# Patient Record
Sex: FEMALE | Race: BLACK OR AFRICAN AMERICAN | NOT HISPANIC OR LATINO | Employment: OTHER | URBAN - METROPOLITAN AREA
[De-identification: names, ages, dates, MRNs, and addresses within clinical notes are randomized per-mention and may not be internally consistent; named-entity substitution may affect disease eponyms.]

---

## 2017-01-19 ENCOUNTER — INFUSION THERAPY VISIT (OUTPATIENT)
Dept: INFUSION THERAPY | Facility: CLINIC | Age: 77
End: 2017-01-19
Payer: MEDICARE

## 2017-01-19 VITALS
SYSTOLIC BLOOD PRESSURE: 88 MMHG | DIASTOLIC BLOOD PRESSURE: 63 MMHG | HEART RATE: 73 BPM | OXYGEN SATURATION: 98 % | TEMPERATURE: 97 F | RESPIRATION RATE: 18 BRPM

## 2017-01-19 DIAGNOSIS — K55.20 AVM (ARTERIOVENOUS MALFORMATION) OF SMALL BOWEL, ACQUIRED: ICD-10-CM

## 2017-01-19 DIAGNOSIS — R19.7 DIARRHEA: Primary | ICD-10-CM

## 2017-01-19 DIAGNOSIS — K31.811 ANGIODYSPLASIA OF STOMACH AND DUODENUM WITH HEMORRHAGE: ICD-10-CM

## 2017-01-19 DIAGNOSIS — D50.0 IRON DEFICIENCY ANEMIA DUE TO CHRONIC BLOOD LOSS: ICD-10-CM

## 2017-01-19 PROCEDURE — 99207 ZZC NO CHARGE LOS: CPT

## 2017-01-19 PROCEDURE — 96372 THER/PROPH/DIAG INJ SC/IM: CPT | Performed by: NURSE PRACTITIONER

## 2017-01-19 RX ADMIN — OCTREOTIDE ACETATE 20 MG: KIT INTRAMUSCULAR at 12:06

## 2017-01-19 NOTE — PROGRESS NOTES
Infusion Nursing Note:  Rachele LORRIE Martínez presents today for Sandostatin.    Patient seen by provider today: No   present during visit today: Not Applicable.    Note: pt last had injection on 12/22, tolerated without issue.    Intravenous Access:  No Intravenous access/labs at this visit.    Treatment Conditions:  Not Applicable.      Post Infusion Assessment:  Patient tolerated injection without incident to RIGHT gluteal.    Discharge Plan:   Patient and/or family verbalized understanding of discharge instructions and all questions answered.  Copy of AVS reviewed with patient and/or family.  Patient will return 4 weeks for next appointment, they will schedule this on their way out today.  Patient discharged in stable condition accompanied by: self and daughter.  Departure Mode: Ambulatory.    Jerilyn Guajardo RN

## 2017-02-16 ENCOUNTER — INFUSION THERAPY VISIT (OUTPATIENT)
Dept: INFUSION THERAPY | Facility: CLINIC | Age: 77
End: 2017-02-16
Payer: MEDICARE

## 2017-02-16 VITALS
HEART RATE: 71 BPM | TEMPERATURE: 97 F | BODY MASS INDEX: 23.21 KG/M2 | DIASTOLIC BLOOD PRESSURE: 66 MMHG | WEIGHT: 135.2 LBS | SYSTOLIC BLOOD PRESSURE: 121 MMHG

## 2017-02-16 DIAGNOSIS — R19.7 DIARRHEA: Primary | ICD-10-CM

## 2017-02-16 DIAGNOSIS — K55.20 AVM (ARTERIOVENOUS MALFORMATION) OF SMALL BOWEL, ACQUIRED: ICD-10-CM

## 2017-02-16 DIAGNOSIS — D50.0 IRON DEFICIENCY ANEMIA DUE TO CHRONIC BLOOD LOSS: ICD-10-CM

## 2017-02-16 DIAGNOSIS — K31.811 ANGIODYSPLASIA OF STOMACH AND DUODENUM WITH HEMORRHAGE: ICD-10-CM

## 2017-02-16 PROCEDURE — 96372 THER/PROPH/DIAG INJ SC/IM: CPT | Performed by: NURSE PRACTITIONER

## 2017-02-16 PROCEDURE — 99207 ZZC NO CHARGE LOS: CPT

## 2017-02-16 RX ADMIN — OCTREOTIDE ACETATE 20 MG: KIT INTRAMUSCULAR at 11:50

## 2017-02-16 NOTE — PROGRESS NOTES
Infusion Nursing Note:  Rachele Martínez presents today for Sandostatin.    Patient seen by provider today: No   present during visit today: Not Applicable.    Note: N/A.    Intravenous Access:  No Intravenous access/labs at this visit.    Treatment Conditions:  Not Applicable.      Post Infusion Assessment:  Patient tolerated injection without incident.  Site patent and intact, free from redness, edema or discomfort.    Discharge Plan:   Copy of AVS reviewed with patient and/or family.  Patient will return in 1 month for next appointment.  Patient discharged in stable condition accompanied by: daughter.  Departure Mode: Ambulatory.    Salome Bahena RN

## 2017-02-16 NOTE — MR AVS SNAPSHOT
After Visit Summary   2/16/2017    Rachele Martínez    MRN: [id number]           Patient Information     Date Of Birth          1940        Visit Information        Provider Department      2/16/2017 11:30 AM [address]        Today's Diagnoses     Diarrhea    -  1    Angiodysplasia of stomach and duodenum with hemorrhage        AVM (arteriovenous malformation) of small bowel, acquired (H)        Iron deficiency anemia due to chronic blood loss           Follow-ups after your visit        Your next 10 appointments already scheduled     May 04, 2017  9:30 AM CDT   Lab with  LAB   [facility] ([facility])    [address] [place] [address] [phone]            May 04, 2017 10:00 AM CDT   US ABDOMEN COMPLETE with [address] [place] [address] Imaging Center US ([facility])    [address] [place] [address] [phone]           Please bring a list of your medicines (including vitamins, minerals and over-the-counter drugs). Also, tell your doctor about any allergies you may have. Wear comfortable clothes and leave your valuables at home.  Adults: No eating or drinking for 8 hours before the exam. You may take medicine with a small sip of water.  Children: - Children 6+ years: No food or drink for 6 hours before exam. - Children 1-5 years: No food or drink for 4 hours before exam. - Infants, breast-fed: may have breast milk up to 2 hours before exam. - Infants, formula: may have bottle until 4 hours before exam.  Please call the Imaging Department at your exam site with any questions.            May 04, 2017 11:00 AM CDT   (Arrive by 10:45 AM)   Return General Liver with Nguyen Yusuf PA-C   [facility] ([facility])    [address] [place] [address] [phone]              Who to contact      If you have questions or need follow up information about today's clinic visit or your schedule please contact [facility] directly at [phone].  Normal or non-critical lab and imaging results will be communicated to you by Covacsishart, letter or phone within 4 business days after the clinic has received the results. If you do not hear from us within 7 days, please contact the clinic through Covacsishart or phone. If you have a critical or abnormal lab result, we will notify you by phone as soon as possible.  Submit refill requests through Signal Vine or call your pharmacy and they will forward the refill request to us. Please allow 3 business days for your refill to be completed.          Additional Information About Your Visit        Signal Vine Information     Signal Vine is an electronic gateway that provides easy, online access to your medical records. With Signal Vine, you can request a clinic appointment, read your test results, renew a prescription or communicate with your care team.     To sign up for Signal Vine visit the website at [email or web address]   You will be asked to enter the access code listed below, as well as some personal information. Please follow the directions to create your username and password.     Your access code is: [email or web address]  Expires: 2017  6:31 AM     Your access code will  in 90 days. If you need help or a new code, please contact your [facility] or call [phone] for assistance.        Care EveryWhere ID     This is your Care EveryWhere ID. This could be used by other organizations to access your [place] medical records  [id number]        Your Vitals Were     Pulse Temperature BMI (Body Mass Index)             71 97  F (36.1  C) (Oral) 23.21 kg/m2          Blood Pressure from Last 3 Encounters:   17 121/66   17 (!) 88/63   16 130/75    Weight from Last 3 Encounters:   17 61.3 kg (135 lb 3.2 oz)    12/22/16 61 kg (134 lb 8 oz)   11/03/16 61.1 kg (134 lb 12.8 oz)              Today, you had the following     No orders found for display         Today's Medication Changes          These changes are accurate as of: 2/16/17 11:55 AM.  If you have any questions, ask your nurse or doctor.               These medicines have changed or have updated prescriptions.        Dose/Directions    carvedilol 6.25 MG tablet   Commonly known as:  COREG   This may have changed:    - how much to take  - additional instructions   Used for:  Hypertension goal BP (blood pressure) < 140/80        Dose:  18.75 mg   Take 3 tablets (18.75 mg) by mouth 2 times daily (with meals)   Quantity:  150 tablet   Refills:  3                Primary Care Provider Office Phone # Fax #    Agnieszka Rodriguez [phone]       [address] [place] [address]        Thank you!     Thank you for choosing [facility]  for your care. Our goal is always to provide you with excellent care. Hearing back from our patients is one way we can continue to improve our services. Please take a few minutes to complete the written survey that you may receive in the mail after your visit with us. Thank you!             Your Updated Medication List - Protect others around you: Learn how to safely use, store and throw away your medicines at www.disposemymeds.org.          This list is accurate as of: 2/16/17 11:55 AM.  Always use your most recent med list.                   Brand Name Dispense Instructions for use    Calcium Acetate (Phos Binder) 667 MG Caps      TAKE 2 CAPSULE BY MOUTH THREE TIMES A DAY WITH MEALS       calcium carbonate 500 MG tablet    OS-BELGICA 500 mg [place]. Ca    90 tablet    Take 1 tablet (500 mg) by mouth 3 times daily       carvedilol 6.25 MG tablet    COREG    150 tablet    Take 3 tablets (18.75 mg) by mouth 2 times daily (with meals)       OCTREOTIDE ACETATE IJ      Inject as  directed every 30 days       order for DME     1 Device    Equipment being ordered: 4 wheel walker with seat.       polyethylene glycol 3350 Powd     1530 g    Take 17 g by mouth daily       sertraline 50 MG tablet    ZOLOFT    90 tablet    Take 1 tablet (50 mg) by mouth daily

## 2017-03-16 ENCOUNTER — INFUSION THERAPY VISIT (OUTPATIENT)
Dept: INFUSION THERAPY | Facility: CLINIC | Age: 77
End: 2017-03-16
Payer: MEDICARE

## 2017-03-16 VITALS
SYSTOLIC BLOOD PRESSURE: 118 MMHG | DIASTOLIC BLOOD PRESSURE: 70 MMHG | HEART RATE: 81 BPM | OXYGEN SATURATION: 100 % | TEMPERATURE: 97.8 F | RESPIRATION RATE: 18 BRPM

## 2017-03-16 DIAGNOSIS — K55.20 AVM (ARTERIOVENOUS MALFORMATION) OF SMALL BOWEL, ACQUIRED: ICD-10-CM

## 2017-03-16 DIAGNOSIS — D50.0 IRON DEFICIENCY ANEMIA DUE TO CHRONIC BLOOD LOSS: ICD-10-CM

## 2017-03-16 DIAGNOSIS — R19.7 DIARRHEA: Primary | ICD-10-CM

## 2017-03-16 DIAGNOSIS — K31.811 ANGIODYSPLASIA OF STOMACH AND DUODENUM WITH HEMORRHAGE: ICD-10-CM

## 2017-03-16 PROCEDURE — 96372 THER/PROPH/DIAG INJ SC/IM: CPT | Performed by: NURSE PRACTITIONER

## 2017-03-16 PROCEDURE — 99207 ZZC NO CHARGE LOS: CPT

## 2017-03-16 RX ADMIN — OCTREOTIDE ACETATE 20 MG: KIT INTRAMUSCULAR at 12:02

## 2017-03-16 NOTE — PROGRESS NOTES
Infusion Nursing Note:  Rachele Martínez presents today for Sandostatin.    Patient seen by provider today: No   present during visit today: Not Applicable.    Note: N/A.    Intravenous Access:  No Intravenous access/labs at this visit.    Treatment Conditions:  Not Applicable.      Post Infusion Assessment:  Patient tolerated injection without incident.    Discharge Plan:   Pt to make appt for 4 wks.    ADAM GUO RN

## 2017-03-16 NOTE — MR AVS SNAPSHOT
After Visit Summary   3/16/2017    Rachele Martínez    MRN: [id number]           Patient Information     Date Of Birth          1940        Visit Information        Provider Department      3/16/2017 11:30 AM [address]        Today's Diagnoses     Diarrhea    -  1    Angiodysplasia of stomach and duodenum with hemorrhage        AVM (arteriovenous malformation) of small bowel, acquired (H)        Iron deficiency anemia due to chronic blood loss           Follow-ups after your visit        Your next 10 appointments already scheduled     Apr 13, 2017 11:30 AM CDT   Level O with [address] ([facility])    [address] [address] [place] [id number] [phone]            May 04, 2017  9:30 AM CDT   Lab with  LAB   [facility])    [address] [place] [address] [phone]            May 04, 2017 10:00 AM CDT   US ABDOMEN COMPLETE with [email or web address]   [facility] Imaging Center US ([facility])    [address] [place] [address] [phone]           Please bring a list of your medicines (including vitamins, minerals and over-the-counter drugs). Also, tell your doctor about any allergies you may have. Wear comfortable clothes and leave your valuables at home.  Adults: No eating or drinking for 8 hours before the exam. You may take medicine with a small sip of water.  Children: - Children 6+ years: No food or drink for 6 hours before exam. - Children 1-5 years: No food or drink for 4 hours before exam. - Infants, breast-fed: may have breast milk up to 2 hours before exam. - Infants, formula: may have bottle until 4 hours before exam.  Please call the Imaging Department at your exam site with any questions.            May 04, 2017 11:00 AM CDT   (Arrive by 10:45 AM)   Return General Liver with  Nguyen Yusuf PA-C   [facility] ([facility])    [address] [facility]  3rd [facility] [address] [phone]              Who to contact     If you have questions or need follow up information about today's clinic visit or your schedule please contact [facility] directly at [phone].  Normal or non-critical lab and imaging results will be communicated to you by MyChart, letter or phone within 4 business days after the clinic has received the results. If you do not hear from us within 7 days, please contact the clinic through MyChart or phone. If you have a critical or abnormal lab result, we will notify you by phone as soon as possible.  Submit refill requests through Fortem or call your pharmacy and they will forward the refill request to us. Please allow 3 business days for your refill to be completed.          Additional Information About Your Visit        Fortem Information     Fortem is an electronic gateway that provides easy, online access to your medical records. With Fortem, you can request a clinic appointment, read your test results, renew a prescription or communicate with your care team.     To sign up for Fortem visit the website at [email or web address]   You will be asked to enter the access code listed below, as well as some personal information. Please follow the directions to create your username and password.     Your access code is: [email or web address]  Expires: 2017  4:49 PM     Your access code will  in 90 days. If you need help or a new code, please contact your [facility] or call [phone] for assistance.        Care EveryWhere ID     This is your Care EveryWhere ID. This could be used by other organizations to access your [place] medical records  [id number]        Your Vitals Were     Pulse Temperature Respirations Pulse Oximetry          81 97.8  F (36.6  C)  (Oral) 18 100%         Blood Pressure from Last 3 Encounters:   03/16/17 118/70   02/16/17 121/66   01/19/17 (!) 88/63    Weight from Last 3 Encounters:   02/16/17 61.3 kg (135 lb 3.2 oz)   12/22/16 61 kg (134 lb 8 oz)   11/03/16 61.1 kg (134 lb 12.8 oz)              Today, you had the following     No orders found for display       Primary Care Provider Office Phone # Fax #    Agnieszka Rodriguez [phone]       [address]  [facility] [address]        Thank you!     Thank you for choosing [facility]  for your care. Our goal is always to provide you with excellent care. Hearing back from our patients is one way we can continue to improve our services. Please take a few minutes to complete the written survey that you may receive in the mail after your visit with us. Thank you!             Your Updated Medication List - Protect others around you: Learn how to safely use, store and throw away your medicines at www.disposemymeds.org.          This list is accurate as of: 3/16/17  4:49 PM.  Always use your most recent med list.                   Brand Name Dispense Instructions for use    Calcium Acetate (Phos Binder) 667 MG Caps      TAKE 2 CAPSULE BY MOUTH THREE TIMES A DAY WITH MEALS       calcium carbonate 500 MG tablet    OS-BELGICA 500 mg [place]. Ca    90 tablet    Take 1 tablet (500 mg) by mouth 3 times daily       carvedilol 6.25 MG tablet    COREG    150 tablet    Take 3 tablets (18.75 mg) by mouth 2 times daily (with meals)       OCTREOTIDE ACETATE IJ      Inject as directed every 30 days       order for DME     1 Device    Equipment being ordered: 4 wheel walker with seat.       polyethylene glycol 3350 Powd     1530 g    Take 17 g by mouth daily       sertraline 50 MG tablet    ZOLOFT    90 tablet    Take 1 tablet (50 mg) by mouth daily

## 2017-04-13 ENCOUNTER — INFUSION THERAPY VISIT (OUTPATIENT)
Dept: INFUSION THERAPY | Facility: CLINIC | Age: 77
End: 2017-04-13
Payer: MEDICARE

## 2017-04-13 VITALS
TEMPERATURE: 97.2 F | RESPIRATION RATE: 18 BRPM | SYSTOLIC BLOOD PRESSURE: 112 MMHG | HEART RATE: 74 BPM | OXYGEN SATURATION: 99 % | DIASTOLIC BLOOD PRESSURE: 61 MMHG

## 2017-04-13 DIAGNOSIS — D50.0 IRON DEFICIENCY ANEMIA DUE TO CHRONIC BLOOD LOSS: ICD-10-CM

## 2017-04-13 DIAGNOSIS — K55.20 AVM (ARTERIOVENOUS MALFORMATION) OF SMALL BOWEL, ACQUIRED: ICD-10-CM

## 2017-04-13 DIAGNOSIS — K31.811 ANGIODYSPLASIA OF STOMACH AND DUODENUM WITH HEMORRHAGE: ICD-10-CM

## 2017-04-13 DIAGNOSIS — R19.7 DIARRHEA: Primary | ICD-10-CM

## 2017-04-13 PROCEDURE — 96372 THER/PROPH/DIAG INJ SC/IM: CPT | Performed by: NURSE PRACTITIONER

## 2017-04-13 PROCEDURE — 99207 ZZC NO CHARGE LOS: CPT

## 2017-04-13 RX ADMIN — OCTREOTIDE ACETATE 20 MG: KIT INTRAMUSCULAR at 11:51

## 2017-04-13 NOTE — PROGRESS NOTES
Infusion Nursing Note:  Rachele Martínez presents today for Sandostatin.    Patient seen by provider today: No   present during visit today: Not Applicable.    Note: N/A.    Intravenous Access:  No Intravenous access/labs at this visit.    Treatment Conditions:  Not Applicable.      Post Infusion Assessment:  Patient tolerated injection without incident.  Site patent and intact, free from redness, edema or discomfort.    Discharge Plan:   Patient will return 5/10/17 for next appointment.   Patient discharged in stable condition accompanied by: self and wife.  Departure Mode: Ambulatory.    Loraine García RN

## 2017-04-13 NOTE — MR AVS SNAPSHOT
After Visit Summary   4/13/2017    Rachele Martínez    MRN: [id number]           Patient Information     Date Of Birth          1940        Visit Information        Provider Department      4/13/2017 11:30 AM [address]        Today's Diagnoses     Diarrhea    -  1    Angiodysplasia of stomach and duodenum with hemorrhage        AVM (arteriovenous malformation) of small bowel, acquired (H)        Iron deficiency anemia due to chronic blood loss           Follow-ups after your visit        Follow-up notes from your care team     Return in about 4 weeks (around 5/11/2017).      Your next 10 appointments already scheduled     May 04, 2017  9:30 AM CDT   Lab with  LAB   [facility] ([facility])    [address] [place] [address] [phone]            May 04, 2017 10:00 AM CDT   US ABDOMEN COMPLETE with US1   [facility] Imaging Center US ([facility])    [address] [place] [address] [phone]           Please bring a list of your medicines (including vitamins, minerals and over-the-counter drugs). Also, tell your doctor about any allergies you may have. Wear comfortable clothes and leave your valuables at home.  Adults: No eating or drinking for 8 hours before the exam. You may take medicine with a small sip of water.  Children: - Children 6+ years: No food or drink for 6 hours before exam. - Children 1-5 years: No food or drink for 4 hours before exam. - Infants, breast-fed: may have breast milk up to 2 hours before exam. - Infants, formula: may have bottle until 4 hours before exam.  Please call the Imaging Department at your exam site with any questions.            May 04, 2017 11:00 AM CDT   (Arrive by 10:45 AM)   Return General Liver with Nguyen Yusuf PA-C   [facility] Hepatology ([facility])    [address] [place] [address]  [address]  3rd [employer] [id number] [phone]            May 11, 2017 11:30 AM CDT   Level O with [address] ([facility])    [address] [address] [place] [address] [phone]              Who to contact     If you have questions or need follow up information about today's clinic visit or your schedule please contact [facility] directly at [phone].  Normal or non-critical lab and imaging results will be communicated to you by Zoundshart, letter or phone within 4 business days after the clinic has received the results. If you do not hear from us within 7 days, please contact the clinic through Zoundshart or phone. If you have a critical or abnormal lab result, we will notify you by phone as soon as possible.  Submit refill requests through Earnix or call your pharmacy and they will forward the refill request to us. Please allow 3 business days for your refill to be completed.          Additional Information About Your Visit        Earnix Information     Earnix is an electronic gateway that provides easy, online access to your medical records. With Earnix, you can request a clinic appointment, read your test results, renew a prescription or communicate with your care team.     To sign up for Earnix visit the website at [email or web address]   You will be asked to enter the access code listed below, as well as some personal information. Please follow the directions to create your username and password.     Your access code is: [email or web address]  Expires: 2017  4:49 PM     Your access code will  in 90 days. If you need help or a new code, please contact your [facility] or call [phone] for assistance.        Care EveryWhere ID     This is your Care EveryWhere ID. This could be used by other organizations to access your [place] medical records  [id number]        Your  Vitals Were     Pulse Temperature Respirations Pulse Oximetry          74 97.2  F (36.2  C) (Oral) 18 99%         Blood Pressure from Last 3 Encounters:   04/13/17 112/61   03/16/17 118/70   02/16/17 121/66    Weight from Last 3 Encounters:   02/16/17 61.3 kg (135 lb 3.2 oz)   12/22/16 61 kg (134 lb 8 oz)   11/03/16 61.1 kg (134 lb 12.8 oz)              Today, you had the following     No orders found for display       Primary Care Provider Office Phone # Fax #    Agnieszka Erica Rodriguez [phone]       [address]  [facility] [address]        Thank you!     Thank you for choosing [facility]  for your care. Our goal is always to provide you with excellent care. Hearing back from our patients is one way we can continue to improve our services. Please take a few minutes to complete the written survey that you may receive in the mail after your visit with us. Thank you!             Your Updated Medication List - Protect others around you: Learn how to safely use, store and throw away your medicines at www.disposemymeds.org.          This list is accurate as of: 4/13/17  1:55 PM.  Always use your most recent med list.                   Brand Name Dispense Instructions for use    Calcium Acetate (Phos Binder) 667 MG Caps      TAKE 2 CAPSULE BY MOUTH THREE TIMES A DAY WITH MEALS       calcium carbonate 500 MG tablet    OS-BELGICA 500 mg [place]. Ca    90 tablet    Take 1 tablet (500 mg) by mouth 3 times daily       OCTREOTIDE ACETATE IJ      Inject as directed every 30 days       order for DME     1 Device    Equipment being ordered: 4 wheel walker with seat.       polyethylene glycol 3350 Powd     1530 g    Take 17 g by mouth daily       sertraline 50 MG tablet    ZOLOFT    90 tablet    Take 1 tablet (50 mg) by mouth daily

## 2017-04-17 DIAGNOSIS — K74.60 CIRRHOSIS OF LIVER WITHOUT ASCITES, UNSPECIFIED HEPATIC CIRRHOSIS TYPE (H): Primary | ICD-10-CM

## 2017-05-09 ENCOUNTER — OFFICE VISIT (OUTPATIENT)
Dept: GASTROENTEROLOGY | Facility: CLINIC | Age: 77
End: 2017-05-09
Attending: INTERNAL MEDICINE
Payer: MEDICARE

## 2017-05-09 VITALS
HEIGHT: 64 IN | DIASTOLIC BLOOD PRESSURE: 71 MMHG | HEART RATE: 68 BPM | BODY MASS INDEX: 23.9 KG/M2 | OXYGEN SATURATION: 100 % | SYSTOLIC BLOOD PRESSURE: 123 MMHG | RESPIRATION RATE: 16 BRPM | TEMPERATURE: 98.2 F | WEIGHT: 140 LBS

## 2017-05-09 DIAGNOSIS — B18.2 CHRONIC HEPATITIS C WITHOUT HEPATIC COMA (H): Primary | ICD-10-CM

## 2017-05-09 DIAGNOSIS — K74.60 CIRRHOSIS OF LIVER WITHOUT ASCITES, UNSPECIFIED HEPATIC CIRRHOSIS TYPE (H): ICD-10-CM

## 2017-05-09 LAB
ALBUMIN SERPL-MCNC: 4 G/DL (ref 3.4–5)
ALP SERPL-CCNC: 144 U/L (ref 40–150)
ALT SERPL W P-5'-P-CCNC: 20 U/L (ref 0–50)
ANION GAP SERPL CALCULATED.3IONS-SCNC: 15 MMOL/L (ref 3–14)
AST SERPL W P-5'-P-CCNC: 29 U/L (ref 0–45)
BILIRUB DIRECT SERPL-MCNC: 0.1 MG/DL (ref 0–0.2)
BILIRUB SERPL-MCNC: 0.5 MG/DL (ref 0.2–1.3)
BUN SERPL-MCNC: 23 MG/DL (ref 7–30)
CALCIUM SERPL-MCNC: 8.7 MG/DL (ref 8.5–10.1)
CHLORIDE SERPL-SCNC: 94 MMOL/L (ref 94–109)
CO2 SERPL-SCNC: 29 MMOL/L (ref 20–32)
CREAT SERPL-MCNC: 6.88 MG/DL (ref 0.52–1.04)
ERYTHROCYTE [DISTWIDTH] IN BLOOD BY AUTOMATED COUNT: 14.5 % (ref 10–15)
GFR SERPL CREATININE-BSD FRML MDRD: 6 ML/MIN/1.7M2
GLUCOSE SERPL-MCNC: 117 MG/DL (ref 70–99)
HCT VFR BLD AUTO: 32.8 % (ref 35–47)
HGB BLD-MCNC: 10.5 G/DL (ref 11.7–15.7)
INR PPP: 1.19 (ref 0.86–1.14)
MCH RBC QN AUTO: 33.4 PG (ref 26.5–33)
MCHC RBC AUTO-ENTMCNC: 32 G/DL (ref 31.5–36.5)
MCV RBC AUTO: 105 FL (ref 78–100)
PLATELET # BLD AUTO: 260 10E9/L (ref 150–450)
POTASSIUM SERPL-SCNC: 3.7 MMOL/L (ref 3.4–5.3)
PROT SERPL-MCNC: 8.7 G/DL (ref 6.8–8.8)
RBC # BLD AUTO: 3.14 10E12/L (ref 3.8–5.2)
SODIUM SERPL-SCNC: 138 MMOL/L (ref 133–144)
WBC # BLD AUTO: 6.1 10E9/L (ref 4–11)

## 2017-05-09 PROCEDURE — 80048 BASIC METABOLIC PNL TOTAL CA: CPT | Performed by: INTERNAL MEDICINE

## 2017-05-09 PROCEDURE — 91200 LIVER ELASTOGRAPHY: CPT | Mod: ZF | Performed by: INTERNAL MEDICINE

## 2017-05-09 PROCEDURE — 85610 PROTHROMBIN TIME: CPT | Performed by: INTERNAL MEDICINE

## 2017-05-09 PROCEDURE — 36415 COLL VENOUS BLD VENIPUNCTURE: CPT | Performed by: INTERNAL MEDICINE

## 2017-05-09 PROCEDURE — 80076 HEPATIC FUNCTION PANEL: CPT | Performed by: INTERNAL MEDICINE

## 2017-05-09 PROCEDURE — 85027 COMPLETE CBC AUTOMATED: CPT | Performed by: INTERNAL MEDICINE

## 2017-05-09 PROCEDURE — 99213 OFFICE O/P EST LOW 20 MIN: CPT | Mod: ZF

## 2017-05-09 RX ORDER — B,C/FERROUS FUM/FA/D3/ZINC OX 8MG-800MCG
1 TABLET ORAL DAILY
COMMUNITY
End: 2020-11-04

## 2017-05-09 ASSESSMENT — PAIN SCALES - GENERAL: PAINLEVEL: NO PAIN (0)

## 2017-05-09 NOTE — MR AVS SNAPSHOT
After Visit Summary   5/9/2017    Rachele Martínez    MRN: [id number]           Patient Information     Date Of Birth          1940        Visit Information        Provider Department      5/9/2017 12:15 PM Andriy Barnett MD [facility] Hepatology        Today's Diagnoses     Chronic hepatitis C without hepatic coma (H)    -  1       Follow-ups after your visit        Your next 10 appointments already scheduled     May 11, 2017 11:30 AM CDT   Level O with BAY 6 INFUSION   [facility]    [address] [address] [place] [address]   [phone]            Nov 07, 2017 10:00 AM CST   Lab with  LAB   [facility] ([facility])    [address]  1st Floor  [facility] [address] [phone]            Nov 07, 2017 11:00 AM CST   (Arrive by 10:45 AM)   Return General Liver with Andriy Barnett MD   [facility] Hepatology ([facility])    [address]  3rd Floor  [facility] [address] [phone]              Future tests that were ordered for you today     Open Future Orders        Priority Expected Expires Ordered    Fibrosis Scan Routine 5/9/2017 5/9/2018 5/9/2017            Who to contact     If you have questions or need follow up information about today's clinic visit or your schedule please contact [facility] HEPATOLOGY directly at [phone].  Normal or non-critical lab and imaging results will be communicated to you by MyChart, letter or phone within 4 business days after the clinic has received the results. If you do not hear from us within 7 days, please contact the clinic through MyChart or phone. If you have a critical or abnormal lab result, we will notify you by phone as soon as possible.  Submit refill requests through [employer] or call your pharmacy and they will forward the refill request to us. Please allow 3 business days for your refill to be completed.           "Additional Information About Your Visit        Care EveryWhere ID     This is your Care EveryWhere ID. This could be used by other organizations to access your [facility] medical records  [id number]        Your Vitals Were     Pulse Temperature Respirations Height Pulse Oximetry BMI (Body Mass Index)    68 98.2  F (36.8  C) (Oral) 16 1.626 m (5' 4.02\") 100% 24.02 kg/m2       Blood Pressure from Last 3 Encounters:   05/09/17 123/71   04/13/17 112/61   03/16/17 118/70    Weight from Last 3 Encounters:   05/09/17 63.5 kg (140 lb)   02/16/17 61.3 kg (135 lb 3.2 oz)   12/22/16 61 kg (134 lb 8 oz)                 Today's Medication Changes          These changes are accurate as of: 5/9/17  1:10 PM.  If you have any questions, ask your nurse or doctor.               These medicines have changed or have updated prescriptions.        Dose/Directions    polyethylene glycol 3350 Powd   This may have changed:    - when to take this  - reasons to take this   Used for:  Slow transit constipation        Dose:  17 g   Take 17 g by mouth daily   Quantity:  1530 g   Refills:  3                Primary Care Provider Office Phone # Fax #    Agnieszkamena Rodriguez [phone]       [address]  [facility] [address]        Thank you!     Thank you for choosing [facility] HEPATOLOGY  for your care. Our goal is always to provide you with excellent care. Hearing back from our patients is one way we can continue to improve our services. Please take a few minutes to complete the written survey that you may receive in the mail after your visit with us. Thank you!             Your Updated Medication List - Protect others around you: Learn how to safely use, store and throw away your medicines at www.disposemymeds.org.          This list is accurate as of: 5/9/17  1:10 PM.  Always use your most recent med list.                   Brand Name Dispense Instructions for use    Calcium Acetate (Phos Binder) " 667 MG Caps      TAKE 2 CAPSULE BY MOUTH THREE TIMES A DAY WITH MEALS       OCTREOTIDE ACETATE IJ      Inject as directed every 30 days       order for DME     1 Device    Equipment being ordered: 4 wheel walker with seat.       polyethylene glycol 3350 Powd     1530 g    Take 17 g by mouth daily       PRORENAL + D Tabs      Take 1 tablet by mouth daily       sertraline 50 MG tablet    ZOLOFT    90 tablet    Take 1 tablet (50 mg) by mouth daily

## 2017-05-09 NOTE — NURSING NOTE
"Chief Complaint   Patient presents with     RECHECK     Hep C       Initial /71 (BP Location: Left arm, Patient Position: Chair, Cuff Size: Adult Regular)  Pulse 68  Temp 98.2  F (36.8  C) (Oral)  Resp 16  Ht 1.626 m (5' 4.02\")  Wt 63.5 kg (140 lb)  SpO2 100%  BMI 24.02 kg/m2 Estimated body mass index is 24.02 kg/(m^2) as calculated from the following:    Height as of this encounter: 1.626 m (5' 4.02\").    Weight as of this encounter: 63.5 kg (140 lb).  Medication Reconciliation: complete    "

## 2017-05-09 NOTE — LETTER
May 10, 2017       TO: Rachele Martínez  [address]    [place] [facility] [address]       Dear MsAbdiel,    Your liver stiffness assessment showed your liver stiffness score is 19.9 kilopascals.  This indicates significant scarring in your liver (stage 4/cirrhosis).    Please be sure to follow-up with your liver specialist as planned. If you have any questions or are unsure of your liver plan of care, please call our clinic at [phone].    Laurence Kaiser CNP, Hepatology

## 2017-05-09 NOTE — LETTER
5/9/2017      RE: Rachele Martínez  [address]    [place] [facility] [address]       HISTORY OF PRESENT ILLNESS:  I had the pleasure of seeing Rachele Martínez for followup in the Liver Clinic at the [facility] on 05/09/2017.  Ms. 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  She is a sustained virologic responder to hepatitis C treatment.      She is doing extremely well at this visit.  Unfortunately, she has started on dialysis, but otherwise denies any abdominal pain, itching or skin rash or fatigue.  She denies any increased abdominal girth or lower extremity edema.  She denies any fevers or chills, cough or shortness of breath.  She denies any nausea or vomiting, diarrhea or constipation.  Her appetite has been good, and her weight has been stable.  There have been no other new events since she was last seen.  Specifically, she has had no gastrointestinal bleeding or overt signs of encephalopathy.       Current Outpatient Prescriptions   Medication     Multiple Vitamins-Minerals (PRORENAL + D) TABS     Calcium Acetate, Phos Binder, 667 MG CAPS     OCTREOTIDE ACETATE IJ     order for DME     polyethylene glycol 3350 POWD     sertraline (ZOLOFT) 50 MG tablet     No current facility-administered medications for this visit.      B/P: 123/71, T: 98.2, P: 68, R: 16    HEENT exam shows no scleral icterus and no temporal muscle wasting.  Chest is clear.  Abdominal exam shows no increase in girth.  No masses or tenderness to palpation are present.  He liver is 10 cm in span without left lobe enlargement.  No spleen tip is palpable.  Extremity exam shows no edema.  Skin exam shows no stigmata of chronic liver disease.  Neurologic exam shows no asterixis.       Recent Results (from the past 168 hour(s))   Hepatic panel    Collection Time: 05/09/17 12:03 PM   Result Value Ref Range    Bilirubin Direct 0.1 0.0 - 0.2 mg/dL    Bilirubin Total  0.5 0.2 - 1.3 mg/dL    Albumin 4.0 3.4 - 5.0 g/dL    Protein Total 8.7 6.8 - 8.8 g/dL    Alkaline Phosphatase 144 40 - 150 U/L    ALT 20 0 - 50 U/L    AST 29 0 - 45 U/L   CBC with platelets    Collection Time: 05/09/17 12:03 PM   Result Value Ref Range    WBC 6.1 4.0 - 11.0 10e9/L    RBC Count 3.14 (L) 3.8 - 5.2 10e12/L    Hemoglobin 10.5 (L) 11.7 - 15.7 g/dL    Hematocrit 32.8 (L) 35.0 - 47.0 %     (H) 78 - 100 fl    MCH 33.4 (H) 26.5 - 33.0 pg    MCHC 32.0 31.5 - 36.5 g/dL    RDW 14.5 10.0 - 15.0 %    Platelet Count 260 150 - 450 10e9/L   INR    Collection Time: 05/09/17 12:03 PM   Result Value Ref Range    INR 1.19 (H) 0.86 - 1.14   Basic metabolic panel    Collection Time: 05/09/17 12:03 PM   Result Value Ref Range    Sodium 138 133 - 144 mmol/L    Potassium 3.7 3.4 - 5.3 mmol/L    Chloride 94 94 - 109 mmol/L    Carbon Dioxide 29 20 - 32 mmol/L    Anion Gap 15 (H) 3 - 14 mmol/L    Glucose 117 (H) 70 - 99 mg/dL    Urea Nitrogen 23 7 - 30 mg/dL    Creatinine 6.88 (H) 0.52 - 1.04 mg/dL    GFR Estimate 6 (L) >60 mL/min/1.7m2    GFR Estimate If Black 7 (L) >60 mL/min/1.7m2    Calcium 8.7 8.5 - 10.1 mg/dL      My impression is that Ms. Martínez has very well-compensated cirrhosis caused by chronic hepatitis C.  In fact, I did question whether she might have resolved her cirrhosis given her sustained virologic response to hepatitis C treatment.  We did go ahead and perform a fibrosis scan, which does show that she still has a little stiffness that would put her in the cirrhotic range.  She, however, has minimal evidence of cirrhosis by ultrasound and her last endoscopy showed no varices.  Moreover, her platelet count is normal also.  I will not be making any change to her medical regimen.  I will see her back in the clinic again in 6 months for routine cirrhosis care.      Thank you very much for allowing me to participate in the care of your patient.  If you have any questions regarding my recommendations,  please do not hesitate to contact me.       Andriy Barnett MD      Professor of Medicine  [employer]      Executive Medical Director, Solid Organ Transplant Program  [facility]

## 2017-05-11 ENCOUNTER — INFUSION THERAPY VISIT (OUTPATIENT)
Dept: INFUSION THERAPY | Facility: CLINIC | Age: 77
End: 2017-05-11
Payer: MEDICARE

## 2017-05-11 VITALS
DIASTOLIC BLOOD PRESSURE: 57 MMHG | TEMPERATURE: 97.4 F | HEART RATE: 73 BPM | BODY MASS INDEX: 24 KG/M2 | SYSTOLIC BLOOD PRESSURE: 99 MMHG | OXYGEN SATURATION: 98 % | WEIGHT: 139.9 LBS | RESPIRATION RATE: 18 BRPM

## 2017-05-11 DIAGNOSIS — R19.7 DIARRHEA: Primary | ICD-10-CM

## 2017-05-11 DIAGNOSIS — K31.811 ANGIODYSPLASIA OF STOMACH AND DUODENUM WITH HEMORRHAGE: ICD-10-CM

## 2017-05-11 DIAGNOSIS — K55.20 AVM (ARTERIOVENOUS MALFORMATION) OF SMALL BOWEL, ACQUIRED: ICD-10-CM

## 2017-05-11 DIAGNOSIS — D50.0 IRON DEFICIENCY ANEMIA DUE TO CHRONIC BLOOD LOSS: ICD-10-CM

## 2017-05-11 PROCEDURE — 99207 ZZC NO CHARGE LOS: CPT

## 2017-05-11 PROCEDURE — 96372 THER/PROPH/DIAG INJ SC/IM: CPT | Performed by: NURSE PRACTITIONER

## 2017-05-11 RX ADMIN — OCTREOTIDE ACETATE 20 MG: KIT INTRAMUSCULAR at 12:12

## 2017-05-11 NOTE — MR AVS SNAPSHOT
After Visit Summary   5/11/2017    Rachele Martínez    MRN: [id number]           Patient Information     Date Of Birth          1940        Visit Information        Provider Department      5/11/2017 11:30 AM [address]        Today's Diagnoses     Diarrhea    -  1    Angiodysplasia of stomach and duodenum with hemorrhage        AVM (arteriovenous malformation) of small bowel, acquired (H)        Iron deficiency anemia due to chronic blood loss           Follow-ups after your visit        Your next 10 appointments already scheduled     Jun 08, 2017 11:30 AM CDT   Level O with [address] ([facility])    [address] [address] [place] [id number] [phone]            Nov 07, 2017 10:00 AM CST   Lab with  LAB   [facility])    [address]  1st [facility] [address] [phone]            Nov 07, 2017 11:00 AM CST   (Arrive by 10:45 AM)   Return General Liver with Andriy Barnett MD   [facility] Hepatology ([facility])    [address]  3rd Floor  [facility] [address] [phone]              Who to contact     If you have questions or need follow up information about today's clinic visit or your schedule please contact [facility] directly at [phone].  Normal or non-critical lab and imaging results will be communicated to you by MyChart, letter or phone within 4 business days after the clinic has received the results. If you do not hear from us within 7 days, please contact the clinic through MyChart or phone. If you have a critical or abnormal lab result, we will notify you by phone as soon as possible.  Submit refill requests through [employer] or call your pharmacy and they will forward the refill request to us. Please allow 3 business days for your refill to be completed.           Additional Information About Your Visit        Care EveryWhere ID     This is your Care EveryWhere ID. This could be used by other organizations to access your [place] [facility] medical records  [id number]        Your Vitals Were     Pulse Temperature Respirations Pulse Oximetry BMI (Body Mass Index)       73 97.4  F (36.3  C) (Oral) 18 98% 24 kg/m2        Blood Pressure from Last 3 Encounters:   05/11/17 99/57   05/09/17 123/71   04/13/17 112/61    Weight from Last 3 Encounters:   05/11/17 63.5 kg (139 lb 14.4 oz)   05/09/17 63.5 kg (140 lb)   02/16/17 61.3 kg (135 lb 3.2 oz)              Today, you had the following     No orders found for display         Today's Medication Changes          These changes are accurate as of: 5/11/17 11:59 PM.  If you have any questions, ask your nurse or doctor.               These medicines have changed or have updated prescriptions.        Dose/Directions    polyethylene glycol 3350 Powd   This may have changed:    - when to take this  - reasons to take this   Used for:  Slow transit constipation        Dose:  17 g   Take 17 g by mouth daily   Quantity:  1530 g   Refills:  3                Primary Care Provider Office Phone # Fax #    Agnieszka Rodriguez [phone]       [address]  [facility] [address]        Thank you!     Thank you for choosing [facility]  for your care. Our goal is always to provide you with excellent care. Hearing back from our patients is one way we can continue to improve our services. Please take a few minutes to complete the written survey that you may receive in the mail after your visit with us. Thank you!             Your Updated Medication List - Protect others around you: Learn how to safely use, store and throw away your medicines at www.disposemymeds.org.          This list is accurate as of: 5/11/17 11:59 PM.  Always use your most recent med list.                   Brand Name Dispense  Instructions for use    Calcium Acetate (Phos Binder) 667 MG Caps      TAKE 2 CAPSULE BY MOUTH THREE TIMES A DAY WITH MEALS       OCTREOTIDE ACETATE IJ      Inject as directed every 30 days       order for DME     1 Device    Equipment being ordered: 4 wheel walker with seat.       polyethylene glycol 3350 Powd     1530 g    Take 17 g by mouth daily       PRORENAL + D Tabs      Take 1 tablet by mouth daily       sertraline 50 MG tablet    ZOLOFT    90 tablet    Take 1 tablet (50 mg) by mouth daily

## 2017-05-12 NOTE — PROGRESS NOTES
HISTORY OF PRESENT ILLNESS:  I had the pleasure of seeing Rachele Martínez for followup in the Liver Clinic at the [facility] on 05/09/2017.  Ms. 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  She is a sustained virologic responder to hepatitis C treatment.      She is doing extremely well at this visit.  Unfortunately, she has started on dialysis, but otherwise denies any abdominal pain, itching or skin rash or fatigue.  She denies any increased abdominal girth or lower extremity edema.  She denies any fevers or chills, cough or shortness of breath.  She denies any nausea or vomiting, diarrhea or constipation.  Her appetite has been good, and her weight has been stable.  There have been no other new events since she was last seen.  Specifically, she has had no gastrointestinal bleeding or overt signs of encephalopathy.       Current Outpatient Prescriptions   Medication     Multiple Vitamins-Minerals (PRORENAL + D) TABS     Calcium Acetate, Phos Binder, 667 MG CAPS     OCTREOTIDE ACETATE IJ     order for DME     polyethylene glycol 3350 POWD     sertraline (ZOLOFT) 50 MG tablet     No current facility-administered medications for this visit.      B/P: 123/71, T: 98.2, P: 68, R: 16    HEENT exam shows no scleral icterus and no temporal muscle wasting.  Chest is clear.  Abdominal exam shows no increase in girth.  No masses or tenderness to palpation are present.  He liver is 10 cm in span without left lobe enlargement.  No spleen tip is palpable.  Extremity exam shows no edema.  Skin exam shows no stigmata of chronic liver disease.  Neurologic exam shows no asterixis.       Recent Results (from the past 168 hour(s))   Hepatic panel    Collection Time: 05/09/17 12:03 PM   Result Value Ref Range    Bilirubin Direct 0.1 0.0 - 0.2 mg/dL    Bilirubin Total 0.5 0.2 - 1.3 mg/dL    Albumin 4.0 3.4 - 5.0 g/dL    Protein Total 8.7 6.8 - 8.8 g/dL    Alkaline  Phosphatase 144 40 - 150 U/L    ALT 20 0 - 50 U/L    AST 29 0 - 45 U/L   CBC with platelets    Collection Time: 05/09/17 12:03 PM   Result Value Ref Range    WBC 6.1 4.0 - 11.0 10e9/L    RBC Count 3.14 (L) 3.8 - 5.2 10e12/L    Hemoglobin 10.5 (L) 11.7 - 15.7 g/dL    Hematocrit 32.8 (L) 35.0 - 47.0 %     (H) 78 - 100 fl    MCH 33.4 (H) 26.5 - 33.0 pg    MCHC 32.0 31.5 - 36.5 g/dL    RDW 14.5 10.0 - 15.0 %    Platelet Count 260 150 - 450 10e9/L   INR    Collection Time: 05/09/17 12:03 PM   Result Value Ref Range    INR 1.19 (H) 0.86 - 1.14   Basic metabolic panel    Collection Time: 05/09/17 12:03 PM   Result Value Ref Range    Sodium 138 133 - 144 mmol/L    Potassium 3.7 3.4 - 5.3 mmol/L    Chloride 94 94 - 109 mmol/L    Carbon Dioxide 29 20 - 32 mmol/L    Anion Gap 15 (H) 3 - 14 mmol/L    Glucose 117 (H) 70 - 99 mg/dL    Urea Nitrogen 23 7 - 30 mg/dL    Creatinine 6.88 (H) 0.52 - 1.04 mg/dL    GFR Estimate 6 (L) >60 mL/min/1.7m2    GFR Estimate If Black 7 (L) >60 mL/min/1.7m2    Calcium 8.7 8.5 - 10.1 mg/dL      My impression is that Ms. Martínez has very well-compensated cirrhosis caused by chronic hepatitis C.  In fact, I did question whether she might have resolved her cirrhosis given her sustained virologic response to hepatitis C treatment.  We did go ahead and perform a fibrosis scan, which does show that she still has a little stiffness that would put her in the cirrhotic range.  She, however, has minimal evidence of cirrhosis by ultrasound and her last endoscopy showed no varices.  Moreover, her platelet count is normal also.  I will not be making any change to her medical regimen.  I will see her back in the clinic again in 6 months for routine cirrhosis care.      Thank you very much for allowing me to participate in the care of your patient.  If you have any questions regarding my recommendations, please do not hesitate to contact me.       Andriy Barnett MD      Professor of Medicine  [facility]  [employer]      Executive Medical Director, Solid Organ Transplant Program  [facility]

## 2017-06-08 ENCOUNTER — INFUSION THERAPY VISIT (OUTPATIENT)
Dept: INFUSION THERAPY | Facility: CLINIC | Age: 77
End: 2017-06-08
Payer: MEDICARE

## 2017-06-08 VITALS
SYSTOLIC BLOOD PRESSURE: 107 MMHG | TEMPERATURE: 98.1 F | DIASTOLIC BLOOD PRESSURE: 67 MMHG | HEART RATE: 77 BPM | RESPIRATION RATE: 16 BRPM | OXYGEN SATURATION: 94 %

## 2017-06-08 DIAGNOSIS — R19.7 DIARRHEA: Primary | ICD-10-CM

## 2017-06-08 DIAGNOSIS — D50.0 IRON DEFICIENCY ANEMIA DUE TO CHRONIC BLOOD LOSS: ICD-10-CM

## 2017-06-08 DIAGNOSIS — K31.811 ANGIODYSPLASIA OF STOMACH AND DUODENUM WITH HEMORRHAGE: ICD-10-CM

## 2017-06-08 DIAGNOSIS — K55.20 AVM (ARTERIOVENOUS MALFORMATION) OF SMALL BOWEL, ACQUIRED: ICD-10-CM

## 2017-06-08 PROCEDURE — 99207 ZZC NO CHARGE LOS: CPT

## 2017-06-08 PROCEDURE — 96372 THER/PROPH/DIAG INJ SC/IM: CPT | Performed by: INTERNAL MEDICINE

## 2017-06-08 RX ADMIN — OCTREOTIDE ACETATE 20 MG: KIT INTRAMUSCULAR at 11:52

## 2017-06-08 ASSESSMENT — PAIN SCALES - GENERAL: PAINLEVEL: NO PAIN (0)

## 2017-06-08 NOTE — MR AVS SNAPSHOT
After Visit Summary   6/8/2017    Rachele Martínez    MRN: [id number]           Patient Information     Date Of Birth          1940        Visit Information        Provider Department      6/8/2017 11:30 AM [place] [facility]        Today's Diagnoses     Diarrhea    -  1    Angiodysplasia of stomach and duodenum with hemorrhage        AVM (arteriovenous malformation) of small bowel, acquired (H)        Iron deficiency anemia due to chronic blood loss           Follow-ups after your visit        Your next 10 appointments already scheduled     Jul 07, 2017  3:00 PM CDT   (Arrive by 2:45 PM)   PHYSICAL with Agnieszka Rodriguez MD   [facility] ([facility])    [address] [place] [address]  4th Floor  [facility] [id number] [phone]            Jul 11, 2017 11:00 AM CDT   Level O with [address] ([facility])    [address] [place] [id number] [phone]            Nov 07, 2017 10:00 AM CST   Lab with  LAB   [facility] Lab ([facility])    [address] [place] [address]  1st [facility] [phone] [phone]            Nov 07, 2017 11:00 AM CST   (Arrive by 10:45 AM)   Return General Liver with Andriy Barnett MD   [facility] Hepatology ([facility])    [address] [place] [address]  3rd Floor  [facility] [phone] [phone]              Who to contact     If you have questions or need follow up information about today's clinic visit or your schedule please contact [facility] directly at [phone].  Normal or non-critical lab and imaging results will be communicated to you by MyChart, letter or phone within 4 business days after the clinic has received the results. If you do not hear from us within 7 days, please contact the clinic through MyChart or phone. If you have a critical or abnormal  lab result, we will notify you by phone as soon as possible.  Submit refill requests through [employer] or call your pharmacy and they will forward the refill request to us. Please allow 3 business days for your refill to be completed.          Additional Information About Your Visit        Care EveryWhere ID     This is your Care EveryWhere ID. This could be used by other organizations to access your [facility] medical records  [id number]        Your Vitals Were     Pulse Temperature Respirations Pulse Oximetry          77 98.1  F (36.7  C) (Oral) 16 94%         Blood Pressure from Last 3 Encounters:   06/08/17 107/67   05/11/17 99/57   05/09/17 123/71    Weight from Last 3 Encounters:   05/11/17 63.5 kg (139 lb 14.4 oz)   05/09/17 63.5 kg (140 lb)   02/16/17 61.3 kg (135 lb 3.2 oz)              Today, you had the following     No orders found for display         Today's Medication Changes          These changes are accurate as of: 6/8/17 11:55 AM.  If you have any questions, ask your nurse or doctor.               These medicines have changed or have updated prescriptions.        Dose/Directions    polyethylene glycol 3350 Powd   This may have changed:    - when to take this  - reasons to take this   Used for:  Slow transit constipation        Dose:  17 g   Take 17 g by mouth daily   Quantity:  1530 g   Refills:  3                Primary Care Provider Office Phone # Fax #    Agnieszkamena Rodriguez [phone]       [address] [place] [address]        Thank you!     Thank you for choosing [facility]  for your care. Our goal is always to provide you with excellent care. Hearing back from our patients is one way we can continue to improve our services. Please take a few minutes to complete the written survey that you may receive in the mail after your visit with us. Thank you!             Your Updated Medication List - Protect others around you: Learn  how to safely use, store and throw away your medicines at www.disposemymeds.org.          This list is accurate as of: 6/8/17 11:55 AM.  Always use your most recent med list.                   Brand Name Dispense Instructions for use    Calcium Acetate (Phos Binder) 667 MG Caps      TAKE 2 CAPSULE BY MOUTH THREE TIMES A DAY WITH MEALS       OCTREOTIDE ACETATE IJ      Inject as directed every 30 days       order for DME     1 Device    Equipment being ordered: 4 wheel walker with seat.       polyethylene glycol 3350 Powd     1530 g    Take 17 g by mouth daily       PRORENAL + D Tabs      Take 1 tablet by mouth daily       sertraline 50 MG tablet    ZOLOFT    90 tablet    Take 1 tablet (50 mg) by mouth daily

## 2017-07-07 ENCOUNTER — OFFICE VISIT (OUTPATIENT)
Dept: INTERNAL MEDICINE | Facility: CLINIC | Age: 77
End: 2017-07-07

## 2017-07-07 VITALS
DIASTOLIC BLOOD PRESSURE: 76 MMHG | SYSTOLIC BLOOD PRESSURE: 122 MMHG | HEART RATE: 88 BPM | RESPIRATION RATE: 16 BRPM | BODY MASS INDEX: 23.85 KG/M2 | WEIGHT: 139 LBS

## 2017-07-07 DIAGNOSIS — M25.561 CHRONIC PAIN OF RIGHT KNEE: ICD-10-CM

## 2017-07-07 DIAGNOSIS — R06.02 SHORTNESS OF BREATH: ICD-10-CM

## 2017-07-07 DIAGNOSIS — Z99.2 ESRD (END STAGE RENAL DISEASE) ON DIALYSIS (H): ICD-10-CM

## 2017-07-07 DIAGNOSIS — L29.9 ITCHING: Primary | ICD-10-CM

## 2017-07-07 DIAGNOSIS — G89.29 CHRONIC PAIN OF RIGHT KNEE: ICD-10-CM

## 2017-07-07 DIAGNOSIS — N18.6 ESRD (END STAGE RENAL DISEASE) ON DIALYSIS (H): ICD-10-CM

## 2017-07-07 DIAGNOSIS — R42 DIZZINESS: ICD-10-CM

## 2017-07-07 RX ORDER — LORATADINE 10 MG/1
10 TABLET ORAL DAILY
Qty: 90 TABLET | Refills: 3 | Status: SHIPPED | OUTPATIENT
Start: 2017-07-07 | End: 2017-07-11

## 2017-07-07 ASSESSMENT — PAIN SCALES - GENERAL: PAINLEVEL: NO PAIN (0)

## 2017-07-07 NOTE — NURSING NOTE
Chief Complaint   Patient presents with     Physical     Pt is here for a physical.      Laura Marsh LPN July 7, 2017 3:09 PM

## 2017-07-07 NOTE — MR AVS SNAPSHOT
After Visit Summary   7/7/2017    Rachele Martínez    MRN: [id number]           Patient Information     Date Of Birth          1940        Visit Information        Provider Department      7/7/2017 3:00 PM Agnieszka Rodriguez MD [facility]        Today's Diagnoses     Itching    -  1    Chronic pain of right knee        Dizziness        ESRD (end stage renal disease) on dialysis (H)        Shortness of breath           Care Instructions    Primary Care Center Medication Refill Request Information:  * Please contact your pharmacy regarding ANY request for medication refills.  ** [facility] Prescription Fax = [phone]  * Please allow 3 business days for routine medication refills.  * Please allow 5 business days for controlled substance medication refills.     Primary Care Center Test Result notification information:  *You will be notified within 7-10 days of your appointment day regarding the results of your test.  If you are on MyChart you will be notified as soon as the provider has reviewed the results and signed off on them.  ECHO DEPARTMENT  (EXERCISE)    Name: Rachele Martínez  YOB: 1940  MRN: [id number]  DATE:     TIME:     DOES PATIENT HAVE A DEFIBRILLATOR?  No  DOES PATIENT HAVE A PACEMAKER? No  IS PATIENT TAKING ANY BETA-BLOCKERS? No    PREP INFO:        NOTHING TO EAT OR DRINK 3 HOURS PRIOR.    DO NOT TAKE NITRATES ON THE DAY OF YOUR TEST. DO NOT WEAR YOUR NITRO-PATCH.    NO ALCOHOL, SMOKING, OR OTHER TOBACCO FOR 12 HOURS BEFORE THE TEST    WEAR COMFORTABLE CLOTHES AND COMFORTABLE SHOES    STOP BETA-BLOCKERS THE DAY BEFORE AND THE MORNING OF PROCEDURE.    LOCATION:       REPORT TO THE [facility] WAITING ROOM ON THE 2ND FLOOR, (STREET LEVEL) OF THE [facility], AT THE [facility].    QUESTIONS OR NEED TO RESCHEDULE: CALL [phone]          Follow-ups after your visit        Additional Services     PHYSICAL THERAPY REFERRAL        "*This therapy referral will be filtered to a centralized scheduling office at [facility] and the patient will receive a call to schedule an appointment at a [place] location most convenient for them. *     [facility] provides Physical Therapy evaluation and treatment and many specialty services across the [place] system.  If requesting a specialty program, please choose from the list below.    If you have not heard from the scheduling office within 2 business days, please call [phone] for all locations, with the exception of [place], please call [phone].  Treatment: Evaluation & Treatment  Special Instructions/Modalities: none  Special Programs: None    Please be aware that coverage of these services is subject to the terms and limitations of your health insurance plan.  Call member services at your health plan with any benefit or coverage questions.      **Note to Provider:  If you are referring outside of [place] for the therapy appointment, please list the name of the location in the \"special instructions\" above, print the referral and give to the patient to schedule the appointment.                  Your next 10 appointments already scheduled     Jul 11, 2017 11:00 AM CDT   Level O with [address] ([facility])    [address] [place] [address] [phone]            Jul 11, 2017  2:00 PM CDT   Ech Dobutamine Stress Test with UUEDOBR1   [facility] ([facility] [place])    [address] [address] [phone]           1.  Please bring or wear a comfortable two-piece outfit and walking shoes. 2.  Stop eating 3 hours before the test. You may drink water or juice. 3.  Stop all caffeine 12 hours before the test. This includes coffee, tea, soda pop, chocolate and certain medicines (such as Anacin and Excederin). Also " avoid decaf coffee and tea, as these contain small amounts of caffeine. 4.  No alcohol, smoking or use of other tobacco products for 12 hours before the test. 5.  Refer to your provider instructions to see if you need to stop any medications (such as beta-blockers or nitrates) for this test. 6.  For patients with diabetes: -   If you take insulin, call your diabetes care team. Ask if you should take a   dose the morning of your test. -   If you take diabetes medicine by mouth, don't take it on the morning of your test. Bring it with you to take after the test.  (If you have questions, call your diabetes care team) 7.  When you arrive, please tell us if: -   You have diabetes. -   You have taken Viagra, Cialis or Levitra in the past 48 hours. 8.  For any questions that cannot be answered, please contact the ordering physician            Nov 07, 2017 10:00 AM CST   Lab with  LAB   [facility] ([facility])    [address]  1st Floor  [facility] [address] [phone]            Nov 07, 2017 11:00 AM CST   (Arrive by 10:45 AM)   Return General Liver with Andriy Barnett MD   [facility] ([facility])    [address] [place] [address]  3rd Floor  [facility] [address] [phone]              Future tests that were ordered for you today     Open Future Orders        Priority Expected Expires Ordered    Dobutamine Stress Echocardiogram Routine  7/7/2018 7/7/2017            Who to contact     Please call your clinic at [phone] to:    Ask questions about your health    Make or cancel appointments    Discuss your medicines    Learn about your test results    Speak to your doctor   If you have compliments or concerns about an experience at your clinic, or if you wish to file a complaint, please contact [facility] Patient Relations at [phone] or email us at [email or web address]         Additional Information  About Your Visit        Care EveryWhere ID     This is your Care EveryWhere ID. This could be used by other organizations to access your [facility] medical records  [id number]        Your Vitals Were     Pulse Respirations BMI (Body Mass Index)             88 16 23.85 kg/m2          Blood Pressure from Last 3 Encounters:   07/07/17 122/76   06/08/17 107/67   05/11/17 99/57    Weight from Last 3 Encounters:   07/07/17 63 kg (139 lb)   05/11/17 63.5 kg (139 lb 14.4 oz)   05/09/17 63.5 kg (140 lb)              We Performed the Following     PHYSICAL THERAPY REFERRAL          Today's Medication Changes          These changes are accurate as of: 7/7/17  4:29 PM.  If you have any questions, ask your nurse or doctor.               Start taking these medicines.        Dose/Directions    loratadine 10 MG tablet   Commonly known as:  CLARITIN   Used for:  Itching   Started by:  Agnieszka Rodriguez MD        Dose:  10 mg   Take 1 tablet (10 mg) by mouth daily   Quantity:  90 tablet   Refills:  3         These medicines have changed or have updated prescriptions.        Dose/Directions    polyethylene glycol 3350 Powd   This may have changed:    - when to take this  - reasons to take this   Used for:  Slow transit constipation        Dose:  17 g   Take 17 g by mouth daily   Quantity:  1530 g   Refills:  3            Where to get your medicines      These medications were sent to [facility]/pharmacy #[id number] - [place], [place] - [address]  [address] [address]     Phone:  [phone]     loratadine 10 MG tablet                Primary Care Provider Office Phone # Fax #    Agnieszka Rodriguez [phone]       [facility] [address]  [facility] [address]        Equal Access to Services     JMAIE CARVAJAL : Crow Degroot, soy weiss, cee patel, jaz fowler. So [facility] [phone].    ATENCIÓN: Russell campbell  español, tiene a ibarra disposición servicios gratuitos de asistencia lingüística. Bettye werner [phone].    We comply with applicable federal civil rights laws and [place] laws. We do not discriminate on the basis of race, color, national origin, age, disability sex, sexual orientation or gender identity.            Thank you!     Thank you for choosing [facility]  for your care. Our goal is always to provide you with excellent care. Hearing back from our patients is one way we can continue to improve our services. Please take a few minutes to complete the written survey that you may receive in the mail after your visit with us. Thank you!             Your Updated Medication List - Protect others around you: Learn how to safely use, store and throw away your medicines at www.disposemymeds.org.          This list is accurate as of: 7/7/17  4:29 PM.  Always use your most recent med list.                   Brand Name Dispense Instructions for use Diagnosis    Calcium Acetate (Phos Binder) 667 MG Caps      TAKE 2 CAPSULE BY MOUTH THREE TIMES A DAY WITH MEALS        loratadine 10 MG tablet    CLARITIN    90 tablet    Take 1 tablet (10 mg) by mouth daily    Itching       OCTREOTIDE ACETATE IJ      Inject as directed every 30 days        order for DME     1 Device    Equipment being ordered: 4 wheel walker with seat.    Disorder of bone and cartilage, Arthralgia, unspecified joint, Tendency to fall       polyethylene glycol 3350 Powd     1530 g    Take 17 g by mouth daily    Slow transit constipation       PRORENAL + D Tabs      Take 1 tablet by mouth daily        sertraline 50 MG tablet    ZOLOFT    90 tablet    Take 1 tablet (50 mg) by mouth daily    Itching

## 2017-07-07 NOTE — PATIENT INSTRUCTIONS
Primary Care Center Medication Refill Request Information:  * Please contact your pharmacy regarding ANY request for medication refills.  ** [facility] Prescription Fax = [phone]  * Please allow 3 business days for routine medication refills.  * Please allow 5 business days for controlled substance medication refills.     Primary Care Center Test Result notification information:  *You will be notified within 7-10 days of your appointment day regarding the results of your test.  If you are on MyChart you will be notified as soon as the provider has reviewed the results and signed off on them.  ECHO DEPARTMENT  (EXERCISE)    Name: Rachele Martínez  YOB: 1940  MRN: [id number]  DATE:     TIME:     DOES PATIENT HAVE A DEFIBRILLATOR?  No  DOES PATIENT HAVE A PACEMAKER? No  IS PATIENT TAKING ANY BETA-BLOCKERS? No    PREP INFO:        NOTHING TO EAT OR DRINK 3 HOURS PRIOR.    DO NOT TAKE NITRATES ON THE DAY OF YOUR TEST. DO NOT WEAR YOUR NITRO-PATCH.    NO ALCOHOL, SMOKING, OR OTHER TOBACCO FOR 12 HOURS BEFORE THE TEST    WEAR COMFORTABLE CLOTHES AND COMFORTABLE SHOES    STOP BETA-BLOCKERS THE DAY BEFORE AND THE MORNING OF PROCEDURE.    LOCATION:       REPORT TO THE [facility] WAITING ROOM ON THE 2ND FLOOR, (STREET LEVEL) OF THE [facility], AT THE [facility].    QUESTIONS OR NEED TO RESCHEDULE: CALL [phone]

## 2017-07-11 ENCOUNTER — HOSPITAL ENCOUNTER (OUTPATIENT)
Dept: CARDIOLOGY | Facility: CLINIC | Age: 77
Discharge: HOME | End: 2017-07-11
Attending: INTERNAL MEDICINE | Admitting: INTERNAL MEDICINE
Payer: MEDICARE

## 2017-07-11 ENCOUNTER — INFUSION THERAPY VISIT (OUTPATIENT)
Dept: INFUSION THERAPY | Facility: CLINIC | Age: 77
End: 2017-07-11
Payer: MEDICARE

## 2017-07-11 VITALS
RESPIRATION RATE: 18 BRPM | OXYGEN SATURATION: 96 % | HEART RATE: 75 BPM | TEMPERATURE: 96.9 F | SYSTOLIC BLOOD PRESSURE: 121 MMHG | DIASTOLIC BLOOD PRESSURE: 63 MMHG

## 2017-07-11 DIAGNOSIS — K55.20 AVM (ARTERIOVENOUS MALFORMATION) OF SMALL BOWEL, ACQUIRED: ICD-10-CM

## 2017-07-11 DIAGNOSIS — K31.811 ANGIODYSPLASIA OF STOMACH AND DUODENUM WITH HEMORRHAGE: ICD-10-CM

## 2017-07-11 DIAGNOSIS — D50.0 IRON DEFICIENCY ANEMIA DUE TO CHRONIC BLOOD LOSS: ICD-10-CM

## 2017-07-11 DIAGNOSIS — R06.02 SHORTNESS OF BREATH: ICD-10-CM

## 2017-07-11 DIAGNOSIS — R19.7 DIARRHEA: Primary | ICD-10-CM

## 2017-07-11 DIAGNOSIS — R42 DIZZINESS: ICD-10-CM

## 2017-07-11 PROCEDURE — 93018 CV STRESS TEST I&R ONLY: CPT | Performed by: INTERNAL MEDICINE

## 2017-07-11 PROCEDURE — 93321 DOPPLER ECHO F-UP/LMTD STD: CPT | Mod: 26 | Performed by: INTERNAL MEDICINE

## 2017-07-11 PROCEDURE — 96372 THER/PROPH/DIAG INJ SC/IM: CPT | Performed by: INTERNAL MEDICINE

## 2017-07-11 PROCEDURE — 93325 DOPPLER ECHO COLOR FLOW MAPG: CPT | Mod: 26 | Performed by: INTERNAL MEDICINE

## 2017-07-11 PROCEDURE — 25000128 H RX IP 250 OP 636: Performed by: INTERNAL MEDICINE

## 2017-07-11 PROCEDURE — 93016 CV STRESS TEST SUPVJ ONLY: CPT | Performed by: INTERNAL MEDICINE

## 2017-07-11 PROCEDURE — 25500064 ZZH RX 255 OP 636: Performed by: INTERNAL MEDICINE

## 2017-07-11 PROCEDURE — 25000125 ZZHC RX 250: Performed by: INTERNAL MEDICINE

## 2017-07-11 PROCEDURE — 93350 STRESS TTE ONLY: CPT | Mod: 26 | Performed by: INTERNAL MEDICINE

## 2017-07-11 PROCEDURE — 40000264 ECHO DOBUTAMINE STRESS TEST WITH DEFINITY

## 2017-07-11 PROCEDURE — 99207 ZZC NO CHARGE NURSE ONLY: CPT

## 2017-07-11 RX ORDER — METOPROLOL TARTRATE 1 MG/ML
.5-15 INJECTION, SOLUTION INTRAVENOUS
Status: DISCONTINUED | OUTPATIENT
Start: 2017-07-11 | End: 2017-07-12 | Stop reason: HOSPADM

## 2017-07-11 RX ORDER — DOBUTAMINE HYDROCHLORIDE 200 MG/100ML
5-50 INJECTION INTRAVENOUS CONTINUOUS PRN
Status: COMPLETED | OUTPATIENT
Start: 2017-07-11 | End: 2017-07-11

## 2017-07-11 RX ADMIN — DOBUTAMINE IN DEXTROSE 30 MCG/KG/MIN: 200 INJECTION, SOLUTION INTRAVENOUS at 14:26

## 2017-07-11 RX ADMIN — METOPROLOL TARTRATE 2 MG: 5 INJECTION INTRAVENOUS at 14:33

## 2017-07-11 RX ADMIN — OCTREOTIDE ACETATE 20 MG: KIT INTRAMUSCULAR at 11:33

## 2017-07-11 RX ADMIN — PERFLUTREN 3 ML: 6.52 INJECTION, SUSPENSION INTRAVENOUS at 14:34

## 2017-07-11 NOTE — MR AVS SNAPSHOT
After Visit Summary   7/11/2017    Rachele Martínez    MRN: [id number]           Patient Information     Date Of Birth          1940        Visit Information        Provider Department      7/11/2017 11:00 AM [address]        Today's Diagnoses     Diarrhea    -  1    Angiodysplasia of stomach and duodenum with hemorrhage        AVM (arteriovenous malformation) of small bowel, acquired (H)        Iron deficiency anemia due to chronic blood loss           Follow-ups after your visit        Your next 10 appointments already scheduled     Jul 11, 2017  2:00 PM CDT   Ech Dobutamine Stress Test with UUEDOBR1   [facility] [place],  Echocardiography ([facility], [facility])    [address] [place] [address] [phone]           1.  Please bring or wear a comfortable two-piece outfit and walking shoes. 2.  Stop eating 3 hours before the test. You may drink water or juice. 3.  Stop all caffeine 12 hours before the test. This includes coffee, tea, soda pop, chocolate and certain medicines (such as Anacin and Excederin). Also avoid decaf coffee and tea, as these contain small amounts of caffeine. 4.  No alcohol, smoking or use of other tobacco products for 12 hours before the test. 5.  Refer to your provider instructions to see if you need to stop any medications (such as beta-blockers or nitrates) for this test. 6.  For patients with diabetes: -   If you take insulin, call your diabetes care team. Ask if you should take a   dose the morning of your test. -   If you take diabetes medicine by mouth, don't take it on the morning of your test. Bring it with you to take after the test.  (If you have questions, call your diabetes care team) 7.  When you arrive, please tell us if: -   You have diabetes. -   You have taken Viagra, Cialis or Levitra in the past 48 hours. 8.  For any questions that cannot be answered, please contact the  ordering physician            Jul 18, 2017 10:10 AM CDT   KATIE Extremity with Earnest Lerma, PT   [facility] ([facility]  )    [address]  [place] [address]   [phone]            Jul 25, 2017 10:10 AM CDT   KATIE Extremity with Earnest Lerma, PT   [facility] ([facility]  )    [address]  [place] [address]   [phone]            Aug 01, 2017 10:10 AM CDT   KATIE Extremity with Earnest Lerma, PT   [facility] ([facility]  )    [address]  [place] [address]   [phone]            Aug 08, 2017 11:30 AM CDT   Level O with BAY 2 INFUSION   [facility]    [address] [place] [id number] [phone]            Nov 07, 2017 10:00 AM CST   Lab with  LAB   [facility] ([facility])    [address]  1st Floor  [facility] [address] [phone]            Nov 07, 2017 11:00 AM CST   (Arrive by 10:45 AM)   Return General Liver with Andriy Barnett MD   [facility] ([facility])    [address]  3rd Floor  [facility] [id number] [phone]              Who to contact     If you have questions or need follow up information about today's clinic visit or your schedule please contact [facility] directly at [phone].  Normal or non-critical lab and imaging results will be communicated to you by MyChart, letter or phone within 4 business days after the clinic has received the results. If you do not hear from us within 7 days, please contact the clinic through MyChart or phone. If you have a critical or abnormal lab result, we will notify you by phone as soon as possible.  Submit refill requests through [employer] or call your pharmacy and they will forward the refill request to us.  Please allow 3 business days for your refill to be completed.          Additional Information About Your Visit        Care EveryWhere ID     This is your Care EveryWhere ID. This could be used by other organizations to access your [place] medical records  [id number]        Your Vitals Were     Pulse Temperature Respirations Pulse Oximetry          75 96.9  F (36.1  C) (Oral) 18 96%         Blood Pressure from Last 3 Encounters:   07/11/17 121/63   07/07/17 122/76   06/08/17 107/67    Weight from Last 3 Encounters:   07/07/17 63 kg (139 lb)   05/11/17 63.5 kg (139 lb 14.4 oz)   05/09/17 63.5 kg (140 lb)              Today, you had the following     No orders found for display         Today's Medication Changes          These changes are accurate as of: 7/11/17 11:53 AM.  If you have any questions, ask your nurse or doctor.               These medicines have changed or have updated prescriptions.        Dose/Directions    polyethylene glycol 3350 Powd   This may have changed:    - when to take this  - reasons to take this   Used for:  Slow transit constipation        Dose:  17 g   Take 17 g by mouth daily   Quantity:  1530 g   Refills:  3         Stop taking these medicines if you haven't already. Please contact your care team if you have questions.     loratadine 10 MG tablet   Commonly known as:  CLARITIN                    Primary Care Provider Office Phone # Fax #    Agnieszka Erica Rodriguez [phone]       [address] [place] [address]        Equal Access to Services     JAMIE CARVAJAL : Hadii lawrence Degroot, waaxda lumiguelangeladaha, qaybta kaalmada jaz patel. So [facility] [phone].    ATENCIÓN: Si habla español, tiene a ibarra disposición servicios gratuitos de asistencia lingüística. Llame al [phone].    We comply with applicable federal civil rights laws and [place] laws. We do not discriminate on the basis of  race, color, national origin, age, disability sex, sexual orientation or gender identity.            Thank you!     Thank you for choosing [facility]  for your care. Our goal is always to provide you with excellent care. Hearing back from our patients is one way we can continue to improve our services. Please take a few minutes to complete the written survey that you may receive in the mail after your visit with us. Thank you!             Your Updated Medication List - Protect others around you: Learn how to safely use, store and throw away your medicines at www.disposemymeds.org.          This list is accurate as of: 7/11/17 11:53 AM.  Always use your most recent med list.                   Brand Name Dispense Instructions for use Diagnosis    Calcium Acetate (Phos Binder) 667 MG Caps      TAKE 2 CAPSULE BY MOUTH THREE TIMES A DAY WITH MEALS        OCTREOTIDE ACETATE IJ      Inject as directed every 30 days        order for DME     1 Device    Equipment being ordered: 4 wheel walker with seat.    Disorder of bone and cartilage, Arthralgia, unspecified joint, Tendency to fall       polyethylene glycol 3350 Powd     1530 g    Take 17 g by mouth daily    Slow transit constipation       PRORENAL + D Tabs      Take 1 tablet by mouth daily        sertraline 50 MG tablet    ZOLOFT    90 tablet    Take 1 tablet (50 mg) by mouth daily    Itching

## 2017-07-11 NOTE — PROGRESS NOTES
Pt here for dobutamine stress test.  Test, meds and side effects reviewed with patient.  Achieved target HR at 40 mcg Dobutamine.  Pt very SOB at peak HR.  Gave a total of 2 mg IV Metoprolol to bring HR back to baseline.  Post monitoring complete and VSS.  Pt states she is feeling better after medication stopped.  Pt escorted out to the gold waiting room.

## 2017-07-11 NOTE — PROGRESS NOTES
Infusion Nursing Note:  Rachele Martínez presents today for sandostatin.    Patient seen by provider today: No   present during visit today: Not Applicable.    Treatment Conditions:  Patient continues to see primary care physician about chronic dizziness. No fevers or new medical concerns verbalized.      Post Infusion Assessment:  Patient tolerated injection without incident.    Discharge Plan:   Patient discharged in stable condition accompanied by: daughter.  Departure Mode: Wheelchair.  Verbally reviewed next sandostatin appointment on 8/8/17.    Erum Abel RN

## 2017-07-19 DIAGNOSIS — R00.2 PALPITATIONS: ICD-10-CM

## 2017-07-19 DIAGNOSIS — R42 LIGHTHEADEDNESS: Primary | ICD-10-CM

## 2017-07-20 NOTE — PROGRESS NOTES
Chief complaint:  Rachele Martínez is a 77 year old female presents for   Chief Complaint   Patient presents with     Physical     Pt is here for a physical.         SUBJECTIVE:  Has been doing well since last visit.  No further epsidoes of GI bleeding.  She is on HD and tolerating it well.  She is not very interested in PD as she does not want to be responsible for the cares at home.      Not very physically active.  Mostly sedentary.  Daughter is encouraging her to get out more.    Does have ongoing episodes of dizziness.  Previously thought they may be medication related, but she is now off BP meds.  Never had cardiac w/u.  Not positional.  Come on suddenly.  No syncope.    Medications and allergies were reviewed by me today.     SocHx:   History   Smoking Status     Former Smoker     Packs/day: 2.00     Years: 45.00     Types: Cigarettes     Quit date: 11/23/2002   Smokeless Tobacco     Never Used        Patient Active Problem List   Diagnosis     Health Care Home     Cataract     Anxiety state     Insomnia     Hyperlipidemia with target LDL less than 130     History of hepatitis C     ACP (advance care planning)     Iron deficiency anemia due to chronic blood loss     AVM (arteriovenous malformation) of small bowel, acquired (H)     ESRD (end stage renal disease) on dialysis (H)     Cirrhosis of liver without ascites, unspecified hepatic cirrhosis type (H)     Tendency to fall     Hypertension goal BP (blood pressure) < 140/80     Diarrhea     Angiodysplasia of stomach and duodenum with hemorrhage       Review Of Systems   10 point ROS completed and negative except noted above, including Gen, CV, Resp, GI, MS    PE:  /76  Pulse 88  Resp 16  Wt 63 kg (139 lb)  BMI 23.85 kg/m2  Gen: no distress, comfortable, pleasant   Eyes: anicteric, normal extra-ocular movements   Cardiovascular: regular rate and rhythm, normal S1 and S2, no murmurs, rubs or gallops, peripheral pulses full and symmetric    Respiratory: clear to auscultation, no wheezes or crackles, normal breath sounds   Gastrointestinal: positive bowel sounds, nontender,   Skin: no concerning lesions, no jaundice   Psychological: appropriate mood       ASSESSMENT/PLAN:  Itching  Try cetaphil    Chronic pain of right knee  - PHYSICAL THERAPY REFERRAL    Dizziness  Start with TTE.  If unremarkable will get ziopatch    ESRD (end stage renal disease) on dialysis (H)  Continue HD        HM:  Get records from HD ([facility] will fax labs)    RTC: sofía Rodriguez MD

## 2017-08-01 ENCOUNTER — ALLIED HEALTH/NURSE VISIT (OUTPATIENT)
Dept: CARDIOLOGY | Facility: CLINIC | Age: 77
End: 2017-08-01
Attending: INTERNAL MEDICINE
Payer: MEDICARE

## 2017-08-01 DIAGNOSIS — R42 LIGHTHEADEDNESS: ICD-10-CM

## 2017-08-01 DIAGNOSIS — R00.2 PALPITATIONS: ICD-10-CM

## 2017-08-01 PROCEDURE — 0298T ZZC EXT ECG > 48HR TO 21 DAY REVIEW AND INTERPRETATN: CPT | Performed by: INTERNAL MEDICINE

## 2017-08-01 PROCEDURE — 0296T ZIO PATCH HOLTER: CPT | Mod: ZF

## 2017-08-01 NOTE — MR AVS SNAPSHOT
After Visit Summary   8/1/2017    Rachele Martínez    MRN: [id number]           Patient Information     Date Of Birth          1940        Visit Information        Provider Department      8/1/2017 11:00 AM Tech, [facility] Monitor, [facility]        Today's Diagnoses     Lightheadedness        Palpitations           Follow-ups after your visit        Your next 10 appointments already scheduled     Aug 08, 2017 11:30 AM CDT   Level O with [place] 2 [facility] ([facility])    [address] [place] [address] [phone]            Sep 11, 2017 11:30 AM CDT   (Arrive by 11:15 AM)   New Patient Visit with Dereck Prado MD   [facility] ([facility])    [address] [place] [address] [id number] [phone]            Nov 07, 2017 10:00 AM CST   Lab with  LAB   [facility] Lab ([facility])    [address] [place] [address] [id number] [phone]            Nov 07, 2017 11:00 AM CST   (Arrive by 10:45 AM)   Return General Liver with Andriy Barnett MD   [facility] Hepatology ([facility])    [address] [place] [address] [id number] [phone]              Who to contact     If you have questions or need follow up information about today's clinic visit or your schedule please contact [facility] directly at [phone].  Normal or non-critical lab and imaging results will be communicated to you by MyChart, letter or phone within 4 business days after the clinic has received the results. If you do not hear from us within 7 days, please contact the clinic through MyChart or phone. If you have a critical or abnormal lab result, we will notify you by phone as soon as possible.  Submit refill requests through IPLocks or call your pharmacy and they will forward the refill request to us. Please  allow 3 business days for your refill to be completed.          Additional Information About Your Visit        Care EveryWhere ID     This is your Care EveryWhere ID. This could be used by other organizations to access your [place] medical records  [id number]         Blood Pressure from Last 3 Encounters:   07/11/17 121/63   07/07/17 122/76   06/08/17 107/67    Weight from Last 3 Encounters:   07/07/17 63 kg (139 lb)   05/11/17 63.5 kg (139 lb 14.4 oz)   05/09/17 63.5 kg (140 lb)              We Performed the Following     Zio Patch Holter          Today's Medication Changes          These changes are accurate as of: 8/1/17 11:54 AM.  If you have any questions, ask your nurse or doctor.               These medicines have changed or have updated prescriptions.        Dose/Directions    polyethylene glycol 3350 Powd   This may have changed:    - when to take this  - reasons to take this   Used for:  Slow transit constipation        Dose:  17 g   Take 17 g by mouth daily   Quantity:  1530 g   Refills:  3                Primary Care Provider Office Phone # Fax #    Agnieszka Erica Rodriguez [phone]       [facility] [address] [facility] [address]  [facility] [address]        Equal Access to Services     JAMIE CARVAJAL AH: Crow Degroot, watimda abhishek, qaybta kaalmada adecarolinayada, jaz fowler. So [facility] [phone].    ATENCIÓN: Si habla español, tiene a ibarra disposición servicios gratuitos de asistencia lingüística. Llame al [phone].    We comply with applicable federal civil rights laws and [place] laws. We do not discriminate on the basis of race, color, national origin, age, disability sex, sexual orientation or gender identity.            Thank you!     Thank you for choosing [facility]  for your care. Our goal is always to provide you with excellent care. Hearing back from our patients is one way we can continue to improve our services.  Please take a few minutes to complete the written survey that you may receive in the mail after your visit with us. Thank you!             Your Updated Medication List - Protect others around you: Learn how to safely use, store and throw away your medicines at www.disposemymeds.org.          This list is accurate as of: 8/1/17 11:54 AM.  Always use your most recent med list.                   Brand Name Dispense Instructions for use Diagnosis    Calcium Acetate (Phos Binder) 667 MG Caps      TAKE 2 CAPSULE BY MOUTH THREE TIMES A DAY WITH MEALS        OCTREOTIDE ACETATE IJ      Inject as directed every 30 days        order for DME     1 Device    Equipment being ordered: 4 wheel walker with seat.    Disorder of bone and cartilage, Arthralgia, unspecified joint, Tendency to fall       polyethylene glycol 3350 Powd     1530 g    Take 17 g by mouth daily    Slow transit constipation       PRORENAL + D Tabs      Take 1 tablet by mouth daily        sertraline 50 MG tablet    ZOLOFT    90 tablet    Take 1 tablet (50 mg) by mouth daily    Itching

## 2017-08-01 NOTE — NURSING NOTE
Per JESSICA Giraldo patient to have 7 day zio patch monitor placed.  Diagnosis: Palpitations  Monitor placed: Yes  Patient Instructed: Yes  Patient verbalized understanding: Yes  Holter # [id number]    Placed by Sheree Conde MA

## 2017-08-08 ENCOUNTER — INFUSION THERAPY VISIT (OUTPATIENT)
Dept: INFUSION THERAPY | Facility: CLINIC | Age: 77
End: 2017-08-08
Payer: MEDICARE

## 2017-08-08 VITALS
HEART RATE: 78 BPM | RESPIRATION RATE: 18 BRPM | SYSTOLIC BLOOD PRESSURE: 94 MMHG | BODY MASS INDEX: 23.81 KG/M2 | OXYGEN SATURATION: 100 % | TEMPERATURE: 98.4 F | DIASTOLIC BLOOD PRESSURE: 54 MMHG | WEIGHT: 138.8 LBS

## 2017-08-08 DIAGNOSIS — D50.0 IRON DEFICIENCY ANEMIA DUE TO CHRONIC BLOOD LOSS: ICD-10-CM

## 2017-08-08 DIAGNOSIS — K55.20 AVM (ARTERIOVENOUS MALFORMATION) OF SMALL BOWEL, ACQUIRED: ICD-10-CM

## 2017-08-08 DIAGNOSIS — K31.811 ANGIODYSPLASIA OF STOMACH AND DUODENUM WITH HEMORRHAGE: Primary | ICD-10-CM

## 2017-08-08 PROCEDURE — 96372 THER/PROPH/DIAG INJ SC/IM: CPT | Performed by: INTERNAL MEDICINE

## 2017-08-08 PROCEDURE — 99207 ZZC NO CHARGE NURSE ONLY: CPT

## 2017-08-08 RX ADMIN — OCTREOTIDE ACETATE 20 MG: KIT INTRAMUSCULAR at 12:12

## 2017-08-08 ASSESSMENT — PAIN SCALES - GENERAL: PAINLEVEL: NO PAIN (0)

## 2017-08-08 NOTE — MR AVS SNAPSHOT
After Visit Summary   8/8/2017    Rachele Martínez    MRN: [id number]           Patient Information     Date Of Birth          1940        Visit Information        Provider Department      8/8/2017 11:30 AM [place] [facility]        Today's Diagnoses     Angiodysplasia of stomach and duodenum with hemorrhage    -  1    AVM (arteriovenous malformation) of small bowel, acquired (H)        Iron deficiency anemia due to chronic blood loss           Follow-ups after your visit        Your next 10 appointments already scheduled     Sep 05, 2017 11:30 AM CDT   Level O with [address] ([facility])    [id number] [place] [phone]   [phone]            Sep 11, 2017 11:30 AM CDT   (Arrive by 11:15 AM)   New Patient Visit with Dereck Prado MD   [facility] Heart Care ([facility])    [address] [place] [address] [phone] [phone]            Nov 07, 2017 10:00 AM CST   Lab with  LAB   [facility] Lab ([facility])    [address] [place] [phone] [phone]            Nov 07, 2017 11:00 AM CST   (Arrive by 10:45 AM)   Return General Liver with Andriy Barnett MD   [facility] Hepatology ([facility])    [address] [place] [address] [id number] [phone]              Who to contact     If you have questions or need follow up information about today's clinic visit or your schedule please contact [facility] directly at [phone].  Normal or non-critical lab and imaging results will be communicated to you by MyChart, letter or phone within 4 business days after the clinic has received the results. If you do not hear from us within 7 days, please contact the clinic through MyChart or phone. If you have a critical or abnormal lab result, we will  notify you by phone as soon as possible.  Submit refill requests through Zapya or call your pharmacy and they will forward the refill request to us. Please allow 3 business days for your refill to be completed.          Additional Information About Your Visit        Zapya Information     Zapya is an electronic gateway that provides easy, online access to your medical records. With Zapya, you can request a clinic appointment, read your test results, renew a prescription or communicate with your care team.     To sign up for Zapya visit the website at [email or web address]   You will be asked to enter the access code listed below, as well as some personal information. Please follow the directions to create your username and password.     Your access code is: [email or web address]  Expires: 2017  3:05 PM     Your access code will  in 90 days. If you need help or a new code, please contact your [facility] or call [phone] for assistance.        Care EveryWhere ID     This is your Care EveryWhere ID. This could be used by other organizations to access your [place] medical records  [id number]        Your Vitals Were     Pulse Temperature Respirations Pulse Oximetry BMI (Body Mass Index)       78 98.4  F (36.9  C) (Oral) 18 100% 23.81 kg/m2        Blood Pressure from Last 3 Encounters:   17 94/54   17 121/63   17 122/76    Weight from Last 3 Encounters:   17 63 kg (138 lb 12.8 oz)   17 63 kg (139 lb)   17 63.5 kg (139 lb 14.4 oz)              Today, you had the following     No orders found for display         Today's Medication Changes          These changes are accurate as of: 17  3:05 PM.  If you have any questions, ask your nurse or doctor.               These medicines have changed or have updated prescriptions.        Dose/Directions    polyethylene glycol 3350 Powd   This may have changed:    - when to take this  -  reasons to take this   Used for:  Slow transit constipation        Dose:  17 g   Take 17 g by mouth daily   Quantity:  1530 g   Refills:  3                Primary Care Provider Office Phone # Fax #    Agnieszka Erica Rodriguez [phone]       [address]  [facility] [address]        Equal Access to Services     CLARISA [facility] : Hadii aad ku hadasho Soomaali, waaxda luqadaha, qaybta kaalmada adeegyada, waxay idiin hayaan adeeg kharash la'aan . So [facility] [phone].    ATENCIÓN: Si habla español, tiene a ibarra disposición servicios gratuitos de asistencia lingüística. Bettye al [phone].    We comply with applicable federal civil rights laws and [place] laws. We do not discriminate on the basis of race, color, national origin, age, disability sex, sexual orientation or gender identity.            Thank you!     Thank you for choosing [facility]  for your care. Our goal is always to provide you with excellent care. Hearing back from our patients is one way we can continue to improve our services. Please take a few minutes to complete the written survey that you may receive in the mail after your visit with us. Thank you!             Your Updated Medication List - Protect others around you: Learn how to safely use, store and throw away your medicines at www.disposemymeds.org.          This list is accurate as of: 8/8/17  3:05 PM.  Always use your most recent med list.                   Brand Name Dispense Instructions for use Diagnosis    Calcium Acetate (Phos Binder) 667 MG Caps      TAKE 2 CAPSULE BY MOUTH THREE TIMES A DAY WITH MEALS        OCTREOTIDE ACETATE IJ      Inject as directed every 30 days        order for DME     1 Device    Equipment being ordered: 4 wheel walker with seat.    Disorder of bone and cartilage, Arthralgia, unspecified joint, Tendency to fall       polyethylene glycol 3350 Powd     1530 g    Take 17 g by mouth daily    Slow  transit constipation       PRORENAL + D Tabs      Take 1 tablet by mouth daily        sertraline 50 MG tablet    ZOLOFT    90 tablet    Take 1 tablet (50 mg) by mouth daily    Itching

## 2017-09-01 DIAGNOSIS — R19.7 DIARRHEA: Primary | ICD-10-CM

## 2017-09-05 ENCOUNTER — ONCOLOGY VISIT (OUTPATIENT)
Dept: ONCOLOGY | Facility: CLINIC | Age: 77
End: 2017-09-05
Payer: MEDICARE

## 2017-09-05 ENCOUNTER — CARE COORDINATION (OUTPATIENT)
Dept: GASTROENTEROLOGY | Facility: CLINIC | Age: 77
End: 2017-09-05

## 2017-09-05 VITALS
TEMPERATURE: 97.8 F | OXYGEN SATURATION: 100 % | HEART RATE: 64 BPM | DIASTOLIC BLOOD PRESSURE: 78 MMHG | SYSTOLIC BLOOD PRESSURE: 162 MMHG | RESPIRATION RATE: 16 BRPM

## 2017-09-05 DIAGNOSIS — D50.0 IRON DEFICIENCY ANEMIA DUE TO CHRONIC BLOOD LOSS: ICD-10-CM

## 2017-09-05 DIAGNOSIS — K31.811 ANGIODYSPLASIA OF STOMACH AND DUODENUM WITH HEMORRHAGE: ICD-10-CM

## 2017-09-05 DIAGNOSIS — R19.7 DIARRHEA: Primary | ICD-10-CM

## 2017-09-05 DIAGNOSIS — K55.20 AVM (ARTERIOVENOUS MALFORMATION) OF SMALL BOWEL, ACQUIRED: ICD-10-CM

## 2017-09-05 PROCEDURE — 96372 THER/PROPH/DIAG INJ SC/IM: CPT

## 2017-09-05 RX ADMIN — OCTREOTIDE ACETATE 20 MG: KIT INTRAMUSCULAR at 12:42

## 2017-09-05 NOTE — PROGRESS NOTES
[facility] RN Care Coordination Note:    Called and spoke with infusion nurse at  Infusion center. Informed her Dr. Gonzalez is out of the office through tomorrow. Informed her that we can arrange for a one time injection but the patient either needs to arrange follow up here in the GI clinic, can see SHANNON Drummond that works with Dr. Gonzalez or can follow up with her PCP for additional orders for continue sandostatin injections.      Shira Izquierdo RN   Care Coordinator - Dr Fonseca-Jayant  [phone]

## 2017-09-05 NOTE — MR AVS SNAPSHOT
After Visit Summary   9/5/2017    Rachele Martínez    MRN: [id number]           Patient Information     Date Of Birth          1940        Visit Information        Provider Department      9/5/2017 11:30 AM NURSE ONLY [facility]        Today's Diagnoses     Diarrhea    -  1    Angiodysplasia of stomach and duodenum with hemorrhage        AVM (arteriovenous malformation) of small bowel, acquired (H)        Iron deficiency anemia due to chronic blood loss           Follow-ups after your visit        Your next 10 appointments already scheduled     Sep 11, 2017 11:30 AM CDT   (Arrive by 11:15 AM)   New Patient Visit with Dereck Prado MD   [facility] ([facility])    [address] [place] [address]  [address] [facility] [phone]            Oct 03, 2017 11:30 AM CDT   Level O with [address] ([facility])    [address] [place] [address] [phone]            Nov 07, 2017 10:00 AM CST   Lab with  LAB   [facility] Lab ([facility])    [address] [place] [address]  [address] [facility] [phone]            Nov 07, 2017 11:00 AM CST   (Arrive by 10:45 AM)   Return General Liver with Andriy Barnett MD   [facility] Hepatology ([facility])    [address] [place] [address]  [address] [facility] [phone]            Dec 12, 2017 11:05 AM CST   (Arrive by 10:50 AM)   Return Visit with Pan Saba MD   [facility] ([facility])    [address] [place] [address]  [address] [facility] [phone]              Who to contact     If you have questions or need follow up information about today's clinic visit or your schedule please contact [facility] directly at [phone].  Normal or non-critical lab and imaging  results will be communicated to you by CEDUhart, letter or phone within 4 business days after the clinic has received the results. If you do not hear from us within 7 days, please contact the clinic through MetaSolv or phone. If you have a critical or abnormal lab result, we will notify you by phone as soon as possible.  Submit refill requests through MetaSolv or call your pharmacy and they will forward the refill request to us. Please allow 3 business days for your refill to be completed.          Additional Information About Your Visit        MetaSolv Information     MetaSolv is an electronic gateway that provides easy, online access to your medical records. With MetaSolv, you can request a clinic appointment, read your test results, renew a prescription or communicate with your care team.     To sign up for MetaSolv visit the website at [email or web address]   You will be asked to enter the access code listed below, as well as some personal information. Please follow the directions to create your username and password.     Your access code is: [email or web address]  Expires: 2017  3:05 PM     Your access code will  in 90 days. If you need help or a new code, please contact your [facility] or call [phone] for assistance.        Care EveryWhere ID     This is your Care EveryWhere ID. This could be used by other organizations to access your [place] medical records  [id number]        Your Vitals Were     Pulse Temperature Respirations Pulse Oximetry          64 97.8  F (36.6  C) (Oral) 16 100%         Blood Pressure from Last 3 Encounters:   17 162/78   17 94/54   17 121/63    Weight from Last 3 Encounters:   17 63 kg (138 lb 12.8 oz)   17 63 kg (139 lb)   17 63.5 kg (139 lb 14.4 oz)              Today, you had the following     No orders found for display         Today's Medication Changes          These changes are accurate as of: 17   1:08 PM.  If you have any questions, ask your nurse or doctor.               These medicines have changed or have updated prescriptions.        Dose/Directions    polyethylene glycol 3350 Powd   This may have changed:    - when to take this  - reasons to take this   Used for:  Slow transit constipation        Dose:  17 g   Take 17 g by mouth daily   Quantity:  1530 g   Refills:  3                Primary Care Provider Office Phone # Fax #    Agnieszka Erica Rodriguez [phone]       [address] [place] [address] [address] [place] [address]        Equal Access to Services     [facility]: Hadii lawrence wilson hadasho Sohemal, waaxda luqadaha, qaybta kaalmada josueegevan, jaz peres . So [facility] [phone].    ATENCIÓN: Si habla español, tiene a ibarra disposición servicios gratuitos de asistencia lingüística. [facility] [phone].    We comply with applicable federal civil rights laws and [place] laws. We do not discriminate on the basis of race, color, national origin, age, disability sex, sexual orientation or gender identity.            Thank you!     Thank you for choosing [facility]  for your care. Our goal is always to provide you with excellent care. Hearing back from our patients is one way we can continue to improve our services. Please take a few minutes to complete the written survey that you may receive in the mail after your visit with us. Thank you!             Your Updated Medication List - Protect others around you: Learn how to safely use, store and throw away your medicines at www.disposemymeds.org.          This list is accurate as of: 9/5/17  1:08 PM.  Always use your most recent med list.                   Brand Name Dispense Instructions for use Diagnosis    Calcium Acetate (Phos Binder) 667 MG Caps      TAKE 2 CAPSULE BY MOUTH THREE TIMES A DAY WITH MEALS        OCTREOTIDE ACETATE IJ      Inject as directed every 30 days        order for DME     1  Device    Equipment being ordered: 4 wheel walker with seat.    Disorder of bone and cartilage, Arthralgia, unspecified joint, Tendency to fall       polyethylene glycol 3350 Powd     1530 g    Take 17 g by mouth daily    Slow transit constipation       PRORENAL + D Tabs      Take 1 tablet by mouth daily        sertraline 50 MG tablet    ZOLOFT    90 tablet    Take 1 tablet (50 mg) by mouth daily    Itching

## 2017-09-05 NOTE — PROGRESS NOTES
Infusion Nursing Note:  Rachele LORRIE Martínez presents today for Sandostatin.    Patient seen by provider today: No. Provider called for orders. 1 Time dose entered. Instructed patient to make f/u appt with GI provider for additional sandostatin orders   present during visit today: Not Applicable.    Note: Patient hypertensive today. Patient states she was hypertensive at dialysis yesterday also. Discussed with pharmacy who instructed small percentage of patient's have a hypertension adverse effect with sandostatin. Instructed patient to monitor BP at home and if she continues to be elevated she needs to make an appt soon with primary.        Treatment Conditions:  Not Applicable.      Post Infusion Assessment:  Patient tolerated injection without incident.    Discharge Plan:   Patient discharged in stable condition accompanied by: daughter.  Departure Mode: Ambulatory.    DAMIEN DUMONT RN

## 2017-09-13 ENCOUNTER — PRE VISIT (OUTPATIENT)
Dept: CARDIOLOGY | Facility: CLINIC | Age: 77
End: 2017-09-13

## 2017-09-13 DIAGNOSIS — E78.5 HYPERLIPIDEMIA WITH TARGET LDL LESS THAN 130: Chronic | ICD-10-CM

## 2017-09-13 DIAGNOSIS — I10 HYPERTENSION GOAL BP (BLOOD PRESSURE) < 140/80: Primary | Chronic | ICD-10-CM

## 2017-09-13 NOTE — TELEPHONE ENCOUNTER
Previsit nursing summary    HPI: 77 year old female self referred to discuss results of recent cardiac testing from Dr. Rodriguez. She had been seen for ongoing episodes of dizziness.    Procedures:    Dobutamine stress test (7/11/2017)  Interpretation Summary  Dobutamine Stress test with no inducible ischemia.  Normal blood pressure and heart rate response to dobutamine  No anginal symptoms during stress test.  No ECG evidence of ischemia at rest or with dobutamine.  No stress induced regional wall motion abnormalities.  Normal resting LV function with EF of approximately 55-60%, with dobutamine  left ventricular cavity size decreases and LVEF increases to 65-70%.  Normal biventricular function, normal aortic root and no significant valvular  dysfunction noted on screening 2D and Doppler examination.        Maria D (8/1/2017)

## 2017-09-25 DIAGNOSIS — K74.60 CIRRHOSIS OF LIVER WITHOUT ASCITES, UNSPECIFIED HEPATIC CIRRHOSIS TYPE (H): Primary | ICD-10-CM

## 2017-10-10 ENCOUNTER — OFFICE VISIT (OUTPATIENT)
Dept: GASTROENTEROLOGY | Facility: CLINIC | Age: 77
End: 2017-10-10
Attending: PHYSICIAN ASSISTANT
Payer: MEDICARE

## 2017-10-10 VITALS
HEART RATE: 81 BPM | OXYGEN SATURATION: 99 % | DIASTOLIC BLOOD PRESSURE: 72 MMHG | TEMPERATURE: 98.3 F | RESPIRATION RATE: 18 BRPM | SYSTOLIC BLOOD PRESSURE: 120 MMHG | WEIGHT: 138 LBS | BODY MASS INDEX: 23.56 KG/M2 | HEIGHT: 64 IN

## 2017-10-10 DIAGNOSIS — K26.4 GASTROINTESTINAL HEMORRHAGE ASSOCIATED WITH DUODENAL ULCER: Primary | ICD-10-CM

## 2017-10-10 PROCEDURE — 99213 OFFICE O/P EST LOW 20 MIN: CPT | Mod: ZF

## 2017-10-10 RX ORDER — LISINOPRIL 10 MG/1
10 TABLET ORAL DAILY
Refills: 3 | COMMUNITY
Start: 2017-09-06 | End: 2017-11-07

## 2017-10-10 ASSESSMENT — PAIN SCALES - GENERAL: PAINLEVEL: NO PAIN (0)

## 2017-10-10 NOTE — PATIENT INSTRUCTIONS
It was a pleasure taking care of you today.  I've included a brief summary of our discussion and care plan from today's visit below.  Please review this information with your primary care provider.  ______________________________________________________________________    My recommendations are summarized as follows:    -- Try Zantac 150mg before bed.  If cough still persists, consider taking it once in the morning and once at night  -- Continue Octreotide once a month      Return to GI Clinic in December months to review your progress.    ______________________________________________________________________    Who do I call with any questions after my visit?  Please be in touch if there are any further questions that arise following today's visit.  There are multiple ways to contact your gastroenterology care team.        During business hours, you may reach a Gastroenterology nurse at [phone].       To schedule or reschedule an appointment, please call [phone].       You can always send a secure message through ticketstreet.  ticketstreet messages are answered by your nurse or doctor typically within 24 hours.  Please allow extra time on weekends and holidays.        For urgent/emergent questions after business hours, you may reach the on-call GI Fellow by contacting the [facility]  at [phone].     How will I get the results of any tests ordered?    You will receive all of your results.  If you have signed up for ticketstreet, any tests ordered at your visit will be available to you after your physician reviews them.  Typically this takes 1-2 weeks.  If there are urgent results that require a change in your care plan, your physician or nurse will call you to discuss the next steps.      What is ticketstreet?  ticketstreet is a secure way for you to access all of your healthcare records from the [facility].  It is a web based computer program, so you can sign on to it from any location.  It  also allows you to send secure messages to your care team.  I recommend signing up for [place] access if you have not already done so and are comfortable with using a computer.      How to I schedule a follow-up visit?  If you did not schedule a follow-up visit today, please call [phone] to schedule a follow-up office visit.        Sincerely,    Leandro Odell PA-C  [facility]  Division of Gastroenterology

## 2017-10-10 NOTE — MR AVS SNAPSHOT
After Visit Summary   10/10/2017    Rachele Martínez    MRN: [id number]           Patient Information     Date Of Birth          1940        Visit Information        Provider Department      10/10/2017 2:00 PM Leandro Odell PA-C [facility]        Care Instructions    It was a pleasure taking care of you today.  I've included a brief summary of our discussion and care plan from today's visit below.  Please review this information with your primary care provider.  ______________________________________________________________________    My recommendations are summarized as follows:    -- Try Zantac 150mg before bed.  If cough still persists, consider taking it once in the morning and once at night  -- Continue Octreotide once a month      Return to GI Clinic in December months to review your progress.    ______________________________________________________________________    Who do I call with any questions after my visit?  Please be in touch if there are any further questions that arise following today's visit.  There are multiple ways to contact your gastroenterology care team.        During business hours, you may reach a Gastroenterology nurse at [phone].       To schedule or reschedule an appointment, please call [phone].       You can always send a secure message through [employer].  [employer] messages are answered by your nurse or doctor typically within 24 hours.  Please allow extra time on weekends and holidays.        For urgent/emergent questions after business hours, you may reach the on-call GI Fellow by contacting the [facility] at [phone].     How will I get the results of any tests ordered?    You will receive all of your results.  If you have signed up for [employer], any tests ordered at your visit will be available to you after your physician reviews them.  Typically this takes 1-2 weeks.  If there are urgent results that  require a change in your care plan, your physician or nurse will call you to discuss the next steps.      What is [employer]?  [employer] is a secure way for you to access all of your healthcare records from the [facility].  It is a web based computer program, so you can sign on to it from any location.  It also allows you to send secure messages to your care team.  I recommend signing up for [employer] access if you have not already done so and are comfortable with using a computer.      How to I schedule a follow-up visit?  If you did not schedule a follow-up visit today, please call [phone] to schedule a follow-up office visit.        Sincerely,    Leandro Odell PA-C  [facility]  Division of Gastroenterology                  Follow-ups after your visit        Follow-up notes from your care team     Return if symptoms worsen or fail to improve.      Your next 10 appointments already scheduled     Nov 07, 2017 10:00 AM CST   Lab with [facility]   [facility] ([facility])    [address]  1st Floor  [place] [place] [address] [phone]            Nov 07, 2017 11:00 AM CST   (Arrive by 10:45 AM)   Return General Liver with Andriy Barnett MD   [facility] ([facility])    [address]  3rd Floor  [place] [place] [address] [phone]            Dec 12, 2017 11:05 AM CST   (Arrive by 10:50 AM)   Return Visit with aPn Saba MD   [facility] ([facility])    [address]  2nd Floor  [place] [place] [address] [phone]              Who to contact     If you have questions or need follow up information about today's clinic visit or your schedule please contact [facility] directly at [phone].  Normal or non-critical lab and imaging results will be communicated to you by MyChart, letter or phone within 4 business days after the clinic has  "received the results. If you do not hear from us within 7 days, please contact the clinic through [employer] or phone. If you have a critical or abnormal lab result, we will notify you by phone as soon as possible.  Submit refill requests through [employer] or call your pharmacy and they will forward the refill request to us. Please allow 3 business days for your refill to be completed.          Additional Information About Your Visit        [employer] Information     [employer] lets you send messages to your doctor, view your test results, renew your prescriptions, schedule appointments and more. To sign up, go to [email or web address] . Click on \"Log in\" on the left side of the screen, which will take you to the Welcome page. Then click on \"Sign up Now\" on the right side of the page.     You will be asked to enter the access code listed below, as well as some personal information. Please follow the directions to create your username and password.     Your access code is: [email or web address]  Expires: 2017  3:05 PM     Your access code will  in 90 days. If you need help or a new code, please call your [facility] clinic or [phone].        Care EveryWhere ID     This is your Care EveryWhere ID. This could be used by other organizations to access your [facility] medical records  [id number]        Your Vitals Were     Pulse Temperature Respirations Height Pulse Oximetry BMI (Body Mass Index)    81 98.3  F (36.8  C) (Oral) 18 1.626 m (5' 4.02\") 99% 23.68 kg/m2       Blood Pressure from Last 3 Encounters:   10/10/17 120/72   17 162/78   17 94/54    Weight from Last 3 Encounters:   10/10/17 62.6 kg (138 lb)   17 63 kg (138 lb 12.8 oz)   17 63 kg (139 lb)              Today, you had the following     No orders found for display         Today's Medication Changes          These changes are accurate as of: 10/10/17  3:13 PM.  If you have any questions, ask your nurse or doctor.               These " medicines have changed or have updated prescriptions.        Dose/Directions    polyethylene glycol 3350 Powd   This may have changed:    - when to take this  - reasons to take this   Used for:  Slow transit constipation        Dose:  17 g   Take 17 g by mouth daily   Quantity:  1530 g   Refills:  3                Primary Care Provider Office Phone # Fax #    Agnieszka Erica Rodriguez [phone]       [address] [place] [address]  [facility] [address]        Equal Access to Services     JAMIE CARVAJAL : Hadii aad ku hadasho Soomaali, waaxda luqadaha, qaybta kaalmada adeegyada, waxay idiin hayaan adeeg bc peres . So [facility] [phone].    ATENCIÓN: Si habla español, tiene a ibarra disposición servicios gratuitos de asistencia lingüística. [facility] [phone].    We comply with applicable federal civil rights laws and [place] laws. We do not discriminate on the basis of race, color, national origin, age, disability, sex, sexual orientation, or gender identity.            Thank you!     Thank you for choosing [facility]  for your care. Our goal is always to provide you with excellent care. Hearing back from our patients is one way we can continue to improve our services. Please take a few minutes to complete the written survey that you may receive in the mail after your visit with us. Thank you!             Your Updated Medication List - Protect others around you: Learn how to safely use, store and throw away your medicines at www.disposemymeds.org.          This list is accurate as of: 10/10/17  3:13 PM.  Always use your most recent med list.                   Brand Name Dispense Instructions for use Diagnosis    Calcium Acetate (Phos Binder) 667 MG Caps      TAKE 2 CAPSULE BY MOUTH THREE TIMES A DAY WITH MEALS        lisinopril 10 MG tablet    PRINIVIL/ZESTRIL     Take 10 mg by mouth daily        OCTREOTIDE ACETATE IJ      Inject as directed every 30 days        order for DME      1 Device    Equipment being ordered: 4 wheel walker with seat.    Disorder of bone and cartilage, Arthralgia, unspecified joint, Tendency to fall       polyethylene glycol 3350 Powd     1530 g    Take 17 g by mouth daily    Slow transit constipation       PRORENAL + D Tabs      Take 1 tablet by mouth daily        sertraline 50 MG tablet    ZOLOFT    90 tablet    TAKE 1 TABLET (50 MG) BY MOUTH DAILY    Itching

## 2017-10-10 NOTE — LETTER
10/10/2017       RE: Rachele Martínez  [address]    [place] [facility] [id number]     Dear Colleague,    Thank you for referring your patient, Rachele Martínez, to the [facility] at [facility]. Please see a copy of my visit note below.    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    1. AVMs of the small bowel: Patient is currently asymptomatic. Last transfusion was July 2017. Regarding repeating enteroscopy for treatment has been considered although per Dr. Gonzalez, repeating these procedures is likely not a high yield considering there could be others that are bleeding and will likely impact the amount or frequency of transfusions that she will need. This is also complicated by her previous transfusion reactions. We will continue octreotide treatment at this time with once a month injection. Infusion plan was updated today.    2. Nighttime cough: This could be consideration of gastric esophageal reflux disease. I think it would be in patient's best interest to initiate proton pump inhibitor therapy at this time. I had encouraged patient to use ranitidine as needed and could try prior to bedtime to see if this helps with nighttime cough.    RTC 1 year, or if symptomatic sooner    Thank you for this consultation.  It was a pleasure to participate in the care of this patient; please contact us with any further questions.       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    Regarding patient's recurrent anemia it is presumed to be secondary to AVMs throughout her GI tract. She was well controlled prior to hemodialysis when she received frequent doses of Procrit. Now she is on hemodialysis and they have change the Procrit for a longer lasting formulation of erythropoietin. It is likely the burden of the AVMs contributing to drops in hemoglobin. She had a mail enteroscopy from above and below and will treated for 3 AVMs. In addition she has had transfusion reactions which are presumed to be allergic reactions, last transfused July 2017. Dr. Gonzalez had discussed with patient's nephrologist in order to optimize and minimize need for transfusions. Nephrologist changed her erythropoietin from long-acting to shorter acting one to see if this would help stabilize her hemoglobin. Octreotide was then initiated. Last blood transfusion was July 2017. Patient has no incidence of upper or lower GI bleeding at this time. Patient has hemoglobin tract with dialysis and per patient report last hemoglobin was around 9. Last upper endoscopy was performed March 2016 revealing a few nonbleeding angiectasia is in the duodenum and jejunum which were not treated due to patient's intolerance to the procedure.     She is currently having 2 BM per day. She denies any hematemesis, hematochezia, any sources of blood loss. She denies any reflux symptoms, although she does note occasional cough in the middle of night and in the morning. She is not on any acid suppression medications. She denies any recent weight loss. No fevers.    ROS:    No fevers or chills  No weight loss  No blurry vision, double vision or change in  vision  No sore throat  No lymphadenopathy  No headache, paraesthesias, or weakness in a limb  No shortness of breath or wheezing  No chest pain or pressure  No arthralgias or myalgias  No rashes or skin changes  No odynophagia or dysphagia  No BRBPR, hematochezia, melena  No dysuria, frequency or urgency  No hot/cold intolerance or polyria  No anxiety or depression    PROBLEM LIST  Patient Active Problem List    Diagnosis Date Noted     Diarrhea 08/29/2016     Priority: Medium     Angiodysplasia of stomach and duodenum with hemorrhage 08/29/2016     Priority: Medium     Tendency to fall 07/20/2016     Priority: Medium     ESRD (end stage renal disease) on dialysis (H) 05/05/2016     Priority: Medium     Nephrologist is Dr. Tarango  [facility] [phone], fax [phone]         Cirrhosis of liver without ascites, unspecified hepatic cirrhosis type (H) 05/05/2016     Priority: Medium     AVM (arteriovenous malformation) of small bowel, acquired (H) 03/14/2016     Priority: Medium     Iron deficiency anemia due to chronic blood loss 01/31/2016     Priority: Medium     Due to AVMs       ACP (advance care planning) 07/30/2015     Priority: Medium     Advance Care Planning 7/30/2015: ACP Review and Resources Provided:  Reviewed chart for advance care plan.  Rachele Martínez has no plan or code status on file. Discussed available resources and provided with information. Confirmed code status reflects current choices pending further ACP discussions. . Added by Zita Armendariz             History of hepatitis C 07/28/2015     Priority: Medium     SVR, 7/2015       Hyperlipidemia with target LDL less than 130 02/27/2015     Priority: Medium     Diagnosis updated by automated process. Provider to review and confirm.       Cataract 11/23/2012     Priority: Medium     Right   Problem list name updated by automated process. Provider to review       Anxiety state 11/23/2012     Priority: Medium     Problem  list name updated by automated process. Provider to review       Insomnia 11/23/2012     Priority: Medium     Problem list name updated by automated process. Provider to review       Health Care Home 02/03/2012     Priority: Medium     State Tier Level:    Status:  Accepted/Closed 4/15/16   Care Coordinator:  Stacy Jones RN     See Letters for [facility] Care Plan--Pending  Date:  March 4, 2016        Disenroll from [facility] 8/31/12  [facility] Case Management               Hypertension goal BP (blood pressure) < 140/80 08/02/2003     Priority: Medium       PERTINENT PAST MEDICAL HISTORY:  Past Medical History:   Diagnosis Date     Anemia      Anxiety state, unspecified 11/23/2012     Arthritis      Chronic hepatitis C with cirrhosis (H) 12/10/2014     Chronic kidney disease      Clotting disorder (H)      Dialysis patient (H)      Glaucoma      Hypertension      Hypertension goal BP (blood pressure) < 140/80 2/10/2014     Liver failure (H)      Renal failure      Unspecified pruritic disorder 11/23/2012       PREVIOUS SURGERIES:  Past Surgical History:   Procedure Laterality Date     COLONOSCOPY N/A 9/4/2015    Procedure: COMBINED COLONOSCOPY, SINGLE OR MULTIPLE BIOPSY/POLYPECTOMY BY BIOPSY;  Surgeon: Rupesh Lopez MD;  Location:  GI     ESOPHAGOSCOPY, GASTROSCOPY, DUODENOSCOPY (EGD), COMBINED N/A 12/18/2014    Procedure: COMBINED ESOPHAGOSCOPY, GASTROSCOPY, DUODENOSCOPY (EGD);  Surgeon: Betsy Carvajal MD;  Location:  GI     ESOPHAGOSCOPY, GASTROSCOPY, DUODENOSCOPY (EGD), COMBINED N/A 4/25/2015    Procedure: COMBINED ESOPHAGOSCOPY, GASTROSCOPY, DUODENOSCOPY (EGD);  Surgeon: Yosvany Ram MD;  Location:  GI     ESOPHAGOSCOPY, GASTROSCOPY, DUODENOSCOPY (EGD), COMBINED N/A 5/5/2015    Procedure: COMBINED ESOPHAGOSCOPY, GASTROSCOPY, DUODENOSCOPY (EGD);  Surgeon: Mariano Mistry MD;  Location:  GI     HC CAPSULE ENDOSCOPY N/A 9/30/2015    Procedure: CAPSULE/PILL CAM ENDOSCOPY;  Surgeon:  Pan Dhaliwal MD;  Location:  GI     HYSTERECTOMY  1980    [facility]     LUMPECTOMY BREAST       ALLERGIES:     Allergies   Allergen Reactions     Diovan Hct Itching     severe     Dust Mites      Hydrochlorothiazide Itching     Severe       Sulfa Drugs Hives     Spironolactone Nausea       PERTINENT MEDICATIONS:    Current Outpatient Prescriptions:      lisinopril (PRINIVIL/ZESTRIL) 10 MG tablet, Take 10 mg by mouth daily, Disp: , Rfl: 3     sertraline (ZOLOFT) 50 MG tablet, TAKE 1 TABLET (50 MG) BY MOUTH DAILY, Disp: 90 tablet, Rfl: 3     Multiple Vitamins-Minerals (PRORENAL + D) TABS, Take 1 tablet by mouth daily, Disp: , Rfl:      Calcium Acetate, Phos Binder, 667 MG CAPS, TAKE 2 CAPSULE BY MOUTH THREE TIMES A DAY WITH MEALS, Disp: , Rfl: 8     OCTREOTIDE ACETATE IJ, Inject as directed every 30 days, Disp: , Rfl:      order for DME, Equipment being ordered: 4 wheel walker with seat., Disp: 1 Device, Rfl: 0     polyethylene glycol 3350 POWD, Take 17 g by mouth daily (Patient taking differently: Take 17 g by mouth daily as needed (constipation) ), Disp: 1530 g, Rfl: 3    SOCIAL HISTORY:  Social History     Social History     Marital status:      Spouse name: N/A     Number of children: N/A     Years of education: N/A     Occupational History     Not on file.     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     Sexual activity: Not Currently     Other Topics Concern     Parent/Sibling W/ Cabg, Mi Or Angioplasty Before 65f 55m? No     Social History Narrative       FAMILY HISTORY:  Family History   Problem Relation Age of Onset     DIABETES Mother      Alzheimer Disease Mother      Musculoskeletal Disorder Mother      joint pain bilateral knees  "      Past/family/social history reviewed and no changes    PHYSICAL EXAMINATION:  Constitutional: aaox3, cooperative, pleasant, not dyspneic/diaphoretic, no acute distress  Vitals reviewed: /72  Pulse 81  Temp 98.3  F (36.8  C) (Oral)  Resp 18  Ht 1.626 m (5' 4.02\")  Wt 62.6 kg (138 lb)  SpO2 99%  BMI 23.68 kg/m2  Wt:   Wt Readings from Last 2 Encounters:   10/10/17 62.6 kg (138 lb)   08/08/17 63 kg (138 lb 12.8 oz)      Eyes: Sclera anicteric/injected  Ears/nose/mouth/throat: Normal oropharynx without ulcers or exudate, mucus membranes moist, hearing intact  Neck: supple, thyroid normal size  CV: No edema  Respiratory: Unlabored breathing  Lymph: No axillary, submandibular, supraclavicular or inguinal lymphadenopathy  Abd: Nondistended, +bs, no hepatosplenomegaly, tender to RUQ, no peritoneal signs  Skin: warm, perfused, no jaundice  Psych: Normal affect  MSK: Normal gait      PERTINENT STUDIES:    Orders Only on 05/09/2017   Component Date Value Ref Range Status     Bilirubin Direct 05/09/2017 0.1  0.0 - 0.2 mg/dL Final     Bilirubin Total 05/09/2017 0.5  0.2 - 1.3 mg/dL Final     Albumin 05/09/2017 4.0  3.4 - 5.0 g/dL Final     Protein Total 05/09/2017 8.7  6.8 - 8.8 g/dL Final     Alkaline Phosphatase 05/09/2017 144  40 - 150 U/L Final     ALT 05/09/2017 20  0 - 50 U/L Final     AST 05/09/2017 29  0 - 45 U/L Final     WBC 05/09/2017 6.1  4.0 - 11.0 10e9/L Final     RBC Count 05/09/2017 3.14* 3.8 - 5.2 10e12/L Final     Hemoglobin 05/09/2017 10.5* 11.7 - 15.7 g/dL Final     Hematocrit 05/09/2017 32.8* 35.0 - 47.0 % Final     MCV 05/09/2017 105* 78 - 100 fl Final     MCH 05/09/2017 33.4* 26.5 - 33.0 pg Final     MCHC 05/09/2017 32.0  31.5 - 36.5 g/dL Final     RDW 05/09/2017 14.5  10.0 - 15.0 % Final     Platelet Count 05/09/2017 260  150 - 450 10e9/L Final     INR 05/09/2017 1.19* 0.86 - 1.14 Final     Sodium 05/09/2017 138  133 - 144 mmol/L Final     Potassium 05/09/2017 3.7  3.4 - 5.3 mmol/L Final "     Chloride 05/09/2017 94  94 - 109 mmol/L Final     Carbon Dioxide 05/09/2017 29  20 - 32 mmol/L Final     Anion Gap 05/09/2017 15* 3 - 14 mmol/L Final     Glucose 05/09/2017 117* 70 - 99 mg/dL Final     Urea Nitrogen 05/09/2017 23  7 - 30 mg/dL Final     Creatinine 05/09/2017 6.88* 0.52 - 1.04 mg/dL Final     GFR Estimate 05/09/2017 6* >60 mL/min/1.7m2 Final     GFR Estimate If Black 05/09/2017 7* >60 mL/min/1.7m2 Final     Calcium 05/09/2017 8.7  8.5 - 10.1 mg/dL Final       Again, thank you for allowing me to participate in the care of your patient.      Sincerely,    Leandro Odell PA-C

## 2017-10-10 NOTE — PROGRESS NOTES
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    1. AVMs of the small bowel: Patient is currently asymptomatic. Last transfusion was July 2017. Regarding repeating enteroscopy for treatment has been considered although per Dr. Gonzalez, repeating these procedures is likely not a high yield considering there could be others that are bleeding and will likely impact the amount or frequency of transfusions that she will need. This is also complicated by her previous transfusion reactions. We will continue octreotide treatment at this time with once a month injection. Infusion plan was updated today.    2. Nighttime cough: This could be consideration of gastric esophageal reflux disease. I think it would be in patient's best interest to initiate proton pump inhibitor therapy at this time. I had encouraged patient to use ranitidine as needed and could try prior to bedtime to see if this helps with nighttime cough.    RTC 1 year, or if symptomatic sooner    Thank you for this consultation.  It was a pleasure to participate in the care of this patient; please contact us with any further questions.       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    Regarding patient's recurrent  anemia it is presumed to be secondary to AVMs throughout her GI tract. She was well controlled prior to hemodialysis when she received frequent doses of Procrit. Now she is on hemodialysis and they have change the Procrit for a longer lasting formulation of erythropoietin. It is likely the burden of the AVMs contributing to drops in hemoglobin. She had a mail enteroscopy from above and below and will treated for 3 AVMs. In addition she has had transfusion reactions which are presumed to be allergic reactions, last transfused July 2017. Dr. Gonzalez had discussed with patient's nephrologist in order to optimize and minimize need for transfusions. Nephrologist changed her erythropoietin from long-acting to shorter acting one to see if this would help stabilize her hemoglobin. Octreotide was then initiated. Last blood transfusion was July 2017. Patient has no incidence of upper or lower GI bleeding at this time. Patient has hemoglobin tract with dialysis and per patient report last hemoglobin was around 9. Last upper endoscopy was performed March 2016 revealing a few nonbleeding angiectasia is in the duodenum and jejunum which were not treated due to patient's intolerance to the procedure.     She is currently having 2 BM per day. She denies any hematemesis, hematochezia, any sources of blood loss. She denies any reflux symptoms, although she does note occasional cough in the middle of night and in the morning. She is not on any acid suppression medications. She denies any recent weight loss. No fevers.    ROS:    No fevers or chills  No weight loss  No blurry vision, double vision or change in vision  No sore throat  No lymphadenopathy  No headache, paraesthesias, or weakness in a limb  No shortness of breath or wheezing  No chest pain or pressure  No arthralgias or myalgias  No rashes or skin changes  No odynophagia or dysphagia  No BRBPR, hematochezia, melena  No dysuria, frequency or urgency  No hot/cold intolerance or  polyria  No anxiety or depression    PROBLEM LIST  Patient Active Problem List    Diagnosis Date Noted     Diarrhea 08/29/2016     Priority: Medium     Angiodysplasia of stomach and duodenum with hemorrhage 08/29/2016     Priority: Medium     Tendency to fall 07/20/2016     Priority: Medium     ESRD (end stage renal disease) on dialysis (H) 05/05/2016     Priority: Medium     Nephrologist is Dr. Tarango  [facility] [phone], fax [phone]         Cirrhosis of liver without ascites, unspecified hepatic cirrhosis type (H) 05/05/2016     Priority: Medium     AVM (arteriovenous malformation) of small bowel, acquired (H) 03/14/2016     Priority: Medium     Iron deficiency anemia due to chronic blood loss 01/31/2016     Priority: Medium     Due to AVMs       ACP (advance care planning) 07/30/2015     Priority: Medium     Advance Care Planning 7/30/2015: ACP Review and Resources Provided:  Reviewed chart for advance care plan.  Rachele Martínez has no plan or code status on file. Discussed available resources and provided with information. Confirmed code status reflects current choices pending further ACP discussions. . Added by Zita Armendariz             History of hepatitis C 07/28/2015     Priority: Medium     SVR, 7/2015       Hyperlipidemia with target LDL less than 130 02/27/2015     Priority: Medium     Diagnosis updated by automated process. Provider to review and confirm.       Cataract 11/23/2012     Priority: Medium     Right   Problem list name updated by automated process. Provider to review       Anxiety state 11/23/2012     Priority: Medium     Problem list name updated by automated process. Provider to review       Insomnia 11/23/2012     Priority: Medium     Problem list name updated by automated process. Provider to review       Health Care Home 02/03/2012     Priority: Medium     State Tier Level:    Status:  Accepted/Closed 4/15/16   Care Coordinator:  Stacy Jones RN     See  Letters for H Care Plan--Pending  Date:  March 4, 2016        Disenroll from [facility] 8/31/12  [facility] Case Management               Hypertension goal BP (blood pressure) < 140/80 08/02/2003     Priority: Medium       PERTINENT PAST MEDICAL HISTORY:  Past Medical History:   Diagnosis Date     Anemia      Anxiety state, unspecified 11/23/2012     Arthritis      Chronic hepatitis C with cirrhosis (H) 12/10/2014     Chronic kidney disease      Clotting disorder (H)      Dialysis patient (H)      Glaucoma      Hypertension      Hypertension goal BP (blood pressure) < 140/80 2/10/2014     Liver failure (H)      Renal failure      Unspecified pruritic disorder 11/23/2012       PREVIOUS SURGERIES:  Past Surgical History:   Procedure Laterality Date     COLONOSCOPY N/A 9/4/2015    Procedure: COMBINED COLONOSCOPY, SINGLE OR MULTIPLE BIOPSY/POLYPECTOMY BY BIOPSY;  Surgeon: Rupesh Lopez MD;  Location:  GI     ESOPHAGOSCOPY, GASTROSCOPY, DUODENOSCOPY (EGD), COMBINED N/A 12/18/2014    Procedure: COMBINED ESOPHAGOSCOPY, GASTROSCOPY, DUODENOSCOPY (EGD);  Surgeon: Betsy Carvajal MD;  Location:  GI     ESOPHAGOSCOPY, GASTROSCOPY, DUODENOSCOPY (EGD), COMBINED N/A 4/25/2015    Procedure: COMBINED ESOPHAGOSCOPY, GASTROSCOPY, DUODENOSCOPY (EGD);  Surgeon: Yosvany Ram MD;  Location:  GI     ESOPHAGOSCOPY, GASTROSCOPY, DUODENOSCOPY (EGD), COMBINED N/A 5/5/2015    Procedure: COMBINED ESOPHAGOSCOPY, GASTROSCOPY, DUODENOSCOPY (EGD);  Surgeon: Mariano Mistry MD;  Location:  GI     HC CAPSULE ENDOSCOPY N/A 9/30/2015    Procedure: CAPSULE/PILL CAM ENDOSCOPY;  Surgeon: Pan Dhaliwal MD;  Location:  GI     HYSTERECTOMY  1980    KYREE     LUMPECTOMY BREAST       ALLERGIES:     Allergies   Allergen Reactions     Diovan Hct Itching     severe     Dust Mites      Hydrochlorothiazide Itching     Severe       Sulfa Drugs Hives     Spironolactone Nausea       PERTINENT MEDICATIONS:    Current  "Outpatient Prescriptions:      lisinopril (PRINIVIL/ZESTRIL) 10 MG tablet, Take 10 mg by mouth daily, Disp: , Rfl: 3     sertraline (ZOLOFT) 50 MG tablet, TAKE 1 TABLET (50 MG) BY MOUTH DAILY, Disp: 90 tablet, Rfl: 3     Multiple Vitamins-Minerals (PRORENAL + D) TABS, Take 1 tablet by mouth daily, Disp: , Rfl:      Calcium Acetate, Phos Binder, 667 MG CAPS, TAKE 2 CAPSULE BY MOUTH THREE TIMES A DAY WITH MEALS, Disp: , Rfl: 8     OCTREOTIDE ACETATE IJ, Inject as directed every 30 days, Disp: , Rfl:      order for DME, Equipment being ordered: 4 wheel walker with seat., Disp: 1 Device, Rfl: 0     polyethylene glycol 3350 POWD, Take 17 g by mouth daily (Patient taking differently: Take 17 g by mouth daily as needed (constipation) ), Disp: 1530 g, Rfl: 3    SOCIAL HISTORY:  Social History     Social History     Marital status:      Spouse name: N/A     Number of children: N/A     Years of education: N/A     Occupational History     Not on file.     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     Sexual activity: Not Currently     Other Topics Concern     Parent/Sibling W/ Cabg, Mi Or Angioplasty Before 65f 55m? No     Social History Narrative       FAMILY HISTORY:  Family History   Problem Relation Age of Onset     DIABETES Mother      Alzheimer Disease Mother      Musculoskeletal Disorder Mother      joint pain bilateral knees       Past/family/social history reviewed and no changes    PHYSICAL EXAMINATION:  Constitutional: aaox3, cooperative, pleasant, not dyspneic/diaphoretic, no acute distress  Vitals reviewed: /72  Pulse 81  Temp 98.3  F (36.8  C) (Oral)  Resp 18  Ht 1.626 m (5' 4.02\")  Wt 62.6 kg (138 lb)  SpO2 99%  BMI 23.68 kg/m2  Wt:   Wt Readings from " Last 2 Encounters:   10/10/17 62.6 kg (138 lb)   08/08/17 63 kg (138 lb 12.8 oz)      Eyes: Sclera anicteric/injected  Ears/nose/mouth/throat: Normal oropharynx without ulcers or exudate, mucus membranes moist, hearing intact  Neck: supple, thyroid normal size  CV: No edema  Respiratory: Unlabored breathing  Lymph: No axillary, submandibular, supraclavicular or inguinal lymphadenopathy  Abd: Nondistended, +bs, no hepatosplenomegaly, tender to RUQ, no peritoneal signs  Skin: warm, perfused, no jaundice  Psych: Normal affect  MSK: Normal gait      PERTINENT STUDIES:    Orders Only on 05/09/2017   Component Date Value Ref Range Status     Bilirubin Direct 05/09/2017 0.1  0.0 - 0.2 mg/dL Final     Bilirubin Total 05/09/2017 0.5  0.2 - 1.3 mg/dL Final     Albumin 05/09/2017 4.0  3.4 - 5.0 g/dL Final     Protein Total 05/09/2017 8.7  6.8 - 8.8 g/dL Final     Alkaline Phosphatase 05/09/2017 144  40 - 150 U/L Final     ALT 05/09/2017 20  0 - 50 U/L Final     AST 05/09/2017 29  0 - 45 U/L Final     WBC 05/09/2017 6.1  4.0 - 11.0 10e9/L Final     RBC Count 05/09/2017 3.14* 3.8 - 5.2 10e12/L Final     Hemoglobin 05/09/2017 10.5* 11.7 - 15.7 g/dL Final     Hematocrit 05/09/2017 32.8* 35.0 - 47.0 % Final     MCV 05/09/2017 105* 78 - 100 fl Final     MCH 05/09/2017 33.4* 26.5 - 33.0 pg Final     MCHC 05/09/2017 32.0  31.5 - 36.5 g/dL Final     RDW 05/09/2017 14.5  10.0 - 15.0 % Final     Platelet Count 05/09/2017 260  150 - 450 10e9/L Final     INR 05/09/2017 1.19* 0.86 - 1.14 Final     Sodium 05/09/2017 138  133 - 144 mmol/L Final     Potassium 05/09/2017 3.7  3.4 - 5.3 mmol/L Final     Chloride 05/09/2017 94  94 - 109 mmol/L Final     Carbon Dioxide 05/09/2017 29  20 - 32 mmol/L Final     Anion Gap 05/09/2017 15* 3 - 14 mmol/L Final     Glucose 05/09/2017 117* 70 - 99 mg/dL Final     Urea Nitrogen 05/09/2017 23  7 - 30 mg/dL Final     Creatinine 05/09/2017 6.88* 0.52 - 1.04 mg/dL Final     GFR Estimate 05/09/2017 6* >60  mL/min/1.7m2 Final     GFR Estimate If Black 05/09/2017 7* >60 mL/min/1.7m2 Final     Calcium 05/09/2017 8.7  8.5 - 10.1 mg/dL Final

## 2017-10-17 ENCOUNTER — INFUSION THERAPY VISIT (OUTPATIENT)
Dept: INFUSION THERAPY | Facility: CLINIC | Age: 77
End: 2017-10-17
Payer: MEDICARE

## 2017-10-17 VITALS
SYSTOLIC BLOOD PRESSURE: 94 MMHG | WEIGHT: 136.3 LBS | OXYGEN SATURATION: 98 % | BODY MASS INDEX: 23.38 KG/M2 | TEMPERATURE: 98.5 F | HEART RATE: 82 BPM | DIASTOLIC BLOOD PRESSURE: 61 MMHG | RESPIRATION RATE: 16 BRPM

## 2017-10-17 DIAGNOSIS — K55.20 AVM (ARTERIOVENOUS MALFORMATION) OF SMALL BOWEL, ACQUIRED: ICD-10-CM

## 2017-10-17 DIAGNOSIS — R19.7 DIARRHEA: Primary | ICD-10-CM

## 2017-10-17 DIAGNOSIS — K31.811 ANGIODYSPLASIA OF STOMACH AND DUODENUM WITH HEMORRHAGE: ICD-10-CM

## 2017-10-17 DIAGNOSIS — D50.0 IRON DEFICIENCY ANEMIA DUE TO CHRONIC BLOOD LOSS: ICD-10-CM

## 2017-10-17 PROCEDURE — 96372 THER/PROPH/DIAG INJ SC/IM: CPT | Performed by: INTERNAL MEDICINE

## 2017-10-17 PROCEDURE — 99207 ZZC NO CHARGE LOS: CPT

## 2017-10-17 RX ADMIN — OCTREOTIDE ACETATE 20 MG: KIT INTRAMUSCULAR at 13:39

## 2017-10-17 NOTE — PROGRESS NOTES
Infusion Nursing Note:  Rachele Martínez presents today for Sandostatin.    Patient seen by provider today: No   present during visit today: Not Applicable.    Note: Pt reports she receives Sandostatin for GI bleed. Reports her last Hgb was 10.1 per dialysis. States she has experienced less tarry stools.     Intravenous Access:  No Intravenous access/labs at this visit.    Treatment Conditions:  Not Applicable.      Post Infusion Assessment:  Patient tolerated injection without incident.    Discharge Plan:   Patient will return 11/16/17 for next appointment.   Patient discharged in stable condition accompanied by: daughter.  Departure Mode: Ambulatory.    Kathrin Peters RN

## 2017-10-17 NOTE — MR AVS SNAPSHOT
After Visit Summary   10/17/2017    Rachele Martínez    MRN: [id number]           Patient Information     Date Of Birth          1940        Visit Information        Provider Department      10/17/2017 1:30 PM [address]        Today's Diagnoses     Diarrhea    -  1    Angiodysplasia of stomach and duodenum with hemorrhage        AVM (arteriovenous malformation) of small bowel, acquired (H)        Iron deficiency anemia due to chronic blood loss           Follow-ups after your visit        Your next 10 appointments already scheduled     Nov 07, 2017 10:00 AM CST   Lab with  LAB   [facility] ([facility])    [address]  1st Floor  [facility] [id number] [phone]            Nov 07, 2017 11:00 AM CST   (Arrive by 10:45 AM)   Return General Liver with Andriy Barnett MD   [facility] ([facility])    [address] [place] [address]  3rd Floor  [facility] [id number] [phone]            Nov 16, 2017 11:30 AM CST   Level O with [address] ([facility])    [address] [place] [id number] [phone]            Dec 12, 2017 11:05 AM CST   (Arrive by 10:50 AM)   Return Visit with Pan Saba MD   [facility] ([facility])    [address] [place] [address]  2nd Floor  [facility] [address] [phone]              Who to contact     If you have questions or need follow up information about today's clinic visit or your schedule please contact [facility] directly at [phone].  Normal or non-critical lab and imaging results will be communicated to you by MyChart, letter or phone within 4 business days after the clinic has received the results. If you do not hear from us within 7 days, please contact the clinic through MyChart or phone. If you have a critical or  abnormal lab result, we will notify you by phone as soon as possible.  Submit refill requests through [facility] [employer] or call your pharmacy and they will forward the refill request to us. Please allow 3 business days for your refill to be completed.          Additional Information About Your Visit        [facility] Information     [facility] is an electronic gateway that provides easy, online access to your medical records. With [facility] [employer], you can request a clinic appointment, read your test results, renew a prescription or communicate with your care team.     To sign up for [employer] [employer] visit the website at [email or web address]   You will be asked to enter the access code listed below, as well as some personal information. Please follow the directions to create your username and password.     Your access code is: [email or web address]  Expires: 2017  3:05 PM     Your access code will  in 90 days. If you need help or a new code, please contact your [facility] or call [phone] for assistance.        Care EveryWhere ID     This is your Care EveryWhere ID. This could be used by other organizations to access your [facility] medical records  [id number]        Your Vitals Were     Pulse Temperature Respirations Pulse Oximetry BMI (Body Mass Index)       82 98.5  F (36.9  C) (Oral) 16 98% 23.38 kg/m2        Blood Pressure from Last 3 Encounters:   10/17/17 94/61   10/10/17 120/72   17 162/78    Weight from Last 3 Encounters:   10/17/17 61.8 kg (136 lb 4.8 oz)   10/10/17 62.6 kg (138 lb)   17 63 kg (138 lb 12.8 oz)              Today, you had the following     No orders found for display         Today's Medication Changes          These changes are accurate as of: 10/17/17  1:48 PM.  If you have any questions, ask your nurse or doctor.               These medicines have changed or have updated prescriptions.        Dose/Directions    polyethylene glycol 3350 Powd   This may have changed:     - when to take this  - reasons to take this   Used for:  Slow transit constipation        Dose:  17 g   Take 17 g by mouth daily   Quantity:  1530 g   Refills:  3                Primary Care Provider Office Phone # Fax #    Agnieszka Erica Rodriguez [phone]       [address]  [facility] [address]        Equal Access to Services     [facility] CenterNATHALY : Hadii aad ku hadasho Soomaali, waaxda luqadaha, qaybta kaalmada adeegyada, waxay idiin hayaan adeeg kharash latristinn . So [facility] [phone].    ATENCIÓN: Si habla español, tiene a ibarra disposición servicios gratuitos de asistencia lingüística. Bettye al [phone].    We comply with applicable federal civil rights laws and [place] laws. We do not discriminate on the basis of race, color, national origin, age, disability, sex, sexual orientation, or gender identity.            Thank you!     Thank you for choosing [facility]  for your care. Our goal is always to provide you with excellent care. Hearing back from our patients is one way we can continue to improve our services. Please take a few minutes to complete the written survey that you may receive in the mail after your visit with us. Thank you!             Your Updated Medication List - Protect others around you: Learn how to safely use, store and throw away your medicines at www.disposemymeds.org.          This list is accurate as of: 10/17/17  1:48 PM.  Always use your most recent med list.                   Brand Name Dispense Instructions for use Diagnosis    Calcium Acetate (Phos Binder) 667 MG Caps      TAKE 2 CAPSULE BY MOUTH THREE TIMES A DAY WITH MEALS        lisinopril 10 MG tablet    PRINIVIL/ZESTRIL     Take 10 mg by mouth daily        OCTREOTIDE ACETATE IJ      Inject as directed every 30 days        order for DME     1 Device    Equipment being ordered: 4 wheel walker with seat.    Disorder of bone and cartilage, Arthralgia, unspecified joint, Tendency  to fall       polyethylene glycol 3350 Powd     1530 g    Take 17 g by mouth daily    Slow transit constipation       PRORENAL + D Tabs      Take 1 tablet by mouth daily        sertraline 50 MG tablet    ZOLOFT    90 tablet    TAKE 1 TABLET (50 MG) BY MOUTH DAILY    Itching

## 2017-11-06 ENCOUNTER — TELEPHONE (OUTPATIENT)
Dept: INTERNAL MEDICINE | Facility: CLINIC | Age: 77
End: 2017-11-06

## 2017-11-06 NOTE — TELEPHONE ENCOUNTER
Regarding: Jennifer pt--requesting med  Contact: [phone]  Pt's daughter Charla calling In, states pt is needing to be seen for a bad cough and some back pain. I was able to get them in with a resident next week, but Charla is wondering what to do about pt's cough in the meantime. She states pt is constantly coughing and expelling phlegm (Charla states it has been clear colored). Charla is wondering if a cough syrup could be prescribed again--she states it was done before, she could not remember the name. Would this be possible? Please advise.    November 6, 2017 5:19 PM  Spoke with Charla. No able to prescribe cough medicine without an appointment. Advised fluids, keeping head a little elevated at night, and could speak with pharmacist about OTC cough medicine. Appointment moved up to Thu, 11/9/17 at 3:40 with Dr. Cowan.   Alma Delia Barger RN, Care Coordinator

## 2017-11-07 ENCOUNTER — OFFICE VISIT (OUTPATIENT)
Dept: GASTROENTEROLOGY | Facility: CLINIC | Age: 77
End: 2017-11-07
Attending: INTERNAL MEDICINE
Payer: MEDICARE

## 2017-11-07 VITALS
TEMPERATURE: 98.3 F | WEIGHT: 143 LBS | HEART RATE: 66 BPM | DIASTOLIC BLOOD PRESSURE: 83 MMHG | OXYGEN SATURATION: 99 % | BODY MASS INDEX: 24.41 KG/M2 | HEIGHT: 64 IN | SYSTOLIC BLOOD PRESSURE: 120 MMHG

## 2017-11-07 DIAGNOSIS — L29.9 ITCHING: ICD-10-CM

## 2017-11-07 DIAGNOSIS — K74.60 CIRRHOSIS OF LIVER WITHOUT ASCITES, UNSPECIFIED HEPATIC CIRRHOSIS TYPE (H): Primary | ICD-10-CM

## 2017-11-07 DIAGNOSIS — K74.60 CIRRHOSIS OF LIVER WITHOUT ASCITES, UNSPECIFIED HEPATIC CIRRHOSIS TYPE (H): ICD-10-CM

## 2017-11-07 LAB
ALBUMIN SERPL-MCNC: 3.8 G/DL (ref 3.4–5)
ALP SERPL-CCNC: 113 U/L (ref 40–150)
ALT SERPL W P-5'-P-CCNC: 30 U/L (ref 0–50)
ANION GAP SERPL CALCULATED.3IONS-SCNC: 8 MMOL/L (ref 3–14)
AST SERPL W P-5'-P-CCNC: 30 U/L (ref 0–45)
BILIRUB DIRECT SERPL-MCNC: 0.1 MG/DL (ref 0–0.2)
BILIRUB SERPL-MCNC: 0.4 MG/DL (ref 0.2–1.3)
BUN SERPL-MCNC: 19 MG/DL (ref 7–30)
CALCIUM SERPL-MCNC: 8.6 MG/DL (ref 8.5–10.1)
CHLORIDE SERPL-SCNC: 97 MMOL/L (ref 94–109)
CO2 SERPL-SCNC: 28 MMOL/L (ref 20–32)
CREAT SERPL-MCNC: 6.97 MG/DL (ref 0.52–1.04)
ERYTHROCYTE [DISTWIDTH] IN BLOOD BY AUTOMATED COUNT: 13.7 % (ref 10–15)
GFR SERPL CREATININE-BSD FRML MDRD: 6 ML/MIN/1.7M2
GLUCOSE SERPL-MCNC: 100 MG/DL (ref 70–99)
HCT VFR BLD AUTO: 36.3 % (ref 35–47)
HGB BLD-MCNC: 11.1 G/DL (ref 11.7–15.7)
INR PPP: 1.13 (ref 0.86–1.14)
MCH RBC QN AUTO: 31.5 PG (ref 26.5–33)
MCHC RBC AUTO-ENTMCNC: 30.6 G/DL (ref 31.5–36.5)
MCV RBC AUTO: 103 FL (ref 78–100)
PLATELET # BLD AUTO: 233 10E9/L (ref 150–450)
POTASSIUM SERPL-SCNC: 4.1 MMOL/L (ref 3.4–5.3)
PROT SERPL-MCNC: 7.9 G/DL (ref 6.8–8.8)
RBC # BLD AUTO: 3.52 10E12/L (ref 3.8–5.2)
SODIUM SERPL-SCNC: 133 MMOL/L (ref 133–144)
WBC # BLD AUTO: 5.3 10E9/L (ref 4–11)

## 2017-11-07 PROCEDURE — 85027 COMPLETE CBC AUTOMATED: CPT | Performed by: INTERNAL MEDICINE

## 2017-11-07 PROCEDURE — 85610 PROTHROMBIN TIME: CPT | Performed by: INTERNAL MEDICINE

## 2017-11-07 PROCEDURE — 36415 COLL VENOUS BLD VENIPUNCTURE: CPT | Performed by: INTERNAL MEDICINE

## 2017-11-07 PROCEDURE — 80076 HEPATIC FUNCTION PANEL: CPT | Mod: AY | Performed by: INTERNAL MEDICINE

## 2017-11-07 PROCEDURE — 99212 OFFICE O/P EST SF 10 MIN: CPT | Mod: ZF

## 2017-11-07 PROCEDURE — 80048 BASIC METABOLIC PNL TOTAL CA: CPT | Performed by: INTERNAL MEDICINE

## 2017-11-07 ASSESSMENT — PAIN SCALES - GENERAL: PAINLEVEL: EXTREME PAIN (8)

## 2017-11-07 NOTE — NURSING NOTE
Chief Complaint   Patient presents with     RECHECK     Cirrhosis of Liver without Ascites   Pt roomed, vitals, meds, and allergies reviewed with pt. Pt ready for provider.  Andrew Jones, CMA

## 2017-11-07 NOTE — LETTER
11/7/2017      RE: Rachele Martínez  [address] [place]    [facility] [address]       I had the pleasure of seeing Rachele Martínez for followup in the Liver Clinic at the [facility] on 11/07/2017.  Ms. 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      For the most part, she seems stable.  She does complain of some mild right upper quadrant pain.  She also complains of itching.  She is on Zoloft for that but is only on a dose of 50 mg per day.  We will try increasing the dose to see if that provides better control of her itching.  She does complain of some fatigue.  She denies any increased abdominal girth or lower extremity edema.  She has not had any gastrointestinal bleeding or any overt signs of encephalopathy.  She denies any fevers or chills.  She does have a cough that has been going on for about a month.  She did see her primary physician about that and was prescribed some cough medicine that seemed to work fairly well.  She is now out and will be seeing the primary physician in 2 days.  She denies any nausea or vomiting, diarrhea or constipation.  Her appetite is so-so, but her weight has remained stable and is controlled by dialysis.     Current Outpatient Prescriptions   Medication     sertraline (ZOLOFT) 50 MG tablet     Multiple Vitamins-Minerals (PRORENAL + D) TABS     Calcium Acetate, Phos Binder, 667 MG CAPS     OCTREOTIDE ACETATE IJ     order for DME     polyethylene glycol 3350 POWD     [DISCONTINUED] sertraline (ZOLOFT) 50 MG tablet     No current facility-administered medications for this visit.      B/P: 120/83, T: 98.3, P: 66, R: Data Unavailable    HEENT exam shows no scleral icterus and no temporal muscle wasting.  Her chest is clear.  Her abdominal exam shows no increase in girth.  No masses or tenderness to palpation are present.  Liver is 10 cm in span without left lobe enlargement.  No spleen tip is palpable, and  extremity exam shows no edema.  Skin exam shows no stigmata of chronic liver disease, and neurologic exam shows no asterixis.     Recent Results (from the past 168 hour(s))   Hepatic panel    Collection Time: 11/07/17 10:18 AM   Result Value Ref Range    Bilirubin Direct 0.1 0.0 - 0.2 mg/dL    Bilirubin Total 0.4 0.2 - 1.3 mg/dL    Albumin 3.8 3.4 - 5.0 g/dL    Protein Total 7.9 6.8 - 8.8 g/dL    Alkaline Phosphatase 113 40 - 150 U/L    ALT 30 0 - 50 U/L    AST 30 0 - 45 U/L   CBC with platelets    Collection Time: 11/07/17 10:18 AM   Result Value Ref Range    WBC 5.3 4.0 - 11.0 10e9/L    RBC Count 3.52 (L) 3.8 - 5.2 10e12/L    Hemoglobin 11.1 (L) 11.7 - 15.7 g/dL    Hematocrit 36.3 35.0 - 47.0 %     (H) 78 - 100 fl    MCH 31.5 26.5 - 33.0 pg    MCHC 30.6 (L) 31.5 - 36.5 g/dL    RDW 13.7 10.0 - 15.0 %    Platelet Count 233 150 - 450 10e9/L   INR    Collection Time: 11/07/17 10:18 AM   Result Value Ref Range    INR 1.13 0.86 - 1.14   Basic metabolic panel    Collection Time: 11/07/17 10:18 AM   Result Value Ref Range    Sodium 133 133 - 144 mmol/L    Potassium 4.1 3.4 - 5.3 mmol/L    Chloride 97 94 - 109 mmol/L    Carbon Dioxide 28 20 - 32 mmol/L    Anion Gap 8 3 - 14 mmol/L    Glucose 100 (H) 70 - 99 mg/dL    Urea Nitrogen 19 7 - 30 mg/dL    Creatinine 6.97 (H) 0.52 - 1.04 mg/dL    GFR Estimate 6 (L) >60 mL/min/1.7m2    GFR Estimate If Black 7 (L) >60 mL/min/1.7m2    Calcium 8.6 8.5 - 10.1 mg/dL      My impression is that Ms. 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  She has a normal platelet count; however, her liver stiffness was less than 20; thus, she does not need screening for esophageal varices.  She does need an ultrasound to screen for HCC which she will be scheduled for.  She, otherwise, will return to see me in 6 months.      As I mentioned, I am going to increase her Zoloft to 100 mg b.i.d. to see if it does not affect a better control of her pruritus.      Thank you very much for  allowing me to participate in the care of this patient.  If you have any questions regarding my recommendations, please do not hesitate to contact me.       Andriy Barnett MD      Professor of Medicine  [employer]      Executive Medical Director, Solid Organ Transplant Program  [facility]

## 2017-11-07 NOTE — MR AVS SNAPSHOT
After Visit Summary   11/7/2017    Rachele Martínez    MRN: [id number]           Patient Information     Date Of Birth          1940        Visit Information        Provider Department      11/7/2017 11:00 AM Andriy Barnett MD [facility] Hepatology        Today's Diagnoses     Cirrhosis of liver without ascites, unspecified hepatic cirrhosis type (H)    -  1    Itching           Follow-ups after your visit        Follow-up notes from your care team     Return in about 6 months (around 5/7/2018).      Your next 10 appointments already scheduled     Nov 07, 2017 11:00 AM CST   (Arrive by 10:45 AM)   Return General Liver with Andriy Barnett MD   [facility] Hepatology ([facility])    [address] [place] [address]  3rd Floor  [facility] [address] [phone]            Nov 09, 2017  3:40 PM CST   (Arrive by 3:25 PM)   Return Visit with Bull Cowan MD   [facility] Primary Care Clinic ([facility])    [address] [place] [address]  4th Floor  [facility] [address] [phone]            Nov 16, 2017 11:30 AM CST   Level O with [address] ([facility])    [address] [place] [id number] [phone]            Dec 12, 2017 11:05 AM CST   (Arrive by 10:50 AM)   Return Visit with Pan Saba MD   [facility] ([facility])    [address] [place] [address]  2nd [facility] [address] [phone]            May 08, 2018 10:00 AM CDT   Lab with  LAB   [facility] Lab ([facility])    [address] [place] [address]  1st [facility] [address] [phone]            May 08, 2018 10:45 AM CDT   US ABDOMEN COMPLETE with UCUS1   [facility] Imaging Center US ([facility])    [address] [place] [address]  1st [facility] [address] [phone]           Please bring a list of your medicines (including  vitamins, minerals and over-the-counter drugs). Also, tell your doctor about any allergies you may have. Wear comfortable clothes and leave your valuables at home.  Adults: No eating or drinking for 8 hours before the exam. You may take medicine with a small sip of water.  Children: - Children 6+ years: No food or drink for 6 hours before exam. - Children 1-5 years: No food or drink for 4 hours before exam. - Infants, breast-fed: may have breast milk up to 2 hours before exam. - Infants, formula: may have bottle until 4 hours before exam.  Please call the Imaging Department at your exam site with any questions.            May 08, 2018 11:45 AM CDT   (Arrive by 11:30 AM)   Return General Liver with Andriy Barnett MD   [facility] Hepatology ([facility])    [address] [place] [address] [phone]              Future tests that were ordered for you today     Open Standing Orders        Priority Remaining Interval Expires Ordered    US abdomen complete [RTM373] Routine 2/2 Every 6 Months 11/7/2018 11/7/2017          Open Future Orders        Priority Expected Expires Ordered    US Abdomen Complete Routine  11/7/2018 11/7/2017    Hepatic Panel [LAB20] Routine 5/6/2018 11/7/2018 11/7/2017    Basic metabolic panel [LAB15] Routine 5/6/2018 11/7/2018 11/7/2017    CBC with platelets [RWC819] Routine 5/6/2018 11/7/2018 11/7/2017    INR [GYZ6984] Routine 5/6/2018 11/7/2018 11/7/2017    AFP tumor marker [LUN500] Routine 5/6/2018 11/7/2018 11/7/2017            Who to contact     If you have questions or need follow up information about today's clinic visit or your schedule please contact [facility] HEPATOLOGY directly at [phone].  Normal or non-critical lab and imaging results will be communicated to you by MyChart, letter or phone within 4 business days after the clinic has received the results. If you do not hear from us within 7 days, please contact the clinic through  "[employer] or phone. If you have a critical or abnormal lab result, we will notify you by phone as soon as possible.  Submit refill requests through [employer] or call your pharmacy and they will forward the refill request to us. Please allow 3 business days for your refill to be completed.          Additional Information About Your Visit        [employer] Information     [employer] lets you send messages to your doctor, view your test results, renew your prescriptions, schedule appointments and more. To sign up, go to [email or web address] . Click on \"Log in\" on the left side of the screen, which will take you to the Welcome page. Then click on \"Sign up Now\" on the right side of the page.     You will be asked to enter the access code listed below, as well as some personal information. Please follow the directions to create your username and password.     Your access code is: [email or web address]  Expires: 2018  6:30 AM     Your access code will  in 90 days. If you need help or a new code, please call your [place] clinic or [phone].        Care EveryWhere ID     This is your Care EveryWhere ID. This could be used by other organizations to access your [place] medical records  [id number]        Your Vitals Were     Pulse Temperature Height Pulse Oximetry BMI (Body Mass Index)       66 98.3  F (36.8  C) (Oral) 1.626 m (5' 4\") 99% 24.55 kg/m2        Blood Pressure from Last 3 Encounters:   17 120/83   10/17/17 94/61   10/10/17 120/72    Weight from Last 3 Encounters:   17 64.9 kg (143 lb)   10/17/17 61.8 kg (136 lb 4.8 oz)   10/10/17 62.6 kg (138 lb)              We Performed the Following     Schedule follow up appointments          Today's Medication Changes          These changes are accurate as of: 17 10:56 AM.  If you have any questions, ask your nurse or doctor.               These medicines have changed or have updated prescriptions.        Dose/Directions    polyethylene glycol 3350 Powd "   This may have changed:    - when to take this  - reasons to take this   Used for:  Slow transit constipation        Dose:  17 g   Take 17 g by mouth daily   Quantity:  1530 g   Refills:  3       sertraline 50 MG tablet   Commonly known as:  ZOLOFT   This may have changed:  See the new instructions.   Used for:  Itching   Changed by:  Andriy Barnett MD        Dose:  100 mg   Take 2 tablets (100 mg) by mouth daily   Quantity:  180 tablet   Refills:  3            Where to get your medicines      These medications were sent to [facility] - [place] - [address]  [address] [place] [address]     Phone:  [phone]     sertraline 50 MG tablet                Primary Care Provider Office Phone # Fax #    Agnieszka Erica Rodriguez [phone]       [address] [place] [address]  [facility] [address]        Equal Access to Services     [facility]: Hadii lawrence ku hadasho Soomaali, waaxda luqadaha, qaybta kaalmada adeegyada, waxay idiin hayaadani peres . So [facility] [phone].    ATENCIÓN: Si habla español, tiene a ibarra disposición servicios gratuitos de asistencia lingüística. Bettye al [phone].    We comply with applicable federal civil rights laws and [place] laws. We do not discriminate on the basis of race, color, national origin, age, disability, sex, sexual orientation, or gender identity.            Thank you!     Thank you for choosing [facility] HEPATOLOGY  for your care. Our goal is always to provide you with excellent care. Hearing back from our patients is one way we can continue to improve our services. Please take a few minutes to complete the written survey that you may receive in the mail after your visit with us. Thank you!             Your Updated Medication List - Protect others around you: Learn how to safely use, store and throw away your medicines at www.disposemymeds.org.          This list is accurate as of: 11/7/17 10:56 AM.  Always use your most  recent med list.                   Brand Name Dispense Instructions for use Diagnosis    Calcium Acetate (Phos Binder) 667 MG Caps      TAKE 2 CAPSULE BY MOUTH THREE TIMES A DAY WITH MEALS        OCTREOTIDE ACETATE IJ      Inject as directed every 30 days        order for DME     1 Device    Equipment being ordered: 4 wheel walker with seat.    Disorder of bone and cartilage, Arthralgia, unspecified joint, Tendency to fall       polyethylene glycol 3350 Powd     1530 g    Take 17 g by mouth daily    Slow transit constipation       PRORENAL + D Tabs      Take 1 tablet by mouth daily        sertraline 50 MG tablet    ZOLOFT    180 tablet    Take 2 tablets (100 mg) by mouth daily    Itching

## 2017-11-07 NOTE — LETTER
11/7/2017       RE: Rachele Martínez  [address] [place]    [facility] [address]     Dear Colleague,    Thank you for referring your patient, Rachlee Martínez, to the [facility] HEPATOLOGY at [facility]. Please see a copy of my visit note below.    I had the pleasure of seeing Rachele Martínez for followup in the Liver Clinic at the [facility] on 11/07/2017.  Ms. 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      For the most part, she seems stable.  She does complain of some mild right upper quadrant pain.  She also complains of itching.  She is on Zoloft for that but is only on a dose of 50 mg per day.  We will try increasing the dose to see if that provides better control of her itching.  She does complain of some fatigue.  She denies any increased abdominal girth or lower extremity edema.  She has not had any gastrointestinal bleeding or any overt signs of encephalopathy.  She denies any fevers or chills.  She does have a cough that has been going on for about a month.  She did see her primary physician about that and was prescribed some cough medicine that seemed to work fairly well.  She is now out and will be seeing the primary physician in 2 days.  She denies any nausea or vomiting, diarrhea or constipation.  Her appetite is so-so, but her weight has remained stable and is controlled by dialysis.     Current Outpatient Prescriptions   Medication     sertraline (ZOLOFT) 50 MG tablet     Multiple Vitamins-Minerals (PRORENAL + D) TABS     Calcium Acetate, Phos Binder, 667 MG CAPS     OCTREOTIDE ACETATE IJ     order for DME     polyethylene glycol 3350 POWD     [DISCONTINUED] sertraline (ZOLOFT) 50 MG tablet     No current facility-administered medications for this visit.      B/P: 120/83, T: 98.3, P: 66, R: Data Unavailable    HEENT exam shows no scleral icterus and no temporal muscle wasting.  Her chest is  clear.  Her abdominal exam shows no increase in girth.  No masses or tenderness to palpation are present.  Liver is 10 cm in span without left lobe enlargement.  No spleen tip is palpable, and extremity exam shows no edema.  Skin exam shows no stigmata of chronic liver disease, and neurologic exam shows no asterixis.     Recent Results (from the past 168 hour(s))   Hepatic panel    Collection Time: 11/07/17 10:18 AM   Result Value Ref Range    Bilirubin Direct 0.1 0.0 - 0.2 mg/dL    Bilirubin Total 0.4 0.2 - 1.3 mg/dL    Albumin 3.8 3.4 - 5.0 g/dL    Protein Total 7.9 6.8 - 8.8 g/dL    Alkaline Phosphatase 113 40 - 150 U/L    ALT 30 0 - 50 U/L    AST 30 0 - 45 U/L   CBC with platelets    Collection Time: 11/07/17 10:18 AM   Result Value Ref Range    WBC 5.3 4.0 - 11.0 10e9/L    RBC Count 3.52 (L) 3.8 - 5.2 10e12/L    Hemoglobin 11.1 (L) 11.7 - 15.7 g/dL    Hematocrit 36.3 35.0 - 47.0 %     (H) 78 - 100 fl    MCH 31.5 26.5 - 33.0 pg    MCHC 30.6 (L) 31.5 - 36.5 g/dL    RDW 13.7 10.0 - 15.0 %    Platelet Count 233 150 - 450 10e9/L   INR    Collection Time: 11/07/17 10:18 AM   Result Value Ref Range    INR 1.13 0.86 - 1.14   Basic metabolic panel    Collection Time: 11/07/17 10:18 AM   Result Value Ref Range    Sodium 133 133 - 144 mmol/L    Potassium 4.1 3.4 - 5.3 mmol/L    Chloride 97 94 - 109 mmol/L    Carbon Dioxide 28 20 - 32 mmol/L    Anion Gap 8 3 - 14 mmol/L    Glucose 100 (H) 70 - 99 mg/dL    Urea Nitrogen 19 7 - 30 mg/dL    Creatinine 6.97 (H) 0.52 - 1.04 mg/dL    GFR Estimate 6 (L) >60 mL/min/1.7m2    GFR Estimate If Black 7 (L) >60 mL/min/1.7m2    Calcium 8.6 8.5 - 10.1 mg/dL      My impression is that Ms. 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  She has a normal platelet count; however, her liver stiffness was less than 20; thus, she does not need screening for esophageal varices.  She does need an ultrasound to screen for HCC which she will be scheduled for.  She, otherwise, will  return to see me in 6 months.      As I mentioned, I am going to increase her Zoloft to 100 mg b.i.d. to see if it does not affect a better control of her pruritus.      Thank you very much for allowing me to participate in the care of this patient.  If you have any questions regarding my recommendations, please do not hesitate to contact me.       Andriy Barnett MD      Professor of Medicine  [employer]      Executive Medical Director, Solid Organ Transplant Program  [facility]

## 2017-11-07 NOTE — PROGRESS NOTES
I had the pleasure of seeing Rachele Martínez for followup in the Liver Clinic at the [facility] on 11/07/2017.  Ms. 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      For the most part, she seems stable.  She does complain of some mild right upper quadrant pain.  She also complains of itching.  She is on Zoloft for that but is only on a dose of 50 mg per day.  We will try increasing the dose to see if that provides better control of her itching.  She does complain of some fatigue.  She denies any increased abdominal girth or lower extremity edema.  She has not had any gastrointestinal bleeding or any overt signs of encephalopathy.  She denies any fevers or chills.  She does have a cough that has been going on for about a month.  She did see her primary physician about that and was prescribed some cough medicine that seemed to work fairly well.  She is now out and will be seeing the primary physician in 2 days.  She denies any nausea or vomiting, diarrhea or constipation.  Her appetite is so-so, but her weight has remained stable and is controlled by dialysis.     Current Outpatient Prescriptions   Medication     sertraline (ZOLOFT) 50 MG tablet     Multiple Vitamins-Minerals (PRORENAL + D) TABS     Calcium Acetate, Phos Binder, 667 MG CAPS     OCTREOTIDE ACETATE IJ     order for DME     polyethylene glycol 3350 POWD     [DISCONTINUED] sertraline (ZOLOFT) 50 MG tablet     No current facility-administered medications for this visit.      B/P: 120/83, T: 98.3, P: 66, R: Data Unavailable    HEENT exam shows no scleral icterus and no temporal muscle wasting.  Her chest is clear.  Her abdominal exam shows no increase in girth.  No masses or tenderness to palpation are present.  Liver is 10 cm in span without left lobe enlargement.  No spleen tip is palpable, and extremity exam shows no edema.  Skin exam shows no stigmata of chronic liver disease, and neurologic exam  shows no asterixis.     Recent Results (from the past 168 hour(s))   Hepatic panel    Collection Time: 11/07/17 10:18 AM   Result Value Ref Range    Bilirubin Direct 0.1 0.0 - 0.2 mg/dL    Bilirubin Total 0.4 0.2 - 1.3 mg/dL    Albumin 3.8 3.4 - 5.0 g/dL    Protein Total 7.9 6.8 - 8.8 g/dL    Alkaline Phosphatase 113 40 - 150 U/L    ALT 30 0 - 50 U/L    AST 30 0 - 45 U/L   CBC with platelets    Collection Time: 11/07/17 10:18 AM   Result Value Ref Range    WBC 5.3 4.0 - 11.0 10e9/L    RBC Count 3.52 (L) 3.8 - 5.2 10e12/L    Hemoglobin 11.1 (L) 11.7 - 15.7 g/dL    Hematocrit 36.3 35.0 - 47.0 %     (H) 78 - 100 fl    MCH 31.5 26.5 - 33.0 pg    MCHC 30.6 (L) 31.5 - 36.5 g/dL    RDW 13.7 10.0 - 15.0 %    Platelet Count 233 150 - 450 10e9/L   INR    Collection Time: 11/07/17 10:18 AM   Result Value Ref Range    INR 1.13 0.86 - 1.14   Basic metabolic panel    Collection Time: 11/07/17 10:18 AM   Result Value Ref Range    Sodium 133 133 - 144 mmol/L    Potassium 4.1 3.4 - 5.3 mmol/L    Chloride 97 94 - 109 mmol/L    Carbon Dioxide 28 20 - 32 mmol/L    Anion Gap 8 3 - 14 mmol/L    Glucose 100 (H) 70 - 99 mg/dL    Urea Nitrogen 19 7 - 30 mg/dL    Creatinine 6.97 (H) 0.52 - 1.04 mg/dL    GFR Estimate 6 (L) >60 mL/min/1.7m2    GFR Estimate If Black 7 (L) >60 mL/min/1.7m2    Calcium 8.6 8.5 - 10.1 mg/dL      My impression is that Ms. 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  She has a normal platelet count; however, her liver stiffness was less than 20; thus, she does not need screening for esophageal varices.  She does need an ultrasound to screen for HCC which she will be scheduled for.  She, otherwise, will return to see me in 6 months.      As I mentioned, I am going to increase her Zoloft to 100 mg b.i.d. to see if it does not affect a better control of her pruritus.      Thank you very much for allowing me to participate in the care of this patient.  If you have any questions regarding my  recommendations, please do not hesitate to contact me.       Andriy Barnett MD      Professor of Medicine  [employer]      Executive Medical Director, Solid Organ Transplant Program  [facility]

## 2017-11-09 ENCOUNTER — OFFICE VISIT (OUTPATIENT)
Dept: INTERNAL MEDICINE | Facility: CLINIC | Age: 77
End: 2017-11-09

## 2017-11-09 VITALS
SYSTOLIC BLOOD PRESSURE: 138 MMHG | DIASTOLIC BLOOD PRESSURE: 67 MMHG | OXYGEN SATURATION: 100 % | BODY MASS INDEX: 24.13 KG/M2 | WEIGHT: 140.6 LBS | HEART RATE: 65 BPM | RESPIRATION RATE: 16 BRPM

## 2017-11-09 DIAGNOSIS — Z13.220 SCREENING FOR HYPERLIPIDEMIA: Primary | ICD-10-CM

## 2017-11-09 DIAGNOSIS — K21.9 GASTROESOPHAGEAL REFLUX DISEASE WITHOUT ESOPHAGITIS: ICD-10-CM

## 2017-11-09 DIAGNOSIS — M47.816 LUMBAR ARTHROPATHY: ICD-10-CM

## 2017-11-09 RX ORDER — OMEPRAZOLE 40 MG/1
40 CAPSULE, DELAYED RELEASE ORAL DAILY
Qty: 30 CAPSULE | Refills: 0 | Status: SHIPPED | OUTPATIENT
Start: 2017-11-09 | End: 2017-12-31

## 2017-11-09 ASSESSMENT — PAIN SCALES - GENERAL: PAINLEVEL: SEVERE PAIN (7)

## 2017-11-09 NOTE — PATIENT INSTRUCTIONS
[facility]: [phone]     [facility] Medication Refill Request Information:  * Please contact your pharmacy regarding ANY request for medication refills.  ** [facility] Prescription Fax = [phone]  * Please allow 3 business days for routine medication refills.  * Please allow 5 business days for controlled substance medication refills.     [facility] Test Result notification information:  *You will be notified with in 7-10 days of your appointment day regarding the results of your test.  If you are on MyChart you will be notified as soon as the provider has reviewed the results and signed off on them.

## 2017-11-09 NOTE — MR AVS SNAPSHOT
After Visit Summary   11/9/2017    Rachele Martínez    MRN: [id number]           Patient Information     Date Of Birth          1940        Visit Information        Provider Department      11/9/2017 3:40 PM Bull Cowan MD [facility]        Today's Diagnoses     Screening for hyperlipidemia    -  1    Gastroesophageal reflux disease without esophagitis        Lumbar arthropathy (H)          Care Instructions    Primary Care Center: [phone]     Primary Care Center Medication Refill Request Information:  * Please contact your pharmacy regarding ANY request for medication refills.  ** [facility] Prescription Fax = [phone]  * Please allow 3 business days for routine medication refills.  * Please allow 5 business days for controlled substance medication refills.     Primary Care Center Test Result notification information:  *You will be notified with in 7-10 days of your appointment day regarding the results of your test.  If you are on MyChart you will be notified as soon as the provider has reviewed the results and signed off on them.            Follow-ups after your visit        Follow-up notes from your care team     Return in about 1 month (around 12/9/2017).      Your next 10 appointments already scheduled     Nov 16, 2017 11:30 AM CST   Level O with [address] ([facility])    [address] [place] [address] [phone]            Dec 12, 2017 11:05 AM CST   (Arrive by 10:50 AM)   Return Visit with Pan Saba MD   [facility] ([facility])    [address]  2nd Floor  [facility] [address] [phone]            Jan 11, 2018 10:30 AM CST   (Arrive by 10:15 AM)   Return Visit with Agnieszka Rodriguez MD   [facility] ([facility])    [address]  4th Floor  [facility] [address]    [phone]            May 08, 2018 10:00 AM CDT   Lab with  LAB    Health Lab ([facility])    [address] [place] [address] [phone]            May 08, 2018 10:45 AM CDT   US ABDOMEN COMPLETE with UCUS1   [facility] US ([facility])    [address] [place] [address] [phone]           Please bring a list of your medicines (including vitamins, minerals and over-the-counter drugs). Also, tell your doctor about any allergies you may have. Wear comfortable clothes and leave your valuables at home.  Adults: No eating or drinking for 8 hours before the exam. You may take medicine with a small sip of water.  Children: - Children 6+ years: No food or drink for 6 hours before exam. - Children 1-5 years: No food or drink for 4 hours before exam. - Infants, breast-fed: may have breast milk up to 2 hours before exam. - Infants, formula: may have bottle until 4 hours before exam.  Please call the Imaging Department at your exam site with any questions.            May 08, 2018 11:45 AM CDT   (Arrive by 11:30 AM)   Return General Liver with Andriy Barnett MD   [facility] ([facility])    [address] [place] [address] [phone]              Who to contact     Please call your clinic at [phone] to:    Ask questions about your health    Make or cancel appointments    Discuss your medicines    Learn about your test results    Speak to your doctor   If you have compliments or concerns about an experience at your clinic, or if you wish to file a complaint, please contact [facility] Patient Relations at [phone] or email us at [email or web address]         Additional Information About Your Visit        Chosen.fmhart Information     [employer] is an electronic gateway that provides easy, online access  to your medical records. With [employer], you can request a clinic appointment, read your test results, renew a prescription or communicate with your care team.     To sign up for [employer] visit the website at [email or web address]   You will be asked to enter the access code listed below, as well as some personal information. Please follow the directions to create your username and password.     Your access code is: [email or web address]  Expires: 2018  6:30 AM     Your access code will  in 90 days. If you need help or a new code, please contact your [facility] or call [phone] for assistance.        Care EveryWhere ID     This is your Care EveryWhere ID. This could be used by other organizations to access your [place] medical records  [id number]        Your Vitals Were     Pulse Respirations Pulse Oximetry BMI (Body Mass Index)          65 16 100% 24.13 kg/m2         Blood Pressure from Last 3 Encounters:   17 138/67   17 120/83   10/17/17 94/61    Weight from Last 3 Encounters:   17 63.8 kg (140 lb 9.6 oz)   17 64.9 kg (143 lb)   10/17/17 61.8 kg (136 lb 4.8 oz)                 Today's Medication Changes          These changes are accurate as of: 17 11:59 PM.  If you have any questions, ask your nurse or doctor.               Start taking these medicines.        Dose/Directions    diclofenac 1 % Gel topical gel   Commonly known as:  VOLTAREN   Used for:  Lumbar arthropathy (H)   Started by:  Bull Cowan MD        Apply 4 grams to knees or 2 grams to hands four times daily using enclosed dosing card.   Quantity:  100 g   Refills:  1       omeprazole 40 MG capsule   Commonly known as:  priLOSEC   Used for:  Gastroesophageal reflux disease without esophagitis   Started by:  Bull Cowan MD        Dose:  40 mg   Take 1 capsule (40 mg) by mouth daily Take 30-60 minutes before a meal.   Quantity:  30 capsule   Refills:  0         These medicines  have changed or have updated prescriptions.        Dose/Directions    polyethylene glycol 3350 Powd   This may have changed:    - when to take this  - reasons to take this   Used for:  Slow transit constipation        Dose:  17 g   Take 17 g by mouth daily   Quantity:  1530 g   Refills:  3            Where to get your medicines      These medications were sent to [facility] [address] [facility] [place] - [address]  [address] [place] [place] [address]     Phone:  [phone]     diclofenac 1 % Gel topical gel    omeprazole 40 MG capsule                Primary Care Provider Office Phone # Fax #    Agnieszka Erica Rodriguez [phone]       [address] [facility] [address]  [facility] [address]        Equal Access to Services     JAMIE CARVAJAL : Hadii lawrence sellerso Sohemal, waaxda luqadaha, qaybta kaalmada adecarolinayada, jaz fowler. So [facility] [phone].    ATENCIÓN: Si habla español, tiene a ibarra disposición servicios gratuitos de asistencia lingüística. [facility] [phone].    We comply with applicable federal civil rights laws and [place] laws. We do not discriminate on the basis of race, color, national origin, age, disability, sex, sexual orientation, or gender identity.            Thank you!     Thank you for choosing [facility]  for your care. Our goal is always to provide you with excellent care. Hearing back from our patients is one way we can continue to improve our services. Please take a few minutes to complete the written survey that you may receive in the mail after your visit with us. Thank you!             Your Updated Medication List - Protect others around you: Learn how to safely use, store and throw away your medicines at www.disposemymeds.org.          This list is accurate as of: 11/9/17 11:59 PM.  Always use your most recent med list.                   Brand Name Dispense Instructions for use Diagnosis    Calcium Acetate (Phos Binder) 665  MG Caps      TAKE 2 CAPSULE BY MOUTH THREE TIMES A DAY WITH MEALS        diclofenac 1 % Gel topical gel    VOLTAREN    100 g    Apply 4 grams to knees or 2 grams to hands four times daily using enclosed dosing card.    Lumbar arthropathy (H)       OCTREOTIDE ACETATE IJ      Inject as directed every 30 days        omeprazole 40 MG capsule    priLOSEC    30 capsule    Take 1 capsule (40 mg) by mouth daily Take 30-60 minutes before a meal.    Gastroesophageal reflux disease without esophagitis       order for DME     1 Device    Equipment being ordered: 4 wheel walker with seat.    Disorder of bone and cartilage, Arthralgia, unspecified joint, Tendency to fall       polyethylene glycol 3350 Powd     1530 g    Take 17 g by mouth daily    Slow transit constipation       PRORENAL + D Tabs      Take 1 tablet by mouth daily        sertraline 50 MG tablet    ZOLOFT    180 tablet    Take 2 tablets (100 mg) by mouth daily    Itching

## 2017-11-09 NOTE — NURSING NOTE
Chief Complaint   Patient presents with     Cough     pt has a cough      Back Pain     pt states having back pain and spasms     Selin Glover CMA at 3:46 PM on 11/9/2017.

## 2017-11-10 NOTE — PROGRESS NOTES
PRIMARY CARE CENTER       SUBJECTIVE:  Rachele Martínez is a 77 year old female with a PMHx of   Active Ambulatory Problems     Diagnosis Date Noted     Health Care Home 02/03/2012     Cataract 11/23/2012     Anxiety state 11/23/2012     Insomnia 11/23/2012     Hyperlipidemia with target LDL less than 130 02/27/2015     History of hepatitis C 07/28/2015     ACP (advance care planning) 07/30/2015     Iron deficiency anemia due to chronic blood loss 01/31/2016     AVM (arteriovenous malformation) of small bowel, acquired (H) 03/14/2016     ESRD (end stage renal disease) on dialysis (H) 05/05/2016     Cirrhosis of liver without ascites, unspecified hepatic cirrhosis type (H) 05/05/2016     Tendency to fall 07/20/2016     Hypertension goal BP (blood pressure) < 140/80 08/02/2003     Diarrhea 08/29/2016     Angiodysplasia of stomach and duodenum with hemorrhage 08/29/2016   who comes in for cough that started about 2 weeks ago. It is worse in the evening and the morning and is accompanied by whitish sputum. Can be dry at times. Has no symptoms of acid reflux. Was recommended to start Zantac by GI 2 days ago, but this has not improved symptoms. She denies any recent fever, chills, night sweats, rhinorrhea, sinus congestion, sore throat or any symptoms to suggest URI. No sick contacts or recent travel.     Patient also has low back pain in L3-L4 region. Has tried physical therapy and improved symptoms mildly. Would like other alternatives not including acetaminophen due to hx of HCV cirrhosis and NSAIDs due to ESRD. Denies red flag symptoms such as fever, chills, nightsweats, weight loss, stool or urine incontinence.     Medications and allergies reviewed by me today.     ROS:   Constitutional, neuro, ENT, endocrine, pulmonary, cardiac, gastrointestinal, genitourinary, musculoskeletal, integument and psychiatric systems are negative, except as otherwise noted.      OBJECTIVE:  /67  Pulse 65   Resp 16  Wt 63.8 kg (140 lb 9.6 oz)  SpO2 100%  BMI 24.13 kg/m2   Wt Readings from Last 1 Encounters:   11/09/17 63.8 kg (140 lb 9.6 oz)       GENERAL APPEARANCE: healthy, alert and no distress     EYES: EOMI,  PERRL     HENT: ear canals and TM's normal and nose and mouth without ulcers or lesions     NECK: no adenopathy, no asymmetry, masses, or scars and thyroid normal to palpation     RESP: lungs clear to auscultation - no rales, rhonchi or wheezes     CV: regular rates and rhythm, normal S1 S2, no S3 or S4 and no murmur, click or rub     ABDOMEN:  soft, nontender, no HSM or masses and bowel sounds normal     MS: extremities normal- no gross deformities noted, no evidence of inflammation in joints, FROM in all extremities, point tenderness in L3-L4, positive straight leg raise and KRISTIN test bilaterally     SKIN: no suspicious lesions or rashes     NEURO: Normal strength and tone, sensory exam grossly normal, mentation intact and speech normal        ASSESSMENT/PLAN:    Rachele was seen today for cough and back pain.    Diagnoses and all orders for this visit:    Screening for hyperlipidemia  -     Lipid panel reflex to direct LDL Fasting; Future    Cough  Gastroesophageal reflux disease without esophagitis  Believe cough is secondary to GERD based on exacerbation when laying down and in the morning. Did have some hoarseness on visit. Will trial PPI and if this does not resolve symptoms will consider chest CT for lung etiology as patient with about 30 pack year history, qut about 15 years ago.   -     omeprazole (PRILOSEC) 40 MG capsule; Take 1 capsule (40 mg) by mouth daily Take 30-60 minutes before a meal.    Lumbar arthropathy (H)  -     diclofenac (VOLTAREN) 1 % GEL topical gel; Apply 4 grams to knees or 2 grams to hands four times daily using enclosed dosing card.    Pt should return to clinic for f/u in 4 weeks.    Bull Cowan MD  Nov 9, 2017    Pt was seen and plan of care discussed with Dr. Ascencio.      Attending Addendum:  Patient seen and examined with resident in clinic today.  Pertinent portions of history and exam were independently verified by myself.  I agree with the exam and plan as outlined above with the following modifications: none.  If PPI doesn't improve cough, would consider reactive airways/inhaler and chest imaging given significant smoking history.  This was verbalized/discussed with patient.  Jaz Ascencio MD  Internal Medicine

## 2017-11-16 ENCOUNTER — INFUSION THERAPY VISIT (OUTPATIENT)
Dept: INFUSION THERAPY | Facility: CLINIC | Age: 77
End: 2017-11-16
Payer: MEDICARE

## 2017-11-16 VITALS
DIASTOLIC BLOOD PRESSURE: 70 MMHG | HEART RATE: 80 BPM | RESPIRATION RATE: 18 BRPM | SYSTOLIC BLOOD PRESSURE: 126 MMHG | OXYGEN SATURATION: 100 % | TEMPERATURE: 97.6 F

## 2017-11-16 DIAGNOSIS — D50.0 IRON DEFICIENCY ANEMIA DUE TO CHRONIC BLOOD LOSS: ICD-10-CM

## 2017-11-16 DIAGNOSIS — K31.811 ANGIODYSPLASIA OF STOMACH AND DUODENUM WITH HEMORRHAGE: ICD-10-CM

## 2017-11-16 DIAGNOSIS — R19.7 DIARRHEA, UNSPECIFIED TYPE: Primary | ICD-10-CM

## 2017-11-16 DIAGNOSIS — K55.20 AVM (ARTERIOVENOUS MALFORMATION) OF SMALL BOWEL, ACQUIRED: ICD-10-CM

## 2017-11-16 PROCEDURE — 99207 ZZC NO CHARGE LOS: CPT

## 2017-11-16 PROCEDURE — 96372 THER/PROPH/DIAG INJ SC/IM: CPT | Performed by: INTERNAL MEDICINE

## 2017-11-16 RX ADMIN — OCTREOTIDE ACETATE 20 MG: KIT INTRAMUSCULAR at 11:47

## 2017-11-16 NOTE — PROGRESS NOTES
Infusion Nursing Note:  Rachele Martínez presents today for Sandostatin.    Patient seen by provider today: No   present during visit today: Not Applicable.    Note: N/A.    Intravenous Access:  No Intravenous access/labs at this visit.    Treatment Conditions:  Not Applicable.      Post Infusion Assessment:  Patient tolerated injection without incident.  Site patent and intact, free from redness, edema or discomfort.    Discharge Plan:   Patient will return 12/14/17 for next appointment.   Patient discharged in stable condition accompanied by: self and friend.  Departure Mode: Ambulatory.    Loraine García RN

## 2017-11-16 NOTE — MR AVS SNAPSHOT
After Visit Summary   11/16/2017    Rachele Martínez    MRN: [id number]           Patient Information     Date Of Birth          1940        Visit Information        Provider Department      11/16/2017 11:30 AM [place] [facility]        Today's Diagnoses     Diarrhea, unspecified type    -  1    Angiodysplasia of stomach and duodenum with hemorrhage        AVM (arteriovenous malformation) of small bowel, acquired (H)        Iron deficiency anemia due to chronic blood loss           Follow-ups after your visit        Your next 10 appointments already scheduled     Dec 12, 2017 11:05 AM CST   (Arrive by 10:50 AM)   Return Visit with Pan Saba MD   [facility] ([facility])    [address] [place] [address]  2nd [facility] [id number] [phone]            Dec 14, 2017 11:30 AM CST   Level O with [address] ([facility])    [address] [place] [id number] [phone]            Jan 11, 2018 10:30 AM CST   (Arrive by 10:15 AM)   Return Visit with Agnieszka Rodriguez MD   [facility] ([facility])    [address] [place] [address]  4th [facility] [id number] [phone]            May 08, 2018 10:00 AM CDT   Lab with  LAB   [facility] ([facility])    [address] [place] [id number] [phone]            May 08, 2018 10:45 AM CDT   US ABDOMEN COMPLETE with [address] [place] [address] Imaging Center US ([facility])    [address] [place] [id number] [phone]           Please bring a list of your medicines (including vitamins, minerals and over-the-counter drugs). Also, tell your doctor about any allergies you may have. Wear comfortable clothes and leave your valuables at home.  Adults: No eating or  drinking for 8 hours before the exam. You may take medicine with a small sip of water.  Children: - Children 6+ years: No food or drink for 6 hours before exam. - Children 1-5 years: No food or drink for 4 hours before exam. - Infants, breast-fed: may have breast milk up to 2 hours before exam. - Infants, formula: may have bottle until 4 hours before exam.  Please call the Imaging Department at your exam site with any questions.            May 08, 2018 11:45 AM CDT   (Arrive by 11:30 AM)   Return General Liver with Andriy Barnett MD   [facility] ([facility])    [address] [facility]  3rd Floor  [facility] [address] [phone]              Who to contact     If you have questions or need follow up information about today's clinic visit or your schedule please contact [facility] [[email or web address]] directly at [phone].  Normal or non-critical lab and imaging results will be communicated to you by MyChart, letter or phone within 4 business days after the clinic has received the results. If you do not hear from us within 7 days, please contact the clinic through [employer] or phone. If you have a critical or abnormal lab result, we will notify you by phone as soon as possible.  Submit refill requests through [employer] or call your pharmacy and they will forward the refill request to us. Please allow 3 business days for your refill to be completed.          Additional Information About Your Visit        [employer] Information     [employer] is an electronic gateway that provides easy, online access to your medical records. With [employer], you can request a clinic appointment, read your test results, renew a prescription or communicate with your care team.     To sign up for [employer] visit the website at [email or web address]   You will be asked to enter the access code listed below, as well as some personal information. Please follow the directions to create your username and  password.     Your access code is: [email or web address]  Expires: 2018  6:30 AM     Your access code will  in 90 days. If you need help or a new code, please contact your [facility] or call [phone] for assistance.        Care EveryWhere ID     This is your Care EveryWhere ID. This could be used by other organizations to access your [place] medical records  [id number]        Your Vitals Were     Pulse Temperature Respirations Pulse Oximetry          80 97.6  F (36.4  C) (Oral) 18 100%         Blood Pressure from Last 3 Encounters:   17 126/70   17 138/67   17 120/83    Weight from Last 3 Encounters:   17 63.8 kg (140 lb 9.6 oz)   17 64.9 kg (143 lb)   10/17/17 61.8 kg (136 lb 4.8 oz)              Today, you had the following     No orders found for display         Today's Medication Changes          These changes are accurate as of: 17 11:57 AM.  If you have any questions, ask your nurse or doctor.               These medicines have changed or have updated prescriptions.        Dose/Directions    polyethylene glycol 3350 Powd   This may have changed:    - when to take this  - reasons to take this   Used for:  Slow transit constipation        Dose:  17 g   Take 17 g by mouth daily   Quantity:  1530 g   Refills:  3                Primary Care Provider Office Phone # Fax #    Agnieszka Erica Rodriguez [phone]       [address] [place] [address] [address] [place] [id number]        Equal Access to Services     JAMIE CARVAJAL : Hadbarbara edwards Sohemal, waaxda luqadaha, qaybta kaalmada jorge, jaz fowler. So [facility] [phone].    ATENCIÓN: Si habla español, tiene a ibarra disposición servicios gratuitos de asistencia lingüística. Llame al [phone].    We comply with applicable federal civil rights laws and [place] laws. We do not discriminate on the basis of race, color, national origin, age,  disability, sex, sexual orientation, or gender identity.            Thank you!     Thank you for choosing [facility]  for your care. Our goal is always to provide you with excellent care. Hearing back from our patients is one way we can continue to improve our services. Please take a few minutes to complete the written survey that you may receive in the mail after your visit with us. Thank you!             Your Updated Medication List - Protect others around you: Learn how to safely use, store and throw away your medicines at www.disposemymeds.org.          This list is accurate as of: 11/16/17 11:57 AM.  Always use your most recent med list.                   Brand Name Dispense Instructions for use Diagnosis    Calcium Acetate (Phos Binder) 667 MG Caps      TAKE 2 CAPSULE BY MOUTH THREE TIMES A DAY WITH MEALS        diclofenac 1 % Gel topical gel    VOLTAREN    100 g    Apply 4 grams to knees or 2 grams to hands four times daily using enclosed dosing card.    Lumbar arthropathy (H)       OCTREOTIDE ACETATE IJ      Inject as directed every 30 days        omeprazole 40 MG capsule    priLOSEC    30 capsule    Take 1 capsule (40 mg) by mouth daily Take 30-60 minutes before a meal.    Gastroesophageal reflux disease without esophagitis       order for DME     1 Device    Equipment being ordered: 4 wheel walker with seat.    Disorder of bone and cartilage, Arthralgia, unspecified joint, Tendency to fall       polyethylene glycol 3350 Powd     1530 g    Take 17 g by mouth daily    Slow transit constipation       PRORENAL + D Tabs      Take 1 tablet by mouth daily        sertraline 50 MG tablet    ZOLOFT    180 tablet    Take 2 tablets (100 mg) by mouth daily    Itching

## 2017-11-27 ENCOUNTER — TELEPHONE (OUTPATIENT)
Dept: INTERNAL MEDICINE | Facility: CLINIC | Age: 77
End: 2017-11-27

## 2017-11-27 NOTE — TELEPHONE ENCOUNTER
[facility] Prior Authorization Team   Phone: [phone]  Fax: [phone]      PA Initiation    Medication: diclofenac (VOLTAREN) 1 % GEL topical gel - pa initiated   Insurance Company:    Pharmacy Filling the Rx: [id number] - [place] [place] - [address]  Filling Pharmacy Phone: [phone]  Filling Pharmacy Fax:    Start Date: 11/27/2017

## 2017-11-28 NOTE — TELEPHONE ENCOUNTER
MEDICATION APPEAL APPROVED    Medication: diclofenac (VOLTAREN) 1 % GEL topical gel - pa initiated-Approved-  Authorization Effective Date: 11/27/2017  Authorization Expiration Date: 12/31/2018  Approved Dose/Quantity:   Reference #:     Insurance Company:    Expected CoPay: $0.00     CoPay Card Available:      Foundation Assistance Needed:    Which Pharmacy is filling the prescription (Not needed for infusion/clinic administered): [id number] [place] - [address]

## 2017-12-14 ENCOUNTER — INFUSION THERAPY VISIT (OUTPATIENT)
Dept: INFUSION THERAPY | Facility: CLINIC | Age: 77
End: 2017-12-14
Payer: MEDICARE

## 2017-12-14 VITALS
HEART RATE: 95 BPM | SYSTOLIC BLOOD PRESSURE: 112 MMHG | WEIGHT: 140 LBS | BODY MASS INDEX: 24.03 KG/M2 | OXYGEN SATURATION: 98 % | DIASTOLIC BLOOD PRESSURE: 63 MMHG | RESPIRATION RATE: 16 BRPM | TEMPERATURE: 97.9 F

## 2017-12-14 DIAGNOSIS — K55.20 AVM (ARTERIOVENOUS MALFORMATION) OF SMALL BOWEL, ACQUIRED: ICD-10-CM

## 2017-12-14 DIAGNOSIS — K31.811 ANGIODYSPLASIA OF STOMACH AND DUODENUM WITH HEMORRHAGE: ICD-10-CM

## 2017-12-14 DIAGNOSIS — D50.0 IRON DEFICIENCY ANEMIA DUE TO CHRONIC BLOOD LOSS: ICD-10-CM

## 2017-12-14 DIAGNOSIS — R19.7 DIARRHEA, UNSPECIFIED TYPE: Primary | ICD-10-CM

## 2017-12-14 PROCEDURE — 96372 THER/PROPH/DIAG INJ SC/IM: CPT | Performed by: NURSE PRACTITIONER

## 2017-12-14 PROCEDURE — 99207 ZZC NO CHARGE LOS: CPT

## 2017-12-14 RX ADMIN — OCTREOTIDE ACETATE 20 MG: KIT INTRAMUSCULAR at 11:52

## 2017-12-14 NOTE — MR AVS SNAPSHOT
After Visit Summary   12/14/2017    Rachele Martínez    MRN: [id number]           Patient Information     Date Of Birth          1940        Visit Information        Provider Department      12/14/2017 11:30 AM [place] [facility]        Today's Diagnoses     Diarrhea, unspecified type    -  1    Angiodysplasia of stomach and duodenum with hemorrhage        AVM (arteriovenous malformation) of small bowel, acquired (H)        Iron deficiency anemia due to chronic blood loss           Follow-ups after your visit        Your next 10 appointments already scheduled     Jan 11, 2018 10:30 AM CST   (Arrive by 10:15 AM)   Return Visit with Agnieszka Rodriguez MD   [facility] ([facility])    [address] [place] [address] [id number] [phone]            Jan 16, 2018 11:30 AM CST   Level O with [address] ([facility])    [address] [place] [address] [id number] [phone]            May 08, 2018 10:00 AM CDT   Lab with  LAB   [facility] ([facility])    [address] [place] [address] [id number] [phone]            May 08, 2018 10:45 AM CDT   US ABDOMEN COMPLETE with UCUS1   [facility] ([facility])    [address] [place] [id number] [phone]           Please bring a list of your medicines (including vitamins, minerals and over-the-counter drugs). Also, tell your doctor about any allergies you may have. Wear comfortable clothes and leave your valuables at home.  Adults: No eating or drinking for 8 hours before the exam. You may take medicine with a small sip of water.  Children: - Children 6+ years: No food or drink for 6 hours before exam. - Children 1-5 years: No food or drink for 4 hours before exam. - Infants, breast-fed: may  have breast milk up to 2 hours before exam. - Infants, formula: may have bottle until 4 hours before exam.  Please call the Imaging Department at your exam site with any questions.            May 08, 2018 11:45 AM CDT   (Arrive by 11:30 AM)   Return General Liver with Andriy Barnett MD   [facility] ([facility])    [address] [facility]  [address] [facility] [address] [phone]              Who to contact     If you have questions or need follow up information about today's clinic visit or your schedule please contact [facility] directly at [phone].  Normal or non-critical lab and imaging results will be communicated to you by [employer], letter or phone within 4 business days after the clinic has received the results. If you do not hear from us within 7 days, please contact the clinic through MyChart or phone. If you have a critical or abnormal lab result, we will notify you by phone as soon as possible.  Submit refill requests through [facility] or call your pharmacy and they will forward the refill request to us. Please allow 3 business days for your refill to be completed.          Additional Information About Your Visit        [facility] Information     [facility] is an electronic gateway that provides easy, online access to your medical records. With [facility], you can request a clinic appointment, read your test results, renew a prescription or communicate with your care team.     To sign up for [facility] visit the website at [email or web address]   You will be asked to enter the access code listed below, as well as some personal information. Please follow the directions to create your username and password.     Your access code is: [email or web address]  Expires: 2018  6:30 AM     Your access code will  in 90 days. If you need help or a new code, please contact your [facility] or call [phone] for assistance.         Care EveryWhere ID     This is your Care EveryWhere ID. This could be used by other organizations to access your [facility] medical records  [id number]        Your Vitals Were     Pulse Temperature Respirations Pulse Oximetry BMI (Body Mass Index)       95 97.9  F (36.6  C) (Oral) 16 98% 24.03 kg/m2        Blood Pressure from Last 3 Encounters:   12/14/17 112/63   11/16/17 126/70   11/09/17 138/67    Weight from Last 3 Encounters:   12/14/17 63.5 kg (140 lb)   11/09/17 63.8 kg (140 lb 9.6 oz)   11/07/17 64.9 kg (143 lb)              Today, you had the following     No orders found for display         Today's Medication Changes          These changes are accurate as of: 12/14/17 11:57 AM.  If you have any questions, ask your nurse or doctor.               These medicines have changed or have updated prescriptions.        Dose/Directions    polyethylene glycol 3350 Powd   This may have changed:    - when to take this  - reasons to take this   Used for:  Slow transit constipation        Dose:  17 g   Take 17 g by mouth daily   Quantity:  1530 g   Refills:  3                Primary Care Provider Office Phone # Fax #    Agnieszka Erica Rodriguez [phone]       [address] [place] [address] [address] [place] [address]        Equal Access to Services     JAMIE CARVAJAL AH: Hadii lawrence edwards Sohemal, waaxda luqadaha, qaybta kaalmada adeegyada, jaz fowler. So [facility] [phone].    ATENCIÓN: Si habla español, tiene a ibarra disposición servicios gratuitos de asistencia lingüística. Llame al [phone].    We comply with applicable federal civil rights laws and [place] laws. We do not discriminate on the basis of race, color, national origin, age, disability, sex, sexual orientation, or gender identity.            Thank you!     Thank you for choosing [facility]  for your care. Our goal is always to provide you with excellent care. Hearing back from our patients is  one way we can continue to improve our services. Please take a few minutes to complete the written survey that you may receive in the mail after your visit with us. Thank you!             Your Updated Medication List - Protect others around you: Learn how to safely use, store and throw away your medicines at www.disposemymeds.org.          This list is accurate as of: 12/14/17 11:57 AM.  Always use your most recent med list.                   Brand Name Dispense Instructions for use Diagnosis    Calcium Acetate (Phos Binder) 667 MG Caps      TAKE 2 CAPSULE BY MOUTH THREE TIMES A DAY WITH MEALS        diclofenac 1 % Gel topical gel    VOLTAREN    100 g    Apply 4 grams to knees or 2 grams to hands four times daily using enclosed dosing card.    Lumbar arthropathy (H)       OCTREOTIDE ACETATE IJ      Inject as directed every 30 days        omeprazole 40 MG capsule    priLOSEC    30 capsule    Take 1 capsule (40 mg) by mouth daily Take 30-60 minutes before a meal.    Gastroesophageal reflux disease without esophagitis       order for DME     1 Device    Equipment being ordered: 4 wheel walker with seat.    Disorder of bone and cartilage, Arthralgia, unspecified joint, Tendency to fall       polyethylene glycol 3350 Powd     1530 g    Take 17 g by mouth daily    Slow transit constipation       PRORENAL + D Tabs      Take 1 tablet by mouth daily        sertraline 50 MG tablet    ZOLOFT    180 tablet    Take 2 tablets (100 mg) by mouth daily    Itching

## 2017-12-14 NOTE — PROGRESS NOTES
Infusion Nursing Note:  [facility]    Patient seen by provider today: No   present during visit today: Not Applicable.    Note: N/A.    Intravenous Access:  No Intravenous access/labs at this visit.    Treatment Conditions:  Not Applicable.      Post Infusion Assessment:  Patient tolerated injection without incident.  Site patent and intact, free from redness, edema or discomfort.  No evidence of extravasations.    Discharge Plan:   Discharge instructions reviewed with: Patient.  Patient and/or family verbalized understanding of discharge instructions and all questions answered.  Patient discharged in stable condition accompanied by: self.  Departure Mode: Ambulatory.    Loraine Charles RN

## 2017-12-31 DIAGNOSIS — K21.9 GASTROESOPHAGEAL REFLUX DISEASE WITHOUT ESOPHAGITIS: ICD-10-CM

## 2018-01-05 RX ORDER — OMEPRAZOLE 40 MG/1
CAPSULE, DELAYED RELEASE ORAL
Qty: 30 CAPSULE | Refills: 0 | Status: SHIPPED | OUTPATIENT
Start: 2018-01-05 | End: 2018-02-13

## 2018-01-16 ENCOUNTER — INFUSION THERAPY VISIT (OUTPATIENT)
Dept: INFUSION THERAPY | Facility: CLINIC | Age: 78
End: 2018-01-16
Payer: MEDICARE

## 2018-01-16 VITALS
SYSTOLIC BLOOD PRESSURE: 153 MMHG | BODY MASS INDEX: 24.43 KG/M2 | RESPIRATION RATE: 18 BRPM | TEMPERATURE: 98.1 F | DIASTOLIC BLOOD PRESSURE: 71 MMHG | HEART RATE: 65 BPM | WEIGHT: 142.3 LBS | OXYGEN SATURATION: 97 %

## 2018-01-16 DIAGNOSIS — K31.811 ANGIODYSPLASIA OF STOMACH AND DUODENUM WITH HEMORRHAGE: ICD-10-CM

## 2018-01-16 DIAGNOSIS — K55.20 AVM (ARTERIOVENOUS MALFORMATION) OF SMALL BOWEL, ACQUIRED: ICD-10-CM

## 2018-01-16 DIAGNOSIS — R19.7 DIARRHEA, UNSPECIFIED TYPE: Primary | ICD-10-CM

## 2018-01-16 DIAGNOSIS — D50.0 IRON DEFICIENCY ANEMIA DUE TO CHRONIC BLOOD LOSS: ICD-10-CM

## 2018-01-16 PROCEDURE — 99207 ZZC NO CHARGE LOS: CPT

## 2018-01-16 PROCEDURE — 96372 THER/PROPH/DIAG INJ SC/IM: CPT | Performed by: INTERNAL MEDICINE

## 2018-01-16 RX ADMIN — OCTREOTIDE ACETATE 20 MG: KIT INTRAMUSCULAR at 11:51

## 2018-01-16 ASSESSMENT — PAIN SCALES - GENERAL: PAINLEVEL: EXTREME PAIN (8)

## 2018-01-16 NOTE — MR AVS SNAPSHOT
After Visit Summary   1/16/2018    Rachele Martínez    MRN: [id number]           Patient Information     Date Of Birth          1940        Visit Information        Provider Department      1/16/2018 11:30 AM [address]        Today's Diagnoses     Diarrhea, unspecified type    -  1    Angiodysplasia of stomach and duodenum with hemorrhage        AVM (arteriovenous malformation) of small bowel, acquired (H)        Iron deficiency anemia due to chronic blood loss           Follow-ups after your visit        Your next 10 appointments already scheduled     Feb 13, 2018 11:30 AM CST   Level O with [address] ([facility])    [address] [address] [place] [address] [phone]            May 08, 2018 10:00 AM CDT   Lab with  LAB   [facility])    [address] [place] [address] [phone] [phone]            May 08, 2018 10:45 AM CDT   US ABDOMEN COMPLETE with UCUS1   [facility] US ([facility])    [address] [place] [address] [phone]           Please bring a list of your medicines (including vitamins, minerals and over-the-counter drugs). Also, tell your doctor about any allergies you may have. Wear comfortable clothes and leave your valuables at home.  Adults: No eating or drinking for 8 hours before the exam. You may take medicine with a small sip of water.  Children: - Children 6+ years: No food or drink for 6 hours before exam. - Children 1-5 years: No food or drink for 4 hours before exam. - Infants, breast-fed: may have breast milk up to 2 hours before exam. - Infants, formula: may have bottle until 4 hours before exam.  Please call the Imaging Department at your exam site with any questions.            May 08, 2018 11:45 AM CDT   (Arrive by 11:30 AM)   Return  General Liver with Andriy Barnett MD   [facility] ([facility])    [address] [facility] [address]  [address]  [facility] [address] [phone]              Who to contact     If you have questions or need follow up information about today's clinic visit or your schedule please contact [facility] directly at [phone].  Normal or non-critical lab and imaging results will be communicated to you by MyChart, letter or phone within 4 business days after the clinic has received the results. If you do not hear from us within 7 days, please contact the clinic through MyChart or phone. If you have a critical or abnormal lab result, we will notify you by phone as soon as possible.  Submit refill requests through [employer] or call your pharmacy and they will forward the refill request to us. Please allow 3 business days for your refill to be completed.          Additional Information About Your Visit        [employer] Information     [employer] is an electronic gateway that provides easy, online access to your medical records. With [employer], you can request a clinic appointment, read your test results, renew a prescription or communicate with your care team.     To sign up for [employer] visit the website at [email or web address]   You will be asked to enter the access code listed below, as well as some personal information. Please follow the directions to create your username and password.     Your access code is: [email or web address]  Expires: 2018  6:30 AM     Your access code will  in 90 days. If you need help or a new code, please contact your [facility] or call [phone] for assistance.        Care EveryWhere ID     This is your Care EveryWhere ID. This could be used by other organizations to access your [place] medical records  [id number]        Your Vitals Were     Pulse Temperature Respirations Pulse Oximetry BMI (Body Mass Index)        65 98.1  F (36.7  C) (Oral) 18 97% 24.43 kg/m2        Blood Pressure from Last 3 Encounters:   01/16/18 153/71   12/14/17 112/63   11/16/17 126/70    Weight from Last 3 Encounters:   01/16/18 64.5 kg (142 lb 4.8 oz)   12/14/17 63.5 kg (140 lb)   11/09/17 63.8 kg (140 lb 9.6 oz)              Today, you had the following     No orders found for display         Today's Medication Changes          These changes are accurate as of: 1/16/18 12:01 PM.  If you have any questions, ask your nurse or doctor.               These medicines have changed or have updated prescriptions.        Dose/Directions    polyethylene glycol 3350 Powd   This may have changed:    - when to take this  - reasons to take this   Used for:  Slow transit constipation        Dose:  17 g   Take 17 g by mouth daily   Quantity:  1530 g   Refills:  3                Primary Care Provider Office Phone # Fax #    Agnieszka Erica Rodriguez [phone]       [address] [place] [address] [address] [place] [address]        Equal Access to Services     [facility] HospitalNATHALY : Hadii lawrence Degroot, wamolina weiss, qaalexandrata kaalsho patel, jaz peres . So [facility] [phone].    ATENCIÓN: Si habla español, tiene a ibarra disposición servicios gratuitos de asistencia lingüística. Ll[facility] [phone].    We comply with applicable federal civil rights laws and [place] laws. We do not discriminate on the basis of race, color, national origin, age, disability, sex, sexual orientation, or gender identity.            Thank you!     Thank you for choosing [facility]  for your care. Our goal is always to provide you with excellent care. Hearing back from our patients is one way we can continue to improve our services. Please take a few minutes to complete the written survey that you may receive in the mail after your visit with us. Thank you!             Your Updated Medication List - Protect others around you: Learn  how to safely use, store and throw away your medicines at www.disposemymeds.org.          This list is accurate as of: 1/16/18 12:01 PM.  Always use your most recent med list.                   Brand Name Dispense Instructions for use Diagnosis    Calcium Acetate (Phos Binder) 667 MG Caps      TAKE 2 CAPSULE BY MOUTH THREE TIMES A DAY WITH MEALS        diclofenac 1 % Gel topical gel    VOLTAREN    100 g    Apply 4 grams to knees or 2 grams to hands four times daily using enclosed dosing card.    Lumbar arthropathy (H)       OCTREOTIDE ACETATE IJ      Inject as directed every 30 days        omeprazole 40 MG capsule    priLOSEC    30 capsule    TAKE 1 CAPSULE BY MOUTH DAILY TAKE 30-60 MINUTES BEFORE A MEAL.    Gastroesophageal reflux disease without esophagitis       order for DME     1 Device    Equipment being ordered: 4 wheel walker with seat.    Disorder of bone and cartilage, Arthralgia, unspecified joint, Tendency to fall       polyethylene glycol 3350 Powd     1530 g    Take 17 g by mouth daily    Slow transit constipation       PRORENAL + D Tabs      Take 1 tablet by mouth daily        sertraline 50 MG tablet    ZOLOFT    180 tablet    Take 2 tablets (100 mg) by mouth daily    Itching

## 2018-01-16 NOTE — PROGRESS NOTES
The following medication was given:     MEDICATION: Sandostatin LAR  ROUTE: IM  SITE: Left Gluteus Wagner  DOSE: 20 mg    Nursing Note:  Rachele Martínez presents today for Sandostatin IM.    Patient seen by provider today: No   present during visit today: Not Applicable.    Note: Pt states she is doing well, denies any problems with Sandostatin injection.  States she is getting over bronchitis, taking last antibiotic dose today.    Intravenous Access:  No Intravenous access/labs at this visit.    Treatment Conditions:  Not Applicable.      Post Infusion Assessment:  Patient tolerated injection without incident.    Discharge Plan:   Return on 02/13/18 for injection.  Discharge instructions reviewed with: Patient and Family.  Patient and/or family verbalized understanding of discharge instructions and all questions answered.  Patient discharged in stable condition accompanied by: daughter.  Departure Mode: Ambulatory.    Estrella Hernandez RN

## 2018-01-31 ENCOUNTER — OFFICE VISIT (OUTPATIENT)
Dept: INTERNAL MEDICINE | Facility: CLINIC | Age: 78
End: 2018-01-31
Payer: MEDICARE

## 2018-01-31 VITALS
DIASTOLIC BLOOD PRESSURE: 59 MMHG | HEART RATE: 90 BPM | WEIGHT: 136.9 LBS | SYSTOLIC BLOOD PRESSURE: 113 MMHG | OXYGEN SATURATION: 99 % | BODY MASS INDEX: 23.5 KG/M2

## 2018-01-31 DIAGNOSIS — R05.9 COUGH: Primary | ICD-10-CM

## 2018-01-31 DIAGNOSIS — I10 HYPERTENSION GOAL BP (BLOOD PRESSURE) < 140/80: Chronic | ICD-10-CM

## 2018-01-31 RX ORDER — ALBUTEROL SULFATE 90 UG/1
AEROSOL, METERED RESPIRATORY (INHALATION)
Refills: 0 | COMMUNITY
Start: 2018-01-06 | End: 2018-08-21

## 2018-01-31 RX ORDER — GUAIFENESIN/DEXTROMETHORPHAN 100-10MG/5
5 SYRUP ORAL EVERY 4 HOURS PRN
Qty: 240 ML | Refills: 1 | Status: SHIPPED | OUTPATIENT
Start: 2018-01-31 | End: 2018-02-01

## 2018-01-31 RX ORDER — OXYCODONE HYDROCHLORIDE 5 MG/1
5-10 TABLET ORAL
COMMUNITY
Start: 2016-06-28 | End: 2018-01-31

## 2018-01-31 RX ORDER — ALBUTEROL SULFATE 90 UG/1
2 AEROSOL, METERED RESPIRATORY (INHALATION) EVERY 6 HOURS
Qty: 1 INHALER | Refills: 3 | Status: ON HOLD | OUTPATIENT
Start: 2018-01-31 | End: 2018-09-16

## 2018-01-31 RX ORDER — PREDNISONE 20 MG/1
60 TABLET ORAL DAILY
Qty: 15 TABLET | Refills: 0 | Status: SHIPPED | OUTPATIENT
Start: 2018-01-31 | End: 2018-02-05

## 2018-01-31 ASSESSMENT — PAIN SCALES - GENERAL: PAINLEVEL: SEVERE PAIN (7)

## 2018-01-31 NOTE — NURSING NOTE
Chief Complaint   Patient presents with     Cough     pt states having a cough x 7 weeks, went to urgent care, coughing up white foamy stuff, hurts     Selin Glover CMA at 2:57 PM on 1/31/2018.

## 2018-01-31 NOTE — MR AVS SNAPSHOT
After Visit Summary   1/31/2018    Rachele Martínez    MRN: [id number]           Patient Information     Date Of Birth          1940        Visit Information        Provider Department      1/31/2018 2:40 PM Fede Pizarro MD [facility]        Today's Diagnoses     Cough    -  1    Hypertension goal BP (blood pressure) < 140/80          Care Instructions    Primary Care Center: [phone]     Primary Care Center Medication Refill Request Information:  * Please contact your pharmacy regarding ANY request for medication refills.  ** [facility] Prescription Fax = [phone]  * Please allow 3 business days for routine medication refills.  * Please allow 5 business days for controlled substance medication refills.     Primary Care Center Test Result notification information:  *You will be notified with in 7-10 days of your appointment day regarding the results of your test.  If you are on MyChart you will be notified as soon as the provider has reviewed the results and signed off on them.            Follow-ups after your visit        Your next 10 appointments already scheduled     Feb 13, 2018 11:30 AM CST   Level O with [address] ([facility])    [address] [place] [address] [id number] [phone]            Feb 27, 2018 12:00 PM CST   Walk In From ENT with Abad Barba   [facility] Audiology ([facility])    [address] [place] [address] [id number] [phone]            Feb 27, 2018  1:15 PM CST   (Arrive by 1:00 PM)   RETURN NEUROTOLOGY with Rick L Nissen, MD   [facility] Ear Nose and Throat ([facility])    [address] [place] [address] [id number] [phone]            May 08, 2018 10:00 AM CDT   Lab with  LAB   [facility] Lab ([facility])    [address] [place] [address]  [id number] [phone]            May 08, 2018 10:45 AM CDT   US ABDOMEN COMPLETE with [email or web address]   [facility] Imaging Center US ([facility])    [address]  1st Floor  [facility] [id number] [phone]           Please bring a list of your medicines (including vitamins, minerals and over-the-counter drugs). Also, tell your doctor about any allergies you may have. Wear comfortable clothes and leave your valuables at home.  Adults: No eating or drinking for 8 hours before the exam. You may take medicine with a small sip of water.  Children: - Children 6+ years: No food or drink for 6 hours before exam. - Children 1-5 years: No food or drink for 4 hours before exam. - Infants, breast-fed: may have breast milk up to 2 hours before exam. - Infants, formula: may have bottle until 4 hours before exam.  Please call the Imaging Department at your exam site with any questions.            May 08, 2018 11:45 AM CDT   (Arrive by 11:30 AM)   Return General Liver with Andriy Barnett MD   [facility] Hepatology ([facility])    [address]  [address] [place] [address] [phone] [phone]              Who to contact     Please call your clinic at [phone] to:    Ask questions about your health    Make or cancel appointments    Discuss your medicines    Learn about your test results    Speak to your doctor   If you have compliments or concerns about an experience at your clinic, or if you wish to file a complaint, please contact [facility] Patient Relations at [phone] or email us at [email or web address]         Additional Information About Your Visit        [employer]hart Information     [employer] is an electronic gateway that provides easy, online access to your medical records. With [employer], you can request a clinic appointment, read your test results, renew a prescription or communicate with your care team.     To sign up  for MyChart visit the website at [email or web address]   You will be asked to enter the access code listed below, as well as some personal information. Please follow the directions to create your username and password.     Your access code is: [email or web address]  Expires: 2018  6:30 AM     Your access code will  in 90 days. If you need help or a new code, please contact your [facility] or call [phone] for assistance.        Care EveryWhere ID     This is your Care EveryWhere ID. This could be used by other organizations to access your [place] medical records  [id number]        Your Vitals Were     Pulse Pulse Oximetry BMI (Body Mass Index)             90 99% 23.5 kg/m2          Blood Pressure from Last 3 Encounters:   18 113/59   18 153/71   17 112/63    Weight from Last 3 Encounters:   18 62.1 kg (136 lb 14.4 oz)   18 64.5 kg (142 lb 4.8 oz)   17 63.5 kg (140 lb)              Today, you had the following     No orders found for display         Today's Medication Changes          These changes are accurate as of 18  4:01 PM.  If you have any questions, ask your nurse or doctor.               Start taking these medicines.        Dose/Directions    fluticasone-salmeterol 250-50 MCG/DOSE diskus inhaler   Commonly known as:  ADVAIR   Used for:  Hypertension goal BP (blood pressure) < 140/80   Started by:  Fede Pizarro MD        Dose:  1 puff   Inhale 1 puff into the lungs every 12 hours   Quantity:  60 Inhaler   Refills:  1       guaiFENesin-dextromethorphan 100-10 MG/5ML syrup   Commonly known as:  ROBITUSSIN DM   Used for:  Hypertension goal BP (blood pressure) < 140/80   Started by:  Fede Pizarro MD        Dose:  5 mL   Take 5 mLs by mouth every 4 hours as needed for cough   Quantity:  240 mL   Refills:  1       predniSONE 20 MG tablet   Commonly known as:  DELTASONE   Used for:  Hypertension goal BP (blood pressure) <  140/80   Started by:  Fede Pizarro MD        Dose:  60 mg   Take 3 tablets (60 mg) by mouth daily for 5 days   Quantity:  15 tablet   Refills:  0         These medicines have changed or have updated prescriptions.        Dose/Directions    polyethylene glycol 3350 Powd   This may have changed:    - when to take this  - reasons to take this   Used for:  Slow transit constipation        Dose:  17 g   Take 17 g by mouth daily   Quantity:  1530 g   Refills:  3       * VENTOLIN  (90 BASE) MCG/ACT Inhaler   This may have changed:  Another medication with the same name was added. Make sure you understand how and when to take each.   Generic drug:  albuterol   Changed by:  Fede Pizarro MD        INHALE 2 PUFFS BY MOUTH EVERY 4 HOURS AS NEEDED FOR WHEEZING OR SHORTNESS OF BREATH   Refills:  0       * albuterol 108 (90 BASE) MCG/ACT Inhaler   Commonly known as:  PROAIR HFA   This may have changed:  You were already taking a medication with the same name, and this prescription was added. Make sure you understand how and when to take each.   Used for:  Cough   Changed by:  Fede Pizarro MD        Dose:  2 puff   Inhale 2 puffs into the lungs every 6 hours   Quantity:  1 Inhaler   Refills:  3       * Notice:  This list has 2 medication(s) that are the same as other medications prescribed for you. Read the directions carefully, and ask your doctor or other care provider to review them with you.         Where to get your medicines      These medications were sent to [facility] [id number] [facility] - [place] - [address] [place] [address]. [place] [address]     Phone:  [phone]     albuterol 108 (90 BASE) MCG/ACT Inhaler    fluticasone-salmeterol 250-50 MCG/DOSE diskus inhaler    guaiFENesin-dextromethorphan 100-10 MG/5ML syrup    predniSONE 20 MG tablet                Primary Care Provider Office Phone # Fax #    Agnieszka Rodriguez [phone]       [address] [place] [address]  [address]  [facility] [address]        Equal Access to Services     JAMIE CARVAJAL : Hadii aad ku hadestersayra Annaali, watimda radhatalha, qaalexandrarissa carvalhomajaz sanchez. So [facility] [phone].    ATENCIÓN: Si habla español, tiene a ibarra disposición servicios gratuitos de asistencia lingüística. Bettye al [phone].    We comply with applicable federal civil rights laws and [place] laws. We do not discriminate on the basis of race, color, national origin, age, disability, sex, sexual orientation, or gender identity.            Thank you!     Thank you for choosing [facility]  for your care. Our goal is always to provide you with excellent care. Hearing back from our patients is one way we can continue to improve our services. Please take a few minutes to complete the written survey that you may receive in the mail after your visit with us. Thank you!             Your Updated Medication List - Protect others around you: Learn how to safely use, store and throw away your medicines at www.disposemymeds.org.          This list is accurate as of 1/31/18  4:01 PM.  Always use your most recent med list.                   Brand Name Dispense Instructions for use Diagnosis    Calcium Acetate (Phos Binder) 667 MG Caps      TAKE 2 CAPSULE BY MOUTH THREE TIMES A DAY WITH MEALS        diclofenac 1 % Gel topical gel    VOLTAREN    100 g    Apply 4 grams to knees or 2 grams to hands four times daily using enclosed dosing card.    Lumbar arthropathy (H)       fluticasone-salmeterol 250-50 MCG/DOSE diskus inhaler    ADVAIR    60 Inhaler    Inhale 1 puff into the lungs every 12 hours    Hypertension goal BP (blood pressure) < 140/80       guaiFENesin-dextromethorphan 100-10 MG/5ML syrup    ROBITUSSIN DM    240 mL    Take 5 mLs by mouth every 4 hours as needed for cough    Hypertension goal BP (blood pressure) < 140/80       OCTREOTIDE ACETATE IJ      Inject as directed every 30 days         omeprazole 40 MG capsule    priLOSEC    30 capsule    TAKE 1 CAPSULE BY MOUTH DAILY TAKE 30-60 MINUTES BEFORE A MEAL.    Gastroesophageal reflux disease without esophagitis       order for DME     1 Device    Equipment being ordered: 4 wheel walker with seat.    Disorder of bone and cartilage, Arthralgia, unspecified joint, Tendency to fall       polyethylene glycol 3350 Powd     1530 g    Take 17 g by mouth daily    Slow transit constipation       predniSONE 20 MG tablet    DELTASONE    15 tablet    Take 3 tablets (60 mg) by mouth daily for 5 days    Hypertension goal BP (blood pressure) < 140/80       PRORENAL + D Tabs      Take 1 tablet by mouth daily        sertraline 50 MG tablet    ZOLOFT    180 tablet    Take 2 tablets (100 mg) by mouth daily    Itching       * VENTOLIN  (90 BASE) MCG/ACT Inhaler   Generic drug:  albuterol      INHALE 2 PUFFS BY MOUTH EVERY 4 HOURS AS NEEDED FOR WHEEZING OR SHORTNESS OF BREATH        * albuterol 108 (90 BASE) MCG/ACT Inhaler    PROAIR HFA    1 Inhaler    Inhale 2 puffs into the lungs every 6 hours    Cough       * Notice:  This list has 2 medication(s) that are the same as other medications prescribed for you. Read the directions carefully, and ask your doctor or other care provider to review them with you.

## 2018-01-31 NOTE — PROGRESS NOTES
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 Reports that she has had a persistent cough for the past 7 weeks, has not worsened or improved during this time. She is coughing up small amount of whitish sputum, no green sputum and no hemoptysis. She denies any fevers, chills, nasal congestion, headaches, myalgias, or sick contacts. She notes that the cough wakes her up at night and is better once she sits up. Has some mild sinus pressure. Has chest/abdomen pain after prolonged coughing attacks. She has tried Mucinex at home with no relief. She went to urgent care at [facility] on 1/6 for these symptoms. CXR at that time did not show any concerning pathology. She was prescribed doxycycline and a 5 day course of prednisone for presumed bronchitis. Was also given albuterol inhaler. She thinks that this alleviated her symptoms for about a day, then they immediately returned. History of tobacco use (90 pack year history), though quit about 20 years ago. She does not have a history of asthma or any other lung disease.      She also is concerned about a long history of dizziness. Says that this has been present for about a year. She has been evaluated by ENT, who did not find evidence of any ear pathology. Describes it as more of lightheadedness/unbalanced feeling as opposed to vertigo.       Medications and allergies were reviewed by me today.     SocHx:   History   Smoking Status     Former Smoker     Packs/day: 2.00     Years: 45.00     Types: Cigarettes     Quit date: 11/23/2002   Smokeless Tobacco     Never Used        Patient Active Problem List   Diagnosis     Health Care Home      Cataract     Anxiety state     Insomnia     Hyperlipidemia with target LDL less than 130     History of hepatitis C     ACP (advance care planning)     Iron deficiency anemia due to chronic blood loss     AVM (arteriovenous malformation) of small bowel, acquired (H)     ESRD (end stage renal disease) on dialysis (H)     Cirrhosis of liver without ascites, unspecified hepatic cirrhosis type (H)     Tendency to fall     Hypertension goal BP (blood pressure) < 140/80     Diarrhea     Angiodysplasia of stomach and duodenum with hemorrhage       Current Outpatient Prescriptions   Medication     VENTOLIN  (90 BASE) MCG/ACT Inhaler     guaiFENesin-dextromethorphan (ROBITUSSIN DM) 100-10 MG/5ML syrup     predniSONE (DELTASONE) 20 MG tablet     fluticasone-salmeterol (ADVAIR) 250-50 MCG/DOSE diskus inhaler     albuterol (PROAIR HFA) 108 (90 BASE) MCG/ACT Inhaler     omeprazole (PRILOSEC) 40 MG capsule     diclofenac (VOLTAREN) 1 % GEL topical gel     sertraline (ZOLOFT) 50 MG tablet     Multiple Vitamins-Minerals (PRORENAL + D) TABS     Calcium Acetate, Phos Binder, 667 MG CAPS     OCTREOTIDE ACETATE IJ     order for DME     polyethylene glycol 3350 POWD     No current facility-administered medications for this visit.        Review Of Systems   5 point ROS completed and negative except noted above, including Gen, CV, Resp, GI, MS    PE:  /59  Pulse 90  Wt 62.1 kg (136 lb 14.4 oz)  SpO2 99%  BMI 23.5 kg/m2  Gen: no distress, comfortable, pleasant   HEENT: Oropharynx clear and moist, no erythema, exudates, or cobblestone appearance. Nasal passages patent. Eyes anicteric, normal extra-ocular movements. No pre/postauricular, submandibular, submental, cervical, or supraclavicular lymphadenopathy.   Cardiovascular: Regular rate and rhythm, normal S1 and S2, no murmurs, rubs or gallops. Peripheral pulses full and symmetric.    Respiratory: Clear to auscultation, no wheezes or crackles with normal respirations.  Scattered expiratory wheezing with coughing/ forced expiration.   Gastrointestinal: Positive bowel sounds, nontender, nondistended.   Psychological: Appropriate mood.        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     Cough - History of cough for 7 weeks, likely post-viral reactive airways. No fevers or significant sputum production, low suspicion for bacterial infection at this time. Other differentials include GERD (less likely given wheezing on exam) and sinusitis/post-nasal drip. After discussing options with patient, decided to treat with 5 day course of prednisone along with Advair inhaler. She can continue to use albuterol inhaler as needed for coughing attacks, and can use Robitussin at night for symptom relief.   - guaiFENesin-dextromethorphan (ROBITUSSIN DM) 100-10 MG/5ML syrup  Dispense: 240 mL; Refill: 1  - predniSONE (DELTASONE) 20 MG tablet  Dispense: 15 tablet; Refill: 0  - fluticasone-salmeterol (ADVAIR) 250-50 MCG/DOSE diskus inhaler  Dispense: 60 Inhaler; Refill: 1  - albuterol (PROAIR HFA) 108 (90 BASE) MCG/ACT Inhaler  Dispense: 1 Inhaler; Refill: 3    Dizziness - Unclear etiology for her dizziness at this time. She has been evaluated by ENT, who did not feel that her symptoms are ENT related. Could be due to hypotension related to her dialysis. Advised her to speak with dialysis staff at her dialysis center about possibly removing less fluid to see if that helps and to follow up once cough and other symptoms resolved.  Consider further evaluation including event monitor, BP ambulatory monitoring.       RTC: 3-4 weeks     Dana Giordano MS4     As the attending physician, I conducted the history, examination, and medical decision making.  The student accompanied me while seeing this patient and acted as a scribe in recording the physician's history, examination and  medical management.  The review of systems and/or past, family, and social history may have been taken directly from the patient/parent and documented by the student.    Fede Pizarro MD, A

## 2018-01-31 NOTE — PATIENT INSTRUCTIONS
[facility]: [phone]     [facility] Medication Refill Request Information:  * Please contact your pharmacy regarding ANY request for medication refills.  ** [facility] Prescription Fax = [phone]  * Please allow 3 business days for routine medication refills.  * Please allow 5 business days for controlled substance medication refills.     [facility] Test Result notification information:  *You will be notified with in 7-10 days of your appointment day regarding the results of your test.  If you are on MyChart you will be notified as soon as the provider has reviewed the results and signed off on them.

## 2018-02-01 ENCOUNTER — TELEPHONE (OUTPATIENT)
Dept: INTERNAL MEDICINE | Facility: CLINIC | Age: 78
End: 2018-02-01

## 2018-02-01 DIAGNOSIS — I10 HYPERTENSION GOAL BP (BLOOD PRESSURE) < 140/80: Chronic | ICD-10-CM

## 2018-02-01 DIAGNOSIS — R05.9 COUGH: Primary | ICD-10-CM

## 2018-02-01 RX ORDER — GUAIFENESIN/DEXTROMETHORPHAN 100-10MG/5
5 SYRUP ORAL EVERY 4 HOURS PRN
Qty: 240 ML | Refills: 1 | Status: SHIPPED | OUTPATIENT
Start: 2018-02-01 | End: 2018-02-13

## 2018-02-01 RX ORDER — BUDESONIDE AND FORMOTEROL FUMARATE DIHYDRATE 80; 4.5 UG/1; UG/1
2 AEROSOL RESPIRATORY (INHALATION) 2 TIMES DAILY
Qty: 1 INHALER | Refills: 1 | Status: SHIPPED | OUTPATIENT
Start: 2018-02-01 | End: 2018-02-13

## 2018-02-01 NOTE — TELEPHONE ENCOUNTER
Regarding: FW: Pt's Daughter, Charla Called  Contact: [phone]    Charla, Daughter of Pt of Dr. Pizarro called, and stated that we didn't send the Rx for Robitussin w/ Codeine to pt's pharmacy, and that this was the main reason they had come to the appt. Pt's Daughter said they'd just spoken w/ [facility], and was informed they did not have the request though I do see the order in [facility].      [facility]:  [address]  [facility] [address]  Fax: [phone]    February 1, 2018 9:18 AM  Spoke with Charla. Advised her that Dr. Pizarro had prescribed Robitussin-DM. It was not received by pharmacy. Prescription resent and confirmed that pharmacy received it.     Per pharmacist, Advair is not covered by patient's insurance. The preferred medications would be Symbicort or Breo. If Advair desired, would need a prior auth.   Alma Delia Barger RN, Care Coordinator

## 2018-02-02 NOTE — TELEPHONE ENCOUNTER
"Contacted Chalra about the Symbicort prescription. She thought patient had already picked up the \"steroid inhaler\" that Dr. Pizarro prescribed. She went to patient's house, and it was an albuterol inhaler that was filled. She thought that was different than the Ventolin, so patient has been using double the albuterol. She will contact her mother regarding only using one of those, and aware that Symbicort is at pharmacy to be picked up.   "

## 2018-02-13 ENCOUNTER — INFUSION THERAPY VISIT (OUTPATIENT)
Dept: INFUSION THERAPY | Facility: CLINIC | Age: 78
End: 2018-02-13
Payer: MEDICARE

## 2018-02-13 VITALS
HEART RATE: 82 BPM | WEIGHT: 140.3 LBS | OXYGEN SATURATION: 100 % | RESPIRATION RATE: 20 BRPM | BODY MASS INDEX: 24.08 KG/M2

## 2018-02-13 DIAGNOSIS — R19.7 DIARRHEA, UNSPECIFIED TYPE: Primary | ICD-10-CM

## 2018-02-13 DIAGNOSIS — K55.20 AVM (ARTERIOVENOUS MALFORMATION) OF SMALL BOWEL, ACQUIRED: ICD-10-CM

## 2018-02-13 DIAGNOSIS — K31.811 ANGIODYSPLASIA OF STOMACH AND DUODENUM WITH HEMORRHAGE: ICD-10-CM

## 2018-02-13 DIAGNOSIS — D50.0 IRON DEFICIENCY ANEMIA DUE TO CHRONIC BLOOD LOSS: ICD-10-CM

## 2018-02-13 PROCEDURE — 96372 THER/PROPH/DIAG INJ SC/IM: CPT | Performed by: INTERNAL MEDICINE

## 2018-02-13 PROCEDURE — 99207 ZZC NO CHARGE LOS: CPT

## 2018-02-13 RX ADMIN — OCTREOTIDE ACETATE 20 MG: KIT INTRAMUSCULAR at 11:58

## 2018-02-13 ASSESSMENT — PAIN SCALES - GENERAL: PAINLEVEL: NO PAIN (0)

## 2018-02-13 NOTE — MR AVS SNAPSHOT
After Visit Summary   2/13/2018    Rachele Martínez    MRN: [id number]           Patient Information     Date Of Birth          1940        Visit Information        Provider Department      2/13/2018 11:30 AM [address]        Today's Diagnoses     Diarrhea, unspecified type    -  1    Angiodysplasia of stomach and duodenum with hemorrhage        AVM (arteriovenous malformation) of small bowel, acquired (H)        Iron deficiency anemia due to chronic blood loss           Follow-ups after your visit        Your next 10 appointments already scheduled     Feb 27, 2018 12:00 PM CST   Walk In From ENT with Abad Barba   [facility]    [address] [place] [id number] [phone]            Feb 27, 2018  1:15 PM CST   (Arrive by 1:00 PM)   RETURN NEUROTOLOGY with Rick L Nissen, MD   [facility]    [address] [place] [address] [id number] [phone]            Mar 13, 2018 11:30 AM CDT   Level O with [address] ([facility])    [address] [place] [address] [phone] [phone]            May 08, 2018 10:00 AM CDT   Lab with  LAB   [facility]    [address] [place] [address] [phone]            May 08, 2018 10:45 AM CDT   US ABDOMEN COMPLETE with UCUS1   [facility]    [address] [place] [id number] [phone]           Please bring a list of your medicines (including vitamins, minerals and over-the-counter drugs). Also, tell your doctor about any allergies you may have. Wear comfortable clothes and leave your valuables at home.  Adults: No eating or drinking for 8 hours before the exam. You  may take medicine with a small sip of water.  Children: - Children 6+ years: No food or drink for 6 hours before exam. - Children 1-5 years: No food or drink for 4 hours before exam. - Infants, breast-fed: may have breast milk up to 2 hours before exam. - Infants, formula: may have bottle until 4 hours before exam.  Please call the Imaging Department at your exam site with any questions.            May 08, 2018 11:45 AM CDT   (Arrive by 11:30 AM)   Return General Liver with Andriy Barnett MD   [facility] ([facility])    [address]  [address]  [facility] [address] [phone]              Who to contact     If you have questions or need follow up information about today's clinic visit or your schedule please contact [facility] directly at [phone].  Normal or non-critical lab and imaging results will be communicated to you by MyChart, letter or phone within 4 business days after the clinic has received the results. If you do not hear from us within 7 days, please contact the clinic through [employer] or phone. If you have a critical or abnormal lab result, we will notify you by phone as soon as possible.  Submit refill requests through [employer] or call your pharmacy and they will forward the refill request to us. Please allow 3 business days for your refill to be completed.          Additional Information About Your Visit        EQU[employer] Information     EQUISO is an electronic gateway that provides easy, online access to your medical records. With EQUISO, you can request a clinic appointment, read your test results, renew a prescription or communicate with your care team.     To sign up for EQUISO visit the website at [email or web address]   You will be asked to enter the access code listed below, as well as some personal information. Please follow the directions to create your username and password.     Your access code is:  [email or web address]  Expires: 2018  6:30 AM     Your access code will  in 90 days. If you need help or a new code, please contact your [facility] or call [phone] for assistance.        Care EveryWhere ID     This is your Care EveryWhere ID. This could be used by other organizations to access your [facility] medical records  [id number]        Your Vitals Were     Pulse Respirations Pulse Oximetry BMI (Body Mass Index)          82 20 100% 24.08 kg/m2         Blood Pressure from Last 3 Encounters:   18 113/59   18 153/71   17 112/63    Weight from Last 3 Encounters:   18 63.6 kg (140 lb 4.8 oz)   18 62.1 kg (136 lb 14.4 oz)   18 64.5 kg (142 lb 4.8 oz)              Today, you had the following     No orders found for display         Today's Medication Changes          These changes are accurate as of 18 11:59 PM.  If you have any questions, ask your nurse or doctor.               These medicines have changed or have updated prescriptions.        Dose/Directions    polyethylene glycol 3350 Powd   This may have changed:    - when to take this  - reasons to take this   Used for:  Slow transit constipation        Dose:  17 g   Take 17 g by mouth daily   Quantity:  1530 g   Refills:  3         Stop taking these medicines if you haven't already. Please contact your care team if you have questions.     budesonide-formoterol 80-4.5 MCG/ACT Inhaler   Commonly known as:  SYMBICORT           guaiFENesin-dextromethorphan 100-10 MG/5ML syrup   Commonly known as:  ROBITUSSIN DM           omeprazole 40 MG capsule   Commonly known as:  priLOSEC                    Primary Care Provider Office Phone # Fax #    Agnieszka Rodriguez [phone]       [address] [place] [address] [address] [place] [id number]        Equal Access to Services     JAMIE CARVAJAL AH: Crow Degroot, soy weiss, jaz ramirez  josuecarolina lesryder la'aadani ah. So [facility] [phone].    ATENCIÓN: Si gisellla jorje, tiene a ibarra disposición servicios gratuitos de asistencia lingüística. Bettye werner [phone].    We comply with applicable federal civil rights laws and [place] laws. We do not discriminate on the basis of race, color, national origin, age, disability, sex, sexual orientation, or gender identity.            Thank you!     Thank you for choosing [facility]  for your care. Our goal is always to provide you with excellent care. Hearing back from our patients is one way we can continue to improve our services. Please take a few minutes to complete the written survey that you may receive in the mail after your visit with us. Thank you!             Your Updated Medication List - Protect others around you: Learn how to safely use, store and throw away your medicines at www.disposemymeds.org.          This list is accurate as of 2/13/18 11:59 PM.  Always use your most recent med list.                   Brand Name Dispense Instructions for use Diagnosis    Calcium Acetate (Phos Binder) 667 MG Caps      TAKE 2 CAPSULE BY MOUTH THREE TIMES A DAY WITH MEALS        diclofenac 1 % Gel topical gel    VOLTAREN    100 g    Apply 4 grams to knees or 2 grams to hands four times daily using enclosed dosing card.    Lumbar arthropathy (H)       OCTREOTIDE ACETATE IJ      Inject as directed every 30 days        order for DME     1 Device    Equipment being ordered: 4 wheel walker with seat.    Disorder of bone and cartilage, Arthralgia, unspecified joint, Tendency to fall       polyethylene glycol 3350 Powd     1530 g    Take 17 g by mouth daily    Slow transit constipation       PRORENAL + D Tabs      Take 1 tablet by mouth daily        sertraline 50 MG tablet    ZOLOFT    180 tablet    Take 2 tablets (100 mg) by mouth daily    Itching       * VENTOLIN  (90 BASE) MCG/ACT Inhaler   Generic drug:  albuterol      INHALE 2 PUFFS BY MOUTH  EVERY 4 HOURS AS NEEDED FOR WHEEZING OR SHORTNESS OF BREATH        * albuterol 108 (90 BASE) MCG/ACT Inhaler    PROAIR HFA    1 Inhaler    Inhale 2 puffs into the lungs every 6 hours    Cough       * Notice:  This list has 2 medication(s) that are the same as other medications prescribed for you. Read the directions carefully, and ask your doctor or other care provider to review them with you.

## 2018-02-14 NOTE — PROGRESS NOTES
Infusion Nursing Note:  Rachele Martínez presents today for Sandostatin.    Patient seen by provider today: No   present during visit today: Not Applicable.    Note: N/A.    Intravenous Access:  No Intravenous access/labs at this visit.    Treatment Conditions:  Not Applicable.      Post Infusion Assessment:  Patient tolerated injection without incident.    Discharge Plan:   3/13 as scheduled for next injection.    ADAM GUO RN

## 2018-02-21 ENCOUNTER — OFFICE VISIT (OUTPATIENT)
Dept: INTERNAL MEDICINE | Facility: CLINIC | Age: 78
End: 2018-02-21
Payer: MEDICARE

## 2018-02-21 VITALS
HEART RATE: 83 BPM | BODY MASS INDEX: 23.34 KG/M2 | DIASTOLIC BLOOD PRESSURE: 55 MMHG | SYSTOLIC BLOOD PRESSURE: 110 MMHG | WEIGHT: 136 LBS

## 2018-02-21 DIAGNOSIS — R42 DIZZINESS: ICD-10-CM

## 2018-02-21 DIAGNOSIS — R01.1 HEART MURMUR: Primary | ICD-10-CM

## 2018-02-21 ASSESSMENT — PAIN SCALES - GENERAL: PAINLEVEL: SEVERE PAIN (7)

## 2018-02-21 NOTE — PROGRESS NOTES
"Chief Complaint   Rachele Martínez is a 77 year old female presents for   Chief Complaint   Patient presents with     Dizziness     pt states being very dizzy     Cough     pt states she still is having cough      SUBJECTIVE:  ***    Medications and allergies were reviewed by me today.     Social History   History   Smoking Status     Former Smoker     Packs/day: 2.00     Years: 45.00     Types: Cigarettes     Quit date: 11/23/2002   Smokeless Tobacco     Never Used      PHQ-2 ( 1999 Pf[employer]) 8/9/2016 6/21/2016   Q1: Little interest or pleasure in doing things 0 0   Q2: Feeling down, depressed or hopeless 0 0   PHQ-2 Score 0 0     No flowsheet data found.***    Past Medical History   Patient Active Problem List   Diagnosis     Health Care Home     Cataract     Anxiety state     Insomnia     Hyperlipidemia with target LDL less than 130     History of hepatitis C     ACP (advance care planning)     Iron deficiency anemia due to chronic blood loss     AVM (arteriovenous malformation) of small bowel, acquired (H)     ESRD (end stage renal disease) on dialysis (H)     Cirrhosis of liver without ascites, unspecified hepatic cirrhosis type (H)     Tendency to fall     Hypertension goal BP (blood pressure) < 140/80     Diarrhea     Angiodysplasia of stomach and duodenum with hemorrhage       Review of Systems   {Use \"UMPROS\" if patient entered questionnaire completed:755858}    Physical Exam   /55  Pulse 83  Wt 61.7 kg (136 lb)  BMI 23.34 kg/m2  Gen: no distress, comfortable, pleasant   Eyes: anicteric, normal extra-ocular movements   Cardiovascular: regular rate and rhythm, normal S1 and S2, no murmurs, rubs or gallops, peripheral pulses full and symmetric   Respiratory: clear to auscultation, no wheezes or crackles, normal breath sounds   Gastrointestinal: positive bowel sounds, nontender, nondistended  Skin: no concerning lesions, no jaundice   Psychological: appropriate mood   ***  Assessment & Plan   There " are no diagnoses linked to this encounter.    HM:  ***    Dr. Tarango [phone]    RTC: ***    Agnieszka Rodriguez MD

## 2018-02-21 NOTE — NURSING NOTE
Chief Complaint   Patient presents with     Dizziness     pt states being very dizzy     Cough     pt states she still is having cough       Selin Glover CMA at 3:09 PM on 2/21/2018.

## 2018-02-21 NOTE — PROGRESS NOTES
Chief Complaint   Rachele Martínez is a 77 year old female presents for dizziness/lightheadedness and cough.  Chief Complaint   Patient presents with     Dizziness     pt states being very dizzy     Cough     pt states she still is having cough      SUBJECTIVE:  Rachele Martínez is a 77 year old woman with ESRD who presents after her dialysis appointment for evaluation of dizziness/lightheadedness and cough. She notes that she has been experiencing symptoms of dizziness/lightheadedness for approximately 1 year and is concerned it could be related to her low Hgb and dialysis treatments. She becomes light headed during the day and feels cloudy headed and her vision becomes blurry for a moment. Her daughter stated her hemoglobin was 6.3 at dialysis today and that she is scheduled for a blood transfusion tomorrow. She is not currently on EPO. Her daughter provided a detailed list of her blood pressures taken seated and standing before and after every dialysis session on [facility]. Many of the readings consistent with ortho stasis. She also experiences a spinning of the room at night when she lays flat. She notes that they symptoms occur with both eyes open and closed. She had a full evaluation for vertigo by ENT and her daughter said they had nothing left to offer.     She has had a 2 month history of chronic cough treated with 2 rounds of prednisone, a course of amoxicillin, an albuterol inhaler, and elevation of the head of her bed all of which have provided no relief. She indicated that she was inhaling her albuterol quickly so the spacer would whistle using the medication approximately 2 times per day. Of note she restarted lisinopril around the same time the cough started and was instructed by her dialysis team to take 20 milligrams which is twice her previous dose. She denies fever, chills, diaphoresis, green/yellow sputum, or GERD symptoms.     Finally her daughter mentioned that she has become more forgetful and  has been having trouble with familiar tasks. Specifically noting that she turned on the faucet and could not remember how to turn it off. Rachele notes her daughter helps her with most ADLs. She did not qualify for a PCA in the past due to her social security income but states she cannot afford that option at this time.      Medications and allergies were reviewed by me today.     Social History   History   Smoking Status     Former Smoker     Packs/day: 2.00     Years: 45.00     Types: Cigarettes     Quit date: 11/23/2002   Smokeless Tobacco     Never Used        Past Medical History   Patient Active Problem List   Diagnosis     Health Care Home     Cataract     Anxiety state     Insomnia     Hyperlipidemia with target LDL less than 130     History of hepatitis C     ACP (advance care planning)     Iron deficiency anemia due to chronic blood loss     AVM (arteriovenous malformation) of small bowel, acquired (H)     ESRD (end stage renal disease) on dialysis (H)     Cirrhosis of liver without ascites, unspecified hepatic cirrhosis type (H)     Tendency to fall     Hypertension goal BP (blood pressure) < 140/80     Diarrhea     Angiodysplasia of stomach and duodenum with hemorrhage       Review of Systems   5 point ROS completed and negative except noted above, including Gen, CV, Resp, GI, MS    Physical Exam   /55  Pulse 83  Wt 61.7 kg (136 lb)  BMI 23.34 kg/m2  Gen: no distress, comfortable, pleasant, general malaise, in wheelchair    HEENT: anicteric, normal extra-ocular movements, no hearing difficulty, nasal turbinates mildly erythematous on left, no evidence of post nasal drip on physical exam.   Neuro: no nystagmus, cognitive  Cardiovascular: Systolic crescendo decrescendo murmur heard over the right second intercostal space and left sternal boarder. Significant radiation of the murmer into the right carotid.   Respiratory: clear to auscultation, no wheezes or crackles, normal breath sounds    Gastrointestinal: positive bowel sounds, nontender, nondistended  Skin: no concerning lesions, no jaundice   Psychological: mild depressed mood secondary to dialysis and health conditions    Assessment & Plan   Heart murmur/ Dizziness  New crescendo decrescendo murmur consistent with aortic stenosis. Patient would benefit from a resting echo to evaluate valve given her symptoms of lightheadedness.   - Echocardiogram    Cough  Patients cough is consistent with re-starting lisinopril. We touch base with her dialysis physician Dr. Tarango this Friday when he is in clinic and consider switching her to an ARB. Given lack of benefit from the albuterol (with the caveat of improper technique), lack of GERD symptoms, and absence of evidence of post nasal drip on physical exam it is most likely related to her medication. In addition albuterol can increase heart strain and cause electrolyte changes and therefore we would like her to use this medication less if possible.   - Discuss with dialysis team about medication changes    Memory  Daughter will monitor and we will discuss further at next visit    This note was scribed by Cain Brown MS4 on behalf of Dr. Tae Tarango [phone]    Tae Rodriguez MD

## 2018-02-21 NOTE — MR AVS SNAPSHOT
After Visit Summary   2/21/2018    Rachele Martínez    MRN: [id number]           Patient Information     Date Of Birth          1940        Visit Information        Provider Department      2/21/2018 3:00 PM Agnieszka Rodriguez MD [facility]        Today's Diagnoses     Heart murmur    -  1    Dizziness          Care Instructions    Primary Care Center: [phone]     Primary Care Center Medication Refill Request Information:  * Please contact your pharmacy regarding ANY request for medication refills.  ** [facility] Prescription Fax = [phone]  * Please allow 3 business days for routine medication refills.  * Please allow 5 business days for controlled substance medication refills.     Primary Care Center Test Result notification information:  *You will be notified with in 7-10 days of your appointment day regarding the results of your test.  If you are on MyChart you will be notified as soon as the provider has reviewed the results and signed off on them.            Follow-ups after your visit        Your next 10 appointments already scheduled     Feb 22, 2018 10:00 AM CST   Ech Complete with [id number]   [facility] Echo ([facility])    [address] [place] [address] [phone]           1. Please bring or wear a comfortable two-piece outfit. 2. You may eat, drink and take your normal medicines. 3. For any questions that cannot be answered, please contact the ordering physician            Feb 27, 2018 12:00 PM CST   Walk In From ENT with Abad Barba   [facility] Audiology ([facility])    [address] [place] [address] [phone]            Feb 27, 2018  1:15 PM CST   (Arrive by 1:00 PM)   RETURN NEUROTOLOGY with Rick L Nissen, MD   [facility] Nose and Throat ([facility])    [address] [place] [address]    [phone]            Mar 13, 2018 11:30 AM CDT   Level O with BAY 2 INFUSION   [facility] ([facility])    [address] [facility] [address] [phone]            May 08, 2018 10:00 AM CDT   Lab with  LAB   [facility] ([facility])    [address] [facility] [address] [phone]            May 08, 2018 10:45 AM CDT   US ABDOMEN COMPLETE with UCUS1   [facility] US ([facility])    [address] [facility] [address] [phone]           Please bring a list of your medicines (including vitamins, minerals and over-the-counter drugs). Also, tell your doctor about any allergies you may have. Wear comfortable clothes and leave your valuables at home.  Adults: No eating or drinking for 8 hours before the exam. You may take medicine with a small sip of water.  Children: - Children 6+ years: No food or drink for 6 hours before exam. - Children 1-5 years: No food or drink for 4 hours before exam. - Infants, breast-fed: may have breast milk up to 2 hours before exam. - Infants, formula: may have bottle until 4 hours before exam.  Please call the Imaging Department at your exam site with any questions.            May 08, 2018 11:45 AM CDT   (Arrive by 11:30 AM)   Return General Liver with Andriy Barnett MD   [facility] Hepatology ([facility])    [address] [place] [address]  [address]  [facility] [address] [phone]              Future tests that were ordered for you today     Open Future Orders        Priority Expected Expires Ordered    Echocardiogram Routine  2/21/2019 2/21/2018            Who to contact     Please call your clinic at [phone] to:    Ask questions about your health    Make or cancel appointments    Discuss your medicines    Learn about your test results    Speak to your doctor            Additional  Information About Your Visit        [email or web address] Information     [email or web address] is an electronic gateway that provides easy, online access to your medical records. With [email or web address], you can request a clinic appointment, read your test results, renew a prescription or communicate with your care team.     To sign up for [email or web address] visit the website at [email or web address]   You will be asked to enter the access code listed below, as well as some personal information. Please follow the directions to create your username and password.     Your access code is: [email or web address]  Expires: 2018  6:30 AM     Your access code will  in 90 days. If you need help or a new code, please contact your [facility] or call [phone] for assistance.        Care EveryWhere ID     This is your Care EveryWhere ID. This could be used by other organizations to access your [place] medical records  [id number]        Your Vitals Were     Pulse BMI (Body Mass Index)                83 23.34 kg/m2           Blood Pressure from Last 3 Encounters:   18 110/55   18 113/59   18 153/71    Weight from Last 3 Encounters:   18 61.7 kg (136 lb)   18 63.6 kg (140 lb 4.8 oz)   18 62.1 kg (136 lb 14.4 oz)                 Today's Medication Changes          These changes are accurate as of 18  4:48 PM.  If you have any questions, ask your nurse or doctor.               These medicines have changed or have updated prescriptions.        Dose/Directions    polyethylene glycol 3350 Powd   This may have changed:    - when to take this  - reasons to take this   Used for:  Slow transit constipation        Dose:  17 g   Take 17 g by mouth daily   Quantity:  1530 g   Refills:  3                Primary Care Provider Office Phone # Fax #    Agnieszka Rodriguez [phone]       [address] [place] [address]  [facility] [address]        Equal Access to Services     JAMIE CARVAJAL AH:  Hadii aad ku hadestero Socassandraali, waaxda luqadaha, qaybta kaalmada jorge, jaz fowler. So [facility] [phone].    ATENCIÓN: Si shannan recinos, tiene a ibarra disposición servicios gratuitos de asistencia lingüística. Llame al [phone].    We comply with applicable federal civil rights laws and [place] laws. We do not discriminate on the basis of race, color, national origin, age, disability, sex, sexual orientation, or gender identity.            Thank you!     Thank you for choosing [facility]  for your care. Our goal is always to provide you with excellent care. Hearing back from our patients is one way we can continue to improve our services. Please take a few minutes to complete the written survey that you may receive in the mail after your visit with us. Thank you!             Your Updated Medication List - Protect others around you: Learn how to safely use, store and throw away your medicines at www.disposemymeds.org.          This list is accurate as of 2/21/18  4:48 PM.  Always use your most recent med list.                   Brand Name Dispense Instructions for use Diagnosis    Calcium Acetate (Phos Binder) 667 MG Caps      TAKE 2 CAPSULE BY MOUTH THREE TIMES A DAY WITH MEALS        diclofenac 1 % Gel topical gel    VOLTAREN    100 g    Apply 4 grams to knees or 2 grams to hands four times daily using enclosed dosing card.    Lumbar arthropathy (H)       OCTREOTIDE ACETATE IJ      Inject as directed every 30 days        order for DME     1 Device    Equipment being ordered: 4 wheel walker with seat.    Disorder of bone and cartilage, Arthralgia, unspecified joint, Tendency to fall       polyethylene glycol 3350 Powd     1530 g    Take 17 g by mouth daily    Slow transit constipation       PRORENAL + D Tabs      Take 1 tablet by mouth daily        sertraline 50 MG tablet    ZOLOFT    180 tablet    Take 2 tablets (100 mg) by mouth daily    Itching       *  VENTOLIN  (90 BASE) MCG/ACT Inhaler   Generic drug:  albuterol      INHALE 2 PUFFS BY MOUTH EVERY 4 HOURS AS NEEDED FOR WHEEZING OR SHORTNESS OF BREATH        * albuterol 108 (90 BASE) MCG/ACT Inhaler    PROAIR HFA    1 Inhaler    Inhale 2 puffs into the lungs every 6 hours    Cough       * Notice:  This list has 2 medication(s) that are the same as other medications prescribed for you. Read the directions carefully, and ask your doctor or other care provider to review them with you.

## 2018-02-21 NOTE — PATIENT INSTRUCTIONS
[facility]: [phone]     [facility] Medication Refill Request Information:  * Please contact your pharmacy regarding ANY request for medication refills.  ** [facility] Prescription Fax = [phone]  * Please allow 3 business days for routine medication refills.  * Please allow 5 business days for controlled substance medication refills.     [facility] Test Result notification information:  *You will be notified with in 7-10 days of your appointment day regarding the results of your test.  If you are on MyChart you will be notified as soon as the provider has reviewed the results and signed off on them.

## 2018-02-21 NOTE — PROGRESS NOTES
Rooming Note  Health Maintenance   Health Maintenance Due   Topic Date Due     LIPID MONITORING Q1 YEAR  02/10/2015    All health maintenance items discussed and pended.

## 2018-02-23 ENCOUNTER — TELEPHONE (OUTPATIENT)
Dept: INTERNAL MEDICINE | Facility: CLINIC | Age: 78
End: 2018-02-23

## 2018-02-23 NOTE — TELEPHONE ENCOUNTER
Reviewed information in detail with patient's daughter, Charla. She verbalized understanding. Follow-up appointment scheduled for patient with Dr. Rodriguez on Wed, 4/25/18 at 3:00.

## 2018-02-23 NOTE — TELEPHONE ENCOUNTER
Spoke with Dr. Tarango (her nephrologist).  We discussed the concerns we had in clinic:  1. Lisinopril -- will hold for now and start losartan if needed for BP  2. Anemia -- she's on all the correct meds, so we'll keep watching  3. Dizziness -- still not sure the cause since they are not taking off much fluid with HD.  Will see if holding the lisinopril hilps  4. Memory -- Dr. Tarango mentioned some concerns as well.  We should talk about this more at the next visit.    Can you please let her daughter know.  Her number is the primary one in the chart

## 2018-03-08 ENCOUNTER — RADIANT APPOINTMENT (OUTPATIENT)
Dept: CARDIOLOGY | Facility: CLINIC | Age: 78
End: 2018-03-08
Payer: MEDICARE

## 2018-03-08 DIAGNOSIS — R42 DIZZINESS: ICD-10-CM

## 2018-03-08 DIAGNOSIS — R01.1 HEART MURMUR: ICD-10-CM

## 2018-03-09 NOTE — PROGRESS NOTES
I agree with the PFSH and ROS as completed by the MS.  The remainder of the encounter was performed by me and scribed by the MS.  The scribed note accurately reflects my personal services and the decisions made by me.    After her visit, spoke with her neprhologist Dr Tarango who discontined the lisionpril based on her BP.  He will start losartan if needed.

## 2018-03-13 ENCOUNTER — INFUSION THERAPY VISIT (OUTPATIENT)
Dept: INFUSION THERAPY | Facility: CLINIC | Age: 78
End: 2018-03-13
Payer: MEDICARE

## 2018-03-13 VITALS
HEART RATE: 72 BPM | BODY MASS INDEX: 23.88 KG/M2 | DIASTOLIC BLOOD PRESSURE: 68 MMHG | TEMPERATURE: 99.1 F | SYSTOLIC BLOOD PRESSURE: 123 MMHG | OXYGEN SATURATION: 97 % | WEIGHT: 139.1 LBS

## 2018-03-13 DIAGNOSIS — D50.0 IRON DEFICIENCY ANEMIA DUE TO CHRONIC BLOOD LOSS: ICD-10-CM

## 2018-03-13 DIAGNOSIS — K55.20 AVM (ARTERIOVENOUS MALFORMATION) OF SMALL BOWEL, ACQUIRED: ICD-10-CM

## 2018-03-13 DIAGNOSIS — K31.811 ANGIODYSPLASIA OF STOMACH AND DUODENUM WITH HEMORRHAGE: ICD-10-CM

## 2018-03-13 DIAGNOSIS — R19.7 DIARRHEA, UNSPECIFIED TYPE: Primary | ICD-10-CM

## 2018-03-13 PROCEDURE — 96372 THER/PROPH/DIAG INJ SC/IM: CPT | Performed by: INTERNAL MEDICINE

## 2018-03-13 PROCEDURE — 99207 ZZC NO CHARGE NURSE ONLY: CPT

## 2018-03-13 RX ADMIN — OCTREOTIDE ACETATE 20 MG: KIT INTRAMUSCULAR at 12:09

## 2018-03-13 ASSESSMENT — PAIN SCALES - GENERAL: PAINLEVEL: NO PAIN (0)

## 2018-03-13 NOTE — PROGRESS NOTES
Infusion Nursing Note:  Rachele Martínez presents today for Sandostatin.    Patient seen by provider today: No   present during visit today: Not Applicable.    Note: Pt c/o still needing blood transfusions lately despite receiving the sandostatin, going to talk with ordering provider regarding this.  See flow sheet for assessment.    Intravenous Access:  No Intravenous access/labs at this visit.    Treatment Conditions:  Not Applicable.      Post Infusion Assessment:  Patient tolerated injection without incident.  Site patent and intact, free from redness, edema or discomfort.    Discharge Plan:   Patient discharged in stable condition accompanied by: daughter.  Departure Mode: Ambulatory.  Pt will RTC 4/10/18 for sandostatin.    Josias Hicks RN

## 2018-03-13 NOTE — MR AVS SNAPSHOT
After Visit Summary   3/13/2018    Rachele Martínez    MRN: [id number]           Patient Information     Date Of Birth          1940        Visit Information        Provider Department      3/13/2018 11:30 AM [place] [facility]        Today's Diagnoses     Diarrhea, unspecified type    -  1    Angiodysplasia of stomach and duodenum with hemorrhage        AVM (arteriovenous malformation) of small bowel, acquired (H)        Iron deficiency anemia due to chronic blood loss           Follow-ups after your visit        Your next 10 appointments already scheduled     Apr 10, 2018 11:30 AM CDT   Level O with [address] ([facility])    [address] [place] [address] [phone]            Apr 25, 2018  3:00 PM CDT   (Arrive by 2:45 PM)   Return Visit with Agnieszka Rodriguez MD   [facility] ([facility])    [address] [place] [id number] [phone]            May 17, 2018  9:00 AM CDT   Lab with  LAB   [facility] Lab ([facility])    [address] [place] [id number] [phone]            May 17, 2018  9:15 AM CDT   US ABDOMEN COMPLETE with UCUS[address] [place] [address] Imaging Center US ([facility])    [address] [place] [id number] [phone]           Please bring a list of your medicines (including vitamins, minerals and over-the-counter drugs). Also, tell your doctor about any allergies you may have. Wear comfortable clothes and leave your valuables at home.  Adults: No eating or drinking for 8 hours before the exam. You may take medicine with a small sip of water.  Children: - Children 6+ years: No food or drink for 6 hours before exam. - Children 1-5 years: No food or drink for 4 hours before exam. - Infants, breast-fed: may have  breast milk up to 2 hours before exam. - Infants, formula: may have bottle until 4 hours before exam.  Please call the Imaging Department at your exam site with any questions.            May 17, 2018 10:30 AM CDT   (Arrive by 10:15 AM)   Return General Liver with Roberto Gordon PA-C   [facility] Hepatology ([facility])    [address] [facility]  [address]  [facility] [address] [phone]              Who to contact     If you have questions or need follow up information about today's clinic visit or your schedule please contact [facility] directly at [phone].  Normal or non-critical lab and imaging results will be communicated to you by MyChart, letter or phone within 4 business days after the clinic has received the results. If you do not hear from us within 7 days, please contact the clinic through MyChart or phone. If you have a critical or abnormal lab result, we will notify you by phone as soon as possible.  Submit refill requests through [employer] or call your pharmacy and they will forward the refill request to us. Please allow 3 business days for your refill to be completed.          Additional Information About Your Visit        [employer] Information     [employer] is an electronic gateway that provides easy, online access to your medical records. With [employer], you can request a clinic appointment, read your test results, renew a prescription or communicate with your care team.     To sign up for [employer] visit the website at [email or web address]   You will be asked to enter the access code listed below, as well as some personal information. Please follow the directions to create your username and password.     Your access code is: [email or web address]  Expires: 2018  7:30 AM     Your access code will  in 90 days. If you need help or a new code, please contact your [facility] or call [phone] for assistance.         Care EveryWhere ID     This is your Care EveryWhere ID. This could be used by other organizations to access your [place] medical records  [id number]        Your Vitals Were     Pulse Temperature Pulse Oximetry BMI (Body Mass Index)          72 99.1  F (37.3  C) (Oral) 97% 23.88 kg/m2         Blood Pressure from Last 3 Encounters:   03/13/18 123/68   02/21/18 110/55   01/31/18 113/59    Weight from Last 3 Encounters:   03/13/18 63.1 kg (139 lb 1.6 oz)   02/21/18 61.7 kg (136 lb)   02/13/18 63.6 kg (140 lb 4.8 oz)              Today, you had the following     No orders found for display         Today's Medication Changes          These changes are accurate as of 3/13/18  2:33 PM.  If you have any questions, ask your nurse or doctor.               These medicines have changed or have updated prescriptions.        Dose/Directions    polyethylene glycol 3350 Powd   This may have changed:    - when to take this  - reasons to take this   Used for:  Slow transit constipation        Dose:  17 g   Take 17 g by mouth daily   Quantity:  1530 g   Refills:  3                Primary Care Provider Office Phone # Fax #    Agnieszka Erica Rodriguez [phone]       [address] [place] [address] [address] [place] [address]        Equal Access to Services     JAMIE CARVAJAL AH: Hadii lawrence edwards Sohemal, waaxda luqadaha, qaybta kaalmada adeegyada, jaz fowler. So [facility] [phone].    ATENCIÓN: Si habla español, tiene a ibarra disposición servicios gratuitos de asistencia lingüística. Pegame al [phone].    We comply with applicable federal civil rights laws and [place] laws. We do not discriminate on the basis of race, color, national origin, age, disability, sex, sexual orientation, or gender identity.            Thank you!     Thank you for choosing [facility]  for your care. Our goal is always to provide you with excellent care. Hearing back from our patients is one  way we can continue to improve our services. Please take a few minutes to complete the written survey that you may receive in the mail after your visit with us. Thank you!             Your Updated Medication List - Protect others around you: Learn how to safely use, store and throw away your medicines at www.disposemymeds.org.          This list is accurate as of 3/13/18  2:33 PM.  Always use your most recent med list.                   Brand Name Dispense Instructions for use Diagnosis    Calcium Acetate (Phos Binder) 667 MG Caps      TAKE 2 CAPSULE BY MOUTH THREE TIMES A DAY WITH MEALS        diclofenac 1 % Gel topical gel    VOLTAREN    100 g    Apply 4 grams to knees or 2 grams to hands four times daily using enclosed dosing card.    Lumbar arthropathy (H)       OCTREOTIDE ACETATE IJ      Inject as directed every 30 days        order for DME     1 Device    Equipment being ordered: 4 wheel walker with seat.    Disorder of bone and cartilage, Arthralgia, unspecified joint, Tendency to fall       polyethylene glycol 3350 Powd     1530 g    Take 17 g by mouth daily    Slow transit constipation       PRORENAL + D Tabs      Take 1 tablet by mouth daily        sertraline 50 MG tablet    ZOLOFT    180 tablet    Take 2 tablets (100 mg) by mouth daily    Itching       * VENTOLIN  (90 BASE) MCG/ACT Inhaler   Generic drug:  albuterol      INHALE 2 PUFFS BY MOUTH EVERY 4 HOURS AS NEEDED FOR WHEEZING OR SHORTNESS OF BREATH        * albuterol 108 (90 BASE) MCG/ACT Inhaler    PROAIR HFA    1 Inhaler    Inhale 2 puffs into the lungs every 6 hours    Cough       * Notice:  This list has 2 medication(s) that are the same as other medications prescribed for you. Read the directions carefully, and ask your doctor or other care provider to review them with you.

## 2018-04-10 ENCOUNTER — INFUSION THERAPY VISIT (OUTPATIENT)
Dept: INFUSION THERAPY | Facility: CLINIC | Age: 78
End: 2018-04-10
Payer: MEDICARE

## 2018-04-10 VITALS
SYSTOLIC BLOOD PRESSURE: 115 MMHG | HEART RATE: 74 BPM | OXYGEN SATURATION: 100 % | DIASTOLIC BLOOD PRESSURE: 70 MMHG | TEMPERATURE: 98.3 F

## 2018-04-10 DIAGNOSIS — R19.7 DIARRHEA, UNSPECIFIED TYPE: Primary | ICD-10-CM

## 2018-04-10 DIAGNOSIS — K31.811 ANGIODYSPLASIA OF STOMACH AND DUODENUM WITH HEMORRHAGE: ICD-10-CM

## 2018-04-10 DIAGNOSIS — K55.20 AVM (ARTERIOVENOUS MALFORMATION) OF SMALL BOWEL, ACQUIRED: ICD-10-CM

## 2018-04-10 DIAGNOSIS — D50.0 IRON DEFICIENCY ANEMIA DUE TO CHRONIC BLOOD LOSS: ICD-10-CM

## 2018-04-10 PROCEDURE — 96372 THER/PROPH/DIAG INJ SC/IM: CPT | Performed by: INTERNAL MEDICINE

## 2018-04-10 PROCEDURE — 99207 ZZC NO CHARGE NURSE ONLY: CPT

## 2018-04-10 RX ADMIN — OCTREOTIDE ACETATE 20 MG: KIT INTRAMUSCULAR at 11:39

## 2018-04-10 ASSESSMENT — PAIN SCALES - GENERAL: PAINLEVEL: NO PAIN (0)

## 2018-04-10 NOTE — PROGRESS NOTES
Infusion Nursing Note:  Rachele Martínez presents today for sadostatin.    Patient seen by provider today: No   present during visit today: Not Applicable.    Treatment Conditions:  Reviewed system flow sheet. No new medical issues or concerns.      Post Infusion Assessment:  Patient tolerated injection without incident.    Discharge Plan:   Patient discharged in stable condition accompanied by: daughter.  Departure Mode: Ambulatory.  Verbally reviewed next injection on 5/8/18.    Erum Abel RN

## 2018-04-10 NOTE — MR AVS SNAPSHOT
After Visit Summary   4/10/2018    Rachele Martínez    MRN: [id number]           Patient Information     Date Of Birth          1940        Visit Information        Provider Department      4/10/2018 11:30 AM [address]        Today's Diagnoses     Diarrhea, unspecified type    -  1    Angiodysplasia of stomach and duodenum with hemorrhage        AVM (arteriovenous malformation) of small bowel, acquired (H)        Iron deficiency anemia due to chronic blood loss           Follow-ups after your visit        Your next 10 appointments already scheduled     Apr 25, 2018  3:00 PM CDT   (Arrive by 2:45 PM)   Return Visit with Agnieszka Rodriguez MD   [facility] ([facility])    [address] [place] [address]  [facility] [id number] [phone]            May 08, 2018 11:30 AM CDT   Level O with [address] ([facility])    [address] [place] [address] [id number] [phone]            May 17, 2018  9:00 AM CDT   Lab with UC LAB   [facility] ([facility])    [address] [place] [id number] [phone]            May 17, 2018  9:15 AM CDT   US ABDOMEN COMPLETE with UCUS1   [facility] ([facility])    [address] [place] [id number] [phone]           Please bring a list of your medicines (including vitamins, minerals and over-the-counter drugs). Also, tell your doctor about any allergies you may have. Wear comfortable clothes and leave your valuables at home.  Adults: No eating or drinking for 8 hours before the exam. You may take medicine with a small sip of water.  Children: - Children 6+ years: No food or drink for 6 hours before exam. - Children 1-5 years: No food or drink for 4 hours before exam. - Infants, breast-fed: may have  breast milk up to 2 hours before exam. - Infants, formula: may have bottle until 4 hours before exam.  Please call the Imaging Department at your exam site with any questions.            May 17, 2018 10:15 AM CDT   (Arrive by 10:00 AM)   Return General Liver with Roberto Gordon PA-C   [facility] ([facility])    [address] [facility]  [address]  [facility] [address] [phone]              Who to contact     If you have questions or need follow up information about today's clinic visit or your schedule please contact [facility] directly at [phone].  Normal or non-critical lab and imaging results will be communicated to you by MyChart, letter or phone within 4 business days after the clinic has received the results. If you do not hear from us within 7 days, please contact the clinic through MyChart or phone. If you have a critical or abnormal lab result, we will notify you by phone as soon as possible.  Submit refill requests through [employer] or call your pharmacy and they will forward the refill request to us. Please allow 3 business days for your refill to be completed.          Additional Information About Your Visit        [employer] Information     [employer] is an electronic gateway that provides easy, online access to your medical records. With [employer], you can request a clinic appointment, read your test results, renew a prescription or communicate with your care team.     To sign up for [employer] visit the website at [email or web address]   You will be asked to enter the access code listed below, as well as some personal information. Please follow the directions to create your username and password.     Your access code is: [email or web address]  Expires: 2018  7:30 AM     Your access code will  in 90 days. If you need help or a new code, please contact your [facility] or call [phone] for assistance.         Care EveryWhere ID     This is your Care EveryWhere ID. This could be used by other organizations to access your [place] medical records  [id number]        Your Vitals Were     Pulse Temperature Pulse Oximetry             74 98.3  F (36.8  C) (Oral) 100%          Blood Pressure from Last 3 Encounters:   04/10/18 115/70   03/13/18 123/68   02/21/18 110/55    Weight from Last 3 Encounters:   03/13/18 63.1 kg (139 lb 1.6 oz)   02/21/18 61.7 kg (136 lb)   02/13/18 63.6 kg (140 lb 4.8 oz)              Today, you had the following     No orders found for display         Today's Medication Changes          These changes are accurate as of 4/10/18  1:32 PM.  If you have any questions, ask your nurse or doctor.               These medicines have changed or have updated prescriptions.        Dose/Directions    polyethylene glycol 3350 Powd   This may have changed:    - when to take this  - reasons to take this   Used for:  Slow transit constipation        Dose:  17 g   Take 17 g by mouth daily   Quantity:  1530 g   Refills:  3                Primary Care Provider Office Phone # Fax #    Agnieszka Erica Rodriguez [phone]       [address] [facility] [address]  [facility] [address]        Equal Access to Services     JAIME CARVAJAL AH: Crow sellerso Socassandraali, waaxda luqadaha, qaybta kaalmada adeegyada, jaz fowler. So [facility] [phone].    ATENCIÓN: Si habla español, tiene a ibarra disposición servicios gratuitos de asistencia lingüística. Llame al [phone].    We comply with applicable federal civil rights laws and [place] laws. We do not discriminate on the basis of race, color, national origin, age, disability, sex, sexual orientation, or gender identity.            Thank you!     Thank you for choosing [facility]  for your care. Our goal is always to provide you with excellent care. Hearing back from our patients is one way we can continue to improve  our services. Please take a few minutes to complete the written survey that you may receive in the mail after your visit with us. Thank you!             Your Updated Medication List - Protect others around you: Learn how to safely use, store and throw away your medicines at www.disposemymeds.org.          This list is accurate as of 4/10/18  1:32 PM.  Always use your most recent med list.                   Brand Name Dispense Instructions for use Diagnosis    Calcium Acetate (Phos Binder) 667 MG Caps      TAKE 2 CAPSULE BY MOUTH THREE TIMES A DAY WITH MEALS        diclofenac 1 % Gel topical gel    VOLTAREN    100 g    Apply 4 grams to knees or 2 grams to hands four times daily using enclosed dosing card.    Lumbar arthropathy (H)       OCTREOTIDE ACETATE IJ      Inject as directed every 30 days        order for DME     1 Device    Equipment being ordered: 4 wheel walker with seat.    Disorder of bone and cartilage, Arthralgia, unspecified joint, Tendency to fall       polyethylene glycol 3350 Powd     1530 g    Take 17 g by mouth daily    Slow transit constipation       PRORENAL + D Tabs      Take 1 tablet by mouth daily        sertraline 50 MG tablet    ZOLOFT    180 tablet    Take 2 tablets (100 mg) by mouth daily    Itching       * VENTOLIN  (90 Base) MCG/ACT Inhaler   Generic drug:  albuterol      INHALE 2 PUFFS BY MOUTH EVERY 4 HOURS AS NEEDED FOR WHEEZING OR SHORTNESS OF BREATH        * albuterol 108 (90 Base) MCG/ACT Inhaler    PROAIR HFA    1 Inhaler    Inhale 2 puffs into the lungs every 6 hours    Cough       * Notice:  This list has 2 medication(s) that are the same as other medications prescribed for you. Read the directions carefully, and ask your doctor or other care provider to review them with you.

## 2018-04-25 ENCOUNTER — OFFICE VISIT (OUTPATIENT)
Dept: ORTHOPEDICS | Facility: CLINIC | Age: 78
End: 2018-04-25
Payer: MEDICARE

## 2018-04-25 ENCOUNTER — RADIANT APPOINTMENT (OUTPATIENT)
Dept: GENERAL RADIOLOGY | Facility: CLINIC | Age: 78
End: 2018-04-25
Attending: FAMILY MEDICINE
Payer: MEDICARE

## 2018-04-25 ENCOUNTER — RADIANT APPOINTMENT (OUTPATIENT)
Dept: GENERAL RADIOLOGY | Facility: CLINIC | Age: 78
End: 2018-04-25
Payer: MEDICARE

## 2018-04-25 ENCOUNTER — OFFICE VISIT (OUTPATIENT)
Dept: INTERNAL MEDICINE | Facility: CLINIC | Age: 78
End: 2018-04-25
Payer: MEDICARE

## 2018-04-25 VITALS
HEART RATE: 85 BPM | RESPIRATION RATE: 16 BRPM | DIASTOLIC BLOOD PRESSURE: 70 MMHG | WEIGHT: 135.9 LBS | SYSTOLIC BLOOD PRESSURE: 103 MMHG | BODY MASS INDEX: 23.33 KG/M2

## 2018-04-25 VITALS
DIASTOLIC BLOOD PRESSURE: 70 MMHG | HEART RATE: 85 BPM | BODY MASS INDEX: 23.17 KG/M2 | SYSTOLIC BLOOD PRESSURE: 103 MMHG | WEIGHT: 135 LBS

## 2018-04-25 DIAGNOSIS — R05.9 COUGH: ICD-10-CM

## 2018-04-25 DIAGNOSIS — M65.322 TRIGGER INDEX FINGER OF LEFT HAND: ICD-10-CM

## 2018-04-25 DIAGNOSIS — R41.3 MEMORY LOSS: ICD-10-CM

## 2018-04-25 DIAGNOSIS — M79.645 PAIN OF FINGER OF LEFT HAND: Primary | ICD-10-CM

## 2018-04-25 DIAGNOSIS — R05.9 COUGH: Primary | ICD-10-CM

## 2018-04-25 DIAGNOSIS — M79.645 PAIN OF FINGER OF LEFT HAND: ICD-10-CM

## 2018-04-25 ASSESSMENT — PAIN SCALES - GENERAL: PAINLEVEL: SEVERE PAIN (7)

## 2018-04-25 NOTE — PROGRESS NOTES
SPORTS & ORTHOPEDIC WALK-IN VISIT 4/25/2018    Primary Care Physician: Dr. Rodriguez  A few weeks ago noticed some pain in L index finger. Pain is at PIP joint. Flexion is most painful. Denies any injury or hx of finger pain. Has not been doing anything for pain.      Reason for visit:     What part of your body is injured / painful?  left index finger    What caused the injury /pain? No inciting event     How long ago did your injury occur or pain begin? several weeks ago    What are your most bothersome symptoms? Pain    How would you characterize your symptom?  aching and sharp    What makes your symptoms better? Nothing    What makes your symptoms worse? Movement    Have you been previously seen for this problem? No    Medical History:    Any recent changes to your medical history? No    Any new medication prescribed since last visit? No    Have you had surgery on this body part before? No    Social History:    Occupation: NA    Handedness: Right    Exercise: None    Review of Systems:    Do you have fever, chills, weight loss? No    Do you have any vision problems? No    Do you have any chest pain or edema? No    Do you have any shortness of breath or wheezing?  No    Do you have stomach problems? No    Do you have any numbness or focal weakness? No    Do you have diabetes? No    Do you have problems with bleeding or clotting? No    Do you have an rashes or other skin lesions? 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 that are presumed to be leading to drops in hemoglobin    PMH:  Past Medical History:   Diagnosis Date     Anemia      Anxiety state, unspecified 11/23/2012     Arthritis      Chronic hepatitis C with cirrhosis (H) 12/10/2014     Chronic kidney disease      Clotting disorder (H)      Dialysis patient (H)      Glaucoma       Hypertension      Hypertension goal BP (blood pressure) < 140/80 2/10/2014     Liver failure (H)      Renal failure      Unspecified pruritic disorder 11/23/2012       Active problem list:  Patient Active Problem List   Diagnosis     Health Care Home     Cataract     Anxiety state     Insomnia     Hyperlipidemia with target LDL less than 130     History of hepatitis C     ACP (advance care planning)     Iron deficiency anemia due to chronic blood loss     AVM (arteriovenous malformation) of small bowel, acquired (H)     ESRD (end stage renal disease) on dialysis (H)     Cirrhosis of liver without ascites, unspecified hepatic cirrhosis type (H)     Tendency to fall     Hypertension goal BP (blood pressure) < 140/80     Diarrhea     Angiodysplasia of stomach and duodenum with hemorrhage       FH:  Family History   Problem Relation Age of Onset     DIABETES Mother      Alzheimer Disease Mother      Musculoskeletal Disorder Mother      joint pain bilateral knees       SH:  Social History     Social History     Marital status:      Spouse name: N/A     Number of children: N/A     Years of education: N/A     Occupational History     Not on file.     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     Sexual activity: Not Currently     Other Topics Concern     Parent/Sibling W/ Cabg, Mi Or Angioplasty Before 65f 55m? No     Social History Narrative       MEDS:  See EMR, reviewed  ALL:  See EMR, reviewed    REVIEW OF SYSTEMS:  CONSTITUTIONAL:NEGATIVE for fever, chills, change in weight  INTEGUMENTARY/SKIN: NEGATIVE for worrisome rashes, moles or lesions  EYES: NEGATIVE for vision changes or irritation  ENT/MOUTH: NEGATIVE for ear, mouth and throat problems  RESP:NEGATIVE for  significant cough or SOB  BREAST: NEGATIVE for masses, tenderness or discharge  CV: NEGATIVE for chest pain, palpitations or peripheral edema  GI: NEGATIVE for nausea, abdominal pain, heartburn, or change in bowel habits  :NEGATIVE for frequency, dysuria, or hematuria  :NEGATIVE for frequency, dysuria, or hematuria  NEURO: NEGATIVE for weakness, dizziness or paresthesias  ENDOCRINE: NEGATIVE for temperature intolerance, skin/hair changes  HEME/ALLERGY/IMMUNE: NEGATIVE for bleeding problems  PSYCHIATRIC: NEGATIVE for changes in mood or affect    SUBJECTIVE:  This 77-year-old female has a tendency to notice a catching sensation involving the base of her left index finger.  She points on the palmar side near the A1 pulley as the area of discomfort.  It is not to the point that she has had to manually reduce the finger, but sometimes it will get locked in place.  She tends to play games on her phone and she will admit to holding the phone for sometimes 2 hours at a time to play these games.  She will prop it on her flexed index finger to hold the phone.      OBJECTIVE:  The hand appears normal.  There is no synovial thickening about the MCPs, PIPs or DIPs.  No deformity noted.  She can make a full fist.  She has intact strength to flexion independently at the DIP, PIP and MCP of the index finger, but she does have a nodule at the A1 pulley with some slight catching noted in full flexion.  There are no signs of contracture in the hand.  The overlying skin is normal.  She is appropriate in conversation and affect.      ASSESSMENT:  Trigger finger, index finger, left hand.      PLAN:  This is a recent finding.  We talked about getting a  order to play her computer games with her phone rather than holding it for hours at a time.  We went over some gentle stretches she can do in extension and some massaging in the area of the A1 pulley.  If the problem becomes persistent enough, she can return for a cortisone  injection.  She declined a cortisone injection today.

## 2018-04-25 NOTE — LETTER
4/25/2018       RE: Rachele Martínez  [address]    [facility] [address]     Dear Colleague,    Thank you for referring your patient, Rachele Martínez, to the [facility] CLINIC at [facility]. Please see a copy of my visit note below.          SPORTS & ORTHOPEDIC WALK-IN VISIT 4/25/2018    Primary Care Physician: Dr. Rodriguez  A few weeks ago noticed some pain in L index finger. Pain is at PIP joint. Flexion is most painful. Denies any injury or hx of finger pain. Has not been doing anything for pain.      Reason for visit:     What part of your body is injured / painful?  left index finger    What caused the injury /pain? No inciting event     How long ago did your injury occur or pain begin? several weeks ago    What are your most bothersome symptoms? Pain    How would you characterize your symptom?  aching and sharp    What makes your symptoms better? Nothing    What makes your symptoms worse? Movement    Have you been previously seen for this problem? No    Medical History:    Any recent changes to your medical history? No    Any new medication prescribed since last visit? No    Have you had surgery on this body part before? No    Social History:    Occupation: NA    Handedness: Right    Exercise: None    Review of Systems:    Do you have fever, chills, weight loss? No    Do you have any vision problems? No    Do you have any chest pain or edema? No    Do you have any shortness of breath or wheezing?  No    Do you have stomach problems? No    Do you have any numbness or focal weakness? No    Do you have diabetes? No    Do you have problems with bleeding or clotting? No    Do you have an rashes or other skin lesions? 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 that are  presumed to be leading to drops in hemoglobin    PMH:  Past Medical History:   Diagnosis Date     Anemia      Anxiety state, unspecified 11/23/2012     Arthritis      Chronic hepatitis C with cirrhosis (H) 12/10/2014     Chronic kidney disease      Clotting disorder (H)      Dialysis patient (H)      Glaucoma      Hypertension      Hypertension goal BP (blood pressure) < 140/80 2/10/2014     Liver failure (H)      Renal failure      Unspecified pruritic disorder 11/23/2012       Active problem list:  Patient Active Problem List   Diagnosis     Health Care Home     Cataract     Anxiety state     Insomnia     Hyperlipidemia with target LDL less than 130     History of hepatitis C     ACP (advance care planning)     Iron deficiency anemia due to chronic blood loss     AVM (arteriovenous malformation) of small bowel, acquired (H)     ESRD (end stage renal disease) on dialysis (H)     Cirrhosis of liver without ascites, unspecified hepatic cirrhosis type (H)     Tendency to fall     Hypertension goal BP (blood pressure) < 140/80     Diarrhea     Angiodysplasia of stomach and duodenum with hemorrhage       FH:  Family History   Problem Relation Age of Onset     DIABETES Mother      Alzheimer Disease Mother      Musculoskeletal Disorder Mother      joint pain bilateral knees       SH:  Social History     Social History     Marital status:      Spouse name: N/A     Number of children: N/A     Years of education: N/A     Occupational History     Not on file.     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     Sexual activity: Not Currently     Other Topics Concern     Parent/Sibling W/ Cabg, Mi Or Angioplasty Before 65f 55m? No     Social History Narrative       MEDS:  See EMR,  reviewed  ALL:  See EMR, reviewed    REVIEW OF SYSTEMS:  CONSTITUTIONAL:NEGATIVE for fever, chills, change in weight  INTEGUMENTARY/SKIN: NEGATIVE for worrisome rashes, moles or lesions  EYES: NEGATIVE for vision changes or irritation  ENT/MOUTH: NEGATIVE for ear, mouth and throat problems  RESP:NEGATIVE for significant cough or SOB  BREAST: NEGATIVE for masses, tenderness or discharge  CV: NEGATIVE for chest pain, palpitations or peripheral edema  GI: NEGATIVE for nausea, abdominal pain, heartburn, or change in bowel habits  :NEGATIVE for frequency, dysuria, or hematuria  :NEGATIVE for frequency, dysuria, or hematuria  NEURO: NEGATIVE for weakness, dizziness or paresthesias  ENDOCRINE: NEGATIVE for temperature intolerance, skin/hair changes  HEME/ALLERGY/IMMUNE: NEGATIVE for bleeding problems  PSYCHIATRIC: NEGATIVE for changes in mood or affect    SUBJECTIVE:  This 77-year-old female has a tendency to notice a catching sensation involving the base of her left index finger.  She points on the palmar side near the A1 pulley as the area of discomfort.  It is not to the point that she has had to manually reduce the finger, but sometimes it will get locked in place.  She tends to play games on her phone and she will admit to holding the phone for sometimes 2 hours at a time to play these games.  She will prop it on her flexed index finger to hold the phone.      OBJECTIVE:  The hand appears normal.  There is no synovial thickening about the MCPs, PIPs or DIPs.  No deformity noted.  She can make a full fist.  She has intact strength to flexion independently at the DIP, PIP and MCP of the index finger, but she does have a nodule at the A1 pulley with some slight catching noted in full flexion.  There are no signs of contracture in the hand.  The overlying skin is normal.  She is appropriate in conversation and affect.      ASSESSMENT:  Trigger finger, index finger, left hand.      PLAN:  This is a recent finding.  We  talked about getting a  order to play her computer games with her phone rather than holding it for hours at a time.  We went over some gentle stretches she can do in extension and some massaging in the area of the A1 pulley.  If the problem becomes persistent enough, she can return for a cortisone injection.  She declined a cortisone injection today.               Again, thank you for allowing me to participate in the care of your patient.      Sincerely,    Mendez Yun MD

## 2018-04-25 NOTE — MR AVS SNAPSHOT
After Visit Summary   4/25/2018    Rachele Martínez    MRN: [id number]           Patient Information     Date Of Birth          1940        Visit Information        Provider Department      4/25/2018 3:50 PM Mendez Yun MD [facility]        Today's Diagnoses     Pain of finger of left hand    -  1    Trigger index finger of left hand           Follow-ups after your visit        Your next 10 appointments already scheduled     May 08, 2018 11:30 AM CDT   Level O with [address] ([facility])    [address] [place] [address] [phone]            May 12, 2018 10:00 AM CDT   (Arrive by 9:45 AM)   MR BRAIN W/O CONTRAST with [address] [facility] [place] [facility] ([facility] [place])    [address] [address] [phone]           Take your medicines as usual, unless your doctor tells you not to. Bring a list of your current medicines to your exam (including vitamins, minerals and over-the-counter drugs). Also bring the results of similar scans you may have had.  Please remove any body piercings and hair extensions before you arrive.  Follow your doctor s orders. If you do not, we may have to postpone your exam.  You may or may not receive IV contrast for this exam pending the discretion of the Radiologist.  You do not need to do anything special to prepare.  The MRI machine uses a strong magnet. Please wear clothes without metal (snaps, zippers). A sweatsuit works well, or we may give you a hospital gown.   **IMPORTANT** THE INSTRUCTIONS BELOW ARE ONLY FOR THOSE PATIENTS WHO HAVE BEEN PRESCRIBED SEDATION OR GENERAL ANESTHESIA DURING THEIR MRI PROCEDURE:  IF YOUR DOCTOR PRESCRIBED ORAL SEDATION (take medicine to help you relax during your exam):   You must get the medicine from your doctor (oral medication) before you arrive. Bring  the medicine to the exam. Do not take it at home. You ll be told when to take it upon arriving for your exam.   Arrive one hour early. Bring someone who can take you home after the test. Your medicine will make you sleepy. After the exam, you may not drive, take a bus or take a taxi by yourself.  IF YOUR DOCTOR PRESCRIBED IV SEDATION:   Arrive one hour early. Bring someone who can take you home after the test. Your medicine will make you sleepy. After the exam, you may not drive, take a bus or take a taxi by yourself.   No eating 6 hours before your exam. You may have clear liquids up until 4 hours before your exam. (Clear liquids include water, clear tea, black coffee and fruit juice without pulp.)  IF YOUR DOCTOR PRESCRIBED ANESTHESIA (be asleep for your exam):   Arrive 1 1/2 hours early. Bring someone who can take you home after the test. You may not drive, take a bus or take a taxi by yourself.   No eating 8 hours before your exam. You may have clear liquids up until 4 hours before your exam. (Clear liquids include water, clear tea, black coffee and fruit juice without pulp.)   You will spend four to five hours in the recovery room.  Please call the Imaging Department at your exam site with any questions.            May 17, 2018  9:00 AM CDT   Lab with  LAB   [facility] ([facility])    [address] [place] [address] [phone]            May 17, 2018  9:15 AM CDT   US ABDOMEN COMPLETE with [address] [place] [address] Imaging Center US ([facility])    [address] [place] [id number] [phone]           Please bring a list of your medicines (including vitamins, minerals and over-the-counter drugs). Also, tell your doctor about any allergies you may have. Wear comfortable clothes and leave your valuables at home.  Adults: No eating or drinking for 8 hours before the exam. You may take medicine with a small sip  of water.  Children: - Children 6+ years: No food or drink for 6 hours before exam. - Children 1-5 years: No food or drink for 4 hours before exam. - Infants, breast-fed: may have breast milk up to 2 hours before exam. - Infants, formula: may have bottle until 4 hours before exam.  Please call the Imaging Department at your exam site with any questions.            May 17, 2018 10:15 AM CDT   (Arrive by 10:00 AM)   Return General Liver with Roberto Gordon PA-C   [facility] ([facility])    [address] [place] [address]  [address]  [facility] [address] [phone]              Future tests that were ordered for you today     Open Future Orders        Priority Expected Expires Ordered    MRI Brain w/o contrast Routine  2019            Who to contact     Please call your clinic at [phone] to:    Ask questions about your health    Make or cancel appointments    Discuss your medicines    Learn about your test results    Speak to your doctor            Additional Information About Your Visit        [employer] Information     [employer] is an electronic gateway that provides easy, online access to your medical records. With [employer], you can request a clinic appointment, read your test results, renew a prescription or communicate with your care team.     To sign up for [employer] visit the website at [email or web address]   You will be asked to enter the access code listed below, as well as some personal information. Please follow the directions to create your username and password.     Your access code is: [email or web address]  Expires: 2018  7:30 AM     Your access code will  in 90 days. If you need help or a new code, please contact your [facility] or call [phone] for assistance.        Care EveryWhere ID     This is your Care EveryWhere ID. This could be used by other organizations to access your [facility]  records  [id number]        Your Vitals Were     Pulse BMI (Body Mass Index)                85 23.17 kg/m2           Blood Pressure from Last 3 Encounters:   04/25/18 103/70   04/25/18 103/70   04/10/18 115/70    Weight from Last 3 Encounters:   04/25/18 135 lb (61.2 kg)   04/25/18 135 lb 14.4 oz (61.6 kg)   03/13/18 139 lb 1.6 oz (63.1 kg)                 Today's Medication Changes          These changes are accurate as of 4/25/18 11:59 PM.  If you have any questions, ask your nurse or doctor.               These medicines have changed or have updated prescriptions.        Dose/Directions    polyethylene glycol 3350 Powd   This may have changed:    - when to take this  - reasons to take this   Used for:  Slow transit constipation        Dose:  17 g   Take 17 g by mouth daily   Quantity:  1530 g   Refills:  3                Primary Care Provider Office Phone # Fax #    Agnieszka Erica Rodriguez [phone]       [address] [place] [address] [address] [place] [address]        Equal Access to Services     [facility]: Hadii lawrence edwards Sohemal, waaxda luqadaha, qaybta kaalmakarolina patel, jaz fowler. So [facility] [phone].    ATENCIÓN: Si habla español, tiene a ibarra disposición servicios gratuitos de asistencia lingüística. [facility] [phone].    We comply with applicable federal civil rights laws and [place] laws. We do not discriminate on the basis of race, color, national origin, age, disability, sex, sexual orientation, or gender identity.            Thank you!     Thank you for choosing [facility]  for your care. Our goal is always to provide you with excellent care. Hearing back from our patients is one way we can continue to improve our services. Please take a few minutes to complete the written survey that you may receive in the mail after your visit with us. Thank you!             Your Updated Medication List - Protect others  around you: Learn how to safely use, store and throw away your medicines at www.disposemymeds.org.          This list is accurate as of 4/25/18 11:59 PM.  Always use your most recent med list.                   Brand Name Dispense Instructions for use Diagnosis    Calcium Acetate (Phos Binder) 667 MG Caps      TAKE 2 CAPSULE BY MOUTH THREE TIMES A DAY WITH MEALS        diclofenac 1 % Gel topical gel    VOLTAREN    100 g    Apply 4 grams to knees or 2 grams to hands four times daily using enclosed dosing card.    Lumbar arthropathy (H)       OCTREOTIDE ACETATE IJ      Inject as directed every 30 days        order for DME     1 Device    Equipment being ordered: 4 wheel walker with seat.    Disorder of bone and cartilage, Arthralgia, unspecified joint, Tendency to fall       polyethylene glycol 3350 Powd     1530 g    Take 17 g by mouth daily    Slow transit constipation       PRORENAL + D Tabs      Take 1 tablet by mouth daily        sertraline 50 MG tablet    ZOLOFT    180 tablet    Take 2 tablets (100 mg) by mouth daily    Itching       * VENTOLIN  (90 Base) MCG/ACT Inhaler   Generic drug:  albuterol      INHALE 2 PUFFS BY MOUTH EVERY 4 HOURS AS NEEDED FOR WHEEZING OR SHORTNESS OF BREATH        * albuterol 108 (90 Base) MCG/ACT Inhaler    PROAIR HFA    1 Inhaler    Inhale 2 puffs into the lungs every 6 hours    Cough       * Notice:  This list has 2 medication(s) that are the same as other medications prescribed for you. Read the directions carefully, and ask your doctor or other care provider to review them with you.

## 2018-04-25 NOTE — MR AVS SNAPSHOT
After Visit Summary   4/25/2018    Rachele Martínez    MRN: [id number]           Patient Information     Date Of Birth          1940        Visit Information        Provider Department      4/25/2018 3:00 PM Agnieszka Rodriguez MD [facility]        Today's Diagnoses     Cough    -  1    Memory loss          Care Instructions    Primary Care Center Phone Number [phone]  Primary Care Center Medication Refill Request Information:  * Please contact your pharmacy regarding ANY request for medication refills.  ** [facility] Prescription Fax = [phone]  * Please allow 3 business days for routine medication refills.  * Please allow 5 business days for controlled substance medication refills.     Primary Care Center Test Result notification information:  *You will be notified with in 7-10 days of your appointment day regarding the results of your test.  If you are on MyChart you will be notified as soon as the provider has reviewed the results and signed off on them.                  Follow-ups after your visit        Your next 10 appointments already scheduled     May 08, 2018 11:30 AM CDT   Level O with [address] ([facility])    [address] [place] [address] [phone]            May 12, 2018 10:00 AM CDT   (Arrive by 9:45 AM)   MR BRAIN W/O CONTRAST with [address] [facility] [place] [facility] ([facility])    [address] [address] [phone]           Take your medicines as usual, unless your doctor tells you not to. Bring a list of your current medicines to your exam (including vitamins, minerals and over-the-counter drugs). Also bring the results of similar scans you may have had.  Please remove any body piercings and hair extensions before you arrive.  Follow your doctor s orders. If you do not, we may have to postpone your exam.  You may  or may not receive IV contrast for this exam pending the discretion of the Radiologist.  You do not need to do anything special to prepare.  The MRI machine uses a strong magnet. Please wear clothes without metal (snaps, zippers). A sweatsuit works well, or we may give you a hospital gown.   **IMPORTANT** THE INSTRUCTIONS BELOW ARE ONLY FOR THOSE PATIENTS WHO HAVE BEEN PRESCRIBED SEDATION OR GENERAL ANESTHESIA DURING THEIR MRI PROCEDURE:  IF YOUR DOCTOR PRESCRIBED ORAL SEDATION (take medicine to help you relax during your exam):   You must get the medicine from your doctor (oral medication) before you arrive. Bring the medicine to the exam. Do not take it at home. You ll be told when to take it upon arriving for your exam.   Arrive one hour early. Bring someone who can take you home after the test. Your medicine will make you sleepy. After the exam, you may not drive, take a bus or take a taxi by yourself.  IF YOUR DOCTOR PRESCRIBED IV SEDATION:   Arrive one hour early. Bring someone who can take you home after the test. Your medicine will make you sleepy. After the exam, you may not drive, take a bus or take a taxi by yourself.   No eating 6 hours before your exam. You may have clear liquids up until 4 hours before your exam. (Clear liquids include water, clear tea, black coffee and fruit juice without pulp.)  IF YOUR DOCTOR PRESCRIBED ANESTHESIA (be asleep for your exam):   Arrive 1 1/2 hours early. Bring someone who can take you home after the test. You may not drive, take a bus or take a taxi by yourself.   No eating 8 hours before your exam. You may have clear liquids up until 4 hours before your exam. (Clear liquids include water, clear tea, black coffee and fruit juice without pulp.)   You will spend four to five hours in the recovery room.  Please call the Imaging Department at your exam site with any questions.            May 17, 2018  9:00 AM CDT   Lab with [facility] ([facility])    [address] [facility]  1st Floor  [facility] [id number] [phone]            May 17, 2018  9:15 AM CDT   US ABDOMEN COMPLETE with [id number]   [facility] US ([facility])    [address] [facility]  1st Floor  [facility] [id number] [phone]           Please bring a list of your medicines (including vitamins, minerals and over-the-counter drugs). Also, tell your doctor about any allergies you may have. Wear comfortable clothes and leave your valuables at home.  Adults: No eating or drinking for 8 hours before the exam. You may take medicine with a small sip of water.  Children: - Children 6+ years: No food or drink for 6 hours before exam. - Children 1-5 years: No food or drink for 4 hours before exam. - Infants, breast-fed: may have breast milk up to 2 hours before exam. - Infants, formula: may have bottle until 4 hours before exam.  Please call the Imaging Department at your exam site with any questions.            May 17, 2018 10:15 AM CDT   (Arrive by 10:00 AM)   Return General Liver with Roberto Gordon PA-C   [facility] ([facility])    [address] [facility]  [address]  [facility] [id number] [phone]              Future tests that were ordered for you today     Open Future Orders        Priority Expected Expires Ordered    XR Chest 2 Views Routine 4/25/2018 4/25/2019 4/25/2018    MRI Brain w/o contrast Routine  4/25/2019 4/25/2018            Who to contact     Please call your clinic at [phone] to:    Ask questions about your health    Make or cancel appointments    Discuss your medicines    Learn about your test results    Speak to your doctor            Additional Information About Your Visit        [employer] Information     [employer] is an electronic gateway that provides easy, online access to your medical records. With [employer], you can request a clinic appointment, read your test results,  renew a prescription or communicate with your care team.     To sign up for [employer] visit the website at [email or web address]   You will be asked to enter the access code listed below, as well as some personal information. Please follow the directions to create your username and password.     Your access code is: [email or web address]  Expires: 2018  7:30 AM     Your access code will  in 90 days. If you need help or a new code, please contact your [facility] or call [phone] for assistance.        Care EveryWhere ID     This is your Care EveryWhere ID. This could be used by other organizations to access your [facility] medical records  [id number]        Your Vitals Were     Pulse Respirations Breastfeeding? BMI (Body Mass Index)          85 16 No 23.33 kg/m2         Blood Pressure from Last 3 Encounters:   18 103/70   18 103/70   04/10/18 115/70    Weight from Last 3 Encounters:   18 61.2 kg (135 lb)   18 61.6 kg (135 lb 14.4 oz)   18 63.1 kg (139 lb 1.6 oz)                 Today's Medication Changes          These changes are accurate as of 18  4:22 PM.  If you have any questions, ask your nurse or doctor.               These medicines have changed or have updated prescriptions.        Dose/Directions    polyethylene glycol 3350 Powd   This may have changed:    - when to take this  - reasons to take this   Used for:  Slow transit constipation        Dose:  17 g   Take 17 g by mouth daily   Quantity:  1530 g   Refills:  3                Primary Care Provider Office Phone # Fax #    Agnieszkamena Rodriguez [phone]       [address] [place] [address] [address] [place] [address]        Equal Access to Services     JAMIE CARVAJAL : Crow Degroot, soy weiss, jaz ramirez. So [facility] [phone].    ATENCIÓN: Si habla español, tiene a ibarra disposición servicios  lawrence de asistencia lingüística. Bettye werner [phone].    We comply with applicable federal civil rights laws and [place] laws. We do not discriminate on the basis of race, color, national origin, age, disability, sex, sexual orientation, or gender identity.            Thank you!     Thank you for choosing [facility]  for your care. Our goal is always to provide you with excellent care. Hearing back from our patients is one way we can continue to improve our services. Please take a few minutes to complete the written survey that you may receive in the mail after your visit with us. Thank you!             Your Updated Medication List - Protect others around you: Learn how to safely use, store and throw away your medicines at www.disposemymeds.org.          This list is accurate as of 4/25/18  4:22 PM.  Always use your most recent med list.                   Brand Name Dispense Instructions for use Diagnosis    Calcium Acetate (Phos Binder) 667 MG Caps      TAKE 2 CAPSULE BY MOUTH THREE TIMES A DAY WITH MEALS        diclofenac 1 % Gel topical gel    VOLTAREN    100 g    Apply 4 grams to knees or 2 grams to hands four times daily using enclosed dosing card.    Lumbar arthropathy (H)       OCTREOTIDE ACETATE IJ      Inject as directed every 30 days        order for DME     1 Device    Equipment being ordered: 4 wheel walker with seat.    Disorder of bone and cartilage, Arthralgia, unspecified joint, Tendency to fall       polyethylene glycol 3350 Powd     1530 g    Take 17 g by mouth daily    Slow transit constipation       PRORENAL + D Tabs      Take 1 tablet by mouth daily        sertraline 50 MG tablet    ZOLOFT    180 tablet    Take 2 tablets (100 mg) by mouth daily    Itching       * VENTOLIN  (90 Base) MCG/ACT Inhaler   Generic drug:  albuterol      INHALE 2 PUFFS BY MOUTH EVERY 4 HOURS AS NEEDED FOR WHEEZING OR SHORTNESS OF BREATH        * albuterol 108 (90 Base) MCG/ACT Inhaler     PROAIR HFA    1 Inhaler    Inhale 2 puffs into the lungs every 6 hours    Cough       * Notice:  This list has 2 medication(s) that are the same as other medications prescribed for you. Read the directions carefully, and ask your doctor or other care provider to review them with you.

## 2018-04-25 NOTE — PATIENT INSTRUCTIONS
Primary Care Center Phone Number [phone]  Primary Care Center Medication Refill Request Information:  * Please contact your pharmacy regarding ANY request for medication refills.  ** [facility] Prescription Fax = [phone]  * Please allow 3 business days for routine medication refills.  * Please allow 5 business days for controlled substance medication refills.     Primary Care Center Test Result notification information:  *You will be notified with in 7-10 days of your appointment day regarding the results of your test.  If you are on MyChart you will be notified as soon as the provider has reviewed the results and signed off on them.

## 2018-04-25 NOTE — NURSING NOTE
Chief Complaint   Patient presents with     Results     pt is here to review echo results      Finger     pt is here to discuss finger issues/pain       Cecily Calderon CMA at 2:50 PM on 4/25/2018

## 2018-05-04 NOTE — PROGRESS NOTES
Chief Complaint   Rachele Martínez is a 78 year old female presents for   Chief Complaint   Patient presents with     Results     pt is here to review echo results      Finger     pt is here to discuss finger issues/pain      SUBJECTIVE:  Here for f/u  Dizziness is ongoing, although perhaps not as severe.  She remains off the ACEi.  Has ongoing dry cough.    Ongoing concern for memory.  More trouble with recent past.  One episode where she was standing in front of the sink and she couldn't remember how to turn off the water.      Also trouble with finger.  She is unable to fully straighten it.  No pain.  No trauma.    Medications and allergies were reviewed by me today.     Social History   History   Smoking Status     Former Smoker     Packs/day: 2.00     Years: 45.00     Types: Cigarettes     Quit date: 11/23/2002   Smokeless Tobacco     Never Used      PHQ-2 ( 1999 [employer]) 8/9/2016 6/21/2016   Q1: Little interest or pleasure in doing things 0 0   Q2: Feeling down, depressed or hopeless 0 0   PHQ-2 Score 0 0     No flowsheet data found.    Past Medical History   Patient Active Problem List   Diagnosis     Health Care Home     Cataract     Anxiety state     Insomnia     Hyperlipidemia with target LDL less than 130     History of hepatitis C     ACP (advance care planning)     Iron deficiency anemia due to chronic blood loss     AVM (arteriovenous malformation) of small bowel, acquired (H)     ESRD (end stage renal disease) on dialysis (H)     Cirrhosis of liver without ascites, unspecified hepatic cirrhosis type (H)     Tendency to fall     Hypertension goal BP (blood pressure) < 140/80     Diarrhea     Angiodysplasia of stomach and duodenum with hemorrhage       Review of Systems   5 point ROS completed and negative except noted above, including Gen, CV, Resp, GI, MS    Physical Exam   /70  Pulse 85  Resp 16  Wt 61.6 kg (135 lb 14.4 oz)  Breastfeeding? No  BMI 23.33 kg/m2  Gen: no distress,  comfortable, pleasant   Eyes: anicteric, normal extra-ocular movements   Neuro: MOCA performed, 16/30 (scanned in)  MSK: unable to fully extend finger  Skin: no concerning lesions, no jaundice   Psychological: appropriate mood       Assessment & Plan      Cough  - XR Chest 2 Views    Memory loss  Low score on MOCA.  Will get MRI.  Concern for vascular dementia versus alzheimers (has strong family hx)  - MRI Brain w/o contrast    Finger: will have her see ortho UC      RTC: 2-3 mo    Agnieszka Rodriguez MD

## 2018-05-08 ENCOUNTER — INFUSION THERAPY VISIT (OUTPATIENT)
Dept: INFUSION THERAPY | Facility: CLINIC | Age: 78
End: 2018-05-08
Payer: MEDICARE

## 2018-05-08 VITALS
RESPIRATION RATE: 20 BRPM | SYSTOLIC BLOOD PRESSURE: 125 MMHG | TEMPERATURE: 97.7 F | DIASTOLIC BLOOD PRESSURE: 75 MMHG | HEART RATE: 69 BPM | OXYGEN SATURATION: 99 %

## 2018-05-08 DIAGNOSIS — R19.7 DIARRHEA, UNSPECIFIED TYPE: Primary | ICD-10-CM

## 2018-05-08 DIAGNOSIS — K31.811 ANGIODYSPLASIA OF STOMACH AND DUODENUM WITH HEMORRHAGE: ICD-10-CM

## 2018-05-08 DIAGNOSIS — K55.20 AVM (ARTERIOVENOUS MALFORMATION) OF SMALL BOWEL, ACQUIRED: ICD-10-CM

## 2018-05-08 DIAGNOSIS — D50.0 IRON DEFICIENCY ANEMIA DUE TO CHRONIC BLOOD LOSS: ICD-10-CM

## 2018-05-08 PROCEDURE — 96372 THER/PROPH/DIAG INJ SC/IM: CPT | Performed by: INTERNAL MEDICINE

## 2018-05-08 PROCEDURE — 99207 ZZC NO CHARGE LOS: CPT

## 2018-05-08 RX ADMIN — OCTREOTIDE ACETATE 20 MG: KIT INTRAMUSCULAR at 12:04

## 2018-05-08 ASSESSMENT — PAIN SCALES - GENERAL: PAINLEVEL: NO PAIN (0)

## 2018-05-08 NOTE — MR AVS SNAPSHOT
After Visit Summary   5/8/2018    Rachele Martínez    MRN: [id number]           Patient Information     Date Of Birth          1940        Visit Information        Provider Department      5/8/2018 11:30 AM [address]        Today's Diagnoses     Diarrhea, unspecified type    -  1    Angiodysplasia of stomach and duodenum with hemorrhage        AVM (arteriovenous malformation) of small bowel, acquired (H)        Iron deficiency anemia due to chronic blood loss           Follow-ups after your visit        Your next 10 appointments already scheduled     May 12, 2018 10:00 AM CDT   MR BRAIN W/O CONTRAST with UCMR1   [facility] MRI ([facility])    [address]  1st Floor  [facility] [address] [phone]           Take your medicines as usual, unless your doctor tells you not to. Bring a list of your current medicines to your exam (including vitamins, minerals and over-the-counter drugs). Also bring the results of similar scans you may have had.  Please remove any body piercings and hair extensions before you arrive.  Follow your doctor s orders. If you do not, we may have to postpone your exam.  You may or may not receive IV contrast for this exam pending the discretion of the Radiologist.  You do not need to do anything special to prepare.  The MRI machine uses a strong magnet. Please wear clothes without metal (snaps, zippers). A sweatsuit works well, or we may give you a hospital gown.   **IMPORTANT** THE INSTRUCTIONS BELOW ARE ONLY FOR THOSE PATIENTS WHO HAVE BEEN PRESCRIBED SEDATION OR GENERAL ANESTHESIA DURING THEIR MRI PROCEDURE:  IF YOUR DOCTOR PRESCRIBED ORAL SEDATION (take medicine to help you relax during your exam):   You must get the medicine from your doctor (oral medication) before you arrive. Bring the medicine to the exam. Do not take it at home. You ll be told when to take it upon arriving for  your exam.   Arrive one hour early. Bring someone who can take you home after the test. Your medicine will make you sleepy. After the exam, you may not drive, take a bus or take a taxi by yourself.  IF YOUR DOCTOR PRESCRIBED IV SEDATION:   Arrive one hour early. Bring someone who can take you home after the test. Your medicine will make you sleepy. After the exam, you may not drive, take a bus or take a taxi by yourself.   No eating 6 hours before your exam. You may have clear liquids up until 4 hours before your exam. (Clear liquids include water, clear tea, black coffee and fruit juice without pulp.)  IF YOUR DOCTOR PRESCRIBED ANESTHESIA (be asleep for your exam):   Arrive 1 1/2 hours early. Bring someone who can take you home after the test. You may not drive, take a bus or take a taxi by yourself.   No eating 8 hours before your exam. You may have clear liquids up until 4 hours before your exam. (Clear liquids include water, clear tea, black coffee and fruit juice without pulp.)   You will spend four to five hours in the recovery room.  Please call the Imaging Department at your exam site with any questions.            May 17, 2018  9:00 AM CDT   Lab with  LAB   [facility] ([facility])    [address] [place] [address] [phone]            May 17, 2018  9:15 AM CDT   US ABDOMEN COMPLETE with US1   [facility] US ([facility])    [address] [place] [id number] [phone]           Please bring a list of your medicines (including vitamins, minerals and over-the-counter drugs). Also, tell your doctor about any allergies you may have. Wear comfortable clothes and leave your valuables at home.  Adults: No eating or drinking for 8 hours before the exam. You may take medicine with a small sip of water.  Children: - Children 6+ years: No food or drink for 6 hours before exam. - Children 1-5  years: No food or drink for 4 hours before exam. - Infants, breast-fed: may have breast milk up to 2 hours before exam. - Infants, formula: may have bottle until 4 hours before exam.  Please call the Imaging Department at your exam site with any questions.            May 17, 2018 10:15 AM CDT   (Arrive by 10:00 AM)   Return General Liver with Roberto Gordon PA-C   [facility]at[facility] ([facility])    [address]  [address]  [facility] [address] [phone]            Jun 05, 2018 11:30 AM CDT   Level O with [place] [facility] ([facility])    [address] [address] [place] [address] [phone]              Who to contact     If you have questions or need follow up information about today's clinic visit or your schedule please contact [facility] directly at [phone].  Normal or non-critical lab and imaging results will be communicated to you by NatureBoxhart, letter or phone within 4 business days after the clinic has received the results. If you do not hear from us within 7 days, please contact the clinic through [employer] or phone. If you have a critical or abnormal lab result, we will notify you by phone as soon as possible.  Submit refill requests through FlipKey or call your pharmacy and they will forward the refill request to us. Please allow 3 business days for your refill to be completed.          Additional Information About Your Visit        FlipKey Information     FlipKey is an electronic gateway that provides easy, online access to your medical records. With FlipKey, you can request a clinic appointment, read your test results, renew a prescription or communicate with your care team.     To sign up for FlipKey visit the website at [email or web address]   You will be asked to enter the access code listed below, as well as some personal information. Please follow the directions to  create your username and password.     Your access code is: [email or web address]  Expires: 2018  7:30 AM     Your access code will  in 90 days. If you need help or a new code, please contact your [facility] or call [phone] for assistance.        Care EveryWhere ID     This is your Care EveryWhere ID. This could be used by other organizations to access your [facility] medical records  [id number]        Your Vitals Were     Pulse Temperature Respirations Pulse Oximetry          69 97.7  F (36.5  C) (Oral) 20 99%         Blood Pressure from Last 3 Encounters:   18 125/75   18 103/70   18 103/70    Weight from Last 3 Encounters:   18 61.2 kg (135 lb)   18 61.6 kg (135 lb 14.4 oz)   18 63.1 kg (139 lb 1.6 oz)              Today, you had the following     No orders found for display         Today's Medication Changes          These changes are accurate as of 18 11:59 PM.  If you have any questions, ask your nurse or doctor.               Stop taking these medicines if you haven't already. Please contact your care team if you have questions.     diclofenac 1 % Gel topical gel   Commonly known as:  VOLTAREN                    Primary Care Provider Office Phone # Fax #    Agnieszka Erica Rodriguez [phone]       [address] [place] [address] [address] [place] [address]        Equal Access to Services     CLARISA [facility] HospitalNATHALY : Hadii lawrence sellerso Sohemal, waaxda luqadaha, qaybta kaalmairina sancheznatalie celestine peres . So [facility] [phone].    ATENCIÓN: Si habla español, tiene a ibarra disposición servicios gratuitos de asistencia lingüística. Llame al [phone].    We comply with applicable federal civil rights laws and [place] laws. We do not discriminate on the basis of race, color, national origin, age, disability, sex, sexual orientation, or gender identity.            Thank you!     Thank you for choosing  HEALTH  [facility]  for your care. Our goal is always to provide you with excellent care. Hearing back from our patients is one way we can continue to improve our services. Please take a few minutes to complete the written survey that you may receive in the mail after your visit with us. Thank you!             Your Updated Medication List - Protect others around you: Learn how to safely use, store and throw away your medicines at www.disposemymeds.org.          This list is accurate as of 5/8/18 11:59 PM.  Always use your most recent med list.                   Brand Name Dispense Instructions for use Diagnosis    Calcium Acetate (Phos Binder) 667 MG Caps      TAKE 2 CAPSULE BY MOUTH THREE TIMES A DAY WITH MEALS        OCTREOTIDE ACETATE IJ      Inject as directed every 30 days        order for DME     1 Device    Equipment being ordered: 4 wheel walker with seat.    Disorder of bone and cartilage, Arthralgia, unspecified joint, Tendency to fall       polyethylene glycol 3350 Powd     1530 g    Take 17 g by mouth daily    Slow transit constipation       PRORENAL + D Tabs      Take 1 tablet by mouth daily        sertraline 50 MG tablet    ZOLOFT    180 tablet    Take 2 tablets (100 mg) by mouth daily    Itching       * VENTOLIN  (90 Base) MCG/ACT Inhaler   Generic drug:  albuterol      INHALE 2 PUFFS BY MOUTH EVERY 4 HOURS AS NEEDED FOR WHEEZING OR SHORTNESS OF BREATH        * albuterol 108 (90 Base) MCG/ACT Inhaler    PROAIR HFA    1 Inhaler    Inhale 2 puffs into the lungs every 6 hours    Cough       * Notice:  This list has 2 medication(s) that are the same as other medications prescribed for you. Read the directions carefully, and ask your doctor or other care provider to review them with you.

## 2018-05-08 NOTE — PROGRESS NOTES
Infusion Nursing Note:  Rachele Martínez presents today for Sandostatin.    Patient seen by provider today: No   present during visit today: Not Applicable.    Note: N/A.    Intravenous Access:  No Intravenous access/labs at this visit.    Treatment Conditions:  Not Applicable.      Post Infusion Assessment:  Patient tolerated injection without incident.    Discharge Plan:   Verified upcoming monthly appt with patient.    ADAM GUO RN

## 2018-05-12 ENCOUNTER — RADIANT APPOINTMENT (OUTPATIENT)
Dept: MRI IMAGING | Facility: CLINIC | Age: 78
End: 2018-05-12
Attending: INTERNAL MEDICINE
Payer: MEDICARE

## 2018-05-12 DIAGNOSIS — R41.3 MEMORY LOSS: ICD-10-CM

## 2018-05-17 ENCOUNTER — RADIANT APPOINTMENT (OUTPATIENT)
Dept: ULTRASOUND IMAGING | Facility: CLINIC | Age: 78
End: 2018-05-17
Attending: INTERNAL MEDICINE
Payer: MEDICARE

## 2018-05-17 ENCOUNTER — OFFICE VISIT (OUTPATIENT)
Dept: GASTROENTEROLOGY | Facility: CLINIC | Age: 78
End: 2018-05-17
Attending: PHYSICIAN ASSISTANT
Payer: MEDICARE

## 2018-05-17 VITALS
BODY MASS INDEX: 23.42 KG/M2 | DIASTOLIC BLOOD PRESSURE: 78 MMHG | WEIGHT: 137.2 LBS | OXYGEN SATURATION: 99 % | HEIGHT: 64 IN | TEMPERATURE: 98.5 F | SYSTOLIC BLOOD PRESSURE: 137 MMHG | HEART RATE: 69 BPM

## 2018-05-17 DIAGNOSIS — K74.60 CIRRHOSIS OF LIVER WITHOUT ASCITES, UNSPECIFIED HEPATIC CIRRHOSIS TYPE (H): Primary | ICD-10-CM

## 2018-05-17 DIAGNOSIS — K74.60 CIRRHOSIS OF LIVER WITHOUT ASCITES, UNSPECIFIED HEPATIC CIRRHOSIS TYPE (H): ICD-10-CM

## 2018-05-17 LAB
AFP SERPL-MCNC: 6.3 UG/L (ref 0–8)
ALBUMIN SERPL-MCNC: 3.9 G/DL (ref 3.4–5)
ALP SERPL-CCNC: 106 U/L (ref 40–150)
ALT SERPL W P-5'-P-CCNC: 18 U/L (ref 0–50)
ANION GAP SERPL CALCULATED.3IONS-SCNC: 8 MMOL/L (ref 3–14)
AST SERPL W P-5'-P-CCNC: 25 U/L (ref 0–45)
BILIRUB DIRECT SERPL-MCNC: 0.1 MG/DL (ref 0–0.2)
BILIRUB SERPL-MCNC: 0.4 MG/DL (ref 0.2–1.3)
BUN SERPL-MCNC: 24 MG/DL (ref 7–30)
CALCIUM SERPL-MCNC: 9.2 MG/DL (ref 8.5–10.1)
CHLORIDE SERPL-SCNC: 98 MMOL/L (ref 94–109)
CO2 SERPL-SCNC: 32 MMOL/L (ref 20–32)
CREAT SERPL-MCNC: 6.31 MG/DL (ref 0.52–1.04)
ERYTHROCYTE [DISTWIDTH] IN BLOOD BY AUTOMATED COUNT: 14.9 % (ref 10–15)
GFR SERPL CREATININE-BSD FRML MDRD: 6 ML/MIN/1.7M2
GLUCOSE SERPL-MCNC: 108 MG/DL (ref 70–99)
HCT VFR BLD AUTO: 35.8 % (ref 35–47)
HGB BLD-MCNC: 11.2 G/DL (ref 11.7–15.7)
INR PPP: 1.03 (ref 0.86–1.14)
MCH RBC QN AUTO: 32.3 PG (ref 26.5–33)
MCHC RBC AUTO-ENTMCNC: 31.3 G/DL (ref 31.5–36.5)
MCV RBC AUTO: 103 FL (ref 78–100)
PLATELET # BLD AUTO: 234 10E9/L (ref 150–450)
POTASSIUM SERPL-SCNC: 3.5 MMOL/L (ref 3.4–5.3)
PROT SERPL-MCNC: 8.2 G/DL (ref 6.8–8.8)
RBC # BLD AUTO: 3.47 10E12/L (ref 3.8–5.2)
SODIUM SERPL-SCNC: 138 MMOL/L (ref 133–144)
WBC # BLD AUTO: 5.6 10E9/L (ref 4–11)

## 2018-05-17 PROCEDURE — 85027 COMPLETE CBC AUTOMATED: CPT | Performed by: INTERNAL MEDICINE

## 2018-05-17 PROCEDURE — G0463 HOSPITAL OUTPT CLINIC VISIT: HCPCS | Mod: ZF

## 2018-05-17 PROCEDURE — 85610 PROTHROMBIN TIME: CPT | Performed by: INTERNAL MEDICINE

## 2018-05-17 PROCEDURE — 36415 COLL VENOUS BLD VENIPUNCTURE: CPT | Performed by: INTERNAL MEDICINE

## 2018-05-17 PROCEDURE — 80048 BASIC METABOLIC PNL TOTAL CA: CPT | Performed by: INTERNAL MEDICINE

## 2018-05-17 PROCEDURE — 80076 HEPATIC FUNCTION PANEL: CPT | Performed by: INTERNAL MEDICINE

## 2018-05-17 PROCEDURE — 82105 ALPHA-FETOPROTEIN SERUM: CPT | Performed by: INTERNAL MEDICINE

## 2018-05-17 ASSESSMENT — PAIN SCALES - GENERAL: PAINLEVEL: NO PAIN (0)

## 2018-05-17 NOTE — LETTER
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 Hepatitis C has been treated with Harvoni ×12 weeks, achieved SVR in 2015. Repeat fibrosis scan 5/2017 showed Stage 4 fibrosis, 19.9 kilopascals. She has normal liver function, normal platelets and normal transaminases.  She has no signs of ascites, hepatic encephalopathy or lower extremity edema. She recently had MRI for recent cognitive changes, she does not have asterixis on exam. Can consider a trial of lactulose in the future, but she doesn't appear to have characteristics of HE at this time. Her ultrasound for HCC screening did not show any hepatic lesions.     - Continue HCC screening with Abdominal U/S in 6 months   - BMP, Hepatic panel, CBC, INR in 6 months  - Follow-up in clinic in 6 months     Roberto Gordon PA-C   [facility] Hepatology clinic    -----------------------------------------------------       HPI:   Rachele Martínez is a 78 year old female with Cirrhosis presenting for follow-up.     Hep C:  - treated with Harvoni x 12, achieved SVR 7/2015  - Fibrosis scan:  Stage 4 fibrosis, 19.9 kilopascals    Cirrhosis:  - Hep C  Complicated by:  - No hx of Ascites  - No hx of GI bleed  - No hx of HE  EGD: last EGD done in 2015, history of AVM's  HCC: 5/17/2018, no liver lesions.     Patient presenting for follow-up of cirrhosis due to hepatitis C.  Patient denies any recent hospitalizations or ER visits. 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 She notes that she is had some cognitive changes recently. She states she had an episode where she is standing at her sink was not sure how to  turn off the water.  She had an MRI ordered recently. She notes a good appetite.  Her dry weight has remained stable.  She states she has 1-2 bowel movements a day. She is sleeping well at night. No daytime drowsiness.      Patient denies jaundice, lower extremity edema, abdominal distension.  Patient also denies melena, hematochezia or hematemesis. Patient denies weight loss, fevers, sweats or chills.    Medical hx Surgical hx   Past Medical History:   Diagnosis Date     Anemia      Anxiety state, unspecified 11/23/2012     Arthritis      Chronic hepatitis C with cirrhosis (H) 12/10/2014     Chronic kidney disease      Clotting disorder (H)      Dialysis patient (H)      Glaucoma      Hypertension      Hypertension goal BP (blood pressure) < 140/80 2/10/2014     Liver failure (H)      Renal failure      Unspecified pruritic disorder 11/23/2012    Past Surgical History:   Procedure Laterality Date     COLONOSCOPY N/A 9/4/2015    Procedure: COMBINED COLONOSCOPY, SINGLE OR MULTIPLE BIOPSY/POLYPECTOMY BY BIOPSY;  Surgeon: Rupesh Lopez MD;  Location:  GI     ESOPHAGOSCOPY, GASTROSCOPY, DUODENOSCOPY (EGD), COMBINED N/A 12/18/2014    Procedure: COMBINED ESOPHAGOSCOPY, GASTROSCOPY, DUODENOSCOPY (EGD);  Surgeon: Betsy Carvajal MD;  Location:  GI     ESOPHAGOSCOPY, GASTROSCOPY, DUODENOSCOPY (EGD), COMBINED N/A 4/25/2015    Procedure: COMBINED ESOPHAGOSCOPY, GASTROSCOPY, DUODENOSCOPY (EGD);  Surgeon: Yosvany Ram MD;  Location:  GI     ESOPHAGOSCOPY, GASTROSCOPY, DUODENOSCOPY (EGD), COMBINED N/A 5/5/2015    Procedure: COMBINED ESOPHAGOSCOPY, GASTROSCOPY, DUODENOSCOPY (EGD);  Surgeon: Mariano Mistry MD;  Location:  GI     HC CAPSULE ENDOSCOPY N/A 9/30/2015    Procedure: CAPSULE/PILL CAM ENDOSCOPY;  Surgeon: Pan Dhaliwal MD;  Location:  GI     HYSTERECTOMY  1980    KYREE     LUMPECTOMY BREAST                   Medications:     Current Outpatient Prescriptions   Medication      "albuterol (PROAIR HFA) 108 (90 BASE) MCG/ACT Inhaler     Calcium Acetate, Phos Binder, 667 MG CAPS     Multiple Vitamins-Minerals (PRORENAL + D) TABS     OCTREOTIDE ACETATE IJ     order for DME     sertraline (ZOLOFT) 50 MG tablet     VENTOLIN  (90 BASE) MCG/ACT Inhaler     polyethylene glycol 3350 POWD     No current facility-administered medications for this visit.             Allergies:     Allergies   Allergen Reactions     Diovan Hct Itching     severe     Dust Mites      Hydrochlorothiazide Itching     Severe       Sulfa Drugs Hives     Spironolactone Nausea            Review of Systems:   10 points ROS was obtained and highlighted in the HPI, otherwise negative.          Physical Exam:   VS:  /78  Pulse 69  Temp 98.5  F (36.9  C) (Oral)  Ht 1.626 m (5' 4\")  Wt 62.2 kg (137 lb 3.2 oz)  SpO2 99%  BMI 23.55 kg/m2      Gen: A&Ox3, NAD, well developed  HEENT: non-icteric   CV: RRR, no overt murmurs  Lung: CTA Bilatererally, no wheezing or crackles.   Lym- no palpable lymphadenopathy  Abd: soft, NT, ND, no organomegaly.   Ext: no edema, intact pulses.   Skin: No rash no palmar erythema, telangiectasias or jaundice  Neuro: grossly intact, no asterixis   Psych: appropriate mood and affects           Data:   Reviewed in person and significant for:    Lab Results   Component Value Date     05/17/2018      Lab Results   Component Value Date    POTASSIUM 3.5 05/17/2018     Lab Results   Component Value Date    CHLORIDE 98 05/17/2018     Lab Results   Component Value Date    CO2 32 05/17/2018     Lab Results   Component Value Date    BUN 24 05/17/2018     Lab Results   Component Value Date    CR 6.31 05/17/2018       Lab Results   Component Value Date    WBC 5.6 05/17/2018     Lab Results   Component Value Date    HGB 11.2 05/17/2018     Lab Results   Component Value Date    HCT 35.8 05/17/2018     Lab Results   Component Value Date     05/17/2018     Lab Results   Component Value Date    "  05/17/2018       Lab Results   Component Value Date    AST 25 05/17/2018     Lab Results   Component Value Date    ALT 18 05/17/2018     No results found for: BILICONJ   Lab Results   Component Value Date    BILITOTAL 0.4 05/17/2018       Lab Results   Component Value Date    ALBUMIN 3.9 05/17/2018     Lab Results   Component Value Date    PROTTOTAL 8.2 05/17/2018      Lab Results   Component Value Date    ALKPHOS 106 05/17/2018       Lab Results   Component Value Date    INR 1.03 05/17/2018       Roberto Gordon PA-C

## 2018-05-17 NOTE — PROGRESS NOTES
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 Hepatitis C has been treated with Harvoni ×12 weeks, achieved SVR in 2015. Repeat fibrosis scan 5/2017 showed Stage 4 fibrosis, 19.9 kilopascals. She has normal liver function, normal platelets and normal transaminases.  She has no signs of ascites, hepatic encephalopathy or lower extremity edema. She recently had MRI for recent cognitive changes, she does not have asterixis on exam. Can consider a trial of lactulose in the future, but she doesn't appear to have characteristics of HE at this time. Her ultrasound for HCC screening did not show any hepatic lesions.     - Continue HCC screening with Abdominal U/S in 6 months   - BMP, Hepatic panel, CBC, INR in 6 months  - Follow-up in clinic in 6 months     Roberto Gordon PA-C   [facility] Hepatology clinic    -----------------------------------------------------       HPI:   Rachele Martínez is a 78 year old female with Cirrhosis presenting for follow-up.     Hep C:  - treated with Harvoni x 12, achieved SVR 7/2015  - Fibrosis scan:  Stage 4 fibrosis, 19.9 kilopascals    Cirrhosis:  - Hep C  Complicated by:  - No hx of Ascites  - No hx of GI bleed  - No hx of HE  EGD: last EGD done in 2015, history of AVM's  HCC: 5/17/2018, no liver lesions.     Patient presenting for follow-up of cirrhosis due to hepatitis C.  Patient denies any recent hospitalizations or ER visits. 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 She notes that she is had some cognitive changes recently. She states she had an episode where she is standing at her sink was not sure how to turn off the water.  She had an MRI ordered recently. She notes a good appetite.  Her dry weight  has remained stable.  She states she has 1-2 bowel movements a day. She is sleeping well at night. No daytime drowsiness.      Patient denies jaundice, lower extremity edema, abdominal distension.  Patient also denies melena, hematochezia or hematemesis. Patient denies weight loss, fevers, sweats or chills.    Medical hx Surgical hx   Past Medical History:   Diagnosis Date     Anemia      Anxiety state, unspecified 11/23/2012     Arthritis      Chronic hepatitis C with cirrhosis (H) 12/10/2014     Chronic kidney disease      Clotting disorder (H)      Dialysis patient (H)      Glaucoma      Hypertension      Hypertension goal BP (blood pressure) < 140/80 2/10/2014     Liver failure (H)      Renal failure      Unspecified pruritic disorder 11/23/2012    Past Surgical History:   Procedure Laterality Date     COLONOSCOPY N/A 9/4/2015    Procedure: COMBINED COLONOSCOPY, SINGLE OR MULTIPLE BIOPSY/POLYPECTOMY BY BIOPSY;  Surgeon: Rupesh Lopez MD;  Location:  GI     ESOPHAGOSCOPY, GASTROSCOPY, DUODENOSCOPY (EGD), COMBINED N/A 12/18/2014    Procedure: COMBINED ESOPHAGOSCOPY, GASTROSCOPY, DUODENOSCOPY (EGD);  Surgeon: Betsy Carvajal MD;  Location:  GI     ESOPHAGOSCOPY, GASTROSCOPY, DUODENOSCOPY (EGD), COMBINED N/A 4/25/2015    Procedure: COMBINED ESOPHAGOSCOPY, GASTROSCOPY, DUODENOSCOPY (EGD);  Surgeon: Yosvany Ram MD;  Location:  GI     ESOPHAGOSCOPY, GASTROSCOPY, DUODENOSCOPY (EGD), COMBINED N/A 5/5/2015    Procedure: COMBINED ESOPHAGOSCOPY, GASTROSCOPY, DUODENOSCOPY (EGD);  Surgeon: Mariano Mistry MD;  Location:  GI     HC CAPSULE ENDOSCOPY N/A 9/30/2015    Procedure: CAPSULE/PILL CAM ENDOSCOPY;  Surgeon: Pan Dhaliwal MD;  Location:  GI     HYSTERECTOMY  1980    KYREE     LUMPECTOMY BREAST                   Medications:     Current Outpatient Prescriptions   Medication     albuterol (PROAIR HFA) 108 (90 BASE) MCG/ACT Inhaler     Calcium Acetate, Phos Binder, 667 MG CAPS  "    Multiple Vitamins-Minerals (PRORENAL + D) TABS     OCTREOTIDE ACETATE IJ     order for DME     sertraline (ZOLOFT) 50 MG tablet     VENTOLIN  (90 BASE) MCG/ACT Inhaler     polyethylene glycol 3350 POWD     No current facility-administered medications for this visit.             Allergies:     Allergies   Allergen Reactions     Diovan Hct Itching     severe     Dust Mites      Hydrochlorothiazide Itching     Severe       Sulfa Drugs Hives     Spironolactone Nausea            Review of Systems:   10 points ROS was obtained and highlighted in the HPI, otherwise negative.          Physical Exam:   VS:  /78  Pulse 69  Temp 98.5  F (36.9  C) (Oral)  Ht 1.626 m (5' 4\")  Wt 62.2 kg (137 lb 3.2 oz)  SpO2 99%  BMI 23.55 kg/m2      Gen: A&Ox3, NAD, well developed  HEENT: non-icteric   CV: RRR, no overt murmurs  Lung: CTA Bilatererally, no wheezing or crackles.   Lym- no palpable lymphadenopathy  Abd: soft, NT, ND, no organomegaly.   Ext: no edema, intact pulses.   Skin: No rash no palmar erythema, telangiectasias or jaundice  Neuro: grossly intact, no asterixis   Psych: appropriate mood and affects           Data:   Reviewed in person and significant for:    Lab Results   Component Value Date     05/17/2018      Lab Results   Component Value Date    POTASSIUM 3.5 05/17/2018     Lab Results   Component Value Date    CHLORIDE 98 05/17/2018     Lab Results   Component Value Date    CO2 32 05/17/2018     Lab Results   Component Value Date    BUN 24 05/17/2018     Lab Results   Component Value Date    CR 6.31 05/17/2018       Lab Results   Component Value Date    WBC 5.6 05/17/2018     Lab Results   Component Value Date    HGB 11.2 05/17/2018     Lab Results   Component Value Date    HCT 35.8 05/17/2018     Lab Results   Component Value Date     05/17/2018     Lab Results   Component Value Date     05/17/2018       Lab Results   Component Value Date    AST 25 05/17/2018     Lab Results "   Component Value Date    ALT 18 05/17/2018     No results found for: BILICONJ   Lab Results   Component Value Date    BILITOTAL 0.4 05/17/2018       Lab Results   Component Value Date    ALBUMIN 3.9 05/17/2018     Lab Results   Component Value Date    PROTTOTAL 8.2 05/17/2018      Lab Results   Component Value Date    ALKPHOS 106 05/17/2018       Lab Results   Component Value Date    INR 1.03 05/17/2018

## 2018-05-17 NOTE — MR AVS SNAPSHOT
After Visit Summary   5/17/2018    Rachele Martínez    MRN: [id number]           Patient Information     Date Of Birth          1940        Visit Information        Provider Department      5/17/2018 10:15 AM Roberto Gordon PA-C [facility] Hepatology        Today's Diagnoses     Cirrhosis of liver without ascites, unspecified hepatic cirrhosis type (H)    -  1       Follow-ups after your visit        Follow-up notes from your care team     Return in about 6 months (around 11/17/2018).      Your next 10 appointments already scheduled     Jun 05, 2018 11:30 AM CDT   Level O with [address] ([facility])    [address] [place] [address] [phone]            Nov 15, 2018  9:00 AM CST   Lab with  LAB   [facility] ([facility])    [address] [place] [address] [phone]            Nov 15, 2018  9:15 AM CST   US ABDOMEN COMPLETE with UCUS1   [facility] US ([facility])    [address] [place] [address] [phone]           Please bring a list of your medicines (including vitamins, minerals and over-the-counter drugs). Also, tell your doctor about any allergies you may have. Wear comfortable clothes and leave your valuables at home.  Adults: No eating or drinking for 8 hours before the exam. You may take medicine with a small sip of water.  Children: - Children 6+ years: No food or drink for 6 hours before exam. - Children 1-5 years: No food or drink for 4 hours before exam. - Infants, breast-fed: may have breast milk up to 2 hours before exam. - Infants, formula: may have bottle until 4 hours before exam.  Please call the Imaging Department at your exam site with any questions.            Nov 15, 2018 10:15 AM CST   (Arrive by 10:00 AM)   Return General Liver with Roberto Gordon PA-C  "  [facility] ([facility])    [address]  [address]  [facility] [address] [phone]              Future tests that were ordered for you today     Open Standing Orders        Priority Remaining Interval Expires Ordered    US abdomen complete [XZP177] Routine 2/2 Every 6 Months 5/17/2019 5/17/2018          Open Future Orders        Priority Expected Expires Ordered    US Abdomen Complete Routine  5/17/2019 5/17/2018    Fibrosis Scan (In-Clinic) Routine 11/17/2018 5/17/2019 5/17/2018    INR [AFC1553] Routine 11/13/2018 5/17/2019 5/17/2018    Hepatic Panel [LAB20] Routine 11/13/2018 5/17/2019 5/17/2018    Basic metabolic panel [LAB15] Routine 11/13/2018 5/17/2019 5/17/2018    CBC with platelets [EKT030] Routine 11/13/2018 5/17/2019 5/17/2018            Who to contact     If you have questions or need follow up information about today's clinic visit or your schedule please contact [facility] HEPATOLOG[facility] directly at [phone].  Normal or non-critical lab and imaging results will be communicated to you by Xenithhart, letter or phone within 4 business days after the clinic has received the results. If you do not hear from us within 7 days, please contact the clinic through Xenithhart or phone. If you have a critical or abnormal lab result, we will notify you by phone as soon as possible.  Submit refill requests through S[employer] or call your pharmacy and they will forward the refill request to us. Please allow 3 business days for your refill to be completed.          Additional Information About Your Visit        Xenithhart Information     Syncbak lets you send messages to your doctor, view your test results, renew your prescriptions, schedule appointments and more. To sign up, go to [email or web address] . Click on \"Log in\" on the left side of the screen, which will take you to the Welcome page. Then click on \"Sign up Now\" on the right side of the page.     You will be asked to " "enter the access code listed below, as well as some personal information. Please follow the directions to create your username and password.     Your access code is: [email or web address]  Expires: 8/15/2018 11:15 AM     Your access code will  in 90 days. If you need help or a new code, please call your [facility] clinic or [phone].        Care EveryWhere ID     This is your Care EveryWhere ID. This could be used by other organizations to access your [facility] medical records  [id number]        Your Vitals Were     Pulse Temperature Height Pulse Oximetry BMI (Body Mass Index)       69 98.5  F (36.9  C) (Oral) 1.626 m (5' 4\") 99% 23.55 kg/m2        Blood Pressure from Last 3 Encounters:   18 137/78   18 125/75   18 103/70    Weight from Last 3 Encounters:   18 62.2 kg (137 lb 3.2 oz)   18 61.2 kg (135 lb)   18 61.6 kg (135 lb 14.4 oz)              We Performed the Following     Schedule follow up appointments        Primary Care Provider Office Phone # Fax #    Agnieszka Erica Rodriguez [phone]       [address] [place] [address] [address] [place] [address]        Equal Access to Services     JAMIE CARVAJAL : Hadii lawrence ku hadasho Sohemal, waaxda luqadaha, qaybta kaalmada adecarolinayada, jaz fowler. So [facility] [phone].    ATENCIÓN: Si habla español, tiene a ibarra disposición servicios gratuitos de asistencia lingüística. Llame al [phone].    We comply with applicable federal civil rights laws and [place] laws. We do not discriminate on the basis of race, color, national origin, age, disability, sex, sexual orientation, or gender identity.            Thank you!     Thank you for choosing [facility] HEPATOLOG[facility]  for your care. Our goal is always to provide you with excellent care. Hearing back from our patients is one way we can continue to improve our services. Please take a few minutes to complete the written survey that you may receive in the " mail after your visit with us. Thank you!             Your Updated Medication List - Protect others around you: Learn how to safely use, store and throw away your medicines at www.disposemymeds.org.          This list is accurate as of 5/17/18 11:15 AM.  Always use your most recent med list.                   Brand Name Dispense Instructions for use Diagnosis    Calcium Acetate (Phos Binder) 667 MG Caps      TAKE 2 CAPSULE BY MOUTH THREE TIMES A DAY WITH MEALS        OCTREOTIDE ACETATE IJ      Inject as directed every 30 days        order for DME     1 Device    Equipment being ordered: 4 wheel walker with seat.    Disorder of bone and cartilage, Arthralgia, unspecified joint, Tendency to fall       polyethylene glycol 3350 Powd     1530 g    Take 17 g by mouth daily    Slow transit constipation       PRORENAL + D Tabs      Take 1 tablet by mouth daily        sertraline 50 MG tablet    ZOLOFT    180 tablet    Take 2 tablets (100 mg) by mouth daily    Itching       * VENTOLIN  (90 Base) MCG/ACT Inhaler   Generic drug:  albuterol      INHALE 2 PUFFS BY MOUTH EVERY 4 HOURS AS NEEDED FOR WHEEZING OR SHORTNESS OF BREATH        * albuterol 108 (90 Base) MCG/ACT Inhaler    PROAIR HFA    1 Inhaler    Inhale 2 puffs into the lungs every 6 hours    Cough       * Notice:  This list has 2 medication(s) that are the same as other medications prescribed for you. Read the directions carefully, and ask your doctor or other care provider to review them with you.

## 2018-05-17 NOTE — NURSING NOTE
Chief Complaint   Patient presents with     RECHECK     Cirrhosis of liver without ascites     Alcira Mahan MA

## 2018-06-05 ENCOUNTER — ONCOLOGY VISIT (OUTPATIENT)
Dept: ONCOLOGY | Facility: CLINIC | Age: 78
End: 2018-06-05
Payer: MEDICARE

## 2018-06-05 VITALS
OXYGEN SATURATION: 98 % | WEIGHT: 137.7 LBS | TEMPERATURE: 97.6 F | SYSTOLIC BLOOD PRESSURE: 119 MMHG | RESPIRATION RATE: 16 BRPM | HEART RATE: 82 BPM | DIASTOLIC BLOOD PRESSURE: 68 MMHG | BODY MASS INDEX: 23.64 KG/M2

## 2018-06-05 DIAGNOSIS — R19.7 DIARRHEA, UNSPECIFIED TYPE: Primary | ICD-10-CM

## 2018-06-05 DIAGNOSIS — K55.20 AVM (ARTERIOVENOUS MALFORMATION) OF SMALL BOWEL, ACQUIRED: ICD-10-CM

## 2018-06-05 DIAGNOSIS — K31.811 ANGIODYSPLASIA OF STOMACH AND DUODENUM WITH HEMORRHAGE: ICD-10-CM

## 2018-06-05 DIAGNOSIS — D50.0 IRON DEFICIENCY ANEMIA DUE TO CHRONIC BLOOD LOSS: ICD-10-CM

## 2018-06-05 PROCEDURE — 96372 THER/PROPH/DIAG INJ SC/IM: CPT

## 2018-06-05 RX ADMIN — OCTREOTIDE ACETATE 20 MG: KIT INTRAMUSCULAR at 11:38

## 2018-06-05 ASSESSMENT — PAIN SCALES - GENERAL: PAINLEVEL: NO PAIN (0)

## 2018-06-05 NOTE — PROGRESS NOTES
Infusion Nursing Note:   Racehle Martínez presents today for Sandostatin.    Patient seen by provider today: No   present during visit today: Not Applicable.    Note: Last received sandostatin on 5/8/18. Administered today to the right gluteus. Last blood transfusion was 3 months ago. States she can tell her Hgb is lower.    Intravenous Access:  No Intravenous access/labs at this visit.    Treatment Conditions:  Not Applicable.      Post Infusion Assessment:  Patient tolerated injection without incident.  Blood return noted pre and post infusion.  Site patent and intact, free from redness, edema or discomfort.  No evidence of extravasations.  Access discontinued per protocol.    Discharge Plan:   Patient will return in 28 days for next appointment. Sent to scheduling.  Patient discharged in stable condition accompanied by: daughter.  Departure Mode: Ambulatory.    Kathrin Peters RN

## 2018-06-05 NOTE — MR AVS SNAPSHOT
After Visit Summary   6/5/2018    Rachele Martínez    MRN: [id number]           Patient Information     Date Of Birth          1940        Visit Information        Provider Department      6/5/2018 11:30 AM NURSE ONLY CANCER CENTER [facility]        Today's Diagnoses     Diarrhea, unspecified type    -  1    Angiodysplasia of stomach and duodenum with hemorrhage        AVM (arteriovenous malformation) of small bowel, acquired (H)        Iron deficiency anemia due to chronic blood loss           Follow-ups after your visit        Your next 10 appointments already scheduled     Satish 15, 2018  3:00 PM CDT   (Arrive by 2:45 PM)   Return Visit with Agnieszka Rodriguez MD   [facility] ([facility])    [address] [place] [address]  [address] [facility] [id number] [phone]            Jul 03, 2018  2:00 PM CDT   Level O with [address] ([facility])    [address] [place] [id number] [phone]            Nov 15, 2018  9:00 AM CST   Lab with UC LAB   [facility] Lab ([facility])    [address] [place] [id number] [phone]            Nov 15, 2018  9:15 AM CST   US ABDOMEN COMPLETE with UCUS1   [facility] US ([facility])    [address] [place] [id number] [phone]           Please bring a list of your medicines (including vitamins, minerals and over-the-counter drugs). Also, tell your doctor about any allergies you may have. Wear comfortable clothes and leave your valuables at home.  Adults: No eating or drinking for 8 hours before the exam. You may take medicine with a small sip of water.  Children: - Children 6+ years: No food or drink for 6 hours before exam. - Children 1-5 years: No food or drink for 4 hours before exam. - Infants, breast-fed:  may have breast milk up to 2 hours before exam. - Infants, formula: may have bottle until 4 hours before exam.  Please call the Imaging Department at your exam site with any questions.            Nov 15, 2018 10:15 AM CST   (Arrive by 10:00 AM)   Return General Liver with Roberto Gordon PA-C   [facility] ([facility])    [address]  [address]  [facility] [address] [phone]              Who to contact     If you have questions or need follow up information about today's clinic visit or your schedule please contact [facility] directly at [phone].  Normal or non-critical lab and imaging results will be communicated to you by MyChart, letter or phone within 4 business days after the clinic has received the results. If you do not hear from us within 7 days, please contact the clinic through MyChart or phone. If you have a critical or abnormal lab result, we will notify you by phone as soon as possible.  Submit refill requests through [employer] or call your pharmacy and they will forward the refill request to us. Please allow 3 business days for your refill to be completed.          Additional Information About Your Visit        [employer] Information     [employer] is an electronic gateway that provides easy, online access to your medical records. With [employer], you can request a clinic appointment, read your test results, renew a prescription or communicate with your care team.     To sign up for [employer] visit the website at [email or web address]   You will be asked to enter the access code listed below, as well as some personal information. Please follow the directions to create your username and password.     Your access code is: [email or web address]  Expires: 8/15/2018 11:15 AM     Your access code will  in 90 days. If you need help or a new code, please contact your [facility] or call [phone] for  assistance.        Care EveryWhere ID     This is your Care EveryWhere ID. This could be used by other organizations to access your [facility] medical records  [id number]        Your Vitals Were     Pulse Temperature Respirations Pulse Oximetry BMI (Body Mass Index)       82 97.6  F (36.4  C) (Oral) 16 98% 23.64 kg/m2        Blood Pressure from Last 3 Encounters:   06/05/18 119/68   05/17/18 137/78   05/08/18 125/75    Weight from Last 3 Encounters:   06/05/18 62.5 kg (137 lb 11.2 oz)   05/17/18 62.2 kg (137 lb 3.2 oz)   04/25/18 61.2 kg (135 lb)              Today, you had the following     No orders found for display       Primary Care Provider Office Phone # Fax #    Agnieszka Erica Rodriguez [phone]       [address] [facility] [address] [place] [address]        Equal Access to Services     JAMIE CARVAJAL : Hadii aad ku hadasho Soomaali, waaxda luqadaha, qaybta kaalmada adeegyada, waxay anmolin hayshyanne peres . So [facility] [phone].    ATENCIÓN: Si habla español, tiene a ibarra disposición servicios gratuitos de asistencia lingüística. Llame al [phone].    We comply with applicable federal civil rights laws and [place] laws. We do not discriminate on the basis of race, color, national origin, age, disability, sex, sexual orientation, or gender identity.            Thank you!     Thank you for choosing [facility]  for your care. Our goal is always to provide you with excellent care. Hearing back from our patients is one way we can continue to improve our services. Please take a few minutes to complete the written survey that you may receive in the mail after your visit with us. Thank you!             Your Updated Medication List - Protect others around you: Learn how to safely use, store and throw away your medicines at www.disposemymeds.org.          This list is accurate as of 6/5/18 11:46 AM.  Always use your most recent med list.                   Brand Name  Dispense Instructions for use Diagnosis    Calcium Acetate (Phos Binder) 667 MG Caps      TAKE 2 CAPSULE BY MOUTH THREE TIMES A DAY WITH MEALS        OCTREOTIDE ACETATE IJ      Inject as directed every 30 days        order for DME     1 Device    Equipment being ordered: 4 wheel walker with seat.    Disorder of bone and cartilage, Arthralgia, unspecified joint, Tendency to fall       polyethylene glycol 3350 Powd     1530 g    Take 17 g by mouth daily    Slow transit constipation       PRORENAL + D Tabs      Take 1 tablet by mouth daily        sertraline 50 MG tablet    ZOLOFT    180 tablet    Take 2 tablets (100 mg) by mouth daily    Itching       * VENTOLIN  (90 Base) MCG/ACT Inhaler   Generic drug:  albuterol      INHALE 2 PUFFS BY MOUTH EVERY 4 HOURS AS NEEDED FOR WHEEZING OR SHORTNESS OF BREATH        * albuterol 108 (90 Base) MCG/ACT Inhaler    PROAIR HFA    1 Inhaler    Inhale 2 puffs into the lungs every 6 hours    Cough       * Notice:  This list has 2 medication(s) that are the same as other medications prescribed for you. Read the directions carefully, and ask your doctor or other care provider to review them with you.

## 2018-06-15 ENCOUNTER — OFFICE VISIT (OUTPATIENT)
Dept: INTERNAL MEDICINE | Facility: CLINIC | Age: 78
End: 2018-06-15
Payer: MEDICARE

## 2018-06-15 VITALS
DIASTOLIC BLOOD PRESSURE: 69 MMHG | WEIGHT: 135 LBS | HEART RATE: 76 BPM | SYSTOLIC BLOOD PRESSURE: 126 MMHG | BODY MASS INDEX: 23.17 KG/M2 | OXYGEN SATURATION: 99 %

## 2018-06-15 DIAGNOSIS — R41.89 COGNITIVE IMPAIRMENT: Primary | ICD-10-CM

## 2018-06-15 RX ORDER — ATORVASTATIN CALCIUM 20 MG/1
20 TABLET, FILM COATED ORAL DAILY
Qty: 90 TABLET | Refills: 3 | Status: SHIPPED | OUTPATIENT
Start: 2018-06-15 | End: 2019-08-09

## 2018-06-15 ASSESSMENT — PAIN SCALES - GENERAL: PAINLEVEL: NO PAIN (0)

## 2018-06-15 NOTE — PATIENT INSTRUCTIONS
[facility]: [phone]     [facility] Medication Refill Request Information:  * Please contact your pharmacy regarding ANY request for medication refills.  ** [facility] Prescription Fax = [phone]  * Please allow 3 business days for routine medication refills.  * Please allow 5 business days for controlled substance medication refills.     [facility] Test Result notification information:  *You will be notified with in 7-10 days of your appointment day regarding the results of your test.  If you are on MyChart you will be notified as soon as the provider has reviewed the results and signed off on them.

## 2018-06-15 NOTE — MR AVS SNAPSHOT
After Visit Summary   6/15/2018    Rachele Martínez    MRN: [id number]           Patient Information     Date Of Birth          1940        Visit Information        Provider Department      6/15/2018 3:00 PM Agnieszka Rodriguez MD [facility]        Today's Diagnoses     Cognitive impairment    -  1      Care Instructions    [facility] Center: [phone]     Primary Care Center Medication Refill Request Information:  * Please contact your pharmacy regarding ANY request for medication refills.  ** [facility] Prescription Fax = [phone]  * Please allow 3 business days for routine medication refills.  * Please allow 5 business days for controlled substance medication refills.     Primary Care Center Test Result notification information:  *You will be notified with in 7-10 days of your appointment day regarding the results of your test.  If you are on MyChart you will be notified as soon as the provider has reviewed the results and signed off on them.          Follow-ups after your visit        Additional Services     NEUROPSYCHOLOGY REFERRAL       Your provider has referred you to:    [facility]: Adult Neuropsychology Clinic [facility] [phone] Preferred Provider: No preferred provider   [email or web address]    All scheduling is subject to the client's specific insurance plan & benefits, provider/location availability, and provider clinical specialities.  Please arrive 15 minutes early for your first appointment and bring your completed paperwork.    Please be aware that coverage of these services is subject to the terms and limitations of your health insurance plan.  Call member services at your health plan with any benefit or coverage questions.    Please bring the following to your appointment:  >>   Any x-rays, CTs or MRIs which have been performed.  Contact the facility where they were done to arrange for  prior to your scheduled  appointment.  Any new CT, MRI or other procedures ordered by your specialist must be performed at a [place] facility or coordinated by your clinic's referral office.    >>   List of current medications   >>   This referral request   >>   Any documents/labs given to you for this referral                  Your next 10 appointments already scheduled     Jul 03, 2018  2:00 PM CDT   Level O with [address] ([facility])    [address] [address] [place] [address] [phone]            Nov 15, 2018  9:00 AM CST   Lab with  LAB   [facility] Lab ([facility])    [address] [address] [phone]            Nov 15, 2018  9:15 AM CST   US ABDOMEN COMPLETE with UCUS1   [facility] Imaging Center US ([facility])    [address] [place] [address] [phone]           Please bring a list of your medicines (including vitamins, minerals and over-the-counter drugs). Also, tell your doctor about any allergies you may have. Wear comfortable clothes and leave your valuables at home.  Adults: No eating or drinking for 8 hours before the exam. You may take medicine with a small sip of water.  Children: - Children 6+ years: No food or drink for 6 hours before exam. - Children 1-5 years: No food or drink for 4 hours before exam. - Infants, breast-fed: may have breast milk up to 2 hours before exam. - Infants, formula: may have bottle until 4 hours before exam.  Please call the Imaging Department at your exam site with any questions.            Nov 15, 2018 10:15 AM CST   (Arrive by 10:00 AM)   Return General Liver with Roberto Gordon PA-C   [facility] Hepatology ([facility])    [address] [facility]  [address]  [facility] [address] [phone]              Who to contact     Please call your clinic at [phone] to:    Ask  questions about your health    Make or cancel appointments    Discuss your medicines    Learn about your test results    Speak to your doctor            Additional Information About Your Visit        SNAPP'hart Information     Cloudmach is an electronic gateway that provides easy, online access to your medical records. With Cloudmach, you can request a clinic appointment, read your test results, renew a prescription or communicate with your care team.     To sign up for Cloudmach visit the website at [email or web address]   You will be asked to enter the access code listed below, as well as some personal information. Please follow the directions to create your username and password.     Your access code is: [email or web address]  Expires: 8/15/2018 11:15 AM     Your access code will  in 90 days. If you need help or a new code, please contact your [facility] or call [phone] for assistance.        Care EveryWhere ID     This is your Care EveryWhere ID. This could be used by other organizations to access your [facility] medical records  [id number]        Your Vitals Were     Pulse Pulse Oximetry Breastfeeding? BMI (Body Mass Index)          76 99% No 23.17 kg/m2         Blood Pressure from Last 3 Encounters:   06/15/18 126/69   18 119/68   18 137/78    Weight from Last 3 Encounters:   06/15/18 61.2 kg (135 lb)   18 62.5 kg (137 lb 11.2 oz)   18 62.2 kg (137 lb 3.2 oz)              We Performed the Following     NEUROPSYCHOLOGY REFERRAL          Today's Medication Changes          These changes are accurate as of 6/15/18  3:07 PM.  If you have any questions, ask your nurse or doctor.               Start taking these medicines.        Dose/Directions    atorvastatin 20 MG tablet   Commonly known as:  LIPITOR   Used for:  Cognitive impairment   Started by:  Agnieszka Rodriguez MD        Dose:  20 mg   Take 1 tablet (20 mg) by mouth daily   Quantity:  90 tablet    Refills:  3            Where to get your medicines      These medications were sent to [facility] [address] [facility] [place] - [address]  [address] [place] [address]     Phone:  [phone]     atorvastatin 20 MG tablet                Primary Care Provider Office Phone # Fax #    Agnieszka Erica Rodriguez [phone]       [address] [place] [address]  [facility] [address]        Equal Access to Services     JAMIE CARVAJAL : Hadii aad ku hadasho Soomaali, waaxda luqadaha, qaybta kaalmada adeegyada, waxay idiin hayaan adeeg kharash la'shyanne . So [facility] [phone].    ATENCIÓN: Si habla español, tiene a ibarra disposición servicios gratuitos de asistencia lingüística. [facility] [phone].    We comply with applicable federal civil rights laws and [place] laws. We do not discriminate on the basis of race, color, national origin, age, disability, sex, sexual orientation, or gender identity.            Thank you!     Thank you for choosing [facility]  for your care. Our goal is always to provide you with excellent care. Hearing back from our patients is one way we can continue to improve our services. Please take a few minutes to complete the written survey that you may receive in the mail after your visit with us. Thank you!             Your Updated Medication List - Protect others around you: Learn how to safely use, store and throw away your medicines at www.disposemymeds.org.          This list is accurate as of 6/15/18  3:07 PM.  Always use your most recent med list.                   Brand Name Dispense Instructions for use Diagnosis    atorvastatin 20 MG tablet    LIPITOR    90 tablet    Take 1 tablet (20 mg) by mouth daily    Cognitive impairment       Calcium Acetate (Phos Binder) 667 MG Caps      TAKE 2 CAPSULE BY MOUTH THREE TIMES A DAY WITH MEALS        OCTREOTIDE ACETATE IJ      Inject as directed every 30 days        order for DME     1 Device    Equipment being  ordered: 4 wheel walker with seat.    Disorder of bone and cartilage, Arthralgia, unspecified joint, Tendency to fall       polyethylene glycol 3350 Powd     1530 g    Take 17 g by mouth daily    Slow transit constipation       PRORENAL + D Tabs      Take 1 tablet by mouth daily        sertraline 50 MG tablet    ZOLOFT    180 tablet    Take 2 tablets (100 mg) by mouth daily    Itching       * VENTOLIN  (90 Base) MCG/ACT Inhaler   Generic drug:  albuterol      INHALE 2 PUFFS BY MOUTH EVERY 4 HOURS AS NEEDED FOR WHEEZING OR SHORTNESS OF BREATH        * albuterol 108 (90 Base) MCG/ACT Inhaler    PROAIR HFA    1 Inhaler    Inhale 2 puffs into the lungs every 6 hours    Cough       * Notice:  This list has 2 medication(s) that are the same as other medications prescribed for you. Read the directions carefully, and ask your doctor or other care provider to review them with you.

## 2018-06-15 NOTE — NURSING NOTE
Chief Complaint   Patient presents with     Results     Pt is here to follow up on results.      Kimberley Randall LPN at 2:35 PM on 6/15/2018.

## 2018-06-29 NOTE — PROGRESS NOTES
Chief Complaint   Rachele Martínez is a 78 year old female presents for   Chief Complaint   Patient presents with     Results     Pt is here to follow up on results.       SUBJECTIVE:  Here for f/u on results of MRI.      She is currently living independently.  Denies issues with leaving on stove or taking meds.  Does not drive.  Daughter is very involved in care.      Medications and allergies were reviewed by me today.     Social History   History   Smoking Status     Former Smoker     Packs/day: 2.00     Years: 45.00     Types: Cigarettes     Quit date: 11/23/2002   Smokeless Tobacco     Never Used      PHQ-2 ( 1999 [employer]) 5/17/2018 8/9/2016   Q1: Little interest or pleasure in doing things 0 0   Q2: Feeling down, depressed or hopeless 0 0   PHQ-2 Score 0 0     No flowsheet data found.    Past Medical History   Patient Active Problem List   Diagnosis     Health Care Home     Cataract     Anxiety state     Insomnia     Hyperlipidemia with target LDL less than 130     History of hepatitis C     ACP (advance care planning)     Iron deficiency anemia due to chronic blood loss     AVM (arteriovenous malformation) of small bowel, acquired (H)     ESRD (end stage renal disease) on dialysis (H)     Cirrhosis of liver without ascites, unspecified hepatic cirrhosis type (H)     Tendency to fall     Hypertension goal BP (blood pressure) < 140/80     Diarrhea     Angiodysplasia of stomach and duodenum with hemorrhage       Review of Systems   5 point ROS completed and negative except noted above, including Gen, CV, Resp, GI, MS    Physical Exam   /69  Pulse 76  Wt 61.2 kg (135 lb)  SpO2 99%  Breastfeeding? No  BMI 23.17 kg/m2  Gen: no distress, comfortable, pleasant   Eyes: anicteric, normal extra-ocular movements   Skin: no concerning lesions, no jaundice   Psychological: appropriate mood     Assessment & Plan      Cognitive impairment  Changes on MRI concerning for possible vascular dementia; which would also  be consistent with the time course of her dementia.  Will get further cognitive eval.  Discussed importance of staying active.  Daughter is very involved and feels she is safe living at home at this point   - NEUROPSYCHOLOGY REFERRAL  - atorvastatin (LIPITOR) 20 MG tablet  Dispense: 90 tablet; Refill: 3    >25 minutes, over half the time of the visit, spent as face to face time in discussion regarding MRI findings      RTC: sofía Rodriguez MD

## 2018-07-03 ENCOUNTER — INFUSION THERAPY VISIT (OUTPATIENT)
Dept: INFUSION THERAPY | Facility: CLINIC | Age: 78
End: 2018-07-03
Payer: MEDICARE

## 2018-07-03 VITALS
BODY MASS INDEX: 23.84 KG/M2 | SYSTOLIC BLOOD PRESSURE: 123 MMHG | HEART RATE: 72 BPM | DIASTOLIC BLOOD PRESSURE: 72 MMHG | RESPIRATION RATE: 18 BRPM | WEIGHT: 138.9 LBS | TEMPERATURE: 98.7 F | OXYGEN SATURATION: 97 %

## 2018-07-03 DIAGNOSIS — D50.0 IRON DEFICIENCY ANEMIA DUE TO CHRONIC BLOOD LOSS: ICD-10-CM

## 2018-07-03 DIAGNOSIS — R19.7 DIARRHEA, UNSPECIFIED TYPE: Primary | ICD-10-CM

## 2018-07-03 DIAGNOSIS — K55.20 AVM (ARTERIOVENOUS MALFORMATION) OF SMALL BOWEL, ACQUIRED: ICD-10-CM

## 2018-07-03 DIAGNOSIS — K31.811 ANGIODYSPLASIA OF STOMACH AND DUODENUM WITH HEMORRHAGE: ICD-10-CM

## 2018-07-03 PROCEDURE — 99207 ZZC NO CHARGE LOS: CPT

## 2018-07-03 PROCEDURE — 96372 THER/PROPH/DIAG INJ SC/IM: CPT | Performed by: INTERNAL MEDICINE

## 2018-07-03 RX ADMIN — OCTREOTIDE ACETATE 20 MG: KIT INTRAMUSCULAR at 14:30

## 2018-07-03 ASSESSMENT — PAIN SCALES - GENERAL: PAINLEVEL: NO PAIN (0)

## 2018-07-03 NOTE — MR AVS SNAPSHOT
After Visit Summary   7/3/2018    Rachele Martínez    MRN: [id number]           Patient Information     Date Of Birth          1940        Visit Information        Provider Department      7/3/2018 2:00 PM [facility]        Today's Diagnoses     Diarrhea, unspecified type    -  1    Angiodysplasia of stomach and duodenum with hemorrhage        AVM (arteriovenous malformation) of small bowel, acquired (H)        Iron deficiency anemia due to chronic blood loss           Follow-ups after your visit        Your next 10 appointments already scheduled     Jul 10, 2018  8:00 AM CDT   (Arrive by 7:45 AM)   Neuropsych Eval with Cecily Fontaine, PhD [facility] Neuropsychology ([facility])    [address] [place] [address]  3rd [facility] [id number] [phone]            Jul 31, 2018 11:30 AM CDT   Level O with [address] ([facility])    [address] [place] [id number] [phone]            Nov 15, 2018  9:00 AM CST   Lab with  LAB   [facility] Lab ([facility])    [address] [place] [id number] [phone]            Nov 15, 2018  9:15 AM CST   US ABDOMEN COMPLETE with UCUS1   [facility] US ([facility])    [address] [place] [id number] [phone]           Please bring a list of your medicines (including vitamins, minerals and over-the-counter drugs). Also, tell your doctor about any allergies you may have. Wear comfortable clothes and leave your valuables at home.  Adults: No eating or drinking for 8 hours before the exam. You may take medicine with a small sip of water.  Children: - Children 6+ years: No food or drink for 6 hours before exam. - Children 1-5 years: No food or drink for 4 hours before exam. - Infants, breast-fed: may have  breast milk up to 2 hours before exam. - Infants, formula: may have bottle until 4 hours before exam.  Please call the Imaging Department at your exam site with any questions.            Nov 15, 2018 10:15 AM CST   (Arrive by 10:00 AM)   Return General Liver with Roberto Gordon PA-C   [facility] Hepatology ([facility])    [address] [facility]  [address]  [facility] [address] [phone]              Who to contact     If you have questions or need follow up information about today's clinic visit or your schedule please contact [facility] directly at [phone].  Normal or non-critical lab and imaging results will be communicated to you by MyChart, letter or phone within 4 business days after the clinic has received the results. If you do not hear from us within 7 days, please contact the clinic through MyChart or phone. If you have a critical or abnormal lab result, we will notify you by phone as soon as possible.  Submit refill requests through [employer] or call your pharmacy and they will forward the refill request to us. Please allow 3 business days for your refill to be completed.          Additional Information About Your Visit        Care EveryWhere ID     This is your Care EveryWhere ID. This could be used by other organizations to access your [facility] medical records  [id number]        Your Vitals Were     Pulse Temperature Respirations Pulse Oximetry BMI (Body Mass Index)       72 98.7  F (37.1  C) (Oral) 18 97% 23.84 kg/m2        Blood Pressure from Last 3 Encounters:   07/03/18 123/72   06/15/18 126/69   06/05/18 119/68    Weight from Last 3 Encounters:   07/03/18 63 kg (138 lb 14.4 oz)   06/15/18 61.2 kg (135 lb)   06/05/18 62.5 kg (137 lb 11.2 oz)              Today, you had the following     No orders found for display       Primary Care Provider Office Phone # Fax #    Agnieszka Rodriguez [phone]       [address] [place] [address]  [address] [facility] [address]  [facility] [address]        Equal Access to Services     JAMIE CARVAJAL : Hadii lawrence wilson marlosayra Socassandraali, waaxda luqadaha, qaybta karobbykarolina patel, ajz saldanaaurechristina fowler. So [facility] [phone].    ATENCIÓN: Si habla español, tiene a ibarra disposición servicios gratuitos de asistencia lingüística. Llame al [phone].    We comply with applicable federal civil rights laws and [place] laws. We do not discriminate on the basis of race, color, national origin, age, disability, sex, sexual orientation, or gender identity.            Thank you!     Thank you for choosing [facility]  for your care. Our goal is always to provide you with excellent care. Hearing back from our patients is one way we can continue to improve our services. Please take a few minutes to complete the written survey that you may receive in the mail after your visit with us. Thank you!             Your Updated Medication List - Protect others around you: Learn how to safely use, store and throw away your medicines at www.disposemymeds.org.          This list is accurate as of 7/3/18  4:28 PM.  Always use your most recent med list.                   Brand Name Dispense Instructions for use Diagnosis    atorvastatin 20 MG tablet    LIPITOR    90 tablet    Take 1 tablet (20 mg) by mouth daily    Cognitive impairment       Calcium Acetate (Phos Binder) 667 MG Caps      TAKE 2 CAPSULE BY MOUTH THREE TIMES A DAY WITH MEALS        OCTREOTIDE ACETATE IJ      Inject as directed every 30 days        order for DME     1 Device    Equipment being ordered: 4 wheel walker with seat.    Disorder of bone and cartilage, Arthralgia, unspecified joint, Tendency to fall       polyethylene glycol 3350 Powd     1530 g    Take 17 g by mouth daily    Slow transit constipation       PRORENAL + D Tabs      Take 1 tablet by mouth daily        sertraline 50 MG tablet    ZOLOFT    180 tablet    Take 2 tablets (100 mg) by mouth  daily    Itching       * VENTOLIN  (90 Base) MCG/ACT Inhaler   Generic drug:  albuterol      INHALE 2 PUFFS BY MOUTH EVERY 4 HOURS AS NEEDED FOR WHEEZING OR SHORTNESS OF BREATH        * albuterol 108 (90 Base) MCG/ACT Inhaler    PROAIR HFA    1 Inhaler    Inhale 2 puffs into the lungs every 6 hours    Cough       * Notice:  This list has 2 medication(s) that are the same as other medications prescribed for you. Read the directions carefully, and ask your doctor or other care provider to review them with you.

## 2018-07-03 NOTE — PROGRESS NOTES
Infusion Nursing Note:  Rachele Martínez presents today for Sandstatin.    Patient seen by provider today: No   present during visit today: Not Applicable.    Note: N/A.    Intravenous Access:  No Intravenous access/labs at this visit.    Treatment Conditions:  Not Applicable.      Post Infusion Assessment:  Patient tolerated injection without incident.    Discharge Plan:   RTC as scheduled.    ADAM GUO RN

## 2018-07-10 ENCOUNTER — OFFICE VISIT (OUTPATIENT)
Dept: NEUROPSYCHOLOGY | Facility: CLINIC | Age: 78
End: 2018-07-10
Payer: MEDICARE

## 2018-07-10 DIAGNOSIS — K74.60 CIRRHOSIS OF LIVER WITHOUT ASCITES, UNSPECIFIED HEPATIC CIRRHOSIS TYPE (H): Primary | ICD-10-CM

## 2018-07-10 DIAGNOSIS — F09 MENTAL DISORDER DUE TO GENERAL MEDICAL CONDITION: ICD-10-CM

## 2018-07-10 NOTE — PROGRESS NOTES
Pt was seen for neuropsychological evaluation at the request of Dr. Agnieszka Rodriguez for the purposes of diagnostic clarification and treatment planning. 1.5 hours of face-to-face testing were provided by this writer. Please see Dr. Cecily Fontaien's report for a full interpretation of the findings.    Karina Kirk MA  Psychometrist

## 2018-07-10 NOTE — MR AVS SNAPSHOT
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           Follow-ups after your visit        Your next 10 appointments already scheduled     Jul 31, 2018 11:30 AM CDT   Level O with [address] ([facility])    [address] [address] [place] [id number] [phone]            Nov 15, 2018  9:00 AM CST   Lab with  LAB   [facility] ([facility])    [address] [place] [address] [phone]            Nov 15, 2018  9:15 AM CST   US ABDOMEN COMPLETE with UCUS1   [facility] US ([facility])    [address] [place] [address] [phone]           Please bring a list of your medicines (including vitamins, minerals and over-the-counter drugs). Also, tell your doctor about any allergies you may have. Wear comfortable clothes and leave your valuables at home.  Adults: No eating or drinking for 8 hours before the exam. You may take medicine with a small sip of water.  Children: - Infants, breast-fed: may have breast milk up to 2 hours before exam. - Infants, formula: may have bottle until 4 hours before exam. - Children 1-5 years: No food or drink for 4 hours before exam. - Children 6 -12 years: No food or drink for 6 hours before exam. - Children over 12 years: No food or drink for 8 hours before exam. - J Tube Fed: No need to stop feedings.  Please call the Imaging Department at your exam site with any questions.            Nov 15, 2018 10:15 AM CST    (Arrive by 10:00 AM)   Return General Liver with Roberto Gordon PA-C   [facility] ([facility])    [address]  [address]  [facility] [address] [phone]              Who to contact     Please call your clinic at [phone] to:    Ask questions about your health    Make or cancel appointments    Discuss your medicines    Learn about your test results    Speak to your doctor            Additional Information About Your Visit        Care EveryWhere ID     This is your Care EveryWhere ID. This could be used by other organizations to access your [facility] medical records  [id number]         Blood Pressure from Last 3 Encounters:   07/03/18 123/72   06/15/18 126/69   06/05/18 119/68    Weight from Last 3 Encounters:   07/03/18 63 kg (138 lb 14.4 oz)   06/15/18 61.2 kg (135 lb)   06/05/18 62.5 kg (137 lb 11.2 oz)              We Performed the Following     [id number] TESTING, PER HR/PSYCHOLOGIST     NEUROPSYCH TESTING BY [facility]        Primary Care Provider Office Phone # Fax #    Agnieszka Erica Rodriguez [phone]       [address]  [facility] [address]        Equal Access to Services     JAMIE CARVAJAL : Hadii aad ku hadasho Soomaali, waaxda luqadaha, qaybta kaalmada adeegyada, waxay anmolin hayacn shilpa fowler. So [facility] [phone].    ATENCIÓN: Si habla español, tiene a ibarra disposición servicios gratuitos de asistencia lingüística. Llmelina al [phone].    We comply with applicable federal civil rights laws and [place] laws. We do not discriminate on the basis of race, color, national origin, age, disability, sex, sexual orientation, or gender identity.            Thank you!     Thank you for choosing [facility] NEUROPSYCHOLOGY  for your care. Our goal is always to provide you with excellent care. Hearing back from our patients is one way we can continue to improve our services. Please take a few minutes to  complete the written survey that you may receive in the mail after your visit with us. Thank you!             Your Updated Medication List - Protect others around you: Learn how to safely use, store and throw away your medicines at www.disposemymeds.org.          This list is accurate as of 7/10/18 11:59 PM.  Always use your most recent med list.                   Brand Name Dispense Instructions for use Diagnosis    atorvastatin 20 MG tablet    LIPITOR    90 tablet    Take 1 tablet (20 mg) by mouth daily    Cognitive impairment       Calcium Acetate (Phos Binder) 667 MG Caps      TAKE 2 CAPSULE BY MOUTH THREE TIMES A DAY WITH MEALS        OCTREOTIDE ACETATE IJ      Inject as directed every 30 days        order for DME     1 Device    Equipment being ordered: 4 wheel walker with seat.    Disorder of bone and cartilage, Arthralgia, unspecified joint, Tendency to fall       polyethylene glycol 3350 Powd     1530 g    Take 17 g by mouth daily    Slow transit constipation       PRORENAL + D Tabs      Take 1 tablet by mouth daily        sertraline 50 MG tablet    ZOLOFT    180 tablet    Take 2 tablets (100 mg) by mouth daily    Itching       * VENTOLIN  (90 Base) MCG/ACT Inhaler   Generic drug:  albuterol      INHALE 2 PUFFS BY MOUTH EVERY 4 HOURS AS NEEDED FOR WHEEZING OR SHORTNESS OF BREATH        * albuterol 108 (90 Base) MCG/ACT Inhaler    PROAIR HFA    1 Inhaler    Inhale 2 puffs into the lungs every 6 hours    Cough       * Notice:  This list has 2 medication(s) that are the same as other medications prescribed for you. Read the directions carefully, and ask your doctor or other care provider to review them with you.

## 2018-07-23 NOTE — PROGRESS NOTES
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 Information was obtained via interview with the patient and her daughter, and review of her medical records. 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 At a visit with her internal medicine provider, Dr. Rodriguez, on 4/25/2018, it was noted that she has an ongoing concern for memory, with more trouble with recent events. She had an episode where she was standing in front of the sink and could not remember how to turn off the water. A MoCA on that date was 16/30. There was a concern for vascular dementia vs. Alzheimer s disease. An MRI of the brain on 5/12/2018 was notable for multiple punctate and confluent foci of T2 hyperintensity within the pontine, periventricular and subcortical white matter, and mild diffuse cerebral volume loss. At a hepatology clinic visit on 5/17/2018, she was noted not to have any signs of ascites, hepatic encephalopathy, or lower extremity edema. She was referred for neuropsychological evaluation by Agnieszka Rodriguez M.D., for further characterization of any cognitive difficulties and to evaluate mood.    CLINICAL INTERVIEW FINDINGS    Upon interview, Ms. Martínez and her daughter stated that she has been having problems with cognition over the last six months to a year, which began gradually. They are not sure whether the problems have been progressive. They gave the example of her going to the kitchen sink when the water was running, and not remembering how to turn it off. She forgets what others have said, and misplaces items more than normal. She has not had  difficulty remembering names of familiar people, nor has she become lost in familiar places. She notices greater difficulty with word-finding. Attention and concentration have never been particularly strong for her, but do not seem to have changed much. She feels that she is more impulsive, but her daughter noted that it takes her much longer to make decisions about things. For instance, she may spend 1   hours in the grocery store, because she has to read the labels to monitor potassium.    Ms. Martínez lives by herself in an independent apartment. Her daughter has been managing her finances for her for the last four years, because she had a lot of overdrafts. She manages her own medications, but acknowledges she sometimes forgets, or does not know if she has already taken them. She uses a pillbox, which she sets up herself, but she takes a medication at every meal and sometimes forgets to take it. She quit driving three years ago, reportedly because of dizziness. She had not been cooking much because she was afraid of eating the wrong thing, and because she is tired after dialysis. She has preferred quick meals. She has a meal delivery program that will start next week. She handles her personal cares independently.    Ms. Martínez denied any history of psychiatric illness. She worked with a psychologist many years ago, but has never been hospitalized for psychiatric care. She noted that she takes Zoloft because of an  itching problem,  but she has also noticed that it helps her stay balanced and keeps her from getting depressed. She described her mood currently as okay, and does not believe that she is depressed or anxious. She is no more irritable than normal, although she gets frustrated with dialysis, which she has been on for three years. She has not been crying any more than normal. She sleeps fairly well, but tends to stay up late watching television, often until 3:00 or 4:00 a.m. She wakes up at 9:00 a.m.  on days when she has dialysis, but may sleep until 2:00 p.m. on other days. She tends to nap on days when she has had dialysis. Her appetite varies. She lost 30 pounds since she started dialysis, without trying. Her energy level is low. Her interest level and motivation are also low. Her daughter stated that this is a major change for her, as she used to be on-the-go all the time, and now she does not want to do anything. She will still go to [employer] and [employer]. She denied suicidal ideation or any history of attempts to commit suicide. In the past, she had visual hallucinations consisting of big spiders on the wall, which she believes were associated with her blood pressure medications. She denied auditory hallucinations.    Ms. Martínez denied alcohol use. She has a history of drug abuse, including crack, marijuana, and IV drugs. She participated in a chemical dependency treatment program 25 years ago, and stopped using all IV drugs at that point. She acknowledges she continues to use marijuana occasionally because it helps with her appetite, the last time a couple of days ago. She denied tobacco use.    In school, Ms. Martínez was never in any special education classes, nor was she held back any grades. She stated that she did fairly well in school except for tests. She left school during the 11th grade, stating that she was  fast,  but does not remember why she left. She earned her GED, and also completed a nine-month schaffer school program. 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 She last worked in 2000. She reportedly did not receive disability. She was  after 18 years of marriage, and has three children.    Ms. Martínez denied any history of seizure, stroke, or head injury resulting loss of consciousness. She understands that her last MRI showed microscopic strokes. She experiences dizzy spells on a daily basis, and in the past has fallen a couple of times, but  not recently. She denied unilateral weakness or numbness. She has a tremor when she does not take Zoloft. She experiences occasional headaches. She has occasional back pain as well.    PAST MEDICAL HISTORY: Medical records indicate a history of anxiety, end stage renal disease on dialysis, cirrhosis of the liver without ascites, arteriovenous malformation of small bowel, history of hepatitis C, hyperlipidemia, and hypertension.    CURRENT MEDICATIONS:  Include albuterol, atorvastatin, calcium, multiple vitamins-minerals, octreotide, polyethylene glycol, sertraline, and Ventolin.    FAMILY MEDICAL HISTORY:  Significant for a mother with Alzheimer s disease beginning in her early 80s, who  at the age of 92, a maternal aunt with Alzheimer s disease, a maternal grandmother with Alzheimer s disease and cancer of the eyes, nose, and throat. She has an aunt who had cerebrovascular disease, and some sisters with cancer including brain cancer, lung cancer, and another one who needs a bone marrow transplant. She has a grandson with depression.    BEHAVIORAL OBSERVATIONS    During the evaluation, Ms. Martínez was pleasant, cooperative, and seemed to understand the instructions. She was oriented to person, place, month, and year, but not to date. She appeared tired, and responses were at times somewhat impulsive. Gait was slow, but no other abnormal movements were observed clinically. Mood was euthymic although affect was flat. Speech was fluent, with normal articulation, volume, and rate. Spontaneous conversation was almost entirely absent, but she responded appropriately and with detail to questions. Internal performance validity measures fell within normal limits. The results are believed to accurately reflect her current level of functioning.    MEASURES ADMINISTERED    The following measures were administered by a trained psychometrist, under the direct supervision of a licensed psychologist:    Subtests of the  Wechsler Abbreviated Scale of Intelligence - 2 (WASI-2); Subtests of the Wechsler Adult Intelligence Scale - 4; Reading subtest of the Wide Range Achievement Test - 4; subtests of the Wechsler Memory Scale - Revised; Beck Auditory Verbal Learning Test; Beck-Osterrieth Complex Figure; [place] Naming Test; Controlled Oral Word Association Test; Semantic Fluency; Clock Drawing; Trail Making Test; Stroop; Dementia Rating Scale - 2 (DRS-2); Geriatric Depression Scale (GDS).    RESULTS AND INTERPRETATION    Overall intellectual functioning was estimated to fall in the low average range, consistent with premorbid estimates based on single word reading abilities. Performance on a screening measure dementia was low average (DRS-2 Total Score = 129/144).     Confrontation naming was low average for her age and level of education. Expressive vocabulary was average for her age. Letter fluency was average, and generative naming to category was also average.    Attention span was mildly impaired. Divided attention was moderately impaired, and was notable for difficulty shifting cognitive set. Performance on a measure of distractibility was low average. Psychomotor processing speed was low average.    Construction of a clock was impaired. She had perseverative placement of numbers, and difficulty with conceptualization. When asked to set the hands to 11:10, she denilson one line pointing toward the 11, and one toward the 10. Construction of a complex design was moderately impaired, and was characterized by difficulty with planning and organization, and some missing details. Nonverbal deductive reasoning was low average for her age.    Immediate recall of verbal narrative material was mildly impaired, with average retention (70%) following a 30 minute delay. On a multiple trial list learning task, learning was moderately inefficient, with average retention (67%) following a 30 minute delay. Recognition memory on this task was mildly  impaired. Immediate recall of visual material was mildly impaired, with  low average retention (50%) following a 30 minute delay.    On the GDS, a self-report questionnaire, Ms. Martínez endorsed a mild level of depressive symptomatology. Specifically, she acknowledged that she is not basically satisfied with her life. She has dropped many of her activities and interests, and often gets bored and feels restless and fidgety. She often feels helpless. She prefers to stay at home rather than going out and doing new things and she feels pretty worthless the way that she is now. She finds it hard to get started on new projects and does not feel full of energy. She thinks that most people are better off than she is. She does not enjoy getting up in the morning and she prefers to avoid social gatherings. She feels that she has more problems with memory than most, she has trouble concentrating, and her mind is not as clear as it used to be.    IMPRESSIONS AND RECOMMENDATIONS    Current results indicate executive dysfunction, including difficulty establishing and shifting cognitive set, perseverations, and difficulty with planning, organization, and conceptualization. Behaviorally, she was somewhat impulsive. Learning is impaired, although she tends to retain information well once she has learned it. Attention is also impaired. Language abilities and visual processing generally fall within normal limits, although she has difficulty with construction of visual material. She endorses a mild level of depressive symptomatology.    This pattern of performance is suggestive primarily of frontal system involvement. The findings do not appear to reflect dementia at this time, although they are abnormal. The etiology of her cognitive difficulties is not entirely clear based on neuropsychological evaluation alone. It is noted that there have apparently been marked fluctuations in cognition. She earned 16/30 on a MoCA administered in  her clinic on 4/25/2018. The MoCA was not administered today, but the findings on this evaluation are incompatible with a MoCA of 16, which suggests moderate to severe impairment. Fluctuating cognitive functioning in the presence of cirrhosis raises the possibility of hepatic encephalopathy, and the pattern of performance on today s evaluation is consistent with minimal hepatic encephalopathy. While vascular dementia cannot be ruled out, such fluctuations in cognition would be atypical.  It is noted as well that she uses marijuana on occasion, reportedly to help with her appetite. The last time she used it was two days ago, so substance use may exacerbate her cognitive difficulties, although it does not appear to be the sole contributor.     In terms of daily functioning, Ms. Su s cognitive difficulties may interfere with her ability to manage her instrumental activities of daily living independently. For the last four years, her daughter has been assisting with management of her finances, as she had overdrafts. She manages her own medications, but acknowledged some errors, particularly for a medication that she takes with her meals. A meal delivery service is in the process of being set up for her, which may also help with establishing more of a routine. Perhaps she could rely on an alarm, or her family could call her at mealtimes, to ensure that she is taking the medications regularly. Ultimately, it may be reasonable for her to consider assisted living, where many of these services can be provided, at the same time as she maintains a fair amount of independence. She may have difficulty managing large, complex tasks, and others may assist by breaking down such tasks into smaller, more manageable parts. She may benefit from structure and routine. When working on a task, she may find it helpful to work in an environment that is relatively free from distractions, such as noises or other interruptions.    It may  be helpful to follow her over time. Repeated neuropsychological evaluation in nine months to one year may help to determine whether her cognitive difficulties are progressive. These results have not been discussed with Ms. Martínez or her family. They were provided with instructions to contact me to review the results in detail if they would like.    Cecily Fontaine, Ph.D., ABPP  Licensed Psychologist, [employer] [id number]  Board Certified in Clinical Neuropsychology    Time spent:  Four hours professional time, including interview, record review, data integration, and report writing (CPT 72030); an additional three hours, including testing administered by a psychometrist and interpreted by a neuropsychologist (CPT 76704). ICD-10 diagnosis: K74.60; F06.8.

## 2018-07-26 NOTE — PROGRESS NOTES
WASI-2    Raw                     T   Vocabulary   31    45   Matrix Reasoning     9    40     Full 2 IQ   87      WAIS-IV    Raw              Age Scaled   Coding   37     8    Digit Span   17     6     WIDE RANGE ACHIEVEMENT TEST - 4    Standard  %tile              Grade      Score  Rank              Equiv.  Reading    89     23           9.6      WECHSLER MEMORY SCALE - REVISED    Raw Score       MAS          %ile  Information & Orientation       12   Logical Memory  Immed.  10     5    5   Logical Memory  30 min.    7     6    9   Logical Memory  % retention    70     9    37   Visual Reprod Imm.  18     5    5   Visual Reprod 30 min.    9     6    9   Visual Reprod % retention    50     8    25   30 Minute Recognition    1     JEAN AUDITORY VERBAL LEARNING TEST  (30 item recognition)    I   II III  IV  V VI  VII    1    5       3       2       3       4       3   30 Minute Recall  2  MAS    6   30 Minute Recognition  6  MAS    5           Intrusions  2       Learning Efficiency      <65  % Ret.  67                                                                     MAS  10     JEAN-OSTERRIETH COMPLEX FIGURE   Raw Score   %tile  Copy    20   ?1     BOSTON NAMING TEST  Score     46      MAS    7   16 %ile  [     46 w/o cues    6 w/phonemic cues]    CONTROLLED ORAL WORD ASSOC TEST  Score     25              MAS  8     37 %ile    SEMANTIC FLUENCY  Score     35              MAS 10   50 %ile      CLOCK DRAWING  Command  1 /3    Copy   2   /3       TRAIL MAKING TEST         Seconds         Errors           MAS                %ile  A    44    0    10  .    50   B     282    2      3  .      1     STROOP                    Raw   +  Justo =     Total       MAS      %ile  Word  87  +  - =   87    11   63   Color  55  +  - =   55      9   37   C-W  23  +  - =   23      8   25     DEMENTIA RATING SCALE - 2 - Standard    Raw MAS Raw     MAS   Attention  36   12   Concept  34   8    Init/Psv  34    8  Memory  19     5   Construct  6   10  Total     129/144   7     GERIATRIC DEPRESSION SCALE: 15    [facility] = [place] Older Adult Normative Study Age & Ed. Adj. Scaled Score

## 2018-07-31 ENCOUNTER — INFUSION THERAPY VISIT (OUTPATIENT)
Dept: INFUSION THERAPY | Facility: CLINIC | Age: 78
End: 2018-07-31
Payer: MEDICARE

## 2018-07-31 VITALS
HEART RATE: 58 BPM | RESPIRATION RATE: 18 BRPM | DIASTOLIC BLOOD PRESSURE: 71 MMHG | TEMPERATURE: 97.9 F | WEIGHT: 136.2 LBS | BODY MASS INDEX: 23.38 KG/M2 | OXYGEN SATURATION: 97 % | SYSTOLIC BLOOD PRESSURE: 121 MMHG

## 2018-07-31 DIAGNOSIS — R19.7 DIARRHEA, UNSPECIFIED TYPE: Primary | ICD-10-CM

## 2018-07-31 DIAGNOSIS — K55.20 AVM (ARTERIOVENOUS MALFORMATION) OF SMALL BOWEL, ACQUIRED: ICD-10-CM

## 2018-07-31 DIAGNOSIS — D50.0 IRON DEFICIENCY ANEMIA DUE TO CHRONIC BLOOD LOSS: ICD-10-CM

## 2018-07-31 DIAGNOSIS — K31.811 ANGIODYSPLASIA OF STOMACH AND DUODENUM WITH HEMORRHAGE: ICD-10-CM

## 2018-07-31 PROCEDURE — 96372 THER/PROPH/DIAG INJ SC/IM: CPT | Performed by: INTERNAL MEDICINE

## 2018-07-31 PROCEDURE — 99207 ZZC NO CHARGE NURSE ONLY: CPT

## 2018-07-31 RX ADMIN — OCTREOTIDE ACETATE 20 MG: KIT INTRAMUSCULAR at 11:50

## 2018-07-31 ASSESSMENT — PAIN SCALES - GENERAL: PAINLEVEL: NO PAIN (0)

## 2018-07-31 NOTE — PROGRESS NOTES
Nursing Note:  Rachele Martínez presents today for Sandostatin injection every 28 days.  Patient seen by provider today: No   present during visit today: Not Applicable.    Note: Pt states she is doing well, a little fatigued from dialysis yesterday, denies any problems with Sandostatin injections.    Intravenous Access:  No Intravenous access/labs at this visit.    Treatment Conditions:  Not Applicable.      Post Infusion Assessment:  Patient tolerated injection without incident.    Discharge Plan:   Return 08/28/18 for injection.  Discharge instructions reviewed with: Patient and Family.  Patient and/or family verbalized understanding of discharge instructions and all questions answered.  Patient discharged in stable condition accompanied by: self and daughter.  Departure Mode: Ambulatory.    Estrella Hernandez RN

## 2018-07-31 NOTE — MR AVS SNAPSHOT
After Visit Summary   7/31/2018    Rachele Martínez    MRN: [id number]           Patient Information     Date Of Birth          1940        Visit Information        Provider Department      7/31/2018 11:30 AM BAY 4 INFUSION [facility]        Today's Diagnoses     Diarrhea, unspecified type    -  1    Angiodysplasia of stomach and duodenum with hemorrhage        AVM (arteriovenous malformation) of small bowel, acquired (H)        Iron deficiency anemia due to chronic blood loss           Follow-ups after your visit        Your next 10 appointments already scheduled     Aug 28, 2018 11:30 AM CDT   Level O with [place] 2 INFUSION   [facility] ([facility])    [address] [place] [address]   [phone]            Sep 25, 2018 11:30 AM CDT   Level O with [place] 6 INFUSION   [facility] ([facility])    [id number] [place] [address]   [phone]            Nov 15, 2018  9:00 AM CST   Lab with UC LAB   [facility] ([facility])    [address] [place] [id number] [phone]            Nov 15, 2018  9:15 AM CST   US ABDOMEN COMPLETE with UCUS1   [facility] US ([facility])    [address] [place] [id number] [phone]           Please bring a list of your medicines (including vitamins, minerals and over-the-counter drugs). Also, tell your doctor about any allergies you may have. Wear comfortable clothes and leave your valuables at home.  Adults: No eating or drinking for 8 hours before the exam. You may take medicine with a small sip of water.  Children: - Infants, breast-fed: may have breast milk up to 2 hours before exam. - Infants, formula: may have bottle until 4 hours before exam. - Children 1-5 years: No food or drink for 4 hours before exam. - Children 6 -12  years: No food or drink for 6 hours before exam. - Children over 12 years: No food or drink for 8 hours before exam. - J Tube Fed: No need to stop feedings.  Please call the Imaging Department at your exam site with any questions.            Nov 15, 2018 10:15 AM CST   (Arrive by 10:00 AM)   Return General Liver with Roberto Gordon PA-C   [facility] ([facility])    [address]  [address]  [facility] [address] [phone]              Who to contact     If you have questions or need follow up information about today's clinic visit or your schedule please contact [facility] directly at [phone].  Normal or non-critical lab and imaging results will be communicated to you by MyChart, letter or phone within 4 business days after the clinic has received the results. If you do not hear from us within 7 days, please contact the clinic through MyChart or phone. If you have a critical or abnormal lab result, we will notify you by phone as soon as possible.  Submit refill requests through [employer] or call your pharmacy and they will forward the refill request to us. Please allow 3 business days for your refill to be completed.          Additional Information About Your Visit        Care EveryWhere ID     This is your Care EveryWhere ID. This could be used by other organizations to access your [place] medical records  [id number]        Your Vitals Were     Pulse Temperature Respirations Pulse Oximetry BMI (Body Mass Index)       58 97.9  F (36.6  C) (Oral) 18 97% 23.38 kg/m2        Blood Pressure from Last 3 Encounters:   07/31/18 121/71   07/03/18 123/72   06/15/18 126/69    Weight from Last 3 Encounters:   07/31/18 61.8 kg (136 lb 3.2 oz)   07/03/18 63 kg (138 lb 14.4 oz)   06/15/18 61.2 kg (135 lb)              Today, you had the following     No orders found for display       Primary Care Provider Office Phone # Fax #    Agnieszka Maldonado  MD Jennifer [phone]       [address] [place] [address] [address]  [facility] [address]        Equal Access to Services     JAMIE CARVAJAL : Hadii aad ku hadestersayra Aminahemal, michaelkarolina garciatalha, cee riyarobbyda jorge, jaz yancey laShantishyanne fowler. So [facility] [phone].    ATENCIÓN: Si habla español, tiene a ibarra disposición servicios gratuitos de asistencia lingüística. Llame al [phone].    We comply with applicable federal civil rights laws and [place] laws. We do not discriminate on the basis of race, color, national origin, age, disability, sex, sexual orientation, or gender identity.            Thank you!     Thank you for choosing [facility]  for your care. Our goal is always to provide you with excellent care. Hearing back from our patients is one way we can continue to improve our services. Please take a few minutes to complete the written survey that you may receive in the mail after your visit with us. Thank you!             Your Updated Medication List - Protect others around you: Learn how to safely use, store and throw away your medicines at www.disposemymeds.org.          This list is accurate as of 7/31/18 12:00 PM.  Always use your most recent med list.                   Brand Name Dispense Instructions for use Diagnosis    atorvastatin 20 MG tablet    LIPITOR    90 tablet    Take 1 tablet (20 mg) by mouth daily    Cognitive impairment       Calcium Acetate (Phos Binder) 667 MG Caps      TAKE 2 CAPSULE BY MOUTH THREE TIMES A DAY WITH MEALS        OCTREOTIDE ACETATE IJ      Inject as directed every 30 days        order for DME     1 Device    Equipment being ordered: 4 wheel walker with seat.    Disorder of bone and cartilage, Arthralgia, unspecified joint, Tendency to fall       polyethylene glycol 3350 Powd     1530 g    Take 17 g by mouth daily    Slow transit constipation       PRORENAL + D Tabs      Take 1 tablet by mouth daily        sertraline 50 MG tablet     ZOLOFT    180 tablet    Take 2 tablets (100 mg) by mouth daily    Itching       * VENTOLIN  (90 Base) MCG/ACT Inhaler   Generic drug:  albuterol      INHALE 2 PUFFS BY MOUTH EVERY 4 HOURS AS NEEDED FOR WHEEZING OR SHORTNESS OF BREATH        * albuterol 108 (90 Base) MCG/ACT Inhaler    PROAIR HFA    1 Inhaler    Inhale 2 puffs into the lungs every 6 hours    Cough       * Notice:  This list has 2 medication(s) that are the same as other medications prescribed for you. Read the directions carefully, and ask your doctor or other care provider to review them with you.

## 2018-08-21 ENCOUNTER — OFFICE VISIT (OUTPATIENT)
Dept: GASTROENTEROLOGY | Facility: CLINIC | Age: 78
End: 2018-08-21
Attending: PHYSICIAN ASSISTANT
Payer: MEDICARE

## 2018-08-21 VITALS
OXYGEN SATURATION: 100 % | RESPIRATION RATE: 16 BRPM | WEIGHT: 138.6 LBS | BODY MASS INDEX: 23.66 KG/M2 | SYSTOLIC BLOOD PRESSURE: 169 MMHG | DIASTOLIC BLOOD PRESSURE: 73 MMHG | HEIGHT: 64 IN | TEMPERATURE: 98 F | HEART RATE: 72 BPM

## 2018-08-21 DIAGNOSIS — K74.60 CIRRHOSIS OF LIVER WITHOUT ASCITES, UNSPECIFIED HEPATIC CIRRHOSIS TYPE (H): ICD-10-CM

## 2018-08-21 DIAGNOSIS — K74.60 CIRRHOSIS OF LIVER WITHOUT ASCITES, UNSPECIFIED HEPATIC CIRRHOSIS TYPE (H): Primary | ICD-10-CM

## 2018-08-21 LAB
ALBUMIN SERPL-MCNC: 3.2 G/DL (ref 3.4–5)
ALP SERPL-CCNC: 127 U/L (ref 40–150)
ALT SERPL W P-5'-P-CCNC: 13 U/L (ref 0–50)
ANION GAP SERPL CALCULATED.3IONS-SCNC: 8 MMOL/L (ref 3–14)
AST SERPL W P-5'-P-CCNC: 31 U/L (ref 0–45)
BILIRUB DIRECT SERPL-MCNC: 0.2 MG/DL (ref 0–0.2)
BILIRUB SERPL-MCNC: 0.4 MG/DL (ref 0.2–1.3)
BUN SERPL-MCNC: 21 MG/DL (ref 7–30)
CALCIUM SERPL-MCNC: 8.3 MG/DL (ref 8.5–10.1)
CHLORIDE SERPL-SCNC: 100 MMOL/L (ref 94–109)
CO2 SERPL-SCNC: 31 MMOL/L (ref 20–32)
CREAT SERPL-MCNC: 6.52 MG/DL (ref 0.52–1.04)
ERYTHROCYTE [DISTWIDTH] IN BLOOD BY AUTOMATED COUNT: 16 % (ref 10–15)
GFR SERPL CREATININE-BSD FRML MDRD: 6 ML/MIN/1.7M2
GLUCOSE SERPL-MCNC: 102 MG/DL (ref 70–99)
HCT VFR BLD AUTO: 24.9 % (ref 35–47)
HGB BLD-MCNC: 7.8 G/DL (ref 11.7–15.7)
INR PPP: 1.12 (ref 0.86–1.14)
MCH RBC QN AUTO: 31.6 PG (ref 26.5–33)
MCHC RBC AUTO-ENTMCNC: 31.3 G/DL (ref 31.5–36.5)
MCV RBC AUTO: 101 FL (ref 78–100)
PLATELET # BLD AUTO: 269 10E9/L (ref 150–450)
POTASSIUM SERPL-SCNC: 4.1 MMOL/L (ref 3.4–5.3)
PROT SERPL-MCNC: 7.1 G/DL (ref 6.8–8.8)
RBC # BLD AUTO: 2.47 10E12/L (ref 3.8–5.2)
SODIUM SERPL-SCNC: 138 MMOL/L (ref 133–144)
WBC # BLD AUTO: 7.6 10E9/L (ref 4–11)

## 2018-08-21 PROCEDURE — G0463 HOSPITAL OUTPT CLINIC VISIT: HCPCS | Mod: ZF

## 2018-08-21 PROCEDURE — 80076 HEPATIC FUNCTION PANEL: CPT | Performed by: PHYSICIAN ASSISTANT

## 2018-08-21 PROCEDURE — 85027 COMPLETE CBC AUTOMATED: CPT | Performed by: PHYSICIAN ASSISTANT

## 2018-08-21 PROCEDURE — 36415 COLL VENOUS BLD VENIPUNCTURE: CPT | Performed by: PHYSICIAN ASSISTANT

## 2018-08-21 PROCEDURE — 80048 BASIC METABOLIC PNL TOTAL CA: CPT | Performed by: PHYSICIAN ASSISTANT

## 2018-08-21 PROCEDURE — 85610 PROTHROMBIN TIME: CPT | Performed by: PHYSICIAN ASSISTANT

## 2018-08-21 RX ORDER — PANTOPRAZOLE SODIUM 40 MG/1
40 TABLET, DELAYED RELEASE ORAL 2 TIMES DAILY
Status: ON HOLD | COMMUNITY
Start: 2018-08-16 | End: 2018-09-20

## 2018-08-21 ASSESSMENT — PAIN SCALES - GENERAL: PAINLEVEL: NO PAIN (0)

## 2018-08-21 NOTE — LETTER
8/21/2018      RE: Rachele Martínez  [address] [place]  [address]  [facility] [address]       Hepatology Follow-up Clinic note  Rachele Martínez   Date of Birth 1940  Date of Service 8/21/2018    Reason for follow-up: Hep C cirrhosis          Assessment/plan:   Rachele Martínez is a 78 year old female with history of Hep C cirrhosis. Hep C has been treated and she achieved Harvoni x 12 weeks. Her cirrhosis is well compensated at this time. No signs of ascites, LESTER or HE or asterixis on exam. She has a history of AVM and was recently hospitalized for GI bleed requiring transfusions and two EGD's and argon plasma coagulation. Her platelets remain normal and she does not have any varices.     - Follow-up in clinic in [facility] within the week   - HCC screening with abdominal ultrasound in November  - Repeat hepatic panel, CBC, BMP, INR in 6 months   - Follow up with me in 6 months or sooner as needed    Roberto Gordon PA-C   [facility] Hepatology clinic    -----------------------------------------------------       HPI:   Rachele Martínez is a 78 year old female presenting for follow-up. She is accompanied by her daughter today.     Hep C:  - treated with Harvoni x 12, achieved SVR 7/2015  - Fibrosis scan:  Stage 4 fibrosis, 19.9 kilopascals     Cirrhosis:  - Hep C  Complicated by:  - No hx of Ascites  - Hx of GI bleed due to AVM, no history of varices  - No hx of HE  EGD: 8/14/2018, normal esophagus, bleeding angiodysplastic lesions in stomach and duodenum  HCC: 5/17/2018, no liver lesions.      Patient was last seen by me on 5/17/2018. She and her daughter present for hospital follow to discuss management of AVM. She was recently hospitalized at [facility] for GI bleed secondary to multiple AVM's. She two EGD's which showed multiple bleeding angiodysplastic lesion in the stomach that were bleeding and treated with argon plasma coagulation. She did require transfusions during  admission. Her hemoglobin today was 7.8. Her hemoglobin one week ago was 8.7. She denies any shortness of breath or chest pain. She denies any melena or hematochezia since discharge.  She continues to receive dialysis three times a week.     She has previously been followed [facility] for [facility]'s and has been on monthly octreotide infusions. Due to normal platelets and liver stiffness <20 kilopascals, she did not previously have variceal screening.     Patient denies jaundice, lower extremity edema, abdominal distension or confusion.  Patient also denies melena, hematochezia or hematemesis. Patient denies weight loss, fevers, sweats or chills. ETOH includes     Medical hx Surgical hx   Past Medical History:   Diagnosis Date     Anemia      Anxiety state, unspecified 11/23/2012     Arthritis      Chronic hepatitis C with cirrhosis (H) 12/10/2014     Chronic kidney disease      Clotting disorder (H)      Dialysis patient (H)      Glaucoma      Hypertension      Hypertension goal BP (blood pressure) < 140/80 2/10/2014     Liver failure (H)      Renal failure      Unspecified pruritic disorder 11/23/2012    Past Surgical History:   Procedure Laterality Date     COLONOSCOPY N/A 9/4/2015    Procedure: COMBINED COLONOSCOPY, SINGLE OR MULTIPLE BIOPSY/POLYPECTOMY BY BIOPSY;  Surgeon: Rupesh Lopez MD;  Location: [facility]     ESOPHAGOSCOPY, GASTROSCOPY, DUODENOSCOPY (EGD), COMBINED N/A 12/18/2014    Procedure: COMBINED ESOPHAGOSCOPY, GASTROSCOPY, DUODENOSCOPY (EGD);  Surgeon: Betsy Carvajal MD;  Location:  GI     ESOPHAGOSCOPY, GASTROSCOPY, DUODENOSCOPY (EGD), COMBINED N/A 4/25/2015    Procedure: COMBINED ESOPHAGOSCOPY, GASTROSCOPY, DUODENOSCOPY (EGD);  Surgeon: Yosvany Ram MD;  Location:  GI     ESOPHAGOSCOPY, GASTROSCOPY, DUODENOSCOPY (EGD), COMBINED N/A 5/5/2015    Procedure: COMBINED ESOPHAGOSCOPY, GASTROSCOPY, DUODENOSCOPY (EGD);  Surgeon: Mariano Mistry MD;  Location: [facility]     HC  "CAPSULE ENDOSCOPY N/A 9/30/2015    Procedure: CAPSULE/PILL CAM ENDOSCOPY;  Surgeon: Pan Dhaliwal MD;  Location:  GI     HYSTERECTOMY  1980    KYREE     LUMPECTOMY BREAST                   Medications:     Current Outpatient Prescriptions   Medication     atorvastatin (LIPITOR) 20 MG tablet     Calcium Acetate, Phos Binder, 667 MG CAPS     Multiple Vitamins-Minerals (PRORENAL + D) TABS     OCTREOTIDE ACETATE IJ     pantoprazole (PROTONIX) 40 MG EC tablet     polyethylene glycol 3350 POWD     sertraline (ZOLOFT) 50 MG tablet     albuterol (PROAIR HFA) 108 (90 BASE) MCG/ACT Inhaler     order for DME     [DISCONTINUED] VENTOLIN  (90 BASE) MCG/ACT Inhaler     No current facility-administered medications for this visit.           Allergies:     Allergies   Allergen Reactions     Diovan Hct Itching     severe     Dust Mites      Hydrochlorothiazide Itching     Severe       Sulfa Drugs Hives     Spironolactone Nausea            Review of Systems:   10 points ROS was obtained and highlighted in the HPI, otherwise negative.          Physical Exam:   VS:  /73 (BP Location: Left arm, Patient Position: Sitting, Cuff Size: Adult Regular)  Pulse 72  Temp 98  F (36.7  C) (Oral)  Resp 16  Ht 1.626 m (5' 4\")  Wt 62.9 kg (138 lb 9.6 oz)  SpO2 100%  BMI 23.79 kg/m2      Gen: A&Ox3, NAD, well developed  HEENT: non-icteric, no cervical lymphadenopathy, no lesions or ulcers in oropharynx  CV: RRR, no overt murmurs  Lung: CTA Bilatererally, no wheezing or crackles.   Lym- no palpable lymphadenopathy  Abd: soft, NT, ND, no organomegaly. No ascites.   Ext: no edema, intact pulses.   Skin: No rash, no palmar erythema, telangiectasias or jaundice  Neuro: grossly intact, no asterixis   Psych: appropriate mood and affects           Data:   Reviewed in person and significant for:    Lab Results   Component Value Date     05/17/2018      Lab Results   Component Value Date    POTASSIUM 3.5 05/17/2018     Lab " Results   Component Value Date    CHLORIDE 98 05/17/2018     Lab Results   Component Value Date    CO2 32 05/17/2018     Lab Results   Component Value Date    BUN 24 05/17/2018     Lab Results   Component Value Date    CR 6.31 05/17/2018       Lab Results   Component Value Date    WBC 5.6 05/17/2018     Lab Results   Component Value Date    HGB 11.2 05/17/2018     Lab Results   Component Value Date    HCT 35.8 05/17/2018     Lab Results   Component Value Date     05/17/2018     Lab Results   Component Value Date     05/17/2018       Lab Results   Component Value Date    AST 25 05/17/2018     Lab Results   Component Value Date    ALT 18 05/17/2018     No results found for: BILICONJ   Lab Results   Component Value Date    BILITOTAL 0.4 05/17/2018       Lab Results   Component Value Date    ALBUMIN 3.9 05/17/2018     Lab Results   Component Value Date    PROTTOTAL 8.2 05/17/2018      Lab Results   Component Value Date    ALKPHOS 106 05/17/2018       Lab Results   Component Value Date    INR 1.03 05/17/2018       Roberto Gordon PA-C

## 2018-08-21 NOTE — LETTER
8/21/2018       RE: Rachele Martínez  [address] [place]  [address]  [facility] [address]     Dear Colleague,    Thank you for referring your patient, Rachele Martínez, to the [facility] HEPATOLOGY at [facility]. Please see a copy of my visit note below.    Hepatology Follow-up Clinic note  Rachele Martínez   Date of Birth 1940  Date of Service 8/21/2018    Reason for follow-up: Hep C cirrhosis          Assessment/plan:   Rachele Martínez is a 78 year old female with history of Hep C cirrhosis. Hep C has been treated and she achieved Harvoni x 12 weeks. Her cirrhosis is well compensated at this time. No signs of ascites, LESTER or HE or asterixis on exam. She has a history of AVM and was recently hospitalized for GI bleed requiring transfusions and two EGD's and argon plasma coagulation. Her platelets remain normal and she does not have any varices.     - Follow-up in clinic in [facility] within the week   - HCC screening with abdominal ultrasound in November  - Repeat hepatic panel, CBC, BMP, INR in 6 months   - Follow up with me in 6 months or sooner as needed    Roberto Gordon PA-C   [facility] Hepatology clinic    -----------------------------------------------------       HPI:   Rachele Martínez is a 78 year old female presenting for follow-up. She is accompanied by her daughter today.     Hep C:  - treated with Harvoni x 12, achieved SVR 7/2015  - Fibrosis scan:  Stage 4 fibrosis, 19.9 kilopascals     Cirrhosis:  - Hep C  Complicated by:  - No hx of Ascites  - Hx of GI bleed due to AVM, no history of varices  - No hx of HE  EGD: 8/14/2018, normal esophagus, bleeding angiodysplastic lesions in stomach and duodenum  HCC: 5/17/2018, no liver lesions.      Patient was last seen by me on 5/17/2018. She and her daughter present for hospital follow to discuss management of AVM. She was recently hospitalized at [facility] for GI bleed secondary to  multiple AVM's. She two EGD's which showed multiple bleeding angiodysplastic lesion in the stomach that were bleeding and treated with argon plasma coagulation. She did require transfusions during admission. Her hemoglobin today was 7.8. Her hemoglobin one week ago was 8.7. She denies any shortness of breath or chest pain. She denies any melena or hematochezia since discharge.  She continues to receive dialysis three times a week.     She has previously been followed in [facility] for [facility]'s and has been on monthly octreotide infusions. Due to normal platelets and liver stiffness <20 kilopascals, she did not previously have variceal screening.     Patient denies jaundice, lower extremity edema, abdominal distension or confusion.  Patient also denies melena, hematochezia or hematemesis. Patient denies weight loss, fevers, sweats or chills. ETOH includes     Medical hx Surgical hx   Past Medical History:   Diagnosis Date     Anemia      Anxiety state, unspecified 11/23/2012     Arthritis      Chronic hepatitis C with cirrhosis (H) 12/10/2014     Chronic kidney disease      Clotting disorder (H)      Dialysis patient (H)      Glaucoma      Hypertension      Hypertension goal BP (blood pressure) < 140/80 2/10/2014     Liver failure (H)      Renal failure      Unspecified pruritic disorder 11/23/2012    Past Surgical History:   Procedure Laterality Date     COLONOSCOPY N/A 9/4/2015    Procedure: COMBINED COLONOSCOPY, SINGLE OR MULTIPLE BIOPSY/POLYPECTOMY BY BIOPSY;  Surgeon: Rupesh Lopez MD;  Location: [facility]     ESOPHAGOSCOPY, GASTROSCOPY, DUODENOSCOPY (EGD), COMBINED N/A 12/18/2014    Procedure: COMBINED ESOPHAGOSCOPY, GASTROSCOPY, DUODENOSCOPY (EGD);  Surgeon: Betsy Carvajal MD;  Location: [facility]     ESOPHAGOSCOPY, GASTROSCOPY, DUODENOSCOPY (EGD), COMBINED N/A 4/25/2015    Procedure: COMBINED ESOPHAGOSCOPY, GASTROSCOPY, DUODENOSCOPY (EGD);  Surgeon: Yosvany Ram MD;  Location: [facility]  "    ESOPHAGOSCOPY, GASTROSCOPY, DUODENOSCOPY (EGD), COMBINED N/A 5/5/2015    Procedure: COMBINED ESOPHAGOSCOPY, GASTROSCOPY, DUODENOSCOPY (EGD);  Surgeon: Mariano Mistry MD;  Location:  GI     HC CAPSULE ENDOSCOPY N/A 9/30/2015    Procedure: CAPSULE/PILL CAM ENDOSCOPY;  Surgeon: Pan Dhaliwal MD;  Location:  GI     HYSTERECTOMY  1980    KYREE     LUMPECTOMY BREAST                   Medications:     Current Outpatient Prescriptions   Medication     atorvastatin (LIPITOR) 20 MG tablet     Calcium Acetate, Phos Binder, 667 MG CAPS     Multiple Vitamins-Minerals (PRORENAL + D) TABS     OCTREOTIDE ACETATE IJ     pantoprazole (PROTONIX) 40 MG EC tablet     polyethylene glycol 3350 POWD     sertraline (ZOLOFT) 50 MG tablet     albuterol (PROAIR HFA) 108 (90 BASE) MCG/ACT Inhaler     order for DME     [DISCONTINUED] VENTOLIN  (90 BASE) MCG/ACT Inhaler     No current facility-administered medications for this visit.           Allergies:     Allergies   Allergen Reactions     Diovan Hct Itching     severe     Dust Mites      Hydrochlorothiazide Itching     Severe       Sulfa Drugs Hives     Spironolactone Nausea            Review of Systems:   10 points ROS was obtained and highlighted in the HPI, otherwise negative.          Physical Exam:   VS:  /73 (BP Location: Left arm, Patient Position: Sitting, Cuff Size: Adult Regular)  Pulse 72  Temp 98  F (36.7  C) (Oral)  Resp 16  Ht 1.626 m (5' 4\")  Wt 62.9 kg (138 lb 9.6 oz)  SpO2 100%  BMI 23.79 kg/m2      Gen: A&Ox3, NAD, well developed  HEENT: non-icteric, no cervical lymphadenopathy, no lesions or ulcers in oropharynx  CV: RRR, no overt murmurs  Lung: CTA Bilatererally, no wheezing or crackles.   Lym- no palpable lymphadenopathy  Abd: soft, NT, ND, no organomegaly. No ascites.   Ext: no edema, intact pulses.   Skin: No rash, no palmar erythema, telangiectasias or jaundice  Neuro: grossly intact, no asterixis   Psych: appropriate mood and " affects           Data:   Reviewed in person and significant for:    Lab Results   Component Value Date     05/17/2018      Lab Results   Component Value Date    POTASSIUM 3.5 05/17/2018     Lab Results   Component Value Date    CHLORIDE 98 05/17/2018     Lab Results   Component Value Date    CO2 32 05/17/2018     Lab Results   Component Value Date    BUN 24 05/17/2018     Lab Results   Component Value Date    CR 6.31 05/17/2018       Lab Results   Component Value Date    WBC 5.6 05/17/2018     Lab Results   Component Value Date    HGB 11.2 05/17/2018     Lab Results   Component Value Date    HCT 35.8 05/17/2018     Lab Results   Component Value Date     05/17/2018     Lab Results   Component Value Date     05/17/2018       Lab Results   Component Value Date    AST 25 05/17/2018     Lab Results   Component Value Date    ALT 18 05/17/2018     No results found for: BILICONJ   Lab Results   Component Value Date    BILITOTAL 0.4 05/17/2018       Lab Results   Component Value Date    ALBUMIN 3.9 05/17/2018     Lab Results   Component Value Date    PROTTOTAL 8.2 05/17/2018      Lab Results   Component Value Date    ALKPHOS 106 05/17/2018       Lab Results   Component Value Date    INR 1.03 05/17/2018       Again, thank you for allowing me to participate in the care of your patient.      Sincerely,    Roberto Gordon PA-C

## 2018-08-21 NOTE — NURSING NOTE
"Chief Complaint   Patient presents with     Hospital F/U     GI bleed     RECHECK     cirrhosis       /73 (BP Location: Left arm, Patient Position: Sitting, Cuff Size: Adult Regular)  Pulse 72  Temp 98  F (36.7  C) (Oral)  Resp 16  Ht 1.626 m (5' 4\")  Wt 62.9 kg (138 lb 9.6 oz)  SpO2 100%  BMI 23.79 kg/m2    Florence Wadsworth [facility]  8/21/2018 12:43 PM      "

## 2018-08-21 NOTE — MR AVS SNAPSHOT
After Visit Summary   8/21/2018    Rachele Martínez    MRN: [id number]           Patient Information     Date Of Birth          1940        Visit Information        Provider Department      8/21/2018 12:45 PM Roberto Gordon PA-C [facility] Hepatology        Today's Diagnoses     Cirrhosis of liver without ascites, unspecified hepatic cirrhosis type (H)    -  1       Follow-ups after your visit        Follow-up notes from your care team     Return in about 6 months (around 2/21/2019).      Your next 10 appointments already scheduled     Aug 28, 2018 11:30 AM CDT   Level O with BAY 3 INFUSION   [facility] ([facility])    [address] [place] [id number]   [phone]            Sep 25, 2018 11:30 AM CDT   Level O with BAY 6 INFUSION   [facility] ([facility])    [address] [place] [address]   [phone]            Nov 15, 2018  9:00 AM CST   Lab with  LAB   [facility] ([facility])    [address] [place] [id number] [phone]            Nov 15, 2018  9:15 AM CST   US ABDOMEN COMPLETE with UCUS1   [facility] US ([facility])    [address] [place] [id number] [phone]           Please bring a list of your medicines (including vitamins, minerals and over-the-counter drugs). Also, tell your doctor about any allergies you may have. Wear comfortable clothes and leave your valuables at home.  Adults: No eating or drinking for 8 hours before the exam. You may take medicine with a small sip of water.  Children: - Infants, breast-fed: may have breast milk up to 2 hours before exam. - Infants, formula: may have bottle until 4 hours before exam. - Children 1-5 years: No food or drink for 4 hours before exam. - Children 6 -12 years: No food or drink for 6 hours before  "exam. - Children over 12 years: No food or drink for 8 hours before exam. - J Tube Fed: No need to stop feedings.  Please call the Imaging Department at your exam site with any questions.            Feb 12, 2019 11:45 AM CST   (Arrive by 11:30 AM)   Return General Liver with Andriy Barnett MD   [facility] Hepatology ([facility])    [address] [place] [address]  [address]  [facility] [address] [phone]              Who to contact     If you have questions or need follow up information about today's clinic visit or your schedule please contact [facility] HEPATOLOGY directly at [phone].  Normal or non-critical lab and imaging results will be communicated to you by MyChart, letter or phone within 4 business days after the clinic has received the results. If you do not hear from us within 7 days, please contact the clinic through MyChart or phone. If you have a critical or abnormal lab result, we will notify you by phone as soon as possible.  Submit refill requests through MyForce or call your pharmacy and they will forward the refill request to us. Please allow 3 business days for your refill to be completed.          Additional Information About Your Visit        Care EveryWhere ID     This is your Care EveryWhere ID. This could be used by other organizations to access your [place] medical records  [id number]        Your Vitals Were     Pulse Temperature Respirations Height Pulse Oximetry BMI (Body Mass Index)    72 98  F (36.7  C) (Oral) 16 1.626 m (5' 4\") 100% 23.79 kg/m2       Blood Pressure from Last 3 Encounters:   08/21/18 169/73   07/31/18 121/71   07/03/18 123/72    Weight from Last 3 Encounters:   08/21/18 62.9 kg (138 lb 9.6 oz)   07/31/18 61.8 kg (136 lb 3.2 oz)   07/03/18 63 kg (138 lb 14.4 oz)              Today, you had the following     No orders found for display         Today's Medication Changes          These changes are accurate as of 8/21/18 11:59 PM.  If you have any " questions, ask your nurse or doctor.               These medicines have changed or have updated prescriptions.        Dose/Directions    polyethylene glycol 3350 Powd   This may have changed:    - when to take this  - reasons to take this   Used for:  Slow transit constipation        Dose:  17 g   Take 17 g by mouth daily   Quantity:  1530 g   Refills:  3                Primary Care Provider Office Phone # Fax #    Agnieszka Erica Rodriguez [phone]       [address] [place] [address] 741  [facility] [address]        Equal Access to Services     JAMIE CARVAJAL : Hadii lawrence ku hadasho Soomaali, waaxda luqadaha, qaybta kaalmada adeegyada, waxay anmolin hayshyanne peres . So [facility] [phone].    ATENCIÓN: Si habla español, tiene a ibarra disposición servicios gratuitos de asistencia lingüística. [facility] [phone].    We comply with applicable federal civil rights laws and [place] laws. We do not discriminate on the basis of race, color, national origin, age, disability, sex, sexual orientation, or gender identity.            Thank you!     Thank you for choosing [facility] HEPATOLOGY  for your care. Our goal is always to provide you with excellent care. Hearing back from our patients is one way we can continue to improve our services. Please take a few minutes to complete the written survey that you may receive in the mail after your visit with us. Thank you!             Your Updated Medication List - Protect others around you: Learn how to safely use, store and throw away your medicines at www.disposemymeds.org.          This list is accurate as of 8/21/18 11:59 PM.  Always use your most recent med list.                   Brand Name Dispense Instructions for use Diagnosis    albuterol 108 (90 Base) MCG/ACT inhaler    PROAIR HFA    1 Inhaler    Inhale 2 puffs into the lungs every 6 hours    Cough       atorvastatin 20 MG tablet    LIPITOR    90 tablet    Take 1 tablet (20 mg) by mouth daily     Cognitive impairment       Calcium Acetate (Phos Binder) 667 MG Caps      TAKE 2 CAPSULE BY MOUTH THREE TIMES A DAY WITH MEALS        OCTREOTIDE ACETATE IJ      Inject as directed every 30 days        order for DME     1 Device    Equipment being ordered: 4 wheel walker with seat.    Disorder of bone and cartilage, Arthralgia, unspecified joint, Tendency to fall       pantoprazole 40 MG EC tablet    PROTONIX     Take 40 mg by mouth 2 times daily        polyethylene glycol 3350 Powd     1530 g    Take 17 g by mouth daily    Slow transit constipation       PRORENAL + D Tabs      Take 1 tablet by mouth daily        sertraline 50 MG tablet    ZOLOFT    180 tablet    Take 2 tablets (100 mg) by mouth daily    Itching

## 2018-08-21 NOTE — PROGRESS NOTES
Hepatology Follow-up Clinic note  Rachele Martínez   Date of Birth 1940  Date of Service 8/21/2018    Reason for follow-up: Hep C cirrhosis          Assessment/plan:   Rachele Martínez is a 78 year old female with history of Hep C cirrhosis. Hep C has been treated and she achieved Harvoni x 12 weeks. Her cirrhosis is well compensated at this time. No signs of ascites, LESTER or HE or asterixis on exam. She has a history of AVM and was recently hospitalized for GI bleed requiring transfusions and two EGD's and argon plasma coagulation. Her platelets remain normal and she does not have any varices.     - Follow-up in clinic in [facility] within the week   - HCC screening with abdominal ultrasound in November  - Repeat hepatic panel, CBC, BMP, INR in 6 months   - Follow up with me in 6 months or sooner as needed    Roberto Gordon PA-C   [facility] Hepatology clinic    -----------------------------------------------------       HPI:   Rachele Martínez is a 78 year old female presenting for follow-up. She is accompanied by her daughter today.     Hep C:  - treated with Harvoni x 12, achieved SVR 7/2015  - Fibrosis scan:  Stage 4 fibrosis, 19.9 kilopascals     Cirrhosis:  - Hep C  Complicated by:  - No hx of Ascites  - Hx of GI bleed due to AVM, no history of varices  - No hx of HE  EGD: 8/14/2018, normal esophagus, bleeding angiodysplastic lesions in stomach and duodenum  HCC: 5/17/2018, no liver lesions.      Patient was last seen by me on 5/17/2018. She and her daughter present for hospital follow to discuss management of AVM. She was recently hospitalized at [facility] for GI bleed secondary to multiple AVM's. She two EGD's which showed multiple bleeding angiodysplastic lesion in the stomach that were bleeding and treated with argon plasma coagulation. She did require transfusions during admission. Her hemoglobin today was 7.8. Her hemoglobin one week ago was 8.7. She denies any shortness  of breath or chest pain. She denies any melena or hematochezia since discharge.  She continues to receive dialysis three times a week.     She has previously been followed [facility] for AVM's and has been on monthly octreotide infusions. Due to normal platelets and liver stiffness <20 kilopascals, she did not previously have variceal screening.     Patient denies jaundice, lower extremity edema, abdominal distension or confusion.  Patient also denies melena, hematochezia or hematemesis. Patient denies weight loss, fevers, sweats or chills. ETOH includes     Medical hx Surgical hx   Past Medical History:   Diagnosis Date     Anemia      Anxiety state, unspecified 11/23/2012     Arthritis      Chronic hepatitis C with cirrhosis (H) 12/10/2014     Chronic kidney disease      Clotting disorder (H)      Dialysis patient (H)      Glaucoma      Hypertension      Hypertension goal BP (blood pressure) < 140/80 2/10/2014     Liver failure (H)      Renal failure      Unspecified pruritic disorder 11/23/2012    Past Surgical History:   Procedure Laterality Date     COLONOSCOPY N/A 9/4/2015    Procedure: COMBINED COLONOSCOPY, SINGLE OR MULTIPLE BIOPSY/POLYPECTOMY BY BIOPSY;  Surgeon: Rupesh Lopez MD;  Location:  GI     ESOPHAGOSCOPY, GASTROSCOPY, DUODENOSCOPY (EGD), COMBINED N/A 12/18/2014    Procedure: COMBINED ESOPHAGOSCOPY, GASTROSCOPY, DUODENOSCOPY (EGD);  Surgeon: Betsy Carvajal MD;  Location:  GI     ESOPHAGOSCOPY, GASTROSCOPY, DUODENOSCOPY (EGD), COMBINED N/A 4/25/2015    Procedure: COMBINED ESOPHAGOSCOPY, GASTROSCOPY, DUODENOSCOPY (EGD);  Surgeon: Yosvany Ram MD;  Location:  GI     ESOPHAGOSCOPY, GASTROSCOPY, DUODENOSCOPY (EGD), COMBINED N/A 5/5/2015    Procedure: COMBINED ESOPHAGOSCOPY, GASTROSCOPY, DUODENOSCOPY (EGD);  Surgeon: Mariano Mistry MD;  Location:  GI     HC CAPSULE ENDOSCOPY N/A 9/30/2015    Procedure: CAPSULE/PILL CAM ENDOSCOPY;  Surgeon: Pan Dhaliwal  "MD FLY;  Location:  GI     HYSTERECTOMY  1980    KYREE     LUMPECTOMY BREAST                   Medications:     Current Outpatient Prescriptions   Medication     atorvastatin (LIPITOR) 20 MG tablet     Calcium Acetate, Phos Binder, 667 MG CAPS     Multiple Vitamins-Minerals (PRORENAL + D) TABS     OCTREOTIDE ACETATE IJ     pantoprazole (PROTONIX) 40 MG EC tablet     polyethylene glycol 3350 POWD     sertraline (ZOLOFT) 50 MG tablet     albuterol (PROAIR HFA) 108 (90 BASE) MCG/ACT Inhaler     order for DME     [DISCONTINUED] VENTOLIN  (90 BASE) MCG/ACT Inhaler     No current facility-administered medications for this visit.           Allergies:     Allergies   Allergen Reactions     Diovan Hct Itching     severe     Dust Mites      Hydrochlorothiazide Itching     Severe       Sulfa Drugs Hives     Spironolactone Nausea            Review of Systems:   10 points ROS was obtained and highlighted in the HPI, otherwise negative.          Physical Exam:   VS:  /73 (BP Location: Left arm, Patient Position: Sitting, Cuff Size: Adult Regular)  Pulse 72  Temp 98  F (36.7  C) (Oral)  Resp 16  Ht 1.626 m (5' 4\")  Wt 62.9 kg (138 lb 9.6 oz)  SpO2 100%  BMI 23.79 kg/m2      Gen: A&Ox3, NAD, well developed  HEENT: non-icteric, no cervical lymphadenopathy, no lesions or ulcers in oropharynx  CV: RRR, no overt murmurs  Lung: CTA Bilatererally, no wheezing or crackles.   Lym- no palpable lymphadenopathy  Abd: soft, NT, ND, no organomegaly. No ascites.   Ext: no edema, intact pulses.   Skin: No rash, no palmar erythema, telangiectasias or jaundice  Neuro: grossly intact, no asterixis   Psych: appropriate mood and affects           Data:   Reviewed in person and significant for:    Lab Results   Component Value Date     05/17/2018      Lab Results   Component Value Date    POTASSIUM 3.5 05/17/2018     Lab Results   Component Value Date    CHLORIDE 98 05/17/2018     Lab Results   Component Value Date    CO2 32 " 05/17/2018     Lab Results   Component Value Date    BUN 24 05/17/2018     Lab Results   Component Value Date    CR 6.31 05/17/2018       Lab Results   Component Value Date    WBC 5.6 05/17/2018     Lab Results   Component Value Date    HGB 11.2 05/17/2018     Lab Results   Component Value Date    HCT 35.8 05/17/2018     Lab Results   Component Value Date     05/17/2018     Lab Results   Component Value Date     05/17/2018       Lab Results   Component Value Date    AST 25 05/17/2018     Lab Results   Component Value Date    ALT 18 05/17/2018     No results found for: BILICONJ   Lab Results   Component Value Date    BILITOTAL 0.4 05/17/2018       Lab Results   Component Value Date    ALBUMIN 3.9 05/17/2018     Lab Results   Component Value Date    PROTTOTAL 8.2 05/17/2018      Lab Results   Component Value Date    ALKPHOS 106 05/17/2018       Lab Results   Component Value Date    INR 1.03 05/17/2018

## 2018-08-22 ENCOUNTER — TELEPHONE (OUTPATIENT)
Dept: GASTROENTEROLOGY | Facility: CLINIC | Age: 78
End: 2018-08-22

## 2018-08-22 NOTE — TELEPHONE ENCOUNTER
LVM for patient's daughter to schedule GI clinic appointment with Kim Cheng per Leandro Odell. Left call back number for patient's daughter to call and schedule appointment.

## 2018-08-28 ENCOUNTER — INFUSION THERAPY VISIT (OUTPATIENT)
Dept: INFUSION THERAPY | Facility: CLINIC | Age: 78
End: 2018-08-28
Payer: MEDICARE

## 2018-08-28 VITALS
DIASTOLIC BLOOD PRESSURE: 64 MMHG | WEIGHT: 135.8 LBS | SYSTOLIC BLOOD PRESSURE: 130 MMHG | TEMPERATURE: 97.6 F | BODY MASS INDEX: 23.31 KG/M2 | OXYGEN SATURATION: 100 % | HEART RATE: 70 BPM | RESPIRATION RATE: 16 BRPM

## 2018-08-28 DIAGNOSIS — K31.811 ANGIODYSPLASIA OF STOMACH AND DUODENUM WITH HEMORRHAGE: ICD-10-CM

## 2018-08-28 DIAGNOSIS — R19.7 DIARRHEA, UNSPECIFIED TYPE: Primary | ICD-10-CM

## 2018-08-28 DIAGNOSIS — K55.20 AVM (ARTERIOVENOUS MALFORMATION) OF SMALL BOWEL, ACQUIRED: ICD-10-CM

## 2018-08-28 DIAGNOSIS — D50.0 IRON DEFICIENCY ANEMIA DUE TO CHRONIC BLOOD LOSS: ICD-10-CM

## 2018-08-28 PROCEDURE — 96372 THER/PROPH/DIAG INJ SC/IM: CPT | Performed by: INTERNAL MEDICINE

## 2018-08-28 PROCEDURE — 99207 ZZC NO CHARGE NURSE ONLY: CPT

## 2018-08-28 RX ADMIN — OCTREOTIDE ACETATE 20 MG: KIT INTRAMUSCULAR at 11:49

## 2018-08-28 ASSESSMENT — PAIN SCALES - GENERAL: PAINLEVEL: NO PAIN (0)

## 2018-08-28 NOTE — PROGRESS NOTES
Infusion Nursing Note:  Rachele Martínez presents today for Sandostatin.    Patient seen by provider today: No   present during visit today: Not Applicable.    Note: States she was in the hospital earlier this month for low Hgb and dark stools. She was treated at [facility] and has not noticed GI bleeding/dark stools since.    Intravenous Access:  No Intravenous access/labs at this visit.    Treatment Conditions:  Not Applicable.      Post Infusion Assessment:  Patient tolerated injection without incident.    Discharge Plan:   Patient will return 9/25/18 for next appointment.   Patient discharged in stable condition accompanied by: daughter.  Departure Mode: Ambulatory.    Kathrin Peters RN

## 2018-08-28 NOTE — MR AVS SNAPSHOT
After Visit Summary   8/28/2018    Rachele Martínez    MRN: [id number]           Patient Information     Date Of Birth          1940        Visit Information        Provider Department      8/28/2018 11:30 AM [place] [facility]        Today's Diagnoses     Diarrhea, unspecified type    -  1    Angiodysplasia of stomach and duodenum with hemorrhage        AVM (arteriovenous malformation) of small bowel, acquired (H)        Iron deficiency anemia due to chronic blood loss           Follow-ups after your visit        Your next 10 appointments already scheduled     Sep 04, 2018 12:00 PM CDT   (Arrive by 11:45 AM)   New Patient Visit with Leandro Odell PA-C   [facility] ([facility])    [address] [place] [address]  4th [facility] [id number] [phone]            Sep 25, 2018 11:30 AM CDT   Level O with [place] [facility] ([facility])    [address] [address] [place] [id number] [phone]            Sep 27, 2018 11:30 AM CDT   Level O with [place] [facility] ([facility])    [address] [place] [id number] [phone]            Nov 15, 2018  9:00 AM CST   Lab with  LAB   [facility] ([facility])    [address] [place] [id number] [phone]            Nov 15, 2018  9:15 AM CST   US ABDOMEN COMPLETE with UCUS1   [facility] US ([facility])    [address] [place] [id number] [phone]           Please bring a list of your medicines (including vitamins, minerals and over-the-counter drugs). Also, tell your doctor about any allergies you may have. Wear comfortable clothes and leave your valuables at home.  Adults: No eating or drinking for 8 hours before the exam. You may  take medicine with a small sip of water.  Children: - Infants, breast-fed: may have breast milk up to 2 hours before exam. - Infants, formula: may have bottle until 4 hours before exam. - Children 1-5 years: No food or drink for 4 hours before exam. - Children 6 -12 years: No food or drink for 6 hours before exam. - Children over 12 years: No food or drink for 8 hours before exam. - J Tube Fed: No need to stop feedings.  Please call the Imaging Department at your exam site with any questions.            Feb 12, 2019 11:45 AM CST   (Arrive by 11:30 AM)   Return General Liver with Andriy Barnett MD   [facility] Hepatology ([facility])    [address] [facility] [address]  [address]  [facility] [address] [phone]              Who to contact     If you have questions or need follow up information about today's clinic visit or your schedule please contact [facility] directly at [phone].  Normal or non-critical lab and imaging results will be communicated to you by MyChart, letter or phone within 4 business days after the clinic has received the results. If you do not hear from us within 7 days, please contact the clinic through MyChart or phone. If you have a critical or abnormal lab result, we will notify you by phone as soon as possible.  Submit refill requests through [employer] or call your pharmacy and they will forward the refill request to us. Please allow 3 business days for your refill to be completed.          Additional Information About Your Visit        Care EveryWhere ID     This is your Care EveryWhere ID. This could be used by other organizations to access your [place] medical records  [id number]        Your Vitals Were     Pulse Temperature Respirations Pulse Oximetry BMI (Body Mass Index)       70 97.6  F (36.4  C) (Oral) 16 100% 23.31 kg/m2        Blood Pressure from Last 3 Encounters:   08/28/18 130/64   08/21/18 169/73   07/31/18 121/71    Weight from Last 3  Encounters:   08/28/18 61.6 kg (135 lb 12.8 oz)   08/21/18 62.9 kg (138 lb 9.6 oz)   07/31/18 61.8 kg (136 lb 3.2 oz)              Today, you had the following     No orders found for display         Today's Medication Changes          These changes are accurate as of 8/28/18  2:28 PM.  If you have any questions, ask your nurse or doctor.               These medicines have changed or have updated prescriptions.        Dose/Directions    polyethylene glycol 3350 Powd   This may have changed:    - when to take this  - reasons to take this   Used for:  Slow transit constipation        Dose:  17 g   Take 17 g by mouth daily   Quantity:  1530 g   Refills:  3                Primary Care Provider Office Phone # Fax #    Agnieszka Erica Rodriguez [phone]       [address] [place] [address] 741  [facility] [address]        Equal Access to Services     [facility]inaNATHALY : Hadii lawrence Degroot, waaxda luduong, qaybta kaalmada jorge, jza peres . So [facility] [phone].    ATENCIÓN: Si habla español, tiene a ibarra disposición servicios gratuitos de asistencia lingüística. Llame al [phone].    We comply with applicable federal civil rights laws and [place] laws. We do not discriminate on the basis of race, color, national origin, age, disability, sex, sexual orientation, or gender identity.            Thank you!     Thank you for choosing [facility]  for your care. Our goal is always to provide you with excellent care. Hearing back from our patients is one way we can continue to improve our services. Please take a few minutes to complete the written survey that you may receive in the mail after your visit with us. Thank you!             Your Updated Medication List - Protect others around you: Learn how to safely use, store and throw away your medicines at www.disposemymeds.org.          This list is accurate as of 8/28/18  2:28 PM.  Always use your most  recent med list.                   Brand Name Dispense Instructions for use Diagnosis    albuterol 108 (90 Base) MCG/ACT inhaler    PROAIR HFA    1 Inhaler    Inhale 2 puffs into the lungs every 6 hours    Cough       atorvastatin 20 MG tablet    LIPITOR    90 tablet    Take 1 tablet (20 mg) by mouth daily    Cognitive impairment       Calcium Acetate (Phos Binder) 667 MG Caps      TAKE 2 CAPSULE BY MOUTH THREE TIMES A DAY WITH MEALS        OCTREOTIDE ACETATE IJ      Inject as directed every 30 days        order for DME     1 Device    Equipment being ordered: 4 wheel walker with seat.    Disorder of bone and cartilage, Arthralgia, unspecified joint, Tendency to fall       pantoprazole 40 MG EC tablet    PROTONIX     Take 40 mg by mouth 2 times daily        polyethylene glycol 3350 Powd     1530 g    Take 17 g by mouth daily    Slow transit constipation       PRORENAL + D Tabs      Take 1 tablet by mouth daily        sertraline 50 MG tablet    ZOLOFT    180 tablet    Take 2 tablets (100 mg) by mouth daily    Itching

## 2018-08-31 ENCOUNTER — TELEPHONE (OUTPATIENT)
Dept: GASTROENTEROLOGY | Facility: CLINIC | Age: 78
End: 2018-08-31

## 2018-08-31 NOTE — TELEPHONE ENCOUNTER
Called and spoke with patient's daughter Charla to remind of appointment for 9/4/18 at 12PM. Daughter stated they are currently at [facility] and may have to be admitted for blood transfusion. Advised will keep appointment on for now but may have to reschedule should patient have to be hospitalized.

## 2018-09-04 ENCOUNTER — OFFICE VISIT (OUTPATIENT)
Dept: GASTROENTEROLOGY | Facility: CLINIC | Age: 78
End: 2018-09-04
Payer: MEDICARE

## 2018-09-04 VITALS
TEMPERATURE: 97.8 F | WEIGHT: 139.2 LBS | RESPIRATION RATE: 16 BRPM | BODY MASS INDEX: 23.76 KG/M2 | DIASTOLIC BLOOD PRESSURE: 60 MMHG | HEART RATE: 76 BPM | HEIGHT: 64 IN | OXYGEN SATURATION: 99 % | SYSTOLIC BLOOD PRESSURE: 137 MMHG

## 2018-09-04 DIAGNOSIS — K55.21 AVM (ARTERIOVENOUS MALFORMATION) OF SMALL BOWEL, ACQUIRED WITH HEMORRHAGE: Primary | ICD-10-CM

## 2018-09-04 ASSESSMENT — PAIN SCALES - GENERAL: PAINLEVEL: NO PAIN (0)

## 2018-09-04 NOTE — PROGRESS NOTES
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    1. AVMs of the small bowel: Recent admission requiring APC for treatment for reoccuring small bowel AVMs. Continues monthly octreotide injections.  We again discussed the utilization of repeat enteroscopy for treatment and as per Dr. Gonzalez, repeating these procedures is likely not a high yield considering there could be others that are bleeding and will likely impact the amount or frequency of transfusions that she will need. This is also complicated by her previous transfusion reactions. We discussed that we do recommend evaluation at [facility] if she were to become symptomatic, in order to allow for consolidation of care.  We will continue octreotide treatment at this time with once a month injection. Infusion plan was updated today.  -- Continue octreotide injections monthly  -- Continue protonix 40 mg BID, to be taken 30-60 minutes prior to any intake.     2. Colon cancer surveillance: last colonoscopy 2015 shows 3 TAs, with recommendations to repeat in 3 years, due now.    RTC 1 year, or if symptomatic sooner    Thank you for this consultation.  It was a pleasure to participate in the care of this patient; please contact us with any further questions.       Leandro Odell PA-C  Division of Gastroenterology, Hepatology and Nutrition  [facility]      HPI  78-year-old female with hepatitis C cirrhosis, status post her bony treatment which she has cleared hepatitis C, hypertension, chronic kidney disease, currently on dialysis with history of  AVMs throughout her small bowel that are presumed to be leading to drops in hemoglobin.    Regarding patient's recurrent anemia it is presumed to be secondary to AVMs throughout her GI tract. She was well controlled prior to hemodialysis when she received frequent doses of Procrit. She continues with hemodialysis 3 times per week. It is likely the burden of the AVMs contributing to drops in hemoglobin. She had an enteroscopy at [facility] from above and below and treated 3 AVMs. In addition she has had transfusion reactions which are presumed to be allergic reactions, last transfused July 2017. Dr. Gonzalez had discussed with patient's nephrologist in order to optimize and minimize need for transfusions. Nephrologist changed her erythropoietin from long-acting to shorter acting one to see if this would help stabilize her hemoglobin. Octreotide was then initiated. Last blood transfusion was August 2018 when she was seen at [facility] due to rectal bleeding.  Patient had presented with bloody stools for 5 days with lower abdominal pain.  When she was admitted hemoglobin was 6.3, INR 1.2, creatinine 7.45 and LFTs within normal limits.  Required 3 transfusions.  Multiple recently bleeding angiodysplastic lesions of the duodenum.  Treated with argon plasma coagulation, no specimens collected.  EGD 8/14/2018 repeated due to ongoing bloody stools, a single recently bleeding angiodysplastic lesion in the stomach which is treated with argon plasma coagulation.  Nonbleeding gastric ulcers from prior APC therapy noted, red spot on edge of one treated APC.  No specimens were collected at that time.  Treated with argon plasma coagulation (APC).  Normal duodenal bulb.EGD 8/12/2018 showed normal esophagus, multiple bleeding angiodysplastic lesions in the stomach.  PPI was continued twice daily and octreotide were continued during hospitalization through discharge.    Patient continues to note blood in her stool on occasion. Last  hgb 7.8 on 8/21/18, but another hbg pending from dialysis 8/31/18.  No hematemesis, or other any sources of blood loss.    ROS:    No fevers or chills  No weight loss  No blurry vision, double vision or change in vision  No sore throat  No lymphadenopathy  No headache, paraesthesias, or weakness in a limb  No shortness of breath or wheezing  No chest pain or pressure  No arthralgias or myalgias  No rashes or skin changes  No odynophagia or dysphagia  + BRBPR, hematochezia  No melena  No dysuria, frequency or urgency  No hot/cold intolerance or polyria  No anxiety or depression    PROBLEM LIST  Patient Active Problem List    Diagnosis Date Noted     Diarrhea 08/29/2016     Priority: Medium     Angiodysplasia of stomach and duodenum with hemorrhage 08/29/2016     Priority: Medium     Tendency to fall 07/20/2016     Priority: Medium     ESRD (end stage renal disease) on dialysis (H) 05/05/2016     Priority: Medium     [facility]phrolog[facility] Dr. Tarango  [phone], fax [phone]         Cirrhosis of liver without ascites, unspecified hepatic cirrhosis type (H) 05/05/2016     Priority: Medium     AVM (arteriovenous malformation) of small bowel, acquired (H) 03/14/2016     Priority: Medium     Iron deficiency anemia due to chronic blood loss 01/31/2016     Priority: Medium     Due to AVMs       ACP (advance care planning) 07/30/2015     Priority: Medium     Advance Care Planning 7/30/2015: ACP Review and Resources Provided:  Reviewed chart for advance care plan.  Rachele Martínez has no plan or code status on file. Discussed available resources and provided with information. Confirmed code status reflects current choices pending further ACP discussions. . Added by Zita Armendariz             History of hepatitis C 07/28/2015     Priority: Medium     SVR, 7/2015       Hyperlipidemia with target LDL less than 130 02/27/2015     Priority: Medium     Diagnosis updated by automated process. Provider  to review and confirm.       Cataract 11/23/2012     Priority: Medium     Right   Problem list name updated by automated process. Provider to review       Anxiety state 11/23/2012     Priority: Medium     Problem list name updated by automated process. Provider to review       Insomnia 11/23/2012     Priority: Medium     Problem list name updated by automated process. Provider to review       Health Care Home 02/03/2012     Priority: Medium     State Tier Level:    Status:  Accepted/Closed 4/15/16   Care Coordinator:  Stacy Jones RN     See Letters for [facility] Care Plan--Pending  Date:  March 4, 2016        Disenroll from [facility] 8/31/12  [facility] Case Management               Hypertension goal BP (blood pressure) < 140/80 08/02/2003     Priority: Medium       PERTINENT PAST MEDICAL HISTORY:  Past Medical History:   Diagnosis Date     Anemia      Anxiety state, unspecified 11/23/2012     Arthritis      Chronic hepatitis C with cirrhosis (H) 12/10/2014     Chronic kidney disease      Clotting disorder (H)      Dialysis patient (H)      Glaucoma      Hypertension      Hypertension goal BP (blood pressure) < 140/80 2/10/2014     Liver failure (H)      Renal failure      Unspecified pruritic disorder 11/23/2012       PREVIOUS SURGERIES:  Past Surgical History:   Procedure Laterality Date     COLONOSCOPY N/A 9/4/2015    Procedure: COMBINED COLONOSCOPY, SINGLE OR MULTIPLE BIOPSY/POLYPECTOMY BY BIOPSY;  Surgeon: Rupesh Lopez MD;  Location:  GI     ESOPHAGOSCOPY, GASTROSCOPY, DUODENOSCOPY (EGD), COMBINED N/A 12/18/2014    Procedure: COMBINED ESOPHAGOSCOPY, GASTROSCOPY, DUODENOSCOPY (EGD);  Surgeon: Betsy Carvajal MD;  Location:  GI     ESOPHAGOSCOPY, GASTROSCOPY, DUODENOSCOPY (EGD), COMBINED N/A 4/25/2015    Procedure: COMBINED ESOPHAGOSCOPY, GASTROSCOPY, DUODENOSCOPY (EGD);  Surgeon: Yosvany Ram MD;  Location:  GI     ESOPHAGOSCOPY, GASTROSCOPY, DUODENOSCOPY (EGD), COMBINED N/A  5/5/2015    Procedure: COMBINED ESOPHAGOSCOPY, GASTROSCOPY, DUODENOSCOPY (EGD);  Surgeon: Mariano Mistry MD;  Location:  GI     HC CAPSULE ENDOSCOPY N/A 9/30/2015    Procedure: CAPSULE/PILL CAM ENDOSCOPY;  Surgeon: Pan Dhaliwal MD;  Location:  GI     HYSTERECTOMY  1980    KYREE     LUMPECTOMY BREAST       ALLERGIES:     Allergies   Allergen Reactions     Diovan Hct Itching     severe     Dust Mites      Hydrochlorothiazide Itching     Severe       Sulfa Drugs Hives     Spironolactone Nausea       PERTINENT MEDICATIONS:    Current Outpatient Prescriptions:      albuterol (PROAIR HFA) 108 (90 BASE) MCG/ACT Inhaler, Inhale 2 puffs into the lungs every 6 hours (Patient not taking: Reported on 7/3/2018), Disp: 1 Inhaler, Rfl: 3     atorvastatin (LIPITOR) 20 MG tablet, Take 1 tablet (20 mg) by mouth daily, Disp: 90 tablet, Rfl: 3     Calcium Acetate, Phos Binder, 667 MG CAPS, TAKE 2 CAPSULE BY MOUTH THREE TIMES A DAY WITH MEALS, Disp: , Rfl: 8     Multiple Vitamins-Minerals (PRORENAL + D) TABS, Take 1 tablet by mouth daily, Disp: , Rfl:      OCTREOTIDE ACETATE IJ, Inject as directed every 30 days, Disp: , Rfl:      order for DME, Equipment being ordered: 4 wheel walker with seat., Disp: 1 Device, Rfl: 0     pantoprazole (PROTONIX) 40 MG EC tablet, Take 40 mg by mouth 2 times daily, Disp: , Rfl:      polyethylene glycol 3350 POWD, Take 17 g by mouth daily (Patient taking differently: Take 17 g by mouth daily as needed (constipation) ), Disp: 1530 g, Rfl: 3     sertraline (ZOLOFT) 50 MG tablet, Take 2 tablets (100 mg) by mouth daily, Disp: 180 tablet, Rfl: 3    SOCIAL HISTORY:  Social History     Social History     Marital status:      Spouse name: N/A     Number of children: N/A     Years of education: N/A     Occupational History     Not on file.     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     Sexual activity: Not Currently     Other Topics Concern     Parent/Sibling W/ Cabg, Mi Or Angioplasty Before 65f 55m? No     Social History Narrative       FAMILY HISTORY:  Family History   Problem Relation Age of Onset     Diabetes Mother      Alzheimer Disease Mother      Musculoskeletal Disorder Mother      joint pain bilateral knees       Past/family/social history reviewed and no changes    PHYSICAL EXAMINATION:  Constitutional: aaox3, cooperative, pleasant, not dyspneic/diaphoretic, no acute distress  Vitals reviewed: /60 (BP Location: Left arm, Patient Position: Sitting, Cuff Size: Adult Regular)  Pulse 76  Temp 97.8  F (36.6  C) (Oral)  Resp 16  Ht 1.62 m (5' 3.78\")  Wt 63.1 kg (139 lb 3.2 oz)  SpO2 99%  BMI 24.06 kg/m2  Wt:   Wt Readings from Last 2 Encounters:   08/28/18 61.6 kg (135 lb 12.8 oz)   08/21/18 62.9 kg (138 lb 9.6 oz)      Eyes: Sclera anicteric/injected  Ears/nose/mouth/throat: Normal oropharynx without ulcers or exudate, mucus membranes moist, hearing intact  Neck: supple, thyroid normal size  CV: No edema  Respiratory: Unlabored breathing  Lymph: No axillary, submandibular, supraclavicular or inguinal lymphadenopathy  Abd: Nondistended, +bs, no hepatosplenomegaly, tender to RUQ, no peritoneal signs  Skin: warm, perfused, no jaundice  Psych: Normal affect  MSK: Normal gait      PERTINENT STUDIES:    Orders Only on 05/09/2017   Component Date Value Ref Range Status     Bilirubin Direct 05/09/2017 0.1  0.0 - 0.2 mg/dL Final     Bilirubin Total 05/09/2017 0.5  0.2 - 1.3 mg/dL Final     Albumin 05/09/2017 4.0  3.4 - 5.0 g/dL Final     Protein Total 05/09/2017 8.7  6.8 - 8.8 g/dL Final     Alkaline Phosphatase 05/09/2017 144  40 - 150 U/L Final     ALT 05/09/2017 20  0 - 50 U/L Final     AST 05/09/2017 29  0 - 45 U/L Final     WBC 05/09/2017 6.1 "  4.0 - 11.0 10e9/L Final     RBC Count 05/09/2017 3.14* 3.8 - 5.2 10e12/L Final     Hemoglobin 05/09/2017 10.5* 11.7 - 15.7 g/dL Final     Hematocrit 05/09/2017 32.8* 35.0 - 47.0 % Final     MCV 05/09/2017 105* 78 - 100 fl Final     MCH 05/09/2017 33.4* 26.5 - 33.0 pg Final     MCHC 05/09/2017 32.0  31.5 - 36.5 g/dL Final     RDW 05/09/2017 14.5  10.0 - 15.0 % Final     Platelet Count 05/09/2017 260  150 - 450 10e9/L Final     INR 05/09/2017 1.19* 0.86 - 1.14 Final     Sodium 05/09/2017 138  133 - 144 mmol/L Final     Potassium 05/09/2017 3.7  3.4 - 5.3 mmol/L Final     Chloride 05/09/2017 94  94 - 109 mmol/L Final     Carbon Dioxide 05/09/2017 29  20 - 32 mmol/L Final     Anion Gap 05/09/2017 15* 3 - 14 mmol/L Final     Glucose 05/09/2017 117* 70 - 99 mg/dL Final     Urea Nitrogen 05/09/2017 23  7 - 30 mg/dL Final     Creatinine 05/09/2017 6.88* 0.52 - 1.04 mg/dL Final     GFR Estimate 05/09/2017 6* >60 mL/min/1.7m2 Final     GFR Estimate If Black 05/09/2017 7* >60 mL/min/1.7m2 Final     Calcium 05/09/2017 8.7  8.5 - 10.1 mg/dL Final

## 2018-09-04 NOTE — MR AVS SNAPSHOT
After Visit Summary   9/4/2018    Rachele Martínez    MRN: [id number]           Patient Information     Date Of Birth          1940        Visit Information        Provider Department      9/4/2018 12:00 PM Leandro Odell PA-C [facility]        Today's Diagnoses     AVM (arteriovenous malformation) of small bowel, acquired with hemorrhage (H)    -  1       Follow-ups after your visit        Additional Services     GASTROENTEROLOGY ADULT REF PROCEDURE ONLY       Last Lab Result: Creatinine (mg/dL)       Date                     Value                 08/21/2018               6.52 (H)         ----------  Body mass index is 24.06 kg/(m^2).     Needed:  No  Language:  English    Patient will be contacted to schedule procedure.     Please be aware that coverage of these services is subject to the terms and limitations of your health insurance plan.  Call member services at your health plan with any benefit or coverage questions.  Any procedures must be performed at a [place] facility OR coordinated by your clinic's referral office.    Please bring the following with you to your appointment:    (1) Any X-Rays, CTs or MRIs which have been performed.  Contact the facility where they were done to arrange for  prior to your scheduled appointment.    (2) List of current medications   (3) This referral request   (4) Any documents/labs given to you for this referral                  Your next 10 appointments already scheduled     Sep 25, 2018 11:30 AM CDT   Level O with [place] 6 INFUSION   [facility]    [id number] [place] [id number]   [phone]            Sep 27, 2018 11:30 AM CDT   Level O with [place] 10 INFUSION   [facility] ([facility])    [id number] [place] [id number]   [phone]            Nov 15, 2018  9:00 AM CST   Lab with  LAB   [facility])    [address] [facility] [id number] [phone]            Nov 15, 2018  9:15 AM CST   US ABDOMEN COMPLETE with UCUS1   [facility] US ([facility])    [address]  [address] [facility] [id number] [phone]           Please bring a list of your medicines (including vitamins, minerals and over-the-counter drugs). Also, tell your doctor about any allergies you may have. Wear comfortable clothes and leave your valuables at home.  Adults: No eating or drinking for 8 hours before the exam. You may take medicine with a small sip of water.  Children: - Infants, breast-fed: may have breast milk up to 2 hours before exam. - Infants, formula: may have bottle until 4 hours before exam. - Children 1-5 years: No food or drink for 4 hours before exam. - Children 6 -12 years: No food or drink for 6 hours before exam. - Children over 12 years: No food or drink for 8 hours before exam. - J Tube Fed: No need to stop feedings.  Please call the Imaging Department at your exam site with any questions.            Feb 12, 2019 11:45 AM CST   (Arrive by 11:30 AM)   Return General Liver with Andriy Barnett MD   [facility] ([facility])    [address] [place] [address]  [address]  [facility] [id number] [phone]              Who to contact     Please call your clinic at [phone] to:    Ask questions about your health    Make or cancel appointments    Discuss your medicines    Learn about your test results    Speak to your doctor            Additional Information About Your Visit        Care EveryWhere ID     This is your Care EveryWhere ID. This could be used by other organizations to access your [place] medical records  [id number]        Your Vitals Were     Pulse Temperature Respirations Height Pulse Oximetry BMI (Body Mass Index)    76 97.8  F (36.6  C) (Oral) 16 1.62 m  "(5' 3.78\") 99% 24.06 kg/m2       Blood Pressure from Last 3 Encounters:   09/04/18 137/60   08/28/18 130/64   08/21/18 169/73    Weight from Last 3 Encounters:   09/04/18 63.1 kg (139 lb 3.2 oz)   08/28/18 61.6 kg (135 lb 12.8 oz)   08/21/18 62.9 kg (138 lb 9.6 oz)              We Performed the Following     GASTROENTEROLOGY ADULT REF PROCEDURE ONLY        Primary Care Provider Office Phone # Fax #    Agnieszka Erica Rodriguez [phone]       [address] [facility] [address] [place] [address]        Equal Access to Services     JAMIE CARVAJAL : Hadii lawrence Degroot, watimda abhishek, qaybta kaalmada jorge, jaz fowler. So [facility] [phone].    ATENCIÓN: Si habla español, tiene a ibarra disposición servicios gratuitos de asistencia lingüística. [facility] [phone].    We comply with applicable federal civil rights laws and [place] laws. We do not discriminate on the basis of race, color, national origin, age, disability, sex, sexual orientation, or gender identity.            Thank you!     Thank you for choosing [facility]STROENTEROLOG[facility] IBD [facility]IN[facility]  for your care. Our goal is always to provide you with excellent care. Hearing back from our patients is one way we can continue to improve our services. Please take a few minutes to complete the written survey that you may receive in the mail after your visit with us. Thank you!             Your Updated Medication List - Protect others around you: Learn how to safely use, store and throw away your medicines at www.disposemymeds.org.          This list is accurate as of 9/4/18  2:30 PM.  Always use your most recent med list.                   Brand Name Dispense Instructions for use Diagnosis    albuterol 108 (90 Base) MCG/ACT inhaler    PROAIR HFA    1 Inhaler    Inhale 2 puffs into the lungs every 6 hours    Cough       atorvastatin 20 MG tablet    LIPITOR    90 tablet    Take 1 tablet (20 mg) by mouth daily    " Cognitive impairment       Calcium Acetate (Phos Binder) 667 MG Caps      TAKE 2 CAPSULE BY MOUTH THREE TIMES A DAY WITH MEALS        OCTREOTIDE ACETATE IJ      Inject as directed every 30 days        order for DME     1 Device    Equipment being ordered: 4 wheel walker with seat.    Disorder of bone and cartilage, Arthralgia, unspecified joint, Tendency to fall       pantoprazole 40 MG EC tablet    PROTONIX     Take 40 mg by mouth 2 times daily        polyethylene glycol 3350 Powd     1530 g    Take 17 g by mouth daily    Slow transit constipation       PRORENAL + D Tabs      Take 1 tablet by mouth daily        sertraline 50 MG tablet    ZOLOFT    180 tablet    Take 2 tablets (100 mg) by mouth daily    Itching

## 2018-09-04 NOTE — NURSING NOTE
"Chief Complaint   Patient presents with     Consult     Irritable Bowel Disease       Vitals:    09/04/18 1203   BP: 137/60   BP Location: Left arm   Patient Position: Sitting   Cuff Size: Adult Regular   Pulse: 76   Resp: 16   Temp: 97.8  F (36.6  C)   TempSrc: Oral   SpO2: 99%   Weight: 63.1 kg (139 lb 3.2 oz)   Height: 1.62 m (5' 3.78\")       Body mass index is 24.06 kg/(m^2).      Katiana Damico LPN                          "

## 2018-09-04 NOTE — LETTER
9/4/2018       RE: Rachele Martínez  [address] [place]  [address]  [facility] [address]     Dear Colleague,    Thank you for referring your patient, Rachele Martínez, to the [facility] GASTROENTEROLOG[facility] IBD CLINIC at [facility]. Please see a copy of my visit note below.    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    1. AVMs of the small bowel: Recent admission requiring APC for treatment for reoccuring small bowel AVMs. Continues monthly octreotide injections.  We again discussed the utilization of repeat enteroscopy for treatment and as per Dr. Gonzalez, repeating these procedures is likely not a high yield considering there could be others that are bleeding and will likely impact the amount or frequency of transfusions that she will need. This is also complicated by her previous transfusion reactions. We discussed that we do recommend evaluation at [facility] if she were to become symptomatic, in order to allow for consolidation of care.  We will continue octreotide treatment at this time with once a month injection. Infusion plan was updated today.  -- Continue octreotide injections monthly  -- Continue protonix 40 mg BID, to be taken 30-60 minutes prior to any intake.     2. Colon cancer surveillance: last colonoscopy 2015 shows 3 TAs, with recommendations to repeat in 3 years, due now.    RTC 1 year, or if symptomatic sooner    Thank you for this consultation.  It was a pleasure to participate in the care of this patient; please contact us with any further  questions.       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    Regarding patient's recurrent anemia it is presumed to be secondary to AVMs throughout her GI tract. She was well controlled prior to hemodialysis when she received frequent doses of Procrit. She continues with hemodialysis 3 times per week. It is likely the burden of the AVMs contributing to drops in hemoglobin. She had an enteroscopy at [facility] from above and below and treated 3 AVMs. In addition she has had transfusion reactions which are presumed to be allergic reactions, last transfused July 2017. Dr. Gonzalez had discussed with patient's nephrologist in order to optimize and minimize need for transfusions. Nephrologist changed her erythropoietin from long-acting to shorter acting one to see if this would help stabilize her hemoglobin. Octreotide was then initiated. Last blood transfusion was August 2018 when she was seen at [facility] due to rectal bleeding.  Patient had presented with bloody stools for 5 days with lower abdominal pain.  When she was admitted hemoglobin was 6.3, INR 1.2, creatinine 7.45 and LFTs within normal limits.  Required 3 transfusions.  Multiple recently bleeding angiodysplastic lesions of the duodenum.  Treated with argon plasma coagulation, no specimens collected.  EGD 8/14/2018 repeated due to ongoing bloody stools, a single recently bleeding angiodysplastic lesion in the stomach which is treated with argon plasma coagulation.  Nonbleeding gastric ulcers from prior APC therapy noted, red spot on edge of one treated APC.  No specimens were collected at that time.  Treated with argon  plasma coagulation (APC).  Normal duodenal bulb.EGD 8/12/2018 showed normal esophagus, multiple bleeding angiodysplastic lesions in the stomach.  PPI was continued twice daily and octreotide were continued during hospitalization through discharge.    Patient continues to note blood in her stool on occasion. Last hgb 7.8 on 8/21/18, but another hbg pending from dialysis 8/31/18.  No hematemesis, or other any sources of blood loss.    ROS:    No fevers or chills  No weight loss  No blurry vision, double vision or change in vision  No sore throat  No lymphadenopathy  No headache, paraesthesias, or weakness in a limb  No shortness of breath or wheezing  No chest pain or pressure  No arthralgias or myalgias  No rashes or skin changes  No odynophagia or dysphagia  + BRBPR, hematochezia  No melena  No dysuria, frequency or urgency  No hot/cold intolerance or polyria  No anxiety or depression    PROBLEM LIST  Patient Active Problem List    Diagnosis Date Noted     Diarrhea 08/29/2016     Priority: Medium     Angiodysplasia of stomach and duodenum with hemorrhage 08/29/2016     Priority: Medium     Tendency to fall 07/20/2016     Priority: Medium     ESRD (end stage renal disease) on dialysis (H) 05/05/2016     Priority: Medium     Nephrologist is Dr. Tarango  [facility] [phone], fax [phone]         Cirrhosis of liver without ascites, unspecified hepatic cirrhosis type (H) 05/05/2016     Priority: Medium     AVM (arteriovenous malformation) of small bowel, acquired (H) 03/14/2016     Priority: Medium     Iron deficiency anemia due to chronic blood loss 01/31/2016     Priority: Medium     Due to AVMs       ACP (advance care planning) 07/30/2015     Priority: Medium     Advance Care Planning 7/30/2015: ACP Review and Resources Provided:  Reviewed chart for advance care plan.  Rachele Martínez has no plan or code status on file. Discussed available resources and provided with information. Confirmed  code status reflects current choices pending further ACP discussions. . Added by Zita Armendariz             History of hepatitis C 07/28/2015     Priority: Medium     SVR, 7/2015       Hyperlipidemia with target LDL less than 130 02/27/2015     Priority: Medium     Diagnosis updated by automated process. Provider to review and confirm.       Cataract 11/23/2012     Priority: Medium     Right   Problem list name updated by automated process. Provider to review       Anxiety state 11/23/2012     Priority: Medium     Problem list name updated by automated process. Provider to review       Insomnia 11/23/2012     Priority: Medium     Problem list name updated by automated process. Provider to review       Health Care Home 02/03/2012     Priority: Medium     State Tier Level:    Status:  Accepted/Closed 4/15/16   Care Coordinator:  Stacy Jones RN     See Letters for [facility] Care Plan--Pending  Date:  March 4, 2016        Disenroll from [facility] 8/31/12  [facility] Case Management               Hypertension goal BP (blood pressure) < 140/80 08/02/2003     Priority: Medium       PERTINENT PAST MEDICAL HISTORY:  Past Medical History:   Diagnosis Date     Anemia      Anxiety state, unspecified 11/23/2012     Arthritis      Chronic hepatitis C with cirrhosis (H) 12/10/2014     Chronic kidney disease      Clotting disorder (H)      Dialysis patient (H)      Glaucoma      Hypertension      Hypertension goal BP (blood pressure) < 140/80 2/10/2014     Liver failure (H)      Renal failure      Unspecified pruritic disorder 11/23/2012       PREVIOUS SURGERIES:  Past Surgical History:   Procedure Laterality Date     COLONOSCOPY N/A 9/4/2015    Procedure: COMBINED COLONOSCOPY, SINGLE OR MULTIPLE BIOPSY/POLYPECTOMY BY BIOPSY;  Surgeon: Rupesh Lopez MD;  Location:  GI     ESOPHAGOSCOPY, GASTROSCOPY, DUODENOSCOPY (EGD), COMBINED N/A 12/18/2014    Procedure: COMBINED ESOPHAGOSCOPY, GASTROSCOPY, DUODENOSCOPY (EGD);  Surgeon:  Betsy Carvajal MD;  Location:  GI     ESOPHAGOSCOPY, GASTROSCOPY, DUODENOSCOPY (EGD), COMBINED N/A 4/25/2015    Procedure: COMBINED ESOPHAGOSCOPY, GASTROSCOPY, DUODENOSCOPY (EGD);  Surgeon: Yosvany Ram MD;  Location:  GI     ESOPHAGOSCOPY, GASTROSCOPY, DUODENOSCOPY (EGD), COMBINED N/A 5/5/2015    Procedure: COMBINED ESOPHAGOSCOPY, GASTROSCOPY, DUODENOSCOPY (EGD);  Surgeon: Mariano Mistry MD;  Location:  GI     HC CAPSULE ENDOSCOPY N/A 9/30/2015    Procedure: CAPSULE/PILL CAM ENDOSCOPY;  Surgeon: Pan Dhaliwal MD;  Location:  GI     HYSTERECTOMY  1980    KYREE     LUMPECTOMY BREAST       ALLERGIES:     Allergies   Allergen Reactions     Diovan Hct Itching     severe     Dust Mites      Hydrochlorothiazide Itching     Severe       Sulfa Drugs Hives     Spironolactone Nausea       PERTINENT MEDICATIONS:    Current Outpatient Prescriptions:      albuterol (PROAIR HFA) 108 (90 BASE) MCG/ACT Inhaler, Inhale 2 puffs into the lungs every 6 hours (Patient not taking: Reported on 7/3/2018), Disp: 1 Inhaler, Rfl: 3     atorvastatin (LIPITOR) 20 MG tablet, Take 1 tablet (20 mg) by mouth daily, Disp: 90 tablet, Rfl: 3     Calcium Acetate, Phos Binder, 667 MG CAPS, TAKE 2 CAPSULE BY MOUTH THREE TIMES A DAY WITH MEALS, Disp: , Rfl: 8     Multiple Vitamins-Minerals (PRORENAL + D) TABS, Take 1 tablet by mouth daily, Disp: , Rfl:      OCTREOTIDE ACETATE IJ, Inject as directed every 30 days, Disp: , Rfl:      order for DME, Equipment being ordered: 4 wheel walker with seat., Disp: 1 Device, Rfl: 0     pantoprazole (PROTONIX) 40 MG EC tablet, Take 40 mg by mouth 2 times daily, Disp: , Rfl:      polyethylene glycol 3350 POWD, Take 17 g by mouth daily (Patient taking differently: Take 17 g by mouth daily as needed (constipation) ), Disp: 1530 g, Rfl: 3     sertraline (ZOLOFT) 50 MG tablet, Take 2 tablets (100 mg) by mouth daily, Disp: 180 tablet, Rfl: 3    SOCIAL HISTORY:  Social History     Social  "History     Marital status:      Spouse name: N/A     Number of children: N/A     Years of education: N/A     Occupational History     Not on file.     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     Sexual activity: Not Currently     Other Topics Concern     Parent/Sibling W/ Cabg, Mi Or Angioplasty Before 65f 55m? No     Social History Narrative       FAMILY HISTORY:  Family History   Problem Relation Age of Onset     Diabetes Mother      Alzheimer Disease Mother      Musculoskeletal Disorder Mother      joint pain bilateral knees       Past/family/social history reviewed and no changes    PHYSICAL EXAMINATION:  Constitutional: aaox3, cooperative, pleasant, not dyspneic/diaphoretic, no acute distress  Vitals reviewed: /60 (BP Location: Left arm, Patient Position: Sitting, Cuff Size: Adult Regular)  Pulse 76  Temp 97.8  F (36.6  C) (Oral)  Resp 16  Ht 1.62 m (5' 3.78\")  Wt 63.1 kg (139 lb 3.2 oz)  SpO2 99%  BMI 24.06 kg/m2  Wt:   Wt Readings from Last 2 Encounters:   08/28/18 61.6 kg (135 lb 12.8 oz)   08/21/18 62.9 kg (138 lb 9.6 oz)      Eyes: Sclera anicteric/injected  Ears/nose/mouth/throat: Normal oropharynx without ulcers or exudate, mucus membranes moist, hearing intact  Neck: supple, thyroid normal size  CV: No edema  Respiratory: Unlabored breathing  Lymph: No axillary, submandibular, supraclavicular or inguinal lymphadenopathy  Abd: Nondistended, +bs, no hepatosplenomegaly, tender to RUQ, no peritoneal signs  Skin: warm, perfused, no jaundice  Psych: Normal affect  MSK: Normal gait      PERTINENT STUDIES:    Orders Only on 05/09/2017   Component Date Value Ref Range Status     Bilirubin Direct 05/09/2017 0.1  0.0 - 0.2 mg/dL Final     Bilirubin " Total 05/09/2017 0.5  0.2 - 1.3 mg/dL Final     Albumin 05/09/2017 4.0  3.4 - 5.0 g/dL Final     Protein Total 05/09/2017 8.7  6.8 - 8.8 g/dL Final     Alkaline Phosphatase 05/09/2017 144  40 - 150 U/L Final     ALT 05/09/2017 20  0 - 50 U/L Final     AST 05/09/2017 29  0 - 45 U/L Final     WBC 05/09/2017 6.1  4.0 - 11.0 10e9/L Final     RBC Count 05/09/2017 3.14* 3.8 - 5.2 10e12/L Final     Hemoglobin 05/09/2017 10.5* 11.7 - 15.7 g/dL Final     Hematocrit 05/09/2017 32.8* 35.0 - 47.0 % Final     MCV 05/09/2017 105* 78 - 100 fl Final     MCH 05/09/2017 33.4* 26.5 - 33.0 pg Final     MCHC 05/09/2017 32.0  31.5 - 36.5 g/dL Final     RDW 05/09/2017 14.5  10.0 - 15.0 % Final     Platelet Count 05/09/2017 260  150 - 450 10e9/L Final     INR 05/09/2017 1.19* 0.86 - 1.14 Final     Sodium 05/09/2017 138  133 - 144 mmol/L Final     Potassium 05/09/2017 3.7  3.4 - 5.3 mmol/L Final     Chloride 05/09/2017 94  94 - 109 mmol/L Final     Carbon Dioxide 05/09/2017 29  20 - 32 mmol/L Final     Anion Gap 05/09/2017 15* 3 - 14 mmol/L Final     Glucose 05/09/2017 117* 70 - 99 mg/dL Final     Urea Nitrogen 05/09/2017 23  7 - 30 mg/dL Final     Creatinine 05/09/2017 6.88* 0.52 - 1.04 mg/dL Final     GFR Estimate 05/09/2017 6* >60 mL/min/1.7m2 Final     GFR Estimate If Black 05/09/2017 7* >60 mL/min/1.7m2 Final     Calcium 05/09/2017 8.7  8.5 - 10.1 mg/dL Final                   Again, thank you for allowing me to participate in the care of your patient.      Sincerely,    Leandro Odell PA-C

## 2018-09-06 DIAGNOSIS — D50.0 IRON DEFICIENCY ANEMIA DUE TO CHRONIC BLOOD LOSS: Primary | ICD-10-CM

## 2018-09-07 ENCOUNTER — CARE COORDINATION (OUTPATIENT)
Dept: GASTROENTEROLOGY | Facility: CLINIC | Age: 78
End: 2018-09-07

## 2018-09-07 NOTE — PROGRESS NOTES
Octreotide plan . New plan entered with no change in plan.         Can you renew this patient's octreotide therapy plan?

## 2018-09-10 ENCOUNTER — TELEPHONE (OUTPATIENT)
Dept: GASTROENTEROLOGY | Facility: CLINIC | Age: 78
End: 2018-09-10

## 2018-09-10 NOTE — TELEPHONE ENCOUNTER
Pt was scheduled for colonoscopy 9/25 at [facility]. Dr Crawley reviewed, wants done in OR. Called pt and informed her I was cancelling [facility] date and that one of Dr. Crawley's nurses will call pt to schedule in OR.

## 2018-09-12 ENCOUNTER — CARE COORDINATION (OUTPATIENT)
Dept: GASTROENTEROLOGY | Facility: CLINIC | Age: 78
End: 2018-09-12

## 2018-09-12 DIAGNOSIS — K55.20 ARTERIOVENOUS MALFORMATION OF SMALL INTESTINE: Primary | ICD-10-CM

## 2018-09-12 NOTE — PROGRESS NOTES
Message from Dr. Crawley to organize the following:  Based on a procedure note from 2016   She needs anesthesia for anterograde enteroscopy   Needs to be done in the OR; no real urgency   Ill forward to my team and take it from here; I can do it   (only clear liquids after dinner the day before with two packets of Miralax that night), no real urgency     Rober     Order placed and sent to scheduling.     Emily DUPREE RN Coordinator  Dr. Verduzco, Dr. Crawley & Dr. Michael   Advanced Endoscopy  [phone]

## 2018-09-14 ENCOUNTER — TELEPHONE (OUTPATIENT)
Dept: GASTROENTEROLOGY | Facility: CLINIC | Age: 78
End: 2018-09-14

## 2018-09-14 ENCOUNTER — HOSPITAL ENCOUNTER (INPATIENT)
Facility: CLINIC | Age: 78
LOS: 2 days | Discharge: HOME | DRG: 377 | End: 2018-09-16
Attending: EMERGENCY MEDICINE | Admitting: INTERNAL MEDICINE
Payer: MEDICARE

## 2018-09-14 DIAGNOSIS — K92.2 ACUTE GASTROINTESTINAL HEMORRHAGE: ICD-10-CM

## 2018-09-14 DIAGNOSIS — R05.9 COUGH: ICD-10-CM

## 2018-09-14 DIAGNOSIS — K62.5 RECTAL BLEEDING: ICD-10-CM

## 2018-09-14 LAB
ALBUMIN SERPL-MCNC: 3.5 G/DL (ref 3.4–5)
ALP SERPL-CCNC: 132 U/L (ref 40–150)
ALT SERPL W P-5'-P-CCNC: 19 U/L (ref 0–50)
ANION GAP SERPL CALCULATED.3IONS-SCNC: 11 MMOL/L (ref 3–14)
APTT PPP: 38 SEC (ref 22–37)
AST SERPL W P-5'-P-CCNC: 26 U/L (ref 0–45)
BASOPHILS # BLD AUTO: 0.1 10E9/L (ref 0–0.2)
BASOPHILS NFR BLD AUTO: 0.8 %
BILIRUB DIRECT SERPL-MCNC: 0.2 MG/DL (ref 0–0.2)
BILIRUB SERPL-MCNC: 0.3 MG/DL (ref 0.2–1.3)
BUN SERPL-MCNC: 48 MG/DL (ref 7–30)
CALCIUM SERPL-MCNC: 8.2 MG/DL (ref 8.5–10.1)
CHLORIDE SERPL-SCNC: 99 MMOL/L (ref 94–109)
CO2 SERPL-SCNC: 29 MMOL/L (ref 20–32)
CREAT SERPL-MCNC: 8.26 MG/DL (ref 0.52–1.04)
DIFFERENTIAL METHOD BLD: ABNORMAL
EOSINOPHIL # BLD AUTO: 0.2 10E9/L (ref 0–0.7)
EOSINOPHIL NFR BLD AUTO: 2.2 %
ERYTHROCYTE [DISTWIDTH] IN BLOOD BY AUTOMATED COUNT: 18.2 % (ref 10–15)
FERRITIN SERPL-MCNC: 229 NG/ML (ref 8–252)
FOLATE SERPL-MCNC: >100 NG/ML
GFR SERPL CREATININE-BSD FRML MDRD: 5 ML/MIN/1.7M2
GLUCOSE SERPL-MCNC: 121 MG/DL (ref 70–99)
HCT VFR BLD AUTO: 22.3 % (ref 35–47)
HGB BLD-MCNC: 7 G/DL (ref 11.7–15.7)
HGB BLD-MCNC: 7.2 G/DL (ref 11.7–15.7)
IMM GRANULOCYTES # BLD: 0 10E9/L (ref 0–0.4)
IMM GRANULOCYTES NFR BLD: 0.1 %
INR PPP: 1.2 (ref 0.86–1.14)
IRON SATN MFR SERPL: 20 % (ref 15–46)
IRON SERPL-MCNC: 55 UG/DL (ref 35–180)
LYMPHOCYTES # BLD AUTO: 1.1 10E9/L (ref 0.8–5.3)
LYMPHOCYTES NFR BLD AUTO: 14.2 %
MCH RBC QN AUTO: 34.7 PG (ref 26.5–33)
MCHC RBC AUTO-ENTMCNC: 31.4 G/DL (ref 31.5–36.5)
MCV RBC AUTO: 110 FL (ref 78–100)
MONOCYTES # BLD AUTO: 0.4 10E9/L (ref 0–1.3)
MONOCYTES NFR BLD AUTO: 5 %
NEUTROPHILS # BLD AUTO: 5.8 10E9/L (ref 1.6–8.3)
NEUTROPHILS NFR BLD AUTO: 77.7 %
NRBC # BLD AUTO: 0 10*3/UL
NRBC BLD AUTO-RTO: 0 /100
PHOSPHATE SERPL-MCNC: 3.6 MG/DL (ref 2.5–4.5)
PLATELET # BLD AUTO: 209 10E9/L (ref 150–450)
POTASSIUM SERPL-SCNC: 4.7 MMOL/L (ref 3.4–5.3)
PROT SERPL-MCNC: 7.2 G/DL (ref 6.8–8.8)
RBC # BLD AUTO: 2.02 10E12/L (ref 3.8–5.2)
RETICS # AUTO: 211.1 10E9/L (ref 25–95)
RETICS/RBC NFR AUTO: 10.4 % (ref 0.5–2)
SODIUM SERPL-SCNC: 139 MMOL/L (ref 133–144)
TIBC SERPL-MCNC: 279 UG/DL (ref 240–430)
TRANSFERRIN SERPL-MCNC: 241 MG/DL (ref 210–360)
VIT B12 SERPL-MCNC: 1108 PG/ML (ref 193–986)
WBC # BLD AUTO: 7.4 10E9/L (ref 4–11)

## 2018-09-14 PROCEDURE — 85045 AUTOMATED RETICULOCYTE COUNT: CPT | Performed by: EMERGENCY MEDICINE

## 2018-09-14 PROCEDURE — 82607 VITAMIN B-12: CPT | Performed by: EMERGENCY MEDICINE

## 2018-09-14 PROCEDURE — 25000132 ZZH RX MED GY IP 250 OP 250 PS 637: Mod: GY | Performed by: INTERNAL MEDICINE

## 2018-09-14 PROCEDURE — 86900 BLOOD TYPING SEROLOGIC ABO: CPT | Performed by: EMERGENCY MEDICINE

## 2018-09-14 PROCEDURE — 86923 COMPATIBILITY TEST ELECTRIC: CPT | Performed by: EMERGENCY MEDICINE

## 2018-09-14 PROCEDURE — A9270 NON-COVERED ITEM OR SERVICE: HCPCS | Mod: GY | Performed by: PHYSICIAN ASSISTANT

## 2018-09-14 PROCEDURE — 83540 ASSAY OF IRON: CPT | Performed by: EMERGENCY MEDICINE

## 2018-09-14 PROCEDURE — 12000001 ZZH R&B MED SURG/OB UMMC

## 2018-09-14 PROCEDURE — 82728 ASSAY OF FERRITIN: CPT | Performed by: EMERGENCY MEDICINE

## 2018-09-14 PROCEDURE — 99223 1ST HOSP IP/OBS HIGH 75: CPT | Mod: AI | Performed by: INTERNAL MEDICINE

## 2018-09-14 PROCEDURE — 80048 BASIC METABOLIC PNL TOTAL CA: CPT | Performed by: EMERGENCY MEDICINE

## 2018-09-14 PROCEDURE — 84100 ASSAY OF PHOSPHORUS: CPT | Performed by: EMERGENCY MEDICINE

## 2018-09-14 PROCEDURE — 85610 PROTHROMBIN TIME: CPT | Performed by: EMERGENCY MEDICINE

## 2018-09-14 PROCEDURE — 83550 IRON BINDING TEST: CPT | Performed by: EMERGENCY MEDICINE

## 2018-09-14 PROCEDURE — 96365 THER/PROPH/DIAG IV INF INIT: CPT | Performed by: EMERGENCY MEDICINE

## 2018-09-14 PROCEDURE — 25000131 ZZH RX MED GY IP 250 OP 636 PS 637: Mod: GY | Performed by: PHYSICIAN ASSISTANT

## 2018-09-14 PROCEDURE — 99207 ZZC APP CREDIT; MD BILLING SHARED VISIT: CPT | Performed by: PHYSICIAN ASSISTANT

## 2018-09-14 PROCEDURE — 96375 TX/PRO/DX INJ NEW DRUG ADDON: CPT | Performed by: EMERGENCY MEDICINE

## 2018-09-14 PROCEDURE — 99285 EMERGENCY DEPT VISIT HI MDM: CPT | Mod: 25 | Performed by: EMERGENCY MEDICINE

## 2018-09-14 PROCEDURE — 36415 COLL VENOUS BLD VENIPUNCTURE: CPT | Performed by: PHYSICIAN ASSISTANT

## 2018-09-14 PROCEDURE — C9113 INJ PANTOPRAZOLE SODIUM, VIA: HCPCS | Performed by: PHYSICIAN ASSISTANT

## 2018-09-14 PROCEDURE — 82746 ASSAY OF FOLIC ACID SERUM: CPT | Performed by: EMERGENCY MEDICINE

## 2018-09-14 PROCEDURE — 25000132 ZZH RX MED GY IP 250 OP 250 PS 637: Mod: GY | Performed by: PHYSICIAN ASSISTANT

## 2018-09-14 PROCEDURE — 86850 RBC ANTIBODY SCREEN: CPT | Performed by: EMERGENCY MEDICINE

## 2018-09-14 PROCEDURE — 93005 ELECTROCARDIOGRAM TRACING: CPT | Performed by: EMERGENCY MEDICINE

## 2018-09-14 PROCEDURE — 85025 COMPLETE CBC W/AUTO DIFF WBC: CPT | Performed by: EMERGENCY MEDICINE

## 2018-09-14 PROCEDURE — 25000128 H RX IP 250 OP 636: Performed by: PHYSICIAN ASSISTANT

## 2018-09-14 PROCEDURE — 80076 HEPATIC FUNCTION PANEL: CPT | Performed by: EMERGENCY MEDICINE

## 2018-09-14 PROCEDURE — 84466 ASSAY OF TRANSFERRIN: CPT | Performed by: EMERGENCY MEDICINE

## 2018-09-14 PROCEDURE — 85730 THROMBOPLASTIN TIME PARTIAL: CPT | Performed by: EMERGENCY MEDICINE

## 2018-09-14 PROCEDURE — 86901 BLOOD TYPING SEROLOGIC RH(D): CPT | Performed by: EMERGENCY MEDICINE

## 2018-09-14 PROCEDURE — A9270 NON-COVERED ITEM OR SERVICE: HCPCS | Mod: GY | Performed by: INTERNAL MEDICINE

## 2018-09-14 PROCEDURE — 85018 HEMOGLOBIN: CPT | Performed by: PHYSICIAN ASSISTANT

## 2018-09-14 PROCEDURE — 96366 THER/PROPH/DIAG IV INF ADDON: CPT | Performed by: EMERGENCY MEDICINE

## 2018-09-14 PROCEDURE — 99285 EMERGENCY DEPT VISIT HI MDM: CPT | Mod: Z6 | Performed by: EMERGENCY MEDICINE

## 2018-09-14 RX ORDER — ONDANSETRON 4 MG/1
4 TABLET, ORALLY DISINTEGRATING ORAL EVERY 6 HOURS PRN
Status: DISCONTINUED | OUTPATIENT
Start: 2018-09-14 | End: 2018-09-16 | Stop reason: HOSPADM

## 2018-09-14 RX ORDER — SERTRALINE HYDROCHLORIDE 100 MG/1
100 TABLET, FILM COATED ORAL DAILY
Status: DISCONTINUED | OUTPATIENT
Start: 2018-09-15 | End: 2018-09-14

## 2018-09-14 RX ORDER — OCTREOTIDE ACETATE 50 UG/ML
50 INJECTION, SOLUTION INTRAVENOUS; SUBCUTANEOUS ONCE
Status: COMPLETED | OUTPATIENT
Start: 2018-09-14 | End: 2018-09-14

## 2018-09-14 RX ORDER — DIPHENHYDRAMINE HYDROCHLORIDE 50 MG/ML
25 INJECTION INTRAMUSCULAR; INTRAVENOUS ONCE
Status: DISCONTINUED | OUTPATIENT
Start: 2018-09-14 | End: 2018-09-15

## 2018-09-14 RX ORDER — LANOLIN ALCOHOL/MO/W.PET/CERES
3 CREAM (GRAM) TOPICAL
Status: DISCONTINUED | OUTPATIENT
Start: 2018-09-14 | End: 2018-09-16 | Stop reason: HOSPADM

## 2018-09-14 RX ORDER — ONDANSETRON 2 MG/ML
4 INJECTION INTRAMUSCULAR; INTRAVENOUS EVERY 6 HOURS PRN
Status: DISCONTINUED | OUTPATIENT
Start: 2018-09-14 | End: 2018-09-16 | Stop reason: HOSPADM

## 2018-09-14 RX ORDER — ATORVASTATIN CALCIUM 20 MG/1
20 TABLET, FILM COATED ORAL DAILY
Status: DISCONTINUED | OUTPATIENT
Start: 2018-09-15 | End: 2018-09-16 | Stop reason: HOSPADM

## 2018-09-14 RX ORDER — SERTRALINE HYDROCHLORIDE 100 MG/1
100 TABLET, FILM COATED ORAL DAILY
Status: DISCONTINUED | OUTPATIENT
Start: 2018-09-14 | End: 2018-09-16 | Stop reason: HOSPADM

## 2018-09-14 RX ORDER — LIDOCAINE 40 MG/G
CREAM TOPICAL
Status: DISCONTINUED | OUTPATIENT
Start: 2018-09-14 | End: 2018-09-16 | Stop reason: HOSPADM

## 2018-09-14 RX ADMIN — MELATONIN TAB 3 MG 3 MG: 3 TAB at 21:45

## 2018-09-14 RX ADMIN — OCTREOTIDE ACETATE 50 MCG: 50 INJECTION, SOLUTION INTRAVENOUS; SUBCUTANEOUS at 17:31

## 2018-09-14 RX ADMIN — SERTRALINE HYDROCHLORIDE 100 MG: 100 TABLET ORAL at 21:25

## 2018-09-14 RX ADMIN — PANTOPRAZOLE SODIUM 40 MG: 40 INJECTION, POWDER, FOR SOLUTION INTRAVENOUS at 16:43

## 2018-09-14 RX ADMIN — OCTREOTIDE ACETATE 50 MCG/HR: 200 INJECTION, SOLUTION INTRAVENOUS; SUBCUTANEOUS at 17:38

## 2018-09-14 ASSESSMENT — ENCOUNTER SYMPTOMS
DIZZINESS: 0
ANAL BLEEDING: 1
LIGHT-HEADEDNESS: 0

## 2018-09-14 ASSESSMENT — ACTIVITIES OF DAILY LIVING (ADL): ADLS_ACUITY_SCORE: 12

## 2018-09-14 NOTE — CONSULTS
Nephrology Initial Consult  September 14, 2018      Rachele Martínez MRN:[id number] YOB: 1940  Date of Admission:9/14/2018  Primary care provider: Agnieszka Rodriguez  Requesting physician: Sandi Lopez DO    ASSESSMENT AND RECOMMENDATIONS:   Rachele Martínez is a 78 year old female with a hx of ESKD on HD for 3 yrs with LUE AVG for ~3yrs at [facility] with Dr Enoch Tarango , x3 colon adenoma resected 2015, HTN , HepC SP treatment with Harvoni 3 yrs back. She has a hx of recurrent GIB with need for transfusions and multiple EGD for cauterization (x2 in last 6 wks) sec to AVM, on monthly octreotide and PPI BiD treatment for bleed. Last admission to [facility] 8/14 for bleed and transfused 3 pRBC and received EGD for acute anemia with blood loss.    ESKD on IHD  At [facility]   Under care of Dr Tarango   Run time 3.5hrs   -- will plan on IHD in am , if there is a plan for transfusion non emergent please coordinate with HD for transfusion while she dialyzes, she should be able to tolerate any emergent dialysis needs over night  -- labs stable and no clinical hypervolemia with admission for bleed HD eld for today  -- repeat labs in am , renal panel , magnesium and phos  -- follow on hb per GI and primary plans  -- daily weights    Electrolytes stable    Bicarb 29    Clinical euvolemic  Normotensive    Hx of Liver cirrhosis / hep C SP harvoni  GIB with AVM  Octreotide and PPI per primary    Anemia  Worsened from baseline   Transfuse if <7  Follow per primary    CKD MBD  Will get run records in am  Calcium and phos wnl      Recommendations were communicated to primary team     Seen and discussed with Dr. Mason Cisneros MD   [phone]      REASON FOR CONSULT: ESKD , bleed and need for ESKD management    HISTORY OF PRESENT ILLNESS:  Admitting provider and nursing notes reviewed  Rachele Martínez is a 78 year old female with a hx of ESKD on HD for 3 yrs with LUE AVG for ~3yrs at  [facility] with Dr Enoch Tarango , x3 colon adenoma resected 2015, HTN , HepC SP treatment with [facility]oni 3 yrs back. She has a hx of recurrent GIB with need for transfusions and multiple EGD for cauterization (x2 in last 6 wks) sec to AVM, on monthly octreotide and PPI BiD treatment for bleed. Last admission to [facility] 8/14 for bleed and transfused 3 pRBC and received EGD for acute anemia with blood loss.  She had noticed some blood after BM last night and then again this morning after BM which was excess amount them before. She also noticed increased dizziness and light headedness and that prompted her to reports to ED with her daughter.   She has been tolerating IHD and has been followed by Dr Tarango. She reports her EDW is 61.5kg and has 2.5kg usual UF on HD treatments MWF , she missed HD this morning due to bleed and dizziness she was sent from her HD unit to ED    PAST MEDICAL HISTORY:  Reviewed with patient on 09/14/2018     Past Medical History:   Diagnosis Date     Anemia      Anxiety and depression      Arthritis      AVM (arteriovenous malformation)      Chronic hepatitis C with cirrhosis (H)      Clotting disorder (H)      ESRD (end stage renal disease) (H)     on dialysis     GI bleed     recurrent     Glaucoma      Hyperlipidemia      Hypertension goal BP (blood pressure) < 140/80        Past Surgical History:   Procedure Laterality Date     COLONOSCOPY N/A 9/4/2015    Procedure: COMBINED COLONOSCOPY, SINGLE OR MULTIPLE BIOPSY/POLYPECTOMY BY BIOPSY;  Surgeon: Rupesh Lopez MD;  Location: [facility]     ESOPHAGOSCOPY, GASTROSCOPY, DUODENOSCOPY (EGD), COMBINED N/A 12/18/2014    Procedure: COMBINED ESOPHAGOSCOPY, GASTROSCOPY, DUODENOSCOPY (EGD);  Surgeon: Betsy Carvajal MD;  Location: [facility]     ESOPHAGOSCOPY, GASTROSCOPY, DUODENOSCOPY (EGD), COMBINED N/A 4/25/2015    Procedure: COMBINED ESOPHAGOSCOPY, GASTROSCOPY, DUODENOSCOPY (EGD);  Surgeon: Yosvany Ram MD;  Location:  GI      ESOPHAGOSCOPY, GASTROSCOPY, DUODENOSCOPY (EGD), COMBINED N/A 5/5/2015    Procedure: COMBINED ESOPHAGOSCOPY, GASTROSCOPY, DUODENOSCOPY (EGD);  Surgeon: Mariano Mistry MD;  Location: [facility]     HC CAPSULE ENDOSCOPY N/A 9/30/2015    Procedure: CAPSULE/PILL CAM ENDOSCOPY;  Surgeon: Pan Dhaliwal MD;  Location:  GI     HYSTERECTOMY  1980    KYREE     LUMPECTOMY BREAST          MEDICATIONS:  PTA Meds  Prior to Admission medications    Medication Sig Last Dose Taking? Auth Provider   atorvastatin (LIPITOR) 20 MG tablet Take 1 tablet (20 mg) by mouth daily 9/13/2018 at Unknown time Yes Agnieszka Rodriguez MD   Multiple Vitamins-Minerals (PRORENAL + D) TABS Take 1 tablet by mouth daily 9/13/2018 at Unknown time Yes Reported, Patient   OCTREOTIDE ACETATE IJ Inject as directed every 28 days  8/28/2018 Yes Reported, Patient   pantoprazole (PROTONIX) 40 MG EC tablet Take 40 mg by mouth 2 times daily 9/13/2018 at Unknown time Yes Reported, Patient   sertraline (ZOLOFT) 50 MG tablet Take 2 tablets (100 mg) by mouth daily 9/13/2018 at Unknown time Yes Andriy Barnett MD   albuterol (PROAIR HFA) 108 (90 BASE) MCG/ACT Inhaler Inhale 2 puffs into the lungs every 6 hours More than a month at Unknown time  Fede Pizarro MD   Calcium Acetate, Phos Binder, 667 MG CAPS TAKE 2 CAPSULE BY MOUTH THREE TIMES A DAY WITH MEALS Unknown at Unknown time  Reported, Patient   order for DME Equipment being ordered: 4 wheel walker with seat.  Patient not taking: Reported on 9/4/2018   Kong Britton MD   polyethylene glycol 3350 POWD Take 17 g by mouth daily More than a month at Unknown time  Kong Britton MD      Current Meds    [START ON 9/15/2018] atorvastatin  20 mg Oral Daily     [START ON 9/15/2018] Calcium Acetate (Phos Binder)  2 capsule Oral TID w/meals     diphenhydrAMINE  25 mg Intravenous Once     pantoprazole (PROTONIX) IV  40 mg Intravenous Q12H     [START ON 9/15/2018] PRORENAL + D  1 tablet Oral Daily  "    [START ON 9/15/2018] sertraline  100 mg Oral Daily     sodium chloride (PF)  3 mL Intracatheter Q8H     Infusion Meds    octreotide (sandoSTATIN) infusion ADULT 50 mcg/hr (18 6877)       ALLERGIES:    Allergies   Allergen Reactions     Diovan Hct Itching     severe     Dust Mites      Hydrochlorothiazide Itching     Severe       Sulfa Drugs Hives     Spironolactone Nausea       REVIEW OF SYSTEMS:  A comprehensive of systems was negative except as noted above.    SOCIAL HISTORY:   Social History     Social History     Marital status:      Spouse name: N/A     Number of children: N/A     Years of education: N/A     Occupational History     Not on file.     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     Sexual activity: Not Currently     Other Topics Concern     Parent/Sibling W/ Cabg, Mi Or Angioplasty Before 65f 55m? No     Social History Narrative     Reviewed with patient   Daughter accompanies Rachele Martínez in hospital room    FAMILY MEDICAL HISTORY:   Family History   Problem Relation Age of Onset     Diabetes Mother      Alzheimer Disease Mother      Reviewed with patient     PHYSICAL EXAM:   Temp  Av.6  F (37  C)  Min: 98.4  F (36.9  C)  Max: 98.8  F (37.1  C)      Pulse  Av  Min: 77  Max: 77 Resp  Av.7  Min: 8  Max: 16  SpO2  Av.1 %  Min: 95 %  Max: 100 %       /52  Pulse 77  Temp 98.4  F (36.9  C) (Oral)  Resp 11  Ht 1.626 m (5' 4\")  Wt 63.3 kg (139 lb 8 oz)  SpO2 100%  BMI 23.95 kg/m2       Admit Weight: 63.3 kg (139 lb 8 oz)     GENERAL APPEARANCE: no distress,  awake  EYES: - scleral icterus, pupils equal  Endo: - goiter, - moon facies  Lymphatics: no cervical or " supraclavicular LAD  Pulmonary: lungs clear to auscultation with equal breath sounds bilaterally, - clubbing  CV: regular rhythm, normal rate, no rub   - JVD -   - Edema +  GI: soft, nontender, normal bowel sounds  MS: no evidence of inflammation in joints, no muscle tenderness  : - armando  SKIN: no rash, warm, dry, no cyanosis  NEURO: face symmetric, - asterixis     LABS:   CMP  Recent Labs  Lab 09/14/18  1159      POTASSIUM 4.7   CHLORIDE 99   CO2 29   ANIONGAP 11   *   BUN 48*   CR 8.26*   GFRESTIMATED 5*   GFRESTBLACK 6*   BELGICA 8.2*   PHOS 3.6   PROTTOTAL 7.2   ALBUMIN 3.5   BILITOTAL 0.3   ALKPHOS 132   AST 26   ALT 19     CBC  Recent Labs  Lab 09/14/18  1159   HGB 7.0*   WBC 7.4   RBC 2.02*   HCT 22.3*   *   MCH 34.7*   MCHC 31.4*   RDW 18.2*        INR  Recent Labs  Lab 09/14/18  1159   INR 1.20*   PTT 38*     ABGNo lab results found in last 7 days.   URINE STUDIES  Recent Labs   Lab Test  03/11/16   1449  03/05/16   1440  05/04/15   1213  01/18/12   1726   COLOR  Light Yellow  Yellow  Light Yellow  Yellow   APPEARANCE  Clear  Clear  Clear   --    URINEGLC  30*  30*  Negative  NEGATIVE   URINEBILI  Negative  Negative  Negative   --    URINEKETONE  Negative  Negative  Negative   --    SG  1.009  1.011  1.008  >=1.030   UBLD  Trace*  Small*  Trace*   --    URINEPH  6.0  6.0  6.5  5.5   PROTEIN  300*  300*  300*   --    UROBILINOGEN   --    --    --   1.0 E.U./dL   NITRITE  Negative  Negative  Negative  NEGATIVE   LEUKEST  Negative  Negative  Negative   --    RBCU  1  1  <1   --    WBCU  <1  4*  2  2-4     No lab results found.  PTH  Recent Labs   Lab Test  03/12/16   0552   PTHI  147*     IRON STUDIES  Recent Labs   Lab Test  09/14/18   1159  06/21/16   1404  03/13/16   0446  03/01/16   1140  01/14/16   0944  11/12/15   1005  08/21/15   0924  11/05/14   1306   IRON  55  30*  25*  21*   --   35  34*  143   FEB  279  284  369  482*   --   399  445*  360   IRONSAT  20  11*  7*  4*   --    9*  8*  40   KASSI  229  67  18  11  51  40  18   --        IMAGING:  All imaging studies reviewed by me.     Yo Cisneros MD

## 2018-09-14 NOTE — IP AVS SNAPSHOT
[address] [address]    Phone:  [phone]                                       After Visit Summary   9/14/2018    Rachele Martínez    MRN: [id number]           After Visit Summary Signature Page     I have received my discharge instructions, and my questions have been answered. I have discussed any challenges I see with this plan with the nurse or doctor.    ..........................................................................................................................................  Patient/Patient Representative Signature      ..........................................................................................................................................  Patient Representative Print Name and Relationship to Patient    ..................................................               ................................................  Date                                   Time    ..........................................................................................................................................  Reviewed by Signature/Title    ...................................................              ..............................................  Date                                               Time          22EPIC Rev 08/18

## 2018-09-14 NOTE — PLAN OF CARE
Problem: Patient Care Overview  Goal: Plan of Care/Patient Progress Review  Outcome: No Change  Pt arrived to unit 5B at 1630 from UED, VSS upon arrival, Pt alert and oriented.  Pt belongings at the bedside including clothing, glasses, dentures, purse and cell phone.  Pt able to ambulate from stretcher to bed with a stand by assist.  No acute incidents this shift.  Will continue to monitor and await MD orders.

## 2018-09-14 NOTE — ED TRIAGE NOTES
Pt with hx of bleeding AVMs comes in with rectal bleeding and anemia. She was recently inpatient at [facility] about 2 weeks ago with low Hgb of 6.1, receiving 1 units PRBCs. She is a dialysis pt, did not have her run today d/t the rectal bleeding.

## 2018-09-14 NOTE — IP AVS SNAPSHOT
MRN:[id number]                      After Visit Summary   9/14/2018    Rachele Martínez    MRN: [id number]           Thank you!     Thank you for choosing [facility] for your care. Our goal is always to provide you with excellent care. Hearing back from our patients is one way we can continue to improve our services. Please take a few minutes to complete the written survey that you may receive in the mail after you visit with us. Thank you!        Patient Information     Date Of Birth          1940        Designated Caregiver       Most Recent Value    Caregiver    Will someone help with your care after discharge? yes    Name of designated caregiver Leti    Phone number of caregiver [phone]    Caregiver address Not sure [Doesn't know off the top of her head]      About your hospital stay     You were admitted on:  September 14, 2018 You last received care in the:  [address] [facility]    You were discharged on:  September 16, 2018        Reason for your hospital stay       You were admitted for evaluation of GI bleeding. You received a unit of blood with dialysis. GI will arrange for outpatient endoscopy.                  Who to Call     For medical emergencies, please call 911.  For non-urgent questions about your medical care, please call your primary care provider or clinic, [phone]          Attending Provider     Provider Specialty    Jackelyn Quezada MD Emergency Medicine    Sandi Lopez DO Internal Medicine       Primary Care Provider Office Phone # Fax #    Agnieszka Erica Rodriguez [phone]       When to contact your care team       Return to ED if recurrent GI bleeding.                  After Care Instructions     Activity       Your activity upon discharge: activity as tolerated            Diet       Follow this diet upon discharge: Regular            Monitor and record       Monitor stool output                  Follow-up Appointments     Adult  [facility] Follow-up and recommended labs and tests       GI should contact you to arrange outpatient endoscopies. Continue your usual dialysis schedule. Consider repeat hemoglobin with dialysis run in 5-7 days.     Appointments on [place] and/or [facility] (with [facility] or [facility] provider or service). Call [phone] if you haven't heard regarding these appointments within 7 days of discharge.                  Your next 10 appointments already scheduled     Sep 27, 2018 11:30 AM CDT   Level O with [place] 10 INFUSION   [facility]    [address] [address] [place] [address] [id number] [phone]            Nov 15, 2018  9:00 AM CST   Lab with  LAB   [facility] ([facility])    [address] [place] [address] [id number] [phone]            Nov 15, 2018  9:15 AM CST   US ABDOMEN COMPLETE with UCUS1   [facility] ([facility])    [address] [place] [address] [id number] [phone]           How do I prepare for my exam? (Food and drink instructions) Adults: No eating, smoking, gum chewing or drinking for 8 hours before the exam. You may take medicine with a small sip of water.  Children: * Infants, breast-fed: may have breast milk up to 2 hours before exam. * Infants, formula: may have bottle until 4 hours before exam. * Children 1-5 years: No food or drink for 4 hours before exam. * Children 6 -12 years: No food or drink for 6 hours before exam. * Children over 12 years: No food or drink for 8 hours before exam.  * J Tube Fed: No need to stop feedings.  What should I wear: Wear comfortable clothes.  How long does the exam take: Most ultrasounds take 30 to 60 minutes.  What should I bring: Bring a list of your medicines, including vitamins, minerals and over-the-counter drugs. It is safest to leave personal items at home.  Do I need a :  No  is needed.  " What do I need to tell my doctor: Tell your doctor about any allergies you may have.  What should I do after the exam: No restrictions, You may resume normal activities.  What is this test: An ultrasound uses sound waves to make pictures of the body. Sound waves do not cause pain. The only discomfort may be the pressure of the wand against your skin or full bladder.  Who should I call with questions: If you have any questions, please call the Imaging Department where you will have your exam. Directions, parking instructions, and other information is available on our website, [email or web address].            Dec 04, 2018 10:40 AM CST   (Arrive by 10:25 AM)   RETURN INFLAMMATORY BOWEL DISEASE with Rupesh Lopez MD   [facility] ([facility])    [address] [place] [address]  4th Floor  [facility] [address]   [phone]            Feb 12, 2019 11:45 AM CST   (Arrive by 11:30 AM)   Return General Liver with Andriy Barnett MD   [facility] ([facility])    [address] [place] [address]  [address]  [facility] [address]   [phone]              Pending Results     No orders found from 9/12/2018 to 9/15/2018.            Statement of Approval     Ordered          09/16/18 1317  I have reviewed and agree with all the recommendations and orders detailed in this document.  EFFECTIVE NOW     Approved and electronically signed by:  Kierra Pena PA             Admission Information     Date & Time Provider Department Dept. Phone    9/14/2018 Sandi Lopez DO [address] [facility] [phone]      Your Vitals Were     Blood Pressure Pulse Temperature Respirations Height Weight    158/89 (BP Location: Left arm) 78 98.1  F (36.7  C) (Oral) 16 1.626 m (5' 4\") 62.6 kg (137 lb 14.4 oz)    Pulse Oximetry BMI (Body Mass Index)                99% 23.67 kg/m2          Care EveryWhere ID     This is your Care EveryWhere ID. This could be used " by other organizations to access your [place] medical records  [id number]        Equal Access to Services     ESTIVENCLARISA WEI : Crow Degroot, soy weiss, cee carvalhomakarolina patel, jaz saldanaaurechristina fowler. So [facility] [phone].    ATENCIÓN: Si habla español, tiene a ibarra disposición servicios gratuitos de asistencia lingüística. Pegame al [phone].    We comply with applicable federal civil rights laws and [place] laws. We do not discriminate on the basis of race, color, national origin, age, disability, sex, sexual orientation, or gender identity.               Review of your medicines      CONTINUE these medicines which may have CHANGED, or have new prescriptions. If we are uncertain of the size of tablets/capsules you have at home, strength may be listed as something that might have changed.        Dose / Directions    PROAIR  (90 Base) MCG/ACT inhaler   This may have changed:    - when to take this  - reasons to take this   Used for:  Cough   Generic drug:  albuterol        Dose:  2 puff   Inhale 2 puffs into the lungs every 6 hours as needed for shortness of breath / dyspnea or wheezing   Quantity:  1 Inhaler   Refills:  3         CONTINUE these medicines which have NOT CHANGED        Dose / Directions    atorvastatin 20 MG tablet   Commonly known as:  LIPITOR   Used for:  Cognitive impairment        Dose:  20 mg   Take 1 tablet (20 mg) by mouth daily   Quantity:  90 tablet   Refills:  3       Calcium Acetate (Phos Binder) 667 MG Caps        TAKE 2 CAPSULE BY MOUTH THREE TIMES A DAY WITH MEALS   Refills:  8       OCTREOTIDE ACETATE IJ        Inject as directed every 28 days   Refills:  0       order for DME   Used for:  Disorder of bone and cartilage, Arthralgia, unspecified joint, Tendency to fall        Equipment being ordered: 4 wheel walker with seat.   Quantity:  1 Device   Refills:  0       pantoprazole 40 MG EC tablet   Commonly known as:  PROTONIX         Dose:  40 mg   Take 40 mg by mouth 2 times daily   Refills:  0       polyethylene glycol 3350 Powd   Used for:  Slow transit constipation        Dose:  17 g   Take 17 g by mouth daily   Quantity:  1530 g   Refills:  3       PRORENAL + D Tabs        Dose:  1 tablet   Take 1 tablet by mouth daily   Refills:  0       sertraline 50 MG tablet   Commonly known as:  ZOLOFT   Used for:  Itching        Dose:  100 mg   Take 2 tablets (100 mg) by mouth daily   Quantity:  180 tablet   Refills:  3                Protect others around you: Learn how to safely use, store and throw away your medicines at www.disposemymeds.org.             Medication List: This is a list of all your medications and when to take them. Check marks below indicate your daily home schedule. Keep this list as a reference.      Medications           Morning Afternoon Evening Bedtime As Needed    atorvastatin 20 MG tablet   Commonly known as:  LIPITOR   Take 1 tablet (20 mg) by mouth daily   Last time this was given:  20 mg on 9/15/2018  3:03 PM                                Calcium Acetate (Phos Binder) 667 MG Caps   TAKE 2 CAPSULE BY MOUTH THREE TIMES A DAY WITH MEALS   Last time this was given:  1,334 mg on 9/15/2018  3:04 PM                                OCTREOTIDE ACETATE IJ   Inject as directed every 28 days   Last time this was given:  9/15/2018  5:37 PM                                order for DME   Equipment being ordered: 4 wheel walker with seat.                                pantoprazole 40 MG EC tablet   Commonly known as:  PROTONIX   Take 40 mg by mouth 2 times daily                                polyethylene glycol 3350 Powd   Take 17 g by mouth daily                                PROAIR  (90 Base) MCG/ACT inhaler   Inhale 2 puffs into the lungs every 6 hours as needed for shortness of breath / dyspnea or wheezing   Generic drug:  albuterol                                PRORENAL + D Tabs   Take 1 tablet by mouth daily                                 sertraline 50 MG tablet   Commonly known as:  ZOLOFT   Take 2 tablets (100 mg) by mouth daily   Last time this was given:  100 mg on 9/15/2018  3:04 PM

## 2018-09-14 NOTE — CONSULTS
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 Patient presents to ED today after noticing bright red rectal bleeding. Also reports having dark black stool for a long period of time. Most recent EGD from 8/14/18 [facility] showed a single recently bleeding angiodysplastic lesion in the stomach, treated by APC and a non bleeding gastric ulcers from prior APC therapy with red spot on edge of one treated with APC. Two days prior (8/12/18), she also had an EGD with APC therapy applied to multiple gastric and duodenal angiodysplastic lesions. Capsule endoscopy (10/26/15) revealed small bowel AVMs.   Currently HDS, reports epigastric pain, and no hematemesis. Hgb is 7 g/dl (from 7.6 at [facility] on 8/31), with normal coagulation panel and elevated BUN/creat (patient is also ESRD, missed today's HD session).   Presentation likely secondary to bleeding from AVMs. Although repeating endoscopic interventions is unlikely to be of high yield for long term management, it is not unreasonable to attempt further evaluation and therapy for current acute presentation.     Recommendations  - Close monitoring of hemodynamic measures and stool output.  - Clears for today. NPO after MN for potential EGD in am.  - Monitor hemoglobin Q8H.  - IV PPI 40 mg BID.  - Avoid NSAIDs,  antiplatelets and anticoagulants.   - GI team will continue to follow.      Patient care plan discussed with Dr. Baires, GI staff physician. Thank you for involving us in this patient's care. Please do not hesitate to contact the GI service with any questions or concerns.     Felix James  GI Fellow  P: [phone]  -------------------------------------------------------------------------------------------------------------------   History is obtained from the patient and the medical record.          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 Patient presents to ED today after noticing bright red rectal bleeding. Also reports having dark black stool for a long period of time, with epigastric abdominal pain. No reported N/V, hematemesis, dizziness or syncope. No recent use of NSAIDs. Had prior history of similar presentations attributed to AVMs.             Past Medical History:   Reviewed and edited as appropriate  Past Medical History:   Diagnosis Date     Anemia      Anxiety and depression      Arthritis      AVM (arteriovenous malformation)      Chronic hepatitis C with cirrhosis (H)      Clotting disorder (H)      ESRD (end stage renal disease) (H)     on dialysis     GI bleed     recurrent     Glaucoma      Hyperlipidemia      Hypertension goal BP (blood pressure) < 140/80             Past Surgical History:   Reviewed and edited as appropriate   Past Surgical History:   Procedure Laterality Date     COLONOSCOPY N/A 9/4/2015    Procedure: COMBINED COLONOSCOPY, SINGLE OR MULTIPLE BIOPSY/POLYPECTOMY BY  BIOPSY;  Surgeon: Rupesh Lopez MD;  Location:  GI     ESOPHAGOSCOPY, GASTROSCOPY, DUODENOSCOPY (EGD), COMBINED N/A 12/18/2014    Procedure: COMBINED ESOPHAGOSCOPY, GASTROSCOPY, DUODENOSCOPY (EGD);  Surgeon: Betsy Carvajal MD;  Location:  GI     ESOPHAGOSCOPY, GASTROSCOPY, DUODENOSCOPY (EGD), COMBINED N/A 4/25/2015    Procedure: COMBINED ESOPHAGOSCOPY, GASTROSCOPY, DUODENOSCOPY (EGD);  Surgeon: Yosvany Ram MD;  Location:  GI     ESOPHAGOSCOPY, GASTROSCOPY, DUODENOSCOPY (EGD), COMBINED N/A 5/5/2015    Procedure: COMBINED ESOPHAGOSCOPY, GASTROSCOPY, DUODENOSCOPY (EGD);  Surgeon: Mariano Mistry MD;  Location:  GI     HC CAPSULE ENDOSCOPY N/A 9/30/2015    Procedure: CAPSULE/PILL CAM ENDOSCOPY;  Surgeon: Pan Dhaliwal MD;  Location:  GI     HYSTERECTOMY  1980    KYREE     LUMPECTOMY BREAST              Previous Endoscopy:     Results for orders placed or performed during the hospital encounter of 09/04/15   COLONOSCOPY   Result Value Ref Range    COLONOSCOPY       [address] [place] [address] [phone]     Endoscopy Department  _______________________________________________________________________________  Patient Name: Rachele Martínez        Procedure Date: 9/4/2015 10:10 AM  MRN: [id number]                       Account Number: [id number]  YOB: 1940              Admit Type: Outpatient  Age: 75                                Gender: Female  Note Status: Finalized                Attending MD: Rupesh Lopez,   _______________________________________________________________________________     Procedure:           Colonoscopy  Indications:         Iron deficiency anemia  Providers:           Rupesh Lopez, Chika Acuña RN, Jennifer Mann RN  Patient Profile:     75 year old female with hep C cirrhosis recently                        treated, who has history of gastric ulcer (healed as of                         May 2015) and AVMs. Has ongoing anemia requiring blood                         transfusion. EGD today with some AVMs in the duodenum.                        Colonoscopy to assess for etiology of anemia.  Referring MD:        Kong Britton MD  Medicines:           Midazolam 0.5 mg IV, Fentanyl 25 micrograms IV  Complications:       No immediate complications.  _______________________________________________________________________________  Procedure:           Pre-Anesthesia Assessment:                       - Prior to the procedure, a History and Physical was                        performed, and patient medications and allergies were                        reviewed. The patient is competent. The risks and                        benefits of the procedure and the sedation options and                        risks were discussed with the patient. All questions                        were answered and informed consent was obtained. Patient                        identification and proposed procedure were verified by                        the physi julia and the nurse in the pre-procedure area in                        the procedure room at 10:50 AM. Mental Status                        Examination: alert and oriented. Airway Examination:                        normal oropharyngeal airway and neck mobility.                        Respiratory Examination: clear to auscultation. CV                        Examination: normal. Prophylactic Antibiotics: The                        patient does not require prophylactic antibiotics. Prior                        Anticoagulants: The patient has taken no previous                        anticoagulant or antiplatelet agents. ASA Grade                        Assessment: III - A patient with severe systemic                        disease. After reviewing the risks and benefits, the                        patient was deemed in satisfactory condition to undergo                         the procedure. The anesthesia plan was to use moderate                        sedation / analgesia (conscious sedat ion). Immediately                        prior to administration of medications, the patient was                        re-assessed for adequacy to receive sedatives. The heart                        rate, respiratory rate, oxygen saturations, blood                        pressure, adequacy of pulmonary ventilation, and                        response to care were monitored throughout the                        procedure. The physical status of the patient was                        re-assessed after the procedure.                       - Airway Examination: Mallampati Class III (part of the                        uvula and soft palate visualized).                       After obtaining informed consent, the colonoscope was                        passed under direct vision. Throughout the procedure,                        the patient's blood pressure, pulse, and oxygen                        saturations were monitored continuously. The Colonoscope                        was introduced t hrough the anus and advanced to the                        terminal ileum. The colonoscopy was somewhat difficult                        due to a tortuous colon. Successful completion of the                        procedure was aided by using scope torsion. The patient                        tolerated the procedure well. The quality of the bowel                        preparation was good.                                                                                   Findings:       Five semi-sessile polyps were found in the sigmoid colon, in the        descending colon, in the transverse colon and in the ascending colon.        The polyps were 1 to 4 mm in size. These polyps were removed with a cold        biopsy forceps. Resection and retrieval were complete. Verification of        patient identification for  the specimen was done. Estimated blood loss        was minimal.       The terminal ileum appeared normal.                                                                                    Impression:          - Five 1 to 4 mm polyps in the sigmoid colon, in the                        descending colon, in the transverse colon and in the                        ascending colon. Resected and retrieved.                       - The examined portion of the ileum was normal.  Recommendation:      - Await pathology results.                       - No etiology of anemia was found. The polyps were small                        and without mucosal erosion or ulceration and thus were                        not contributing to anemia.                       - If 3 or more polyps return as adenomas would repeat in                        3 years, If < 3 adenomas then would repeat in 5 years.                        Await pathology results in 2-4 weeks for final                        recommendation.                       - Most likely the anemia is related to AVMs. Gievn                        findings on EGD today suspect that more AVMs are in the                        smal l intestine. Would recommend capsule endoscopy for                        further evaluation if continued anemia.                                                                                     Electronically signed by: Rupesh Lopez MD  _____________________  Rupesh Lopez,   9/4/2015 11:52 AM  Number of Addenda: 0    Note Initiated On: 9/4/2015 10:10 AM              Social History:   Reviewed and edited as appropriate  Social History     Social History     Marital status:      Spouse name: N/A     Number of children: N/A     Years of education: N/A     Occupational History     Not on file.     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     Sexual activity: Not Currently     Other Topics Concern     Parent/Sibling W/ Cabg, Mi Or Angioplasty Before 65f 55m? No     Social History Narrative            Family History:   Reviewed and edited as appropriate  Family History   Problem Relation Age of Onset     Diabetes Mother      Alzheimer Disease Mother            Allergies:   Reviewed and edited as appropriate     Allergies   Allergen Reactions     Diovan Hct Itching     severe     Dust Mites      Hydrochlorothiazide Itching     Severe       Sulfa Drugs Hives     Spironolactone Nausea            Medications:     Current Facility-Administered Medications   Medication     diphenhydrAMINE (BENADRYL) injection 25 mg     Current Outpatient Prescriptions   Medication Sig     atorvastatin (LIPITOR) 20 MG tablet Take 1 tablet (20 mg) by mouth daily     Multiple Vitamins-Minerals (PRORENAL + D) TABS Take 1 tablet by mouth daily     OCTREOTIDE ACETATE IJ Inject as directed every 28 days      pantoprazole (PROTONIX) 40 MG EC tablet Take 40 mg by mouth 2 times daily     sertraline (ZOLOFT) 50 MG tablet Take 2 tablets (100 mg) by mouth daily     albuterol (PROAIR HFA) 108 (90 BASE) MCG/ACT Inhaler Inhale 2 puffs into the lungs every 6 hours     Calcium Acetate, Phos Binder, 667 MG CAPS TAKE 2 CAPSULE BY MOUTH THREE TIMES A DAY WITH MEALS     order for DME Equipment being ordered: 4 wheel walker with seat. (Patient not taking: Reported on 9/4/2018)     polyethylene glycol 3350 POWD Take 17 g by mouth daily             Review of Systems:   A complete review of systems was performed and is negative except as noted in the HPI           Physical Exam:   /90  Pulse 77  Temp 98.4  F (36.9  C) (Oral)  Resp 11  Ht 1.626 m (5' 4\")  Wt 63.3 kg (139 lb 8 oz)  SpO2 99%  BMI 23.95 " kg/m2  Wt:   Wt Readings from Last 2 Encounters:   09/14/18 63.3 kg (139 lb 8 oz)   09/04/18 63.1 kg (139 lb 3.2 oz)      Constitutional: cooperative, pleasant, not dyspneic/diaphoretic, no acute distress  Eyes: Pale  Ears/nose/mouth/throat: Normal oropharynx without ulcers or exudate, mucus membranes moist, hearing intact  Neck: supple, thyroid normal size  CV: No edema  Respiratory: Unlabored breathing  Lymph: No axillary, submandibular, supraclavicular or inguinal lymphadenopathy  Abd: Nondistended, +bs, no hepatosplenomegaly, tender epigastrium, no peritoneal signs  Skin: warm, perfused, no jaundice  Neuro: AAO x 3, No asterixis  Psych: Normal affect  MSK: Normal gait         Data:   Labs and imaging below were independently reviewed and interpreted    BMP  Recent Labs  Lab 09/14/18  1159      POTASSIUM 4.7   CHLORIDE 99   BELGICA 8.2*   CO2 29   BUN 48*   CR 8.26*   *     CBC  Recent Labs  Lab 09/14/18  1159   WBC 7.4   RBC 2.02*   HGB 7.0*   HCT 22.3*   *   MCH 34.7*   MCHC 31.4*   RDW 18.2*        INR  Recent Labs  Lab 09/14/18  1159   INR 1.20*     LFTs  Recent Labs  Lab 09/14/18  1159   ALKPHOS 132   AST 26   ALT 19   BILITOTAL 0.3   PROTTOTAL 7.2   ALBUMIN 3.5      PANCNo lab results found in last 7 days.    Imaging and records reviewed.

## 2018-09-14 NOTE — ED NOTES
[facility], [place]   ED Nurse to Floor Handoff     Rachele Martínez is a 78 year old female who speaks English and lives alone,  in a home  They arrived in the ED by car from clinic    ED Chief Complaint: Rectal Bleeding and Anemia    ED Dx;   Final diagnoses:   Rectal bleeding         Needed?: No    Allergies:   Allergies   Allergen Reactions     Diovan Hct Itching     severe     Dust Mites      Hydrochlorothiazide Itching     Severe       Sulfa Drugs Hives     Spironolactone Nausea   .  Past Medical Hx:   Past Medical History:   Diagnosis Date     Anemia      Anxiety and depression      Arthritis      AVM (arteriovenous malformation)      Chronic hepatitis C with cirrhosis (H)      Clotting disorder (H)      ESRD (end stage renal disease) (H)     on dialysis     GI bleed     recurrent     Glaucoma      Hyperlipidemia      Hypertension goal BP (blood pressure) < 140/80       Baseline Mental status: WDL  Current Mental Status changes: at basesline    Infection present or suspected this encounter: no  Sepsis suspected: No  Isolation type: No active isolations     Activity level - Baseline/Home:  Independent  Activity Level - Current:   Independent    Bariatric equipment needed?: No    In the ED these meds were given: Medications - No data to display    Drips running?  No    Home pump  No    Current LDAs       Labs results:   Labs Ordered and Resulted from Time of ED Arrival Up to the Time of Departure from the ED   CBC WITH PLATELETS DIFFERENTIAL - Abnormal; Notable for the following:        Result Value    RBC Count 2.02 (*)     Hemoglobin 7.0 (*)     Hematocrit 22.3 (*)      (*)     MCH 34.7 (*)     MCHC 31.4 (*)     RDW 18.2 (*)     All other components within normal limits   INR - Abnormal; Notable for the following:     INR 1.20 (*)     All other components within normal limits   PARTIAL THROMBOPLASTIN TIME - Abnormal; Notable for the following:     PTT 38 (*)   "   All other components within normal limits   BASIC METABOLIC PANEL - Abnormal; Notable for the following:     Glucose 121 (*)     Urea Nitrogen 48 (*)     Creatinine 8.26 (*)     GFR Estimate 5 (*)     GFR Estimate If Black 6 (*)     Calcium 8.2 (*)     All other components within normal limits   TRANSFERRIN   FERRITIN   IRON AND IRON BINDING CAPACITY   PERIPHERAL IV CATHETER   CARDIAC CONTINUOUS MONITORING   IV ACCESS   ABO/RH TYPE AND SCREEN       Imaging Studies: No results found for this or any previous visit (from the past 24 hour(s)).    Recent vital signs:   /64  Pulse 77  Temp 98.4  F (36.9  C) (Oral)  Resp 11  Ht 1.626 m (5' 4\")  Wt 63.3 kg (139 lb 8 oz)  SpO2 100%  BMI 23.95 kg/m2    Cardiac Rhythm: Normal Sinus  Pt needs tele? No  Skin/wound Issues: None    Code Status: Full Code    Pain control: pt had none    Nausea control: pt had none    Abnormal labs/tests/findings requiring intervention:     Family present during ED course? Yes   Family Comments/Social Situation comments:     Tasks needing completion: Dialysis     JESSICA GUO RN    [phone] [facility]    "

## 2018-09-14 NOTE — TELEPHONE ENCOUNTER
[facility] Call Center    Phone Message    May a detailed message be left on voicemail: yes    Reason for Call: Other: Patients daughter Charla is calling, patient is currently in the ED dept and she is hoping that Leandro can call over and speak with the ED physician.  She stated that the colonoscopy needs to be moved to OR, and that they are currently trying to take Xray of chest, yet patient is in for blood in stool please call to follow up.      Action Taken: Message routed to:  [facility] ([facility]): GI

## 2018-09-14 NOTE — PHARMACY-ADMISSION MEDICATION HISTORY
Admission medication history interview status for the 9/14/2018 admission is complete. See Epic admission navigator for allergy information, pharmacy, prior to admission medications and immunization status.     Medication history interview sources:  patient, patient's daughter, [facility]    Changes made to PTA medication list (reason)  Added: none  Deleted: none  Changed: none    Additional medication history information (including reliability of information, actions taken by pharmacist):  --Patient's daughter was a reliable historian for most of the patient's medications. Pantoprazole was started on 8/17/18 and patient was told to take twice daily for 30 days. Patient should still be taking this medication. No refill was given on prescription  --Patient reports she is taking Phoslo, however [facility] did not have record since April 2017. Patient stated she only filled at this pharmacy.   --Patient receives octreotide in the clinic, last given 8/28/18.     Prior to Admission medications    Medication Sig Last Dose Taking? Auth Provider   atorvastatin (LIPITOR) 20 MG tablet Take 1 tablet (20 mg) by mouth daily 9/13/2018 at Unknown time Yes Agnieszka Rodriguez MD   Calcium Acetate, Phos Binder, 667 MG CAPS TAKE 2 CAPSULE BY MOUTH THREE TIMES A DAY WITH MEALS 9/13/2018 at Unknown time Yes Reported, Patient   Multiple Vitamins-Minerals (PRORENAL + D) TABS Take 1 tablet by mouth daily 9/13/2018 at Unknown time Yes Reported, Patient   OCTREOTIDE ACETATE IJ Inject as directed every 28 days  8/28/2018 Yes Reported, Patient   pantoprazole (PROTONIX) 40 MG EC tablet Take 40 mg by mouth 2 times daily 9/13/2018 at Unknown time Yes Reported, Patient   sertraline (ZOLOFT) 50 MG tablet Take 2 tablets (100 mg) by mouth daily 9/13/2018 at Unknown time Yes Andriy Barnett MD   albuterol (PROAIR HFA) 108 (90 BASE) MCG/ACT Inhaler Inhale 2 puffs into the lungs every 6 hours More than a month at Unknown time  Fede Pizarro,  MD   order for DME Equipment being ordered: 4 wheel walker with seat.  Patient not taking: Reported on 9/4/2018   Kong Britton MD   polyethylene glycol 3350 POWD Take 17 g by mouth daily More than a month at Unknown time  Kong Britton MD       Medication history completed by: Gunjan Britton, AshD

## 2018-09-14 NOTE — H&P
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     #Bright Red Blood Per Rectum. With history of AVMs throughout the GI tract resulting in recurrent GIB bleeds. EGD on 8/12/18 showed multiple bleeding angiodysplastic lesions in the stomach treated with argon plasma coagulation (APC), and multiple recently bleeding angiodysplastic lesions in the duodenum also treated with APC. Repeat EGD on 8/14 had a single recently bleeding angiodysplastic lesion in the stomach treated with APC, non-bleeding gastric ulcers from prior APC therapy, and red spot on edge of one treated with APC. Presented w/ rectal bleeding and stools darker than baseline. Hemodynamically stable. Hgb 7.6-->7.0 over past couple of weeks.   - GI consult. Discussed. Anticipate EGD today +/- prep for colonoscopy tomorrow.   - NPO pending GI reccs.   - IV PPI BID.   - Per prelim d/w GI, octreotide gtt not indicated (takes monthly injection at home).   - Anemia management as below.     #Acute on Chronic Anemia. Hgb 7s-8s dating back to at least 4/2016 in [facility] records, although was 11.2 here in 5/2018 (unclear reliability although was also 10s-11s here in 2017). No iron studies or other anemia w/u since [id number].  which is unexpected. Suspect mixed picture w/ drop from 7.6 on 8/31 (from 7.0 prior to 2 units - inadequate response) to 7.0 here today d/t GIB but chronic disease, iron deficiency, and possible other nutrient deficiencies playing a role. She has a hx of transfusion reactions.   - Agree w/ 1 unit PRBCs ordered in  ED. Note that patient has a history of transfusion reactions and per daughter receives Benadryl 25 mg IV prior to any transfusion.  - Add on retic count, iron studies, B12/folate prior to transfusion (although last had on 8/31).   - Trend hgb again this evening.   - Asked nephrology to review anemia management w/ HD.     #ESRD. On HD MWF via right AVG at [facility].   - Nephrology consult. Discussed. Is due for HD today. Also asked them to further eval anemia and what is currently being done w/ HD (per patient receives iron).     #Cirrhosis. Hepatitis C treated w/ [facility] and achieved SVR 7/2015. Cirrhosis has been well compensated w/ no hx of varices, ascites, or HE. MELD Na score 21 primarily driven by creatinine (on dialysis). Followed in Dr. Barnett's clinic.   - Add on LFTs.     #HTN. BPs controlled. Not on meds.     #HLD. Continue home statin.     #Depression/Anxiety. Continue home Zoloft.     Diet: NPO pending GI recommendations  Fluids: PO - IV if developing tachycardia/hypotension but note that she missed HD today   DVT Prophylaxis: mechanical  Code Status: full    Disposition Plan   Expected discharge: 2 - 3 days; recommended to prior living arrangement once GIB resolved    Kierra Pena PA-C  Hospitalist Service  [facility]  Pager [phone]    Chief Complaint   Rectal Bleeding    History is obtained from the patient and daughter    History of Present Illness   Patient has a long history of recurrent GI bleeding attributed to small bowel AVMs. She was most recently admitted to [facility] 8/11-8/16/18 with a hemoglobin of 6.3. She received 3 units PRBCs and was 8.7 at time of discharge. During that stay she underwent EGD x 2. On 8/12 had multiple bleeding angiodysplastic lesions in the stomach treated with argon plasma coagulation (APC), and multiple recently bleeding angiodysplastic lesions in the duodenum also treated with APC. On 8/14 had a single recently bleeding  angiodysplastic lesion in the stomach treated with APC, non-bleeding gastric ulcers from prior APC therapy, and red spot on edge of one treated with APC.     She presented to [facility] on 8/31/18 for dizziness, weakness, and a hemoglobin of 7 identified at dialysis. She had dark stools that were hemoccult positive. She received 2 units PRBCs and declined admission leaving A. Repeat hemoglobin was 7.6.     Today, she presents to our ED for evaluation of rectal bleeding. Seeing blood when she wipes but also reports that stools are darker than normal (but not bloody). Daughter reports at least 30 blood transfusions in the past 3 years for recurrent GI bleeding. Prior to August she had been doing well since February after starting monthly octreotide injections about 2 years ago. She had a colonoscopy scheduled for 9/17 but this was cancelled as GI team wanted to perform in OR instead, and not yet rescheduled. Last was in 2015. Followed in our GI clinic. Currently, patient denies any abdominal pain, N/V, dizziness, chest pain, SOB. She and her daughter are frustrated with the recurrence and wonder if they should be seen at [place]. Daughter notes that patient receives dose of IV Benadryl prior to any blood transfusions due to history of reactions (which are documented in GI notes as well).     Review of Systems   10 point ROS performed and negative unless otherwise noted in HPI    Past Medical History    I have reviewed this patient's medical history and updated it with pertinent information if needed.   Past Medical History:   Diagnosis Date     Anemia      Anxiety and depression      Arthritis      AVM (arteriovenous malformation)      Chronic hepatitis C with cirrhosis (H)      Clotting disorder (H)      ESRD (end stage renal disease) (H)     on dialysis     GI bleed     recurrent     Glaucoma      Hyperlipidemia      Hypertension goal BP (blood pressure) < 140/80         Past Surgical History   I have reviewed  this patient's surgical history and updated it with pertinent information if needed.  Past Surgical History:   Procedure Laterality Date     COLONOSCOPY N/A 9/4/2015    Procedure: COMBINED COLONOSCOPY, SINGLE OR MULTIPLE BIOPSY/POLYPECTOMY BY BIOPSY;  Surgeon: Rupesh Lopez MD;  Location:  GI     ESOPHAGOSCOPY, GASTROSCOPY, DUODENOSCOPY (EGD), COMBINED N/A 12/18/2014    Procedure: COMBINED ESOPHAGOSCOPY, GASTROSCOPY, DUODENOSCOPY (EGD);  Surgeon: Betsy Carvajal MD;  Location:  GI     ESOPHAGOSCOPY, GASTROSCOPY, DUODENOSCOPY (EGD), COMBINED N/A 4/25/2015    Procedure: COMBINED ESOPHAGOSCOPY, GASTROSCOPY, DUODENOSCOPY (EGD);  Surgeon: Yosvany Ram MD;  Location:  GI     ESOPHAGOSCOPY, GASTROSCOPY, DUODENOSCOPY (EGD), COMBINED N/A 5/5/2015    Procedure: COMBINED ESOPHAGOSCOPY, GASTROSCOPY, DUODENOSCOPY (EGD);  Surgeon: Mariano Mistry MD;  Location:  GI     HC CAPSULE ENDOSCOPY N/A 9/30/2015    Procedure: CAPSULE/PILL CAM ENDOSCOPY;  Surgeon: Pan Dhaliwal MD;  Location:  GI     HYSTERECTOMY  1980    KYREE     LUMPECTOMY BREAST          Social History   Social History   Substance Use Topics     Smoking status: Former Smoker     Packs/day: 2.00     Years: 45.00     Types: Cigarettes     Start date: 1/1/1953     Quit date: 11/23/2002     Smokeless tobacco: Never Used     Alcohol use No       Family History   I have reviewed this patient's family history and updated it with pertinent information if needed.   Family History   Problem Relation Age of Onset     Diabetes Mother      Alzheimer Disease Mother        Prior to Admission Medications   Prior to Admission Medications   Prescriptions Last Dose Informant Patient Reported? Taking?   Calcium Acetate, Phos Binder, 667 MG CAPS Unknown at Unknown time  Yes No   Sig: TAKE 2 CAPSULE BY MOUTH THREE TIMES A DAY WITH MEALS   Multiple Vitamins-Minerals (PRORENAL + D) TABS 9/13/2018 at Unknown time  Yes Yes   Sig: Take 1 tablet by  "mouth daily   OCTREOTIDE ACETATE IJ 8/28/2018  Yes Yes   Sig: Inject as directed every 28 days    albuterol (PROAIR HFA) 108 (90 BASE) MCG/ACT Inhaler More than a month at Unknown time  No No   Sig: Inhale 2 puffs into the lungs every 6 hours   atorvastatin (LIPITOR) 20 MG tablet 9/13/2018 at Unknown time  No Yes   Sig: Take 1 tablet (20 mg) by mouth daily   order for DME   No No   Sig: Equipment being ordered: 4 wheel walker with seat.   Patient not taking: Reported on 9/4/2018   pantoprazole (PROTONIX) 40 MG EC tablet 9/13/2018 at Unknown time  Yes Yes   Sig: Take 40 mg by mouth 2 times daily   polyethylene glycol 3350 POWD More than a month at Unknown time  No No   Sig: Take 17 g by mouth daily   sertraline (ZOLOFT) 50 MG tablet 9/13/2018 at Unknown time  No Yes   Sig: Take 2 tablets (100 mg) by mouth daily      Facility-Administered Medications: None     Allergies   Allergies   Allergen Reactions     Diovan Hct Itching     severe     Dust Mites      Hydrochlorothiazide Itching     Severe       Sulfa Drugs Hives     Spironolactone Nausea       Physical Exam   /64  Pulse 77  Temp 98.4  F (36.9  C) (Oral)  Resp 11  Ht 1.626 m (5' 4\")  Wt 63.3 kg (139 lb 8 oz)  SpO2 100%  BMI 23.95 kg/m2  GENERAL: Alert and oriented x 3. Lying in bed, appears comfortable. Conversant.   HEENT: Anicteric sclera. Mucous membranes moist.   CV: RRR. S1, S2. No murmurs appreciated.   RESPIRATORY: Effort normal on room air. Lungs CTAB with no wheezing, rales, rhonchi.   GI: Abdomen soft and non distended, bowel sounds present. No tenderness, rebound, guarding.   NEUROLOGICAL: No focal deficits. Moves all extremities.   EXTREMITIES: No peripheral edema. Intact bilateral pedal pulses.   SKIN: No jaundice. No rashes.     Data   Data reviewed today: I reviewed all medications, new labs and imaging results over the last 24 hours.                            "

## 2018-09-14 NOTE — ED PROVIDER NOTES
"    [place] EMERGENCY DEPARTMENT ([facility])  9/14/18  History     Chief Complaint   Patient presents with     Rectal Bleeding     Anemia     The history is provided by a relative, the patient and medical records.     Rachele Martínez is a 78 year old female with a medical history significant for GI bleed secondary to AVM, ESRD on dialysis and hypertension.  Per review of patient's chart, patient was seen at [facility] Emergency Department on 8/31/2018 after patient was found to have a hemoglobin of 7 at dialysis associated with dizziness and weakness.  While in the Emergency Department at that time, her exam showed dark stools that was Hemoccult positive.  Patient was given 2 units of PRBCs.  Patient after this was monitored for 1 hour in the Emergency Department, but was unwilling to be monitored further.  Patient left AMA.    Patient returns to the Emergency Department today for evaluation of rectal bleeding.  The majority of the patient's history is provided by the patient's daughter.  Patient has a history of rectal bleeding and reports that no blood is seen in the stool, blood is only seen when the patient wipes.  Patient has required multiple blood transfusions in the past, per the daughter's report she has needed \"30 the last 3 years\".  Patient here denies any lightheadedness, dizziness or chest pain.  Patient comes to the [place] Emergency Department as her Gastroenterology physician is here through the clinics.  The patient's daughter does note that the patient was scheduled to undergo a colonoscopy on 9/17/2018; however, this was canceled as they wanted the colonoscopy to be done in the OR.  They have not been called yet to reschedule the colonoscopy.    I have reviewed the Medications, Allergies, Past Medical and Surgical History, and Social History in the [employer] system.    Past Medical History:   Diagnosis Date     Anemia      Anxiety state, unspecified 11/23/2012     Arthritis  "     Chronic hepatitis C with cirrhosis (H) 12/10/2014     Chronic kidney disease      Clotting disorder (H)      Dialysis patient (H)      Glaucoma      Hypertension      Hypertension goal BP (blood pressure) < 140/80 2/10/2014     Liver failure (H)      Renal failure      Unspecified pruritic disorder 11/23/2012       Past Surgical History:   Procedure Laterality Date     COLONOSCOPY N/A 9/4/2015    Procedure: COMBINED COLONOSCOPY, SINGLE OR MULTIPLE BIOPSY/POLYPECTOMY BY BIOPSY;  Surgeon: Rupesh Lopez MD;  Location:  GI     ESOPHAGOSCOPY, GASTROSCOPY, DUODENOSCOPY (EGD), COMBINED N/A 12/18/2014    Procedure: COMBINED ESOPHAGOSCOPY, GASTROSCOPY, DUODENOSCOPY (EGD);  Surgeon: Betsy Carvajal MD;  Location:  GI     ESOPHAGOSCOPY, GASTROSCOPY, DUODENOSCOPY (EGD), COMBINED N/A 4/25/2015    Procedure: COMBINED ESOPHAGOSCOPY, GASTROSCOPY, DUODENOSCOPY (EGD);  Surgeon: Yosvany Ram MD;  Location:  GI     ESOPHAGOSCOPY, GASTROSCOPY, DUODENOSCOPY (EGD), COMBINED N/A 5/5/2015    Procedure: COMBINED ESOPHAGOSCOPY, GASTROSCOPY, DUODENOSCOPY (EGD);  Surgeon: Mariano Mistry MD;  Location:  GI     HC CAPSULE ENDOSCOPY N/A 9/30/2015    Procedure: CAPSULE/PILL CAM ENDOSCOPY;  Surgeon: Pan Dhaliwal MD;  Location:  GI     HYSTERECTOMY  1980    KYREE     LUMPECTOMY BREAST         Family History   Problem Relation Age of Onset     Diabetes Mother      Alzheimer Disease Mother      Musculoskeletal Disorder Mother      joint pain bilateral knees       Social History   Substance Use Topics     Smoking status: Former Smoker     Packs/day: 2.00     Years: 45.00     Types: Cigarettes     Start date: 1/1/1953     Quit date: 11/23/2002     Smokeless tobacco: Never Used     Alcohol use No       No current facility-administered medications for this encounter.      Current Outpatient Prescriptions   Medication     atorvastatin (LIPITOR) 20 MG tablet     Calcium Acetate, Phos Binder, 667 MG CAPS      "sertraline (ZOLOFT) 50 MG tablet     albuterol (PROAIR HFA) 108 (90 BASE) MCG/ACT Inhaler     Multiple Vitamins-Minerals (PRORENAL + D) TABS     OCTREOTIDE ACETATE IJ     order for DME     pantoprazole (PROTONIX) 40 MG EC tablet     polyethylene glycol 3350 POWD        Allergies   Allergen Reactions     Diovan Hct Itching     severe     Dust Mites      Hydrochlorothiazide Itching     Severe       Sulfa Drugs Hives     Spironolactone Nausea         Review of Systems   Cardiovascular: Negative for chest pain.   Gastrointestinal: Positive for anal bleeding.   Neurological: Negative for dizziness and light-headedness.   All other systems reviewed and are negative.      Physical Exam   BP: 145/66  Pulse: 77  Temp: 98.8  F (37.1  C)  Resp: 16  Height: 162.6 cm (5' 4\")  Weight: 63.3 kg (139 lb 8 oz)  SpO2: 100 %      Physical Exam   Constitutional: She is oriented to person, place, and time. She appears well-developed and well-nourished. No distress.   HENT:   Head: Atraumatic.   Mouth/Throat: Oropharynx is clear and moist. No oropharyngeal exudate.   Eyes: Pupils are equal, round, and reactive to light. No scleral icterus.   Cardiovascular: Normal rate, regular rhythm, normal heart sounds and intact distal pulses.    Pulmonary/Chest: Breath sounds normal. No respiratory distress.   Abdominal: Soft. Bowel sounds are normal. There is no tenderness.   Musculoskeletal: She exhibits no edema or tenderness.   Neurological: She is alert and oriented to person, place, and time.   Skin: Skin is warm. No rash noted. She is not diaphoretic.   Nursing note and vitals reviewed.      ED Course   11:44 AM  The patient was seen and examined by Jackelyn Quezada MD in Room ED14.     ED Course     Procedures             Critical Care time:  none             Labs Ordered and Resulted from Time of ED Arrival Up to the Time of Departure from the ED - No data to display         Assessments & Plan (with Medical Decision Making)     78 year old " female with a medical history significant for GI bleed secondary to AVM, ESRD on dialysis and hypertension with recurrent GI bleeds who comes in with c/o rectal bleeding. IV established, labs drawn and sent and remarkable for a Hgb of 7.0 and stable electrolytes c/w her ESRD. Case discussed with Medicine and GI, will admit to trend HgB and further eval. .      I have reviewed the nursing notes.    I have reviewed the findings, diagnosis, plan and need for follow up with the patient.    New Prescriptions    No medications on file       Final diagnoses:   Rectal bleeding     IKostas, am serving as a trained medical scribe to document services personally performed by Jackelyn Quezada MD, based on the provider's statements to me.   IJackelyn MD, was physically present and have reviewed and verified the accuracy of this note documented by Kostas Dickey.    9/14/2018   [facility], [place], EMERGENCY DEPARTMENT     Jackelyn Quezada MD  09/25/18 [id number]

## 2018-09-15 LAB
ABO + RH BLD: NORMAL
ABO + RH BLD: NORMAL
BLD GP AB SCN SERPL QL: NORMAL
BLD PROD TYP BPU: NORMAL
BLD PROD TYP BPU: NORMAL
BLD UNIT ID BPU: 0
BLOOD BANK CMNT PATIENT-IMP: NORMAL
BLOOD PRODUCT CODE: NORMAL
BPU ID: NORMAL
ERYTHROCYTE [DISTWIDTH] IN BLOOD BY AUTOMATED COUNT: 17.8 % (ref 10–15)
HCT VFR BLD AUTO: 21.6 % (ref 35–47)
HGB BLD-MCNC: 6.8 G/DL (ref 11.7–15.7)
HGB BLD-MCNC: 8 G/DL (ref 11.7–15.7)
HGB BLD-MCNC: 8.5 G/DL (ref 11.7–15.7)
INTERPRETATION ECG - MUSE: NORMAL
MCH RBC QN AUTO: 34.2 PG (ref 26.5–33)
MCHC RBC AUTO-ENTMCNC: 31.5 G/DL (ref 31.5–36.5)
MCV RBC AUTO: 109 FL (ref 78–100)
NUM BPU REQUESTED: 1
PLATELET # BLD AUTO: 203 10E9/L (ref 150–450)
RBC # BLD AUTO: 1.99 10E12/L (ref 3.8–5.2)
SPECIMEN EXP DATE BLD: NORMAL
TRANSFUSION STATUS PATIENT QL: NORMAL
TRANSFUSION STATUS PATIENT QL: NORMAL
WBC # BLD AUTO: 8.7 10E9/L (ref 4–11)

## 2018-09-15 PROCEDURE — 25000125 ZZHC RX 250: Performed by: PHYSICIAN ASSISTANT

## 2018-09-15 PROCEDURE — A9270 NON-COVERED ITEM OR SERVICE: HCPCS | Mod: GY | Performed by: INTERNAL MEDICINE

## 2018-09-15 PROCEDURE — 25000132 ZZH RX MED GY IP 250 OP 250 PS 637: Mod: GY | Performed by: INTERNAL MEDICINE

## 2018-09-15 PROCEDURE — A9270 NON-COVERED ITEM OR SERVICE: HCPCS | Mod: GY | Performed by: PHYSICIAN ASSISTANT

## 2018-09-15 PROCEDURE — 25000132 ZZH RX MED GY IP 250 OP 250 PS 637: Mod: GY | Performed by: PHYSICIAN ASSISTANT

## 2018-09-15 PROCEDURE — 36415 COLL VENOUS BLD VENIPUNCTURE: CPT | Performed by: PHYSICIAN ASSISTANT

## 2018-09-15 PROCEDURE — 99207 ZZC APP CREDIT; MD BILLING SHARED VISIT: CPT | Performed by: PHYSICIAN ASSISTANT

## 2018-09-15 PROCEDURE — 25000131 ZZH RX MED GY IP 250 OP 636 PS 637: Mod: GY | Performed by: PHYSICIAN ASSISTANT

## 2018-09-15 PROCEDURE — 85018 HEMOGLOBIN: CPT | Performed by: PHYSICIAN ASSISTANT

## 2018-09-15 PROCEDURE — 12000001 ZZH R&B MED SURG/OB UMMC

## 2018-09-15 PROCEDURE — 90937 HEMODIALYSIS REPEATED EVAL: CPT

## 2018-09-15 PROCEDURE — C9113 INJ PANTOPRAZOLE SODIUM, VIA: HCPCS | Performed by: PHYSICIAN ASSISTANT

## 2018-09-15 PROCEDURE — 99232 SBSQ HOSP IP/OBS MODERATE 35: CPT | Performed by: INTERNAL MEDICINE

## 2018-09-15 PROCEDURE — 5A1D70Z PERFORMANCE OF URINARY FILTRATION, INTERMITTENT, LESS THAN 6 HOURS PER DAY: ICD-10-PCS | Performed by: INTERNAL MEDICINE

## 2018-09-15 PROCEDURE — 25000128 H RX IP 250 OP 636: Performed by: PHYSICIAN ASSISTANT

## 2018-09-15 PROCEDURE — 25000128 H RX IP 250 OP 636: Performed by: INTERNAL MEDICINE

## 2018-09-15 PROCEDURE — P9016 RBC LEUKOCYTES REDUCED: HCPCS | Performed by: EMERGENCY MEDICINE

## 2018-09-15 PROCEDURE — 85027 COMPLETE CBC AUTOMATED: CPT | Performed by: PHYSICIAN ASSISTANT

## 2018-09-15 RX ORDER — LIDOCAINE 4 G/G
1-2 PATCH TOPICAL
Status: DISCONTINUED | OUTPATIENT
Start: 2018-09-15 | End: 2018-09-16 | Stop reason: HOSPADM

## 2018-09-15 RX ORDER — ACETAMINOPHEN 500 MG
500 TABLET ORAL
Status: COMPLETED | OUTPATIENT
Start: 2018-09-15 | End: 2018-09-15

## 2018-09-15 RX ORDER — DIPHENHYDRAMINE HYDROCHLORIDE 50 MG/ML
25 INJECTION INTRAMUSCULAR; INTRAVENOUS ONCE
Status: COMPLETED | OUTPATIENT
Start: 2018-09-15 | End: 2018-09-15

## 2018-09-15 RX ADMIN — IRON SUCROSE 100 MG: 20 INJECTION, SOLUTION INTRAVENOUS at 11:45

## 2018-09-15 RX ADMIN — SODIUM CHLORIDE 250 ML: 9 INJECTION, SOLUTION INTRAVENOUS at 10:45

## 2018-09-15 RX ADMIN — Medication 1 MG: at 15:03

## 2018-09-15 RX ADMIN — OCTREOTIDE ACETATE 50 MCG/HR: 200 INJECTION, SOLUTION INTRAVENOUS; SUBCUTANEOUS at 17:37

## 2018-09-15 RX ADMIN — CALCIUM ACETATE 1334 MG: 667 CAPSULE ORAL at 15:04

## 2018-09-15 RX ADMIN — DIPHENHYDRAMINE HYDROCHLORIDE 25 MG: 50 INJECTION, SOLUTION INTRAMUSCULAR; INTRAVENOUS at 10:51

## 2018-09-15 RX ADMIN — ATORVASTATIN CALCIUM 20 MG: 20 TABLET, FILM COATED ORAL at 15:03

## 2018-09-15 RX ADMIN — SERTRALINE HYDROCHLORIDE 100 MG: 100 TABLET ORAL at 15:04

## 2018-09-15 RX ADMIN — Medication: at 11:05

## 2018-09-15 RX ADMIN — ONDANSETRON 4 MG: 4 TABLET, ORALLY DISINTEGRATING ORAL at 16:08

## 2018-09-15 RX ADMIN — PANTOPRAZOLE SODIUM 40 MG: 40 INJECTION, POWDER, FOR SOLUTION INTRAVENOUS at 15:56

## 2018-09-15 RX ADMIN — SODIUM CHLORIDE 300 ML: 9 INJECTION, SOLUTION INTRAVENOUS at 10:45

## 2018-09-15 RX ADMIN — LIDOCAINE 2 PATCH: 560 PATCH PERCUTANEOUS; TOPICAL; TRANSDERMAL at 21:08

## 2018-09-15 RX ADMIN — ACETAMINOPHEN 500 MG: 500 TABLET, FILM COATED ORAL at 16:55

## 2018-09-15 RX ADMIN — PANTOPRAZOLE SODIUM 40 MG: 40 INJECTION, POWDER, FOR SOLUTION INTRAVENOUS at 04:04

## 2018-09-15 ASSESSMENT — ACTIVITIES OF DAILY LIVING (ADL)
ADLS_ACUITY_SCORE: 10
ADLS_ACUITY_SCORE: 12
ADLS_ACUITY_SCORE: 10

## 2018-09-15 ASSESSMENT — PAIN DESCRIPTION - DESCRIPTORS: DESCRIPTORS: ACHING

## 2018-09-15 NOTE — PLAN OF CARE
Problem: Patient Care Overview  Goal: Plan of Care/Patient Progress Review  Outcome: No Change   09/15/18 1814   OTHER   Plan Of Care Reviewed With patient   Plan of Care Review   Progress no change     Temp: 98.9  F (37.2  C) Temp src: Oral BP: 156/70 Pulse: 78 Heart Rate: 82 Resp: 16 SpO2: 98 % O2 Device: None (Room air)       Patient is here for GI bleed:    -hemoglobin stable at 8.6  -C/O lower back pain, given a one time dose of 1 tab Tylenol with relief  -C/O nausea, given Zofran with relief  -tolerating regular diet with good appetite  -had dialysis today  -on Octreotide drip at 50 mcg/hr    Plan:  Hemoglobin check at 18:00.  Octreotide incompatible with Protonix: disconnect octreotide and flush with saline before and after Protonix.

## 2018-09-15 NOTE — PROGRESS NOTES
HEMODIALYSIS TREATMENT NOTE    Date: 9/15/2018  Time: 2:53 PM    Data:  Pre Wt:   62.8 kg  Desired Wt: 62.0 kg   Ultrafiltration - Post Run Net Total Removed (mL): 800 mL  Ultrafiltration - Post Run Net Total Gain (mL): 0 mL  Vascular Access Status: Yes, secured and visible  Dialyzer Rinse: Clear  Total Blood Volume Processed: 86.0 L  Total Dialysis (Treatment) Time:  3.5 hours      Lab:   Yes - Hep B Surface Antigen & Antibody, CBC & BMP      Assessment/Interventions:  3.5 hours of HD via RUE AVG on K2Ca3 bath. AVG cannulated with 15 gauge needles. 450 BFR. Tolerated well. VSS throughout. 1 unit of PRBCs transfused without complications. 25 mg IV Benadryl administered prior to transfusion, as ordered. 0.8 kg total removed in attempt to reach desired post-run weight of 62.0 kg (UF goal adjusted to include PRBC volume). Venofer given. Hemostasis achieved in 5 minutes post-treatment. See MAR for medication details. Report given to primary RN on 5B.     Plan:    Regularly dialyzes MWF. Next run Monday or per renal team.

## 2018-09-15 NOTE — PROGRESS NOTES
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       #Recurrent GI Bleed, History of AVMs. Chronic issue w/ most recent eval at [facility] - EGD on 8/12/18 showed multiple bleeding angiodysplastic lesions in the stomach treated with argon plasma coagulation (APC), and multiple recently bleeding angiodysplastic lesions in the duodenum also treated with APC. Repeat EGD on 8/14 had a single recently bleeding angiodysplastic lesion in the stomach treated with APC, non-bleeding gastric ulcers from prior APC therapy, and red spot on edge of one treated with APC. Capsule endoscopy here in 2015 revealed small bowel AVMs. Last colonoscopy 2015 showed only diminutive polyps. Presented here w/ hgb 7.0 (from 7.6 on 8/31), melena, and bright red blood per rectum. Hemodynamically stable. Did not receive blood yesterday - hgb now 6.8.   - GI following and patient is NPO for EGD w/ push enteroscopy today. The bulk of her angioectasias have been proximal. Unlikely to be high yield for long term management.   - Transfuse 1 unit PRBCs w/ dialysis today. Recheck hgb this evening.   - Continue IV PPI BID and octreotide gtt (note that patient is on monthly octreotide injections as OP and had good period of stability when she first started this).      #Acute on Chronic Anemia. Hgb 7s-8s dating back to at least 4/2016 in [facility] records, although was 11.2 here in 5/2018 (unclear reliability although was also 10s-11s here in 2017). No iron studies or other anemia w/u since [id number].  which is unexpected. Suspect mixed picture w/ drop  from 7.6 on 8/31 (from 7.0 prior to 2 units - inadequate response) to 7.0 here on admit --> 6.8 today d/t GIB but chronic disease, iron deficiency, and possible other nutrient deficiencies playing a role. She has a hx of transfusion reactions. Studies here: ferritin 229, iron 55, TIBC 279, % sat 20, transferrin 241, B12 1108, folate > 100, retic 10.4%.   - Did not receive blood yesterday. Will get 1 unit w/ HD today.   - Note that patient has a history of transfusion reactions and per daughter receives Benadryl 25 mg IV prior to any transfusion.  - Trend hgb again this evening as above.   - Nephrology obtaining run records, would be helpful to know what she is doing for anemia management w/ HD.      #ESRD. On HD MWF via right AVG at [facility] under Dr. Tarango. Missed 9/14 run.   - Nephrology following w/ plan for HD today.       #Cirrhosis. Hepatitis C treated w/ [facility] and achieved SVR 7/2015. Cirrhosis has been well compensated w/ no hx of varices, ascites, or HE. MELD Na score 21 primarily driven by creatinine (on dialysis). Followed in Dr. Barnett's clinic. LFTs wnl.     Consulting Services: GI, Nephrology     Diet: NPO for procedure, then per GI reccs  Fluids: No IV at this time (dialysis patient w/ missed run and volume w/ blood transfusion)  DVT Prophylaxis: Mechanical  Code Status: Full Code    Disposition Plan   Expected discharge: 2 - 3 days; recommended to prior living arrangement once GIB resolved    The patient's care was discussed with bedside LEESA Eason, blood bank, and medicine attending Dr. Lopez.     Kierra Pena PA-C  Hospitalist Service  [facility]  Pager: [phone]    Interval History   Rachele is feeling tired today. She slept ok overnight. She is feeling nauseous and threw up once yesterday (non bloody). Does not feel hungry. Had BM since arrival - formed, darker than usual, with some maroon/red streaks. Mild abdominal discomfort/upset. No f/c. Frustrated by all  "of these AVM recurrences. Understands plan for EGD, HD, and blood transfusion today. No chest pain or SOB.     A 4 point ROS was performed (including CV, Resp, GI, ) and negative unless otherwise noted in HPI.     Physical Exam   /63  Pulse 78  Temp 98.2  F (36.8  C) (Oral)  Resp 16  Ht 1.626 m (5' 4\")  Wt 62.8 kg (138 lb 6.4 oz)  SpO2 100%  BMI 23.76 kg/m2   GENERAL: Alert and oriented x 3. Lying in bed, appears comfortable.  HEENT: Anicteric sclera. Mucous membranes moist.   CV: RRR. S1, S2. No murmurs appreciated.   RESPIRATORY: Effort normal on room air. Lungs CTAB with no wheezing, rales, rhonchi.   GI: Abdomen soft and non distended, bowel sounds present. No tenderness, rebound, guarding.   NEUROLOGICAL: No focal deficits. Moves all extremities.   EXTREMITIES: No peripheral edema. Intact bilateral pedal pulses.   SKIN: No jaundice. No rashes.     Lines/Tubes/Drains  PIV     Data reviewed today: I reviewed all medications, new labs and imaging results over the last 24 hours.                      "

## 2018-09-15 NOTE — PROVIDER NOTIFICATION
Patient's Hgb 6.8 with morning labs. Notified SHANNON Lozada ([id number]). No new orders placed at this time, will continue to monitor.

## 2018-09-15 NOTE — PLAN OF CARE
Problem: Patient Care Overview  Goal: Plan of Care/Patient Progress Review  Outcome: No Change  Pt A&O x 4.  AVSS on RA.  Up with SBA to BR.  No c/o pain or discomfort.  Pt tolerated clear liquid diet well last evening, has been NPO since MN for possible EGD today.  BM x 1, formed brown with maroon/red streaks.  Hgb remained stable last evening, labs have yet to be drawn this morning.  Pt pleasant and cooperative.  Will continue to monitor, update physicians with any changes.

## 2018-09-15 NOTE — PLAN OF CARE
Problem: Patient Care Overview  Goal: Plan of Care/Patient Progress Review  AVSS, Hgb 6.8 this am. Seen by team, to had dialysis  3 1/2 hr run and 1 U RBCs in dialysis. Possible EGD on hold, follow Hgb this evening to determine if EGD required tomorrow. Octreotide gtt infusing 10 ml/hr Note 1800 Hgb check.

## 2018-09-15 NOTE — PROGRESS NOTES
Brief GI Progress Note  9/15/2018     Patient seen and examined. Stool now brown with red streaks. Melena is resolved. Getting dialysis today.    If bleeding continues overnight, would plan for enteroscopy tomorrow. If this resolves, we could perform this as an outpatient.    Reasonable to have her eat today and then NPO after MN again.    Discussed with attending, Dr Baires.    Scott Rose MD  GI Fellow  [phone]

## 2018-09-16 ENCOUNTER — PATIENT OUTREACH (OUTPATIENT)
Dept: CARE COORDINATION | Facility: CLINIC | Age: 78
End: 2018-09-16

## 2018-09-16 VITALS
OXYGEN SATURATION: 99 % | BODY MASS INDEX: 23.54 KG/M2 | TEMPERATURE: 98.1 F | RESPIRATION RATE: 16 BRPM | HEIGHT: 64 IN | DIASTOLIC BLOOD PRESSURE: 89 MMHG | SYSTOLIC BLOOD PRESSURE: 158 MMHG | HEART RATE: 78 BPM | WEIGHT: 137.9 LBS

## 2018-09-16 LAB
ANION GAP SERPL CALCULATED.3IONS-SCNC: 8 MMOL/L (ref 3–14)
BUN SERPL-MCNC: 21 MG/DL (ref 7–30)
CALCIUM SERPL-MCNC: 8.4 MG/DL (ref 8.5–10.1)
CHLORIDE SERPL-SCNC: 102 MMOL/L (ref 94–109)
CO2 SERPL-SCNC: 28 MMOL/L (ref 20–32)
CREAT SERPL-MCNC: 4.77 MG/DL (ref 0.52–1.04)
ERYTHROCYTE [DISTWIDTH] IN BLOOD BY AUTOMATED COUNT: 19 % (ref 10–15)
GFR SERPL CREATININE-BSD FRML MDRD: 9 ML/MIN/1.7M2
GLUCOSE SERPL-MCNC: 92 MG/DL (ref 70–99)
HCT VFR BLD AUTO: 27.4 % (ref 35–47)
HGB BLD-MCNC: 8.3 G/DL (ref 11.7–15.7)
MCH RBC QN AUTO: 32.5 PG (ref 26.5–33)
MCHC RBC AUTO-ENTMCNC: 30.3 G/DL (ref 31.5–36.5)
MCV RBC AUTO: 108 FL (ref 78–100)
PLATELET # BLD AUTO: 188 10E9/L (ref 150–450)
POTASSIUM SERPL-SCNC: 5.2 MMOL/L (ref 3.4–5.3)
POTASSIUM SERPL-SCNC: 5.6 MMOL/L (ref 3.4–5.3)
RBC # BLD AUTO: 2.55 10E12/L (ref 3.8–5.2)
SODIUM SERPL-SCNC: 138 MMOL/L (ref 133–144)
WBC # BLD AUTO: 6 10E9/L (ref 4–11)

## 2018-09-16 PROCEDURE — 99238 HOSP IP/OBS DSCHRG MGMT 30/<: CPT | Performed by: INTERNAL MEDICINE

## 2018-09-16 PROCEDURE — 80048 BASIC METABOLIC PNL TOTAL CA: CPT | Performed by: PHYSICIAN ASSISTANT

## 2018-09-16 PROCEDURE — 84132 ASSAY OF SERUM POTASSIUM: CPT | Performed by: PHYSICIAN ASSISTANT

## 2018-09-16 PROCEDURE — 85027 COMPLETE CBC AUTOMATED: CPT | Performed by: PHYSICIAN ASSISTANT

## 2018-09-16 PROCEDURE — 99207 ZZC APP CREDIT; MD BILLING SHARED VISIT: CPT | Performed by: PHYSICIAN ASSISTANT

## 2018-09-16 PROCEDURE — 36415 COLL VENOUS BLD VENIPUNCTURE: CPT | Performed by: PHYSICIAN ASSISTANT

## 2018-09-16 RX ORDER — ALBUTEROL SULFATE 90 UG/1
2 AEROSOL, METERED RESPIRATORY (INHALATION) EVERY 6 HOURS PRN
Qty: 1 INHALER | Refills: 3 | COMMUNITY
Start: 2018-09-16 | End: 2019-03-26

## 2018-09-16 ASSESSMENT — ACTIVITIES OF DAILY LIVING (ADL)
ADLS_ACUITY_SCORE: 10

## 2018-09-16 NOTE — PLAN OF CARE
Problem: Patient Care Overview  Goal: Plan of Care/Patient Progress Review  Outcome: Improving  Pt slept quietly through the night.  A&O x 4, AVSS on RA.  Octreotide gtt at 10/h.  Hgb stable at 8.3 this morning.  No BMs overnight.  Lido patches placed on lower back.  No further complaints.  Will update provider with any changes.

## 2018-09-16 NOTE — PROGRESS NOTES
Patient dc to home with daughter.  Patient understands his medications and orders to be seen. Gathered all belongings. Iv removed with pressure dressing applied.  [facility]

## 2018-09-16 NOTE — PROGRESS NOTES
Brief Progress Note  9/16/2018     No further bleeding, stool is brown. After discussion with primary team and patient, will pursue outpatient endoscopic evaluation. We will coordinate this in the next coming days. Please call with questions or concerns.     Discussed with attending, Dr Baires.     Scott Rose MD  GI Fellow  [phone]

## 2018-09-16 NOTE — DISCHARGE SUMMARY
[facility], [place]    Internal Medicine Discharge Summary- Gold Service    Date of Admission: 9/14/2018  Date of Discharge: 9/16/2018  Discharging Provider: Kierra Pena PA-C/Sandi Lopez DO  Discharging Team: Gold 3    Follow-ups Needed After Discharge   GI to arrange outpatient endoscopy    Discharge Diagnoses   Acute on Chronic GI Bleed, Suspected 2/2 AVMs  Acute on Chronic Anemia, Suspected 2/2 GI Bleed  ESRD on HD  Cirrhosis  HTN  HLD  Depression/Anxiety    Hospital Course   Rachele Martínez is a 78 year old woman with a history of HTN, HLD, hepatitis C (previously treated) well compensated cirrhosis, depression/anxiety, ESRD on HD (s/p right brachiobasilic graft declot on 8/14/18), and 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      The following problems were addressed during his hospitalization:    #Recurrent GI Bleed, History of AVMs. Chronic issue w/ most recent eval at [facility] - EGD on 8/12/18 showed multiple bleeding angiodysplastic lesions in the stomach treated with argon plasma coagulation (APC), and multiple recently bleeding angiodysplastic lesions in the duodenum also treated with APC. Repeat EGD on 8/14 had a single recently bleeding angiodysplastic lesion in the stomach treated with APC, non-bleeding gastric ulcers from prior APC therapy, and red spot on edge of one treated with APC. Capsule endoscopy here in 2015 revealed small bowel AVMs. Last colonoscopy 2015 showed only diminutive polyps. Presented here w/ hgb 7.0 (from 7.6 on 8/31), melena, and bright red blood per rectum. Hemodynamically stable. Received 1 unit PRBCs w/ dialysis on 9/15 and hgb has improved to 8.3. GI was consulted. Initially considered EGD w/ push enteroscopy but patient reported requiring heavy sedation for these procedures and this is not available today. Last evidence of blood in stool was 9/15 AM - normal appearing brown  "stools since and patient feels well. She would prefer to go home and come back for OP evaluation. Long term management is challenging - need to optimize anemia management through nephrology. Will resume her monthly octreotide injections. GI will contact patient to arrange outpatient endoscopy w/ appropriate sedation - likely upper and lower. Patient/daughter in agreement w/ this plan and agree to return if evidence of recurrent GIB.       #Acute on Chronic Anemia. Hgb 7s-8s dating back to at least 4/2016 in [facility] records, although was 11.2 here in 5/2018 (unclear reliability although was also 10s-11s here in 2017). No iron studies or other anemia w/u since [id number].  which is unexpected. Suspect mixed picture w/ drop from 7.6 on 8/31 (from 7.0 prior to 2 units - inadequate response) to 7.0 here on admit d/t GIB but chronic disease, iron deficiency, and possible other nutrient deficiencies playing a role. Studies here: ferritin 229, iron 55, TIBC 279, % sat 20, transferrin 241, B12 1108, folate > 100, retic 10.4%. Hgb has improved to 8.3 after 1 unit PRBCs. Note that patient has a reported history of transfusion reactions and takes Benadryl prior to any transfusions.       #ESRD. On HD MWF via [facility] under Dr. Tarango. Missed 9/14 run. Nephrology consulted here and ran on 9/15.       #Cirrhosis. Hepatitis C treated w/ [facility] and achieved SVR 7/2015. Cirrhosis has been well compensated w/ no hx of varices, ascites, or HE. MELD Na score 21 primarily driven by creatinine (on dialysis). Followed in Dr. Barnett's clinic. LFTs wnl.     Consultations This Hospital Stay   GI, Nephrology     Physical Exam   Blood pressure 158/89, pulse 78, temperature 98.1  F (36.7  C), temperature source Oral, resp. rate 16, height 1.626 m (5' 4\"), weight 62.6 kg (137 lb 14.4 oz), SpO2 99 %, not currently breastfeeding.  GENERAL: Alert and oriented x 3. Lying in bed, appears comfortable.  HEENT: " Anicteric sclera. Mucous membranes moist.   CV: RRR. S1, S2. No murmurs appreciated.   RESPIRATORY: Effort normal on room air. Lungs CTAB with no wheezing, rales, rhonchi.   GI: Abdomen soft and non distended, bowel sounds present. No tenderness, rebound, guarding.   NEUROLOGICAL: No focal deficits. Moves all extremities.   EXTREMITIES: No peripheral edema. Intact bilateral pedal pulses.   SKIN: No jaundice. No rashes.      Significant Results and Procedures   Hemodialysis 9/15/18.     Pending Results   None    Primary Care Physician   Agnieszka Rodriguez    Discharge Disposition   Discharged to home  Condition at discharge: Stable    Code Status   Full    Discharge Procedure Orders  Reason for your hospital stay   Order Comments: You were admitted for evaluation of GI bleeding. You received a unit of blood with dialysis. GI will arrange for outpatient endoscopy.     Adult [facility] Follow-up and recommended labs and tests   Order Comments: GI should contact you to arrange outpatient endoscopies. Continue your usual dialysis schedule. Consider repeat hemoglobin with dialysis run in 5-7 days.     Appointments on [place] and/or [facility] (with [facility] or [facility] provider or service). Call [phone] if you haven't heard regarding these appointments within 7 days of discharge.     Activity   Order Comments: Your activity upon discharge: activity as tolerated   Order Specific Question Answer Comments   Is discharge order? Yes      When to contact your care team   Order Comments: Return to ED if recurrent GI bleeding.     Monitor and record   Order Comments: Monitor stool output     Full Code     Diet   Order Comments: Follow this diet upon discharge: Regular   Order Specific Question Answer Comments   Is discharge order? Yes           Discharge Medications   Current Discharge Medication List      CONTINUE these medications which have CHANGED    Details   albuterol (PROAIR HFA) 108 (90 Base) MCG/ACT inhaler  Inhale 2 puffs into the lungs every 6 hours as needed for shortness of breath / dyspnea or wheezing  Qty: 1 Inhaler, Refills: 3    Associated Diagnoses: Cough         CONTINUE these medications which have NOT CHANGED    Details   atorvastatin (LIPITOR) 20 MG tablet Take 1 tablet (20 mg) by mouth daily  Qty: 90 tablet, Refills: 3    Associated Diagnoses: Cognitive impairment      Multiple Vitamins-Minerals (PRORENAL + D) TABS Take 1 tablet by mouth daily      OCTREOTIDE ACETATE IJ Inject as directed every 28 days       pantoprazole (PROTONIX) 40 MG EC tablet Take 40 mg by mouth 2 times daily      sertraline (ZOLOFT) 50 MG tablet Take 2 tablets (100 mg) by mouth daily  Qty: 180 tablet, Refills: 3    Associated Diagnoses: Itching      Calcium Acetate, Phos Binder, 667 MG CAPS TAKE 2 CAPSULE BY MOUTH THREE TIMES A DAY WITH MEALS  Refills: 8      order for DME Equipment being ordered: 4 wheel walker with seat.  Qty: 1 Device, Refills: 0    Associated Diagnoses: Disorder of bone and cartilage; Arthralgia, unspecified joint; Tendency to fall      polyethylene glycol 3350 POWD Take 17 g by mouth daily  Qty: 1530 g, Refills: 3    Associated Diagnoses: Slow transit constipation             Time Spent on this Encounter   35 minutes spent in discharge    Patient was evaluated on day of discharge by attending physician who agrees with plan of care.     Kierra Pena PA-C  Hospitalist Service  [facility]  Pager: [phone]

## 2018-09-17 NOTE — PROGRESS NOTES
[facility]: Post-Discharge Note  SITUATION                                                      Admission:    Admission Date: 09/14/18   Reason for Admission: Acute on Chronic GI Bleed, Suspected 2/2 AVMs  Discharge:   Discharge Date: 09/16/18  Discharge Diagnosis: Acute on Chronic GI Bleed, Suspected 2/2 AVMs  Discharge Service: Internal Medicine     BACKGROUND                                                      Rachele Martínez is a 78 year old woman with a history of HTN, HLD, hepatitis C (previously treated) well compensated cirrhosis, depression/anxiety, ESRD on HD (s/p right brachiobasilic graft declot on 8/14/18), and 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      ASSESSMENT      Discharge Assessment  Patient reports symptoms are: Improved  Does the patient have all of their medications?: Yes  Does patient know what their new medications are for?: Yes  Does paient have a follow-up appointment scheduled?: Yes  Does patient have any other questions or concerns?: No    Post-op  Did the patient have surgery or a procedure: No    PLAN                                                      Outpatient Plan:      GI should contact you to arrange outpatient endoscopies. Continue your usual dialysis schedule. Consider repeat hemoglobin with dialysis run in 5-7 days.     Future Appointments  Date Time Provider Department Center   9/27/2018 11:30 AM BAY 10 INFUSION [facility] [place]   11/15/2018 9:00 AM  LAB UCLAB [facility]   11/15/2018 9:15 AM UCUS1 CUS [facility]   12/4/2018 10:40 AM Rupesh Lopez MD [facility]   2/12/2019 11:45 AM Andriy Barnett MD [facility]           Jessica Walker, [facility]

## 2018-09-18 ENCOUNTER — HOSPITAL ENCOUNTER (OUTPATIENT)
Facility: CLINIC | Age: 78
End: 2018-09-18
Attending: INTERNAL MEDICINE | Admitting: INTERNAL MEDICINE
Payer: MEDICARE

## 2018-09-18 ENCOUNTER — HOSPITAL ENCOUNTER (OUTPATIENT)
Facility: CLINIC | Age: 78
Setting detail: OBSERVATION
Discharge: HOME | End: 2018-09-20
Attending: EMERGENCY MEDICINE | Admitting: INTERNAL MEDICINE
Payer: MEDICARE

## 2018-09-18 DIAGNOSIS — K92.2 GASTROINTESTINAL HEMORRHAGE, UNSPECIFIED GASTROINTESTINAL HEMORRHAGE TYPE: ICD-10-CM

## 2018-09-18 LAB
ABO + RH BLD: NORMAL
ABO + RH BLD: NORMAL
ALBUMIN SERPL-MCNC: 3.9 G/DL (ref 3.4–5)
ALP SERPL-CCNC: 120 U/L (ref 40–150)
ALT SERPL W P-5'-P-CCNC: 24 U/L (ref 0–50)
ANION GAP SERPL CALCULATED.3IONS-SCNC: 8 MMOL/L (ref 3–14)
APTT PPP: 37 SEC (ref 22–37)
AST SERPL W P-5'-P-CCNC: 29 U/L (ref 0–45)
BASOPHILS # BLD AUTO: 0.1 10E9/L (ref 0–0.2)
BASOPHILS NFR BLD AUTO: 1.1 %
BILIRUB SERPL-MCNC: 0.5 MG/DL (ref 0.2–1.3)
BLD GP AB SCN SERPL QL: NORMAL
BLOOD BANK CMNT PATIENT-IMP: NORMAL
BUN SERPL-MCNC: 27 MG/DL (ref 7–30)
CALCIUM SERPL-MCNC: 8.4 MG/DL (ref 8.5–10.1)
CHLORIDE SERPL-SCNC: 99 MMOL/L (ref 94–109)
CO2 SERPL-SCNC: 30 MMOL/L (ref 20–32)
CREAT SERPL-MCNC: 5.96 MG/DL (ref 0.52–1.04)
DIFFERENTIAL METHOD BLD: ABNORMAL
EOSINOPHIL # BLD AUTO: 0.1 10E9/L (ref 0–0.7)
EOSINOPHIL NFR BLD AUTO: 1.9 %
ERYTHROCYTE [DISTWIDTH] IN BLOOD BY AUTOMATED COUNT: 17.1 % (ref 10–15)
GFR SERPL CREATININE-BSD FRML MDRD: 7 ML/MIN/1.7M2
GLUCOSE SERPL-MCNC: 98 MG/DL (ref 70–99)
HCT VFR BLD AUTO: 28.5 % (ref 35–47)
HGB BLD-MCNC: 7.8 G/DL (ref 11.7–15.7)
HGB BLD-MCNC: 8.8 G/DL (ref 11.7–15.7)
IMM GRANULOCYTES # BLD: 0 10E9/L (ref 0–0.4)
IMM GRANULOCYTES NFR BLD: 0.1 %
INR PPP: 1.12 (ref 0.86–1.14)
LIPASE SERPL-CCNC: 165 U/L (ref 73–393)
LYMPHOCYTES # BLD AUTO: 0.8 10E9/L (ref 0.8–5.3)
LYMPHOCYTES NFR BLD AUTO: 11 %
MCH RBC QN AUTO: 32.7 PG (ref 26.5–33)
MCHC RBC AUTO-ENTMCNC: 30.9 G/DL (ref 31.5–36.5)
MCV RBC AUTO: 106 FL (ref 78–100)
MONOCYTES # BLD AUTO: 0.6 10E9/L (ref 0–1.3)
MONOCYTES NFR BLD AUTO: 8 %
NEUTROPHILS # BLD AUTO: 5.7 10E9/L (ref 1.6–8.3)
NEUTROPHILS NFR BLD AUTO: 77.9 %
NRBC # BLD AUTO: 0 10*3/UL
NRBC BLD AUTO-RTO: 0 /100
PLATELET # BLD AUTO: 230 10E9/L (ref 150–450)
POTASSIUM SERPL-SCNC: 4.3 MMOL/L (ref 3.4–5.3)
PROT SERPL-MCNC: 8.1 G/DL (ref 6.8–8.8)
RBC # BLD AUTO: 2.69 10E12/L (ref 3.8–5.2)
SODIUM SERPL-SCNC: 137 MMOL/L (ref 133–144)
SPECIMEN EXP DATE BLD: NORMAL
WBC # BLD AUTO: 7.3 10E9/L (ref 4–11)

## 2018-09-18 PROCEDURE — 25000128 H RX IP 250 OP 636: Performed by: EMERGENCY MEDICINE

## 2018-09-18 PROCEDURE — 80053 COMPREHEN METABOLIC PANEL: CPT | Performed by: EMERGENCY MEDICINE

## 2018-09-18 PROCEDURE — 96376 TX/PRO/DX INJ SAME DRUG ADON: CPT | Performed by: EMERGENCY MEDICINE

## 2018-09-18 PROCEDURE — 99207 ZZC APP CREDIT; MD BILLING SHARED VISIT: CPT | Performed by: PHYSICIAN ASSISTANT

## 2018-09-18 PROCEDURE — 85025 COMPLETE CBC W/AUTO DIFF WBC: CPT | Performed by: EMERGENCY MEDICINE

## 2018-09-18 PROCEDURE — 99285 EMERGENCY DEPT VISIT HI MDM: CPT | Mod: Z6 | Performed by: EMERGENCY MEDICINE

## 2018-09-18 PROCEDURE — C9113 INJ PANTOPRAZOLE SODIUM, VIA: HCPCS | Performed by: EMERGENCY MEDICINE

## 2018-09-18 PROCEDURE — 96375 TX/PRO/DX INJ NEW DRUG ADDON: CPT | Performed by: EMERGENCY MEDICINE

## 2018-09-18 PROCEDURE — 25000128 H RX IP 250 OP 636: Performed by: PHYSICIAN ASSISTANT

## 2018-09-18 PROCEDURE — 86850 RBC ANTIBODY SCREEN: CPT | Performed by: EMERGENCY MEDICINE

## 2018-09-18 PROCEDURE — 86900 BLOOD TYPING SEROLOGIC ABO: CPT | Performed by: EMERGENCY MEDICINE

## 2018-09-18 PROCEDURE — C9113 INJ PANTOPRAZOLE SODIUM, VIA: HCPCS | Performed by: PHYSICIAN ASSISTANT

## 2018-09-18 PROCEDURE — 83690 ASSAY OF LIPASE: CPT | Performed by: EMERGENCY MEDICINE

## 2018-09-18 PROCEDURE — 96366 THER/PROPH/DIAG IV INF ADDON: CPT | Performed by: EMERGENCY MEDICINE

## 2018-09-18 PROCEDURE — 85610 PROTHROMBIN TIME: CPT | Performed by: EMERGENCY MEDICINE

## 2018-09-18 PROCEDURE — A9270 NON-COVERED ITEM OR SERVICE: HCPCS | Mod: GY | Performed by: PHYSICIAN ASSISTANT

## 2018-09-18 PROCEDURE — 85018 HEMOGLOBIN: CPT | Performed by: PHYSICIAN ASSISTANT

## 2018-09-18 PROCEDURE — 25000132 ZZH RX MED GY IP 250 OP 250 PS 637: Mod: GY | Performed by: PHYSICIAN ASSISTANT

## 2018-09-18 PROCEDURE — 99285 EMERGENCY DEPT VISIT HI MDM: CPT | Mod: 25 | Performed by: EMERGENCY MEDICINE

## 2018-09-18 PROCEDURE — 36415 COLL VENOUS BLD VENIPUNCTURE: CPT | Performed by: PHYSICIAN ASSISTANT

## 2018-09-18 PROCEDURE — 96365 THER/PROPH/DIAG IV INF INIT: CPT | Performed by: EMERGENCY MEDICINE

## 2018-09-18 PROCEDURE — 85730 THROMBOPLASTIN TIME PARTIAL: CPT | Performed by: EMERGENCY MEDICINE

## 2018-09-18 PROCEDURE — 86901 BLOOD TYPING SEROLOGIC RH(D): CPT | Performed by: EMERGENCY MEDICINE

## 2018-09-18 PROCEDURE — 12000025 ZZH R&B TRANSPLANT INTERMEDIATE

## 2018-09-18 RX ORDER — ACETAMINOPHEN 325 MG/1
650 TABLET ORAL EVERY 4 HOURS PRN
Status: DISCONTINUED | OUTPATIENT
Start: 2018-09-18 | End: 2018-09-20 | Stop reason: HOSPADM

## 2018-09-18 RX ORDER — ALBUTEROL SULFATE 90 UG/1
2 AEROSOL, METERED RESPIRATORY (INHALATION) EVERY 6 HOURS PRN
Status: DISCONTINUED | OUTPATIENT
Start: 2018-09-18 | End: 2018-09-20 | Stop reason: HOSPADM

## 2018-09-18 RX ORDER — POLYETHYLENE GLYCOL 3350 17 G/17G
17 POWDER, FOR SOLUTION ORAL DAILY
Status: DISCONTINUED | OUTPATIENT
Start: 2018-09-18 | End: 2018-09-20 | Stop reason: HOSPADM

## 2018-09-18 RX ORDER — HYDROMORPHONE HYDROCHLORIDE 1 MG/ML
0.5 INJECTION, SOLUTION INTRAMUSCULAR; INTRAVENOUS; SUBCUTANEOUS
Status: DISCONTINUED | OUTPATIENT
Start: 2018-09-18 | End: 2018-09-18

## 2018-09-18 RX ORDER — AMOXICILLIN 250 MG
1 CAPSULE ORAL 2 TIMES DAILY PRN
Status: DISCONTINUED | OUTPATIENT
Start: 2018-09-18 | End: 2018-09-20 | Stop reason: HOSPADM

## 2018-09-18 RX ORDER — NALOXONE HYDROCHLORIDE 0.4 MG/ML
.1-.4 INJECTION, SOLUTION INTRAMUSCULAR; INTRAVENOUS; SUBCUTANEOUS
Status: DISCONTINUED | OUTPATIENT
Start: 2018-09-18 | End: 2018-09-20 | Stop reason: HOSPADM

## 2018-09-18 RX ORDER — ATORVASTATIN CALCIUM 20 MG/1
20 TABLET, FILM COATED ORAL DAILY
Status: DISCONTINUED | OUTPATIENT
Start: 2018-09-18 | End: 2018-09-20 | Stop reason: HOSPADM

## 2018-09-18 RX ORDER — AMOXICILLIN 250 MG
2 CAPSULE ORAL 2 TIMES DAILY PRN
Status: DISCONTINUED | OUTPATIENT
Start: 2018-09-18 | End: 2018-09-20 | Stop reason: HOSPADM

## 2018-09-18 RX ORDER — ONDANSETRON 2 MG/ML
4 INJECTION INTRAMUSCULAR; INTRAVENOUS EVERY 30 MIN PRN
Status: DISCONTINUED | OUTPATIENT
Start: 2018-09-18 | End: 2018-09-18

## 2018-09-18 RX ORDER — ONDANSETRON 2 MG/ML
4 INJECTION INTRAMUSCULAR; INTRAVENOUS EVERY 6 HOURS PRN
Status: DISCONTINUED | OUTPATIENT
Start: 2018-09-18 | End: 2018-09-20 | Stop reason: HOSPADM

## 2018-09-18 RX ORDER — ONDANSETRON 4 MG/1
4 TABLET, ORALLY DISINTEGRATING ORAL EVERY 6 HOURS PRN
Status: DISCONTINUED | OUTPATIENT
Start: 2018-09-18 | End: 2018-09-20 | Stop reason: HOSPADM

## 2018-09-18 RX ORDER — SERTRALINE HYDROCHLORIDE 100 MG/1
100 TABLET, FILM COATED ORAL DAILY
Status: DISCONTINUED | OUTPATIENT
Start: 2018-09-18 | End: 2018-09-20 | Stop reason: HOSPADM

## 2018-09-18 RX ADMIN — POLYETHYLENE GLYCOL 3350, SODIUM SULFATE ANHYDROUS, SODIUM BICARBONATE, SODIUM CHLORIDE, POTASSIUM CHLORIDE 4000 ML: 236; 22.74; 6.74; 5.86; 2.97 POWDER, FOR SOLUTION ORAL at 20:07

## 2018-09-18 RX ADMIN — ONDANSETRON 4 MG: 2 INJECTION INTRAMUSCULAR; INTRAVENOUS at 13:51

## 2018-09-18 RX ADMIN — SODIUM CHLORIDE 80 MG: 9 INJECTION, SOLUTION INTRAVENOUS at 14:07

## 2018-09-18 RX ADMIN — PANTOPRAZOLE SODIUM 40 MG: 40 INJECTION, POWDER, FOR SOLUTION INTRAVENOUS at 18:22

## 2018-09-18 RX ADMIN — ATORVASTATIN CALCIUM 20 MG: 20 TABLET, FILM COATED ORAL at 18:01

## 2018-09-18 RX ADMIN — Medication 1 CAPSULE: at 18:01

## 2018-09-18 RX ADMIN — SERTRALINE HYDROCHLORIDE 100 MG: 100 TABLET ORAL at 18:01

## 2018-09-18 RX ADMIN — ACETAMINOPHEN 650 MG: 325 TABLET, FILM COATED ORAL at 20:18

## 2018-09-18 RX ADMIN — Medication 0.5 MG: at 17:10

## 2018-09-18 RX ADMIN — SODIUM CHLORIDE 8 MG/HR: 9 INJECTION, SOLUTION INTRAVENOUS at 14:22

## 2018-09-18 RX ADMIN — Medication 0.5 MG: at 13:56

## 2018-09-18 ASSESSMENT — ENCOUNTER SYMPTOMS
VOMITING: 0
LIGHT-HEADEDNESS: 1
ANAL BLEEDING: 1
NAUSEA: 1
ABDOMINAL PAIN: 1
DIZZINESS: 0
BLOOD IN STOOL: 1

## 2018-09-18 ASSESSMENT — ACTIVITIES OF DAILY LIVING (ADL): ADLS_ACUITY_SCORE: 9

## 2018-09-18 ASSESSMENT — PAIN DESCRIPTION - DESCRIPTORS: DESCRIPTORS: ACHING

## 2018-09-18 NOTE — IP AVS SNAPSHOT
[address] [address]    Phone:  [phone]                                       After Visit Summary   9/18/2018    Rachele Martínez    MRN: [id number]           After Visit Summary Signature Page     I have received my discharge instructions, and my questions have been answered. I have discussed any challenges I see with this plan with the nurse or doctor.    ..........................................................................................................................................  Patient/Patient Representative Signature      ..........................................................................................................................................  Patient Representative Print Name and Relationship to Patient    ..................................................               ................................................  Date                                   Time    ..........................................................................................................................................  Reviewed by Signature/Title    ...................................................              ..............................................  Date                                               Time          22EPIC Rev 08/18

## 2018-09-18 NOTE — ED PROVIDER NOTES
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 Notably, patient was admitted from 9/14-9/16 for the same symptoms--melena and bright red blood per rectum. She received 1 unit of PRBC and was instructed to arrange outpatient Endoscopy with GI and to return at recurrence of GIB. Patient states that this morning, at both 3 and 10 AM, she had episodes of melena and bright red blood per rectum. She reports the toilet bowl was \"filled with blood and black stools\". Patient endorses associated lightheadedness, nausea, and abdominal pain. She denies syncope, dizziness, or vomiting.    Past Medical History:   Diagnosis Date     Anemia      Anxiety and depression      Arthritis      AVM (arteriovenous malformation)      Chronic hepatitis C with cirrhosis (H)      Clotting disorder (H)      ESRD (end stage renal disease) (H)     on dialysis     GI bleed     recurrent     Glaucoma      Hyperlipidemia      Hypertension goal BP (blood pressure) < 140/80        Past Surgical History:   Procedure Laterality Date     COLONOSCOPY N/A 9/4/2015    Procedure: COMBINED COLONOSCOPY, SINGLE OR MULTIPLE BIOPSY/POLYPECTOMY BY BIOPSY;  Surgeon: Rupesh Lopez MD;  Location:  GI     ESOPHAGOSCOPY, GASTROSCOPY, DUODENOSCOPY (EGD), COMBINED N/A 12/18/2014    Procedure: COMBINED ESOPHAGOSCOPY, GASTROSCOPY, DUODENOSCOPY (EGD);  Surgeon: Betsy Carvajal MD;  Location:  GI     ESOPHAGOSCOPY, GASTROSCOPY, DUODENOSCOPY (EGD), COMBINED N/A 4/25/2015    Procedure: COMBINED ESOPHAGOSCOPY, GASTROSCOPY, DUODENOSCOPY (EGD);  Surgeon: Yosvany Ram MD;  Location:  GI     ESOPHAGOSCOPY, GASTROSCOPY, DUODENOSCOPY " (EGD), COMBINED N/A 5/5/2015    Procedure: COMBINED ESOPHAGOSCOPY, GASTROSCOPY, DUODENOSCOPY (EGD);  Surgeon: Mariano Mistry MD;  Location:  GI     HC CAPSULE ENDOSCOPY N/A 9/30/2015    Procedure: CAPSULE/PILL CAM ENDOSCOPY;  Surgeon: Pan Dhaliwal MD;  Location:  GI     HYSTERECTOMY  1980    KYREE     LUMPECTOMY BREAST         Family History   Problem Relation Age of Onset     Diabetes Mother      Alzheimer Disease Mother        Social History   Substance Use Topics     Smoking status: Former Smoker     Packs/day: 2.00     Years: 45.00     Types: Cigarettes     Start date: 1/1/1953     Quit date: 11/23/2002     Smokeless tobacco: Never Used     Alcohol use No       Current Facility-Administered Medications   Medication     HYDROmorphone (PF) (DILAUDID) injection 0.5 mg     ondansetron (ZOFRAN) injection 4 mg     pantoprazole (PROTONIX) 80 mg in sodium chloride 0.9 % 100 mL infusion     Current Outpatient Prescriptions   Medication     albuterol (PROAIR HFA) 108 (90 Base) MCG/ACT inhaler     atorvastatin (LIPITOR) 20 MG tablet     Calcium Acetate, Phos Binder, 667 MG CAPS     Multiple Vitamins-Minerals (PRORENAL + D) TABS     OCTREOTIDE ACETATE IJ     order for DME     pantoprazole (PROTONIX) 40 MG EC tablet     polyethylene glycol 3350 POWD     sertraline (ZOLOFT) 50 MG tablet        Allergies   Allergen Reactions     Diovan Hct Itching     severe     Dust Mites      Hydrochlorothiazide Itching     Severe       No Clinical Screening - See Comments      History of blood transfusion reactions and pre-treats with Benadryl.      Sulfa Drugs Hives     Spironolactone Nausea       I have reviewed the Medications, Allergies, Past Medical and Surgical History, and Social History in the Epic system.    Review of Systems   Gastrointestinal: Positive for abdominal pain, anal bleeding, blood in stool and nausea. Negative for vomiting.   Neurological: Positive for light-headedness. Negative for dizziness and syncope.  "  All other systems reviewed and are negative.      Physical Exam   BP: 117/57  Pulse: 75  Heart Rate: 67  Temp: 98.8  F (37.1  C)  Resp: 18  Height: 162.6 cm (5' 4\")  Weight: 63 kg (139 lb)  SpO2: 100 %      Physical Exam   Constitutional: No distress.   HENT:   Head: Atraumatic.   Mouth/Throat: Oropharynx is clear and moist. No oropharyngeal exudate.   Eyes: EOM are normal. No scleral icterus.   Neck: Neck supple.   Cardiovascular: Normal rate, regular rhythm and normal heart sounds.    Pulmonary/Chest: Breath sounds normal. No respiratory distress.   Abdominal: Soft. She exhibits no distension.   Moderate upper abdominal tenderness   Genitourinary: Rectal exam shows guaiac positive stool.   Genitourinary Comments: Hemoccult positive, no gross blood   Musculoskeletal: She exhibits no edema or deformity.   Neurological: She is alert.   Skin: Skin is warm and dry. She is not diaphoretic.   Psychiatric: She has a normal mood and affect. Her behavior is normal.       ED Course     ED Course     Procedures          Labs Ordered and Resulted from Time of ED Arrival Up to the Time of Departure from the ED   CBC WITH PLATELETS DIFFERENTIAL - Abnormal; Notable for the following:        Result Value    RBC Count 2.69 (*)     Hemoglobin 8.8 (*)     Hematocrit 28.5 (*)      (*)     MCHC 30.9 (*)     RDW 17.1 (*)     All other components within normal limits   COMPREHENSIVE METABOLIC PANEL - Abnormal; Notable for the following:     Creatinine 5.96 (*)     GFR Estimate 7 (*)     GFR Estimate If Black 8 (*)     Calcium 8.4 (*)     All other components within normal limits   LIPASE   INR   PARTIAL THROMBOPLASTIN TIME   PERIPHERAL IV CATHETER   ABO/RH TYPE AND SCREEN          Results for orders placed or performed during the hospital encounter of 09/18/18   CBC with platelets differential   Result Value Ref Range    WBC 7.3 4.0 - 11.0 10e9/L    RBC Count 2.69 (L) 3.8 - 5.2 10e12/L    Hemoglobin 8.8 (L) 11.7 - 15.7 g/dL    " Hematocrit 28.5 (L) 35.0 - 47.0 %     (H) 78 - 100 fl    MCH 32.7 26.5 - 33.0 pg    MCHC 30.9 (L) 31.5 - 36.5 g/dL    RDW 17.1 (H) 10.0 - 15.0 %    Platelet Count 230 150 - 450 10e9/L    Diff Method Automated Method     % Neutrophils 77.9 %    % Lymphocytes 11.0 %    % Monocytes 8.0 %    % Eosinophils 1.9 %    % Basophils 1.1 %    % Immature Granulocytes 0.1 %    Nucleated RBCs 0 0 /100    Absolute Neutrophil 5.7 1.6 - 8.3 10e9/L    Absolute Lymphocytes 0.8 0.8 - 5.3 10e9/L    Absolute Monocytes 0.6 0.0 - 1.3 10e9/L    Absolute Eosinophils 0.1 0.0 - 0.7 10e9/L    Absolute Basophils 0.1 0.0 - 0.2 10e9/L    Abs Immature Granulocytes 0.0 0 - 0.4 10e9/L    Absolute Nucleated RBC 0.0    Comprehensive metabolic panel   Result Value Ref Range    Sodium 137 133 - 144 mmol/L    Potassium 4.3 3.4 - 5.3 mmol/L    Chloride 99 94 - 109 mmol/L    Carbon Dioxide 30 20 - 32 mmol/L    Anion Gap 8 3 - 14 mmol/L    Glucose 98 70 - 99 mg/dL    Urea Nitrogen 27 7 - 30 mg/dL    Creatinine 5.96 (H) 0.52 - 1.04 mg/dL    GFR Estimate 7 (L) >60 mL/min/1.7m2    GFR Estimate If Black 8 (L) >60 mL/min/1.7m2    Calcium 8.4 (L) 8.5 - 10.1 mg/dL    Bilirubin Total 0.5 0.2 - 1.3 mg/dL    Albumin 3.9 3.4 - 5.0 g/dL    Protein Total 8.1 6.8 - 8.8 g/dL    Alkaline Phosphatase 120 40 - 150 U/L    ALT 24 0 - 50 U/L    AST 29 0 - 45 U/L   Lipase   Result Value Ref Range    Lipase 165 73 - 393 U/L   INR   Result Value Ref Range    INR 1.12 0.86 - 1.14   Partial thromboplastin time   Result Value Ref Range    PTT 37 22 - 37 sec   ABO/Rh type and screen   Result Value Ref Range    ABO O     RH(D) Pos     Antibody Screen Neg     Test Valid Only At          [facility]    Specimen Expires 09/21/2018        Assessments & Plan (with Medical Decision Making)   78-year-old female presents with chief complaint of rectal bleeding.  She has had multiple episodes of this in the past including earlier this week.  It  had resolved at that time and she elected discharge rather than stay for endoscopy in the hospital.  She has had multiple episodes of black tarry stool and rectal bleeding starting at 3 AM this morning.  She also states she has been lightheaded.  Hemoccult was positive but no gross blood was seen on exam.  Hemoglobin is currently stable as her vital signs.  Discussed with internal medicine triage physician who accepted the admission.  They will consult with gastroenterology for likely endoscopy during the stay.  Patient was also placed on Protonix drip and given a bolus.    I have reviewed the nursing notes.    I have reviewed the findings, diagnosis, plan and need for follow up with the patient.    New Prescriptions    No medications on file       Final diagnoses:   Gastrointestinal hemorrhage, unspecified gastrointestinal hemorrhage type   I, Alin Charlton, am serving as a trained medical scribe to document services personally performed by Gene Frederick DO, based on the provider's statements to me.   I, Gene Frederick DO, was physically present and have reviewed and verified the accuracy of this note documented by Alin Charlton.      9/18/2018   [facility], [place], EMERGENCY DEPARTMENT     Gene Frederick DO  09/18/18 [id number]

## 2018-09-18 NOTE — IP AVS SNAPSHOT
MRN:[id number]                      After Visit Summary   9/18/2018    aRchele Martínez    MRN: [id number]           Thank you!     Thank you for choosing [facility] for your care. Our goal is always to provide you with excellent care. Hearing back from our patients is one way we can continue to improve our services. Please take a few minutes to complete the written survey that you may receive in the mail after you visit with us. Thank you!        Patient Information     Date Of Birth          1940        Designated Caregiver       Most Recent Value    Caregiver    Will someone help with your care after discharge? yes    Name of designated caregiver Charla    Phone number of caregiver [phone]    Caregiver address [address] [place]      About your hospital stay     You were admitted on:  September 18, 2018 You last received care in the:  [address] [facility]    You were discharged on:  September 20, 2018        Reason for your hospital stay       Upper GI bleed                  Who to Call     For medical emergencies, please call 911.  For non-urgent questions about your medical care, please call your primary care provider or clinic, [phone]  For questions related to your surgery, please call your surgery clinic        Attending Provider     Provider Gene Sanabria DO Emergency Medicine    Anand Jerome MD Internal Medicine    Sujit London MD Internal Medicine       Primary Care Provider Office Phone # Fax #    Agnieszka Erica Rodriguez [phone]      After Care Instructions     Activity       Your activity upon discharge: activity as tolerated            Diet       Follow this diet upon discharge: Orders Placed This Encounter      Advance Diet as Tolerated: Regular Diet Adult            Discharge Instructions       Daily Miralax to avoid constipation  Twice daily pantoprazole till you follow up  with GI team again  If you have further Bleeds, please seek emergent medical attention                  Follow-up Appointments     Adult [facility] Follow-up and recommended labs and tests       GI team will contact you with a follow up appointment.  Please follow up with your PCP in 1-2 weeks as a follow up visit     Appointments on [facility] and/or [facility] (with [facility] or [facility] provider or service). Call [phone] if you haven't heard regarding these appointments within 7 days of discharge.                  Your next 10 appointments already scheduled     Sep 27, 2018 11:30 AM CDT   Level O with [address] ([facility])    [id number] [place] [id number] [phone]            Oct 02, 2018 12:05 PM CDT   (Arrive by 11:50 AM)   Return Visit with Tiarra Marcelino MD   [facility] ([facility] [place])    [address] [place] [address]  4th [facility] [address] [phone]            Oct 23, 2018   Procedure with Rober Crawley MD   [facility], [place], Same Day Surgery (--)    [address]   [place] [address]   [phone]            Nov 15, 2018  9:00 AM CST   Lab with  LAB   [facility] Lab ([facility])    [address] [place] [id number] [phone]            Nov 15, 2018  9:15 AM CST   US ABDOMEN COMPLETE with UCUS1   [facility] US ([facility])    [address] [place] [address] [phone]           How do I prepare for my exam? (Food and drink instructions) Adults: No eating, smoking, gum chewing or drinking for 8 hours before the exam. You may take medicine with a small sip of water.  Children: * Infants, breast-fed: may have breast milk up to 2 hours before exam. * Infants, formula: may have bottle until 4 hours before exam. * Children 1-5 years: No food or drink  for 4 hours before exam. * Children 6 -12 years: No food or drink for 6 hours before exam. * Children over 12 years: No food or drink for 8 hours before exam.  * J Tube Fed: No need to stop feedings.  What should I wear: Wear comfortable clothes.  How long does the exam take: Most ultrasounds take 30 to 60 minutes.  What should I bring: Bring a list of your medicines, including vitamins, minerals and over-the-counter drugs. It is safest to leave personal items at home.  Do I need a :  No  is needed.  What do I need to tell my doctor: Tell your doctor about any allergies you may have.  What should I do after the exam: No restrictions, You may resume normal activities.  What is this test: An ultrasound uses sound waves to make pictures of the body. Sound waves do not cause pain. The only discomfort may be the pressure of the wand against your skin or full bladder.  Who should I call with questions: If you have any questions, please call the Imaging Department where you will have your exam. Directions, parking instructions, and other information is available on our website, [email or web address].            Dec 04, 2018 10:40 AM CST   (Arrive by 10:25 AM)   RETURN INFLAMMATORY BOWEL DISEASE with Rupesh Lopez MD   [facility] ([facility])    [address] [place] [address]  4th Floor  [facility] [address]   [phone]            Feb 12, 2019 11:45 AM CST   (Arrive by 11:30 AM)   Return General Liver with Andriy Barnett MD   [facility] Hepatology ([facility])    [address] [place] [address]  [address] [place] [address]   [phone]              Pending Results     Date and Time Order Name Status Description    9/19/2018 1633 Hepatitis B Surface Antibody In process             Statement of Approval     Ordered          09/20/18 0023  I have reviewed and agree with all the recommendations and orders detailed in this document.   "EFFECTIVE NOW     Approved and electronically signed by:  Sujit London MD             Admission Information     Date & Time Provider Department Dept. Phone    9/18/2018 Sujit London MD [address] [facility] [phone]      Your Vitals Were     Blood Pressure Pulse Temperature Respirations Height Weight    135/63 (BP Location: Left arm) 76 98.9  F (37.2  C) (Oral) 16 1.626 m (5' 4\") 61.6 kg (135 lb 14.4 oz)    Pulse Oximetry BMI (Body Mass Index)                99% 23.33 kg/m2          Care EveryWhere ID     This is your Care EveryWhere ID. This could be used by other organizations to access your [facility] medical records  [id number]        Equal Access to Services     JAMIE CARVAJAL AH: Crow Degroot, wamolina luqcory, qayumiko kaalmakarolina patel, jaz fowler. So [facility] [phone].    ATENCIÓN: Si habla español, tiene a ibarra disposición servicios gratuitos de asistencia lingüística. Llame al [phone].    We comply with applicable federal civil rights laws and [place] laws. We do not discriminate on the basis of race, color, national origin, age, disability, sex, sexual orientation, or gender identity.               Review of your medicines      CONTINUE these medicines which may have CHANGED, or have new prescriptions. If we are uncertain of the size of tablets/capsules you have at home, strength may be listed as something that might have changed.        Dose / Directions    pantoprazole 20 MG EC tablet   Commonly known as:  PROTONIX   This may have changed:    - medication strength  - how much to take  - additional instructions   Used for:  Gastrointestinal hemorrhage, unspecified gastrointestinal hemorrhage type        Dose:  20 mg   Take 1 tablet (20 mg) by mouth 2 times daily Take by mouth 30-60 minutes before a meal.   Quantity:  60 tablet   Refills:  2         CONTINUE these medicines which have NOT CHANGED        Dose / " Directions    atorvastatin 20 MG tablet   Commonly known as:  LIPITOR   Used for:  Cognitive impairment        Dose:  20 mg   Take 1 tablet (20 mg) by mouth daily   Quantity:  90 tablet   Refills:  3       Calcium Acetate (Phos Binder) 667 MG Caps        TAKE 2 CAPSULE BY MOUTH THREE TIMES A DAY WITH MEALS   Refills:  8       octreotide 20 MG injection   Commonly known as:  sandoSTATIN LAR        Dose:  20 mg   Inject 20 mg into the muscle every 28 days   Refills:  0       order for DME   Used for:  Disorder of bone and cartilage, Arthralgia, unspecified joint, Tendency to fall        Equipment being ordered: 4 wheel walker with seat.   Quantity:  1 Device   Refills:  0       polyethylene glycol 3350 Powd   Used for:  Slow transit constipation        Dose:  17 g   Take 17 g by mouth daily   Quantity:  1530 g   Refills:  3       PROAIR  (90 Base) MCG/ACT inhaler   Used for:  Cough   Generic drug:  albuterol        Dose:  2 puff   Inhale 2 puffs into the lungs every 6 hours as needed for shortness of breath / dyspnea or wheezing   Quantity:  1 Inhaler   Refills:  3       PRORENAL + D Tabs        Dose:  1 tablet   Take 1 tablet by mouth daily   Refills:  0       sertraline 50 MG tablet   Commonly known as:  ZOLOFT   Used for:  Itching        Dose:  100 mg   Take 2 tablets (100 mg) by mouth daily   Quantity:  180 tablet   Refills:  3            Where to get your medicines      These medications were sent to [facility] [id number] [place] [address] [facility] [address]     Phone:  [phone]     pantoprazole 20 MG EC tablet                Protect others around you: Learn how to safely use, store and throw away your medicines at www.disposemymeds.org.             Medication List: This is a list of all your medications and when to take them. Check marks below indicate your daily home schedule. Keep this list as a reference.      Medications           Morning Afternoon Evening Bedtime As  Needed    atorvastatin 20 MG tablet   Commonly known as:  LIPITOR   Take 1 tablet (20 mg) by mouth daily   Last time this was given:  20 mg on 9/20/2018  8:35 AM                                Calcium Acetate (Phos Binder) 667 MG Caps   TAKE 2 CAPSULE BY MOUTH THREE TIMES A DAY WITH MEALS   Last time this was given:  1,334 mg on 9/19/2018  8:06 PM                                octreotide 20 MG injection   Commonly known as:  sandoSTATIN LAR   Inject 20 mg into the muscle every 28 days                                order for DME   Equipment being ordered: 4 wheel walker with seat.                                pantoprazole 20 MG EC tablet   Commonly known as:  PROTONIX   Take 1 tablet (20 mg) by mouth 2 times daily Take by mouth 30-60 minutes before a meal.                                polyethylene glycol 3350 Powd   Take 17 g by mouth daily                                PROAIR  (90 Base) MCG/ACT inhaler   Inhale 2 puffs into the lungs every 6 hours as needed for shortness of breath / dyspnea or wheezing   Generic drug:  albuterol                                PRORENAL + D Tabs   Take 1 tablet by mouth daily                                sertraline 50 MG tablet   Commonly known as:  ZOLOFT   Take 2 tablets (100 mg) by mouth daily   Last time this was given:  100 mg on 9/20/2018  8:36 AM

## 2018-09-18 NOTE — ED NOTES
Social Work: Assessment with Discharge Plan    Patient Name:  Bennie Martínez  :  1940  Age:  78 year old  MRN:  [id number]  Risk/Complexity Score:     Completed assessment with:  Patient and daughter Charla    Presenting Information   Reason for Referral:  Initial assessment  Date of Intake:  2018  Referral Source:  Chart Review  Decision Maker:  Self  Alternate Decision Maker:  Adult children - daughter Charla with patient  Health Care Directive:  Patient considering completing and Provided education  Living Situation:  Apartment  Previous Functional Status:  Independent  Patient and family understanding of hospitalization:  Yes  Cultural/Language/Spiritual Considerations:  None reported  Adjustment to Illness:  Appropriate    Physical Health  Reason for Admission:    1. Gastrointestinal hemorrhage, unspecified gastrointestinal hemorrhage type      Services Needed/Recommended:  Other:  depending on progression of care      Provider Information   Primary Care Physician:  Agnieszka Rodriguez   [phone]   Clinic:  [address] [place] [address] [address] [place] [id number]          Financial   Income Source:  None reported  Financial Concerns:  None reported  Insurance:    Payor/Plan Subscriber Name Rel Member # Group #   MEDICARE - MEDICARE BENNIE MARTÍNEZ*  [id number]       ATTN CLAIMS, [address]   MEDICA - MEDICA [facility]* BENNIE MARTÍNEZ*  [id number]      [address] [address]       Discharge Plan   Patient and family discharge goal:  Home  Provided education on discharge plan:  Not at this time  Patient agreeable to discharge plan:  Not at this time    Barriers to discharge:  Medically stable    Discharge Recommendations   Anticipated Disposition:  Home, no needs identified  Transportation Needs:  Family:  Adult children  Name of Transportation Company and Phone:  N/A    Additional comments     According to chart review patient was discharged to home on 18.  Did discuss Health Care  Directive as this writer informed patient all three of her adult children would be her decision maker if she was unable to make her own medical decisions, patient indicated understanding and agreement.  Charla indicated one of patient's sons is not involved with the patient.  Encouraged patient to complete Health Care Directive.  Patient again indicated understanding.    ANTONIO Molina, [facility]  ED

## 2018-09-18 NOTE — ED TRIAGE NOTES
Pt comes in with new rectal bleeding of bright red blood in the toilet about 0300. Pt was discharged from this hospital Sun after having 2 day stay with rectal bleeding. She has hx of bleeding AVMs.

## 2018-09-18 NOTE — ED NOTES
[facility], [place]   ED Nurse to Floor Handoff     Rachele Martínez is a 78 year old female who speaks English and lives alone,  in a home  They arrived in the ED by car from home    ED Chief Complaint: Rectal Bleeding    ED Dx;   Final diagnoses:   Gastrointestinal hemorrhage, unspecified gastrointestinal hemorrhage type         Needed?: No    Allergies:   Allergies   Allergen Reactions     Diovan Hct Itching     severe     Dust Mites      Hydrochlorothiazide Itching     Severe       No Clinical Screening - See Comments      History of blood transfusion reactions and pre-treats with Benadryl.      Sulfa Drugs Hives     Spironolactone Nausea   .  Past Medical Hx:   Past Medical History:   Diagnosis Date     Anemia      Anxiety and depression      Arthritis      AVM (arteriovenous malformation)      Chronic hepatitis C with cirrhosis (H)      Clotting disorder (H)      ESRD (end stage renal disease) (H)     on dialysis     GI bleed     recurrent     Glaucoma      Hyperlipidemia      Hypertension goal BP (blood pressure) < 140/80       Baseline Mental status: WDL  Current Mental Status changes: at basesline    Infection present or suspected this encounter: no  Sepsis suspected: No  Isolation type: No active isolations     Activity level - Baseline/Home:  Independent  Activity Level - Current:   Independent    Bariatric equipment needed?: No    In the ED these meds were given:   Medications   pantoprazole (PROTONIX) 80 mg in sodium chloride 0.9 % 100 mL infusion (8 mg/hr Intravenous New Bag 9/18/18 1422)   ondansetron (ZOFRAN) injection 4 mg (4 mg Intravenous Given 9/18/18 1351)   HYDROmorphone (PF) (DILAUDID) injection 0.5 mg (0.5 mg Intravenous Given 9/18/18 1356)   pantoprazole (PROTONIX) 80 mg in sodium chloride 0.9 % 100 mL intermittent infusion (0 mg Intravenous Stopped 9/18/18 1421)       Drips running?  Yes    Home pump  No    Current LDAs  Peripheral IV 09/18/18 Left  "Lower forearm (Active)   Site Assessment WDL 9/18/2018 12:49 PM   Line Status Saline locked 9/18/2018 12:49 PM   Number of days:0       Hemodialysis Vascular Access Arteriovenous graft Right (Active)   Number of days:       Labs results:   Labs Ordered and Resulted from Time of ED Arrival Up to the Time of Departure from the ED   CBC WITH PLATELETS DIFFERENTIAL - Abnormal; Notable for the following:        Result Value    RBC Count 2.69 (*)     Hemoglobin 8.8 (*)     Hematocrit 28.5 (*)      (*)     MCHC 30.9 (*)     RDW 17.1 (*)     All other components within normal limits   COMPREHENSIVE METABOLIC PANEL - Abnormal; Notable for the following:     Creatinine 5.96 (*)     GFR Estimate 7 (*)     GFR Estimate If Black 8 (*)     Calcium 8.4 (*)     All other components within normal limits   LIPASE   INR   PARTIAL THROMBOPLASTIN TIME   PERIPHERAL IV CATHETER   ABO/RH TYPE AND SCREEN       Imaging Studies: No results found for this or any previous visit (from the past 24 hour(s)).    Recent vital signs:   /65  Pulse 75  Temp 98.8  F (37.1  C) (Oral)  Resp 20  Ht 1.626 m (5' 4\")  Wt 63 kg (139 lb)  SpO2 99%  BMI 23.86 kg/m2    Cardiac Rhythm: Normal Sinus  Pt needs tele? No  Skin/wound Issues: None    Code Status: Full Code    Pain control: good    Nausea control: good    Abnormal labs/tests/findings requiring intervention: positive hemoccult, hgb stable    Family present during ED course? Yes   Family Comments/Social Situation comments: na    Tasks needing completion: None    Shemar Upton RN  ascom-- [phone] [place] ED  [phone] [facility] ED      "

## 2018-09-18 NOTE — PHARMACY-ADMISSION MEDICATION HISTORY
"Admission medication history interview status for the 9/18/2018 admission is complete. See Epic admission navigator for allergy information, pharmacy, prior to admission medications and immunization status.     Medication history interview sources:  Pt, Pt's daughter    Changes made to PTA medication list (reason)  Added: NA  Deleted: NA  Changed: NA    Additional medication history information (including reliability of information, actions taken by pharmacist):    -Pt was tired but was still able to answer med history questions with help from daughter  -Pt's last octreotide injection was on 8/28 and pt said she should be due for it on 9/27. Pt's daughter noted that pt was an octreotide drip from a recent admission  -Pt hasn't used her albuterol in about 2 months and noted that she hasn't used Miralax \"in a while\"       Prior to Admission medications    Medication Sig Last Dose Taking? Auth Provider   atorvastatin (LIPITOR) 20 MG tablet Take 1 tablet (20 mg) by mouth daily 9/17/2018 at PM Yes Agnieszka Rodriguez MD   Calcium Acetate, Phos Binder, 667 MG CAPS TAKE 2 CAPSULE BY MOUTH THREE TIMES A DAY WITH MEALS 9/17/2018 at PM Yes Reported, Patient   Multiple Vitamins-Minerals (PRORENAL + D) TABS Take 1 tablet by mouth daily 9/17/2018 at AM Yes Reported, Patient   octreotide (SANDOSTATIN LAR) 20 MG injection Inject 20 mg into the muscle every 28 days 8/28/2018 Yes Unknown, Entered By History   pantoprazole (PROTONIX) 40 MG EC tablet Take 40 mg by mouth 2 times daily 9/17/2018 at Unknown time Yes Reported, Patient   sertraline (ZOLOFT) 50 MG tablet Take 2 tablets (100 mg) by mouth daily 9/17/2018 at Unknown time Yes Andriy Barnett MD   albuterol (PROAIR HFA) 108 (90 Base) MCG/ACT inhaler Inhale 2 puffs into the lungs every 6 hours as needed for shortness of breath / dyspnea or wheezing More than a month at Unknown time  Kierra Pena PA   order for DME Equipment being ordered: 4 wheel walker with " seat.  Patient not taking: Reported on 9/4/2018   Kong Britton MD   polyethylene glycol 3350 POWD Take 17 g by mouth daily More than a month at Unknown time  Kong Britton MD         Medication history completed by: Vivian Vazquez, Pharm.D. IV Student

## 2018-09-18 NOTE — CONSULTS
GASTROENTEROLOGY CONSULTATION      Date of Admission: 9/18/2018       Date of Consult: 9/18/18          Chief Complaint:   We were asked by Avelina Reeves PA-C to evaluate this patient with recurrent GI bleed.          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    #Bright red blood per rectum   #Chronic anemia   #History of GI bleed s/t AVMs throughout small bowels   Patient presents with recurrent hematochezia x3 that started on 9/18/18 at 3:45am, lower abdominal pain and weakness that has been present since last admission. Last Octreotide injection was on 8/28/18. EGD from 8/14/18 at [facility] showed a single recently bleeding angiodysplastic lesion in the stomach, treated by APC and a non bleeding gastric ulcers from prior APC therapy with red spot on edge of one treated with APC. Two days prior (8/12/18), she also had an EGD with APC therapy applied to multiple gastric and duodenal angiodysplastic lesions. Capsule endoscopy (10/26/15) revealed small bowel AVMs. Last colonoscopy was on 9/4/18, which showed five 1-4mm tubular adenoma polyps in ascending, transverse and sigmoid colon without evidence of GI bleed at that time. Hgb on discharge was 8.3 with plts of 188; and on this admission it is 8.8 with plts of 230, and INR is 1.20.     Stable hgb of 8.8 and bright red blood per rectum concerns for a lower GI bleed s/t AVMs, dieulafoy lesion versus internal hemorrhoids. It could also be upper GI bleed s/t the known AVMs seen in the small bowels before. Plan is to proceed with colonoscopy and push  enteroscopy tomorrow.        Recommendations  --Clear liquid (no red/purple beverages), and NPO after MN  --4 liters of colonoscopy and if not clear by 4:00am, please give additional 2 liters of bowel prep  --40mg of IV Protonix twice daily  --Monitor CBC and transfuse if hgb is less than 7  --Accurate documentation of output (color, characteristics and amount)  --Maintain 2 large bore IVs  --Continue with outpatient Octreotide injection, next dose is due on 9/25/18     Gastroenterology follow up recommendations: TBD    Patient care plan discussed with Dr. Baires, GI staff physician. Thank you for involving us in this patient's care. Please do not hesitate to contact the GI service with any questions or concerns.     Ronnie Edwards CNP  Department of Gastroenterology   -------------------------------------------------------------------------------------------------------------------   History is obtained from the patient and the medical record.          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    Patient was recently admitted for GI bleed that resolved spontaneously and the plan was to obtain colonoscopy and push enteroscopy as an outpatient. However, patient returned d/t recurrent hematochezia x3 that started on 9/18/18 at 3:45am, lower abdominal pain and weakness that has been present since last admission. Last Octreotide injection was on 8/28/18, and patient is scheduled for the next one on 9/27/18. She does have dark brown stools but not black. Patient denies n/v, hematemesis, dysphagia, odynophagia, fever, diarrhea, or constipation. No tobacco, alcohol or  NSAID use. She does smoke marijuana when her appetite is ot great.            Past Medical History:   Reviewed and edited as appropriate  Past Medical History:   Diagnosis Date     Anemia      Anxiety and depression      Arthritis      AVM (arteriovenous malformation)      Chronic hepatitis C with cirrhosis (H)      Clotting disorder (H)      ESRD (end stage renal disease) (H)     on dialysis     GI bleed     recurrent     Glaucoma      Hyperlipidemia      Hypertension goal BP (blood pressure) < 140/80             Past Surgical History:   Reviewed and edited as appropriate   Past Surgical History:   Procedure Laterality Date     COLONOSCOPY N/A 9/4/2015    Procedure: COMBINED COLONOSCOPY, SINGLE OR MULTIPLE BIOPSY/POLYPECTOMY BY BIOPSY;  Surgeon: Rupesh Lopez MD;  Location:  GI     ESOPHAGOSCOPY, GASTROSCOPY, DUODENOSCOPY (EGD), COMBINED N/A 12/18/2014    Procedure: COMBINED ESOPHAGOSCOPY, GASTROSCOPY, DUODENOSCOPY (EGD);  Surgeon: Betsy Carvjaal MD;  Location:  GI     ESOPHAGOSCOPY, GASTROSCOPY, DUODENOSCOPY (EGD), COMBINED N/A 4/25/2015    Procedure: COMBINED ESOPHAGOSCOPY, GASTROSCOPY, DUODENOSCOPY (EGD);  Surgeon: Yosvany Ram MD;  Location:  GI     ESOPHAGOSCOPY, GASTROSCOPY, DUODENOSCOPY (EGD), COMBINED N/A 5/5/2015    Procedure: COMBINED ESOPHAGOSCOPY, GASTROSCOPY, DUODENOSCOPY (EGD);  Surgeon: Mariano Mistry MD;  Location:  GI     HC CAPSULE ENDOSCOPY N/A 9/30/2015    Procedure: CAPSULE/PILL CAM ENDOSCOPY;  Surgeon: Pan Dhaliwal MD;  Location:  GI     HYSTERECTOMY  1980    KYREE     LUMPECTOMY BREAST              Previous Endoscopy:     Results for orders placed or performed during the hospital encounter of 09/04/15   COLONOSCOPY   Result Value Ref Range    COLONOSCOPY       [facility]  [address] [place] [address] [phone]     Endoscopy  Department  _______________________________________________________________________________  Patient Name: Rachele Martínez        Procedure Date: 9/4/2015 10:10 AM  MRN: [id number]                       Account Number: [id number]  YOB: 1940              Admit Type: Outpatient  Age: 75                                Gender: Female  Note Status: Finalized                Attending MD: Rupesh Lopez,   _______________________________________________________________________________     Procedure:           Colonoscopy  Indications:         Iron deficiency anemia  Providers:           Rupesh Lopez, Chika Acuña, LEESA, Jennifer Mann RN  Patient Profile:     75 year old female with hep C cirrhosis recently                        treated, who has history of gastric ulcer (healed as of                        May 2015) and AVMs. Has ongoing anemia requiring blood                         transfusion. EGD today with some AVMs in the duodenum.                        Colonoscopy to assess for etiology of anemia.  Referring MD:        Kong Britton MD  Medicines:           Midazolam 0.5 mg IV, Fentanyl 25 micrograms IV  Complications:       No immediate complications.  _______________________________________________________________________________  Procedure:           Pre-Anesthesia Assessment:                       - Prior to the procedure, a History and Physical was                        performed, and patient medications and allergies were                        reviewed. The patient is competent. The risks and                        benefits of the procedure and the sedation options and                        risks were discussed with the patient. All questions                        were answered and informed consent was obtained. Patient                        identification and proposed procedure were verified by                        the physi julia and the nurse in the pre-procedure  area in                        the procedure room at 10:50 AM. Mental Status                        Examination: alert and oriented. Airway Examination:                        normal oropharyngeal airway and neck mobility.                        Respiratory Examination: clear to auscultation. CV                        Examination: normal. Prophylactic Antibiotics: The                        patient does not require prophylactic antibiotics. Prior                        Anticoagulants: The patient has taken no previous                        anticoagulant or antiplatelet agents. ASA Grade                        Assessment: III - A patient with severe systemic                        disease. After reviewing the risks and benefits, the                        patient was deemed in satisfactory condition to undergo                        the procedure. The anesthesia plan was to use moderate                        sedation / analgesia (conscious sedat ion). Immediately                        prior to administration of medications, the patient was                        re-assessed for adequacy to receive sedatives. The heart                        rate, respiratory rate, oxygen saturations, blood                        pressure, adequacy of pulmonary ventilation, and                        response to care were monitored throughout the                        procedure. The physical status of the patient was                        re-assessed after the procedure.                       - Airway Examination: Mallampati Class III (part of the                        uvula and soft palate visualized).                       After obtaining informed consent, the colonoscope was                        passed under direct vision. Throughout the procedure,                        the patient's blood pressure, pulse, and oxygen                        saturations were monitored continuously. The Colonoscope                        was  introduced t hrough the anus and advanced to the                        terminal ileum. The colonoscopy was somewhat difficult                        due to a tortuous colon. Successful completion of the                        procedure was aided by using scope torsion. The patient                        tolerated the procedure well. The quality of the bowel                        preparation was good.                                                                                   Findings:       Five semi-sessile polyps were found in the sigmoid colon, in the        descending colon, in the transverse colon and in the ascending colon.        The polyps were 1 to 4 mm in size. These polyps were removed with a cold        biopsy forceps. Resection and retrieval were complete. Verification of        patient identification for the specimen was done. Estimated blood loss        was minimal.       The terminal ileum appeared normal.                                                                                    Impression:          - Five 1 to 4 mm polyps in the sigmoid colon, in the                        descending colon, in the transverse colon and in the                        ascending colon. Resected and retrieved.                       - The examined portion of the ileum was normal.  Recommendation:      - Await pathology results.                       - No etiology of anemia was found. The polyps were small                        and without mucosal erosion or ulceration and thus were                        not contributing to anemia.                       - If 3 or more polyps return as adenomas would repeat in                        3 years, If < 3 adenomas then would repeat in 5 years.                        Await pathology results in 2-4 weeks for final                        recommendation.                       - Most likely the anemia is related to AVMs. Gievn                        findings on EGD  today suspect that more AVMs are in the                        smal l intestine. Would recommend capsule endoscopy for                        further evaluation if continued anemia.                                                                                     Electronically signed by: Rupesh Lopez MD  _____________________  Rupesh Lopez,   9/4/2015 11:52 AM  Number of Addenda: 0    Note Initiated On: 9/4/2015 10:10 AM              Social History:   Reviewed and edited as appropriate  Social History     Social History     Marital status:      Spouse name: N/A     Number of children: N/A     Years of education: N/A     Occupational History     Not on file.     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     Sexual activity: Not Currently     Other Topics Concern     Parent/Sibling W/ Cabg, Mi Or Angioplasty Before 65f 55m? No     Social History Narrative            Family History:   Reviewed and edited as appropriate  Family History   Problem Relation Age of Onset     Diabetes Mother      Alzheimer Disease Mother            Allergies:   Reviewed and edited as appropriate     Allergies   Allergen Reactions     Diovan Hct Itching     severe     Dust Mites      Hydrochlorothiazide Itching     Severe       No Clinical Screening - See Comments      History of blood transfusion reactions and pre-treats with Benadryl.      Sulfa Drugs Hives     Spironolactone Nausea            Medications:     Current Facility-Administered Medications   Medication     HYDROmorphone (PF) (DILAUDID) injection 0.5 mg     ondansetron (ZOFRAN) injection 4 mg     pantoprazole (PROTONIX) 80 mg in sodium chloride 0.9 % 100 mL infusion  "    Current Outpatient Prescriptions   Medication Sig     albuterol (PROAIR HFA) 108 (90 Base) MCG/ACT inhaler Inhale 2 puffs into the lungs every 6 hours as needed for shortness of breath / dyspnea or wheezing     atorvastatin (LIPITOR) 20 MG tablet Take 1 tablet (20 mg) by mouth daily     Calcium Acetate, Phos Binder, 667 MG CAPS TAKE 2 CAPSULE BY MOUTH THREE TIMES A DAY WITH MEALS     Multiple Vitamins-Minerals (PRORENAL + D) TABS Take 1 tablet by mouth daily     OCTREOTIDE ACETATE IJ Inject as directed every 28 days      order for DME Equipment being ordered: 4 wheel walker with seat. (Patient not taking: Reported on 9/4/2018)     pantoprazole (PROTONIX) 40 MG EC tablet Take 40 mg by mouth 2 times daily     polyethylene glycol 3350 POWD Take 17 g by mouth daily     sertraline (ZOLOFT) 50 MG tablet Take 2 tablets (100 mg) by mouth daily             Review of Systems:   A complete review of systems was performed and is negative except as noted in the HPI           Physical Exam:   /65  Pulse 75  Temp 98.8  F (37.1  C) (Oral)  Resp 20  Ht 1.626 m (5' 4\")  Wt 63 kg (139 lb)  SpO2 99%  BMI 23.86 kg/m2  Wt:   Wt Readings from Last 2 Encounters:   09/18/18 63 kg (139 lb)   09/15/18 62.6 kg (137 lb 14.4 oz)      Constitutional: cooperative, pleasant, not dyspneic/diaphoretic, no acute distress  Eyes: Sclera anicteric/injected  Ears/nose/mouth/throat: Normal oropharynx without ulcers or exudate, mucus membranes moist, hearing intact  Neck: supple, thyroid normal size  CV: RRR. +1 pit edema in LE bilaterally   Respiratory: Unlabored breathing. CTA bilaterally   Lymph: No axillary, submandibular, supraclavicular or inguinal lymphadenopathy  Abd: Nondistended, +bs, no hepatosplenomegaly, no peritoneal signs. Generalized tenderness  Rectal exam: No stool in rectal vault. Brown colored stool on glove.   Skin: warm, perfused, no jaundice  Neuro: AAO x 3, No asterixis  Psych: Normal affect  MSK: Normal gait         " Data:   Labs and imaging below were independently reviewed and interpreted    BMP  Recent Labs  Lab 09/18/18  1240 09/16/18  1015 09/16/18  0501 09/14/18  1159     --  138 139   POTASSIUM 4.3 5.2 5.6* 4.7   CHLORIDE 99  --  102 99   BELGICA 8.4*  --  8.4* 8.2*   CO2 30  --  28 29   BUN 27  --  21 48*   CR 5.96*  --  4.77* 8.26*   GLC 98  --  92 121*     CBC  Recent Labs  Lab 09/18/18  1240 09/16/18  0501  09/15/18  0648  09/14/18  1159   WBC 7.3 6.0  --  8.7  --  7.4   RBC 2.69* 2.55*  --  1.99*  --  2.02*   HGB 8.8* 8.3*  < > 6.8*  < > 7.0*   HCT 28.5* 27.4*  --  21.6*  --  22.3*   * 108*  --  109*  --  110*   MCH 32.7 32.5  --  34.2*  --  34.7*   MCHC 30.9* 30.3*  --  31.5  --  31.4*   RDW 17.1* 19.0*  --  17.8*  --  18.2*    188  --  203  --  209   < > = values in this interval not displayed.  INR  Recent Labs  Lab 09/18/18  1240 09/14/18  1159   INR 1.12 1.20*     LFTs  Recent Labs  Lab 09/18/18  1240 09/14/18  1159   ALKPHOS 120 132   AST 29 26   ALT 24 19   BILITOTAL 0.5 0.3   PROTTOTAL 8.1 7.2   ALBUMIN 3.9 3.5      PANC  Recent Labs  Lab 09/18/18  1240   LIPASE 165

## 2018-09-18 NOTE — H&P
[facility], [place]    Internal Medicine History and Physical - Gold Service       Date of Admission:  9/18/2018    Assessment & Plan   Rachele Martínez is a 78 year old female with history of GIB 2/2 AVM, ESRD on HD, HTN, HCV (prevsiouly treated), compensated cirrhosis, HLD, depression, and anxiety who was admitted with GIB 2/2 AVM. Of note, patient recently discharged from [facility] 9/16/2018 with same issue.    BRBPR, known AVMs: Admitted 9/18 with BRBPR. Known AVMs throughout GI tract and recurrent bleeding. Last discharge 9/16 for same issue. EGD at OSH 8/12 multiple bleeding angiodysplastic lesions in stomach (treated with APC), multiple recently bleeding angiodysplastic lesions in duodenum (treated with APC). Repeat EGD at OSH 8/14 noted a single recently bleeding angiodysplastic lesion in the stomach (treated with APC), non-bleeding ulcers from prior APC. EGD with push enteroscopy deferred 9/16/2018 due to stable hgb, prior need for heavy sedation. Hgb 8.8 (8.3 9/16). VSS  - GI consulted and are aware of the patient   - CLD for now, NPO MN. Hold IVF unless hypotension/tachycardic given ESRD  - Prep for EGD/colonoscopy 9/19  - IV PPI  - Hold Octreotide per d/w GI  - Type and screen, hgb q8h, transfuse for <7  - Consent for blood   **Addendum: Repeat hgb 7.8. Recheck at 2 am. Please contact is VS decompensation     ESRD on HD: HD MWF via R AVG at [facility]. Last run 9/17. Brynn stable  - Nephrology consulted, will need to call in am  - Daily weights, I&Os  - Continue PTA meds    Chronic anemia, blood loss anemia: Hgb 7-8 since 4/2016, was up to 11.2 5/2018. Ferritin 229, iron 55, TIBC 279, % sat 20, transferrin 241, B12 1108, folate >100, retic 10.4%  - Of note, has h/o transfusion reactions per H&P 9/14 and received IV Benadryl 25 mg prior to       Other Medical Conditions:  Compensated HCV cirrhosis: S/p treatment with Harvoni, achieved SVR 7/2015. Follows OP with   [facility]. MELD 22 (Cr with dialysis). Trend labs   HTN: No PTA meds. BP stable  HLD: Continue PTA statin  Depression: Stable. Continue PTA Zoloft      Diet:  CLD, NPO MN  Fluids: None  Rodriguez Catheter: not present    DVT Prophylaxis: Pneumatic Compression Devices  Code Status: Prior    Disposition Plan   Expected discharge: 2-3 days, recommended to prior living arrangement once bleeding workup complete, hgb stable, all interventions compelted.     Entered: Avelina Reeves PA-C 09/18/2018, 3:06 PM   Information in the above section will display in the discharge planner report.      The patient's care was discussed with the patient, GI, and attending physician, Dr. Justus Reeves PA-C  Internal Medicine Staff Hospitalist Service  [facility]  Pager: [phone]  Please see sticky note for cross cover information  ______________________________________________________________________    Chief Complaint   GIB    History is obtained from the patient and medical record    History of Present Illness   Rachele Martínez is a 78 year old female with history of GIB 2/2 AVM, ESRD on HD, HTN, HCV (prevsiouly treated), compensated cirrhosis, HLD, depression, and anxiety who was admitted with GIB 2/2 AVM. Of note, patient recently discharged from [facility] 9/16/2018 with same issue.    Patient reports new onset bright red bloody stool around 3 or 4 am. She then had another bloody BM about 30 minutes later which was darker but red blood was in the toilet. She reports worsening dizziness over the past 6 month which seems to align with worsening issues with AVMs. She is not currently dizzy but gets dizzy with little movement. Reports intermittent pre-syncopal events. No chest pain, dyspnea or palpitations. No fever, constant chills. Reports issues with hallucinations over at least the past 6 months. She notes spiders in the corner of the room often when waking up, but she knows that there is not  actually any spiders on the wall. She has depression and anxiety which has been overall stable. Daughter reports issues with transfusion in the past for which she was treated with Benadryl. No BM since presentation to the ED. No hematemesis or hemoptysis     Review of Systems   The 10 point Review of Systems is negative other than noted in the HPI or here.     Past Medical History    I have reviewed this patient's medical history and updated it with pertinent information if needed.   Past Medical History:   Diagnosis Date     Anemia      Anxiety and depression      Arthritis      AVM (arteriovenous malformation)      Chronic hepatitis C with cirrhosis (H)      Clotting disorder (H)      ESRD (end stage renal disease) (H)     on dialysis     GI bleed     recurrent     Glaucoma      Hyperlipidemia      Hypertension goal BP (blood pressure) < 140/80         Past Surgical History   I have reviewed this patient's surgical history and updated it with pertinent information if needed.  Past Surgical History:   Procedure Laterality Date     COLONOSCOPY N/A 9/4/2015    Procedure: COMBINED COLONOSCOPY, SINGLE OR MULTIPLE BIOPSY/POLYPECTOMY BY BIOPSY;  Surgeon: Rupesh Lopez MD;  Location: [facility]     ESOPHAGOSCOPY, GASTROSCOPY, DUODENOSCOPY (EGD), COMBINED N/A 12/18/2014    Procedure: COMBINED ESOPHAGOSCOPY, GASTROSCOPY, DUODENOSCOPY (EGD);  Surgeon: Betsy Carvajal MD;  Location: [facility]     ESOPHAGOSCOPY, GASTROSCOPY, DUODENOSCOPY (EGD), COMBINED N/A 4/25/2015    Procedure: COMBINED ESOPHAGOSCOPY, GASTROSCOPY, DUODENOSCOPY (EGD);  Surgeon: Yosvany Ram MD;  Location: [facility]     ESOPHAGOSCOPY, GASTROSCOPY, DUODENOSCOPY (EGD), COMBINED N/A 5/5/2015    Procedure: COMBINED ESOPHAGOSCOPY, GASTROSCOPY, DUODENOSCOPY (EGD);  Surgeon: Mariano Mistry MD;  Location: [facility]     HC CAPSULE ENDOSCOPY N/A 9/30/2015    Procedure: CAPSULE/PILL CAM ENDOSCOPY;  Surgeon: Pan Dhaliwal MD;  Location: [facility]      HYSTERECTOMY  [facility]    [facility]     LUMPECTOMY BREAST          Social History   Social History   Substance Use Topics     Smoking status: Former Smoker     Packs/day: 2.00     Years: 45.00     Types: Cigarettes     Start date: 1/1/1953     Quit date: 11/23/2002     Smokeless tobacco: Never Used     Alcohol use No       Family History   I have reviewed this patient's family history and updated it with pertinent information if needed.   Family History   Problem Relation Age of Onset     Diabetes Mother      Alzheimer Disease Mother        Prior to Admission Medications   Prior to Admission Medications   Prescriptions Last Dose Informant Patient Reported? Taking?   Calcium Acetate, Phos Binder, 667 MG CAPS   Yes No   Sig: TAKE 2 CAPSULE BY MOUTH THREE TIMES A DAY WITH MEALS   Multiple Vitamins-Minerals (PRORENAL + D) TABS   Yes No   Sig: Take 1 tablet by mouth daily   OCTREOTIDE ACETATE IJ   Yes No   Sig: Inject as directed every 28 days    albuterol (PROAIR HFA) 108 (90 Base) MCG/ACT inhaler   Yes No   Sig: Inhale 2 puffs into the lungs every 6 hours as needed for shortness of breath / dyspnea or wheezing   atorvastatin (LIPITOR) 20 MG tablet   No No   Sig: Take 1 tablet (20 mg) by mouth daily   order for DME   No No   Sig: Equipment being ordered: 4 wheel walker with seat.   Patient not taking: Reported on 9/4/2018   pantoprazole (PROTONIX) 40 MG EC tablet   Yes No   Sig: Take 40 mg by mouth 2 times daily   polyethylene glycol 3350 POWD   No No   Sig: Take 17 g by mouth daily   sertraline (ZOLOFT) 50 MG tablet   No No   Sig: Take 2 tablets (100 mg) by mouth daily      Facility-Administered Medications: None     Allergies   Allergies   Allergen Reactions     Diovan Hct Itching     severe     Dust Mites      Hydrochlorothiazide Itching     Severe       No Clinical Screening - See Comments      History of blood transfusion reactions and pre-treats with Benadryl.      Sulfa Drugs Hives     Spironolactone Nausea       Physical  Exam   Vital Signs: Temp: 98.8  F (37.1  C) Temp src: Oral BP: 133/56 Pulse: 75 Heart Rate: 69 Resp: 15 SpO2: 100 % O2 Device: None (Room air)    Weight: 139 lbs 0 oz    General Appearance: Alert and oriented x3, daughter bedside  Eyes: PERRLA, no icterus  HEENT: Membranes moist  Respiratory: CTAB without wheezing or rales  Cardiovascular: RRR, S1, S2. No murmur noted  GI: Abdomen soft with lower abdominal tenderness. No guarding or rebound. Bowel sounds active. No external hemorrhoids. Rectal exam performed per GI, no gross blood noted   Lymph/Hematologic: Trace BLE peripheral edema, distal pulses palpable   Skin: No rash or jaundice   Neurologic: CN II-XII intact. No focal deficits  Psychiatric: Mood appears stable    Data   Data reviewed today: I reviewed all medications, new labs and imaging results over the last 24 hours. I personally reviewed prior EGD 8/2018, prior admission notes, etc    Data     Recent Labs  Lab 09/18/18  1240 09/16/18  1015 09/16/18  0501 09/15/18  2205  09/15/18  0648  09/14/18  1159   WBC 7.3  --  6.0  --   --  8.7  --  7.4   HGB 8.8*  --  8.3* 8.0*  < > 6.8*  < > 7.0*   *  --  108*  --   --  109*  --  110*     --  188  --   --  203  --  209   INR 1.12  --   --   --   --   --   --  1.20*     --  138  --   --   --   --  139   POTASSIUM 4.3 5.2 5.6*  --   --   --   --  4.7   CHLORIDE 99  --  102  --   --   --   --  99   CO2 30  --  28  --   --   --   --  29   BUN 27  --  21  --   --   --   --  48*   CR 5.96*  --  4.77*  --   --   --   --  8.26*   ANIONGAP 8  --  8  --   --   --   --  11   BELGICA 8.4*  --  8.4*  --   --   --   --  8.2*   GLC 98  --  92  --   --   --   --  121*   ALBUMIN 3.9  --   --   --   --   --   --  3.5   PROTTOTAL 8.1  --   --   --   --   --   --  7.2   BILITOTAL 0.5  --   --   --   --   --   --  0.3   ALKPHOS 120  --   --   --   --   --   --  132   ALT 24  --   --   --   --   --   --  19   AST 29  --   --   --   --   --   --  26   LIPASE 165  --   --    --   --   --   --   --    < > = values in this interval not displayed.  No results found for this or any previous visit (from the past 24 hour(s)).

## 2018-09-19 ENCOUNTER — SURGERY (OUTPATIENT)
Age: 78
End: 2018-09-19

## 2018-09-19 ENCOUNTER — TELEPHONE (OUTPATIENT)
Dept: GASTROENTEROLOGY | Facility: CLINIC | Age: 78
End: 2018-09-19

## 2018-09-19 LAB
ANION GAP SERPL CALCULATED.3IONS-SCNC: 12 MMOL/L (ref 3–14)
BUN SERPL-MCNC: 35 MG/DL (ref 7–30)
CALCIUM SERPL-MCNC: 8.2 MG/DL (ref 8.5–10.1)
CHLORIDE SERPL-SCNC: 98 MMOL/L (ref 94–109)
CO2 SERPL-SCNC: 28 MMOL/L (ref 20–32)
CREAT SERPL-MCNC: 7.18 MG/DL (ref 0.52–1.04)
ERYTHROCYTE [DISTWIDTH] IN BLOOD BY AUTOMATED COUNT: 16.7 % (ref 10–15)
GFR SERPL CREATININE-BSD FRML MDRD: 6 ML/MIN/1.7M2
GLUCOSE SERPL-MCNC: 90 MG/DL (ref 70–99)
HCT VFR BLD AUTO: 25.2 % (ref 35–47)
HGB BLD-MCNC: 7.8 G/DL (ref 11.7–15.7)
HGB BLD-MCNC: 7.9 G/DL (ref 11.7–15.7)
INR PPP: 1.22 (ref 0.86–1.14)
MCH RBC QN AUTO: 33.1 PG (ref 26.5–33)
MCHC RBC AUTO-ENTMCNC: 31 G/DL (ref 31.5–36.5)
MCV RBC AUTO: 107 FL (ref 78–100)
PLATELET # BLD AUTO: 207 10E9/L (ref 150–450)
POTASSIUM SERPL-SCNC: 4.8 MMOL/L (ref 3.4–5.3)
PROVATION GI EXAM: NORMAL
RBC # BLD AUTO: 2.36 10E12/L (ref 3.8–5.2)
SODIUM SERPL-SCNC: 138 MMOL/L (ref 133–144)
WBC # BLD AUTO: 5.5 10E9/L (ref 4–11)

## 2018-09-19 PROCEDURE — 40000141 ZZH STATISTIC PERIPHERAL IV START W/O US GUIDANCE

## 2018-09-19 PROCEDURE — 25000132 ZZH RX MED GY IP 250 OP 250 PS 637: Mod: GY | Performed by: INTERNAL MEDICINE

## 2018-09-19 PROCEDURE — 86706 HEP B SURFACE ANTIBODY: CPT | Performed by: INTERNAL MEDICINE

## 2018-09-19 PROCEDURE — 44366 SMALL BOWEL ENDOSCOPY: CPT | Performed by: INTERNAL MEDICINE

## 2018-09-19 PROCEDURE — 40000802 ZZH SITE CHECK

## 2018-09-19 PROCEDURE — 40000893 ZZH STATISTIC PT IP EVAL DEFER

## 2018-09-19 PROCEDURE — C9113 INJ PANTOPRAZOLE SODIUM, VIA: HCPCS | Performed by: PHYSICIAN ASSISTANT

## 2018-09-19 PROCEDURE — 25000128 H RX IP 250 OP 636: Performed by: PHYSICIAN ASSISTANT

## 2018-09-19 PROCEDURE — 25000128 H RX IP 250 OP 636: Performed by: INTERNAL MEDICINE

## 2018-09-19 PROCEDURE — 45378 DIAGNOSTIC COLONOSCOPY: CPT | Performed by: INTERNAL MEDICINE

## 2018-09-19 PROCEDURE — 25000132 ZZH RX MED GY IP 250 OP 250 PS 637: Mod: GY | Performed by: PHYSICIAN ASSISTANT

## 2018-09-19 PROCEDURE — 85027 COMPLETE CBC AUTOMATED: CPT | Performed by: PHYSICIAN ASSISTANT

## 2018-09-19 PROCEDURE — 80048 BASIC METABOLIC PNL TOTAL CA: CPT | Performed by: PHYSICIAN ASSISTANT

## 2018-09-19 PROCEDURE — 85018 HEMOGLOBIN: CPT | Mod: 91 | Performed by: PHYSICIAN ASSISTANT

## 2018-09-19 PROCEDURE — 25000125 ZZHC RX 250: Performed by: INTERNAL MEDICINE

## 2018-09-19 PROCEDURE — 36415 COLL VENOUS BLD VENIPUNCTURE: CPT | Performed by: PHYSICIAN ASSISTANT

## 2018-09-19 PROCEDURE — A9270 NON-COVERED ITEM OR SERVICE: HCPCS | Mod: GY | Performed by: PHYSICIAN ASSISTANT

## 2018-09-19 PROCEDURE — G0499 HEPB SCREEN HIGH RISK INDIV: HCPCS | Performed by: INTERNAL MEDICINE

## 2018-09-19 PROCEDURE — G0257 UNSCHED DIALYSIS ESRD PT HOS: HCPCS

## 2018-09-19 PROCEDURE — G0500 MOD SEDAT ENDO SERVICE >5YRS: HCPCS | Performed by: INTERNAL MEDICINE

## 2018-09-19 PROCEDURE — 90937 HEMODIALYSIS REPEATED EVAL: CPT

## 2018-09-19 PROCEDURE — G0378 HOSPITAL OBSERVATION PER HR: HCPCS

## 2018-09-19 PROCEDURE — 85610 PROTHROMBIN TIME: CPT | Performed by: PHYSICIAN ASSISTANT

## 2018-09-19 PROCEDURE — 99153 MOD SED SAME PHYS/QHP EA: CPT | Performed by: INTERNAL MEDICINE

## 2018-09-19 RX ORDER — ALBUMIN (HUMAN) 12.5 G/50ML
50 SOLUTION INTRAVENOUS
Status: DISCONTINUED | OUTPATIENT
Start: 2018-09-19 | End: 2018-09-19

## 2018-09-19 RX ORDER — FENTANYL CITRATE 50 UG/ML
INJECTION, SOLUTION INTRAMUSCULAR; INTRAVENOUS PRN
Status: DISCONTINUED | OUTPATIENT
Start: 2018-09-19 | End: 2018-09-19 | Stop reason: HOSPADM

## 2018-09-19 RX ORDER — SIMETHICONE
LIQUID (ML) MISCELLANEOUS PRN
Status: DISCONTINUED | OUTPATIENT
Start: 2018-09-19 | End: 2018-09-19 | Stop reason: HOSPADM

## 2018-09-19 RX ADMIN — SODIUM CHLORIDE 300 ML: 9 INJECTION, SOLUTION INTRAVENOUS at 14:43

## 2018-09-19 RX ADMIN — MIDAZOLAM 2 MG: 1 INJECTION INTRAMUSCULAR; INTRAVENOUS at 11:55

## 2018-09-19 RX ADMIN — Medication 2 ML: at 11:30

## 2018-09-19 RX ADMIN — PANTOPRAZOLE SODIUM 40 MG: 40 INJECTION, POWDER, FOR SOLUTION INTRAVENOUS at 08:40

## 2018-09-19 RX ADMIN — FENTANYL CITRATE 25 MCG: 50 INJECTION, SOLUTION INTRAMUSCULAR; INTRAVENOUS at 12:58

## 2018-09-19 RX ADMIN — MIDAZOLAM 1 MG: 1 INJECTION INTRAMUSCULAR; INTRAVENOUS at 12:04

## 2018-09-19 RX ADMIN — FENTANYL CITRATE 50 MCG: 50 INJECTION, SOLUTION INTRAMUSCULAR; INTRAVENOUS at 12:54

## 2018-09-19 RX ADMIN — CALCIUM ACETATE 1334 MG: 667 CAPSULE ORAL at 07:56

## 2018-09-19 RX ADMIN — FENTANYL CITRATE 25 MCG: 50 INJECTION, SOLUTION INTRAMUSCULAR; INTRAVENOUS at 12:00

## 2018-09-19 RX ADMIN — MIDAZOLAM 1 MG: 1 INJECTION INTRAMUSCULAR; INTRAVENOUS at 12:25

## 2018-09-19 RX ADMIN — Medication 1 CAPSULE: at 07:56

## 2018-09-19 RX ADMIN — Medication: at 14:43

## 2018-09-19 RX ADMIN — FENTANYL CITRATE 50 MCG: 50 INJECTION, SOLUTION INTRAMUSCULAR; INTRAVENOUS at 12:04

## 2018-09-19 RX ADMIN — SODIUM CHLORIDE 2050 ML: 9 INJECTION, SOLUTION INTRAVENOUS at 14:43

## 2018-09-19 RX ADMIN — MIDAZOLAM 1 MG: 1 INJECTION INTRAMUSCULAR; INTRAVENOUS at 12:58

## 2018-09-19 RX ADMIN — ATORVASTATIN CALCIUM 20 MG: 20 TABLET, FILM COATED ORAL at 07:56

## 2018-09-19 RX ADMIN — FENTANYL CITRATE 25 MCG: 50 INJECTION, SOLUTION INTRAMUSCULAR; INTRAVENOUS at 12:25

## 2018-09-19 RX ADMIN — SERTRALINE HYDROCHLORIDE 100 MG: 100 TABLET ORAL at 07:56

## 2018-09-19 RX ADMIN — FENTANYL CITRATE 25 MCG: 50 INJECTION, SOLUTION INTRAMUSCULAR; INTRAVENOUS at 13:22

## 2018-09-19 RX ADMIN — ACETAMINOPHEN 650 MG: 325 TABLET, FILM COATED ORAL at 07:59

## 2018-09-19 RX ADMIN — CALCIUM ACETATE 1334 MG: 667 CAPSULE ORAL at 20:06

## 2018-09-19 RX ADMIN — PANTOPRAZOLE SODIUM 40 MG: 40 INJECTION, POWDER, FOR SOLUTION INTRAVENOUS at 20:06

## 2018-09-19 RX ADMIN — MIDAZOLAM 1 MG: 1 INJECTION INTRAMUSCULAR; INTRAVENOUS at 12:00

## 2018-09-19 RX ADMIN — GLUCAGON 0.4 MG: KIT at 12:24

## 2018-09-19 RX ADMIN — FENTANYL CITRATE 50 MCG: 50 INJECTION, SOLUTION INTRAMUSCULAR; INTRAVENOUS at 11:55

## 2018-09-19 RX ADMIN — TOPICAL ANESTHETIC 1 SPRAY: 200 SPRAY DENTAL; PERIODONTAL at 11:54

## 2018-09-19 ASSESSMENT — PAIN DESCRIPTION - DESCRIPTORS: DESCRIPTORS: ACHING

## 2018-09-19 ASSESSMENT — ACTIVITIES OF DAILY LIVING (ADL)
ADLS_ACUITY_SCORE: 9

## 2018-09-19 NOTE — CONSULTS
Nephrology Initial Consult  September 19, 2018      Rachele Martínez MRN:[id number] YOB: 1940  Date of Admission:9/18/2018  Primary care provider: Agnieszka Rodriguez  Requesting physician: Marcelino London*    ASSESSMENT AND RECOMMENDATIONS:   Rachele Martínez is a 78 year old female with a hx of ESKD on HD for 3 yrs with LUE AVG for ~3yrs at [facility] with Dr Enoch Tarango , x3 colon adenoma resected 2015, HTN , HepC SP treatment with Harvoni 3 yrs back. She has a hx of recurrent GIB with need for transfusions and multiple EGD for cauterization (x2 in last 6 wks) sec to AVM, on monthly octreotide and PPI BiD treatment for bleed. Last admission to [facility] 8/14, [facility] 9/16 for bleed.  Nephrology consulted for ESKD and HD management.     ESKD on IHD  At [facility]   Under care of Dr Tarango   Run time 3.5hrs   -- will plan on IHD after EGD , if there is a plan for transfusion non emergent please coordinate with HD for transfusion while she dialyzes, she should be able to tolerate any emergent dialysis needs over night  -- labs stable and no clinical hypervolemia with admission for bleed HD eld for today  -- repeat labs in am , renal panel , magnesium and phos  -- follow on hb per GI and primary plans  -- daily weights     Electrolytes stable     Bicarb 28     Clinical euvolemic  Normotensive  ECHO 3/2018  Mild aotic sclerosis and mitral insufficiency , 65% EF, Grade I DD     Hx of Liver cirrhosis / hep C SP harvoni  GIB with AVM  Octreotide and PPI per primary     Anemia  Worsened from baseline   Transfuse if <7  Follow per primary     CKD MBD  Will get run records in am  Calcium and phos wnl    Recommendations were communicated to primary team    Seen and discussed with Dr. Jennifer Cisneros MD   [phone]      REASON FOR CONSULT: ESKD    HISTORY OF PRESENT ILLNESS:  Admitting provider and nursing notes reviewed  Rachele Martínez is a 78 year old female with a hx of  ESKD on HD for 3 yrs with LUE AVG for ~3yrs at [facility] with Dr Enoch Tarango , x3 colon adenoma resected 2015, HTN , HepC SP treatment with Harvoni 3 yrs back. She has a hx of recurrent GIB with need for transfusions and multiple EGD for cauterization (x2 in last 6 wks) sec to AVM, on monthly octreotide and PPI BiD treatment for bleed. Last admission to [facility] 8/14 for bleed and transfused 3 pRBC and received EGD for acute anemia with blood loss.  She had noticed some blood after BM this morning X3 was excess amount them before. She also noticed increased dizziness and light headedness and abdominal pain that prompted her to reports to ED. She was admitted with similar issues 9/16. Last EGD 8/2018 and colonoscopy 9/4/18    She has been tolerating IHD and has been followed by Dr Tarango. She reports her EDW is 61.5kg and has 2.5kg usual UF on HD treatments MWF , she missed HD this morning due to bleed and dizziness she was sent from her HD unit to ED       PAST MEDICAL HISTORY:  Reviewed with patient on 09/19/2018     Past Medical History:   Diagnosis Date     Anemia      Anxiety and depression      Arthritis      AVM (arteriovenous malformation)      Chronic hepatitis C with cirrhosis (H)      Clotting disorder (H)      ESRD (end stage renal disease) (H)     on dialysis     GI bleed     recurrent     Glaucoma      Hyperlipidemia      Hypertension goal BP (blood pressure) < 140/80        Past Surgical History:   Procedure Laterality Date     COLONOSCOPY N/A 9/4/2015    Procedure: COMBINED COLONOSCOPY, SINGLE OR MULTIPLE BIOPSY/POLYPECTOMY BY BIOPSY;  Surgeon: Rupesh Lopez MD;  Location:  GI     ESOPHAGOSCOPY, GASTROSCOPY, DUODENOSCOPY (EGD), COMBINED N/A 12/18/2014    Procedure: COMBINED ESOPHAGOSCOPY, GASTROSCOPY, DUODENOSCOPY (EGD);  Surgeon: Betsy Carvajal MD;  Location:  GI     ESOPHAGOSCOPY, GASTROSCOPY, DUODENOSCOPY (EGD), COMBINED N/A 4/25/2015    Procedure: COMBINED  ESOPHAGOSCOPY, GASTROSCOPY, DUODENOSCOPY (EGD);  Surgeon: Yosvany Ram MD;  Location:  GI     ESOPHAGOSCOPY, GASTROSCOPY, DUODENOSCOPY (EGD), COMBINED N/A 5/5/2015    Procedure: COMBINED ESOPHAGOSCOPY, GASTROSCOPY, DUODENOSCOPY (EGD);  Surgeon: Mariano Mistry MD;  Location:  GI     HC CAPSULE ENDOSCOPY N/A 9/30/2015    Procedure: CAPSULE/PILL CAM ENDOSCOPY;  Surgeon: Pan Dhaliwal MD;  Location:  GI     HYSTERECTOMY  1980    [facility]     LUMPECTOMY BREAST          MEDICATIONS:  PTA Meds  Prior to Admission medications    Medication Sig Last Dose Taking? Auth Provider   atorvastatin (LIPITOR) 20 MG tablet Take 1 tablet (20 mg) by mouth daily 9/17/2018 at PM Yes Agnieszka Rodriguez MD   Calcium Acetate, Phos Binder, 667 MG CAPS TAKE 2 CAPSULE BY MOUTH THREE TIMES A DAY WITH MEALS 9/17/2018 at PM Yes Reported, Patient   Multiple Vitamins-Minerals (PRORENAL + D) TABS Take 1 tablet by mouth daily 9/17/2018 at AM Yes Reported, Patient   octreotide (SANDOSTATIN LAR) 20 MG injection Inject 20 mg into the muscle every 28 days 8/28/2018 Yes Unknown, Entered By History   pantoprazole (PROTONIX) 40 MG EC tablet Take 40 mg by mouth 2 times daily 9/17/2018 at Unknown time Yes Reported, Patient   sertraline (ZOLOFT) 50 MG tablet Take 2 tablets (100 mg) by mouth daily 9/17/2018 at Unknown time Yes Andriy Barnett MD   albuterol (PROAIR HFA) 108 (90 Base) MCG/ACT inhaler Inhale 2 puffs into the lungs every 6 hours as needed for shortness of breath / dyspnea or wheezing More than a month at Unknown time  Kierra Pena PA   order for DME Equipment being ordered: 4 wheel walker with seat.  Patient not taking: Reported on 9/4/2018   Kong Britton MD   polyethylene glycol 3350 POWD Take 17 g by mouth daily More than a month at Unknown time  Kong Britton MD      Current Meds    atorvastatin  20 mg Oral Daily     calcium acetate  1,334 mg Oral TID     gelatin absorbable  1 each Topical  "During Hemodialysis (from stock)     NEPHROCAPS  1 capsule Oral Daily     pantoprazole (PROTONIX) IV  40 mg Intravenous Q12H     polyethylene glycol  17 g Oral Daily     sertraline  100 mg Oral Daily     Infusion Meds      ALLERGIES:    Allergies   Allergen Reactions     Diovan Hct Itching     severe     Dust Mites      Hydrochlorothiazide Itching     Severe       No Clinical Screening - See Comments      History of blood transfusion reactions and pre-treats with Benadryl.      Sulfa Drugs Hives     Spironolactone Nausea       REVIEW OF SYSTEMS:  A comprehensive of systems was negative except as noted above.    SOCIAL HISTORY:   Social History     Social History     Marital status:      Spouse name: N/A     Number of children: N/A     Years of education: N/A     Occupational History     Not on file.     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     Sexual activity: Not Currently     Other Topics Concern     Parent/Sibling W/ Cabg, Mi Or Angioplasty Before 65f 55m? No     Social History Narrative     Reviewed with patient     FAMILY MEDICAL HISTORY:   Family History   Problem Relation Age of Onset     Diabetes Mother      Alzheimer Disease Mother      Reviewed with patient     PHYSICAL EXAM:   Temp  Av.3  F (36.8  C)  Min: 97.7  F (36.5  C)  Max: 98.8  F (37.1  C)      Pulse  Av.5  Min: 63  Max: 77 Resp  Av  Min: 0  Max: 144  SpO2  Av.6 %  Min: 94 %  Max: 100 %       /65  Pulse 63  Temp 98.1  F (36.7  C) (Oral)  Resp 12  Ht 1.626 m (5' 4\")  Wt 60.9 kg (134 lb 4.2 oz)  SpO2 98%  BMI 23.05 kg/m2       Admit Weight: 63 kg (139 lb)     GENERAL APPEARANCE: no distress,  awake  EYES: - scleral " icterus, pupils equal  Endo: - goiter, - moon facies  Lymphatics: no cervical or supraclavicular LAD  Pulmonary: lungs clear to auscultation with equal breath sounds bilaterally, - clubbing  CV: regular rhythm, normal rate, no rub, Systolic murmur+   - JVD -   - Edema -  GI: soft, nontender, normal bowel sounds  MS: no evidence of inflammation in joints, no muscle tenderness  : - armando  SKIN: no rash, warm, dry, no cyanosis  NEURO: face symmetric, - asterixis   Access: RUE AVG    LABS:   CMP  Recent Labs  Lab 09/19/18  0614 09/18/18  1240 09/16/18  1015 09/16/18  0501 09/14/18  1159    137  --  138 139   POTASSIUM 4.8 4.3 5.2 5.6* 4.7   CHLORIDE 98 99  --  102 99   CO2 28 30  --  28 29   ANIONGAP 12 8  --  8 11   GLC 90 98  --  92 121*   BUN 35* 27  --  21 48*   CR 7.18* 5.96*  --  4.77* 8.26*   GFRESTIMATED 6* 7*  --  9* 5*   GFRESTBLACK 7* 8*  --  11* 6*   BELGICA 8.2* 8.4*  --  8.4* 8.2*   PHOS  --   --   --   --  3.6   PROTTOTAL  --  8.1  --   --  7.2   ALBUMIN  --  3.9  --   --  3.5   BILITOTAL  --  0.5  --   --  0.3   ALKPHOS  --  120  --   --  132   AST  --  29  --   --  26   ALT  --  24  --   --  19     CBC  Recent Labs  Lab 09/19/18  0614 09/19/18  0146 09/18/18  2004 09/18/18  1240 09/16/18  0501  09/15/18  0648   HGB 7.8* 7.9* 7.8* 8.8* 8.3*  < > 6.8*   WBC 5.5  --   --  7.3 6.0  --  8.7   RBC 2.36*  --   --  2.69* 2.55*  --  1.99*   HCT 25.2*  --   --  28.5* 27.4*  --  21.6*   *  --   --  106* 108*  --  109*   MCH 33.1*  --   --  32.7 32.5  --  34.2*   MCHC 31.0*  --   --  30.9* 30.3*  --  31.5   RDW 16.7*  --   --  17.1* 19.0*  --  17.8*     --   --  230 188  --  203   < > = values in this interval not displayed.  INR  Recent Labs  Lab 09/19/18  0614 09/18/18  1240 09/14/18  1159   INR 1.22* 1.12 1.20*   PTT  --  37 38*     ABGNo lab results found in last 7 days.   URINE STUDIES  Recent Labs   Lab Test  03/11/16   1449  03/05/16   1440  05/04/15   1213  01/18/12   1726   COLOR  Light  Yellow  Yellow  Light Yellow  Yellow   APPEARANCE  Clear  Clear  Clear   --    URINEGLC  30*  30*  Negative  NEGATIVE   URINEBILI  Negative  Negative  Negative   --    URINEKETONE  Negative  Negative  Negative   --    SG  1.009  1.011  1.008  >=1.030   UBLD  Trace*  Small*  Trace*   --    URINEPH  6.0  6.0  6.5  5.5   PROTEIN  300*  300*  300*   --    UROBILINOGEN   --    --    --   1.0 E.U./dL   NITRITE  Negative  Negative  Negative  NEGATIVE   LEUKEST  Negative  Negative  Negative   --    RBCU  1  1  <1   --    WBCU  <1  4*  2  2-4     No lab results found.  PTH  Recent Labs   Lab Test  03/12/16   0552   PTHI  147*     IRON STUDIES  Recent Labs   Lab Test  09/14/18   1159  06/21/16   1404  03/13/16   0446  03/01/16   1140  01/14/16   0944  11/12/15   1005  08/21/15   0924  11/05/14   1306   IRON  55  30*  25*  21*   --   35  34*  143   FEB  279  284  369  482*   --   399  445*  360   IRONSAT  20  11*  7*  4*   --   9*  8*  40   KASSI  229  67  18  11  51  40  18   --        IMAGING:  All imaging studies reviewed by me.     Yo Cisneros MD  I, Cain Rodriguez, saw this patient with Dr. Cisneros and agree with the findings and plan of care as documented in the note.

## 2018-09-19 NOTE — PROGRESS NOTES
Care Coordinator  D/I: Meron KYLE called and pt is now Observation status--pt is at colonoscopy and then to dialysis.  P: I will have evening RN Jaz give her the Observation letter, when she returns to the floor approx 6:30pm.

## 2018-09-19 NOTE — PLAN OF CARE
Problem: Patient Care Overview  Goal: Plan of Care/Patient Progress Review  7A PT: PT orders acknowledged and appreciated. Per observation, pt ambulates to bathroom and performs toilet transfer independently with no unsteadiness or balance concerns. Per discussion with pt and daughter, pt is independent with functional mobility and ADLs, though pt's daughter assists with rides to appointments, grocery shopping, and other activities outside of the home. Pt with no IP PT needs at this time; PT to defer and complete orders. Please re-consult PT if needs arise.

## 2018-09-19 NOTE — PLAN OF CARE
"Problem: Patient Care Overview  Goal: Plan of Care/Patient Progress Review  Outcome: No Change  /84 (BP Location: Left arm)  Pulse 63  Temp 98.3  F (36.8  C) (Oral)  Resp 16  Ht 1.626 m (5' 4\")  Wt 61.6 kg (135 lb 14.4 oz)  SpO2 98%  BMI 23.33 kg/m2. Vitals stable, on RA.  Pt. had no c/o's pain or nausea.  Pt. oliguric, 4 loose brown/watery stools. No blood seen in stools.  Pt. is currently taking Golytely for bowel prep for colonoscopy today. Pt. up ad rosalinda.  Left PIV saline locked. Hgb 7.9 at 0146 & on-call aware with no orders to transfuse. Previous Hgb 7.8 last van.  Hgb q6 hours ordered.  Pt. slept fair between cares. Continue to monitor & treat per POC & notify team with change in status.      "

## 2018-09-19 NOTE — PROGRESS NOTES
[facility], [place]   Internal Medicine Daily Note          Assessment and Plan:       Rachele Martínez is a 78 year old female with history of GIB 2/2 AVM, ESRD on HD, HTN, HCV (prevsiouly treated), compensated cirrhosis, HLD, depression, and anxiety who was admitted with GIB 2/2 AVM. Of note, patient recently discharged from [facility] 9/16/2018 with same issue.     BRBPR, known AVMs: Admitted 9/18 with BRBPR. Known AVMs throughout GI tract and recurrent bleeding. Last discharge 9/16 for same issue. EGD at OSH 8/12 multiple bleeding angiodysplastic lesions in stomach (treated with APC), multiple recently bleeding angiodysplastic lesions in duodenum (treated with APC). Repeat EGD at OSH 8/14 noted a single recently bleeding angiodysplastic lesion in the stomach (treated with APC), non-bleeding ulcers from prior APC. EGD with push enteroscopy deferred 9/16/2018 due to stable hgb, prior need for heavy sedation. Hgb 8.8 (8.3 9/16). VSS  - GI consulted. Patient  Underwent colonoscopy and push enteroscopy.Used ERBE to cauterize AVMs found in the gastric and duodenal region using 2 effect, 20-40 mcmillan with a flow of 0.4-0.8. Detailed report awaited   - IV PPI  - Hold Octreotide per d/w GI  - Type and screen, hgb q8h, transfuse for <7     ESRD on HD: HD MWF via R [facility]G at [facility]. Last run 9/17. Brynn stable  - Nephrology consulted, Will dialyze today  - Daily weights, I&Os  - Continue PTA meds     Chronic anemia, blood loss anemia:   Hgb 7-8 since 4/2016, was up to 11.2 5/2018. Ferritin 229, iron 55, TIBC 279, % sat 20, transferrin 241, B12 1108, folate >100, retic 10.4%  - Of note, has h/o transfusion reactions per H&P 9/14 and received IV Benadryl 25 mg prior to   Hgb stable at 7.8 today         Other Medical Conditions:  Compensated HCV cirrhosis: S/p treatment with Harvoni, achieved SVR 7/2015. Follows OP with Dr. Barnett. MELD 22 (Cr with dialysis). Trend labs   HTN: No PTA meds. BP  "stable  HLD: Continue PTA statin  Depression: Stable. Continue PTA Zoloft       Consulting teams: GI, Nephrology   Code status: Full   DVT Prophylaxis: PCD  Gastric prophylaxis: PPI   Diet: Advance to clears   Disposition: To be determined       Patient seen and examined with RN         Interval History:   Progress notes in the last 24 hrs by MDT team has been reviewed.  Met patient before procedure and writing notes after seeing procedure details  This morning, patient was completing her Golytely preparation   Denies chest pain/ SOB   No fevers or chills       Review of Systems:   A comprehensive review of systems was performed and found to be negative except: Those that are outlined in interval history              Medications:   I have reviewed this patient's current medications which are outlined in the \"current medication\" section of EPIC             Physical Exam:   Vitals were reviewed  Temp: 98.6  F (37  C) Temp src: Oral BP: 139/64 Pulse: 63 Heart Rate: 60 Resp: 10 SpO2: 98 % O2 Device: None (Room air) Oxygen Delivery: 2 LPM  Constitutional:   awake, alert, cooperative, no apparent distress, and appears stated age     Lungs:   No increased work of breathing, good air exchange, clear to auscultation bilaterally, no crackles or wheezing     Cardiovascular:   Normal apical impulse, regular rate and rhythm, normal S1 and S2, no S3 or S4, and no murmur noted     Abdomen:   No scars, normal bowel sounds, soft, non-distended, non-tender, no masses palpated, no hepatosplenomegally     Musculoskeletal:   Mild lower extremity pitting edema      Neurologic:   Awake, alert, oriented to name, place and time.  Cranial nerves II-XII are grossly intact.              Data:     Most recent labs have been evaluated and relevant labs are outlined below :    BMP    Recent Labs  Lab 09/19/18  0614 09/18/18  1240 09/16/18  1015 09/16/18  0501 09/14/18  1159    137  --  138 139   POTASSIUM 4.8 4.3 5.2 5.6* 4.7   CHLORIDE 98 " 99  --  102 99   BELGICA 8.2* 8.4*  --  8.4* 8.2*   CO2 28 30  --  28 29   BUN 35* 27  --  21 48*   CR 7.18* 5.96*  --  4.77* 8.26*   GLC 90 98  --  92 121*     CBC  Recent Labs  Lab 09/19/18  0614  09/18/18  1240 09/16/18  0501  09/15/18  0648   WBC 5.5  --  7.3 6.0  --  8.7   RBC 2.36*  --  2.69* 2.55*  --  1.99*   HGB 7.8*  < > 8.8* 8.3*  < > 6.8*   HCT 25.2*  --  28.5* 27.4*  --  21.6*   *  --  106* 108*  --  109*   MCH 33.1*  --  32.7 32.5  --  34.2*   MCHC 31.0*  --  30.9* 30.3*  --  31.5   RDW 16.7*  --  17.1* 19.0*  --  17.8*     --  230 188  --  203   < > = values in this interval not displayed.  INR    Recent Labs  Lab 09/19/18  0614 09/18/18  1240 09/14/18  1159   INR 1.22* 1.12 1.20*     LFTs    Recent Labs  Lab 09/18/18  1240 09/14/18  1159   ALKPHOS 120 132   AST 29 26   ALT 24 19   BILITOTAL 0.5 0.3   PROTTOTAL 8.1 7.2   ALBUMIN 3.9 3.5      PANC    Recent Labs  Lab 09/18/18  1240   LIPASE 165         Dr BINA Amin MD  Hospitalist ( Internal medicine)  Pager: [phone]

## 2018-09-19 NOTE — PLAN OF CARE
Problem: Patient Care Overview  Goal: Plan of Care/Patient Progress Review  Outcome: Therapy, progress toward functional goals is gradual  (5512-0857). AVSS on room air. Patient had BM x4 after drinking Golytely clear, watery stools. Tylenol given x1 for abdominal pain. Denied nausea. Patient went down to endo at 1100. Received report from endoscopy and patient tolerated procedure well. Patient to go to dialysis and then will return to . Report given to dialysis RN.

## 2018-09-19 NOTE — OR NURSING
Procedure: Small Bowel Enteroscopy to control bleed   Sedation: Conscious sedation (4 mg versed, 175 mcg fentanyl)  O2: 2 LPM NC, room air post  Tolerated: well. VS stable during and post procedure. ETCO2 monitored continuously. Not c/o abdominal or chest pain  Specimens: NO specimen jar(s) sent to lab  Report: Given to Alyssa LANDERS; #[id number]  Plan of care to perform Colonoscopy  Other: Used ERBE to cauterize AVMs found in the gastric and duodenal region using 2 effect, 20-40 mcmillan with a flow of 0.4-0.8. Placed grounding pad on right flank. Upon removal skin appeared clean, dry and intact.

## 2018-09-19 NOTE — PROVIDER NOTIFICATION
Notified Resident at 2106 PM regarding lab results.      Spoke with: DAVID Reeves MD    Orders were obtained.    Comments: MD notified of pt's Hgb 7.8 from 8.8 at 2004 this van. Vitals stable & pt. states she hasn't had BM since arriving from ED today.  MD ordered to have next Hgb at 2am & morning labs at 6am tomorrow.

## 2018-09-19 NOTE — UTILIZATION REVIEW
"    Admission Status; Secondary Review Determination         Under the authority of the Utilization Management Committee, the utilization review process indicated a secondary review on the above patient.  The review outcome is based on review of the medical records, discussions with staff, and applying clinical experience noted on the date of the review.        ()      Inpatient Status Appropriate - This patient's medical care is consistent with medical management for inpatient care and reasonable inpatient medical practice.      (x) Observation Status Appropriate - This patient does not meet hospital inpatient criteria and is placed in observation status. If this patient's primary payer is Medicare and was admitted as an inpatient, Condition Code 44 should be used and patient status changed to \"observation\".   () Admission Status NOT Appropriate - This patient's medical care is not consistent with medical management for Inpatient or Observation Status.          RATIONALE FOR DETERMINATION     \"78 year old female with history of GIB 2/2 AVM, ESRD on HD, HTN, HCV (prevsiouly treated), compensated cirrhosis, HLD, depression, and anxiety who was admitted with GIB 2/2 AVM. Of note, patient recently discharged from [facility] 9/16/2018 with same issue.\"    Pt has been seen by GI and plan is for colonoscopy. Her H/H is stable for now and she is hemodynamically stable. Pt is also going to get HD today. I discussed care with Dr Amin and for now she meets observation criteria.    The severity of illness, intensity of service provided, expected LOS make it appropriate for hospital observation.        The information on this document is developed by the utilization review team in order for the business office to ensure compliance.  This only denotes the appropriateness of proper admission status and does not reflect the quality of care rendered.         The definitions of Inpatient Status and Observation Status used in making " the determination above are those provided in the CMS Coverage Manual, Chapter 1 and Chapter 6, section 70.4.      Sincerely,     RHIANNON UGARTE MD    Physician Advisor  Utilization Review/ Case Management  [facility].

## 2018-09-19 NOTE — OR NURSING
Procedure: Colonoscopy  Sedation: Conscious sedation (2 mg versed, 75 mcg fentanyl)  O2: 2 LPM NC, room air post  Tolerated: Well with incremental doses   VS stable during and post procedure. ETCO2 monitored continuously. Not c/o abdominal or chest pain  Specimens: NO specimen jar(s) sent to lab  Report: Given to Alyssa staff RN; #[id number]  Pt to recovery area in stable condition, accompanied by RN  Other: Patient escorted to and from endoscopy clinic by patient transport on a litter.

## 2018-09-19 NOTE — PROGRESS NOTES
GASTROENTEROLOGY PROGRESS NOTE       Patient Summary   Rachele Martínez is a 78 year old female with a past medical history of Hep C cirrhosis s/p Harvoni therapy (cleared hepatitis C), HTN, ESRD on hemodialysis (MWF), s/p right brachiobasilic graft on 8/14/18, anemia, hx of colonic adenomas s/p resection in 2015, recurrent GI bleed s/t AVMs throughout small bowels (on monthly Octreotide) and now presents with dark brown stools mixed bright red blood.       ASSESSMENT AND RECOMMENDATIONS:   #Bright red blood per rectum   #Chronic anemia   #History of GI bleed s/t AVMs throughout small bowels   Patient presents with recurrent hematochezia x3 that started on 9/18/18 at 3:45am, lower abdominal pain and weakness that has been present since last admission. Last Octreotide injection was on 8/28/18. EGD from 8/14/18 at [facility] showed a single recently bleeding angiodysplastic lesion in the stomach, treated by APC and a non bleeding gastric ulcers from prior APC therapy with red spot on edge of one treated with APC. Two days prior (8/12/18), she also had an EGD with APC therapy applied to multiple gastric and duodenal angiodysplastic lesions. Capsule endoscopy (10/26/15) revealed small bowel AVMs. Last colonoscopy was on 9/4/18, which showed five 1-4mm tubular adenoma polyps in ascending, transverse and sigmoid colon without evidence of GI bleed at that time. Hgb on discharge was 8.3 with plts of 188; and on this admission it is 8.8 with plts of 230, and INR is 1.20.      Patient is suspected to have a lower GI bleed s/t AVMs, dieulafoy lesion versus internal hemorrhoids. It could also be upper GI bleed s/t the known AVMs seen in the small bowels before. Plan is to proceed with colonoscopy and push enteroscopy today. However, stool needs to be clear.       Recommendations  --NPO for 2 hours prior to procedure   --Please continue with bowel prep till patient is having clear water   --Continue with 40mg of  "IV Protonix twice daily  --Monitor CBC and transfuse if hgb is less than 7  --Accurate documentation of output (color, characteristics and amount)  --Maintain 2 large bore IVs  --Continue with outpatient Octreotide injection, next dose is due on 9/25/18    Pt care plan discussed with Dr. Baires, GI staff physician. Thank you for involving us in this patient's care. Please do not hesitate to contact the GI service with any questions or concerns.    BAILEY [facility]  Department of Gastroenterology   P: [phone]  Subjective\events within the 24 hours:   Hgb is stable at 7.8, and stools are liquid brown. Patient feels exhausted from the bowel prep.     4 point ROS performed and negative unless noted above.           Medications:     Current Facility-Administered Medications   Medication     0.9% sodium chloride BOLUS     0.9% sodium chloride BOLUS     0.9% sodium chloride BOLUS     acetaminophen (TYLENOL) tablet 650 mg     albumin human 25 % injection 50 mL     albuterol (PROAIR HFA/PROVENTIL HFA/VENTOLIN HFA) 108 (90 Base) MCG/ACT inhaler 2 puff     atorvastatin (LIPITOR) tablet 20 mg     calcium acetate (PHOSLO) capsule 1,334 mg     gelatin absorbable (GELFOAM) sponge 1 each     melatonin tablet 1 mg     naloxone (NARCAN) injection 0.1-0.4 mg     NEPHROCAPS capsule 1 capsule     No heparin via hemodialysis machine     ondansetron (ZOFRAN-ODT) ODT tab 4 mg    Or     ondansetron (ZOFRAN) injection 4 mg     pantoprazole (PROTONIX) 40 mg IV push injection     polyethylene glycol (GoLYTELY/NuLYTELY) suspension 4,000 mL     polyethylene glycol (MIRALAX/GLYCOLAX) Packet 17 g     senna-docusate (SENOKOT-S;PERICOLACE) 8.6-50 MG per tablet 1 tablet    Or     senna-docusate (SENOKOT-S;PERICOLACE) 8.6-50 MG per tablet 2 tablet     sertraline (ZOLOFT) tablet 100 mg        Physical Exam   Blood pressure 132/48, pulse 63, temperature 98.6  F (37  C), temperature source Oral, resp. rate 12, height 1.626 m (5' 4\"), " weight 60.9 kg (134 lb 3.2 oz), SpO2 100 %, not currently breastfeeding.  Constitutional: cooperative, pleasant, not dyspneic/diaphoretic, no acute distress  Eyes: Sclera anicteric/injected  Ears/nose/mouth/throat: Normal oropharynx without ulcers or exudate, mucus membranes moist, hearing intact  Neck: supple, thyroid normal size  CV: RRR. +1 pit edema in LE bilaterally   Respiratory: Unlabored breathing. CTA bilaterally   Lymph: No axillary, submandibular, supraclavicular or inguinal lymphadenopathy  Abd: Nondistended, +bs, no hepatosplenomegaly, no peritoneal signs. Generalized tenderness  Skin: warm, perfused, no jaundice  Neuro: AAO x 3, No asterixis  Psych: Normal affect  MSK: Normal gait    Data   Current Labs  CBC  Recent Labs  Lab 09/19/18  0614 09/19/18  0146 09/18/18  2004 09/18/18  1240 09/16/18  0501  09/15/18  0648   WBC 5.5  --   --  7.3 6.0  --  8.7   RBC 2.36*  --   --  2.69* 2.55*  --  1.99*   HGB 7.8* 7.9* 7.8* 8.8* 8.3*  < > 6.8*   HCT 25.2*  --   --  28.5* 27.4*  --  21.6*   *  --   --  106* 108*  --  109*   MCH 33.1*  --   --  32.7 32.5  --  34.2*   MCHC 31.0*  --   --  30.9* 30.3*  --  31.5   RDW 16.7*  --   --  17.1* 19.0*  --  17.8*     --   --  230 188  --  203   < > = values in this interval not displayed.  BMP  Recent Labs  Lab 09/19/18  0614 09/18/18  1240 09/16/18  1015 09/16/18  0501 09/14/18  1159    137  --  138 139   POTASSIUM 4.8 4.3 5.2 5.6* 4.7   CHLORIDE 98 99  --  102 99   CO2 28 30  --  28 29   ANIONGAP 12 8  --  8 11   GLC 90 98  --  92 121*   BUN 35* 27  --  21 48*   CR 7.18* 5.96*  --  4.77* 8.26*   GFRESTIMATED 6* 7*  --  9* 5*   GFRESTBLACK 7* 8*  --  11* 6*   BELGICA 8.2* 8.4*  --  8.4* 8.2*   PHOS  --   --   --   --  3.6      INR  Recent Labs  Lab 09/19/18  0614 09/18/18  1240 09/14/18  1159   INR 1.22* 1.12 1.20*     Liver panel  Recent Labs  Lab 09/18/18  1240 09/14/18  1159   PROTTOTAL 8.1 7.2   ALBUMIN 3.9 3.5   BILITOTAL 0.5 0.3   ALKPHOS 120 132    AST 29 26   ALT 24 19          History of Present Illness:   Rachele Martínez is a 78 year old female with a history of Hep C cirrhosis s/p Harvoni therapy (cleared hepatitis C), HTN, ESRD on hemodialysis (MWF), s/p right brachiobasilic graft on 8/14/18, anemia, hx of colonic adenomas s/p resection in 2015, recurrent GI bleed s/t AVMs throughout small bowels and now presents with black stools and bright red blood per rectum.     Patient was recently admitted for GI bleed that resolved spontaneously and the plan was to obtain colonoscopy and push enteroscopy as an outpatient. However, patient returned d/t recurrent hematochezia x3 that started on 9/18/18 at 3:45am, lower abdominal pain and weakness that has been present since last admission. Last Octreotide injection was on 8/28/18, and patient is scheduled for the next one on 9/27/18. She does have dark brown stools but not black. Patient denies n/v, hematemesis, dysphagia, odynophagia, fever, diarrhea, or constipation. No tobacco, alcohol or NSAID use. She does smoke marijuana when her appetite is ot great.

## 2018-09-19 NOTE — PLAN OF CARE
Problem: Patient Care Overview  Goal: Plan of Care/Patient Progress Review  OT 7A - Orders acknowledged and received. Per interdisciplinary collaboration, pt observed to be IND with ADLs and pt reports no concerns with ADL/IADLs. Pt requires some assistance with driving and completing grocery shopping but pt reports this is baseline for her. OT will be deferring at this time. Will re-evaluate if need arises.

## 2018-09-19 NOTE — TELEPHONE ENCOUNTER
LATE DOCUMENTATION from 09/18/2018:    I spoke to Charla yesterday and got Rcahele scheduled with Dr. Crawley for a procedure.   Patient's daughter informed me that pt was currently in the ED, I advised that they should have Dr. Crawley paged as he goes on service today (09/19/2018).  I see that patient is scheduled with Dr. Baires for today.   Patient on schedule with Dr. Crawley on 10/23/2018.  All instructions will be mailed to patient at her address listed in EPIC, it was confirmed during my call yesterday.   I will send a message to RNCC to F/U on plan post discharge to see if procedure w/ Dr. Crawley is still needed.     SR 09/19/2018 @ 9780 A

## 2018-09-19 NOTE — PLAN OF CARE
Problem: Gastrointestinal Condition Comorbidity  Intervention: Monitor/Manage Gastrointestinal Function/Elimination  Pt admitted to 7A from ED around 1800 w/ GIB, abdominal pain, and nausea. AVSS on RA. A&Ox4. Denies pain and nausea. PIV saline locked, protonix gtt d/c'd, received Protonix IV push x1. Clear liquid diet ordered until 0000, then NPO for colonoscopy. Golytlely prep ordered from pharmacy, will begin once arrives to unit. No output since arrival to the unit. Up w/ SBA. Will continue to monitor and update team w/ changes.

## 2018-09-20 ENCOUNTER — PATIENT OUTREACH (OUTPATIENT)
Dept: CARE COORDINATION | Facility: CLINIC | Age: 78
End: 2018-09-20

## 2018-09-20 VITALS
OXYGEN SATURATION: 99 % | HEART RATE: 76 BPM | DIASTOLIC BLOOD PRESSURE: 63 MMHG | TEMPERATURE: 98.9 F | WEIGHT: 135.9 LBS | HEIGHT: 64 IN | RESPIRATION RATE: 16 BRPM | BODY MASS INDEX: 23.2 KG/M2 | SYSTOLIC BLOOD PRESSURE: 135 MMHG

## 2018-09-20 LAB
ERYTHROCYTE [DISTWIDTH] IN BLOOD BY AUTOMATED COUNT: 15.5 % (ref 10–15)
HBV SURFACE AB SERPL IA-ACNC: 8.64 M[IU]/ML
HBV SURFACE AG SERPL QL IA: NONREACTIVE
HCT VFR BLD AUTO: 24.3 % (ref 35–47)
HGB BLD-MCNC: 7.8 G/DL (ref 11.7–15.7)
MCH RBC QN AUTO: 33.1 PG (ref 26.5–33)
MCHC RBC AUTO-ENTMCNC: 32.1 G/DL (ref 31.5–36.5)
MCV RBC AUTO: 103 FL (ref 78–100)
PLATELET # BLD AUTO: 194 10E9/L (ref 150–450)
RBC # BLD AUTO: 2.36 10E12/L (ref 3.8–5.2)
WBC # BLD AUTO: 6 10E9/L (ref 4–11)

## 2018-09-20 PROCEDURE — 25000128 H RX IP 250 OP 636: Performed by: PHYSICIAN ASSISTANT

## 2018-09-20 PROCEDURE — 25000132 ZZH RX MED GY IP 250 OP 250 PS 637: Mod: GY | Performed by: PHYSICIAN ASSISTANT

## 2018-09-20 PROCEDURE — A9270 NON-COVERED ITEM OR SERVICE: HCPCS | Mod: GY | Performed by: PHYSICIAN ASSISTANT

## 2018-09-20 PROCEDURE — C9113 INJ PANTOPRAZOLE SODIUM, VIA: HCPCS | Performed by: PHYSICIAN ASSISTANT

## 2018-09-20 PROCEDURE — G0378 HOSPITAL OBSERVATION PER HR: HCPCS

## 2018-09-20 PROCEDURE — 96376 TX/PRO/DX INJ SAME DRUG ADON: CPT

## 2018-09-20 PROCEDURE — 85027 COMPLETE CBC AUTOMATED: CPT | Performed by: INTERNAL MEDICINE

## 2018-09-20 PROCEDURE — 99217 ZZC OBSERVATION CARE DISCHARGE: CPT | Performed by: INTERNAL MEDICINE

## 2018-09-20 RX ORDER — PANTOPRAZOLE SODIUM 40 MG/1
40 TABLET, DELAYED RELEASE ORAL
Status: DISCONTINUED | OUTPATIENT
Start: 2018-09-20 | End: 2018-09-20 | Stop reason: HOSPADM

## 2018-09-20 RX ORDER — PANTOPRAZOLE SODIUM 20 MG/1
20 TABLET, DELAYED RELEASE ORAL 2 TIMES DAILY
Qty: 60 TABLET | Refills: 1 | Status: SHIPPED | OUTPATIENT
Start: 2018-09-20 | End: 2018-09-20

## 2018-09-20 RX ORDER — PANTOPRAZOLE SODIUM 20 MG/1
20 TABLET, DELAYED RELEASE ORAL 2 TIMES DAILY
Qty: 60 TABLET | Refills: 2 | Status: SHIPPED | OUTPATIENT
Start: 2018-09-20 | End: 2019-01-18

## 2018-09-20 RX ADMIN — ACETAMINOPHEN 650 MG: 325 TABLET, FILM COATED ORAL at 01:08

## 2018-09-20 RX ADMIN — Medication 1 CAPSULE: at 08:35

## 2018-09-20 RX ADMIN — ATORVASTATIN CALCIUM 20 MG: 20 TABLET, FILM COATED ORAL at 08:35

## 2018-09-20 RX ADMIN — PANTOPRAZOLE SODIUM 40 MG: 40 INJECTION, POWDER, FOR SOLUTION INTRAVENOUS at 05:56

## 2018-09-20 RX ADMIN — SERTRALINE HYDROCHLORIDE 100 MG: 100 TABLET ORAL at 08:36

## 2018-09-20 ASSESSMENT — PAIN DESCRIPTION - DESCRIPTORS: DESCRIPTORS: ACHING

## 2018-09-20 NOTE — PROGRESS NOTES
HEMODIALYSIS TREATMENT NOTE    Date: 9/19/2018  Time: 7:58 PM    Data:  Pre Wt: 60.9 kg (134 lb 4.2 oz)   Desired Wt: 60.9 kg   Post Wt: 60.9 kg (134 lb 4.2 oz)  Weight gain: 0 kg   Weight change: 0 kg  Ultrafiltration - Post Run Net Total Removed (mL): 0 mL  Ultrafiltration - Post Run Net Total Gain (mL): 0 mL  Vascular Access Status: Yes, secured and visible  Dialyzer Rinse: Streaked  Total Blood Volume Processed: 89.3  Total Dialysis (Treatment) Time:   3.5 hours    Lab:   No    Interventions:Assessment:  Pt came to dialysis from Endoscopy. Pt toterated tx well. No fluid obtained this tx. AVF utilized without complication. Thrill and bruit present. VSS throughout tx. Sites held for 10 minutes to achieve homeostasis. Hand off report given to primary nurse.     Plan:    Per nephrology team.

## 2018-09-20 NOTE — PLAN OF CARE
Problem: Patient Care Overview  Goal: Plan of Care/Patient Progress Review  Outcome: Improving  Patient plans to discharge today. Stable hemodynamically but C/O IV causing pain every time it's used. Given Tylenol for pain. Continue to monitor and treat per plan of care.

## 2018-09-20 NOTE — DISCHARGE SUMMARY
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      Sulfa Drugs Hives     Spironolactone Nausea               Discharge Medications:     Current Discharge Medication List      CONTINUE these medications which have NOT CHANGED    Details   atorvastatin (LIPITOR) 20 MG tablet Take 1 tablet (20 mg) by mouth daily  Qty: 90 tablet, Refills: 3    Associated Diagnoses: Cognitive impairment      Calcium Acetate, Phos Binder, 667 MG CAPS TAKE 2 CAPSULE BY MOUTH THREE TIMES A DAY WITH MEALS  Refills: 8      Multiple Vitamins-Minerals (PRORENAL + D) TABS Take 1 tablet by mouth daily      octreotide (SANDOSTATIN LAR) 20 MG injection Inject 20 mg into the muscle every 28 days      pantoprazole (PROTONIX) 20 MG EC tablet Take 20 mg by mouth 2 times daily      sertraline (ZOLOFT) 50 MG tablet Take 2 tablets (100 mg) by mouth daily  Qty: 180 tablet, Refills: 3    Associated Diagnoses: Itching      albuterol (PROAIR HFA) 108 (90 Base) MCG/ACT inhaler Inhale 2 puffs into the lungs every 6 hours as needed for  shortness of breath / dyspnea or wheezing  Qty: 1 Inhaler, Refills: 3    Associated Diagnoses: Cough      order for DME Equipment being ordered: 4 wheel walker with seat.  Qty: 1 Device, Refills: 0    Associated Diagnoses: Disorder of bone and cartilage; Arthralgia, unspecified joint; Tendency to fall      polyethylene glycol 3350 POWD Take 17 g by mouth daily  Qty: 1530 g, Refills: 3    Associated Diagnoses: Slow transit constipation                   Consultations:   Consultation during this admission received from gastroenterology and nephrology          Brief History of Presenting Illness:   Rachele Martínez is a 78 year old female with history of GIB 2/2 AVM, ESRD on HD, HTN, HCV (prevsiouly treated), compensated cirrhosis, HLD, depression, and anxiety who was admitted with GIB on 9/18 secondary to AVM. Of note, patient recently discharged from [facility] 9/16/2018 with same issue.          Hospital Course:   #Bright red blood per rectum   #Chronic anemia   #History of GI bleed s/t AVMs throughout small bowels   Patient presents with recurrent hematochezia x3 that started on 9/18/18 at 3:45am, lower abdominal pain and weakness that has been present since last admission. Last Octreotide injection was on 8/28/18. EGD from 8/14/18 at [facility] showed a single recently bleeding angiodysplastic lesion in the stomach, treated by APC and a non bleeding gastric ulcers from prior APC therapy with red spot on edge of one treated with APC. Two days prior (8/12/18), she also had an EGD with APC therapy applied to multiple gastric and duodenal angiodysplastic lesions. Capsule endoscopy (10/26/15) revealed small bowel AVMs. Last colonoscopy was on 9/4/18, which showed five 1-4mm tubular adenoma polyps in ascending, transverse and sigmoid colon without evidence of GI bleed at that time. Hgb on discharge was 8.3 with plts of 188; and on this admission it is 8.8 with plts of 230, and INR is 1.20.       Push  "enteroscopy and colonoscopy were completed on 9/19/18, AVMs in the small bowel and colon has been treated, for detailed report please see procedure note.  No further bleeds noted       Recommendations  --Diet as tolerated   --Continue with 20mg of Protonix twice daily  ( reduction from previous 40 mg B ID)   --Continue with outpatient Octreotide injection, next dose is due on 9/28/18  --Daily Miralax as needed to prevent constipation  --Outpatient f/u with Dr. Crawley, GI will arrange     ESRD on HD: HD MWF via R AVG at [facility].Lytes stable  - Nephrology consulted. Patient dialyzed on 9/19    - Continue PTA meds      Chronic anemia, blood loss anemia:   Hgb 7-8 since 4/2016, was up to 11.2 5/2018. Ferritin 229, iron 55, TIBC 279, % sat 20, transferrin 241, B12 1108, folate >100, retic 10.4%  - Of note, has h/o transfusion reactions per H&P 9/14 and received IV Benadryl 25 mg prior to   Hgb stable at 7.8 today           Other Medical Conditions:  Compensated HCV cirrhosis: S/p treatment with Harvoni, achieved SVR 7/2015. Follows OP with Dr. Barnett. MELD 22 (Cr with dialysis). Trend labs   HTN: No PTA meds. BP stable  HLD: Continue PTA statin  Depression: Stable. Continue PTA Zoloft             PROGRESS ON DISCHARGE DAY   Feels well today  No further bleeding  Denies chest pain/ SOB   No fevers or chills   /63 (BP Location: Left arm)  Pulse 76  Temp 98.9  F (37.2  C) (Oral)  Resp 16  Ht 1.626 m (5' 4\")  Wt 61.6 kg (135 lb 14.4 oz)  SpO2 99%  BMI 23.33 kg/m2  CVS: Normal Heart sounds   RS: Clear breath sounds bilaterally   Abdomen: Soft and non tender. Normal bowel sounds.   Neuro: Alert and orientated X 3                  Pending Tests at Discharge:     Unresulted Labs Ordered in the Past 30 Days of this Admission     Date and Time Order Name Status Description    9/19/2018 1633 Hepatitis B Surface Antibody In process                  Discharge instructions and Follow up:       Discharge " Procedure Orders  Reason for your hospital stay   Order Comments: Upper GI bleed     Adult [facility] Follow-up and recommended labs and tests   Order Comments: GI team will contact you with a follow up appointment.  Please follow up with your PCP in 1-2 weeks as a follow up visit     Appointments on [place] and/or [facility] (with [facility] or [facility] provider or service). Call [phone] if you haven't heard regarding these appointments within 7 days of discharge.     Activity   Order Comments: Your activity upon discharge: activity as tolerated   Order Specific Question Answer Comments   Is discharge order? Yes      Discharge Instructions   Order Comments: Daily Miralax to avoid constipation  Twice daily pantoprazole till you follow up with GI team again  If you have further Bleeds, please seek emergent medical attention     Full Code   Order Specific Question Answer Comments   Code status determined by: Discussion with patient/legal decision maker      Diet   Order Comments: Follow this diet upon discharge: Orders Placed This Encounter     Advance Diet as Tolerated: Regular Diet Adult   Order Specific Question Answer Comments   Is discharge order? Yes                Discharge Disposition:   Discharged to Home           Time spent : 35  mts total with patient and coordination of care       Dr BINA Amin MD  Hospitalist ( Internal medicine)  Pager: [phone]

## 2018-09-20 NOTE — PROGRESS NOTES
GASTROENTEROLOGY PROGRESS NOTE       Patient Summary   Rachele Martínez is a 78 year old female with a past medical history of Hep C cirrhosis s/p Harvoni therapy (cleared hepatitis C), HTN, ESRD on hemodialysis (MWF), s/p right brachiobasilic graft on 8/14/18, anemia, hx of colonic adenomas s/p resection in 2015, recurrent GI bleed s/t AVMs throughout small bowels (on monthly Octreotide) and now presents with dark brown stools mixed bright red blood.       ASSESSMENT AND RECOMMENDATIONS:   #Bright red blood per rectum   #Chronic anemia   #History of GI bleed s/t AVMs throughout small bowels   Patient presents with recurrent hematochezia x3 that started on 9/18/18 at 3:45am, lower abdominal pain and weakness that has been present since last admission. Last Octreotide injection was on 8/28/18. EGD from 8/14/18 at [facility] showed a single recently bleeding angiodysplastic lesion in the stomach, treated by APC and a non bleeding gastric ulcers from prior APC therapy with red spot on edge of one treated with APC. Two days prior (8/12/18), she also had an EGD with APC therapy applied to multiple gastric and duodenal angiodysplastic lesions. Capsule endoscopy (10/26/15) revealed small bowel AVMs. Last colonoscopy was on 9/4/18, which showed five 1-4mm tubular adenoma polyps in ascending, transverse and sigmoid colon without evidence of GI bleed at that time. Hgb on discharge was 8.3 with plts of 188; and on this admission it is 8.8 with plts of 230, and INR is 1.20.      Push enteroscopy and colonoscopy were completed on 9/19/18, AVMs in the small bowel and colon has been treated, for detailed report please see procedure note. Today patient denies bloody bowel movement and hgb is stable at 7.8. Plan is for patient to follow up with GI clinic.       Recommendations  --Diet as tolerated   --Continue with 20mg of Protonix twice daily   --Continue with outpatient Octreotide injection, next dose is due on  "9/28/18  --Daily Miralax as needed to prevent constipation  --Outpatient f/u with Dr. Crawley, GI will arrange   --GI will sign off patient to primary team     Pt care plan discussed with Dr. Baires, GI staff physician. Thank you for involving us in this patient's care. Please do not hesitate to contact the GI service with any questions or concerns.    BAILEY [facility]  Department of Gastroenterology   P: [phone]  Subjective\events within the 24 hours:   No acute event overnight.     4 point ROS performed and negative unless noted above.           Medications:     Current Facility-Administered Medications   Medication     acetaminophen (TYLENOL) tablet 650 mg     albuterol (PROAIR HFA/PROVENTIL HFA/VENTOLIN HFA) 108 (90 Base) MCG/ACT inhaler 2 puff     atorvastatin (LIPITOR) tablet 20 mg     calcium acetate (PHOSLO) capsule 1,334 mg     melatonin tablet 1 mg     naloxone (NARCAN) injection 0.1-0.4 mg     NEPHROCAPS capsule 1 capsule     ondansetron (ZOFRAN-ODT) ODT tab 4 mg    Or     ondansetron (ZOFRAN) injection 4 mg     pantoprazole (PROTONIX) EC tablet 40 mg     polyethylene glycol (GoLYTELY/NuLYTELY) suspension 4,000 mL     polyethylene glycol (MIRALAX/GLYCOLAX) Packet 17 g     senna-docusate (SENOKOT-S;PERICOLACE) 8.6-50 MG per tablet 1 tablet    Or     senna-docusate (SENOKOT-S;PERICOLACE) 8.6-50 MG per tablet 2 tablet     sertraline (ZOLOFT) tablet 100 mg        Physical Exam   Blood pressure 135/63, pulse 76, temperature 98.9  F (37.2  C), temperature source Oral, resp. rate 16, height 1.626 m (5' 4\"), weight 61.6 kg (135 lb 14.4 oz), SpO2 99 %, not currently breastfeeding.  Constitutional: cooperative, pleasant, not dyspneic/diaphoretic, no acute distress  Eyes: Sclera anicteric/injected  Ears/nose/mouth/throat: Normal oropharynx without ulcers or exudate, mucus membranes moist, hearing intact  Neck: supple, thyroid normal size  CV: RRR. +1 pit edema in LE bilaterally   Respiratory: " Unlabored breathing. CTA bilaterally   Lymph: No axillary, submandibular, supraclavicular or inguinal lymphadenopathy  Abd: Nondistended, +bs, no hepatosplenomegaly, no peritoneal signs. Generalized tenderness  Skin: warm, perfused, no jaundice  Neuro: AAO x 3, No asterixis  Psych: Normal affect  MSK: Normal gait    Data   Current Labs  CBC    Recent Labs  Lab 09/20/18  0650 09/19/18  0614 09/19/18  0146 09/18/18  2004 09/18/18  1240 09/16/18  0501   WBC 6.0 5.5  --   --  7.3 6.0   RBC 2.36* 2.36*  --   --  2.69* 2.55*   HGB 7.8* 7.8* 7.9* 7.8* 8.8* 8.3*   HCT 24.3* 25.2*  --   --  28.5* 27.4*   * 107*  --   --  106* 108*   MCH 33.1* 33.1*  --   --  32.7 32.5   MCHC 32.1 31.0*  --   --  30.9* 30.3*   RDW 15.5* 16.7*  --   --  17.1* 19.0*    207  --   --  230 188     BMP    Recent Labs  Lab 09/19/18  0614 09/18/18  1240 09/16/18  1015 09/16/18  0501 09/14/18  1159    137  --  138 139   POTASSIUM 4.8 4.3 5.2 5.6* 4.7   CHLORIDE 98 99  --  102 99   CO2 28 30  --  28 29   ANIONGAP 12 8  --  8 11   GLC 90 98  --  92 121*   BUN 35* 27  --  21 48*   CR 7.18* 5.96*  --  4.77* 8.26*   GFRESTIMATED 6* 7*  --  9* 5*   GFRESTBLACK 7* 8*  --  11* 6*   BELGICA 8.2* 8.4*  --  8.4* 8.2*   PHOS  --   --   --   --  3.6      INR    Recent Labs  Lab 09/19/18  0614 09/18/18  1240 09/14/18  1159   INR 1.22* 1.12 1.20*     Liver panel    Recent Labs  Lab 09/18/18  1240 09/14/18  1159   PROTTOTAL 8.1 7.2   ALBUMIN 3.9 3.5   BILITOTAL 0.5 0.3   ALKPHOS 120 132   AST 29 26   ALT 24 19          History of Present Illness:   Rachele Martínez is a 78 year old female with a history of Hep C cirrhosis s/p Harvoni therapy (cleared hepatitis C), HTN, ESRD on hemodialysis (MWF), s/p right brachiobasilic graft on 8/14/18, anemia, hx of colonic adenomas s/p resection in 2015, recurrent GI bleed s/t AVMs throughout small bowels and now presents with black stools and bright red blood per rectum.     Patient was recently admitted for GI  bleed that resolved spontaneously and the plan was to obtain colonoscopy and push enteroscopy as an outpatient. However, patient returned d/t recurrent hematochezia x3 that started on 9/18/18 at 3:45am, lower abdominal pain and weakness that has been present since last admission. Last Octreotide injection was on 8/28/18, and patient is scheduled for the next one on 9/27/18. She does have dark brown stools but not black. Patient denies n/v, hematemesis, dysphagia, odynophagia, fever, diarrhea, or constipation. No tobacco, alcohol or NSAID use. She does smoke marijuana when her appetite is ot great.

## 2018-09-20 NOTE — PLAN OF CARE
Problem: Patient Care Overview  Goal: Plan of Care/Patient Progress Review  Outcome: Therapy, progress toward functional goals is gradual  OBSERVATION GOALS:     1) Resolution of bleeding: Met, patient does not have any symptoms of active bleeding. Hemoglobin stable.   2). Completion of tests: Met.   3) Safe discharge: Pending

## 2018-09-20 NOTE — PLAN OF CARE
Problem: Patient Care Overview  Goal: Plan of Care/Patient Progress Review  Outcome: Therapy, progress toward functional goals is gradual  OBSERVATION GOALS:     1) Resolution of bleeding: Met. Patient does not have any symptoms of active bleeding. Denies blood in stool. Hemoglobin stable.   2). Completion of tests: Met.   3) Safe discharge: Met. Patient discharged home with daughter. Denies pain. Tolerating regular diet. Denies nausea. Discharge orders reviewed with patient and all questions answered. PIV removed. AVSS. All belongings accounted for. Patient to  prescription at [facility] [employer] [place], [place].

## 2018-09-20 NOTE — PLAN OF CARE
Pt returned from HD.Alert and orient.VSS.voided 50cc,Had a small stool,looked like dark red coffee grounds and tolerating a clear liquid diet.Observation sheet given to pt.Continue with POC.

## 2018-09-21 NOTE — PROGRESS NOTES
Patient has clinic visit within 24-48 hours of Discharge so no post DC follow up call is needed    HD MWF via R AVG at [facility].Brynn soilz  - Nephrology consulted. Patient dialyzed on 9/19

## 2018-09-24 LAB
COLONOSCOPY: NORMAL
PROVATION GI EXAM: NORMAL
PROVATION GI EXAM: NORMAL

## 2018-09-27 ENCOUNTER — INFUSION THERAPY VISIT (OUTPATIENT)
Dept: INFUSION THERAPY | Facility: CLINIC | Age: 78
End: 2018-09-27
Payer: MEDICARE

## 2018-09-27 VITALS
RESPIRATION RATE: 16 BRPM | WEIGHT: 137 LBS | TEMPERATURE: 98 F | SYSTOLIC BLOOD PRESSURE: 129 MMHG | OXYGEN SATURATION: 100 % | HEART RATE: 71 BPM | DIASTOLIC BLOOD PRESSURE: 77 MMHG | BODY MASS INDEX: 23.52 KG/M2

## 2018-09-27 DIAGNOSIS — K31.811 ANGIODYSPLASIA OF STOMACH AND DUODENUM WITH HEMORRHAGE: Primary | ICD-10-CM

## 2018-09-27 DIAGNOSIS — K55.20 AVM (ARTERIOVENOUS MALFORMATION) OF SMALL BOWEL, ACQUIRED: ICD-10-CM

## 2018-09-27 DIAGNOSIS — N18.6 ESRD (END STAGE RENAL DISEASE) ON DIALYSIS (H): ICD-10-CM

## 2018-09-27 DIAGNOSIS — K74.60 CIRRHOSIS OF LIVER WITHOUT ASCITES, UNSPECIFIED HEPATIC CIRRHOSIS TYPE (H): ICD-10-CM

## 2018-09-27 DIAGNOSIS — Z99.2 ESRD (END STAGE RENAL DISEASE) ON DIALYSIS (H): ICD-10-CM

## 2018-09-27 DIAGNOSIS — D50.0 IRON DEFICIENCY ANEMIA DUE TO CHRONIC BLOOD LOSS: ICD-10-CM

## 2018-09-27 PROCEDURE — 99207 ZZC NO CHARGE LOS: CPT

## 2018-09-27 PROCEDURE — 96372 THER/PROPH/DIAG INJ SC/IM: CPT | Performed by: INTERNAL MEDICINE

## 2018-09-27 RX ADMIN — OCTREOTIDE ACETATE 20 MG: KIT INTRAMUSCULAR at 11:47

## 2018-09-27 ASSESSMENT — PAIN SCALES - GENERAL: PAINLEVEL: NO PAIN (0)

## 2018-09-27 NOTE — MR AVS SNAPSHOT
After Visit Summary   9/27/2018    Rachele Martínez    MRN: [id number]           Patient Information     Date Of Birth          1940        Visit Information        Provider Department      9/27/2018 11:30 AM [place] [facility] Pending sale to [facility]        Today's Diagnoses     Angiodysplasia of stomach and duodenum with hemorrhage    -  1    ESRD (end stage renal disease) on dialysis (H)        Cirrhosis of liver without ascites, unspecified hepatic cirrhosis type (H)        AVM (arteriovenous malformation) of small bowel, acquired (H)        Iron deficiency anemia due to chronic blood loss           Follow-ups after your visit        Your next 10 appointments already scheduled     Oct 02, 2018 12:05 PM CDT   (Arrive by 11:50 AM)   Return Visit with Tiarra Marcelino MD   [facility] ([facility])    [address] [place] [address]  4th [facility] [id number] [phone]            Oct 23, 2018   Procedure with Rober Crawley MD   [facility], [place], Same Day Surgery (--)    [address] [facility] [id number] [phone]            Oct 25, 2018 11:30 AM CDT   Level O with [place] [facility] Pending sale to [facility] ([facility])    [id number] [place] [id number] [phone]            Nov 15, 2018  9:00 AM CST   Lab with UC LAB   [facility] ([facility])    [address] [place] [id number] [phone]            Nov 15, 2018  9:15 AM CST   US ABDOMEN COMPLETE with UCUS1   [facility] ([facility])    [address] [place] [id number] [phone]           How do I prepare for my exam? (Food and drink instructions) Adults: No eating, smoking, gum chewing or drinking for 8 hours before the exam. You may take medicine with a small sip of water.  Children: * Infants, breast-fed: may  have breast milk up to 2 hours before exam. * Infants, formula: may have bottle until 4 hours before exam. * Children 1-5 years: No food or drink for 4 hours before exam. * Children 6 -12 years: No food or drink for 6 hours before exam. * Children over 12 years: No food or drink for 8 hours before exam.  * J Tube Fed: No need to stop feedings.  What should I wear: Wear comfortable clothes.  How long does the exam take: Most ultrasounds take 30 to 60 minutes.  What should I bring: Bring a list of your medicines, including vitamins, minerals and over-the-counter drugs. It is safest to leave personal items at home.  Do I need a :  No  is needed.  What do I need to tell my doctor: Tell your doctor about any allergies you may have.  What should I do after the exam: No restrictions, You may resume normal activities.  What is this test: An ultrasound uses sound waves to make pictures of the body. Sound waves do not cause pain. The only discomfort may be the pressure of the wand against your skin or full bladder.  Who should I call with questions: If you have any questions, please call the Imaging Department where you will have your exam. Directions, parking instructions, and other information is available on our website, [email or web address].            Nov 23, 2018 11:30 AM CST   Level O with [address] ([facility])    [address] [place] [id number]   [phone]            Dec 04, 2018 10:40 AM CST   (Arrive by 10:25 AM)   RETURN INFLAMMATORY BOWEL DISEASE with Rupesh Lopez MD   [facility] ([facility])    [address] [place] [id number]   [phone]            Feb 12, 2019 11:45 AM CST   (Arrive by 11:30 AM)   Return General Liver with Andriy Barnett MD   [facility] Hepatology ([facility])    [address] [place] [address]  [address]  [facility] [address] [phone]              Who to contact     If you have questions or need follow up information about today's clinic visit or your schedule please contact [facility] directly at [phone].  Normal or non-critical lab and imaging results will be communicated to you by MyChart, letter or phone within 4 business days after the clinic has received the results. If you do not hear from us within 7 days, please contact the clinic through MyChart or phone. If you have a critical or abnormal lab result, we will notify you by phone as soon as possible.  Submit refill requests through [employer] or call your pharmacy and they will forward the refill request to us. Please allow 3 business days for your refill to be completed.          Additional Information About Your Visit        Care EveryWhere ID     This is your Care EveryWhere ID. This could be used by other organizations to access your [facility] medical records  [id number]        Your Vitals Were     Pulse Temperature Respirations Pulse Oximetry BMI (Body Mass Index)       71 98  F (36.7  C) (Oral) 16 100% 23.52 kg/m2        Blood Pressure from Last 3 Encounters:   09/27/18 129/77   09/20/18 135/63   09/16/18 158/89    Weight from Last 3 Encounters:   09/27/18 62.1 kg (137 lb)   09/20/18 61.6 kg (135 lb 14.4 oz)   09/15/18 62.6 kg (137 lb 14.4 oz)              Today, you had the following     No orders found for display       Primary Care Provider Office Phone # Fax #    Agnieszka Erica Rodriguez [phone]       [address] [place] [phone]  [facility] [address]        Equal Access to Services     JAMIE CARVAJAL : Hadbarbara Degroot, soy weiss, jaz ramirez. So [facility] [phone].    ATENCIÓN: Si habla español, tiene a ibarra disposición servicios gratuitos de asistencia lingüística. Llame al [phone].    We comply with applicable  federal civil rights laws and [place] laws. We do not discriminate on the basis of race, color, national origin, age, disability, sex, sexual orientation, or gender identity.            Thank you!     Thank you for choosing [facility]  for your care. Our goal is always to provide you with excellent care. Hearing back from our patients is one way we can continue to improve our services. Please take a few minutes to complete the written survey that you may receive in the mail after your visit with us. Thank you!             Your Updated Medication List - Protect others around you: Learn how to safely use, store and throw away your medicines at www.disposemymeds.org.          This list is accurate as of 9/27/18 11:58 AM.  Always use your most recent med list.                   Brand Name Dispense Instructions for use Diagnosis    atorvastatin 20 MG tablet    LIPITOR    90 tablet    Take 1 tablet (20 mg) by mouth daily    Cognitive impairment       Calcium Acetate (Phos Binder) 667 MG Caps      TAKE 2 CAPSULE BY MOUTH THREE TIMES A DAY WITH MEALS        octreotide 20 MG injection    sandoSTATIN LAR     Inject 20 mg into the muscle every 28 days        order for DME     1 Device    Equipment being ordered: 4 wheel walker with seat.    Disorder of bone and cartilage, Arthralgia, unspecified joint, Tendency to fall       pantoprazole 20 MG EC tablet    PROTONIX    60 tablet    Take 1 tablet (20 mg) by mouth 2 times daily Take by mouth 30-60 minutes before a meal.    Gastrointestinal hemorrhage, unspecified gastrointestinal hemorrhage type       polyethylene glycol 3350 Powd     1530 g    Take 17 g by mouth daily    Slow transit constipation       PROAIR  (90 Base) MCG/ACT inhaler   Generic drug:  albuterol     1 Inhaler    Inhale 2 puffs into the lungs every 6 hours as needed for shortness of breath / dyspnea or wheezing    Cough       PRORENAL + D Tabs      Take 1 tablet by mouth daily         sertraline 50 MG tablet    ZOLOFT    180 tablet    Take 2 tablets (100 mg) by mouth daily    Itching

## 2018-09-27 NOTE — PROGRESS NOTES
Infusion Nursing Note:  Rachele Martínez presents today for sandostatin.    Patient seen by provider today: No   present during visit today: Not Applicable.    Note: N/A.    Intravenous Access:  No Intravenous access/labs at this visit.    Treatment Conditions:  Not Applicable.      Post Infusion Assessment:  Patient tolerated injection without incident.    Dis[facility] Plan:   [facility]  Patient will return 10/25/2018 for next appointment.   Patient discharged in stable condition accompanied by: daughter.  Departure Mode: Ambulatory.    Agnieszka Triplett RN

## 2018-10-01 ENCOUNTER — TELEPHONE (OUTPATIENT)
Dept: GASTROENTEROLOGY | Facility: CLINIC | Age: 78
End: 2018-10-01

## 2018-10-01 NOTE — TELEPHONE ENCOUNTER
Charla is informed that Rachele is scheduled on 10/24/2018 at 4 PM for a clinic consult with Dr. Baires and a lab appt at 3:30 P.  Patient is on Dialysis on Wednesdays and is unable to come in sooner.     SR 10/1/2018 @ 145 P

## 2018-10-03 DIAGNOSIS — D64.9 ANEMIA: Primary | ICD-10-CM

## 2018-10-24 ENCOUNTER — OFFICE VISIT (OUTPATIENT)
Dept: GASTROENTEROLOGY | Facility: CLINIC | Age: 78
End: 2018-10-24
Payer: MEDICARE

## 2018-10-24 VITALS
BODY MASS INDEX: 23.15 KG/M2 | WEIGHT: 135.6 LBS | DIASTOLIC BLOOD PRESSURE: 62 MMHG | HEART RATE: 91 BPM | SYSTOLIC BLOOD PRESSURE: 121 MMHG | OXYGEN SATURATION: 100 % | TEMPERATURE: 98.3 F | HEIGHT: 64 IN

## 2018-10-24 DIAGNOSIS — D50.0 IRON DEFICIENCY ANEMIA DUE TO CHRONIC BLOOD LOSS: ICD-10-CM

## 2018-10-24 DIAGNOSIS — K55.21 AVM (ARTERIOVENOUS MALFORMATION) OF SMALL BOWEL, ACQUIRED WITH HEMORRHAGE: Primary | ICD-10-CM

## 2018-10-24 DIAGNOSIS — D64.9 ANEMIA: ICD-10-CM

## 2018-10-24 LAB
BASOPHILS # BLD AUTO: 0.1 10E9/L (ref 0–0.2)
BASOPHILS NFR BLD AUTO: 1.1 %
DIFFERENTIAL METHOD BLD: ABNORMAL
EOSINOPHIL # BLD AUTO: 0.1 10E9/L (ref 0–0.7)
EOSINOPHIL NFR BLD AUTO: 2 %
ERYTHROCYTE [DISTWIDTH] IN BLOOD BY AUTOMATED COUNT: 17.1 % (ref 10–15)
FERRITIN SERPL-MCNC: 176 NG/ML (ref 8–252)
FOLATE SERPL-MCNC: 51.3 NG/ML
HCT VFR BLD AUTO: 33.8 % (ref 35–47)
HGB BLD-MCNC: 10.1 G/DL (ref 11.7–15.7)
IMM GRANULOCYTES # BLD: 0 10E9/L (ref 0–0.4)
IMM GRANULOCYTES NFR BLD: 0.3 %
IRON SATN MFR SERPL: 57 % (ref 15–46)
IRON SERPL-MCNC: 241 UG/DL (ref 35–180)
LYMPHOCYTES # BLD AUTO: 0.8 10E9/L (ref 0.8–5.3)
LYMPHOCYTES NFR BLD AUTO: 11.9 %
MCH RBC QN AUTO: 31.4 PG (ref 26.5–33)
MCHC RBC AUTO-ENTMCNC: 29.9 G/DL (ref 31.5–36.5)
MCV RBC AUTO: 105 FL (ref 78–100)
MONOCYTES # BLD AUTO: 0.7 10E9/L (ref 0–1.3)
MONOCYTES NFR BLD AUTO: 10.4 %
NEUTROPHILS # BLD AUTO: 5 10E9/L (ref 1.6–8.3)
NEUTROPHILS NFR BLD AUTO: 74.3 %
NRBC # BLD AUTO: 0 10*3/UL
NRBC BLD AUTO-RTO: 0 /100
PLATELET # BLD AUTO: 282 10E9/L (ref 150–450)
RBC # BLD AUTO: 3.22 10E12/L (ref 3.8–5.2)
TIBC SERPL-MCNC: 419 UG/DL (ref 240–430)
VIT B12 SERPL-MCNC: 1402 PG/ML (ref 193–986)
WBC # BLD AUTO: 6.7 10E9/L (ref 4–11)

## 2018-10-24 PROCEDURE — 82746 ASSAY OF FOLIC ACID SERUM: CPT | Performed by: PHYSICIAN ASSISTANT

## 2018-10-24 ASSESSMENT — PAIN SCALES - GENERAL: PAINLEVEL: WORST PAIN (10)

## 2018-10-24 NOTE — PROGRESS NOTES
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 Last EGD, push enterosocopy, and colonoscopy on 9/19/18 showed multiple bleeding angioectasias that were treated. Clinically, she has been doing well since then without overt signs of bleeding. She did receive a 1 unit PRBC transfusion for a Hgb of 6.7 on 10/17 but her Hgb today is 10.1 which is very encouraging. We discussed that ESRD patient's commonly develop angioectasias and that she will always be at risk for recurrence over time.  This is not something that can be completely cured but can be medically and endoscopically managed. Octreotide will decrease the bleeding from these lesions and her current dose is adequate (higher doses haven't been studied significantly). There may be some data that going from the depot injection to 3 x a day subcutaneous injections may provide better results, but this is quite onerous and the data isn't strong enough, in my opinion, to know if there is a real benefit. She should continue with IV iron infusions and her current iron studies show adequate iron levels. I would not pursue any endoscopy or other studies at this time given all these findings. Recommend continued monitoring of blood counts and if there is a trend for more frequent transfusions or if she starts seeing melena in her stool again, would recommend a capsule study followed by and an endoscopic procedure depending on the findings.    RECOMMENDATIONS:  - Continue current management with octreotide depot 20 mg monthly and IV iron with dialysis  - If  she re-develops melena or increased transfusion requirements, then she should have a capsule study first and we can then determine the best endoscopic procedure for her at that time.      Thank you for this consultation.  It was a pleasure to participate in the care of this patient; please contact us with any further questions.  A total of 30 minutes was spent in face to face evaluation with this patient, >50% of which was counseling and coordinating a management plan for this patient.     Ankit Baires MD  [facility]  Division of Gastroenterology and Hepatology  [place] [address]    __________________________________________________________________________________  HPI:  Rachele Martínez is a 78 year old female with a past medical history significant for HCV cirrhosis (compensated, CPS A) s/p Harvoni (in SVR), ESRD on hemodialysis (MWF), and recurrent GI bleeds/anemia due to GI angioectasias who is here for follow up. Most recently she was admitted on 9/18 to [facility] when she had noted some bright red streaks in her stool along with melena.  She had an EGD on 8/14/18 at [facility] for anemia showed a single recently bleeding angiodysplastic lesion in the stomach, treated by APC and a non bleeding gastric ulceration from prior APC therapy with red spot on edge of one treated with APC. Two days prior (8/12/18), she also had an EGD with APC therapy applied to multiple gastric and duodenal angiodysplastic lesions. Capsule endoscopy on 10/26/15 revealed small bowel angioectasias. Last colonoscopy was on 9/4/18, which showed five 1-4mm tubular adenoma polyps in ascending, transverse and sigmoid colon without evidence of GI bleed at that time. I performed a push enteroscopy and colonoscopy on 9/19/18. Angioectasias were seen in the stomach and treated with APC and multiple actively bleeding angioectasias in the duodenum were seen and also  treated. Colonoscopy showed angioectasia in the cecum that were also treated. Hemorrhoids were also seen and likely the source of her bright red blood per rectum that are not contributing to her anemia significantly. She has clinically been doing well since then without melena or hematochezia. She continues on monthly Octreotide 20 mg depot injections. She receives IV iron during dialysis. She did received a 1 unit PRBC transfusion for a Hgb of 6.7 on 10/17. Her Hgb today is 10.1    Patient denies jaundice, abdominal distension, lower extremity edema, lethargy or confusion. Patient denies fevers, sweats, chills or weight loss.    PMHx:  Past Medical History:   Diagnosis Date     Anemia      Anxiety and depression      Arthritis      AVM (arteriovenous malformation)      Chronic hepatitis C with cirrhosis (H)      Clotting disorder (H)      ESRD (end stage renal disease) (H)     on dialysis     GI bleed     recurrent     Glaucoma      Hyperlipidemia      Hypertension goal BP (blood pressure) < 140/80      PSurgHx:  Past Surgical History:   Procedure Laterality Date     COLONOSCOPY N/A 9/4/2015    Procedure: COMBINED COLONOSCOPY, SINGLE OR MULTIPLE BIOPSY/POLYPECTOMY BY BIOPSY;  Surgeon: Rupesh Lopez MD;  [facility]     COLONOSCOPY N/A 9/19/2018    Procedure: COLONOSCOPY;  enteroscopy small bowel  COLONOSCOPY;  Surgeon: Ankit Baires MD;  Location:  GI     ESOPHAGOSCOPY, GASTROSCOPY, DUODENOSCOPY (EGD), COMBINED N/A 12/18/2014    Procedure: COMBINED ESOPHAGOSCOPY, GASTROSCOPY, DUODENOSCOPY (EGD);  Surgeon: Betsy Carvajal MD;  [facility]     ESOPHAGOSCOPY, GASTROSCOPY, DUODENOSCOPY (EGD), COMBINED N/A 4/25/2015    Procedure: COMBINED ESOPHAGOSCOPY, GASTROSCOPY, DUODENOSCOPY (EGD);  Surgeon: Yosvany Ram MD;  [facility]     ESOPHAGOSCOPY, GASTROSCOPY, DUODENOSCOPY (EGD), COMBINED N/A 5/5/2015    Procedure: COMBINED ESOPHAGOSCOPY, GASTROSCOPY, DUODENOSCOPY (EGD);  Surgeon:  Mariano Mistry MD;  Location:  GI     HC CAPSULE ENDOSCOPY N/A 9/30/2015    Procedure: CAPSULE/PILL CAM ENDOSCOPY;  Surgeon: Pan Dhaliwal MD;  Location:  GI     HYSTERECTOMY  1980    KYREE     LUMPECTOMY BREAST         MEDS:  Current Outpatient Prescriptions   Medication     albuterol (PROAIR HFA) 108 (90 Base) MCG/ACT inhaler     atorvastatin (LIPITOR) 20 MG tablet     Calcium Acetate, Phos Binder, 667 MG CAPS     Multiple Vitamins-Minerals (PRORENAL + D) TABS     octreotide (SANDOSTATIN LAR) 20 MG injection     order for DME     pantoprazole (PROTONIX) 20 MG EC tablet     polyethylene glycol 3350 POWD     sertraline (ZOLOFT) 50 MG tablet     No current facility-administered medications for this visit.      ALLERGIES:    Allergies   Allergen Reactions     Diovan Hct Itching     severe     Dust Mites      Hydrochlorothiazide Itching     Severe       No Clinical Screening - See Comments      History of blood transfusion reactions and pre-treats with Benadryl.      Sulfa Drugs Hives     Spironolactone Nausea     FHx:  Family History   Problem Relation Age of Onset     Diabetes Mother      Alzheimer Disease Mother        SOCIAL Hx:  Social History     Social History     Marital status:      Spouse name: N/A     Number of children: N/A     Years of education: N/A     Occupational History     Not on file.     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     Sexual activity: Not Currently     Other Topics Concern     Parent/Sibling W/ Cabg, Mi Or Angioplasty Before 65f 55m? No     Social History Narrative       ROS: A comprehensive Review of Systems was asked and answered in the negative unless specifically commented  upon in the HPI    Physical Exam  There were no vitals taken for this visit.  There is no height or weight on file to calculate BMI.  Gen: A&Ox3, NAD  HEENT: Moist mucus membranes, no scleral icterus.  Lungs: no respiratory distress  Abd: soft, non-tender, non-distended.  No guarding/rigidity/rebound.  Skin: no jaundice, no stigmata of chronic liver disease  Ext: warm, dry, no evidence of edema    LABS:  Orders Only on 10/24/2018   Component Date Value Ref Range Status     Iron 10/24/2018 241* 35 - 180 ug/dL Final     Iron Binding Cap 10/24/2018 419  240 - 430 ug/dL Final     Iron Saturation Index 10/24/2018 57* 15 - 46 % Final     Ferritin 10/24/2018 176  8 - 252 ng/mL Final     Vitamin B12 10/24/2018 1402* 193 - 986 pg/mL Final     WBC 10/24/2018 6.7  4.0 - 11.0 10e9/L Final     RBC Count 10/24/2018 3.22* 3.8 - 5.2 10e12/L Final     Hemoglobin 10/24/2018 10.1* 11.7 - 15.7 g/dL Final     Hematocrit 10/24/2018 33.8* 35.0 - 47.0 % Final     MCV 10/24/2018 105* 78 - 100 fl Final     MCH 10/24/2018 31.4  26.5 - 33.0 pg Final     MCHC 10/24/2018 29.9* 31.5 - 36.5 g/dL Final     RDW 10/24/2018 17.1* 10.0 - 15.0 % Final     Platelet Count 10/24/2018 282  150 - 450 10e9/L Final     Diff Method 10/24/2018 Automated Method   Final     % Neutrophils 10/24/2018 74.3  % Final     % Lymphocytes 10/24/2018 11.9  % Final     % Monocytes 10/24/2018 10.4  % Final     % Eosinophils 10/24/2018 2.0  % Final     % Basophils 10/24/2018 1.1  % Final     % Immature Granulocytes 10/24/2018 0.3  % Final     Nucleated RBCs 10/24/2018 0  0 /100 Final     Absolute Neutrophil 10/24/2018 5.0  1.6 - 8.3 10e9/L Final     Absolute Lymphocytes 10/24/2018 0.8  0.8 - 5.3 10e9/L Final     Absolute Monocytes 10/24/2018 0.7  0.0 - 1.3 10e9/L Final     Absolute Eosinophils 10/24/2018 0.1  0.0 - 0.7 10e9/L Final     Absolute Basophils 10/24/2018 0.1  0.0 - 0.2 10e9/L Final     Abs Immature Granulocytes 10/24/2018 0.0  0 - 0.4 10e9/L Final     Absolute  Nucleated RBC 10/24/2018 0.0   Final       Results for RACHELE SCHUMACHER (MRN [id number]) as of 10/24/2018 16:47   Ref. Range 9/18/2018 20:04 9/19/2018 01:46 9/19/2018 06:14 9/20/2018 06:50 10/24/2018 15:06   Hemoglobin Latest Ref Range: 11.7 - 15.7 g/dL 7.8 (L) 7.9 (L) 7.8 (L) 7.8 (L) 10.1 (L)     Outside Hgb at [facility]  10/5 7.6  10/8 7.4  10/17 6.7  --->1u Transfusion    Endoscopies:    PROVGI 09/19/2018 11:05 AM [address] [place] [address] [phone]     Endoscopy Department   _______________________________________________________________________________   Patient Name: Rachele Schumacher        Procedure Date: 9/19/2018 11:05 AM   MRN: [id number]                       Account Number: [id number]   YOB: 1940              Admit Type: Inpatient   Age: 78                                Gender: Female   Note Status: Finalized                Attending MD: Ankit Baires MD   Pause for the Cause: time out performed Total Sedation Time:   _______________________________________________________________________________       Procedure:           Upper endoscopy with push enteroscopy   Indications:         Hematochezia, Melena, Arteriovenous malformation in the                        small intestine   Providers:           Ankit Baires MD, Gayle Patel RN, Ke Campbell MD   Referring MD:        Sujit Amin   Medicines:           Midazolam 4 mg IV, Fentanyl 125 micrograms IV, Glucagon                        0.4 mg IV   Complications:       No immediate complications. Estimated blood loss:                        Minimal.   _______________________________________________________________________________   Procedure:           Pre-Anesthesia Assessment:                        - Prior to the procedure, a History and Physical was                        performed, and patient medications and allergies were                         reviewed. The patient is competent. The risks and                        benefits of the procedure and the sedation options and                        risks were discussed with the patient. All questions                        were answered and informed consent was obtained. Patient                        identification and proposed procedure were verified by                        the physician and the nurse in the procedure room.                        Mental Status Examination: alert and oriented. Airway                        Examination: normal oropharyngeal airway and neck                        mobility. Respiratory Examination: clear to                        auscultation. CV Examination: normal. Prophylactic                        Antibiotics: The patient does not require prophylactic                        antibiotics. Prior Anticoagulants: The patient has taken                        no previous anticoagulant or antiplatelet agents. ASA                        Grade Assessment: III - A patient with severe systemic                        disease. After reviewing the risks and benefits, the                        patient was deemed in satisfactory condition to undergo                        the procedure. The anesthesia plan was to use moderate                        sedation / analgesia (conscious sedation). Immediately                        prior to administration of medications, the patient was                        re-assessed for adequacy to receive sedatives. The heart                        rate, respiratory rate, oxygen saturations, blood                        pressure, adequacy of pulmonary ventilation, and                        response to care were monitored throughout the                        procedure. The physical status of the patient was                        re-assessed after the procedure.                        After obtaining informed consent, the endoscope was                         passed under direct vision. Throughout the procedure,                        the patient's blood pressure, pulse, and oxygen                        saturations were monitored continuously. The Colonoscope                        was introduced through the mouth and advanced to the                        proximal jejunum. The small bowel enteroscopy was                        accomplished without difficulty. The patient tolerated                        the procedure well.                                                                                     Findings:        The examined esophagus was normal.        Two small no bleeding angioectasias were found in the gastric body.        Coagulation for bleeding prevention using argon plasma at 0.5        liters/minute and 40 mmcillan was successful. Estimated blood loss was        minimal.        Multiple angioectasias with bleeding were found in the duodenal bulb, in        the second portion of the duodenum and in the third portion of the        duodenum, some with active bleeding. Coagulation for hemostasis using        argon plasma at 0.5 liters/minute and 20 mcmillan was successful.        There was no evidence of significant pathology in the proximal jejunum.                                                                                     Moderate Sedation:        Moderate (conscious) sedation was administered by the endoscopy nurse        and supervised by the endoscopist. The patient's oxygen saturation,        heart rate, blood pressure and response to care were monitored. Total        physician intraservice time was 49 minutes.   Impression:          - Normal esophagus.                        - Two non-bleeding angioectasias in the stomach. Treated                        with argon plasma coagulation (APC).                        - Multiple bleeding angioectasias in the duodenum, some                        with active bleeding. Treated with argon plasma                         coagulation (APC).                        - The examined portion of the jejunum was normal.                        - No specimens collected.   Recommendation:      - Proceed with colonoscopy today as scheduled (see                        seperate note for complete recommendations).                                                                                       Ankit Baires MD        COLONOSCOPY 09/19/2018 12:46 PM [address] [place] [address] [phone]     Endoscopy Department   _______________________________________________________________________________   Patient Name: Rachele Martínez        Procedure Date: 9/19/2018 12:46 PM   MRN: [id number]                       Account Number: [id number]   YOB: 1940              Admit Type: Inpatient   Age: 78                                Gender: Female   Note Status: Supervisor Override      Attending MD: Ankit Baires MD   Pause for the Cause: time out performed Total Sedation Time:   _______________________________________________________________________________       Procedure:           Colonoscopy   Indications:         Hematochezia   Providers:           Ankit Baires MD, Gayle Patel RN, Ke Campbell MD   Referring MD:        Sujit Amin   Medicines:           See the other procedure note for documentation of the                        administered medications, Midazolam 1 mg IV, Fentanyl 25                        micrograms IV   Complications:       No immediate complications. Estimated blood loss:                        Minimal.   _______________________________________________________________________________   Procedure:           Pre-Anesthesia Assessment:                        - Prior to the procedure, a History and Physical was                        performed, and patient medications and allergies were                        reviewed. The  patient is competent. The risks and                        benefits of the procedure and the sedation options and                        risks were discussed with the patient. All questions                        were answered and informed consent was obtained. Patient                        identification and proposed procedure were verified by                        the physician and the nurse in the procedure room.                        Mental Status Examination: alert and oriented. Airway                        Examination: normal oropharyngeal airway and neck                        mobility. Respiratory Examination: clear to                        auscultation. CV Examination: normal. Prophylactic                        Antibiotics: The patient does not require prophylactic                        antibiotics. Prior Anticoagulants: The patient has taken                        no previous anticoagulant or antiplatelet agents. ASA                        Grade Assessment: III - A patient with severe systemic                        disease. After reviewing the risks and benefits, the                        patient was deemed in satisfactory condition to undergo                        the procedure. The anesthesia plan was to use moderate                        sedation / analgesia (conscious sedation). Immediately                        prior to administration of medications, the patient was                        re-assessed for adequacy to receive sedatives. The heart                        rate, respiratory rate, oxygen saturations, blood                        pressure, adequacy of pulmonary ventilation, and                        response to care were monitored throughout the                        procedure. The physical status of the patient was                        re-assessed after the procedure.                        After obtaining informed consent, the colonoscope was                        passed  under direct vision. Throughout the procedure,                        the patient's blood pressure, pulse, and oxygen                        saturations were monitored continuously. The Colonoscope                        was introduced through the anus and advanced to the                        terminal ileum. The colonoscopy was performed without                        difficulty. The patient tolerated the procedure well.                        The quality of the bowel preparation was evaluated using                        the BBPS ([place] Bowel Preparation Scale) with scores                        of: Right Colon = 3, Transverse Colon = 3 and Left Colon                        = 3 (entire mucosa seen well with no residual staining,                        small fragments of stool or opaque liquid). The total                        BBPS score equals 9.                                                                                     Findings:        The perianal and digital rectal examinations were normal. Pertinent        negatives include no palpable rectal lesions.        Old blood clots in the colon lumen. The terminal ileum appeared normal.        A few large localized angioectasias with stigmata of recent bleeding        were found in the cecum. Coagulation for bleeding prevention using argon        plasma at 0.5 liters/minute and 20 mcmillan was successful. Estimated blood        loss was minimal.        The exam was otherwise without abnormality.        External hemorrhoids were found during retroflexion. The hemorrhoids        were large.                                                                                     Moderate Sedation:        Moderate (conscious) sedation was administered by the endoscopy nurse        and supervised by the endoscopist. The patient's oxygen saturation,        heart rate, blood pressure and response to care were monitored. Total        physician intraservice time was 36  minutes.   Impression:          - Old blood clots were seen in the colon lumen.                        - The examined portion of the ileum was normal.                        - A few recently bleeding colonic angioectasias. Treated                        with argon plasma coagulation (APC).                        - The examination was otherwise normal.                        - External hemorrhoids.                        - No specimens collected.                        - Exam also adequate for CRC screening purposes as she                        was due for surveillance   Recommendation:      - [facility].                        - Suspect the bright red blood was from the hemorrhoids                        and the melena was from the upper GI tract and cecal                        angioectasias.                        - Continue to monitor Hgb and stool output                        - Continue medical management of chronic                        angioectasia/small bowel bleeding with octreotide and IV                        iron infusions with dialysis                        - Repeat colonoscopy in 5 years given prior history of                        tubular adenomas.                                                                                       Ankit Baires MD   ________________      Video Capsule Endoscopy 10/26/2015  4:44 PM [address] [place] [address] [phone]     Endoscopy Department   _______________________________________________________________________________   Patient Name: Rachele Martínez        Procedure Date: 10/26/2015 4:44 PM   MRN: [id number]                       Account Number: [id number]   YOB: 1940              Admit Type: Outpatient   Age: 75                                Gender: Female   Note Status: Supervisor Override      Attending MD: Pan Iverson MD    _______________________________________________________________________________       Procedure:           Video capsule endoscopy   Indications:         GI bleeding source not documented by previous EGD and                        colonoscopy   Providers:           Pan Iverson MD   Referring MD:        Rupesh Lopez, Betsy Carvajal MD   Medicines:           None   Complications:       No immediate complications.   _______________________________________________________________________________   Procedure:           Pre-Anesthesia Assessment:                        - After reviewing the risks and benefits, the patient                        was deemed in satisfactory condition to undergo the                        procedure.                        The video capsule endoscopy was accomplished without                        difficulty. The patient tolerated the procedure well.                        - Small amy preparation was adequate                        - Gastric transit time 1 h 2 m                        - Small bowel transit time 1 h 41 m                        - Capsule swallowed 9/30/2015                                                                                     Findings:        Images of the esophagus, stomach and small bowel were obtained from the        swallowed capsule and reviewed.        A single medium-sized angioectasia with no stigmata of recent bleeding        was seen in the small bowel (probable duodenum). It was focal in extent.        A few venous structures without bleeding were seen in the small bowel        (mid small bowel) (distal small bowel). They were small. There were no        stigmata of recent bleeding.                                                                                     Impression:          - No blood noted in the entire exam.                        - A single angioectasia with no stigmata of recent                        bleeding in the  duodenum.                        - A few venous structures without bleeding in the small                        bowel. Likely the once noted in upper endoscopy and                        treated with APC.   Recommendation:      - Return to referring physician.                                                                                       _____________________   Pan Iverson MD

## 2018-10-24 NOTE — LETTER
10/24/2018       RE: Rachele Martínez  [address] [place]  [address]  [facility] [address]     Dear Colleague,    Thank you for referring your patient, Rachele Martínez, to the [facility] at [facility]. Please see a copy of my visit note below.    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 Last EGD, push enterosocopy, and colonoscopy on 9/19/18 showed multiple bleeding angioectasias that were treated. Clinically, she has been doing well since then without overt signs of bleeding. She did receive a 1 unit PRBC transfusion for a Hgb of 6.7 on 10/17 but her Hgb today is 10.1 which is very encouraging. We discussed that ESRD patient's commonly develop angioectasias and that she will always be at risk for recurrence over time.  This is not something that can be completely cured but can be medically and endoscopically managed. Octreotide will decrease the bleeding from these lesions and her current dose is adequate (higher doses haven't been studied significantly). There may be some data that going from the depot injection to 3 x a day subcutaneous injections may provide better results, but this is quite onerous and the data isn't strong enough, in my opinion, to know if there is a real benefit. She should continue with IV iron infusions and her current iron studies show adequate iron levels. I would not pursue any endoscopy or other studies at this time given all these findings. Recommend continued monitoring of blood counts and if there is  a trend for more frequent transfusions or if she starts seeing melena in her stool again, would recommend a capsule study followed by and an endoscopic procedure depending on the findings.    RECOMMENDATIONS:  - Continue current management with octreotide depot 20 mg monthly and IV iron with dialysis  - If she re-develops melena or increased transfusion requirements, then she should have a capsule study first and we can then determine the best endoscopic procedure for her at that time.      Thank you for this consultation.  It was a pleasure to participate in the care of this patient; please contact us with any further questions.  A total of 30 minutes was spent in face to face evaluation with this patient, >50% of which was counseling and coordinating a management plan for this patient.     Ankit Baires MD  [facility]  Division of Gastroenterology and Hepatology  [facility] [address] [address]    __________________________________________________________________________________  HPI:  Rachele Martínez is a 78 year old female with a past medical history significant for HCV cirrhosis (compensated, CPS A) s/p Harvoni (in SVR), ESRD on hemodialysis (MWF), and recurrent GI bleeds/anemia due to GI angioectasias who is here for follow up. Most recently she was admitted on 9/18 to [facility] when she had noted some bright red streaks in her stool along with melena.  She had an EGD on 8/14/18 at [facility] for anemia showed a single recently bleeding angiodysplastic lesion in the stomach, treated by APC and a non bleeding gastric ulceration from prior APC therapy with red spot on edge of one treated with APC. Two days prior (8/12/18), she also had an EGD with APC therapy applied to multiple gastric and duodenal angiodysplastic lesions. Capsule endoscopy on 10/26/15 revealed small bowel angioectasias. Last colonoscopy was on 9/4/18, which showed five 1-4mm  tubular adenoma polyps in ascending, transverse and sigmoid colon without evidence of GI bleed at that time. I performed a push enteroscopy and colonoscopy on 9/19/18. Angioectasias were seen in the stomach and treated with APC and multiple actively bleeding angioectasias in the duodenum were seen and also treated. Colonoscopy showed angioectasia in the cecum that were also treated. Hemorrhoids were also seen and likely the source of her bright red blood per rectum that are not contributing to her anemia significantly. She has clinically been doing well since then without melena or hematochezia. She continues on monthly Octreotide 20 mg depot injections. She receives IV iron during dialysis. She did received a 1 unit PRBC transfusion for a Hgb of 6.7 on 10/17. Her Hgb today is 10.1    Patient denies jaundice, abdominal distension, lower extremity edema, lethargy or confusion. Patient denies fevers, sweats, chills or weight loss.    PMHx:  Past Medical History:   Diagnosis Date     Anemia      Anxiety and depression      Arthritis      AVM (arteriovenous malformation)      Chronic hepatitis C with cirrhosis (H)      Clotting disorder (H)      ESRD (end stage renal disease) (H)     on dialysis     GI bleed     recurrent     Glaucoma      Hyperlipidemia      Hypertension goal BP (blood pressure) < 140/80      PSurgHx:  Past Surgical History:   Procedure Laterality Date     COLONOSCOPY N/A 9/4/2015    Procedure: COMBINED COLONOSCOPY, SINGLE OR MULTIPLE BIOPSY/POLYPECTOMY BY BIOPSY;  Surgeon: Rupesh Lopez MD;  Location:  GI     COLONOSCOPY N/A 9/19/2018    Procedure: COLONOSCOPY;  enteroscopy small bowel  COLONOSCOPY;  Surgeon: Ankit Baires MD;  Location:  GI     ESOPHAGOSCOPY, GASTROSCOPY, DUODENOSCOPY (EGD), COMBINED N/A 12/18/2014    Procedure: COMBINED ESOPHAGOSCOPY, GASTROSCOPY, DUODENOSCOPY (EGD);  Surgeon: Betsy Carvajal MD;  Location:  GI     ESOPHAGOSCOPY, GASTROSCOPY, DUODENOSCOPY  (EGD), COMBINED N/A 4/25/2015    Procedure: COMBINED ESOPHAGOSCOPY, GASTROSCOPY, DUODENOSCOPY (EGD);  Surgeon: Yosvany Ram MD;  Location:  GI     ESOPHAGOSCOPY, GASTROSCOPY, DUODENOSCOPY (EGD), COMBINED N/A 5/5/2015    Procedure: COMBINED ESOPHAGOSCOPY, GASTROSCOPY, DUODENOSCOPY (EGD);  Surgeon: Mariano Mistry MD;  Location:  GI     HC CAPSULE ENDOSCOPY N/A 9/30/2015    Procedure: CAPSULE/PILL CAM ENDOSCOPY;  Surgeon: Pan Dhaliwal MD;  Location:  GI     HYSTERECTOMY  1980    KYREE     LUMPECTOMY BREAST         MEDS:  Current Outpatient Prescriptions   Medication     albuterol (PROAIR HFA) 108 (90 Base) MCG/ACT inhaler     atorvastatin (LIPITOR) 20 MG tablet     Calcium Acetate, Phos Binder, 667 MG CAPS     Multiple Vitamins-Minerals (PRORENAL + D) TABS     octreotide (SANDOSTATIN LAR) 20 MG injection     order for DME     pantoprazole (PROTONIX) 20 MG EC tablet     polyethylene glycol 3350 POWD     sertraline (ZOLOFT) 50 MG tablet     No current facility-administered medications for this visit.      ALLERGIES:    Allergies   Allergen Reactions     Diovan Hct Itching     severe     Dust Mites      Hydrochlorothiazide Itching     Severe       No Clinical Screening - See Comments      History of blood transfusion reactions and pre-treats with Benadryl.      Sulfa Drugs Hives     Spironolactone Nausea     FHx:  Family History   Problem Relation Age of Onset     Diabetes Mother      Alzheimer Disease Mother        SOCIAL Hx:  Social History     Social History     Marital status:      Spouse name: N/A     Number of children: N/A     Years of education: N/A     Occupational History     Not on file.     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     Sexual activity: Not Currently     Other Topics Concern     Parent/Sibling W/ Cabg, Mi Or Angioplasty Before 65f 55m? No     Social History Narrative       ROS: A comprehensive Review of Systems was asked and answered in the negative unless specifically commented upon in the HPI    Physical Exam  There were no vitals taken for this visit.  There is no height or weight on file to calculate BMI.  Gen: A&Ox3, NAD  HEENT: Moist mucus membranes, no scleral icterus.  Lungs: no respiratory distress  Abd: soft, non-tender, non-distended.  No guarding/rigidity/rebound.  Skin: no jaundice, no stigmata of chronic liver disease  Ext: warm, dry, no evidence of edema    LABS:  Orders Only on 10/24/2018   Component Date Value Ref Range Status     Iron 10/24/2018 241* 35 - 180 ug/dL Final     Iron Binding Cap 10/24/2018 419  240 - 430 ug/dL Final     Iron Saturation Index 10/24/2018 57* 15 - 46 % Final     Ferritin 10/24/2018 176  8 - 252 ng/mL Final     Vitamin B12 10/24/2018 1402* 193 - 986 pg/mL Final     WBC 10/24/2018 6.7  4.0 - 11.0 10e9/L Final     RBC Count 10/24/2018 3.22* 3.8 - 5.2 10e12/L Final     Hemoglobin 10/24/2018 10.1* 11.7 - 15.7 g/dL Final     Hematocrit 10/24/2018 33.8* 35.0 - 47.0 % Final     MCV 10/24/2018 105* 78 - 100 fl Final     MCH 10/24/2018 31.4  26.5 - 33.0 pg Final     MCHC 10/24/2018 29.9* 31.5 - 36.5 g/dL Final     RDW 10/24/2018 17.1* 10.0 - 15.0 % Final     Platelet Count 10/24/2018 282  150 - 450 10e9/L Final     Diff Method 10/24/2018 Automated Method   Final     % Neutrophils 10/24/2018 74.3  % Final     % Lymphocytes 10/24/2018 11.9  % Final     % Monocytes 10/24/2018 10.4  % Final     % Eosinophils 10/24/2018 2.0  % Final     % Basophils 10/24/2018 1.1  % Final     % Immature Granulocytes 10/24/2018 0.3  % Final     Nucleated RBCs 10/24/2018 0  0 /100 Final     Absolute Neutrophil 10/24/2018 5.0  1.6 - 8.3 10e9/L Final     Absolute  Lymphocytes 10/24/2018 0.8  0.8 - 5.3 10e9/L Final     Absolute Monocytes 10/24/2018 0.7  0.0 - 1.3 10e9/L Final     Absolute Eosinophils 10/24/2018 0.1  0.0 - 0.7 10e9/L Final     Absolute Basophils 10/24/2018 0.1  0.0 - 0.2 10e9/L Final     Abs Immature Granulocytes 10/24/2018 0.0  0 - 0.4 10e9/L Final     Absolute Nucleated RBC 10/24/2018 0.0   Final       Results for RACHELE SCHUMACHER (MRN [id number]) as of 10/24/2018 16:47   Ref. Range 9/18/2018 20:04 9/19/2018 01:46 9/19/2018 06:14 9/20/2018 06:50 10/24/2018 15:06   Hemoglobin Latest Ref Range: 11.7 - 15.7 g/dL 7.8 (L) 7.9 (L) 7.8 (L) 7.8 (L) 10.1 (L)     Outside Hgb at [facility]  10/5 7.6  10/8 7.4  10/17 6.7  --->1u Transfusion    Endoscopies:    PROVGI 09/19/2018 11:05 AM [address] [place] [address] [phone]     Endoscopy Department   _______________________________________________________________________________   Patient Name: Rachele Schumacher        Procedure Date: 9/19/2018 11:05 AM   MRN: [id number]                       Account Number: [id number]   YOB: 1940              Admit Type: Inpatient   Age: 78                                Gender: Female   Note Status: Finalized                Attending MD: Ankit Baires MD   Pause for the Cause: time out performed Total Sedation Time:   _______________________________________________________________________________       Procedure:           Upper endoscopy with push enteroscopy   Indications:         Hematochezia, Melena, Arteriovenous malformation in the                        small intestine   Providers:           Ankit Baires MD, Gayle Patel RN, Ke Campbell MD   Referring MD:        Sujit Amin   Medicines:           Midazolam 4 mg IV, Fentanyl 125 micrograms IV, Glucagon                        0.4 mg IV   Complications:       No immediate complications. Estimated blood loss:                         Minimal.   _______________________________________________________________________________   Procedure:           Pre-Anesthesia Assessment:                        - Prior to the procedure, a History and Physical was                        performed, and patient medications and allergies were                        reviewed. The patient is competent. The risks and                        benefits of the procedure and the sedation options and                        risks were discussed with the patient. All questions                        were answered and informed consent was obtained. Patient                        identification and proposed procedure were verified by                        the physician and the nurse in the procedure room.                        Mental Status Examination: alert and oriented. Airway                        Examination: normal oropharyngeal airway and neck                        mobility. Respiratory Examination: clear to                        auscultation. CV Examination: normal. Prophylactic                        Antibiotics: The patient does not require prophylactic                        antibiotics. Prior Anticoagulants: The patient has taken                        no previous anticoagulant or antiplatelet agents. ASA                        Grade Assessment: III - A patient with severe systemic                        disease. After reviewing the risks and benefits, the                        patient was deemed in satisfactory condition to undergo                        the procedure. The anesthesia plan was to use moderate                        sedation / analgesia (conscious sedation). Immediately                        prior to administration of medications, the patient was                        re-assessed for adequacy to receive sedatives. The heart                        rate, respiratory rate, oxygen saturations, blood                        pressure, adequacy of  pulmonary ventilation, and                        response to care were monitored throughout the                        procedure. The physical status of the patient was                        re-assessed after the procedure.                        After obtaining informed consent, the endoscope was                        passed under direct vision. Throughout the procedure,                        the patient's blood pressure, pulse, and oxygen                        saturations were monitored continuously. The Colonoscope                        was introduced through the mouth and advanced to the                        proximal jejunum. The small bowel enteroscopy was                        accomplished without difficulty. The patient tolerated                        the procedure well.                                                                                     Findings:        The examined esophagus was normal.        Two small no bleeding angioectasias were found in the gastric body.        Coagulation for bleeding prevention using argon plasma at 0.5        liters/minute and 40 mcmillan was successful. Estimated blood loss was        minimal.        Multiple angioectasias with bleeding were found in the duodenal bulb, in        the second portion of the duodenum and in the third portion of the        duodenum, some with active bleeding. Coagulation for hemostasis using        argon plasma at 0.5 liters/minute and 20 mcmillan was successful.        There was no evidence of significant pathology in the proximal jejunum.                                                                                     Moderate Sedation:        Moderate (conscious) sedation was administered by the endoscopy nurse        and supervised by the endoscopist. The patient's oxygen saturation,        heart rate, blood pressure and response to care were monitored. Total        physician intraservice time was 49 minutes.   Impression:           - Normal esophagus.                        - Two non-bleeding angioectasias in the stomach. Treated                        with argon plasma coagulation (APC).                        - Multiple bleeding angioectasias in the duodenum, some                        with active bleeding. Treated with argon plasma                        coagulation (APC).                        - The examined portion of the jejunum was normal.                        - No specimens collected.   Recommendation:      - Proceed with colonoscopy today as scheduled (see                        seperate note for complete recommendations).                                                                                       Ankit Baires MD        COLONOSCOPY 09/19/2018 12:46 PM [address] [place] [address] [phone]     Endoscopy Department   _______________________________________________________________________________   Patient Name: Rachele Martínez        Procedure Date: 9/19/2018 12:46 PM   MRN: [id number]                       Account Number: [id number]   YOB: 1940              Admit Type: Inpatient   Age: 78                                Gender: Female   Note Status: Supervisor Override      Attending MD: Ankit Baires MD   Pause for the Cause: time out performed Total Sedation Time:   _______________________________________________________________________________       Procedure:           Colonoscopy   Indications:         Hematochezia   Providers:           Ankit Baires MD, Gayle Patel RN, Ke Campbell MD   Referring MD:        Sujit Amin   Medicines:           See the other procedure note for documentation of the                        administered medications, Midazolam 1 mg IV, Fentanyl 25                        micrograms IV   Complications:       No immediate complications. Estimated blood loss:                        Minimal.    _______________________________________________________________________________   Procedure:           Pre-Anesthesia Assessment:                        - Prior to the procedure, a History and Physical was                        performed, and patient medications and allergies were                        reviewed. The patient is competent. The risks and                        benefits of the procedure and the sedation options and                        risks were discussed with the patient. All questions                        were answered and informed consent was obtained. Patient                        identification and proposed procedure were verified by                        the physician and the nurse in the procedure room.                        Mental Status Examination: alert and oriented. Airway                        Examination: normal oropharyngeal airway and neck                        mobility. Respiratory Examination: clear to                        auscultation. CV Examination: normal. Prophylactic                        Antibiotics: The patient does not require prophylactic                        antibiotics. Prior Anticoagulants: The patient has taken                        no previous anticoagulant or antiplatelet agents. ASA                        Grade Assessment: III - A patient with severe systemic                        disease. After reviewing the risks and benefits, the                        patient was deemed in satisfactory condition to undergo                        the procedure. The anesthesia plan was to use moderate                        sedation / analgesia (conscious sedation). Immediately                        prior to administration of medications, the patient was                        re-assessed for adequacy to receive sedatives. The heart                        rate, respiratory rate, oxygen saturations, blood                        pressure, adequacy of pulmonary  ventilation, and                        response to care were monitored throughout the                        procedure. The physical status of the patient was                        re-assessed after the procedure.                        After obtaining informed consent, the colonoscope was                        passed under direct vision. Throughout the procedure,                        the patient's blood pressure, pulse, and oxygen                        saturations were monitored continuously. The Colonoscope                        was introduced through the anus and advanced to the                        terminal ileum. The colonoscopy was performed without                        difficulty. The patient tolerated the procedure well.                        The quality of the bowel preparation was evaluated using                        the BBPS ([place] Bowel Preparation Scale) with scores                        of: Right Colon = 3, Transverse Colon = 3 and Left Colon                        = 3 (entire mucosa seen well with no residual staining,                        small fragments of stool or opaque liquid). The total                        BBPS score equals 9.                                                                                     Findings:        The perianal and digital rectal examinations were normal. Pertinent        negatives include no palpable rectal lesions.        Old blood clots in the colon lumen. The terminal ileum appeared normal.        A few large localized angioectasias with stigmata of recent bleeding        were found in the cecum. Coagulation for bleeding prevention using argon        plasma at 0.5 liters/minute and 20 mcmillan was successful. Estimated blood        loss was minimal.        The exam was otherwise without abnormality.        External hemorrhoids were found during retroflexion. The hemorrhoids        were large.                                                                                      Moderate Sedation:        Moderate (conscious) sedation was administered by the endoscopy nurse        and supervised by the endoscopist. The patient's oxygen saturation,        heart rate, blood pressure and response to care were monitored. Total        physician intraservice time was 36 minutes.   Impression:          - Old blood clots were seen in the colon lumen.                        - The examined portion of the ileum was normal.                        - A few recently bleeding colonic angioectasias. Treated                        with argon plasma coagulation (APC).                        - The examination was otherwise normal.                        - External hemorrhoids.                        - No specimens collected.                        - Exam also adequate for CRC screening purposes as she                        was due for surveillance   Recommendation:      - [facility].                        - Suspect the bright red blood was from the hemorrhoids                        and the melena was from the upper GI tract and cecal                        angioectasias.                        - Continue to monitor Hgb and stool output                        - Continue medical management of chronic                        angioectasia/small bowel bleeding with octreotide and IV                        iron infusions with dialysis                        - Repeat colonoscopy in 5 years given prior history of                        tubular adenomas.                                                                                       Ankit Baires MD   ________________      Video Capsule Endoscopy 10/26/2015  4:44 PM [address] [place] [address] [phone]     Endoscopy Department   _______________________________________________________________________________   Patient Name: Rachele Martínez         Procedure Date: 10/26/2015 4:44 PM   MRN: [id number]                       Account Number: [id number]   YOB: 1940              Admit Type: Outpatient   Age: 75                                Gender: Female   Note Status: Supervisor Override      Attending MD: Pan Iverson MD   _______________________________________________________________________________       Procedure:           Video capsule endoscopy   Indications:         GI bleeding source not documented by previous EGD and                        colonoscopy   Providers:           Pan Iverson MD   Referring MD:        Rupesh Lopze, Betsy Carvajal MD   Medicines:           None   Complications:       No immediate complications.   _______________________________________________________________________________   Procedure:           Pre-Anesthesia Assessment:                        - After reviewing the risks and benefits, the patient                        was deemed in satisfactory condition to undergo the                        procedure.                        The video capsule endoscopy was accomplished without                        difficulty. The patient tolerated the procedure well.                        - Small amy preparation was adequate                        - Gastric transit time 1 h 2 m                        - Small bowel transit time 1 h 41 m                        - Capsule swallowed 9/30/2015                                                                                     Findings:        Images of the esophagus, stomach and small bowel were obtained from the        swallowed capsule and reviewed.        A single medium-sized angioectasia with no stigmata of recent bleeding        was seen in the small bowel (probable duodenum). It was focal in extent.        A few venous structures without bleeding were seen in the small bowel        (mid small bowel) (distal small bowel). They were small.  There were no        stigmata of recent bleeding.                                                                                     Impression:          - No blood noted in the entire exam.                        - A single angioectasia with no stigmata of recent                        bleeding in the duodenum.                        - A few venous structures without bleeding in the small                        bowel. Likely the once noted in upper endoscopy and                        treated with APC.   Recommendation:      - Return to referring physician.                                                                                       _____________________   Pan Iverson MD            Again, thank you for allowing me to participate in the care of your patient.      Sincerely,    Ankit Baires MD

## 2018-10-24 NOTE — MR AVS SNAPSHOT
After Visit Summary   10/24/2018    Rachele Martínez    MRN: [id number]           Patient Information     Date Of Birth          1940        Visit Information        Provider Department      10/24/2018 4:00 PM Ankit Baires MD [facility] Pancreas [facility] Biliary        Care Instructions    You labs look great. I do not think you need another procedure to look for more bleeding at this time. I recommend continuing your octreotide at you current dose and monitoring you blood counts. We will plan to see you back in 3 months and review your blood counts. If you see blood in your stool or you blood counts start to decrease, let us know and we will likely repeat a capsule study and or a scope to treat those abnormal blood vessels.          Follow-ups after your visit        Follow-up notes from your care team     Return in about 3 months (around 1/24/2019).      Your next 10 appointments already scheduled     Oct 25, 2018 11:30 AM CDT   Level O with [address] ([facility])    [address] [place] [id number] [phone]            Nov 15, 2018  9:00 AM CST   Lab with  LAB   [facility] Lab ([facility])    [address] [place] [id number] [phone]            Nov 15, 2018  9:15 AM CST   US ABDOMEN COMPLETE with UCUS1   [facility] Imaging Center US ([facility])    [address] [place] [id number] [phone]           How do I prepare for my exam? (Food and drink instructions) Adults: No eating, smoking, gum chewing or drinking for 8 hours before the exam. You may take medicine with a small sip of water.  Children: * Infants, breast-fed: may have breast milk up to 2 hours before exam. * Infants, formula: may have bottle until 4 hours before exam. * Children 1-5 years: No food or drink for 4 hours before exam. * Children 6 -12 years:  No food or drink for 6 hours before exam. * Children over 12 years: No food or drink for 8 hours before exam.  * J Tube Fed: No need to stop feedings.  What should I wear: Wear comfortable clothes.  How long does the exam take: Most ultrasounds take 30 to 60 minutes.  What should I bring: Bring a list of your medicines, including vitamins, minerals and over-the-counter drugs. It is safest to leave personal items at home.  Do I need a :  No  is needed.  What do I need to tell my doctor: Tell your doctor about any allergies you may have.  What should I do after the exam: No restrictions, You may resume normal activities.  What is this test: An ultrasound uses sound waves to make pictures of the body. Sound waves do not cause pain. The only discomfort may be the pressure of the wand against your skin or full bladder.  Who should I call with questions: If you have any questions, please call the Imaging Department where you will have your exam. Directions, parking instructions, and other information is available on our website, [email or web address].            Nov 23, 2018 11:30 AM CST   Level O with [address] ([facility])    [address] [place] [id number] [phone]            Dec 07, 2018  3:30 PM CST   (Arrive by 3:15 PM)   Return Visit with Agnieszka Rodriguez MD   [facility] ([facility])    [address] [place] [address]  4th Floor  [facility] [address] [phone]            Feb 12, 2019 11:45 AM CST   (Arrive by 11:30 AM)   Return General Liver with Andriy Barnett MD   [facility] Hepatology ([facility])    [address] [place] [address]  [address]  [facility] [address] [phone]              Who to contact     Please call your clinic at [phone] to:    Ask questions about your health    Make or cancel appointments    Discuss your medicines    Learn about your  "test results    Speak to your doctor            Additional Information About Your Visit        MyChart Information     [employer] is an electronic gateway that provides easy, online access to your medical records. With [employer], you can request a clinic appointment, read your test results, renew a prescription or communicate with your care team.     To sign up for [employer] visit the website at [email or web address]   You will be asked to enter the access code listed below, as well as some personal information. Please follow the directions to create your username and password.     Your access code is: [email or web address]  Expires: 2019  6:30 AM     Your access code will  in 90 days. If you need help or a new code, please contact your [facility] or call [phone] for assistance.        Care EveryWhere ID     This is your Care EveryWhere ID. This could be used by other organizations to access your [place] medical records  [id number]        Your Vitals Were     Pulse Temperature Height Pulse Oximetry BMI (Body Mass Index)       91 98.3  F (36.8  C) (Oral) 5' 4\" 100% 23.28 kg/m2        Blood Pressure from Last 3 Encounters:   10/24/18 121/62   18 129/77   18 135/63    Weight from Last 3 Encounters:   10/24/18 135 lb 9.6 oz   18 137 lb   18 135 lb 14.4 oz              Today, you had the following     No orders found for display       Primary Care Provider Office Phone # Fax #    Agnieszka Erica Rodriguez [phone]       [address] [place] [address]        Equal Access to Services     JAMIE CARVAJAL AH: Hadii lawrence Degroot, waaxda luqadaha, qaybta kaalmajaz sanchez. So [facility] [phone].    ATENCIÓN: Si habla español, tiene a ibarra disposición servicios gratuitos de asistencia lingüística. Bettye al [phone].    We comply with applicable federal civil rights laws and " [place] laws. We do not discriminate on the basis of race, color, national origin, age, disability, sex, sexual orientation, or gender identity.            Thank you!     Thank you for choosing [facility]CREAS [facility] BILI[facility]  for your care. Our goal is always to provide you with excellent care. Hearing back from our patients is one way we can continue to improve our services. Please take a few minutes to complete the written survey that you may receive in the mail after your visit with us. Thank you!             Your Updated Medication List - Protect others around you: Learn how to safely use, store and throw away your medicines at www.disposemymeds.org.          This list is accurate as of 10/24/18  4:33 PM.  Always use your most recent med list.                   Brand Name Dispense Instructions for use Diagnosis    atorvastatin 20 MG tablet    LIPITOR    90 tablet    Take 1 tablet (20 mg) by mouth daily    Cognitive impairment       Calcium Acetate (Phos Binder) 667 MG Caps      TAKE 2 CAPSULE BY MOUTH THREE TIMES A DAY WITH MEALS        octreotide 20 MG injection    sandoSTATIN LAR     Inject 20 mg into the muscle every 28 days        order for DME     1 Device    Equipment being ordered: 4 wheel walker with seat.    Disorder of bone and cartilage, Arthralgia, unspecified joint, Tendency to fall       pantoprazole 20 MG EC tablet    PROTONIX    60 tablet    Take 1 tablet (20 mg) by mouth 2 times daily Take by mouth 30-60 minutes before a meal.    Gastrointestinal hemorrhage, unspecified gastrointestinal hemorrhage type       polyethylene glycol 3350 Powd     1530 g    Take 17 g by mouth daily    Slow transit constipation       PROAIR  (90 Base) MCG/ACT inhaler   Generic drug:  albuterol     1 Inhaler    Inhale 2 puffs into the lungs every 6 hours as needed for shortness of breath / dyspnea or wheezing    Cough       PRORENAL + D Tabs      Take 1 tablet by mouth daily        sertraline 50 MG tablet     ZOLOFT    180 tablet    Take 2 tablets (100 mg) by mouth daily    Itching

## 2018-10-24 NOTE — PATIENT INSTRUCTIONS
You labs look great. I do not think you need another procedure to look for more bleeding at this time. I recommend continuing your octreotide at you current dose and monitoring you blood counts. We will plan to see you back in 3 months and review your blood counts. If you see blood in your stool or you blood counts start to decrease, let us know and we will likely repeat a capsule study and or a scope to treat those abnormal blood vessels.

## 2018-10-24 NOTE — NURSING NOTE
"No chief complaint on file.      Vitals:    10/24/18 1606   BP: 121/62   Pulse: 91   Temp: 98.3  F (36.8  C)   TempSrc: Oral   SpO2: 100%   Weight: 135 lb 9.6 oz   Height: 5' 4\"       Body mass index is 23.28 kg/(m^2).      Jose J Martinez on 10/24/2018 at 4:12 PM                          "

## 2018-10-25 ENCOUNTER — INFUSION THERAPY VISIT (OUTPATIENT)
Dept: INFUSION THERAPY | Facility: CLINIC | Age: 78
End: 2018-10-25
Payer: MEDICARE

## 2018-10-25 VITALS
TEMPERATURE: 98.8 F | WEIGHT: 135 LBS | DIASTOLIC BLOOD PRESSURE: 69 MMHG | SYSTOLIC BLOOD PRESSURE: 120 MMHG | BODY MASS INDEX: 23.17 KG/M2 | HEART RATE: 78 BPM | OXYGEN SATURATION: 100 %

## 2018-10-25 DIAGNOSIS — D50.0 IRON DEFICIENCY ANEMIA DUE TO CHRONIC BLOOD LOSS: ICD-10-CM

## 2018-10-25 DIAGNOSIS — K55.20 AVM (ARTERIOVENOUS MALFORMATION) OF SMALL BOWEL, ACQUIRED: ICD-10-CM

## 2018-10-25 DIAGNOSIS — K74.60 CIRRHOSIS OF LIVER WITHOUT ASCITES, UNSPECIFIED HEPATIC CIRRHOSIS TYPE (H): ICD-10-CM

## 2018-10-25 DIAGNOSIS — Z99.2 ESRD (END STAGE RENAL DISEASE) ON DIALYSIS (H): ICD-10-CM

## 2018-10-25 DIAGNOSIS — N18.6 ESRD (END STAGE RENAL DISEASE) ON DIALYSIS (H): ICD-10-CM

## 2018-10-25 DIAGNOSIS — K31.811 ANGIODYSPLASIA OF STOMACH AND DUODENUM WITH HEMORRHAGE: Primary | ICD-10-CM

## 2018-10-25 PROCEDURE — 96372 THER/PROPH/DIAG INJ SC/IM: CPT | Performed by: INTERNAL MEDICINE

## 2018-10-25 PROCEDURE — 99207 ZZC NO CHARGE LOS: CPT

## 2018-10-25 RX ADMIN — OCTREOTIDE ACETATE 20 MG: KIT INTRAMUSCULAR at 11:57

## 2018-10-25 ASSESSMENT — PAIN SCALES - GENERAL: PAINLEVEL: SEVERE PAIN (6)

## 2018-10-25 NOTE — MR AVS SNAPSHOT
After Visit Summary   10/25/2018    Rachele Martínez    MRN: [id number]           Patient Information     Date Of Birth          1940        Visit Information        Provider Department      10/25/2018 11:30 AM [address]        Today's Diagnoses     Angiodysplasia of stomach and duodenum with hemorrhage    -  1    ESRD (end stage renal disease) on dialysis (H)        Cirrhosis of liver without ascites, unspecified hepatic cirrhosis type (H)        AVM (arteriovenous malformation) of small bowel, acquired (H)        Iron deficiency anemia due to chronic blood loss           Follow-ups after your visit        Your next 10 appointments already scheduled     Nov 15, 2018  9:00 AM CST   Lab with  LAB   [facility] ([facility])    [address] [place] [address] [phone]            Nov 15, 2018  9:15 AM CST   US ABDOMEN COMPLETE with UCUS1   [facility] Imaging Center US ([facility])    [address] [place] [id number] [phone]           How do I prepare for my exam? (Food and drink instructions) Adults: No eating, smoking, gum chewing or drinking for 8 hours before the exam. You may take medicine with a small sip of water.  Children: * Infants, breast-fed: may have breast milk up to 2 hours before exam. * Infants, formula: may have bottle until 4 hours before exam. * Children 1-5 years: No food or drink for 4 hours before exam. * Children 6 -12 years: No food or drink for 6 hours before exam. * Children over 12 years: No food or drink for 8 hours before exam.  * J Tube Fed: No need to stop feedings.  What should I wear: Wear comfortable clothes.  How long does the exam take: Most ultrasounds take 30 to 60 minutes.  What should I bring: Bring a list of your medicines, including vitamins, minerals and over-the-counter drugs. It is safest to leave personal  items at home.  Do I need a :  No  is needed.  What do I need to tell my doctor: Tell your doctor about any allergies you may have.  What should I do after the exam: No restrictions, You may resume normal activities.  What is this test: An ultrasound uses sound waves to make pictures of the body. Sound waves do not cause pain. The only discomfort may be the pressure of the wand against your skin or full bladder.  Who should I call with questions: If you have any questions, please call the Imaging Department where you will have your exam. Directions, parking instructions, and other information is available on our website, [email or web address].            Nov 23, 2018  3:30 PM CST   Level O with [address] [facility] ([facility])    [address] [place] [id number] [phone]            Dec 07, 2018  3:30 PM CST   (Arrive by 3:15 PM)   Return Visit with Agnieszka Rodriguez MD   [facility] ([place] [facility])    [address] [place] [address]  4th Floor  [facility] [phone] [phone]            Feb 12, 2019 11:45 AM CST   (Arrive by 11:30 AM)   Return General Liver with Andriy Barnett MD   [facility] Hepatology ([place] [facility])    [address] [place] [address]  [address] [place] [id number] [phone]              Who to contact     If you have questions or need follow up information about today's clinic visit or your schedule please contact [facility] directly at [phone].  Normal or non-critical lab and imaging results will be communicated to you by MyChart, letter or phone within 4 business days after the clinic has received the results. If you do not hear from us within 7 days, please contact the clinic through MyChart or phone. If you have a critical or abnormal lab result, we will notify you by phone as soon as possible.  Submit refill requests through [employer]  or call your pharmacy and they will forward the refill request to us. Please allow 3 business days for your refill to be completed.          Additional Information About Your Visit        Ripstonehart Information     [employer] is an electronic gateway that provides easy, online access to your medical records. With [employer], you can request a clinic appointment, read your test results, renew a prescription or communicate with your care team.     To sign up for [employer] visit the website at [email or web address]   You will be asked to enter the access code listed below, as well as some personal information. Please follow the directions to create your username and password.     Your access code is: [email or web address]  Expires: 2019  6:30 AM     Your access code will  in 90 days. If you need help or a new code, please contact your [facility] or call [phone] for assistance.        Care EveryWhere ID     This is your Care EveryWhere ID. This could be used by other organizations to access your [place] medical records  [id number]        Your Vitals Were     Pulse Temperature Pulse Oximetry BMI (Body Mass Index)          78 98.8  F (37.1  C) (Oral) 100% 23.17 kg/m2         Blood Pressure from Last 3 Encounters:   10/25/18 120/69   10/24/18 121/62   18 129/77    Weight from Last 3 Encounters:   10/25/18 61.2 kg (135 lb)   10/24/18 61.5 kg (135 lb 9.6 oz)   18 62.1 kg (137 lb)              Today, you had the following     No orders found for display       Primary Care Provider Office Phone # Fax #    Agnieszka Erica Rodriguez [phone]       [address] [place] [address] [address] [place] [address]        Equal Access to Services     JAMIE CARVAJAL : Hadii lawrence Degroot, watimda luqadaha, qaybta kaalmada adecarolinayada, jaz fowler. So [facility] [phone].    ATENCIÓN: Si habla español, tiene a ibarra disposición servicios gratuitos de  asistencia lingüística. Bettye al [phone].    We comply with applicable federal civil rights laws and [place] laws. We do not discriminate on the basis of race, color, national origin, age, disability, sex, sexual orientation, or gender identity.            Thank you!     Thank you for choosing [facility]  for your care. Our goal is always to provide you with excellent care. Hearing back from our patients is one way we can continue to improve our services. Please take a few minutes to complete the written survey that you may receive in the mail after your visit with us. Thank you!             Your Updated Medication List - Protect others around you: Learn how to safely use, store and throw away your medicines at www.disposemymeds.org.          This list is accurate as of 10/25/18  2:21 PM.  Always use your most recent med list.                   Brand Name Dispense Instructions for use Diagnosis    atorvastatin 20 MG tablet    LIPITOR    90 tablet    Take 1 tablet (20 mg) by mouth daily    Cognitive impairment       Calcium Acetate (Phos Binder) 667 MG Caps      TAKE 2 CAPSULE BY MOUTH THREE TIMES A DAY WITH MEALS        octreotide 20 MG injection    sandoSTATIN LAR     Inject 20 mg into the muscle every 28 days        order for DME     1 Device    Equipment being ordered: 4 wheel walker with seat.    Disorder of bone and cartilage, Arthralgia, unspecified joint, Tendency to fall       pantoprazole 20 MG EC tablet    PROTONIX    60 tablet    Take 1 tablet (20 mg) by mouth 2 times daily Take by mouth 30-60 minutes before a meal.    Gastrointestinal hemorrhage, unspecified gastrointestinal hemorrhage type       polyethylene glycol 3350 Powd     1530 g    Take 17 g by mouth daily    Slow transit constipation       PROAIR  (90 Base) MCG/ACT inhaler   Generic drug:  albuterol     1 Inhaler    Inhale 2 puffs into the lungs every 6 hours as needed for shortness of breath / dyspnea or wheezing     Cough       PRORENAL + D Tabs      Take 1 tablet by mouth daily        sertraline 50 MG tablet    ZOLOFT    180 tablet    Take 2 tablets (100 mg) by mouth daily    Itching

## 2018-10-25 NOTE — PROGRESS NOTES
Infusion Nursing Note:  Rachele Martínez presents today for Sandostatin.    Patient seen by provider today: No   present during visit today: Not Applicable.    Note: N/A.    Intravenous Access:  No Intravenous access/labs at this visit.    Treatment Conditions:  Not Applicable.    Post Infusion Assessment:  Patient tolerated injection without incident.  Site patent and intact, free from redness, edema or discomfort.    Discharge Plan:   Patient will return 11/23/18 for next appointment.  Patient discharged in stable condition accompanied by: daughter.  Departure Mode: Ambulatory.    Holly Méndez RN

## 2018-11-03 ENCOUNTER — RADIANT APPOINTMENT (OUTPATIENT)
Dept: GENERAL RADIOLOGY | Facility: CLINIC | Age: 78
End: 2018-11-03
Attending: FAMILY MEDICINE
Payer: MEDICARE

## 2018-11-03 ENCOUNTER — OFFICE VISIT (OUTPATIENT)
Dept: ORTHOPEDICS | Facility: CLINIC | Age: 78
End: 2018-11-03
Payer: MEDICARE

## 2018-11-03 VITALS
BODY MASS INDEX: 23.05 KG/M2 | SYSTOLIC BLOOD PRESSURE: 128 MMHG | HEIGHT: 64 IN | WEIGHT: 135 LBS | DIASTOLIC BLOOD PRESSURE: 72 MMHG

## 2018-11-03 DIAGNOSIS — M54.41 ACUTE RIGHT-SIDED LOW BACK PAIN WITH RIGHT-SIDED SCIATICA: ICD-10-CM

## 2018-11-03 DIAGNOSIS — M54.41 ACUTE RIGHT-SIDED LOW BACK PAIN WITH RIGHT-SIDED SCIATICA: Primary | ICD-10-CM

## 2018-11-03 RX ORDER — PREDNISONE 20 MG/1
40 TABLET ORAL DAILY
Qty: 10 TABLET | Refills: 0 | Status: SHIPPED | OUTPATIENT
Start: 2018-11-03 | End: 2019-03-02

## 2018-11-03 RX ORDER — TIZANIDINE 2 MG/1
2 TABLET ORAL 3 TIMES DAILY PRN
Qty: 30 TABLET | Refills: 0 | Status: SHIPPED | OUTPATIENT
Start: 2018-11-03 | End: 2019-03-26

## 2018-11-03 NOTE — MR AVS SNAPSHOT
After Visit Summary   11/3/2018    Rachele Martínez    MRN: [id number]           Patient Information     Date Of Birth          1940        Visit Information        Provider Department      11/3/2018 11:30 AM Sharad Awan MD [facility]        Today's Diagnoses     Acute right-sided low back pain with right-sided sciatica    -  1      Care Instructions    Take prednisone in the morning with food for 5 days  Do not take ibuprofen, naproxen while taking prednisone  Take tizanidine up to three times daily for spasm  Perform home exercises as tolerated  Follow up in clinic if worsening symptoms such as numbness, weakness, or if there is no improvement after 6 weeks.             Follow-ups after your visit        Your next 10 appointments already scheduled     Nov 08, 2018 11:30 AM TREASURE   MA SCREENING DIGITAL BILATERAL with [id number]   [facility] Imaging ([facility])    [address] [place] [address], 2nd Floor  [facility] [id number] [phone]           How do I prepare for my exam? (Food and drink instructions) No Food and Drink Restrictions.  How do I prepare for my exam? (Other instructions) Do not use any powder, lotion or deodorant under your arms or on your breast. If you do, we will ask you to remove it before your exam.  What should I wear: Wear comfortable, two-piece clothing.  How long does the exam take: Most scans will take 15 minutes.  What should I bring: Bring any previous mammograms from other facilities or have them mailed to the breast center.  Do I need a :  No  is needed.  What do I need to tell my doctor: If you have any allergies, tell your care team.  What should I do after the exam: No restrictions, You may resume normal activities.  What is this test: This test is an x-ray of the breast to look for breast disease. The breast is pressed between two plates to flatten and spread the  "tissue. An X-ray is taken of the breast from different angles.  Who should I call with questions: If you have any questions, please call the Imaging Department where you will have your exam. Directions, parking instructions, and other information is available on our website, [email or web address].  Other information about my exam Three-dimensional (3D) mammograms are available at [facility] locations in [facility], [facility], [place], [facility] and [place].  Health locations include [place] and the [facility] in [place].  Benefits of 3D mammograms include * Improved rate of cancer detection * Decreases your chance of having to go back for more tests, which means fewer: * \"False-positive\" results (This means that there is an abnormal area but it isn't cancer.) * Invasive testing procedures, such as a biopsy or surgery * Can provide clearer images of the breast if you have dense breast tissue.  *3D mammography is an optional exam that anyone can have with a 2D mammogram. It doesn't replace or take the place of a 2D mammogram. 2D mammograms remain an effective screening test for all women.  Not all insurance companies cover the cost of a 3D mammogram. Check with your insurance. Three-dimensional (3D) mammograms are available at [facility] locations in [facility], [facility], [facility], and [place]. Health locations include [place] and [facility] in [place]. Benefits of 3D mammograms include: - Improved rate of cancer detection - Decreases your chance of having to go back for more tests, which means fewer: - \"False-positive\" results (This means that there is an abnormal area but it isn't cancer.) - Invasive testing procedures, such as a biopsy or surgery - Can provide clearer images of the breast if you have dense breast tissue. 3D mammography is an optional exam that anyone can have with a 2D " mammogram. It doesn't replace or take the place of a 2D mammogram. 2D mammograms remain an effective screening test for all women.  Not all insurance companies cover the cost of a 3D mammogram. Check with your insurance.            2018  3:30 PM CST   Level O with [facility] ([facility])    [address] [place] [address]   [phone]            Dec 07, 2018  3:30 PM CST   (Arrive by 3:15 PM)   Return Visit with Agnieszka Rodriguez MD   [facility] ([facility])    [address]  4th Floor  [facility] [id number] [phone]            2019 11:45 AM CST   (Arrive by 11:30 AM)   Return General Liver with Andriy Barnett MD   [facility] Hepatology ([facility])    [address]  [address]  [facility] [id number] [phone]              Who to contact     Please call your clinic at [phone] to:    Ask questions about your health    Make or cancel appointments    Discuss your medicines    Learn about your test results    Speak to your doctor            Additional Information About Your Visit        MyChart Information     [employer] is an electronic gateway that provides easy, online access to your medical records. With [employer], you can request a clinic appointment, read your test results, renew a prescription or communicate with your care team.     To sign up for [employer] visit the website at [email or web address]   You will be asked to enter the access code listed below, as well as some personal information. Please follow the directions to create your username and password.     Your access code is: [email or web address]  Expires: 2019  6:30 AM     Your access code will  in 90 days. If you need help or a new code, please contact your [facility] or call [phone] for assistance.        Care EveryWhere ID   "   This is your Care EveryWhere ID. This could be used by other organizations to access your [facility] medical records  [id number]        Your Vitals Were     Height BMI (Body Mass Index)                1.626 m (5' 4\") 23.17 kg/m2           Blood Pressure from Last 3 Encounters:   10/25/18 120/69   10/24/18 121/62   09/27/18 129/77    Weight from Last 3 Encounters:   11/03/18 61.2 kg (135 lb)   10/25/18 61.2 kg (135 lb)   10/24/18 61.5 kg (135 lb 9.6 oz)                 Today's Medication Changes          These changes are accurate as of 11/3/18 12:41 PM.  If you have any questions, ask your nurse or doctor.               Start taking these medicines.        Dose/Directions    predniSONE 20 MG tablet   Commonly known as:  DELTASONE   Used for:  Acute right-sided low back pain with right-sided sciatica   Started by:  Sharad Awan MD        Dose:  40 mg   Take 2 tablets (40 mg) by mouth daily   Quantity:  10 tablet   Refills:  0       tiZANidine 2 MG tablet   Commonly known as:  ZANAFLEX   Used for:  Acute right-sided low back pain with right-sided sciatica   Started by:  Sharad Awan MD        Dose:  2 mg   Take 1 tablet (2 mg) by mouth 3 times daily as needed for muscle spasms   Quantity:  30 tablet   Refills:  0            Where to get your medicines      These medications were sent to [facility] [address] [place] [address] [address] [facility] [address]     Phone:  [phone]     predniSONE 20 MG tablet    tiZANidine 2 MG tablet                Primary Care Provider Office Phone # Fax #    Agnieszka Erica Rodriguez [phone]       [address] [place] [address]  [facility] [address]        Equal Access to Services     JAMIE CARVAJAL AH: Crow Degroot, waaxda luqadaha, qaybta kaalmada jorge, jaz fowler. So [facility] [phone].    ATENCIÓN: Si habla español, tiene a ibarra disposición servicios gratuitos de asistencia " lingüística. Bettye al [phone].    We comply with applicable federal civil rights laws and [place] laws. We do not discriminate on the basis of race, color, national origin, age, disability, sex, sexual orientation, or gender identity.            Thank you!     Thank you for choosing [facility]THOP[facility] W[facility] CLINIC  for your care. Our goal is always to provide you with excellent care. Hearing back from our patients is one way we can continue to improve our services. Please take a few minutes to complete the written survey that you may receive in the mail after your visit with us. Thank you!             Your Updated Medication List - Protect others around you: Learn how to safely use, store and throw away your medicines at www.disposemymeds.org.          This list is accurate as of 11/3/18 12:41 PM.  Always use your most recent med list.                   Brand Name Dispense Instructions for use Diagnosis    atorvastatin 20 MG tablet    LIPITOR    90 tablet    Take 1 tablet (20 mg) by mouth daily    Cognitive impairment       Calcium Acetate (Phos Binder) 667 MG Caps      TAKE 2 CAPSULE BY MOUTH THREE TIMES A DAY WITH MEALS        octreotide 20 MG injection    sandoSTATIN LAR     Inject 20 mg into the muscle every 28 days        order for DME     1 Device    Equipment being ordered: 4 wheel walker with seat.    Disorder of bone and cartilage, Arthralgia, unspecified joint, Tendency to fall       pantoprazole 20 MG EC tablet    PROTONIX    60 tablet    Take 1 tablet (20 mg) by mouth 2 times daily Take by mouth 30-60 minutes before a meal.    Gastrointestinal hemorrhage, unspecified gastrointestinal hemorrhage type       polyethylene glycol 3350 Powd     1530 g    Take 17 g by mouth daily    Slow transit constipation       predniSONE 20 MG tablet    DELTASONE    10 tablet    Take 2 tablets (40 mg) by mouth daily    Acute right-sided low back pain with right-sided sciatica       PROAIR  (90  Base) MCG/ACT inhaler   Generic drug:  albuterol     1 Inhaler    Inhale 2 puffs into the lungs every 6 hours as needed for shortness of breath / dyspnea or wheezing    Cough       PRORENAL + D Tabs      Take 1 tablet by mouth daily        sertraline 50 MG tablet    ZOLOFT    180 tablet    Take 2 tablets (100 mg) by mouth daily    Itching       tiZANidine 2 MG tablet    ZANAFLEX    30 tablet    Take 1 tablet (2 mg) by mouth 3 times daily as needed for muscle spasms    Acute right-sided low back pain with right-sided sciatica

## 2018-11-03 NOTE — PATIENT INSTRUCTIONS
Take prednisone in the morning with food for 5 days  Do not take ibuprofen, naproxen while taking prednisone  Take tizanidine up to three times daily for spasm  Perform home exercises as tolerated  Follow up in clinic if worsening symptoms such as numbness, weakness, or if there is no improvement after 6 weeks.

## 2018-11-03 NOTE — PROGRESS NOTES
SPORTS & ORTHOPEDIC WALK-IN VISIT 11/3/2018    Primary Care Physician:      Patient is here today with her daughter.  Has been having low back pain for a number of months however for the last few weeks has been radiating down her right anterior leg.  Pain is worse with sitting as well as walking.  Has pain with forward flexion.  Better with lying down.  Has tried rest, heat and Tylenol with minimal improvement.  Denies any tingling numbness or weakness in the lower extremities.  No specific back injury or trauma.  No bowel or bladder problems.      Reason for visit:     What part of your body is injured / painful?  right low back and right leg/thigh.     What caused the injury /pain? No inciting event     How long ago did your injury occur or pain begin? several weeks ago    What are your most bothersome symptoms? Pain    How would you characterize your symptom?  sharp and shooting    What makes your symptoms better? Rest, Heat and Ibuprofen    What makes your symptoms worse? Standing, Walking, Movement and Bending    Have you been previously seen for this problem? No    Medical History:    Any recent changes to your medical history? No    Any new medication prescribed since last visit? No    Have you had surgery on this body part before? No    Social History:    Occupation: NA    Handedness: Right    Exercise: 4-5 days/week    Review of Systems:    Do you have fever, chills, weight loss? No    Do you have any vision problems? No    Do you have any chest pain or edema? No    Do you have any shortness of breath or wheezing?  No    Do you have stomach problems? No    Do you have any numbness or focal weakness? No    Do you have diabetes? No    Do you have problems with bleeding or clotting? No    Do you have an rashes or other skin lesions? Yes, bleeding AVM           Past Medical History, Current Medications, and Allergies are reviewed in the electronic medical record as appropriate.       EXAM: 1.626 m  "(5' 4\")  Wt 61.2 kg (135 lb)  BMI 23.17 kg/m2    General: alert, pleasant, no distress. sitting comfortably in chair  Back/Spine: no tenderness to palpation of spinous processes, or paraspinous musculature of lumbar spine. full ROM with flexion, extension, twisting, and side bending but has pain with forward flexion, side bending and rotation at the waist.  Straight leg raise produces pain in the back bilaterally.  No significant hamstring tightness noted.   Neuro: SILT on bilateral lower extremities, can stand on toes and heel walk without difficulty, patellar and achilles reflexes 2+ and symmetric,       Imaging: X-rays of the lumbar spine are performed reviewed independently demonstrating diffuse degenerative disease worse at L3-4 as evidenced by endplate sclerosis and disc height narrowing.      Assessment: Patient is a 78 year old female with degenerative disease of the lumbar spine presenting with complaints of low back pain and right-sided radicular symptoms.    Recommendations:   Reviewed imaging and assessment with the patient and her daughter in detail  Recommended course of oral steroid medication.  Given prescription for tizanidine and discussed dosing with relation to her dialysis  Call or follow-up in 1 week if no improvement after steroid course.  Could consider further imaging of the lumbar spine with MRI at that time.    Sharad Awan MD                "

## 2018-11-08 ENCOUNTER — RADIANT APPOINTMENT (OUTPATIENT)
Dept: MAMMOGRAPHY | Facility: CLINIC | Age: 78
End: 2018-11-08
Payer: MEDICARE

## 2018-11-08 DIAGNOSIS — Z12.31 VISIT FOR SCREENING MAMMOGRAM: ICD-10-CM

## 2018-11-14 DIAGNOSIS — L29.9 ITCHING: ICD-10-CM

## 2018-11-23 ENCOUNTER — INFUSION THERAPY VISIT (OUTPATIENT)
Dept: INFUSION THERAPY | Facility: CLINIC | Age: 78
End: 2018-11-23
Payer: MEDICARE

## 2018-11-23 VITALS
DIASTOLIC BLOOD PRESSURE: 75 MMHG | OXYGEN SATURATION: 96 % | HEART RATE: 55 BPM | TEMPERATURE: 97.3 F | SYSTOLIC BLOOD PRESSURE: 123 MMHG

## 2018-11-23 DIAGNOSIS — K74.60 CIRRHOSIS OF LIVER WITHOUT ASCITES, UNSPECIFIED HEPATIC CIRRHOSIS TYPE (H): ICD-10-CM

## 2018-11-23 DIAGNOSIS — N18.6 ESRD (END STAGE RENAL DISEASE) ON DIALYSIS (H): ICD-10-CM

## 2018-11-23 DIAGNOSIS — K55.20 AVM (ARTERIOVENOUS MALFORMATION) OF SMALL BOWEL, ACQUIRED: ICD-10-CM

## 2018-11-23 DIAGNOSIS — Z99.2 ESRD (END STAGE RENAL DISEASE) ON DIALYSIS (H): ICD-10-CM

## 2018-11-23 DIAGNOSIS — D50.0 IRON DEFICIENCY ANEMIA DUE TO CHRONIC BLOOD LOSS: ICD-10-CM

## 2018-11-23 DIAGNOSIS — K31.811 ANGIODYSPLASIA OF STOMACH AND DUODENUM WITH HEMORRHAGE: Primary | ICD-10-CM

## 2018-11-23 PROCEDURE — 96372 THER/PROPH/DIAG INJ SC/IM: CPT | Performed by: NURSE PRACTITIONER

## 2018-11-23 PROCEDURE — 99207 ZZC NO CHARGE NURSE ONLY: CPT

## 2018-11-23 RX ADMIN — OCTREOTIDE ACETATE 20 MG: KIT INTRAMUSCULAR at 14:52

## 2018-11-23 ASSESSMENT — PAIN SCALES - GENERAL: PAINLEVEL: NO PAIN (0)

## 2018-11-23 NOTE — MR AVS SNAPSHOT
After Visit Summary   11/23/2018    Rachele Martínez    MRN: [id number]           Patient Information     Date Of Birth          1940        Visit Information        Provider Department      11/23/2018 3:30 PM \[facility] [place] [facility]\"" INFUSION [facility]        Today's Diagnoses     Angiodysplasia of stomach and duodenum with hemorrhage    -  1    ESRD (end stage renal disease) on dialysis (H)        Cirrhosis of liver without ascites, unspecified hepatic cirrhosis type (H)        AVM (arteriovenous malformation) of small bowel, acquired (H)        Iron deficiency anemia due to chronic blood loss           Follow-ups after your visit        Your next 10 appointments already scheduled     Nov 23, 2018  3:30 PM CST   Level O with \[facility] [place] [facility]\"" INFUSION   [facility] ([facility])    [address] [address] [place] [address] [phone]            Dec 07, 2018  3:30 PM CST   (Arrive by 3:15 PM)   Return Visit with Agnieszka Rodriguez MD   [facility] ([facility])    [address] [place] [address]  4th Floor  [facility] [address] [phone]            Dec 20, 2018 11:00 AM CST   Level O with \[facility] [place] [facility]\"" INFUSION   [facility] ([facility])    [address] [place] [address] [phone]            Feb 12, 2019 11:45 AM CST   (Arrive by 11:30 AM)   Return General Liver with Andriy Barnett MD   [facility] Hepatology ([facility])    [address] [place] [id number] [phone]              Who to contact     If you have questions or need follow up information about today's clinic visit or your schedule please contact [facility] directly at [phone].  Normal or non-critical lab and imaging results will be communicated to you by MyChart, letter or phone within 4 business days after the clinic has received the  results. If you do not hear from us within 7 days, please contact the clinic through [email or web address] or phone. If you have a critical or abnormal lab result, we will notify you by phone as soon as possible.  Submit refill requests through [email or web address] or call your pharmacy and they will forward the refill request to us. Please allow 3 business days for your refill to be completed.          Additional Information About Your Visit        [email or web address] Information     [email or web address] is an electronic gateway that provides easy, online access to your medical records. With [email or web address], you can request a clinic appointment, read your test results, renew a prescription or communicate with your care team.     To sign up for [email or web address] visit the website at [email or web address]   You will be asked to enter the access code listed below, as well as some personal information. Please follow the directions to create your username and password.     Your access code is: [email or web address]  Expires: 2019  5:30 AM     Your access code will  in 90 days. If you need help or a new code, please contact your [facility] or call [phone] for assistance.        Care EveryWhere ID     This is your Care EveryWhere ID. This could be used by other organizations to access your [place] medical records  [id number]        Your Vitals Were     Pulse Temperature Pulse Oximetry             55 97.3  F (36.3  C) (Oral) 96%          Blood Pressure from Last 3 Encounters:   18 123/75   18 128/72   10/25/18 120/69    Weight from Last 3 Encounters:   18 61.2 kg (135 lb)   10/25/18 61.2 kg (135 lb)   10/24/18 61.5 kg (135 lb 9.6 oz)              Today, you had the following     No orders found for display       Primary Care Provider Office Phone # Fax #    Agnieszka Rodriguez [phone]       [address] [place] [address]  [facility] [address]        Equal Access to Services     JAMIE CARVAJAL : Jerrodii lawrence wilson  jerry Degroot, watimda luqadaha, qaybta kamalika patel, jaz ramírez josuecarolina lesryder latristindain malcolm. So [facility] [phone].    ATENCIÓN: Si habla español, tiene a ibarra disposición servicios gratuitos de asistencia lingüística. Bettye al [phone].    We comply with applicable federal civil rights laws and [place] laws. We do not discriminate on the basis of race, color, national origin, age, disability, sex, sexual orientation, or gender identity.            Thank you!     Thank you for choosing [facility]  for your care. Our goal is always to provide you with excellent care. Hearing back from our patients is one way we can continue to improve our services. Please take a few minutes to complete the written survey that you may receive in the mail after your visit with us. Thank you!             Your Updated Medication List - Protect others around you: Learn how to safely use, store and throw away your medicines at www.disposemymeds.org.          This list is accurate as of 11/23/18  3:06 PM.  Always use your most recent med list.                   Brand Name Dispense Instructions for use Diagnosis    atorvastatin 20 MG tablet    LIPITOR    90 tablet    Take 1 tablet (20 mg) by mouth daily    Cognitive impairment       Calcium Acetate (Phos Binder) 667 MG Caps      TAKE 2 CAPSULE BY MOUTH THREE TIMES A DAY WITH MEALS        octreotide 20 MG injection    sandoSTATIN LAR     Inject 20 mg into the muscle every 28 days        order for DME     1 Device    Equipment being ordered: 4 wheel walker with seat.    Disorder of bone and cartilage, Arthralgia, unspecified joint, Tendency to fall       pantoprazole 20 MG EC tablet    PROTONIX    60 tablet    Take 1 tablet (20 mg) by mouth 2 times daily Take by mouth 30-60 minutes before a meal.    Gastrointestinal hemorrhage, unspecified gastrointestinal hemorrhage type       polyethylene glycol 3350 Powd     1530 g    Take 17 g by mouth daily    Slow transit  constipation       predniSONE 20 MG tablet    DELTASONE    10 tablet    Take 2 tablets (40 mg) by mouth daily    Acute right-sided low back pain with right-sided sciatica       PROAIR  (90 Base) MCG/ACT inhaler   Generic drug:  albuterol     1 Inhaler    Inhale 2 puffs into the lungs every 6 hours as needed for shortness of breath / dyspnea or wheezing    Cough       PRORENAL + D Tabs      Take 1 tablet by mouth daily        sertraline 50 MG tablet    ZOLOFT    180 tablet    Take 2 tablets (100 mg) by mouth daily    Itching       tiZANidine 2 MG tablet    ZANAFLEX    30 tablet    Take 1 tablet (2 mg) by mouth 3 times daily as needed for muscle spasms    Acute right-sided low back pain with right-sided sciatica

## 2018-12-20 ENCOUNTER — INFUSION THERAPY VISIT (OUTPATIENT)
Dept: INFUSION THERAPY | Facility: CLINIC | Age: 78
End: 2018-12-20
Payer: MEDICARE

## 2018-12-20 VITALS
TEMPERATURE: 98 F | WEIGHT: 140.5 LBS | RESPIRATION RATE: 16 BRPM | OXYGEN SATURATION: 99 % | BODY MASS INDEX: 24.12 KG/M2 | SYSTOLIC BLOOD PRESSURE: 128 MMHG | DIASTOLIC BLOOD PRESSURE: 63 MMHG | HEART RATE: 73 BPM

## 2018-12-20 DIAGNOSIS — D50.0 IRON DEFICIENCY ANEMIA DUE TO CHRONIC BLOOD LOSS: ICD-10-CM

## 2018-12-20 DIAGNOSIS — K74.60 CIRRHOSIS OF LIVER WITHOUT ASCITES, UNSPECIFIED HEPATIC CIRRHOSIS TYPE (H): ICD-10-CM

## 2018-12-20 DIAGNOSIS — K31.811 ANGIODYSPLASIA OF STOMACH AND DUODENUM WITH HEMORRHAGE: Primary | ICD-10-CM

## 2018-12-20 DIAGNOSIS — N18.6 ESRD (END STAGE RENAL DISEASE) ON DIALYSIS (H): ICD-10-CM

## 2018-12-20 DIAGNOSIS — K55.20 AVM (ARTERIOVENOUS MALFORMATION) OF SMALL BOWEL, ACQUIRED: ICD-10-CM

## 2018-12-20 DIAGNOSIS — Z99.2 ESRD (END STAGE RENAL DISEASE) ON DIALYSIS (H): ICD-10-CM

## 2018-12-20 PROCEDURE — 99207 ZZC NO CHARGE NURSE ONLY: CPT

## 2018-12-20 PROCEDURE — 96372 THER/PROPH/DIAG INJ SC/IM: CPT | Performed by: INTERNAL MEDICINE

## 2018-12-20 RX ADMIN — OCTREOTIDE ACETATE 20 MG: KIT INTRAMUSCULAR at 11:44

## 2018-12-20 ASSESSMENT — PAIN SCALES - GENERAL: PAINLEVEL: NO PAIN (0)

## 2018-12-20 NOTE — PROGRESS NOTES
Infusion Nursing Note:  Rachele Martínez presents today for Sandostatin.    Patient seen by provider today: No   present during visit today: Not Applicable.    Note: Pt c/o fatigue and decreased appetite, see flow sheet for assessment.    Intravenous Access:  No Intravenous access.    Treatment Conditions:  Not Applicable.      Post Infusion Assessment:  Patient tolerated injection without incident.  Site patent and intact, free from redness, edema or discomfort.    Discharge Plan:   Patient discharged in stable condition accompanied by: daughter.  Departure Mode: Ambulatory.  Pt will RTC 1/17/19 for sandostatin.    Josias Hicks RN

## 2019-01-16 DIAGNOSIS — K74.60 CIRRHOSIS OF LIVER WITHOUT ASCITES, UNSPECIFIED HEPATIC CIRRHOSIS TYPE (H): Primary | ICD-10-CM

## 2019-01-17 ENCOUNTER — INFUSION THERAPY VISIT (OUTPATIENT)
Dept: INFUSION THERAPY | Facility: CLINIC | Age: 79
End: 2019-01-17
Payer: MEDICARE

## 2019-01-17 VITALS
RESPIRATION RATE: 18 BRPM | SYSTOLIC BLOOD PRESSURE: 144 MMHG | WEIGHT: 136.25 LBS | BODY MASS INDEX: 23.26 KG/M2 | DIASTOLIC BLOOD PRESSURE: 79 MMHG | OXYGEN SATURATION: 98 % | HEART RATE: 74 BPM | HEIGHT: 64 IN | TEMPERATURE: 97.8 F

## 2019-01-17 DIAGNOSIS — K31.811 ANGIODYSPLASIA OF STOMACH AND DUODENUM WITH HEMORRHAGE: Primary | ICD-10-CM

## 2019-01-17 DIAGNOSIS — D50.0 IRON DEFICIENCY ANEMIA DUE TO CHRONIC BLOOD LOSS: ICD-10-CM

## 2019-01-17 DIAGNOSIS — K55.20 AVM (ARTERIOVENOUS MALFORMATION) OF SMALL BOWEL, ACQUIRED: ICD-10-CM

## 2019-01-17 DIAGNOSIS — Z99.2 ESRD (END STAGE RENAL DISEASE) ON DIALYSIS (H): ICD-10-CM

## 2019-01-17 DIAGNOSIS — K74.60 CIRRHOSIS OF LIVER WITHOUT ASCITES, UNSPECIFIED HEPATIC CIRRHOSIS TYPE (H): ICD-10-CM

## 2019-01-17 DIAGNOSIS — N18.6 ESRD (END STAGE RENAL DISEASE) ON DIALYSIS (H): ICD-10-CM

## 2019-01-17 PROCEDURE — 96372 THER/PROPH/DIAG INJ SC/IM: CPT | Performed by: INTERNAL MEDICINE

## 2019-01-17 PROCEDURE — 99207 ZZC NO CHARGE LOS: CPT

## 2019-01-17 RX ADMIN — OCTREOTIDE ACETATE 20 MG: KIT INTRAMUSCULAR at 11:54

## 2019-01-17 ASSESSMENT — MIFFLIN-ST. JEOR: SCORE: 1088.03

## 2019-01-17 ASSESSMENT — PAIN SCALES - GENERAL: PAINLEVEL: EXTREME PAIN (8)

## 2019-01-17 NOTE — PROGRESS NOTES
Infusion Nursing Note:  Rachele Martínez presents today for Sandostatin injection.    Patient seen by provider today: No   present during visit today: Not Applicable.    Note: N/A.    Intravenous Access:  No Intravenous access/labs at this visit.    Treatment Conditions:  Not Applicable.      Post Infusion Assessment:  Patient tolerated injection without incident.    Discharge Plan:   Patient will return 2/14/19 for next appointment.   Patient discharged in stable condition accompanied by: daughter.  Departure Mode: Ambulatory.    Mercy Shoemaker RN

## 2019-01-18 DIAGNOSIS — K92.2 GASTROINTESTINAL HEMORRHAGE, UNSPECIFIED GASTROINTESTINAL HEMORRHAGE TYPE: ICD-10-CM

## 2019-01-19 NOTE — TELEPHONE ENCOUNTER
pantoprazole (PROTONIX) 20 MG EC tablet       Last Written Prescription Date:  Inpatient U7A     Last Office Visit : 6/15/18  Future Office visit:  none    Routing refill request to provider for review/approval because:  Previously ordered inpatient U7A

## 2019-01-21 RX ORDER — PANTOPRAZOLE SODIUM 20 MG/1
20 TABLET, DELAYED RELEASE ORAL 2 TIMES DAILY
Qty: 180 TABLET | Refills: 1 | Status: SHIPPED | OUTPATIENT
Start: 2019-01-21 | End: 2019-11-04

## 2019-02-14 ENCOUNTER — INFUSION THERAPY VISIT (OUTPATIENT)
Dept: INFUSION THERAPY | Facility: CLINIC | Age: 79
End: 2019-02-14
Payer: MEDICARE

## 2019-02-14 VITALS
OXYGEN SATURATION: 98 % | DIASTOLIC BLOOD PRESSURE: 82 MMHG | SYSTOLIC BLOOD PRESSURE: 158 MMHG | TEMPERATURE: 98.2 F | HEART RATE: 70 BPM | RESPIRATION RATE: 16 BRPM

## 2019-02-14 DIAGNOSIS — K55.20 AVM (ARTERIOVENOUS MALFORMATION) OF SMALL BOWEL, ACQUIRED: ICD-10-CM

## 2019-02-14 DIAGNOSIS — N18.6 ESRD (END STAGE RENAL DISEASE) ON DIALYSIS (H): ICD-10-CM

## 2019-02-14 DIAGNOSIS — Z99.2 ESRD (END STAGE RENAL DISEASE) ON DIALYSIS (H): ICD-10-CM

## 2019-02-14 DIAGNOSIS — K31.811 ANGIODYSPLASIA OF STOMACH AND DUODENUM WITH HEMORRHAGE: Primary | ICD-10-CM

## 2019-02-14 DIAGNOSIS — D50.0 IRON DEFICIENCY ANEMIA DUE TO CHRONIC BLOOD LOSS: ICD-10-CM

## 2019-02-14 DIAGNOSIS — K74.60 CIRRHOSIS OF LIVER WITHOUT ASCITES, UNSPECIFIED HEPATIC CIRRHOSIS TYPE (H): ICD-10-CM

## 2019-02-14 PROCEDURE — 96372 THER/PROPH/DIAG INJ SC/IM: CPT | Performed by: NURSE PRACTITIONER

## 2019-02-14 PROCEDURE — 99207 ZZC NO CHARGE LOS: CPT

## 2019-02-14 RX ADMIN — OCTREOTIDE ACETATE 20 MG: KIT INTRAMUSCULAR at 11:39

## 2019-02-14 ASSESSMENT — PAIN SCALES - GENERAL: PAINLEVEL: NO PAIN (0)

## 2019-02-14 NOTE — PROGRESS NOTES
Infusion Nursing Note:  Rachele Martínez presents today for sandostatin.    Patient seen by provider today: No   present during visit today: Not Applicable.    Note: N/A.    Intravenous Access:  No Intravenous access/labs at this visit.    Treatment Conditions:  Not Applicable.      Post Infusion Assessment:  Patient tolerated injection without incident.    Discharge Plan:   Patient and/or family verbalized understanding of discharge instructions and all questions answered.  Patient discharged in stable condition accompanied by: self and family member.  Departure Mode: Ambulatory.    Holly Way RN

## 2019-03-02 ENCOUNTER — OFFICE VISIT (OUTPATIENT)
Dept: ORTHOPEDICS | Facility: CLINIC | Age: 79
End: 2019-03-02
Payer: MEDICARE

## 2019-03-02 VITALS — BODY MASS INDEX: 23.22 KG/M2 | WEIGHT: 136 LBS | RESPIRATION RATE: 16 BRPM | HEIGHT: 64 IN

## 2019-03-02 DIAGNOSIS — M51.369 LUMBAR DEGENERATIVE DISC DISEASE: ICD-10-CM

## 2019-03-02 DIAGNOSIS — M54.41 ACUTE RIGHT-SIDED LOW BACK PAIN WITH RIGHT-SIDED SCIATICA: Primary | ICD-10-CM

## 2019-03-02 RX ORDER — PREDNISONE 20 MG/1
40 TABLET ORAL DAILY
Qty: 10 TABLET | Refills: 0 | Status: SHIPPED | OUTPATIENT
Start: 2019-03-02 | End: 2019-03-11

## 2019-03-02 RX ORDER — TRAMADOL HYDROCHLORIDE 50 MG/1
50 TABLET ORAL EVERY 6 HOURS PRN
Qty: 15 TABLET | Refills: 0 | Status: SHIPPED | OUTPATIENT
Start: 2019-03-02 | End: 2019-03-26

## 2019-03-02 ASSESSMENT — MIFFLIN-ST. JEOR: SCORE: 1086.89

## 2019-03-02 NOTE — LETTER
"  RE: Rachele Martínez  [address]  [address]  [facility] [address]     Dear Colleague,    Thank you for referring your patient, Rachele Martínez, to the [facility] SPORTS AND ORTHOPAEDIC WALK IN CLINIC at [facility]. Please see a copy of my visit note below.    ESTABLISHED PATIENT FOLLOW-UP:  Pain of the Lower Back     HISTORY OF PRESENT ILLNESS  Ms. Martínez is a pleasant 78 year old year old female who presents to clinic today for follow-up of low back pain with right-sided radiculopathy.    Date of injury: Roughly 6 months ago, no acute injury  Date last seen: 11/3/18 with Dr. Awan  Following Therapeutic Plan: Yes  Pain: Worsening  Function: Worsening  Interval History: Patient states that she was seen by Dr. Awan about 4 months ago and prescribed prednisone burst.  She had very good response shortly thereafter and had decreased pain of her low back.  However she has continued to have pain down the right leg which she describes as \"the whole leg\".  It is gotten worse over the last week or 2 which is preventing her from being able to walk normally.  No significant numbness or tingling noted.  No saddle anesthesia.    Treatments to date: Prednisone burst, tizanidine which she continues to use sparingly.  Patient does not like ibuprofen or Tylenol.    Additional medical/Social/Surgical histories reviewed in [facility] and updated as appropriate.     PHYSICAL EXAM  Resp 16   Ht 1.626 m (5' 4\")   Wt 61.7 kg (136 lb)   BMI 23.34 kg/m       General  - normal appearance, in no obvious distress  CV  - normal peripheral perfusion  Pulm  - normal respiratory pattern, non-labored  Musculoskeletal - lumbar spine  - stance: normal gait without limp, no obvious leg length discrepancy, normal heel and toe walk  - inspection: normal bone and joint alignment, no obvious scoliosis  - palpation: no paravertebral or bony tenderness  - ROM: flexion exacerbates pain, normal extension, " sidebending, rotation  - strength: lower extremities 5/5 in all planes  - special tests:  (+) straight leg raise right  (+) slump test right  Neuro  - patellar and Achilles DTRs 2+ bilaterally, no lower extremity sensory deficit throughout L5 distribution, grossly normal coordination, normal muscle tone  Skin  - no ecchymosis, erythema, warmth, or induration, no obvious rash  Psych  - interactive, appropriate, normal mood and affect    IMAGING : XR lumbar spine reviewed independently and reveals significant L3-L4 disc degeneration, otherwise multilevel mild to moderate changes     ASSESSMENT & PLAN  Ms. Martínez is a 78 year old year old female who presents to clinic today with mild right-sided low back pain as well as severe pain of her right lower extremity.  Given initial conservative treatments, is warranted to order an MRI of the lumbar spine at this time.  I do suspect that there is impingement, possibly at the L4 nerve root caused by disc or osteophyte    -MRI lumbar spine  -Tramadol as needed for pain  -Prednisone burst  -Follow up after MRI is completed, discussed possibility of epidural steroid injection if applicable    It was a pleasure seeing Haily Ayala DO, CAQSM  Primary Care Sports Medicine

## 2019-03-03 ENCOUNTER — ANCILLARY PROCEDURE (OUTPATIENT)
Dept: MRI IMAGING | Facility: CLINIC | Age: 79
End: 2019-03-03
Attending: FAMILY MEDICINE
Payer: MEDICARE

## 2019-03-03 DIAGNOSIS — M54.41 ACUTE RIGHT-SIDED LOW BACK PAIN WITH RIGHT-SIDED SCIATICA: ICD-10-CM

## 2019-03-03 NOTE — DISCHARGE INSTRUCTIONS
MRI Contrast Discharge Instructions    The IV contrast you received today will pass out of your body in your  urine. This will happen in the next 24 hours. You will not feel this process.  Your urine will not change color.    Drink at least 4 extra glasses of water or juice today (unless your doctor  has restricted your fluids). This reduces the stress on your kidneys.  You may take your regular medicines.    If you are on dialysis: It is best to have dialysis today.    If you have a reaction: Most reactions happen right away. If you have  any new symptoms after leaving the hospital (such as hives or swelling),  call your hospital at the correct number below. Or call your family doctor.  If you have breathing distress or wheezing, call 911.    Special instructions: ***    I have read and understand the above information.    Signature:______________________________________ Date:___________    Staff:__________________________________________ Date:___________     Time:__________    [place] [facility] Departments:    ___[place]: [phone]  ___[facility]: [phone]  ___[place]: [phone] ___[facility]: [phone]  ___[facility]: [phone]  ___[facility]: [phone]  ___[phone]  ___[facility]: [phone]  ___[phone]

## 2019-03-03 NOTE — PROGRESS NOTES
"ESTABLISHED PATIENT FOLLOW-UP:  Pain of the Lower Back       HISTORY OF PRESENT ILLNESS  Ms. Martínez is a pleasant 78 year old year old female who presents to clinic today for follow-up of low back pain with right-sided radiculopathy.    Date of injury: Roughly 6 months ago, no acute injury  Date last seen: 11/3/18 with Dr. Awan  Following Therapeutic Plan: Yes  Pain: Worsening  Function: Worsening  Interval History: Patient states that she was seen by Dr. Awan about 4 months ago and prescribed prednisone burst.  She had very good response shortly thereafter and had decreased pain of her low back.  However she has continued to have pain down the right leg which she describes as \"the whole leg\".  It is gotten worse over the last week or 2 which is preventing her from being able to walk normally.  No significant numbness or tingling noted.  No saddle anesthesia.    Treatments to date: Prednisone burst, tizanidine which she continues to use sparingly.  Patient does not like ibuprofen or Tylenol.    Additional medical/Social/Surgical histories reviewed in [facility] and updated as appropriate.    REVIEW OF SYSTEMS (3/2/2019)  CONSTITUTIONAL: Denies fever and weight loss  GASTROINTESTINAL: Denies abdominal pain, nausea, vomiting  MUSCULOSKELETAL: See HPI  SKIN: Denies any recent rash or lesion  NEUROLOGICAL: Denies numbness or focal weakness     PHYSICAL EXAM  Resp 16   Ht 1.626 m (5' 4\")   Wt 61.7 kg (136 lb)   BMI 23.34 kg/m      General  - normal appearance, in no obvious distress  CV  - normal peripheral perfusion  Pulm  - normal respiratory pattern, non-labored  Musculoskeletal - lumbar spine  - stance: normal gait without limp, no obvious leg length discrepancy, normal heel and toe walk  - inspection: normal bone and joint alignment, no obvious scoliosis  - palpation: no paravertebral or bony tenderness  - ROM: flexion exacerbates pain, normal extension, sidebending, rotation  - strength: lower extremities 5/5 in " all planes  - special tests:  (+) straight leg raise right  (+) slump test right  Neuro  - patellar and Achilles DTRs 2+ bilaterally, no lower extremity sensory deficit throughout L5 distribution, grossly normal coordination, normal muscle tone  Skin  - no ecchymosis, erythema, warmth, or induration, no obvious rash  Psych  - interactive, appropriate, normal mood and affect    IMAGING : XR lumbar spine reviewed independently and reveals significant L3-L4 disc degeneration, otherwise multilevel mild to moderate changes     ASSESSMENT & PLAN  Ms. Martínez is a 78 year old year old female who presents to clinic today with mild right-sided low back pain as well as severe pain of her right lower extremity.  Given initial conservative treatments, is warranted to order an MRI of the lumbar spine at this time.  I do suspect that there is impingement, possibly at the L4 nerve root caused by disc or osteophyte    -MRI lumbar spine  -Tramadol as needed for pain  -Prednisone burst  -Follow up after MRI is completed, discussed possibility of epidural steroid injection if applicable    It was a pleasure seeing Haily Ayala DO, CAQSM  Primary Care Sports Medicine

## 2019-03-11 ENCOUNTER — OFFICE VISIT (OUTPATIENT)
Dept: ORTHOPEDICS | Facility: CLINIC | Age: 79
End: 2019-03-11
Payer: MEDICARE

## 2019-03-11 DIAGNOSIS — G89.29 CHRONIC BILATERAL LOW BACK PAIN WITH RIGHT-SIDED SCIATICA: Primary | ICD-10-CM

## 2019-03-11 DIAGNOSIS — M54.41 CHRONIC BILATERAL LOW BACK PAIN WITH RIGHT-SIDED SCIATICA: Primary | ICD-10-CM

## 2019-03-11 RX ORDER — TRAMADOL HYDROCHLORIDE 50 MG/1
50-100 TABLET ORAL EVERY 6 HOURS PRN
Qty: 15 TABLET | Refills: 0 | Status: SHIPPED | OUTPATIENT
Start: 2019-03-11 | End: 2019-08-09

## 2019-03-11 NOTE — Clinical Note
3/11/2019       RE: Rachele Martínez  [address]  [place] [address]     Dear Colleague,    Thank you for referring your patient, Rachele Martínez, to the [facility] SPORTS AND ORTHOPAEDIC WALK IN CLINIC at [facility]. Please see a copy of my visit note below.          SPORTS & ORTHOPEDIC WALK-IN FOLLOW-UP VISIT 3/11/2019    Interval History:     Follow up reason: results of lumbar MRI             [facility]  Orthopedics  Sharad Awan MD  2019     Name: Rachele Martínez  MRN: [id number]  Age: 78 year old  : 1940  Referring provider: Referred Self     Chief Complaint:   Low Back Pain.      History of Present Illness:   Rachele Martínez is a 78 year old female who presents today for follow-up regarding low back pain with right-sided radiculopathy. She was last evaluated in follow-up of this complaint on 2019 by Dr. Naga Ayala; please see this note for further details. At that time, she was experiencing right-sided low back pain with radiation to her right lateral thigh. It had progressively worsened in the two weeks prior to her evaluation, preventing her from being able to walk normally. Dr. Ayala prescribed a course of prednisone as this had been helpful in the past. MR imaging was also undertaken; this was completed on 2019. The results are listed below. She presents today to discuss these results and further treatment options. Compared to last week, the patient states she has had no change in symptoms. The prednisone offered little to no relief. She continues to have pain localized to her right low back with radiation to her right posterolateral thigh. The right leg generally feels weak.     Review of Systems:   A 10-point review of systems was obtained and is negative except for as noted in the HPI.     Physical Exam:   General: alert, pleasant, no distress. sitting comfortably in chair  Back/Spine: Positive straight leg  raise on the right.   Neuro: Patellar and achilles reflexes 2+ and symmetric. Sensation intact to light touch.     Imaging:  MRI of the lumbar spine - (03/11/2019):  1. Multilevel lumbar spondylosis, most pronounced at L3-4 with severe spinal canal stenosis and moderate to severe neural foraminal stenosis bilaterally and at L5-S1 with severe right and moderate severe left neural foraminal stenosis.  2. Narrowing of the lateral recesses at L3-4 and L5-S1 with probable impingement of the traversing L4 and S1 nerve roots bilaterally.  Per Radiology.     I have independently reviewed the above imaging studies; the results were discussed with the patient.     Assessment:   78 year old female with low back pain and right-sided radicular symptoms. Multiple disc bulges noted on MR imaging; suspect symptoms correlate with an L5 radiculopathy.     Plan:   Rachele and I discussed the available treatment options, including continue conservative measures versus an L5-S1 interlaminar corticosteroid injection, as well as the risks and benefits of each. We agreed upon the following plan:     - We will schedule an L5-S1 interlaminar epidural corticosteroid injection.   - She will follow up two weeks after the injection for repeat evaluation and to discuss progression.   - Continue with conservative measures, including rest, tizanidine, tramadol, and activity modification in the meantime. A new prescription for tramadol was provided.       Scribe Disclosure:   I, Nakul Fraga, am serving as a scribe to document services personally performed by Sharad Awan MD at this visit, based upon the provider's statements to me. All documentation has been reviewed by the aforementioned provider prior to being entered into the official medical record.         Again, thank you for allowing me to participate in the care of your patient.      Sincerely,    Sharad Awan MD

## 2019-03-11 NOTE — PROGRESS NOTES
SPORTS & ORTHOPEDIC WALK-IN FOLLOW-UP VISIT 3/11/2019    Interval History:     Follow up reason: results of lumbar MRI

## 2019-03-11 NOTE — PROGRESS NOTES
[facility]  Orthopedics  Sharad Awan MD  2019     Name: Rachele Martínez  MRN: [id number]  Age: 78 year old  : 1940  Referring provider: Referred Self     Chief Complaint:   Low Back Pain.      History of Present Illness:   Rachele Martínez is a 78 year old female who presents today for follow-up regarding low back pain with right-sided radiculopathy. She was last evaluated in follow-up of this complaint on 2019 by Dr. Naga Ayala; please see this note for further details. At that time, she was experiencing right-sided low back pain with radiation to her right lateral thigh. It had progressively worsened in the two weeks prior to her evaluation, preventing her from being able to walk normally. Dr. Ayala prescribed a course of prednisone as this had been helpful in the past. MR imaging was also undertaken; this was completed on 2019. The results are listed below. She presents today to discuss these results and further treatment options. Compared to last week, the patient states she has had no change in symptoms. The prednisone offered little to no relief. She continues to have pain localized to her right low back with radiation to her right posterolateral thigh. The right leg generally feels weak.     Review of Systems:   A 10-point review of systems was obtained and is negative except for as noted in the HPI.     Physical Exam:   General: alert, pleasant, no distress. sitting comfortably in chair  Back/Spine: Positive straight leg raise on the right.   Neuro: Patellar and achilles reflexes 2+ and symmetric. Sensation intact to light touch.     Imaging:  MRI of the lumbar spine - (2019):  1. Multilevel lumbar spondylosis, most pronounced at L3-4 with severe spinal canal stenosis and moderate to severe neural foraminal stenosis bilaterally and at L5-S1 with severe right and moderate severe left neural foraminal stenosis.  2. Narrowing of the lateral recesses at L3-4 and L5-S1 with  probable impingement of the traversing L4 and S1 nerve roots bilaterally.  Per Radiology.     I have independently reviewed the above imaging studies; the results were discussed with the patient.     Assessment:   78 year old female with low back pain and right-sided radicular symptoms. Multiple disc bulges noted on MR imaging; suspect symptoms correlate with an L5 radiculopathy.     Plan:   Rachele and I discussed the available treatment options, including continue conservative measures versus an L5-S1 interlaminar corticosteroid injection, as well as the risks and benefits of each. We agreed upon the following plan:     - We will schedule an L5-S1 interlaminar epidural corticosteroid injection.   - She will follow up two weeks after the injection for repeat evaluation and to discuss progression.   - Continue with conservative measures, including rest, tizanidine, tramadol, and activity modification in the meantime. A new prescription for tramadol was provided.     Sharad Awan MD    Scribe Disclosure:   Nakul OSORIO, am serving as a scribe to document services personally performed by Sharad Awan MD at this visit, based upon the provider's statements to me. All documentation has been reviewed by the aforementioned provider prior to being entered into the official medical record.

## 2019-03-14 ENCOUNTER — INFUSION THERAPY VISIT (OUTPATIENT)
Dept: INFUSION THERAPY | Facility: CLINIC | Age: 79
End: 2019-03-14
Payer: MEDICARE

## 2019-03-14 VITALS
SYSTOLIC BLOOD PRESSURE: 168 MMHG | TEMPERATURE: 97.8 F | RESPIRATION RATE: 16 BRPM | OXYGEN SATURATION: 96 % | HEART RATE: 65 BPM | DIASTOLIC BLOOD PRESSURE: 68 MMHG

## 2019-03-14 DIAGNOSIS — K31.811 ANGIODYSPLASIA OF STOMACH AND DUODENUM WITH HEMORRHAGE: Primary | ICD-10-CM

## 2019-03-14 DIAGNOSIS — K55.20 AVM (ARTERIOVENOUS MALFORMATION) OF SMALL BOWEL, ACQUIRED: ICD-10-CM

## 2019-03-14 DIAGNOSIS — Z99.2 ESRD (END STAGE RENAL DISEASE) ON DIALYSIS (H): ICD-10-CM

## 2019-03-14 DIAGNOSIS — D50.0 IRON DEFICIENCY ANEMIA DUE TO CHRONIC BLOOD LOSS: ICD-10-CM

## 2019-03-14 DIAGNOSIS — N18.6 ESRD (END STAGE RENAL DISEASE) ON DIALYSIS (H): ICD-10-CM

## 2019-03-14 DIAGNOSIS — K74.60 CIRRHOSIS OF LIVER WITHOUT ASCITES, UNSPECIFIED HEPATIC CIRRHOSIS TYPE (H): ICD-10-CM

## 2019-03-14 PROCEDURE — 96372 THER/PROPH/DIAG INJ SC/IM: CPT | Performed by: INTERNAL MEDICINE

## 2019-03-14 PROCEDURE — 99207 ZZC NO CHARGE LOS: CPT

## 2019-03-14 RX ADMIN — OCTREOTIDE ACETATE 20 MG: KIT INTRAMUSCULAR at 11:42

## 2019-03-14 ASSESSMENT — PAIN SCALES - GENERAL: PAINLEVEL: EXTREME PAIN (8)

## 2019-03-14 NOTE — PROGRESS NOTES
Infusion Nursing Note:  Rachele Martíenz presents today for Sandostatin.    Patient seen by provider today: No   present during visit today: Not Applicable.    Intravenous Access:  No Intravenous access/labs at this visit.    Treatment Conditions:  Not Applicable.    Post Infusion Assessment:  Patient tolerated injection without incident.    Discharge Plan:   Patient and/or family verbalized understanding of discharge instructions and all questions answered.  Patient discharged in stable condition accompanied by: self and daughter.  Departure Mode: Ambulatory.    Holly Way RN

## 2019-03-19 ENCOUNTER — HOSPITAL ENCOUNTER (OUTPATIENT)
Facility: CLINIC | Age: 79
Discharge: HOME | End: 2019-03-19
Attending: RADIOLOGY | Admitting: RADIOLOGY
Payer: MEDICARE

## 2019-03-19 ENCOUNTER — HOSPITAL ENCOUNTER (OUTPATIENT)
Dept: GENERAL RADIOLOGY | Facility: CLINIC | Age: 79
End: 2019-03-19
Attending: FAMILY MEDICINE | Admitting: RADIOLOGY
Payer: MEDICARE

## 2019-03-19 VITALS
SYSTOLIC BLOOD PRESSURE: 186 MMHG | HEIGHT: 64 IN | DIASTOLIC BLOOD PRESSURE: 84 MMHG | HEART RATE: 89 BPM | BODY MASS INDEX: 22.53 KG/M2 | RESPIRATION RATE: 18 BRPM | TEMPERATURE: 98.6 F | WEIGHT: 132 LBS | OXYGEN SATURATION: 94 %

## 2019-03-19 DIAGNOSIS — G89.29 CHRONIC BILATERAL LOW BACK PAIN WITH RIGHT-SIDED SCIATICA: ICD-10-CM

## 2019-03-19 DIAGNOSIS — M54.41 CHRONIC BILATERAL LOW BACK PAIN WITH RIGHT-SIDED SCIATICA: ICD-10-CM

## 2019-03-19 PROCEDURE — 25500064 ZZH RX 255 OP 636: Performed by: FAMILY MEDICINE

## 2019-03-19 PROCEDURE — 25000128 H RX IP 250 OP 636: Performed by: FAMILY MEDICINE

## 2019-03-19 PROCEDURE — 62323 NJX INTERLAMINAR LMBR/SAC: CPT

## 2019-03-19 PROCEDURE — 25000125 ZZHC RX 250: Performed by: FAMILY MEDICINE

## 2019-03-19 PROCEDURE — 40000863 ZZH STATISTIC RADIOLOGY XRAY, US, CT, MAR, NM

## 2019-03-19 RX ORDER — NICOTINE POLACRILEX 4 MG
15-30 LOZENGE BUCCAL
Status: CANCELLED | OUTPATIENT
Start: 2019-03-19

## 2019-03-19 RX ORDER — DEXTROSE MONOHYDRATE 25 G/50ML
25-50 INJECTION, SOLUTION INTRAVENOUS
Status: DISCONTINUED | OUTPATIENT
Start: 2019-03-19 | End: 2019-03-19 | Stop reason: HOSPADM

## 2019-03-19 RX ORDER — LISINOPRIL 10 MG/1
10 TABLET ORAL
Status: ON HOLD | COMMUNITY
End: 2019-03-28

## 2019-03-19 RX ORDER — LIDOCAINE HYDROCHLORIDE 10 MG/ML
30 INJECTION, SOLUTION EPIDURAL; INFILTRATION; INTRACAUDAL; PERINEURAL ONCE
Status: COMPLETED | OUTPATIENT
Start: 2019-03-19 | End: 2019-03-19

## 2019-03-19 RX ORDER — NICOTINE POLACRILEX 4 MG
15-30 LOZENGE BUCCAL
Status: DISCONTINUED | OUTPATIENT
Start: 2019-03-19 | End: 2019-03-19 | Stop reason: HOSPADM

## 2019-03-19 RX ORDER — DEXAMETHASONE SODIUM PHOSPHATE 10 MG/ML
20 INJECTION, SOLUTION INTRAMUSCULAR; INTRAVENOUS ONCE
Status: COMPLETED | OUTPATIENT
Start: 2019-03-19 | End: 2019-03-19

## 2019-03-19 RX ORDER — IOPAMIDOL 408 MG/ML
10 INJECTION, SOLUTION INTRATHECAL ONCE
Status: COMPLETED | OUTPATIENT
Start: 2019-03-19 | End: 2019-03-19

## 2019-03-19 RX ORDER — DEXTROSE MONOHYDRATE 25 G/50ML
25-50 INJECTION, SOLUTION INTRAVENOUS
Status: CANCELLED | OUTPATIENT
Start: 2019-03-19

## 2019-03-19 RX ADMIN — LIDOCAINE HYDROCHLORIDE 4 ML: 10 INJECTION, SOLUTION EPIDURAL; INFILTRATION; INTRACAUDAL; PERINEURAL at 11:44

## 2019-03-19 RX ADMIN — DEXAMETHASONE SODIUM PHOSPHATE 20 MG: 10 INJECTION INTRAMUSCULAR; INTRAVENOUS at 11:58

## 2019-03-19 RX ADMIN — LIDOCAINE HYDROCHLORIDE 3 ML: 10 INJECTION, SOLUTION EPIDURAL; INFILTRATION; INTRACAUDAL; PERINEURAL at 11:59

## 2019-03-19 RX ADMIN — IOPAMIDOL 3 ML: 408 INJECTION, SOLUTION INTRATHECAL at 11:51

## 2019-03-19 ASSESSMENT — MIFFLIN-ST. JEOR: SCORE: 1068.75

## 2019-03-19 NOTE — PROGRESS NOTES
RADIOLOGY PROCEDURE NOTE  Patient name: Rachele Martínez  MRN: [id number]  : 1940    Pre-procedure diagnosis: low back pain and right-sided radicular symptoms    Post-procedure diagnosis: Same    Procedure Date/Time: 2019  12:02 PM  Procedure: L5-S1 interlaminar epidural steroid injection  Estimated blood loss: None  Specimen(s) collected with description: none  The patient tolerated the procedure well with no immediate complications.  Significant findings:none    See imaging dictation for procedural details.    Provider name: Cecily Phillips PA-C and All Osuna PA-C  Assistant(s):None

## 2019-03-19 NOTE — PROGRESS NOTES
Band aid CDI.  No diff dressing or ambulating in room.  Tolerated food and fluids.  discharge to home via WC with family

## 2019-03-19 NOTE — DISCHARGE INSTRUCTIONS
Steroid Injection Discharge Instructions     After you go home:      You may resume your normal diet.    Care of Puncture Site:      If you have a bandaid on your puncture site, you may remove it the next morning    You may shower tomorrow    No bath tubs, whirlpools or swimming for at least 3 days     Activity:      You may go back to normal activity in 24 hours    You should let pain be your guide as to the extent of your activities    Maintain any activity limitations as ordered by your provider    Do NOT drive a vehicle until tomorrow    Medicines:      You may resume all medications    Resume your Warfarin/Coumadin at your regular dose today. Follow up with your provider to have your INR rechecked    Resume your Platelet Inhibitors and Aspirin tomorrow at your regular dose    For minor pain, you may take Acetaminophen (Tylenol) or Ibuprofen (Advil)    Pain:       You may experience increased or different pain over the next 24-48 hours    For the next 48 hrs - you may use ice packs for discomfort     Call your primary care doctor if:      You have severe pain that does not improve with pain medication    You have chills or a fever greater than 101 F (38 C)    The site is red, swollen, hot or tender    New problems with your bowel or bladder    Any questions or concerns    Other Instructions:      New numbness down your leg post injection is temporary and may last for up to 6 hours. You may need assistance with activity until your leg has normal sensation.    If you are diabetic, monitor your blood sugar closely. Contact the provider who manages your diabetes to help you control your blood sugar if needed.    For Your Information:      A steroid was injected to help decrease swelling and may help to reduce pain. It may take up to 7-10 days to obtain full results.    Some patients will get lasting relief from a single injection. Others may require up to 3 injections to get results. If you have more than one  steroid injection, they should be given 2 weeks apart.    Side effects of your steroid injection are mild and will go away in 2-3 days  - Insomnia  - Heartburn  - Flushed face  - Water retention  - Increased appetite  - Increased blood sugar      If you have questions call:        [facility] Radiology Dept @ [phone]      The provider who performed your procedure was  All ORTEGA

## 2019-03-19 NOTE — PROGRESS NOTES
1215 back to room about 1210. Right leg pain at 4, low back pain at 5. Was 10 pre. bandaid site on low mid back is CDI. Pt given beverage and meal. Report to alexsandra

## 2019-03-26 ENCOUNTER — APPOINTMENT (OUTPATIENT)
Dept: CARDIOLOGY | Facility: CLINIC | Age: 79
DRG: 377 | End: 2019-03-26
Payer: MEDICARE

## 2019-03-26 ENCOUNTER — APPOINTMENT (OUTPATIENT)
Dept: GENERAL RADIOLOGY | Facility: CLINIC | Age: 79
DRG: 377 | End: 2019-03-26
Attending: EMERGENCY MEDICINE
Payer: MEDICARE

## 2019-03-26 ENCOUNTER — HOSPITAL ENCOUNTER (INPATIENT)
Facility: CLINIC | Age: 79
LOS: 3 days | Discharge: HOME | DRG: 377 | End: 2019-03-29
Attending: EMERGENCY MEDICINE | Admitting: INTERNAL MEDICINE
Payer: MEDICARE

## 2019-03-26 DIAGNOSIS — I21.4 NSTEMI (NON-ST ELEVATED MYOCARDIAL INFARCTION) (H): ICD-10-CM

## 2019-03-26 DIAGNOSIS — R05.9 COUGH: ICD-10-CM

## 2019-03-26 DIAGNOSIS — K92.2 GIB (GASTROINTESTINAL BLEEDING): ICD-10-CM

## 2019-03-26 DIAGNOSIS — R06.02 SHORTNESS OF BREATH: ICD-10-CM

## 2019-03-26 DIAGNOSIS — K92.2 GASTROINTESTINAL HEMORRHAGE, UNSPECIFIED GASTROINTESTINAL HEMORRHAGE TYPE: ICD-10-CM

## 2019-03-26 LAB
ABO + RH BLD: NORMAL
ABO + RH BLD: NORMAL
ALBUMIN SERPL-MCNC: 3.5 G/DL (ref 3.4–5)
ALP SERPL-CCNC: 88 U/L (ref 40–150)
ALT SERPL W P-5'-P-CCNC: 14 U/L (ref 0–50)
ANION GAP SERPL CALCULATED.3IONS-SCNC: 10 MMOL/L (ref 3–14)
APTT PPP: 37 SEC (ref 22–37)
AST SERPL W P-5'-P-CCNC: 17 U/L (ref 0–45)
BASOPHILS # BLD AUTO: 0 10E9/L (ref 0–0.2)
BASOPHILS NFR BLD AUTO: 0.6 %
BILIRUB SERPL-MCNC: 0.5 MG/DL (ref 0.2–1.3)
BLD GP AB SCN SERPL QL: NORMAL
BLOOD BANK CMNT PATIENT-IMP: NORMAL
BUN SERPL-MCNC: 30 MG/DL (ref 7–30)
CALCIUM SERPL-MCNC: 8.8 MG/DL (ref 8.5–10.1)
CHLORIDE SERPL-SCNC: 100 MMOL/L (ref 94–109)
CO2 BLDCOV-SCNC: 29 MMOL/L (ref 21–28)
CO2 SERPL-SCNC: 28 MMOL/L (ref 20–32)
CREAT SERPL-MCNC: 6.19 MG/DL (ref 0.52–1.04)
DIFFERENTIAL METHOD BLD: ABNORMAL
EOSINOPHIL # BLD AUTO: 0.1 10E9/L (ref 0–0.7)
EOSINOPHIL NFR BLD AUTO: 0.7 %
ERYTHROCYTE [DISTWIDTH] IN BLOOD BY AUTOMATED COUNT: 15.3 % (ref 10–15)
GFR SERPL CREATININE-BSD FRML MDRD: 6 ML/MIN/{1.73_M2}
GLUCOSE SERPL-MCNC: 103 MG/DL (ref 70–99)
HCT VFR BLD AUTO: 30.4 % (ref 35–47)
HGB BLD-MCNC: 9.1 G/DL (ref 11.7–15.7)
IMM GRANULOCYTES # BLD: 0 10E9/L (ref 0–0.4)
IMM GRANULOCYTES NFR BLD: 0.4 %
INR PPP: 1.31 (ref 0.86–1.14)
INTERPRETATION ECG - MUSE: NORMAL
LACTATE BLD-SCNC: 1 MMOL/L (ref 0.7–2.1)
LIPASE SERPL-CCNC: 63 U/L (ref 73–393)
LYMPHOCYTES # BLD AUTO: 0.5 10E9/L (ref 0.8–5.3)
LYMPHOCYTES NFR BLD AUTO: 6.9 %
MCH RBC QN AUTO: 32 PG (ref 26.5–33)
MCHC RBC AUTO-ENTMCNC: 29.9 G/DL (ref 31.5–36.5)
MCV RBC AUTO: 107 FL (ref 78–100)
MONOCYTES # BLD AUTO: 0.7 10E9/L (ref 0–1.3)
MONOCYTES NFR BLD AUTO: 10.2 %
NEUTROPHILS # BLD AUTO: 5.7 10E9/L (ref 1.6–8.3)
NEUTROPHILS NFR BLD AUTO: 81.2 %
NRBC # BLD AUTO: 0 10*3/UL
NRBC BLD AUTO-RTO: 0 /100
NT-PROBNP SERPL-MCNC: ABNORMAL PG/ML (ref 0–1800)
PCO2 BLDV: 39 MM HG (ref 40–50)
PH BLDV: 7.48 PH (ref 7.32–7.43)
PLATELET # BLD AUTO: 282 10E9/L (ref 150–450)
PO2 BLDV: 24 MM HG (ref 25–47)
POTASSIUM SERPL-SCNC: 4.7 MMOL/L (ref 3.4–5.3)
PROT SERPL-MCNC: 7.2 G/DL (ref 6.8–8.8)
RBC # BLD AUTO: 2.84 10E12/L (ref 3.8–5.2)
SAO2 % BLDV FROM PO2: 46 %
SODIUM SERPL-SCNC: 137 MMOL/L (ref 133–144)
SPECIMEN EXP DATE BLD: NORMAL
TROPONIN I BLD-MCNC: 0.08 UG/L (ref 0–0.08)
TROPONIN I SERPL-MCNC: 0.16 UG/L (ref 0–0.04)
TROPONIN I SERPL-MCNC: 0.17 UG/L (ref 0–0.04)
WBC # BLD AUTO: 7.1 10E9/L (ref 4–11)

## 2019-03-26 PROCEDURE — 84484 ASSAY OF TROPONIN QUANT: CPT

## 2019-03-26 PROCEDURE — 25000132 ZZH RX MED GY IP 250 OP 250 PS 637: Mod: GY | Performed by: INTERNAL MEDICINE

## 2019-03-26 PROCEDURE — 25000128 H RX IP 250 OP 636: Performed by: EMERGENCY MEDICINE

## 2019-03-26 PROCEDURE — A9270 NON-COVERED ITEM OR SERVICE: HCPCS | Mod: GY | Performed by: EMERGENCY MEDICINE

## 2019-03-26 PROCEDURE — 99285 EMERGENCY DEPT VISIT HI MDM: CPT | Mod: 25 | Performed by: EMERGENCY MEDICINE

## 2019-03-26 PROCEDURE — 93005 ELECTROCARDIOGRAM TRACING: CPT | Performed by: EMERGENCY MEDICINE

## 2019-03-26 PROCEDURE — 80053 COMPREHEN METABOLIC PANEL: CPT | Performed by: EMERGENCY MEDICINE

## 2019-03-26 PROCEDURE — 86900 BLOOD TYPING SEROLOGIC ABO: CPT | Performed by: EMERGENCY MEDICINE

## 2019-03-26 PROCEDURE — A9270 NON-COVERED ITEM OR SERVICE: HCPCS | Mod: GY | Performed by: INTERNAL MEDICINE

## 2019-03-26 PROCEDURE — 83605 ASSAY OF LACTIC ACID: CPT

## 2019-03-26 PROCEDURE — 96365 THER/PROPH/DIAG IV INF INIT: CPT | Performed by: EMERGENCY MEDICINE

## 2019-03-26 PROCEDURE — 83880 ASSAY OF NATRIURETIC PEPTIDE: CPT | Performed by: EMERGENCY MEDICINE

## 2019-03-26 PROCEDURE — 96375 TX/PRO/DX INJ NEW DRUG ADDON: CPT | Performed by: EMERGENCY MEDICINE

## 2019-03-26 PROCEDURE — 86850 RBC ANTIBODY SCREEN: CPT | Performed by: EMERGENCY MEDICINE

## 2019-03-26 PROCEDURE — 12000001 ZZH R&B MED SURG/OB UMMC

## 2019-03-26 PROCEDURE — 82803 BLOOD GASES ANY COMBINATION: CPT

## 2019-03-26 PROCEDURE — 71046 X-RAY EXAM CHEST 2 VIEWS: CPT

## 2019-03-26 PROCEDURE — 93306 TTE W/DOPPLER COMPLETE: CPT | Mod: 26 | Performed by: INTERNAL MEDICINE

## 2019-03-26 PROCEDURE — A9270 NON-COVERED ITEM OR SERVICE: HCPCS | Mod: GY

## 2019-03-26 PROCEDURE — 25000132 ZZH RX MED GY IP 250 OP 250 PS 637: Mod: GY | Performed by: EMERGENCY MEDICINE

## 2019-03-26 PROCEDURE — 85025 COMPLETE CBC W/AUTO DIFF WBC: CPT | Performed by: EMERGENCY MEDICINE

## 2019-03-26 PROCEDURE — 25000132 ZZH RX MED GY IP 250 OP 250 PS 637: Mod: GY

## 2019-03-26 PROCEDURE — 84484 ASSAY OF TROPONIN QUANT: CPT | Performed by: EMERGENCY MEDICINE

## 2019-03-26 PROCEDURE — 25800030 ZZH RX IP 258 OP 636: Performed by: EMERGENCY MEDICINE

## 2019-03-26 PROCEDURE — 93306 TTE W/DOPPLER COMPLETE: CPT

## 2019-03-26 PROCEDURE — 83690 ASSAY OF LIPASE: CPT | Performed by: EMERGENCY MEDICINE

## 2019-03-26 PROCEDURE — 93010 ELECTROCARDIOGRAM REPORT: CPT | Mod: Z6 | Performed by: EMERGENCY MEDICINE

## 2019-03-26 PROCEDURE — 25000128 H RX IP 250 OP 636

## 2019-03-26 PROCEDURE — 99223 1ST HOSP IP/OBS HIGH 75: CPT | Mod: GC | Performed by: INTERNAL MEDICINE

## 2019-03-26 PROCEDURE — 85610 PROTHROMBIN TIME: CPT | Performed by: EMERGENCY MEDICINE

## 2019-03-26 PROCEDURE — 86901 BLOOD TYPING SEROLOGIC RH(D): CPT | Performed by: EMERGENCY MEDICINE

## 2019-03-26 PROCEDURE — C9113 INJ PANTOPRAZOLE SODIUM, VIA: HCPCS | Performed by: EMERGENCY MEDICINE

## 2019-03-26 PROCEDURE — 85730 THROMBOPLASTIN TIME PARTIAL: CPT | Performed by: EMERGENCY MEDICINE

## 2019-03-26 RX ORDER — LABETALOL 20 MG/4 ML (5 MG/ML) INTRAVENOUS SYRINGE
10 ONCE
Status: DISCONTINUED | OUTPATIENT
Start: 2019-03-26 | End: 2019-03-27

## 2019-03-26 RX ORDER — ALBUTEROL SULFATE 90 UG/1
2 AEROSOL, METERED RESPIRATORY (INHALATION) EVERY 6 HOURS PRN
Status: DISCONTINUED | OUTPATIENT
Start: 2019-03-26 | End: 2019-03-29 | Stop reason: HOSPADM

## 2019-03-26 RX ORDER — ATORVASTATIN CALCIUM 20 MG/1
20 TABLET, FILM COATED ORAL DAILY
Status: DISCONTINUED | OUTPATIENT
Start: 2019-03-27 | End: 2019-03-29 | Stop reason: HOSPADM

## 2019-03-26 RX ORDER — TRAMADOL HYDROCHLORIDE 50 MG/1
50 TABLET ORAL EVERY 6 HOURS PRN
Status: DISCONTINUED | OUTPATIENT
Start: 2019-03-26 | End: 2019-03-27

## 2019-03-26 RX ORDER — POLYETHYLENE GLYCOL 3350 17 G/17G
17 POWDER, FOR SOLUTION ORAL DAILY PRN
Status: DISCONTINUED | OUTPATIENT
Start: 2019-03-26 | End: 2019-03-29 | Stop reason: HOSPADM

## 2019-03-26 RX ORDER — LOSARTAN POTASSIUM 50 MG/1
25 TABLET ORAL EVERY MORNING
COMMUNITY
End: 2020-11-04

## 2019-03-26 RX ORDER — HYDRALAZINE HYDROCHLORIDE 20 MG/ML
10 INJECTION INTRAMUSCULAR; INTRAVENOUS EVERY 4 HOURS PRN
Status: DISCONTINUED | OUTPATIENT
Start: 2019-03-26 | End: 2019-03-26

## 2019-03-26 RX ORDER — ONDANSETRON 2 MG/ML
4 INJECTION INTRAMUSCULAR; INTRAVENOUS EVERY 30 MIN PRN
Status: DISCONTINUED | OUTPATIENT
Start: 2019-03-26 | End: 2019-03-29 | Stop reason: HOSPADM

## 2019-03-26 RX ORDER — SERTRALINE HYDROCHLORIDE 100 MG/1
100 TABLET, FILM COATED ORAL DAILY
Status: DISCONTINUED | OUTPATIENT
Start: 2019-03-27 | End: 2019-03-29 | Stop reason: HOSPADM

## 2019-03-26 RX ORDER — HYDRALAZINE HYDROCHLORIDE 20 MG/ML
10 INJECTION INTRAMUSCULAR; INTRAVENOUS ONCE
Status: COMPLETED | OUTPATIENT
Start: 2019-03-26 | End: 2019-03-26

## 2019-03-26 RX ORDER — NITROGLYCERIN 0.4 MG/1
0.4 TABLET SUBLINGUAL EVERY 5 MIN PRN
Status: DISCONTINUED | OUTPATIENT
Start: 2019-03-26 | End: 2019-03-29 | Stop reason: HOSPADM

## 2019-03-26 RX ORDER — ASPIRIN 81 MG/1
81 TABLET, CHEWABLE ORAL ONCE
Status: COMPLETED | OUTPATIENT
Start: 2019-03-26 | End: 2019-03-26

## 2019-03-26 RX ORDER — LIDOCAINE 40 MG/G
CREAM TOPICAL
Status: DISCONTINUED | OUTPATIENT
Start: 2019-03-26 | End: 2019-03-29 | Stop reason: HOSPADM

## 2019-03-26 RX ORDER — NALOXONE HYDROCHLORIDE 0.4 MG/ML
.1-.4 INJECTION, SOLUTION INTRAMUSCULAR; INTRAVENOUS; SUBCUTANEOUS
Status: DISCONTINUED | OUTPATIENT
Start: 2019-03-26 | End: 2019-03-29 | Stop reason: HOSPADM

## 2019-03-26 RX ADMIN — Medication 10 MG: at 16:31

## 2019-03-26 RX ADMIN — ASPIRIN 81 MG CHEWABLE TABLET 81 MG: 81 TABLET CHEWABLE at 15:53

## 2019-03-26 RX ADMIN — Medication 1 MG: at 21:34

## 2019-03-26 RX ADMIN — NITROGLYCERIN 0.4 MG: 0.4 TABLET SUBLINGUAL at 15:52

## 2019-03-26 RX ADMIN — SODIUM CHLORIDE 80 MG: 9 INJECTION, SOLUTION INTRAVENOUS at 15:40

## 2019-03-26 RX ADMIN — HYDRALAZINE HYDROCHLORIDE 10 MG: 20 INJECTION INTRAMUSCULAR; INTRAVENOUS at 20:04

## 2019-03-26 RX ADMIN — TRAMADOL HYDROCHLORIDE 50 MG: 50 TABLET, COATED ORAL at 17:22

## 2019-03-26 ASSESSMENT — ENCOUNTER SYMPTOMS
ABDOMINAL DISTENTION: 0
NAUSEA: 1
BLOOD IN STOOL: 1
ABDOMINAL PAIN: 0
LIGHT-HEADEDNESS: 1
FEVER: 0
UNEXPECTED WEIGHT CHANGE: 0
RHINORRHEA: 0
SHORTNESS OF BREATH: 1
DIZZINESS: 1
VOMITING: 0
DYSURIA: 0
DIARRHEA: 0
COUGH: 1

## 2019-03-26 ASSESSMENT — MIFFLIN-ST. JEOR: SCORE: 1076.46

## 2019-03-26 NOTE — H&P
[facility], [place]  History and Physical - FRS Service   Date of Admission:  3/26/2019    Assessment & Plan   Rachele Martínez is a 78 year old female admitted on 3/26/2019. 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  Hgb stable and suspect dyspnea is due to combination of volume overload and     GIB:  Hx AVMs:   Hx ABIGAIL: Presenting with reported BRBPR.  Known history of AVM and on monthly octreotide.  Ddx for painless rectal bleeding also includes hemorrhoid vs diverticular vs variceal vs polyp/malignancy.   Reported bleed sounds small and hgb is at baseline (around 9), however will monitor closely incase of increasing rate.    - GI consulted and aware of patient  - CLD for now, NPO midnight  - Defer ABX for now  - Defer prep for now pending hgb trend, if bleeding start prep and call GI  - Type and screen done in the ER  - Vitamin K given in ER  - Two large bore IVs  - IV PPI BID, loaded in the ER  - Consider octreotide if bleeding increases, defer for now  - Consented for blood transfusion  - Trend hgb in the AM, or sooner is sxs bleeding  - Repeat INR in the AM    Dyspnea:  Elevated JVD:  Elevated NT-proBNP  Systolic murmur:  Lightheadedness:  Shortness of breath possibly related to volume overload, less likely ACS as below.  She is volume up on exam, however it is also possible that there is a component of perceived dyspnea 2/2 lisinopril. Most recent ECHO in 2015 showed normal EF and mild diastolic dysfunction, bedside ECHO in the ER with diastolic dysfunction and B lines which is consistent with clinical picture.  Patient may need dry weight re-challenge.    - Orthostatics ordered  - HD tomorrow as below  - TTE ordered  - Will likely benefit from dialysis in the AM  - Continue PTA inhaler    Elevated troponin:   ACS rule  out/hypertensive emergency: Low suspicion for ACS as I suspect this elevation is due to ESRD, with possible component of volume overload causing spill vs 2/2 demand with hypertension.  However, with equivocal ECG changes and reported dyspnea/near syncope will proceed with caution and monitor closely.   - Defer further monitoring unless chest pain or other sxs  - ASA given in the ER, defer further given possible GIB  - Will repeat trop and trend if continued elevation  - Telemetry  - Will give iv hydralazine slow push in case of tachycardia  - Then PO PRN on arrival to floor    Chronic Issues:  ESRD on HD: HD MWF   - Nephrology consulted, will need to call in am  - Daily weights, I&Os  - Continue PTA meds  Compensated HCV cirrhosis: S/p treatment with Harvoni, achieved SVR 7/2015. Follows OP with Dr. Barnett. MELD 23 (alhough significant contribution from creatinine).    HTN: Held PTA lisinopril as above.   HLD: Continue PTA statin  Depression: Stable. Continue PTA Zoloft  Chronic pain: Continue MEDINA tramadol      Diet: NPO for Medical/Clinical Reasons Except for: No Exceptions, Meds, Ice Chips    Fluids: None  DVT Prophylaxis: SCDs  Rodriguez Catheter: not present  Code Status: DNI, ok with chest compressions, discussed with patient and family    Disposition Plan   Expected discharge: 2 - 3 days, recommended to prior living arrangement once hemoglobin stable.  Entered: Nathan Carvajal MD 03/26/2019, 3:42 PM       The patient's care was discussed with the Attending Physician, Dr. Burgos.    Nathan Carvajal MD  [facility], [place]  Pager: [phone]  Please see sticky note for cross cover information  ______________________________________________________________________    Chief Complaint   Shortness of breath an rectal bleeding    History is obtained from the patient    History of Present Illness   Rachele Martínez is a 78 year old female who presents with CC of  dyspnea and recent BRBPR.     Shortness of breath began several days ago.  However for the past week she has had new cough which is productive of clear sputum and lightheadedness with exertion or standing.  It is better with rest/sitting.  These symptoms started after beginning a lisinopril for hypertension.  She stopped taking it 2 days ago, cough has not changed much.  She has not had and fevers or chills.  No chest pain.  No syncope.      This morning she had an episode of bloody stool, she noted blood on the toilet paper and in the water, but she thinks it was small volume, less than 1/3 cup.  No blood dripping into the bowl.  No rectal itching.  Blood was maroon in color.  She has not had recent bleeding otherwise but has long history of bleeding from AVMs and feels like her current symptoms are similar to when she has been anemic in the past.  No ABD pain.  She denies heavy use of NSAIDs.  No Hematemesis/CGE.  No melena.       She is on MWF dialysis and attending her visit yesterday without improvement in her shortness of breath, although she was told to stop taking her lisinopril due to the cough.  Afterwards, due to concern for SOB she went to the ER in [place], hgb was stable and CT PE was negative but did show pleural effusions.  Trop was mildly elevated but decrease don recheck.      ER course:  ECG with non specific ST changes  Hgb 9.1  Platelets normal  NT BNP markedly elevated  INR 1.31  Trop mildly elevated (increased from prior)  Venous blood gas unremarkable  Vitamin K given   Admitted for monitoring and possible scope tomorrow    Review of Systems    The 10 point Review of Systems is negative other than noted in the HPI or here.     Past Medical History    I have reviewed this patient's medical history and updated it with pertinent information if needed.   Past Medical History:   Diagnosis Date     Anemia      Anxiety and depression      Arthritis      AVM (arteriovenous malformation)       Chronic hepatitis C with cirrhosis (H)      Clotting disorder (H)      ESRD (end stage renal disease) (H)     on dialysis     GI bleed     recurrent     Glaucoma      Hyperlipidemia      Hypertension goal BP (blood pressure) < 140/80       Past Surgical History   I have reviewed this patient's surgical history and updated it with pertinent information if needed.  Past Surgical History:   Procedure Laterality Date     COLONOSCOPY N/A 9/4/2015    Procedure: COMBINED COLONOSCOPY, SINGLE OR MULTIPLE BIOPSY/POLYPECTOMY BY BIOPSY;  Surgeon: Rupesh Lopez MD;  Location:  GI     COLONOSCOPY N/A 9/19/2018    Procedure: COLONOSCOPY;  enteroscopy small bowel  COLONOSCOPY;  Surgeon: Ankit Baires MD;  Location:  GI     ESOPHAGOSCOPY, GASTROSCOPY, DUODENOSCOPY (EGD), COMBINED N/A 12/18/2014    Procedure: COMBINED ESOPHAGOSCOPY, GASTROSCOPY, DUODENOSCOPY (EGD);  Surgeon: Betsy Carvajal MD;  Location:  GI     ESOPHAGOSCOPY, GASTROSCOPY, DUODENOSCOPY (EGD), COMBINED N/A 4/25/2015    Procedure: COMBINED ESOPHAGOSCOPY, GASTROSCOPY, DUODENOSCOPY (EGD);  Surgeon: Yosvany Ram MD;  [facility]: [facility]     ESOPHAGOSCOPY, GASTROSCOPY, DUODENOSCOPY (EGD), COMBINED N/A 5/5/2015    Procedure: COMBINED ESOPHAGOSCOPY, GASTROSCOPY, DUODENOSCOPY (EGD);  Surgeon: Mariano Mistry MD;  Location:  GI     HC CAPSULE ENDOSCOPY N/A 9/30/2015    Procedure: CAPSULE/PILL CAM ENDOSCOPY;  Surgeon: Pan Dhaliwal MD;  Location:  GI     HYSTERECTOMY  1980    KYREE     LUMPECTOMY BREAST        Social History   I have reviewed this patient's social history and updated it with pertinent information if needed. Rachele Martínez  reports that she quit smoking about 16 years ago. Her smoking use included cigarettes. She started smoking about 66 years ago. She has a 90.00 pack-year smoking history. she has never used smokeless tobacco. She reports that she uses drugs. Drug: Marijuana. She reports that she does not drink  alcohol.    Family History   I have reviewed this patient's family history and updated it with pertinent information if needed.   Family History   Problem Relation Age of Onset     Diabetes Mother      Alzheimer Disease Mother      Prior to Admission Medications   Prior to Admission Medications   Prescriptions Last Dose Informant Patient Reported? Taking?   Calcium Acetate, Phos Binder, 667 MG CAPS   Yes No   Sig: TAKE 2 CAPSULE BY MOUTH THREE TIMES A DAY WITH MEALS   Multiple Vitamins-Minerals (PRORENAL + D) TABS   Yes No   Sig: Take 1 tablet by mouth daily   albuterol (PROAIR HFA) 108 (90 Base) MCG/ACT inhaler   Yes No   Sig: Inhale 2 puffs into the lungs every 6 hours as needed for shortness of breath / dyspnea or wheezing   atorvastatin (LIPITOR) 20 MG tablet   No No   Sig: Take 1 tablet (20 mg) by mouth daily   lisinopril (PRINIVIL/ZESTRIL) 10 MG tablet   Yes No   Sig: Take 10 mg by mouth   octreotide (SANDOSTATIN LAR) 20 MG injection   Yes No   Sig: Inject 20 mg into the muscle every 28 days   order for DME   No No   Sig: Equipment being ordered: 4 wheel walker with seat.   Patient not taking: Reported on 10/24/2018   pantoprazole (PROTONIX) 20 MG EC tablet   No No   Sig: Take 1 tablet (20 mg) by mouth 2 times daily 30 - 60 minutes before a meal.   polyethylene glycol 3350 POWD   No No   Sig: Take 17 g by mouth daily   sertraline (ZOLOFT) 50 MG tablet   No No   Sig: Take 2 tablets (100 mg) by mouth daily   tiZANidine (ZANAFLEX) 2 MG tablet   No No   Sig: Take 1 tablet (2 mg) by mouth 3 times daily as needed for muscle spasms   traMADol (ULTRAM) 50 MG tablet   No No   Sig: Take 1 tablet (50 mg) by mouth every 6 hours as needed for severe pain   traMADol (ULTRAM) 50 MG tablet   No No   Sig: Take 1-2 tablets ( mg) by mouth every 6 hours as needed for breakthrough pain or severe pain      Facility-Administered Medications: None     Allergies   Allergies   Allergen Reactions     Diovan Hct Itching     severe      Dust Mites      Hydrochlorothiazide Itching     Severe       No Clinical Screening - See Comments      History of blood transfusion reactions and pre-treats with Benadryl.      Sulfa Drugs Hives     Spironolactone Nausea       Physical Exam   Vital Signs: Temp: 98.5  F (36.9  C) Temp src: Oral BP: 184/76   Heart Rate: 86 Resp: 16 SpO2: 98 % O2 Device: None (Room air)    Weight: 136 lbs 0 oz    GEN: Well nourished, well developed, appears stated age.  Family at bedside and quite concerned  HEENT: PERRL, no conjunctival injection, anicteric, Neck supple without meningismus, no anterior cervical or supraclavicular LAD  CV: RRR, 3/6 systolic murmur loudest at the left sternal border,  Warm, well-perfused extremities, JVD to ear at 45 degrees.    RESP: CTAB, slight crackles at the bilateral bases, normal WOB  GI: soft, non-tender, non-distended, no rebound or guarding, no masses or organomegaly noted  MSK: No gross deformities appreciated, non pitting edema at the ankles bilaterally  Skin: Warm, dry. No rashes/lesions  Neuro: Alert, CNs II-XII grossly intact. Sensation and motor function of extremities grossly intact.    Psych: Appropriate mood and affect.    Data   Data reviewed today: I reviewed all medications, new labs and imaging results over the last 24 hours.

## 2019-03-26 NOTE — LETTER
Transition Communication Hand-off for Care Transitions to Next Level of Care Provider    Name: Rachele Martínez  : 1940  MRN #: [id number]  Primary Care Provider: Agnieszka Rodriguez     Primary Clinic: [address] [facility] [address]  [facility] [address]     Reason for Hospitalization:  Shortness of breath [R06.02]  GIB (gastrointestinal bleeding) [K92.2]  NSTEMI (non-ST elevated myocardial infarction) (H) [I21.4]  Admit Date/Time: 3/26/2019  1:12 PM  Discharge Date: 3/29/2019   Payor Source: Payor: MEDICARE / Plan: MEDICARE / Product Type: Medicare /     Readmission Assessment Measure (CHRISTEN) Risk Score/category: Average         Reason for Communication Hand-off Referral: Fragility- has out pt HD runs 3 times weekly  Follow-up plan:    Future Appointments   Date Time Provider Department Center   2019  3:00 PM Shun Cornelius MD [facility] [place]   2019 11:00 AM BAY 8 INFUSION [facility] [place]       Any outstanding tests or procedures:        Radiology & Cardiology Orders     Future Labs/Procedures Complete By Expires    NM Lexiscan stress test  2019 (Approximate) 2019    Process Instructions:    The type of stress to be performed (pharmacologic or physical) will be at the discretion of the supervising physician as per department protocol.              Key Recommendations:  Resume out pt HD runs.  Follow up with PCP for post hospital visit.  SEE avs for final orders and discharge recommendations    Senia Rivas    AVS/Discharge Summary is the source of truth; this is a helpful guide for improved communication of patient story

## 2019-03-26 NOTE — ED TRIAGE NOTES
Pt arrived via car with c/o SOA and bleeding jamie red blood after a bowel movement this morning. Per pt and her daughter pt was evaluated at urgent care for SOA yesterday. On arrival to the ED vials stable, afebrile. Pt alert and oriented x4. Denies dizziness. Reports SOA at rest. Breathing unlabored.

## 2019-03-26 NOTE — PHARMACY-ADMISSION MEDICATION HISTORY
"Admission medication history interview status for the 3/26/2019 admission is complete. See Epic admission navigator for allergy information, pharmacy, prior to admission medications and immunization status.     Medication history interview sources:     Patient    Patient's daughter (Charla)    [facility] [employer], [place], [place] #[phone]    [facility] rx    Changes made to PTA medication list (reason)  Added: Losartan  Deleted: Albuterol HFA, Tizanidine, Tramadol duplication  Changed: None    Losartan: [facility] stated losartan 25mg prescription was sent over on 3/25/19 and the patient has not picked up. Pt's [place] records state she has had a chronic cough that did not resolve with prednisone and albuterol in the fall. Now, it could possibly be due to her lisinopril. Therefore, kept on PTA as not taking.    Albuterol inhaler: Pt stated she used for her cough a \"few months ago\" and has not used since then. [facility] confirmed the prescription has .    Tizanidine: Pt's daughter stated the patient had only a few day supply for some leg spasms/pain. This was last filled at [facility] on 11/3/18 for 10 day supply.    Tramadol: There was a duplication on her PTA list with different directions. One script stated take 1 to 2 tablets and the other stated take 1 tablet. [facility] confirmed the most recent tramadol fill was on 3/11/19 for 2 day supply of 15 tablets which contained the 1 tablet by mouth every 6 hours as needed. Therefore, tramadol script with directions take 1 to 2 tablets was deleted.    Pt does take tramadol differently than prescribed. She takes 2 tablets at once instead of 1 tablet every 6 hours.  Other:    Lisinopril: Pt stated she has not taken since 3/24/19 per doctor's request since this could be causing her cough.  She last filled was 3/18/19 for 90 day supply from [facility].    Octreotide: [place] records showed pt's last dose was on 3/14/19.    Polyethylene glycol: pt takes as needed when constipated.      Additional " medication history information (including reliability of information, actions taken by pharmacist): Pt's daughter was more reliable than the pt for source of information. Pt's daughter knew her medications, but not the strength. Therefore, [facility] was called to verify patients prescriptions.    Prior to Admission medications    Medication Sig Last Dose Taking? Auth Provider   atorvastatin (LIPITOR) 20 MG tablet Take 1 tablet (20 mg) by mouth daily 3/25/2019 at Unknown time Yes Agnieszka Rodriguez MD   Calcium Acetate, Phos Binder, 667 MG CAPS TAKE 2 CAPSULE BY MOUTH THREE TIMES A DAY WITH MEALS 3/25/2019 at PM Yes Reported, Patient   Multiple Vitamins-Minerals (PRORENAL + D) TABS Take 1 tablet by mouth daily 3/25/2019 at PM Yes Reported, Patient   octreotide (SANDOSTATIN LAR) 20 MG injection Inject 20 mg into the muscle every 28 days 3/18/2019 Yes Unknown, Entered By History   pantoprazole (PROTONIX) 20 MG EC tablet Take 1 tablet (20 mg) by mouth 2 times daily 30 - 60 minutes before a meal. 3/25/2019 at Unknown time Yes Agnieszka Rodriguez MD   sertraline (ZOLOFT) 50 MG tablet Take 2 tablets (100 mg) by mouth daily 3/25/2019 at Unknown time Yes Andriy Barnett MD   lisinopril (PRINIVIL/ZESTRIL) 10 MG tablet Take 10 mg by mouth 3/24/2019 at Unknown time  Reported, Patient   losartan (COZAAR) 25 MG tablet Take 25 mg by mouth Daily at bedtime   Unknown, Entered By History   order for DME Equipment being ordered: 4 wheel walker with seat.  Patient not taking: Reported on 10/24/2018   Kong Britton MD   polyethylene glycol 3350 POWD Take 17 g by mouth daily  Patient taking differently: Take 17 g by mouth daily as needed  More than a month at Unknown time  Kong Britton MD   traMADol (ULTRAM) 50 MG tablet Take 1-2 tablets ( mg) by mouth every 6 hours as needed for breakthrough pain or severe pain  Patient taking differently: Take 100 mg by mouth every 6 hours as needed for breakthrough pain  or severe pain    Sharad Awan MD     Medication history completed by: Meredith Cornejo [facility] Student

## 2019-03-26 NOTE — ED NOTES
[facility], [place]   ED Nurse to Floor Handoff     Rachele Martínez is a 78 year old female who speaks English and lives alone,  in a home  They arrived in the ED by car from home    ED Chief Complaint: Shortness of Breath    ED Dx;   Final diagnoses:   GIB (gastrointestinal bleeding)         Needed?: No    Allergies:   Allergies   Allergen Reactions     Diovan Hct Itching     severe     Dust Mites      Hydrochlorothiazide Itching     Severe       No Clinical Screening - See Comments      History of blood transfusion reactions and pre-treats with Benadryl.      Sulfa Drugs Hives     Spironolactone Nausea   .  Past Medical Hx:   Past Medical History:   Diagnosis Date     Anemia      Anxiety and depression      Arthritis      AVM (arteriovenous malformation)      Chronic hepatitis C with cirrhosis (H)      Clotting disorder (H)      ESRD (end stage renal disease) (H)     on dialysis     GI bleed     recurrent     Glaucoma      Hyperlipidemia      Hypertension goal BP (blood pressure) < 140/80       Baseline Mental status: WDL  Current Mental Status changes: at basesline    Infection present or suspected this encounter: no  Sepsis suspected: No  Isolation type: No active isolations     Activity level - Baseline/Home:  Independent  Activity Level - Current:   Independent    Bariatric equipment needed?: No    In the ED these meds were given:   Medications   ondansetron (ZOFRAN) injection 4 mg (not administered)   phytonadione (MEPHYTON/VITAMIN K) 1 MG/ML oral suspension 10 mg (not administered)   nitroGLYcerin (NITROSTAT) sublingual tablet 0.4 mg (not administered)   aspirin (ASA) chewable tablet 81 mg (not administered)   pantoprazole (PROTONIX) 80 mg in sodium chloride 0.9 % 100 mL intermittent infusion (80 mg Intravenous New Bag 3/26/19 1540)       Drips running?  Yes Protonix     Home pump  No    Current LDAs  Peripheral IV 09/19/18 Left;Anterior;Medial Upper forearm  (Active)   Number of days: 188       Peripheral IV 03/26/19 Left Upper forearm (Active)   Site Assessment WDL 3/26/2019  1:57 PM   Number of days: 0       Hemodialysis Vascular Access Arteriovenous graft Right (Active)   Number of days:        Incision/Surgical Site 03/19/19 Lower Back (Active)   Number of days: 7       Labs results:   Labs Ordered and Resulted from Time of ED Arrival Up to the Time of Departure from the ED   CBC WITH PLATELETS DIFFERENTIAL - Abnormal; Notable for the following components:       Result Value    RBC Count 2.84 (*)     Hemoglobin 9.1 (*)     Hematocrit 30.4 (*)      (*)     MCHC 29.9 (*)     RDW 15.3 (*)     Absolute Lymphocytes 0.5 (*)     All other components within normal limits   COMPREHENSIVE METABOLIC PANEL - Abnormal; Notable for the following components:    Glucose 103 (*)     Creatinine 6.19 (*)     GFR Estimate 6 (*)     GFR Estimate If Black 7 (*)     All other components within normal limits   LIPASE - Abnormal; Notable for the following components:    Lipase 63 (*)     All other components within normal limits   INR - Abnormal; Notable for the following components:    INR 1.31 (*)     All other components within normal limits   NT PROBNP INPATIENT - Abnormal; Notable for the following components:    N-Terminal Pro BNP Inpatient >175,000 (*)     All other components within normal limits   TROPONIN I - Abnormal; Notable for the following components:    Troponin I ES 0.164 (*)     All other components within normal limits   ISTAT  GASES LACTATE RENALDO POCT - Abnormal; Notable for the following components:    Ph Venous 7.48 (*)     PCO2 Venous 39 (*)     PO2 Venous 24 (*)     Bicarbonate Venous 29 (*)     All other components within normal limits   PARTIAL THROMBOPLASTIN TIME   PERIPHERAL IV CATHETER   ISTAT CG4 GASES LACTATE RENALDO NURSING POCT   ISTAT TROPONIN NURSING POCT   CARDIAC CONTINUOUS MONITORING   PERIPHERAL IV CATHETER   TROPONIN POCT   ABO/RH TYPE AND SCREEN        Imaging Studies: No results found for this or any previous visit (from the past 24 hour(s)).    Recent vital signs:   /76   Temp 98.5  F (36.9  C) (Oral)   Resp 16   Wt 61.7 kg (136 lb)   SpO2 98%   BMI 23.34 kg/m      Cardiac Rhythm: Normal Sinus  Pt needs tele? Yes  Skin/wound Issues: None    Code Status: Full Code    Pain control: good    Nausea control: good    Abnormal labs/tests/findings requiring intervention: Troponin 0.164, BNP >175,000    Family present during ED course? Yes   Family Comments/Social Situation comments: NA    Tasks needing completion: None    Evon Bansal, RN    [phone] [facility] ED

## 2019-03-26 NOTE — CONSULTS
GASTROENTEROLOGY CONSULTATION      Date of Admission:  3/26/2019           Reason for Consultation:   We were asked by Dr. Berman to evaluate this patient with bright red blood per rectum.            ASSESSMENT AND RECOMMENDATIONS:   Assessment:  Rachele Martínez is a 78 year old female with a history of ESRD on hemodialysis, hepatitis C s/p Harvoni treatment, HTN, HLD, and multiple GI bleed from AVMs   who GI was consulted on for possible GI bleed.     Presented with 3 days of fatigue and dyspnea on exertion and 1 day of melena and BRBPR concerning for recurrent GI bleed. Hgb at baseline in 9s and patient hemodynamically stable. Black stool noted on rectal exam today concerning for upper GI bleed source. Known history of multiple AVMs is most likely source for bleeding however differential also includes PUD (no NSAID/EtOH use, no abdominal pain), gastritis, hemorrhoids (for BRBPR), esophagitis. Will monitor vitals and hemoglobin overnight and plan for upper endoscopy with push enteroscopy tomorrow if signs of ongoing bleeding.      Recommendations  - Clear liquid diet today, NPO at midnight   - Plan for EGD with push enteroscopy tomorrow if signs/sxs of bleeding continues.   - If no upper source identified will plan to proceed with capsule endoscopy study +/- colonoscopy.   - Trend hemoglobin, transfuse if <7.   - 40mg IV pantoprazole BID   - If patient becomes hemodynamically unstable or concern for active/lifethreatening GI bleed, page GI for urgent EGD and consider starting octreotide drip for AVM treatment.      Gastroenterology outpatient follow up recommendations: TBD    GI will continue to follow    Thank you for involving us in this patient's care. Please do not hesitate to contact the GI service with any questions or concerns.     Pt care plan discussed with Dr. Perkins, GI staff physician.    Vikas Rios MD   Internal Medicine, PGY1  P:  [phone]  -------------------------------------------------------------------------------------------------------------------    History of Present Illness   Rachele Martínez is a 78 year old female with a history of ESRD on hemodialysis, hepatitis C s/p Harvoni treatment, HTN, HLD, and multiple GI bleed from AVMs   who GI was consulted on for possible GI bleed.     Patient reports 2-3 days of generalized fatigue and shortness of breath with exertion. She had uneventful run of HD on 3/25 but was sent to ED/UC after dialysis due to complaint of shortness of breath. Per chair review, workup for shortness of breath was largely unremarkable. CT PE study was negative for PE but showed small bilateral layering effusions and slight airway wall thickening. Labs were otherwise at her baseline and she was discharged from ED.     This morning she noted bright red blood mixed in her stool. The stool was dark in color but unable to say if it was black or maroon. She has had black stool along with bright red blood previously from GI bleeds and reports she usually presents with fatigue. Also notes nausea this AM without emesis. Reports dry cough since starting lisinopril. Denies fever, chills, abdominal pain, chest pain, pain with defecation, diarrhea or urinary symptoms. Denies NSAID or EtOH use. Not on any anticoagulation.  No additional bowel movements since this morning.     Patient has had multiple admissions in the past year for GI bleeding due to AVMs, most recently in September 2018. Last GI procedures included EGD, push enteroscopy and colonoscopy (9/19/18) which showed multiple angiectasias in stomach, duodenum and cecum treated with APC. External hemorrhoids also noted and thought to be source of bright red blood in stool.   Follows with Dr. Baires in GI clinic and has been doing well with monthly octreotide injections and IV iron with dialysis.       EtOH: none  Smoking: none  NSAIDs:  none  Antiplatelets/Anticoags: none  Bismuth/iron use: IV iron in during dialysis runs.     Past Medical History    Reviewed and edited as appropriate  Past Medical History:   Diagnosis Date     Anemia      Anxiety and depression      Arthritis      AVM (arteriovenous malformation)      Chronic hepatitis C with cirrhosis (H)      Clotting disorder (H)      ESRD (end stage renal disease) (H)     on dialysis     GI bleed     recurrent     Glaucoma      Hyperlipidemia      Hypertension goal BP (blood pressure) < 140/80        Past Surgical History   Reviewed and edited as appropriate   Past Surgical History:   Procedure Laterality Date     COLONOSCOPY N/A 9/4/2015    Procedure: COMBINED COLONOSCOPY, SINGLE OR MULTIPLE BIOPSY/POLYPECTOMY BY BIOPSY;  Surgeon: Rupesh Lopez MD;  Location:  GI     COLONOSCOPY N/A 9/19/2018    Procedure: COLONOSCOPY;  enteroscopy small bowel  COLONOSCOPY;  Surgeon: Ankit Baires MD;  Location:  GI     ESOPHAGOSCOPY, GASTROSCOPY, DUODENOSCOPY (EGD), COMBINED N/A 12/18/2014    Procedure: COMBINED ESOPHAGOSCOPY, GASTROSCOPY, DUODENOSCOPY (EGD);  Surgeon: Betsy Carvajal MD;  Location:  GI     ESOPHAGOSCOPY, GASTROSCOPY, DUODENOSCOPY (EGD), COMBINED N/A 4/25/2015    Procedure: COMBINED ESOPHAGOSCOPY, GASTROSCOPY, DUODENOSCOPY (EGD);  Surgeon: Yosvany Ram MD;  Location:  GI     ESOPHAGOSCOPY, GASTROSCOPY, DUODENOSCOPY (EGD), COMBINED N/A 5/5/2015    Procedure: COMBINED ESOPHAGOSCOPY, GASTROSCOPY, DUODENOSCOPY (EGD);  Surgeon: Mariano Mistry MD;  Location:  GI     HC CAPSULE ENDOSCOPY N/A 9/30/2015    Procedure: CAPSULE/PILL CAM ENDOSCOPY;  Surgeon: Pan Dhaliwal MD;  Location:  GI     HYSTERECTOMY  1980    KYREE     LUMPECTOMY BREAST         Social History   Reviewed and edited as appropriate  Social History     Socioeconomic History     Marital status:      Spouse name: Not on file     Number of children: Not on file     Years of  education: Not on file     Highest education level: Not on file   Occupational History     Not on file   Social Needs     Financial resource strain: Not on file     Food insecurity:     Worry: Not on file     Inability: Not on file     Transportation needs:     Medical: Not on file     Non-medical: Not on file   Tobacco Use     Smoking status: Former Smoker     Packs/day: 2.00     Years: 45.00     Pack years: 90.00     Types: Cigarettes     Start date: 1953     Last attempt to quit: 2002     Years since quittin.3     Smokeless tobacco: Never Used   Substance and Sexual Activity     Alcohol use: No     Drug use: Yes     Types: Marijuana     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     Sexual activity: Not Currently   Lifestyle     Physical activity:     Days per week: Not on file     Minutes per session: Not on file     Stress: Not on file   Relationships     Social connections:     Talks on phone: Not on file     Gets together: Not on file     Attends Jain service: Not on file     Active member of club or organization: Not on file     Attends meetings of clubs or organizations: Not on file     Relationship status: Not on file     Intimate partner violence:     Fear of current or ex partner: Not on file     Emotionally abused: Not on file     Physically abused: Not on file     Forced sexual activity: Not on file   Other Topics Concern     Parent/sibling w/ CABG, MI or angioplasty before 65F 55M? No   Social History Narrative     Not on file       Family History     Reviewed and edited as appropriate  Family History   Problem Relation Age of Onset     Diabetes Mother      Alzheimer Disease Mother        Allergies   Reviewed and edited as appropriate     Allergies   Allergen Reactions     Diovan Hct Itching     severe     Dust Mites      Hydrochlorothiazide Itching     Severe       No Clinical Screening - See Comments      History of blood transfusion reactions and  pre-treats with Benadryl.      Sulfa Drugs Hives     Spironolactone Nausea        Prior to Admission Medications      Current Facility-Administered Medications   Medication     nitroGLYcerin (NITROSTAT) sublingual tablet 0.4 mg     ondansetron (ZOFRAN) injection 4 mg     phytonadione (MEPHYTON/VITAMIN K) 1 MG/ML oral suspension 10 mg     Current Outpatient Medications   Medication Sig     atorvastatin (LIPITOR) 20 MG tablet Take 1 tablet (20 mg) by mouth daily     Calcium Acetate, Phos Binder, 667 MG CAPS TAKE 2 CAPSULE BY MOUTH THREE TIMES A DAY WITH MEALS     Multiple Vitamins-Minerals (PRORENAL + D) TABS Take 1 tablet by mouth daily     pantoprazole (PROTONIX) 20 MG EC tablet Take 1 tablet (20 mg) by mouth 2 times daily 30 - 60 minutes before a meal.     sertraline (ZOLOFT) 50 MG tablet Take 2 tablets (100 mg) by mouth daily     lisinopril (PRINIVIL/ZESTRIL) 10 MG tablet Take 10 mg by mouth     octreotide (SANDOSTATIN LAR) 20 MG injection Inject 20 mg into the muscle every 28 days     order for DME Equipment being ordered: 4 wheel walker with seat. (Patient not taking: Reported on 10/24/2018)     polyethylene glycol 3350 POWD Take 17 g by mouth daily (Patient taking differently: Take 17 g by mouth daily as needed )     traMADol (ULTRAM) 50 MG tablet Take 1-2 tablets ( mg) by mouth every 6 hours as needed for breakthrough pain or severe pain (Patient taking differently: Take 100 mg by mouth every 6 hours as needed for breakthrough pain or severe pain )        Review of Systems   A complete review of systems was performed and is negative except as noted in the HPI      Physical Exam   /76   Temp 98.5  F (36.9  C) (Oral)   Resp 16   Wt 61.7 kg (136 lb)   SpO2 98%   BMI 23.34 kg/m    Wt:   Wt Readings from Last 2 Encounters:   03/26/19 61.7 kg (136 lb)   03/19/19 59.9 kg (132 lb)      Constitutional: cooperative, pleasant, not dyspneic/diaphoretic, no acute distress  Eyes: EOMI, Sclera anicteric    Ears/nose/mouth/throat: Normal oropharynx without ulcers or exudate, mucus membranes moist, hearing intact  CV: RRR, normal s1/s2, no murmur, rub or gallop. No edema  Respiratory: Unlabored breathing, CTAB, no wheezes, crackles, or rhonchi  Abd: Soft, mild epigastric tenderness on palpation. Nondistended, +bs, no hepatosplenomegaly, no peritoneal signs  Rectal: Positive for melena/black stool.  No masses, external hemorrhoids, or fissures   Skin: warm, perfused, no jaundice  Neuro: AAO x 3, No asterixis  Psych: Normal affect  MSK: No gross deformities    Data   Labs and imaging below were independently reviewed and interpreted    BMP  Recent Labs   Lab 03/26/19  1358      POTASSIUM 4.7   CHLORIDE 100   BELGICA 8.8   CO2 28   BUN 30   CR 6.19*   *     CBC  Recent Labs   Lab 03/26/19  1358   WBC 7.1   RBC 2.84*   HGB 9.1*   HCT 30.4*   *   MCH 32.0   MCHC 29.9*   RDW 15.3*        INR  Recent Labs   Lab 03/26/19  1358   INR 1.31*     LFTs  Recent Labs   Lab 03/26/19  1358   ALKPHOS 88   AST 17   ALT 14   BILITOTAL 0.5   PROTTOTAL 7.2   ALBUMIN 3.5      PANC  Recent Labs   Lab 03/26/19  1358   LIPASE 63*              Previous Endoscopy:     Results for orders placed or performed during the hospital encounter of 09/18/18   COLONOSCOPY   Result Value Ref Range    COLONOSCOPY       [address] [place] [address] [phone]     Endoscopy Department  _______________________________________________________________________________  Patient Name: Rachele Martínez        Procedure Date: 9/19/2018 12:46 PM  MRN: [id number]                       Account Number: [id number]  YOB: 1940              Admit Type: Inpatient  Age: 78                                Gender: Female  Note Status: Supervisor Override      Attending MD: Ankit Baires MD  Pause for the Cause: time out performed Total Sedation Time:    _______________________________________________________________________________     Procedure:           Colonoscopy  Indications:         Hematochezia  Providers:           Ankit Baires MD, Gayle Patel RN, Ke Campbell MD  Referring MD:        Sujit Amin  Medicines:           See the other procedure note for documentation of the                        administered medications, Midazolam 1 mg IV,  Fentanyl 25                        micrograms IV  Complications:       No immediate complications. Estimated blood loss:                        Minimal.  _______________________________________________________________________________  Procedure:           Pre-Anesthesia Assessment:                       - Prior to the procedure, a History and Physical was                        performed, and patient medications and allergies were                        reviewed. The patient is competent. The risks and                        benefits of the procedure and the sedation options and                        risks were discussed with the patient. All questions                        were answered and informed consent was obtained. Patient                        identification and proposed procedure were verified by                        the physician and the nurse in the procedure room.                        Mental Status Examination: alert and oriented. Airway                        [place]: normal oropharyngeal airway and neck                        mobility. Respiratory Examination: clear to                        auscultation. CV Examination: normal. Prophylactic                        Antibiotics: The patient does not require prophylactic                        antibiotics. Prior Anticoagulants: The patient has taken                        no previous anticoagulant or antiplatelet agents. ASA                        Grade Assessment: III - A patient with severe systemic                         disease. After reviewing the risks and benefits, the                        patient was deemed in satisfactory condition to undergo                        the procedure. The anesthesia plan was to use moderate                        sedation / analgesia (conscious sedation). Immediately                        prior to administration of medications, the patient was                        re-assessed for adequacy to receive sedatives. The heart                        rate, re spiratory rate, oxygen saturations, blood                        pressure, adequacy of pulmonary ventilation, and                        response to care were monitored throughout the                        procedure. The physical status of the patient was                        re-assessed after the procedure.                       After obtaining informed consent, the colonoscope was                        passed under direct vision. Throughout the procedure,                        the patient's blood pressure, pulse, and oxygen                        saturations were monitored continuously. The Colonoscope                        was introduced through the anus and advanced to the                        terminal ileum. The colonoscopy was performed without                        difficulty. The patient tolerated the procedure well.                        The quality of the bowel preparation was evaluated using                        the BBPS ([place] Bowel Preparation Scale) with scor es                        of: Right Colon = 3, Transverse Colon = 3 and Left Colon                        = 3 (entire mucosa seen well with no residual staining,                        small fragments of stool or opaque liquid). The total                        BBPS score equals 9.                                                                                   Findings:       The perianal and digital rectal examinations were normal. Pertinent        negatives  include no palpable rectal lesions.       Old blood clots in the colon lumen. The terminal ileum appeared normal.       A few large localized angioectasias with stigmata of recent bleeding        were found in the cecum. Coagulation for bleeding prevention using argon        plasma at 0.5 liters/minute and 20 mcmillan was successful. Estimated blood        loss was minimal.       The exam was otherwise without abnormality.       External hemorrhoids were found during retroflexion. The hemorrhoids        were large.                                                                                    Moderate Sedation:       Moderate (conscious) sedation was administered by the endoscopy nurse        and supervised by the endoscopist. The patient's oxygen saturation,        heart rate, blood pressure and response to care were monitored. Total        physician intraservice time was 36 minutes.  Impression:          - Old blood clots were seen in the colon lumen.                       - The examined portion of the ileum was normal.                       - A few recently bleeding colonic angioectasias. Treated                        with argon plasma coagulation (APC).                       - The examination was otherwise normal.                       - External hemorrhoids.                       - No specimens collected.                       - Exam also adequate for CRC screening purposes as she                        was due for surveillance  Recommendation:      - Return patient to [facility] for [facility] care.                        - Suspect the bright red blood was from the hemorrhoids                        and the melena was from the upper GI tract and cecal                        angioectasias.                       - Continue to monitor Hgb and stool output                       - Continue medical management of chronic                        angioectasia/small bowel bleeding with octreotide and IV                         iron infusions with dialysis                       - Repeat colonoscopy in 5 years given prior history of                        tubular adenomas.                                                                                     Ankit Baires MD  ________________  Ankit Baires MD  9/19/2018 3:42:02 PM  I was physically present for the entire viewing portion of the exam.  __________________________  Signature of teaching physician  [facility]/Libertad Baires MD    __________________  Ke Campbell MD  Number of Addenda: 0    Note Initiated On: 9/19/2018 12:46 PM  Scope In:   Scope Out:

## 2019-03-26 NOTE — ED PROVIDER NOTES
History     Chief Complaint   Patient presents with     Shortness of Breath     The history is provided by the patient and medical records.     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 Patient reports onset of sob, dizziness, lightheadedness, and nausea 2 days ago, which she typically develops when she is anemic from a GI bleed. She had one dark, tarry bowel movement this morning with streaking of bright red blood and blood in the toilet bowl. The patient denies chest pain or abdominal pain. She is not on any blood thinners.     The patient reports she was at her dialysis appointment yesterday (03/25/2019) and due to her shortness of breath she presented to the [facility]. Per chart review, CT Chest Pulmonary Angio was obtained and was negative for pulmonary embolism or pneumothorax. Small bilateral layering pleural effusions and slight airway wall thickening was seen, suggestive of mild interstitial edema. Of note, patient was recently started on Lisinopril for HTN but was subsequently discontinued due to dry cough.     I have reviewed the Medications, Allergies, Past Medical and Surgical History, and Social History in the [employer] system.    Past Medical History:   Diagnosis Date     Anemia      Anxiety and depression      Arthritis      AVM (arteriovenous malformation)      Chronic hepatitis C with cirrhosis (H)      Clotting disorder (H)      ESRD (end stage renal disease) (H)     on dialysis     GI bleed     recurrent     Glaucoma      Hyperlipidemia      Hypertension goal BP (blood pressure) < 140/80        Past Surgical History:   Procedure Laterality Date     COLONOSCOPY N/A 9/4/2015    Procedure: COMBINED  COLONOSCOPY, SINGLE OR MULTIPLE BIOPSY/POLYPECTOMY BY BIOPSY;  Surgeon: Rupesh Lopez MD;  Location: [facility]     COLONOSCOPY N/A 2018    Procedure: COLONOSCOPY;  enteroscopy small bowel  COLONOSCOPY;  Surgeon: Ankit Baires MD;  Location:  GI     ESOPHAGOSCOPY, GASTROSCOPY, DUODENOSCOPY (EGD), COMBINED N/A 2014    Procedure: COMBINED ESOPHAGOSCOPY, GASTROSCOPY, DUODENOSCOPY (EGD);  Surgeon: Betsy Carvajal MD;  [facility]     ESOPHAGOSCOPY, GASTROSCOPY, DUODENOSCOPY (EGD), COMBINED N/A 2015    Procedure: COMBINED ESOPHAGOSCOPY, GASTROSCOPY, DUODENOSCOPY (EGD);  Surgeon: Yosvany Ram MD;  Location:  GI     ESOPHAGOSCOPY, GASTROSCOPY, DUODENOSCOPY (EGD), COMBINED N/A 2015    Procedure: COMBINED ESOPHAGOSCOPY, GASTROSCOPY, DUODENOSCOPY (EGD);  Surgeon: Mariano Mistry MD;  Location:  GI     HC CAPSULE ENDOSCOPY N/A 2015    Procedure: CAPSULE/PILL CAM ENDOSCOPY;  Surgeon: Pan Dhaliwal MD;  Location:  GI     HYSTERECTOMY  1980    KYREE     LUMPECTOMY BREAST         Family History   Problem Relation Age of Onset     Diabetes Mother      Alzheimer Disease Mother        Social History     Tobacco Use     Smoking status: Former Smoker     Packs/day: 2.00     Years: 45.00     Pack years: 90.00     Types: Cigarettes     Start date: 1953     Last attempt to quit: 2002     Years since quittin.3     Smokeless tobacco: Never Used   Substance Use Topics     Alcohol use: No       Current Facility-Administered Medications   Medication     nitroGLYcerin (NITROSTAT) sublingual tablet 0.4 mg     ondansetron (ZOFRAN) injection 4 mg     phytonadione (MEPHYTON/VITAMIN K) 1 MG/ML oral suspension 10 mg     Current Outpatient Medications   Medication     atorvastatin (LIPITOR) 20 MG tablet     Calcium Acetate, Phos Binder, 667 MG CAPS     Multiple Vitamins-Minerals (PRORENAL + D) TABS     pantoprazole (PROTONIX) 20 MG EC tablet     sertraline (ZOLOFT) 50 MG  tablet     lisinopril (PRINIVIL/ZESTRIL) 10 MG tablet     octreotide (SANDOSTATIN LAR) 20 MG injection     order for DME     polyethylene glycol 3350 POWD     traMADol (ULTRAM) 50 MG tablet        Allergies   Allergen Reactions     Diovan Hct Itching     severe     Dust Mites      Hydrochlorothiazide Itching     Severe       No Clinical Screening - See Comments      History of blood transfusion reactions and pre-treats with Benadryl.      Sulfa Drugs Hives     Spironolactone Nausea       Review of Systems   Constitutional: Negative for fever and unexpected weight change.   HENT: Negative for congestion and rhinorrhea.    Respiratory: Positive for cough and shortness of breath.    Cardiovascular: Negative for chest pain and leg swelling.   Gastrointestinal: Positive for blood in stool and nausea. Negative for abdominal distention, abdominal pain, diarrhea and vomiting.   Genitourinary: Negative for dysuria.   Neurological: Positive for dizziness and light-headedness.   All other systems reviewed and are negative.      Physical Exam   BP: 184/76  Heart Rate: 86  Temp: 98.5  F (36.9  C)  Resp: 16  Weight: 61.7 kg (136 lb)  SpO2: 98 %      Physical Exam   Constitutional: She is oriented to person, place, and time. She appears well-developed and well-nourished. No distress.   HENT:   Head: Normocephalic and atraumatic.   Nose: Nose normal.   Mouth/Throat: Oropharynx is clear and moist.   Eyes: Conjunctivae are normal. No scleral icterus.   Neck: Normal range of motion. Neck supple. JVD present.   Cardiovascular: Normal rate, regular rhythm, normal heart sounds and intact distal pulses. Exam reveals no friction rub.   No murmur heard.  Pulmonary/Chest: Effort normal and breath sounds normal. No stridor. No respiratory distress. She has no wheezes. She has no rales. She exhibits no tenderness.   Abdominal: Soft. Bowel sounds are normal. She exhibits no distension and no mass. There is tenderness in the right upper  quadrant, epigastric area and left upper quadrant. There is no rigidity, no rebound, no guarding and no tenderness at McBurney's point.   Genitourinary: Rectal exam shows guaiac positive stool. Rectal exam shows no external hemorrhoid, no internal hemorrhoid, no fissure, no mass, no tenderness and anal tone normal.   Genitourinary Comments: Gross melena. No BRB per rectum.    Musculoskeletal: Normal range of motion. She exhibits no edema or deformity.   Neurological: She is alert and oriented to person, place, and time. No sensory deficit. She exhibits normal muscle tone.   Skin: Skin is warm and dry. No rash noted. She is not diaphoretic. No erythema. No pallor.   Psychiatric: She has a normal mood and affect. Her behavior is normal. Thought content normal.   Nursing note and vitals reviewed.      ED Course        Procedures             EKG Interpretation:      Interpreted by Dr. Berman  Time reviewed: 13:45  Symptoms at time of EKG: None   Rhythm: normal sinus   Rate: 80 bpm  Axis: Normal  Ectopy: none  Conduction: normal  ST Segments/ T Waves: Non-specific ST-T wave abnormalities  Q Waves: Prolonged QT  Comparison to prior: Diffuse T wave flattening, similar to EKG from 03/15/2016, but new compared to 09/14/2018    Clinical Impression: Nonspecific ST-T wave changes      Critical Care time:  none             Labs Ordered and Resulted from Time of ED Arrival Up to the Time of Departure from the ED   CBC WITH PLATELETS DIFFERENTIAL - Abnormal; Notable for the following components:       Result Value    RBC Count 2.84 (*)     Hemoglobin 9.1 (*)     Hematocrit 30.4 (*)      (*)     MCHC 29.9 (*)     RDW 15.3 (*)     Absolute Lymphocytes 0.5 (*)     All other components within normal limits   COMPREHENSIVE METABOLIC PANEL - Abnormal; Notable for the following components:    Glucose 103 (*)     Creatinine 6.19 (*)     GFR Estimate 6 (*)     GFR Estimate If Black 7 (*)     All other components within normal limits    LIPASE - Abnormal; Notable for the following components:    Lipase 63 (*)     All other components within normal limits   INR - Abnormal; Notable for the following components:    INR 1.31 (*)     All other components within normal limits   NT PROBNP INPATIENT - Abnormal; Notable for the following components:    N-Terminal Pro BNP Inpatient >175,000 (*)     All other components within normal limits   TROPONIN I - Abnormal; Notable for the following components:    Troponin I ES 0.164 (*)     All other components within normal limits   ISTAT  GASES LACTATE RENALDO POCT - Abnormal; Notable for the following components:    Ph Venous 7.48 (*)     PCO2 Venous 39 (*)     PO2 Venous 24 (*)     Bicarbonate Venous 29 (*)     All other components within normal limits   PARTIAL THROMBOPLASTIN TIME   PERIPHERAL IV CATHETER   ISTAT CG4 GASES LACTATE RENALDO NURSING POCT   ISTAT TROPONIN NURSING POCT   CARDIAC CONTINUOUS MONITORING   PERIPHERAL IV CATHETER   TROPONIN POCT   ABO/RH TYPE AND SCREEN           [facility] ED    3/25/19  Hgb 9.3    CT Chest Pulmonary Angio 3/25/2019  IMPRESSION:   1.  Study is negative for pulmonary embolism or pneumothorax.  2.  Small bilateral layering pleural effusions with adjacent relaxation atelectasis. Combined with slight airway wall thickening suggests mild possibly resolving interstitial edema.     Endoscopy 8/14/2019  Impression:       - Normal esophagus.       - A single recently bleeding angiodysplastic lesion in the stomach. Treated with argon plasma coagulation (APC).       - Non-bleeding gastric ulcers from prior APC therapy, red spot on edge of one treated with APC.       - Normal.       - No specimens collected.      [facility]   Colonoscopy 9/9/18  Impression:          - Old blood clots were seen in the colon lumen.                        - The examined portion of the ileum was normal.                        - A few recently bleeding colonic angioectasias. Treated with argon plasma  coagulation (APC).                        - The examination was otherwise normal.                        - External hemorrhoids.                        - No specimens collected.                        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    Ddx: UGIB, LGIB, anemia, ACS, coagulopathy    Patient had nl w/u for dyspnea yesterday at [facility]. CTA neg for PE as above. Has known h/o GIBs from AVMs that have required recurrent interventions with Argon and multiple previous blood transfusions. She is also on monthly octreotide injections. No known h/o variceal bleeding and no recent SBP. Abdominal exam with only mild epigastric tenderness. No fluid wave or diffuse tenderness/pain so not c/f SBP. Hgb essentially stable from outside lab performed yesterday. Only one bloody BM today. Vitals wnl aside from HTN. Therefore, doubt rapid or unstable GIB. Defer abdominal CT. Will admit for monitoring given high risk comorbidities and coagulopathy. INR elevated. Given PO Vit K. Given IV bolus protonix. Discussed with GI who agreed with admission. No need for redosing of Octreotide.     Patient does c/o orthopnea and has JVD. No LE edema. HTN on arrival w/o chest pain. Previous echo showed diastolic dysfunction with preserved EF. Trop elevated. Discussed with accepting triage hospitalist who recommended giving baby ASA. Also given SL nitroglycerin in setting of HTN and elevated BNP. Will titrate to SBP >120. Suspect decompensated (new onset) HFpEF and type II NSTEMI.  Per medicine, they will contact nephrology to arrange  dialysis. Admitted to general care on tele.     I have reviewed the nursing notes.    I have reviewed the findings, diagnosis, plan and need for follow up with the patient.       Medication List      There are no discharge medications for this visit.         Final diagnoses:   GIB (gastrointestinal bleeding)   NSTEMI (non-ST elevated myocardial infarction) (H)   Shortness of breath   I, Margarito Lees, am serving as a trained medical scribe to document services personally performed by Yaquelin Berman MD, based on the provider's statements to me.   IYaquelin MD, was physically present and have reviewed and verified the accuracy of this note documented by Margarito Lees.    3/26/2019   [facility], [place], EMERGENCY DEPARTMENT     Yaquelin Berman MD  03/26/19 [id number]

## 2019-03-27 LAB
ANION GAP SERPL CALCULATED.3IONS-SCNC: 11 MMOL/L (ref 3–14)
BASOPHILS # BLD AUTO: 0.1 10E9/L (ref 0–0.2)
BASOPHILS NFR BLD AUTO: 0.9 %
BUN SERPL-MCNC: 35 MG/DL (ref 7–30)
CALCIUM SERPL-MCNC: 8.8 MG/DL (ref 8.5–10.1)
CHLORIDE SERPL-SCNC: 100 MMOL/L (ref 94–109)
CO2 SERPL-SCNC: 25 MMOL/L (ref 20–32)
CREAT SERPL-MCNC: 7.44 MG/DL (ref 0.52–1.04)
DIFFERENTIAL METHOD BLD: ABNORMAL
EOSINOPHIL # BLD AUTO: 0.2 10E9/L (ref 0–0.7)
EOSINOPHIL NFR BLD AUTO: 2.8 %
ERYTHROCYTE [DISTWIDTH] IN BLOOD BY AUTOMATED COUNT: 15.8 % (ref 10–15)
GFR SERPL CREATININE-BSD FRML MDRD: 5 ML/MIN/{1.73_M2}
GLUCOSE SERPL-MCNC: 103 MG/DL (ref 70–99)
HCT VFR BLD AUTO: 28.2 % (ref 35–47)
HGB BLD-MCNC: 8.3 G/DL (ref 11.7–15.7)
HGB BLD-MCNC: 9.2 G/DL (ref 11.7–15.7)
IMM GRANULOCYTES # BLD: 0 10E9/L (ref 0–0.4)
IMM GRANULOCYTES NFR BLD: 0.6 %
INR PPP: 1.38 (ref 0.86–1.14)
LYMPHOCYTES # BLD AUTO: 0.7 10E9/L (ref 0.8–5.3)
LYMPHOCYTES NFR BLD AUTO: 10.5 %
MCH RBC QN AUTO: 31.9 PG (ref 26.5–33)
MCHC RBC AUTO-ENTMCNC: 29.4 G/DL (ref 31.5–36.5)
MCV RBC AUTO: 109 FL (ref 78–100)
MONOCYTES # BLD AUTO: 0.9 10E9/L (ref 0–1.3)
MONOCYTES NFR BLD AUTO: 12.2 %
NEUTROPHILS # BLD AUTO: 5.1 10E9/L (ref 1.6–8.3)
NEUTROPHILS NFR BLD AUTO: 73 %
NRBC # BLD AUTO: 0 10*3/UL
NRBC BLD AUTO-RTO: 0 /100
PLATELET # BLD AUTO: 305 10E9/L (ref 150–450)
POTASSIUM SERPL-SCNC: 4.7 MMOL/L (ref 3.4–5.3)
RBC # BLD AUTO: 2.6 10E12/L (ref 3.8–5.2)
SMALL BOWEL ENTEROSCOPY: NORMAL
SODIUM SERPL-SCNC: 136 MMOL/L (ref 133–144)
TROPONIN I SERPL-MCNC: 0.18 UG/L (ref 0–0.04)
TROPONIN I SERPL-MCNC: 0.19 UG/L (ref 0–0.04)
WBC # BLD AUTO: 7 10E9/L (ref 4–11)

## 2019-03-27 PROCEDURE — 0W3P8ZZ CONTROL BLEEDING IN GASTROINTESTINAL TRACT, VIA NATURAL OR ARTIFICIAL OPENING ENDOSCOPIC: ICD-10-PCS | Performed by: INTERNAL MEDICINE

## 2019-03-27 PROCEDURE — 85610 PROTHROMBIN TIME: CPT

## 2019-03-27 PROCEDURE — 25000132 ZZH RX MED GY IP 250 OP 250 PS 637: Mod: GY | Performed by: INTERNAL MEDICINE

## 2019-03-27 PROCEDURE — 36415 COLL VENOUS BLD VENIPUNCTURE: CPT | Performed by: STUDENT IN AN ORGANIZED HEALTH CARE EDUCATION/TRAINING PROGRAM

## 2019-03-27 PROCEDURE — A9270 NON-COVERED ITEM OR SERVICE: HCPCS | Mod: GY | Performed by: INTERNAL MEDICINE

## 2019-03-27 PROCEDURE — 99153 MOD SED SAME PHYS/QHP EA: CPT | Performed by: INTERNAL MEDICINE

## 2019-03-27 PROCEDURE — 44366 SMALL BOWEL ENDOSCOPY: CPT | Performed by: INTERNAL MEDICINE

## 2019-03-27 PROCEDURE — 84484 ASSAY OF TROPONIN QUANT: CPT | Performed by: STUDENT IN AN ORGANIZED HEALTH CARE EDUCATION/TRAINING PROGRAM

## 2019-03-27 PROCEDURE — 25000128 H RX IP 250 OP 636: Performed by: INTERNAL MEDICINE

## 2019-03-27 PROCEDURE — 86706 HEP B SURFACE ANTIBODY: CPT | Performed by: PHYSICIAN ASSISTANT

## 2019-03-27 PROCEDURE — 36415 COLL VENOUS BLD VENIPUNCTURE: CPT

## 2019-03-27 PROCEDURE — 80048 BASIC METABOLIC PNL TOTAL CA: CPT

## 2019-03-27 PROCEDURE — 85018 HEMOGLOBIN: CPT | Performed by: INTERNAL MEDICINE

## 2019-03-27 PROCEDURE — 85025 COMPLETE CBC W/AUTO DIFF WBC: CPT

## 2019-03-27 PROCEDURE — 99233 SBSQ HOSP IP/OBS HIGH 50: CPT | Performed by: INTERNAL MEDICINE

## 2019-03-27 PROCEDURE — C9113 INJ PANTOPRAZOLE SODIUM, VIA: HCPCS

## 2019-03-27 PROCEDURE — 84484 ASSAY OF TROPONIN QUANT: CPT

## 2019-03-27 PROCEDURE — 25000128 H RX IP 250 OP 636

## 2019-03-27 PROCEDURE — 25000132 ZZH RX MED GY IP 250 OP 250 PS 637: Mod: GY

## 2019-03-27 PROCEDURE — A9270 NON-COVERED ITEM OR SERVICE: HCPCS | Mod: GY

## 2019-03-27 PROCEDURE — 5A1D70Z PERFORMANCE OF URINARY FILTRATION, INTERMITTENT, LESS THAN 6 HOURS PER DAY: ICD-10-PCS | Performed by: PHYSICIAN ASSISTANT

## 2019-03-27 PROCEDURE — 27210582 ZZH DEVICE CLIP RESOLUTION, EACH: Performed by: INTERNAL MEDICINE

## 2019-03-27 PROCEDURE — 91110 GI TRC IMG INTRAL ESOPH-ILE: CPT | Performed by: INTERNAL MEDICINE

## 2019-03-27 PROCEDURE — G0499 HEPB SCREEN HIGH RISK INDIV: HCPCS | Performed by: PHYSICIAN ASSISTANT

## 2019-03-27 PROCEDURE — G0500 MOD SEDAT ENDO SERVICE >5YRS: HCPCS | Performed by: INTERNAL MEDICINE

## 2019-03-27 PROCEDURE — 63400005 ZZH RX 634: Performed by: INTERNAL MEDICINE

## 2019-03-27 PROCEDURE — 90937 HEMODIALYSIS REPEATED EVAL: CPT

## 2019-03-27 PROCEDURE — 12000001 ZZH R&B MED SURG/OB UMMC

## 2019-03-27 RX ORDER — SIMETHICONE
LIQUID (ML) MISCELLANEOUS PRN
Status: DISCONTINUED | OUTPATIENT
Start: 2019-03-27 | End: 2019-03-27 | Stop reason: HOSPADM

## 2019-03-27 RX ORDER — PANTOPRAZOLE SODIUM 20 MG/1
20 TABLET, DELAYED RELEASE ORAL 2 TIMES DAILY
Status: DISCONTINUED | OUTPATIENT
Start: 2019-03-27 | End: 2019-03-27

## 2019-03-27 RX ORDER — TRAMADOL HYDROCHLORIDE 50 MG/1
100 TABLET ORAL EVERY 6 HOURS PRN
Status: DISCONTINUED | OUTPATIENT
Start: 2019-03-27 | End: 2019-03-29 | Stop reason: HOSPADM

## 2019-03-27 RX ORDER — CALCIUM ACETATE 667 MG/1
1334 CAPSULE ORAL
Status: DISCONTINUED | OUTPATIENT
Start: 2019-03-27 | End: 2019-03-29 | Stop reason: HOSPADM

## 2019-03-27 RX ORDER — LABETALOL HYDROCHLORIDE 5 MG/ML
10 INJECTION, SOLUTION INTRAVENOUS EVERY 4 HOURS PRN
Status: DISCONTINUED | OUTPATIENT
Start: 2019-03-27 | End: 2019-03-29 | Stop reason: HOSPADM

## 2019-03-27 RX ORDER — FENTANYL CITRATE 50 UG/ML
INJECTION, SOLUTION INTRAMUSCULAR; INTRAVENOUS PRN
Status: DISCONTINUED | OUTPATIENT
Start: 2019-03-27 | End: 2019-03-27 | Stop reason: HOSPADM

## 2019-03-27 RX ADMIN — SODIUM CHLORIDE 300 ML: 9 INJECTION, SOLUTION INTRAVENOUS at 16:40

## 2019-03-27 RX ADMIN — CALCIUM ACETATE 1334 MG: 667 CAPSULE ORAL at 20:59

## 2019-03-27 RX ADMIN — PANTOPRAZOLE SODIUM 40 MG: 40 INJECTION, POWDER, FOR SOLUTION INTRAVENOUS at 09:09

## 2019-03-27 RX ADMIN — SODIUM CHLORIDE 250 ML: 9 INJECTION, SOLUTION INTRAVENOUS at 16:40

## 2019-03-27 RX ADMIN — PANTOPRAZOLE SODIUM 40 MG: 40 INJECTION, POWDER, FOR SOLUTION INTRAVENOUS at 21:00

## 2019-03-27 RX ADMIN — Medication: at 16:40

## 2019-03-27 RX ADMIN — ATORVASTATIN CALCIUM 20 MG: 20 TABLET, FILM COATED ORAL at 09:09

## 2019-03-27 RX ADMIN — EPOETIN ALFA 4000 UNITS: 10000 SOLUTION INTRAVENOUS; SUBCUTANEOUS at 17:25

## 2019-03-27 RX ADMIN — TRAMADOL HYDROCHLORIDE 50 MG: 50 TABLET, COATED ORAL at 01:46

## 2019-03-27 RX ADMIN — SERTRALINE HYDROCHLORIDE 100 MG: 100 TABLET ORAL at 09:09

## 2019-03-27 ASSESSMENT — ACTIVITIES OF DAILY LIVING (ADL)
ADLS_ACUITY_SCORE: 19

## 2019-03-27 ASSESSMENT — MIFFLIN-ST. JEOR
SCORE: 1081.9
SCORE: 1057

## 2019-03-27 NOTE — OR NURSING
Pt tolerated push enteroscopy well. APC was used in the small intestine as well as 2 [employer] Resolution 360 clips. BP and HR were elevated towards the endo of the procedure. Began checking  VS q 3 minutes. Pt back in recovery and was prepared for a capsule endoscopy. VS have currently returned to baseline. Capsule was swallowed at 1305. Report was called to floor nurse. Return pt to PCU

## 2019-03-27 NOTE — PLAN OF CARE
Pt alert and oriented. Vitally stable on ra. Sba with cares. C/o of right lower extremity pain, requested prn tramadol administered and pt was able to sleep 30 min post administration. Slept through the night. Denies dyspnea/sob; on 2L of O2 with sats in upper 90s. Continues with elevated trops; CC paged with increasing trops X2. No changes assessed in pt's physical status. On telemetry monitoring; reading NSR with occasional PVCs. Has not had a BM overnight. Has been NPO since midnight for possible GI procedure regarding GIB. Has been sleeping through the night. Continue to monitor and implement poc.

## 2019-03-27 NOTE — PROGRESS NOTES
[facility], [facility] Progress Note - Hospitalist Service, Gold 1       Date of Admission:  3/26/2019    Assessment & Plan       Rachele Martínez is a 78 year old female admitted on 3/26/2019. 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     Today:   - echo completed, ?WMA - case discussed with cardiology who will investigate further, consider nuclear stress test in the coming days   - EGD and push enteroscopy per GI, 2 AVMs intervened upon and capsule placed   - HD this afternoon   - labetalol added PRN elevated BPs in addition to home regimen      GIB  Hx AVMs:   Hx ABIGAIL: Presenting with reported BRBPR.  Known history of AVM and on monthly octreotide.  Ddx for painless rectal bleeding also includes hemorrhoid vs diverticular vs variceal vs polyp/malignancy.  Reported bleed sounds small and hgb is at baseline (around 9), however will monitor closely in case of increasing rate. Received vitamin K in ER.   - GI consulted, appreciate involvement. Went for EGD/push enteroscopy 3/27 with AVMs noted that were clipped/cauterized, capsule enteroscopy placed.   - IV PPI BID  - T&S, consented for blood transfusion if needed,   - Trend hgb q12h for now given lack of output, sooner PRN     Acute hypoxic respiratory failure   Suspected volume overload   Shortness of breath possibly related to volume overload, less likely ACS as below.  She is volume up on exam, however it is also possible that there is a component of perceived dyspnea 2/2 lisinopril. Most recent ECHO in 2015 showed normal EF and mild diastolic dysfunction, bedside ECHO in the ER with diastolic dysfunction and B lines which is consistent with clinical picture.  Patient may need dry weight re-challenge.   - HD today   - appreciate renal involvement   - Continue PTA inhaler  -  supplemental O2, wean as tolerated     Likely type 2 MI/demand ischemia   New possible WMA on echo   Low suspicion for ACS, suspect this elevation is due to ESRD, with possible component of volume overload causing spill vs 2/2 demand with hypertension. Minimal ECG changes, troponin essentially flat, but echo 3/27 with possible distal anterior hypokinetic segment.   - case discussed with cardiology fellow, who agreed more likely demand, especially in setting of renal failure/HD, and less likely ACS - will review echos.   - consider nuclear stress test in the coming days depending on GI work-up   - Telemetry  - continue to monitor, repeat full evaluation if chest pain      HTN: holding PTA lisinopril due to patient concern for allergy (cough), which had been recently started, previously on losartan.   - labetalol available PRN, would rediscuss with renal in AM     Chronic Issues:  ESRD on HD: HD MWF   - Nephrology consulted   - Daily weights, I&Os  - Continue PTA meds  Compensated HCV cirrhosis: S/p treatment with Harvoni, achieved SVR 7/2015. Follows OP with Dr. Barnett. MELD 23 (alhough significant contribution from creatinine).    HLD: Continue PTA statin  Depression: Stable. Continue PTA Zoloft  Chronic pain: Continue MEDINA tramadol      Diet: Regular Diet Adult    DVT Prophylaxis: Pneumatic Compression Devices  Rodriguez Catheter: not present  Code Status: DNI      Disposition Plan   Expected discharge: 1-2 days, recommended to prior living arrangement once hemoglobin stable and O2 use less than 0 liters/minute.  Entered: Micki Hampton MD 03/27/2019, 2:28 PM       The patient's care was discussed with the Bedside Nurse, Patient and GI Consultant.    Micki Hampton MD  Hospitalist Service, [address], [place]  Pager: [phone]  Please see sticky note for cross cover information  ______________________________________________________________________    Interval History    No acute events  overnight, nursing notes reviewed.     Feeling overall improved, breathing more comfortable and is lying flat this morning. No change in cough, chest pain. No further BMs since PTA, no BRBPR (retierates previously was very dark stool with bright red blood on the toilet paper) but does feel the need to have a BM now.     5-pt ROS otherwise negative, including for new fevers/chills, chest pain, shortness of breath, abdominal pain, or nausea/vomiting.       Data reviewed today: I reviewed all medications, new labs and imaging results over the last 24 hours. I personally reviewed no images or EKG's today.    Physical Exam   Vital Signs: Temp: 98.3  F (36.8  C) Temp src: Oral BP: 157/60 Pulse: 78 Heart Rate: 71 Resp: 20 SpO2: 97 % O2 Device: None (Room air) Oxygen Delivery: 3 LPM  Weight: 133 lbs 11.2 oz   Gen: alert, interactive and pleasant, lying flat in bed in no acute distress  HEENT: Normocephalic/atraumatic, sclera clear, oropharynx with moist mucous membranes, +JVD   Resp: Fair air movement bilaterally, crackles in bilateral bases but no wheezing easy work of breathing on room air  CV: Regular rate and rhythm, 2/6 systolic murmur noted   Abd: soft, non-tender, nondistended  Ext: no lower extremity edema, warm and well-perfused with brisk capillary refill   Neuro: Alert and oriented x4, grossly non-focal, moving all extremities equally       Data   Recent Labs   Lab 03/27/19  0433 03/27/19  0111 03/26/19  1808 03/26/19  1402 03/26/19  1358   WBC 7.0  --   --   --  7.1   HGB 8.3*  --   --   --  9.1*   *  --   --   --  107*     --   --   --  282   INR 1.38*  --   --   --  1.31*     --   --   --  137   POTASSIUM 4.7  --   --   --  4.7   CHLORIDE 100  --   --   --  100   CO2 25  --   --   --  28   BUN 35*  --   --   --  30   CR 7.44*  --   --   --  6.19*   ANIONGAP 11  --   --   --  10   BELGICA 8.8  --   --   --  8.8   *  --   --   --  103*   ALBUMIN  --   --   --   --  3.5   PROTTOTAL  --   --    --   --  7.2   BILITOTAL  --   --   --   --  0.5   ALKPHOS  --   --   --   --  88   ALT  --   --   --   --  14   AST  --   --   --   --  17   LIPASE  --   --   --   --  63*   TROPI 0.187* 0.184* 0.175*  --  0.164*   TROPONIN  --   --   --  0.08  --

## 2019-03-27 NOTE — PLAN OF CARE
Pt had no bowel movement since yesterday.  Small amount of urine, no gross blood noted.  BP borderline.  Pt left for endoscopy at 1000 and will go to dialysis from there.  Daughter called and updated on plan for the day.  Pt remains NPO except ice chips.

## 2019-03-27 NOTE — CONSULTS
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      ESKD: dialysis dependent since 2016; dialyzes [facility] [place] [place] under the care of Dr. Enoch Tarango. Access: RUE AVG. EDW: 61 kg. Run time: 3 hrs.  - Dialysis today per MWF schedule  - No heparin, GIB    Anemia, acute (GIB) on chronic: hgb 8.3 g/dL this AM, down from 9.1 on admission; has been in 9's, though was 8.8 g/dL on 3/25. Iron labs 3/11: IS 23, ferr 595, iron 53. On Aranesp 80 mcg qMonday; IV venofer periodically, per protocols  - Continue Aranesp qMonday  - Has scope today, pending results    BMD: Ca 8.8, alb 3.5, recent . PO calcitriol 0.5 mcg MWF, Phoslo 3 tabs tid WM  - Continue PO calcitriol and Phoslo once eating  - Would check phos level    Dyspnea: likely multifactorial with anemia and volume overload  Volume: EDW 61 kg. CXR with pulm edema and curly B lines. O2 97% on 2L, report of worsening dyspnea; echo with normal IVC but does not collapse with inspiration; pt states she cramps and becomes hypotensive with more than 2 kg UF  - Will challenge EDW as able  - Daily standing weights    BP: 150-160's. Echo 3/26 with EF 50-55%. On losartan 25 mg at bedtime  - continue losartan       Recommendations were communicated to primary team via this note.      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 Outpatient dialysis records obtained and reviewed. Pt is being scoped this AM, will dialyze later today and attempt to challenge dry weight which will likely help improve dyspnea. CXR with pulm edema and curly B's. Echo with normal IVC but no respiratory variation. Hgb stable and blood pressures in 150's. Denies CP, chills, n/v.     PAST MEDICAL HISTORY:  Reviewed with patient on 03/27/2019     Past Medical History:   Diagnosis Date     Anemia      Anxiety and depression      Arthritis      AVM (arteriovenous malformation)      Chronic hepatitis C with cirrhosis (H)      Clotting disorder (H)      ESRD (end stage renal disease) (H)     on dialysis     GI bleed     recurrent     Glaucoma      Hyperlipidemia      Hypertension goal BP (blood pressure) < 140/80        Past Surgical History:   Procedure Laterality Date     COLONOSCOPY N/A 9/4/2015    Procedure: COMBINED COLONOSCOPY, SINGLE OR MULTIPLE BIOPSY/POLYPECTOMY BY BIOPSY;  Surgeon: Rpuesh Lopez MD;  Location:  GI     COLONOSCOPY N/A 9/19/2018    Procedure: COLONOSCOPY;  enteroscopy small bowel  COLONOSCOPY;  Surgeon: Ankit Baires MD;  Location:  GI     ESOPHAGOSCOPY, GASTROSCOPY, DUODENOSCOPY (EGD), COMBINED N/A 12/18/2014    Procedure: COMBINED ESOPHAGOSCOPY, GASTROSCOPY, DUODENOSCOPY (EGD);  Surgeon: Betsy Carvajal MD;  Location:  GI     ESOPHAGOSCOPY, GASTROSCOPY, DUODENOSCOPY (EGD), COMBINED N/A 4/25/2015    Procedure: COMBINED ESOPHAGOSCOPY, GASTROSCOPY, DUODENOSCOPY (EGD);  Surgeon: Yosvany Ram MD;  Location:  GI     ESOPHAGOSCOPY, GASTROSCOPY, DUODENOSCOPY (EGD), COMBINED N/A 5/5/2015    Procedure: COMBINED ESOPHAGOSCOPY, GASTROSCOPY, DUODENOSCOPY (EGD);  Surgeon: Mariano Mistry MD;  Location:  GI     HC CAPSULE ENDOSCOPY N/A 9/30/2015    Procedure: CAPSULE/PILL CAM ENDOSCOPY;  Surgeon: Pan Dhaliwal MD;  Location:  GI     HYSTERECTOMY  1980    KYREE      LUMPECTOMY BREAST          MEDICATIONS:  PTA Meds  Prior to Admission medications    Medication Sig Last Dose Taking? Auth Provider   atorvastatin (LIPITOR) 20 MG tablet Take 1 tablet (20 mg) by mouth daily 3/25/2019 at Unknown time Yes Agnieszka Rodriguez MD   Calcium Acetate, Phos Binder, 667 MG CAPS TAKE 2 CAPSULE BY MOUTH THREE TIMES A DAY WITH MEALS 3/25/2019 at PM Yes Reported, Patient   Multiple Vitamins-Minerals (PRORENAL + D) TABS Take 1 tablet by mouth daily 3/25/2019 at PM Yes Reported, Patient   octreotide (SANDOSTATIN LAR) 20 MG injection Inject 20 mg into the muscle every 28 days 3/18/2019 Yes Unknown, Entered By History   pantoprazole (PROTONIX) 20 MG EC tablet Take 1 tablet (20 mg) by mouth 2 times daily 30 - 60 minutes before a meal. 3/25/2019 at Unknown time Yes Agnieszka Rodriguez MD   sertraline (ZOLOFT) 50 MG tablet Take 2 tablets (100 mg) by mouth daily 3/25/2019 at Unknown time Yes Andriy Barnett MD   traMADol (ULTRAM) 50 MG tablet Take 1-2 tablets ( mg) by mouth every 6 hours as needed for breakthrough pain or severe pain  Patient taking differently: Take 100 mg by mouth every 6 hours as needed for breakthrough pain or severe pain  Past Month at Unknown time Yes Sharad Awan MD   lisinopril (PRINIVIL/ZESTRIL) 10 MG tablet Take 10 mg by mouth STOPPED TAKING , last dose 3/24 at Unknown time  Reported, Patient   losartan (COZAAR) 25 MG tablet Take 25 mg by mouth Daily at bedtime NOT YET STARTED  Unknown, Entered By History   order for DME Equipment being ordered: 4 wheel walker with seat.  Patient not taking: Reported on 10/24/2018   Kong Britton MD   polyethylene glycol 3350 POWD Take 17 g by mouth daily  Patient taking differently: Take 17 g by mouth daily as needed  More than a month at Unknown time  Kong Britton MD      Current Meds    sodium chloride 0.9%  250 mL Intravenous Once in dialysis     sodium chloride 0.9%  300 mL Hemodialysis  Machine Once     atorvastatin  20 mg Oral Daily     epoetin christofer (EPOGEN,PROCRIT) inj ESRD  4,000 Units Intravenous Once in dialysis     gelatin absorbable  1 each Topical During Hemodialysis (from stock)     labetalol  10 mg Intravenous Once     - MEDICATION INSTRUCTIONS -   Does not apply Once     pantoprazole (PROTONIX) IV  40 mg Intravenous BID     sertraline  100 mg Oral Daily     sodium chloride (PF)  3 mL Intracatheter Q8H     Infusion Meds    - MEDICATION INSTRUCTIONS -         ALLERGIES:    Allergies   Allergen Reactions     Lisinopril Cough     Diovan Hct Itching     severe     Dust Mites      Hydrochlorothiazide Itching     Severe       No Clinical Screening - See Comments      History of blood transfusion reactions and pre-treats with Benadryl.      Sulfa Drugs Hives     Spironolactone Nausea       REVIEW OF SYSTEMS:  A comprehensive of systems was negative except as noted above.    SOCIAL HISTORY:   Social History     Socioeconomic History     Marital status:      Spouse name: Not on file     Number of children: Not on file     Years of education: Not on file     Highest education level: Not on file   Occupational History     Not on file   Social Needs     Financial resource strain: Not on file     Food insecurity:     Worry: Not on file     Inability: Not on file     Transportation needs:     Medical: Not on file     Non-medical: Not on file   Tobacco Use     Smoking status: Former Smoker     Packs/day: 2.00     Years: 45.00     Pack years: 90.00     Types: Cigarettes     Start date: 1953     Last attempt to quit: 2002     Years since quittin.3     Smokeless tobacco: Never Used   Substance and Sexual Activity     Alcohol use: No     Drug use: Yes     Types: Marijuana     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     Sexual activity: Not Currently   Lifestyle     Physical activity:     Days per week: Not on file     Minutes per session: Not on file      "Stress: Not on file   Relationships     Social connections:     Talks on phone: Not on file     Gets together: Not on file     Attends [employer] service: Not on file     Active member of club or organization: Not on file     Attends meetings of clubs or organizations: Not on file     Relationship status: Not on file     Intimate partner violence:     Fear of current or ex partner: Not on file     Emotionally abused: Not on file     Physically abused: Not on file     Forced sexual activity: Not on file   Other Topics Concern     Parent/sibling w/ CABG, MI or angioplasty before 65F 55M? No   Social History Narrative     Not on file     Reviewed with patient     FAMILY MEDICAL HISTORY:   Family History   Problem Relation Age of Onset     Diabetes Mother      Alzheimer Disease Mother      Reviewed with patient     PHYSICAL EXAM:   Temp  Av.4  F (36.9  C)  Min: 97.8  F (36.6  C)  Max: 99  F (37.2  C)      Pulse  Av  Min: 81  Max: 87 Resp  Av.1  Min: 12  Max: 23  SpO2  Av.6 %  Min: 96 %  Max: 100 %       /73 (Cuff Size: Adult Regular)   Pulse 81   Temp 97.8  F (36.6  C) (Oral)   Resp 12   Ht 1.626 m (5' 4\")   Wt 60.6 kg (133 lb 11.2 oz)   SpO2 100%   BMI 22.95 kg/m        Admit Weight: 61.7 kg (136 lb)     GENERAL APPEARANCE: alert, NAD  EYES: no scleral icterus, pupils equal  Pulmonary: crackles in bases, diminished   CV: regular rhythm, normal rate    - Edema none  GI: soft, nontender, normal bowel sounds  MS: no evidence of inflammation in joints, no muscle tenderness  : no armando  SKIN: no rash, warm, dry, no cyanosis  NEURO: face symmetric, no asterixis    Access: RUE AVG    LABS:   CMP  Recent Labs   Lab 19  0433 19  1358    137   POTASSIUM 4.7 4.7   CHLORIDE 100 100   CO2 25 28   ANIONGAP 11 10   * 103*   BUN 35* 30   CR 7.44* 6.19*   GFRESTIMATED 5* 6*   GFRESTBLACK 6* 7*   BELGICA 8.8 8.8   PROTTOTAL  --  7.2   ALBUMIN  --  3.5   BILITOTAL  --  0.5   ALKPHOS  " --  88   AST  --  17   ALT  --  14     CBC  Recent Labs   Lab 03/27/19  0433 03/26/19  1358   HGB 8.3* 9.1*   WBC 7.0 7.1   RBC 2.60* 2.84*   HCT 28.2* 30.4*   * 107*   MCH 31.9 32.0   MCHC 29.4* 29.9*   RDW 15.8* 15.3*    282     INR  Recent Labs   Lab 03/27/19  0433 03/26/19  1358   INR 1.38* 1.31*   PTT  --  37     ABGNo lab results found in last 7 days.   URINE STUDIES  Recent Labs   Lab Test 03/11/16  1449 03/05/16  1440 05/04/15  1213 01/18/12  1726   COLOR Light Yellow Yellow Light Yellow Yellow   APPEARANCE Clear Clear Clear  --    URINEGLC 30* 30* Negative NEGATIVE   URINEBILI Negative Negative Negative  --    URINEKETONE Negative Negative Negative  --    SG 1.009 1.011 1.008 >=1.030   UBLD Trace* Small* Trace*  --    URINEPH 6.0 6.0 6.5 5.5   PROTEIN 300* 300* 300*  --    UROBILINOGEN  --   --   --  1.0 E.U./dL   NITRITE Negative Negative Negative NEGATIVE   LEUKEST Negative Negative Negative  --    RBCU 1 1 <1  --    WBCU <1 4* 2 2-4     No lab results found.  PTH  Recent Labs   Lab Test 03/12/16  0552   PTHI 147*     IRON STUDIES  Recent Labs   Lab Test 10/24/18  1506 09/14/18  1159 06/21/16  1404 03/13/16  0446 03/01/16  1140 01/14/16  0944 11/12/15  1005 08/21/15  0924 11/05/14  1306   IRON 241* 55 30* 25* 21*  --  35 34* 143    279 284 369 482*  --  399 445* 360   IRONSAT 57* 20 11* 7* 4*  --  9* 8* 40   KASSI 176 229 67 18 11 51 40 18  --        IMAGING:  Reviewed    Zora Villagomez PA-C

## 2019-03-27 NOTE — PLAN OF CARE
Pt had endoscopy today and arrived back to floor at 1400.  Pt alert, sleepy between cares.  VSS.  BP up to 187 systolic at 1515.  Other VSS.  Pt has monitor on for capsule study until 1am on 3/28/2019.  Pt will go to dialysis at 1600 today.  Clear liquid at 1505 and regular diet at 1700.  Daughters here to see patient and updated per plan and will be back at 730PM.  Will continue with care plan and notify MD if any changes.

## 2019-03-27 NOTE — PROGRESS NOTES
Gastroenterology Endoscopy Suite Brief Operative Note    Procedure:  Push enteroscopy   Post-operative diagnosis: 2 Duodenal AVMs   Staff Physician:  Dr. Perkins   Fellow/Assistant(s):  Dr. Mariano Blanco    Specimens:  Please see final procedure note for further details.   Findings:  2 AVMs in 2nd portion of duodenum, treated with APC and 2 clips   Complications:  Hypertensive during procedure with increasing tachycardia, so procedure stopped after treatment of 2nd AVM.   Condition:  Stable   Recommendations  Diet:  Clear liquid diet  PPI:  N/A  Anti-coagulants/platelets:  N/A  Octreotide:  N/A  Discharge Planning:   - Monitor Hgb and for signs of further bleeding  - Consider repeat push enterosocopy if signs of further bleeding

## 2019-03-27 NOTE — SUMMARY OF CARE
Admitted to unit with following belongings:    Shoes, jacket clothing, socks, danay phone, purse, earrings, eye glasses, rings(7)

## 2019-03-27 NOTE — PLAN OF CARE
Admitted this 78 yr old female from ED at 2021 via litter and escorted by transport personnel. Came in with CC of Dyspnea and recent GI bleed. Troponin  level is elevated. With significant medical hX of GI bleed 2/2 AVM, ESRD and on HD, HTN, HCV, Compensated Cirrhosis. Made comfortable in bed. Orientation to room, call light and staff done. Alert and oriented x4, pleasant calm, cooperative, verbally responsive. BP elevated and antihypertensive was requested from pharmacy thereafter. MD however discontinued hydralazine and ordered labetalol instead. BP however came down to 163/64 mmHg and MD held antihypertensives as of this time. Patient resting.

## 2019-03-28 LAB
ANION GAP SERPL CALCULATED.3IONS-SCNC: 10 MMOL/L (ref 3–14)
BUN SERPL-MCNC: 14 MG/DL (ref 7–30)
CALCIUM SERPL-MCNC: 8.7 MG/DL (ref 8.5–10.1)
CHLORIDE SERPL-SCNC: 99 MMOL/L (ref 94–109)
CO2 SERPL-SCNC: 28 MMOL/L (ref 20–32)
CREAT SERPL-MCNC: 4.3 MG/DL (ref 0.52–1.04)
ERYTHROCYTE [DISTWIDTH] IN BLOOD BY AUTOMATED COUNT: 15.7 % (ref 10–15)
GFR SERPL CREATININE-BSD FRML MDRD: 9 ML/MIN/{1.73_M2}
GLUCOSE SERPL-MCNC: 102 MG/DL (ref 70–99)
HBV SURFACE AB SERPL IA-ACNC: 10.19 M[IU]/ML
HBV SURFACE AG SERPL QL IA: NONREACTIVE
HCT VFR BLD AUTO: 29.5 % (ref 35–47)
HGB BLD-MCNC: 8.8 G/DL (ref 11.7–15.7)
MCH RBC QN AUTO: 31.7 PG (ref 26.5–33)
MCHC RBC AUTO-ENTMCNC: 29.8 G/DL (ref 31.5–36.5)
MCV RBC AUTO: 106 FL (ref 78–100)
PHOSPHATE SERPL-MCNC: 2.9 MG/DL (ref 2.5–4.5)
PLATELET # BLD AUTO: 290 10E9/L (ref 150–450)
POTASSIUM SERPL-SCNC: 3.8 MMOL/L (ref 3.4–5.3)
RBC # BLD AUTO: 2.78 10E12/L (ref 3.8–5.2)
SODIUM SERPL-SCNC: 136 MMOL/L (ref 133–144)
WBC # BLD AUTO: 8.5 10E9/L (ref 4–11)

## 2019-03-28 PROCEDURE — 84100 ASSAY OF PHOSPHORUS: CPT | Performed by: INTERNAL MEDICINE

## 2019-03-28 PROCEDURE — 25000132 ZZH RX MED GY IP 250 OP 250 PS 637: Mod: GY | Performed by: INTERNAL MEDICINE

## 2019-03-28 PROCEDURE — 25000128 H RX IP 250 OP 636

## 2019-03-28 PROCEDURE — A9270 NON-COVERED ITEM OR SERVICE: HCPCS | Mod: GY

## 2019-03-28 PROCEDURE — 99233 SBSQ HOSP IP/OBS HIGH 50: CPT | Performed by: STUDENT IN AN ORGANIZED HEALTH CARE EDUCATION/TRAINING PROGRAM

## 2019-03-28 PROCEDURE — 12000001 ZZH R&B MED SURG/OB UMMC

## 2019-03-28 PROCEDURE — 25000132 ZZH RX MED GY IP 250 OP 250 PS 637: Mod: GY

## 2019-03-28 PROCEDURE — 80048 BASIC METABOLIC PNL TOTAL CA: CPT | Performed by: INTERNAL MEDICINE

## 2019-03-28 PROCEDURE — 36415 COLL VENOUS BLD VENIPUNCTURE: CPT | Performed by: INTERNAL MEDICINE

## 2019-03-28 PROCEDURE — 85027 COMPLETE CBC AUTOMATED: CPT | Performed by: INTERNAL MEDICINE

## 2019-03-28 PROCEDURE — C9113 INJ PANTOPRAZOLE SODIUM, VIA: HCPCS

## 2019-03-28 PROCEDURE — A9270 NON-COVERED ITEM OR SERVICE: HCPCS | Mod: GY | Performed by: INTERNAL MEDICINE

## 2019-03-28 RX ADMIN — CALCIUM ACETATE 1334 MG: 667 CAPSULE ORAL at 08:56

## 2019-03-28 RX ADMIN — PANTOPRAZOLE SODIUM 40 MG: 40 INJECTION, POWDER, FOR SOLUTION INTRAVENOUS at 19:25

## 2019-03-28 RX ADMIN — ATORVASTATIN CALCIUM 20 MG: 20 TABLET, FILM COATED ORAL at 08:56

## 2019-03-28 RX ADMIN — PANTOPRAZOLE SODIUM 40 MG: 40 INJECTION, POWDER, FOR SOLUTION INTRAVENOUS at 08:56

## 2019-03-28 RX ADMIN — TRAMADOL HYDROCHLORIDE 100 MG: 50 TABLET, COATED ORAL at 00:38

## 2019-03-28 RX ADMIN — CALCIUM ACETATE 1334 MG: 667 CAPSULE ORAL at 18:43

## 2019-03-28 RX ADMIN — SERTRALINE HYDROCHLORIDE 100 MG: 100 TABLET ORAL at 08:56

## 2019-03-28 ASSESSMENT — ACTIVITIES OF DAILY LIVING (ADL)
ADLS_ACUITY_SCORE: 14
ADLS_ACUITY_SCORE: 19
ADLS_ACUITY_SCORE: 14
ADLS_ACUITY_SCORE: 19
ADLS_ACUITY_SCORE: 19
ADLS_ACUITY_SCORE: 14

## 2019-03-28 ASSESSMENT — MIFFLIN-ST. JEOR: SCORE: 1063.76

## 2019-03-28 NOTE — PLAN OF CARE
No significant changes to status. Pt alert and oriented. Vitally stable on ra. Sba with cares. C/o of right lower extremity pain, requested prn tramadol administered. Slept through the night. Denies dyspnea/sob. No changes assessed in pt's physical status. On telemetry monitoring; reading NSR with occasional PVCs. Has not had a BM overnight. Capsule study ended at 0100. Has been sleeping through the night. Continue to monitor and implement poc.

## 2019-03-28 NOTE — PROGRESS NOTES
[facility], [facility] Progress Note - Hospitalist Service, Gold 1       Date of Admission:  3/26/2019    Assessment & Plan       Rachele Martínez is a 78 year old female admitted on 3/26/2019. 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     Today:   - d/w cardiology, plan for outpatient Lexiscan and cards follow up  - GI to follow up capsule study  - HD in AM then discharge     GIB  Hx AVMs:   Hx ABIGAIL: Presenting with reported BRBPR.  Known history of AVM and on monthly octreotide.  Ddx for painless rectal bleeding also includes hemorrhoid vs diverticular vs variceal vs polyp/malignancy.  Reported bleed sounds small and hgb is at baseline (around 9), however will monitor closely in case of increasing rate. Received vitamin K in ER.   - GI consulted, appreciate involvement. Went for EGD/push enteroscopy 3/27 with AVMs noted that were clipped/cauterized, capsule enteroscopy placed.   - PPI BID PO on discharge  - Trend hgb q12h for now given lack of output    Acute hypoxic respiratory failure   Suspected volume overload   Shortness of breath possibly related to volume overload, less likely ACS as below.  She is volume up on exam, however it is also possible that there is a component of perceived dyspnea 2/2 lisinopril. Most recent ECHO in 2015 showed normal EF and mild diastolic dysfunction, bedside ECHO in the ER with diastolic dysfunction and B lines which is consistent with clinical picture.  Patient may need dry weight re-challenge.   - HD today   - appreciate renal involvement   - Continue PTA inhaler  - supplemental O2, wean as tolerated     Likely type 2 MI/demand ischemia   New possible WMA on echo   Low suspicion for ACS, suspect this elevation is due to ESRD, with possible component of volume overload causing spill vs 2/2 demand  with hypertension. Minimal ECG changes, troponin essentially flat, but echo 3/27 with possible distal anterior hypokinetic segment.   - nuclear stress test as outpatient with cardiology follow up  - Telemetry  - continue to monitor, repeat full evaluation if chest pain      HTN: on losartan at baseline  - restart losartan at discharge    Chronic Issues:  ESRD on HD: HD MWF   - Nephrology consulted   - Daily weights, I&Os  - Continue PTA meds  Compensated HCV cirrhosis: S/p treatment with Harvoni, achieved SVR 7/2015. Follows OP with Dr. Barnett. MELD 23 (alhough significant contribution from creatinine).    HLD: Continue PTA statin  Depression: Stable. Continue PTA Zoloft  Chronic pain: Continue MEDINA tramadol      Diet: Regular Diet Adult  Room Service  Diet    DVT Prophylaxis: Pneumatic Compression Devices  Rodriguez Catheter: not present  Code Status: DNI      Disposition Plan   Expected discharge: 1-2 days, recommended to prior living arrangement once hemoglobin stable and O2 use less than 0 liters/minute.  Entered: Rafat Carver MD 03/28/2019, 2:52 PM       The patient's care was discussed with the Bedside Nurse, Patient and GI Consultant.    Rafat Carver MD  Hospitalist Service, [address], [place]  Pager: [phone]  Please see sticky note for cross cover information  ______________________________________________________________________    Interval History    No acute events overnight, nursing notes reviewed.     Feeling near baseline right now.  No chest pain or trouble breathing.  No bowel movement.  Feeling otherwise pretty good.  Wants to go home.    5-pt ROS otherwise negative, including for new fevers/chills, chest pain, shortness of breath, abdominal pain, or nausea/vomiting.       Data reviewed today: I reviewed all medications, new labs and imaging results over the last 24 hours. I personally reviewed no images or EKG's today.    Physical Exam   Vital  Signs: Temp: 96.2  F (35.7  C) Temp src: Axillary BP: 149/65 Pulse: 66 Heart Rate: 81 Resp: 16 SpO2: 97 % O2 Device: None (Room air)    Weight: 129 lbs 6.56 oz   Gen: alert, interactive and pleasant, lying flat in bed in no acute distress  HEENT: Normocephalic/atraumatic, sclera clear, oropharynx with moist mucous membranes, +JVD   Resp: Fair air movement bilaterally, no crackles today  CV: Regular rate and rhythm, 2/6 systolic murmur noted   Abd: soft, non-tender, nondistended  Ext: no lower extremity edema, warm and well-perfused with brisk capillary refill   Neuro: Alert and oriented x4, grossly non-focal, moving all extremities equally

## 2019-03-28 NOTE — PROGRESS NOTES
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       ESKD: dialysis dependent since 2016; dialyzes MWF at [place] [place]. \[facility]"" under the care of Dr. Enoch Tarango. Access: RUE AVG. EDW: 61 kg. Run time: 3 hrs.  - Dialysis per MWF schedule  - No heparin, GIB     Anemia, acute (GIB) on chronic: hgb 8.8 g/dL this AM; has been in 9's, though was 8.8 g/dL on 3/25. Iron labs 3/11: IS 23, ferr 595, iron 53. On Aranesp 80 mcg qMonday; IV venofer periodically, per protocols  - Continue Aranesp qMonday  - Scope 3/27 with several AVMs located and clipped, also video capsule done and results pending     BMD: Ca 8.7, alb 3.5, recent . PO calcitriol 0.5 mcg MWF, Phoslo 3 tabs tid WM  - Continue PO calcitriol and Phoslo once eating  - Ordered phos lab     Dyspnea: likely multifactorial with anemia and volume overload  Volume: EDW 61 kg. CXR with pulm edema and curly B lines. O2 97% on 2L, report of worsening dyspnea; echo with normal IVC but does not collapse with inspiration; pt states she cramps and becomes hypotensive with more than 2 kg UF  - Will challenge EDW as able  - Daily standing weights     BP: 150's. Echo 3/26 with EF 50-55%. On losartan 25 mg at bedtime  - continue losartan      Recommendations were communicated to primary team via this note       Zora Villagomez PAJoannaC  Division of Kidney Disease  [phone]    Interval History :   Seen bedside, AVMs clipped yesterday, video capsule pending. 2 kg UF yesterday, breathing improved. Denies CP, chills, n/v    Review of Systems:   4 point ROS neg other than as noted above.    Physical Exam:   I/O last 3 completed shifts:  In: -   Out: 2175 [Urine:175; Other:2000]   /60 (BP Location: Right arm)   Pulse 66   Temp 98.6  F (37  C) (Oral)   Resp 18    "Ht 1.626 m (5' 4\")   Wt 58.7 kg (129 lb 6.6 oz)   SpO2 95%   BMI 22.21 kg/m       GENERAL APPEARANCE: alert, NAD  EYES: no scleral icterus, pupils equal  Pulmonary: crackles in bases, diminished   CV: regular rhythm, normal rate    - Edema none  GI: soft, nontender, normal bowel sounds  MS: no evidence of inflammation in joints, no muscle tenderness  : no armando  SKIN: no rash, warm, dry, no cyanosis  NEURO: face symmetric, no asterixis    Access: CHIOMA PEREZ        Labs:   All labs reviewed by me  Electrolytes/Renal -   Recent Labs   Lab Test 03/28/19  0456 03/27/19  0433 03/26/19  1358  09/14/18  1159  03/12/16  0552    136 137   < > 139   < > 143   POTASSIUM 3.8 4.7 4.7   < > 4.7   < > 3.6   CHLORIDE 99 100 100   < > 99   < > 116*   CO2 28 25 28   < > 29   < > 19*   BUN 14 35* 30   < > 48*   < > 34*   CR 4.30* 7.44* 6.19*   < > 8.26*   < > 2.76*   * 103* 103*   < > 121*   < > 104*   BELGICA 8.7 8.8 8.8   < > 8.2*   < > 7.9*   MAG  --   --   --   --   --   --  1.7   PHOS  --   --   --   --  3.6  --  3.0    < > = values in this interval not displayed.       CBC -   Recent Labs   Lab Test 03/28/19  0456 03/27/19  1819 03/27/19  0433 03/26/19  1358   WBC 8.5  --  7.0 7.1   HGB 8.8* 9.2* 8.3* 9.1*     --  305 282       LFTs -   Recent Labs   Lab Test 03/26/19  1358 09/18/18  1240 09/14/18  1159   ALKPHOS 88 120 132   BILITOTAL 0.5 0.5 0.3   ALT 14 24 19   AST 17 29 26   PROTTOTAL 7.2 8.1 7.2   ALBUMIN 3.5 3.9 3.5       Iron Panel -   Recent Labs   Lab Test 10/24/18  1506 09/14/18  1159 06/21/16  1404   IRON 241* 55 30*   IRONSAT 57* 20 11*   KASSI 176 229 67         Imaging:  Reviewed    Current Medications:    atorvastatin  20 mg Oral Daily     calcium acetate  1,334 mg Oral TID w/meals     pantoprazole (PROTONIX) IV  40 mg Intravenous BID     sertraline  100 mg Oral Daily     sodium chloride (PF)  3 mL Intracatheter Q8H       Zora Villagomez PA-C  "

## 2019-03-28 NOTE — PLAN OF CARE
Patient back from dialysis at 2100 via bed in stable condition, awake and alert, verbally responsive. No c/o pain or discomfort and breathing comfortably in room air. Claimed she ate supper while in dialysis and consumed 50% of supper. Had push enteroscopy and capsule endoscopy done this afternoon prior to dialysis. Due meds given. Resting in bed as of this time and Made comfortable.  P: Continue with plan of care.

## 2019-03-28 NOTE — PLAN OF CARE
Neuro: A&Ox4.   Cardiac: SR 80's. VSS, BP elevated into 160 systolic, recheck 140's.  Prn labetalol for >190/110   Respiratory: Sating >92% on RA. Denies SOB  GI/: Anuric on HD. No BM since AVM's clipped.  Diet/appetite: Tolerating regular diet. Fair appetite.  Activity:  Up ad rosalinda, up to chair and in halls.  Pain: Denies  Skin: No new deficits noted.  LDA's:  L PIV, R AV fistula with bruit/thrill    Plan: Discharge orders are entered for tomorrow morning after dialysis and Hgb recheck.  Dialysis already scheduled for 0730.  Continue with POC. Notify primary team with changes.

## 2019-03-28 NOTE — PROGRESS NOTES
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      Presented with 3 days of fatigue and dyspnea on exertion and 1 day of melena and BRBPR concerning for recurrent GI bleed. Hgb at baseline in 9s and patient hemodynamically stable. Black stool noted on rectal exam today concerning for upper GI bleed source. EGD with push enteroscopy on 3/27 found 2 non-bleeding angiectasias in 2nd portion of duodenum. Treated with APC and 2 clips. Completed capsule study after EGD, read pending. No bowel movement since procedure. Hemoglobin and vitals stable.      Recommendations  - Formal read of capsule study pending, will contact patient if any abnormality requiring further intervention/follow up.   - instructed patient to monitor stool for bleeding   - continue monthly octreotide as outpatient   - resume PTA oral pantoprazole as outpatient   - avoid NSAIDs  - okay to discharge from GI standpoint     GI team will sign off.     Thank you for involving us in this patient's care. Please do not hesitate to contact the GI service with any questions or concerns.     Pt care plan discussed with Dr. Singh, GI staff physician.    Vikas Rios MD  Internal Medicine-PGY1  P:[phone]          Interval History:   No acute events overnight or post procedure. Tolerated clear liquid diet yesterday. No bowel movement since 3/26. Denies fever, nausea, vomiting, abdominal pain/distention, diarrhea. Primary team planning for discharge tomorrow after dialysis.     4 point ROS performed and negative unless noted above          Medications:     Current Facility-Administered Medications   Medication     albuterol (PROAIR HFA/PROVENTIL HFA/VENTOLIN HFA) 108 (90 Base) MCG/ACT inhaler 2 puff      "atorvastatin (LIPITOR) tablet 20 mg     calcium acetate (PHOSLO) capsule 1,334 mg     labetalol (NORMODYNE/TRANDATE) injection 10 mg     lidocaine (LMX4) cream     lidocaine 1 % 0.1-1 mL     melatonin tablet 1 mg     naloxone (NARCAN) injection 0.1-0.4 mg     nitroGLYcerin (NITROSTAT) sublingual tablet 0.4 mg     ondansetron (ZOFRAN) injection 4 mg     pantoprazole (PROTONIX) 40 mg IV push injection     polyethylene glycol (MIRALAX/GLYCOLAX) Packet 17 g     sertraline (ZOLOFT) tablet 100 mg     sodium chloride (PF) 0.9% PF flush 3 mL     sodium chloride (PF) 0.9% PF flush 3 mL     traMADol (ULTRAM) tablet 100 mg        Physical Exam   Blood pressure 152/60, pulse 66, temperature 98.6  F (37  C), temperature source Oral, resp. rate 18, height 1.626 m (5' 4\"), weight 58.7 kg (129 lb 6.6 oz), SpO2 95 %, not currently breastfeeding.  Constitutional: cooperative, pleasant, not dyspneic/diaphoretic, no acute distress  Eyes: EOMI, MMM, Sclera anicteric  Ears/nose/mouth/throat: Normal oropharynx without ulcers or exudate, mucus membranes moist  CV: No edema  Respiratory: Unlabored breathing  Abd:  Nondistended, no hepatosplenomegaly, nontender, no peritoneal signs  Skin: warm, perfused, no jaundice  Neuro: AAO x 3, no focal neuro deficits.   Psych: Normal affect    Data   Current Labs  CBC  Recent Labs   Lab 03/28/19 0456 03/27/19  1819 03/27/19  0433 03/26/19  1358   WBC 8.5  --  7.0 7.1   RBC 2.78*  --  2.60* 2.84*   HGB 8.8* 9.2* 8.3* 9.1*   HCT 29.5*  --  28.2* 30.4*   *  --  109* 107*   MCH 31.7  --  31.9 32.0   MCHC 29.8*  --  29.4* 29.9*   RDW 15.7*  --  15.8* 15.3*     --  305 282     BMP  Recent Labs   Lab 03/28/19  0456 03/27/19  0433 03/26/19  1358    136 137   POTASSIUM 3.8 4.7 4.7   CHLORIDE 99 100 100   CO2 28 25 28   ANIONGAP 10 11 10   * 103* 103*   BUN 14 35* 30   CR 4.30* 7.44* 6.19*   GFRESTIMATED 9* 5* 6*   GFRESTBLACK 11* 6* 7*   BELGICA 8.7 8.8 8.8   PHOS 2.9  --   --     "   INR  Recent Labs   Lab 03/27/19  0433 03/26/19  1358   INR 1.38* 1.31*     Liver panel  Recent Labs   Lab 03/26/19  1358   PROTTOTAL 7.2   ALBUMIN 3.5   BILITOTAL 0.5   ALKPHOS 88   AST 17   ALT 14       Imaging/procedures:  EGD with push enteroscopy 3/28/19  Impression:            - Two non-bleeding angioectasias in the duodenum. Treated with argon plasma coagulation (APC) and clips placed   - Limited exam of stomach due to residue was negative  for angioectasias or other significant findings   - Normal esophagus   - The examined jejunum was normal.    - Exam ended due to hypertension and tachycardia at end of exam.

## 2019-03-29 ENCOUNTER — PRE VISIT (OUTPATIENT)
Dept: CARDIOLOGY | Facility: CLINIC | Age: 79
End: 2019-03-29

## 2019-03-29 VITALS
TEMPERATURE: 98.1 F | BODY MASS INDEX: 22.09 KG/M2 | RESPIRATION RATE: 18 BRPM | DIASTOLIC BLOOD PRESSURE: 64 MMHG | HEART RATE: 73 BPM | WEIGHT: 129.41 LBS | SYSTOLIC BLOOD PRESSURE: 142 MMHG | OXYGEN SATURATION: 99 % | HEIGHT: 64 IN

## 2019-03-29 LAB — HGB BLD-MCNC: 8.9 G/DL (ref 11.7–15.7)

## 2019-03-29 PROCEDURE — A9270 NON-COVERED ITEM OR SERVICE: HCPCS | Mod: GY | Performed by: INTERNAL MEDICINE

## 2019-03-29 PROCEDURE — 85018 HEMOGLOBIN: CPT | Performed by: STUDENT IN AN ORGANIZED HEALTH CARE EDUCATION/TRAINING PROGRAM

## 2019-03-29 PROCEDURE — 36415 COLL VENOUS BLD VENIPUNCTURE: CPT | Performed by: STUDENT IN AN ORGANIZED HEALTH CARE EDUCATION/TRAINING PROGRAM

## 2019-03-29 PROCEDURE — 90937 HEMODIALYSIS REPEATED EVAL: CPT

## 2019-03-29 PROCEDURE — 25000128 H RX IP 250 OP 636: Performed by: STUDENT IN AN ORGANIZED HEALTH CARE EDUCATION/TRAINING PROGRAM

## 2019-03-29 PROCEDURE — 99239 HOSP IP/OBS DSCHRG MGMT >30: CPT | Performed by: STUDENT IN AN ORGANIZED HEALTH CARE EDUCATION/TRAINING PROGRAM

## 2019-03-29 PROCEDURE — 25000132 ZZH RX MED GY IP 250 OP 250 PS 637: Mod: GY | Performed by: INTERNAL MEDICINE

## 2019-03-29 RX ADMIN — Medication: at 08:23

## 2019-03-29 RX ADMIN — TRAMADOL HYDROCHLORIDE 100 MG: 50 TABLET, COATED ORAL at 02:21

## 2019-03-29 RX ADMIN — SODIUM CHLORIDE 250 ML: 9 INJECTION, SOLUTION INTRAVENOUS at 08:23

## 2019-03-29 RX ADMIN — SODIUM CHLORIDE 300 ML: 9 INJECTION, SOLUTION INTRAVENOUS at 08:22

## 2019-03-29 ASSESSMENT — ACTIVITIES OF DAILY LIVING (ADL)
ADLS_ACUITY_SCORE: 14

## 2019-03-29 ASSESSMENT — MIFFLIN-ST. JEOR
SCORE: 1057
SCORE: 1057

## 2019-03-29 NOTE — PROGRESS NOTES
HEMODIALYSIS TREATMENT NOTE    Date: 3/29/2019  Time: 11:09 AM    Data:  Pre Wt:   58.7kg  Desired Wt: 57 kg   Post Wt:    Weight gain:   kg   Weight change-1.7:   kg  Ultrafiltration - Post Run Net Total Removed (mL): 1700 mL  Ultrafiltration - Post Run Net Total Gain (mL): 0 mL  Vascular Access Status: Yes, secured and visible  Dialyzer Rinse: Streaked, Light  Total Blood Volume Processed: 75.2  Total Dialysis (Treatment) Time:  3         Interventions:  3 hours of HD with 1.7 kgs pulled with no complications. This should put the patient 3 kgs below EDW. Hand off to RN    Assessment:  ESRD patient in for regular HD run. VSS A+O      Plan:    Per renal

## 2019-03-29 NOTE — PLAN OF CARE
Assumed cares from 0700 until discharge.    Pt left unit around 0730 for dialysis.     Pt returned from dialysis around 1130.    Pt medically ready for discharge. Reviewed discharge instructions with pt. Pt verbalized understanding. Removed PIV. Pt didn't want AM meds after returning to unit from dialysis--pt was eager to go home and stated she would take her meds at home.     Pt left unit around 1200. Left unit with all her belongings.    Writer faxed AVS to outpatient dialysis center.

## 2019-03-29 NOTE — TELEPHONE ENCOUNTER
PREVISIT INFORMATION                                                    Rachele Martínez scheduled for future visit at [facility] specialty clinics.    Patient is scheduled to see Ashli on 4/1/19  Reason for visit: Follow up hospitalization-see note from 3/28/19 under Rafat Carver  Referring provider none  Has patient seen previous specialist?Daughter believes she saw Dr Cornelius but unable to find any notes  Medical Records:  Available in chart.  Patient was previously seen at a [place] or [facility] facility.    REVIEW                                                      New patient packet mailed to patient: No  Medication reconciliation complete: Yes      No current outpatient medications on file.       Allergies: Lisinopril; Diovan hct; Dust mites; Hydrochlorothiazide; No clinical screening - see comments; Sulfa drugs; and Spironolactone        PLAN/FOLLOW-UP NEEDED                                                      Previsit review complete.  Patient will see provider at future scheduled appointment.     Patient Reminders Given:  Clinic location reviewed.     Michelle Martinez

## 2019-03-29 NOTE — PROGRESS NOTES
"  Nephrology Progress Note  03/29/2019         Assessment & Recommendations:   Rachele Martínez is a 78 year old female with PMH of GIB 2/2 AVM, ESRD, HTN, HCV (s/p Harvoni), compensated cirrhosis, depression/anxiety, admitted with concern for GIB and dyspnea.       ESKD: dialysis dependent since 2016; dialyzes MWF at [facility] under the care of Dr. Enoch Tarango. Access: RUE AVG. EDW: 61 kg. Run time: 3 hrs.  - Dialysis per MWF schedule  - No heparin, GIB     Anemia, acute (GIB) on chronic: hgb 8.9 g/dL this AM; has been in 9's, though was 8.8 g/dL on 3/25. Iron labs 3/11: IS 23, ferr 595, iron 53. On Aranesp 80 mcg qMonday; IV venofer periodically, per protocols  - Continue Aranesp qMonday  - Scope 3/27 with several AVMs located and clipped, also video capsule done and results pending     BMD: Ca 8.7, alb 3.5, phos 2.9; recent . PO calcitriol 0.5 mcg MWF, Phoslo 3 tabs tid WM  - Continue PO calcitriol and Phoslo once eating       Dyspnea: likely multifactorial with anemia and volume overload  Volume: EDW 61 kg. CXR with pulm edema and curly B lines. O2 97% on 2L, report of worsening dyspnea; echo with normal IVC but does not collapse with inspiration; pt states she cramps and becomes hypotensive with more than 2 kg UF  - EDW re-established at 57 kg  - Daily standing weights     BP: 150's. Echo 3/26 with EF 50-55%. On losartan 25 mg at bedtime  - continue losartan      Recommendations were communicated to primary team via this note       ARCHIE CharlesC  Division of Kidney Disease  [phone]    Interval History :   Seen bedside, AVMs clipped 3/27, video capsule pending. EDW reduced by 4 kg, breathing improved. Denies CP, chills, n/v    Review of Systems:   4 point ROS neg other than as noted above.    Physical Exam:   I/O last 3 completed shifts:  In: 600 [P.O.:600]  Out: -    /64   Pulse 73   Temp 98.1  F (36.7  C) (Oral)   Resp 18   Ht 1.626 m (5' 4\")   Wt 58.7 kg (129 lb 6.6 oz) "   SpO2 99%   BMI 22.21 kg/m       GENERAL APPEARANCE: alert, NAD  EYES: no scleral icterus, pupils equal  Pulmonary: diminished  CV: regular rhythm, normal rate    - Edema none  GI: soft, nontender, normal bowel sounds  MS: no evidence of inflammation in joints, no muscle tenderness  : no armando  SKIN: no rash, warm, dry, no cyanosis  NEURO: face symmetric, no asterixis    Access: RUE AVG        Labs:   All labs reviewed by me  Electrolytes/Renal -   Recent Labs   Lab Test 03/28/19  0456 03/27/19  0433 03/26/19  1358  09/14/18  1159  03/12/16  0552    136 137   < > 139   < > 143   POTASSIUM 3.8 4.7 4.7   < > 4.7   < > 3.6   CHLORIDE 99 100 100   < > 99   < > 116*   CO2 28 25 28   < > 29   < > 19*   BUN 14 35* 30   < > 48*   < > 34*   CR 4.30* 7.44* 6.19*   < > 8.26*   < > 2.76*   * 103* 103*   < > 121*   < > 104*   BELGICA 8.7 8.8 8.8   < > 8.2*   < > 7.9*   MAG  --   --   --   --   --   --  1.7   PHOS 2.9  --   --   --  3.6  --  3.0    < > = values in this interval not displayed.       CBC -   Recent Labs   Lab Test 03/29/19  0504 03/28/19  0456 03/27/19  1819 03/27/19  0433 03/26/19  1358   WBC  --  8.5  --  7.0 7.1   HGB 8.9* 8.8* 9.2* 8.3* 9.1*   PLT  --  290  --  305 282       LFTs -   Recent Labs   Lab Test 03/26/19  1358 09/18/18  1240 09/14/18  1159   ALKPHOS 88 120 132   BILITOTAL 0.5 0.5 0.3   ALT 14 24 19   AST 17 29 26   PROTTOTAL 7.2 8.1 7.2   ALBUMIN 3.5 3.9 3.5       Iron Panel -   Recent Labs   Lab Test 10/24/18  1506 09/14/18  1159 06/21/16  1404   IRON 241* 55 30*   IRONSAT 57* 20 11*   KASSI 176 229 67         Imaging:  Reviewed    Current Medications:    atorvastatin  20 mg Oral Daily     calcium acetate  1,334 mg Oral TID w/meals     pantoprazole (PROTONIX) IV  40 mg Intravenous BID     sertraline  100 mg Oral Daily     sodium chloride (PF)  3 mL Intracatheter Q8H       Zora Villagomez PA-C

## 2019-03-29 NOTE — DISCHARGE SUMMARY
Dialysis Discharge Summary Brief    [facility]  Division of Nephrology  Nephrology Discharge Dialysis Orders  Ph: [phone]  Fax: [phone]    Rachele Martínez  MRN: [id number]  YOB: 1940    [facility]. \[facility].\""  Primary Nephrologist: Dr. Tarango    Date of Admission: 3/26/2019  Date of Discharge: 3/29/2019    Pt was last dialyzed Friday 3/29 prior to discharge. Please page me at  with any questions. Thanks much. Zora Villagomez PA-C    Rachele Martínez is a 78 year old female with PMH of GIB 2/2 AVM, ESRD, HTN, HCV (s/p Harvoni), compensated cirrhosis, depression/anxiety, admitted with concern for GIB and dyspnea.       ESKD: dialysis dependent since 2016; dialyzes MWF at [facility] \[facility].\"" under the care of Dr. Enoch Tarango. Access: RUE AVG. EDW: re-established at 57 kg. Run time: 3 hrs.  - No heparin, GIB     Anemia, acute (GIB) on chronic: hgb 8.9 g/dL this AM; has been in 9's, though was 8.8 g/dL on 3/25. Iron labs 3/11: IS 23, ferr 595, iron 53. On Aranesp 80 mcg qMonday; IV venofer periodically, per protocols  - Continue Aranesp qMonday  - Scope 3/27 with several AVMs located and clipped, also video capsule done and results pending with GI follow up as outpatient     BMD: Ca 8.7, alb 3.5, phos 2.9; recent . PO calcitriol 0.5 mcg MWF, Phoslo 3 tabs tid WM    Dyspnea: likely multifactorial with anemia and volume overload  Volume: EDW 61 kg. CXR with pulm edema and curly B lines. O2 97% on 2L, report of worsening dyspnea; echo with normal IVC but does not collapse with inspiration  - EDW re-established at 57 kg    BP: 150's. Echo 3/26 with EF 50-55%. On losartan 25 mg at bedtime  - continue losartan    Possible new hypokinesis on echo: may be related to volume overload  - Will follow up outpt with cardiology       [x] Resume all previous dialysis orders with exception as noted below    New Orders (if not applicable put NA):  Estimated Dry  Weight 57 kg   Dialysis Duration    Dialysis Access    Antibiotics (dose per dialysis, end date)            Labs to be drawn at dialysis    Other major changes to dialysis prescription (e.g. Dialysate bath, heparin, blood flow rate, etc)   No heparin given recent GIB   Medication changes (also fax the unit a copy of the discharge summary)              Name of physician completing this form: Zora Villagomez PA-C

## 2019-03-29 NOTE — DISCHARGE INSTRUCTIONS
Tomas RN please fax AVS, last MD note, consult notes at time of dc to:    dialyzes MWF at [facility] under the care of Dr. Enoch Tarango  [facility]  [address] [address]  Ph: [phone]  Fax: [phone]    Resume dialysis on usual schedule

## 2019-03-29 NOTE — DISCHARGE SUMMARY
[facility], [place]  Hospitalist Discharge Summary       Date of Admission:  3/26/2019  Date of Discharge:  3/29/2019 12:11 PM  Discharging Provider: Rafat Carver MD  Discharge Team: Hospitalist Service, Gold 1    Discharge Diagnoses   GIB due to AVM  Iron deficiency anemia  Acute hypoxic respiratory failure secondary to volume overload  NSTEMI type 2  Possible WMA on echo  ESRD on HD  Compensated HCV cirrhosis    Follow-ups Needed After Discharge   Follow-up Appointments     Adult [facility] Follow-up and recommended labs and tests      GI follow in 1 month (call to schedule)  Cardiology follow up in 3 weeks (call to schedule)  PCP follow up next week with hgb check.    Lexiscan per oders    Appointments on [place] and/or [facility] (with [facility] or [facility]   provider or service). Call [phone] if you haven't heard regarding   these appointments within 7 days of discharge.             Unresulted Labs Ordered in the Past 30 Days of this Admission     No orders found from 1/25/2019 to 3/27/2019.      These results will be followed up by na    Discharge Disposition   Discharged to home  Condition at discharge: Stable    Hospital Course    GIB  Hx AVMs:   Hx ABIGAIL: The patient presented with reported BRBPR.  She had a known history of AVM and was on monthly octreotide.  She underwent push enteroscopy and had a capsule study placed following.  Several AVMs were clipped/cauterized.  Her hgb stayed stable post-operatively and it was deemed that she was stable for discharge.  GI will follow up after discharge.       Acute hypoxic respiratory failure   Suspected volume overload   She presented with shortness of breath possibly related to volume overload.  Bedside ECHO in the ER with diastolic dysfunction and B lines which was consistent with clinical picture.  She underwent HD with significant improvement in her symptoms.  EKG and troponin without signs of ACS.      Likely type 2  MI/demand ischemia   New possible WMA on echo   There was low suspicion for ACS and she had minimal ECG changes with a troponin that was essentially flat.  We discussed her echo with cardiology and the decision was made to follow up with cardiology after a nuclear stress test as an outpatient.    Consultations This Hospital Stay   GI LUMINAL ADULT IP CONSULT  MEDICATION HISTORY IP PHARMACY CONSULT  NEPHROLOGY GENERAL ADULT IP CONSULT    Code Status   DNI    Time Spent on this Encounter   I, Rafat Carver, personally saw the patient today and spent greater than 30 minutes discharging this patient.       Rafat Carver MD  [facility], [place]  ______________________________________________________________________    Physical Exam   Vital Signs:                    Weight: 129 lbs 6.56 oz   Gen: alert, interactive and pleasant, lying flat in bed in no acute distress  HEENT: Normocephalic/atraumatic, sclera clear, oropharynx with moist mucous membranes, +JVD   Resp: Fair air movement bilaterally, no crackles   CV: Regular rate and rhythm, 2/6 systolic murmur noted   Abd: soft, non-tender, nondistended  Ext: no lower extremity edema, warm and well-perfused with brisk capillary refill   Neuro: Alert and oriented        Primary Care Physician   Agnieszka Rodriguez    Immunizations       Discharge Orders      NM Lexiscan stress test     Reason for your hospital stay    Dear Rachele Martínez    Your were hospitalized at [facility] with a GI bleed and treated with endoscopy and acid reducing medication.  Over your hospitalization your condition improved and today you are ready to be discharged home.  If you continue therapy you should continue to improve but if you develop fever, shortness of breath, light headedness, chest pain or multiple bloody/tarry bowel movements please seek medical attention.    We are suggesting the following medication  changes:  None    Please get the following tests done:  Nuclear Med - lexiscan with cardiology    Please set up an appointment with:  GI in 1 month, sooner if needed  Cardiology in 2-3 weeks, after Lexiscan    It was a pleasure meeting with you today. Thank you for allowing me and my team the privilege of caring for you today. You are the reason we are here, and I truly hope we provided you with the excellent service you deserve. Please let us know if there is anything else we can do for you so that we can be sure you are leaving completely satisfied with your care experience.    Your hospital unit at the time of discharge is 5B so if you have any questions please call the hospital at [phone] and ask to talk to a nurse on 5B.    Take care!  Rafat Carver MD  Department of Medicine  [facility]     Adult [facility] Follow-up and recommended labs and tests    GI follow in 1 month (call to schedule)  Cardiology follow up in 3 weeks (call to schedule)  PCP follow up next week with hgb check.    Lexiscan per manuel    Appointments on [place] and/or [facility] (with [facility] or [facility] provider or service). Call [phone] if you haven't heard regarding these appointments within 7 days of discharge.     Activity    Your activity upon discharge: activity as tolerated     DNI     Diet    Follow this diet upon discharge: Orders Placed This Encounter      Room Service      Regular Diet Adult       Significant Results and Procedures   Most Recent 3 CBC's:  Recent Labs   Lab Test 03/29/19  0504 03/28/19  0456 03/27/19  1819 03/27/19  0433 03/26/19  1358   WBC  --  8.5  --  7.0 7.1   HGB 8.9* 8.8* 9.2* 8.3* 9.1*   MCV  --  106*  --  109* 107*   PLT  --  290  --  305 282     Most Recent 2 LFT's:  Recent Labs   Lab Test 03/26/19  1358 09/18/18  1240   AST 17 29   ALT 14 24   ALKPHOS 88 120   BILITOTAL 0.5 0.5     Most Recent 3 INR's:  Recent Labs   Lab Test 03/27/19  0433 03/26/19  1358 09/19/18  0614   INR  1.38* 1.31* 1.22*       Discharge Medications   Discharge Medication List as of 3/29/2019  1:36 PM      CONTINUE these medications which have NOT CHANGED    Details   atorvastatin (LIPITOR) 20 MG tablet Take 1 tablet (20 mg) by mouth daily, Disp-90 tablet, R-3, E-Prescribe      Calcium Acetate, Phos Binder, 667 MG CAPS TAKE 2 CAPSULE BY MOUTH THREE TIMES A DAY WITH MEALS, R-8, Historical      Multiple Vitamins-Minerals (PRORENAL + D) TABS Take 1 tablet by mouth daily, Historical      octreotide (SANDOSTATIN LAR) 20 MG injection Inject 20 mg into the muscle every 28 days, Historical      pantoprazole (PROTONIX) 20 MG EC tablet Take 1 tablet (20 mg) by mouth 2 times daily 30 - 60 minutes before a meal., Disp-180 tablet, R-1, E-Prescribe      sertraline (ZOLOFT) 50 MG tablet Take 2 tablets (100 mg) by mouth daily, Disp-180 tablet, R-3, E-Prescribe      traMADol (ULTRAM) 50 MG tablet Take 1-2 tablets ( mg) by mouth every 6 hours as needed for breakthrough pain or severe pain, Disp-15 tablet, R-0, Local Print      losartan (COZAAR) 25 MG tablet Take 25 mg by mouth Daily at bedtime, Historical      order for DME Equipment being ordered: 4 wheel walker with seat.Disp-1 Device, R-0, Local Print      polyethylene glycol 3350 POWD Take 17 g by mouth daily, Disp-1530 g, R-3, E-Prescribe         STOP taking these medications       albuterol (PROAIR HFA) 108 (90 Base) MCG/ACT inhaler Comments:   Reason for Stopping:         lisinopril (PRINIVIL/ZESTRIL) 10 MG tablet Comments:   Reason for Stopping:             Allergies   Allergies   Allergen Reactions     Lisinopril Cough     Diovan Hct Itching     severe     Dust Mites      Hydrochlorothiazide Itching     Severe       No Clinical Screening - See Comments      History of blood transfusion reactions and pre-treats with Benadryl.      Sulfa Drugs Hives     Spironolactone Nausea

## 2019-03-29 NOTE — PLAN OF CARE
No significant changes to status. Pt alert and oriented. Vitally stable on ra; hypertensive, prn labetalol available with parameters. Didn't meet parameters to give at this time. Sba with cares. C/o of right lower extremity pain, requested prn tramadol administered. Had trouble sleeping the beginning of the night, was able to go to sleep around 0200. Denies dyspnea/sob. No changes assessed in pt's physical status. On telemetry monitoring; reading NSR with occasional PVCs. Has not had a BM overnight. Plan to discharge today sometime in the am. Had dialysis scheduled prior to discharge. Continue to monitor and implement poc.

## 2019-04-01 ENCOUNTER — PATIENT OUTREACH (OUTPATIENT)
Dept: CARE COORDINATION | Facility: CLINIC | Age: 79
End: 2019-04-01

## 2019-04-01 NOTE — PROGRESS NOTES
Date: 4/1/2019 Status: [facility]   Time: 3:00 PM Length: 30   Visit Type: NEW [656] NILSA:     Provider: Shun Cornelius MD Department:  CARDIOLOGY     Patient has clinic visit within 24-48 hours of Discharge so no post DC follow up call is needed

## 2019-04-11 ENCOUNTER — INFUSION THERAPY VISIT (OUTPATIENT)
Dept: INFUSION THERAPY | Facility: CLINIC | Age: 79
End: 2019-04-11
Payer: MEDICARE

## 2019-04-11 VITALS
WEIGHT: 131.6 LBS | OXYGEN SATURATION: 95 % | DIASTOLIC BLOOD PRESSURE: 76 MMHG | HEART RATE: 86 BPM | SYSTOLIC BLOOD PRESSURE: 158 MMHG | TEMPERATURE: 98.4 F | BODY MASS INDEX: 22.59 KG/M2 | RESPIRATION RATE: 18 BRPM

## 2019-04-11 DIAGNOSIS — K55.20 AVM (ARTERIOVENOUS MALFORMATION) OF SMALL BOWEL, ACQUIRED: ICD-10-CM

## 2019-04-11 DIAGNOSIS — Z99.2 ESRD (END STAGE RENAL DISEASE) ON DIALYSIS (H): ICD-10-CM

## 2019-04-11 DIAGNOSIS — K74.60 CIRRHOSIS OF LIVER WITHOUT ASCITES, UNSPECIFIED HEPATIC CIRRHOSIS TYPE (H): ICD-10-CM

## 2019-04-11 DIAGNOSIS — K31.811 ANGIODYSPLASIA OF STOMACH AND DUODENUM WITH HEMORRHAGE: Primary | ICD-10-CM

## 2019-04-11 DIAGNOSIS — N18.6 ESRD (END STAGE RENAL DISEASE) ON DIALYSIS (H): ICD-10-CM

## 2019-04-11 DIAGNOSIS — D50.0 IRON DEFICIENCY ANEMIA DUE TO CHRONIC BLOOD LOSS: ICD-10-CM

## 2019-04-11 PROCEDURE — 99207 ZZC NO CHARGE NURSE ONLY: CPT

## 2019-04-11 PROCEDURE — 96372 THER/PROPH/DIAG INJ SC/IM: CPT | Performed by: NURSE PRACTITIONER

## 2019-04-11 RX ADMIN — OCTREOTIDE ACETATE 20 MG: KIT INTRAMUSCULAR at 11:32

## 2019-04-11 ASSESSMENT — PAIN SCALES - GENERAL: PAINLEVEL: NO PAIN (0)

## 2019-04-11 NOTE — PROGRESS NOTES
Nursing Note:  Rachele Martínez presents today for Sandostatin.    Patient seen by provider today: No   present during visit today: Not Applicable.    Note: Pt states she is tolerating injections, denies any reason to hold treatment. Daughter with patient an agrees, medications updated, no change in history.    Intravenous Access:  No Intravenous access/labs at this visit.    Treatment Conditions:  Not Applicable.      Post Infusion Assessment:  Patient tolerated injection without incident.  Site patent and intact, free from redness, edema or discomfort.       Discharge Plan:   Return 05/09/19 for Sandostatin.  Discharge instructions reviewed with: Patient and Family.  Patient and/or family verbalized understanding of discharge instructions and all questions answered.  Patient discharged in stable condition accompanied by: self and daughter.  Departure Mode: Ambulatory.    Estrella Hernandez RN

## 2019-04-16 ENCOUNTER — HOSPITAL ENCOUNTER (OUTPATIENT)
Dept: NUCLEAR MEDICINE | Facility: CLINIC | Age: 79
Setting detail: NUCLEAR MEDICINE
End: 2019-04-16
Attending: STUDENT IN AN ORGANIZED HEALTH CARE EDUCATION/TRAINING PROGRAM
Payer: MEDICARE

## 2019-04-16 ENCOUNTER — HOSPITAL ENCOUNTER (OUTPATIENT)
Dept: CARDIOLOGY | Facility: CLINIC | Age: 79
Discharge: HOME | End: 2019-04-16
Attending: STUDENT IN AN ORGANIZED HEALTH CARE EDUCATION/TRAINING PROGRAM | Admitting: STUDENT IN AN ORGANIZED HEALTH CARE EDUCATION/TRAINING PROGRAM
Payer: MEDICARE

## 2019-04-16 DIAGNOSIS — I21.4 NSTEMI (NON-ST ELEVATED MYOCARDIAL INFARCTION) (H): ICD-10-CM

## 2019-04-16 PROCEDURE — 78452 HT MUSCLE IMAGE SPECT MULT: CPT

## 2019-04-16 PROCEDURE — 99207 ZZC NO CHARGE LOS: CPT

## 2019-04-16 PROCEDURE — 78452 HT MUSCLE IMAGE SPECT MULT: CPT | Mod: 26 | Performed by: INTERNAL MEDICINE

## 2019-04-16 PROCEDURE — 34300033 ZZH RX 343: Performed by: STUDENT IN AN ORGANIZED HEALTH CARE EDUCATION/TRAINING PROGRAM

## 2019-04-16 PROCEDURE — 93017 CV STRESS TEST TRACING ONLY: CPT

## 2019-04-16 PROCEDURE — A9502 TC99M TETROFOSMIN: HCPCS | Performed by: STUDENT IN AN ORGANIZED HEALTH CARE EDUCATION/TRAINING PROGRAM

## 2019-04-16 PROCEDURE — 25000128 H RX IP 250 OP 636: Performed by: STUDENT IN AN ORGANIZED HEALTH CARE EDUCATION/TRAINING PROGRAM

## 2019-04-16 PROCEDURE — 93016 CV STRESS TEST SUPVJ ONLY: CPT | Performed by: INTERNAL MEDICINE

## 2019-04-16 PROCEDURE — 93018 CV STRESS TEST I&R ONLY: CPT | Performed by: INTERNAL MEDICINE

## 2019-04-16 RX ORDER — AMINOPHYLLINE 25 MG/ML
50-100 INJECTION, SOLUTION INTRAVENOUS
Status: DISCONTINUED | OUTPATIENT
Start: 2019-04-16 | End: 2019-04-17 | Stop reason: HOSPADM

## 2019-04-16 RX ORDER — ALBUTEROL SULFATE 90 UG/1
2 AEROSOL, METERED RESPIRATORY (INHALATION) EVERY 5 MIN PRN
Status: DISCONTINUED | OUTPATIENT
Start: 2019-04-16 | End: 2019-04-17 | Stop reason: HOSPADM

## 2019-04-16 RX ORDER — REGADENOSON 0.08 MG/ML
0.4 INJECTION, SOLUTION INTRAVENOUS ONCE
Status: COMPLETED | OUTPATIENT
Start: 2019-04-16 | End: 2019-04-16

## 2019-04-16 RX ORDER — ACYCLOVIR 200 MG/1
0-1 CAPSULE ORAL
Status: DISCONTINUED | OUTPATIENT
Start: 2019-04-16 | End: 2019-04-17 | Stop reason: HOSPADM

## 2019-04-16 RX ADMIN — TETROFOSMIN 9.9 MCI.: 1.38 INJECTION, POWDER, LYOPHILIZED, FOR SOLUTION INTRAVENOUS at 09:45

## 2019-04-16 RX ADMIN — TETROFOSMIN 33 MCI.: 1.38 INJECTION, POWDER, LYOPHILIZED, FOR SOLUTION INTRAVENOUS at 11:13

## 2019-04-16 RX ADMIN — REGADENOSON 0.4 MG: 0.08 INJECTION, SOLUTION INTRAVENOUS at 11:08

## 2019-04-30 ENCOUNTER — OFFICE VISIT (OUTPATIENT)
Dept: ORTHOPEDICS | Facility: CLINIC | Age: 79
End: 2019-04-30
Payer: MEDICARE

## 2019-04-30 DIAGNOSIS — M51.369 LUMBAR DEGENERATIVE DISC DISEASE: Primary | ICD-10-CM

## 2019-04-30 RX ORDER — TRAMADOL HYDROCHLORIDE 50 MG/1
50 TABLET ORAL EVERY 6 HOURS PRN
Qty: 20 TABLET | Refills: 0 | Status: SHIPPED | OUTPATIENT
Start: 2019-04-30 | End: 2019-07-02

## 2019-04-30 NOTE — LETTER
2019       RE: Rachele Martínez  [address]  [place] [address]     Dear Colleague,    Thank you for referring your patient, Rachele Martínez, to the [facility]TH[facility] Please see a copy of my visit note below.          SPORTS & ORTHOPEDIC WALK-IN FOLLOW-UP VISIT 2019  Patient reports right leg pain. Hx of lumbar CHAYA 3/19/19 reported one week of relief.   Interval History:     Follow up reason: right leg pain    Date of injury: none    Date last seen: 3/11/19    Following Therapeutic Plan: Yes     Pain: Improving    Function: Improving    Interval History:           [facility]  Orthopedics  Naga Ayala, DO  2019     Name: Rachele Martínez  MRN: [id number]  Age: 79 year old  : 1940  Referring provider: Referred Self     Chief Complaint: RECHECK    History of Present Illness:   Rachele Martínez is a 79 year old female, with a past medical history of lumbar CHAYA (3/19/19) who presents today for follow-up regarding right leg pain. She was last evaluated on 3/11/19 by Dr. Awan, at which time she was scheduled for an L5-S1 interlaminar epidural corticosteroid injection and was prescribed tizanidine and tramadol. She notes that the pain relief from the injection lasted one week. She continues to endorse pain in the lateral and posterior aspect of the right leg. She also endorses numbness on the plantar aspect of her right toes. She denies any symptoms down the left leg. She is wondering of possible cane options and is requesting a repeat injection.     Review of Systems:   A 10-point review of systems was obtained and is negative except for as noted in the HPI.     Physical Examination:  not currently breastfeeding.    General  - normal appearance, in no obvious distress  CV  - normal peripheral perfusion  Pulm  - normal respiratory pattern, non-labored  Musculoskeletal - lumbar spine  -  stance: normal gait without limp, no obvious leg length discrepancy, normal heel and toe walk  - inspection: normal bone and joint alignment, no obvious scoliosis  - palpation: Tenderness at the L4-L5 left perispinal region. Tenderness at the right SI joint. Non tender at the lateral hips.   - ROM: flexion exacerbates pain, normal extension, pain with rotation and side bending.   - strength: lower extremities 5/5 in all planes  - special tests:  (+) straight leg raise right  (+) slump test right  Neuro  - plantar aspect of toes 1 to 3 have decreased sensations  Skin  - no ecchymosis, erythema, warmth, or induration, no obvious rash  Psych  - interactive, appropriate, normal mood and affect    Assessment:   79 year old female with a past medical history of lumbar back pain with right sided radiculopathy secondary to degenerative disc disease presenting with persisting low back pain with radiculopathy. Treatment options discussed including surgical vs. non surgical options. Patient would like to attempt another corticosteroid injection prior to any discussion of surgery. In the absense of weakness and other red flag symptoms, this is appropriate and we will order a SNRB to attempt improved symptom control today.     Plan:   - Right sided S1 SNRB  - Continue HEP exercises provided  - Tramadol for pain relief.   - Follow-up with me in 2-3 weeks for repeat evaluation.     Naga OSORIO DO, have reviewed the above note and agree with the scribe's notation as written.    Scribe Disclosure:  Efrain OSORIO, am serving as a scribe to document services personally performed by Naga Ayala DO at this visit, based upon the provider's statements to me. All documentation has been reviewed by the aforementioned provider prior to being entered into the official medical record.     Efrain OSORIO, a scribe, prepared the chart for today's encounter.     Again, thank you for allowing me to participate in the care of your  patient.      Sincerely,    Naga Ayala, DO

## 2019-04-30 NOTE — PROGRESS NOTES
[facility]  Orthopedics  Naga Ayala,   2019     Name: Rachele Martínez  MRN: [id number]  Age: 79 year old  : 1940  Referring provider: Referred Self     Chief Complaint: RECHECK    History of Present Illness:   Rachele Martínez is a 79 year old female, with a past medical history of lumbar CHAYA (3/19/19) who presents today for follow-up regarding right leg pain. She was last evaluated on 3/11/19 by Dr. Awan, at which time she was scheduled for an L5-S1 interlaminar epidural corticosteroid injection and was prescribed tizanidine and tramadol. She notes that the pain relief from the injection lasted one week. She continues to endorse pain in the lateral and posterior aspect of the right leg. She also endorses numbness on the plantar aspect of her right toes. She denies any symptoms down the left leg. She is wondering of possible cane options and is requesting a repeat injection.     Review of Systems:   A 10-point review of systems was obtained and is negative except for as noted in the HPI.     Physical Examination:  not currently breastfeeding.    General  - normal appearance, in no obvious distress  CV  - normal peripheral perfusion  Pulm  - normal respiratory pattern, non-labored  Musculoskeletal - lumbar spine  - stance: normal gait without limp, no obvious leg length discrepancy, normal heel and toe walk  - inspection: normal bone and joint alignment, no obvious scoliosis  - palpation: Tenderness at the L4-L5 left perispinal region. Tenderness at the right SI joint. Non tender at the lateral hips.   - ROM: flexion exacerbates pain, normal extension, pain with rotation and side bending.   - strength: lower extremities 5/5 in all planes  - special tests:  (+) straight leg raise right  (+) slump test right  Neuro  - plantar aspect of toes 1 to 3 have decreased sensations  Skin  - no ecchymosis, erythema, warmth, or induration, no obvious rash  Psych  - interactive, appropriate, normal mood and  affect    Assessment:   79 year old female with a past medical history of lumbar back pain with right sided radiculopathy secondary to degenerative disc disease presenting with persisting low back pain with radiculopathy. Treatment options discussed including surgical vs. non surgical options. Patient would like to attempt another corticosteroid injection prior to any discussion of surgery. In the absense of weakness and other red flag symptoms, this is appropriate and we will order a SNRB to attempt improved symptom control today.     Plan:   - Right sided S1 SNRB  - Continue HEP exercises provided  - Tramadol for pain relief.   - Follow-up with me in 2-3 weeks for repeat evaluation.     Naga OSORIO DO, have reviewed the above note and agree with the scribe's notation as written.    Scribe Disclosure:  Efrain OSORIO, am serving as a scribe to document services personally performed by Naga Ayala DO at this visit, based upon the provider's statements to me. All documentation has been reviewed by the aforementioned provider prior to being entered into the official medical record.     Efrain OSORIO, a scribe, prepared the chart for today's encounter.

## 2019-04-30 NOTE — PROGRESS NOTES
SPORTS & ORTHOPEDIC WALK-IN FOLLOW-UP VISIT 4/30/2019  Patient reports right leg pain. Hx of lumbar CHAYA 3/19/19 reported one week of relief.   Interval History:     Follow up reason: right leg pain    Date of injury: none    Date last seen: 3/11/19    Following Therapeutic Plan: Yes     Pain: Improving    Function: Improving    Interval History:

## 2019-05-01 DIAGNOSIS — M51.369 LUMBAR DEGENERATIVE DISC DISEASE: Primary | ICD-10-CM

## 2019-05-09 ENCOUNTER — INFUSION THERAPY VISIT (OUTPATIENT)
Dept: INFUSION THERAPY | Facility: CLINIC | Age: 79
End: 2019-05-09
Payer: MEDICARE

## 2019-05-09 VITALS
DIASTOLIC BLOOD PRESSURE: 52 MMHG | OXYGEN SATURATION: 100 % | RESPIRATION RATE: 20 BRPM | HEART RATE: 71 BPM | SYSTOLIC BLOOD PRESSURE: 168 MMHG | TEMPERATURE: 98.7 F

## 2019-05-09 DIAGNOSIS — K31.811 ANGIODYSPLASIA OF STOMACH AND DUODENUM WITH HEMORRHAGE: Primary | ICD-10-CM

## 2019-05-09 DIAGNOSIS — K55.20 AVM (ARTERIOVENOUS MALFORMATION) OF SMALL BOWEL, ACQUIRED: ICD-10-CM

## 2019-05-09 DIAGNOSIS — N18.6 ESRD (END STAGE RENAL DISEASE) ON DIALYSIS (H): ICD-10-CM

## 2019-05-09 DIAGNOSIS — K74.60 CIRRHOSIS OF LIVER WITHOUT ASCITES, UNSPECIFIED HEPATIC CIRRHOSIS TYPE (H): ICD-10-CM

## 2019-05-09 DIAGNOSIS — Z99.2 ESRD (END STAGE RENAL DISEASE) ON DIALYSIS (H): ICD-10-CM

## 2019-05-09 DIAGNOSIS — D50.0 IRON DEFICIENCY ANEMIA DUE TO CHRONIC BLOOD LOSS: ICD-10-CM

## 2019-05-09 PROCEDURE — 99207 ZZC NO CHARGE LOS: CPT

## 2019-05-09 PROCEDURE — 96372 THER/PROPH/DIAG INJ SC/IM: CPT | Performed by: INTERNAL MEDICINE

## 2019-05-09 RX ADMIN — OCTREOTIDE ACETATE 20 MG: KIT INTRAMUSCULAR at 11:58

## 2019-05-09 ASSESSMENT — PAIN SCALES - GENERAL: PAINLEVEL: EXTREME PAIN (9)

## 2019-05-09 NOTE — PROGRESS NOTES
Infusion Nursing Note:  Rachele Martínez presents today for Sandostatin.    Patient seen by provider today: No   present during visit today: Not Applicable.    Note:.    Intravenous Access:  No Intravenous access/labs at this visit.    Treatment Conditions:  Not Applicable.      Post Infusion Assessment:  Patient tolerated injection without incident.       Discharge Plan:   RTC 6/6 as scheduled.    ADAM GUO RN

## 2019-05-16 ENCOUNTER — ANCILLARY PROCEDURE (OUTPATIENT)
Dept: GENERAL RADIOLOGY | Facility: CLINIC | Age: 79
End: 2019-05-16
Attending: FAMILY MEDICINE
Payer: MEDICARE

## 2019-05-16 VITALS — HEART RATE: 73 BPM | OXYGEN SATURATION: 99 % | DIASTOLIC BLOOD PRESSURE: 100 MMHG | SYSTOLIC BLOOD PRESSURE: 177 MMHG

## 2019-05-16 DIAGNOSIS — M51.369 LUMBAR DEGENERATIVE DISC DISEASE: ICD-10-CM

## 2019-05-16 PROCEDURE — 64483 NJX AA&/STRD TFRM EPI L/S 1: CPT | Performed by: RADIOLOGY

## 2019-05-16 RX ORDER — BUPIVACAINE HYDROCHLORIDE 5 MG/ML
5 INJECTION, SOLUTION EPIDURAL; INTRACAUDAL ONCE
Status: COMPLETED | OUTPATIENT
Start: 2019-05-16 | End: 2019-05-16

## 2019-05-16 RX ORDER — LIDOCAINE HYDROCHLORIDE 10 MG/ML
5 INJECTION, SOLUTION EPIDURAL; INFILTRATION; INTRACAUDAL; PERINEURAL ONCE
Status: COMPLETED | OUTPATIENT
Start: 2019-05-16 | End: 2019-05-16

## 2019-05-16 RX ORDER — METHYLPREDNISOLONE ACETATE 80 MG/ML
80 INJECTION, SUSPENSION INTRA-ARTICULAR; INTRALESIONAL; INTRAMUSCULAR; SOFT TISSUE ONCE
Status: COMPLETED | OUTPATIENT
Start: 2019-05-16 | End: 2019-05-16

## 2019-05-16 RX ORDER — IOPAMIDOL 408 MG/ML
10 INJECTION, SOLUTION INTRATHECAL ONCE
Status: COMPLETED | OUTPATIENT
Start: 2019-05-16 | End: 2019-05-16

## 2019-05-16 RX ADMIN — IOPAMIDOL 1 ML: 408 INJECTION, SOLUTION INTRATHECAL at 10:44

## 2019-05-16 RX ADMIN — LIDOCAINE HYDROCHLORIDE 3 ML: 10 INJECTION, SOLUTION EPIDURAL; INFILTRATION; INTRACAUDAL; PERINEURAL at 10:44

## 2019-05-16 RX ADMIN — BUPIVACAINE HYDROCHLORIDE 5 MG: 5 INJECTION, SOLUTION EPIDURAL; INTRACAUDAL at 10:43

## 2019-05-16 RX ADMIN — METHYLPREDNISOLONE ACETATE 80 MG: 80 INJECTION, SUSPENSION INTRA-ARTICULAR; INTRALESIONAL; INTRAMUSCULAR; SOFT TISSUE at 10:44

## 2019-06-04 ENCOUNTER — OFFICE VISIT (OUTPATIENT)
Dept: ORTHOPEDICS | Facility: CLINIC | Age: 79
End: 2019-06-04
Payer: COMMERCIAL

## 2019-06-04 VITALS
SYSTOLIC BLOOD PRESSURE: 192 MMHG | DIASTOLIC BLOOD PRESSURE: 86 MMHG | HEIGHT: 64 IN | HEART RATE: 80 BPM | WEIGHT: 131 LBS | BODY MASS INDEX: 22.36 KG/M2

## 2019-06-04 DIAGNOSIS — G89.29 CHRONIC BILATERAL LOW BACK PAIN WITH RIGHT-SIDED SCIATICA: ICD-10-CM

## 2019-06-04 DIAGNOSIS — M54.41 CHRONIC BILATERAL LOW BACK PAIN WITH RIGHT-SIDED SCIATICA: ICD-10-CM

## 2019-06-04 DIAGNOSIS — M51.369 LUMBAR DEGENERATIVE DISC DISEASE: Primary | ICD-10-CM

## 2019-06-04 DIAGNOSIS — M54.50 LUMBAR PAIN: Primary | ICD-10-CM

## 2019-06-04 RX ORDER — TRAMADOL HYDROCHLORIDE 50 MG/1
50 TABLET ORAL EVERY 6 HOURS PRN
Qty: 15 TABLET | Refills: 0 | Status: SHIPPED | OUTPATIENT
Start: 2019-06-04 | End: 2019-07-02

## 2019-06-04 RX ORDER — GABAPENTIN 300 MG/1
300 CAPSULE ORAL 3 TIMES DAILY
Qty: 60 CAPSULE | Refills: 0 | Status: SHIPPED | OUTPATIENT
Start: 2019-06-04 | End: 2019-08-09

## 2019-06-04 ASSESSMENT — MIFFLIN-ST. JEOR: SCORE: 1059.21

## 2019-06-04 NOTE — PROGRESS NOTES
"[facility]  Orthopedics  Naga Ayala, DO  2019     Name: Rachele Martínez  MRN: [id number]  Age: 79 year old  : 1940  Referring provider: Referred Self     Chief Complaint: Pain of the Lower Back     History of Present Illness:   Rachele Martínez is a 79 year old, female with a past medical history of lumbar CHAYA (3/19/19) and who presents today for follow-up regarding right leg pain. I last evaluated the patient on 2019, at which time the patient reported one week of relief from her prior injection. She continued to have pain in the lateral and posterior aspect of the right leg as well as numbness on the plantar aspect of the right toes. We elected to proceed with a right sided S1 SNRB injection and continue HEP and Tramadol PRN for pain relief.    Today, the patient reports getting relief from the injection for a few days before her pain returned. Her pain is not constant, and is typically present when she is weight bearing. She has occasional numbness sensations in her right leg that has associated aching pain. She notes a new \"twinge\" sensation in the left leg which onset one week ago after the injection. This is not as severe as the right leg. She takes 50 mg of Tramadol once a day.     Of note, her blood pressure was 192/86 in clinic. She states that she takes her medications for BP in the evening.  Has not taken it yet today.  Sees PMD for BP.    Review of Systems:   A 10-point review of systems was obtained and is negative except for as noted in the HPI.     Physical Examination:  Blood pressure 192/86, pulse 80, height 1.626 m (5' 4\"), weight 59.4 kg (131 lb), not currently breastfeeding.  General  - normal appearance, in no obvious distress  CV  - normal peripheral perfusion  Pulm  - normal respiratory pattern, non-labored  Musculoskeletal - lumbar spine  - stance: slow to rise and sit  - inspection: normal bone and joint alignment, no obvious scoliosis  - palpation: bilateral lumbar " paravertebral spasm  - ROM: pain with bilateral rotation, flexion past 30 deg, extension  - strength: lower extremities 5/5 in all planes  - special tests:  (-) straight leg raise bilaterally  (-) slump test  Neuro  - patellar and Achilles DTRs 2+ bilaterally, no sensory or motor deficit, grossly normal coordination, normal muscle tone  Skin  - no ecchymosis, erythema, warmth, or induration, no obvious rash  Psych  - interactive, appropriate, normal mood and affect    Assessment:   79 year old, female with past medial history of lumbar degenerative disc disease as well as 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 Patient has had a very limited relief after both injections although it did provide relief for a short period of time. At this point we discussed additional treatment options including encouraging physical therapy as well as referral to spine surgery.     Diagnosis:  1. Lumbar degenerative disc diease with radiculopathy of right lower extremity      Plan:     Referred to physical therapy for lumbar degenerative disc disease.    Refilled prescription of Tramadol to take for breakthrough pain    Prescribed course of Gabapentin. She may take up to three times a day for pain relief.     Referred to spine surgeon given lack of improvement with conservative measures.     Follow-up with me as needed.     It was a pleasure seeing Rachele today.    Naga Ayala DO, [facility]  Primary Care Sports Medicine    Scribe Disclosure:  I, Brian Gaffney, am serving as a scribe to document services personally performed by Naga Ayala DO at this visit, based upon the provider's statements to me. All documentation has been reviewed by the aforementioned provider prior to being entered into the official medical record.

## 2019-06-04 NOTE — LETTER
"2019       RE: Rachele Martínez  [address]  [facility] [address]     Dear Colleague,    Thank you for referring your patient, Rachele Martínez, to the [facility] SPORTS AND ORTHOPAEDIC WALK IN CLINIC at [facility]. Please see a copy of my visit note below.    [facility]  Orthopedics  Naga Ayala, DO  2019     Name: Rachele Martínez  MRN: [id number]  Age: 79 year old  : 1940  Referring provider: Referred Self     Chief Complaint: Pain of the Lower Back     History of Present Illness:   Rachele Martínez is a 79 year old, female with a past medical history of lumbar CHAYA (3/19/19) and who presents today for follow-up regarding right leg pain. I last evaluated the patient on 2019, at which time the patient reported one week of relief from her prior injection. She continued to have pain in the lateral and posterior aspect of the right leg as well as numbness on the plantar aspect of the right toes. We elected to proceed with a right sided S1 SNRB injection and continue HEP and Tramadol PRN for pain relief.    Today, the patient reports getting relief from the injection for a few days before her pain returned. Her pain is not constant, and is typically present when she is weight bearing. She has occasional numbness sensations in her right leg that has associated aching pain. She notes a new \"twinge\" sensation in the left leg which onset one week ago after the injection. This is not as severe as the right leg. She takes 50 mg of Tramadol once a day.     Of note, her blood pressure was 192/86 in clinic. She states that she takes her medications for BP in the evening.  Has not taken it yet today.  Sees PMD for BP.    Review of Systems:   A 10-point review of systems was obtained and is negative except for as noted in the HPI.     Physical Examination:  Blood pressure 192/86, pulse 80, height 1.626 m (5' 4\"), weight 59.4 kg (131 lb), not " currently breastfeeding.  General  - normal appearance, in no obvious distress  CV  - normal peripheral perfusion  Pulm  - normal respiratory pattern, non-labored  Musculoskeletal - lumbar spine  - stance: slow to rise and sit  - inspection: normal bone and joint alignment, no obvious scoliosis  - palpation: bilateral lumbar paravertebral spasm  - ROM: pain with bilateral rotation, flexion past 30 deg, extension  - strength: lower extremities 5/5 in all planes  - special tests:  (-) straight leg raise bilaterally  (-) slump test  Neuro  - patellar and Achilles DTRs 2+ bilaterally, no sensory or motor deficit, grossly normal coordination, normal muscle tone  Skin  - no ecchymosis, erythema, warmth, or induration, no obvious rash  Psych  - interactive, appropriate, normal mood and affect    Assessment:   79 year old, female with past medial history of lumbar degenerative disc disease as well as 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 Patient has had a very limited relief after both injections although it did provide relief for a short period of time. At this point we discussed additional treatment options including encouraging physical therapy as well as referral to spine surgery.     Diagnosis:  1. Lumbar degenerative disc diease with radiculopathy of right lower extremity      Plan:     Referred to physical therapy for lumbar degenerative disc disease.    Refilled prescription of Tramadol to take for breakthrough pain    Prescribed course of Gabapentin. She may take up to three times a day for pain relief.     Referred to spine surgeon given lack of improvement with conservative measures.     Follow-up with me as needed.     It was a pleasure seeing Rachele today.    Naga Ayala DO, [facility]  Primary Care Sports Medicine    Scribe Disclosure:  I, Brian Gaffney, am serving as a scribe to document services personally performed by Naga Ayala DO at  this visit, based upon the provider's statements to me. All documentation has been reviewed by the aforementioned provider prior to being entered into the official medical record.          SPORTS & ORTHOPEDIC WALK-IN FOLLOW-UP VISIT 6/4/2019    Interval History:     Follow up reason: post-injection     Date of injury: NA    Date last seen: 4/30/19    Following Therapeutic Plan: Yes     Pain: Unchanged    Function: Unchanged    Interval History: Injection didn't help. Daughter says it worked for a couple days but stopped working fairly quickly. Pain is not constant, mostly when she's on it. Feels like leg is falling asleep, along with achy pain. Does feel a slight twinge in left leg as well which is new since about a week after the injection.     Medical History:    Any recent changes to your medical history? No    Any new medication prescribed since last visit? No    Review of Systems:    Do you have fever, chills, weight loss? No    Do you have any vision problems? No    Do you have any chest pain or edema? No    Do you have any shortness of breath or wheezing?  No    Do you have stomach problems? No    Do you have any numbness or focal weakness? No    Do you have diabetes? No    Do you have problems with bleeding or clotting? No    Do you have an rashes or other skin lesions? No

## 2019-06-04 NOTE — PROGRESS NOTES
SPORTS & ORTHOPEDIC WALK-IN FOLLOW-UP VISIT 6/4/2019    Interval History:     Follow up reason: post-injection     Date of injury: NA    Date last seen: 4/30/19    Following Therapeutic Plan: Yes     Pain: Unchanged    Function: Unchanged    Interval History: Injection didn't help. Daughter says it worked for a couple days but stopped working fairly quickly. Pain is not constant, mostly when she's on it. Feels like leg is falling asleep, along with achy pain. Does feel a slight twinge in left leg as well which is new since about a week after the injection.     Medical History:    Any recent changes to your medical history? No    Any new medication prescribed since last visit? No    Review of Systems:    Do you have fever, chills, weight loss? No    Do you have any vision problems? No    Do you have any chest pain or edema? No    Do you have any shortness of breath or wheezing?  No    Do you have stomach problems? No    Do you have any numbness or focal weakness? No    Do you have diabetes? No    Do you have problems with bleeding or clotting? No    Do you have an rashes or other skin lesions? No

## 2019-06-04 NOTE — TELEPHONE ENCOUNTER
RECORDS RECEIVED FROM: Low back pain with radiculopathy, records internal, referred by walk in   DATE RECEIVED: Jun 11, 2019    NOTES STATUS DETAILS   OFFICE NOTE from referring provider Internal Dr. Ayaal 06/04/19 ...   OFFICE NOTE from other specialist Internal Dr. Awan 3/11/19...   DISCHARGE SUMMARY from hospital N/A    DISCHARGE REPORT from the ER N/A    OPERATIVE REPORT N/A    MEDICATION LIST Internal    IMPLANT RECORD/STICKER N/A    LABS     CBC/DIFF Internal 3/28/19   CULTURES N/A    INJECTIONS DONE IN RADIOLOGY Internal 5/16/19...   MRI Internal 3/3/19   CT SCAN N/A    XRAYS (IMAGES & REPORTS) Internal 11/3/18   TUMOR     PATHOLOGY  Slides & report N/A

## 2019-06-06 ENCOUNTER — INFUSION THERAPY VISIT (OUTPATIENT)
Dept: INFUSION THERAPY | Facility: CLINIC | Age: 79
End: 2019-06-06
Payer: MEDICARE

## 2019-06-06 ENCOUNTER — TELEPHONE (OUTPATIENT)
Dept: INTERNAL MEDICINE | Facility: CLINIC | Age: 79
End: 2019-06-06

## 2019-06-06 VITALS
DIASTOLIC BLOOD PRESSURE: 70 MMHG | TEMPERATURE: 97.7 F | HEART RATE: 75 BPM | SYSTOLIC BLOOD PRESSURE: 157 MMHG | RESPIRATION RATE: 16 BRPM | OXYGEN SATURATION: 98 %

## 2019-06-06 DIAGNOSIS — N18.6 ESRD (END STAGE RENAL DISEASE) ON DIALYSIS (H): ICD-10-CM

## 2019-06-06 DIAGNOSIS — K55.20 AVM (ARTERIOVENOUS MALFORMATION) OF SMALL BOWEL, ACQUIRED: ICD-10-CM

## 2019-06-06 DIAGNOSIS — D50.0 IRON DEFICIENCY ANEMIA DUE TO CHRONIC BLOOD LOSS: ICD-10-CM

## 2019-06-06 DIAGNOSIS — Z99.2 ESRD (END STAGE RENAL DISEASE) ON DIALYSIS (H): ICD-10-CM

## 2019-06-06 DIAGNOSIS — K74.60 CIRRHOSIS OF LIVER WITHOUT ASCITES, UNSPECIFIED HEPATIC CIRRHOSIS TYPE (H): ICD-10-CM

## 2019-06-06 DIAGNOSIS — K31.811 ANGIODYSPLASIA OF STOMACH AND DUODENUM WITH HEMORRHAGE: Primary | ICD-10-CM

## 2019-06-06 PROCEDURE — 99207 ZZC NO CHARGE LOS: CPT

## 2019-06-06 PROCEDURE — 96372 THER/PROPH/DIAG INJ SC/IM: CPT | Performed by: NURSE PRACTITIONER

## 2019-06-06 RX ADMIN — OCTREOTIDE ACETATE 20 MG: KIT INTRAMUSCULAR at 11:49

## 2019-06-06 ASSESSMENT — PAIN SCALES - GENERAL: PAINLEVEL: NO PAIN (0)

## 2019-06-06 NOTE — TELEPHONE ENCOUNTER
[facility] Call Center    Phone Message    May a detailed message be left on voicemail: yes    Reason for Call: Other: Pt's daughter would like to see who Dr. Rodriguez would reccomend pt to establish care with. Please call pt's daughter at [phone].     Action Taken: Message routed to:  [facility] ([facility]

## 2019-06-06 NOTE — PROGRESS NOTES
Infusion Nursing Note:  Rachele Martínez presents today for Sandostatin.    Patient seen by provider today: No   present during visit today: Not Applicable.    Note: N/A.    Intravenous Access:  No Intravenous access/labs at this visit.    Treatment Conditions:  Not Applicable.      Post Infusion Assessment:  Patient tolerated injection without incident.       Discharge Plan:   Patient discharged in stable condition accompanied by: self and daughter.  Departure Mode: Ambulatory.  Patient directed to scheduling.    Holly Way RN

## 2019-06-06 NOTE — TELEPHONE ENCOUNTER
Called daughter and would like Dr WHITLEY to recommend an MD for her mom. She was under the impression that [facility] was leaving the [facility] [employer] . She also asked if she could find someone close to where mom lives- [place] or [place].     Found [facility] at ?  [address] [place] [address]    [place]   [address]   [place] [id number]  BP looks more comprehensive for healthcare needs  Will route to ANGÉLICA Coronel on 6/6/2019 at 10:52 AM

## 2019-06-07 NOTE — TELEPHONE ENCOUNTER
Called daughter the patient is having pain in Legs, pain pills from sports medicine Dr Ayala, daughter said they want the primary to take over RX of Tramadol.  She said she would like an appointment in August, I scheduled the patient for Aug 9 and 1530. Piper Coronel on 6/7/2019 at 9:16 AM

## 2019-06-11 ENCOUNTER — DOCUMENTATION ONLY (OUTPATIENT)
Dept: ORTHOPEDICS | Facility: CLINIC | Age: 79
End: 2019-06-11

## 2019-06-11 ENCOUNTER — ANCILLARY PROCEDURE (OUTPATIENT)
Dept: GENERAL RADIOLOGY | Facility: CLINIC | Age: 79
End: 2019-06-11
Attending: ORTHOPAEDIC SURGERY
Payer: MEDICARE

## 2019-06-11 ENCOUNTER — HOSPITAL ENCOUNTER (OUTPATIENT)
Facility: CLINIC | Age: 79
End: 2019-06-11
Attending: ORTHOPAEDIC SURGERY | Admitting: ORTHOPAEDIC SURGERY
Payer: MEDICARE

## 2019-06-11 ENCOUNTER — PRE VISIT (OUTPATIENT)
Dept: ORTHOPEDICS | Facility: CLINIC | Age: 79
End: 2019-06-11

## 2019-06-11 ENCOUNTER — OFFICE VISIT (OUTPATIENT)
Dept: ORTHOPEDICS | Facility: CLINIC | Age: 79
End: 2019-06-11
Payer: MEDICARE

## 2019-06-11 VITALS — HEIGHT: 64 IN | BODY MASS INDEX: 22.36 KG/M2 | WEIGHT: 131 LBS

## 2019-06-11 DIAGNOSIS — M48.062 SPINAL STENOSIS OF LUMBAR REGION WITH NEUROGENIC CLAUDICATION: Primary | ICD-10-CM

## 2019-06-11 LAB
MRSA DNA SPEC QL NAA+PROBE: NEGATIVE
SPECIMEN SOURCE: NORMAL

## 2019-06-11 PROCEDURE — 87641 MR-STAPH DNA AMP PROBE: CPT | Performed by: ORTHOPAEDIC SURGERY

## 2019-06-11 PROCEDURE — 87640 STAPH A DNA AMP PROBE: CPT | Mod: XU | Performed by: ORTHOPAEDIC SURGERY

## 2019-06-11 ASSESSMENT — ENCOUNTER SYMPTOMS
BACK PAIN: 1
DECREASED APPETITE: 1
INCREASED ENERGY: 0
STIFFNESS: 0
TREMORS: 0
LEG PAIN: 1
WEIGHT GAIN: 0
DISTURBANCES IN COORDINATION: 1
SLEEP DISTURBANCES DUE TO BREATHING: 0
ALTERED TEMPERATURE REGULATION: 1
DIZZINESS: 1
PARALYSIS: 0
CHILLS: 0
LOSS OF CONSCIOUSNESS: 0
NIGHT SWEATS: 0
MEMORY LOSS: 1
LIGHT-HEADEDNESS: 1
SPEECH CHANGE: 0
SEIZURES: 0
FEVER: 0
POLYPHAGIA: 0
SYNCOPE: 0
ARTHRALGIAS: 0
FATIGUE: 1
WEAKNESS: 1
WEIGHT LOSS: 1
JOINT SWELLING: 0
HYPERTENSION: 1
TINGLING: 0
EXERCISE INTOLERANCE: 0
MUSCLE WEAKNESS: 1
NUMBNESS: 0
HALLUCINATIONS: 0
MUSCLE CRAMPS: 0
ORTHOPNEA: 0
MYALGIAS: 1
HYPOTENSION: 0
POLYDIPSIA: 1
NECK PAIN: 0
HEADACHES: 1
PALPITATIONS: 0

## 2019-06-11 ASSESSMENT — MIFFLIN-ST. JEOR: SCORE: 1059.21

## 2019-06-11 NOTE — PROGRESS NOTES
Patient is scheduled for surgery with Dr. Beckham     Spoke or left message with: patient in clinic     Date of Surgery: 7/25/19     Location:[facility]    Informed patient they will need an adult  yes    Post-ops Made 6 wks with Dr. Beckham     Pre-op with surgeon (if applicable): yes 7/2/19     H&P: Scheduled with PAC 7/2/19     Additional imaging/appointments: n/a     Surgery packet: given in clinic     Additional comments: n/a

## 2019-06-11 NOTE — PROGRESS NOTES
Spine Surgery Consultation    REFERRING PHYSICIAN: Naga Ayala   PRIMARY CARE PHYSICIAN: Agnieszka Rodriguez           Chief Complaint:   Consult (Lower Back Pain w/Radic. into the Right Leg. Referring: Jamie)      History of Present Illness:  Symptom Profile Including: location of symptoms, onset, severity, exacerbating/alleviating factors, previous treatments:        Rachele Martínez is a 79 year old female w/ PMH ESRD on dialysis, bleeding AVM, hepatitis C w/ liver cirrhosis, who presents w/ chief complaint of radicular RLE pain.    Endorses pain without weakness of the right lower extremity from the buttock progressing down the lateral thigh and leg in an approximately L5 distribution pattern.  She finds that this pain does limit her ability to perform housework and walk for long distances.  She finds it bothersome but not severe.    Conservative treatments to date have included gabapentin, marijuana, a lumbar support belt, and and L5-S1 interlaminar epidural steroid injections.  These injections did provide 1 week of 100% pain relief.         Past Medical History:     Past Medical History:   Diagnosis Date     Anemia      Anxiety and depression      Arthritis      AVM (arteriovenous malformation)      Chronic hepatitis C with cirrhosis (H)      Clotting disorder (H)      ESRD (end stage renal disease) (H)     on dialysis     GI bleed     recurrent     Glaucoma      Hyperlipidemia      Hypertension goal BP (blood pressure) < 140/80             Past Surgical History:     Past Surgical History:   Procedure Laterality Date     CAPSULE/PILL CAM ENDOSCOPY N/A 3/27/2019    Procedure: Capsule/pill cam endoscopy;  Surgeon: Yosvany Ram MD;  Location:  GI     COLONOSCOPY N/A 9/4/2015    Procedure: COMBINED COLONOSCOPY, SINGLE OR MULTIPLE BIOPSY/POLYPECTOMY BY BIOPSY;  Surgeon: Rupesh Lopez MD;  Location:  GI     COLONOSCOPY N/A 9/19/2018    Procedure: COLONOSCOPY;  enteroscopy small  bowel  COLONOSCOPY;  Surgeon: Ankit Baires MD;  Location:  GI     ESOPHAGOSCOPY, GASTROSCOPY, DUODENOSCOPY (EGD), COMBINED N/A 2014    Procedure: COMBINED ESOPHAGOSCOPY, GASTROSCOPY, DUODENOSCOPY (EGD);  Surgeon: Betsy Carvajal MD;  Location:  GI     ESOPHAGOSCOPY, GASTROSCOPY, DUODENOSCOPY (EGD), COMBINED N/A 2015    Procedure: COMBINED ESOPHAGOSCOPY, GASTROSCOPY, DUODENOSCOPY (EGD);  Surgeon: Yosvany Ram MD;  Location:  GI     ESOPHAGOSCOPY, GASTROSCOPY, DUODENOSCOPY (EGD), COMBINED N/A 2015    Procedure: COMBINED ESOPHAGOSCOPY, GASTROSCOPY, DUODENOSCOPY (EGD);  Surgeon: Mariano Mistry MD;  Location: [facility] CAPSULE ENDOSCOPY N/A 2015    Procedure: CAPSULE/PILL CAM ENDOSCOPY;  Surgeon: Pan Dhaliwal MD;  Location: [facility] VASCULAR SURGERY PROCEDURE UNLIST       HYSTERECTOMY      KYREE     LUMPECTOMY BREAST              Social History:     Lives alone in an apartment.  Non-smoker  No alcohol use  Endorses marijuana, denies other illicit drugs.    Social History     Tobacco Use     Smoking status: Former Smoker     Packs/day: 2.00     Years: 45.00     Pack years: 90.00     Types: Cigarettes     Start date: 1953     Last attempt to quit: 2002     Years since quittin.5     Smokeless tobacco: Never Used   Substance Use Topics     Alcohol use: No            Family History:     Family History   Problem Relation Age of Onset     Diabetes Mother      Alzheimer Disease Mother      Substance Abuse Son      Cancer Sister      Soft Tissue Cancer Sister      Breast Cancer Sister      Hyperlipidemia Daughter      Alcoholism Brother      Spine Problems Sister             Allergies:     Allergies   Allergen Reactions     Lisinopril Cough     Diovan Hct Itching     severe     Dust Mites      Hydrochlorothiazide Itching     Severe       No Clinical Screening - See Comments      History of blood transfusion reactions and pre-treats with Benadryl.   "    Sulfa Drugs Hives     Spironolactone Nausea            Medications:     Current Outpatient Medications   Medication     atorvastatin (LIPITOR) 20 MG tablet     Calcium Acetate, Phos Binder, 667 MG CAPS     gabapentin (NEURONTIN) 300 MG capsule     losartan (COZAAR) 50 MG tablet     Multiple Vitamins-Minerals (PRORENAL + D) TABS     octreotide (SANDOSTATIN LAR) 20 MG injection     order for DME     order for DME     pantoprazole (PROTONIX) 20 MG EC tablet     polyethylene glycol 3350 POWD     sertraline (ZOLOFT) 50 MG tablet     traMADol (ULTRAM) 50 MG tablet     traMADol (ULTRAM) 50 MG tablet     traMADol (ULTRAM) 50 MG tablet     No current facility-administered medications for this visit.              Review of Systems:     A 10 point ROS was performed and reviewed. Specific responses to these questions are noted at the end of the document.         Physical Exam:   Vitals: Ht 1.626 m (5' 4\")   Wt 59.4 kg (131 lb)   BMI 22.49 kg/m    Constitutional: awake, alert, cooperative, no apparent distress, appears stated age.    Eyes: The sclera are white.  Ears, Nose, Throat: The trachea is midline.  Psychiatric: The patient has a normal affect.  Respiratory: breathing non-labored  Cardiovascular: The extremities are warm and perfused.  Skin: no obvious rashes or lesions.  Musculoskeletal, Neurologic, and Spine:    Lumbar Spine:    Appearance - No gross stepoffs or deformities    Motor -     L2-3: Hip flexion 5/5 R and 5/5 L strength          L3/4:  Knee extension R 5/5 and L 5/5 strength         L4/5:  Foot dorsiflexion R 5/5 L 5/5 and       EHL dorsiflexion R 5/5 L 5/5 strength         S1:  Plantarflexion/Peroneal Muscles  R 5/5 and L 5/5 strength    Sensation: intact to light touch L3-S1 distribution BLE      Neurologic:      REFLEXES Left Right   Patella 1+ 1+   Ankle jerk 1+ 1+   Clonus 0 beats 0 beats     Hip Exam:  No pain with hip log roll and no tenderness over the greater trochanters.           Imaging: "   We ordered and independently reviewed new radiographs at this clinic visit. The results were discussed with the patient.  Findings include:    6/11/2019 standing AP lateral flexion and extension L-spine x-rays from today demonstrate multilevel degenerative disc disease with some loss of lumbar lordosis.  On flex ex views there is no sign of listhesis.  Calcified aorta noted.    L-spine MRI 3/3/2019 reviewed and demonstrates multilevel lumbar spondylosis with cervical canal stenosis at L3-L4.  Also with right-sided neuroforaminal stenosis at L5-S1.             Assessment and Plan:   Assessment:  79 year old female with R L5-S1 neuroforaminal stenosis and L5/S1 radiculopathy as well as L3-4 central stenosis.     Plan:  1. Extensively discussed operative versus nonoperative management with the patient and her daughter today.  Discussed options for operative management including laminectomy and fusion.  Given her age and medical comorbidities, it would be ideal to avoid the bigger procedure.  Discussed that we would be willing to proceed with a fusion if the patient did want to proceed with surgery.  2. The patient is going to take some time to do more nonoperative management including physical therapy.  3. We will have patient undergo preoperative assessment by PAC clinic.  4. She will then return to clinic in 3 to 4 weeks to discuss her progress.  If she is not improved and wishes to proceed with surgery, we could discuss possible decompression surgery.    Josafat Montague MD  Orthopaedic Surgery, PGY-1  Pager: [phone]    Attending MD (Dr. Perry Beckham) :  I reviewed and verified the history and physical exam of the patient and discussed the patient's management with the other clinical providers involved in this patient's care including any involved residents or physicians assistants. I reviewed the above note and agree with the documented findings and plan of care, which were communicated to the patient.       Perry Beckham MD    Respectfully,  Perry Beckham MD  Spine Surgery  [facility]        Answers for HPI/ROS submitted by the patient on 6/11/2019   General Symptoms: Yes  Skin Symptoms: No  HENT Symptoms: No  EYE SYMPTOMS: No  HEART SYMPTOMS: Yes  LUNG SYMPTOMS: No  INTESTINAL SYMPTOMS: No  URINARY SYMPTOMS: No  GYNECOLOGIC SYMPTOMS: No  BREAST SYMPTOMS: No  SKELETAL SYMPTOMS: Yes  BLOOD SYMPTOMS: No  NERVOUS SYSTEM SYMPTOMS: Yes  MENTAL HEALTH SYMPTOMS: No  Fever: No  Loss of appetite: Yes  Weight loss: Yes  Weight gain: No  Fatigue: Yes  Night sweats: No  Chills: No  Increased stress: No  Excessive hunger: No  Excessive thirst: Yes  Feeling hot or cold when others believe the temperature is normal: Yes  Loss of height: No  Post-operative complications: No  Surgical site pain: No  Hallucinations: No  Change in or Loss of Energy: No  Hyperactivity: No  Confusion: Yes  Chest pain or pressure: No  Fast or irregular heartbeat: No  Pain in legs with walking: Yes  Trouble breathing while lying down: No  Fingers or toes appear blue: No  High blood pressure: Yes  Low blood pressure: No  Fainting: No  Murmurs: No  Pacemaker: No  Varicose veins: No  Edema or swelling: No  Wake up at night with shortness of breath: No  Light-headedness: Yes  Exercise intolerance: No  Back pain: Yes  Muscle aches: Yes  Neck pain: No  Swollen joints: No  Joint pain: No  Bone pain: No  Muscle cramps: No  Muscle weakness: Yes  Joint stiffness: No  Bone fracture: No  Trouble with coordination: Yes  Dizziness or trouble with balance: Yes  Fainting or black-out spells: No  Memory loss: Yes  Headache: Yes  Seizures: No  Speech problems: No  Tingling: No  Tremor: No  Weakness: Yes  Difficulty walking: Yes  Paralysis: No  Numbness: No

## 2019-06-11 NOTE — NURSING NOTE
"Reason For Visit:   Chief Complaint   Patient presents with     Consult     Lower Back Pain w/Radic. into the Right Leg. Referring: Jamie Dukes MD: Agnieszka Rodriguez  Ref. MD: Dr. Ayala    ?  No  Occupation N/A.  Currently working? No.  Work status?  Retired.  Date of injury: N/A  Type of injury: N/A.  Date of surgery: None  Type of surgery: N/A.     Ht 1.626 m (5' 4\")   Wt 59.4 kg (131 lb)   BMI 22.49 kg/m        Oswestry (JORDAN) Questionnaire    OSWESTRY DISABILITY INDEX 6/11/2019   Count 7   Sum 19   Oswestry Score (%) 54.29   Some recent data might be hidden                 Promis 10 Assessment    PROMIS 10 6/11/2019   In general, would you say your health is: Fair   In general, would you say your quality of life is: Good   In general, how would you rate your physical health? Good   In general, how would you rate your mental health, including your mood and your ability to think? Fair   In general, how would you rate your satisfaction with your social activities and relationships? Good   In general, please rate how well you carry out your usual social activities and roles Fair   To what extent are you able to carry out your everyday physical activities such as walking, climbing stairs, carrying groceries, or moving a chair? Moderately   How often have you been bothered by emotional problems such as feeling anxious, depressed or irritable? Rarely   How would you rate your fatigue on average? Mild   How would you rate your pain on average?   0 = No Pain  to  10 = Worst Imaginable Pain 7   In general, would you say your health is: 2   In general, would you say your quality of life is: 3   In general, how would you rate your physical health? 3   In general, how would you rate your mental health, including your mood and your ability to think? 2   In general, how would you rate your satisfaction with your social activities and relationships? 3   In general, please rate how well you carry out " your usual social activities and roles. (This includes activities at home, at work and in your community, and responsibilities as a parent, child, spouse, employee, friend, etc.) 2   To what extent are you able to carry out your everyday physical activities such as walking, climbing stairs, carrying groceries, or moving a chair? 3   In the past 7 days, how often have you been bothered by emotional problems such as feeling anxious, depressed, or irritable? 2   In the past 7 days, how would you rate your fatigue on average? 2   In the past 7 days, how would you rate your pain on average, where 0 means no pain, and 10 means worst imaginable pain? 7   Global Mental Health Score 12   Global Physical Health Score 12   PROMIS TOTAL - SUBSCORES 24   Some recent data might be hidden                Arti Beltran CMA

## 2019-06-11 NOTE — LETTER
6/11/2019       RE: Rachele Martínez  [address]  [facility] [address]     Dear Colleague,    Thank you for referring your patient, Rachele Martínez, to the [facility]. Please see a copy of my visit note below.    Spine Surgery Consultation    REFERRING PHYSICIAN: Naga Ayala   PRIMARY CARE PHYSICIAN: Agnieszka Rodriguez           Chief Complaint:   Consult (Lower Back Pain w/Radic. into the Right Leg. Referring: Jamie)      History of Present Illness:  Symptom Profile Including: location of symptoms, onset, severity, exacerbating/alleviating factors, previous treatments:        Rachele Martínez is a 79 year old female w/ PMH ESRD on dialysis, bleeding AVM, hepatitis C w/ liver cirrhosis, who presents w/ chief complaint of radicular RLE pain.    Endorses pain without weakness of the right lower extremity from the buttock progressing down the lateral thigh and leg in an approximately L5 distribution pattern.  She finds that this pain does limit her ability to perform housework and walk for long distances.  She finds it bothersome but not severe.    Conservative treatments to date have included gabapentin, marijuana, a lumbar support belt, and and L5-S1 interlaminar epidural steroid injections.  These injections did provide 1 week of 100% pain relief.         Past Medical History:     Past Medical History:   Diagnosis Date     Anemia      Anxiety and depression      Arthritis      AVM (arteriovenous malformation)      Chronic hepatitis C with cirrhosis (H)      Clotting disorder (H)      ESRD (end stage renal disease) (H)     on dialysis     GI bleed     recurrent     Glaucoma      Hyperlipidemia      Hypertension goal BP (blood pressure) < 140/80             Past Surgical History:     Past Surgical History:   Procedure Laterality Date     CAPSULE/PILL CAM ENDOSCOPY N/A 3/27/2019    Procedure: Capsule/pill cam endoscopy;   Surgeon: Yosvany Ram MD;  Location:  GI     COLONOSCOPY N/A 2015    Procedure: COMBINED COLONOSCOPY, SINGLE OR MULTIPLE BIOPSY/POLYPECTOMY BY BIOPSY;  Surgeon: Rupesh Lopez MD;  Location:  GI     COLONOSCOPY N/A 2018    Procedure: COLONOSCOPY;  enteroscopy small bowel  COLONOSCOPY;  Surgeon: Ankit Baires MD;  Location:  GI     ESOPHAGOSCOPY, GASTROSCOPY, DUODENOSCOPY (EGD), COMBINED N/A 2014    Procedure: COMBINED ESOPHAGOSCOPY, GASTROSCOPY, DUODENOSCOPY (EGD);  Surgeon: Betsy Carvajal MD;  Location:  GI     ESOPHAGOSCOPY, GASTROSCOPY, DUODENOSCOPY (EGD), COMBINED N/A 2015    Procedure: COMBINED ESOPHAGOSCOPY, GASTROSCOPY, DUODENOSCOPY (EGD);  Surgeon: Yosvany Ram MD;  Location: [facility]     ESOPHAGOSCOPY, GASTROSCOPY, DUODENOSCOPY (EGD), COMBINED N/A 2015    Procedure: COMBINED ESOPHAGOSCOPY, GASTROSCOPY, DUODENOSCOPY (EGD);  Surgeon: Mariano Mistry MD;  Location: [facility]     HC CAPSULE ENDOSCOPY N/A 2015    Procedure: CAPSULE/PILL CAM ENDOSCOPY;  Surgeon: Pan Dhaliwal MD;  Location: [facility] VASCULAR SURGERY PROCEDURE UNLIST       HYSTERECTOMY  1980    KYREE     LUMPECTOMY BREAST              Social History:     Lives alone in an apartment.  Non-smoker  No alcohol use  Endorses marijuana, denies other illicit drugs.    Social History     Tobacco Use     Smoking status: Former Smoker     Packs/day: 2.00     Years: 45.00     Pack years: 90.00     Types: Cigarettes     Start date: 1953     Last attempt to quit: 2002     Years since quittin.5     Smokeless tobacco: Never Used   Substance Use Topics     Alcohol use: No            Family History:     Family History   Problem Relation Age of Onset     Diabetes Mother      Alzheimer Disease Mother      Substance Abuse Son      Cancer Sister      Soft Tissue Cancer Sister      Breast Cancer Sister      Hyperlipidemia Daughter      Alcoholism Brother      Spine Problems  "Sister             Allergies:     Allergies   Allergen Reactions     Lisinopril Cough     Diovan Hct Itching     severe     Dust Mites      Hydrochlorothiazide Itching     Severe       No Clinical Screening - See Comments      History of blood transfusion reactions and pre-treats with Benadryl.      Sulfa Drugs Hives     Spironolactone Nausea            Medications:     Current Outpatient Medications   Medication     atorvastatin (LIPITOR) 20 MG tablet     Calcium Acetate, Phos Binder, 667 MG CAPS     gabapentin (NEURONTIN) 300 MG capsule     losartan (COZAAR) 50 MG tablet     Multiple Vitamins-Minerals (PRORENAL + D) TABS     octreotide (SANDOSTATIN LAR) 20 MG injection     order for DME     order for DME     pantoprazole (PROTONIX) 20 MG EC tablet     polyethylene glycol 3350 POWD     sertraline (ZOLOFT) 50 MG tablet     traMADol (ULTRAM) 50 MG tablet     traMADol (ULTRAM) 50 MG tablet     traMADol (ULTRAM) 50 MG tablet     No current facility-administered medications for this visit.              Review of Systems:     A 10 point ROS was performed and reviewed. Specific responses to these questions are noted at the end of the document.         Physical Exam:   Vitals: Ht 1.626 m (5' 4\")   Wt 59.4 kg (131 lb)   BMI 22.49 kg/m     Constitutional: awake, alert, cooperative, no apparent distress, appears stated age.    Eyes: The sclera are white.  Ears, Nose, Throat: The trachea is midline.  Psychiatric: The patient has a normal affect.  Respiratory: breathing non-labored  Cardiovascular: The extremities are warm and perfused.  Skin: no obvious rashes or lesions.  Musculoskeletal, Neurologic, and Spine:    Lumbar Spine:    Appearance - No gross stepoffs or deformities    Motor -     L2-3: Hip flexion 5/5 R and 5/5 L strength          L3/4:  Knee extension R 5/5 and L 5/5 strength         L4/5:  Foot dorsiflexion R 5/5 L 5/5 and       EHL dorsiflexion R 5/5 L 5/5 strength         S1:  Plantarflexion/Peroneal Muscles  " R 5/5 and L 5/5 strength    Sensation: intact to light touch L3-S1 distribution BLE      Neurologic:      REFLEXES Left Right   Patella 1+ 1+   Ankle jerk 1+ 1+   Clonus 0 beats 0 beats     Hip Exam:  No pain with hip log roll and no tenderness over the greater trochanters.           Imaging:   We ordered and independently reviewed new radiographs at this clinic visit. The results were discussed with the patient.  Findings include:    6/11/2019 standing AP lateral flexion and extension L-spine x-rays from today demonstrate multilevel degenerative disc disease with some loss of lumbar lordosis.  On flex ex views there is no sign of listhesis.  Calcified aorta noted.    L-spine MRI 3/3/2019 reviewed and demonstrates multilevel lumbar spondylosis with cervical canal stenosis at L3-L4.  Also with right-sided neuroforaminal stenosis at L5-S1.             Assessment and Plan:   Assessment:  79 year old female with R L5-S1 neuroforaminal stenosis and L5/S1 radiculopathy as well as L3-4 central stenosis.     Plan:  1. Extensively discussed operative versus nonoperative management with the patient and her daughter today.  Discussed options for operative management including laminectomy and fusion.  Given her age and medical comorbidities, it would be ideal to avoid the bigger procedure.  Discussed that we would be willing to proceed with a fusion if the patient did want to proceed with surgery.  2. The patient is going to take some time to do more nonoperative management including physical therapy.  3. We will have patient undergo preoperative assessment by PAC clinic.  4. She will then return to clinic in 3 to 4 weeks to discuss her progress.  If she is not improved and wishes to proceed with surgery, we could discuss possible decompression surgery.    Josafat Montague MD  Orthopaedic Surgery, PGY-1  Pager: [phone]    Attending MD (Dr. Perry Beckham) :  I reviewed and verified the history and physical exam of the  patient and discussed the patient's management with the other clinical providers involved in this patient's care including any involved residents or physicians assistants. I reviewed the above note and agree with the documented findings and plan of care, which were communicated to the patient.      Perry Beckham MD    Respectfully,  Perry Beckham MD  Spine Surgery  [facility]

## 2019-06-11 NOTE — NURSING NOTE
Teaching Flowsheet   Relevant Diagnosis: lumbar radiculopathy  Teaching Topic: preop L3-4 laminectomy    Pt lives in [place], daughter able to help after surgery.  She will see PAC for optimizing due to liver cirrhosis, AVM, kidney failure on dialysis, hep C 4 yrs ago. MRSA screen swab was sent to lab.     Person(s) involved in teaching:   Patient     Motivation Level:  Asks Questions: Yes  Eager to Learn: Yes  Cooperative: Yes  Receptive (willing/able to accept information): Yes  Any cultural factors/[place] beliefs that may influence understanding or compliance? No  Comments:      Patient and Family demonstrates understanding of the following:  Reason for the appointment, diagnosis and treatment plan: Yes  Knowledge of proper use of medications and conditions for which they are ordered (with special attention to potential side effects or drug interactions): Yes  Which situations necessitate calling provider and whom to contact: Yes       Teaching Concerns Addressed:   Comments:      Proper use and care of medications (medical equip, care aids, etc.): Yes  Nutritional needs and diet plan: Yes  Pain management techniques: Yes  Wound Care: Yes  How and/when to access community resources: NA     Instructional Materials Used/Given: preop pkt, antiseptic soap     Time spent with patient: 30 minutes.

## 2019-06-12 PROBLEM — M48.062 SPINAL STENOSIS OF LUMBAR REGION WITH NEUROGENIC CLAUDICATION: Status: ACTIVE | Noted: 2019-06-12

## 2019-06-25 ENCOUNTER — THERAPY VISIT (OUTPATIENT)
Dept: PHYSICAL THERAPY | Facility: CLINIC | Age: 79
End: 2019-06-25
Attending: FAMILY MEDICINE
Payer: MEDICARE

## 2019-06-25 ENCOUNTER — PRE VISIT (OUTPATIENT)
Dept: SURGERY | Facility: CLINIC | Age: 79
End: 2019-06-25

## 2019-06-25 DIAGNOSIS — M51.369 LUMBAR DEGENERATIVE DISC DISEASE: ICD-10-CM

## 2019-06-25 DIAGNOSIS — M54.41 BILATERAL LOW BACK PAIN WITH RIGHT-SIDED SCIATICA: ICD-10-CM

## 2019-06-25 DIAGNOSIS — G89.29 CHRONIC BILATERAL LOW BACK PAIN WITH RIGHT-SIDED SCIATICA: ICD-10-CM

## 2019-06-25 DIAGNOSIS — M54.41 CHRONIC BILATERAL LOW BACK PAIN WITH RIGHT-SIDED SCIATICA: ICD-10-CM

## 2019-06-25 PROCEDURE — 97161 PT EVAL LOW COMPLEX 20 MIN: CPT | Mod: GP | Performed by: PHYSICAL THERAPIST

## 2019-06-25 PROCEDURE — 97110 THERAPEUTIC EXERCISES: CPT | Mod: GP | Performed by: PHYSICAL THERAPIST

## 2019-06-25 NOTE — LETTER
DEPARTMENT OF HEALTH AND HUMAN SERVICES  [facility] FOR MED[facility]ARE & MEDICAID SERVICES  PLAN/UPDATED PLAN OF PROGRESS FOR OUTPATIENT REHABILITATION  PATIENTS NAME:  Rachele Martínez   : 1940  PR[facility] NUMBER:    [id number]  [id number]   PROVIDER NAME: [employer] [facility] SONG DELONG  MEDICAL RECORD NUMBER: [id number]   START OF CARE DATE:  SOC Date: 19   TYPE:  PT  PRIMARY/TREATMENT DIAGNOSIS: (Pertinent Medical Diagnosis)  Lumbar degenerative disc disease  Chronic bilateral low back pain with right-sided sciatica  Bilateral low back pain with right-sided sciatica  VISITS FROM START OF CARE:  Rxs Used: 1     [employer] [facility] [employer] [facility] Initial Evaluation  Subjective:  The history is provided by the patient. No  was used.   Type of problem:  Lumbar   Condition occurred with:  Insidious onset. This is a chronic condition   Problem details: Patient reports insidious onset of lower back and leg pain for about 6 months. She has had 2 epidurals with the last about 1 month ago with about 2 days of relief or so. MD order from 19..   Patient reports pain:  Central lumbar spine, lumbar spine right and lumbar spine left (R>L). Radiates to:  Thigh right and lower leg right (posterior thigh and lower leg on the R). Associated symptoms:  Loss of motion/stiffness. Symptoms are exacerbated by standing, walking, bending and lifting (walk simón 5' with 10/10 pain, sit simón 10' with 7/10 pain) and relieved by rest, other and analgesics (changing position, Gabapentin).  Rachele Martínez being seen for LBP.   Problem began 2019. Where condition occurred: for unknown reasons.Problem occurred: insidious  and reported as 6/10 on pain scale. General health as reported by patient is fair. Pertinent medical history includes:  High blood pressure, kidney disease, hepatitis and smoking.    Surgeries include:  None.  Current medications:  Anti-depressants, high blood pressure  medication and muscle relaxants.     Pain is described as aching and is intermittent. Pain is the same all the time. Since onset symptoms are unchanged. Special tests:  MRI (mod to severe stenosis).     Patient is retired.   Barriers include:  None as reported by patient.  Red flags:  None as reported by patient.                Objective:  System  Lumbar/SI Evaluation  ROM:    AROM Lumbar:   Flexion:          Fingertips to ankles and increase  Ext:                    Mod to sign loss and increase   Side Bend:        Left:     Right:   Rotation:           Left:     Right:   Side Glide:        Left:  Mod loss    Right:  Mod loss  Lumbar Myotomes:  normal  Lumbar DTR's:  normal  Cord Signs:  normal  Lumbar Dermtomes:  not assessed  Neural Tension/Mobility:  Lumbar:  Normal    Lumbar Palpation:    Tenderness present at Left:    Quadratus Lumborum and Erector Spinae  Tenderness present at Right: Quadratus Lumborum and Erector Spinae    Himanshu Lumbar Evaluation  Posture:  Sitting: fair  Standing: fair  Lateral Shift: no  Correction of Posture: no effect  Test Movements:  FIS: During: increases  After: no worse    Repeat FIS: During: decreases  After: better    EIS: During: increases  After: no worse    Repeat EIS: During: increases  After: worse and peripheralizing      Assessment/Plan:    Patient is a 79 year old female with lumbar complaints.    Patient has the following significant findings with corresponding treatment plan.                Diagnosis 1:  Lumbar stenosis  Pain -  US, electric stimulation, mechanical traction, manual therapy, self management, education and directional preference exercise  Decreased ROM/flexibility - manual therapy, therapeutic exercise and home program  Decreased joint mobility - manual therapy, therapeutic exercise and home program  Impaired muscle performance - neuro re-education and home program  Decreased function - therapeutic activities and home program  Impaired posture - neuro  re-education and home program    Therapy Evaluation Codes:   1) History comprised of:   Personal factors that impact the plan of care:      Time since onset of symptoms.    Comorbidity factors that impact the plan of care are:      None.     Medications impacting care: None.  2) Examination of Body Systems comprised of:   Body structures and functions that impact the plan of care:      Lumbar spine.   Activity limitations that impact the plan of care are:      Bending, Lifting, Standing and Walking.  3) Clinical presentation characteristics are:   Stable/Uncomplicated.  4) Decision-Making    Low complexity using standardized patient assessment instrument and/or measureable assessment of functional outcome.  Cumulative Therapy Evaluation is: Low complexity.  Previous and current functional limitations:  (See Goal Flow Sheet for this information)    Short term and Long term goals: (See Goal Flow Sheet for this information)   Communication ability:  Patient appears to be able to clearly communicate and understand verbal and written communication and follow directions correctly.  Treatment Explanation - The following has been discussed with the patient:   RX ordered/plan of care  Anticipated outcomes  Possible risks and side effects  This patient would benefit from PT intervention to resume normal activities.   Rehab potential is good.    Frequency:  1 X week, once daily  Duration:  for 10 weeks  Discharge Plan:  Achieve all LTG.  Independent in home treatment program.  Reach maximal therapeutic benefit.    Please refer to the daily flowsheet for treatment today, total treatment time and time spent performing 1:1 timed codes.       Caregiver Signature/Credentials ___________________________________________________ Date ________       Samantha Boyle PT   I have reviewed and certified the need for these services and plan of treatment while under my care.        PHYSICIAN'S SIGNATURE:    "__________________________________________________  Date___________     Naga Ayala MD    Certification period:  Beginning of Cert date period: 06/25/19 to  End of Cert period date: 09/13/19   Functional Level Progress Report: Please see attached \"Goal Flow sheet for Functional level.\"  ____X____ Continue Services or       ________ DC Services              Service dates: From  SOC Date: 06/25/19 date to present                         "

## 2019-06-25 NOTE — PROGRESS NOTES
[facility] Initial Evaluation  Subjective:  The history is provided by the patient. No  was used.   Type of problem:  Lumbar   Condition occurred with:  Insidious onset. This is a chronic condition   Problem details: Patient reports insidious onset of lower back and leg pain for about 6 months. She has had 2 epidurals with the last about 1 month ago with about 2 days of relief or so. MD order from 6-4-19..   Patient reports pain:  Central lumbar spine, lumbar spine right and lumbar spine left (R>L). Radiates to:  Thigh right and lower leg right (posterior thigh and lower leg on the R). Associated symptoms:  Loss of motion/stiffness. Symptoms are exacerbated by standing, walking, bending and lifting (walk simón 5' with 10/10 pain, sit simón 10' with 7/10 pain) and relieved by rest, other and analgesics (changing position, Gabapentin).    Rachele LORRIE Martínez being seen for LBP.   Problem began 6/4/2019. Where condition occurred: for unknown reasons.Problem occurred: insidious  and reported as 6/10 on pain scale. General health as reported by patient is fair. Pertinent medical history includes:  High blood pressure, kidney disease, hepatitis and smoking.    Surgeries include:  None.  Current medications:  Anti-depressants, high blood pressure medication and muscle relaxants.     Pain is described as aching and is intermittent. Pain is the same all the time. Since onset symptoms are unchanged. Special tests:  MRI (mod to severe stenosis).     Patient is retired.   Barriers include:  None as reported by patient.  Red flags:  None as reported by patient.                      Objective:  System         Lumbar/SI Evaluation  ROM:    AROM Lumbar:   Flexion:          Fingertips to ankles and increase  Ext:                    Mod to sign loss and increase   Side Bend:        Left:     Right:   Rotation:           Left:     Right:   Side Glide:        Left:  Mod loss    Right:  Mod loss           Lumbar Myotomes:  normal            Lumbar DTR's:  normal      Cord Signs:  normal    Lumbar Dermtomes:  not assessed                Neural Tension/Mobility:  Lumbar:  Normal        Lumbar Palpation:    Tenderness present at Left:    Quadratus Lumborum and Erector Spinae  Tenderness present at Right: Quadratus Lumborum and Erector Spinae                                                       Himanshu Lumbar Evaluation    Posture:  Sitting: fair  Standing: fair    Lateral Shift: no  Correction of Posture: no effect      Test Movements:  FIS: During: increases  After: no worse    Repeat FIS: During: decreases  After: better    EIS: During: increases  After: no worse    Repeat EIS: During: increases  After: worse and peripheralizing                                                       ROS    Assessment/Plan:    Patient is a 79 year old female with lumbar complaints.    Patient has the following significant findings with corresponding treatment plan.                Diagnosis 1:  Lumbar stenosis  Pain -  US, electric stimulation, mechanical traction, manual therapy, self management, education and directional preference exercise  Decreased ROM/flexibility - manual therapy, therapeutic exercise and home program  Decreased joint mobility - manual therapy, therapeutic exercise and home program  Impaired muscle performance - neuro re-education and home program  Decreased function - therapeutic activities and home program  Impaired posture - neuro re-education and home program    Therapy Evaluation Codes:   1) History comprised of:   Personal factors that impact the plan of care:      Time since onset of symptoms.    Comorbidity factors that impact the plan of care are:      None.     Medications impacting care: None.  2) Examination of Body Systems comprised of:   Body structures and functions that impact the plan of care:      Lumbar spine.   Activity limitations that impact the plan of care are:      Bending, Lifting,  Standing and Walking.  3) Clinical presentation characteristics are:   Stable/Uncomplicated.  4) Decision-Making    Low complexity using standardized patient assessment instrument and/or measureable assessment of functional outcome.  Cumulative Therapy Evaluation is: Low complexity.    Previous and current functional limitations:  (See Goal Flow Sheet for this information)    Short term and Long term goals: (See Goal Flow Sheet for this information)     Communication ability:  Patient appears to be able to clearly communicate and understand verbal and written communication and follow directions correctly.  Treatment Explanation - The following has been discussed with the patient:   RX ordered/plan of care  Anticipated outcomes  Possible risks and side effects  This patient would benefit from PT intervention to resume normal activities.   Rehab potential is good.    Frequency:  1 X week, once daily  Duration:  for 10 weeks  Discharge Plan:  Achieve all LTG.  Independent in home treatment program.  Reach maximal therapeutic benefit.    Please refer to the daily flowsheet for treatment today, total treatment time and time spent performing 1:1 timed codes.

## 2019-06-25 NOTE — TELEPHONE ENCOUNTER
FUTURE VISIT INFORMATION      SURGERY INFORMATION:    Date: 7/25/19    Location: [facility] OR    Surgeon:  Perry Beckham    Anesthesia Type:  General    RECORDS REQUESTED FROM:       Primary Care Provider: Agnieszka Rodriguez- [facility] Globalsravani    Pertinent Medical History: Hypertension    Most recent EKG+ Tracing: 3/26/19    Most recent ECHO: 3/26/19    Most recent Cardiac Stress Test: 4/16/19

## 2019-06-30 PROBLEM — M54.41 BILATERAL LOW BACK PAIN WITH RIGHT-SIDED SCIATICA: Status: ACTIVE | Noted: 2019-06-30

## 2019-07-02 ENCOUNTER — OFFICE VISIT (OUTPATIENT)
Dept: SURGERY | Facility: CLINIC | Age: 79
End: 2019-07-02
Payer: MEDICARE

## 2019-07-02 ENCOUNTER — OFFICE VISIT (OUTPATIENT)
Dept: ORTHOPEDICS | Facility: CLINIC | Age: 79
End: 2019-07-02
Payer: MEDICARE

## 2019-07-02 VITALS
OXYGEN SATURATION: 97 % | RESPIRATION RATE: 16 BRPM | TEMPERATURE: 98.4 F | BODY MASS INDEX: 21.51 KG/M2 | WEIGHT: 126 LBS | HEART RATE: 73 BPM | DIASTOLIC BLOOD PRESSURE: 71 MMHG | SYSTOLIC BLOOD PRESSURE: 161 MMHG | HEIGHT: 64 IN

## 2019-07-02 DIAGNOSIS — Z01.818 PRE-OPERATIVE GENERAL PHYSICAL EXAMINATION: ICD-10-CM

## 2019-07-02 DIAGNOSIS — D50.8 OTHER IRON DEFICIENCY ANEMIA: ICD-10-CM

## 2019-07-02 DIAGNOSIS — R09.89 BILATERAL CAROTID BRUITS: Primary | ICD-10-CM

## 2019-07-02 DIAGNOSIS — M48.062 SPINAL STENOSIS OF LUMBAR REGION WITH NEUROGENIC CLAUDICATION: Primary | ICD-10-CM

## 2019-07-02 LAB
ABO + RH BLD: NORMAL
ABO + RH BLD: NORMAL
BLD GP AB SCN SERPL QL: NORMAL
BLOOD BANK CMNT PATIENT-IMP: NORMAL
BLOOD BANK CMNT PATIENT-IMP: NORMAL
SPECIMEN EXP DATE BLD: NORMAL

## 2019-07-02 ASSESSMENT — MIFFLIN-ST. JEOR: SCORE: 1036.53

## 2019-07-02 ASSESSMENT — PAIN SCALES - GENERAL: PAINLEVEL: MILD PAIN (2)

## 2019-07-02 NOTE — H&P
Pre-Operative H & P     CC:  Preoperative exam to assess for increased cardiopulmonary risk while undergoing surgery and anesthesia.    Date of Encounter: 7/2/2019  Primary Care Physician:  Agnieszka Rodriguez  Rachele Martínez is a 79 year old female who presents for pre-operative H & P in preparation for L3-4 decompression, L5-S1 decompression and bilateral foraminotomy, with Dr. Perry Beckham, on 7/25/19, at [facility], in diagnosis and treatment of lumbar stenosis with neurogenic claudication. She  Had insidious onset of low back pain and radiation to legs over past 6 months. She has decreased mobility, stiffness, and this is worse with standing and walking and better with rest. She was on Ultram and now on gabapentin with some relief. SHe had Epidural injection 3/19/19 with some relief.   Her comorbidities include ESRD on hemodialysis at [facility] in [place], HTN, HLD, recurrent GI bleeds from GI AVMs.  She was admitted in March 2019 with what was diagnosed as demand ischemia (EKG and troponins were negative), treated with hemodialysis for fluid overload.   She is back to baseline in that regard and has HD M/W/F and gets labs every Monday. She is on aranesp and IV venifer for chronic anemia.    History is obtained from the patient, with help from her daughter.    Past Medical History  Past Medical History:   Diagnosis Date     Anemia      Anxiety and depression      Arthritis      AVM (arteriovenous malformation) in the GI tract with intermittent bleeding requiring blood transfusions in 2016 and 2018      Chronic hepatitis C with cirrhosis (H) treated with Harvoni and eradicated      ESRD (end stage renal disease) (H)     on dialysis     GI bleed     recurrent    Ex-smoker of > 50 years duration      Glaucoma      Hyperlipidemia      Hypertension goal BP (blood pressure) < 140/80        Past Surgical History  Past Surgical History:   Procedure  Laterality Date     CAPSULE/PILL CAM ENDOSCOPY N/A 3/27/2019    Procedure: Capsule/pill cam endoscopy;  Surgeon: Yosvany Ram MD;  Location: [facility]     COLONOSCOPY N/A 9/4/2015    Procedure: COMBINED COLONOSCOPY, SINGLE OR MULTIPLE BIOPSY/POLYPECTOMY BY BIOPSY;  Surgeon: Rupesh Lopez MD;  Location: [facility]     COLONOSCOPY N/A 9/19/2018    Procedure: COLONOSCOPY;  enteroscopy small bowel  COLONOSCOPY;  Surgeon: Ankit Baires MD;  [facility]     ESOPHAGOSCOPY, GASTROSCOPY, DUODENOSCOPY (EGD), COMBINED N/A 12/18/2014    Procedure: COMBINED ESOPHAGOSCOPY, GASTROSCOPY, DUODENOSCOPY (EGD);  Surgeon: Betsy Carvajal MD;  [facility]     ESOPHAGOSCOPY, GASTROSCOPY, DUODENOSCOPY (EGD), COMBINED N/A 4/25/2015    Procedure: COMBINED ESOPHAGOSCOPY, GASTROSCOPY, DUODENOSCOPY (EGD);  Surgeon: Yosvany Ram MD;  Location:  GI     ESOPHAGOSCOPY, GASTROSCOPY, DUODENOSCOPY (EGD), COMBINED N/A 5/5/2015    Procedure: COMBINED ESOPHAGOSCOPY, GASTROSCOPY, DUODENOSCOPY (EGD);  Surgeon: Mariano Mistry MD;  Location: [facility]     HC CAPSULE ENDOSCOPY N/A 9/30/2015    Procedure: CAPSULE/PILL CAM ENDOSCOPY;  Surgeon: Pan Dhaliwal MD;  Location: [facility] VASCULAR SURGERY PROCEDURE UNLIST       HYSTERECTOMY  1980    KYREE     LUMPECTOMY BREAST         Hx of Blood transfusions/reactions: yes with hive reaction     Hx of abnormal bleeding or anti-platelet use: no    Menstrual history: No LMP recorded. Patient has had a hysterectomy.    Steroid use in the last year: steroid injection for back pain    Personal or FH with difficulty with Anesthesia:  no    Prior to Admission Medications  Current Outpatient Medications   Medication Sig Dispense Refill     atorvastatin (LIPITOR) 20 MG tablet Take 1 tablet (20 mg) by mouth daily (Patient taking differently: Take 20 mg by mouth daily ) 90 tablet 3     Calcium Acetate, Phos Binder, 667 MG CAPS TAKE 2 CAPSULE BY MOUTH THREE TIMES A DAY WITH MEALS  8      gabapentin (NEURONTIN) 300 MG capsule Take 1 capsule (300 mg) by mouth 3 times daily (Patient taking differently: Take 300 mg by mouth as needed ) 60 capsule 0     losartan (COZAAR) 50 MG tablet Take 50 mg by mouth every morning        Multiple Vitamins-Minerals (PRORENAL + D) TABS Take 1 tablet by mouth daily At 2:00 pm       octreotide (SANDOSTATIN LAR) 20 MG injection Inject 20 mg into the muscle every 28 days       pantoprazole (PROTONIX) 20 MG EC tablet Take 1 tablet (20 mg) by mouth 2 times daily 30 - 60 minutes before a meal. 180 tablet 1     sertraline (ZOLOFT) 50 MG tablet Take 2 tablets (100 mg) by mouth daily (Patient taking differently: Take 100 mg by mouth daily ) 180 tablet 3     order for DME Cane 1 Units 0     order for DME Equipment being ordered: 4 wheel walker with seat. 1 Device 0     polyethylene glycol 3350 POWD Take 17 g by mouth daily (Patient taking differently: Take 17 g by mouth daily as needed ) 1530 g 3     traMADol (ULTRAM) 50 MG tablet Take 1-2 tablets ( mg) by mouth every 6 hours as needed for breakthrough pain or severe pain (Patient taking differently: Take 100 mg by mouth every 6 hours as needed for breakthrough pain or severe pain ) 15 tablet 0       Allergies  Allergies   Allergen Reactions     Abacavir Itching     Lisinopril Cough     Diovan Hct Itching     severe     Dust Mites      Hydrochlorothiazide Itching     Severe       No Clinical Screening - See Comments      History of blood transfusion reactions and pre-treats with Benadryl.      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     Sexual activity: Not Currently     Other Topics Concern     Parent/sibling w/ CABG, MI or angioplasty before 65F 55M? No       Family History  Family History   Problem Relation Age of Onset     Diabetes Mother      Alzheimer Disease Mother      Substance Abuse Son      Cancer Sister      Soft Tissue Cancer Sister      Breast Cancer Sister      Hyperlipidemia Daughter      Alcoholism Brother      Spine Problems Sister      Anesthesia Evaluation  Pt has had prior anesthetic. Type: General and MAC     ROS/MED HX    ENT/Pulmonary:     (+)CHELSEA risk factors hypertension, age  tobacco use, Past use Quit 30 years after 45 years of smoking 2 packs/day  , . .    Neurologic:     (+)neuropathy - feet,     Cardiovascular:     (+) Dyslipidemia, hypertension  Previous cardiac testing Echodate:3/26/19results:LVH; EF 50-55%, REDUCED LV FUNCTION WITH HYPOKINESIS DISTAL ANTERIOR WALL SEGMENT  Stress Testdate:4/16/19 results:No ischemia, no infarct, EF 56%  ECG reviewed date:3/26/19 results:SINUS RHYTHM WITH OCCASIONAL VENTRICULAR PREMATURE COMPLEXES  NONSPECIFIC T-WAVE ABNORMALITY  Compared to ECG 08/11/2018 21:17:49  Ventricular premature complex(es) now present  T-wave abnormality now present date: results:          METS/Exercise Tolerance:  1 - Eating, dressing   Hematologic:     (+) Anemia, History of Transfusion previous transfusion reaction - hives, uses benadryl pre transfusion, Other Hematologic Disorder-GI AVMs withintermittent GIbleeds - HGB followed by dialysis center      Musculoskeletal:   (+)  other musculoskeletal- LOW BACK PAIN TO LEGS      GI/Hepatic:     (+) hepatitis type C,       Renal/Genitourinary:     (+) chronic renal disease, type: ESRD, Pt requires dialysis, type: Peritoneal dialysis, Pt has no history of transplant,   "     Endo:  - neg endo ROS       Psychiatric:  - neg psychiatric ROS       Infectious Disease:  - neg infectious disease ROS       Malignancy:      - no malignancy   Other:    (+) No chance of pregnancy H/O Chronic Pain,no other significant disability          The complete review of systems is negative other than noted in the HPI or here.   Temp: 98.4  F (36.9  C) Temp src: Oral BP: 161/71 Pulse: 73   Resp: 16 SpO2: 97 %         126 lbs 0 oz  5' 4\"   Body mass index is 21.63 kg/m .       Physical Exam  Constitutional: Awake, alert, cooperative, no apparent distress, and appears stated age.  Eyes: Pupils equal, round and reactive to light, extra ocular muscles intact, sclera clear, conjunctiva normal.  HENT: Normocephalic, oral pharynx with moist mucus membranes, full dentures.  No goiter appreciated.   Respiratory: Clear to auscultation bilaterally, no crackles or wheezing.  Cardiovascular: Regular rate and rhythm, normal S1 and S2, and no murmur noted.  Carotids +2, no bruits. No LE edema. Palpable pulses to radial pulses and feel weak DP pulses.    GI: Normal bowel sounds, soft, non-distended, non-tender, no masses palpated, no hepatosplenomegaly.  Surgical scars: central and well healed abdominal scar  Lymph/Hematologic: No cervical lymphadenopathy and no supraclavicular lymphadenopathy.  Genitourinary:  deferred  Skin: Warm and dry.  No rashes at anticipated surgical site.   Musculoskeletal: Full ROM of neck. There is no redness, warmth, or swelling of the joints.   Neurologic: Awake, alert, oriented to name, place and time. Cranial nerves II-XII are grossly intact.   Neuropsychiatric: Calm, cooperative. Normal affect.     Labs: (personally reviewed)  Lab Results   Component Value Date    WBC 8.5 03/28/2019     Lab Results   Component Value Date    RBC 2.78 03/28/2019     Lab Results   Component Value Date    HGB 8.9 03/29/2019     Lab Results   Component Value Date    HCT 29.5 03/28/2019     Lab Results "   Component Value Date     03/28/2019     Lab Results   Component Value Date    MCH 31.7 03/28/2019     Lab Results   Component Value Date    MCHC 29.8 03/28/2019     Lab Results   Component Value Date    RDW 15.7 03/28/2019     Lab Results   Component Value Date     03/28/2019     Last Comprehensive Metabolic Panel:  Sodium   Date Value Ref Range Status   03/28/2019 136 133 - 144 mmol/L Final     Potassium   Date Value Ref Range Status   03/28/2019 3.8 3.4 - 5.3 mmol/L Final     Chloride   Date Value Ref Range Status   03/28/2019 99 94 - 109 mmol/L Final     Carbon Dioxide   Date Value Ref Range Status   03/28/2019 28 20 - 32 mmol/L Final     Anion Gap   Date Value Ref Range Status   03/28/2019 10 3 - 14 mmol/L Final     Glucose   Date Value Ref Range Status   03/28/2019 102 (H) 70 - 99 mg/dL Final     Urea Nitrogen   Date Value Ref Range Status   03/28/2019 14 7 - 30 mg/dL Final     Creatinine   Date Value Ref Range Status   03/28/2019 4.30 (H) 0.52 - 1.04 mg/dL Final     GFR Estimate   Date Value Ref Range Status   03/28/2019 9 (L) >60 mL/min/[1.73_m2] Final     Comment:     Non  GFR Calc  Starting 12/18/2018, serum creatinine based estimated GFR (eGFR) will be   calculated using the Chronic Kidney Disease Epidemiology Collaboration   (CKD-EPI) equation.       Calcium   Date Value Ref Range Status   03/28/2019 8.7 8.5 - 10.1 mg/dL Final       EKG: 3/2019: SINUS RHYTHM WITH OCCASIONAL VENTRICULAR PREMATURE COMPLEXES  NONSPECIFIC T-WAVE ABNORMALITY  Compared to ECG 08/11/2018 21:17:49  Ventricular premature complex(es) now present  T-wave abnormality now present.   Cardiac echo: LVH; Hypokinesis distal anterior wall segment; EF 50-55%, slightly reduced LV function.  Stress test: 4/16/19: No ischmeia or infarct. NL study.  Outside records reviewed from: Care Everywhere    ASSESSMENT and PLAN  Rachele Martínez is a 79 year old female scheduled to undergo  L3-4 decompression, L5-S1  decompression and bilateral foraminotomy, with Dr. Perry Beckham, on 7/25/19, in diagnosis and treatment of lumbar stenosis with neurogenic claudication.     Pre-operative considerations include:  1.) CV: Functional status independent. Exercise tolerance < 4 METS due to back pain. Cardiac risks: smoking hx, HTN (losartan) -not optimal control but recent increase in dosage; HLD (lipitor), demand ischemia NSTEMI March 2019 due to fluid overload managed with HD. ECHO 3/2/19 LVH;  Borderline (EF 50-55%) reduced LV function;  Hypokinesis of distal anterior wall segment new compared to 3/8/18; No significant valve dysfunction.  Stress Lexiscan: 4/16/19 No ischemia or infarct.  RCRI = 1 reflecting 0.9% risk of major adverse cardiac event  Recommend carotid US for bilateral bruits on exam ( may be radiation of AVF, but would check imaging).    2.) Pulmonary: Ex-smoker for > 50 years quit age 70. No formally diagnosed COPD or pulmonary meds. Acute hypoxia related to fluid overload (ESRD) in March 2019, managed with HD.  CHELSEA risk is 2/8 = low risk    3.) Heme: Acute (recurrent AVM bleeds)  on chronic (ESRD) anemia with last HGB in this chart in [place] and @ 8.8. On aranesp and IV venofer, PPI. Blood transfusion x 3 last two 2016 and 2018. Followed by [facility]. Get most recent HGB from [facility] in [facility] and screen  VTE risk elevated due to age    4.) GI: Recurrent GI bleed as above. Several episodes of AVM clipping or cauterization. Hepatitis C treated with Harvoni. Compensated liver cirrhosis, on Octreotide: platelets 290,000. INR 1.38  T & S as above  PONV score = 2 (2 or > antiemetic prophylaxis recommended)      Anesthesia: No records in this ER-pt and daughter relate no prior problems for regional, MAC or GA.   Pt discussed with Dr. Angeles. Recommendation is to change location of surgery to [place] where dialysis services would be available should there be a problem that necessitates  it.    ADDENDUM: Carotid artery US results: IMPRESSION:     1. RIGHT ICA: 50-70% diameter stenosis by grayscale imaging and  sonographic velocity criteria.     2. LEFT ICA:  Less than 50% diameter stenosis by grayscale imaging and  sonographic velocity criteria.       Discussed carotid US results with Dr. North who recommends OK to proceed with surgery as planned. No further diagnostic evaluation is needed.   I will notify pt of results and educate on risks and follow up instructions for carotid results.    SHANNON Borden  Preoperative Assessment Center  [facility]  Clinic and Surgery Center  Phone: [phone]  Fax: [phone]

## 2019-07-02 NOTE — PATIENT INSTRUCTIONS
Preparing for Your Surgery      Name:  Rachele Martínez   MRN:  [id number]   :  1940   Today's Date:  2019     Arriving for surgery:  To be called with information  Please come to:  To be called with information        What can I eat or drink?  -  You may have solid food or milk products until 8 hours prior to your surgery.  -  You may have water, apple juice or 7up/Sprite until 2 hours prior to your surgery.    Which medicines can I take?  Stop Aspirin, vitamins and supplements one week prior to surgery.  Hold Ibuprofen for 24 hours and/or Naproxen for 48 hours prior to surgery.  -  Please take these medications the day of surgery:  Ultram or tylenol if needed; take all other scheduled medications normally taken in the morning.    How do I prepare myself?  -  Take two showers: one the night before surgery; and one the morning of surgery.         Use Scrubcare or Hibiclens to wash from neck down.  You may use your own     shampoo and conditioner. No other hair products.   -  Do NOT use lotion, powder, deodorant, or antiperspirant the day of your surgery.  -  Do NOT wear any makeup, fingernail polish or jewelry.  - Do not bring your own medications to the hospital, except for inhalers and eye   drops.  -  Bring your ID and insurance card.    -If you are scheduled to go home the Same Day as surgery you must have a responsible adult as a  and to stay with you overnight the first 24 hours after surgery.     Questions or Concerns:  -If you have questions or concerns regarding the day of surgery, please call [phone].   -For questions after surgery please call your surgeons office.

## 2019-07-02 NOTE — NURSING NOTE
Reason For Visit:   Chief Complaint   Patient presents with     RECHECK     PAC review and discuss surgery        There were no vitals taken for this visit.         Manpreet Harkins ATC

## 2019-07-02 NOTE — LETTER
7/2/2019       RE: Rachele Martínez  [address]  [facility] [address]     Dear Colleague,    Thank you for referring your patient, Rachele Martínez, to the [facility]. Please see a copy of my visit note below.    Spine Surgery Return Clinic Visit      Chief Complaint:   RECHECK (PAC review and discuss surgery )      Interval HPI:  Symptom Profile Including: location of symptoms, onset, severity, exacerbating/alleviating factors, previous treatments:        Rachele Martínez is a 79 year old female w/ PMH ESRD on dialysis, bleeding AVM, hepatitis C w/ liver cirrhosis, who presents w/ chief complaint of radicular RLE pain and claudicatory buttock pain.     Endorses pain without weakness of the right lower extremity from the buttock progressing down the lateral thigh and leg in an approximately L5 distribution pattern, but also diffusely in the thighs.  She finds that this pain does limit her ability to perform housework and walk for long distances.  She finds it bothersome but not severe.     Conservative treatments to date have included gabapentin, marijuana, a lumbar support belt, and and L5-S1 interlaminar epidural steroid injections.  These injections did provide 1 week of 100% pain relief.    I last saw her a month ago and at that time we discussed a possible lumbar decompression procedure.  She did have foraminal stenosis but I felt with her multiple medical comorbidities and age that it would be best to avoid a fusion type surgery.  She returns today for final imaging review and surgical decision making.          Past Medical History:     Past Medical History:   Diagnosis Date     Anemia      Anxiety and depression      Arthritis      AVM (arteriovenous malformation)      Chronic hepatitis C with cirrhosis (H)      Clotting disorder (H)      ESRD (end stage renal disease) (H)     on dialysis     GI bleed     recurrent      Glaucoma      Hyperlipidemia      Hypertension goal BP (blood pressure) < 140/80             Past Surgical History:     Past Surgical History:   Procedure Laterality Date     CAPSULE/PILL CAM ENDOSCOPY N/A 3/27/2019    Procedure: Capsule/pill cam endoscopy;  Surgeon: Yosvany Ram MD;  [facility]     COLONOSCOPY N/A 2015    Procedure: COMBINED COLONOSCOPY, SINGLE OR MULTIPLE BIOPSY/POLYPECTOMY BY BIOPSY;  Surgeon: Rupesh Lopez MD;  [facility]     COLONOSCOPY N/A 2018    Procedure: COLONOSCOPY;  enteroscopy small bowel  COLONOSCOPY;  Surgeon: Ankit Baires MD;  Location:  [facility]     ESOPHAGOSCOPY, GASTROSCOPY, DUODENOSCOPY (EGD), COMBINED N/A 2014    Procedure: COMBINED ESOPHAGOSCOPY, GASTROSCOPY, DUODENOSCOPY (EGD);  Surgeon: Betsy Carvajal MD;  Location:  [facility]     ESOPHAGOSCOPY, GASTROSCOPY, DUODENOSCOPY (EGD), COMBINED N/A 2015    Procedure: COMBINED ESOPHAGOSCOPY, GASTROSCOPY, DUODENOSCOPY (EGD);  Surgeon: Yosvany Ram MD;  [facility]     ESOPHAGOSCOPY, GASTROSCOPY, DUODENOSCOPY (EGD), COMBINED N/A 2015    Procedure: COMBINED ESOPHAGOSCOPY, GASTROSCOPY, DUODENOSCOPY (EGD);  Surgeon: Mariano Mistry MD;  Location: [facility]     HC CAPSULE ENDOSCOPY N/A 2015    Procedure: CAPSULE/PILL CAM ENDOSCOPY;  Surgeon: Pan Dhaliwal MD;  Location: [facility] VASCULAR SURGERY PROCEDURE UNLIST       HYSTERECTOMY      KYREE     LUMPECTOMY BREAST              Social History:     Social History     Tobacco Use     Smoking status: Former Smoker     Packs/day: 2.00     Years: 45.00     Pack years: 90.00     Types: Cigarettes     Start date: 1953     Last attempt to quit: 2002     Years since quittin.6     Smokeless tobacco: Never Used   Substance Use Topics     Alcohol use: No            Family History:     Family History   Problem Relation Age of Onset     Diabetes Mother      Alzheimer Disease Mother      Substance Abuse Son       Cancer Sister      Soft Tissue Cancer Sister      Breast Cancer Sister      Hyperlipidemia Daughter      Alcoholism Brother      Spine Problems Sister             Allergies:     Allergies   Allergen Reactions     Abacavir Itching     Lisinopril Cough     Diovan Hct Itching     severe     Dust Mites      Hydrochlorothiazide Itching     Severe       No Clinical Screening - See Comments      History of blood transfusion reactions and pre-treats with Benadryl.      Oxycodone-Acetaminophen      Sulfa Drugs Hives     Valsartan Itching     Spironolactone Nausea            Medications:     Current Outpatient Medications   Medication     atorvastatin (LIPITOR) 20 MG tablet     Calcium Acetate, Phos Binder, 667 MG CAPS     gabapentin (NEURONTIN) 300 MG capsule     losartan (COZAAR) 50 MG tablet     Multiple Vitamins-Minerals (PRORENAL + D) TABS     octreotide (SANDOSTATIN LAR) 20 MG injection     order for DME     order for DME     pantoprazole (PROTONIX) 20 MG EC tablet     polyethylene glycol 3350 POWD     sertraline (ZOLOFT) 50 MG tablet     traMADol (ULTRAM) 50 MG tablet     No current facility-administered medications for this visit.              Review of Systems:   A focused musculoskeletal and neurologic ROS was performed with pertinent positives and negatives noted in the HPI.  Additional systems were also reviewed and are documented at the bottom of the note.         Physical Exam:   Vitals: There were no vitals taken for this visit.  Musculoskeletal, Neurologic, and Spine:         Lumbar Spine:                          Appearance - No gross stepoffs or deformities                          Motor -                           L2-3: Hip flexion 5/5 R and 5/5 L strength                           L3/4:  Knee extension R 5/5 and L 5/5 strength                          L4/5:  Foot dorsiflexion R 5/5 L 5/5 and                                       EHL dorsiflexion R 5/5 L 5/5 strength                          S1:   Plantarflexion/Peroneal Muscles  R 5/5 and L 5/5 strength                          Sensation: intact to light touch L3-S1 distribution BLE                             Neurologic:                            REFLEXES Left Right   Patella 1+ 1+   Ankle jerk 1+ 1+   Clonus 0 beats 0 beats      Hip Exam:  No pain with hip log roll and no tenderness over the greater trochanters.          Imaging:   We independently reviewed radiographs at this clinic visit. The results were discussed with the patient. Findings include:     6/11/2019 standing AP lateral flexion and extension L-spine x-rays from today demonstrate multilevel degenerative disc disease with some loss of lumbar lordosis.  On flex ex views there is no sign of listhesis.  Calcified aorta noted.     L-spine MRI 3/3/2019 reviewed and demonstrates multilevel lumbar spondylosis with cervical canal stenosis at L3-L4.  Also with right-sided neuroforaminal stenosis at L5-S1.     Assessment and Plan:     79 year old female with R L5-S1 neuroforaminal stenosis and L5/S1 radiculopathy as well as L3-4 central stenosis.     We again discussed options today.  At this point she is failed conservative management with therapy injections and oral medications.  If she did wish to proceed with surgery would recommend an L3-4 decompression as well as L5-S1 decompression with foraminotomies to address the foraminal stenosis there.  Because of her significant multiple medical comorbidities I have recommended against the larger fusion surgery at this time.  She understands there is a risk of residual foraminal stenosis and incomplete symptom relief as a result of this but the benefit is that by doing a smaller surgery there is less morbidity risk and less risk around the time of the operation and she understands the pros and cons of this approach.    Risks of this surgery include risk of infection, risk of dural tear resulting in CSF leak which might result in headaches, or possible  need for lumbar drain, or possible revision surgery in the setting of a persistent leak. Risk of seroma or hematoma requiring revision surgery. Possible nerve root injury resulting in numbness weakness or paralysis into the leg. Possible radiculitis which could result in similar symptoms or could result in significant neurogenic type pain into the leg. Risk of incomplete decompression which might require revision surgery in the future.  Risk of pars fracture or postoperative instability requiring conversion to a fusion in the future. Risk of adjacent segment problems requiring surgery in the future. Risk of incomplete relief of symptoms possibly requiring revision surgery in the future. There is a risk of blood clots in the legs or the lungs.  Furthermore, although rare, there are risks of major vessel or major organ injury from the surgery, and risks of the anesthetic including stroke heart attack and death.    We talked about the postoperative recovery period, the need for activity avoidance and the risk of pars fracture if she does too much too soon.  We also spent some time talking about incomplete symptom relief as noted above.  She understands and she would like to proceed.    Respectfully,  Perry Beckham MD  Spine Surgery  [facility]

## 2019-07-03 NOTE — PROGRESS NOTES
Spine Surgery Return Clinic Visit      Chief Complaint:   RECHECK (PAC review and discuss surgery )      Interval HPI:  Symptom Profile Including: location of symptoms, onset, severity, exacerbating/alleviating factors, previous treatments:        Rachele Martínez is a 79 year old female w/ PMH ESRD on dialysis, bleeding AVM, hepatitis C w/ liver cirrhosis, who presents w/ chief complaint of radicular RLE pain and claudicatory buttock pain.     Endorses pain without weakness of the right lower extremity from the buttock progressing down the lateral thigh and leg in an approximately L5 distribution pattern, but also diffusely in the thighs.  She finds that this pain does limit her ability to perform housework and walk for long distances.  She finds it bothersome but not severe.     Conservative treatments to date have included gabapentin, marijuana, a lumbar support belt, and and L5-S1 interlaminar epidural steroid injections.  These injections did provide 1 week of 100% pain relief.    I last saw her a month ago and at that time we discussed a possible lumbar decompression procedure.  She did have foraminal stenosis but I felt with her multiple medical comorbidities and age that it would be best to avoid a fusion type surgery.  She returns today for final imaging review and surgical decision making.          Past Medical History:     Past Medical History:   Diagnosis Date     Anemia      Anxiety and depression      Arthritis      AVM (arteriovenous malformation)      Chronic hepatitis C with cirrhosis (H)      Clotting disorder (H)      ESRD (end stage renal disease) (H)     on dialysis     GI bleed     recurrent     Glaucoma      Hyperlipidemia      Hypertension goal BP (blood pressure) < 140/80             Past Surgical History:     Past Surgical History:   Procedure Laterality Date     CAPSULE/PILL CAM ENDOSCOPY N/A 3/27/2019    Procedure: Capsule/pill cam endoscopy;  Surgeon: Yosvany Ram MD;  [facility]     COLONOSCOPY N/A 2015    Procedure: COMBINED COLONOSCOPY, SINGLE OR MULTIPLE BIOPSY/POLYPECTOMY BY BIOPSY;  Surgeon: Rupesh Lopez MD;  Location:  GI     COLONOSCOPY N/A 2018    Procedure: COLONOSCOPY;  enteroscopy small bowel  COLONOSCOPY;  Surgeon: Ankit Baires MD;  Location:  GI     ESOPHAGOSCOPY, GASTROSCOPY, DUODENOSCOPY (EGD), COMBINED N/A 2014    Procedure: COMBINED ESOPHAGOSCOPY, GASTROSCOPY, DUODENOSCOPY (EGD);  Surgeon: Betsy Carvajal MD;  Location:  [facility]     ESOPHAGOSCOPY, GASTROSCOPY, DUODENOSCOPY (EGD), COMBINED N/A 2015    Procedure: COMBINED ESOPHAGOSCOPY, GASTROSCOPY, DUODENOSCOPY (EGD);  Surgeon: Yosvany Ram MD;  Location:  [facility]     ESOPHAGOSCOPY, GASTROSCOPY, DUODENOSCOPY (EGD), COMBINED N/A 2015    Procedure: COMBINED ESOPHAGOSCOPY, GASTROSCOPY, DUODENOSCOPY (EGD);  Surgeon: Mariano Mistry MD;  Location: [facility] CAPSULE ENDOSCOPY N/A 2015    Procedure: CAPSULE/PILL CAM ENDOSCOPY;  Surgeon: Pan Dhaliwal MD;  Location: [facility] VASCULAR SURGERY PROCEDURE UNLIST       HYSTERECTOMY  1980    KYREE     LUMPECTOMY BREAST              Social History:     Social History     Tobacco Use     Smoking status: Former Smoker     Packs/day: 2.00     Years: 45.00     Pack years: 90.00     Types: Cigarettes     Start date: 1953     Last attempt to quit: 2002     Years since quittin.6     Smokeless tobacco: Never Used   Substance Use Topics     Alcohol use: No            Family History:     Family History   Problem Relation Age of Onset     Diabetes Mother      Alzheimer Disease Mother      Substance Abuse Son      Cancer Sister      Soft Tissue Cancer Sister      Breast Cancer Sister      Hyperlipidemia Daughter      Alcoholism Brother      Spine Problems Sister             Allergies:     Allergies   Allergen Reactions     Abacavir Itching     Lisinopril Cough     Diovan Hct Itching     severe     Dust Mites       Hydrochlorothiazide Itching     Severe       No Clinical Screening - See Comments      History of blood transfusion reactions and pre-treats with Benadryl.      Oxycodone-Acetaminophen      Sulfa Drugs Hives     Valsartan Itching     Spironolactone Nausea            Medications:     Current Outpatient Medications   Medication     atorvastatin (LIPITOR) 20 MG tablet     Calcium Acetate, Phos Binder, 667 MG CAPS     gabapentin (NEURONTIN) 300 MG capsule     losartan (COZAAR) 50 MG tablet     Multiple Vitamins-Minerals (PRORENAL + D) TABS     octreotide (SANDOSTATIN LAR) 20 MG injection     order for DME     order for DME     pantoprazole (PROTONIX) 20 MG EC tablet     polyethylene glycol 3350 POWD     sertraline (ZOLOFT) 50 MG tablet     traMADol (ULTRAM) 50 MG tablet     No current facility-administered medications for this visit.              Review of Systems:   A focused musculoskeletal and neurologic ROS was performed with pertinent positives and negatives noted in the HPI.  Additional systems were also reviewed and are documented at the bottom of the note.         Physical Exam:   Vitals: There were no vitals taken for this visit.  Musculoskeletal, Neurologic, and Spine:         Lumbar Spine:                          Appearance - No gross stepoffs or deformities                          Motor -                           L2-3: Hip flexion 5/5 R and 5/5 L strength                           L3/4:  Knee extension R 5/5 and L 5/5 strength                          L4/5:  Foot dorsiflexion R 5/5 L 5/5 and                                       EHL dorsiflexion R 5/5 L 5/5 strength                          S1:  Plantarflexion/Peroneal Muscles  R 5/5 and L 5/5 strength                          Sensation: intact to light touch L3-S1 distribution BLE                             Neurologic:                            REFLEXES Left Right   Patella 1+ 1+   Ankle jerk 1+ 1+   Clonus 0 beats 0 beats      Hip Exam:  No pain  with hip log roll and no tenderness over the greater trochanters.              Imaging:   We independently reviewed radiographs at this clinic visit. The results were discussed with the patient. Findings include:     6/11/2019 standing AP lateral flexion and extension L-spine x-rays from today demonstrate multilevel degenerative disc disease with some loss of lumbar lordosis.  On flex ex views there is no sign of listhesis.  Calcified aorta noted.     L-spine MRI 3/3/2019 reviewed and demonstrates multilevel lumbar spondylosis with cervical canal stenosis at L3-L4.  Also with right-sided neuroforaminal stenosis at L5-S1.       Assessment and Plan:     79 year old female with R L5-S1 neuroforaminal stenosis and L5/S1 radiculopathy as well as L3-4 central stenosis.     We again discussed options today.  At this point she is failed conservative management with therapy injections and oral medications.  If she did wish to proceed with surgery would recommend an L3-4 decompression as well as L5-S1 decompression with foraminotomies to address the foraminal stenosis there.  Because of her significant multiple medical comorbidities I have recommended against the larger fusion surgery at this time.  She understands there is a risk of residual foraminal stenosis and incomplete symptom relief as a result of this but the benefit is that by doing a smaller surgery there is less morbidity risk and less risk around the time of the operation and she understands the pros and cons of this approach.    Risks of this surgery include risk of infection, risk of dural tear resulting in CSF leak which might result in headaches, or possible need for lumbar drain, or possible revision surgery in the setting of a persistent leak. Risk of seroma or hematoma requiring revision surgery. Possible nerve root injury resulting in numbness weakness or paralysis into the leg. Possible radiculitis which could result in similar symptoms or could result  in significant neurogenic type pain into the leg. Risk of incomplete decompression which might require revision surgery in the future.  Risk of pars fracture or postoperative instability requiring conversion to a fusion in the future. Risk of adjacent segment problems requiring surgery in the future. Risk of incomplete relief of symptoms possibly requiring revision surgery in the future. There is a risk of blood clots in the legs or the lungs.  Furthermore, although rare, there are risks of major vessel or major organ injury from the surgery, and risks of the anesthetic including stroke heart attack and death.    We talked about the postoperative recovery period, the need for activity avoidance and the risk of pars fracture if she does too much too soon.  We also spent some time talking about incomplete symptom relief as noted above.  She understands and she would like to proceed.      Respectfully,  Perry Beckham MD  Spine Surgery  [facility]

## 2019-07-09 ENCOUNTER — INFUSION THERAPY VISIT (OUTPATIENT)
Dept: INFUSION THERAPY | Facility: CLINIC | Age: 79
End: 2019-07-09
Payer: MEDICARE

## 2019-07-09 ENCOUNTER — ANCILLARY PROCEDURE (OUTPATIENT)
Dept: ULTRASOUND IMAGING | Facility: CLINIC | Age: 79
End: 2019-07-09
Attending: PHYSICIAN ASSISTANT
Payer: MEDICARE

## 2019-07-09 ENCOUNTER — THERAPY VISIT (OUTPATIENT)
Dept: PHYSICAL THERAPY | Facility: CLINIC | Age: 79
End: 2019-07-09
Payer: MEDICARE

## 2019-07-09 VITALS
RESPIRATION RATE: 18 BRPM | OXYGEN SATURATION: 98 % | HEART RATE: 67 BPM | BODY MASS INDEX: 21.75 KG/M2 | DIASTOLIC BLOOD PRESSURE: 71 MMHG | HEIGHT: 64 IN | TEMPERATURE: 98.6 F | SYSTOLIC BLOOD PRESSURE: 161 MMHG | WEIGHT: 127.4 LBS

## 2019-07-09 DIAGNOSIS — K31.811 ANGIODYSPLASIA OF STOMACH AND DUODENUM WITH HEMORRHAGE: Primary | ICD-10-CM

## 2019-07-09 DIAGNOSIS — D50.0 IRON DEFICIENCY ANEMIA DUE TO CHRONIC BLOOD LOSS: ICD-10-CM

## 2019-07-09 DIAGNOSIS — N18.6 ESRD (END STAGE RENAL DISEASE) ON DIALYSIS (H): ICD-10-CM

## 2019-07-09 DIAGNOSIS — K55.20 AVM (ARTERIOVENOUS MALFORMATION) OF SMALL BOWEL, ACQUIRED: ICD-10-CM

## 2019-07-09 DIAGNOSIS — M54.41 BILATERAL LOW BACK PAIN WITH RIGHT-SIDED SCIATICA: ICD-10-CM

## 2019-07-09 DIAGNOSIS — R09.89 BILATERAL CAROTID BRUITS: ICD-10-CM

## 2019-07-09 DIAGNOSIS — K74.60 CIRRHOSIS OF LIVER WITHOUT ASCITES, UNSPECIFIED HEPATIC CIRRHOSIS TYPE (H): ICD-10-CM

## 2019-07-09 DIAGNOSIS — Z99.2 ESRD (END STAGE RENAL DISEASE) ON DIALYSIS (H): ICD-10-CM

## 2019-07-09 DIAGNOSIS — Z01.818 PRE-OPERATIVE GENERAL PHYSICAL EXAMINATION: ICD-10-CM

## 2019-07-09 PROCEDURE — 93880 EXTRACRANIAL BILAT STUDY: CPT | Performed by: RADIOLOGY

## 2019-07-09 PROCEDURE — 99207 ZZC NO CHARGE NURSE ONLY: CPT

## 2019-07-09 PROCEDURE — 97140 MANUAL THERAPY 1/> REGIONS: CPT | Mod: GP | Performed by: PHYSICAL THERAPIST

## 2019-07-09 PROCEDURE — 97110 THERAPEUTIC EXERCISES: CPT | Mod: GP | Performed by: PHYSICAL THERAPIST

## 2019-07-09 PROCEDURE — 96372 THER/PROPH/DIAG INJ SC/IM: CPT | Performed by: PHYSICIAN ASSISTANT

## 2019-07-09 RX ADMIN — OCTREOTIDE ACETATE 20 MG: KIT INTRAMUSCULAR at 11:44

## 2019-07-09 ASSESSMENT — MIFFLIN-ST. JEOR: SCORE: 1043.13

## 2019-07-09 ASSESSMENT — PAIN SCALES - GENERAL: PAINLEVEL: NO PAIN (0)

## 2019-07-09 NOTE — PROGRESS NOTES
SUBJECTIVE  Subjective changes as noted by pt:  Patient reports that she has improved by 15-20% since starting therapy.      Current pain level: 5/10     Changes in function:  Yes (See Goal flowsheet attached for changes in current functional level)     Adverse reaction to treatment or activity:  None    OBJECTIVE  Changes in objective findings:  Standing LROM: fingertips to ankles and slight ache in lower back and B HS tightness and better with reps and BB mod loss and increase lower back and leg Sx.         ASSESSMENT  Rachele continues to require intervention to meet STG and LTG's: PT  Patient is progressing as expected.  Response to therapy has shown an improvement in  pain level  Progress made towards STG/LTG?  Yes (See Goal flowsheet attached for updates on achievement of STG and LTG)    PLAN  Current treatment program is being advanced to more complex exercises.  The following procedures have been added:  manual therapy    PTA/ATC plan:  N/A    Please refer to the daily flowsheet for treatment today, total treatment time and time spent performing 1:1 timed codes.

## 2019-07-09 NOTE — PROGRESS NOTES
Infusion Nursing Note:  Rachele Martínez presents today for octreotide injection.      Patient seen by provider today: No   present during visit today: Not Applicable.    Note: Assessment performed by Kayleigh ANDREW RN prior to injection today.    Intravenous Access:  No Intravenous access/labs at this visit.    Treatment Conditions:  Lab Results   Component Value Date    HGB 8.9 03/29/2019     Lab Results   Component Value Date    WBC 8.5 03/28/2019      Lab Results   Component Value Date    ANEU 5.1 03/27/2019     Lab Results   Component Value Date     03/28/2019      Results reviewed, labs MET treatment parameters, ok to proceed with treatment.      Post Infusion Assessment:  Patient tolerated injection without incident.  Site patent and intact, free from redness, edema or discomfort.       Discharge Plan:   Patient discharged in stable condition accompanied by: daughter.  Departure Mode: Ambulatory.  Patient verbalized confirmation of next octreotide injection scheduled for 8/6/19.    Vilma Ariza LPN

## 2019-07-11 ENCOUNTER — THERAPY VISIT (OUTPATIENT)
Dept: PHYSICAL THERAPY | Facility: CLINIC | Age: 79
End: 2019-07-11
Payer: MEDICARE

## 2019-07-11 DIAGNOSIS — M54.41 BILATERAL LOW BACK PAIN WITH RIGHT-SIDED SCIATICA: ICD-10-CM

## 2019-07-11 PROCEDURE — 97110 THERAPEUTIC EXERCISES: CPT | Mod: GP | Performed by: PHYSICAL THERAPIST

## 2019-07-11 PROCEDURE — 97140 MANUAL THERAPY 1/> REGIONS: CPT | Mod: GP | Performed by: PHYSICAL THERAPIST

## 2019-07-11 NOTE — PROGRESS NOTES
Subjective:  HPI  Oswestry Score: 46.67 %                 Objective:  System    Physical Exam    General     ROS    Assessment/Plan:    DISCHARGE REPORT    Progress reporting period is from 6-25-19 to 7-11-19.       SUBJECTIVE  Subjective changes noted by patient:  Patient reports that she does find the exercises helpful and she has improved about 20% since starting therapy. She will be having surgery to her lower back on 7-25-19, so will not be back for therapy prior to the surgery.      Current Pain level: 2/10.     Previous pain level was  6/10  .   Changes in function:  Yes (See Goal flowsheet attached for changes in current functional level)  Adverse reaction to treatment or activity: None    OBJECTIVE  Changes noted in objective findings:  Standing LROM: FB with fingertips to ankles and no pain and better with RFIS and BB nod loss and increase and worse and peripheralizing with ALESSIO.        ASSESSMENT/PLAN  Updated problem list and treatment plan: Diagnosis 1:  Lumbar stenosis  Pain -  manual therapy, self management, education, directional preference exercise and home program  Decreased ROM/flexibility - manual therapy, therapeutic exercise and home program  Decreased joint mobility - manual therapy, therapeutic exercise and home program  Impaired muscle performance - neuro re-education and home program  Decreased function - therapeutic activities and home program  Impaired posture - neuro re-education and home program  STG/LTGs have been met or progress has been made towards goals:  Yes (See Goal flow sheet completed today.)  Assessment of Progress: The patient's condition has potential to improve.  Self Management Plans:  Patient has been instructed in a home treatment program.  Patient  has been instructed in self management of symptoms.    Rachele continues to require the following intervention to meet STG and LTG's:  Patient to have surgery, so no further treatment at this time.      Recommendations:  This patient is ready to be discharged from therapy and continue their home treatment program.    Please refer to the daily flowsheet for treatment today, total treatment time and time spent performing 1:1 timed codes.

## 2019-07-12 NOTE — TELEPHONE ENCOUNTER
FUTURE VISIT INFORMATION      SURGERY INFORMATION:    Date: 2019     Location: [facility]    Surgeon:  Dr. Perry Beckham     Anesthesia Type:  General    RECORDS REQUESTED FROM:       Primary Care Provider: [facility]    Pertinent Medical History:  Essential Hypertension     Most recent EKG+ Tracin2019 (Internal)    Most recent ECHO:  3/26/2019 (Internal)    Most recent Cardiac Stress Test:  3/28/2019 (Internal)

## 2019-07-16 ENCOUNTER — PRE VISIT (OUTPATIENT)
Dept: SURGERY | Facility: CLINIC | Age: 79
End: 2019-07-16

## 2019-07-17 ENCOUNTER — PRE VISIT (OUTPATIENT)
Dept: INTERNAL MEDICINE | Facility: CLINIC | Age: 79
End: 2019-07-17

## 2019-07-17 DIAGNOSIS — E78.5 HYPERLIPIDEMIA WITH TARGET LDL LESS THAN 130: Primary | Chronic | ICD-10-CM

## 2019-07-17 NOTE — TELEPHONE ENCOUNTER
[facility]  Dept: [phone]     Patient: Rachele Martínez   : 1940  MRN: [id number]  Encounter: 19       Pre Visit Assessment    Health Maintenance Due   Topic Date Due     URINE DRUG SCREEN  1940     ZOSTER IMMUNIZATION (1 of 2) 1991     LIPID  02/10/2015     MEDICARE ANNUAL WELLNESS VISIT  2018     MICROALBUMIN  2018     Chart Reviewed for Labs needed/Health Maintenance: Yes   If labs needed, orders placed:  Yes Fasting Lipid and Urine micro albumin,   Lab appointment scheduled:  Yes    Notes:  Patient confirmed they will attend appointment:  Yes  Appointment rescheduled?  N/A      Jaqui Benedict at 11:45 AM on 2019

## 2019-07-18 ENCOUNTER — TELEPHONE (OUTPATIENT)
Dept: ORTHOPEDICS | Facility: CLINIC | Age: 79
End: 2019-07-18

## 2019-07-18 NOTE — TELEPHONE ENCOUNTER
Spoke with patient daughter to confirm that patients surgery with Dr. Beckham has been canceled for 7/25 due to patient not passing her PAC appointment.     Patient daughter, who is patient's main person of contact, would like a call back explaining more in detail as to why the surgery had to be canceled. She can be reached at [phone]

## 2019-07-18 NOTE — TELEPHONE ENCOUNTER
Pt's daughter Charla was called back by [facility].  It was explained that the anesthesia team did not find that she was safe to have the surgery on the [facility].   Pt's daughter stated she was relieved to hear, as she was concerned about her mother having surgery at this time.  Lucia Mcgarry RN

## 2019-07-18 NOTE — TELEPHONE ENCOUNTER
I called and informed her patient that our anesthesia team feels she is not safe for surgery on the [facility].  As a result, we will need to cancel her surgery next week, since it is scheduled at that facility, and then look for a time to reschedule her.    Perry Beckham MD

## 2019-07-21 PROBLEM — M54.41 BILATERAL LOW BACK PAIN WITH RIGHT-SIDED SCIATICA: Status: RESOLVED | Noted: 2019-06-30 | Resolved: 2019-07-21

## 2019-07-22 NOTE — TELEPHONE ENCOUNTER
RECORDS RECEIVED FROM: carotid stenosis    DATE RECEIVED: 7.24.19   NOTES STATUS DETAILS   OFFICE NOTE from referring provider Internal 7.2.19 Amie Gorman   OFFICE NOTE from other specialist Internal    OPERATIVE REPORT N/A    MEDICATION LIST Internal    PERTINENT LABS Internal/Care Everywhere    CTA (CT ANGIOGRAPHY) Internal    CT N/A    MRI N/A    ULTRASOUND Internal

## 2019-07-24 ENCOUNTER — PRE VISIT (OUTPATIENT)
Dept: VASCULAR SURGERY | Facility: CLINIC | Age: 79
End: 2019-07-24

## 2019-08-01 ENCOUNTER — OFFICE VISIT (OUTPATIENT)
Dept: VASCULAR SURGERY | Facility: CLINIC | Age: 79
End: 2019-08-01
Payer: MEDICARE

## 2019-08-01 VITALS — DIASTOLIC BLOOD PRESSURE: 70 MMHG | HEART RATE: 71 BPM | OXYGEN SATURATION: 97 % | SYSTOLIC BLOOD PRESSURE: 146 MMHG

## 2019-08-01 DIAGNOSIS — K74.60 CIRRHOSIS OF LIVER WITHOUT ASCITES, UNSPECIFIED HEPATIC CIRRHOSIS TYPE (H): ICD-10-CM

## 2019-08-01 DIAGNOSIS — N18.6 ESRD (END STAGE RENAL DISEASE) ON DIALYSIS (H): ICD-10-CM

## 2019-08-01 DIAGNOSIS — K92.2 GASTROINTESTINAL HEMORRHAGE, UNSPECIFIED GASTROINTESTINAL HEMORRHAGE TYPE: ICD-10-CM

## 2019-08-01 DIAGNOSIS — Z99.2 ESRD (END STAGE RENAL DISEASE) ON DIALYSIS (H): ICD-10-CM

## 2019-08-01 DIAGNOSIS — E78.5 HYPERLIPIDEMIA WITH TARGET LDL LESS THAN 130: Chronic | ICD-10-CM

## 2019-08-01 DIAGNOSIS — I10 HYPERTENSION GOAL BP (BLOOD PRESSURE) < 140/80: Chronic | ICD-10-CM

## 2019-08-01 DIAGNOSIS — I65.21 CAROTID ARTERY STENOSIS, ASYMPTOMATIC, RIGHT: Primary | ICD-10-CM

## 2019-08-01 DIAGNOSIS — M48.062 SPINAL STENOSIS OF LUMBAR REGION WITH NEUROGENIC CLAUDICATION: ICD-10-CM

## 2019-08-01 DIAGNOSIS — Z86.19 HISTORY OF HEPATITIS C: ICD-10-CM

## 2019-08-01 ASSESSMENT — ENCOUNTER SYMPTOMS
JOINT SWELLING: 0
POOR WOUND HEALING: 1
SKIN CHANGES: 0
NECK PAIN: 0
STIFFNESS: 0
MUSCLE WEAKNESS: 0
BRUISES/BLEEDS EASILY: 0
MYALGIAS: 1
ARTHRALGIAS: 0
MUSCLE CRAMPS: 0
NAIL CHANGES: 1
SWOLLEN GLANDS: 0
BACK PAIN: 1

## 2019-08-01 ASSESSMENT — PAIN SCALES - GENERAL: PAINLEVEL: NO PAIN (0)

## 2019-08-01 NOTE — LETTER
8/1/2019       RE: Rachele Martínez  [address]  [place] [address]     Dear Colleague,    Thank you for referring your patient, Rachele Martínez, to the [facility] VASCULAR [facility]INIC at [facility]. Please see a copy of my visit note below.    VASCULAR SURGERY CLINIC NOTE    CC:  Chief Complaint   Patient presents with     Consult     Rachele, is being seen today for a consult carotid stenosis, feeling ok, discussion about condition, as reported by patient.       HPI:    This is the first vascular surgery clinic visit for this 78 YO female with ESRD (HD), HTN, HLD, cirrhosis, and hepatitis C who presents for evaluation of carotid artery disease. This was found on carotid duplex that was ordered when a right carotid bruit was auscultated during a preop exam before spine surgery. The patient denies amaurosis, hemiplegia/sensory loss, exp[ressive aphasia or other neurologic symptoms. Of note, she has been considered too high a risk for operation and is currently undergoing physical therapy for degenerative disc disease.    Review Of Systems  Skin: negative  Eyes: negative  Ears/Nose/Throat: negative  Respiratory: No shortness of breath, dyspnea on exertion, cough, or hemoptysis  Cardiovascular: negative  Gastrointestinal: history of recurrent upper GI bleed due to AVMs in stomach. Recently received 2 U PRBCs for anemia  Genitourinary: ESRD on HD for three years  Musculoskeletal: neurogenic claudication  Neurologic: negative  Psychiatric: negative  Hematologic/Lymphatic/Immunologic: negative  Endocrine: negative    Past Medical History:   Diagnosis Date     Anemia      Anxiety and depression      Arthritis      AVM (arteriovenous malformation)      Chronic hepatitis C with cirrhosis (H)      Clotting disorder (H)      ESRD (end stage renal disease) (H)     on dialysis     GI bleed     recurrent     Glaucoma      Hyperlipidemia      Hypertension goal BP (blood  pressure) < 140/80        Current Outpatient Medications:      atorvastatin (LIPITOR) 20 MG tablet, Take 1 tablet (20 mg) by mouth daily (Patient taking differently: Take 20 mg by mouth daily ), Disp: 90 tablet, Rfl: 3     Calcium Acetate, Phos Binder, 667 MG CAPS, TAKE 2 CAPSULE BY MOUTH THREE TIMES A DAY WITH MEALS, Disp: , Rfl: 8     gabapentin (NEURONTIN) 300 MG capsule, Take 1 capsule (300 mg) by mouth 3 times daily (Patient taking differently: Take 300 mg by mouth as needed ), Disp: 60 capsule, Rfl: 0     losartan (COZAAR) 50 MG tablet, Take 50 mg by mouth every morning , Disp: , Rfl:      Multiple Vitamins-Minerals (PRORENAL + D) TABS, Take 1 tablet by mouth daily At 2:00 pm, Disp: , Rfl:      octreotide (SANDOSTATIN LAR) 20 MG injection, Inject 20 mg into the muscle every 28 days, Disp: , Rfl:      order for DME, Cane, Disp: 1 Units, Rfl: 0     order for DME, Equipment being ordered: 4 wheel walker with seat., Disp: 1 Device, Rfl: 0     pantoprazole (PROTONIX) 20 MG EC tablet, Take 1 tablet (20 mg) by mouth 2 times daily 30 - 60 minutes before a meal., Disp: 180 tablet, Rfl: 1     polyethylene glycol 3350 POWD, Take 17 g by mouth daily (Patient taking differently: Take 17 g by mouth daily as needed ), Disp: 1530 g, Rfl: 3     sertraline (ZOLOFT) 50 MG tablet, Take 2 tablets (100 mg) by mouth daily (Patient taking differently: Take 100 mg by mouth daily ), Disp: 180 tablet, Rfl: 3     traMADol (ULTRAM) 50 MG tablet, Take 1-2 tablets ( mg) by mouth every 6 hours as needed for breakthrough pain or severe pain (Patient not taking: Reported on 7/9/2019), Disp: 15 tablet, Rfl: 0    Allergies   Allergen Reactions     Abacavir Itching     Lisinopril Cough     Diovan Hct Itching     severe     Dust Mites      Hydrochlorothiazide Itching     Severe       No Clinical Screening - See Comments      History of blood transfusion reactions and pre-treats with Benadryl.      Oxycodone-Acetaminophen      Sulfa Drugs  Hives     Valsartan Itching     Spironolactone Nausea       PHYSICAL EXAM:  Vital signs:  There were no vitals taken for this visit.  General: Well developed, well nourished female in no acute distress  HEENT: ALYSSA, EOMI, sclera non-icteric  Neck: Normal contours. Right carotid midsystolic bruit auscultated. No bruit on left. Carotids with normal upstrokes.  Heart: S1, S2 normal. No murmurs, gallops, or rub. No heaves or thrills  Lungs: Clear to auscultation.    Upper Extremities: Normal contours. Palpable right upper arm graft with strong thrill. Right radial pulse 1+, left radial pulse 2+  GI: Abdomen soft, nontender. No rebound, guarding or mass. No organomegaly.  Lower Extremities: Normal contours. No edema. Femoral; pulses 2+ and bilaterally equal. Left popliteal pulse palpable, not palpable on right. Left DP and PT pulses weak but present. Right pedal pulses not palpable.  Neurologic: CN II-XII grossly intact and symmetrical. Motor/sensory symmetrical and normal strength in all muscle group. DTRs brisk and symmetrical in patellar and achilles.     Image studies: Carotid duplex    RIGHT SIDE:      Plaque Morphology: Echogenic shadowing plaque causing 50-70% diameter narrowing.        Mid CCA: 78/14 cm/s     Distal CCA: 68/14 cm/s          External CA: 87/1 cm/s        Proximal ICA: 209/38 cm/s     Mid ICA: 185/31 cm/s     Distal ICA: 160/31 cm/s        Vertebral artery: Antegrade - 69/7 cm/s      ICA/CCA ratio: 2.7      Left side:      Plaque Morphology: Echogenic shadowing plaque causing less than 50%  diameter narrowing.         Proximal CCA: 82/16 cm/s     Mid CCA: 75/12 cm/s     Distal CCA: 83/18 cm/s        External CA: 256/0 cm/s        Proximal ICA: 106/15 cm/s     Mid ICA: 136/22 cm/s     Distal ICA: 131/27 cm/s        Vertebral artery: Antegrade - 75/15 cm/s     ICA/CCA ratio: 1.8     ASSESSMENT:  1. Asymptomatic right ICA stenosis measuring 50-79% diameter loss by Strandness criteria. No indication  for intervention at this time.  2. History of chronic GI bleeding. Aspirin contraindicated  3. Other risk factors cointrolled    PLAN:  Reassurance. RTC in 6 months with carotid duplex on arrival.    I spent a total of 30 minutes face-to-face with this patient, > 50% of which involved counseling.    WAQAR Green MD, FACS, RPVI  Professor of Surgery  Division of Vascular and Endovascular Surgery  [facility]  Cell: [phone]  [email or web address]

## 2019-08-01 NOTE — NURSING NOTE
Vascular Rooming Note     Rachele Martínez's goals for this visit include:   Chief Complaint   Patient presents with     Consult     Rachele, is being seen today for a consult carotid stenosis, feeling ok, discussion about condition, as reported by patient.     Priscilla Kennedy LPN

## 2019-08-01 NOTE — PROGRESS NOTES
VASCULAR SURGERY CLINIC NOTE    CC:  Chief Complaint   Patient presents with     Consult     Rachele, is being seen today for a consult carotid stenosis, feeling ok, discussion about condition, as reported by patient.       HPI:    This is the first vascular surgery clinic visit for this 78 YO female with ESRD (HD), HTN, HLD, cirrhosis, and hepatitis C who presents for evaluation of carotid artery disease. This was found on carotid duplex that was ordered when a right carotid bruit was auscultated during a preop exam before spine surgery. The patient denies amaurosis, hemiplegia/sensory loss, exp[ressive aphasia or other neurologic symptoms. Of note, she has been considered too high a risk for operation and is currently undergoing physical therapy for degenerative disc disease.    Review Of Systems  Skin: negative  Eyes: negative  Ears/Nose/Throat: negative  Respiratory: No shortness of breath, dyspnea on exertion, cough, or hemoptysis  Cardiovascular: negative  Gastrointestinal: history of recurrent upper GI bleed due to AVMs in stomach. Recently received 2 U PRBCs for anemia  Genitourinary: ESRD on HD for three years  Musculoskeletal: neurogenic claudication  Neurologic: negative  Psychiatric: negative  Hematologic/Lymphatic/Immunologic: negative  Endocrine: negative    Past Medical History:   Diagnosis Date     Anemia      Anxiety and depression      Arthritis      AVM (arteriovenous malformation)      Chronic hepatitis C with cirrhosis (H)      Clotting disorder (H)      ESRD (end stage renal disease) (H)     on dialysis     GI bleed     recurrent     Glaucoma      Hyperlipidemia      Hypertension goal BP (blood pressure) < 140/80          Current Outpatient Medications:      atorvastatin (LIPITOR) 20 MG tablet, Take 1 tablet (20 mg) by mouth daily (Patient taking differently: Take 20 mg by mouth daily ), Disp: 90 tablet, Rfl: 3     Calcium Acetate, Phos Binder, 667 MG CAPS, TAKE 2 CAPSULE BY MOUTH THREE TIMES A  DAY WITH MEALS, Disp: , Rfl: 8     gabapentin (NEURONTIN) 300 MG capsule, Take 1 capsule (300 mg) by mouth 3 times daily (Patient taking differently: Take 300 mg by mouth as needed ), Disp: 60 capsule, Rfl: 0     losartan (COZAAR) 50 MG tablet, Take 50 mg by mouth every morning , Disp: , Rfl:      Multiple Vitamins-Minerals (PRORENAL + D) TABS, Take 1 tablet by mouth daily At 2:00 pm, Disp: , Rfl:      octreotide (SANDOSTATIN LAR) 20 MG injection, Inject 20 mg into the muscle every 28 days, Disp: , Rfl:      order for DME, Cane, Disp: 1 Units, Rfl: 0     order for DME, Equipment being ordered: 4 wheel walker with seat., Disp: 1 Device, Rfl: 0     pantoprazole (PROTONIX) 20 MG EC tablet, Take 1 tablet (20 mg) by mouth 2 times daily 30 - 60 minutes before a meal., Disp: 180 tablet, Rfl: 1     polyethylene glycol 3350 POWD, Take 17 g by mouth daily (Patient taking differently: Take 17 g by mouth daily as needed ), Disp: 1530 g, Rfl: 3     sertraline (ZOLOFT) 50 MG tablet, Take 2 tablets (100 mg) by mouth daily (Patient taking differently: Take 100 mg by mouth daily ), Disp: 180 tablet, Rfl: 3     traMADol (ULTRAM) 50 MG tablet, Take 1-2 tablets ( mg) by mouth every 6 hours as needed for breakthrough pain or severe pain (Patient not taking: Reported on 7/9/2019), Disp: 15 tablet, Rfl: 0       Allergies   Allergen Reactions     Abacavir Itching     Lisinopril Cough     Diovan Hct Itching     severe     Dust Mites      Hydrochlorothiazide Itching     Severe       No Clinical Screening - See Comments      History of blood transfusion reactions and pre-treats with Benadryl.      Oxycodone-Acetaminophen      Sulfa Drugs Hives     Valsartan Itching     Spironolactone Nausea         PHYSICAL EXAM:  Vital signs:  There were no vitals taken for this visit.  General: Well developed, well nourished female in no acute distress  HEENT: ALYSSA, EOMI, sclera non-icteric  Neck: Normal contours. Right carotid midsystolic bruit  auscultated. No bruit on left. Carotids with normal upstrokes.  Heart: S1, S2 normal. No murmurs, gallops, or rub. No heaves or thrills  Lungs: Clear to auscultation.    Upper Extremities: Normal contours. Palpable right upper arm graft with strong thrill. Right radial pulse 1+, left radial pulse 2+  GI: Abdomen soft, nontender. No rebound, guarding or mass. No organomegaly.  Lower Extremities: Normal contours. No edema. Femoral; pulses 2+ and bilaterally equal. Left popliteal pulse palpable, not palpable on right. Left DP and PT pulses weak but present. Right pedal pulses not palpable.  Neurologic: CN II-XII grossly intact and symmetrical. Motor/sensory symmetrical and normal strength in all muscle group. DTRs brisk and symmetrical in patellar and achilles.     Image studies: Carotid duplex    RIGHT SIDE:      Plaque Morphology: Echogenic shadowing plaque causing 50-70% diameter narrowing.        Mid CCA: 78/14 cm/s     Distal CCA: 68/14 cm/s          External CA: 87/1 cm/s        Proximal ICA: 209/38 cm/s     Mid ICA: 185/31 cm/s     Distal ICA: 160/31 cm/s        Vertebral artery: Antegrade - 69/7 cm/s      ICA/CCA ratio: 2.7      Left side:      Plaque Morphology: Echogenic shadowing plaque causing less than 50%  diameter narrowing.         Proximal CCA: 82/16 cm/s     Mid CCA: 75/12 cm/s     Distal CCA: 83/18 cm/s        External CA: 256/0 cm/s        Proximal ICA: 106/15 cm/s     Mid ICA: 136/22 cm/s     Distal ICA: 131/27 cm/s        Vertebral artery: Antegrade - 75/15 cm/s     ICA/CCA ratio: 1.8     ASSESSMENT:  1. Asymptomatic right ICA stenosis measuring 50-79% diameter loss by Strandness criteria. No indication for intervention at this time.  2. History of chronic GI bleeding. Aspirin contraindicated  3. Other risk factors cointrolled    PLAN:  Reassurance. RTC in 6 months with carotid duplex on arrival.      I spent a total of 30 minutes face-to-face with this patient, > 50% of which involved  alli Green MD, FACS, RPVI  Professor of Surgery  Division of Vascular and Endovascular Surgery  [facility]  Cell: [phone]  [email or web address]  Answers for HPI/ROS submitted by the patient on 8/1/2019   General Symptoms: No  Skin Symptoms: Yes  HENT Symptoms: No  EYE SYMPTOMS: No  HEART SYMPTOMS: No  LUNG SYMPTOMS: No  INTESTINAL SYMPTOMS: No  URINARY SYMPTOMS: No  GYNECOLOGIC SYMPTOMS: No  BREAST SYMPTOMS: No  SKELETAL SYMPTOMS: Yes  BLOOD SYMPTOMS: Yes  NERVOUS SYSTEM SYMPTOMS: No  MENTAL HEALTH SYMPTOMS: No  Changes in hair: No  Changes in moles/birth marks: No  Itching: No  Rashes: No  Changes in nails: Yes  Acne: No  Hair in places you don't want it: No  Change in facial hair: No  Warts: No  Non-healing sores: Yes  Scarring: No  Flaking of skin: Yes  Color changes of hands/feet in cold : No  Sun sensitivity: No  Skin thickening: No  Back pain: Yes  Muscle aches: Yes  Neck pain: No  Swollen joints: No  Joint pain: No  Bone pain: No  Muscle cramps: No  Muscle weakness: No  Joint stiffness: No  Bone fracture: No  Anemia: Yes  Swollen glands: No  Easy bleeding or bruising: No  Edema or swelling: No

## 2019-08-06 ENCOUNTER — INFUSION THERAPY VISIT (OUTPATIENT)
Dept: INFUSION THERAPY | Facility: CLINIC | Age: 79
End: 2019-08-06
Payer: MEDICARE

## 2019-08-06 VITALS
HEART RATE: 66 BPM | TEMPERATURE: 97.9 F | BODY MASS INDEX: 22.01 KG/M2 | WEIGHT: 128.9 LBS | OXYGEN SATURATION: 97 % | RESPIRATION RATE: 16 BRPM | SYSTOLIC BLOOD PRESSURE: 162 MMHG | HEIGHT: 64 IN | DIASTOLIC BLOOD PRESSURE: 71 MMHG

## 2019-08-06 DIAGNOSIS — K55.20 AVM (ARTERIOVENOUS MALFORMATION) OF SMALL BOWEL, ACQUIRED: ICD-10-CM

## 2019-08-06 DIAGNOSIS — K74.60 CIRRHOSIS OF LIVER WITHOUT ASCITES, UNSPECIFIED HEPATIC CIRRHOSIS TYPE (H): ICD-10-CM

## 2019-08-06 DIAGNOSIS — D50.0 IRON DEFICIENCY ANEMIA DUE TO CHRONIC BLOOD LOSS: ICD-10-CM

## 2019-08-06 DIAGNOSIS — K31.811 ANGIODYSPLASIA OF STOMACH AND DUODENUM WITH HEMORRHAGE: Primary | ICD-10-CM

## 2019-08-06 DIAGNOSIS — N18.6 ESRD (END STAGE RENAL DISEASE) ON DIALYSIS (H): ICD-10-CM

## 2019-08-06 DIAGNOSIS — Z99.2 ESRD (END STAGE RENAL DISEASE) ON DIALYSIS (H): ICD-10-CM

## 2019-08-06 PROCEDURE — 99207 ZZC NO CHARGE NURSE ONLY: CPT

## 2019-08-06 PROCEDURE — 96372 THER/PROPH/DIAG INJ SC/IM: CPT | Performed by: NURSE PRACTITIONER

## 2019-08-06 RX ADMIN — OCTREOTIDE ACETATE 20 MG: KIT INTRAMUSCULAR at 11:54

## 2019-08-06 ASSESSMENT — PAIN SCALES - GENERAL: PAINLEVEL: NO PAIN (0)

## 2019-08-06 ASSESSMENT — MIFFLIN-ST. JEOR: SCORE: 1049.94

## 2019-08-06 NOTE — PROGRESS NOTES
Infusion Nursing Note:  Rachele LORRIE Martínez presents today for Sandostatin.    Patient seen by provider today: No   present during visit today: Not Applicable.    Note: Patient assessed by Carloz COSTELLO RN, prior to injection.    Intravenous Access:  No Intravenous access/labs at this visit.    Treatment Conditions:  Not Applicable.      Post Infusion Assessment:  Patient tolerated injection without incident.  Site patent and intact, free from redness, edema or discomfort.       Discharge Plan:   Patient and/or family verbalized understanding of discharge instructions and all questions answered.  Patient discharged in stable condition accompanied by: daughter.  Departure Mode: Ambulatory.    Lise Duarte LPN

## 2019-08-09 ENCOUNTER — OFFICE VISIT (OUTPATIENT)
Dept: INTERNAL MEDICINE | Facility: CLINIC | Age: 79
End: 2019-08-09
Payer: MEDICARE

## 2019-08-09 VITALS — OXYGEN SATURATION: 97 % | DIASTOLIC BLOOD PRESSURE: 70 MMHG | SYSTOLIC BLOOD PRESSURE: 153 MMHG | HEART RATE: 80 BPM

## 2019-08-09 DIAGNOSIS — E78.5 HYPERLIPIDEMIA WITH TARGET LDL LESS THAN 130: Chronic | ICD-10-CM

## 2019-08-09 DIAGNOSIS — M51.369 LUMBAR DEGENERATIVE DISC DISEASE: ICD-10-CM

## 2019-08-09 DIAGNOSIS — M54.41 CHRONIC BILATERAL LOW BACK PAIN WITH RIGHT-SIDED SCIATICA: ICD-10-CM

## 2019-08-09 DIAGNOSIS — G89.29 CHRONIC BILATERAL LOW BACK PAIN WITH RIGHT-SIDED SCIATICA: ICD-10-CM

## 2019-08-09 DIAGNOSIS — R41.89 COGNITIVE IMPAIRMENT: ICD-10-CM

## 2019-08-09 LAB
CHOLEST SERPL-MCNC: 116 MG/DL
CREAT UR-MCNC: 158 MG/DL
HDLC SERPL-MCNC: 59 MG/DL
LDLC SERPL CALC-MCNC: 41 MG/DL
MICROALBUMIN UR-MCNC: 849 MG/L
MICROALBUMIN/CREAT UR: 537.34 MG/G CR (ref 0–25)
NONHDLC SERPL-MCNC: 57 MG/DL
TRIGL SERPL-MCNC: 79 MG/DL

## 2019-08-09 RX ORDER — TRAMADOL HYDROCHLORIDE 50 MG/1
50-100 TABLET ORAL EVERY 6 HOURS PRN
Qty: 15 TABLET | Refills: 0 | Status: SHIPPED | OUTPATIENT
Start: 2019-08-09 | End: 2020-11-04

## 2019-08-09 RX ORDER — GABAPENTIN 300 MG/1
300 CAPSULE ORAL AT BEDTIME
Qty: 90 CAPSULE | Refills: 3 | Status: SHIPPED | OUTPATIENT
Start: 2019-08-09 | End: 2020-11-23

## 2019-08-09 ASSESSMENT — PAIN SCALES - GENERAL: PAINLEVEL: MODERATE PAIN (5)

## 2019-08-09 NOTE — PROGRESS NOTES
Chief Complaint   Rachele Martínez is a 79 year old female presents for   Chief Complaint   Patient presents with     Foot Problems     Pt is here to discuss a bump on left foot.      Medication Request     Pt needs refill on gabapentin.        History of Present Illness   She has overall been doing well.  A few episodes of bleeding since last visit.  Continues to attend HD regularly.  Continues on octreotide injections.    She is having increased sciatica pain.  She was given some opioids, and found using very sparingly to be helpful.  She had a grandson recently pass away from an overdose, so she is very cautious about opioids.    Medications and allergies were reviewed by me today.     Social History   History   Smoking Status     Former Smoker     Packs/day: 2.00     Years: 45.00     Types: Cigarettes     Start date: 1/1/1953     Quit date: 11/23/2002   Smokeless Tobacco     Never Used      PHQ-2 ( 1999 [employer]) 7/2/2019 8/21/2018   Q1: Little interest or pleasure in doing things 1 0   Q2: Feeling down, depressed or hopeless 0 0   PHQ-2 Score 1 0     No flowsheet data found.    Past Medical History   Patient Active Problem List   Diagnosis     Health Care Home     Cataract     Anxiety state     Insomnia     Hyperlipidemia with target LDL less than 130     History of hepatitis C     Iron deficiency anemia due to chronic blood loss     AVM (arteriovenous malformation) of small bowel, acquired     ESRD (end stage renal disease) on dialysis (H)     Cirrhosis of liver without ascites, unspecified hepatic cirrhosis type (H)     Tendency to fall     Hypertension goal BP (blood pressure) < 140/80     Diarrhea     Angiodysplasia of stomach and duodenum with hemorrhage     GI bleed     GIB (gastrointestinal bleeding)     Spinal stenosis of lumbar region with neurogenic claudication       Review of Systems   5 point ROS completed and negative except noted above, including Gen, CV, Resp, GI, MS    Physical Exam   BP (!)  153/70   Pulse 80   SpO2 97%   Gen: no distress, comfortable, pleasant   Eyes: anicteric, normal extra-ocular movements   Skin: no concerning lesions, no jaundice   Psychological: appropriate mood     Assessment & Plan      Lumbar degenerative disc disease  Chronic bilateral low back pain with right-sided sciatica  Will continue gabapentin and provider small script of ultram.  She will use this very sparingly.  Advised risks of this medication, including sleepiness, confusion and constipation  - gabapentin (NEURONTIN) 300 MG capsule  Dispense: 90 capsule; Refill: 3  - traMADol (ULTRAM) 50 MG tablet  Dispense: 15 tablet; Refill: 0    Hx AVMs:  - currently no bleeding.  She will continue ocretotide        RTC: Return in about 6 months (around 2/9/2020).    Agnieszka Rodriguez MD

## 2019-08-09 NOTE — PATIENT INSTRUCTIONS
[facility] Medication Refill Request Information:  * Please contact your pharmacy regarding ANY request for medication refills.  ** [facility] Prescription Fax = [phone]  * Please allow 3 business days for routine medication refills.  * Please allow 5 business days for controlled substance medication refills.     [facility] Test Result notification information:  *You will be notified with in 7-10 days of your appointment day regarding the results of your test.  If you are on MyChart you will be notified as soon as the provider has reviewed the results and signed off on them.    [facility]: [phone]

## 2019-08-12 RX ORDER — ATORVASTATIN CALCIUM 20 MG/1
20 TABLET, FILM COATED ORAL DAILY
Qty: 90 TABLET | Refills: 3 | Status: SHIPPED | OUTPATIENT
Start: 2019-08-12 | End: 2020-08-18

## 2019-08-30 PROBLEM — K92.2 GI BLEED: Status: RESOLVED | Noted: 2018-09-14 | Resolved: 2019-08-30

## 2019-08-30 PROBLEM — K92.2 GIB (GASTROINTESTINAL BLEEDING): Status: RESOLVED | Noted: 2018-09-18 | Resolved: 2019-08-30

## 2019-09-03 ENCOUNTER — INFUSION THERAPY VISIT (OUTPATIENT)
Dept: INFUSION THERAPY | Facility: CLINIC | Age: 79
End: 2019-09-03
Payer: MEDICARE

## 2019-09-03 VITALS
DIASTOLIC BLOOD PRESSURE: 69 MMHG | TEMPERATURE: 98.8 F | OXYGEN SATURATION: 97 % | BODY MASS INDEX: 21.99 KG/M2 | WEIGHT: 128.8 LBS | RESPIRATION RATE: 16 BRPM | HEART RATE: 70 BPM | SYSTOLIC BLOOD PRESSURE: 165 MMHG | HEIGHT: 64 IN

## 2019-09-03 DIAGNOSIS — N18.6 ESRD (END STAGE RENAL DISEASE) ON DIALYSIS (H): ICD-10-CM

## 2019-09-03 DIAGNOSIS — Z99.2 ESRD (END STAGE RENAL DISEASE) ON DIALYSIS (H): ICD-10-CM

## 2019-09-03 DIAGNOSIS — D50.0 IRON DEFICIENCY ANEMIA DUE TO CHRONIC BLOOD LOSS: ICD-10-CM

## 2019-09-03 DIAGNOSIS — K74.60 CIRRHOSIS OF LIVER WITHOUT ASCITES, UNSPECIFIED HEPATIC CIRRHOSIS TYPE (H): ICD-10-CM

## 2019-09-03 DIAGNOSIS — K31.811 ANGIODYSPLASIA OF STOMACH AND DUODENUM WITH HEMORRHAGE: Primary | ICD-10-CM

## 2019-09-03 DIAGNOSIS — K55.20 AVM (ARTERIOVENOUS MALFORMATION) OF SMALL BOWEL, ACQUIRED: ICD-10-CM

## 2019-09-03 PROCEDURE — 99207 ZZC NO CHARGE NURSE ONLY: CPT

## 2019-09-03 PROCEDURE — 96372 THER/PROPH/DIAG INJ SC/IM: CPT | Performed by: NURSE PRACTITIONER

## 2019-09-03 RX ADMIN — OCTREOTIDE ACETATE 20 MG: KIT INTRAMUSCULAR at 12:04

## 2019-09-03 ASSESSMENT — PAIN SCALES - GENERAL: PAINLEVEL: NO PAIN (0)

## 2019-09-03 ASSESSMENT — MIFFLIN-ST. JEOR: SCORE: 1049.48

## 2019-09-03 NOTE — PROGRESS NOTES
Infusion Nursing Note:  Rachele Martínez presents today for Sandostatin.    Patient seen by provider today: No   present during visit today: Not Applicable.    Note: Patient assessed by LEESA Pinon, prior to injection.    Intravenous Access:  No Intravenous access/labs at this visit.    Treatment Conditions:  Not Applicable.      Post Infusion Assessment:  Patient tolerated injection without incident.  Site patent and intact, free from redness, edema or discomfort.       Discharge Plan:   Patient and/or family verbalized understanding of discharge instructions and all questions answered.  Patient discharged in stable condition accompanied by: daughter.  Departure Mode: Ambulatory.    Lise Duarte LPN

## 2019-10-01 ENCOUNTER — INFUSION THERAPY VISIT (OUTPATIENT)
Dept: INFUSION THERAPY | Facility: CLINIC | Age: 79
End: 2019-10-01
Payer: MEDICARE

## 2019-10-01 ENCOUNTER — PATIENT OUTREACH (OUTPATIENT)
Dept: GASTROENTEROLOGY | Facility: CLINIC | Age: 79
End: 2019-10-01

## 2019-10-01 VITALS
HEIGHT: 64 IN | TEMPERATURE: 98.4 F | BODY MASS INDEX: 22.3 KG/M2 | HEART RATE: 69 BPM | SYSTOLIC BLOOD PRESSURE: 175 MMHG | DIASTOLIC BLOOD PRESSURE: 79 MMHG | OXYGEN SATURATION: 100 % | RESPIRATION RATE: 16 BRPM | WEIGHT: 130.6 LBS

## 2019-10-01 DIAGNOSIS — D50.0 IRON DEFICIENCY ANEMIA DUE TO CHRONIC BLOOD LOSS: ICD-10-CM

## 2019-10-01 DIAGNOSIS — K55.20 AVM (ARTERIOVENOUS MALFORMATION) OF SMALL BOWEL, ACQUIRED: ICD-10-CM

## 2019-10-01 DIAGNOSIS — K31.811 ANGIODYSPLASIA OF STOMACH AND DUODENUM WITH HEMORRHAGE: ICD-10-CM

## 2019-10-01 DIAGNOSIS — R19.7 DIARRHEA, UNSPECIFIED TYPE: Primary | ICD-10-CM

## 2019-10-01 PROCEDURE — 99207 ZZC NO CHARGE NURSE ONLY: CPT

## 2019-10-01 PROCEDURE — 96372 THER/PROPH/DIAG INJ SC/IM: CPT | Performed by: NURSE PRACTITIONER

## 2019-10-01 RX ADMIN — OCTREOTIDE ACETATE 20 MG: KIT INTRAMUSCULAR at 11:47

## 2019-10-01 ASSESSMENT — PAIN SCALES - GENERAL: PAINLEVEL: NO PAIN (0)

## 2019-10-01 ASSESSMENT — MIFFLIN-ST. JEOR: SCORE: 1057.65

## 2019-10-01 NOTE — PROGRESS NOTES
"Infusion Nursing Note:  Rachele Martínez presents today for Sandostatin injection.    Patient seen by provider today: No   present during visit today: Not Applicable.    Note: Assessment performed by Betsy KWON RN prior to injection today. Patient reports having a \"sore butt\" for a few days following previous injection 1 month ago. Denies any other symptoms/concerns.    Intravenous Access:  No Intravenous access/labs at this visit.    Treatment Conditions:  Not Applicable.      Post Infusion Assessment:  Patient tolerated injection without incident.  Site patent and intact, free from redness, edema or discomfort.       Discharge Plan:   Discharge instructions reviewed with: Patient.  Patient and/or family verbalized understanding of discharge instructions and all questions answered.  Patient discharged in stable condition accompanied by: daughter.  Departure Mode: Ambulatory.  Patient for follow up for next injection on 10/29/19 as scheduled.    Vilma Ariza LPN, RN                        "

## 2019-10-24 DIAGNOSIS — K74.60 CIRRHOSIS OF LIVER WITHOUT ASCITES, UNSPECIFIED HEPATIC CIRRHOSIS TYPE (H): Primary | ICD-10-CM

## 2019-10-28 ENCOUNTER — PATIENT OUTREACH (OUTPATIENT)
Dept: GASTROENTEROLOGY | Facility: CLINIC | Age: 79
End: 2019-10-28

## 2019-10-28 NOTE — PROGRESS NOTES
New therapy plan added for sandostatin 20 mg im         Christin do you mind renewing her therapy plan?

## 2019-10-29 ENCOUNTER — INFUSION THERAPY VISIT (OUTPATIENT)
Dept: INFUSION THERAPY | Facility: CLINIC | Age: 79
End: 2019-10-29
Payer: MEDICARE

## 2019-10-29 VITALS
WEIGHT: 130.8 LBS | TEMPERATURE: 97.2 F | BODY MASS INDEX: 22.44 KG/M2 | SYSTOLIC BLOOD PRESSURE: 112 MMHG | DIASTOLIC BLOOD PRESSURE: 62 MMHG | HEART RATE: 70 BPM | OXYGEN SATURATION: 94 %

## 2019-10-29 DIAGNOSIS — K55.20 AVM (ARTERIOVENOUS MALFORMATION) OF SMALL BOWEL, ACQUIRED: ICD-10-CM

## 2019-10-29 DIAGNOSIS — R19.7 DIARRHEA, UNSPECIFIED TYPE: Primary | ICD-10-CM

## 2019-10-29 DIAGNOSIS — K31.811 ANGIODYSPLASIA OF STOMACH AND DUODENUM WITH HEMORRHAGE: ICD-10-CM

## 2019-10-29 DIAGNOSIS — D50.0 IRON DEFICIENCY ANEMIA DUE TO CHRONIC BLOOD LOSS: ICD-10-CM

## 2019-10-29 PROCEDURE — 96372 THER/PROPH/DIAG INJ SC/IM: CPT | Performed by: NURSE PRACTITIONER

## 2019-10-29 PROCEDURE — 99207 ZZC NO CHARGE NURSE ONLY: CPT

## 2019-10-29 RX ADMIN — OCTREOTIDE ACETATE 20 MG: KIT INTRAMUSCULAR at 11:55

## 2019-10-29 ASSESSMENT — PAIN SCALES - GENERAL: PAINLEVEL: NO PAIN (0)

## 2019-10-29 NOTE — PROGRESS NOTES
Infusion Nursing Note:  Rachele Martínez presents today for Sandostatin.    Patient seen by provider today: No   present during visit today: Not Applicable.    Note: Pt c/o dizziness and numbness/tingling in BLE (toes mainly).  See flow sheet for assessment.    Intravenous Access:  No Intravenous access/labs at this visit.    Treatment Conditions:  Not Applicable.      Post Infusion Assessment:  Patient tolerated injection without incident.  Site patent and intact, free from redness, edema or discomfort.       Discharge Plan:   Patient discharged in stable condition accompanied by: daughter.  Departure Mode: Ambulatory.  Pt will RTC 11/26/19 for Sandostatin.    Josias Hicks RN

## 2019-11-04 DIAGNOSIS — K92.2 GASTROINTESTINAL HEMORRHAGE, UNSPECIFIED GASTROINTESTINAL HEMORRHAGE TYPE: ICD-10-CM

## 2019-11-05 ENCOUNTER — DOCUMENTATION ONLY (OUTPATIENT)
Dept: CARE COORDINATION | Facility: CLINIC | Age: 79
End: 2019-11-05

## 2019-11-05 RX ORDER — PANTOPRAZOLE SODIUM 20 MG/1
20 TABLET, DELAYED RELEASE ORAL 2 TIMES DAILY
Qty: 180 TABLET | Refills: 2 | Status: SHIPPED | OUTPATIENT
Start: 2019-11-05 | End: 2020-08-14

## 2019-11-05 NOTE — TELEPHONE ENCOUNTER
Called and spoke with patient's daughter for scheduling follow up appointment with PCP. Appointment schedule for 12/27/19 at 3:30 PM confirmed by patient's daughter.

## 2019-11-05 NOTE — TELEPHONE ENCOUNTER
Community Health Worker called the patient for Clinic Care Coordination outreach follow up on goals and action steps.  Spoke to Gunner    Discussed the following  Approved for SSDI    Gunner states that he hasn't heard back yet, he was told he just had to wait for a response.    Confirmed with Gunner that he did have his interview for SSDI in August and did receive help from the disability specialists to complete his application.    He states that the application was sent in August.    I informed him that it can take a couple months, since applications are backed up.    Gunner gave me the number for the Disability [employer]ists [phone] or [phone].    I suggested for us to try calling them by next outreach if he still hasn't heard anything.    Patient agreed to this plan.    Patient reports  Gunner is scheduled to see Dr. Brown on Nov. 8th.  He stated that he would like to get some blood work done and to see if there's anything he can do to prevent him from going on dialysis because it's made him and his family depressed.  His kidneys are currently functioning at 17% right now.    ______________________  Next Outreach: 11/27/19  Planned Outreach Frequency: Monthly  Preferred Phone Number: [phone]   [place]  needed    Last Assessment Date: 03/04/19  Care Plan Completion Date: 03/04/19  ______________________  Goals       Psychosocial (pt-stated)      1. Goal Statement: I want to get SSDI.  Measure of Success: Be approved for SSDI and certified disabled.  Supportive Steps to Achieve: Continue to apply for SSDI, SMRT assessment, appeals if appropriate.  Barriers: Unclear if Pt is eligible as he has been denied SSDI X2. Pt's primary language is not english.  Strengths: Support system of family and care team. Motivated to apply and appeal for services if necessary.  Date to Achieve By: 10.30.2019  Patient expressed understanding of goal: Pt reports understanding and denies any additional questions or concerns  Last Clinic Visit: 8/9/19 with recommended 6 month followup, clinic notified to schedule follow up   at this times. [facility] engaged in [employer] communication during encounter.    As of today's date 10/28/2019 goal is met at 26 - 50%.   Goal Status:  Ongoing  Gunner states that he hasn't heard from the county yet.    As of today's date 9/18/2019 goal is met at 26 - 50%.   Goal Status:  Ongoing  Application was received by Replaced by [facility], but states that they're waiting on some additional documents.  Has a worker who's helped him with these documents and sent in.

## 2019-11-12 DIAGNOSIS — K74.60 CIRRHOSIS OF LIVER WITHOUT ASCITES, UNSPECIFIED HEPATIC CIRRHOSIS TYPE (H): Primary | ICD-10-CM

## 2019-11-18 DIAGNOSIS — L29.9 ITCHING: ICD-10-CM

## 2019-11-21 ENCOUNTER — OFFICE VISIT (OUTPATIENT)
Dept: GASTROENTEROLOGY | Facility: CLINIC | Age: 79
End: 2019-11-21
Attending: PHYSICIAN ASSISTANT
Payer: MEDICARE

## 2019-11-21 ENCOUNTER — ANCILLARY PROCEDURE (OUTPATIENT)
Dept: ULTRASOUND IMAGING | Facility: CLINIC | Age: 79
End: 2019-11-21
Attending: PHYSICIAN ASSISTANT
Payer: MEDICARE

## 2019-11-21 VITALS
HEART RATE: 70 BPM | SYSTOLIC BLOOD PRESSURE: 159 MMHG | BODY MASS INDEX: 22.92 KG/M2 | DIASTOLIC BLOOD PRESSURE: 71 MMHG | WEIGHT: 133.6 LBS | OXYGEN SATURATION: 99 % | TEMPERATURE: 98 F

## 2019-11-21 DIAGNOSIS — K74.60 CIRRHOSIS OF LIVER WITHOUT ASCITES, UNSPECIFIED HEPATIC CIRRHOSIS TYPE (H): ICD-10-CM

## 2019-11-21 DIAGNOSIS — K74.60 CIRRHOSIS OF LIVER WITHOUT ASCITES, UNSPECIFIED HEPATIC CIRRHOSIS TYPE (H): Primary | ICD-10-CM

## 2019-11-21 LAB
ALBUMIN SERPL-MCNC: 3.8 G/DL (ref 3.4–5)
ALP SERPL-CCNC: 146 U/L (ref 40–150)
ALT SERPL W P-5'-P-CCNC: 46 U/L (ref 0–50)
ANION GAP SERPL CALCULATED.3IONS-SCNC: 8 MMOL/L (ref 3–14)
AST SERPL W P-5'-P-CCNC: 55 U/L (ref 0–45)
BILIRUB DIRECT SERPL-MCNC: 0.2 MG/DL (ref 0–0.2)
BILIRUB SERPL-MCNC: 0.6 MG/DL (ref 0.2–1.3)
BUN SERPL-MCNC: 27 MG/DL (ref 7–30)
CALCIUM SERPL-MCNC: 8.7 MG/DL (ref 8.5–10.1)
CHLORIDE SERPL-SCNC: 103 MMOL/L (ref 94–109)
CO2 SERPL-SCNC: 28 MMOL/L (ref 20–32)
CREAT SERPL-MCNC: 6.39 MG/DL (ref 0.52–1.04)
ERYTHROCYTE [DISTWIDTH] IN BLOOD BY AUTOMATED COUNT: 18.1 % (ref 10–15)
GFR SERPL CREATININE-BSD FRML MDRD: 6 ML/MIN/{1.73_M2}
GLUCOSE SERPL-MCNC: 74 MG/DL (ref 70–99)
HCT VFR BLD AUTO: 36.7 % (ref 35–47)
HGB BLD-MCNC: 11.1 G/DL (ref 11.7–15.7)
INR PPP: 1.22 (ref 0.86–1.14)
MCH RBC QN AUTO: 28.1 PG (ref 26.5–33)
MCHC RBC AUTO-ENTMCNC: 30.2 G/DL (ref 31.5–36.5)
MCV RBC AUTO: 93 FL (ref 78–100)
PLATELET # BLD AUTO: 180 10E9/L (ref 150–450)
POTASSIUM SERPL-SCNC: 3.7 MMOL/L (ref 3.4–5.3)
PROT SERPL-MCNC: 8.2 G/DL (ref 6.8–8.8)
RBC # BLD AUTO: 3.95 10E12/L (ref 3.8–5.2)
SODIUM SERPL-SCNC: 140 MMOL/L (ref 133–144)
WBC # BLD AUTO: 6.7 10E9/L (ref 4–11)

## 2019-11-21 PROCEDURE — 80048 BASIC METABOLIC PNL TOTAL CA: CPT | Performed by: PHYSICIAN ASSISTANT

## 2019-11-21 PROCEDURE — 36415 COLL VENOUS BLD VENIPUNCTURE: CPT | Performed by: PHYSICIAN ASSISTANT

## 2019-11-21 PROCEDURE — 85610 PROTHROMBIN TIME: CPT | Performed by: PHYSICIAN ASSISTANT

## 2019-11-21 PROCEDURE — 80076 HEPATIC FUNCTION PANEL: CPT | Performed by: PHYSICIAN ASSISTANT

## 2019-11-21 PROCEDURE — 85027 COMPLETE CBC AUTOMATED: CPT | Performed by: PHYSICIAN ASSISTANT

## 2019-11-21 PROCEDURE — G0463 HOSPITAL OUTPT CLINIC VISIT: HCPCS | Mod: ZF

## 2019-11-21 ASSESSMENT — PAIN SCALES - GENERAL: PAINLEVEL: NO PAIN (0)

## 2019-11-21 NOTE — LETTER
11/21/2019      RE: Rachele Martínez  [address]  [facility] [address]       Hepatology Follow-up Clinic note  Rachele Martínez   Date of Birth 1940  Date of Service 11/21/2019    Reason for follow-up: Cirrhosis         Assessment/plan:   Rachele Martínez is a 79 year old female with hep C (achieved SVR) cirrhosis that is well compensated.  No radiologic signs of ascites or overt hepatic encephalopathy.  Her transaminases are mildly elevated.  She has mildly elevated INR and otherwise normal synthetic liver function.  We discussed monitoring for any signs of decompensation with lower extremity edema or ascites or confusion.  She is up-to-date with HCC and variceal screening.  Encouraged her to follow-up with luminal GI for management of AVM and to ensure she can continue doing octreotide.    - Abdominal ultrasound results pending at the time of visit  - HCC screening with abdominal ultrasound in 6 months  - BMP, hepatic panel, CBC and INR in 6 months  - Follow-up with luminal GI for management of AVM  - Follow-up in clinic in 1 year or sooner as needed    Roberto Gordon PA-C   [facility] Hepatology clinic    -----------------------------------------------------       HPI:   Rachele Martínez is a 79 year old female presenting for follow-up.     Hep C:  - treated with Harvoni x 12, achieved SVR 7/2015  - Fibrosis scan:  Stage 4 fibrosis, 19.9 kilopascals     Cirrhosis:  - Hep C  Complicated by:  - No hx of Ascites  - Hx of GI bleed due to AVM, no history of varices  - No hx of HE  EGD: 8/14/2018, normal esophagus, bleeding angiodysplastic lesions in stomach and duodenum  HCC: 5/17/2018, no liver lesions.      Patient was last seen by me on 8/21/2018. She was admitted for GI bleeding was March 26, 2019 and and endoscopy showed two bleeding  angiodysplastic lesions and APC were treated with hemostatic clips in the duodenum.  In April, she had a video capsule endoscopy that  showed oozing blood in the duodenum and melena in the distal intestine.  She has been getting monthly octreotide injections. No problems with blood in stool.     She has been having problems with back pain and bulging disc in her back.  She was scheduled for surgery but it was canceled due to carotid artery disease.  Was deemed that she was too high risk for operation.  She is currently undergoing physical therapy.  She does ask for pain medications today.    Her weight is relatively stable.  Her appetite is fair, but she knows she has to eat.    Patient denies jaundice, lower extremity edema, abdominal distension or confusion.  Patient also denies melena, hematochezia or hematemesis. Patient denies weight loss, fevers, sweats or chills.  She does not drink alcohol.       Medical hx Surgical hx   Past Medical History:   Diagnosis Date     Anemia      Anxiety and depression      Arthritis      AVM (arteriovenous malformation)      Chronic hepatitis C with cirrhosis (H)      Clotting disorder (H)      ESRD (end stage renal disease) (H)      GI bleed      Glaucoma      Hyperlipidemia      Hypertension goal BP (blood pressure) < 140/80     Past Surgical History:   Procedure Laterality Date     CAPSULE/PILL CAM ENDOSCOPY N/A 3/27/2019    Procedure: Capsule/pill cam endoscopy;  Surgeon: Yosvany Ram MD;  [facility]     COLONOSCOPY N/A 9/4/2015    Procedure: COMBINED COLONOSCOPY, SINGLE OR MULTIPLE BIOPSY/POLYPECTOMY BY BIOPSY;  Surgeon: Rupesh Lopez MD;  [facility]     COLONOSCOPY N/A 9/19/2018    Procedure: COLONOSCOPY;  enteroscopy small bowel  COLONOSCOPY;  Surgeon: Ankit Baires MD;  [facility]     ESOPHAGOSCOPY, GASTROSCOPY, DUODENOSCOPY (EGD), COMBINED N/A 12/18/2014    Procedure: COMBINED ESOPHAGOSCOPY, GASTROSCOPY, DUODENOSCOPY (EGD);  Surgeon: Betsy Carvajal MD;  [facility]     ESOPHAGOSCOPY, GASTROSCOPY, DUODENOSCOPY (EGD), COMBINED N/A 4/25/2015    Procedure:  COMBINED ESOPHAGOSCOPY, GASTROSCOPY, DUODENOSCOPY (EGD);  Surgeon: Yosvany Ram MD;  Location:  GI     ESOPHAGOSCOPY, GASTROSCOPY, DUODENOSCOPY (EGD), COMBINED N/A 5/5/2015    Procedure: COMBINED ESOPHAGOSCOPY, GASTROSCOPY, DUODENOSCOPY (EGD);  Surgeon: Mariano Mistry MD;  Location:  GI      CAPSULE ENDOSCOPY N/A 9/30/2015    Procedure: CAPSULE/PILL CAM ENDOSCOPY;  Surgeon: Pan Dhaliwal MD;  Location: [facility] VASCULAR SURGERY PROCEDURE UNLIST       HYSTERECTOMY  1980    KYREE     LUMPECTOMY BREAST                   Medications:     Current Outpatient Medications   Medication     atorvastatin (LIPITOR) 20 MG tablet     Calcium Acetate, Phos Binder, 667 MG CAPS     gabapentin (NEURONTIN) 300 MG capsule     Multiple Vitamins-Minerals (PRORENAL + D) TABS     octreotide (SANDOSTATIN LAR) 20 MG injection     order for DME     order for DME     pantoprazole (PROTONIX) 20 MG EC tablet     polyethylene glycol 3350 POWD     sertraline (ZOLOFT) 50 MG tablet     losartan (COZAAR) 50 MG tablet     traMADol (ULTRAM) 50 MG tablet     No current facility-administered medications for this visit.             Allergies:     Allergies   Allergen Reactions     Abacavir Itching     Lisinopril Cough     Diovan Hct Itching     severe     Dust Mites      Hydrochlorothiazide Itching     Severe       No Clinical Screening - See Comments      History of blood transfusion reactions and pre-treats with Benadryl.      Oxycodone-Acetaminophen      Sulfa Drugs Hives     Valsartan Itching     Spironolactone Nausea            Review of Systems:   10 points ROS was obtained and highlighted in the HPI, otherwise negative.          Physical Exam:   VS:  BP (!) 159/71 (BP Location: Right arm, Patient Position: Chair, Cuff Size: Adult Regular)   Pulse 70   Temp 98  F (36.7  C) (Oral)   Wt 60.6 kg (133 lb 9.6 oz)   SpO2 99%   BMI 22.92 kg/m         Gen: A&Ox3, NAD, well developed  HEENT: non-icteric  CV: RRR, no overt  murmurs  Lung: CTA Bilatererally, no wheezing or crackles.   Lym- no palpable lymphadenopathy  Abd: soft, NT, ND,  no organomegaly.   Ext: no edema, intact pulses.   Skin: No rash,  no palmar erythema, telangiectasias or jaundice  Neuro: grossly intact, no asterixis   Psych: appropriate mood and affects           Data:   Reviewed in person and significant for:    Lab Results   Component Value Date     11/21/2019      Lab Results   Component Value Date    POTASSIUM 3.7 11/21/2019     Lab Results   Component Value Date    CHLORIDE 103 11/21/2019     Lab Results   Component Value Date    CO2 28 11/21/2019     Lab Results   Component Value Date    BUN 27 11/21/2019     Lab Results   Component Value Date    CR 6.39 11/21/2019       Lab Results   Component Value Date    WBC 6.7 11/21/2019     Lab Results   Component Value Date    HGB 11.1 11/21/2019     Lab Results   Component Value Date    HCT 36.7 11/21/2019     Lab Results   Component Value Date    MCV 93 11/21/2019     Lab Results   Component Value Date     11/21/2019       Lab Results   Component Value Date    AST 55 11/21/2019     Lab Results   Component Value Date    ALT 46 11/21/2019     No results found for: BILICONJ   Lab Results   Component Value Date    BILITOTAL 0.6 11/21/2019       Lab Results   Component Value Date    ALBUMIN 3.8 11/21/2019     Lab Results   Component Value Date    PROTTOTAL 8.2 11/21/2019      Lab Results   Component Value Date    ALKPHOS 146 11/21/2019       Lab Results   Component Value Date    INR 1.22 11/21/2019     US ABDOMEN COMPLETE:   11/21/2019:   1. Cirrhosis without focal liver lesion.  a. LI-RADS US Category: US-1 Negative: No US evidence of HCC.  b. Recommend continued surveillance US.  2. Atrophic echogenic kidneys compatible with medical renal disease.  3. Cholelithiasis/biliary sludge.       Roberto Gordon PA-C

## 2019-11-21 NOTE — NURSING NOTE
[address] [address]  [phone]  Fax [phone]    Blood pressure (!) 159/71, pulse 70, temperature 98  F (36.7  C), temperature source Oral, weight 60.6 kg (133 lb 9.6 oz), SpO2 99 %, not currently breastfeeding.    Gino Allen/JOSE ALBERTO  November 21, 2019 11:55 AM

## 2019-11-21 NOTE — PROGRESS NOTES
Hepatology Follow-up Clinic note  Rachele Martínez   Date of Birth 1940  Date of Service 11/21/2019    Reason for follow-up: Cirrhosis         Assessment/plan:   Rachele Martínez is a 79 year old female with hep C (achieved SVR) cirrhosis that is well compensated.  No radiologic signs of ascites or overt hepatic encephalopathy.  Her transaminases are mildly elevated.  She has mildly elevated INR and otherwise normal synthetic liver function.  We discussed monitoring for any signs of decompensation with lower extremity edema or ascites or confusion.  She is up-to-date with HCC and variceal screening.  Encouraged her to follow-up with luminal GI for management of AVM and to ensure she can continue doing octreotide.    - Abdominal ultrasound results pending at the time of visit  - HCC screening with abdominal ultrasound in 6 months  - BMP, hepatic panel, CBC and INR in 6 months  - Follow-up with luminal GI for management of AVM  - Follow-up in clinic in 1 year or sooner as needed    Roberto Gordon PA-C   [facility] Hepatology clinic    -----------------------------------------------------       HPI:   Rachele Martínez is a 79 year old female presenting for follow-up.     Hep C:  - treated with Harvoni x 12, achieved SVR 7/2015  - Fibrosis scan:  Stage 4 fibrosis, 19.9 kilopascals     Cirrhosis:  - Hep C  Complicated by:  - No hx of Ascites  - Hx of GI bleed due to AVM, no history of varices  - No hx of HE  EGD: 8/14/2018, normal esophagus, bleeding angiodysplastic lesions in stomach and duodenum  HCC: 5/17/2018, no liver lesions.      Patient was last seen by me on 8/21/2018. She was admitted for GI bleeding was March 26, 2019 and and endoscopy showed two bleeding  angiodysplastic lesions and APC were treated with hemostatic clips in the duodenum.  In April, she had a video capsule endoscopy that showed oozing blood in the duodenum and melena in the distal intestine.  She has been getting monthly  octreotide injections. No problems with blood in stool.     She has been having problems with back pain and bulging disc in her back.  She was scheduled for surgery but it was canceled due to carotid artery disease.  Was deemed that she was too high risk for operation.  She is currently undergoing physical therapy.  She does ask for pain medications today.    Her weight is relatively stable.  Her appetite is fair, but she knows she has to eat.    Patient denies jaundice, lower extremity edema, abdominal distension or confusion.  Patient also denies melena, hematochezia or hematemesis. Patient denies weight loss, fevers, sweats or chills.  She does not drink alcohol.       Medical hx Surgical hx   Past Medical History:   Diagnosis Date     Anemia      Anxiety and depression      Arthritis      AVM (arteriovenous malformation)      Chronic hepatitis C with cirrhosis (H)      Clotting disorder (H)      ESRD (end stage renal disease) (H)      GI bleed      Glaucoma      Hyperlipidemia      Hypertension goal BP (blood pressure) < 140/80     Past Surgical History:   Procedure Laterality Date     CAPSULE/PILL CAM ENDOSCOPY N/A 3/27/2019    Procedure: Capsule/pill cam endoscopy;  Surgeon: Yosvany Ram MD;  Location:  GI     COLONOSCOPY N/A 9/4/2015    Procedure: COMBINED COLONOSCOPY, SINGLE OR MULTIPLE BIOPSY/POLYPECTOMY BY BIOPSY;  Surgeon: Rupesh Lopez MD;  [facility]     COLONOSCOPY N/A 9/19/2018    Procedure: COLONOSCOPY;  enteroscopy small bowel  COLONOSCOPY;  Surgeon: Ankit Baires MD;  Location:  GI     ESOPHAGOSCOPY, GASTROSCOPY, DUODENOSCOPY (EGD), COMBINED N/A 12/18/2014    Procedure: COMBINED ESOPHAGOSCOPY, GASTROSCOPY, DUODENOSCOPY (EGD);  Surgeon: Betsy Carvajal MD;  Location:  GI     ESOPHAGOSCOPY, GASTROSCOPY, DUODENOSCOPY (EGD), COMBINED N/A 4/25/2015    Procedure: COMBINED ESOPHAGOSCOPY, GASTROSCOPY, DUODENOSCOPY (EGD);  Surgeon: Yosvany Ram MD;  Location:   GI     ESOPHAGOSCOPY, GASTROSCOPY, DUODENOSCOPY (EGD), COMBINED N/A 5/5/2015    Procedure: COMBINED ESOPHAGOSCOPY, GASTROSCOPY, DUODENOSCOPY (EGD);  Surgeon: Mariano Mistry MD;  Location:  GI      CAPSULE ENDOSCOPY N/A 9/30/2015    Procedure: CAPSULE/PILL CAM ENDOSCOPY;  Surgeon: Pan Dhaliwal MD;  Location:  GI      VASCULAR SURGERY PROCEDURE UNLIST       HYSTERECTOMY  1980    KYREE     LUMPECTOMY BREAST                   Medications:     Current Outpatient Medications   Medication     atorvastatin (LIPITOR) 20 MG tablet     Calcium Acetate, Phos Binder, 667 MG CAPS     gabapentin (NEURONTIN) 300 MG capsule     Multiple Vitamins-Minerals (PRORENAL + D) TABS     octreotide (SANDOSTATIN LAR) 20 MG injection     order for DME     order for DME     pantoprazole (PROTONIX) 20 MG EC tablet     polyethylene glycol 3350 POWD     sertraline (ZOLOFT) 50 MG tablet     losartan (COZAAR) 50 MG tablet     traMADol (ULTRAM) 50 MG tablet     No current facility-administered medications for this visit.             Allergies:     Allergies   Allergen Reactions     Abacavir Itching     Lisinopril Cough     Diovan Hct Itching     severe     Dust Mites      Hydrochlorothiazide Itching     Severe       No Clinical Screening - See Comments      History of blood transfusion reactions and pre-treats with Benadryl.      Oxycodone-Acetaminophen      Sulfa Drugs Hives     Valsartan Itching     Spironolactone Nausea            Review of Systems:   10 points ROS was obtained and highlighted in the HPI, otherwise negative.          Physical Exam:   VS:  BP (!) 159/71 (BP Location: Right arm, Patient Position: Chair, Cuff Size: Adult Regular)   Pulse 70   Temp 98  F (36.7  C) (Oral)   Wt 60.6 kg (133 lb 9.6 oz)   SpO2 99%   BMI 22.92 kg/m        Gen: A&Ox3, NAD, well developed  HEENT: non-icteric  CV: RRR, no overt murmurs  Lung: CTA Bilatererally, no wheezing or crackles.   Lym- no palpable lymphadenopathy  Abd: soft, NT,  ND,  no organomegaly.   Ext: no edema, intact pulses.   Skin: No rash,  no palmar erythema, telangiectasias or jaundice  Neuro: grossly intact, no asterixis   Psych: appropriate mood and affects           Data:   Reviewed in person and significant for:    Lab Results   Component Value Date     11/21/2019      Lab Results   Component Value Date    POTASSIUM 3.7 11/21/2019     Lab Results   Component Value Date    CHLORIDE 103 11/21/2019     Lab Results   Component Value Date    CO2 28 11/21/2019     Lab Results   Component Value Date    BUN 27 11/21/2019     Lab Results   Component Value Date    CR 6.39 11/21/2019       Lab Results   Component Value Date    WBC 6.7 11/21/2019     Lab Results   Component Value Date    HGB 11.1 11/21/2019     Lab Results   Component Value Date    HCT 36.7 11/21/2019     Lab Results   Component Value Date    MCV 93 11/21/2019     Lab Results   Component Value Date     11/21/2019       Lab Results   Component Value Date    AST 55 11/21/2019     Lab Results   Component Value Date    ALT 46 11/21/2019     No results found for: BILICONJ   Lab Results   Component Value Date    BILITOTAL 0.6 11/21/2019       Lab Results   Component Value Date    ALBUMIN 3.8 11/21/2019     Lab Results   Component Value Date    PROTTOTAL 8.2 11/21/2019      Lab Results   Component Value Date    ALKPHOS 146 11/21/2019       Lab Results   Component Value Date    INR 1.22 11/21/2019     US ABDOMEN COMPLETE:   11/21/2019:   1. Cirrhosis without focal liver lesion.  a. LI-RADS US Category: US-1 Negative: No US evidence of HCC.  b. Recommend continued surveillance US.  2. Atrophic echogenic kidneys compatible with medical renal disease.  3. Cholelithiasis/biliary sludge.

## 2019-11-21 NOTE — LETTER
11/21/2019       RE: Rachele Martínez  [address]  [place] [address]     Dear Colleague,    Thank you for referring your patient, Rachele Martínez, to the [facility] at [facility]. Please see a copy of my visit note below.    Hepatology Follow-up Clinic note  Rachele Martínez   Date of Birth 1940  Date of Service 11/21/2019    Reason for follow-up: Cirrhosis         Assessment/plan:   Rachele Martínez is a 79 year old female with hep C (achieved SVR) cirrhosis that is well compensated.  No radiologic signs of ascites or overt hepatic encephalopathy.  Her transaminases are mildly elevated.  She has mildly elevated INR and otherwise normal synthetic liver function.  We discussed monitoring for any signs of decompensation with lower extremity edema or ascites or confusion.  She is up-to-date with HCC and variceal screening.  Encouraged her to follow-up with luminal GI for management of AVM and to ensure she can continue doing octreotide.    - Abdominal ultrasound results pending at the time of visit  - HCC screening with abdominal ultrasound in 6 months  - BMP, hepatic panel, CBC and INR in 6 months  - Follow-up with luminal GI for management of AVM  - Follow-up in clinic in 1 year or sooner as needed    Roberto Gordon PA-C   [facility] Hepatology clinic    -----------------------------------------------------       HPI:   Rachele Martínez is a 79 year old female presenting for follow-up.     Hep C:  - treated with Harvoni x 12, achieved SVR 7/2015  - Fibrosis scan:  Stage 4 fibrosis, 19.9 kilopascals     Cirrhosis:  - Hep C  Complicated by:  - No hx of Ascites  - Hx of GI bleed due to AVM, no history of varices  - No hx of HE  EGD: 8/14/2018, normal esophagus, bleeding angiodysplastic lesions in stomach and duodenum  HCC: 5/17/2018, no liver lesions.      Patient was last seen by me on 8/21/2018. She was admitted for GI  bleeding was March 26, 2019 and and endoscopy showed two bleeding  angiodysplastic lesions and APC were treated with hemostatic clips in the duodenum.  In April, she had a video capsule endoscopy that showed oozing blood in the duodenum and melena in the distal intestine.  She has been getting monthly octreotide injections. No problems with blood in stool.     She has been having problems with back pain and bulging disc in her back.  She was scheduled for surgery but it was canceled due to carotid artery disease.  Was deemed that she was too high risk for operation.  She is currently undergoing physical therapy.  She does ask for pain medications today.    Her weight is relatively stable.  Her appetite is fair, but she knows she has to eat.    Patient denies jaundice, lower extremity edema, abdominal distension or confusion.  Patient also denies melena, hematochezia or hematemesis. Patient denies weight loss, fevers, sweats or chills.  She does not drink alcohol.       Medical hx Surgical hx   Past Medical History:   Diagnosis Date     Anemia      Anxiety and depression      Arthritis      AVM (arteriovenous malformation)      Chronic hepatitis C with cirrhosis (H)      Clotting disorder (H)      ESRD (end stage renal disease) (H)      GI bleed      Glaucoma      Hyperlipidemia      Hypertension goal BP (blood pressure) < 140/80     Past Surgical History:   Procedure Laterality Date     CAPSULE/PILL CAM ENDOSCOPY N/A 3/27/2019    Procedure: Capsule/pill cam endoscopy;  Surgeon: Yosvany Ram MD;  Location:  GI     COLONOSCOPY N/A 9/4/2015    Procedure: COMBINED COLONOSCOPY, SINGLE OR MULTIPLE BIOPSY/POLYPECTOMY BY BIOPSY;  Surgeon: Rupesh Lopez MD;  Location:  GI     COLONOSCOPY N/A 9/19/2018    Procedure: COLONOSCOPY;  enteroscopy small bowel  COLONOSCOPY;  Surgeon: Ankit Baires MD;  Location:  GI     ESOPHAGOSCOPY, GASTROSCOPY, DUODENOSCOPY (EGD), COMBINED N/A 12/18/2014    Procedure:  COMBINED ESOPHAGOSCOPY, GASTROSCOPY, DUODENOSCOPY (EGD);  Surgeon: Betsy Carvajal MD;  Location:  GI     ESOPHAGOSCOPY, GASTROSCOPY, DUODENOSCOPY (EGD), COMBINED N/A 4/25/2015    Procedure: COMBINED ESOPHAGOSCOPY, GASTROSCOPY, DUODENOSCOPY (EGD);  Surgeon: Yosvany Ram MD;  Location:  GI     ESOPHAGOSCOPY, GASTROSCOPY, DUODENOSCOPY (EGD), COMBINED N/A 5/5/2015    Procedure: COMBINED ESOPHAGOSCOPY, GASTROSCOPY, DUODENOSCOPY (EGD);  Surgeon: Mariano Mistry MD;  Location:  GI     HC CAPSULE ENDOSCOPY N/A 9/30/2015    Procedure: CAPSULE/PILL CAM ENDOSCOPY;  Surgeon: Pan Dhaliwal MD;  Location: [facility] VASCULAR SURGERY PROCEDURE UNLIST       HYSTERECTOMY  1980    KYREE     LUMPECTOMY BREAST                   Medications:     Current Outpatient Medications   Medication     atorvastatin (LIPITOR) 20 MG tablet     Calcium Acetate, Phos Binder, 667 MG CAPS     gabapentin (NEURONTIN) 300 MG capsule     Multiple Vitamins-Minerals (PRORENAL + D) TABS     octreotide (SANDOSTATIN LAR) 20 MG injection     order for DME     order for DME     pantoprazole (PROTONIX) 20 MG EC tablet     polyethylene glycol 3350 POWD     sertraline (ZOLOFT) 50 MG tablet     losartan (COZAAR) 50 MG tablet     traMADol (ULTRAM) 50 MG tablet     No current facility-administered medications for this visit.             Allergies:     Allergies   Allergen Reactions     Abacavir Itching     Lisinopril Cough     Diovan Hct Itching     severe     Dust Mites      Hydrochlorothiazide Itching     Severe       No Clinical Screening - See Comments      History of blood transfusion reactions and pre-treats with Benadryl.      Oxycodone-Acetaminophen      Sulfa Drugs Hives     Valsartan Itching     Spironolactone Nausea            Review of Systems:   10 points ROS was obtained and highlighted in the HPI, otherwise negative.          Physical Exam:   VS:  BP (!) 159/71 (BP Location: Right arm, Patient Position: Chair, Cuff  Size: Adult Regular)   Pulse 70   Temp 98  F (36.7  C) (Oral)   Wt 60.6 kg (133 lb 9.6 oz)   SpO2 99%   BMI 22.92 kg/m         Gen: A&Ox3, NAD, well developed  HEENT: non-icteric  CV: RRR, no overt murmurs  Lung: CTA Bilatererally, no wheezing or crackles.   Lym- no palpable lymphadenopathy  Abd: soft, NT, ND,  no organomegaly.   Ext: no edema, intact pulses.   Skin: No rash,  no palmar erythema, telangiectasias or jaundice  Neuro: grossly intact, no asterixis   Psych: appropriate mood and affects           Data:   Reviewed in person and significant for:    Lab Results   Component Value Date     11/21/2019      Lab Results   Component Value Date    POTASSIUM 3.7 11/21/2019     Lab Results   Component Value Date    CHLORIDE 103 11/21/2019     Lab Results   Component Value Date    CO2 28 11/21/2019     Lab Results   Component Value Date    BUN 27 11/21/2019     Lab Results   Component Value Date    CR 6.39 11/21/2019       Lab Results   Component Value Date    WBC 6.7 11/21/2019     Lab Results   Component Value Date    HGB 11.1 11/21/2019     Lab Results   Component Value Date    HCT 36.7 11/21/2019     Lab Results   Component Value Date    MCV 93 11/21/2019     Lab Results   Component Value Date     11/21/2019       Lab Results   Component Value Date    AST 55 11/21/2019     Lab Results   Component Value Date    ALT 46 11/21/2019     No results found for: BILICONJ   Lab Results   Component Value Date    BILITOTAL 0.6 11/21/2019       Lab Results   Component Value Date    ALBUMIN 3.8 11/21/2019     Lab Results   Component Value Date    PROTTOTAL 8.2 11/21/2019      Lab Results   Component Value Date    ALKPHOS 146 11/21/2019       Lab Results   Component Value Date    INR 1.22 11/21/2019     US ABDOMEN COMPLETE:   11/21/2019:   1. Cirrhosis without focal liver lesion.  a. LI-RADS US Category: US-1 Negative: No US evidence of HCC.  b. Recommend continued surveillance US.  2. Atrophic echogenic  kidneys compatible with medical renal disease.  3. Cholelithiasis/biliary sludge.       Again, thank you for allowing me to participate in the care of your patient.      Sincerely,    Roberto Gordon PA-C

## 2019-11-21 NOTE — LETTER
November 21, 2019       TO: Rachele Martínez  [address]  [facility] [address]       Dear Ms. Martínez,    We are writing to inform you of your test results.    Your ultrasound shows changes consistent with cirrhosis but no worrisome liver masses. We will plan to repeat the ultrasound in 6 months for ongoing liver cancer screening.     Please let me know if you have any questions or concerns.     Clinic Staff - [phone] option [phone]     Sincerely,     Roberto Gordon PA-C  [address] [place] [address], Mail Code [id number]  [place]  [address].

## 2019-11-26 ENCOUNTER — INFUSION THERAPY VISIT (OUTPATIENT)
Dept: INFUSION THERAPY | Facility: CLINIC | Age: 79
End: 2019-11-26
Payer: MEDICARE

## 2019-11-26 VITALS
HEIGHT: 64 IN | TEMPERATURE: 98.3 F | DIASTOLIC BLOOD PRESSURE: 72 MMHG | HEART RATE: 70 BPM | RESPIRATION RATE: 16 BRPM | WEIGHT: 134.4 LBS | SYSTOLIC BLOOD PRESSURE: 124 MMHG | OXYGEN SATURATION: 100 % | BODY MASS INDEX: 22.94 KG/M2

## 2019-11-26 DIAGNOSIS — K55.20 AVM (ARTERIOVENOUS MALFORMATION) OF SMALL BOWEL, ACQUIRED: ICD-10-CM

## 2019-11-26 DIAGNOSIS — R19.7 DIARRHEA, UNSPECIFIED TYPE: Primary | ICD-10-CM

## 2019-11-26 DIAGNOSIS — K31.811 ANGIODYSPLASIA OF STOMACH AND DUODENUM WITH HEMORRHAGE: ICD-10-CM

## 2019-11-26 DIAGNOSIS — D50.0 IRON DEFICIENCY ANEMIA DUE TO CHRONIC BLOOD LOSS: ICD-10-CM

## 2019-11-26 PROCEDURE — 96372 THER/PROPH/DIAG INJ SC/IM: CPT | Performed by: NURSE PRACTITIONER

## 2019-11-26 PROCEDURE — 99207 ZZC NO CHARGE NURSE ONLY: CPT

## 2019-11-26 RX ADMIN — OCTREOTIDE ACETATE 20 MG: KIT INTRAMUSCULAR at 12:20

## 2019-11-26 ASSESSMENT — MIFFLIN-ST. JEOR: SCORE: 1074.88

## 2019-11-26 ASSESSMENT — PAIN SCALES - GENERAL: PAINLEVEL: NO PAIN (0)

## 2019-11-26 NOTE — PROGRESS NOTES
Infusion Nursing Note:  Rachele Martínez presents today for Sandostatin.    Patient seen by provider today: No   present during visit today: Not Applicable.    Note: Pt denies new medical concerns, see flow sheet for assessment.    Intravenous Access:  No Intravenous access/labs at this visit.    Treatment Conditions:  Not Applicable.      Post Infusion Assessment:  Patient tolerated injection without incident.  Site patent and intact, free from redness, edema or discomfort.  No evidence of extravasations.       Discharge Plan:   Patient discharged in stable condition accompanied by: daughter.  Departure Mode: Ambulatory.  Pt will RTC 12/31/19 for Sandostatin.    Josias Hicks RN

## 2019-12-31 ENCOUNTER — INFUSION THERAPY VISIT (OUTPATIENT)
Dept: INFUSION THERAPY | Facility: CLINIC | Age: 79
End: 2019-12-31
Payer: MEDICARE

## 2019-12-31 VITALS
BODY MASS INDEX: 22.33 KG/M2 | OXYGEN SATURATION: 98 % | WEIGHT: 130.8 LBS | TEMPERATURE: 97.8 F | HEIGHT: 64 IN | HEART RATE: 85 BPM | SYSTOLIC BLOOD PRESSURE: 95 MMHG | RESPIRATION RATE: 16 BRPM | DIASTOLIC BLOOD PRESSURE: 64 MMHG

## 2019-12-31 DIAGNOSIS — D50.0 IRON DEFICIENCY ANEMIA DUE TO CHRONIC BLOOD LOSS: ICD-10-CM

## 2019-12-31 DIAGNOSIS — R19.7 DIARRHEA, UNSPECIFIED TYPE: Primary | ICD-10-CM

## 2019-12-31 DIAGNOSIS — K55.20 AVM (ARTERIOVENOUS MALFORMATION) OF SMALL BOWEL, ACQUIRED: ICD-10-CM

## 2019-12-31 DIAGNOSIS — K31.811 ANGIODYSPLASIA OF STOMACH AND DUODENUM WITH HEMORRHAGE: ICD-10-CM

## 2019-12-31 PROCEDURE — 96372 THER/PROPH/DIAG INJ SC/IM: CPT | Performed by: INTERNAL MEDICINE

## 2019-12-31 PROCEDURE — 99207 ZZC NO CHARGE NURSE ONLY: CPT

## 2019-12-31 RX ADMIN — OCTREOTIDE ACETATE 20 MG: KIT INTRAMUSCULAR at 15:31

## 2019-12-31 ASSESSMENT — MIFFLIN-ST. JEOR: SCORE: 1058.55

## 2019-12-31 NOTE — PROGRESS NOTES
Infusion Nursing Note:  Rachele LORRIE Martínez presents today for Sandostatin injection.    Patient seen by provider today: No   present during visit today: Not Applicable.     Note: Assessment performed by Holly KWON RN prior to injection today.        Intravenous Access:  No Intravenous access/labs at this visit.       Treatment Conditions:  Not Applicable.        Post Infusion Assessment:  Patient tolerated injection without incident.  Site patent and intact, free from redness, edema or discomfort.         Discharge Plan:   Discharge instructions reviewed with: Patient.  Patient and/or family verbalized understanding of discharge instructions and all questions answered.  Patient discharged in stable condition accompanied by: daughter.  Departure Mode: Ambulatory.  Patient for follow up for next injection on 01/28/2020 as scheduled.     Vilma Ariza LPN

## 2020-01-24 ENCOUNTER — OFFICE VISIT (OUTPATIENT)
Dept: INTERNAL MEDICINE | Facility: CLINIC | Age: 80
End: 2020-01-24
Payer: MEDICARE

## 2020-01-24 VITALS — SYSTOLIC BLOOD PRESSURE: 115 MMHG | DIASTOLIC BLOOD PRESSURE: 73 MMHG | HEART RATE: 85 BPM | OXYGEN SATURATION: 100 %

## 2020-01-24 DIAGNOSIS — K74.60 CIRRHOSIS OF LIVER WITHOUT ASCITES, UNSPECIFIED HEPATIC CIRRHOSIS TYPE (H): ICD-10-CM

## 2020-01-24 DIAGNOSIS — N18.6 ESRD (END STAGE RENAL DISEASE) ON DIALYSIS (H): ICD-10-CM

## 2020-01-24 DIAGNOSIS — Z99.2 ESRD (END STAGE RENAL DISEASE) ON DIALYSIS (H): ICD-10-CM

## 2020-01-24 DIAGNOSIS — M51.369 LUMBAR DEGENERATIVE DISC DISEASE: Primary | ICD-10-CM

## 2020-01-24 NOTE — NURSING NOTE
Chief Complaint   Patient presents with     Recheck Medication     pt here to check in and see dr Kimberly Nissen, EMT at 4:08 PM on 1/24/2020

## 2020-01-24 NOTE — PROGRESS NOTES
SUBJECTIVE:  Rachele Martínez is a 79 year old female presents for   Chief Complaint   Patient presents with     Recheck Medication     pt here to check in and see dr      She has a history of spinal stenosis, ESRD on HD, liver cirrhosis, small bowel AVMs, substance-abuse disorder, hypertension and chronic anemia. She presents today for routine health maintenance follow up.  She reports doing well overall. She continues to have sciatica pain most significant on her R leg, but persistent bilaterally. She takes gabapentin for pain relief but reports that it only provides short, temporary relief. She also continues to have dizziness and fatigue mostly related to hemodialysis sessions. She states that she has not had much interest in doing any activities recently, and has been inconsistent with PT. Per, daughter she was significantly improved with PT; however has worsened since she has been less active and enthusiastic about it. She is interested in seeing an acupuncturist to address her chronic pain issues.    Medications and allergies were reviewed by me today.     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     Past Medical History:   Diagnosis Date     Anemia      Anxiety and depression      Arthritis      AVM (arteriovenous malformation)      Chronic hepatitis C with cirrhosis (H)      Clotting disorder (H)      ESRD (end stage renal disease) (H)     on dialysis     GI bleed     recurrent     Glaucoma      Hyperlipidemia      Hypertension goal BP (blood pressure) < 140/80        Past Surgical History:   Procedure Laterality Date      CAPSULE/PILL CAM ENDOSCOPY N/A 3/27/2019    Procedure: Capsule/pill cam endoscopy;  Surgeon: Yosvany Ram MD;  Location:  GI     COLONOSCOPY N/A 9/4/2015    Procedure: COMBINED COLONOSCOPY, SINGLE OR MULTIPLE BIOPSY/POLYPECTOMY BY BIOPSY;  Surgeon: Rupesh Lopez MD;  [facility]     COLONOSCOPY N/A 9/19/2018    Procedure: COLONOSCOPY;  enteroscopy small bowel  COLONOSCOPY;  Surgeon: Ankit Baires MD;  Location:  GI     ESOPHAGOSCOPY, GASTROSCOPY, DUODENOSCOPY (EGD), COMBINED N/A 12/18/2014    Procedure: COMBINED ESOPHAGOSCOPY, GASTROSCOPY, DUODENOSCOPY (EGD);  Surgeon: Betsy Carvajal MD;  Location:  GI     ESOPHAGOSCOPY, GASTROSCOPY, DUODENOSCOPY (EGD), COMBINED N/A 4/25/2015    Procedure: COMBINED ESOPHAGOSCOPY, GASTROSCOPY, DUODENOSCOPY (EGD);  Surgeon: Yosvany Ram MD;  Location:  GI     ESOPHAGOSCOPY, GASTROSCOPY, DUODENOSCOPY (EGD), COMBINED N/A 5/5/2015    Procedure: COMBINED ESOPHAGOSCOPY, GASTROSCOPY, DUODENOSCOPY (EGD);  Surgeon: Mariano Mistry MD;  Location:  GI     HC CAPSULE ENDOSCOPY N/A 9/30/2015    Procedure: CAPSULE/PILL CAM ENDOSCOPY;  Surgeon: Pan Dhaliwal MD;  Location: [facility] VASCULAR SURGERY PROCEDURE UNLIST       HYSTERECTOMY  1980    KYREE     LUMPECTOMY BREAST         Current Outpatient Medications:      atorvastatin (LIPITOR) 20 MG tablet, Take 1 tablet (20 mg) by mouth daily, Disp: 90 tablet, Rfl: 3     Calcium Acetate, Phos Binder, 667 MG CAPS, TAKE 2 CAPSULE BY MOUTH THREE TIMES A DAY WITH MEALS, Disp: , Rfl: 8     gabapentin (NEURONTIN) 300 MG capsule, Take 1 capsule (300 mg) by mouth At Bedtime, Disp: 90 capsule, Rfl: 3     Multiple Vitamins-Minerals (PRORENAL + D) TABS, Take 1 tablet by mouth daily At 2:00 pm, Disp: , Rfl:      octreotide (SANDOSTATIN LAR) 20 MG injection, Inject 20 mg into the muscle every 28 days, Disp: , Rfl:      order for DME, Cane, Disp: 1 Units, Rfl: 0     order for DME, Equipment being ordered: 4  wheel walker with seat., Disp: 1 Device, Rfl: 0     pantoprazole (PROTONIX) 20 MG EC tablet, Take 1 tablet (20 mg) by mouth 2 times daily 30-60 minutes before a meal, Disp: 180 tablet, Rfl: 2     polyethylene glycol 3350 POWD, Take 17 g by mouth daily (Patient taking differently: Take 17 g by mouth daily as needed ), Disp: 1530 g, Rfl: 3     sertraline (ZOLOFT) 50 MG tablet, TAKE 2 TABLETS (100 MG) BY MOUTH DAILY, Disp: 180 tablet, Rfl: 1     traMADol (ULTRAM) 50 MG tablet, Take 1-2 tablets ( mg) by mouth every 6 hours as needed for breakthrough pain or severe pain, Disp: 15 tablet, Rfl: 0     losartan (COZAAR) 50 MG tablet, Take 50 mg by mouth every morning , Disp: , Rfl:     Review Of Systems    Constitutional: fatigue, no fevers, chills, night sweats or unintentional weight change   Eyes: no vision change, diplopia or red eyes   Ears, Nose, Mouth, Throat: no tinnitus or hearing change, no epistaxis or nasal discharge, no oral lesions, throat clear   Cardiovascular: no chest pain, palpitations, no orthopnea or PND   Respiratory: no dyspnea, cough, shortness of breath or wheezing   GI: no nausea, vomiting, diarrhea or constipation, no abdominal pain   : no change in urine, no dysuria or hematuria, no sexual dysfunction   Musculoskeletal: b/l chronic leg pain, chronic back pain  Integumentary: no concerning lesions or moles   Neuro: no loss of strength or sensation, no numbness or tingling, no tremor, dizziness, occasional headache   Endo: no polyuria or polydipsia, no temperature intolerance   Heme/Lymph: no concerning bumps, small bowel AVMs   Psych: anxiety, sleep problems      PHYSICAL EXAM:  /73   Pulse 85   SpO2 100%     Constitutional: no acute distress, pleasant   Eyes: anicteric, normal extra-ocular movements   Ears, Nose and Throat: throat clear, neck supple with full range of motion, no thyromegaly.   Cardiovascular:RRR, normal S1 and S2, no murmurs, rubs or gallops  Respiratory: clear to  auscultation, no wheezes or crackles, normal breath sounds   Gastrointestinal: positive bowel sounds, nontender   Musculoskeletal: limited ROM in lower extremity, no edema   Psychological: appropriate mood   Lymphatic: no cervical lymphadenopathy      ASSESSMENT/PLAN:  Lumbar degenerative disc disease  Chronic bilateral low back pain with right-sided sciatica  Has history of substance-abuse disorder. Requested stronger pain meds; however agreed to continue with gabapentin. Advised risks of this medication, including sleepiness, confusion and constipation. Encouraged physical therapy and increase in activity.  - gabapentin (NEURONTIN) 300 MG capsule    - orthopedics appointment after today's visit    RTC: Return in about 6 months     Annamaria Monahan, MS4

## 2020-01-28 ENCOUNTER — INFUSION THERAPY VISIT (OUTPATIENT)
Dept: INFUSION THERAPY | Facility: CLINIC | Age: 80
End: 2020-01-28
Payer: MEDICARE

## 2020-01-28 VITALS
HEART RATE: 77 BPM | WEIGHT: 134 LBS | SYSTOLIC BLOOD PRESSURE: 118 MMHG | TEMPERATURE: 98.2 F | DIASTOLIC BLOOD PRESSURE: 60 MMHG | BODY MASS INDEX: 22.99 KG/M2

## 2020-01-28 DIAGNOSIS — K31.811 ANGIODYSPLASIA OF STOMACH AND DUODENUM WITH HEMORRHAGE: ICD-10-CM

## 2020-01-28 DIAGNOSIS — K55.20 AVM (ARTERIOVENOUS MALFORMATION) OF SMALL BOWEL, ACQUIRED: ICD-10-CM

## 2020-01-28 DIAGNOSIS — R19.7 DIARRHEA, UNSPECIFIED TYPE: Primary | ICD-10-CM

## 2020-01-28 DIAGNOSIS — D50.0 IRON DEFICIENCY ANEMIA DUE TO CHRONIC BLOOD LOSS: ICD-10-CM

## 2020-01-28 PROCEDURE — 96372 THER/PROPH/DIAG INJ SC/IM: CPT | Performed by: NURSE PRACTITIONER

## 2020-01-28 PROCEDURE — 99207 ZZC NO CHARGE NURSE ONLY: CPT

## 2020-01-28 RX ADMIN — OCTREOTIDE ACETATE 20 MG: KIT INTRAMUSCULAR at 12:00

## 2020-01-28 ASSESSMENT — PAIN SCALES - GENERAL: PAINLEVEL: WORST PAIN (10)

## 2020-01-28 NOTE — PROGRESS NOTES
Infusion Nursing Note:  Rachele Martínez presents today for Sandostatin.    Patient seen by provider today: No   present during visit today: Not Applicable.    Note: Pt c/o R leg pain due to buldging disc in back.  See flow sheet for assessment.    Intravenous Access:  No Intravenous access/labs at this visit.    Treatment Conditions:  Not Applicable.      Post Infusion Assessment:  Patient tolerated injection without incident.  Site patent and intact, free from redness, edema or discomfort.  Access discontinued per protocol.       Discharge Plan:   Patient discharged in stable condition accompanied by: daughter.  Departure Mode: Ambulatory.  Pt will RTC 2/25/20 for Sandostatin.    Josias Hicks RN

## 2020-01-29 ENCOUNTER — MEDICAL CORRESPONDENCE (OUTPATIENT)
Dept: HEALTH INFORMATION MANAGEMENT | Facility: CLINIC | Age: 80
End: 2020-01-29

## 2020-01-30 NOTE — PROGRESS NOTES
Physician Attestation   I, Agnieszka Rodriguez, was present with the medical student who participated in the service and in the documentation of the note.  I have verified the history and personally performed the physical exam and medical decision making.  I agree with the assessment and plan of care as documented in the note.      Items personally reviewed: vitals and labs.    ESRD: continues to go to HD.  No issues  AVM/GI Bleed: Hgb has been stable recently.  She has not seen any dark stools  Cirrhosis: recently saw hepatology.      Agnieszka Rodriguez MD

## 2020-02-25 ENCOUNTER — INFUSION THERAPY VISIT (OUTPATIENT)
Dept: INFUSION THERAPY | Facility: CLINIC | Age: 80
End: 2020-02-25
Payer: MEDICARE

## 2020-02-25 VITALS
SYSTOLIC BLOOD PRESSURE: 114 MMHG | TEMPERATURE: 97.6 F | RESPIRATION RATE: 20 BRPM | DIASTOLIC BLOOD PRESSURE: 71 MMHG | OXYGEN SATURATION: 97 % | HEART RATE: 74 BPM

## 2020-02-25 DIAGNOSIS — K55.20 AVM (ARTERIOVENOUS MALFORMATION) OF SMALL BOWEL, ACQUIRED: ICD-10-CM

## 2020-02-25 DIAGNOSIS — D50.0 IRON DEFICIENCY ANEMIA DUE TO CHRONIC BLOOD LOSS: ICD-10-CM

## 2020-02-25 DIAGNOSIS — R19.7 DIARRHEA, UNSPECIFIED TYPE: Primary | ICD-10-CM

## 2020-02-25 DIAGNOSIS — K31.811 ANGIODYSPLASIA OF STOMACH AND DUODENUM WITH HEMORRHAGE: ICD-10-CM

## 2020-02-25 PROCEDURE — 96372 THER/PROPH/DIAG INJ SC/IM: CPT | Performed by: PHYSICIAN ASSISTANT

## 2020-02-25 PROCEDURE — 99207 ZZC NO CHARGE LOS: CPT

## 2020-02-25 RX ADMIN — OCTREOTIDE ACETATE 20 MG: KIT INTRAMUSCULAR at 12:32

## 2020-02-25 ASSESSMENT — PAIN SCALES - GENERAL: PAINLEVEL: NO PAIN (0)

## 2020-02-25 NOTE — PROGRESS NOTES
Infusion Nursing Note:  Rachele Martínez presents today for Sandostatin.    Patient seen by provider today: No   present during visit today: Not Applicable.    Note: denies any new symptoms or concerns.    Intravenous Access:  No Intravenous access/labs at this visit.    Treatment Conditions:  Not Applicable.      Post Infusion Assessment:  Patient tolerated injection without incident.       Discharge Plan:   RTC as scheduled for Sandostatin.    ADAM GUO RN

## 2020-03-16 ENCOUNTER — TELEPHONE (OUTPATIENT)
Dept: OTHER | Facility: CLINIC | Age: 80
End: 2020-03-16

## 2020-03-16 NOTE — TELEPHONE ENCOUNTER
Let patient know that all nonessential clinics will be closed for the next two weeks including acupuncture. I then explained their appt on 3/19/2020 was cancelled with Mikaela Burdick and we will call to reschedule as soon as we know more.

## 2020-03-24 ENCOUNTER — INFUSION THERAPY VISIT (OUTPATIENT)
Dept: INFUSION THERAPY | Facility: CLINIC | Age: 80
End: 2020-03-24
Payer: MEDICARE

## 2020-03-24 VITALS
SYSTOLIC BLOOD PRESSURE: 131 MMHG | WEIGHT: 139.3 LBS | HEART RATE: 78 BPM | RESPIRATION RATE: 18 BRPM | DIASTOLIC BLOOD PRESSURE: 62 MMHG | TEMPERATURE: 98.2 F | OXYGEN SATURATION: 96 % | BODY MASS INDEX: 23.9 KG/M2

## 2020-03-24 DIAGNOSIS — R19.7 DIARRHEA, UNSPECIFIED TYPE: Primary | ICD-10-CM

## 2020-03-24 DIAGNOSIS — K55.20 AVM (ARTERIOVENOUS MALFORMATION) OF SMALL BOWEL, ACQUIRED: ICD-10-CM

## 2020-03-24 DIAGNOSIS — D50.0 IRON DEFICIENCY ANEMIA DUE TO CHRONIC BLOOD LOSS: ICD-10-CM

## 2020-03-24 DIAGNOSIS — K31.811 ANGIODYSPLASIA OF STOMACH AND DUODENUM WITH HEMORRHAGE: ICD-10-CM

## 2020-03-24 PROCEDURE — 99207 ZZC NO CHARGE LOS: CPT

## 2020-03-24 PROCEDURE — 96372 THER/PROPH/DIAG INJ SC/IM: CPT | Performed by: NURSE PRACTITIONER

## 2020-03-24 RX ADMIN — OCTREOTIDE ACETATE 20 MG: KIT INTRAMUSCULAR at 11:42

## 2020-03-24 NOTE — PROGRESS NOTES
Infusion Nursing Note:  Rachele Martínez presents today for Sandostatin      Patient seen by provider today: No   present during visit today: Not Applicable.    Note: N/A.    Intravenous Access:  No Intravenous access/labs at this visit.    Treatment Conditions:  Not Applicable.      Post Infusion Assessment:  Patient tolerated injection without incident.       Discharge Plan:     Patient will return in 1 month.  Her daughter makes her appointments    Patient discharged in stable condition accompanied by: daughter.  Departure Mode: Ambulatory.    Salome Bahena RN

## 2020-04-21 ENCOUNTER — INFUSION THERAPY VISIT (OUTPATIENT)
Dept: INFUSION THERAPY | Facility: CLINIC | Age: 80
End: 2020-04-21
Payer: MEDICARE

## 2020-04-21 VITALS
WEIGHT: 136.2 LBS | SYSTOLIC BLOOD PRESSURE: 115 MMHG | HEART RATE: 78 BPM | OXYGEN SATURATION: 96 % | DIASTOLIC BLOOD PRESSURE: 71 MMHG | RESPIRATION RATE: 16 BRPM | HEIGHT: 64 IN | BODY MASS INDEX: 23.25 KG/M2 | TEMPERATURE: 98.3 F

## 2020-04-21 DIAGNOSIS — R19.7 DIARRHEA, UNSPECIFIED TYPE: Primary | ICD-10-CM

## 2020-04-21 DIAGNOSIS — K31.811 ANGIODYSPLASIA OF STOMACH AND DUODENUM WITH HEMORRHAGE: ICD-10-CM

## 2020-04-21 DIAGNOSIS — K55.20 AVM (ARTERIOVENOUS MALFORMATION) OF SMALL BOWEL, ACQUIRED: ICD-10-CM

## 2020-04-21 DIAGNOSIS — D50.0 IRON DEFICIENCY ANEMIA DUE TO CHRONIC BLOOD LOSS: ICD-10-CM

## 2020-04-21 PROCEDURE — 96372 THER/PROPH/DIAG INJ SC/IM: CPT | Performed by: NURSE PRACTITIONER

## 2020-04-21 PROCEDURE — 99207 ZZC NO CHARGE LOS: CPT | Performed by: NURSE PRACTITIONER

## 2020-04-21 RX ADMIN — OCTREOTIDE ACETATE 20 MG: KIT INTRAMUSCULAR at 12:02

## 2020-04-21 ASSESSMENT — PAIN SCALES - GENERAL: PAINLEVEL: WORST PAIN (10)

## 2020-04-21 ASSESSMENT — MIFFLIN-ST. JEOR: SCORE: 1082.8

## 2020-04-21 NOTE — PATIENT INSTRUCTIONS
Pt to return on 05/19/20 for Sandostatin. Copies of medication list and upcoming appointments given prior to discharge.

## 2020-04-21 NOTE — PROGRESS NOTES
Infusion Nursing Note:  Rachele Martínez presents today for Sandostatin.    Patient seen by provider today: No   present during visit today: Not Applicable.    Note: Patient has no new complaints today, shot given in left glut.    Intravenous Access:  No Intravenous access/labs at this visit.    Treatment Conditions:  Not Applicable.      Post Infusion Assessment:  Patient tolerated injection without incident.  Site patent and intact, free from redness, edema or discomfort.       Discharge Plan:   Discharge instructions reviewed with: Patient.  Patient and/or family verbalized understanding of discharge instructions and all questions answered.  Patient discharged in stable condition accompanied by: self.  Departure Mode: Ambulatory.    Glenny Parker RN

## 2020-05-14 DIAGNOSIS — L29.9 ITCHING: ICD-10-CM

## 2020-05-19 ENCOUNTER — INFUSION THERAPY VISIT (OUTPATIENT)
Dept: INFUSION THERAPY | Facility: CLINIC | Age: 80
End: 2020-05-19
Payer: MEDICARE

## 2020-05-19 VITALS
RESPIRATION RATE: 16 BRPM | OXYGEN SATURATION: 94 % | TEMPERATURE: 98.9 F | HEART RATE: 77 BPM | WEIGHT: 139 LBS | BODY MASS INDEX: 23.86 KG/M2 | SYSTOLIC BLOOD PRESSURE: 140 MMHG | DIASTOLIC BLOOD PRESSURE: 66 MMHG

## 2020-05-19 DIAGNOSIS — R19.7 DIARRHEA, UNSPECIFIED TYPE: Primary | ICD-10-CM

## 2020-05-19 DIAGNOSIS — D50.0 IRON DEFICIENCY ANEMIA DUE TO CHRONIC BLOOD LOSS: ICD-10-CM

## 2020-05-19 DIAGNOSIS — K31.811 ANGIODYSPLASIA OF STOMACH AND DUODENUM WITH HEMORRHAGE: ICD-10-CM

## 2020-05-19 DIAGNOSIS — K55.20 AVM (ARTERIOVENOUS MALFORMATION) OF SMALL BOWEL, ACQUIRED: ICD-10-CM

## 2020-05-19 PROCEDURE — 99207 ZZC NO CHARGE NURSE ONLY: CPT

## 2020-05-19 PROCEDURE — 96372 THER/PROPH/DIAG INJ SC/IM: CPT | Performed by: NURSE PRACTITIONER

## 2020-05-19 RX ADMIN — OCTREOTIDE ACETATE 20 MG: KIT INTRAMUSCULAR at 11:42

## 2020-05-19 ASSESSMENT — PAIN SCALES - GENERAL: PAINLEVEL: WORST PAIN (10)

## 2020-05-19 NOTE — PROGRESS NOTES
Infusion Nursing Note:  Rachele Martínez presents today for Sandostatin.    Patient seen by provider today: No   present during visit today: Not Applicable.    Note: N/A.    Intravenous Access:  No Intravenous access/labs at this visit.    Treatment Conditions:  Not Applicable.    Post Infusion Assessment:  Patient tolerated injection without incident.       Discharge Plan:   Patient will return in 1 month for next appointment.   Patient discharged in stable condition accompanied by: self.  Departure Mode: Ambulatory.    Dannielle Triplett RN-BSN, PHN, OCN  [facility]

## 2020-05-26 ENCOUNTER — ANCILLARY PROCEDURE (OUTPATIENT)
Dept: ULTRASOUND IMAGING | Facility: CLINIC | Age: 80
End: 2020-05-26
Attending: PHYSICIAN ASSISTANT
Payer: MEDICARE

## 2020-05-26 DIAGNOSIS — K74.60 CIRRHOSIS OF LIVER WITHOUT ASCITES, UNSPECIFIED HEPATIC CIRRHOSIS TYPE (H): ICD-10-CM

## 2020-05-26 LAB
ALBUMIN SERPL-MCNC: 3.8 G/DL (ref 3.4–5)
ALP SERPL-CCNC: 136 U/L (ref 40–150)
ALT SERPL W P-5'-P-CCNC: 20 U/L (ref 0–50)
ANION GAP SERPL CALCULATED.3IONS-SCNC: 12 MMOL/L (ref 3–14)
AST SERPL W P-5'-P-CCNC: 21 U/L (ref 0–45)
BILIRUB DIRECT SERPL-MCNC: 0.4 MG/DL (ref 0–0.2)
BILIRUB SERPL-MCNC: 1 MG/DL (ref 0.2–1.3)
BUN SERPL-MCNC: 23 MG/DL (ref 7–30)
CALCIUM SERPL-MCNC: 8.8 MG/DL (ref 8.5–10.1)
CHLORIDE SERPL-SCNC: 98 MMOL/L (ref 94–109)
CO2 SERPL-SCNC: 28 MMOL/L (ref 20–32)
CREAT SERPL-MCNC: 7.5 MG/DL (ref 0.52–1.04)
ERYTHROCYTE [DISTWIDTH] IN BLOOD BY AUTOMATED COUNT: 16 % (ref 10–15)
GFR SERPL CREATININE-BSD FRML MDRD: 5 ML/MIN/{1.73_M2}
GLUCOSE SERPL-MCNC: 107 MG/DL (ref 70–99)
HCT VFR BLD AUTO: 41.1 % (ref 35–47)
HGB BLD-MCNC: 12.8 G/DL (ref 11.7–15.7)
INR PPP: 1.31 (ref 0.86–1.14)
MCH RBC QN AUTO: 32.7 PG (ref 26.5–33)
MCHC RBC AUTO-ENTMCNC: 31.1 G/DL (ref 31.5–36.5)
MCV RBC AUTO: 105 FL (ref 78–100)
PLATELET # BLD AUTO: 262 10E9/L (ref 150–450)
POTASSIUM SERPL-SCNC: 4.1 MMOL/L (ref 3.4–5.3)
PROT SERPL-MCNC: 8.7 G/DL (ref 6.8–8.8)
RBC # BLD AUTO: 3.92 10E12/L (ref 3.8–5.2)
SODIUM SERPL-SCNC: 138 MMOL/L (ref 133–144)
WBC # BLD AUTO: 5.5 10E9/L (ref 4–11)

## 2020-05-26 NOTE — LETTER
June 5, 2020       TO: Rachele Martínez  [address]  [facility] [address]       Dear Ms. Martínez,    We are writing to inform you of your test results.    Your liver function is normal! Looks good!     Your ultrasound shows changes consistent with cirrhosis but no worrisome liver masses. We will plan to repeat the ultrasound in 6 months for ongoing liver cancer screening.     Please let me know if you have any questions or concerns.     Clinic Staff - [phone] option [phone]     Sincerely,     Roberto Gordon PA-C  [address] [place] [address], Mail [id number]  [place]  [address].

## 2020-06-16 ENCOUNTER — INFUSION THERAPY VISIT (OUTPATIENT)
Dept: INFUSION THERAPY | Facility: CLINIC | Age: 80
End: 2020-06-16
Payer: MEDICARE

## 2020-06-16 VITALS
WEIGHT: 133.5 LBS | DIASTOLIC BLOOD PRESSURE: 68 MMHG | TEMPERATURE: 98.2 F | BODY MASS INDEX: 22.92 KG/M2 | OXYGEN SATURATION: 100 % | RESPIRATION RATE: 16 BRPM | HEART RATE: 54 BPM | SYSTOLIC BLOOD PRESSURE: 134 MMHG

## 2020-06-16 DIAGNOSIS — K31.811 ANGIODYSPLASIA OF STOMACH AND DUODENUM WITH HEMORRHAGE: ICD-10-CM

## 2020-06-16 DIAGNOSIS — K55.20 AVM (ARTERIOVENOUS MALFORMATION) OF SMALL BOWEL, ACQUIRED: ICD-10-CM

## 2020-06-16 DIAGNOSIS — D50.0 IRON DEFICIENCY ANEMIA DUE TO CHRONIC BLOOD LOSS: ICD-10-CM

## 2020-06-16 DIAGNOSIS — R19.7 DIARRHEA, UNSPECIFIED TYPE: Primary | ICD-10-CM

## 2020-06-16 PROCEDURE — 96372 THER/PROPH/DIAG INJ SC/IM: CPT | Performed by: NURSE PRACTITIONER

## 2020-06-16 PROCEDURE — 99207 ZZC NO CHARGE LOS: CPT

## 2020-06-16 RX ADMIN — OCTREOTIDE ACETATE 20 MG: KIT INTRAMUSCULAR at 12:31

## 2020-06-16 NOTE — PROGRESS NOTES
Infusion Nursing Note:  Rachele Martínez presents today for Sandostatin injection.    Patient seen by provider today: No   present during visit today: Not Applicable.    Note: N/A.    Intravenous Access:  No Intravenous access/labs at this visit.    Treatment Conditions:  Not Applicable.    Post Infusion Assessment:  Patient tolerated injection without incident.     Discharge Plan:   Patient will return 7/14/2020 for next appointment.   Patient discharged in stable condition accompanied by: self.  Departure Mode: Ambulatory.    Mercy Shoemaker RN

## 2020-06-24 ENCOUNTER — TELEPHONE (OUTPATIENT)
Dept: INTERNAL MEDICINE | Facility: CLINIC | Age: 80
End: 2020-06-24

## 2020-06-24 NOTE — TELEPHONE ENCOUNTER
[facility] Call Center    Phone Message    May a detailed message be left on voicemail: yes     Reason for Call: Other: The pt's daughter called. The pt's back pain is really bad now so they want to see Dr Rodriguez to discuss pain med's, and getting a scooter-wheelchair. She has a virtul appt for 7.14. Should the pt be seen in-clinic as she wants to get an order for a scooter or wheelchair. Als, they want to know if the pt should be going to ortho for this appt, or is Dr Rodriguez ok for this?      Action Taken: Message routed to:  [facility] med    Travel Screening: Not Applicable

## 2020-06-24 NOTE — TELEPHONE ENCOUNTER
I answered several questions regarding power scooter order to the patient and daughter. An in person visit is not necessary, a discussion about necessity of the scooter can happen virtually. They were happy with this and will be sure to discuss this with Dr. Rodriguez at their upcoming appointment.   Dana Karimi, EMT at 2:25 PM on 6/24/2020.

## 2020-07-10 ENCOUNTER — VIRTUAL VISIT (OUTPATIENT)
Dept: INTERNAL MEDICINE | Facility: CLINIC | Age: 80
End: 2020-07-10
Payer: MEDICARE

## 2020-07-10 DIAGNOSIS — I73.9 CLAUDICATION (H): Primary | ICD-10-CM

## 2020-07-10 NOTE — NURSING NOTE
Chief Complaint   Patient presents with     Pain     discuss pain management and getting a scooter     Kimberly Nissen, EMT at 2:47 PM on 7/10/2020    Video Visit Technology for this patient: Kimmy not working, patient has smart device, please try Doximity Video with patient

## 2020-07-10 NOTE — PROGRESS NOTES
Chief Complaint   VIDEO VISIT  Rachele Martínez is a 80 year old female presents for   Chief Complaint   Patient presents with     Pain     discuss pain management and getting a scooter        History of Present Illness   Legs have been bothering her.  Toes are very numb. Pain is constant, but worse with exertion.  Pain is located in the back of her calves and up her thighs.    HD has been going well  No recent GI bleeding.      Medications and allergies were reviewed by me today.     Social History   History   Smoking Status     Former Smoker     Packs/day: 2.00     Years: 45.00     Types: Cigarettes     Start date: 1/1/1953     Quit date: 11/23/2002   Smokeless Tobacco     Never Used      PHQ-2 ( 1999 Pfizer) 1/24/2020 7/2/2019   Q1: Little interest or pleasure in doing things 0 1   Q2: Feeling down, depressed or hopeless 0 0   PHQ-2 Score 0 1     No flowsheet data found.    Past Medical History   Patient Active Problem List   Diagnosis     Cataract     Anxiety state     Insomnia     Hyperlipidemia with target LDL less than 130     History of hepatitis C     Iron deficiency anemia due to chronic blood loss     AVM (arteriovenous malformation) of small bowel, acquired     ESRD (end stage renal disease) on dialysis (H)     Cirrhosis of liver without ascites, unspecified hepatic cirrhosis type (H)     Tendency to fall     Hypertension goal BP (blood pressure) < 140/80     Diarrhea     Angiodysplasia of stomach and duodenum with hemorrhage     Spinal stenosis of lumbar region with neurogenic claudication       Review of Systems   5 point ROS completed and negative except noted above, including Gen, CV, Resp, GI, MS    Physical Exam   Gen: no distress, comfortable, pleasant   Eyes: anicteric   Respiratory: able to talk in full sentences.  No evidence of respiratory distress  Skin:  no jaundice   Psychological: appropriate mood     Assessment & Plan      Claudication (H)  Descriptioin sounds consistent with  claudication and she has ESRD making vascular disease more likely.  Will get ABIs to determine severity.  The pain is bad enough it is limiting her walking, so will refer for scooter evaluation.  We did discuss that part of the treatment for PAD is ambulation, but will discuss further after the test has been completed  - US FAISAL Doppler No Exercise  - PHYSICAL THERAPY REFERRAL      Visit/Communication Style   Type of service:  Video Visit  Video End Time:3:35 PM  Originating Location (pt. Location): Home  Distant Location (provider location):  Home   Platform used for Video Visit: Joanna       RTC: Return in about 1 month (around 8/10/2020).    Agnieszka Rodriguez MD

## 2020-07-14 ENCOUNTER — INFUSION THERAPY VISIT (OUTPATIENT)
Dept: INFUSION THERAPY | Facility: CLINIC | Age: 80
End: 2020-07-14
Payer: MEDICARE

## 2020-07-14 VITALS
BODY MASS INDEX: 23 KG/M2 | OXYGEN SATURATION: 97 % | RESPIRATION RATE: 16 BRPM | WEIGHT: 134 LBS | HEART RATE: 72 BPM | DIASTOLIC BLOOD PRESSURE: 61 MMHG | TEMPERATURE: 98.7 F | SYSTOLIC BLOOD PRESSURE: 134 MMHG

## 2020-07-14 DIAGNOSIS — R19.7 DIARRHEA, UNSPECIFIED TYPE: Primary | ICD-10-CM

## 2020-07-14 DIAGNOSIS — K55.20 AVM (ARTERIOVENOUS MALFORMATION) OF SMALL BOWEL, ACQUIRED: ICD-10-CM

## 2020-07-14 DIAGNOSIS — K31.811 ANGIODYSPLASIA OF STOMACH AND DUODENUM WITH HEMORRHAGE: ICD-10-CM

## 2020-07-14 DIAGNOSIS — D50.0 IRON DEFICIENCY ANEMIA DUE TO CHRONIC BLOOD LOSS: ICD-10-CM

## 2020-07-14 PROCEDURE — 96372 THER/PROPH/DIAG INJ SC/IM: CPT | Performed by: NURSE PRACTITIONER

## 2020-07-14 PROCEDURE — 99207 ZZC NO CHARGE LOS: CPT

## 2020-07-14 RX ADMIN — OCTREOTIDE ACETATE 20 MG: KIT INTRAMUSCULAR at 11:40

## 2020-07-14 ASSESSMENT — PAIN SCALES - GENERAL: PAINLEVEL: WORST PAIN (10)

## 2020-07-14 NOTE — PROGRESS NOTES
Infusion Nursing Note:  Rachele Martínez presents today for Sandostatin.    Patient seen by provider today: No   present during visit today: Not Applicable.    Note: N/A.    Intravenous Access:  No Intravenous access/labs at this visit.    Treatment Conditions:  Not Applicable.      Post Infusion Assessment:  Patient tolerated injection without incident.       Discharge Plan:   Patient will return in 1 month for next appointment.   Patient discharged in stable condition accompanied by: self.  Departure Mode: Ambulatory.    Holly Way RN

## 2020-08-04 ENCOUNTER — HOSPITAL ENCOUNTER (OUTPATIENT)
Dept: OCCUPATIONAL THERAPY | Facility: CLINIC | Age: 80
Setting detail: THERAPIES SERIES
End: 2020-08-04
Attending: INTERNAL MEDICINE
Payer: MEDICARE

## 2020-08-04 DIAGNOSIS — I73.9 CLAUDICATION (H): ICD-10-CM

## 2020-08-04 PROCEDURE — 97542 WHEELCHAIR MNGMENT TRAINING: CPT | Mod: GO | Performed by: OCCUPATIONAL THERAPIST

## 2020-08-11 ENCOUNTER — INFUSION THERAPY VISIT (OUTPATIENT)
Dept: INFUSION THERAPY | Facility: CLINIC | Age: 80
End: 2020-08-11
Payer: MEDICARE

## 2020-08-11 VITALS — OXYGEN SATURATION: 99 % | HEART RATE: 73 BPM | DIASTOLIC BLOOD PRESSURE: 74 MMHG | SYSTOLIC BLOOD PRESSURE: 136 MMHG

## 2020-08-11 DIAGNOSIS — R19.7 DIARRHEA, UNSPECIFIED TYPE: Primary | ICD-10-CM

## 2020-08-11 DIAGNOSIS — K55.20 AVM (ARTERIOVENOUS MALFORMATION) OF SMALL BOWEL, ACQUIRED: ICD-10-CM

## 2020-08-11 DIAGNOSIS — K31.811 ANGIODYSPLASIA OF STOMACH AND DUODENUM WITH HEMORRHAGE: ICD-10-CM

## 2020-08-11 DIAGNOSIS — D50.0 IRON DEFICIENCY ANEMIA DUE TO CHRONIC BLOOD LOSS: ICD-10-CM

## 2020-08-11 PROCEDURE — 99207 ZZC NO CHARGE NURSE ONLY: CPT

## 2020-08-11 PROCEDURE — 96372 THER/PROPH/DIAG INJ SC/IM: CPT | Performed by: NURSE PRACTITIONER

## 2020-08-11 RX ADMIN — OCTREOTIDE ACETATE 20 MG: KIT INTRAMUSCULAR at 12:02

## 2020-08-11 NOTE — PROGRESS NOTES
Infusion Nursing Note:  Rachele Martínez presents today for   Chief Complaint   Patient presents with     Allied Health Visit     Sandostatin       Patient seen by provider today: No   present during visit today: Not Applicable.    Note: Patient assessed by Betsy Osorio RN prior to injection.    Intravenous Access:  No Intravenous access/labs at this visit.    Treatment Conditions:  Not Applicable.      Post Infusion Assessment:  Patient tolerated injection without incident.  Site patent and intact, free from redness, edema or discomfort.       Discharge Plan:   Patient will return 9/8/20 for next appointment.   Patient discharged in stable condition accompanied by: nilson To CMA

## 2020-08-12 DIAGNOSIS — K92.2 GASTROINTESTINAL HEMORRHAGE, UNSPECIFIED GASTROINTESTINAL HEMORRHAGE TYPE: ICD-10-CM

## 2020-08-14 ENCOUNTER — MEDICAL CORRESPONDENCE (OUTPATIENT)
Dept: HEALTH INFORMATION MANAGEMENT | Facility: CLINIC | Age: 80
End: 2020-08-14

## 2020-08-14 RX ORDER — PANTOPRAZOLE SODIUM 20 MG/1
20 TABLET, DELAYED RELEASE ORAL 2 TIMES DAILY
Qty: 180 TABLET | Refills: 2 | Status: SHIPPED | OUTPATIENT
Start: 2020-08-14 | End: 2021-08-29

## 2020-08-17 DIAGNOSIS — R41.89 COGNITIVE IMPAIRMENT: ICD-10-CM

## 2020-08-18 ENCOUNTER — ANCILLARY PROCEDURE (OUTPATIENT)
Dept: ULTRASOUND IMAGING | Facility: CLINIC | Age: 80
End: 2020-08-18
Attending: INTERNAL MEDICINE
Payer: MEDICARE

## 2020-08-18 ENCOUNTER — TELEPHONE (OUTPATIENT)
Dept: INTERNAL MEDICINE | Facility: CLINIC | Age: 80
End: 2020-08-18

## 2020-08-18 ENCOUNTER — ANCILLARY PROCEDURE (OUTPATIENT)
Dept: MAMMOGRAPHY | Facility: CLINIC | Age: 80
End: 2020-08-18
Attending: INTERNAL MEDICINE
Payer: MEDICARE

## 2020-08-18 DIAGNOSIS — Z12.31 SCREENING MAMMOGRAM, ENCOUNTER FOR: ICD-10-CM

## 2020-08-18 DIAGNOSIS — I73.9 CLAUDICATION (H): ICD-10-CM

## 2020-08-18 RX ORDER — ATORVASTATIN CALCIUM 20 MG/1
20 TABLET, FILM COATED ORAL DAILY
Qty: 90 TABLET | Refills: 0 | Status: SHIPPED | OUTPATIENT
Start: 2020-08-18 | End: 2020-11-23

## 2020-08-18 NOTE — TELEPHONE ENCOUNTER
Can you let her and her daughter know that the US showed vascular disease, which could be the cause of her pain in the back of her legs/calves.  As we discussed during our visit, walking and exercise are a very important part of the treatment for peripheral arterial disease.  If she would like to discuss other options we can refer her to vascular.

## 2020-08-18 NOTE — TELEPHONE ENCOUNTER
Last Virtual Visit: 7/10/20, NV 8/25/20, overdue for LDL, 90 day refill provided, routed to clinic for follow up

## 2020-08-19 NOTE — TELEPHONE ENCOUNTER
Patient's daughter, Charla, was reached by phone.  Dr. Rodriguez' message was relayed about U/S result.  Charla will call vascular provider that patient has seen before for a follow up appt., and she will call [facility] back if she needs a vascular referral.    Gayle Paul RN on 8/19/2020 at 12:06 PM

## 2020-08-25 ENCOUNTER — VIRTUAL VISIT (OUTPATIENT)
Dept: INTERNAL MEDICINE | Facility: CLINIC | Age: 80
End: 2020-08-25
Payer: MEDICARE

## 2020-08-25 DIAGNOSIS — I73.9 CLAUDICATION (H): Primary | ICD-10-CM

## 2020-08-25 DIAGNOSIS — I73.9 PERIPHERAL ARTERIAL DISEASE (H): ICD-10-CM

## 2020-08-25 RX ORDER — CILOSTAZOL 100 MG/1
100 TABLET ORAL 2 TIMES DAILY
Qty: 60 TABLET | Refills: 3 | Status: SHIPPED | OUTPATIENT
Start: 2020-08-25 | End: 2020-11-23

## 2020-08-25 NOTE — NURSING NOTE
Chief Complaint   Patient presents with     Pain     Per pts daughter pt is having pain.      Video Visit Technology for this patient: No video technology available to patient, please call patient over the phone     Daughter wants to 3 way in her mother. Call daughter Charla at [phone] and then 3 way with patient call [phone].     Kimberley Randall LPN at 12:18 PM on 8/25/2020.

## 2020-08-25 NOTE — PROGRESS NOTES
PHONE VISIT  Chief Complaint   Rachele Martínez is a 80 year old female presents for   Chief Complaint   Patient presents with     Pain     Per pts daughter pt is having pain.         History of Present Illness   She has been having pain in the back of her legs.  Can be very severe and limits her walking.  Recent ABIs showed noncompressable resting ABIs, thought to have bilateral iliac disease.    She is getting her scooter soon.    Medications and allergies were reviewed by me today.     Social History   History   Smoking Status     Former Smoker     Packs/day: 2.00     Years: 45.00     Types: Cigarettes     Start date: 1/1/1953     Quit date: 11/23/2002   Smokeless Tobacco     Never Used      PHQ-2 ( 1999 Pfizer) 1/24/2020 7/2/2019   Q1: Little interest or pleasure in doing things 0 1   Q2: Feeling down, depressed or hopeless 0 0   PHQ-2 Score 0 1     No flowsheet data found.    Past Medical History   Patient Active Problem List   Diagnosis     Cataract     Anxiety state     Insomnia     Hyperlipidemia with target LDL less than 130     History of hepatitis C     Iron deficiency anemia due to chronic blood loss     AVM (arteriovenous malformation) of small bowel, acquired     ESRD (end stage renal disease) on dialysis (H)     Cirrhosis of liver without ascites, unspecified hepatic cirrhosis type (H)     Tendency to fall     Hypertension goal BP (blood pressure) < 140/80     Diarrhea     Angiodysplasia of stomach and duodenum with hemorrhage     Spinal stenosis of lumbar region with neurogenic claudication       Review of Systems   5 point ROS completed and negative except noted above, including Gen, CV, Resp, GI, MS    Physical Exam   Phone Visit, n/a    Assessment & Plan      Bilateral PAD with claudication:  She is currently on atorvastatin.  BP has been well controlled.  Unable to take ASA due to recurrent GI bleeding.  - discussed importance of walking.  Referral to supervised exercise program placed  - start  cilostazol      Visit/Communication Style   Rachele agreed to a phone visit due to the COVID pandemic  Duration of Visit: 18 min       RTC: Return in about 2 months (around 10/25/2020).    Agnieszka Rodriguez MD

## 2020-08-26 ENCOUNTER — TELEPHONE (OUTPATIENT)
Dept: INTERNAL MEDICINE | Facility: CLINIC | Age: 80
End: 2020-08-26

## 2020-08-26 NOTE — TELEPHONE ENCOUNTER
2 month video  appointment made with Dr Rodriguez.   AVS sent via my chart  Selin Glover CMA at 10:18 AM on 8/26/2020.

## 2020-09-01 ENCOUNTER — TRANSFERRED RECORDS (OUTPATIENT)
Dept: HEALTH INFORMATION MANAGEMENT | Facility: CLINIC | Age: 80
End: 2020-09-01

## 2020-09-03 ENCOUNTER — HOSPITAL ENCOUNTER (OUTPATIENT)
Dept: CARDIAC REHAB | Facility: CLINIC | Age: 80
End: 2020-09-03
Attending: INTERNAL MEDICINE
Payer: MEDICARE

## 2020-09-03 DIAGNOSIS — I73.9 CLAUDICATION (H): ICD-10-CM

## 2020-09-03 DIAGNOSIS — I73.9 PERIPHERAL ARTERIAL DISEASE (H): ICD-10-CM

## 2020-09-03 PROCEDURE — 40001023 ZZH STATISTIC PAD/SET VISIT

## 2020-09-03 PROCEDURE — 93668 PERIPHERAL VASCULAR REHAB: CPT

## 2020-09-03 ASSESSMENT — 6 MINUTE WALK TEST (6MWT)
SITTING BLOOD PRESSURE: 130/64
DISTANCE (FT): 150
TOTAL DISTANCE WALKED (FT): 340
TOTAL TIME WALKED: 4.75
TIME: 4.5
BLOOD PRESSURE: 140/64

## 2020-09-08 ENCOUNTER — INFUSION THERAPY VISIT (OUTPATIENT)
Dept: INFUSION THERAPY | Facility: CLINIC | Age: 80
End: 2020-09-08
Payer: MEDICARE

## 2020-09-08 VITALS
DIASTOLIC BLOOD PRESSURE: 73 MMHG | BODY MASS INDEX: 24.41 KG/M2 | WEIGHT: 142.2 LBS | HEART RATE: 73 BPM | RESPIRATION RATE: 16 BRPM | SYSTOLIC BLOOD PRESSURE: 188 MMHG | TEMPERATURE: 98.1 F | OXYGEN SATURATION: 98 %

## 2020-09-08 DIAGNOSIS — R19.7 DIARRHEA, UNSPECIFIED TYPE: Primary | ICD-10-CM

## 2020-09-08 DIAGNOSIS — K55.20 AVM (ARTERIOVENOUS MALFORMATION) OF SMALL BOWEL, ACQUIRED: ICD-10-CM

## 2020-09-08 DIAGNOSIS — D50.0 IRON DEFICIENCY ANEMIA DUE TO CHRONIC BLOOD LOSS: ICD-10-CM

## 2020-09-08 DIAGNOSIS — K31.811 ANGIODYSPLASIA OF STOMACH AND DUODENUM WITH HEMORRHAGE: ICD-10-CM

## 2020-09-08 PROCEDURE — 96372 THER/PROPH/DIAG INJ SC/IM: CPT | Performed by: NURSE PRACTITIONER

## 2020-09-08 PROCEDURE — 99207 ZZC NO CHARGE NURSE ONLY: CPT

## 2020-09-08 RX ADMIN — OCTREOTIDE ACETATE 20 MG: KIT INTRAMUSCULAR at 11:51

## 2020-09-08 ASSESSMENT — PAIN SCALES - GENERAL: PAINLEVEL: EXTREME PAIN (9)

## 2020-09-08 NOTE — PROGRESS NOTES
Infusion Nursing Note:  Rachele Martínez presents today for Sandostatin.    Patient seen by provider today: No   present during visit today: Not Applicable.    Note: Receives Sandostatin for bleeding AVM. Denies dark stools but is feeling fatigued. Reports she is being assessed for peripheral arterial disease in her legs.       Intravenous Access:  No Intravenous access/labs at this visit.    Treatment Conditions:  Not Applicable.      Post Infusion Assessment:  Patient tolerated injection without incident.       Discharge Plan:   Patient discharged in stable condition accompanied by: self.  Departure Mode: Ambulatory-cane  Scheduled to return to clinic 10/6/20.    Kathrin Peters RN

## 2020-09-24 ENCOUNTER — MEDICAL CORRESPONDENCE (OUTPATIENT)
Dept: HEALTH INFORMATION MANAGEMENT | Facility: CLINIC | Age: 80
End: 2020-09-24

## 2020-10-01 ENCOUNTER — PATIENT OUTREACH (OUTPATIENT)
Dept: GASTROENTEROLOGY | Facility: CLINIC | Age: 80
End: 2020-10-01

## 2020-10-01 DIAGNOSIS — K74.60 CIRRHOSIS OF LIVER WITHOUT ASCITES, UNSPECIFIED HEPATIC CIRRHOSIS TYPE (H): Primary | ICD-10-CM

## 2020-10-01 DIAGNOSIS — K55.21 AVM (ARTERIOVENOUS MALFORMATION) OF SMALL BOWEL, ACQUIRED WITH HEMORRHAGE: ICD-10-CM

## 2020-10-01 NOTE — PROGRESS NOTES
Care Coordination Telephone Call  [facility]     Message from Leandro ORTEGA in general GI requesting follow up virtual visit with Dr. Baires.  Last seen by Dr. Baires in 2018.    Dr. Baires has reviewed and will plan for labs and virtual clinic visit in the next few weeks to discuss plan.     Message sent to scheduling to arrange and contact patient.     Jerilyn DOE, HNBC, STAR-T  RN Care Coordinator  [facility]  Ph: [phone]  FAX: [phone]

## 2020-10-05 ENCOUNTER — PATIENT OUTREACH (OUTPATIENT)
Dept: GASTROENTEROLOGY | Facility: CLINIC | Age: 80
End: 2020-10-05

## 2020-10-05 NOTE — PROGRESS NOTES
Care Coordination Telephone Call  [facility]     Called patient's daughter to  Plan for follow up with Dr. Baires to discuss ongoing Octreotide and plan going forward.  Asked for call back.  Explained that this visit can be done virtually.      I have asked the patient to call with any additional questions or concerns and have provided my contact information.    Plan:  Scheduling to contact    Jerilyn DOE, HNBC, STAR-T  RN Care Coordinator  [facility]  Ph: [phone]  FAX: [phone]

## 2020-10-06 ENCOUNTER — INFUSION THERAPY VISIT (OUTPATIENT)
Dept: INFUSION THERAPY | Facility: CLINIC | Age: 80
End: 2020-10-06
Payer: MEDICARE

## 2020-10-06 ENCOUNTER — TRANSFERRED RECORDS (OUTPATIENT)
Dept: HEALTH INFORMATION MANAGEMENT | Facility: CLINIC | Age: 80
End: 2020-10-06

## 2020-10-06 VITALS
RESPIRATION RATE: 16 BRPM | DIASTOLIC BLOOD PRESSURE: 81 MMHG | OXYGEN SATURATION: 97 % | SYSTOLIC BLOOD PRESSURE: 187 MMHG | TEMPERATURE: 97.9 F | WEIGHT: 138.2 LBS | HEART RATE: 73 BPM | BODY MASS INDEX: 23.72 KG/M2

## 2020-10-06 DIAGNOSIS — K31.811 ANGIODYSPLASIA OF STOMACH AND DUODENUM WITH HEMORRHAGE: ICD-10-CM

## 2020-10-06 DIAGNOSIS — K55.20 AVM (ARTERIOVENOUS MALFORMATION) OF SMALL BOWEL, ACQUIRED: ICD-10-CM

## 2020-10-06 DIAGNOSIS — R19.7 DIARRHEA, UNSPECIFIED TYPE: Primary | ICD-10-CM

## 2020-10-06 DIAGNOSIS — D50.0 IRON DEFICIENCY ANEMIA DUE TO CHRONIC BLOOD LOSS: ICD-10-CM

## 2020-10-06 PROCEDURE — 96372 THER/PROPH/DIAG INJ SC/IM: CPT | Performed by: NURSE PRACTITIONER

## 2020-10-06 PROCEDURE — 99207 PR NO CHARGE NURSE ONLY: CPT

## 2020-10-06 RX ADMIN — OCTREOTIDE ACETATE 20 MG: KIT INTRAMUSCULAR at 11:57

## 2020-10-06 NOTE — PROGRESS NOTES
Infusion Nursing Note:  Rachele Martínez presents today for Sandostatin.    Patient seen by provider today: No   present during visit today: Not Applicable.    Note: Denies signs of bleeding or dark tarry stools.    States BP has been high lately. Encouraged her to contact her MD about restarting her BP meds. She stated her BP was high while at dialysis as well. She has been having more pain to her back and leg lately.     Intravenous Access:  No Intravenous access/labs at this visit.    Treatment Conditions:  Not Applicable.      Post Infusion Assessment:  Patient tolerated injection without incident.       Discharge Plan:   Patient will return 11/3/20 for next appointment.   Patient discharged in stable condition accompanied by: self.  Departure Mode: Ambulatory.    Kathrin Peters RN

## 2020-10-13 NOTE — TELEPHONE ENCOUNTER
Called Charla, Mom has Dialysis on M, W, F and Mom is home by 3.  Mom is not an early riser.  Is there a way we can arrange something for them? Besides the upcoming 7:00 am or something past 3:00 pm perhaps?

## 2020-10-28 ENCOUNTER — PATIENT OUTREACH (OUTPATIENT)
Dept: GASTROENTEROLOGY | Facility: CLINIC | Age: 80
End: 2020-10-28

## 2020-10-28 NOTE — PROGRESS NOTES
Patient's care is be managed by Dr. Baires  Cancelled appointment with MsWaldemar Jose R  Daughter aware.   Message to Ms. Diaz to place new therapy plan.

## 2020-10-28 NOTE — PROGRESS NOTES
Patient overdue for appointment  Therapy plan   Video visit with daughter and patient scheduled for .       Sandostatin LAR 20mg

## 2020-10-29 ENCOUNTER — PATIENT OUTREACH (OUTPATIENT)
Dept: ONCOLOGY | Facility: CLINIC | Age: 80
End: 2020-10-29

## 2020-10-29 ENCOUNTER — CARE COORDINATION (OUTPATIENT)
Dept: ONCOLOGY | Facility: CLINIC | Age: 80
End: 2020-10-29

## 2020-10-29 DIAGNOSIS — K55.20 AVM (ARTERIOVENOUS MALFORMATION) OF SMALL BOWEL, ACQUIRED: Primary | ICD-10-CM

## 2020-10-30 ENCOUNTER — VIRTUAL VISIT (OUTPATIENT)
Dept: INTERNAL MEDICINE | Facility: CLINIC | Age: 80
End: 2020-10-30
Payer: MEDICARE

## 2020-10-30 DIAGNOSIS — I73.9 PERIPHERAL VASCULAR DISEASE (H): ICD-10-CM

## 2020-10-30 DIAGNOSIS — Z99.2 ESRD (END STAGE RENAL DISEASE) ON DIALYSIS (H): Primary | ICD-10-CM

## 2020-10-30 DIAGNOSIS — N18.6 ESRD (END STAGE RENAL DISEASE) ON DIALYSIS (H): Primary | ICD-10-CM

## 2020-10-30 DIAGNOSIS — I10 HYPERTENSION GOAL BP (BLOOD PRESSURE) < 140/80: Chronic | ICD-10-CM

## 2020-10-30 PROCEDURE — 99213 OFFICE O/P EST LOW 20 MIN: CPT | Mod: 95 | Performed by: INTERNAL MEDICINE

## 2020-10-30 NOTE — NURSING NOTE
Chief Complaint   Patient presents with     Recheck Medication     per pt daughter, follow up from a few months ago       Zandra Ng CMA, EMT at 1:23 PM on 10/30/2020.

## 2020-10-30 NOTE — PROGRESS NOTES
VIDEO VISIT    Assessment & Plan   ESRD (end stage renal disease) on dialysis (H)  Continue HD    Peripheral vascular disease (H)  Continue to encourage exercise.  Continue pletal    Hypertension goal BP (blood pressure) < 140/80  Being managed at HD      HM:  Already got flu shot    RTC: Return in about 6 months (around 4/30/2021).  Agnieszka Rodriguez MD  _________________________________________________________________________________________    Chief Complaint   Rachele Martínez is a 80 year old female presents for   Chief Complaint   Patient presents with     Recheck Medication     per pt daughter, follow up from a few months ago        SUBJECTIVE  Pain in her legs is much better since starting the pletal.    She has gone 3-4 to the supervised exercise program.  She would like to use the treadmill at home, but unfortunately the gym at home has been closed down due to COVID.  She has been walking out to the mailbox nearly every day.  No falls.    Continues to go to HD.  Her BP has fluctuated a lot but her nephrologist has been monitoring    Medications and allergies were reviewed by me today.     Social History   History   Smoking Status     Former Smoker     Packs/day: 2.00     Years: 45.00     Types: Cigarettes     Start date: 1/1/1953     Quit date: 11/23/2002   Smokeless Tobacco     Never Used      PHQ-2 ( 1999 Pfizer) 1/24/2020 7/2/2019   Q1: Little interest or pleasure in doing things 0 1   Q2: Feeling down, depressed or hopeless 0 0   PHQ-2 Score 0 1     No flowsheet data found.    Past Medical History   Patient Active Problem List   Diagnosis     Cataract     Anxiety state     Insomnia     Hyperlipidemia with target LDL less than 130     History of hepatitis C     Iron deficiency anemia due to chronic blood loss     AVM (arteriovenous malformation) of small bowel, acquired     ESRD (end stage renal disease) on dialysis (H)     Cirrhosis of liver without ascites, unspecified hepatic cirrhosis type  "(H)     Tendency to fall     Hypertension goal BP (blood pressure) < 140/80     Diarrhea     Angiodysplasia of stomach and duodenum with hemorrhage     Spinal stenosis of lumbar region with neurogenic claudication     Peripheral vascular disease (H)       Review of Systems   5 point ROS completed and negative except noted above, including Gen, CV, Resp, GI, MS    Physical Exam   Gen: no distress, comfortable, pleasant   Eyes: anicteric   Respiratory: able to talk in full sentences.  No evidence of respiratory distress  Skin: no concerning lesions, no jaundice   Psychological: appropriate mood       Visit/Communication Style   Type of service:  Video Visit  Video End Time:3:18 PM  Originating Location (pt. Location): Home  Distant Location (provider location):  Home   Platform used for Video Visit: Aquto     The patient has been notified of following:     \"This video visit will be conducted via a call between you and your physician/provider. We have found that certain health care needs can be provided without the need for an in-person physical exam.  This service lets us provide the care you need with a video conversation.  If a prescription is necessary we can send it directly to your pharmacy.  If lab work is needed we can place an order for that and you can then stop by our lab to have the test done at a later time.    Video visits are billed at different rates depending on your insurance coverage.  Please reach out to your insurance provider with any questions.    If during the course of the call the physician/provider feels a video visit is not appropriate, you will not be charged for this service.\"    Patient has given verbal consent for Video visit? Yes  How would you like to obtain your AVS? MyChart  If you are dropped from the video visit, the video invite should be resent to: Text to cell phone: [phone]  Will anyone else be joining your video visit? No      "

## 2020-11-03 ENCOUNTER — INFUSION THERAPY VISIT (OUTPATIENT)
Dept: INFUSION THERAPY | Facility: CLINIC | Age: 80
End: 2020-11-03
Payer: MEDICARE

## 2020-11-03 VITALS
DIASTOLIC BLOOD PRESSURE: 69 MMHG | HEART RATE: 76 BPM | TEMPERATURE: 98.4 F | SYSTOLIC BLOOD PRESSURE: 126 MMHG | OXYGEN SATURATION: 95 %

## 2020-11-03 DIAGNOSIS — K31.811 ANGIODYSPLASIA OF STOMACH AND DUODENUM WITH HEMORRHAGE: ICD-10-CM

## 2020-11-03 DIAGNOSIS — K55.20 AVM (ARTERIOVENOUS MALFORMATION) OF SMALL BOWEL, ACQUIRED: ICD-10-CM

## 2020-11-03 DIAGNOSIS — D50.0 IRON DEFICIENCY ANEMIA DUE TO CHRONIC BLOOD LOSS: ICD-10-CM

## 2020-11-03 DIAGNOSIS — R19.7 DIARRHEA, UNSPECIFIED TYPE: Primary | ICD-10-CM

## 2020-11-03 PROCEDURE — 96372 THER/PROPH/DIAG INJ SC/IM: CPT | Performed by: NURSE PRACTITIONER

## 2020-11-03 PROCEDURE — 99207 PR NO CHARGE NURSE ONLY: CPT

## 2020-11-03 RX ADMIN — OCTREOTIDE ACETATE 20 MG: KIT INTRAMUSCULAR at 12:15

## 2020-11-03 NOTE — PROGRESS NOTES
Infusion Nursing Note:  Rachele Martínez presents today for   Chief Complaint   Patient presents with     Allied Health Visit        Patient seen by provider today: No   present during visit today: Not Applicable.    Note: Patient was assessed by Betsy Osorio RN prior to injection.    Intravenous Access:  No Intravenous access/labs at this visit.    Treatment Conditions:  Not Applicable.      Post Infusion Assessment:  Patient tolerated injection without incident.  Site patent and intact, free from redness, edema or discomfort.       Discharge Plan:     Patient discharged in stable condition accompanied by: self.    Florence To CMA

## 2020-11-04 ENCOUNTER — VIRTUAL VISIT (OUTPATIENT)
Dept: GASTROENTEROLOGY | Facility: CLINIC | Age: 80
End: 2020-11-04
Attending: INTERNAL MEDICINE
Payer: MEDICARE

## 2020-11-04 DIAGNOSIS — K55.20 AVM (ARTERIOVENOUS MALFORMATION) OF SMALL BOWEL, ACQUIRED: Primary | ICD-10-CM

## 2020-11-04 PROCEDURE — 99213 OFFICE O/P EST LOW 20 MIN: CPT | Mod: 95 | Performed by: INTERNAL MEDICINE

## 2020-11-04 RX ORDER — LOSARTAN POTASSIUM 25 MG/1
100 TABLET ORAL DAILY
Status: ON HOLD | COMMUNITY
End: 2023-05-13

## 2020-11-04 RX ORDER — LOSARTAN POTASSIUM 25 MG/1
TABLET ORAL
COMMUNITY
Start: 2020-10-26 | End: 2020-11-04

## 2020-11-04 RX ORDER — WARFARIN SODIUM 5 MG/1
1 TABLET ORAL
COMMUNITY
Start: 2019-04-29 | End: 2020-11-04

## 2020-11-04 RX ORDER — POLYETHYLENE GLYCOL 3350 17 G/17G
17 POWDER, FOR SOLUTION ORAL DAILY PRN
COMMUNITY
Start: 2020-08-28 | End: 2023-07-31

## 2020-11-04 RX ORDER — PREDNISONE 20 MG/1
TABLET ORAL
COMMUNITY
Start: 2020-08-28 | End: 2020-11-04

## 2020-11-04 NOTE — LETTER
"    11/4/2020         RE: Rachele Martínez  [address]  [facility] [id number]        Dear Colleague,    Thank you for referring your patient, Rachele Martínez, to the [facility]. Please see a copy of my visit note below.    Rachele Martínez is a 80 year old female who is being evaluated via a billable video visit.      The patient has been notified of following:     \"This video visit will be conducted via a call between you and your physician/provider. We have found that certain health care needs can be provided without the need for an in-person physical exam.  This service lets us provide the care you need with a video conversation.  If a prescription is necessary we can send it directly to your pharmacy.  If lab work is needed we can place an order for that and you can then stop by our lab to have the test done at a later time.    Video visits are billed at different rates depending on your insurance coverage.  Please reach out to your insurance provider with any questions.    If during the course of the call the physician/provider feels a video visit is not appropriate, you will not be charged for this service.\"    Patient has given verbal consent for Video visit? Yes    How would you like to obtain your AVS? MyChart    If you are dropped from the video visit, the video invite should be resent to: Text to cell phone: [phone]    Will anyone else be joining your video visit? No         I have reviewed and updated the patient's allergies and medication list.    Concerns: No new concerns.   Refills: None needed.         Lianna Meza CMA      Video-Visit Details    Type of service:  Video Visit    Video Start Time: 9:35 AM  Video End Time: 9:41 AM    Originating Location (pt. 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 She is accompanied by her daughter via video visit. She has been doing well without signs of bleeding for the past ~18 months. I reviewed her Hgb for the past year and it has been in the 9-12 range with her last hgb at 11.6 and Ferritin at 626/%FE sat 36%. No change in interventions needed. Her octreotide therapy plan was renewed.       RECOMMENDATIONS:  - Continue monthly octreotide  - Follow up in 1 year      Ankit Baires MD  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 She is accompanied by her daughter via video visit. She has been doing very well in terms of bleeding. I had initially performed endoscopic therapy on her in 9/2018. She did well for awhile but had a bleeding episode in 3/2019 that required endoscopic intervention by Dr. Perkins for duodenal angioectasias. Since then she has had no further bleeding episodes. She reports brown stools. She cannot remember the last time she needed a blood transfusion. She has been on monthly octreotide 20 mg depot shots that she receives at  the infusion center and has been on this nearly 2 years. Her main clinical issue lately has been back pain.    PMHx:  Past Medical History:   Diagnosis Date     Anemia      Anxiety and depression      Arthritis      AVM (arteriovenous malformation)      Chronic hepatitis C with cirrhosis (H)      Clotting disorder (H)      ESRD (end stage renal disease) (H)     on dialysis     GI bleed     recurrent     Glaucoma      Hyperlipidemia      Hypertension goal BP (blood pressure) < 140/80        PSurgHx:  Past Surgical History:   Procedure Laterality Date     CAPSULE/PILL CAM ENDOSCOPY N/A 3/27/2019    Procedure: Capsule/pill cam endoscopy;  Surgeon: Yosvany Ram MD;  Location:  GI     COLONOSCOPY N/A 9/4/2015    Procedure: COMBINED COLONOSCOPY, SINGLE OR MULTIPLE BIOPSY/POLYPECTOMY BY BIOPSY;  Surgeon: Rupesh Lopez MD;  [facility]     COLONOSCOPY N/A 9/19/2018    Procedure: COLONOSCOPY;  enteroscopy small bowel  COLONOSCOPY;  Surgeon: Ankit Baires MD;  Location:  GI     ESOPHAGOSCOPY, GASTROSCOPY, DUODENOSCOPY (EGD), COMBINED N/A 12/18/2014    Procedure: COMBINED ESOPHAGOSCOPY, GASTROSCOPY, DUODENOSCOPY (EGD);  Surgeon: Betsy Carvajal MD;  Location:  GI     ESOPHAGOSCOPY, GASTROSCOPY, DUODENOSCOPY (EGD), COMBINED N/A 4/25/2015    Procedure: COMBINED ESOPHAGOSCOPY, GASTROSCOPY, DUODENOSCOPY (EGD);  Surgeon: Yosvany Ram MD;  Location:  GI     ESOPHAGOSCOPY, GASTROSCOPY, DUODENOSCOPY (EGD), COMBINED N/A 5/5/2015    Procedure: COMBINED ESOPHAGOSCOPY, GASTROSCOPY, DUODENOSCOPY (EGD);  Surgeon: Mariano Mistry MD;  Location: [facility]     HC CAPSULE ENDOSCOPY N/A 9/30/2015    Procedure: CAPSULE/PILL CAM ENDOSCOPY;  Surgeon: Pan Dhaliwal MD;  Location:  GI      VASCULAR SURGERY PROCEDURE UNLIST       HYSTERECTOMY  1980    KYREE     LUMPECTOMY BREAST         MEDS:  Current Outpatient Medications   Medication     atorvastatin (LIPITOR) 20 MG tablet     Calcium Acetate,  Phos Binder, 667 MG CAPS     cilostazol (PLETAL) 100 MG tablet     epoetin christofer-epbx (RETACRIT) 75927 UNIT/ML injection     gabapentin (NEURONTIN) 300 MG capsule     losartan (COZAAR) 25 MG tablet     octreotide (SANDOSTATIN LAR) 20 MG injection     order for DME     order for DME     pantoprazole (PROTONIX) 20 MG EC tablet     polyethylene glycol (MIRALAX) 17 GM/Dose powder     sertraline (ZOLOFT) 50 MG tablet     No current facility-administered medications for this visit.      ALLERGIES:    Allergies   Allergen Reactions     Abacavir Itching     Lisinopril Cough     Diovan Hct Itching     severe     Dust Mites      Hydrochlorothiazide Itching     Severe       No Clinical Screening - See Comments      History of blood transfusion reactions and pre-treats with Benadryl.      Oxycodone-Acetaminophen      Spironolactone Nausea     Sulfa Drugs Hives     Valsartan Itching     FHx:  Family History   Problem Relation Age of Onset     Diabetes Mother      Alzheimer Disease Mother      Substance Abuse Son      Cancer Sister      Soft Tissue Cancer Sister      Breast Cancer Sister      Hyperlipidemia Daughter      Alcoholism Brother      Spine Problems Sister        SOCIAL Hx:  Social History     Socioeconomic History     Marital status:      Spouse name: Not on file     Number of children: Not on file     Years of education: Not on file     Highest education level: Not on file   Occupational History     Not on file   Social Needs     Financial resource strain: Not on file     Food insecurity     Worry: Not on file     Inability: Not on file     Transportation needs     Medical: Not on file     Non-medical: Not on file   Tobacco Use     Smoking status: Former Smoker     Packs/day: 2.00     Years: 45.00     Pack years: 90.00     Types: Cigarettes     Start date: 1953     Quit date: 2002     Years since quittin.9     Smokeless tobacco: Never Used   Substance and Sexual Activity     Alcohol use: No      Drug use: Yes     Types: Marijuana     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     Sexual activity: Not Currently   Lifestyle     Physical activity     Days per week: Not on file     Minutes per session: Not on file     Stress: Not on file   Relationships     Social connections     Talks on phone: Not on file     Gets together: Not on file     Attends [employer] service: Not on file     Active member of club or organization: Not on file     Attends meetings of clubs or organizations: Not on file     Relationship status: Not on file     Intimate partner violence     Fear of current or ex partner: Not on file     Emotionally abused: Not on file     Physically abused: Not on file     Forced sexual activity: Not on file   Other Topics Concern     Parent/sibling w/ CABG, MI or angioplasty before 65F 55M? No   Social History Narrative     Not on file       ROS: A comprehensive Review of Systems was asked and answered in the negative unless specifically commented upon in the HPI    Physical Exam  Gen: A&Ox3, NAD  HEENT: Moist mucus membranes, no scleral icterus.  Lungs: no respiratory distress      LABS:  Orders Only on 05/26/2020   Component Date Value Ref Range Status     INR 05/26/2020 1.31* 0.86 - 1.14 Final     WBC 05/26/2020 5.5  4.0 - 11.0 10e9/L Final     RBC Count 05/26/2020 3.92  3.8 - 5.2 10e12/L Final     Hemoglobin 05/26/2020 12.8  11.7 - 15.7 g/dL Final     Hematocrit 05/26/2020 41.1  35.0 - 47.0 % Final     MCV 05/26/2020 105* 78 - 100 fl Final     MCH 05/26/2020 32.7  26.5 - 33.0 pg Final     MCHC 05/26/2020 31.1* 31.5 - 36.5 g/dL Final     RDW 05/26/2020 16.0* 10.0 - 15.0 % Final     Platelet Count 05/26/2020 262  150 - 450 10e9/L Final     Sodium 05/26/2020 138  133 - 144 mmol/L Final     Potassium 05/26/2020 4.1  3.4 - 5.3 mmol/L Final     Chloride 05/26/2020 98  94 - 109 mmol/L Final     Carbon Dioxide 05/26/2020 28  20 - 32 mmol/L Final     Anion Gap 05/26/2020 12  3 - 14  mmol/L Final     Glucose 05/26/2020 107* 70 - 99 mg/dL Final     Urea Nitrogen 05/26/2020 23  7 - 30 mg/dL Final     Creatinine 05/26/2020 7.50* 0.52 - 1.04 mg/dL Final     GFR Estimate 05/26/2020 5* >60 mL/min/[1.73_m2] Final    Comment: Non  GFR Calc  Starting 12/18/2018, serum creatinine based estimated GFR (eGFR) will be   calculated using the Chronic Kidney Disease Epidemiology Collaboration   (CKD-EPI) equation.       GFR Estimate If Black 05/26/2020 5* >60 mL/min/[1.73_m2] Final    Comment:  GFR Calc  Starting 12/18/2018, serum creatinine based estimated GFR (eGFR) will be   calculated using the Chronic Kidney Disease Epidemiology Collaboration   (CKD-EPI) equation.       Calcium 05/26/2020 8.8  8.5 - 10.1 mg/dL Final     Bilirubin Direct 05/26/2020 0.4* 0.0 - 0.2 mg/dL Final     Bilirubin Total 05/26/2020 1.0  0.2 - 1.3 mg/dL Final     Albumin 05/26/2020 3.8  3.4 - 5.0 g/dL Final     Protein Total 05/26/2020 8.7  6.8 - 8.8 g/dL Final     Alkaline Phosphatase 05/26/2020 136  40 - 150 U/L Final     ALT 05/26/2020 20  0 - 50 U/L Final     AST 05/26/2020 21  0 - 45 U/L Final       Again, thank you for allowing me to participate in the care of your patient.      Sincerely,      Ankit Baires MD

## 2020-11-04 NOTE — PROGRESS NOTES
"Rachele Martínez is a 80 year old female who is being evaluated via a billable video visit.      The patient has been notified of following:     \"This video visit will be conducted via a call between you and your physician/provider. We have found that certain health care needs can be provided without the need for an in-person physical exam.  This service lets us provide the care you need with a video conversation.  If a prescription is necessary we can send it directly to your pharmacy.  If lab work is needed we can place an order for that and you can then stop by our lab to have the test done at a later time.    Video visits are billed at different rates depending on your insurance coverage.  Please reach out to your insurance provider with any questions.    If during the course of the call the physician/provider feels a video visit is not appropriate, you will not be charged for this service.\"    Patient has given verbal consent for Video visit? Yes    How would you like to obtain your AVS? MyChart    If you are dropped from the video visit, the video invite should be resent to: Text to cell phone: [phone]    Will anyone else be joining your video visit? No         I have reviewed and updated the patient's allergies and medication list.    Concerns: No new concerns.   Refills: None needed.         Lianna Meza CMA      Video-Visit Details    Type of service:  Video Visit    Video Start Time: 9:35 AM  Video End Time: 9:41 AM    Originating Location (pt. Location): Home    Distant Location (provider location):  [facility]     Platform used for Video Visit: Kimmy Baires MD    INTERVENTIONAL ENDOSCOPY OUTPATIENT CLINIC CONSULT  DATE OF SERVICE: 11/4/2020  Reason for Consultation: chronic small bowel bleeding from angioectasias    ASSESSMENT:  Rachele is a 80 year old female with a PMHx of ESRD on HD and h/o HCV s/p treatment with SVR and compensated cirrhosis who is " here for follow up for recurrent GI bleeding from angioectasias in the stomach, small bowel, and cecum s/p prior endoscopic therapy and chronic monthly octreotide depot injections. She is accompanied by her daughter via video visit. She has been doing well without signs of bleeding for the past ~18 months. I reviewed her Hgb for the past year and it has been in the 9-12 range with her last hgb at 11.6 and Ferritin at 626/%FE sat 36%. No change in interventions needed. Her octreotide therapy plan was renewed.       RECOMMENDATIONS:  - Continue monthly octreotide  - Follow up in 1 year      Ankit Baires MD  [facility]  Division of Gastroenterology and Hepatology  [facility] [address] [address]    ________________________________________________________________  HPI:  Rachele is a 80 year old female with a PMHx of ESRD on HD and h/o HCV s/p treatment with SVR and compensated cirrhosis who is here for follow up for recurrent GI bleeding from angioectasias in the stomach, small bowel, and cecum s/p prior endoscopic therapy and chronic monthly octreotide depot injections. She is accompanied by her daughter via video visit. She has been doing very well in terms of bleeding. I had initially performed endoscopic therapy on her in 9/2018. She did well for awhile but had a bleeding episode in 3/2019 that required endoscopic intervention by Dr. Perkins for duodenal angioectasias. Since then she has had no further bleeding episodes. She reports brown stools. She cannot remember the last time she needed a blood transfusion. She has been on monthly octreotide 20 mg depot shots that she receives at the infusion center and has been on this nearly 2 years. Her main clinical issue lately has been back pain.    PMHx:  Past Medical History:   Diagnosis Date     Anemia      Anxiety and depression      Arthritis      AVM (arteriovenous malformation)      Chronic hepatitis C with  cirrhosis (H)      Clotting disorder (H)      ESRD (end stage renal disease) (H)     on dialysis     GI bleed     recurrent     Glaucoma      Hyperlipidemia      Hypertension goal BP (blood pressure) < 140/80        PSurgHx:  Past Surgical History:   Procedure Laterality Date     CAPSULE/PILL CAM ENDOSCOPY N/A 3/27/2019    Procedure: Capsule/pill cam endoscopy;  Surgeon: Yosvany Ram MD;  [facility]     COLONOSCOPY N/A 9/4/2015    Procedure: COMBINED COLONOSCOPY, SINGLE OR MULTIPLE BIOPSY/POLYPECTOMY BY BIOPSY;  Surgeon: Rupesh Lopez MD;  [facility]     COLONOSCOPY N/A 9/19/2018    Procedure: COLONOSCOPY;  enteroscopy small bowel  COLONOSCOPY;  Surgeon: Ankit Baires MD;  [facility]     ESOPHAGOSCOPY, GASTROSCOPY, DUODENOSCOPY (EGD), COMBINED N/A 12/18/2014    Procedure: COMBINED ESOPHAGOSCOPY, GASTROSCOPY, DUODENOSCOPY (EGD);  Surgeon: Betsy Carvajal MD;  [facility]     ESOPHAGOSCOPY, GASTROSCOPY, DUODENOSCOPY (EGD), COMBINED N/A 4/25/2015    Procedure: COMBINED ESOPHAGOSCOPY, GASTROSCOPY, DUODENOSCOPY (EGD);  Surgeon: Yosvany Ram MD;  [facility]     ESOPHAGOSCOPY, GASTROSCOPY, DUODENOSCOPY (EGD), COMBINED N/A 5/5/2015    Procedure: COMBINED ESOPHAGOSCOPY, GASTROSCOPY, DUODENOSCOPY (EGD);  Surgeon: Mariano Mistry MD;  Location:  GI     HC CAPSULE ENDOSCOPY N/A 9/30/2015    Procedure: CAPSULE/PILL CAM ENDOSCOPY;  Surgeon: Pan Dhaliwal MD;  Location:  GI     HC VASCULAR SURGERY PROCEDURE UNLIST       HYSTERECTOMY  1980    KYREE     LUMPECTOMY BREAST         MEDS:  Current Outpatient Medications   Medication     atorvastatin (LIPITOR) 20 MG tablet     Calcium Acetate, Phos Binder, 667 MG CAPS     cilostazol (PLETAL) 100 MG tablet     epoetin christofer-epbx (RETACRIT) 51883 UNIT/ML injection     gabapentin (NEURONTIN) 300 MG capsule     losartan (COZAAR) 25 MG tablet     octreotide (SANDOSTATIN LAR) 20 MG injection     order for DME     order for  DME     pantoprazole (PROTONIX) 20 MG EC tablet     polyethylene glycol (MIRALAX) 17 GM/Dose powder     sertraline (ZOLOFT) 50 MG tablet     No current facility-administered medications for this visit.      ALLERGIES:    Allergies   Allergen Reactions     Abacavir Itching     Lisinopril Cough     Diovan Hct Itching     severe     Dust Mites      Hydrochlorothiazide Itching     Severe       No Clinical Screening - See Comments      History of blood transfusion reactions and pre-treats with Benadryl.      Oxycodone-Acetaminophen      Spironolactone Nausea     Sulfa Drugs Hives     Valsartan Itching     FHx:  Family History   Problem Relation Age of Onset     Diabetes Mother      Alzheimer Disease Mother      Substance Abuse Son      Cancer Sister      Soft Tissue Cancer Sister      Breast Cancer Sister      Hyperlipidemia Daughter      Alcoholism Brother      Spine Problems Sister        SOCIAL Hx:  Social History     Socioeconomic History     Marital status:      Spouse name: Not on file     Number of children: Not on file     Years of education: Not on file     Highest education level: Not on file   Occupational History     Not on file   Social Needs     Financial resource strain: Not on file     Food insecurity     Worry: Not on file     Inability: Not on file     Transportation needs     Medical: Not on file     Non-medical: Not on file   Tobacco Use     Smoking status: Former Smoker     Packs/day: 2.00     Years: 45.00     Pack years: 90.00     Types: Cigarettes     Start date: 1953     Quit date: 2002     Years since quittin.9     Smokeless tobacco: Never Used   Substance and Sexual Activity     Alcohol use: No     Drug use: Yes     Types: Marijuana     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     Sexual activity: Not Currently   Lifestyle     Physical activity     Days per week: Not on file     Minutes per session: Not on file     Stress: Not on file    Relationships     Social connections     Talks on phone: Not on file     Gets together: Not on file     Attends [employer] service: Not on file     Active member of club or organization: Not on file     Attends meetings of clubs or organizations: Not on file     Relationship status: Not on file     Intimate partner violence     Fear of current or ex partner: Not on file     Emotionally abused: Not on file     Physically abused: Not on file     Forced sexual activity: Not on file   Other Topics Concern     Parent/sibling w/ CABG, MI or angioplasty before 65F 55M? No   Social History Narrative     Not on file       ROS: A comprehensive Review of Systems was asked and answered in the negative unless specifically commented upon in the HPI    Physical Exam  Gen: A&Ox3, NAD  HEENT: Moist mucus membranes, no scleral icterus.  Lungs: no respiratory distress      LABS:  Orders Only on 05/26/2020   Component Date Value Ref Range Status     INR 05/26/2020 1.31* 0.86 - 1.14 Final     WBC 05/26/2020 5.5  4.0 - 11.0 10e9/L Final     RBC Count 05/26/2020 3.92  3.8 - 5.2 10e12/L Final     Hemoglobin 05/26/2020 12.8  11.7 - 15.7 g/dL Final     Hematocrit 05/26/2020 41.1  35.0 - 47.0 % Final     MCV 05/26/2020 105* 78 - 100 fl Final     MCH 05/26/2020 32.7  26.5 - 33.0 pg Final     MCHC 05/26/2020 31.1* 31.5 - 36.5 g/dL Final     RDW 05/26/2020 16.0* 10.0 - 15.0 % Final     Platelet Count 05/26/2020 262  150 - 450 10e9/L Final     Sodium 05/26/2020 138  133 - 144 mmol/L Final     Potassium 05/26/2020 4.1  3.4 - 5.3 mmol/L Final     Chloride 05/26/2020 98  94 - 109 mmol/L Final     Carbon Dioxide 05/26/2020 28  20 - 32 mmol/L Final     Anion Gap 05/26/2020 12  3 - 14 mmol/L Final     Glucose 05/26/2020 107* 70 - 99 mg/dL Final     Urea Nitrogen 05/26/2020 23  7 - 30 mg/dL Final     Creatinine 05/26/2020 7.50* 0.52 - 1.04 mg/dL Final     GFR Estimate 05/26/2020 5* >60 mL/min/[1.73_m2] Final    Comment: Non  GFR  Calc  Starting 12/18/2018, serum creatinine based estimated GFR (eGFR) will be   calculated using the Chronic Kidney Disease Epidemiology Collaboration   (CKD-EPI) equation.       GFR Estimate If Black 05/26/2020 5* >60 mL/min/[1.73_m2] Final    Comment:  GFR Calc  Starting 12/18/2018, serum creatinine based estimated GFR (eGFR) will be   calculated using the Chronic Kidney Disease Epidemiology Collaboration   (CKD-EPI) equation.       Calcium 05/26/2020 8.8  8.5 - 10.1 mg/dL Final     Bilirubin Direct 05/26/2020 0.4* 0.0 - 0.2 mg/dL Final     Bilirubin Total 05/26/2020 1.0  0.2 - 1.3 mg/dL Final     Albumin 05/26/2020 3.8  3.4 - 5.0 g/dL Final     Protein Total 05/26/2020 8.7  6.8 - 8.8 g/dL Final     Alkaline Phosphatase 05/26/2020 136  40 - 150 U/L Final     ALT 05/26/2020 20  0 - 50 U/L Final     AST 05/26/2020 21  0 - 45 U/L Final

## 2020-11-08 ENCOUNTER — HEALTH MAINTENANCE LETTER (OUTPATIENT)
Age: 80
End: 2020-11-08

## 2020-11-18 ENCOUNTER — TELEPHONE (OUTPATIENT)
Dept: INTERNAL MEDICINE | Facility: CLINIC | Age: 80
End: 2020-11-18

## 2020-11-18 DIAGNOSIS — M54.41 CHRONIC BILATERAL LOW BACK PAIN WITH RIGHT-SIDED SCIATICA: ICD-10-CM

## 2020-11-18 DIAGNOSIS — E78.6 HIGH DENSITY LIPOPROTEIN (HDL) LESS THAN 40 MG/DL: Primary | ICD-10-CM

## 2020-11-18 DIAGNOSIS — R41.89 COGNITIVE IMPAIRMENT: ICD-10-CM

## 2020-11-18 DIAGNOSIS — M51.369 LUMBAR DEGENERATIVE DISC DISEASE: ICD-10-CM

## 2020-11-18 DIAGNOSIS — I73.9 PERIPHERAL ARTERIAL DISEASE (H): ICD-10-CM

## 2020-11-18 DIAGNOSIS — G89.29 CHRONIC BILATERAL LOW BACK PAIN WITH RIGHT-SIDED SCIATICA: ICD-10-CM

## 2020-11-18 DIAGNOSIS — I73.9 CLAUDICATION (H): ICD-10-CM

## 2020-11-23 RX ORDER — GABAPENTIN 300 MG/1
300 CAPSULE ORAL AT BEDTIME
Qty: 90 CAPSULE | Refills: 3 | Status: SHIPPED | OUTPATIENT
Start: 2020-11-23 | End: 2021-11-08

## 2020-11-23 RX ORDER — ATORVASTATIN CALCIUM 20 MG/1
20 TABLET, FILM COATED ORAL DAILY
Qty: 30 TABLET | Refills: 0 | Status: SHIPPED | OUTPATIENT
Start: 2020-11-23 | End: 2021-01-05

## 2020-11-23 NOTE — TELEPHONE ENCOUNTER
CILOSTAZOL 100 MG TABLET      Last Written Prescription Date:  8/25/20  Last Fill Quantity: 60,   # refills: 3  Last Office Visit : 10/30/20 recommended 6 month follow up  Future Office visit:  None scheduled    Routing refill request to provider for review/approval because:  Last creatinine in EMR    Lab Test 05/26/20  1106   CR 7.50*     Last creatinine in care everywhere 10/12/20 at 10.21  ESRD on dialysis  OK to continue?   house

## 2020-11-23 NOTE — TELEPHONE ENCOUNTER
atorvastatin (LIPITOR) 20 MG tablet      Last Written Prescription Date:  8-18-20  Last Fill Quantity: 90,   # refills: 0  Last Office Visit : 10-30-20  Future Office visit:  none    Routing refill request to provider for review/approval because:  Overdue lab: LDL   - this is a second refill request for medication with out or overdue labs. With last request pt was given 90 day trice and Lianna notified.    gabapentin (NEURONTIN) 300 MG       Last Written Prescription Date:  8-9-19  Last Fill Quantity: 90,   # refills: 3      Routing refill request to provider for review/approval because:  Drug not on the [facility] or [facility] refill protocol or controlled substance

## 2020-11-24 RX ORDER — CILOSTAZOL 100 MG/1
100 TABLET ORAL 2 TIMES DAILY
Qty: 180 TABLET | Refills: 1 | Status: SHIPPED | OUTPATIENT
Start: 2020-11-24 | End: 2021-05-26

## 2020-11-24 NOTE — TELEPHONE ENCOUNTER
Dannielle -     The reason there are no recommendations is because there is little data on the safety and efficacy of cilostazol in this population. There is some retrospective evidence suggesting cilostazol is helpful in reducing CVD events in HD patients. They also suggest that there is less bleeding with cilostazol than aspirin in this population. This small, retrospective study used cilostazol 50 mg twice daily. Cilostazol is renally removed and is not dialyzable. However, given the lack of data, I don't have a strong dosing recommendation. Monitoring for bleeding will be the primary concern. If she does not tolerate 100 mg twice daily due to headache, diarrhea or palpitations, reducing the dose to 50 mg twice daily is reasonable.     Reid

## 2020-11-24 NOTE — TELEPHONE ENCOUNTER
Reid -- what I read suggests to use cilostazol with caution in ESRD on HD, but no dose adjustments.  Just double checking that is correct.  She had a significant improvement in claudication symptoms with starting it.

## 2020-11-27 ENCOUNTER — TELEPHONE (OUTPATIENT)
Dept: INTERNAL MEDICINE | Facility: CLINIC | Age: 80
End: 2020-11-27

## 2020-11-27 NOTE — TELEPHONE ENCOUNTER
[facility] Call [facility]    Phone Message    May a detailed message be left on voicemail: yes     Reason for Call: Other: Michelle called looking to get verbal orders to delay the start of care. Please follow up [phone]     Action Taken: Message routed to:  [facility] PCC    Travel Screening: Not Applicable

## 2020-11-29 ENCOUNTER — MEDICAL CORRESPONDENCE (OUTPATIENT)
Dept: HEALTH INFORMATION MANAGEMENT | Facility: CLINIC | Age: 80
End: 2020-11-29

## 2020-11-29 ENCOUNTER — TRANSFERRED RECORDS (OUTPATIENT)
Dept: HEALTH INFORMATION MANAGEMENT | Facility: CLINIC | Age: 80
End: 2020-11-29

## 2020-11-30 NOTE — TELEPHONE ENCOUNTER
I called and left VM approving delay start of care until today 11/30/20  Dana Karimi, EMT at 1:06 PM on 11/30/2020.

## 2020-12-01 ENCOUNTER — MEDICAL CORRESPONDENCE (OUTPATIENT)
Dept: HEALTH INFORMATION MANAGEMENT | Facility: CLINIC | Age: 80
End: 2020-12-01

## 2020-12-02 ENCOUNTER — MEDICAL CORRESPONDENCE (OUTPATIENT)
Dept: HEALTH INFORMATION MANAGEMENT | Facility: CLINIC | Age: 80
End: 2020-12-02

## 2020-12-07 ENCOUNTER — TRANSFERRED RECORDS (OUTPATIENT)
Dept: HEALTH INFORMATION MANAGEMENT | Facility: CLINIC | Age: 80
End: 2020-12-07

## 2020-12-17 ENCOUNTER — MEDICAL CORRESPONDENCE (OUTPATIENT)
Dept: HEALTH INFORMATION MANAGEMENT | Facility: CLINIC | Age: 80
End: 2020-12-17

## 2020-12-17 ENCOUNTER — INFUSION THERAPY VISIT (OUTPATIENT)
Dept: INFUSION THERAPY | Facility: CLINIC | Age: 80
End: 2020-12-17
Payer: MEDICARE

## 2020-12-17 ENCOUNTER — TELEPHONE (OUTPATIENT)
Dept: INTERNAL MEDICINE | Facility: CLINIC | Age: 80
End: 2020-12-17

## 2020-12-17 VITALS
TEMPERATURE: 98.4 F | DIASTOLIC BLOOD PRESSURE: 66 MMHG | HEART RATE: 56 BPM | SYSTOLIC BLOOD PRESSURE: 185 MMHG | OXYGEN SATURATION: 100 %

## 2020-12-17 DIAGNOSIS — K55.20 AVM (ARTERIOVENOUS MALFORMATION) OF SMALL BOWEL, ACQUIRED: ICD-10-CM

## 2020-12-17 DIAGNOSIS — K31.811 ANGIODYSPLASIA OF STOMACH AND DUODENUM WITH HEMORRHAGE: ICD-10-CM

## 2020-12-17 DIAGNOSIS — R19.7 DIARRHEA, UNSPECIFIED TYPE: Primary | ICD-10-CM

## 2020-12-17 DIAGNOSIS — D50.0 IRON DEFICIENCY ANEMIA DUE TO CHRONIC BLOOD LOSS: ICD-10-CM

## 2020-12-17 PROCEDURE — 99207 PR NO CHARGE NURSE ONLY: CPT

## 2020-12-17 PROCEDURE — 96372 THER/PROPH/DIAG INJ SC/IM: CPT | Performed by: NURSE PRACTITIONER

## 2020-12-17 RX ADMIN — OCTREOTIDE ACETATE 20 MG: KIT INTRAMUSCULAR at 12:28

## 2020-12-17 NOTE — PROGRESS NOTES
Infusion Nursing Note:  Rachele Martínez presents today for   Chief Complaint   Patient presents with     Allied Health Visit     Renetta        Patient seen by provider today: No   present during visit today: Not Applicable.    Note: Patient assessed by Dannielle Tejada RN prior to injection.    Intravenous Access:  No Intravenous access/labs at this visit.    Treatment Conditions:  Not Applicable.      Post Infusion Assessment:  Patient tolerated injection without incident.  Site patent and intact, free from redness, edema or discomfort.       Discharge Plan:   Patient discharged in stable condition accompanied by: self.    Florence To CMA

## 2020-12-17 NOTE — TELEPHONE ENCOUNTER
[facility] Call Center    Phone Message    May a detailed message be left on voicemail: yes     Reason for Call: Other: Patient is discharging from physical therapy today. If there's any questions please call Bruce heredia.      Action Taken: Message routed to:  [facility] ALYSON    Travel Screening: Not Applicable

## 2020-12-21 DIAGNOSIS — L29.9 ITCHING: ICD-10-CM

## 2020-12-22 ENCOUNTER — MEDICAL CORRESPONDENCE (OUTPATIENT)
Dept: HEALTH INFORMATION MANAGEMENT | Facility: CLINIC | Age: 80
End: 2020-12-22

## 2020-12-22 ENCOUNTER — TRANSFERRED RECORDS (OUTPATIENT)
Dept: HEALTH INFORMATION MANAGEMENT | Facility: CLINIC | Age: 80
End: 2020-12-22

## 2020-12-26 ENCOUNTER — MEDICAL CORRESPONDENCE (OUTPATIENT)
Dept: HEALTH INFORMATION MANAGEMENT | Facility: CLINIC | Age: 80
End: 2020-12-26

## 2020-12-26 PROCEDURE — G0180 MD CERTIFICATION HHA PATIENT: HCPCS | Performed by: INTERNAL MEDICINE

## 2020-12-31 ENCOUNTER — MEDICAL CORRESPONDENCE (OUTPATIENT)
Dept: HEALTH INFORMATION MANAGEMENT | Facility: CLINIC | Age: 80
End: 2020-12-31

## 2021-01-14 ENCOUNTER — INFUSION THERAPY VISIT (OUTPATIENT)
Dept: INFUSION THERAPY | Facility: CLINIC | Age: 81
End: 2021-01-14
Payer: MEDICARE

## 2021-01-14 VITALS
TEMPERATURE: 98.5 F | OXYGEN SATURATION: 96 % | HEART RATE: 84 BPM | DIASTOLIC BLOOD PRESSURE: 79 MMHG | SYSTOLIC BLOOD PRESSURE: 169 MMHG

## 2021-01-14 DIAGNOSIS — D50.0 IRON DEFICIENCY ANEMIA DUE TO CHRONIC BLOOD LOSS: ICD-10-CM

## 2021-01-14 DIAGNOSIS — R19.7 DIARRHEA, UNSPECIFIED TYPE: Primary | ICD-10-CM

## 2021-01-14 DIAGNOSIS — K55.20 AVM (ARTERIOVENOUS MALFORMATION) OF SMALL BOWEL, ACQUIRED: ICD-10-CM

## 2021-01-14 DIAGNOSIS — K31.811 ANGIODYSPLASIA OF STOMACH AND DUODENUM WITH HEMORRHAGE: ICD-10-CM

## 2021-01-14 PROCEDURE — 99207 PR NO CHARGE NURSE ONLY: CPT

## 2021-01-14 PROCEDURE — 96372 THER/PROPH/DIAG INJ SC/IM: CPT | Performed by: NURSE PRACTITIONER

## 2021-01-14 RX ADMIN — OCTREOTIDE ACETATE 20 MG: KIT INTRAMUSCULAR at 11:17

## 2021-01-14 NOTE — PROGRESS NOTES
Infusion Nursing Note:  Rachele Martínez presents today for   Chief Complaint   Patient presents with     Allied Health Visit     Sandostatin     .    Patient seen by provider today: No   present during visit today: Not Applicable.    Note: Patient was assessed by Holly Méndez RN prior to injection.    Intravenous Access:  No Intravenous access/labs at this visit.    Treatment Conditions:  Not Applicable.      Post Infusion Assessment:  Patient tolerated injection without incident.  Site patent and intact, free from redness, edema or discomfort.       Discharge Plan:   Patient discharged in stable condition accompanied by: self.  Departure Mode: Ambulatory.    Florence To CMA

## 2021-01-15 ENCOUNTER — MEDICAL CORRESPONDENCE (OUTPATIENT)
Dept: HEALTH INFORMATION MANAGEMENT | Facility: CLINIC | Age: 81
End: 2021-01-15

## 2021-01-21 ENCOUNTER — TELEPHONE (OUTPATIENT)
Dept: INTERNAL MEDICINE | Facility: CLINIC | Age: 81
End: 2021-01-21

## 2021-01-21 NOTE — TELEPHONE ENCOUNTER
[facility] Call Center    Phone Message    May a detailed message be left on voicemail: yes     Reason for Call: Form or Letter   Type or form/letter needing completion: Home Care/Hospice/Nursing Home Record - Scan on 1/4/2021 2:30 PM: [facility] ASSESSMENT [facility] SOCIAL WORK  And Post hospital order that is not in the chart. They are refaxing today. Please fax both back   Provider: Jennifer  Date form needed: asap  Once completed: Fax form to: [phone]      Action Taken: Message routed to:  [facility] pcc    Travel Screening: Not Applicable

## 2021-01-24 ENCOUNTER — MEDICAL CORRESPONDENCE (OUTPATIENT)
Dept: HEALTH INFORMATION MANAGEMENT | Facility: CLINIC | Age: 81
End: 2021-01-24

## 2021-01-27 ENCOUNTER — TELEPHONE (OUTPATIENT)
Dept: INTERNAL MEDICINE | Facility: CLINIC | Age: 81
End: 2021-01-27

## 2021-01-27 NOTE — TELEPHONE ENCOUNTER
[facility] Call [facility]    Phone Message    May a detailed message be left on voicemail: yes     Reason for Call: Form or Letter   Type or form/letter needing completion: Post hospital orders (3 pages) and Social worked evaluation (5 pages) both were faxed 12/7/21, 12/22/21, 1/20/21, 1/21/21 and again today.    Provider Jennifer   Date form needed: asap  Once completed: Fax form to: [phone]      Action Taken: Message routed to:  [facility] pcc    Travel Screening: Not Applicable

## 2021-01-28 DIAGNOSIS — R41.89 COGNITIVE IMPAIRMENT: ICD-10-CM

## 2021-01-28 RX ORDER — ATORVASTATIN CALCIUM 20 MG/1
20 TABLET, FILM COATED ORAL DAILY
Qty: 90 TABLET | Refills: 3 | Status: SHIPPED | OUTPATIENT
Start: 2021-01-28 | End: 2022-01-12

## 2021-01-28 NOTE — TELEPHONE ENCOUNTER
Fax received from [facility] in [place] requested refill for atorvastatin (LIPITOR) 20 MG tablet.    Gayle Paul RN on 1/28/2021 at 3:05 PM

## 2021-01-29 ENCOUNTER — MEDICAL CORRESPONDENCE (OUTPATIENT)
Dept: HEALTH INFORMATION MANAGEMENT | Facility: CLINIC | Age: 81
End: 2021-01-29

## 2021-02-11 ENCOUNTER — INFUSION THERAPY VISIT (OUTPATIENT)
Dept: INFUSION THERAPY | Facility: CLINIC | Age: 81
End: 2021-02-11
Payer: MEDICARE

## 2021-02-11 VITALS
SYSTOLIC BLOOD PRESSURE: 174 MMHG | HEART RATE: 72 BPM | TEMPERATURE: 98.6 F | OXYGEN SATURATION: 96 % | DIASTOLIC BLOOD PRESSURE: 70 MMHG | RESPIRATION RATE: 18 BRPM

## 2021-02-11 DIAGNOSIS — K31.811 ANGIODYSPLASIA OF STOMACH AND DUODENUM WITH HEMORRHAGE: ICD-10-CM

## 2021-02-11 DIAGNOSIS — R19.7 DIARRHEA, UNSPECIFIED TYPE: Primary | ICD-10-CM

## 2021-02-11 DIAGNOSIS — K55.20 AVM (ARTERIOVENOUS MALFORMATION) OF SMALL BOWEL, ACQUIRED: ICD-10-CM

## 2021-02-11 DIAGNOSIS — D50.0 IRON DEFICIENCY ANEMIA DUE TO CHRONIC BLOOD LOSS: ICD-10-CM

## 2021-02-11 PROCEDURE — 99207 PR NO CHARGE NURSE ONLY: CPT

## 2021-02-11 PROCEDURE — 96372 THER/PROPH/DIAG INJ SC/IM: CPT | Performed by: NURSE PRACTITIONER

## 2021-02-11 RX ADMIN — OCTREOTIDE ACETATE 20 MG: KIT INTRAMUSCULAR at 11:46

## 2021-02-11 NOTE — PROGRESS NOTES
Infusion Nursing Note:  Rachele Martínez presents today for Sandostatin.    Patient seen by provider today: No   present during visit today: Not Applicable.    Note: Assessment performed by Kyaleigh ANDREW RN prior to injection today. Patient denies symptoms/concerns following previous injection. Patient reports her dialysis team is helping to manage her BP. She will continue to follow several times weekly with them.      Intravenous Access:  No Intravenous access/labs at this visit.    Treatment Conditions:  Not Applicable.      Post Infusion Assessment:  Patient tolerated injection without incident.  Site patent and intact, free from redness, edema or discomfort.       Discharge Plan:   Patient discharged in stable condition accompanied by: self.  Departure Mode: Ambulatory.    Vilma Ariza LPN

## 2021-02-12 ENCOUNTER — MEDICAL CORRESPONDENCE (OUTPATIENT)
Dept: HEALTH INFORMATION MANAGEMENT | Facility: CLINIC | Age: 81
End: 2021-02-12

## 2021-03-30 ENCOUNTER — INFUSION THERAPY VISIT (OUTPATIENT)
Dept: INFUSION THERAPY | Facility: CLINIC | Age: 81
End: 2021-03-30
Payer: MEDICARE

## 2021-03-30 VITALS — OXYGEN SATURATION: 99 % | DIASTOLIC BLOOD PRESSURE: 56 MMHG | SYSTOLIC BLOOD PRESSURE: 114 MMHG | HEART RATE: 68 BPM

## 2021-03-30 DIAGNOSIS — K55.20 AVM (ARTERIOVENOUS MALFORMATION) OF SMALL BOWEL, ACQUIRED: ICD-10-CM

## 2021-03-30 DIAGNOSIS — R19.7 DIARRHEA, UNSPECIFIED TYPE: Primary | ICD-10-CM

## 2021-03-30 DIAGNOSIS — K31.811 ANGIODYSPLASIA OF STOMACH AND DUODENUM WITH HEMORRHAGE: ICD-10-CM

## 2021-03-30 DIAGNOSIS — D50.0 IRON DEFICIENCY ANEMIA DUE TO CHRONIC BLOOD LOSS: ICD-10-CM

## 2021-03-30 PROCEDURE — 99207 PR NO CHARGE NURSE ONLY: CPT

## 2021-03-30 PROCEDURE — 96372 THER/PROPH/DIAG INJ SC/IM: CPT | Performed by: NURSE PRACTITIONER

## 2021-03-30 RX ADMIN — OCTREOTIDE ACETATE 20 MG: KIT INTRAMUSCULAR at 13:04

## 2021-03-30 NOTE — PROGRESS NOTES
Infusion Nursing Note:  Rachele Martínez presents today for   Chief Complaint   Patient presents with     Allied Health Visit     Sandostatin     .    Patient seen by provider today: No   present during visit today: Not Applicable.    Note: Patient was assessed by Betsy Osorio RN prior to injection.    Intravenous Access:  No Intravenous access/labs at this visit.    Treatment Conditions:  Not Applicable.      Post Infusion Assessment:  Patient tolerated injection without incident.  Site patent and intact, free from redness, edema or discomfort.       Discharge Plan:   Patient discharged in stable condition accompanied by: self.  Departure Mode: Ambulatory.    Florence To CMA

## 2021-04-02 DIAGNOSIS — L29.9 ITCHING: ICD-10-CM

## 2021-04-09 DIAGNOSIS — L29.9 ITCHING: ICD-10-CM

## 2021-04-13 ENCOUNTER — OFFICE VISIT (OUTPATIENT)
Dept: INTERNAL MEDICINE | Facility: CLINIC | Age: 81
End: 2021-04-13
Payer: MEDICARE

## 2021-04-13 VITALS
WEIGHT: 129 LBS | SYSTOLIC BLOOD PRESSURE: 158 MMHG | HEART RATE: 70 BPM | OXYGEN SATURATION: 100 % | BODY MASS INDEX: 22.14 KG/M2 | DIASTOLIC BLOOD PRESSURE: 61 MMHG

## 2021-04-13 DIAGNOSIS — E78.5 DYSLIPIDEMIA: ICD-10-CM

## 2021-04-13 DIAGNOSIS — I10 BENIGN ESSENTIAL HYPERTENSION: ICD-10-CM

## 2021-04-13 DIAGNOSIS — N18.6 END STAGE RENAL DISEASE (H): ICD-10-CM

## 2021-04-13 DIAGNOSIS — M48.061 SPINAL STENOSIS OF LUMBAR REGION, UNSPECIFIED WHETHER NEUROGENIC CLAUDICATION PRESENT: ICD-10-CM

## 2021-04-13 DIAGNOSIS — Z87.19 HISTORY OF GI BLEED: ICD-10-CM

## 2021-04-13 DIAGNOSIS — G62.9 PERIPHERAL POLYNEUROPATHY: ICD-10-CM

## 2021-04-13 DIAGNOSIS — L29.9 ITCHING: ICD-10-CM

## 2021-04-13 DIAGNOSIS — K74.60 CIRRHOSIS OF LIVER WITHOUT ASCITES, UNSPECIFIED HEPATIC CIRRHOSIS TYPE (H): Primary | ICD-10-CM

## 2021-04-13 DIAGNOSIS — I73.9 PERIPHERAL VASCULAR DISEASE (H): ICD-10-CM

## 2021-04-13 PROCEDURE — 99214 OFFICE O/P EST MOD 30 MIN: CPT | Mod: GC | Performed by: STUDENT IN AN ORGANIZED HEALTH CARE EDUCATION/TRAINING PROGRAM

## 2021-04-13 NOTE — PROGRESS NOTES
[facility]  -----------------------------------------------------------------    History of Present Illness   Rachele Martínez is a 80 year old female with a history of hep C, cirrhosis (on monthly octreotide), spinal stenosis, angioectasias with prior GIB (11/30), HLD (atorva 20), HTN (amlodipine), PVD, ESRD (on HD, epo), neuropathy (gabapentin), pruritis (sertraline), COVID-19 (11/22/20 and now s/p vaccination) here for annual physical.    Overall she has been doing well over the past few months after being hospitalized last November and also after having Covid in November as well.  She is walking around, occasionally mildly dizzy and lives alone.  She has good support from her daughter who sets up her medications for her.      She does report some reduced appetite over the past few months. Weight is down 3-4 pounds.  No night sweats. No chest pain or dyspea at this time. No fever or chills or cough. No hematuria or dysuria. Has bowel movement every day. Stools get dark sometimes but no melena and hematochezia. She has three kids and enjoys shopping when she can.    She is hypertensive in the clinic today upon arrival the notably just 2 weeks ago had normal blood pressure with  114/56.  Given she is on regular hemodialysis, was recently normotensive, has had some dizziness and follows with her nephrologist, we discussed we will not alter her antihypertensive regimen at this time.    Social Factors    Tobacco: no  EtOH: no  Other drugs: smoke pot every once in a while  Living situation: lives at home with cat  Exercise: limited by weather and mobility, discussed increasing walks   Travel: none recent  Social support: has three children  Hobbies/fun: shopping     -----------------------------------------------------------------  Assessment & Plan   Rachele Martínez is a 80 year old female with a history of hep C, cirrhosis (on monthly octreotide), spinal  stenosis, angioectasias with prior GIB (11/30), HLD (atorva 20), HTN (losartan), PVD (on cilastazol), ESRD (on HD, epo), neuropathy (gabapentin occasionally), MDD (sertraline), COVID-19 (11/22/20 and now vaccinated).    - refill sertraline  - refer to Dr. Barnett and ultrasound     Cirrhosis (on monthly octreotide)  History GI bleed  Per GI and previous hospitalization had angioectasias in stomach, duodenal small bowel and cecum. She has had previous tx and is on monthly 20 mg octreotide depot injections. EGD done 12/1 found normal esophagus but did show gastritis.  - PPI longterm   - Referral to Dr. Barnett of [facility] and followup screening ultrasound with hepatology    HTN  - continue amlodipine  -She is hypertensive in the clinic today upon arrival the notably just 2 weeks ago had normal blood pressure with  114/56.  Given she is on regular hemodialysis, was recently normotensive, has had some dizziness and follows with her nephrologist, we will not alter her antihypertensive regimen at this time.    ESRD: on HD, epo    PVD: She is currently taking cilostazol which she has found to be very helpful symptomatically for her.  Given that she is already on this and is not having any recurrent GI bleeding, and given its lower bleeding risk compared to other antiplatelet therapies, we discussed that she could continue this for now but with acknowledgment that it could be reconsidered if there is another GI bleed episode.    HLD: atorva 20  Neuropathy: gabapentin  Anx/depression: continue sertraline 50mg  Hep C: s/p treatment  DJD: has L1-2 L2-3 foraminal narrowing And mod to severe spinal stenosis L3-4     Health Maintenace: due for tdap, which she would like to wait on at this time    Sharad Morales MD (PGY-3), staffed and seen with Dr. Fuller  [facility]  Staff addendum:  -----------------------------------------------------------------  Physical Exam   BP (!) 158/61 (BP Location: Left  arm, Patient Position: Sitting, Cuff Size: Adult Regular)   Pulse 70   Wt 58.5 kg (129 lb)   SpO2 100%   Breastfeeding No   BMI 22.14 kg/m    Wt Readings from Last 3 Encounters:   21 58.5 kg (129 lb)   10/06/20 62.7 kg (138 lb 3.2 oz)   20 64.5 kg (142 lb 3.2 oz)     Body mass index is 22.14 kg/m .    General Appearance: in no apparent distress and in stylish clothes with daughter Leti present  HEENT: pupils equal and reactive to light  Respiratory: CTAB  Cardiovascular: systolic murmur in setting of right arm fistula, no murmurs  GI: abdomen nontender, nondistended, BS+  Lymph/Hematologic: no LAD  Genitourinary: not examined  Skin: R arm fistula pusatile, clean and intact  Musculoskeletal: no peripheral edema  Neurologic: A@O  Psychiatric: appropriate affect    -----------------------------------------------------------------  Additional Patient History  Social History   Social History     Tobacco Use     Smoking status: Former Smoker     Packs/day: 2.00     Years: 45.00     Pack years: 90.00     Types: Cigarettes     Start date: 1953     Quit date: 2002     Years since quittin.4     Smokeless tobacco: Never Used   Substance Use Topics     Alcohol use: No     Drug use: Yes     Types: Marijuana     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       Current Medications   Current Outpatient Medications   Medication Sig Dispense Refill     atorvastatin (LIPITOR) 20 MG tablet Take 1 tablet (20 mg) by mouth daily 90 tablet 3     Calcium Acetate, Phos Binder, 667 MG CAPS TAKE 2 CAPSULE BY MOUTH THREE TIMES A DAY WITH MEALS  8     cilostazol (PLETAL) 100 MG tablet Take 1 tablet (100 mg) by mouth 2 times daily 180 tablet 1     epoetin christofer-epbx (RETACRIT) 67762 UNIT/ML injection        gabapentin (NEURONTIN) 300 MG capsule Take 1 capsule (300 mg) by mouth At Bedtime 90 capsule 3     losartan (COZAAR) 25 MG tablet Take 25 mg by mouth daily       octreotide (SANDOSTATIN  LAR) 20 MG injection Inject 20 mg into the muscle every 28 days       order for DME Cane 1 Units 0     order for DME Equipment being ordered: 4 wheel walker with seat. 1 Device 0     pantoprazole (PROTONIX) 20 MG EC tablet Take 1 tablet (20 mg) by mouth 2 times daily 30-60 minutes before a meal 180 tablet 2     polyethylene glycol (MIRALAX) 17 GM/Dose powder As needed       sertraline (ZOLOFT) 50 MG tablet Take 2 tablets (100 mg) by mouth daily 180 tablet 0        Past Medical History    Past Medical History:   Diagnosis Date     Anemia      Anxiety and depression      Arthritis      AVM (arteriovenous malformation)      Chronic hepatitis C with cirrhosis (H)      Clotting disorder (H)      ESRD (end stage renal disease) (H)     on dialysis     GI bleed     recurrent     Glaucoma      Hyperlipidemia      Hypertension goal BP (blood pressure) < 140/80        Past Surgical History   Past Surgical History:   Procedure Laterality Date     CAPSULE/PILL CAM ENDOSCOPY N/A 3/27/2019    Procedure: Capsule/pill cam endoscopy;  Surgeon: Yosvany Ram MD;  Location: [facility]     COLONOSCOPY N/A 9/4/2015    Procedure: COMBINED COLONOSCOPY, SINGLE OR MULTIPLE BIOPSY/POLYPECTOMY BY BIOPSY;  Surgeon: Rupesh Lopez MD;  Location: [facility]     COLONOSCOPY N/A 9/19/2018    Procedure: COLONOSCOPY;  enteroscopy small bowel  COLONOSCOPY;  Surgeon: Ankit Baires MD;  [facility]     ESOPHAGOSCOPY, GASTROSCOPY, DUODENOSCOPY (EGD), COMBINED N/A 12/18/2014    Procedure: COMBINED ESOPHAGOSCOPY, GASTROSCOPY, DUODENOSCOPY (EGD);  Surgeon: Betsy Carvajal MD;  [facility]     ESOPHAGOSCOPY, GASTROSCOPY, DUODENOSCOPY (EGD), COMBINED N/A 4/25/2015    Procedure: COMBINED ESOPHAGOSCOPY, GASTROSCOPY, DUODENOSCOPY (EGD);  Surgeon: Yosvany Ram MD;  [facility]     ESOPHAGOSCOPY, GASTROSCOPY, DUODENOSCOPY (EGD), COMBINED N/A 5/5/2015    Procedure: COMBINED ESOPHAGOSCOPY, GASTROSCOPY, DUODENOSCOPY (EGD);   Surgeon: Mariano Mistry MD;  Location:  GI      CAPSULE ENDOSCOPY N/A 9/30/2015    Procedure: CAPSULE/PILL CAM ENDOSCOPY;  Surgeon: Pan Dhaliwal MD;  Location:  GI      VASCULAR SURGERY PROCEDURE UNLIST       HYSTERECTOMY  1980    KYREE     LUMPECTOMY BREAST         Family History    Family History   Problem Relation Age of Onset     Diabetes Mother      Alzheimer Disease Mother      Substance Abuse Son      Cancer Sister      Soft Tissue Cancer Sister      Breast Cancer Sister      Hyperlipidemia Daughter      Alcoholism Brother      Spine Problems Sister        Allergies      Allergies   Allergen Reactions     Abacavir Itching     Lisinopril Cough     Diovan Hct Itching     severe     Dust Mites      Hydrochlorothiazide Itching     Severe       No Clinical Screening - See Comments      History of blood transfusion reactions and pre-treats with Benadryl.      Oxycodone-Acetaminophen      Spironolactone Nausea     Sulfa Drugs Hives     Valsartan Itching       Review of Systems   The 10 point Review of Systems is negative other than noted in the HPI or here.    Data: Reviewed in Epic.

## 2021-04-13 NOTE — PATIENT INSTRUCTIONS
[facility] Medication Refill Request Information:  * Please contact your pharmacy regarding ANY request for medication refills.  ** [facility] Prescription Fax = [phone]  * Please allow 3 business days for routine medication refills.  * Please allow 5 business days for controlled substance medication refills.     [facility] Test Result notification information:  *You will be notified with in 7-10 days of your appointment day regarding the results of your test.  If you are on MyChart you will be notified as soon as the provider has reviewed the results and signed off on them.    [facility]: [phone]

## 2021-04-13 NOTE — NURSING NOTE
Chief Complaint   Patient presents with     Physical     annual physical     Refill Request     medications refill       YVAN Mckoy at 12:54 PM on 4/13/2021

## 2021-04-13 NOTE — NURSING NOTE
Medicare Annual Wellness Visit Previsit note    This 80 year old year old female presents for a  Medicare Wellness Exam.           Medical Care     Have you been to an ER or a hospital in the last year? Yes  What other specialists or organizations are involved in your medical care?  n/a  Current providers sharing in care for this patient include:  Patient Care Team       Relationship Specialty Notifications Start End    Agnieszka Rodriguez MD PCP - General Internal Medicine  3/26/19     Phone: [phone] Pager: [phone] Fax: [phone]        [address] [place] [address]    [facility] [place]    4/15/16     Phone: [phone] [address] [place] [id number]    Agnieszka Rodriguez MD MD Internal Medicine  7/8/16     Phone: [phone] Pager: [phone] Fax: [phone]        [address] [place] [address]    Holly Blake    5/24/17     [facility] Coordinator    Phone: [phone]         Shun Cornelius MD MD Interventional Cardiology  9/8/17     Phone: [phone] Fax: [phone]         [address] [place] [address] [address] [facility] [address]    Roberto Gordon PA-C Physician Assistant Physician Assistant  8/20/18     Phone: [phone] Fax: [phone]         [address] [place] [address]    aGyle Paul RN Registered Nurse   8/9/19     Agnieszka Rodriguez MD Assigned PCP   6/21/20     Phone: [phone] Pager: [phone] Fax: [phone]        [address] [place] [address]    Ankit Baires MD Assigned Gastroenterology Provider   11/15/20     Phone: [phone] Fax: [phone]         [address] [place] [address]                 Social History     Marital Status:  Who lives in your household? Self and cat  Does your home have any of the following safety concerns? Loose rugs in the hallway, no grab bars in the bathroom, no handrails on the stairs or  have poorly lit areas?  No  Do you feel threatened or controlled by a partner, ex-partner or anyone in your life? No  Has anyone hurt you physically, for example by pushing, hitting, slapping or kicking you   or forcing you to have sex? No  Do you need help with the phone, transportation, shopping, preparing meals, housework, laundry, medications or managing money? Yes , shopping and transportation   Have you noticed any hearing difficulties? No      Risk Behaviors and Healthy Habits     How many servings of fruits and vegetables do you eat a day? 1  How often do you exercise and what do you do? 0 minutes 0 a week  Do you frequently ride without a seatbelt? No  Do you use tobacco?  No  Do you use any other drugs? No       Do you use alcohol?No      Sexual Health     Are you sexually active? No   If yes, with men, women, or both? n/a  If yes, do you more than one current partner?N/A  If yes, do you use condoms? N/A  Have you had any sexually transmitted infections? N/A  Any sexual concerns? No       FOR WOMEN ONLY  What year did you stop having periods? 2002  Any vaginal bleeding in the last year? No  Have you ever had an abnormal Pap smear? No

## 2021-04-27 ENCOUNTER — INFUSION THERAPY VISIT (OUTPATIENT)
Dept: INFUSION THERAPY | Facility: CLINIC | Age: 81
End: 2021-04-27
Payer: MEDICARE

## 2021-04-27 VITALS — HEART RATE: 70 BPM | SYSTOLIC BLOOD PRESSURE: 154 MMHG | OXYGEN SATURATION: 100 % | DIASTOLIC BLOOD PRESSURE: 77 MMHG

## 2021-04-27 DIAGNOSIS — K55.20 AVM (ARTERIOVENOUS MALFORMATION) OF SMALL BOWEL, ACQUIRED: ICD-10-CM

## 2021-04-27 DIAGNOSIS — K31.811 ANGIODYSPLASIA OF STOMACH AND DUODENUM WITH HEMORRHAGE: ICD-10-CM

## 2021-04-27 DIAGNOSIS — D50.0 IRON DEFICIENCY ANEMIA DUE TO CHRONIC BLOOD LOSS: ICD-10-CM

## 2021-04-27 DIAGNOSIS — R19.7 DIARRHEA, UNSPECIFIED TYPE: Primary | ICD-10-CM

## 2021-04-27 PROCEDURE — 96372 THER/PROPH/DIAG INJ SC/IM: CPT | Performed by: NURSE PRACTITIONER

## 2021-04-27 PROCEDURE — 99207 PR NO CHARGE NURSE ONLY: CPT

## 2021-04-27 RX ADMIN — OCTREOTIDE ACETATE 20 MG: KIT INTRAMUSCULAR at 12:01

## 2021-04-27 ASSESSMENT — PAIN SCALES - GENERAL: PAINLEVEL: NO PAIN (0)

## 2021-04-27 NOTE — PROGRESS NOTES
Infusion Nursing Note:  Rachele Martínez presents today for   Chief Complaint   Patient presents with     Allied Health Visit     Sandostatin       Patient seen by provider today: No   present during visit today: Not Applicable.    Note: Patient was assessed by Betsy Osorio RN prior to injection.    Intravenous Access:  No Intravenous access/labs at this visit.    Treatment Conditions:  Not Applicable.      Post Infusion Assessment:  Patient tolerated injection without incident.  Site patent and intact, free from redness, edema or discomfort.       Discharge Plan:   Patient discharged in stable condition accompanied by: self.  Departure Mode: Ambulatory.    Florence To CMA

## 2021-05-24 ENCOUNTER — TELEPHONE (OUTPATIENT)
Dept: INTERNAL MEDICINE | Facility: CLINIC | Age: 81
End: 2021-05-24

## 2021-05-24 DIAGNOSIS — I73.9 CLAUDICATION (H): ICD-10-CM

## 2021-05-24 DIAGNOSIS — I73.9 PERIPHERAL ARTERIAL DISEASE (H): ICD-10-CM

## 2021-05-25 NOTE — TELEPHONE ENCOUNTER
CILOSTAZOL 100 MG TABLET  Last Written Prescription Date:  11/24/20  Last Fill Quantity: 180,   # refills: 1  Last Office Visit : 4/13/21  Future Office visit:  None    Routing refill request to provider for review/approval because:  ABN Creatinine( on dialysis)  4/14/2021  1106    CR  10.14 (H)         Last note 4/13/21/ [facility]: PVD: She is currently taking cilostazol which she has found to be very helpful symptomatically for her.  Given that she is already on this and is not having any recurrent GI bleeding, and given its lower bleeding risk compared to other antiplatelet therapies, we discussed that she could continue this for now but with acknowledgment that it could be reconsidered if there is another GI bleed episode.

## 2021-05-27 ENCOUNTER — ONCOLOGY VISIT (OUTPATIENT)
Dept: ONCOLOGY | Facility: CLINIC | Age: 81
End: 2021-05-27
Attending: INTERNAL MEDICINE
Payer: MEDICARE

## 2021-05-27 VITALS
OXYGEN SATURATION: 98 % | TEMPERATURE: 98.1 F | HEART RATE: 69 BPM | DIASTOLIC BLOOD PRESSURE: 65 MMHG | SYSTOLIC BLOOD PRESSURE: 161 MMHG

## 2021-05-27 DIAGNOSIS — R19.7 DIARRHEA, UNSPECIFIED TYPE: Primary | ICD-10-CM

## 2021-05-27 DIAGNOSIS — K55.20 AVM (ARTERIOVENOUS MALFORMATION) OF SMALL BOWEL, ACQUIRED: ICD-10-CM

## 2021-05-27 DIAGNOSIS — D50.0 IRON DEFICIENCY ANEMIA DUE TO CHRONIC BLOOD LOSS: ICD-10-CM

## 2021-05-27 DIAGNOSIS — K31.811 ANGIODYSPLASIA OF STOMACH AND DUODENUM WITH HEMORRHAGE: ICD-10-CM

## 2021-05-27 PROCEDURE — 250N000011 HC RX IP 250 OP 636: Performed by: INTERNAL MEDICINE

## 2021-05-27 PROCEDURE — 96372 THER/PROPH/DIAG INJ SC/IM: CPT | Performed by: INTERNAL MEDICINE

## 2021-05-27 RX ADMIN — OCTREOTIDE ACETATE 20 MG: KIT at 12:56

## 2021-05-27 ASSESSMENT — PAIN SCALES - GENERAL: PAINLEVEL: MILD PAIN (3)

## 2021-05-27 NOTE — PROGRESS NOTES
Patient presents to the [facility] for octreotide (sandoSTATIN LAR) IM injection 20 mg. Order written by Dr. Baires was completed today. Name and  were verified by patient. See MAR for medication details. Patient was asked if they have any new symptoms or questions/concerns. Medication was given in the following site: left ventral gluteal. Patient tolerated injection well and was discharged to home.    -Leonila GARCIA, CMA

## 2021-05-27 NOTE — LETTER
2021         RE: Rachele Martínez  [address]  [place] [address]        Dear Colleague,    Thank you for referring your patient, Rachele Martínez, to the [facility]. Please see a copy of my visit note below.    Patient presents to the [facility] for octreotide (sandoSTATIN LAR) IM injection 20 mg. Order written by Dr. Baires was completed today. Name and  were verified by patient. See MAR for medication details. Patient was asked if they have any new symptoms or questions/concerns. Medication was given in the following site: left ventral gluteal. Patient tolerated injection well and was discharged to home.    -Leonila GARCIA CMA      Again, thank you for allowing me to participate in the care of your patient.        Sincerely,        [facility] [place]

## 2021-06-07 NOTE — TELEPHONE ENCOUNTER
"Note from annual wellness visit with Dr. Morales and Dr. Fuller on 4/17/21:    \"She is currently taking cilostazol which she has found to be very helpful symptomatically for her.  Given that she is already on this and is not having any recurrent GI bleeding, and given its lower bleeding risk compared to other antiplatelet therapies, we discussed that she could continue this for now but with acknowledgment that it could be reconsidered if there is another GI bleed episode.\"    Gayle Paul RN on 6/7/2021 at 9:04 AM       "

## 2021-06-09 RX ORDER — CILOSTAZOL 100 MG/1
TABLET ORAL
Qty: 180 TABLET | Refills: 1 | Status: SHIPPED | OUTPATIENT
Start: 2021-06-09 | End: 2021-11-08

## 2021-06-22 ENCOUNTER — INFUSION THERAPY VISIT (OUTPATIENT)
Dept: INFUSION THERAPY | Facility: CLINIC | Age: 81
End: 2021-06-22
Payer: MEDICARE

## 2021-06-22 VITALS
OXYGEN SATURATION: 100 % | WEIGHT: 129.1 LBS | SYSTOLIC BLOOD PRESSURE: 160 MMHG | TEMPERATURE: 98.1 F | HEART RATE: 83 BPM | BODY MASS INDEX: 22.16 KG/M2 | DIASTOLIC BLOOD PRESSURE: 60 MMHG | RESPIRATION RATE: 14 BRPM

## 2021-06-22 DIAGNOSIS — R19.7 DIARRHEA, UNSPECIFIED TYPE: Primary | ICD-10-CM

## 2021-06-22 DIAGNOSIS — D50.0 IRON DEFICIENCY ANEMIA DUE TO CHRONIC BLOOD LOSS: ICD-10-CM

## 2021-06-22 DIAGNOSIS — K31.811 ANGIODYSPLASIA OF STOMACH AND DUODENUM WITH HEMORRHAGE: ICD-10-CM

## 2021-06-22 DIAGNOSIS — K55.20 AVM (ARTERIOVENOUS MALFORMATION) OF SMALL BOWEL, ACQUIRED: ICD-10-CM

## 2021-06-22 PROCEDURE — 96372 THER/PROPH/DIAG INJ SC/IM: CPT | Performed by: NURSE PRACTITIONER

## 2021-06-22 PROCEDURE — 99207 PR NO CHARGE NURSE ONLY: CPT

## 2021-06-22 RX ADMIN — OCTREOTIDE ACETATE 20 MG: KIT INTRAMUSCULAR at 14:32

## 2021-06-22 ASSESSMENT — PAIN SCALES - GENERAL: PAINLEVEL: NO PAIN (0)

## 2021-06-22 NOTE — PROGRESS NOTES
Infusion Nursing Note:  Rachele Martínez presents today for   Chief Complaint   Patient presents with     Allied Health Visit     Sandostatin injection   .    Patient seen by provider today: No   present during visit today: Not Applicable.    Note: Patient assessed today by Samantha Lopez RN, prior to injection    Intravenous Access:  No Intravenous access/labs at this visit.    Treatment Conditions:  Not Applicable.      Post Infusion Assessment:  Patient tolerated injection without incident.  Site patent and intact, free from redness, edema or discomfort.       Discharge Plan:   Patient discharged in stable condition accompanied by: self.  Departure Mode: Ambulatory.      Lucila Chung CMA

## 2021-06-25 ENCOUNTER — VIRTUAL VISIT (OUTPATIENT)
Dept: INTERNAL MEDICINE | Facility: CLINIC | Age: 81
End: 2021-06-25
Payer: MEDICARE

## 2021-06-25 DIAGNOSIS — I73.9 PAD (PERIPHERAL ARTERY DISEASE) (H): Primary | ICD-10-CM

## 2021-06-25 DIAGNOSIS — M79.662 PAIN IN BOTH LOWER LEGS: ICD-10-CM

## 2021-06-25 DIAGNOSIS — M79.661 PAIN IN BOTH LOWER LEGS: ICD-10-CM

## 2021-06-25 PROCEDURE — 99215 OFFICE O/P EST HI 40 MIN: CPT | Mod: 95 | Performed by: INTERNAL MEDICINE

## 2021-06-25 NOTE — PATIENT INSTRUCTIONS
[facility] Medication Refill Request Information:  * Please contact your pharmacy regarding ANY request for medication refills.  ** [facility] Prescription Fax = [phone]  * Please allow 3 business days for routine medication refills.  * Please allow 5 business days for controlled substance medication refills.     [facility] Test Result notification information:  *You will be notified with in 7-10 days of your appointment day regarding the results of your test.  If you are on MyChart you will be notified as soon as the provider has reviewed the results and signed off on them.    [facility]: [phone]        Negative/Unknown

## 2021-06-25 NOTE — PROGRESS NOTES
VIDEO VISIT    Assessment & Plan   PAD (peripheral artery disease) (H)  Pain in both lower legs  Would like to get a CTA of her lower extremities given that the ABIs done previously were non-compressable.  She has ESRD and I do no believe she has meaningful renal function that we need to preserve, but I would like to verify with her nephrologist Dr. Tarango as it is difficult to accurately assess her UOP with her memory impairment.  - Ordered CTA  - I have left a message at [facility] asking for Dr. Tarango to call back and sent an Epic message       Dr. Tarango.   Shann[facility] - [phone]    RTC: Return in about 4 weeks (around 7/23/2021).  I spent a total of 40 minutes on the day of the visit.   Time was spent doing chart review, history and exam, documentation and further activities as noted above.  Agnieszka Rodriguez MD  _________________________________________________________________________________________    Chief Complaint   Rachele Martínez is a 81 year old female presents for   Chief Complaint   Patient presents with     Recheck Medication     RLS, discuss dialysis per daughter        SUBJECTIVE  RLS at night.  Bothers her at night. Feels like someone scratctching on a chalkboard.  Moving legs doesn't help.  Sitting helps.  It's the whole leg.  Only the right leg.  Pain is worse with elevation.  Challenging to get a detailed description with her poor memory.    [phone]    Medications and allergies were reviewed by me today.     Social History   History   Smoking Status     Former Smoker     Packs/day: 2.00     Years: 45.00     Types: Cigarettes     Start date: 1/1/1953     Quit date: 11/23/2002   Smokeless Tobacco     Never Used      PHQ-2 ( 1999 [employer]) 6/25/2021 4/13/2021   Q1: Little interest or pleasure in doing things 0 0   Q2: Feeling down, depressed or hopeless 0 0   PHQ-2 Score 0 0   Q1: Little interest or pleasure in doing things Not at all -   Q2: Feeling down, depressed or  hopeless Not at all -   PHQ-2 Score 0 -     No flowsheet data found.    Past Medical History   Patient Active Problem List   Diagnosis     Cataract     Anxiety state     Insomnia     Hyperlipidemia with target LDL less than 130     History of hepatitis C     Iron deficiency anemia due to chronic blood loss     AVM (arteriovenous malformation) of small bowel, acquired     ESRD (end stage renal disease) on dialysis (H)     Cirrhosis of liver without ascites, unspecified hepatic cirrhosis type (H)     Tendency to fall     Hypertension goal BP (blood pressure) < 140/80     Diarrhea     Angiodysplasia of stomach and duodenum with hemorrhage     Spinal stenosis of lumbar region with neurogenic claudication     Peripheral vascular disease (H)       Review of Systems   5 point ROS completed and negative except noted above, including Gen, CV, Resp, GI, MS    Physical Exam   Gen: no distress, comfortable, pleasant   Eyes: anicteric   Respiratory: able to talk in full sentences.  No evidence of respiratory distress  Skin: no concerning lesions, no jaundice   Psychological: appropriate mood     Visit/Communication Style   Type of service:  Video Visit  Video End Time:4:10 PM  Originating Location (pt. Location): Home  Distant Location (provider location):  Home   Platform used for Video Visit: Kimmy

## 2021-06-25 NOTE — NURSING NOTE
"Chief Complaint   Patient presents with     Recheck Medication     RLS, discuss dialysis per daughter       Dana Karimi, EMT at 1:08 PM on 6/25/2021.       Daughter reports that patient has been given anticoagulant at dialysis because she has been \"clogging the machine\". She does not know what medication it is.     Daughter will be calling dialysis to get more information about this prior to appointment time.  Dana Karimi, EMT at 1:09 PM on 6/25/2021.  [facility]  [facility]  [place]  [phone]     "

## 2021-07-07 DIAGNOSIS — L29.9 ITCHING: ICD-10-CM

## 2021-07-09 NOTE — TELEPHONE ENCOUNTER
[facility] Call Center    Phone Message    May a detailed message be left on voicemail: yes     Reason for Call: Medication Refill Request    Has the patient contacted the pharmacy for the refill? Yes   Name of medication being requested: sertraline (ZOLOFT) 50 MG tablet  Provider who prescribed the medication:   Pharmacy: [facility] [address]  Date medication is needed: as soon as possible     Patients daughter is requesting an expedite. I notified her of the 72 business hour turn around time.     Action Taken: Message routed to:  [facility] ([facility]): primary care clinic    Travel Screening: Not Applicable

## 2021-07-09 NOTE — TELEPHONE ENCOUNTER
Lucas called back.  [facility]  Daughter does have [employer] and [employer] active.  Precheck in was completed and message was sent to patient's Sandroa if they have any questions tomorrow.  She thanked caller.    SERTRALINE HCL 50 MG TABLET   Last Written Prescription Date:  4/13/2021  Last Fill Quantity: 180,   # refills: 0  Last Office Visit : 6/25/2021  Future Office visit:  None  180 Tabs, 1 Refill sent to karthik Gaspar RN  Central Triage Red Flags/Med Refills

## 2021-07-11 ENCOUNTER — TELEPHONE (OUTPATIENT)
Dept: INTERNAL MEDICINE | Facility: CLINIC | Age: 81
End: 2021-07-11

## 2021-07-11 NOTE — TELEPHONE ENCOUNTER
Can you let her (and her daughter) know that I am still working on getting in touch with her nephrologist regarding the CT of her lower extremities?  Thanks

## 2021-07-12 NOTE — TELEPHONE ENCOUNTER
I called and left message from provider below. They can call back if they have immediate questions.     Dana Karimi, EMT at 4:48 PM on 7/12/2021.  [facility]  [facility]  [place]  [phone]

## 2021-07-16 ENCOUNTER — MEDICAL CORRESPONDENCE (OUTPATIENT)
Dept: HEALTH INFORMATION MANAGEMENT | Facility: CLINIC | Age: 81
End: 2021-07-16

## 2021-07-20 ENCOUNTER — INFUSION THERAPY VISIT (OUTPATIENT)
Dept: INFUSION THERAPY | Facility: CLINIC | Age: 81
End: 2021-07-20
Payer: MEDICARE

## 2021-07-20 VITALS — SYSTOLIC BLOOD PRESSURE: 158 MMHG | HEART RATE: 70 BPM | DIASTOLIC BLOOD PRESSURE: 68 MMHG | OXYGEN SATURATION: 100 %

## 2021-07-20 DIAGNOSIS — D50.0 IRON DEFICIENCY ANEMIA DUE TO CHRONIC BLOOD LOSS: ICD-10-CM

## 2021-07-20 DIAGNOSIS — R19.7 DIARRHEA, UNSPECIFIED TYPE: Primary | ICD-10-CM

## 2021-07-20 DIAGNOSIS — K55.20 AVM (ARTERIOVENOUS MALFORMATION) OF SMALL BOWEL, ACQUIRED: ICD-10-CM

## 2021-07-20 DIAGNOSIS — K31.811 ANGIODYSPLASIA OF STOMACH AND DUODENUM WITH HEMORRHAGE: ICD-10-CM

## 2021-07-20 PROCEDURE — 99207 PR NO CHARGE LOS: CPT

## 2021-07-20 PROCEDURE — 96372 THER/PROPH/DIAG INJ SC/IM: CPT | Performed by: NURSE PRACTITIONER

## 2021-07-20 RX ADMIN — OCTREOTIDE ACETATE 20 MG: KIT INTRAMUSCULAR at 11:28

## 2021-07-27 ENCOUNTER — ANCILLARY PROCEDURE (OUTPATIENT)
Dept: CT IMAGING | Facility: CLINIC | Age: 81
End: 2021-07-27
Attending: INTERNAL MEDICINE
Payer: MEDICARE

## 2021-07-27 DIAGNOSIS — M79.661 PAIN IN BOTH LOWER LEGS: ICD-10-CM

## 2021-07-27 DIAGNOSIS — M79.662 PAIN IN BOTH LOWER LEGS: ICD-10-CM

## 2021-07-27 DIAGNOSIS — I73.9 PAD (PERIPHERAL ARTERY DISEASE) (H): ICD-10-CM

## 2021-07-27 LAB
CREAT BLD-MCNC: 7.2 MG/DL
GFR SERPL CREATININE-BSD FRML MDRD: 5 ML/MIN/1.73M2

## 2021-07-27 PROCEDURE — 75635 CT ANGIO ABDOMINAL ARTERIES: CPT | Mod: ME | Performed by: RADIOLOGY

## 2021-07-27 PROCEDURE — G1004 CDSM NDSC: HCPCS | Performed by: RADIOLOGY

## 2021-07-27 PROCEDURE — 82565 ASSAY OF CREATININE: CPT | Performed by: RADIOLOGY

## 2021-07-27 RX ORDER — IOPAMIDOL 755 MG/ML
149 INJECTION, SOLUTION INTRAVASCULAR ONCE
Status: COMPLETED | OUTPATIENT
Start: 2021-07-27 | End: 2021-07-27

## 2021-07-27 RX ADMIN — IOPAMIDOL 149 ML: 755 INJECTION, SOLUTION INTRAVASCULAR at 16:15

## 2021-08-06 DIAGNOSIS — I73.9 PERIPHERAL VASCULAR DISEASE (H): Primary | ICD-10-CM

## 2021-08-24 ENCOUNTER — INFUSION THERAPY VISIT (OUTPATIENT)
Dept: INFUSION THERAPY | Facility: CLINIC | Age: 81
End: 2021-08-24
Payer: MEDICARE

## 2021-08-24 VITALS — SYSTOLIC BLOOD PRESSURE: 157 MMHG | DIASTOLIC BLOOD PRESSURE: 68 MMHG | OXYGEN SATURATION: 98 % | HEART RATE: 68 BPM

## 2021-08-24 DIAGNOSIS — R19.7 DIARRHEA, UNSPECIFIED TYPE: Primary | ICD-10-CM

## 2021-08-24 DIAGNOSIS — K92.2 GASTROINTESTINAL HEMORRHAGE, UNSPECIFIED GASTROINTESTINAL HEMORRHAGE TYPE: ICD-10-CM

## 2021-08-24 DIAGNOSIS — D50.0 IRON DEFICIENCY ANEMIA DUE TO CHRONIC BLOOD LOSS: ICD-10-CM

## 2021-08-24 DIAGNOSIS — K55.20 AVM (ARTERIOVENOUS MALFORMATION) OF SMALL BOWEL, ACQUIRED: ICD-10-CM

## 2021-08-24 DIAGNOSIS — K31.811 ANGIODYSPLASIA OF STOMACH AND DUODENUM WITH HEMORRHAGE: ICD-10-CM

## 2021-08-24 PROCEDURE — 96372 THER/PROPH/DIAG INJ SC/IM: CPT | Performed by: NURSE PRACTITIONER

## 2021-08-24 RX ADMIN — OCTREOTIDE ACETATE 20 MG: KIT INTRAMUSCULAR at 11:37

## 2021-08-24 NOTE — PROGRESS NOTES
Infusion Nursing Note:  Rachele Martínez presents today for   Chief Complaint   Patient presents with     Oncology Clinic Visit     Sandostatin     Patient seen by provider today: No   present during visit today: Not Applicable.    Note: Patient was assessed by Samantha Lopez RN prior to injection.      Intravenous Access:  No Intravenous access/labs at this visit.    Treatment Conditions:  Not Applicable.      Post Infusion Assessment:  Patient tolerated injection without incident.  Site patent and intact, free from redness, edema or discomfort.       Discharge Plan:   Patient discharged in stable condition accompanied by: self.  Departure Mode: Ambulatory.      Florence To CMA

## 2021-08-29 RX ORDER — PANTOPRAZOLE SODIUM 20 MG/1
20 TABLET, DELAYED RELEASE ORAL 2 TIMES DAILY
Qty: 180 TABLET | Refills: 3 | Status: SHIPPED | OUTPATIENT
Start: 2021-08-29 | End: 2022-09-22

## 2021-09-09 PROBLEM — R52 PAIN, UNSPECIFIED: Status: ACTIVE | Noted: 2021-03-17

## 2021-09-09 NOTE — TELEPHONE ENCOUNTER
DIAGNOSIS: PAD consult   DATE RECEIVED: 9.13.21   NOTES STATUS DETAILS   OFFICE NOTE from referring provider Internal 8.6.21 TONG Rodriguez   6.25.21   OFFICE NOTE from other specialist N/A    OPERATIVE REPORT N/A    MEDICATION LIST Internal    PERTINENT LABS Internal    CTA (CT ANGIOGRAPHY) Internal 7.27.21 ab pelviis   CT N/A    MRI N/A    ULTRASOUND Internal 8.18.20 amrita doppler  5.26.20 ab   11.21.19 ab

## 2021-09-11 ENCOUNTER — HEALTH MAINTENANCE LETTER (OUTPATIENT)
Age: 81
End: 2021-09-11

## 2021-09-13 ENCOUNTER — PRE VISIT (OUTPATIENT)
Dept: VASCULAR SURGERY | Facility: CLINIC | Age: 81
End: 2021-09-13

## 2021-09-13 ENCOUNTER — OFFICE VISIT (OUTPATIENT)
Dept: VASCULAR SURGERY | Facility: CLINIC | Age: 81
End: 2021-09-13
Attending: INTERNAL MEDICINE
Payer: MEDICARE

## 2021-09-13 VITALS — SYSTOLIC BLOOD PRESSURE: 199 MMHG | OXYGEN SATURATION: 100 % | HEART RATE: 56 BPM | DIASTOLIC BLOOD PRESSURE: 75 MMHG

## 2021-09-13 DIAGNOSIS — I70.213 ATHEROSCLEROSIS OF NATIVE ARTERY OF BOTH LOWER EXTREMITIES WITH INTERMITTENT CLAUDICATION (H): Primary | ICD-10-CM

## 2021-09-13 DIAGNOSIS — I73.9 PERIPHERAL VASCULAR DISEASE (H): ICD-10-CM

## 2021-09-13 PROCEDURE — 99205 OFFICE O/P NEW HI 60 MIN: CPT | Mod: GC | Performed by: RADIOLOGY

## 2021-09-13 RX ORDER — B,C/FERROUS FUM/FA/D3/ZINC OX 8MG-800MCG
1 TABLET ORAL DAILY
Status: ON HOLD | COMMUNITY
Start: 2021-07-23 | End: 2023-05-25

## 2021-09-13 NOTE — NURSING NOTE
Chief Complaint   Patient presents with     Consult     Consult for PAD.  CTA ABDOMEN, PELVIS, AND LOWER EXTREMITY completed 7/27/2021.  Pt reports sharp pain in LE bilaterally, which she rates 9/10. Pt also noted numbness in both big toes.       Medications and allergies reconciled.  Vitals collected.    Alyssa Aviles LPN

## 2021-09-13 NOTE — LETTER
9/13/2021       RE: Rachele Martínez  [address]  [place] [facility] [address]     Dear Colleague,    Thank you for referring your patient, Rachele Martínez, to the [facility] [place] at [facility]. Please see a copy of my visit note below.    INTERVENTIONAL RADIOLOGY CONSULTATION    Name: Rachele Martínez  Age: 81 year old   Referring Physician: Dr. Rodriguez   REASON FOR REFERRAL: Right lower extremity pain    HPI: Ms. Martínez is a very pleasant 81-year-old female who is here for assessment of right lower extremity pain, believed to be caused by her peripheral arterial disease.  Her past medical history is most significant for end-stage renal disease on dialysis, hyperlipidemia, hypertension, previous tobacco use.  She shares with me that the right lower extremity pain is quite lifestyle limiting for her.  It predominantly involves the lower leg and calf.  It has been present for at least a couple of years.  She definitely is unable to walk up to a block without provoking this pain, however she cannot give me an exact distance when the pain would come up with walking.  Walking can be too difficult for her as her knees can give up on her.  She can stop for a minute and the pain subsides at some point.  However there are times that the pain is constant and resting does not stop the pain.  She also complains of big toe numbness.  She believes that her disc disease in the back was the original cause, for all these problems to start, however she was never cleared by anesthesia for undergoing surgical treatment.  The pain that she describes for me can also be present at rest sometimes.  There is really no positional differences with dangling of her feet from the bed or standing up or walking around at night.  The pain does not wake her up at night.  She is unaware of any ulcerations or slow healing wounds.  She does have restless leg  syndrome as per her own recollection of her medical history which does hurt more when she goes to bed at nights.  The pain does not progress towards the end of her day.  Standing up does not augment the pain necessarily.  She lives alone with her cats in an apartment.  This pain is quite a significant lifestyle limiting factor for her.  She smoked between the ages of 15 and 56, for nearly 1 pack/day and has quit smoking nearly 25 years ago.  She does have end-stage renal disease, and uses dialysis 3 days a week.  She also has hypertension and hyperlipidemia.  No known coronary artery disease.      PAST MEDICAL HISTORY:   Past Medical History:   Diagnosis Date     Anemia      Anxiety and depression      Arthritis      AVM (arteriovenous malformation)      Chronic hepatitis C with cirrhosis (H)      Clotting disorder (H)      ESRD (end stage renal disease) (H)     on dialysis     GI bleed     recurrent     Glaucoma      Hyperlipidemia      Hypertension goal BP (blood pressure) < 140/80        PAST SURGICAL HISTORY:   Past Surgical History:   Procedure Laterality Date     CAPSULE/PILL CAM ENDOSCOPY N/A 3/27/2019    Procedure: Capsule/pill cam endoscopy;  Surgeon: Yosvany Ram MD;  Location:  GI     COLONOSCOPY N/A 9/4/2015    Procedure: COMBINED COLONOSCOPY, SINGLE OR MULTIPLE BIOPSY/POLYPECTOMY BY BIOPSY;  Surgeon: Rupesh Lopez MD;  Location:  GI     COLONOSCOPY N/A 9/19/2018    Procedure: COLONOSCOPY;  enteroscopy small bowel  COLONOSCOPY;  Surgeon: Ankit Baires MD;  Location:  GI     ESOPHAGOSCOPY, GASTROSCOPY, DUODENOSCOPY (EGD), COMBINED N/A 12/18/2014    Procedure: COMBINED ESOPHAGOSCOPY, GASTROSCOPY, DUODENOSCOPY (EGD);  Surgeon: Betsy Carvajal MD;  Location:  GI     ESOPHAGOSCOPY, GASTROSCOPY, DUODENOSCOPY (EGD), COMBINED N/A 4/25/2015    Procedure: COMBINED ESOPHAGOSCOPY, GASTROSCOPY, DUODENOSCOPY (EGD);  Surgeon: Yosvany Ram MD;  Location: [facility]      ESOPHAGOSCOPY, GASTROSCOPY, DUODENOSCOPY (EGD), COMBINED N/A 2015    Procedure: COMBINED ESOPHAGOSCOPY, GASTROSCOPY, DUODENOSCOPY (EGD);  Surgeon: Mariano Mistry MD;  Location:  GI      CAPSULE ENDOSCOPY N/A 2015    Procedure: CAPSULE/PILL CAM ENDOSCOPY;  Surgeon: Pan Dhaliwal MD;  Location:  GI      VASCULAR SURGERY PROCEDURE UNLIST       HYSTERECTOMY      KYREE     LUMPECTOMY BREAST         FAMILY HISTORY:   Family History   Problem Relation Age of Onset     Diabetes Mother      Alzheimer Disease Mother      Substance Abuse Son      Cancer Sister      Soft Tissue Cancer Sister      Breast Cancer Sister      Hyperlipidemia Daughter      Alcoholism Brother      Spine Problems Sister        SOCIAL HISTORY:   Social History     Tobacco Use     Smoking status: Former Smoker     Packs/day: 2.00     Years: 45.00     Pack years: 90.00     Types: Cigarettes     Start date: 1953     Quit date: 2002     Years since quittin.9     Smokeless tobacco: Never Used   Substance Use Topics     Alcohol use: No       PROBLEM LIST:   Patient Active Problem List    Diagnosis Date Noted     Peripheral vascular disease (H) 10/30/2020     Priority: Medium     Spinal stenosis of lumbar region with neurogenic claudication 2019     Priority: Medium     Diarrhea, unspecified 2016     Priority: Medium     Angiodysplasia of stomach and duodenum with hemorrhage 2016     Priority: Medium     Tendency to fall 2016     Priority: Medium     Protein-calorie malnutrition (H) 2016     Priority: Medium     Secondary hyperparathyroidism of renal origin (H) 2016     Priority: Medium     Coagulation defect, unspecified (H) 2016     Priority: Medium     Other disorders of bone development and growth, other site 2016     Priority: Medium     End stage renal disease (H) 2016     Priority: Medium     Nephrologist is Dr. Tarango  [facility]  [phone], fax [phone]         Unspecified cirrhosis of liver (H) 03/31/2016     Priority: Medium     Congenital arteriovenous malformation 03/31/2016     Priority: Medium     AVM (arteriovenous malformation) of small bowel, acquired 03/14/2016     Priority: Medium     Anemia of chronic disease 03/02/2016     Priority: Medium     Iron deficiency anemia due to chronic blood loss 01/31/2016     Priority: Medium     Due to AVMs       Encounter for screening for human immunodeficiency virus (HIV) 07/30/2015     Priority: Medium     Advance Care Planning 7/30/2015: ACP Review and Resources Provided:  Reviewed chart for advance care plan.  Rachele LORRIE Martínez has no plan or code status on file. Discussed available resources and provided with information. Confirmed code status reflects current choices pending further ACP discussions. . Added by Zita Armendariz             History of hepatitis C 07/28/2015     Priority: Medium     SVR, 7/2015       Hyperlipidemia with target LDL less than 130 02/27/2015     Priority: Medium     Diagnosis updated by automated process. Provider to review and confirm.       Chronic viral hepatitis C (H) 12/10/2014     Priority: Medium     Cataract 11/23/2012     Priority: Medium     Right   Problem list name updated by automated process. Provider to review       Depression with anxiety 11/23/2012     Priority: Medium     Problem list name updated by automated process. Provider to review       Insomnia 11/23/2012     Priority: Medium     Problem list name updated by automated process. Provider to review       Hypertension goal BP (blood pressure) < 140/80 08/02/2003     Priority: Medium       MEDICATIONS:   Prescription Medications as of 10/17/2021       Rx Number Disp Refills Start End Last Dispensed Date Next Fill Date Owning Pharmacy    atorvastatin (LIPITOR) 20 MG tablet  90 tablet 3 1/28/2021    [id number] [place], [place] - [address]    Sig: Take 1 tablet (20 mg) by  mouth daily    Class: E-Prescribe    Route: Oral    Calcium Acetate, Phos Binder, 667 MG CAPS   8 10/12/2016        Sig: TAKE 2 CAPSULE BY MOUTH THREE TIMES A DAY WITH MEALS    Class: Historical    cilostazol (PLETAL) 100 MG tablet  180 tablet 1 6/9/2021    [id number] [place] - [address]    Sig: TAKE 1 TABLET BY MOUTH TWICE A DAY    Class: E-Prescribe    gabapentin (NEURONTIN) 300 MG capsule  90 capsule 3 11/23/2020    [facility] [place] [facility] [place] - [address]    Sig: Take 1 capsule (300 mg) by mouth At Bedtime    Class: E-Prescribe    Route: Oral    losartan (COZAAR) 25 MG tablet        [facility] [place] [facility] [place] - [address]    Sig: Take 25 mg by mouth daily Patient reports taking 100 mg daily.    Class: Historical    Route: Oral    Multiple Vitamins-Minerals (PRORENAL + D) TABS    7/23/2021        Sig: Take 1 tablet by mouth daily    Class: Historical    Route: Oral    octreotide (SANDOSTATIN LAR) 20 MG injection            Sig: Inject 20 mg into the muscle every 28 days    Class: Historical    Route: Intramuscular    order for DME  1 Units 0 4/30/2019    [facility], [place] - [address]    Sig: Cane    Class: Local Print    order for DME  1 Device 0 7/20/2016        Sig: Equipment being ordered: 4 wheel walker with seat.    Class: Local Print    pantoprazole (PROTONIX) 20 MG EC tablet  180 tablet 3 8/29/2021    [facility] [place] [facility], [place] - [address]    Sig: Take 1 tablet (20 mg) by mouth 2 times daily *30-60 minutes before a meal    Class: E-Prescribe    Route: Oral    polyethylene glycol (MIRALAX) 17 GM/Dose powder    8/28/2020    [facility] [place] [facility] [place] - [address]    Sig: As needed    Class: Historical    sertraline (ZOLOFT) 50 MG tablet  180 tablet 1 7/9/2021    [facility] [place] [facility] [place] - [address]    Sig: Take 2 tablets (100 mg) by mouth daily    Class: E-Prescribe    Notes to Pharmacy: ZACHARIAH  Code Needed  PATIENT REQUESTS REFILL, IS COMPLETELY OUT, THANKS!.    Route: Oral          ALLERGIES:   Abacavir, Lisinopril, Diovan hct, Dust mites, Hydrochlorothiazide, No clinical screening - see comments, Oxycodone-acetaminophen, Spironolactone, Sulfa drugs, and Valsartan    ROS:  An 11 point review of system was performed and pertinent negative and positives are mentioned in HPI.        Physical Examination:   VITALS:   BP (!) 199/75 (BP Location: Left arm, Patient Position: Sitting, Cuff Size: Adult Regular)   Pulse 56   SpO2 100%   General:  Pt sitting in chair comfortably.  No acute distress  HEENT: normocephalic.  Neck: no JVD  Resp: nonlabored.  CV: RRR.  Lower extremities/vascular: Dopplerable DP and PT pulses bilaterally.  Palpable 1+ left and 2+ right popliteal artery pulses.  No skin ulcerations or breakdowns.  Normal bilateral capillary refills.  No prominent truncal varicosities.  Sensation of bilateral feet is intact.  Lymph: no pedal edema  Neuro: Oriented x3.  No evidence of focal motor deficits  Mood: appropriate affect      Labs:    BMP RESULTS:  Lab Results   Component Value Date     05/26/2020    POTASSIUM 4.1 05/26/2020    CHLORIDE 98 05/26/2020    CO2 28 05/26/2020    ANIONGAP 12 05/26/2020     (H) 05/26/2020    BUN 23 05/26/2020    CR 7.2 07/27/2021    CR 7.50 (H) 05/26/2020    GFRESTIMATED 5 (L) 07/27/2021    GFRESTIMATED 5 (L) 05/26/2020    GFRESTBLACK 5 (L) 05/26/2020    BELGICA 8.8 05/26/2020        CBC RESULTS:  Lab Results   Component Value Date    WBC 5.5 05/26/2020    RBC 3.92 05/26/2020    HGB 12.8 05/26/2020    HCT 41.1 05/26/2020     (H) 05/26/2020    MCH 32.7 05/26/2020    MCHC 31.1 (L) 05/26/2020    RDW 16.0 (H) 05/26/2020     05/26/2020       INR/PTT:  Lab Results   Component Value Date    INR 1.31 (H) 05/26/2020    PTT 37 03/26/2019       Diagnostic studies:  I personally reviewed CTA runoff dated 7/27/2021, with most significant findings as below;  On  the right: RULA multifocal disease 50-70%, EIA multifocal up to 50% disease, common femoral artery less than 50%, SFA tandem disease (near occlusive in the upper thigh and 50 to 70% in the proximal SFA), less than 50% stenosis in the popliteal artery, peroneal artery provides the runoff to the foot with intact pedal plantar arch.  On the left: RULA multifocal 50-70%, EIA multifocal up to 50%, common femoral near occlusive disease, SFA focal near occlusion at the adductor canal and additional multifocal 50-70% stenoses, popliteal 50 to 70% stenosis, intact plantar arch.    Resting FAISAL dated 8/18/2020 reveals bilateral noncompressible ABIs and pulse volume recordings suggestive of inflow disease with relatively preserved toe brachial indices.    Assessment   Multifactorial in etiology, definitely contribution from lumbar radiculopathy, DJD of the knee/ ankle, possible neuropathy and PAD. PAD possibly constitutes 40-50% of her symptomatology in my estimate. Symptoms are dominantly on the right side, however her disease appears rather symmetrical on CTA.  In my assessment her overall disease is a combination of inflow with some infrainguinal involvement.    Plan   Enroll in supervised exercise program.  Obtain an exercise FAISAL  RTC in 6 months with repeat exercise FAISAL  Risk factor modification is properly managed by patient's primary care physician, Dr. Rodriguez    It was a pleasure to participate in Mrs. Martínez's care today.    Gregg Babin MD    I spent a total of 40 minutes face-to-face with Rachele ANDREW Mauricio during today's office visit. Over 50% of this time was spent counseling the patient and/or coordinating care. See note for details.     An additional 25 minutes spent on the date of the encounter doing chart review, history and exam, discussion of care plan with other providers, documentation and further activities as noted above          CC  Patient Care Team:  Agnieszka Rodriguez MD as PCP - General  (Internal Medicine)  [facility]  Jessica Grimes MD as MD (Internal Medicine)  Shun Moore MD as MD (Interventional Cardiology)  Roberto Gordon PA-C as Physician Assistant (Physician Assistant)  Gayle Paul, RN as Registered Nurse  Ankit Baires MD as Assigned Gastroenterology Provider  Jessica Grimes MD as Assigned PCP  JESSICA GRIMES          RLE pain, big toe numbness  It hurts all the time  May stop for a MINUTE  Walking too much  Can give up on me   Cant walk a block  Lower leg and calf  At least for a couple years  Disc in her back as the original cause, anesthesia di not clear, probably age  Even at rest it hurts, no positional differences  Does not wake her up at night  Also has RLS per herself, in bed hurts  Does not change during the day  Lives with her cat in an apartment  25 yrs since quitted smoking, [age], 1 PPD  CKD, 3 days a week of dialysis  No CAD  HTN  No hypercholestrolemia  Life style limiting          Again, thank you for allowing me to participate in the care of your patient.      Sincerely,    Gregg Babin MD

## 2021-09-13 NOTE — PATIENT INSTRUCTIONS
Rachele you have had your consult today with Dr Babin IR/Vascular regarding your leg pain.    Plan:    Dr Babin has ordered supervised exercise or PAD rehab referral:  Your provider has referred you to the supervised exercise therapy program.     If you have not heard from the scheduling office within 2 business days, please call [phone] for all locations, with the exception of [place], please call [phone] and [place], please call [phone].     -you will be contacted for FAISAL with exercise, please complete this as you are able.  We will get you back in 6 month to assess your progress with another FAISAL with exercise test.    Please don't hesitate to reach out sooner with questions or concerns,     AWaldemar Payan, RN, BSN  Interventional Radiology Nurse Coordinator   Phone:  [phone]

## 2021-09-13 NOTE — PROGRESS NOTES
INTERVENTIONAL RADIOLOGY CONSULTATION    Name: Rachele Martínez  Age: 81 year old   Referring Physician: Dr. Rodriguez   REASON FOR REFERRAL: Right lower extremity pain    HPI: Ms. Martínez is a very pleasant 81-year-old female who is here for assessment of right lower extremity pain, believed to be caused by her peripheral arterial disease.  Her past medical history is most significant for end-stage renal disease on dialysis, hyperlipidemia, hypertension, previous tobacco use.  She shares with me that the right lower extremity pain is quite lifestyle limiting for her.  It predominantly involves the lower leg and calf.  It has been present for at least a couple of years.  She definitely is unable to walk up to a block without provoking this pain, however she cannot give me an exact distance when the pain would come up with walking.  Walking can be too difficult for her as her knees can give up on her.  She can stop for a minute and the pain subsides at some point.  However there are times that the pain is constant and resting does not stop the pain.  She also complains of big toe numbness.  She believes that her disc disease in the back was the original cause, for all these problems to start, however she was never cleared by anesthesia for undergoing surgical treatment.  The pain that she describes for me can also be present at rest sometimes.  There is really no positional differences with dangling of her feet from the bed or standing up or walking around at night.  The pain does not wake her up at night.  She is unaware of any ulcerations or slow healing wounds.  She does have restless leg syndrome as per her own recollection of her medical history which does hurt more when she goes to bed at nights.  The pain does not progress towards the end of her day.  Standing up does not augment the pain necessarily.  She lives alone with her cats in an apartment.  This pain is quite a significant lifestyle limiting  factor for her.  She smoked between the ages of 15 and 56, for nearly 1 pack/day and has quit smoking nearly 25 years ago.  She does have end-stage renal disease, and uses dialysis 3 days a week.  She also has hypertension and hyperlipidemia.  No known coronary artery disease.      PAST MEDICAL HISTORY:   Past Medical History:   Diagnosis Date     Anemia      Anxiety and depression      Arthritis      AVM (arteriovenous malformation)      Chronic hepatitis C with cirrhosis (H)      Clotting disorder (H)      ESRD (end stage renal disease) (H)     on dialysis     GI bleed     recurrent     Glaucoma      Hyperlipidemia      Hypertension goal BP (blood pressure) < 140/80        PAST SURGICAL HISTORY:   Past Surgical History:   Procedure Laterality Date     CAPSULE/PILL CAM ENDOSCOPY N/A 3/27/2019    Procedure: Capsule/pill cam endoscopy;  Surgeon: Yosvany Ram MD;  Location: [facility]     COLONOSCOPY N/A 9/4/2015    Procedure: COMBINED COLONOSCOPY, SINGLE OR MULTIPLE BIOPSY/POLYPECTOMY BY BIOPSY;  Surgeon: Rupesh Lopez MD;  Location: [facility]     COLONOSCOPY N/A 9/19/2018    Procedure: COLONOSCOPY;  enteroscopy small bowel  COLONOSCOPY;  Surgeon: Ankit Baires MD;  Location: [facility]     ESOPHAGOSCOPY, GASTROSCOPY, DUODENOSCOPY (EGD), COMBINED N/A 12/18/2014    Procedure: COMBINED ESOPHAGOSCOPY, GASTROSCOPY, DUODENOSCOPY (EGD);  Surgeon: Betsy Carvajal MD;  Location: [facility]     ESOPHAGOSCOPY, GASTROSCOPY, DUODENOSCOPY (EGD), COMBINED N/A 4/25/2015    Procedure: COMBINED ESOPHAGOSCOPY, GASTROSCOPY, DUODENOSCOPY (EGD);  Surgeon: Yosvany Ram MD;  Location: [facility]     ESOPHAGOSCOPY, GASTROSCOPY, DUODENOSCOPY (EGD), COMBINED N/A 5/5/2015    Procedure: COMBINED ESOPHAGOSCOPY, GASTROSCOPY, DUODENOSCOPY (EGD);  Surgeon: Mariano Mistry MD;  Location:  GI     HC CAPSULE ENDOSCOPY N/A 9/30/2015    Procedure: CAPSULE/PILL CAM ENDOSCOPY;  Surgeon: Pna Dhaliwal MD;  Location:  GI     HC  VASCULAR SURGERY PROCEDURE UNLIST       HYSTERECTOMY  1980    KYREE     LUMPECTOMY BREAST         FAMILY HISTORY:   Family History   Problem Relation Age of Onset     Diabetes Mother      Alzheimer Disease Mother      Substance Abuse Son      Cancer Sister      Soft Tissue Cancer Sister      Breast Cancer Sister      Hyperlipidemia Daughter      Alcoholism Brother      Spine Problems Sister        SOCIAL HISTORY:   Social History     Tobacco Use     Smoking status: Former Smoker     Packs/day: 2.00     Years: 45.00     Pack years: 90.00     Types: Cigarettes     Start date: 1953     Quit date: 2002     Years since quittin.9     Smokeless tobacco: Never Used   Substance Use Topics     Alcohol use: No       PROBLEM LIST:   Patient Active Problem List    Diagnosis Date Noted     Peripheral vascular disease (H) 10/30/2020     Priority: Medium     Spinal stenosis of lumbar region with neurogenic claudication 2019     Priority: Medium     Diarrhea, unspecified 2016     Priority: Medium     Angiodysplasia of stomach and duodenum with hemorrhage 2016     Priority: Medium     Tendency to fall 2016     Priority: Medium     Protein-calorie malnutrition (H) 2016     Priority: Medium     Secondary hyperparathyroidism of renal origin (H) 2016     Priority: Medium     Coagulation defect, unspecified (H) 2016     Priority: Medium     Other disorders of bone development and growth, other site 2016     Priority: Medium     End stage renal disease (H) 2016     Priority: Medium     Nephrologist is Dr. Tarango  [facility] [phone], fax [phone]         Unspecified cirrhosis of liver (H) 2016     Priority: Medium     Congenital arteriovenous malformation 2016     Priority: Medium     AVM (arteriovenous malformation) of small bowel, acquired 2016     Priority: Medium     Anemia of chronic disease 2016     Priority: Medium      Iron deficiency anemia due to chronic blood loss 01/31/2016     Priority: Medium     Due to AVMs       Encounter for screening for human immunodeficiency virus (HIV) 07/30/2015     Priority: Medium     Advance Care Planning 7/30/2015: ACP Review and Resources Provided:  Reviewed chart for advance care plan.  Rachele LORRIE Martínez has no plan or code status on file. Discussed available resources and provided with information. Confirmed code status reflects current choices pending further ACP discussions. . Added by Zita Armendariz             History of hepatitis C 07/28/2015     Priority: Medium     SVR, 7/2015       Hyperlipidemia with target LDL less than 130 02/27/2015     Priority: Medium     Diagnosis updated by automated process. Provider to review and confirm.       Chronic viral hepatitis C (H) 12/10/2014     Priority: Medium     Cataract 11/23/2012     Priority: Medium     Right   Problem list name updated by automated process. Provider to review       Depression with anxiety 11/23/2012     Priority: Medium     Problem list name updated by automated process. Provider to review       Insomnia 11/23/2012     Priority: Medium     Problem list name updated by automated process. Provider to review       Hypertension goal BP (blood pressure) < 140/80 08/02/2003     Priority: Medium       MEDICATIONS:   Prescription Medications as of 10/17/2021       Rx Number Disp Refills Start End Last Dispensed Date Next Fill Date Owning Pharmacy    atorvastatin (LIPITOR) 20 MG tablet  90 tablet 3 1/28/2021    [id number] [place] [facility] [id number] [email or web address]    Sig: Take 1 tablet (20 mg) by mouth daily    Class: E-Prescribe    Route: Oral    Calcium Acetate, Phos Binder, 667 MG CAPS   8 10/12/2016        Sig: TAKE 2 CAPSULE BY MOUTH THREE TIMES A DAY WITH MEALS    Class: Historical    cilostazol (PLETAL) 100 MG tablet  180 tablet 1 6/9/2021    [id number] [place] [employer] [place] - [email or web address]    Sig: TAKE 1 TABLET BY  MOUTH TWICE A DAY    Class: E-Prescribe    gabapentin (NEURONTIN) 300 MG capsule  90 capsule 3 11/23/2020    [id number] [place] [facility] [place] - [address]    Sig: Take 1 capsule (300 mg) by mouth At Bedtime    Class: E-Prescribe    Route: Oral    losartan (COZAAR) 25 MG tablet        [id number] [place] [facility] [place] - [address]    Sig: Take 25 mg by mouth daily Patient reports taking 100 mg daily.    Class: Historical    Route: Oral    Multiple Vitamins-Minerals (PRORENAL + D) TABS    7/23/2021        Sig: Take 1 tablet by mouth daily    Class: Historical    Route: Oral    octreotide (SANDOSTATIN LAR) 20 MG injection            Sig: Inject 20 mg into the muscle every 28 days    Class: Historical    Route: Intramuscular    order for DME  1 Units 0 4/30/2019    [id number] [place] [facility], [place] - [address]    Sig: Cane    Class: Local Print    order for DME  1 Device 0 7/20/2016        Sig: Equipment being ordered: 4 wheel walker with seat.    Class: Local Print    pantoprazole (PROTONIX) 20 MG EC tablet  180 tablet 3 8/29/2021    [id number] [place] [facility] [place] - [address]    Sig: Take 1 tablet (20 mg) by mouth 2 times daily *30-60 minutes before a meal    Class: E-Prescribe    Route: Oral    polyethylene glycol (MIRALAX) 17 GM/Dose powder    8/28/2020    [facility] [place] [facility], [place] - [address]    Sig: As needed    Class: Historical    sertraline (ZOLOFT) 50 MG tablet  180 tablet 1 7/9/2021    [facility] [place] [facility] [place] - [address]    Sig: Take 2 tablets (100 mg) by mouth daily    Class: E-Prescribe    Notes to Pharmacy: DX Code Needed  PATIENT REQUESTS REFILL, IS COMPLETELY OUT, THANKS!.    Route: Oral          ALLERGIES:   Abacavir, Lisinopril, Diovan hct, Dust mites, Hydrochlorothiazide, No clinical screening - see comments, Oxycodone-acetaminophen, Spironolactone, Sulfa drugs, and Valsartan    ROS:  An 11 point review of system was performed and  pertinent negative and positives are mentioned in HPI.        Physical Examination:   VITALS:   BP (!) 199/75 (BP Location: Left arm, Patient Position: Sitting, Cuff Size: Adult Regular)   Pulse 56   SpO2 100%   General:  Pt sitting in chair comfortably.  No acute distress  HEENT: normocephalic.  Neck: no JVD  Resp: nonlabored.  CV: RRR.  Lower extremities/vascular: Dopplerable DP and PT pulses bilaterally.  Palpable 1+ left and 2+ right popliteal artery pulses.  No skin ulcerations or breakdowns.  Normal bilateral capillary refills.  No prominent truncal varicosities.  Sensation of bilateral feet is intact.  Lymph: no pedal edema  Neuro: Oriented x3.  No evidence of focal motor deficits  Mood: appropriate affect      Labs:    BMP RESULTS:  Lab Results   Component Value Date     05/26/2020    POTASSIUM 4.1 05/26/2020    CHLORIDE 98 05/26/2020    CO2 28 05/26/2020    ANIONGAP 12 05/26/2020     (H) 05/26/2020    BUN 23 05/26/2020    CR 7.2 07/27/2021    CR 7.50 (H) 05/26/2020    GFRESTIMATED 5 (L) 07/27/2021    GFRESTIMATED 5 (L) 05/26/2020    GFRESTBLACK 5 (L) 05/26/2020    BELGICA 8.8 05/26/2020        CBC RESULTS:  Lab Results   Component Value Date    WBC 5.5 05/26/2020    RBC 3.92 05/26/2020    HGB 12.8 05/26/2020    HCT 41.1 05/26/2020     (H) 05/26/2020    MCH 32.7 05/26/2020    MCHC 31.1 (L) 05/26/2020    RDW 16.0 (H) 05/26/2020     05/26/2020       INR/PTT:  Lab Results   Component Value Date    INR 1.31 (H) 05/26/2020    PTT 37 03/26/2019       Diagnostic studies:  I personally reviewed CTA runoff dated 7/27/2021, with most significant findings as below;  On the right: RULA multifocal disease 50-70%, EIA multifocal up to 50% disease, common femoral artery less than 50%, SFA tandem disease (near occlusive in the upper thigh and 50 to 70% in the proximal SFA), less than 50% stenosis in the popliteal artery, peroneal artery provides the runoff to the foot with intact pedal plantar  arch.  On the left: RULA multifocal 50-70%, EIA multifocal up to 50%, common femoral near occlusive disease, SFA focal near occlusion at the adductor canal and additional multifocal 50-70% stenoses, popliteal 50 to 70% stenosis, intact plantar arch.    Resting FAISAL dated 8/18/2020 reveals bilateral noncompressible ABIs and pulse volume recordings suggestive of inflow disease with relatively preserved toe brachial indices.    Assessment   Multifactorial in etiology, definitely contribution from lumbar radiculopathy, DJD of the knee/ ankle, possible neuropathy and PAD. PAD possibly constitutes 40-50% of her symptomatology in my estimate. Symptoms are dominantly on the right side, however her disease appears rather symmetrical on CTA.  In my assessment her overall disease is a combination of inflow with some infrainguinal involvement.    Plan   Enroll in supervised exercise program.  Obtain an exercise FAISAL  RTC in 6 months with repeat exercise FAISAL  Risk factor modification is properly managed by patient's primary care physician, Dr. Rodriguez    It was a pleasure to participate in Mrs. Martínez's care today.    Gregg Babin MD    I spent a total of 40 minutes face-to-face with Rachele ANDREW Mauricio during today's office visit. Over 50% of this time was spent counseling the patient and/or coordinating care. See note for details.     An additional 25 minutes spent on the date of the encounter doing chart review, history and exam, discussion of care plan with other providers, documentation and further activities as noted above          CC  Patient Care Team:  Agnieszka Rodriguez MD as PCP - General (Internal Medicine)  [facility]  Agnieszka Rodriguez MD as MD (Internal Medicine)  Shun Moore MD as MD (Interventional Cardiology)  Roberto Gordon PA-C as Physician Assistant (Physician Assistant)  Gayle Paul RN as Registered Nurse  Ankit Baires MD as Assigned  Gastroenterology Provider  Jessica Grimes MD as Assigned PCP  JESSICA GRIMES          RLE pain, big toe numbness  It hurts all the time  May stop for a MINUTE  Walking too much  Can give up on me   Cant walk a block  Lower leg and calf  At least for a couple years  Disc in her back as the original cause, anesthesia di not clear, probably age  Even at rest it hurts, no positional differences  Does not wake her up at night  Also has RLS per herself, in bed hurts  Does not change during the day  Lives with her cat in an apartment  25 yrs since quitted smoking, 14-55, 1 PPD  CKD, 3 days a week of dialysis  No CAD  HTN  No hypercholestrolemia  Life style limiting

## 2021-09-21 ENCOUNTER — INFUSION THERAPY VISIT (OUTPATIENT)
Dept: INFUSION THERAPY | Facility: CLINIC | Age: 81
End: 2021-09-21
Payer: MEDICARE

## 2021-09-21 VITALS
WEIGHT: 126.5 LBS | TEMPERATURE: 98.3 F | OXYGEN SATURATION: 100 % | HEIGHT: 64 IN | RESPIRATION RATE: 16 BRPM | SYSTOLIC BLOOD PRESSURE: 149 MMHG | BODY MASS INDEX: 21.6 KG/M2 | DIASTOLIC BLOOD PRESSURE: 66 MMHG | HEART RATE: 62 BPM

## 2021-09-21 DIAGNOSIS — R19.7 DIARRHEA, UNSPECIFIED TYPE: Primary | ICD-10-CM

## 2021-09-21 DIAGNOSIS — D50.0 IRON DEFICIENCY ANEMIA DUE TO CHRONIC BLOOD LOSS: ICD-10-CM

## 2021-09-21 DIAGNOSIS — K55.20 AVM (ARTERIOVENOUS MALFORMATION) OF SMALL BOWEL, ACQUIRED: ICD-10-CM

## 2021-09-21 DIAGNOSIS — K31.811 ANGIODYSPLASIA OF STOMACH AND DUODENUM WITH HEMORRHAGE: ICD-10-CM

## 2021-09-21 PROCEDURE — 99207 PR NO CHARGE LOS: CPT

## 2021-09-21 PROCEDURE — 96372 THER/PROPH/DIAG INJ SC/IM: CPT | Performed by: NURSE PRACTITIONER

## 2021-09-21 RX ADMIN — OCTREOTIDE ACETATE 20 MG: KIT INTRAMUSCULAR at 12:19

## 2021-09-21 ASSESSMENT — PAIN SCALES - GENERAL: PAINLEVEL: WORST PAIN (10)

## 2021-09-21 ASSESSMENT — MIFFLIN-ST. JEOR: SCORE: 1028.8

## 2021-09-21 NOTE — PROGRESS NOTES
Infusion Nursing Note:  Rachele LORRIE Martínez presents today for   Chief Complaint   Patient presents with     Allied Health Visit     Aranesp injection   .    Patient seen by provider today: No   present during visit today: Not Applicable.    Note: Patient assessed today by Holly Méndez RN prior to injection      Intravenous Access:  No Intravenous access/labs at this visit.    Treatment Conditions:  {UMHTXCONDITIONS:486081}      Post Infusion Assessment:  {UMHPOSTINFUSION:628174}       Discharge Plan:   {UMHDISCHARGE:539670}      Lucila Chung, CMA

## 2021-09-21 NOTE — PROGRESS NOTES
Infusion Nursing Note:  Rachele Martínez presents today for   Chief Complaint   Patient presents with     Allied Health Visit     Sandostatin injection   .    Patient seen by provider today: No   present during visit today: Not Applicable.    Note: Patient assessed today by Holly Méndez RN, prior to injection.      Intravenous Access:  No Intravenous access/labs at this visit.    Treatment Conditions:  Not Applicable.      Post Infusion Assessment:  Patient tolerated injection without incident.  Site patent and intact, free from redness, edema or discomfort.       Discharge Plan:   Patient discharged in stable condition accompanied by: self.  Departure Mode: Ambulatory.      Lucila Chung CMA

## 2021-09-23 ENCOUNTER — HOSPITAL ENCOUNTER (OUTPATIENT)
Dept: CARDIAC REHAB | Facility: CLINIC | Age: 81
End: 2021-09-23
Attending: RADIOLOGY
Payer: MEDICARE

## 2021-09-23 ENCOUNTER — OFFICE VISIT (OUTPATIENT)
Dept: ORTHOPEDICS | Facility: CLINIC | Age: 81
End: 2021-09-23
Payer: MEDICARE

## 2021-09-23 ENCOUNTER — ANCILLARY PROCEDURE (OUTPATIENT)
Dept: GENERAL RADIOLOGY | Facility: CLINIC | Age: 81
End: 2021-09-23
Attending: FAMILY MEDICINE
Payer: MEDICARE

## 2021-09-23 DIAGNOSIS — M25.551 RIGHT HIP PAIN: Primary | ICD-10-CM

## 2021-09-23 DIAGNOSIS — I73.9 PERIPHERAL VASCULAR DISEASE (H): ICD-10-CM

## 2021-09-23 PROCEDURE — 73502 X-RAY EXAM HIP UNI 2-3 VIEWS: CPT | Mod: RT | Performed by: RADIOLOGY

## 2021-09-23 PROCEDURE — 99213 OFFICE O/P EST LOW 20 MIN: CPT | Performed by: FAMILY MEDICINE

## 2021-09-23 PROCEDURE — 93668 PERIPHERAL VASCULAR REHAB: CPT

## 2021-09-23 NOTE — PROGRESS NOTES
[facility] SUR[facility]  SPORTS & ORTHOPEDIC CLINIC VISIT     Sep 23, 2021        ASSESSMENT & PLAN    80 yo with right hip pain over the iliac crest that is likely due to mechanical irritation.     Reviewed imaging and assessment with patient in detail  Right using a topical medication on your hip.  You could use diclofenac gel (Voltaren), topical lidocaine, or Salonpas  Ice to the area for 10 to 15 minutes twice daily  Continue to use heat if you feel like this is beneficial for you.   Try to avoid direct pressure contact to the area when possible  Let us know if you are not improving after 1 week of this regimen    Sharda Awan MD  [facility] SPORTS MEDICINE CLINIC [place]    -----  No chief complaint on file.      SUBJECTIVE  Rachele Martínez is a/an 81 year old female who is seen as a self referral for evaluation of  Right hip pain.     The patient is seen with their daughter.  The patient is Right handed    Onset: 1 years(s) ago. Patient describes injury as having back issues that she was suppose to have surgery for a couple years ago but didn't. Now her right hip is bothering her on the lateral side and is tender to the touch.   Location of Pain: right lateral hip   Worsened by: Walking, sitting, standing, laying on it   Better with: NA   Treatments tried: ice and heat  Associated symptoms: no distal numbness or tingling; denies swelling or warmth    Orthopedic/Surgical history: NO  Social History/Occupation: Retired       REVIEW OF SYSTEMS:    Do you have fever, chills, weight loss? No    Do you have any vision problems? No    Do you have any chest pain or edema? No    Do you have any shortness of breath or wheezing?  No    Do you have stomach problems? No    Do you have any numbness or focal weakness? No    Do you have diabetes? No    Do you have problems with bleeding or clotting? No    Do you have an rashes or other skin lesions? No    OBJECTIVE:  There were no vitals taken for  this visit.     Exam:  Patient is alert, in no acute distress, pleasant and conversational.    Gait: Nonantalgic.  Normal heel toe gait    Standing Exam:  Lumbar spine AROM normal.     right Hip:  Supine PROM:  Flexion: Approximately 115 , no tenderness.  External rotation: approximately 60 , no tenderness.  Internal rotation: Approximately 30 , no tenderness  No subjective pain reported with range of motion testing. ROM IS symmetric with uninjured side       Palpation:  negative tenderness to palpation over the greater trochanter.  negative tenderness to palpation over psoas  negative tenderness to palpation over ASIS  positive  tenderness to palpation over Iliac crest       Neurovascularly intact in bilateral lower extremities        RADIOLOGY:    2 view xrays of right hip performed and reviewed independently demonstrating mild hip OA. No acute findings. See EMR for formal radiology report.

## 2021-09-23 NOTE — LETTER
9/23/2021      RE: Rachele Martínez  [address]  [facility] [facility] [address]         [facility]  SPORTS & ORTHOPEDIC CLINIC VISIT     Sep 23, 2021        ASSESSMENT & PLAN    80 yo with right hip pain over the iliac crest that is likely due to mechanical irritation.     Reviewed imaging and assessment with patient in detail  Right using a topical medication on your hip.  You could use diclofenac gel (Voltaren), topical lidocaine, or Salonpas  Ice to the area for 10 to 15 minutes twice daily  Continue to use heat if you feel like this is beneficial for you.   Try to avoid direct pressure contact to the area when possible  Let us know if you are not improving after 1 week of this regimen    Sharad Awan MD  [facility] SPORTS MEDICINE CLINIC [place]    -----  No chief complaint on file.      SUBJECTIVE  Rachele Martínez is a/an 81 year old female who is seen as a self referral for evaluation of  Right hip pain.     The patient is seen with their daughter.  The patient is Right handed    Onset: 1 years(s) ago. Patient describes injury as having back issues that she was suppose to have surgery for a couple years ago but didn't. Now her right hip is bothering her on the lateral side and is tender to the touch.   Location of Pain: right lateral hip   Worsened by: Walking, sitting, standing, laying on it   Better with: NA   Treatments tried: ice and heat  Associated symptoms: no distal numbness or tingling; denies swelling or warmth    Orthopedic/Surgical history: NO  Social History/Occupation: Retired       REVIEW OF SYSTEMS:    Do you have fever, chills, weight loss? No    Do you have any vision problems? No    Do you have any chest pain or edema? No    Do you have any shortness of breath or wheezing?  No    Do you have stomach problems? No    Do you have any numbness or focal weakness? No    Do you have diabetes? No    Do you have problems with bleeding or clotting?  No    Do you have an rashes or other skin lesions? No    OBJECTIVE:  There were no vitals taken for this visit.     Exam:  Patient is alert, in no acute distress, pleasant and conversational.    Gait: Nonantalgic.  Normal heel toe gait    Standing Exam:  Lumbar spine AROM normal.     right Hip:  Supine PROM:  Flexion: Approximately 115 , no tenderness.  External rotation: approximately 60 , no tenderness.  Internal rotation: Approximately 30 , no tenderness  No subjective pain reported with range of motion testing. ROM IS symmetric with uninjured side       Palpation:  negative tenderness to palpation over the greater trochanter.  negative tenderness to palpation over psoas  negative tenderness to palpation over ASIS  positive  tenderness to palpation over Iliac crest       Neurovascularly intact in bilateral lower extremities        RADIOLOGY:    2 view xrays of right hip performed and reviewed independently demonstrating mild hip OA. No acute findings. See EMR for formal radiology report.       Sharad Awan MD

## 2021-09-23 NOTE — PATIENT INSTRUCTIONS
Right using a topical medication on your hip.  You could use diclofenac gel (Voltaren), topical lidocaine, or Salonpas  Ice to the area for 10 to 15 minutes twice daily  Continue to use heat if you feel like this is beneficial for you.   Try to avoid direct pressure contact to the area when possible  Let us know if you are not improving after 1 week of this regimen

## 2021-09-28 ENCOUNTER — HOSPITAL ENCOUNTER (OUTPATIENT)
Dept: CARDIAC REHAB | Facility: CLINIC | Age: 81
End: 2021-09-28
Attending: RADIOLOGY
Payer: MEDICARE

## 2021-09-28 PROCEDURE — 93668 PERIPHERAL VASCULAR REHAB: CPT

## 2021-09-30 ENCOUNTER — HOSPITAL ENCOUNTER (OUTPATIENT)
Dept: CARDIAC REHAB | Facility: CLINIC | Age: 81
End: 2021-09-30
Attending: RADIOLOGY
Payer: MEDICARE

## 2021-09-30 PROCEDURE — 93668 PERIPHERAL VASCULAR REHAB: CPT

## 2021-10-05 ENCOUNTER — HOSPITAL ENCOUNTER (OUTPATIENT)
Dept: CARDIAC REHAB | Facility: CLINIC | Age: 81
End: 2021-10-05
Attending: RADIOLOGY
Payer: MEDICARE

## 2021-10-05 PROCEDURE — 93668 PERIPHERAL VASCULAR REHAB: CPT | Mod: KX

## 2021-10-07 ENCOUNTER — HOSPITAL ENCOUNTER (OUTPATIENT)
Dept: CARDIAC REHAB | Facility: CLINIC | Age: 81
End: 2021-10-07
Attending: RADIOLOGY
Payer: MEDICARE

## 2021-10-07 PROCEDURE — 93668 PERIPHERAL VASCULAR REHAB: CPT

## 2021-10-08 ENCOUNTER — PATIENT OUTREACH (OUTPATIENT)
Dept: GASTROENTEROLOGY | Facility: CLINIC | Age: 81
End: 2021-10-08

## 2021-10-08 DIAGNOSIS — K55.21 AVM (ARTERIOVENOUS MALFORMATION) OF SMALL BOWEL, ACQUIRED WITH HEMORRHAGE: ICD-10-CM

## 2021-10-08 DIAGNOSIS — K74.60 CIRRHOSIS OF LIVER WITHOUT ASCITES, UNSPECIFIED HEPATIC CIRRHOSIS TYPE (H): ICD-10-CM

## 2021-10-08 DIAGNOSIS — K55.20 AVM (ARTERIOVENOUS MALFORMATION) OF SMALL BOWEL, ACQUIRED: Primary | ICD-10-CM

## 2021-10-08 NOTE — PROGRESS NOTES
Called pt to discuss labs and clinic due as one year follow up with Dr Baires. Pt's daughter is point of contact, agreed upon 12/15 at 8:20am, virtual visit. Would like her contact ph number used for visit    Labs ordered Hgb and ferritin. Called HD unit at [phone], discussed that labs needed close to 12/15 clinic date. HD will draw and send results via fax. Did not need orders sent, said they regularily draw these labs for HD    Message sent to clinic coordinators to schedule    Loraine Mancera, RN, BSN,   [facility]  Care coordinator

## 2021-10-15 ENCOUNTER — VIRTUAL VISIT (OUTPATIENT)
Dept: INTERNAL MEDICINE | Facility: CLINIC | Age: 81
End: 2021-10-15
Payer: MEDICARE

## 2021-10-15 DIAGNOSIS — I73.9 PAD (PERIPHERAL ARTERY DISEASE) (H): Primary | ICD-10-CM

## 2021-10-15 PROBLEM — R52 PAIN, UNSPECIFIED: Status: RESOLVED | Noted: 2021-03-17 | Resolved: 2021-10-15

## 2021-10-15 PROCEDURE — 99442 PR PHYSICIAN TELEPHONE EVALUATION 11-20 MIN: CPT | Mod: 95 | Performed by: INTERNAL MEDICINE

## 2021-10-15 NOTE — NURSING NOTE
Rachele Martínez is a 81 year old female patient that presents today in clinic for the following:    Chief Complaint   Patient presents with     RECHECK     Patient comes for a follow up on her ultrasound.      The patient's allergies and medications were reviewed as noted. A set of vitals were recorded as noted without incident. The patient does not have any other questions for the provider.    Nilesh Blanchard, EMT at 3:14 PM on 10/15/2021

## 2021-10-15 NOTE — PATIENT INSTRUCTIONS
Sorry our visit was cut off!  I tried calling back but didn't get an answer.    Let me know if you had any questions.    Let's follow-up in person in about 6 months and we can do the tetanus shot at that time.

## 2021-10-15 NOTE — PROGRESS NOTES
PHONE VISIT    Assessment & Plan   PAD (peripheral artery disease) (H)  Continue with rehab program  On statin  Not on ASA due to hx of bleeding  Saw vascular surgery 9/13/21, rec'd follow-up FAISAL in 6 mo    HM:  Due for tetanus    RTC: Return in about 6 months (around 4/15/2022) for in person.  Agnieszka Rodriguez MD  Securely message with the Vocera Web Console (learn more here)  _________________________________________________________________________________________    Chief Complaint   Rachele Martínez is a 81 year old female presents for   Chief Complaint   Patient presents with     RECHECK     Patient comes for a follow up on her ultrasound.         SUBJECTIVE  Has been going to rehab for her PAD  Has not noticed any change  She plans to continue the whole 32 weeks      Had hip pain for which she saw Dr. Awan.  Rec'd voltaren and PT    Medications and allergies were reviewed by me today.     Social History   History   Smoking Status     Former Smoker     Packs/day: 2.00     Years: 45.00     Types: Cigarettes     Start date: 1/1/1953     Quit date: 11/23/2002   Smokeless Tobacco     Never Used      PHQ-2 ( 1999 Pfizer) 6/25/2021 4/13/2021   Q1: Little interest or pleasure in doing things 0 0   Q2: Feeling down, depressed or hopeless 0 0   PHQ-2 Score 0 0   Q1: Little interest or pleasure in doing things Not at all -   Q2: Feeling down, depressed or hopeless Not at all -   PHQ-2 Score 0 -     No flowsheet data found.  Past Medical History   Patient Active Problem List   Diagnosis     Cataract     Depression with anxiety     Insomnia     Chronic viral hepatitis C (H)     Hyperlipidemia with target LDL less than 130     History of hepatitis C     Encounter for screening for human immunodeficiency virus (HIV)     Iron deficiency anemia due to chronic blood loss     Anemia of chronic disease     AVM (arteriovenous malformation) of small bowel, acquired     End stage renal disease (H)     Unspecified cirrhosis  of liver (H)     Tendency to fall     Hypertension goal BP (blood pressure) < 140/80     Diarrhea, unspecified     Angiodysplasia of stomach and duodenum with hemorrhage     Spinal stenosis of lumbar region with neurogenic claudication     Peripheral vascular disease (H)     Coagulation defect, unspecified (H)     Congenital arteriovenous malformation     Other disorders of bone development and growth, other site     Protein-calorie malnutrition (H)     Secondary hyperparathyroidism of renal origin (H)       Review of Systems   5 point ROS completed and negative except noted above, including Gen, CV, Resp, GI, MS    Physical Exam   Phone Visit, n/a    Visit/Communication Style   Rachele agreed to a phone visit due to the COVID pandemic  Duration of Visit: 14 min

## 2021-10-19 ENCOUNTER — HOSPITAL ENCOUNTER (OUTPATIENT)
Dept: CARDIAC REHAB | Facility: CLINIC | Age: 81
End: 2021-10-19
Attending: RADIOLOGY
Payer: MEDICARE

## 2021-10-19 PROCEDURE — 93668 PERIPHERAL VASCULAR REHAB: CPT | Mod: KX

## 2021-10-21 ENCOUNTER — HOSPITAL ENCOUNTER (OUTPATIENT)
Dept: CARDIAC REHAB | Facility: CLINIC | Age: 81
End: 2021-10-21
Attending: RADIOLOGY
Payer: MEDICARE

## 2021-10-21 PROCEDURE — 93668 PERIPHERAL VASCULAR REHAB: CPT

## 2021-10-29 ENCOUNTER — INFUSION THERAPY VISIT (OUTPATIENT)
Dept: INFUSION THERAPY | Facility: CLINIC | Age: 81
End: 2021-10-29
Payer: MEDICARE

## 2021-10-29 VITALS
RESPIRATION RATE: 16 BRPM | HEART RATE: 71 BPM | SYSTOLIC BLOOD PRESSURE: 172 MMHG | DIASTOLIC BLOOD PRESSURE: 77 MMHG | OXYGEN SATURATION: 92 % | TEMPERATURE: 98.5 F

## 2021-10-29 DIAGNOSIS — R19.7 DIARRHEA, UNSPECIFIED TYPE: Primary | ICD-10-CM

## 2021-10-29 DIAGNOSIS — D50.0 IRON DEFICIENCY ANEMIA DUE TO CHRONIC BLOOD LOSS: ICD-10-CM

## 2021-10-29 DIAGNOSIS — K55.20 AVM (ARTERIOVENOUS MALFORMATION) OF SMALL BOWEL, ACQUIRED: ICD-10-CM

## 2021-10-29 DIAGNOSIS — K31.811 ANGIODYSPLASIA OF STOMACH AND DUODENUM WITH HEMORRHAGE: ICD-10-CM

## 2021-10-29 PROCEDURE — 96372 THER/PROPH/DIAG INJ SC/IM: CPT | Performed by: NURSE PRACTITIONER

## 2021-10-29 PROCEDURE — 99207 PR NO CHARGE LOS: CPT

## 2021-10-29 RX ADMIN — OCTREOTIDE ACETATE 20 MG: KIT INTRAMUSCULAR at 16:01

## 2021-10-29 ASSESSMENT — PAIN SCALES - GENERAL: PAINLEVEL: WORST PAIN (10)

## 2021-10-29 NOTE — PROGRESS NOTES
Infusion Nursing Note:  Rachele LORRIE Martínez presents today for Sandostatin.    Patient seen by provider today: No   present during visit today: Not Applicable.    Note: Assessment performed by Ebony REESE RN prior to injection today. Patient denies symptoms/concerns following previous injection.    Intravenous Access:  No Intravenous access/labs at this visit.    Treatment Conditions:  Not Applicable.      Post Infusion Assessment:  Patient tolerated injection without incident.  Site patent and intact, free from redness, edema or discomfort.       Discharge Plan:   Patient discharged in stable condition accompanied by: self.  Departure Mode: Ambulatory.      Vilma Ariza LPN

## 2021-11-04 ENCOUNTER — TELEPHONE (OUTPATIENT)
Dept: INTERNAL MEDICINE | Facility: CLINIC | Age: 81
End: 2021-11-04
Payer: MEDICARE

## 2021-11-04 DIAGNOSIS — G89.29 CHRONIC BILATERAL LOW BACK PAIN WITH RIGHT-SIDED SCIATICA: ICD-10-CM

## 2021-11-04 DIAGNOSIS — I73.9 CLAUDICATION (H): ICD-10-CM

## 2021-11-04 DIAGNOSIS — L29.9 ITCHING: ICD-10-CM

## 2021-11-04 DIAGNOSIS — I73.9 PERIPHERAL ARTERIAL DISEASE (H): ICD-10-CM

## 2021-11-04 DIAGNOSIS — M54.41 CHRONIC BILATERAL LOW BACK PAIN WITH RIGHT-SIDED SCIATICA: ICD-10-CM

## 2021-11-04 DIAGNOSIS — M51.369 LUMBAR DEGENERATIVE DISC DISEASE: ICD-10-CM

## 2021-11-08 RX ORDER — GABAPENTIN 300 MG/1
CAPSULE ORAL
Qty: 90 CAPSULE | Refills: 3 | Status: SHIPPED | OUTPATIENT
Start: 2021-11-08 | End: 2023-01-16

## 2021-11-08 NOTE — TELEPHONE ENCOUNTER
gabapentin (NEURONTIN) 300 MG capsule  Last Written Prescription Date: 11/23/20  Last Fill Quantity: 90,   # refills: 3  Last Office Visit : 10/15/21  Future Office visit: none       sertraline (ZOLOFT) 50 MG tablet  Last Written Prescription Date:  7/9/21  Last Fill Quantity: 180,   # refills: 1      cilostazol (PLETAL) 100 MG tablet    Last Written Prescription Date:  6/9/21  Last Fill Quantity: 180,   # refills: 1    Routing refill request to provider for review/approval because: gabapentin not on protocol, hgb, platelets, creat H

## 2021-11-09 RX ORDER — CILOSTAZOL 100 MG/1
TABLET ORAL
Qty: 180 TABLET | Refills: 1 | Status: SHIPPED | OUTPATIENT
Start: 2021-11-09 | End: 2023-02-17

## 2021-11-23 ENCOUNTER — INFUSION THERAPY VISIT (OUTPATIENT)
Dept: INFUSION THERAPY | Facility: CLINIC | Age: 81
End: 2021-11-23
Payer: MEDICARE

## 2021-11-23 VITALS
SYSTOLIC BLOOD PRESSURE: 180 MMHG | HEART RATE: 67 BPM | OXYGEN SATURATION: 98 % | RESPIRATION RATE: 18 BRPM | TEMPERATURE: 98.6 F | DIASTOLIC BLOOD PRESSURE: 67 MMHG

## 2021-11-23 DIAGNOSIS — K31.811 ANGIODYSPLASIA OF STOMACH AND DUODENUM WITH HEMORRHAGE: ICD-10-CM

## 2021-11-23 DIAGNOSIS — K55.20 AVM (ARTERIOVENOUS MALFORMATION) OF SMALL BOWEL, ACQUIRED: ICD-10-CM

## 2021-11-23 DIAGNOSIS — D50.0 IRON DEFICIENCY ANEMIA DUE TO CHRONIC BLOOD LOSS: ICD-10-CM

## 2021-11-23 DIAGNOSIS — R19.7 DIARRHEA, UNSPECIFIED TYPE: Primary | ICD-10-CM

## 2021-11-23 PROCEDURE — 96372 THER/PROPH/DIAG INJ SC/IM: CPT | Performed by: NURSE PRACTITIONER

## 2021-11-23 PROCEDURE — 99207 PR NO CHARGE LOS: CPT

## 2021-11-23 RX ADMIN — OCTREOTIDE ACETATE 20 MG: KIT INTRAMUSCULAR at 11:54

## 2021-11-23 NOTE — PROGRESS NOTES
Infusion Nursing Note:  Rachele Martínez presents today for   Chief Complaint   Patient presents with     Imm/Inj     Sandostatin     .    Patient seen by provider today: No   present during visit today: Not Applicable.    Note: Patient was assessed by Samantha Lopez RN prior to injection.      Intravenous Access:  No Intravenous access/labs at this visit.    Treatment Conditions:  Not Applicable.      Post Infusion Assessment:  Patient tolerated injection without incident.  Site patent and intact, free from redness, edema or discomfort.       Discharge Plan:   Patient discharged in stable condition accompanied by: self.  Departure Mode: Ambulatory.      Florence To CMA

## 2021-12-13 ENCOUNTER — PATIENT OUTREACH (OUTPATIENT)
Dept: GASTROENTEROLOGY | Facility: CLINIC | Age: 81
End: 2021-12-13
Payer: MEDICARE

## 2021-12-13 NOTE — PROGRESS NOTES
Called daughter as reminder of upcoming video visit with Dr Baires on 12/15 a 8:20am.    Loraine Mancera, RN, BSN,   [facility]  Care coordinator

## 2021-12-15 ENCOUNTER — VIRTUAL VISIT (OUTPATIENT)
Dept: GASTROENTEROLOGY | Facility: CLINIC | Age: 81
End: 2021-12-15
Attending: INTERNAL MEDICINE
Payer: MEDICARE

## 2021-12-15 DIAGNOSIS — D50.0 IRON DEFICIENCY ANEMIA DUE TO CHRONIC BLOOD LOSS: Chronic | ICD-10-CM

## 2021-12-15 DIAGNOSIS — K55.20 AVM (ARTERIOVENOUS MALFORMATION) OF SMALL BOWEL, ACQUIRED: Primary | ICD-10-CM

## 2021-12-15 PROCEDURE — 99213 OFFICE O/P EST LOW 20 MIN: CPT | Mod: 95 | Performed by: INTERNAL MEDICINE

## 2021-12-15 PROCEDURE — G0463 HOSPITAL OUTPT CLINIC VISIT: HCPCS | Mod: PN,RTG | Performed by: INTERNAL MEDICINE

## 2021-12-15 PROCEDURE — 999N001193 HC VIDEO/TELEPHONE VISIT; NO CHARGE

## 2021-12-15 NOTE — LETTER
12/15/2021         RE: Rachele Martínez  [address]  [place] [address]        Dear Colleague,    Thank you for referring your patient, Rachele Martínez, to the [facility]. Please see a copy of my visit note below.    Rachele is a 81 year old who is being evaluated via a billable video visit.      How would you like to obtain your AVS? MyChart  If the video visit is dropped, the invitation should be resent by: Text to cell phone: [phone]  Will anyone else be joining your video visit? Yes, daughter Charla will be there with her.    Marlys Horton       Video Start Time: 8:47 AM  Video-Visit Details    Type of service:  Video Visit    Video End Time:8:57 AM    Originating Location (pt. Location): Home    Distant Location (provider location):  [facility]     Platform used for Video Visit: [facility]       INTERVENTIONAL ENDOSCOPY OUTPATIENT CLINIC CONSULT  DATE OF SERVICE: 12/15/21  Reason for Consultation: chronic small bowel bleeding from angioectasias    ASSESSMENT:  Rachele is a 81 year old female with a PMHx of ESRD on HD and h/o HCV s/p treatment with SVR and compensated cirrhosis who is here for follow up for recurrent GI bleeding from angioectasias in the stomach, small bowel, and cecum s/p prior endoscopic therapy and chronic monthly octreotide depot injections.     She has been doing well without overt signs of bleeding. I reviewed her Hgb for the past year and it has been in the 8-13 range with her last hgb at 13 and normal Ferritin. No change in interventions needed. Her octreotide therapy plan was renewed.       RECOMMENDATIONS:  - Continue monthly octreotide  - Follow up in 1 year      Ankit Baires MD  [facility]  Division of Gastroenterology and Hepatology  [facility] [address] [place], [place]  [address]    ________________________________________________________________  HPI:  Rachele is a 81 year old female with a PMHx of ESRD on HD and h/o HCV s/p treatment with SVR and compensated cirrhosis who is here for follow up for recurrent GI bleeding from angioectasias in the stomach, small bowel, and cecum s/p prior endoscopic therapy and chronic monthly octreotide depot injections.   Her daughter is with her for this video visit. She has been doing very well in terms of bleeding. I had initially performed endoscopic therapy on her in 9/2018. She did well for awhile but had a bleeding episode in 3/2019 that required endoscopic intervention by Dr. Perkins for duodenal angioectasias. Since then she has had no further bleeding episodes. She reports brown stools. She cannot remember the last time she needed a blood transfusion. She has been on monthly octreotide 20 mg depot shots that she receives at the infusion center and has been on this nearly 2 years. She does report dizziness issues but believes this to be blood pressure related.    PMHx:  Past Medical History:   Diagnosis Date     Anemia      Anxiety and depression      Arthritis      AVM (arteriovenous malformation)      Chronic hepatitis C with cirrhosis (H)      Clotting disorder (H)      ESRD (end stage renal disease) (H)     on dialysis     GI bleed     recurrent     Glaucoma      Hyperlipidemia      Hypertension goal BP (blood pressure) < 140/80        PSurgHx:  Past Surgical History:   Procedure Laterality Date     CAPSULE/PILL CAM ENDOSCOPY N/A 3/27/2019    Procedure: Capsule/pill cam endoscopy;  Surgeon: Yosvany Ram MD;  Location:  GI     COLONOSCOPY N/A 9/4/2015    Procedure: COMBINED COLONOSCOPY, SINGLE OR MULTIPLE BIOPSY/POLYPECTOMY BY BIOPSY;  Surgeon: Rupesh Lopez MD;  Location:  GI     COLONOSCOPY N/A 9/19/2018    Procedure: COLONOSCOPY;  enteroscopy small bowel  COLONOSCOPY;  Surgeon: Ankit Baires MD;  Location:  GI      ESOPHAGOSCOPY, GASTROSCOPY, DUODENOSCOPY (EGD), COMBINED N/A 12/18/2014    Procedure: COMBINED ESOPHAGOSCOPY, GASTROSCOPY, DUODENOSCOPY (EGD);  Surgeon: Betsy Carvajal MD;  Location:  GI     ESOPHAGOSCOPY, GASTROSCOPY, DUODENOSCOPY (EGD), COMBINED N/A 4/25/2015    Procedure: COMBINED ESOPHAGOSCOPY, GASTROSCOPY, DUODENOSCOPY (EGD);  Surgeon: Yosvany Ram MD;  Location:  GI     ESOPHAGOSCOPY, GASTROSCOPY, DUODENOSCOPY (EGD), COMBINED N/A 5/5/2015    Procedure: COMBINED ESOPHAGOSCOPY, GASTROSCOPY, DUODENOSCOPY (EGD);  Surgeon: Mariano Mistry MD;  Location:  GI      CAPSULE ENDOSCOPY N/A 9/30/2015    Procedure: CAPSULE/PILL CAM ENDOSCOPY;  Surgeon: Pan Dhaliwal MD;  Location: [facility] VASCULAR SURGERY PROCEDURE UNLIST       HYSTERECTOMY  1980    KYREE     LUMPECTOMY BREAST         MEDS:  Current Outpatient Medications   Medication     atorvastatin (LIPITOR) 20 MG tablet     Calcium Acetate, Phos Binder, 667 MG CAPS     cilostazol (PLETAL) 100 MG tablet     gabapentin (NEURONTIN) 300 MG capsule     losartan (COZAAR) 25 MG tablet     Multiple Vitamins-Minerals (PRORENAL + D) TABS     octreotide (SANDOSTATIN LAR) 20 MG injection     order for DME     order for DME     pantoprazole (PROTONIX) 20 MG EC tablet     polyethylene glycol (MIRALAX) 17 GM/Dose powder     sertraline (ZOLOFT) 50 MG tablet     No current facility-administered medications for this visit.     ALLERGIES:    Allergies   Allergen Reactions     Abacavir Itching     Lisinopril Cough     Diovan Hct Itching     severe     Dust Mites      Hydrochlorothiazide Itching     Severe       No Clinical Screening - See Comments      History of blood transfusion reactions and pre-treats with Benadryl.      Oxycodone-Acetaminophen      Spironolactone Nausea     Sulfa Drugs Hives     Valsartan Itching     FHx:  Family History   Problem Relation Age of Onset     Diabetes Mother      Alzheimer Disease Mother      Substance Abuse Son       Cancer Sister      Soft Tissue Cancer Sister      Breast Cancer Sister      Hyperlipidemia Daughter      Alcoholism Brother      Spine Problems Sister        SOCIAL Hx:  Social History     Socioeconomic History     Marital status:      Spouse name: Not on file     Number of children: Not on file     Years of education: Not on file     Highest education level: Not on file   Occupational History     Not on file   Tobacco Use     Smoking status: Former Smoker     Packs/day: 2.00     Years: 45.00     Pack years: 90.00     Types: Cigarettes     Start date: 1953     Quit date: 2002     Years since quittin.0     Smokeless tobacco: Never Used   Substance and Sexual Activity     Alcohol use: No     Drug use: Yes     Types: Marijuana     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     Sexual activity: Not Currently   Other Topics Concern     Parent/sibling w/ CABG, MI or angioplasty before 65F 55M? No   Social History Narrative     Not on file     Social Determinants of Health     Financial Resource Strain: Not on file   Food Insecurity: Not on file   Transportation Needs: Not on file   Physical Activity: Not on file   Stress: Not on file   Social Connections: Not on file   Intimate Partner Violence: Not on file   Housing Stability: Not on file       ROS: A comprehensive Review of Systems was asked and answered in the negative unless specifically commented upon in the HPI    Physical Exam  Gen: A&Ox3, NAD  HEENT: Moist mucus membranes, no scleral icterus.  Lungs: no respiratory distress      LABS:  Ancillary Procedure on 2021   Component Date Value Ref Range Status     Creatinine POCT 2021 7.2  mg/dL Final     GFR, ESTIMATED POCT 2021 5* >60 mL/min/1.73m2 Final     21  Hgb 13  Wbc 5.6  Ferritin 183  TIBC 21%      Again, thank you for allowing me to participate in the care of your patient.        Sincerely,        Ankit Baires MD

## 2021-12-15 NOTE — PROGRESS NOTES
Rachele is a 81 year old who is being evaluated via a billable video visit.      How would you like to obtain your AVS? MyChart  If the video visit is dropped, the invitation should be resent by: Text to cell phone: [phone]  Will anyone else be joining your video visit? Yes, daughter Charla will be there with her.    Marlys Horton       Video Start Time: 8:47 AM  Video-Visit Details    Type of service:  Video Visit    Video End Time:8:57 AM    Originating Location (pt. Location): Home    Distant Location (provider location):  [facility]     Platform used for Video Visit: [facility] [place]       INTERVENTIONAL ENDOSCOPY OUTPATIENT CLINIC CONSULT  DATE OF SERVICE: 12/15/21  Reason for Consultation: chronic small bowel bleeding from angioectasias    ASSESSMENT:  Rachele is a 81 year old female with a PMHx of ESRD on HD and h/o HCV s/p treatment with SVR and compensated cirrhosis who is here for follow up for recurrent GI bleeding from angioectasias in the stomach, small bowel, and cecum s/p prior endoscopic therapy and chronic monthly octreotide depot injections.     She has been doing well without overt signs of bleeding. I reviewed her Hgb for the past year and it has been in the 8-13 range with her last hgb at 13 and normal Ferritin. No change in interventions needed. Her octreotide therapy plan was renewed.       RECOMMENDATIONS:  - Continue monthly octreotide  - Follow up in 1 year      Ankit Baires MD  [facility]  Division of Gastroenterology and Hepatology  [place] [address]    ________________________________________________________________  HPI:  Rachele is a 81 year old female with a PMHx of ESRD on HD and h/o HCV s/p treatment with SVR and compensated cirrhosis who is here for follow up for recurrent GI bleeding from angioectasias in the stomach, small bowel, and cecum s/p prior endoscopic therapy and chronic  monthly octreotide depot injections.   Her daughter is with her for this video visit. She has been doing very well in terms of bleeding. I had initially performed endoscopic therapy on her in 9/2018. She did well for awhile but had a bleeding episode in 3/2019 that required endoscopic intervention by Dr. Perkins for duodenal angioectasias. Since then she has had no further bleeding episodes. She reports brown stools. She cannot remember the last time she needed a blood transfusion. She has been on monthly octreotide 20 mg depot shots that she receives at the infusion center and has been on this nearly 2 years. She does report dizziness issues but believes this to be blood pressure related.    PMHx:  Past Medical History:   Diagnosis Date     Anemia      Anxiety and depression      Arthritis      AVM (arteriovenous malformation)      Chronic hepatitis C with cirrhosis (H)      Clotting disorder (H)      ESRD (end stage renal disease) (H)     on dialysis     GI bleed     recurrent     Glaucoma      Hyperlipidemia      Hypertension goal BP (blood pressure) < 140/80        PSurgHx:  Past Surgical History:   Procedure Laterality Date     CAPSULE/PILL CAM ENDOSCOPY N/A 3/27/2019    Procedure: Capsule/pill cam endoscopy;  Surgeon: Yosvany Ram MD;  Location: [facility]     COLONOSCOPY N/A 9/4/2015    Procedure: COMBINED COLONOSCOPY, SINGLE OR MULTIPLE BIOPSY/POLYPECTOMY BY BIOPSY;  Surgeon: Rupesh Lopez MD;  Location: [facility]     COLONOSCOPY N/A 9/19/2018    Procedure: COLONOSCOPY;  enteroscopy small bowel  COLONOSCOPY;  Surgeon: Ankit Baires MD;  [facility]     ESOPHAGOSCOPY, GASTROSCOPY, DUODENOSCOPY (EGD), COMBINED N/A 12/18/2014    Procedure: COMBINED ESOPHAGOSCOPY, GASTROSCOPY, DUODENOSCOPY (EGD);  Surgeon: Betsy Carvajal MD;  Location: [facility]     ESOPHAGOSCOPY, GASTROSCOPY, DUODENOSCOPY (EGD), COMBINED N/A 4/25/2015    Procedure: COMBINED ESOPHAGOSCOPY, GASTROSCOPY, DUODENOSCOPY (EGD);   Surgeon: Yosvany Ram MD;  Location:  GI     ESOPHAGOSCOPY, GASTROSCOPY, DUODENOSCOPY (EGD), COMBINED N/A 5/5/2015    Procedure: COMBINED ESOPHAGOSCOPY, GASTROSCOPY, DUODENOSCOPY (EGD);  Surgeon: Mariano Mistry MD;  Location:  GI     HC CAPSULE ENDOSCOPY N/A 9/30/2015    Procedure: CAPSULE/PILL CAM ENDOSCOPY;  Surgeon: Pan Dhaliwal MD;  Location:  GI      VASCULAR SURGERY PROCEDURE UNLIST       HYSTERECTOMY  1980    KYREE     LUMPECTOMY BREAST         MEDS:  Current Outpatient Medications   Medication     atorvastatin (LIPITOR) 20 MG tablet     Calcium Acetate, Phos Binder, 667 MG CAPS     cilostazol (PLETAL) 100 MG tablet     gabapentin (NEURONTIN) 300 MG capsule     losartan (COZAAR) 25 MG tablet     Multiple Vitamins-Minerals (PRORENAL + D) TABS     octreotide (SANDOSTATIN LAR) 20 MG injection     order for DME     order for DME     pantoprazole (PROTONIX) 20 MG EC tablet     polyethylene glycol (MIRALAX) 17 GM/Dose powder     sertraline (ZOLOFT) 50 MG tablet     No current facility-administered medications for this visit.     ALLERGIES:    Allergies   Allergen Reactions     Abacavir Itching     Lisinopril Cough     Diovan Hct Itching     severe     Dust Mites      Hydrochlorothiazide Itching     Severe       No Clinical Screening - See Comments      History of blood transfusion reactions and pre-treats with Benadryl.      Oxycodone-Acetaminophen      Spironolactone Nausea     Sulfa Drugs Hives     Valsartan Itching     FHx:  Family History   Problem Relation Age of Onset     Diabetes Mother      Alzheimer Disease Mother      Substance Abuse Son      Cancer Sister      Soft Tissue Cancer Sister      Breast Cancer Sister      Hyperlipidemia Daughter      Alcoholism Brother      Spine Problems Sister        SOCIAL Hx:  Social History     Socioeconomic History     Marital status:      Spouse name: Not on file     Number of children: Not on file     Years of education: Not on file      Highest education level: Not on file   Occupational History     Not on file   Tobacco Use     Smoking status: Former Smoker     Packs/day: 2.00     Years: 45.00     Pack years: 90.00     Types: Cigarettes     Start date: 1953     Quit date: 2002     Years since quittin.0     Smokeless tobacco: Never Used   Substance and Sexual Activity     Alcohol use: No     Drug use: Yes     Types: Marijuana     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     Sexual activity: Not Currently   Other Topics Concern     Parent/sibling w/ CABG, MI or angioplasty before 65F 55M? No   Social History Narrative     Not on file     Social Determinants of Health     Financial Resource Strain: Not on file   Food Insecurity: Not on file   Transportation Needs: Not on file   Physical Activity: Not on file   Stress: Not on file   Social Connections: Not on file   Intimate Partner Violence: Not on file   Housing Stability: Not on file       ROS: A comprehensive Review of Systems was asked and answered in the negative unless specifically commented upon in the HPI    Physical Exam  Gen: A&Ox3, NAD  HEENT: Moist mucus membranes, no scleral icterus.  Lungs: no respiratory distress      LABS:  Ancillary Procedure on 2021   Component Date Value Ref Range Status     Creatinine POCT 2021 7.2  mg/dL Final     GFR, ESTIMATED POCT 2021 5* >60 mL/min/1.73m2 Final     21  Hgb 13  Wbc 5.6  Ferritin 183  TIBC 21%

## 2021-12-28 ENCOUNTER — INFUSION THERAPY VISIT (OUTPATIENT)
Dept: INFUSION THERAPY | Facility: CLINIC | Age: 81
End: 2021-12-28
Payer: MEDICARE

## 2021-12-28 VITALS — OXYGEN SATURATION: 97 % | HEART RATE: 67 BPM | SYSTOLIC BLOOD PRESSURE: 153 MMHG | DIASTOLIC BLOOD PRESSURE: 71 MMHG

## 2021-12-28 DIAGNOSIS — R19.7 DIARRHEA, UNSPECIFIED TYPE: Primary | ICD-10-CM

## 2021-12-28 DIAGNOSIS — K55.20 AVM (ARTERIOVENOUS MALFORMATION) OF SMALL BOWEL, ACQUIRED: ICD-10-CM

## 2021-12-28 DIAGNOSIS — K31.811 ANGIODYSPLASIA OF STOMACH AND DUODENUM WITH HEMORRHAGE: ICD-10-CM

## 2021-12-28 DIAGNOSIS — D50.0 IRON DEFICIENCY ANEMIA DUE TO CHRONIC BLOOD LOSS: ICD-10-CM

## 2021-12-28 PROCEDURE — 96372 THER/PROPH/DIAG INJ SC/IM: CPT | Performed by: NURSE PRACTITIONER

## 2021-12-28 PROCEDURE — 99207 PR NO CHARGE LOS: CPT

## 2021-12-28 RX ADMIN — OCTREOTIDE ACETATE 20 MG: KIT INTRAMUSCULAR at 11:44

## 2021-12-28 NOTE — PROGRESS NOTES
Infusion Nursing Note:  Rachele Martínez presents today for   Chief Complaint   Patient presents with     Allied Health Visit     Sandostatin       Patient seen by provider today: No   present during visit today: Not Applicable.    Note: Patient was assessed by Ebony Ngo RN prior to injection.      Intravenous Access:  No Intravenous access/labs at this visit.    Treatment Conditions:  Not Applicable.      Post Infusion Assessment:  Patient tolerated injection without incident.  Site patent and intact, free from redness, edema or discomfort.       Discharge Plan:   Patient discharged in stable condition accompanied by: self.  Departure Mode: Ambulatory.      Florence To CMA

## 2022-01-01 ENCOUNTER — HEALTH MAINTENANCE LETTER (OUTPATIENT)
Age: 82
End: 2022-01-01

## 2022-01-08 DIAGNOSIS — R41.89 COGNITIVE IMPAIRMENT: ICD-10-CM

## 2022-01-08 DIAGNOSIS — L29.9 ITCHING: ICD-10-CM

## 2022-01-12 DIAGNOSIS — K74.60 CIRRHOSIS OF LIVER WITHOUT ASCITES, UNSPECIFIED HEPATIC CIRRHOSIS TYPE (H): Primary | ICD-10-CM

## 2022-01-12 RX ORDER — ATORVASTATIN CALCIUM 20 MG/1
20 TABLET, FILM COATED ORAL DAILY
Qty: 90 TABLET | Refills: 0 | Status: SHIPPED | OUTPATIENT
Start: 2022-01-12 | End: 2022-08-01

## 2022-01-12 NOTE — TELEPHONE ENCOUNTER
ATORVASTATIN 20 MG TABLET  Last Written Prescription Date:  1/28/2021  Last Fill Quantity: 90,   # refills: 3  Last Office Visit :  10/15/2021  Future Office visit:  1/21/2022  90 Tabs, 0 sent to pharm 1/12/2022    SERTRALINE HCL 50 MG TABLET  Last Written Prescription Date:  7/9/2021  Last Fill Quantity: 180,   # refills: 3  Last Office Visit :  10/15/2021  Future Office visit:  1/21/2022  180 Tabs, 1 sent to pharm 1/12/2022    Loraine Gaspar RN  Central Triage Red Flags/Med Refills

## 2022-01-20 ENCOUNTER — ANCILLARY PROCEDURE (OUTPATIENT)
Dept: MAMMOGRAPHY | Facility: CLINIC | Age: 82
End: 2022-01-20
Attending: INTERNAL MEDICINE
Payer: MEDICARE

## 2022-01-20 DIAGNOSIS — Z12.31 VISIT FOR SCREENING MAMMOGRAM: ICD-10-CM

## 2022-01-20 PROCEDURE — 77067 SCR MAMMO BI INCL CAD: CPT | Mod: GC | Performed by: RADIOLOGY

## 2022-01-21 ENCOUNTER — TELEPHONE (OUTPATIENT)
Dept: INTERNAL MEDICINE | Facility: CLINIC | Age: 82
End: 2022-01-21

## 2022-01-21 ENCOUNTER — OFFICE VISIT (OUTPATIENT)
Dept: INTERNAL MEDICINE | Facility: CLINIC | Age: 82
End: 2022-01-21
Payer: MEDICARE

## 2022-01-21 VITALS
HEIGHT: 64 IN | HEART RATE: 67 BPM | SYSTOLIC BLOOD PRESSURE: 187 MMHG | RESPIRATION RATE: 16 BRPM | DIASTOLIC BLOOD PRESSURE: 87 MMHG | OXYGEN SATURATION: 98 % | BODY MASS INDEX: 21.71 KG/M2

## 2022-01-21 DIAGNOSIS — R01.1 HEART MURMUR: Primary | ICD-10-CM

## 2022-01-21 DIAGNOSIS — K74.60 CIRRHOSIS OF LIVER WITHOUT ASCITES, UNSPECIFIED HEPATIC CIRRHOSIS TYPE (H): ICD-10-CM

## 2022-01-21 DIAGNOSIS — I73.9 PAD (PERIPHERAL ARTERY DISEASE) (H): ICD-10-CM

## 2022-01-21 DIAGNOSIS — E78.5 HYPERLIPIDEMIA WITH TARGET LDL LESS THAN 130: ICD-10-CM

## 2022-01-21 DIAGNOSIS — N25.81 SECONDARY HYPERPARATHYROIDISM OF RENAL ORIGIN (H): ICD-10-CM

## 2022-01-21 DIAGNOSIS — N18.6 END STAGE RENAL DISEASE (H): ICD-10-CM

## 2022-01-21 PROCEDURE — 90715 TDAP VACCINE 7 YRS/> IM: CPT | Performed by: INTERNAL MEDICINE

## 2022-01-21 PROCEDURE — 99397 PER PM REEVAL EST PAT 65+ YR: CPT | Mod: 25 | Performed by: INTERNAL MEDICINE

## 2022-01-21 PROCEDURE — 90471 IMMUNIZATION ADMIN: CPT | Performed by: INTERNAL MEDICINE

## 2022-01-21 RX ORDER — ACETAMINOPHEN 500 MG
500-1000 TABLET ORAL
COMMUNITY
Start: 2020-11-25 | End: 2023-02-17

## 2022-01-21 RX ORDER — CARVEDILOL 3.12 MG/1
TABLET ORAL
COMMUNITY
Start: 2021-08-22 | End: 2023-02-17

## 2022-01-21 RX ORDER — AMLODIPINE BESYLATE 5 MG/1
TABLET ORAL
COMMUNITY
Start: 2021-10-19 | End: 2022-01-21

## 2022-01-21 ASSESSMENT — ENCOUNTER SYMPTOMS
FEVER: 0
COUGH: 0
ABDOMINAL PAIN: 0
ALTERED TEMPERATURE REGULATION: 0
SHORTNESS OF BREATH: 0
PALPITATIONS: 0

## 2022-01-21 ASSESSMENT — PAIN SCALES - GENERAL: PAINLEVEL: SEVERE PAIN (7)

## 2022-01-21 NOTE — NURSING NOTE
Rachele Martínez is a 81 year old female patient that presents today in clinic for the following:    Chief Complaint   Patient presents with     Physical     The patient's allergies and medications were reviewed as noted. A set of vitals were recorded as noted without incident. The patient does not have any other questions for the provider.    Nilesh Blanchard, EMT at 8:32 AM on 1/21/2022

## 2022-01-21 NOTE — PROGRESS NOTES
"CC:   Chief Complaint   Patient presents with     Physical       History of Present Illness   Rachele Martínez si here for a routine physical.  Her only concerns are ongoing leg and back pain.    PVD: Did supervised exercise therapy for about 1 month for her legs.  She felt like it wasn't helping so she stopped.  Spends most of the day sitting.  She will do chores around the house like laundry, but it takes nearly all day.  She has a scooter and walker, but does not use often.      HTN:   She stopped the amlodipine and carvedilol.  Carvedilol was \"knocking her out\"  Neprhology increased thhe losartan  Reports BP has fluctuated a lot at HD.  They did increase losartan to 100 mg  She has HD later today    She has an appointment soon with hepatology      Gyne:  Denies concerns  Depression screen:  PHQ-2 Score:     PHQ-2 ( 1999 [employer]) 6/25/2021 4/13/2021   Q1: Little interest or pleasure in doing things 0 0   Q2: Feeling down, depressed or hopeless 0 0   PHQ-2 Score 0 0   PHQ-2 Total Score (12-17 Years)- Positive if 3 or more points; Administer PHQ-A if positive 0 0   Q1: Little interest or pleasure in doing things Not at all -   Q2: Feeling down, depressed or hopeless Not at all -   PHQ-2 Score 0 -       Tobacco screen:  History   Smoking Status     Former Smoker     Packs/day: 2.00     Years: 45.00     Types: Cigarettes     Start date: 1/1/1953     Quit date: 11/23/2002   Smokeless Tobacco     Never Used     Obesity screen:  Body mass index is 21.71 kg/m .    Review of Systems   Review of Systems     Constitutional:  Negative for fever.   Eyes:  Negative for decreased vision.   Respiratory:   Negative for cough and shortness of breath.    Cardiovascular:  Negative for chest pain and palpitations.   Gastrointestinal:  Negative for abdominal pain.   Skin:  Negative for rash.   Neurological:  Negative for difficulty walking.   Psychiatric/Behavioral:  Negative for mood swings.    Endocrine:  Negative for altered " "temperature regulation.      Medications   Medications and allergies were reviewed and updated in the chart    Family History   Family history was reviewed and updated as needed in the chart    Past Medical History   Past medical history was reviewed and updated  Patient Active Problem List   Diagnosis     Cataract     Depression with anxiety     Hyperlipidemia with target LDL less than 130     History of hepatitis C     Iron deficiency anemia due to chronic blood loss     Anemia of chronic disease     AVM (arteriovenous malformation) of small bowel, acquired     End stage renal disease (H)     Unspecified cirrhosis of liver (H)     Tendency to fall     Hypertension goal BP (blood pressure) < 140/80     Angiodysplasia of stomach and duodenum with hemorrhage     Spinal stenosis of lumbar region with neurogenic claudication     Peripheral vascular disease (H)     Coagulation defect, unspecified (H)     Congenital arteriovenous malformation     Other disorders of bone development and growth, other site     Secondary hyperparathyroidism of renal origin (H)       Physical Exam   BP (!) 187/87 (BP Location: Left arm, Patient Position: Sitting, Cuff Size: Adult Regular)   Pulse 67   Resp 16   Ht 1.626 m (5' 4\")   SpO2 98%   BMI 21.71 kg/m      GENERAL APPEARANCE: healthy, alert and no distress     EYES: EOMI, fundi benign- PERRL     HENT: ear canals and TM's normal and nose and mouth without ulcers or lesions     NECK: no adenopathy, no asymmetry, masses, or scars and thyroid normal to palpation     RESP: lungs clear to auscultation - no rales, rhonchi or wheezes     CV: regular rates and rhythm, normal S1 S2, 3/6 systolic murmur radiating to carotids.  1+ dp pulses BL     ABDOMEN:  soft, nontender, no HSM or masses and bowel sounds normal     MS: extremities normal- no gross deformities noted     SKIN: no suspicious lesions or rashes.      PSYCH: mentation appears normal.      LYMPHATICS: No cervical, or " supraclavicular nodes      Assessment & Plan   # Preventive Care Visit:     Cirrhosis of liver without ascites, unspecified hepatic cirrhosis type (H)  Has upcoming appointment with hepatology with labs ordered    End stage renal disease (H)  Secondary hyperparathyroidism of renal origin (H)  Continues with MWF HD.  BP above goal today, but she will be going to HD later today    PAD (peripheral artery disease) (H)  Encouraged her to increase walking  She saw vascular in September, recommended follow-up in 6 mo  - Lipid panel reflex to direct LDL Fasting  - no ASA due to bleeding hx    Heart murmur  Likely aortic. Last TTE in 2019, will update  - Echocardiogram Complete    Hyperlipidemia with target LDL less than 130  - Lipid panel reflex to direct LDL Fasting    Depression  Encouraged her to consider therapy.  She is not interested at this time      RTC: No follow-ups on file.      Agnieszka Rodriguez MD

## 2022-01-25 ENCOUNTER — INFUSION THERAPY VISIT (OUTPATIENT)
Dept: INFUSION THERAPY | Facility: CLINIC | Age: 82
End: 2022-01-25
Payer: MEDICARE

## 2022-01-25 VITALS
OXYGEN SATURATION: 100 % | TEMPERATURE: 98.6 F | RESPIRATION RATE: 16 BRPM | HEART RATE: 68 BPM | SYSTOLIC BLOOD PRESSURE: 175 MMHG | DIASTOLIC BLOOD PRESSURE: 72 MMHG

## 2022-01-25 DIAGNOSIS — K31.811 ANGIODYSPLASIA OF STOMACH AND DUODENUM WITH HEMORRHAGE: Primary | ICD-10-CM

## 2022-01-25 DIAGNOSIS — K55.20 AVM (ARTERIOVENOUS MALFORMATION) OF SMALL BOWEL, ACQUIRED: ICD-10-CM

## 2022-01-25 DIAGNOSIS — D50.0 IRON DEFICIENCY ANEMIA DUE TO CHRONIC BLOOD LOSS: ICD-10-CM

## 2022-01-25 PROCEDURE — 99207 PR NO CHARGE LOS: CPT

## 2022-01-25 PROCEDURE — 96372 THER/PROPH/DIAG INJ SC/IM: CPT | Performed by: NURSE PRACTITIONER

## 2022-01-25 RX ADMIN — OCTREOTIDE ACETATE 20 MG: KIT INTRAMUSCULAR at 11:52

## 2022-01-25 ASSESSMENT — PAIN SCALES - GENERAL: PAINLEVEL: NO PAIN (0)

## 2022-01-26 ENCOUNTER — TELEPHONE (OUTPATIENT)
Dept: INTERNAL MEDICINE | Facility: CLINIC | Age: 82
End: 2022-01-26
Payer: MEDICARE

## 2022-01-26 NOTE — TELEPHONE ENCOUNTER
[facility] Call Center    Phone Message    May a detailed message be left on voicemail: no     Reason for Call: Other: [facility] will be faxing over urgent genetic testing orders. Please sign and fax back.      Action Taken: Message routed to:  [facility]    Travel Screening: Not Applicable

## 2022-01-27 ENCOUNTER — LAB (OUTPATIENT)
Dept: LAB | Facility: CLINIC | Age: 82
End: 2022-01-27
Attending: PHYSICIAN ASSISTANT
Payer: MEDICARE

## 2022-01-27 ENCOUNTER — OFFICE VISIT (OUTPATIENT)
Dept: GASTROENTEROLOGY | Facility: CLINIC | Age: 82
End: 2022-01-27
Attending: PHYSICIAN ASSISTANT
Payer: MEDICARE

## 2022-01-27 ENCOUNTER — ANCILLARY PROCEDURE (OUTPATIENT)
Dept: ULTRASOUND IMAGING | Facility: CLINIC | Age: 82
End: 2022-01-27
Attending: PHYSICIAN ASSISTANT
Payer: MEDICARE

## 2022-01-27 VITALS — SYSTOLIC BLOOD PRESSURE: 135 MMHG | HEART RATE: 71 BPM | OXYGEN SATURATION: 99 % | DIASTOLIC BLOOD PRESSURE: 72 MMHG

## 2022-01-27 DIAGNOSIS — I73.9 PAD (PERIPHERAL ARTERY DISEASE) (H): ICD-10-CM

## 2022-01-27 DIAGNOSIS — E78.5 HYPERLIPIDEMIA WITH TARGET LDL LESS THAN 130: ICD-10-CM

## 2022-01-27 DIAGNOSIS — L29.9 ITCHING: ICD-10-CM

## 2022-01-27 DIAGNOSIS — K55.20 AVM (ARTERIOVENOUS MALFORMATION) OF SMALL BOWEL, ACQUIRED: ICD-10-CM

## 2022-01-27 DIAGNOSIS — K74.60 CIRRHOSIS OF LIVER WITHOUT ASCITES, UNSPECIFIED HEPATIC CIRRHOSIS TYPE (H): ICD-10-CM

## 2022-01-27 DIAGNOSIS — K74.60 CIRRHOSIS OF LIVER WITHOUT ASCITES, UNSPECIFIED HEPATIC CIRRHOSIS TYPE (H): Primary | ICD-10-CM

## 2022-01-27 LAB
ALBUMIN SERPL-MCNC: 4.1 G/DL (ref 3.4–5)
ALP SERPL-CCNC: 87 U/L (ref 40–150)
ALT SERPL W P-5'-P-CCNC: 17 U/L (ref 0–50)
ANION GAP SERPL CALCULATED.3IONS-SCNC: 8 MMOL/L (ref 3–14)
AST SERPL W P-5'-P-CCNC: 19 U/L (ref 0–45)
BILIRUB DIRECT SERPL-MCNC: 0.2 MG/DL (ref 0–0.2)
BILIRUB SERPL-MCNC: 0.6 MG/DL (ref 0.2–1.3)
BUN SERPL-MCNC: 18 MG/DL (ref 7–30)
CALCIUM SERPL-MCNC: 9.2 MG/DL (ref 8.5–10.1)
CHLORIDE BLD-SCNC: 101 MMOL/L (ref 94–109)
CHOLEST SERPL-MCNC: 114 MG/DL
CO2 SERPL-SCNC: 28 MMOL/L (ref 20–32)
CREAT SERPL-MCNC: 6.04 MG/DL (ref 0.52–1.04)
ERYTHROCYTE [DISTWIDTH] IN BLOOD BY AUTOMATED COUNT: 17.5 % (ref 10–15)
FASTING STATUS PATIENT QL REPORTED: NORMAL
FERRITIN SERPL-MCNC: 106 NG/ML (ref 8–252)
GFR SERPL CREATININE-BSD FRML MDRD: 7 ML/MIN/1.73M2
GLUCOSE BLD-MCNC: 108 MG/DL (ref 70–99)
HCT VFR BLD AUTO: 38.1 % (ref 35–47)
HDLC SERPL-MCNC: 51 MG/DL
HGB BLD-MCNC: 11.5 G/DL (ref 11.7–15.7)
INR PPP: 1.17 (ref 0.85–1.15)
LDLC SERPL CALC-MCNC: 47 MG/DL
MCH RBC QN AUTO: 30.6 PG (ref 26.5–33)
MCHC RBC AUTO-ENTMCNC: 30.2 G/DL (ref 31.5–36.5)
MCV RBC AUTO: 101 FL (ref 78–100)
NONHDLC SERPL-MCNC: 63 MG/DL
PLATELET # BLD AUTO: 213 10E3/UL (ref 150–450)
POTASSIUM BLD-SCNC: 4.5 MMOL/L (ref 3.4–5.3)
PROT SERPL-MCNC: 8.2 G/DL (ref 6.8–8.8)
RADIOLOGIST FLAGS: NORMAL
RBC # BLD AUTO: 3.76 10E6/UL (ref 3.8–5.2)
SODIUM SERPL-SCNC: 137 MMOL/L (ref 133–144)
TRIGL SERPL-MCNC: 82 MG/DL
WBC # BLD AUTO: 6.2 10E3/UL (ref 4–11)

## 2022-01-27 PROCEDURE — 80061 LIPID PANEL: CPT | Performed by: PATHOLOGY

## 2022-01-27 PROCEDURE — 82248 BILIRUBIN DIRECT: CPT | Performed by: PATHOLOGY

## 2022-01-27 PROCEDURE — 76700 US EXAM ABDOM COMPLETE: CPT | Mod: GC | Performed by: STUDENT IN AN ORGANIZED HEALTH CARE EDUCATION/TRAINING PROGRAM

## 2022-01-27 PROCEDURE — 85610 PROTHROMBIN TIME: CPT | Performed by: PATHOLOGY

## 2022-01-27 PROCEDURE — 82728 ASSAY OF FERRITIN: CPT | Performed by: PATHOLOGY

## 2022-01-27 PROCEDURE — 99214 OFFICE O/P EST MOD 30 MIN: CPT | Mod: 25 | Performed by: PHYSICIAN ASSISTANT

## 2022-01-27 PROCEDURE — 80053 COMPREHEN METABOLIC PANEL: CPT | Performed by: PATHOLOGY

## 2022-01-27 PROCEDURE — 36415 COLL VENOUS BLD VENIPUNCTURE: CPT | Performed by: PATHOLOGY

## 2022-01-27 PROCEDURE — 85027 COMPLETE CBC AUTOMATED: CPT | Performed by: PATHOLOGY

## 2022-01-27 RX ORDER — NALTREXONE HYDROCHLORIDE 50 MG/1
50 TABLET, FILM COATED ORAL DAILY
Qty: 30 TABLET | Refills: 5 | Status: SHIPPED | OUTPATIENT
Start: 2022-01-27 | End: 2022-03-08

## 2022-01-27 NOTE — LETTER
1/27/2022    RE: Rachele Martínez  [address]  [facility] [address]    Dear Colleague,    Thank you for referring your patient, Rachele Martínez, to the [facility] [place]. Please see a copy of my visit note below.    Hepatology Follow-up Clinic note  Rachele Martínez   Date of Birth 1940  Date of Service 1/27/2022    Reason for follow-up: Hep C cirrhosis          Assessment/plan:   Rachele Martínez is a 81 year old female with history of well compensated of Hep C cirrhosis (achieved SVR in 2015). No overt evidence of ascites or hepatic encephalopathy. Her transaminases and synthetic liver function is normal. Her biggest complaints today are pruritus of unclear etiology, not likely due to liver disease given normal synthetic function especially bilirubin. Recommend that she discuss further with her nephrologist. She is up to date with HCC screening and do not feel that she has clinically significant portal hypertension (normal platelets/normal spleen size) to warrant variceal screening.     # HCC screening:   - US pending at the time of visit     # Pruritus: (currently on sertraline)  - Will trial Naltrexone 12.5 mg, increase 12.5 mg every 3-4 days to max dose of 50 mg  - Discuss with nephrologist/ primary care provider if not improved     # Continue optimization of medical co-morbidities     # Follow-up in clinic in one year or sooner as needed    Roberto Gordon PA-C   [facility]    -----------------------------------------------------       HPI:   Rachele Martínez is a 81 year old female presenting for follow-up.     Hep C:  - treated with Harvoni x 12, achieved SVR 7/2015  - Fibrosis scan:  Stage 4 fibrosis, 19.9 kilopascals     Cirrhosis:  - Hep C  Complicated by:  - No hx of Ascites  - Hx of GI bleed due to AVM, no history of varices  - No hx of HE  EGD: 8/14/2018, normal esophagus, bleeding angiodysplastic lesions in  stomach and duodenum  HCC: 5/17/2018, no liver lesions.     Patient was last seen by me on 1/10/2020. Her PCP is thinking about adding another blood pressure medication. She continues to get octreotide infusion    Appetite is getting better. Weight down is down about 10-15 lbs.     ITching has been very bothersome. Thought she was told that it was likely due to liver disease.     Regular bowel movements. Patient denies jaundice, lower extremity edema, abdominal distension or confusion.   Patient also denies melena, hematochezia or hematemesis.    Patient denies fevers, sweats or chills. Cold all the time.     Medical hx Surgical hx   Past Medical History:   Diagnosis Date     Anemia      Anxiety and depression      Arthritis      AVM (arteriovenous malformation)      Chronic hepatitis C with cirrhosis (H)      Clotting disorder (H)      ESRD (end stage renal disease) (H)      GI bleed      Glaucoma      Hyperlipidemia      Hypertension goal BP (blood pressure) < 140/80     Past Surgical History:   Procedure Laterality Date     CAPSULE/PILL CAM ENDOSCOPY N/A 3/27/2019    Procedure: Capsule/pill cam endoscopy;  Surgeon: Yosvany Ram MD;  Location: [facility]     COLONOSCOPY N/A 9/4/2015    Procedure: COMBINED COLONOSCOPY, SINGLE OR MULTIPLE BIOPSY/POLYPECTOMY BY BIOPSY;  Surgeon: Rupesh Lopez MD;  Location: [facility]     COLONOSCOPY N/A 9/19/2018    Procedure: COLONOSCOPY;  enteroscopy small bowel  COLONOSCOPY;  Surgeon: Ankit Baires MD;  [facility]     ESOPHAGOSCOPY, GASTROSCOPY, DUODENOSCOPY (EGD), COMBINED N/A 12/18/2014    Procedure: COMBINED ESOPHAGOSCOPY, GASTROSCOPY, DUODENOSCOPY (EGD);  Surgeon: Betsy Carvajal MD;  Location: [facility]     ESOPHAGOSCOPY, GASTROSCOPY, DUODENOSCOPY (EGD), COMBINED N/A 4/25/2015    Procedure: COMBINED ESOPHAGOSCOPY, GASTROSCOPY, DUODENOSCOPY (EGD);  Surgeon: Yosvany Ram MD;  Location:  GI     ESOPHAGOSCOPY, GASTROSCOPY, DUODENOSCOPY (EGD),  COMBINED N/A 5/5/2015    Procedure: COMBINED ESOPHAGOSCOPY, GASTROSCOPY, DUODENOSCOPY (EGD);  Surgeon: Mariano Mistry MD;  Location:  GI      CAPSULE ENDOSCOPY N/A 9/30/2015    Procedure: CAPSULE/PILL CAM ENDOSCOPY;  Surgeon: Pan Dhaliwal MD;  Location:  GI      VASCULAR SURGERY PROCEDURE UNLIST       HYSTERECTOMY  1980    KYREE     LUMPECTOMY BREAST                   Medications:     Current Outpatient Medications   Medication     acetaminophen (TYLENOL) 500 MG tablet     atorvastatin (LIPITOR) 20 MG tablet     Calcium Acetate, Phos Binder, 667 MG CAPS     carvedilol (COREG) 3.125 MG tablet     cilostazol (PLETAL) 100 MG tablet     Epoetin Duane-epbx (RETACRIT IJ)     gabapentin (NEURONTIN) 300 MG capsule     LOPERAMIDE HCL PO     losartan (COZAAR) 25 MG tablet     Multiple Vitamins-Minerals (PRORENAL + D) TABS     octreotide (SANDOSTATIN LAR) 20 MG injection     order for DME     order for DME     pantoprazole (PROTONIX) 20 MG EC tablet     polyethylene glycol (MIRALAX) 17 GM/Dose powder     sertraline (ZOLOFT) 50 MG tablet     No current facility-administered medications for this visit.            Allergies:     Allergies   Allergen Reactions     Abacavir Itching     Lisinopril Cough     Diovan Hct Itching     severe     Dust Mites      Hydrochlorothiazide Itching     Severe       No Clinical Screening - See Comments      History of blood transfusion reactions and pre-treats with Benadryl.      Oxycodone-Acetaminophen      Spironolactone Nausea     Sulfa Drugs Hives     Valsartan Itching            Review of Systems:   10 points ROS was obtained and highlighted in the HPI, otherwise negative.          Physical Exam:   VS:  /72   Pulse 71   SpO2 99%       Gen: A&Ox3, NAD, thin  HEENT: non-icteric   CV: RRR, no overt murmurs  Lung: CTA Bilatererally, no wheezing or crackles.   Lym- no palpable lymphadenopathy  Abd: soft, NT, ND  Ext: no edema, intact pulses.   Skin: No rash, no palmar  erythema, telangiectasias or jaundice  Neuro: grossly intact, no asterixis   Psych: appropriate mood and affects           Data:   Reviewed in person and significant for:    Lab Results   Component Value Date     01/27/2022     05/26/2020      Lab Results   Component Value Date    POTASSIUM 4.5 01/27/2022    POTASSIUM 4.1 05/26/2020     Lab Results   Component Value Date    CHLORIDE 101 01/27/2022    CHLORIDE 98 05/26/2020     Lab Results   Component Value Date    CO2 28 01/27/2022    CO2 28 05/26/2020     Lab Results   Component Value Date    BUN 18 01/27/2022    BUN 23 05/26/2020     Lab Results   Component Value Date    CR 6.04 01/27/2022    CR 7.50 05/26/2020       Lab Results   Component Value Date    WBC 6.2 01/27/2022    WBC 5.5 05/26/2020     Lab Results   Component Value Date    HGB 11.5 01/27/2022    HGB 12.8 05/26/2020     Lab Results   Component Value Date    HCT 38.1 01/27/2022    HCT 41.1 05/26/2020     Lab Results   Component Value Date     01/27/2022     05/26/2020     Lab Results   Component Value Date     01/27/2022     05/26/2020       Lab Results   Component Value Date    AST 19 01/27/2022    AST 21 05/26/2020     Lab Results   Component Value Date    ALT 17 01/27/2022    ALT 20 05/26/2020     No results found for: BILICONJ   Lab Results   Component Value Date    BILITOTAL 0.6 01/27/2022    BILITOTAL 1.0 05/26/2020       Lab Results   Component Value Date    ALBUMIN 4.1 01/27/2022    ALBUMIN 3.8 05/26/2020     Lab Results   Component Value Date    PROTTOTAL 8.2 01/27/2022    PROTTOTAL 8.7 05/26/2020      Lab Results   Component Value Date    ALKPHOS 87 01/27/2022    ALKPHOS 136 05/26/2020       Lab Results   Component Value Date    INR 1.17 01/27/2022    INR 1.31 05/26/2020     Exam: US ABDOMEN COMPLETE, 1/27/2022 2:06 PM     Indication: Cirrhosis of liver without ascites, unspecified hepatic  cirrhosis type (H)     Comparison: Abdominal ultrasound  5/26/2020     Technique: The abdomen was scanned in the standard fashion with  specialized ultrasound transducer(s) using both gray scale and limited  color/spectral Doppler techniques.     Findings:     Liver:     The liver demonstrates a mildly increased echotexture with mild  surface nodularity. The liver measures 14.4 cm. No mass or  intrahepatic biliary ductal dilatation. The main portal vein is patent  with antegrade flow, measuring 1.1 cm in diameter.     US visualization score: A - No or minimal limitations     Gallbladder: Cholelithiasis. No gallbladder wall thickening,  pericholecystic fluid, or sonographic Baxter's sign.     Bile Ducts: Both the intra- and extrahepatic biliary system are of  normal caliber. The common bile duct measures 7 mm in diameter, normal  for age.     Pancreas: No visualized pancreatic mass. The pancreatic duct is  prominent, measuring 3 mm in diameter.     Kidneys: Increased echogenicity of the renal cortex bilaterally.  Multiple renal cysts, with the largest measuring 0.8 x 1.0 x 0.8 cm on  the right and 1.6 x 1.7 x 1.2 cm on the left. Craniocaudal dimensions  are: right- 7.0 cm, left- 7.6 cm.     Spleen: The spleen is normal in size, measuring 7.2 cm in sagittal  dimension.     Aorta and IVC: The visualized portions of the aorta and IVC are  unremarkable. The aorta measures 1.6 cm in diameter and the IVC  measures 1.4 cm in diameter.     Fluid: No evidence of ascites or pleural effusions.                                                                    Impression:  1. Cirrhosis without focal liver lesion.  a. LI-RADS US Category: US-1 Negative: No US evidence of HCC  b. Recommend continued surveillance US.  2. Mildly dilated pancreatic duct, pancreas is partially obscured.  Recommend CT abdomen pancreas protocol for further evaluation.  3. Cholelithiasis without sonographic evidence for acute  cholecystitis.  4. The kidneys are atrophic with increased echogenicity of the  renal  cortex, consistent with medical renal disease. Multiple renal cysts  bilaterally are likely related to dialysis.    Again, thank you for allowing me to participate in the care of your patient.      Sincerely,    Roberto Gordon PA-C

## 2022-01-27 NOTE — NURSING NOTE
Chief Complaint   Patient presents with     RECHECK     Follow up     Blood pressure 135/72, pulse 71, SpO2 99 %, not currently breastfeeding.    Amy Staley on 1/27/2022 at 2:00 PM

## 2022-01-27 NOTE — PROGRESS NOTES
Hepatology Follow-up Clinic note  Rachele Martínez   Date of Birth 1940  Date of Service 1/27/2022    Reason for follow-up: Hep C cirrhosis          Assessment/plan:   Rachele Martínez is a 81 year old female with history of well compensated of Hep C cirrhosis (achieved SVR in 2015). No overt evidence of ascites or hepatic encephalopathy. Her transaminases and synthetic liver function is normal. Her biggest complaints today are pruritus of unclear etiology, not likely due to liver disease given normal synthetic function especially bilirubin. Recommend that she discuss further with her nephrologist. She is up to date with HCC screening and do not feel that she has clinically significant portal hypertension (normal platelets/normal spleen size) to warrant variceal screening.     # HCC screening:   - US pending at the time of visit     # Pruritus: (currently on sertraline)  - Will trial Naltrexone 12.5 mg, increase 12.5 mg every 3-4 days to max dose of 50 mg  - Discuss with nephrologist/ primary care provider if not improved     # Continue optimization of medical co-morbidities     # Follow-up in clinic in one year or sooner as needed    Roberto Gordon PA-C   [facility] Hepatology clinic    -----------------------------------------------------       HPI:   Rachele Martínez is a 81 year old female presenting for follow-up.     Hep C:  - treated with Harvoni x 12, achieved SVR 7/2015  - Fibrosis scan:  Stage 4 fibrosis, 19.9 kilopascals     Cirrhosis:  - Hep C  Complicated by:  - No hx of Ascites  - Hx of GI bleed due to AVM, no history of varices  - No hx of HE  EGD: 8/14/2018, normal esophagus, bleeding angiodysplastic lesions in stomach and duodenum  HCC: 5/17/2018, no liver lesions.     Patient was last seen by me on 1/10/2020. Her PCP is thinking about adding another blood pressure medication. She continues to get octreotide infusion    Appetite is getting better. Weight down is down about  10-15 lbs.     ITching has been very bothersome. Thought she was told that it was likely due to liver disease.     Regular bowel movements. Patient denies jaundice, lower extremity edema, abdominal distension or confusion.   Patient also denies melena, hematochezia or hematemesis.    Patient denies fevers, sweats or chills. Cold all the time.     Medical hx Surgical hx   Past Medical History:   Diagnosis Date     Anemia      Anxiety and depression      Arthritis      AVM (arteriovenous malformation)      Chronic hepatitis C with cirrhosis (H)      Clotting disorder (H)      ESRD (end stage renal disease) (H)      GI bleed      Glaucoma      Hyperlipidemia      Hypertension goal BP (blood pressure) < 140/80     Past Surgical History:   Procedure Laterality Date     CAPSULE/PILL CAM ENDOSCOPY N/A 3/27/2019    Procedure: Capsule/pill cam endoscopy;  Surgeon: Yosvany Ram MD;  Location:  GI     COLONOSCOPY N/A 9/4/2015    Procedure: COMBINED COLONOSCOPY, SINGLE OR MULTIPLE BIOPSY/POLYPECTOMY BY BIOPSY;  Surgeon: Rupesh Lopez MD;  Location: [facility]     COLONOSCOPY N/A 9/19/2018    Procedure: COLONOSCOPY;  enteroscopy small bowel  COLONOSCOPY;  Surgeon: Ankit Baires MD;  Location:  GI     ESOPHAGOSCOPY, GASTROSCOPY, DUODENOSCOPY (EGD), COMBINED N/A 12/18/2014    Procedure: COMBINED ESOPHAGOSCOPY, GASTROSCOPY, DUODENOSCOPY (EGD);  Surgeon: Betsy Carvajal MD;  Location:  GI     ESOPHAGOSCOPY, GASTROSCOPY, DUODENOSCOPY (EGD), COMBINED N/A 4/25/2015    Procedure: COMBINED ESOPHAGOSCOPY, GASTROSCOPY, DUODENOSCOPY (EGD);  Surgeon: Yosvany Ram MD;  Location:  GI     ESOPHAGOSCOPY, GASTROSCOPY, DUODENOSCOPY (EGD), COMBINED N/A 5/5/2015    Procedure: COMBINED ESOPHAGOSCOPY, GASTROSCOPY, DUODENOSCOPY (EGD);  Surgeon: Mariano Mistry MD;  Location:  GI     HC CAPSULE ENDOSCOPY N/A 9/30/2015    Procedure: CAPSULE/PILL CAM ENDOSCOPY;  Surgeon: Pan Dhaliwal MD;  Location:  GI      HC VASCULAR SURGERY PROCEDURE UNLIST       HYSTERECTOMY  1980    KYREE     LUMPECTOMY BREAST                   Medications:     Current Outpatient Medications   Medication     acetaminophen (TYLENOL) 500 MG tablet     atorvastatin (LIPITOR) 20 MG tablet     Calcium Acetate, Phos Binder, 667 MG CAPS     carvedilol (COREG) 3.125 MG tablet     cilostazol (PLETAL) 100 MG tablet     Epoetin Duane-epbx (RETACRIT IJ)     gabapentin (NEURONTIN) 300 MG capsule     LOPERAMIDE HCL PO     losartan (COZAAR) 25 MG tablet     Multiple Vitamins-Minerals (PRORENAL + D) TABS     octreotide (SANDOSTATIN LAR) 20 MG injection     order for DME     order for DME     pantoprazole (PROTONIX) 20 MG EC tablet     polyethylene glycol (MIRALAX) 17 GM/Dose powder     sertraline (ZOLOFT) 50 MG tablet     No current facility-administered medications for this visit.            Allergies:     Allergies   Allergen Reactions     Abacavir Itching     Lisinopril Cough     Diovan Hct Itching     severe     Dust Mites      Hydrochlorothiazide Itching     Severe       No Clinical Screening - See Comments      History of blood transfusion reactions and pre-treats with Benadryl.      Oxycodone-Acetaminophen      Spironolactone Nausea     Sulfa Drugs Hives     Valsartan Itching            Review of Systems:   10 points ROS was obtained and highlighted in the HPI, otherwise negative.          Physical Exam:   VS:  /72   Pulse 71   SpO2 99%       Gen: A&Ox3, NAD, thin  HEENT: non-icteric   CV: RRR, no overt murmurs  Lung: CTA Bilatererally, no wheezing or crackles.   Lym- no palpable lymphadenopathy  Abd: soft, NT, ND  Ext: no edema, intact pulses.   Skin: No rash, no palmar erythema, telangiectasias or jaundice  Neuro: grossly intact, no asterixis   Psych: appropriate mood and affects           Data:   Reviewed in person and significant for:    Lab Results   Component Value Date     01/27/2022     05/26/2020      Lab Results   Component  Value Date    POTASSIUM 4.5 01/27/2022    POTASSIUM 4.1 05/26/2020     Lab Results   Component Value Date    CHLORIDE 101 01/27/2022    CHLORIDE 98 05/26/2020     Lab Results   Component Value Date    CO2 28 01/27/2022    CO2 28 05/26/2020     Lab Results   Component Value Date    BUN 18 01/27/2022    BUN 23 05/26/2020     Lab Results   Component Value Date    CR 6.04 01/27/2022    CR 7.50 05/26/2020       Lab Results   Component Value Date    WBC 6.2 01/27/2022    WBC 5.5 05/26/2020     Lab Results   Component Value Date    HGB 11.5 01/27/2022    HGB 12.8 05/26/2020     Lab Results   Component Value Date    HCT 38.1 01/27/2022    HCT 41.1 05/26/2020     Lab Results   Component Value Date     01/27/2022     05/26/2020     Lab Results   Component Value Date     01/27/2022     05/26/2020       Lab Results   Component Value Date    AST 19 01/27/2022    AST 21 05/26/2020     Lab Results   Component Value Date    ALT 17 01/27/2022    ALT 20 05/26/2020     No results found for: BILICONJ   Lab Results   Component Value Date    BILITOTAL 0.6 01/27/2022    BILITOTAL 1.0 05/26/2020       Lab Results   Component Value Date    ALBUMIN 4.1 01/27/2022    ALBUMIN 3.8 05/26/2020     Lab Results   Component Value Date    PROTTOTAL 8.2 01/27/2022    PROTTOTAL 8.7 05/26/2020      Lab Results   Component Value Date    ALKPHOS 87 01/27/2022    ALKPHOS 136 05/26/2020       Lab Results   Component Value Date    INR 1.17 01/27/2022    INR 1.31 05/26/2020     Exam: US ABDOMEN COMPLETE, 1/27/2022 2:06 PM     Indication: Cirrhosis of liver without ascites, unspecified hepatic  cirrhosis type (H)     Comparison: Abdominal ultrasound 5/26/2020     Technique: The abdomen was scanned in the standard fashion with  specialized ultrasound transducer(s) using both gray scale and limited  color/spectral Doppler techniques.     Findings:     Liver:     The liver demonstrates a mildly increased echotexture with mild  surface  nodularity. The liver measures 14.4 cm. No mass or  intrahepatic biliary ductal dilatation. The main portal vein is patent  with antegrade flow, measuring 1.1 cm in diameter.     US visualization score: A - No or minimal limitations     Gallbladder: Cholelithiasis. No gallbladder wall thickening,  pericholecystic fluid, or sonographic Baxter's sign.     Bile Ducts: Both the intra- and extrahepatic biliary system are of  normal caliber. The common bile duct measures 7 mm in diameter, normal  for age.     Pancreas: No visualized pancreatic mass. The pancreatic duct is  prominent, measuring 3 mm in diameter.     Kidneys: Increased echogenicity of the renal cortex bilaterally.  Multiple renal cysts, with the largest measuring 0.8 x 1.0 x 0.8 cm on  the right and 1.6 x 1.7 x 1.2 cm on the left. Craniocaudal dimensions  are: right- 7.0 cm, left- 7.6 cm.     Spleen: The spleen is normal in size, measuring 7.2 cm in sagittal  dimension.     Aorta and IVC: The visualized portions of the aorta and IVC are  unremarkable. The aorta measures 1.6 cm in diameter and the IVC  measures 1.4 cm in diameter.     Fluid: No evidence of ascites or pleural effusions.                                                                      Impression:  1. Cirrhosis without focal liver lesion.  a. LI-RADS US Category: US-1 Negative: No US evidence of HCC  b. Recommend continued surveillance US.  2. Mildly dilated pancreatic duct, pancreas is partially obscured.  Recommend CT abdomen pancreas protocol for further evaluation.  3. Cholelithiasis without sonographic evidence for acute  cholecystitis.  4. The kidneys are atrophic with increased echogenicity of the renal  cortex, consistent with medical renal disease. Multiple renal cysts  bilaterally are likely related to dialysis.

## 2022-01-28 NOTE — TELEPHONE ENCOUNTER
[facility] Call Center    Phone Message    May a detailed message be left on voicemail: yes     Reason for Call: Other: Sarath calling back from [employer] Forms for urgent genetic testing were sent on 01/25/2022 & 01/26/2022. Calling to get a status update.    [facility]ed to [facility] to discuss further.    Fax #: [phone]    Action Taken: Message routed to:  [facility] ([facility]): [facility]    Travel Screening: Not Applicable

## 2022-01-28 NOTE — TELEPHONE ENCOUNTER
"See Dr. Rodriguez mychart message sent to patient today.  This is scam.  Form not completed.  Forms person notified of scam.      [employer] is scam.  I cannot find an \"[employer]\" web site and phone number listed as scam.    Called patient and left VM.  We received request for an extensive cardiac genetic testing from a company called [employer].  We believe this is scam.  Please do not provide any personal information to [employer].      Srinivasan Fox CMA ([facility]) at 2:52 PM on 1/28/2022     "

## 2022-01-31 NOTE — TELEPHONE ENCOUNTER
[facility] Call Center    Phone Message    May a detailed message be left on voicemail: yes     Reason for Call: Other: Bryn was calling back again to check in the updates and status for completing orders, I told her I'll send another message requesting the care team to review and follow up, she got very angry and upset and asked for the clinic supervisor to call he because this is a urgent request and they still haven't gotten the completed documents back. Please review and follow up thank you.       (I see this may be a Scam, I didn t mention any of that information to the caller just to be on the safe side FYI)    Action Taken: Message routed to:  [facility]    Travel Screening: Not Applicable

## 2022-02-01 NOTE — TELEPHONE ENCOUNTER
We will not be speaking with [employer] unless the call is coming from the patient herself. This has been escalated to higher management.     Dana Karimi, EMT at 1:22 PM on 2/1/2022.  [facility]  [facility]  [place]  [phone]

## 2022-02-06 DIAGNOSIS — K74.60 HEPATIC CIRRHOSIS, UNSPECIFIED HEPATIC CIRRHOSIS TYPE (H): Primary | ICD-10-CM

## 2022-02-06 DIAGNOSIS — K86.89 DILATED PANCREATIC DUCT: ICD-10-CM

## 2022-02-08 ENCOUNTER — TELEPHONE (OUTPATIENT)
Dept: GASTROENTEROLOGY | Facility: CLINIC | Age: 82
End: 2022-02-08
Payer: MEDICARE

## 2022-02-08 NOTE — TELEPHONE ENCOUNTER
[facility] Call Center    Phone Message    May a detailed message be left on voicemail: yes     Reason for Call: Other: Kat from [employer] calling regarding cancer genetic testing. Writer did not mention anything about the scam concerns. Writer asked if patient will be contacting us regarding this test. Kat said we could contact the patient or she could request the patient to contact us. Writer informed Kat that I would pass this message along to the care team.      Kat #: [phone]    Action Taken: Message routed to:  [facility]    Travel Screening: Not Applicable

## 2022-02-08 NOTE — TELEPHONE ENCOUNTER
Called patient. Spoke with patient's daughter.  Let her know CT scan of pancreas recommended given her weight loss and itching.     Gave daughter number to call and schedule imaging appointment.    No further questions or concerns.    Martha PATINO LPN  Hepatology Clinic

## 2022-02-14 ENCOUNTER — TELEPHONE (OUTPATIENT)
Dept: INTERNAL MEDICINE | Facility: CLINIC | Age: 82
End: 2022-02-14
Payer: MEDICARE

## 2022-02-14 NOTE — TELEPHONE ENCOUNTER
[facility] Call Center    Phone Message    May a detailed message be left on voicemail: yes     Reason for Call: Form or Letter   Type or form/letter needing completion: requistions  Provider: Dr. Rodriguez  Date form needed: asap   Once completed: did not specify    [employer] calling about the status of a form sent on 01/21/2022. Dexter said it was time sensitive.    Action Taken: Message routed to:  [facility]    Travel Screening: Not Applicable

## 2022-02-15 NOTE — TELEPHONE ENCOUNTER
I phoned the company [employer] and they seem to be a scam.    Kristine Busby on 2/15/2022 at 8:28 AM

## 2022-02-16 NOTE — TELEPHONE ENCOUNTER
[facility] Call Center    Phone Message    May a detailed message be left on voicemail: yes     Reason for Call: Other: Writer noticed previous messages that this is a suspected scam. Writer did not mention this during the call, but informed Haris that I would pass the message along about genetic testing.      Haris from [employer] saying that history of paranormal cancer needs to be selected from the quick reference guide on the forms sent in. Needs to be written on column number 5 of the form.     [employer] Fax #: [phone]  [employer] PH#: [phone]    Action Taken: Message routed to:  [facility]    Travel Screening: Not Applicable                                                                       Epidermal Closure Graft Donor Site (Optional): running

## 2022-02-17 NOTE — TELEPHONE ENCOUNTER
[facility] Call Center    Phone Message    May a detailed message be left on voicemail: no     Reason for Call: Other: Calling about the genetic testing and if we have received the form yet. for the malignant melanoma.     Action Taken: Message routed to:  [facility]    Travel Screening: Not Applicable

## 2022-02-18 NOTE — TELEPHONE ENCOUNTER
When we get another call from [employer] please tell them that we will NOT be accepting further messages/calls from them unless we are prompted by the patient directly.   Do not continue to take messages for this company, we will call them if the patient requests this testing from us.     Dana Karimi, EMT at 8:24 AM on 2/18/2022.  [facility]  Clin[facility] Center  [place]  [phone]

## 2022-03-01 ENCOUNTER — INFUSION THERAPY VISIT (OUTPATIENT)
Dept: INFUSION THERAPY | Facility: CLINIC | Age: 82
End: 2022-03-01
Payer: MEDICARE

## 2022-03-01 VITALS
RESPIRATION RATE: 16 BRPM | OXYGEN SATURATION: 95 % | BODY MASS INDEX: 20.43 KG/M2 | TEMPERATURE: 97.5 F | WEIGHT: 119 LBS | SYSTOLIC BLOOD PRESSURE: 133 MMHG | HEART RATE: 72 BPM | DIASTOLIC BLOOD PRESSURE: 66 MMHG

## 2022-03-01 DIAGNOSIS — D50.0 IRON DEFICIENCY ANEMIA DUE TO CHRONIC BLOOD LOSS: ICD-10-CM

## 2022-03-01 DIAGNOSIS — K55.20 AVM (ARTERIOVENOUS MALFORMATION) OF SMALL BOWEL, ACQUIRED: ICD-10-CM

## 2022-03-01 DIAGNOSIS — K31.811 ANGIODYSPLASIA OF STOMACH AND DUODENUM WITH HEMORRHAGE: Primary | ICD-10-CM

## 2022-03-01 PROCEDURE — 96372 THER/PROPH/DIAG INJ SC/IM: CPT | Performed by: NURSE PRACTITIONER

## 2022-03-01 PROCEDURE — 99207 PR NO CHARGE LOS: CPT

## 2022-03-01 RX ADMIN — OCTREOTIDE ACETATE 20 MG: KIT INTRAMUSCULAR at 16:36

## 2022-03-01 ASSESSMENT — PAIN SCALES - GENERAL: PAINLEVEL: NO PAIN (0)

## 2022-03-01 NOTE — PROGRESS NOTES
Infusion Nursing Note:  Rachele Martínez presents today for Sandostatin.    Patient seen by provider today: No   present during visit today: Not Applicable.    Note: Patient reports tolerating sandostatin well and denies any concerns for today.      Intravenous Access:  No Intravenous access/labs at this visit.    Treatment Conditions:  Not Applicable.      Post Infusion Assessment:  Patient tolerated injection without incident.       Discharge Plan:   [facility]  Patient will return 3/29 for next appointment.   Patient discharged in stable condition accompanied by: daughter.  Departure Mode: Ambulatory.      Holly Way RN

## 2022-03-03 ENCOUNTER — ANCILLARY PROCEDURE (OUTPATIENT)
Dept: CT IMAGING | Facility: CLINIC | Age: 82
End: 2022-03-03
Attending: PHYSICIAN ASSISTANT
Payer: MEDICARE

## 2022-03-03 DIAGNOSIS — K86.89 DILATED PANCREATIC DUCT: ICD-10-CM

## 2022-03-03 DIAGNOSIS — K74.60 HEPATIC CIRRHOSIS, UNSPECIFIED HEPATIC CIRRHOSIS TYPE (H): ICD-10-CM

## 2022-03-03 PROCEDURE — G1004 CDSM NDSC: HCPCS | Performed by: RADIOLOGY

## 2022-03-03 PROCEDURE — 74160 CT ABDOMEN W/CONTRAST: CPT | Mod: MG | Performed by: RADIOLOGY

## 2022-03-03 RX ORDER — IOPAMIDOL 755 MG/ML
73 INJECTION, SOLUTION INTRAVASCULAR ONCE
Status: COMPLETED | OUTPATIENT
Start: 2022-03-03 | End: 2022-03-03

## 2022-03-03 RX ADMIN — IOPAMIDOL 73 ML: 755 INJECTION, SOLUTION INTRAVASCULAR at 11:39

## 2022-03-03 NOTE — LETTER
March 7, 2022       TO: Rachele Martínez  [address]  [place] [address]       Dear Ms. Martínez,    We are writing to inform you of your test results.    Your CT scan did not show any concerning findings in your pancreas.     Other abdominal findings consistent with known cirrhosis.     It was a pleasure to see you at your recent visit. Please let me know if you have any questions or concerns.     Clinic Staff - [phone] option 3     Sincerely,     Roberto Gordon PA-C   [address] [place] [address], Mail Code [id number]  [place]  [address].

## 2022-03-08 ENCOUNTER — TELEPHONE (OUTPATIENT)
Dept: GASTROENTEROLOGY | Facility: CLINIC | Age: 82
End: 2022-03-08
Payer: MEDICARE

## 2022-03-08 DIAGNOSIS — K74.60 CIRRHOSIS OF LIVER WITHOUT ASCITES, UNSPECIFIED HEPATIC CIRRHOSIS TYPE (H): ICD-10-CM

## 2022-03-08 DIAGNOSIS — L29.9 ITCHING: ICD-10-CM

## 2022-03-08 RX ORDER — NALTREXONE HYDROCHLORIDE 50 MG/1
50 TABLET, FILM COATED ORAL DAILY
Qty: 30 TABLET | Refills: 5 | Status: SHIPPED | OUTPATIENT
Start: 2022-03-08 | End: 2023-02-17

## 2022-03-08 NOTE — TELEPHONE ENCOUNTER
Prescription resent.    Martha PATINO LPN  Hepatology Clinic      Health Call Center    Phone Message    May a detailed message be left on voicemail: yes     Reason for Call: Medication Question or concern regarding medication   Prescription Clarification  Name of Medication: naltrexone (DEPADE/REVIA) 50 MG tablet  Prescribing Provider: Roberto Gordon   Pharmacy: [facility] [id number] [facility] [place]   What on the order needs clarification? Pharmacy states they never received RX; please resend RX.  Thank you!    Action Taken: Message routed to:  [facility] ([facility]): [facility]    Travel Screening: Not Applicable

## 2022-03-21 ENCOUNTER — OFFICE VISIT (OUTPATIENT)
Dept: VASCULAR SURGERY | Facility: CLINIC | Age: 82
End: 2022-03-21
Payer: MEDICARE

## 2022-03-21 ENCOUNTER — ANCILLARY PROCEDURE (OUTPATIENT)
Dept: ULTRASOUND IMAGING | Facility: CLINIC | Age: 82
End: 2022-03-21
Attending: RADIOLOGY
Payer: MEDICARE

## 2022-03-21 VITALS — SYSTOLIC BLOOD PRESSURE: 178 MMHG | HEART RATE: 73 BPM | OXYGEN SATURATION: 99 % | DIASTOLIC BLOOD PRESSURE: 68 MMHG

## 2022-03-21 DIAGNOSIS — I70.213 ATHEROSCLEROSIS OF NATIVE ARTERY OF BOTH LOWER EXTREMITIES WITH INTERMITTENT CLAUDICATION (H): ICD-10-CM

## 2022-03-21 DIAGNOSIS — I70.213 ATHEROSCLEROSIS OF NATIVE ARTERY OF BOTH LOWER EXTREMITIES WITH INTERMITTENT CLAUDICATION (H): Primary | ICD-10-CM

## 2022-03-21 DIAGNOSIS — I73.9 PERIPHERAL VASCULAR DISEASE (H): ICD-10-CM

## 2022-03-21 PROCEDURE — 93924 LWR XTR VASC STDY BILAT: CPT | Mod: GC | Performed by: RADIOLOGY

## 2022-03-21 PROCEDURE — 99215 OFFICE O/P EST HI 40 MIN: CPT | Performed by: RADIOLOGY

## 2022-03-21 NOTE — PATIENT INSTRUCTIONS
Rachele you have had your consult today with Dr Babin IR/Vascular regarding your PAD.     Plan:    Order has been placed for PAD rehab, or supervised exercise program.  Please call to make your appointment.    We will see you back in 6 months, with an exercise FAISAL test and clinic visit.    Please call with questions or concerns,     MALLORY Payan RN, BSN  Interventional Radiology Nurse Coordinator   Phone:  [phone]

## 2022-03-21 NOTE — PROGRESS NOTES
INTERVENTIONAL RADIOLOGY CONSULTATION    Name: Rachele Martínez  Age: 81 year old   REASON FOR VISIT : Right lower extremity pain    HPI: Ms. Martínez is a very pleasant 81-year-old female here for follow-up of right lower extremity pain.  She is accompanied by her sister during the visit today.  Her past medical history is most significant for end-stage kidney disease, chronic hepatitis C, hyperlipidemia, hypertension and peripheral arterial disease.  She has not had any coronary artery disease or cerebrovascular accidents.  She has had prior tobacco use, which she no longer uses.  My first visit with her was on September 13, 2021, during which she had similar complaints.  My recommendation was for her to initiate a supervised exercise program, which she has only been able to attend a few sessions without much consistency.  She describes difficulty walking as a barrier, and that she was not sure.  This program was necessarily right for her.  Today she describes right much greater than the left lower extremity pain with different patterns.  She describes a pattern of right calf pain that is provoked by walking only 1 flight of stairs, or much less than a block.  This pain is fairly consistent and being provoked by exertion and is alleviated by rest.  His pain is lifestyle limiting for her and prevents her from doing any number of activities that she would otherwise do.  This pain has been present for 2-3 years at least.  She also describes knee and ankle pain, which could be leading to her knees giving up on her.  She describes some numbness in her bilateral feet, and what she believes to be a restless leg syndrome.  This pain can wake her up at night and is distinctly different than the calf pain that is brought by exertion.  She has some lower back pain, that foot sometimes radiate to bilateral thighs.  Standing up does not worsen her pain necessarily.  The pain does not worsen towards the evening.  She is  accompanied by her sister during the visit today, which is very supportive.  She lives alone, and is able to perform activities of daily living independently.  She has used cigarettes about 1 pack/day between the age of 45 and 56.    PAST MEDICAL HISTORY:   Past Medical History:   Diagnosis Date     Anemia      Anxiety and depression      Arthritis      AVM (arteriovenous malformation)      Chronic hepatitis C with cirrhosis (H)      Clotting disorder (H)      ESRD (end stage renal disease) (H)     on dialysis     GI bleed     recurrent     Glaucoma      Hyperlipidemia      Hypertension goal BP (blood pressure) < 140/80        PAST SURGICAL HISTORY:   Past Surgical History:   Procedure Laterality Date     CAPSULE/PILL CAM ENDOSCOPY N/A 3/27/2019    Procedure: Capsule/pill cam endoscopy;  Surgeon: Yosvany Ram MD;  Location: [facility]     COLONOSCOPY N/A 9/4/2015    Procedure: COMBINED COLONOSCOPY, SINGLE OR MULTIPLE BIOPSY/POLYPECTOMY BY BIOPSY;  Surgeon: Rupesh Lopez MD;  Location: [facility]     COLONOSCOPY N/A 9/19/2018    Procedure: COLONOSCOPY;  enteroscopy small bowel  COLONOSCOPY;  Surgeon: Ankit Baires MD;  Location: [facility]     ESOPHAGOSCOPY, GASTROSCOPY, DUODENOSCOPY (EGD), COMBINED N/A 12/18/2014    Procedure: COMBINED ESOPHAGOSCOPY, GASTROSCOPY, DUODENOSCOPY (EGD);  Surgeon: Betsy Carvajal MD;  Location: [facility]     ESOPHAGOSCOPY, GASTROSCOPY, DUODENOSCOPY (EGD), COMBINED N/A 4/25/2015    Procedure: COMBINED ESOPHAGOSCOPY, GASTROSCOPY, DUODENOSCOPY (EGD);  Surgeon: Yosvany Ram MD;  Location: [facility]     ESOPHAGOSCOPY, GASTROSCOPY, DUODENOSCOPY (EGD), COMBINED N/A 5/5/2015    Procedure: COMBINED ESOPHAGOSCOPY, GASTROSCOPY, DUODENOSCOPY (EGD);  Surgeon: Mariano Mistry MD;  Location:  GI     HC CAPSULE ENDOSCOPY N/A 9/30/2015    Procedure: CAPSULE/PILL CAM ENDOSCOPY;  Surgeon: Pan Dhaliwal MD;  [facility]     HC VASCULAR SURGERY PROCEDURE UNLIST       HYSTERECTOMY   1980    KYREE     LUMPECTOMY BREAST         FAMILY HISTORY:   Family History   Problem Relation Age of Onset     Diabetes Mother      Alzheimer Disease Mother      Substance Abuse Son      Cancer Sister      Soft Tissue Cancer Sister      Breast Cancer Sister      Hyperlipidemia Daughter      Alcoholism Brother      Spine Problems Sister        SOCIAL HISTORY:   Social History     Tobacco Use     Smoking status: Former Smoker     Packs/day: 2.00     Years: 45.00     Pack years: 90.00     Types: Cigarettes     Start date: 1953     Quit date: 2002     Years since quittin.3     Smokeless tobacco: Never Used   Substance Use Topics     Alcohol use: No       PROBLEM LIST:   Patient Active Problem List    Diagnosis Date Noted     Peripheral vascular disease (H) 10/30/2020     Priority: Medium     Spinal stenosis of lumbar region with neurogenic claudication 2019     Priority: Medium     Angiodysplasia of stomach and duodenum with hemorrhage 2016     Priority: Medium     Tendency to fall 2016     Priority: Medium     Secondary hyperparathyroidism of renal origin (H) 2016     Priority: Medium     Coagulation defect, unspecified (H) 2016     Priority: Medium     Other disorders of bone development and growth, other site 2016     Priority: Medium     End stage renal disease (H) 2016     Priority: Medium     Nephrologist is Dr. Tarango  [facility] [place] [phone], fax [phone]         Unspecified cirrhosis of liver (H) 2016     Priority: Medium     Congenital arteriovenous malformation 2016     Priority: Medium     AVM (arteriovenous malformation) of small bowel, acquired 2016     Priority: Medium     Anemia of chronic disease 2016     Priority: Medium     Iron deficiency anemia due to chronic blood loss 2016     Priority: Medium     Due to AVMs       History of hepatitis C 2015     Priority: Medium     SVR, 2015        Hyperlipidemia with target LDL less than 130 02/27/2015     Priority: Medium     Diagnosis updated by automated process. Provider to review and confirm.       Cataract 11/23/2012     Priority: Medium     Right   Problem list name updated by automated process. Provider to review       Depression with anxiety 11/23/2012     Priority: Medium     Problem list name updated by automated process. Provider to review       Hypertension goal BP (blood pressure) < 140/80 08/02/2003     Priority: Medium       MEDICATIONS:   Prescription Medications as of 3/21/2022       Rx Number Disp Refills Start End Last Dispensed Date Next Fill Date Owning Pharmacy    acetaminophen (TYLENOL) 500 MG tablet    11/25/2020        Sig: Take 500-1,000 mg by mouth    Class: Historical    Route: Oral    atorvastatin (LIPITOR) 20 MG tablet  90 tablet 0 1/12/2022    [id number] [place], [place] - [address]    Sig: Take 1 tablet (20 mg) by mouth daily (Please have updated labs on 1/21/2022 for further refills)    Class: E-Prescribe    Route: Oral    Calcium Acetate, Phos Binder, 667 MG CAPS   8 10/12/2016        Sig: TAKE 2 CAPSULE BY MOUTH THREE TIMES A DAY WITH MEALS    Class: Historical    carvedilol (COREG) 3.125 MG tablet    8/22/2021        Class: Historical    cilostazol (PLETAL) 100 MG tablet  180 tablet 1 11/9/2021    [id number], [place] - [address]    Sig: TAKE 1 TABLET BY MOUTH TWICE A DAY    Class: E-Prescribe    Epoetin Duane-epbx (RETACRIT IJ)    8/20/2021 9/16/2022       Class: Historical    Route: Intravenous Push    gabapentin (NEURONTIN) 300 MG capsule  90 capsule 3 11/8/2021    [id number] [place] [facility] [place], [place] - [address]    Sig: TAKE 1 CAPSULE BY MOUTH AT BEDTIME    Class: E-Prescribe    LOPERAMIDE HCL PO    5/26/2021 5/25/2022       Sig: Take 4 mg by mouth    Class: Historical    Route: Oral    losartan (COZAAR) 25 MG tablet        [id number] [place] [facility] [place], [place] - [address]    Sig:  Take 100 mg by mouth daily Patient reports taking 100 mg daily.    Class: Historical    Route: Oral    Multiple Vitamins-Minerals (PRORENAL + D) TABS    7/23/2021        Sig: Take 1 tablet by mouth daily    Class: Historical    Route: Oral    naltrexone (DEPADE/REVIA) 50 MG tablet  30 tablet 5 3/8/2022    [id number] [place] [facility], [place] - [address]    Sig: Take 1 tablet (50 mg) by mouth daily Start with 12.5 mg (1/4 tablet), increase 12.5 mg every 3 days to a max dose of 50 mg (1 tablet) for itching.    Class: E-Prescribe    Route: Oral    octreotide (SANDOSTATIN LAR) 20 MG injection            Sig: Inject 20 mg into the muscle every 28 days    Class: Historical    Route: Intramuscular    pantoprazole (PROTONIX) 20 MG EC tablet  180 tablet 3 8/29/2021    [id number] [place] [facility], [place] - [address]    Sig: Take 1 tablet (20 mg) by mouth 2 times daily *30-60 minutes before a meal    Class: E-Prescribe    Route: Oral    polyethylene glycol (MIRALAX) 17 GM/Dose powder    8/28/2020    [id number] [place] [facility], [place] - [address]    Sig: As needed    Class: Historical    sertraline (ZOLOFT) 50 MG tablet  180 tablet 1 1/12/2022    [id number] [place] [facility], [place] - [address]    Sig: Take 2 tablets (100 mg) by mouth daily    Class: E-Prescribe    Route: Oral    order for DME  1 Units 0 4/30/2019    [id number] [place] [facility], [place] - [address]    Sig: Cane    Class: Local Print    order for DME  1 Device 0 7/20/2016        Sig: Equipment being ordered: 4 wheel walker with seat.    Class: Local Print          ALLERGIES:   Abacavir, Lisinopril, Diovan hct, Dust mites, Hydrochlorothiazide, No clinical screening - see comments, Oxycodone-acetaminophen, Spironolactone, Sulfa drugs, and Valsartan    ROS:  An 11 point review of system was performed and pertinent negative and positives are mentioned in HPI.        Physical Examination:   VITALS:   BP (!) 178/68 (BP Location: Left arm,  Patient Position: Sitting, Cuff Size: Adult Regular)   Pulse 73   SpO2 99%   General:  Pt sitting in chair comfortably.  No acute distress  HEENT: normocephalic.  Neck supple  Neck: no JVD  Resp: nonlabored.   CV: RRR.    Lower extremities/vascular: Right: Dopplerable DP and PT.  2+ right popliteal artery pulse.  Normal capillary refill.  No skin breakdown.  Intact sensation.  No varicose veins.  Left: Dopplerable PT and DP pulses.  1+ palpable popliteal artery pulse.  Normal capillary refill.  No skin ulcerations.  Intact sensation.  Lymph: no pedal edema  Neuro: Oriented x3.  No evidence of focal motor deficits  Mood: appropriate affect      Labs:    BMP RESULTS:  Lab Results   Component Value Date     01/27/2022     05/26/2020    POTASSIUM 4.5 01/27/2022    POTASSIUM 4.1 05/26/2020    CHLORIDE 101 01/27/2022    CHLORIDE 98 05/26/2020    CO2 28 01/27/2022    CO2 28 05/26/2020    ANIONGAP 8 01/27/2022    ANIONGAP 12 05/26/2020     (H) 01/27/2022     (H) 05/26/2020    BUN 18 01/27/2022    BUN 23 05/26/2020    CR 6.04 (H) 01/27/2022    CR 7.50 (H) 05/26/2020    GFRESTIMATED 7 (L) 01/27/2022    GFRESTIMATED 5 (L) 07/27/2021    GFRESTIMATED 5 (L) 05/26/2020    GFRESTBLACK 5 (L) 05/26/2020    BELGICA 9.2 01/27/2022    BELGICA 8.8 05/26/2020        CBC RESULTS:  Lab Results   Component Value Date    WBC 6.2 01/27/2022    WBC 5.5 05/26/2020    RBC 3.76 (L) 01/27/2022    RBC 3.92 05/26/2020    HGB 11.5 (L) 01/27/2022    HGB 12.8 05/26/2020    HCT 38.1 01/27/2022    HCT 41.1 05/26/2020     (H) 01/27/2022     (H) 05/26/2020    MCH 30.6 01/27/2022    MCH 32.7 05/26/2020    MCHC 30.2 (L) 01/27/2022    MCHC 31.1 (L) 05/26/2020    RDW 17.5 (H) 01/27/2022    RDW 16.0 (H) 05/26/2020     01/27/2022     05/26/2020       INR/PTT:  Lab Results   Component Value Date    INR 1.17 (H) 01/27/2022    INR 1.31 (H) 05/26/2020    PTT 37 03/26/2019     Diagnostic studies: Exercise FAISAL study on  March 21, 2022 reveals: On the right side the resting FAISAL is abnormal, 0.82.  The exercise study is negative, however there is significant pain provoked by the walking.  On the left side the resting FAISAL study is abnormal, 0.91.  The exercise study on the left is negative, again a significant pain was performed by exercise.    Assessment  Patient with likely chronic right significantly greater than left lower extremity pain, which again according to her history and physical exam is multifactorial in etiology.  As previously discussed with her there is a significant contribution from her peripheral arterial disease to this pain, however she has been not very successful in completing supervised exercise program.  We discussed the options for revascularization versus another attempt for supervised exercise program.  She prefers to pursue the less invasive option, which in my opinion is the better choices around.  We provided her with information on how to make appointments for this program.  Based on the findings of the CTA dated July 27, 2021, her PAD is a combination of inflow and infrainguinal disease.  Plan   - PAD rehab  - RTC in 6 months with updated exercise FAISAL    It was a pleasure to participate in Rachele's care today.    I, Gregg Babin, was present and performed the entirety of this office visit including the history and exam. I have documented the findings as well as the assessment and plan.      Gregg Babin MD    I spent a total of 35 minutes face-to-face with Rachele Martínez during today's office visit. Over 50% of this time was spent counseling the patient and/or coordinating care. See note for details.     An additional 10 minutes spent on the date of the encounter doing chart review, history and exam, documentation and further activities as noted above        CC  Patient Care Team:  Agnieszka Rodriguez MD as PCP - General (Internal Medicine)  [facility]  Agnieszka Rodriguez  MD Erica as MD (Internal Medicine)  Shun Moore MD as MD (Interventional Cardiology)  Roberto Gordon PA-C as Physician Assistant (Physician Assistant)  Gayle Paul, RN as Registered Nurse  Ankit Baires MD as Assigned Gastroenterology Provider  Agnieszka Rodriguez MD as Assigned PCP  Sharad Awan MD as Assigned Musculoskeletal Provider  SELF, F=REFERRED

## 2022-03-21 NOTE — NURSING NOTE
Chief Complaint   Patient presents with     Follow Up     6 month follow up PAD.  US FAISAL Doppler with Exercise Bilateral completed today.  Pt noted sxs have remained the same.        Medications and allergies reconciled.  Vitals collected.    Alyssa Aviles LPN

## 2022-03-29 ENCOUNTER — INFUSION THERAPY VISIT (OUTPATIENT)
Dept: INFUSION THERAPY | Facility: CLINIC | Age: 82
End: 2022-03-29
Payer: MEDICARE

## 2022-03-29 VITALS
OXYGEN SATURATION: 98 % | DIASTOLIC BLOOD PRESSURE: 71 MMHG | TEMPERATURE: 98.5 F | SYSTOLIC BLOOD PRESSURE: 139 MMHG | HEART RATE: 78 BPM | RESPIRATION RATE: 16 BRPM

## 2022-03-29 DIAGNOSIS — K31.811 ANGIODYSPLASIA OF STOMACH AND DUODENUM WITH HEMORRHAGE: Primary | ICD-10-CM

## 2022-03-29 DIAGNOSIS — K55.20 AVM (ARTERIOVENOUS MALFORMATION) OF SMALL BOWEL, ACQUIRED: ICD-10-CM

## 2022-03-29 DIAGNOSIS — D50.0 IRON DEFICIENCY ANEMIA DUE TO CHRONIC BLOOD LOSS: ICD-10-CM

## 2022-03-29 PROCEDURE — 96372 THER/PROPH/DIAG INJ SC/IM: CPT | Performed by: NURSE PRACTITIONER

## 2022-03-29 PROCEDURE — 99207 PR NO CHARGE LOS: CPT

## 2022-03-29 RX ADMIN — OCTREOTIDE ACETATE 20 MG: KIT INTRAMUSCULAR at 11:39

## 2022-03-29 ASSESSMENT — PAIN SCALES - GENERAL: PAINLEVEL: NO PAIN (0)

## 2022-03-29 NOTE — PROGRESS NOTES
Infusion Nursing Note:  Rachele Martínez presents today for Sandostatin.    Patient seen by provider today: No   present during visit today: Not Applicable.    Note: N/A.    Intravenous Access:  No Intravenous access/labs at this visit.    Treatment Conditions:  Not Applicable.    Post Infusion Assessment:  Patient tolerated injection without incident.     Discharge Plan:   Patient will return 4/26/2022 for next appointment.   Patient discharged in stable condition accompanied by: daughter.  Departure Mode: Ambulatory.    Dannielle Triplett RN-BSN, PHN, OCN  [facility]

## 2022-03-30 ENCOUNTER — ANCILLARY PROCEDURE (OUTPATIENT)
Dept: CARDIOLOGY | Facility: CLINIC | Age: 82
End: 2022-03-30
Attending: INTERNAL MEDICINE
Payer: MEDICARE

## 2022-03-30 DIAGNOSIS — R01.1 HEART MURMUR: ICD-10-CM

## 2022-03-30 LAB — LVEF ECHO: NORMAL

## 2022-03-30 PROCEDURE — 93306 TTE W/DOPPLER COMPLETE: CPT | Performed by: INTERNAL MEDICINE

## 2022-04-26 ENCOUNTER — INFUSION THERAPY VISIT (OUTPATIENT)
Dept: INFUSION THERAPY | Facility: CLINIC | Age: 82
End: 2022-04-26
Payer: MEDICARE

## 2022-04-26 VITALS
TEMPERATURE: 98.2 F | WEIGHT: 128 LBS | OXYGEN SATURATION: 100 % | HEART RATE: 82 BPM | BODY MASS INDEX: 21.97 KG/M2 | DIASTOLIC BLOOD PRESSURE: 67 MMHG | SYSTOLIC BLOOD PRESSURE: 149 MMHG | RESPIRATION RATE: 16 BRPM

## 2022-04-26 DIAGNOSIS — K55.20 AVM (ARTERIOVENOUS MALFORMATION) OF SMALL BOWEL, ACQUIRED: ICD-10-CM

## 2022-04-26 DIAGNOSIS — D50.0 IRON DEFICIENCY ANEMIA DUE TO CHRONIC BLOOD LOSS: ICD-10-CM

## 2022-04-26 DIAGNOSIS — K31.811 ANGIODYSPLASIA OF STOMACH AND DUODENUM WITH HEMORRHAGE: Primary | ICD-10-CM

## 2022-04-26 PROCEDURE — 96372 THER/PROPH/DIAG INJ SC/IM: CPT | Performed by: NURSE PRACTITIONER

## 2022-04-26 PROCEDURE — 99207 PR NO CHARGE LOS: CPT

## 2022-04-26 RX ADMIN — OCTREOTIDE ACETATE 20 MG: KIT INTRAMUSCULAR at 12:28

## 2022-04-26 ASSESSMENT — PAIN SCALES - GENERAL: PAINLEVEL: NO PAIN (0)

## 2022-04-26 NOTE — PROGRESS NOTES
Infusion Nursing Note:  Rachele ANDREW Mauricio presents today for Sandostatin.    Patient seen by provider today: No   present during visit today: Not Applicable.    Note: Assessment performed by Carloz COSTELLO RN prior to injection today. Patient denies symptoms/concerns following previous injection.    Intravenous Access:  No Intravenous access/labs at this visit.    Treatment Conditions:  Not Applicable.      Post Infusion Assessment:  Patient tolerated injection without incident.  Site patent and intact, free from redness, edema or discomfort.       Discharge Plan:   Patient discharged in stable condition accompanied by: self.  Departure Mode: Ambulatory.      Vilma Ariza LPN

## 2022-04-27 NOTE — LETTER
3/21/2022       RE: Rachele Martínez  [address]  [facility] [address]     Dear Colleague,    Thank you for referring your patient, Rachele Martínez, to the [facility] [place] at [facility]. Please see a copy of my visit note below.        INTERVENTIONAL RADIOLOGY CONSULTATION    Name: Rachele Martínez  Age: 81 year old   REASON FOR VISIT : Right lower extremity pain    HPI: Ms. Martínez is a very pleasant 81-year-old female here for follow-up of right lower extremity pain.  She is accompanied by her sister during the visit today.  Her past medical history is most significant for end-stage kidney disease, chronic hepatitis C, hyperlipidemia, hypertension and peripheral arterial disease.  She has not had any coronary artery disease or cerebrovascular accidents.  She has had prior tobacco use, which she no longer uses.  My first visit with her was on September 13, 2021, during which she had similar complaints.  My recommendation was for her to initiate a supervised exercise program, which she has only been able to attend a few sessions without much consistency.  She describes difficulty walking as a barrier, and that she was not sure.  This program was necessarily right for her.  Today she describes right much greater than the left lower extremity pain with different patterns.  She describes a pattern of right calf pain that is provoked by walking only 1 flight of stairs, or much less than a block.  This pain is fairly consistent and being provoked by exertion and is alleviated by rest.  His pain is lifestyle limiting for her and prevents her from doing any number of activities that she would otherwise do.  This pain has been present for 2-3 years at least.  She also describes knee and ankle pain, which could be leading to her knees giving up on her.  She describes some numbness in her bilateral feet, and what she believes to  be a restless leg syndrome.  This pain can wake her up at night and is distinctly different than the calf pain that is brought by exertion.  She has some lower back pain, that foot sometimes radiate to bilateral thighs.  Standing up does not worsen her pain necessarily.  The pain does not worsen towards the evening.  She is accompanied by her sister during the visit today, which is very supportive.  She lives alone, and is able to perform activities of daily living independently.  She has used cigarettes about 1 pack/day between the age of 45 and 56.    PAST MEDICAL HISTORY:   Past Medical History:   Diagnosis Date     Anemia      Anxiety and depression      Arthritis      AVM (arteriovenous malformation)      Chronic hepatitis C with cirrhosis (H)      Clotting disorder (H)      ESRD (end stage renal disease) (H)     on dialysis     GI bleed     recurrent     Glaucoma      Hyperlipidemia      Hypertension goal BP (blood pressure) < 140/80        PAST SURGICAL HISTORY:   Past Surgical History:   Procedure Laterality Date     CAPSULE/PILL CAM ENDOSCOPY N/A 3/27/2019    Procedure: Capsule/pill cam endoscopy;  Surgeon: Yosvany Ram MD;  Location: [facility]     COLONOSCOPY N/A 9/4/2015    Procedure: COMBINED COLONOSCOPY, SINGLE OR MULTIPLE BIOPSY/POLYPECTOMY BY BIOPSY;  Surgeon: Rupesh Lopez MD;  Location: [facility]     COLONOSCOPY N/A 9/19/2018    Procedure: COLONOSCOPY;  enteroscopy small bowel  COLONOSCOPY;  Surgeon: Ankit Baires MD;  Location: [facility]     ESOPHAGOSCOPY, GASTROSCOPY, DUODENOSCOPY (EGD), COMBINED N/A 12/18/2014    Procedure: COMBINED ESOPHAGOSCOPY, GASTROSCOPY, DUODENOSCOPY (EGD);  Surgeon: Betsy Carvajal MD;  Location: [facility]     ESOPHAGOSCOPY, GASTROSCOPY, DUODENOSCOPY (EGD), COMBINED N/A 4/25/2015    Procedure: COMBINED ESOPHAGOSCOPY, GASTROSCOPY, DUODENOSCOPY (EGD);  Surgeon: Yosvany Ram MD;  Location: [facility]     ESOPHAGOSCOPY, GASTROSCOPY, DUODENOSCOPY (EGD), COMBINED  N/A 2015    Procedure: COMBINED ESOPHAGOSCOPY, GASTROSCOPY, DUODENOSCOPY (EGD);  Surgeon: Mariano Mistry MD;  Location:  GI      CAPSULE ENDOSCOPY N/A 2015    Procedure: CAPSULE/PILL CAM ENDOSCOPY;  Surgeon: Pan Dhaliwal MD;  Location:  GI      VASCULAR SURGERY PROCEDURE UNLIST       HYSTERECTOMY  1980    KYREE     LUMPECTOMY BREAST         FAMILY HISTORY:   Family History   Problem Relation Age of Onset     Diabetes Mother      Alzheimer Disease Mother      Substance Abuse Son      Cancer Sister      Soft Tissue Cancer Sister      Breast Cancer Sister      Hyperlipidemia Daughter      Alcoholism Brother      Spine Problems Sister        SOCIAL HISTORY:   Social History     Tobacco Use     Smoking status: Former Smoker     Packs/day: 2.00     Years: 45.00     Pack years: 90.00     Types: Cigarettes     Start date: 1953     Quit date: 2002     Years since quittin.3     Smokeless tobacco: Never Used   Substance Use Topics     Alcohol use: No       PROBLEM LIST:   Patient Active Problem List    Diagnosis Date Noted     Peripheral vascular disease (H) 10/30/2020     Priority: Medium     Spinal stenosis of lumbar region with neurogenic claudication 2019     Priority: Medium     Angiodysplasia of stomach and duodenum with hemorrhage 2016     Priority: Medium     Tendency to fall 2016     Priority: Medium     Secondary hyperparathyroidism of renal origin (H) 2016     Priority: Medium     Coagulation defect, unspecified (H) 2016     Priority: Medium     Other disorders of bone development and growth, other site 2016     Priority: Medium     End stage renal disease (H) 2016     Priority: Medium     Nephrologist is Dr. Tarango  [facility] [phone], fax [phone]         Unspecified cirrhosis of liver (H) 2016     Priority: Medium     Congenital arteriovenous malformation 2016     Priority: Medium     AVM  (arteriovenous malformation) of small bowel, acquired 03/14/2016     Priority: Medium     Anemia of chronic disease 03/02/2016     Priority: Medium     Iron deficiency anemia due to chronic blood loss 01/31/2016     Priority: Medium     Due to AVMs       History of hepatitis C 07/28/2015     Priority: Medium     SVR, 7/2015       Hyperlipidemia with target LDL less than 130 02/27/2015     Priority: Medium     Diagnosis updated by automated process. Provider to review and confirm.       Cataract 11/23/2012     Priority: Medium     Right   Problem list name updated by automated process. Provider to review       Depression with anxiety 11/23/2012     Priority: Medium     Problem list name updated by automated process. Provider to review       Hypertension goal BP (blood pressure) < 140/80 08/02/2003     Priority: Medium       MEDICATIONS:   Prescription Medications as of 3/21/2022       Rx Number Disp Refills Start End Last Dispensed Date Next Fill Date Owning Pharmacy    acetaminophen (TYLENOL) 500 MG tablet    11/25/2020        Sig: Take 500-1,000 mg by mouth    Class: Historical    Route: Oral    atorvastatin (LIPITOR) 20 MG tablet  90 tablet 0 1/12/2022    [id number] [place], [place] - [address]    Sig: Take 1 tablet (20 mg) by mouth daily (Please have updated labs on 1/21/2022 for further refills)    Class: E-Prescribe    Route: Oral    Calcium Acetate, Phos Binder, 667 MG CAPS   8 10/12/2016        Sig: TAKE 2 CAPSULE BY MOUTH THREE TIMES A DAY WITH MEALS    Class: Historical    carvedilol (COREG) 3.125 MG tablet    8/22/2021        Class: Historical    cilostazol (PLETAL) 100 MG tablet  180 tablet 1 11/9/2021    [id number] [place] [place] - [address]    Sig: TAKE 1 TABLET BY MOUTH TWICE A DAY    Class: E-Prescribe    Epoetin Duane-epbx (RETACRIT IJ)    8/20/2021 9/16/2022       Class: Historical    Route: Intravenous Push    gabapentin (NEURONTIN) 300 MG capsule  90 capsule 3 11/8/2021     [id number], [place] - [address]    Sig: TAKE 1 CAPSULE BY MOUTH AT BEDTIME    Class: E-Prescribe    LOPERAMIDE HCL PO    5/26/2021 5/25/2022       Sig: Take 4 mg by mouth    Class: Historical    Route: Oral    losartan (COZAAR) 25 MG tablet        [id number] [place] [facility], [place] - [address]    Sig: Take 100 mg by mouth daily Patient reports taking 100 mg daily.    Class: Historical    Route: Oral    Multiple Vitamins-Minerals (PRORENAL + D) TABS    7/23/2021        Sig: Take 1 tablet by mouth daily    Class: Historical    Route: Oral    naltrexone (DEPADE/REVIA) 50 MG tablet  30 tablet 5 3/8/2022    [id number] [place] [facility], [place] - [address]    Sig: Take 1 tablet (50 mg) by mouth daily Start with 12.5 mg (1/4 tablet), increase 12.5 mg every 3 days to a max dose of 50 mg (1 tablet) for itching.    Class: E-Prescribe    Route: Oral    octreotide (SANDOSTATIN LAR) 20 MG injection            Sig: Inject 20 mg into the muscle every 28 days    Class: Historical    Route: Intramuscular    pantoprazole (PROTONIX) 20 MG EC tablet  180 tablet 3 8/29/2021    [id number] [place] [facility], [place] - [address]    Sig: Take 1 tablet (20 mg) by mouth 2 times daily *30-60 minutes before a meal    Class: E-Prescribe    Route: Oral    polyethylene glycol (MIRALAX) 17 GM/Dose powder    8/28/2020    [id number] [place] [facility], [place] - [address]    Sig: As needed    Class: Historical    sertraline (ZOLOFT) 50 MG tablet  180 tablet 1 1/12/2022    [id number] [place] [facility] [place] - [address]    Sig: Take 2 tablets (100 mg) by mouth daily    Class: E-Prescribe    Route: Oral    order for DME  1 Units 0 4/30/2019    [id number] [place] [facility], [place] - [address]    Sig: Cane    Class: Local Print    order for DME  1 Device 0 7/20/2016        Sig: Equipment being ordered: 4 wheel walker with seat.    Class: Local Print          ALLERGIES:   Abacavir, Lisinopril, Diovan hct,  Dust mites, Hydrochlorothiazide, No clinical screening - see comments, Oxycodone-acetaminophen, Spironolactone, Sulfa drugs, and Valsartan    ROS:  An 11 point review of system was performed and pertinent negative and positives are mentioned in HPI.        Physical Examination:   VITALS:   BP (!) 178/68 (BP Location: Left arm, Patient Position: Sitting, Cuff Size: Adult Regular)   Pulse 73   SpO2 99%   General:  Pt sitting in chair comfortably.  No acute distress  HEENT: normocephalic.  Neck supple  Neck: no JVD  Resp: nonlabored.   CV: RRR.    Lower extremities/vascular: Right: Dopplerable DP and PT.  2+ right popliteal artery pulse.  Normal capillary refill.  No skin breakdown.  Intact sensation.  No varicose veins.  Left: Dopplerable PT and DP pulses.  1+ palpable popliteal artery pulse.  Normal capillary refill.  No skin ulcerations.  Intact sensation.  Lymph: no pedal edema  Neuro: Oriented x3.  No evidence of focal motor deficits  Mood: appropriate affect      Labs:    BMP RESULTS:  Lab Results   Component Value Date     01/27/2022     05/26/2020    POTASSIUM 4.5 01/27/2022    POTASSIUM 4.1 05/26/2020    CHLORIDE 101 01/27/2022    CHLORIDE 98 05/26/2020    CO2 28 01/27/2022    CO2 28 05/26/2020    ANIONGAP 8 01/27/2022    ANIONGAP 12 05/26/2020     (H) 01/27/2022     (H) 05/26/2020    BUN 18 01/27/2022    BUN 23 05/26/2020    CR 6.04 (H) 01/27/2022    CR 7.50 (H) 05/26/2020    GFRESTIMATED 7 (L) 01/27/2022    GFRESTIMATED 5 (L) 07/27/2021    GFRESTIMATED 5 (L) 05/26/2020    GFRESTBLACK 5 (L) 05/26/2020    BELGICA 9.2 01/27/2022    BELGICA 8.8 05/26/2020        CBC RESULTS:  Lab Results   Component Value Date    WBC 6.2 01/27/2022    WBC 5.5 05/26/2020    RBC 3.76 (L) 01/27/2022    RBC 3.92 05/26/2020    HGB 11.5 (L) 01/27/2022    HGB 12.8 05/26/2020    HCT 38.1 01/27/2022    HCT 41.1 05/26/2020     (H) 01/27/2022     (H) 05/26/2020    MCH 30.6 01/27/2022    MCH 32.7 05/26/2020     MCHC 30.2 (L) 01/27/2022    MCHC 31.1 (L) 05/26/2020    RDW 17.5 (H) 01/27/2022    RDW 16.0 (H) 05/26/2020     01/27/2022     05/26/2020       INR/PTT:  Lab Results   Component Value Date    INR 1.17 (H) 01/27/2022    INR 1.31 (H) 05/26/2020    PTT 37 03/26/2019     Diagnostic studies: Exercise FAISAL study on March 21, 2022 reveals: On the right side the resting FAISAL is abnormal, 0.82.  The exercise study is negative, however there is significant pain provoked by the walking.  On the left side the resting FAISAL study is abnormal, 0.91.  The exercise study on the left is negative, again a significant pain was performed by exercise.    Assessment  Patient with likely chronic right significantly greater than left lower extremity pain, which again according to her history and physical exam is multifactorial in etiology.  As previously discussed with her there is a significant contribution from her peripheral arterial disease to this pain, however she has been not very successful in completing supervised exercise program.  We discussed the options for revascularization versus another attempt for supervised exercise program.  She prefers to pursue the less invasive option, which in my opinion is the better choices around.  We provided her with information on how to make appointments for this program.  Based on the findings of the CTA dated July 27, 2021, her PAD is a combination of inflow and infrainguinal disease.  Plan   - PAD rehab  - RTC in 6 months with updated exercise FAISAL    It was a pleasure to participate in Rachele's care today.    I, Gregg Babin, was present and performed the entirety of this office visit including the history and exam. I have documented the findings as well as the assessment and plan.      Gregg Babin MD    I spent a total of 35 minutes face-to-face with Rachele Martínez during today's office visit. Over 50% of this time was spent counseling the patient and/or coordinating care.  See note for details.     An additional 10 minutes spent on the date of the encounter doing chart review, history and exam, documentation and further activities as noted above        CC  Patient Care Team:  Agnieszka Rodriguez MD as PCP - General (Internal Medicine)  [facility]  Agnieszka Rodriguez MD as MD (Internal Medicine)  Shun Moore MD as MD (Interventional Cardiology)  Roberto Gordon PA-C as Physician Assistant (Physician Assistant)  Gayle Paul RN as Registered Nurse  Ankit Baires MD as Assigned Gastroenterology Provider  Agnieszka Rodriguez MD as Assigned PCP  Sharad Awan MD as Assigned Musculoskeletal Provider  SELF, F=REFERRED        Sincerely,  Gregg Babin MD   Home

## 2022-05-24 ENCOUNTER — INFUSION THERAPY VISIT (OUTPATIENT)
Dept: INFUSION THERAPY | Facility: CLINIC | Age: 82
End: 2022-05-24
Payer: MEDICARE

## 2022-05-24 VITALS
DIASTOLIC BLOOD PRESSURE: 82 MMHG | HEART RATE: 71 BPM | WEIGHT: 129.6 LBS | SYSTOLIC BLOOD PRESSURE: 160 MMHG | TEMPERATURE: 98.2 F | BODY MASS INDEX: 22.25 KG/M2 | OXYGEN SATURATION: 96 %

## 2022-05-24 DIAGNOSIS — K55.20 AVM (ARTERIOVENOUS MALFORMATION) OF SMALL BOWEL, ACQUIRED: ICD-10-CM

## 2022-05-24 DIAGNOSIS — K31.811 ANGIODYSPLASIA OF STOMACH AND DUODENUM WITH HEMORRHAGE: Primary | ICD-10-CM

## 2022-05-24 DIAGNOSIS — D50.0 IRON DEFICIENCY ANEMIA DUE TO CHRONIC BLOOD LOSS: ICD-10-CM

## 2022-05-24 PROCEDURE — 96372 THER/PROPH/DIAG INJ SC/IM: CPT | Performed by: NURSE PRACTITIONER

## 2022-05-24 PROCEDURE — 99207 PR NO CHARGE LOS: CPT

## 2022-05-24 RX ADMIN — OCTREOTIDE ACETATE 20 MG: KIT INTRAMUSCULAR at 11:29

## 2022-05-24 ASSESSMENT — PAIN SCALES - GENERAL: PAINLEVEL: NO PAIN (0)

## 2022-05-24 NOTE — PROGRESS NOTES
Infusion Nursing Note:  Rachele Martínez presents today for Sandostatin.    Patient seen by provider today: No   present during visit today: Not Applicable.    Note: Pt states she noticed a small amount of blood in her stool this morning, states this has happened before when she is getting close to the time of her sandostatin injection.  Will monitor and will call provider if an increase in blood.  See flow sheet for assessment.      Intravenous Access:  No Intravenous access/labs at this visit.    Treatment Conditions:  Not Applicable.      Post Infusion Assessment:  Patient tolerated injection without incident.  Site patent and intact, free from redness, edema or discomfort.       Discharge Plan:   Patient discharged in stable condition accompanied by: daughter.  Departure Mode: Ambulatory.  Pt will RTC 6/21/22 for Sandostatin.      Josias Hicks RN

## 2022-06-21 ENCOUNTER — INFUSION THERAPY VISIT (OUTPATIENT)
Dept: INFUSION THERAPY | Facility: CLINIC | Age: 82
End: 2022-06-21
Payer: MEDICARE

## 2022-06-21 VITALS
RESPIRATION RATE: 16 BRPM | DIASTOLIC BLOOD PRESSURE: 70 MMHG | SYSTOLIC BLOOD PRESSURE: 145 MMHG | TEMPERATURE: 97.9 F | OXYGEN SATURATION: 92 % | HEART RATE: 74 BPM

## 2022-06-21 DIAGNOSIS — K31.811 ANGIODYSPLASIA OF STOMACH AND DUODENUM WITH HEMORRHAGE: Primary | ICD-10-CM

## 2022-06-21 DIAGNOSIS — K55.20 AVM (ARTERIOVENOUS MALFORMATION) OF SMALL BOWEL, ACQUIRED: ICD-10-CM

## 2022-06-21 DIAGNOSIS — D50.0 IRON DEFICIENCY ANEMIA DUE TO CHRONIC BLOOD LOSS: ICD-10-CM

## 2022-06-21 PROCEDURE — 99207 PR NO CHARGE LOS: CPT

## 2022-06-21 PROCEDURE — 96372 THER/PROPH/DIAG INJ SC/IM: CPT | Performed by: NURSE PRACTITIONER

## 2022-06-21 RX ADMIN — OCTREOTIDE ACETATE 20 MG: KIT INTRAMUSCULAR at 11:57

## 2022-06-21 ASSESSMENT — PAIN SCALES - GENERAL: PAINLEVEL: EXTREME PAIN (8)

## 2022-06-21 NOTE — PROGRESS NOTES
Infusion Nursing Note:  Rachele ANDREW Mauricio presents today for Sandostatin.    Patient seen by provider today: No   present during visit today: Not Applicable.    Note: Assessment performed by Rose REESE RN prior to injection today. Patient denies symptoms/concerns following previous injection.    Intravenous Access:  No Intravenous access/labs at this visit.    Treatment Conditions:  Not Applicable.    Post Infusion Assessment:  Patient tolerated injection without incident.  Site patent and intact, free from redness, edema or discomfort.     Discharge Plan:   Patient discharged in stable condition accompanied by: self.  Departure Mode: Ambulatory.      Vilma Ariza LPN

## 2022-07-19 ENCOUNTER — INFUSION THERAPY VISIT (OUTPATIENT)
Dept: INFUSION THERAPY | Facility: CLINIC | Age: 82
End: 2022-07-19
Payer: MEDICARE

## 2022-07-19 VITALS — DIASTOLIC BLOOD PRESSURE: 66 MMHG | SYSTOLIC BLOOD PRESSURE: 156 MMHG | OXYGEN SATURATION: 98 % | HEART RATE: 79 BPM

## 2022-07-19 DIAGNOSIS — D50.0 IRON DEFICIENCY ANEMIA DUE TO CHRONIC BLOOD LOSS: ICD-10-CM

## 2022-07-19 DIAGNOSIS — K31.811 ANGIODYSPLASIA OF STOMACH AND DUODENUM WITH HEMORRHAGE: Primary | ICD-10-CM

## 2022-07-19 DIAGNOSIS — K55.20 AVM (ARTERIOVENOUS MALFORMATION) OF SMALL BOWEL, ACQUIRED: ICD-10-CM

## 2022-07-19 PROCEDURE — 96372 THER/PROPH/DIAG INJ SC/IM: CPT | Performed by: NURSE PRACTITIONER

## 2022-07-19 PROCEDURE — 99207 PR NO CHARGE LOS: CPT

## 2022-07-19 RX ADMIN — OCTREOTIDE ACETATE 20 MG: KIT INTRAMUSCULAR at 11:54

## 2022-07-19 NOTE — PROGRESS NOTES
Infusion Nursing Note:  Rachele Martínez presents today for   Chief Complaint   Patient presents with     Allied Health Visit     Sandostatin     .    Patient seen by provider today: No   present during visit today: Not Applicable.    Note: Patient was assessed by Agnieszka BACH RN prior to injection.    Intravenous Access:  No Intravenous access/labs at this visit.    Treatment Conditions:  Not Applicable.    Post Infusion Assessment:  Patient tolerated injection without incident.  Site patent and intact, free from redness, edema or discomfort.     Discharge Plan:   Patient discharged in stable condition accompanied by: self.  Departure Mode: Ambulatory.      Florence To CMA

## 2022-07-30 DIAGNOSIS — R41.89 COGNITIVE IMPAIRMENT: ICD-10-CM

## 2022-08-01 RX ORDER — ATORVASTATIN CALCIUM 20 MG/1
TABLET, FILM COATED ORAL
Qty: 90 TABLET | Refills: 1 | Status: SHIPPED | OUTPATIENT
Start: 2022-08-01 | End: 2023-01-09

## 2022-08-16 ENCOUNTER — INFUSION THERAPY VISIT (OUTPATIENT)
Dept: INFUSION THERAPY | Facility: CLINIC | Age: 82
End: 2022-08-16
Payer: MEDICARE

## 2022-08-16 VITALS
HEART RATE: 80 BPM | TEMPERATURE: 98.3 F | DIASTOLIC BLOOD PRESSURE: 75 MMHG | WEIGHT: 129.3 LBS | RESPIRATION RATE: 18 BRPM | SYSTOLIC BLOOD PRESSURE: 123 MMHG | BODY MASS INDEX: 22.19 KG/M2 | OXYGEN SATURATION: 98 %

## 2022-08-16 DIAGNOSIS — D50.0 IRON DEFICIENCY ANEMIA DUE TO CHRONIC BLOOD LOSS: ICD-10-CM

## 2022-08-16 DIAGNOSIS — K31.811 ANGIODYSPLASIA OF STOMACH AND DUODENUM WITH HEMORRHAGE: Primary | ICD-10-CM

## 2022-08-16 DIAGNOSIS — K55.20 AVM (ARTERIOVENOUS MALFORMATION) OF SMALL BOWEL, ACQUIRED: ICD-10-CM

## 2022-08-16 PROCEDURE — 96372 THER/PROPH/DIAG INJ SC/IM: CPT | Performed by: INTERNAL MEDICINE

## 2022-08-16 PROCEDURE — 99207 PR NO CHARGE LOS: CPT

## 2022-08-16 RX ADMIN — OCTREOTIDE ACETATE 20 MG: KIT INTRAMUSCULAR at 14:23

## 2022-08-16 NOTE — PROGRESS NOTES
"Infusion Nursing Note:  Rachele Martínez presents today for Sandostatin.    Patient seen by provider today: No   present during visit today: Not Applicable.    Note: Patient expressed no new medical concerns. Patient expressed that she may have \"tweaked\" her back, educated that she should have it evaluated if it continues.    Intravenous Access:  No Intravenous access/labs at this visit.    Treatment Conditions:  Not Applicable.    Post Infusion Assessment:  Patient tolerated injection without incident.  Site patent and intact, free from redness, edema or discomfort.     Discharge Plan:   AVS to patient via MYCHART.  Patient will return 9/13/22 for next appointment.   Patient discharged in stable condition accompanied by: self.  Departure Mode: Ambulatory.      Margi Fofana RN                    "

## 2022-08-30 ENCOUNTER — DOCUMENTATION ONLY (OUTPATIENT)
Dept: INTERNAL MEDICINE | Facility: CLINIC | Age: 82
End: 2022-08-30

## 2022-08-30 NOTE — PROGRESS NOTES
Type of Form Received: [facility]    Form Received (Date) 8/29/22   Form Filled out pend   Placed in provider folder Yes

## 2022-09-02 ENCOUNTER — DOCUMENTATION ONLY (OUTPATIENT)
Dept: INTERNAL MEDICINE | Facility: CLINIC | Age: 82
End: 2022-09-02

## 2022-09-02 NOTE — PROGRESS NOTES
Type of Form Received: [facility]    Form Received (Date) 8/29/22   Form Filled out Yes, date 9/2/22   Placed in provider folder

## 2022-09-12 ENCOUNTER — DOCUMENTATION ONLY (OUTPATIENT)
Dept: INTERNAL MEDICINE | Facility: CLINIC | Age: 82
End: 2022-09-12

## 2022-09-12 NOTE — PROGRESS NOTES
Type of Form Received: [facility]    Form Received (Date) 9/12/22   Form Filled out No   Placed in provider folder Yes

## 2022-09-13 ENCOUNTER — INFUSION THERAPY VISIT (OUTPATIENT)
Dept: INFUSION THERAPY | Facility: CLINIC | Age: 82
End: 2022-09-13
Payer: MEDICARE

## 2022-09-13 VITALS
RESPIRATION RATE: 16 BRPM | DIASTOLIC BLOOD PRESSURE: 70 MMHG | SYSTOLIC BLOOD PRESSURE: 147 MMHG | HEART RATE: 76 BPM | OXYGEN SATURATION: 95 % | TEMPERATURE: 99 F

## 2022-09-13 DIAGNOSIS — K31.811 ANGIODYSPLASIA OF STOMACH AND DUODENUM WITH HEMORRHAGE: Primary | ICD-10-CM

## 2022-09-13 DIAGNOSIS — K55.20 AVM (ARTERIOVENOUS MALFORMATION) OF SMALL BOWEL, ACQUIRED: ICD-10-CM

## 2022-09-13 DIAGNOSIS — D50.0 IRON DEFICIENCY ANEMIA DUE TO CHRONIC BLOOD LOSS: ICD-10-CM

## 2022-09-13 PROCEDURE — 96372 THER/PROPH/DIAG INJ SC/IM: CPT | Performed by: NURSE PRACTITIONER

## 2022-09-13 PROCEDURE — 99207 PR NO CHARGE LOS: CPT

## 2022-09-13 RX ADMIN — OCTREOTIDE ACETATE 20 MG: KIT INTRAMUSCULAR at 12:09

## 2022-09-18 NOTE — PROGRESS NOTES
INTERVENTIONAL RADIOLOGY ESTABLISHED PATIENT FOLLOW UP      HPI:   Rachele Martínez is an 82 year old who is here today for follow up regarding her lower extremity claudication symptoms. When we last saw her we came to a consensus decision to pursue PAD rehab (e.g., including supervised exercised program) as opposed to pursuing an intervention.    Today Ms. Martínez is accompanied by her daughter Charla.    Since we last saw her Ms. Martínez reports that she tried to participate in the supervised exercise program but quit after a few appointments because it was painful - specifically, she had pain in her legs and back during the exercise.    Continues to endorse symptoms of arterial claudication including pain in her calves and thighs that is provoked by exercise and relieved with rest. She gets pain in her calves after walking about half a block. For example, when she takes the garbage out she apparently has about a 1.5 block walk and she will typically need to stop to rest 2-3 times while doing so. Symptom onset occurs in the right leg first.    Initially when asked if she could try again with a 3 month PAD Rehab trial she did not think she would be successful in completing it.    ROS:  Negative unless otherwise stated in HPI.      Physical Examination:   VITALS:   There were no vitals taken for this visit.   GENERAL: Healthy, alert and no distress  SKIN: No open wounds.  PSYCH: Mentation appears normal, affect normal/bright, judgement and insight intact, normal speech and appearance well-groomed.  EYES: Eyes grossly normal to inspection. No discharge or erythema, or obvious scleral/conjunctival abnormalities.  RESP: No audible wheeze, cough, or visible cyanosis. No visible retractions or increased work of breathing.   VASCULAR: Dopplerable but non palpable DP and PT pulses bilaterally.     Labs:    BMP RESULTS:  Lab Results   Component Value Date     01/27/2022     05/26/2020    POTASSIUM 4.5  01/27/2022    POTASSIUM 4.1 05/26/2020    CHLORIDE 101 01/27/2022    CHLORIDE 98 05/26/2020    CO2 28 01/27/2022    CO2 28 05/26/2020    ANIONGAP 8 01/27/2022    ANIONGAP 12 05/26/2020     (H) 01/27/2022     (H) 05/26/2020    BUN 18 01/27/2022    BUN 23 05/26/2020    CR 6.04 (H) 01/27/2022    CR 7.50 (H) 05/26/2020    GFRESTIMATED 7 (L) 01/27/2022    GFRESTIMATED 5 (L) 07/27/2021    GFRESTIMATED 5 (L) 05/26/2020    GFRESTBLACK 5 (L) 05/26/2020    BELGICA 9.2 01/27/2022    BELGICA 8.8 05/26/2020        CBC RESULTS:  Lab Results   Component Value Date    WBC 6.2 01/27/2022    WBC 5.5 05/26/2020    RBC 3.76 (L) 01/27/2022    RBC 3.92 05/26/2020    HGB 11.5 (L) 01/27/2022    HGB 12.8 05/26/2020    HCT 38.1 01/27/2022    HCT 41.1 05/26/2020     (H) 01/27/2022     (H) 05/26/2020    MCH 30.6 01/27/2022    MCH 32.7 05/26/2020    MCHC 30.2 (L) 01/27/2022    MCHC 31.1 (L) 05/26/2020    RDW 17.5 (H) 01/27/2022    RDW 16.0 (H) 05/26/2020     01/27/2022     05/26/2020       INR/PTT:  Lab Results   Component Value Date    INR 1.17 (H) 01/27/2022    INR 1.31 (H) 05/26/2020    PTT 37 03/26/2019       Diagnostic studies:   US FAISAL Doppler with Exercise Bilateral 3/21/22  IMPRESSION:  1. RIGHT:       A. Noncompressible resting FAISAL.       B. TBI is ABNORMAL, 0.50, previously 0.53.       C. Mildly abnormal pulse volume recordings suggestive of iliac  disease.     2. LEFT:       A. Noncompressible resting FAISAL.       B. TBI is ABNORMAL, 0.58, previously 0.66.       C. Mildly abnormal pulse volume recordings suggestive of iliac  disease.        I have personally reviewed the examination and initial interpretation  and I agree with the findings.     HEENA SIMON MD     Assessment   Rachele Martínez is an 82 year old who is here today for follow up regarding her lower extremity pain. Specifically, Ms. Martínez has ongoing symptoms of arterial claudication that are lifestyle limiting and has prior imaging  supportive of these clinical findings (reference CTA 7/27/21 - 50-70% bilateral common iliac stenoses as well as moderate-severe multifocal infrainguinal disease bilaterally). Since we last saw her she was unsuccessful in completing a PAD rehab program.    Today, we spent a large portion of the visit discussing the value of a noninvasive treatment option such as a structured exercise program. We explained in depth the underlying cause of her symptoms and emphasized how exercise can help promote and restore blood flow to her legs by promoting blood vessel growth. We also explained the interventions commonly used to treat her condition including both endovascular interventions and open surgical treatments that are performed by vascular surgeons. We discussed the risks and benefits of all of these options.    After a thorough discussion, Ms. Martínez and I came to a consensus decision to re-attempt completing a supervised exercise program. If, after completion, patient's symptoms continue to be lifestyle limiting we could consider pursuing an intervention. Patient agreeable to plan. All questions answered.    Plan   -3 month supervised exercise program (PAD rehab)  -Follow up in clinic in 3 months with new FAISAL with exercise.      I, Gregg Babin, was present with the resident during the history and exam. I discussed the case with the resident and agree with the findings as documented in the assessment and plan.    Gregg Babin MD     I spent a total of 45 minutes face-to-face with Rachele Martínez during today's office visit. Over 50% of this time was spent counseling the patient and/or coordinating care. See note for details.     An additional 20 minutes spent on the date of the encounter doing chart review, history and exam, documentation and further activities as noted above        CC  Patient Care Team:  Agnieszka Rodriguez MD as PCP - General (Internal Medicine)  [facility]  Jennifer  Agnieszka Maldonado MD as MD (Internal Medicine)  Holly Cornelius, Shun Osorio MD as MD (Interventional Cardiology)  Roberto Gordon PA-C as Physician Assistant (Physician Assistant)  Gayle Paul, RN as Registered Nurse  Ankit Baires MD as Assigned Gastroenterology Provider  Jennifer, Agnieszka Maldonado MD as Assigned PCP  Sharad Awan MD as Assigned Musculoskeletal Provider  [facility]

## 2022-09-19 ENCOUNTER — ANCILLARY PROCEDURE (OUTPATIENT)
Dept: ULTRASOUND IMAGING | Facility: CLINIC | Age: 82
End: 2022-09-19
Attending: RADIOLOGY
Payer: MEDICARE

## 2022-09-19 ENCOUNTER — OFFICE VISIT (OUTPATIENT)
Dept: VASCULAR SURGERY | Facility: CLINIC | Age: 82
End: 2022-09-19
Payer: MEDICARE

## 2022-09-19 VITALS — SYSTOLIC BLOOD PRESSURE: 159 MMHG | HEART RATE: 70 BPM | OXYGEN SATURATION: 99 % | DIASTOLIC BLOOD PRESSURE: 63 MMHG

## 2022-09-19 DIAGNOSIS — I70.213 ATHEROSCLEROSIS OF NATIVE ARTERY OF BOTH LOWER EXTREMITIES WITH INTERMITTENT CLAUDICATION (H): ICD-10-CM

## 2022-09-19 DIAGNOSIS — I70.213 ATHEROSCLEROSIS OF NATIVE ARTERY OF BOTH LOWER EXTREMITIES WITH INTERMITTENT CLAUDICATION (H): Primary | ICD-10-CM

## 2022-09-19 PROCEDURE — 99215 OFFICE O/P EST HI 40 MIN: CPT | Mod: GC | Performed by: RADIOLOGY

## 2022-09-19 PROCEDURE — 93922 UPR/L XTREMITY ART 2 LEVELS: CPT | Mod: GC | Performed by: RADIOLOGY

## 2022-09-19 ASSESSMENT — PAIN SCALES - GENERAL: PAINLEVEL: NO PAIN (0)

## 2022-09-19 NOTE — NURSING NOTE
Chief Complaint   Patient presents with     Follow Up     PAD return patient.       Vitals were taken and medications were reconciled.    Lisa Sandoval  9:34 AM

## 2022-09-19 NOTE — LETTER
9/19/2022       RE: Rachele Martínez  [address]  [place] [address]     Dear Colleague,    Thank you for referring your patient, Rachele Martínez, to the [facility] [place] [facility]. Please see a copy of my visit note below.        INTERVENTIONAL RADIOLOGY ESTABLISHED PATIENT FOLLOW UP      HPI:   Rachele Martínez is an 82 year old who is here today for follow up regarding her lower extremity claudication symptoms. When we last saw her we came to a consensus decision to pursue PAD rehab (e.g., including supervised exercised program) as opposed to pursuing an intervention.    Today Ms. Martínez is accompanied by her daughter Charla.    Since we last saw her Ms. Martínez reports that she tried to participate in the supervised exercise program but quit after a few appointments because it was painful - specifically, she had pain in her legs and back during the exercise.    Continues to endorse symptoms of arterial claudication including pain in her calves and thighs that is provoked by exercise and relieved with rest. She gets pain in her calves after walking about half a block. For example, when she takes the garbage out she apparently has about a 1.5 block walk and she will typically need to stop to rest 2-3 times while doing so. Symptom onset occurs in the right leg first.    Initially when asked if she could try again with a 3 month PAD Rehab trial she did not think she would be successful in completing it.    ROS:  Negative unless otherwise stated in HPI.      Physical Examination:   VITALS:   There were no vitals taken for this visit.   GENERAL: Healthy, alert and no distress  SKIN: No open wounds.  PSYCH: Mentation appears normal, affect normal/bright, judgement and insight intact, normal speech and appearance well-groomed.  EYES: Eyes grossly normal to inspection. No discharge or erythema, or obvious  scleral/conjunctival abnormalities.  RESP: No audible wheeze, cough, or visible cyanosis. No visible retractions or increased work of breathing.   VASCULAR: Dopplerable but non palpable DP and PT pulses bilaterally.     Labs:    BMP RESULTS:  Lab Results   Component Value Date     01/27/2022     05/26/2020    POTASSIUM 4.5 01/27/2022    POTASSIUM 4.1 05/26/2020    CHLORIDE 101 01/27/2022    CHLORIDE 98 05/26/2020    CO2 28 01/27/2022    CO2 28 05/26/2020    ANIONGAP 8 01/27/2022    ANIONGAP 12 05/26/2020     (H) 01/27/2022     (H) 05/26/2020    BUN 18 01/27/2022    BUN 23 05/26/2020    CR 6.04 (H) 01/27/2022    CR 7.50 (H) 05/26/2020    GFRESTIMATED 7 (L) 01/27/2022    GFRESTIMATED 5 (L) 07/27/2021    GFRESTIMATED 5 (L) 05/26/2020    GFRESTBLACK 5 (L) 05/26/2020    BELGICA 9.2 01/27/2022    BELGICA 8.8 05/26/2020        CBC RESULTS:  Lab Results   Component Value Date    WBC 6.2 01/27/2022    WBC 5.5 05/26/2020    RBC 3.76 (L) 01/27/2022    RBC 3.92 05/26/2020    HGB 11.5 (L) 01/27/2022    HGB 12.8 05/26/2020    HCT 38.1 01/27/2022    HCT 41.1 05/26/2020     (H) 01/27/2022     (H) 05/26/2020    MCH 30.6 01/27/2022    MCH 32.7 05/26/2020    MCHC 30.2 (L) 01/27/2022    MCHC 31.1 (L) 05/26/2020    RDW 17.5 (H) 01/27/2022    RDW 16.0 (H) 05/26/2020     01/27/2022     05/26/2020       INR/PTT:  Lab Results   Component Value Date    INR 1.17 (H) 01/27/2022    INR 1.31 (H) 05/26/2020    PTT 37 03/26/2019       Diagnostic studies:   US FAISAL Doppler with Exercise Bilateral 3/21/22  IMPRESSION:  1. RIGHT:       A. Noncompressible resting FAISAL.       B. TBI is ABNORMAL, 0.50, previously 0.53.       C. Mildly abnormal pulse volume recordings suggestive of iliac  disease.     2. LEFT:       A. Noncompressible resting FAISAL.       B. TBI is ABNORMAL, 0.58, previously 0.66.       C. Mildly abnormal pulse volume recordings suggestive of iliac  disease.        I have personally reviewed the  examination and initial interpretation  and I agree with the findings.     HEENA SIMON MD     Assessment   Rachele Martínez is an 82 year old who is here today for follow up regarding her lower extremity pain. Specifically, Ms. Martínez has ongoing symptoms of arterial claudication that are lifestyle limiting and has prior imaging supportive of these clinical findings (reference CTA 7/27/21 - 50-70% bilateral common iliac stenoses as well as moderate-severe multifocal infrainguinal disease bilaterally). Since we last saw her she was unsuccessful in completing a PAD rehab program.    Today, we spent a large portion of the visit discussing the value of a noninvasive treatment option such as a structured exercise program. We explained in depth the underlying cause of her symptoms and emphasized how exercise can help promote and restore blood flow to her legs by promoting blood vessel growth. We also explained the interventions commonly used to treat her condition including both endovascular interventions and open surgical treatments that are performed by vascular surgeons. We discussed the risks and benefits of all of these options.    After a thorough discussion, Ms. Martínez and I came to a consensus decision to re-attempt completing a supervised exercise program. If, after completion, patient's symptoms continue to be lifestyle limiting we could consider pursuing an intervention. Patient agreeable to plan. All questions answered.    Plan   -3 month supervised exercise program (PAD rehab)  -Follow up in clinic in 3 months with new FAISAL with exercise.      I, Gregg Babin, was present with the resident during the history and exam. I discussed the case with the resident and agree with the findings as documented in the assessment and plan.     I spent a total of 45 minutes face-to-face with Rachele Martínez during today's office visit. Over 50% of this time was spent counseling the patient and/or coordinating care. See  note for details.     An additional 20 minutes spent on the date of the encounter doing chart review, history and exam, documentation and further activities as noted above          Again, thank you for allowing me to participate in the care of your patient.      Sincerely,    Gregg Babin MD

## 2022-09-19 NOTE — PATIENT INSTRUCTIONS
You were seen today in the Vascular IR Clinic by Dr Babin for follow up regarding peripheral artery disease.    Plan:    - Please call to schedule an appointment with the PAD rehab team. Number to call to make an appointment is [phone]. We will plan to see you back in three months for a visit with Dr Babin.     Please call or send a MyChart with any questions or concerns.    Alyssa KENNEY LPN/ Melody ANDREW RN  [phone]

## 2022-09-20 DIAGNOSIS — K92.2 GASTROINTESTINAL HEMORRHAGE, UNSPECIFIED GASTROINTESTINAL HEMORRHAGE TYPE: ICD-10-CM

## 2022-09-21 ENCOUNTER — TELEPHONE (OUTPATIENT)
Dept: INTERVENTIONAL RADIOLOGY/VASCULAR | Facility: CLINIC | Age: 82
End: 2022-09-21

## 2022-09-22 RX ORDER — PANTOPRAZOLE SODIUM 20 MG/1
20 TABLET, DELAYED RELEASE ORAL
Qty: 180 TABLET | Refills: 1 | Status: SHIPPED | OUTPATIENT
Start: 2022-09-22 | End: 2023-02-21

## 2022-09-22 NOTE — TELEPHONE ENCOUNTER
The pts daughter Charla would like a call back on Monday or Tuesday 9/26/22 or 9/27/22 to schedule the requested appointment for the pt.  I will post rodye the staff message until 9/26/22.  Alex Yepez on 9/21/2022 at 7:23 PM

## 2022-09-22 NOTE — TELEPHONE ENCOUNTER
----- Message from Sol Lambert RN sent at 9/19/2022 10:35 AM CDT -----  Hi,     Can you please call this patient and help her schedule a follow up visit for PAD with Dr Babin. Due in three months.     Sol ZHANG RN, BSN   Interventional Radiology [facility]   [phone]

## 2022-09-28 ENCOUNTER — TELEPHONE (OUTPATIENT)
Dept: INTERNAL MEDICINE | Facility: CLINIC | Age: 82
End: 2022-09-28

## 2022-09-28 NOTE — TELEPHONE ENCOUNTER
Health Call Center    Phone Message    May a detailed message be left on voicemail: yes     Reason for Call: Form or Letter   Type or form/letter needing completion: Emotional Support Animal Letter     The patient daughter is calling asking for emotional support animal letter for a cat including a prescription with diagnosis that support this request, please follow up to address concerns for next steps and requirements in order to get letter if appointment is needed or not thank you.     Provider: Agnieszka Rodriguez MD    Date form needed: asap  Once completed: Patient will  at .      Action Taken: Message routed to:  [facility] pcc    Travel Screening: Not Applicable

## 2022-09-28 NOTE — TELEPHONE ENCOUNTER
Spoke to daughter Charla to schedule a virtual appointment with PCP to discuss letter. Daughter schedule for Friday 09/30/22 at 4:00 PM and confirmed date and time.    Lauren Paniagua, Clinic , 09/28/22 at 12:14 PM

## 2022-09-28 NOTE — TELEPHONE ENCOUNTER
I spoke with the pts daughter Charla and she decided to cancel the on 12/19/22 appointment with . Charla is stating that she will call to schedule the pt for PAD Rehab and she will also try and convince the pt to go. Charla ask that we call back after the holidays around mid January to reschedule the pt with .  Alex Yepez on 9/28/2022 at 6:49 PM

## 2022-09-30 ENCOUNTER — VIRTUAL VISIT (OUTPATIENT)
Dept: INTERNAL MEDICINE | Facility: CLINIC | Age: 82
End: 2022-09-30
Payer: MEDICARE

## 2022-09-30 DIAGNOSIS — F41.8 DEPRESSION WITH ANXIETY: ICD-10-CM

## 2022-09-30 DIAGNOSIS — N18.6 END STAGE RENAL DISEASE (H): ICD-10-CM

## 2022-09-30 DIAGNOSIS — I73.9 PERIPHERAL VASCULAR DISEASE (H): Primary | ICD-10-CM

## 2022-09-30 PROCEDURE — 99214 OFFICE O/P EST MOD 30 MIN: CPT | Mod: 95 | Performed by: INTERNAL MEDICINE

## 2022-09-30 RX ORDER — AMLODIPINE BESYLATE 5 MG/1
5 TABLET ORAL
COMMUNITY
End: 2023-02-17

## 2022-09-30 NOTE — PROGRESS NOTES
Rachele is a 82 year old who is being evaluated via a billable video visit.      Pt is in [place]    How would you like to obtain your AVS? MyChart  If the video visit is dropped, the invitation should be resent by: Text to cell phone: [phone]  Will anyone else be joining your video visit? Daughter, Charla, is present

## 2022-09-30 NOTE — Clinical Note
Can we have the clinic SW reach out to Rachele and her daughter Charla to give more information about assisted living (what it is and the process for finding an assisted living)?  Thanks!

## 2022-09-30 NOTE — PROGRESS NOTES
VIDEO VISIT    Assessment & Plan   Peripheral vascular disease (H)  Agree with plan to repeat exercise program to improve symptoms    Depression with anxiety  Completed letter stating cat is emotional support animal and it is available in [facility]    End stage renal disease (H)  Given her multiple medical problems, including cognitive impairment, encouraged Rachele and Charla to consider assisted living as an option.  This would continue to give her independence, but would also provide more medical support when needed.  Will ask clinic SW to reach out and give more information      RTC: Return if symptoms worsen or fail to improve.  I spent a total of 30 minutes on the day of the visit.  Time was spent doing chart review, history and exam, documentation and further activities as noted above.    Agnieszka Rodriguez MD  Securely message with the Vocera Web Console      Chief Complaint   Rachele Martínez is a 82 year old female presents for the following health issues    History of Present Illness   She is moving to [place].  [employer].  Waiting for section 8 approval.  Her current apartment is falling apart.    Needs a letter stating cat acts as an emotional support  She has had this cat for 15 years    She's go to PAD rehab, but unfortunately her legs         Tobacco Use      Smoking status: Former Smoker        Packs/day: 2.00        Years: 45.00        Pack years: 90        Types: Cigarettes        Start date: 1953        Quit date: 2002        Years since quittin.8      Smokeless tobacco: Never Used    PHQ-2 Score:   PHQ-2 (  [employer]) 2022   Q1: Little interest or pleasure in doing things 1 0   Q2: Feeling down, depressed or hopeless 0 0   PHQ-2 Score 1 0   PHQ-2 Total Score (12-17 Years)- Positive if 3 or more points; Administer PHQ-A if positive - -   Q1: Little interest or pleasure in doing things - -   Q2: Feeling down, depressed or hopeless - -   PHQ-2 Score  - -       ROS    Physical Exam   Gen: no distress, comfortable, pleasant   Respiratory: able to talk in full sentences.  No evidence of respiratory distress  Psychological: appropriate mood       Items reviewed:  Tobacco  Allergies  Meds                Visit/Communication Style   Type of service:  Video Visit  Video Start Time: 4:16 PM  Video End Time:4:34 PM  Originating Location (pt. Location): Home  Distant Location (provider location):  [facility]  Platform used for Video Visit: [employer]

## 2022-09-30 NOTE — PATIENT INSTRUCTIONS
Thank you for visiting the Primary Care Center today at the [facility]! The following is some information about our clinic:     Primary Care Center Frequently-Asked Questions    (1) How do I schedule appointments at the [facility]?     Primary Care--to schedule or make changes to an existing appointment, please call our primary care line at [phone].    Labs--to schedule a lab appointment at the [facility] you can use [employer] or call [phone]. If you have a [place] location that is closer to home, you can reach out to that location for scheduling options.     Imaging--if you need to schedule a CT, X-ray, MRI, ultrasound, or other imaging study you can call [phone] to schedule at the [facility] or any other [facility] imaging location.     Referrals--if a referral to another specialty was ordered you can expect a phone call from their scheduling team. If you have not heard from them in a week, please call us or send us a [employer] message to check the status or get a scheduling number. Please note that this only applies to internal [facility] referrals. If the referral is external you would need to contact their office for scheduling.     (2) I have a question about my visit, who do I contact?     You can call us at the primary care line at [phone] to ask questions about your visit. You can also send a secure message through [employer], which is reviewed by clinic staff. Please note that [employer] messages have a twenty-four to forty-eight business hour turnaround time and should not be used for urgent concerns.    (3) How will I get the results of my tests?    If you are signed up for "[employer]t all tests will be released to you within twenty-four hours of resulting. Please allow three to five days for your doctor to review your results and place a note interpreting the results. If you do not have [employer] you will receive your  results through mail seven to ten business days following the return of the tests. Please note that if there should be any urgent or concerning results that your doctor or their registered nurse will reach out to you the same day as the tests come back. If you have follow up questions about your results or would like to discuss the results in detail please schedule a follow up with your provider either in person or virtually.     (4) How do I get refills of my prescriptions?     You should always first contact your pharmacy for refills of your medications. If submitting a refill request on [employer], please be sure to submit the request only once--repeat requests can cause delays in refill. If you are requesting a NEW medication or a medication related to new symptoms you will need to schedule an appointment with a provider prior to approval. Please note: Routine medication refills have up to one to three business day turnaround whereas controlled substances refills have up to five to seven business day turnaround.    (5) I have new symptoms, what do I do?     If you are having an immediate medical emergency, you should dial 911 for assistance.   For anything urgent that needs to be seen within a few hours to one day you should visit a local urgent care for assistance.  For non-urgent symptoms that need to be seen within a few days to a week you can schedule with an available provider in primary care by going to [employer] or calling [phone].   If you are not sure how serious your symptoms are or you would like to receive medical advice you can always call [phone] to speak with a triage nurse.

## 2022-10-03 ENCOUNTER — PATIENT OUTREACH (OUTPATIENT)
Dept: CARE COORDINATION | Facility: CLINIC | Age: 82
End: 2022-10-03

## 2022-10-03 NOTE — PROGRESS NOTES
Social Work Telephone Note  [facility]     Patient Name:  Rachele Martínez  /Age:  1940 (82 year old)    Referral Source: Dr Rodriguez  Reason for Referral:  Alternative Housing options     attempted to contact Patient via telephone on 10/3/22. Sw had received a consult to connect with patient following a Dr Rodriguez regarding alternative housing. When writer called patient was at dialysis and writer spoke with patients daughter, Charla. Charla shared that her mother is particular on where she moves to and has a cat that she is insistent that she keeps the cat.  Daughter, Charla is very involved and has been working with both her county worker and on her own in researching different options for her mother.  Provided her with [facility] for additional support if needed.     Charla denied any additional concerns at this time. Provided her with writer contact information and encouraged them to call with any additional questions. Joyce will continue to assist as needed.               ANTONIO Chaparro, [facility]    [facility]  [phone]  [email or web address]

## 2022-10-04 DIAGNOSIS — L29.9 ITCHING: ICD-10-CM

## 2022-10-07 NOTE — TELEPHONE ENCOUNTER
SERTRALINE HCL 50 MG TABLET  Last Written Prescription Date: 1/12/2022  Last Fill Quantity: 180,   # refills: 1  Last Office Visit :  9/30/2022  Future Office visit:  None  180 Tabs, 3 Refill sent to karthik Gaspar RN  Central Triage Red Flags/Med Refills

## 2022-10-11 ENCOUNTER — MYC MEDICAL ADVICE (OUTPATIENT)
Dept: INTERNAL MEDICINE | Facility: CLINIC | Age: 82
End: 2022-10-11

## 2022-10-11 ENCOUNTER — INFUSION THERAPY VISIT (OUTPATIENT)
Dept: INFUSION THERAPY | Facility: CLINIC | Age: 82
End: 2022-10-11
Payer: MEDICARE

## 2022-10-11 VITALS — HEART RATE: 89 BPM | OXYGEN SATURATION: 96 % | DIASTOLIC BLOOD PRESSURE: 52 MMHG | SYSTOLIC BLOOD PRESSURE: 86 MMHG

## 2022-10-11 DIAGNOSIS — K31.811 ANGIODYSPLASIA OF STOMACH AND DUODENUM WITH HEMORRHAGE: Primary | ICD-10-CM

## 2022-10-11 DIAGNOSIS — K55.20 AVM (ARTERIOVENOUS MALFORMATION) OF SMALL BOWEL, ACQUIRED: ICD-10-CM

## 2022-10-11 DIAGNOSIS — D50.0 IRON DEFICIENCY ANEMIA DUE TO CHRONIC BLOOD LOSS: ICD-10-CM

## 2022-10-11 PROCEDURE — 99207 PR NO CHARGE LOS: CPT

## 2022-10-11 PROCEDURE — 96372 THER/PROPH/DIAG INJ SC/IM: CPT | Performed by: NURSE PRACTITIONER

## 2022-10-11 RX ADMIN — OCTREOTIDE ACETATE 20 MG: KIT INTRAMUSCULAR at 12:11

## 2022-10-11 NOTE — PROGRESS NOTES
Infusion Nursing Note:  Rachele Martínez presents today for   Chief Complaint   Patient presents with     Allied Health Visit     Sandostatin       Patient seen by provider today: No   present during visit today: Not Applicable.    Note: Patient was assessed by Mercy Shoemaker RN prior to injection.    Intravenous Access:  No Intravenous access/labs at this visit.    Treatment Conditions:  Not Applicable.    Post Infusion Assessment:  Patient tolerated injection without incident.  Site patent and intact, free from redness, edema or discomfort.     Discharge Plan:   Patient discharged in stable condition accompanied by: self.  Departure Mode: Ambulatory.      Florence To CMA

## 2022-10-11 NOTE — PROGRESS NOTES
"Hypotensive today with orthostatic hypotension.  Some dizziness which has been patient's baseline, may be a little worse. Denies any falls in the past week.    States at dialysis her BP fluctuates \"all over the place.\"  At dialysis systolic BP ranges 120s, 140s to 200s.    Patient states two weeks ago she took a double dose of oral BP medications at home for a week. Now for the last week has been on her prescribed dose as her daughter is filling pill box now so that doesn't happen again.    Daughter is  today. States she is going to lunch with daughter after this appointment.    Patient has a BP cuff at home that she knows how to use.    Discussed with Angela Rodriguez NP whom states to ok to give Sandostatin today, encourage patient to eat ice chips and monitor BP at home.  If BP is less than 90/50 patient should hold her BP med and call the provider whom manages her BP. Patient verbalized understanding and agreement with this plan.  States she will be at dialysis tomorrow morning for a BP check.    Mercy Shoemaker RN on 10/11/2022 at 11:47 AM    "

## 2022-10-11 NOTE — PROGRESS NOTES
With patient's permission, called daughter Charla, back to the exam room.    Reminded patient and daughter to have patient change positions slowly.    Reminded to check BP this afternoon and to take BP tomorrow morning.  Daughter expressed understanding that patient will not take morning Amlodipine tomorrow if BP is less than 90/50 and will call ordering provider if BP is less than 90/50.    Mercy Shoemaker RN on 10/11/2022 at 12:13 PM

## 2022-11-08 ENCOUNTER — INFUSION THERAPY VISIT (OUTPATIENT)
Dept: INFUSION THERAPY | Facility: CLINIC | Age: 82
End: 2022-11-08
Payer: MEDICARE

## 2022-11-08 VITALS
BODY MASS INDEX: 22.59 KG/M2 | DIASTOLIC BLOOD PRESSURE: 71 MMHG | SYSTOLIC BLOOD PRESSURE: 120 MMHG | OXYGEN SATURATION: 98 % | WEIGHT: 131.6 LBS | HEART RATE: 96 BPM | RESPIRATION RATE: 18 BRPM

## 2022-11-08 DIAGNOSIS — K55.20 AVM (ARTERIOVENOUS MALFORMATION) OF SMALL BOWEL, ACQUIRED: ICD-10-CM

## 2022-11-08 DIAGNOSIS — D50.0 IRON DEFICIENCY ANEMIA DUE TO CHRONIC BLOOD LOSS: ICD-10-CM

## 2022-11-08 DIAGNOSIS — K31.811 ANGIODYSPLASIA OF STOMACH AND DUODENUM WITH HEMORRHAGE: Primary | ICD-10-CM

## 2022-11-08 PROCEDURE — 99207 PR NO CHARGE LOS: CPT

## 2022-11-08 PROCEDURE — 96372 THER/PROPH/DIAG INJ SC/IM: CPT | Performed by: NURSE PRACTITIONER

## 2022-11-08 RX ADMIN — OCTREOTIDE ACETATE 20 MG: KIT INTRAMUSCULAR at 12:03

## 2022-11-08 ASSESSMENT — PAIN SCALES - GENERAL: PAINLEVEL: MODERATE PAIN (5)

## 2022-11-08 NOTE — PROGRESS NOTES
Infusion Nursing Note:  Rachele Martínez presents today for Sandostatin.    Patient seen by provider today: No   present during visit today: Not Applicable.    Note: Assessed by Ebony REESE RN.    Intravenous Access:  No Intravenous access/labs at this visit.    Treatment Conditions:  Results reviewed, labs MET treatment parameters, ok to proceed with treatment.    Post Infusion Assessment:  Patient tolerated injection without incident.     Discharge Plan:   Patient discharged in stable condition accompanied by: self.  Departure Mode: Ambulatory.      Yudy Downey LPN

## 2022-12-06 ENCOUNTER — INFUSION THERAPY VISIT (OUTPATIENT)
Dept: INFUSION THERAPY | Facility: CLINIC | Age: 82
End: 2022-12-06
Payer: MEDICARE

## 2022-12-06 VITALS
WEIGHT: 132 LBS | RESPIRATION RATE: 18 BRPM | OXYGEN SATURATION: 95 % | SYSTOLIC BLOOD PRESSURE: 128 MMHG | BODY MASS INDEX: 22.66 KG/M2 | DIASTOLIC BLOOD PRESSURE: 63 MMHG | HEART RATE: 83 BPM | TEMPERATURE: 98.6 F

## 2022-12-06 DIAGNOSIS — D50.0 IRON DEFICIENCY ANEMIA DUE TO CHRONIC BLOOD LOSS: ICD-10-CM

## 2022-12-06 DIAGNOSIS — K31.811 ANGIODYSPLASIA OF STOMACH AND DUODENUM WITH HEMORRHAGE: Primary | ICD-10-CM

## 2022-12-06 DIAGNOSIS — K55.20 AVM (ARTERIOVENOUS MALFORMATION) OF SMALL BOWEL, ACQUIRED: ICD-10-CM

## 2022-12-06 PROCEDURE — 96372 THER/PROPH/DIAG INJ SC/IM: CPT | Performed by: INTERNAL MEDICINE

## 2022-12-06 PROCEDURE — 99207 PR NO CHARGE LOS: CPT

## 2022-12-06 RX ADMIN — OCTREOTIDE ACETATE 20 MG: KIT INTRAMUSCULAR at 11:49

## 2022-12-06 NOTE — PROGRESS NOTES
"Infusion Nursing Note:  Rachele Martínez presents today for Sandostatin.    Patient seen by provider today: No   present during visit today: Not Applicable.    Note: currently on dialysis which \"takes everything out of me.\"    Treatment Conditions:  Not Applicable.    Post Infusion Assessment:  Patient tolerated injection without incident.     Discharge Plan:   RTC as scheduled.  Reviewed appts with patient.      ADAM GUO RN                       "

## 2023-01-03 ENCOUNTER — INFUSION THERAPY VISIT (OUTPATIENT)
Dept: INFUSION THERAPY | Facility: CLINIC | Age: 83
End: 2023-01-03
Payer: MEDICARE

## 2023-01-03 VITALS
TEMPERATURE: 98 F | RESPIRATION RATE: 18 BRPM | OXYGEN SATURATION: 100 % | HEART RATE: 93 BPM | SYSTOLIC BLOOD PRESSURE: 82 MMHG | DIASTOLIC BLOOD PRESSURE: 49 MMHG

## 2023-01-03 DIAGNOSIS — D50.0 IRON DEFICIENCY ANEMIA DUE TO CHRONIC BLOOD LOSS: ICD-10-CM

## 2023-01-03 DIAGNOSIS — K31.811 ANGIODYSPLASIA OF STOMACH AND DUODENUM WITH HEMORRHAGE: Primary | ICD-10-CM

## 2023-01-03 DIAGNOSIS — K55.20 AVM (ARTERIOVENOUS MALFORMATION) OF SMALL BOWEL, ACQUIRED: ICD-10-CM

## 2023-01-03 PROCEDURE — 96372 THER/PROPH/DIAG INJ SC/IM: CPT | Performed by: NURSE PRACTITIONER

## 2023-01-03 PROCEDURE — 99207 PR NO CHARGE LOS: CPT

## 2023-01-03 RX ADMIN — OCTREOTIDE ACETATE 20 MG: KIT INTRAMUSCULAR at 12:20

## 2023-01-03 ASSESSMENT — PAIN SCALES - GENERAL: PAINLEVEL: EXTREME PAIN (8)

## 2023-01-03 NOTE — PROGRESS NOTES
Infusion Nursing Note:  Rachele ANDREW Mauricio presents today for Sandostatin.    Patient seen by provider today: No   present during visit today: Not Applicable.    Note: Assessment performed by Ebony REESE RN prior to injection today. Patient denies symptoms/concerns following previous injection.    Intravenous Access:  No Intravenous access/labs at this visit.    Treatment Conditions:  Not Applicable.    Post Infusion Assessment:  Patient tolerated injection without incident.  Site patent and intact, free from redness, edema or discomfort.     Discharge Plan:   Patient discharged in stable condition accompanied by: self and friend.  Departure Mode: Ambulatory.      Vilma Ariza LPN

## 2023-01-05 DIAGNOSIS — R41.89 COGNITIVE IMPAIRMENT: ICD-10-CM

## 2023-01-09 RX ORDER — ATORVASTATIN CALCIUM 20 MG/1
TABLET, FILM COATED ORAL
Qty: 90 TABLET | Refills: 2 | Status: ON HOLD | OUTPATIENT
Start: 2023-01-09 | End: 2023-05-25

## 2023-01-09 NOTE — TELEPHONE ENCOUNTER
ATORVASTATIN 20 MG TABLET  Passed protocol    Virtual Visit  9/30/2022  [facility] Internal Medicine Agnieszka Reece MD  Internal Medicine

## 2023-01-11 DIAGNOSIS — G89.29 CHRONIC BILATERAL LOW BACK PAIN WITH RIGHT-SIDED SCIATICA: ICD-10-CM

## 2023-01-11 DIAGNOSIS — M51.369 LUMBAR DEGENERATIVE DISC DISEASE: ICD-10-CM

## 2023-01-11 DIAGNOSIS — M54.41 CHRONIC BILATERAL LOW BACK PAIN WITH RIGHT-SIDED SCIATICA: ICD-10-CM

## 2023-01-12 ENCOUNTER — TELEPHONE (OUTPATIENT)
Dept: INTERNAL MEDICINE | Facility: CLINIC | Age: 83
End: 2023-01-12

## 2023-01-12 DIAGNOSIS — K74.60 CIRRHOSIS OF LIVER WITHOUT ASCITES, UNSPECIFIED HEPATIC CIRRHOSIS TYPE (H): Primary | ICD-10-CM

## 2023-01-12 NOTE — TELEPHONE ENCOUNTER
[facility] Call Center    Phone Message    May a detailed message be left on voicemail: yes     Reason for Call: Order(s): Home Care Orders: Skilled Nursing:  JOSE ALBERTO Mckeon, [facility], [phone] ok NIDHI, RN 1xwk for 1wk, 2xwk for 1wks, 1xwk for 3wks, PT 2xwk for 5wks, OT 2xwk for 2wks    Action Taken: Message routed to:  [facility] pcc    Travel Screening: Not Applicable

## 2023-01-12 NOTE — TELEPHONE ENCOUNTER
Verbal orders given to Linda from [facility], per Dr. Rodriguez, for Order(s): Home Care Orders: Skilled Nursing:  JOSE ALBERTO Mckeon, [facility], [phone] ok NIDHI, RN 1xwk for 1wk, 2xwk for 1wks, 1xwk for 3wks, PT 2xwk for 5wks, OT 2xwk for 2wks. Lorelei Mcneal LPN 1/12/2023 4:07 PM

## 2023-01-16 RX ORDER — GABAPENTIN 300 MG/1
300 CAPSULE ORAL AT BEDTIME
Qty: 90 CAPSULE | Refills: 3 | Status: ON HOLD | OUTPATIENT
Start: 2023-01-16 | End: 2023-05-25

## 2023-01-16 NOTE — TELEPHONE ENCOUNTER
GABAPENTIN 300 MG CAPSULE     Last Written Prescription Date:  11/8/21  Last Fill Quantity: 90,   # refills: 3  Last Office Visit : 9/30/22  Future Office visit:  none    Routing refill request to provider for review/approval because:  Drug not on the [facility] refill protocol

## 2023-01-24 ENCOUNTER — DOCUMENTATION ONLY (OUTPATIENT)
Dept: INTERNAL MEDICINE | Facility: CLINIC | Age: 83
End: 2023-01-24
Payer: MEDICARE

## 2023-01-24 NOTE — PROGRESS NOTES
Type of Form Received: missed visit    Form Received (Date) 1/24/23   Form Filled out Yes 1/27/23   Placed in provider folder Yes

## 2023-01-26 ENCOUNTER — LAB (OUTPATIENT)
Dept: LAB | Facility: CLINIC | Age: 83
End: 2023-01-26
Attending: PHYSICIAN ASSISTANT
Payer: MEDICARE

## 2023-01-26 ENCOUNTER — HOSPITAL ENCOUNTER (EMERGENCY)
Facility: CLINIC | Age: 83
Discharge: HOME | End: 2023-01-26
Attending: EMERGENCY MEDICINE | Admitting: EMERGENCY MEDICINE
Payer: MEDICARE

## 2023-01-26 ENCOUNTER — DOCUMENTATION ONLY (OUTPATIENT)
Dept: INTERNAL MEDICINE | Facility: CLINIC | Age: 83
End: 2023-01-26

## 2023-01-26 ENCOUNTER — TELEPHONE (OUTPATIENT)
Dept: GASTROENTEROLOGY | Facility: CLINIC | Age: 83
End: 2023-01-26

## 2023-01-26 ENCOUNTER — ANCILLARY PROCEDURE (OUTPATIENT)
Dept: ULTRASOUND IMAGING | Facility: CLINIC | Age: 83
End: 2023-01-26
Attending: PHYSICIAN ASSISTANT
Payer: MEDICARE

## 2023-01-26 VITALS
TEMPERATURE: 97.8 F | RESPIRATION RATE: 15 BRPM | DIASTOLIC BLOOD PRESSURE: 91 MMHG | OXYGEN SATURATION: 99 % | HEART RATE: 72 BPM | SYSTOLIC BLOOD PRESSURE: 189 MMHG

## 2023-01-26 DIAGNOSIS — L29.9 ITCHING: ICD-10-CM

## 2023-01-26 DIAGNOSIS — D63.1 ANEMIA IN END-STAGE RENAL DISEASE (H): ICD-10-CM

## 2023-01-26 DIAGNOSIS — K74.60 CIRRHOSIS OF LIVER WITHOUT ASCITES, UNSPECIFIED HEPATIC CIRRHOSIS TYPE (H): ICD-10-CM

## 2023-01-26 DIAGNOSIS — Z99.2 DEPENDENCE ON HEMODIALYSIS (H): ICD-10-CM

## 2023-01-26 DIAGNOSIS — N18.9 ANEMIA DUE TO CHRONIC KIDNEY DISEASE, UNSPECIFIED CKD STAGE: ICD-10-CM

## 2023-01-26 DIAGNOSIS — N18.6 ANEMIA IN END-STAGE RENAL DISEASE (H): ICD-10-CM

## 2023-01-26 DIAGNOSIS — D63.1 ANEMIA DUE TO CHRONIC KIDNEY DISEASE, UNSPECIFIED CKD STAGE: ICD-10-CM

## 2023-01-26 LAB
ABO/RH(D): NORMAL
ALBUMIN SERPL BCG-MCNC: 3.9 G/DL (ref 3.5–5.2)
ALP SERPL-CCNC: 198 U/L (ref 35–104)
ALT SERPL W P-5'-P-CCNC: 10 U/L (ref 10–35)
ANION GAP SERPL CALCULATED.3IONS-SCNC: 10 MMOL/L (ref 7–15)
ANION GAP SERPL CALCULATED.3IONS-SCNC: 12 MMOL/L (ref 7–15)
ANTIBODY SCREEN: NEGATIVE
AST SERPL W P-5'-P-CCNC: 29 U/L (ref 10–35)
BASOPHILS # BLD AUTO: 0.1 10E3/UL (ref 0–0.2)
BASOPHILS NFR BLD AUTO: 1 %
BILIRUB DIRECT SERPL-MCNC: <0.2 MG/DL (ref 0–0.3)
BILIRUB SERPL-MCNC: 0.4 MG/DL
BLD PROD TYP BPU: NORMAL
BLD PROD TYP BPU: NORMAL
BLOOD COMPONENT TYPE: NORMAL
BLOOD COMPONENT TYPE: NORMAL
BUN SERPL-MCNC: 14.5 MG/DL (ref 8–23)
BUN SERPL-MCNC: 14.6 MG/DL (ref 8–23)
CALCIUM SERPL-MCNC: 9.3 MG/DL (ref 8.8–10.2)
CALCIUM SERPL-MCNC: 9.5 MG/DL (ref 8.8–10.2)
CHLORIDE SERPL-SCNC: 103 MMOL/L (ref 98–107)
CHLORIDE SERPL-SCNC: 104 MMOL/L (ref 98–107)
CODING SYSTEM: NORMAL
CODING SYSTEM: NORMAL
CREAT SERPL-MCNC: 5.45 MG/DL (ref 0.51–0.95)
CREAT SERPL-MCNC: 5.49 MG/DL (ref 0.51–0.95)
CROSSMATCH: NORMAL
CROSSMATCH: NORMAL
DEPRECATED HCO3 PLAS-SCNC: 26 MMOL/L (ref 22–29)
DEPRECATED HCO3 PLAS-SCNC: 28 MMOL/L (ref 22–29)
EOSINOPHIL # BLD AUTO: 0.2 10E3/UL (ref 0–0.7)
EOSINOPHIL NFR BLD AUTO: 3 %
ERYTHROCYTE [DISTWIDTH] IN BLOOD BY AUTOMATED COUNT: 18.3 % (ref 10–15)
ERYTHROCYTE [DISTWIDTH] IN BLOOD BY AUTOMATED COUNT: 18.5 % (ref 10–15)
GFR SERPL CREATININE-BSD FRML MDRD: 7 ML/MIN/1.73M2
GFR SERPL CREATININE-BSD FRML MDRD: 7 ML/MIN/1.73M2
GLUCOSE SERPL-MCNC: 106 MG/DL (ref 70–99)
GLUCOSE SERPL-MCNC: 109 MG/DL (ref 70–99)
HCT VFR BLD AUTO: 22.2 % (ref 35–47)
HCT VFR BLD AUTO: 23.5 % (ref 35–47)
HGB BLD-MCNC: 6.6 G/DL (ref 11.7–15.7)
HGB BLD-MCNC: 6.9 G/DL (ref 11.7–15.7)
IMM GRANULOCYTES # BLD: 0.1 10E3/UL
IMM GRANULOCYTES NFR BLD: 1 %
INR PPP: 1.2 (ref 0.85–1.15)
INR PPP: 1.22 (ref 0.85–1.15)
IRON BINDING CAPACITY (ROCHE): 266 UG/DL (ref 240–430)
IRON SATN MFR SERPL: 13 % (ref 15–46)
IRON SERPL-MCNC: 34 UG/DL (ref 37–145)
ISSUE DATE AND TIME: NORMAL
LYMPHOCYTES # BLD AUTO: 1 10E3/UL (ref 0.8–5.3)
LYMPHOCYTES NFR BLD AUTO: 13 %
MCH RBC QN AUTO: 31.7 PG (ref 26.5–33)
MCH RBC QN AUTO: 31.8 PG (ref 26.5–33)
MCHC RBC AUTO-ENTMCNC: 29.4 G/DL (ref 31.5–36.5)
MCHC RBC AUTO-ENTMCNC: 29.7 G/DL (ref 31.5–36.5)
MCV RBC AUTO: 107 FL (ref 78–100)
MCV RBC AUTO: 108 FL (ref 78–100)
MONOCYTES # BLD AUTO: 0.8 10E3/UL (ref 0–1.3)
MONOCYTES NFR BLD AUTO: 10 %
NEUTROPHILS # BLD AUTO: 5.7 10E3/UL (ref 1.6–8.3)
NEUTROPHILS NFR BLD AUTO: 72 %
NRBC # BLD AUTO: 0.1 10E3/UL
NRBC BLD AUTO-RTO: 1 /100
PLATELET # BLD AUTO: 244 10E3/UL (ref 150–450)
PLATELET # BLD AUTO: 270 10E3/UL (ref 150–450)
POTASSIUM SERPL-SCNC: 4.4 MMOL/L (ref 3.4–5.3)
POTASSIUM SERPL-SCNC: 4.6 MMOL/L (ref 3.4–5.3)
PROT SERPL-MCNC: 7 G/DL (ref 6.4–8.3)
RBC # BLD AUTO: 2.08 10E6/UL (ref 3.8–5.2)
RBC # BLD AUTO: 2.17 10E6/UL (ref 3.8–5.2)
SODIUM SERPL-SCNC: 141 MMOL/L (ref 136–145)
SODIUM SERPL-SCNC: 142 MMOL/L (ref 136–145)
SPECIMEN EXPIRATION DATE: NORMAL
UNIT ABO/RH: NORMAL
UNIT ABO/RH: NORMAL
UNIT NUMBER: NORMAL
UNIT NUMBER: NORMAL
UNIT STATUS: NORMAL
UNIT STATUS: NORMAL
UNIT TYPE ISBT: 9500
UNIT TYPE ISBT: 9500
WBC # BLD AUTO: 7.8 10E3/UL (ref 4–11)
WBC # BLD AUTO: 8.2 10E3/UL (ref 4–11)

## 2023-01-26 PROCEDURE — 82248 BILIRUBIN DIRECT: CPT | Performed by: PATHOLOGY

## 2023-01-26 PROCEDURE — 36430 TRANSFUSION BLD/BLD COMPNT: CPT | Performed by: EMERGENCY MEDICINE

## 2023-01-26 PROCEDURE — 85610 PROTHROMBIN TIME: CPT | Performed by: EMERGENCY MEDICINE

## 2023-01-26 PROCEDURE — 85610 PROTHROMBIN TIME: CPT | Performed by: PATHOLOGY

## 2023-01-26 PROCEDURE — 93010 ELECTROCARDIOGRAM REPORT: CPT | Performed by: EMERGENCY MEDICINE

## 2023-01-26 PROCEDURE — 99284 EMERGENCY DEPT VISIT MOD MDM: CPT | Mod: 25 | Performed by: EMERGENCY MEDICINE

## 2023-01-26 PROCEDURE — 82310 ASSAY OF CALCIUM: CPT | Performed by: EMERGENCY MEDICINE

## 2023-01-26 PROCEDURE — 999N000127 HC STATISTIC PERIPHERAL IV START W US GUIDANCE

## 2023-01-26 PROCEDURE — 85025 COMPLETE CBC W/AUTO DIFF WBC: CPT | Performed by: PATHOLOGY

## 2023-01-26 PROCEDURE — 96374 THER/PROPH/DIAG INJ IV PUSH: CPT | Performed by: EMERGENCY MEDICINE

## 2023-01-26 PROCEDURE — P9016 RBC LEUKOCYTES REDUCED: HCPCS | Performed by: EMERGENCY MEDICINE

## 2023-01-26 PROCEDURE — 86923 COMPATIBILITY TEST ELECTRIC: CPT | Performed by: EMERGENCY MEDICINE

## 2023-01-26 PROCEDURE — 85027 COMPLETE CBC AUTOMATED: CPT | Performed by: EMERGENCY MEDICINE

## 2023-01-26 PROCEDURE — 86901 BLOOD TYPING SEROLOGIC RH(D): CPT | Performed by: EMERGENCY MEDICINE

## 2023-01-26 PROCEDURE — 36415 COLL VENOUS BLD VENIPUNCTURE: CPT | Performed by: PATHOLOGY

## 2023-01-26 PROCEDURE — 80053 COMPREHEN METABOLIC PANEL: CPT | Performed by: PATHOLOGY

## 2023-01-26 PROCEDURE — 250N000011 HC RX IP 250 OP 636: Performed by: EMERGENCY MEDICINE

## 2023-01-26 PROCEDURE — 76705 ECHO EXAM OF ABDOMEN: CPT | Performed by: RADIOLOGY

## 2023-01-26 PROCEDURE — 86850 RBC ANTIBODY SCREEN: CPT | Performed by: EMERGENCY MEDICINE

## 2023-01-26 PROCEDURE — 99285 EMERGENCY DEPT VISIT HI MDM: CPT | Mod: 25 | Performed by: EMERGENCY MEDICINE

## 2023-01-26 PROCEDURE — 36415 COLL VENOUS BLD VENIPUNCTURE: CPT | Performed by: EMERGENCY MEDICINE

## 2023-01-26 PROCEDURE — 250N000013 HC RX MED GY IP 250 OP 250 PS 637: Performed by: EMERGENCY MEDICINE

## 2023-01-26 PROCEDURE — 83550 IRON BINDING TEST: CPT | Performed by: INTERNAL MEDICINE

## 2023-01-26 PROCEDURE — 93005 ELECTROCARDIOGRAM TRACING: CPT | Performed by: EMERGENCY MEDICINE

## 2023-01-26 RX ORDER — DIPHENHYDRAMINE HYDROCHLORIDE 50 MG/ML
50 INJECTION INTRAMUSCULAR; INTRAVENOUS ONCE
Status: COMPLETED | OUTPATIENT
Start: 2023-01-26 | End: 2023-01-26

## 2023-01-26 RX ORDER — LOSARTAN POTASSIUM 100 MG/1
100 TABLET ORAL DAILY
Status: COMPLETED | OUTPATIENT
Start: 2023-01-26 | End: 2023-01-26

## 2023-01-26 RX ADMIN — DIPHENHYDRAMINE HYDROCHLORIDE 50 MG: 50 INJECTION, SOLUTION INTRAMUSCULAR; INTRAVENOUS at 16:40

## 2023-01-26 RX ADMIN — LOSARTAN POTASSIUM 100 MG: 100 TABLET, FILM COATED ORAL at 19:25

## 2023-01-26 ASSESSMENT — ACTIVITIES OF DAILY LIVING (ADL)
ADLS_ACUITY_SCORE: 37

## 2023-01-26 NOTE — ED PROVIDER NOTES
[facility] EMERGENCY DEPARTMENT ([facility])  January 26, 2023  ED Provider Note  [facility]      History     Chief Complaint   Patient presents with     Abnormal Labs     HPI  Rachele Martínez is a 82 year old female with a past medical history significant for ERSD on HD, cirrhosis, and recurrent GI bleeds due to upper GI and cecum angioectatsisas on chronic monthly octreotide injections who presents to the Emergency Department for evaluation of abnormal labs. Patient was referred due to  Hgb level of 6.6. She reports feeling fatigued. She states she has a history of GI bleeds to upper GI and cecum angioectasis. She states her last dialysis was yesterday, 1/25. She states she had a recent transfusion on 1/6. Denies blood or black stool.     Per chart review: Patient is known to [facility] since 2005 when she was seen for hepatitis C. she had stage II fibrosis, genotype 1 so in 2005 no treatment was pursued then but in 2009 and a note indicates there was a plan for interferon and ribavirin treatment though no subsequent notes. She apparently had subsequent care at the [facility] where the hepatitis C was treated with no viral RNA detected in 2021 or 2022. She was also followed there for chronic GI bleeding from upper GI and cecum angioectasias treated with endoscopic therapy and subcutaneous octreotide. 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      Past Medical History  Past Medical History:   Diagnosis Date     Anemia      Anxiety and depression      Arthritis      AVM (arteriovenous malformation)      Chronic hepatitis C with cirrhosis (H)      Clotting disorder (H)      ESRD (end stage renal disease) (H)     on dialysis     GI bleed     recurrent     Glaucoma      Hyperlipidemia      Hypertension goal BP (blood  pressure) < 140/80      Past Surgical History:   Procedure Laterality Date     CAPSULE/PILL CAM ENDOSCOPY N/A 3/27/2019    Procedure: Capsule/pill cam endoscopy;  Surgeon: Yosvany Ram MD;  Location: [facility]     COLONOSCOPY N/A 9/4/2015    Procedure: COMBINED COLONOSCOPY, SINGLE OR MULTIPLE BIOPSY/POLYPECTOMY BY BIOPSY;  Surgeon: Rupesh Lopez MD;  Location: [facility]     COLONOSCOPY N/A 9/19/2018    Procedure: COLONOSCOPY;  enteroscopy small bowel  COLONOSCOPY;  Surgeon: Ankit Baires MD;  Location: [facility]     ESOPHAGOSCOPY, GASTROSCOPY, DUODENOSCOPY (EGD), COMBINED N/A 12/18/2014    Procedure: COMBINED ESOPHAGOSCOPY, GASTROSCOPY, DUODENOSCOPY (EGD);  Surgeon: Betsy Carvajal MD;  Location: [facility]     ESOPHAGOSCOPY, GASTROSCOPY, DUODENOSCOPY (EGD), COMBINED N/A 4/25/2015    Procedure: COMBINED ESOPHAGOSCOPY, GASTROSCOPY, DUODENOSCOPY (EGD);  Surgeon: Yosvany Ram MD;  Location: [facility]     ESOPHAGOSCOPY, GASTROSCOPY, DUODENOSCOPY (EGD), COMBINED N/A 5/5/2015    Procedure: COMBINED ESOPHAGOSCOPY, GASTROSCOPY, DUODENOSCOPY (EGD);  Surgeon: Mariano Mistry MD;  Location:  GI     HC CAPSULE ENDOSCOPY N/A 9/30/2015    Procedure: CAPSULE/PILL CAM ENDOSCOPY;  Surgeon: Pan Dhaliwal MD;  Location: [facility] VASCULAR SURGERY PROCEDURE UNLIST       HYSTERECTOMY  1980    KYREE     LUMPECTOMY BREAST       acetaminophen (TYLENOL) 500 MG tablet  amLODIPine (NORVASC) 5 MG tablet  atorvastatin (LIPITOR) 20 MG tablet  bisacodyl (DULCOLAX) 5 MG EC tablet  Calcium Acetate, Phos Binder, 667 MG CAPS  carvedilol (COREG) 3.125 MG tablet  cilostazol (PLETAL) 100 MG tablet  gabapentin (NEURONTIN) 300 MG capsule  losartan (COZAAR) 25 MG tablet  Multiple Vitamins-Minerals (PRORENAL + D) TABS  naltrexone (DEPADE/REVIA) 50 MG tablet  octreotide (SANDOSTATIN LAR) 20 MG injection  order for DME  order for DME  pantoprazole (PROTONIX) 20 MG EC tablet  polyethylene glycol (GOLYTELY) 236 g  suspension  polyethylene glycol (MIRALAX) 17 GM/Dose powder  sertraline (ZOLOFT) 50 MG tablet  ursodiol (ACTIGALL) 300 MG capsule      Allergies   Allergen Reactions     Abacavir Itching     Lisinopril Cough     Diovan Hct Itching     severe     Dust Mites      Hydrochlorothiazide Itching     Severe       No Clinical Screening - See Comments      History of blood transfusion reactions and pre-treats with Benadryl.      Oxycodone-Acetaminophen      Spironolactone Nausea     Sulfa Drugs Hives     Valsartan Itching     Family History  Family History   Problem Relation Age of Onset     Diabetes Mother      Alzheimer Disease Mother      Substance Abuse Son      Cancer Sister      Soft Tissue Cancer Sister      Breast Cancer Sister      Hyperlipidemia Daughter      Alcoholism Brother      Spine Problems Sister      Social History   Social History     Tobacco Use     Smoking status: Former     Packs/day: 2.00     Years: 45.00     Pack years: 90.00     Types: Cigarettes     Start date: 1953     Quit date: 2002     Years since quittin.2     Smokeless tobacco: Never   Substance Use Topics     Alcohol use: No     Drug use: Yes     Types: Marijuana     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      Past medical history, past surgical history, medications, allergies, family history, and social history were reviewed with the patient. No additional pertinent items.      A medically appropriate review of systems was performed with pertinent positives and negatives noted in the HPI, and all other systems negative.    Physical Exam   BP: (!) 189/69  Pulse: 82  Temp: 97.6  F (36.4  C)  Resp: 18  SpO2: 98 %  Physical Exam  Vitals and nursing note reviewed.   Constitutional:       General: She is not in acute distress.  Cardiovascular:      Rate and Rhythm: Normal rate and regular rhythm.   Pulmonary:      Effort: Pulmonary effort is normal.      Breath sounds: Normal breath sounds.    Musculoskeletal:      Cervical back: Neck supple.   Neurological:      General: No focal deficit present.      Mental Status: She is alert and oriented to person, place, and time.   Psychiatric:         Mood and Affect: Mood normal.           ED Course, Procedures, & Data      Procedures        2:11 PM  The patient was seen and examined by Cain Macdonald MD in Room VTA.     1 unit packed red cells ordered for transfusion              Results for orders placed or performed during the hospital encounter of 01/26/23   Basic metabolic panel     Status: Abnormal   Result Value Ref Range    Sodium 141 136 - 145 mmol/L    Potassium 4.4 3.4 - 5.3 mmol/L    Chloride 103 98 - 107 mmol/L    Carbon Dioxide (CO2) 26 22 - 29 mmol/L    Anion Gap 12 7 - 15 mmol/L    Urea Nitrogen 14.5 8.0 - 23.0 mg/dL    Creatinine 5.49 (H) 0.51 - 0.95 mg/dL    Calcium 9.5 8.8 - 10.2 mg/dL    Glucose 106 (H) 70 - 99 mg/dL    GFR Estimate 7 (L) >60 mL/min/1.73m2   INR     Status: Abnormal   Result Value Ref Range    INR 1.20 (H) 0.85 - 1.15   CBC with platelets and differential     Status: Abnormal   Result Value Ref Range    WBC Count 7.8 4.0 - 11.0 10e3/uL    RBC Count 2.17 (L) 3.80 - 5.20 10e6/uL    Hemoglobin 6.9 (LL) 11.7 - 15.7 g/dL    Hematocrit 23.5 (L) 35.0 - 47.0 %     (H) 78 - 100 fL    MCH 31.8 26.5 - 33.0 pg    MCHC 29.4 (L) 31.5 - 36.5 g/dL    RDW 18.5 (H) 10.0 - 15.0 %    Platelet Count 270 150 - 450 10e3/uL    % Neutrophils 72 %    % Lymphocytes 13 %    % Monocytes 10 %    % Eosinophils 3 %    % Basophils 1 %    % Immature Granulocytes 1 %    NRBCs per 100 WBC 1 (H) <1 /100    Absolute Neutrophils 5.7 1.6 - 8.3 10e3/uL    Absolute Lymphocytes 1.0 0.8 - 5.3 10e3/uL    Absolute Monocytes 0.8 0.0 - 1.3 10e3/uL    Absolute Eosinophils 0.2 0.0 - 0.7 10e3/uL    Absolute Basophils 0.1 0.0 - 0.2 10e3/uL    Absolute Immature Granulocytes 0.1 <=0.4 10e3/uL    Absolute NRBCs 0.1 10e3/uL   Iron and iron binding capacity      Status: Abnormal   Result Value Ref Range    Iron 34 (L) 37 - 145 ug/dL    Iron Binding Capacity 266 240 - 430 ug/dL    Iron Sat Index 13 (L) 15 - 46 %   EKG 12-lead, tracing only     Status: None   Result Value Ref Range    Systolic Blood Pressure  mmHg    Diastolic Blood Pressure  mmHg    Ventricular Rate 77 BPM    Atrial Rate 77 BPM    WA Interval 186 ms    QRS Duration 90 ms     ms    QTc 486 ms    P Axis 65 degrees    R AXIS 61 degrees    T Axis 87 degrees    Interpretation ECG       Sinus rhythm with Premature atrial complexes  Nonspecific T wave abnormality  Prolonged QT  Abnormal ECG    Unconfirmed report - interpretation of this ECG is computer generated - see medical record for final interpretation  Confirmed by - EMERGENCY ROOM, PHYSICIAN ([id number]),  RAFA PONCE ([id number]) on 1/28/2023 3:02:28 AM     Adult Type and Screen     Status: None   Result Value Ref Range    ABO/RH(D) O POS     Antibody Screen Negative Negative    SPECIMEN EXPIRATION DATE [id number]    Prepare red blood cells (unit)     Status: None   Result Value Ref Range    Blood Component Type Red Blood Cells     Product Code S4312H47     Unit Status Transfused     Unit Number [id number]     CROSSMATCH Compatible     CODING SYSTEM [id number]     ISSUE DATE AND TIME [id number]     UNIT ABO/RH O-     UNIT TYPE ISBT 9500    Prepare red blood cells (unit)     Status: None (Preliminary result)   Result Value Ref Range    Blood Component Type Red Blood Cells     Product Code Z4724X63     Unit Status Not used     Unit Number [id number]     CROSSMATCH Compatible     CODING SYSTEM [id number]     UNIT ABO/RH O-     UNIT TYPE ISBT 9500    CBC with platelets differential     Status: Abnormal    Narrative    The following orders were created for panel order CBC with platelets differential.  Procedure                               Abnormality         Status                     ---------                               -----------          ------                     CBC with platelets and d...[[id number]]  Abnormal            Final result                 Please view results for these tests on the individual orders.   ABO/Rh type and screen     Status: None    Narrative    The following orders were created for panel order ABO/Rh type and screen.  Procedure                               Abnormality         Status                     ---------                               -----------         ------                     Adult Type and Screen[[id number]]                            Final result                 Please view results for these tests on the individual orders.   Results for orders placed or performed in visit on 01/26/23   US Abdomen Limited     Status: None    Narrative    Regular quadrant ultrasound    INDICATION: Cirrhosis. Hepatocellular cancer screening.    COMPARISON: CT abdomen 3/3/2022    FINDINGS: Stool and clip images reviewed. No liver masses are visible.  Liver measures 13.4 cm in length. Portal vein, flow it is normal in  caliber is normal at 12 mm. Gallbladder shows well-defined area of  somewhat oval-shaped mild echogenic density without significant  shadowing comment consistent with probable sludge ball versus  noncalcified stones. Lateral measurement is normal at 2 mm. Common  bile duct caliber is normal at 7 mm. No intrahepatic biliary dilation.  The visualized pancreas is partially obscured by bowel gas blocking.  Right kidney measures 9.1 cm. There is an anechoic structure with good  through transmission at the superior pole measuring 11 x 10 x 12 mm.  No hydronephrosis. Cortical echotexture however is increased and there  is loss of cortical medullary differentiation. No free fluid.      Impression    IMPRESSION: Moderate echogenic oval-shaped density in the gallbladder  suggestive of large sludge ball, cannot exclude small stones  intermixed in this area. No wall thickening or biliary dilation. No  liver masses. US LI-RADS 1:  Negative. Small right renal cyst with  chronic renal medical disease of right kidney. Please correlate with  renal function evaluation.    MARK DIAS MD         SYSTEM ID:  [id number]   Results for orders placed or performed in visit on 01/26/23   Basic metabolic panel     Status: Abnormal   Result Value Ref Range    Sodium 142 136 - 145 mmol/L    Potassium 4.6 3.4 - 5.3 mmol/L    Chloride 104 98 - 107 mmol/L    Carbon Dioxide (CO2) 28 22 - 29 mmol/L    Anion Gap 10 7 - 15 mmol/L    Urea Nitrogen 14.6 8.0 - 23.0 mg/dL    Creatinine 5.45 (H) 0.51 - 0.95 mg/dL    Calcium 9.3 8.8 - 10.2 mg/dL    Glucose 109 (H) 70 - 99 mg/dL    GFR Estimate 7 (L) >60 mL/min/1.73m2   CBC with platelets     Status: Abnormal   Result Value Ref Range    WBC Count 8.2 4.0 - 11.0 10e3/uL    RBC Count 2.08 (L) 3.80 - 5.20 10e6/uL    Hemoglobin 6.6 (LL) 11.7 - 15.7 g/dL    Hematocrit 22.2 (L) 35.0 - 47.0 %     (H) 78 - 100 fL    MCH 31.7 26.5 - 33.0 pg    MCHC 29.7 (L) 31.5 - 36.5 g/dL    RDW 18.3 (H) 10.0 - 15.0 %    Platelet Count 244 150 - 450 10e3/uL   Hepatic function panel     Status: Abnormal   Result Value Ref Range    Protein Total 7.0 6.4 - 8.3 g/dL    Albumin 3.9 3.5 - 5.2 g/dL    Bilirubin Total 0.4 <=1.2 mg/dL    Alkaline Phosphatase 198 (H) 35 - 104 U/L    AST 29 10 - 35 U/L    ALT 10 10 - 35 U/L    Bilirubin Direct <0.20 0.00 - 0.30 mg/dL   INR     Status: Abnormal   Result Value Ref Range    INR 1.22 (H) 0.85 - 1.15     Medications   diphenhydrAMINE (BENADRYL) injection 50 mg (50 mg Intravenous Given 1/26/23 1640)   losartan (COZAAR) tablet 100 mg (100 mg Oral Given 1/26/23 1925)     Labs Ordered and Resulted from Time of ED Arrival to Time of ED Departure   BASIC METABOLIC PANEL - Abnormal       Result Value    Sodium 141      Potassium 4.4      Chloride 103      Carbon Dioxide (CO2) 26      Anion Gap 12      Urea Nitrogen 14.5      Creatinine 5.49 (*)     Calcium 9.5      Glucose 106 (*)     GFR Estimate 7 (*)     INR - Abnormal    INR 1.20 (*)    CBC WITH PLATELETS AND DIFFERENTIAL - Abnormal    WBC Count 7.8      RBC Count 2.17 (*)     Hemoglobin 6.9 (*)     Hematocrit 23.5 (*)      (*)     MCH 31.8      MCHC 29.4 (*)     RDW 18.5 (*)     Platelet Count 270      % Neutrophils 72      % Lymphocytes 13      % Monocytes 10      % Eosinophils 3      % Basophils 1      % Immature Granulocytes 1      NRBCs per 100 WBC 1 (*)     Absolute Neutrophils 5.7      Absolute Lymphocytes 1.0      Absolute Monocytes 0.8      Absolute Eosinophils 0.2      Absolute Basophils 0.1      Absolute Immature Granulocytes 0.1      Absolute NRBCs 0.1     IRON AND IRON BINDING CAPACITY - Abnormal    Iron 34 (*)     Iron Binding Capacity 266      Iron Sat Index 13 (*)    TYPE AND SCREEN, ADULT    ABO/RH(D) O POS      Antibody Screen Negative      SPECIMEN EXPIRATION DATE [id number]     PREPARE RED BLOOD CELLS (UNIT)    Blood Component Type Red Blood Cells      Product Code Y8171K90      Unit Status Transfused      Unit Number [id number]      CROSSMATCH Compatible      CODING SYSTEM QIIQ759      ISSUE DATE AND TIME [id number]      UNIT ABO/RH O-      UNIT TYPE ISBT 9500     PREPARE RED BLOOD CELLS (UNIT)    Blood Component Type Red Blood Cells      Product Code E4891U09      Unit Status Not used      Unit Number [id number]      CROSSMATCH Compatible      CODING SYSTEM [id number]      UNIT ABO/RH O-      UNIT TYPE ISBT 9500     TRANSFUSE RED BLOOD CELLS (UNIT)   ABO/RH TYPE AND SCREEN     No orders to display          Medical Decision Making  The patient's presentation is strongly suggestive of a chronic illness mild to moderate exacerbation, progression, or side effect of treatment.    The patient's evaluation involved:  review of external note(s) from 2 sources (Clinic visits and ED visits)  review of 3+ test result(s) ordered prior to this encounter (CBC, metabolic panel, coags,)    The patient's management involved a decision  regarding minor procedure/surgery with identified risk factors, a decision regarding hospitalization and further care after sign-out to Dr Moyer (see their note for further management).      Assessment & Plan    82-year-old female with chronic anemia of unknown etiology also dialysis dependent.  Her hemoglobin is dropped to 6.6.  We will transfuse her 1 unit and provided she is not short of breath from fluid overload or severely hypertensive she will be discharged to have her dialysis run tomorrow.  She has further outpatient work-up of the etiology of her chronic anemia planned.  I do not see an indication for emergent hospitalization as she has not had melena or hematochezia.  Patient is signed out to my partner pending the transfusion and reevaluation.    I have reviewed the nursing notes. I have reviewed the findings, diagnosis, plan and need for follow up with the patient.    Discharge Medication List as of 1/26/2023  8:57 PM          Final diagnoses:   Anemia due to chronic kidney disease, unspecified CKD stage   I, Vianey Mercado, am serving as a trained medical scribe to document services personally performed by Cain Macdonald MD, based on the provider's statements to me.      I, Cain Macdonald MD, was physically present and have reviewed and verified the accuracy of this note documented by Vianey Mercado.     Cain Macdonald MD  [facility] EMERGENCY DEPARTMENT  1/26/2023     Cain Macdonald MD  02/01/23 3523

## 2023-01-26 NOTE — ED TRIAGE NOTES
Pt arrives ambulatory to ED  Referral - Labs checked and HGB was 6.6  Recent transfusion on 1/6 2 units

## 2023-01-26 NOTE — ED NOTES
Patient daughter stated that her mother felt something sharp under her in the bed. Found a syringe from a medication that had been given. When writer entered the room there was an empty 3cc syringe on the floor on the patients right side of the bed with a filter needle still attached with no cover. Charge nurse came in the room and we moved the patient and found the cap underneath patient, there was no puncture or armando on the skin and patient and family were offered to have the doctor look at it and both said no it was ok. Patient was stood up and blankets were shaken out and bed was thoroughly checked and was clear, no other items found. Patient placed back in bed in position of comfort call light in reach.

## 2023-01-26 NOTE — CONFIDENTIAL NOTE
DATE:  1/26/2023   TIME OF RECEIPT FROM LAB:  1:55 PM  LAB TEST:  Hgb  LAB VALUE:  6.6  RESULTS GIVEN WITH READ-BACK TO (PROVIDER):  Roberto Gordon PA-C    TIME LAB VALUE REPORTED TO PROVIDER:   1:57 PM

## 2023-01-26 NOTE — PROGRESS NOTES
Type of Form Received: POC    Form Received (Date) 1/26/23   Form Filled out Yes 1/27/23   Placed in provider folder Yes

## 2023-01-26 NOTE — PROGRESS NOTES
Brief GI progress note:     Patient is sent to the ED after labs with Hgb of 6.6. Unclear if having any overt bleeding, baseline black stool on iron supplement.     83 yo F with HCV cirrhosis well compensated, ESRD on hemodialysis, history of multiple AVMs (upper GI and cecum) on monthly depot octreotide injections. Was receently admitted 1/6-1/9/2023 with acute on chronic anemia Hgb of 5.6 on presentation. On discharge, plan is to obtain video capsule endoscopy with [facility].     Last EGD 1/9/23 without esophageal or gastric varices.   The esophagus was normal.        Diffuse mildly erythematous mucosa was found in the gastric body and in        the gastric antrum.        The exam of the stomach was otherwise normal.        The examined duodenum was normal.        The examined jejunum was normal up to likely proximal mid jejunum. No        obvious lesions seen.     Plan  - transfuse as needed, keep Hgb >7  - IV PPI BID  - If any overt bleeding, start on ceftriaxone 1 g IV daily for infection prophylaxis in cirrhosis with bleeding  - holding off on octreotide given last EGD (2 weeks ago) without varices  - NPO after midnight in case requiring procedure.    Formal consult to follow in am.    Levi Montanez MD  Gastroenterology/Hepatology Fellow

## 2023-01-27 ENCOUNTER — MEDICAL CORRESPONDENCE (OUTPATIENT)
Dept: HEALTH INFORMATION MANAGEMENT | Facility: CLINIC | Age: 83
End: 2023-01-27
Payer: MEDICARE

## 2023-01-27 ENCOUNTER — PATIENT OUTREACH (OUTPATIENT)
Dept: GASTROENTEROLOGY | Facility: CLINIC | Age: 83
End: 2023-01-27
Payer: MEDICARE

## 2023-01-27 ENCOUNTER — DOCUMENTATION ONLY (OUTPATIENT)
Dept: INTERNAL MEDICINE | Facility: CLINIC | Age: 83
End: 2023-01-27
Payer: MEDICARE

## 2023-01-27 ENCOUNTER — TELEPHONE (OUTPATIENT)
Dept: INTERNAL MEDICINE | Facility: CLINIC | Age: 83
End: 2023-01-27
Payer: MEDICARE

## 2023-01-27 DIAGNOSIS — K55.20 AVM (ARTERIOVENOUS MALFORMATION) OF SMALL BOWEL, ACQUIRED: Primary | ICD-10-CM

## 2023-01-27 NOTE — PROGRESS NOTES
Type of Form Received: visit report    Form Received (Date) 1/27/23   Form Filled out Yes 2/6/23   Placed in provider folder Yes

## 2023-01-27 NOTE — TELEPHONE ENCOUNTER
LVM w/ pt's daughter Charla w/ pcc number to call to schedule with pcp. 2/3/23 8:30am on hold for this pt per provider.

## 2023-01-27 NOTE — DISCHARGE INSTRUCTIONS
Go to dialysis run tomorrow as usual.  Follow-up with your GI doctors.  Have your hemoglobin rechecked tomorrow.  If you develop black or red stools return to the Emergency Department for admission to the hospital

## 2023-01-27 NOTE — TELEPHONE ENCOUNTER
RN left voice message for Charla, patient's daughter, that in person appt with Dr. Rodriguez was put on hold for next Fri 2/3/23 @ 8:30 AM, and to call [facility] back to schedule.  Dr. Rodriguez had wanted an update about how patient was doing and also requested the follow up appt.    Gayle Paul RN on 1/27/2023 at 8:54 AM

## 2023-01-27 NOTE — ED PROVIDER NOTES
Sign out Provider: Renae   Sign out Plan: 82-year-old female with a history of end-stage renal disease on dialysis, cirrhosis, chronic GI bleed presenting to the emergency department due to anemia.  Patient is currently receiving 1 unit PRBCs with plan to discharge to home following transfusion.    Reassessment: Patient has completed her transfusion and reports feeling significantly improved.  She denies any abdominal pain, melena, hematochezia or hematemesis.  She denies feeling short of breath or having any lightheadedness.  Both the patient and her daughter report that she frequently has anemia with a hemoglobin around 6.6-6.8 and received a unit of blood with improvement in symptoms.  She is followed closely by GI and GI was consulted for Dr. Raudel Marsh did not recommend any further intervention at this time.  Given her recent endoscopy felt to be appropriate for discharge and continue outpatient management.  Patient was given the option for observation stay for serial hemoglobins and monitoring but would prefer to discharge to home.  She does have dialysis scheduled tomorrow and they will recheck her hemoglobin at dialysis.  She does not have signs or symptoms of volume overload.  She is noted to be hypertensive but does not have signs or symptoms of endorgan injury to suggest hypertensive emergency.  She will plan to dialyze tomorrow.  Both the patient and her daughter were given very close return precautions if she should develop any worsening symptoms to immediately return to the emergency department for reevaluation.    Disposition: Discharge           Mojgan Dewey MD  01/26/23 2100

## 2023-01-27 NOTE — PROGRESS NOTES
Called patient's daughter Charla to discuss recommendations after recent ED visit. Per Dr Baires : can we get a capsule and then I can see in clinic with a repeat CBC prior to? Chances are we'll probably be doing a single balloon enteroscopy afterwards    Charla in agreement, will await call from scheduling to get VCS done. Explained that Dr Baires's video visit with Rachele on 2/8 will need to be delayed since the capsule study will likely not be resulted by then. Will tentatively plan for March 1st clinic and hold a procedure date of 3/24  M/W/Fri HD goes from 10:30-2:30.     Will follow up when capsule study is performed. Needs clinic and lab rescheduled.    Loraine Mancera, RN, BSN,   [facility]  Care coordinator

## 2023-01-28 LAB
ATRIAL RATE - MUSE: 77 BPM
DIASTOLIC BLOOD PRESSURE - MUSE: NORMAL MMHG
INTERPRETATION ECG - MUSE: NORMAL
P AXIS - MUSE: 65 DEGREES
PR INTERVAL - MUSE: 186 MS
QRS DURATION - MUSE: 90 MS
QT - MUSE: 430 MS
QTC - MUSE: 486 MS
R AXIS - MUSE: 61 DEGREES
SYSTOLIC BLOOD PRESSURE - MUSE: NORMAL MMHG
T AXIS - MUSE: 87 DEGREES
VENTRICULAR RATE- MUSE: 77 BPM

## 2023-01-30 ENCOUNTER — TELEPHONE (OUTPATIENT)
Dept: GASTROENTEROLOGY | Facility: CLINIC | Age: 83
End: 2023-01-30

## 2023-01-30 DIAGNOSIS — K55.20 AVM (ARTERIOVENOUS MALFORMATION) OF SMALL BOWEL, ACQUIRED: Primary | ICD-10-CM

## 2023-01-30 RX ORDER — BISACODYL 5 MG/1
TABLET, DELAYED RELEASE ORAL
Qty: 2 TABLET | Refills: 0 | Status: SHIPPED | OUTPATIENT
Start: 2023-01-30 | End: 2023-02-17

## 2023-01-30 NOTE — TELEPHONE ENCOUNTER
Pre assessment questions completed with patient's daughter Charla (on consent to communicate) for upcoming Video capsule endoscopy  procedure scheduled on 2.2.23    COVID policy reviewed.     Reviewed procedural arrival time 1100 and facility location [facility]; [address], [address], [place] [address]     No sedation required for VCE     Anticoagulation/blood thinners? No    Electronic implanted devices? No    Diabetic? No    Reviewed VCE golytely procedure prep instructions.     Charla verbalized understanding and had no questions or concerns at this time.    Amy Pittman RN  Endoscopy Procedure Pre Assessment RN

## 2023-01-31 ENCOUNTER — OFFICE VISIT (OUTPATIENT)
Dept: GASTROENTEROLOGY | Facility: CLINIC | Age: 83
End: 2023-01-31
Attending: PHYSICIAN ASSISTANT
Payer: MEDICARE

## 2023-01-31 ENCOUNTER — INFUSION THERAPY VISIT (OUTPATIENT)
Dept: ONCOLOGY | Facility: CLINIC | Age: 83
End: 2023-01-31
Attending: PHYSICIAN ASSISTANT
Payer: MEDICARE

## 2023-01-31 VITALS
OXYGEN SATURATION: 99 % | HEART RATE: 74 BPM | DIASTOLIC BLOOD PRESSURE: 102 MMHG | BODY MASS INDEX: 23.7 KG/M2 | WEIGHT: 138.1 LBS | SYSTOLIC BLOOD PRESSURE: 165 MMHG | TEMPERATURE: 98.3 F

## 2023-01-31 VITALS
SYSTOLIC BLOOD PRESSURE: 165 MMHG | DIASTOLIC BLOOD PRESSURE: 102 MMHG | RESPIRATION RATE: 16 BRPM | HEART RATE: 74 BPM | TEMPERATURE: 98.3 F | OXYGEN SATURATION: 99 %

## 2023-01-31 DIAGNOSIS — K74.60 CIRRHOSIS OF LIVER WITHOUT ASCITES, UNSPECIFIED HEPATIC CIRRHOSIS TYPE (H): Primary | ICD-10-CM

## 2023-01-31 DIAGNOSIS — K55.20 AVM (ARTERIOVENOUS MALFORMATION) OF SMALL BOWEL, ACQUIRED: ICD-10-CM

## 2023-01-31 DIAGNOSIS — L29.9 ITCHING: ICD-10-CM

## 2023-01-31 DIAGNOSIS — K31.811 ANGIODYSPLASIA OF STOMACH AND DUODENUM WITH HEMORRHAGE: Primary | ICD-10-CM

## 2023-01-31 DIAGNOSIS — D50.0 IRON DEFICIENCY ANEMIA DUE TO CHRONIC BLOOD LOSS: ICD-10-CM

## 2023-01-31 PROCEDURE — 99214 OFFICE O/P EST MOD 30 MIN: CPT | Performed by: PHYSICIAN ASSISTANT

## 2023-01-31 PROCEDURE — G0463 HOSPITAL OUTPT CLINIC VISIT: HCPCS | Mod: 25 | Performed by: PHYSICIAN ASSISTANT

## 2023-01-31 PROCEDURE — 96372 THER/PROPH/DIAG INJ SC/IM: CPT | Performed by: INTERNAL MEDICINE

## 2023-01-31 PROCEDURE — 250N000011 HC RX IP 250 OP 636: Performed by: INTERNAL MEDICINE

## 2023-01-31 RX ORDER — URSODIOL 300 MG/1
600 CAPSULE ORAL 2 TIMES DAILY
Qty: 120 CAPSULE | Refills: 1 | Status: ON HOLD | OUTPATIENT
Start: 2023-01-31 | End: 2023-05-13

## 2023-01-31 RX ADMIN — OCTREOTIDE ACETATE 20 MG: KIT at 12:04

## 2023-01-31 ASSESSMENT — PAIN SCALES - GENERAL
PAINLEVEL: EXTREME PAIN (8)
PAINLEVEL: EXTREME PAIN (8)

## 2023-01-31 NOTE — PROGRESS NOTES
Infusion Nursing Note:  Rachele Martínez presents today for Sandostatin.    Patient seen by provider today: No   present during visit today: Not Applicable.    Note: Patient arrives feeling okay. C/o ongoing R lower leg pain due to vascular issues per pt. She denies need for intervention for this and is working on getting an U/S scheduled. BP elevated upon arrival but states she did not take her BP meds this morning and will do so when she gets home. Pt states her daughter takes care of all her appointments and medications.    Post Infusion Assessment:  Patient received Sandostatin injection into LEFT ventrogluteal. Pt shifted position as medication was adminsitered and then complained of discomfort. No bleeding upon needle removal.     Discharge Plan:   Patient declined prescription refills.  Discharge instructions reviewed with: Patient.  Patient and/or family verbalized understanding of discharge instructions and all questions answered.  AVS to patient via [facility].  Patient will return 2/28 at  clinic for next appointment.   Patient discharged in stable condition accompanied by: writer.  [facility]      Charlotte Townsend RN

## 2023-01-31 NOTE — PROGRESS NOTES
Hepatology Follow-up Clinic note  Rachele Martínez   Date of Birth 1940  Date of Service 1/31/2023    Reason for follow-up: Cirrhosis          Assessment/plan:   Rachele Martínez is a 82 year old female with history of hep C cirrhosis, well compensated without overt hepatic encephalopathy or ascites on imaging.  She continues to have bothersome pruritus about half month.  Unclear if this is truly secondary to liver disease versus hyperparathyroidism/kidney disease. She has mildly elevated alkaline phosphatase, otherwise normal synthetic liver function. She continues to be closely followed by advanced endoscopy due to history of bleeding AVMs.  She has a video capsule endoscopy scheduled in the near future.  She is up-to-date on both variceal screening and HCC screening.    # Pruritus, unclear etiology:   - Trial of ursodiol 600 mg twice daily for a month  - If ursodiol is not helpful, can consider trial of naltrexone    # Continue HCC screening: US abdomen in 1 year    # BMP, Hepatic panel, CBC, INR in one year     # Continue follow up with advanced endoscopy for history of bleeding AVM    # Follow-up in clinic in one year     Roberto Gordon PA-C   [facility] Hepatology clinic    Total time for E/M services performed on the date of the encounter 30 minutes.  This included review of previous: clinic visits, hospital records, lab results, imaging studies, and procedural documentation. Time also includes patient visit, documentation.  The findings from this review are summarized in the above note.   -----------------------------------------------------       HPI:   Rachele Martínez is a 82 year old female presenting for follow-up.     Cirrhosis:  - Hep C  Complicated by:  - No hx of Ascites  - Hx of GI bleed due to AVM, no history of varices  - No hx of HE  EGD: 8/14/2018, normal esophagus, bleeding angiodysplastic lesions in stomach and duodenum  HCC: 5/17/2018, no liver lesions.     Patient was  last seen by 1/27/2022.  She was hospitalized early January for suspected GI bleed with acute on chronic anemia was 5.6 on admission.  She had upper endoscopy that did not show any source of bleeding.She has had several upper endoscopies over the course of the year and one colonoscopy in May 2022.     Her appetite is pretty good weight is up about 10 to 15 pounds. She is scheduled with video capsule endoscopy in a few weeks. She continues to get octreotide injections monthly.      Her biggest complaint today continues to be intermittent pruritus, that is diffuse all over her body.  Tends to be more intense at night.  She has been prescribed sertraline.  Prescribed naltrexone last appointment, but she did not try this.    Patient denies jaundice, lower extremity edema, abdominal distension or confusion.      Patient also denies melena, hematochezia or hematemesis.    Patient denies fevers, sweats or chills.         Medications:     Current Outpatient Medications   Medication     acetaminophen (TYLENOL) 500 MG tablet     amLODIPine (NORVASC) 5 MG tablet     atorvastatin (LIPITOR) 20 MG tablet     bisacodyl (DULCOLAX) 5 MG EC tablet     Calcium Acetate, Phos Binder, 667 MG CAPS     carvedilol (COREG) 3.125 MG tablet     cilostazol (PLETAL) 100 MG tablet     gabapentin (NEURONTIN) 300 MG capsule     losartan (COZAAR) 25 MG tablet     Multiple Vitamins-Minerals (PRORENAL + D) TABS     naltrexone (DEPADE/REVIA) 50 MG tablet     octreotide (SANDOSTATIN LAR) 20 MG injection     order for DME     order for DME     pantoprazole (PROTONIX) 20 MG EC tablet     polyethylene glycol (GOLYTELY) 236 g suspension     polyethylene glycol (MIRALAX) 17 GM/Dose powder     sertraline (ZOLOFT) 50 MG tablet     No current facility-administered medications for this visit.            Review of Systems:   10 points ROS was obtained and highlighted in the HPI, otherwise negative.          Physical Exam:   BP (!) 165/102   Pulse 74   Temp 98.3   F (36.8  C)   Wt 62.6 kg (138 lb 1.6 oz)   SpO2 99%   BMI 23.70 kg/m      Gen: A&Ox3, NAD, well developed  HEENT: non-icteric  CV: RRR, no overt murmurs  Lung: CTA Bilatererally, no wheezing or crackles.   Lym- no palpable lymphadenopathy  Abd: soft, NT, ND, no organomegaly   Ext: no edema, intact pulses.   Skin: No rash, no palmar erythema, telangiectasias or jaundice  Neuro: grossly intact, no asterixis   Psych: appropriate mood and affects         Data:   Reviewed in person and significant for:    Lab Results   Component Value Date     01/26/2023     05/26/2020      Lab Results   Component Value Date    POTASSIUM 4.4 01/26/2023    POTASSIUM 4.5 01/27/2022    POTASSIUM 4.1 05/26/2020     Lab Results   Component Value Date    CHLORIDE 103 01/26/2023    CHLORIDE 101 01/27/2022    CHLORIDE 98 05/26/2020     Lab Results   Component Value Date    CO2 26 01/26/2023    CO2 28 01/27/2022    CO2 28 05/26/2020     Lab Results   Component Value Date    BUN 14.5 01/26/2023    BUN 18 01/27/2022    BUN 23 05/26/2020     Lab Results   Component Value Date    CR 5.49 01/26/2023    CR 7.50 05/26/2020       Lab Results   Component Value Date    WBC 7.8 01/26/2023    WBC 5.5 05/26/2020     Lab Results   Component Value Date    HGB 6.9 01/26/2023    HGB 12.8 05/26/2020     Lab Results   Component Value Date    HCT 23.5 01/26/2023    HCT 41.1 05/26/2020     Lab Results   Component Value Date     01/26/2023     05/26/2020     Lab Results   Component Value Date     01/26/2023     05/26/2020       Lab Results   Component Value Date    AST 29 01/26/2023    AST 21 05/26/2020     Lab Results   Component Value Date    ALT 10 01/26/2023    ALT 20 05/26/2020     No results found for: BILICONJ   Lab Results   Component Value Date    BILITOTAL 0.4 01/26/2023    BILITOTAL 1.0 05/26/2020       Lab Results   Component Value Date    ALBUMIN 3.9 01/26/2023    ALBUMIN 4.1 01/27/2022    ALBUMIN 3.8 05/26/2020      Lab Results   Component Value Date    PROTTOTAL 7.0 01/26/2023    PROTTOTAL 8.7 05/26/2020      Lab Results   Component Value Date    ALKPHOS 198 01/26/2023    ALKPHOS 136 05/26/2020       Lab Results   Component Value Date    INR 1.20 01/26/2023    INR 1.31 05/26/2020 1/23/2023:   US Regular quadrant ultrasound     INDICATION: Cirrhosis. Hepatocellular cancer screening.     COMPARISON: CT abdomen 3/3/2022     FINDINGS: Stool and clip images reviewed. No liver masses are visible.  Liver measures 13.4 cm in length. Portal vein, flow it is normal in  caliber is normal at 12 mm. Gallbladder shows well-defined area of  somewhat oval-shaped mild echogenic density without significant  shadowing comment consistent with probable sludge ball versus  noncalcified stones. Lateral measurement is normal at 2 mm. Common  bile duct caliber is normal at 7 mm. No intrahepatic biliary dilation.  The visualized pancreas is partially obscured by bowel gas blocking.  Right kidney measures 9.1 cm. There is an anechoic structure with good  through transmission at the superior pole measuring 11 x 10 x 12 mm.  No hydronephrosis. Cortical echotexture however is increased and there  is loss of cortical medullary differentiation. No free fluid.                                                                      IMPRESSION: Moderate echogenic oval-shaped density in the gallbladder  suggestive of large sludge ball, cannot exclude small stones  intermixed in this area. No wall thickening or biliary dilation. No  liver masses. US LI-RADS 1: Negative. Small right renal cyst with  chronic renal medical disease of right kidney. Please correlate with  renal function evaluation.

## 2023-01-31 NOTE — LETTER
1/31/2023         RE: Rachele Martínez  [address]  [place] [address]        Dear Colleague,    Thank you for referring your patient, Rachele Martínez, to the [facility] [place]. Please see a copy of my visit note below.    Hepatology Follow-up Clinic note  Rachele Martínez   Date of Birth 1940  Date of Service 1/31/2023    Reason for follow-up: Cirrhosis          Assessment/plan:   Rachele Martínez is a 82 year old female with history of hep C cirrhosis, well compensated without overt hepatic encephalopathy or ascites on imaging.  She continues to have bothersome pruritus about half month.  Unclear if this is truly secondary to liver disease versus hyperparathyroidism/kidney disease. She has mildly elevated alkaline phosphatase, otherwise normal synthetic liver function. She continues to be closely followed by advanced endoscopy due to history of bleeding AVMs.  She has a video capsule endoscopy scheduled in the near future.  She is up-to-date on both variceal screening and HCC screening.    # Pruritus, unclear etiology:   - Trial of ursodiol 600 mg twice daily for a month  - If ursodiol is not helpful, can consider trial of naltrexone    # Continue HCC screening: US abdomen in 1 year    # BMP, Hepatic panel, CBC, INR in one year     # Continue follow up with advanced endoscopy for history of bleeding AVM    # Follow-up in clinic in one year     Roberto Gordon PA-C   [facility] Hepatology clinic    Total time for E/M services performed on the date of the encounter 30 minutes.  This included review of previous: clinic visits, hospital records, lab results, imaging studies, and procedural documentation. Time also includes patient visit, documentation.  The findings from this review are summarized in the above note.   -----------------------------------------------------       HPI:   Rachele Martínez is a 82 year old female presenting  for follow-up.     Cirrhosis:  - Hep C  Complicated by:  - No hx of Ascites  - Hx of GI bleed due to AVM, no history of varices  - No hx of HE  EGD: 8/14/2018, normal esophagus, bleeding angiodysplastic lesions in stomach and duodenum  HCC: 5/17/2018, no liver lesions.     Patient was last seen by 1/27/2022.  She was hospitalized early January for suspected GI bleed with acute on chronic anemia was 5.6 on admission.  She had upper endoscopy that did not show any source of bleeding.She has had several upper endoscopies over the course of the year and one colonoscopy in May 2022.     Her appetite is pretty good weight is up about 10 to 15 pounds. She is scheduled with video capsule endoscopy in a few weeks. She continues to get octreotide injections monthly.      Her biggest complaint today continues to be intermittent pruritus, that is diffuse all over her body.  Tends to be more intense at night.  She has been prescribed sertraline.  Prescribed naltrexone last appointment, but she did not try this.    Patient denies jaundice, lower extremity edema, abdominal distension or confusion.      Patient also denies melena, hematochezia or hematemesis.    Patient denies fevers, sweats or chills.         Medications:     Current Outpatient Medications   Medication     acetaminophen (TYLENOL) 500 MG tablet     amLODIPine (NORVASC) 5 MG tablet     atorvastatin (LIPITOR) 20 MG tablet     bisacodyl (DULCOLAX) 5 MG EC tablet     Calcium Acetate, Phos Binder, 667 MG CAPS     carvedilol (COREG) 3.125 MG tablet     cilostazol (PLETAL) 100 MG tablet     gabapentin (NEURONTIN) 300 MG capsule     losartan (COZAAR) 25 MG tablet     Multiple Vitamins-Minerals (PRORENAL + D) TABS     naltrexone (DEPADE/REVIA) 50 MG tablet     octreotide (SANDOSTATIN LAR) 20 MG injection     order for DME     order for DME     pantoprazole (PROTONIX) 20 MG EC tablet     polyethylene glycol (GOLYTELY) 236 g suspension     polyethylene glycol (MIRALAX) 17  GM/Dose powder     sertraline (ZOLOFT) 50 MG tablet     No current facility-administered medications for this visit.            Review of Systems:   10 points ROS was obtained and highlighted in the HPI, otherwise negative.          Physical Exam:   BP (!) 165/102   Pulse 74   Temp 98.3  F (36.8  C)   Wt 62.6 kg (138 lb 1.6 oz)   SpO2 99%   BMI 23.70 kg/m      Gen: A&Ox3, NAD, well developed  HEENT: non-icteric  CV: RRR, no overt murmurs  Lung: CTA Bilatererally, no wheezing or crackles.   Lym- no palpable lymphadenopathy  Abd: soft, NT, ND, no organomegaly   Ext: no edema, intact pulses.   Skin: No rash, no palmar erythema, telangiectasias or jaundice  Neuro: grossly intact, no asterixis   Psych: appropriate mood and affects         Data:   Reviewed in person and significant for:    Lab Results   Component Value Date     01/26/2023     05/26/2020      Lab Results   Component Value Date    POTASSIUM 4.4 01/26/2023    POTASSIUM 4.5 01/27/2022    POTASSIUM 4.1 05/26/2020     Lab Results   Component Value Date    CHLORIDE 103 01/26/2023    CHLORIDE 101 01/27/2022    CHLORIDE 98 05/26/2020     Lab Results   Component Value Date    CO2 26 01/26/2023    CO2 28 01/27/2022    CO2 28 05/26/2020     Lab Results   Component Value Date    BUN 14.5 01/26/2023    BUN 18 01/27/2022    BUN 23 05/26/2020     Lab Results   Component Value Date    CR 5.49 01/26/2023    CR 7.50 05/26/2020       Lab Results   Component Value Date    WBC 7.8 01/26/2023    WBC 5.5 05/26/2020     Lab Results   Component Value Date    HGB 6.9 01/26/2023    HGB 12.8 05/26/2020     Lab Results   Component Value Date    HCT 23.5 01/26/2023    HCT 41.1 05/26/2020     Lab Results   Component Value Date     01/26/2023     05/26/2020     Lab Results   Component Value Date     01/26/2023     05/26/2020       Lab Results   Component Value Date    AST 29 01/26/2023    AST 21 05/26/2020     Lab Results   Component Value  Date    ALT 10 01/26/2023    ALT 20 05/26/2020     No results found for: BILICONJ   Lab Results   Component Value Date    BILITOTAL 0.4 01/26/2023    BILITOTAL 1.0 05/26/2020       Lab Results   Component Value Date    ALBUMIN 3.9 01/26/2023    ALBUMIN 4.1 01/27/2022    ALBUMIN 3.8 05/26/2020     Lab Results   Component Value Date    PROTTOTAL 7.0 01/26/2023    PROTTOTAL 8.7 05/26/2020      Lab Results   Component Value Date    ALKPHOS 198 01/26/2023    ALKPHOS 136 05/26/2020       Lab Results   Component Value Date    INR 1.20 01/26/2023    INR 1.31 05/26/2020 1/23/2023:   US Regular quadrant ultrasound     INDICATION: Cirrhosis. Hepatocellular cancer screening.     COMPARISON: CT abdomen 3/3/2022     FINDINGS: Stool and clip images reviewed. No liver masses are visible.  Liver measures 13.4 cm in length. Portal vein, flow it is normal in  caliber is normal at 12 mm. Gallbladder shows well-defined area of  somewhat oval-shaped mild echogenic density without significant  shadowing comment consistent with probable sludge ball versus  noncalcified stones. Lateral measurement is normal at 2 mm. Common  bile duct caliber is normal at 7 mm. No intrahepatic biliary dilation.  The visualized pancreas is partially obscured by bowel gas blocking.  Right kidney measures 9.1 cm. There is an anechoic structure with good  through transmission at the superior pole measuring 11 x 10 x 12 mm.  No hydronephrosis. Cortical echotexture however is increased and there  is loss of cortical medullary differentiation. No free fluid.                                                                      IMPRESSION: Moderate echogenic oval-shaped density in the gallbladder  suggestive of large sludge ball, cannot exclude small stones  intermixed in this area. No wall thickening or biliary dilation. No  liver masses. US LI-RADS 1: Negative. Small right renal cyst with  chronic renal medical disease of right kidney. Please correlate  with  renal function evaluation.      Again, thank you for allowing me to participate in the care of your patient.        Sincerely,        Roberto Gordon PA-C

## 2023-01-31 NOTE — NURSING NOTE
Chief Complaint   Patient presents with     RECHECK     Return visit.     Blood pressure (!) 165/102, pulse 74, temperature 98.3  F (36.8  C), weight 62.6 kg (138 lb 1.6 oz), SpO2 99 %, not currently breastfeeding.    MIKHAIL EDWARDS

## 2023-02-02 ENCOUNTER — HOSPITAL ENCOUNTER (EMERGENCY)
Facility: CLINIC | Age: 83
Discharge: HOME | End: 2023-02-02
Attending: EMERGENCY MEDICINE | Admitting: EMERGENCY MEDICINE
Payer: MEDICARE

## 2023-02-02 ENCOUNTER — NURSE TRIAGE (OUTPATIENT)
Dept: CALL CENTER | Age: 83
End: 2023-02-02

## 2023-02-02 ENCOUNTER — DOCUMENTATION ONLY (OUTPATIENT)
Dept: INTERNAL MEDICINE | Facility: CLINIC | Age: 83
End: 2023-02-02
Payer: MEDICARE

## 2023-02-02 ENCOUNTER — HOSPITAL ENCOUNTER (OUTPATIENT)
Facility: CLINIC | Age: 83
Discharge: HOME | End: 2023-02-02
Attending: INTERNAL MEDICINE | Admitting: INTERNAL MEDICINE
Payer: MEDICARE

## 2023-02-02 VITALS
SYSTOLIC BLOOD PRESSURE: 171 MMHG | TEMPERATURE: 96.9 F | HEART RATE: 74 BPM | DIASTOLIC BLOOD PRESSURE: 78 MMHG | OXYGEN SATURATION: 100 % | RESPIRATION RATE: 18 BRPM

## 2023-02-02 DIAGNOSIS — K62.5 BLOOD PER RECTUM: ICD-10-CM

## 2023-02-02 LAB
ALBUMIN SERPL BCG-MCNC: 3.9 G/DL (ref 3.5–5.2)
ALP SERPL-CCNC: 171 U/L (ref 35–104)
ALT SERPL W P-5'-P-CCNC: 9 U/L (ref 10–35)
ANION GAP SERPL CALCULATED.3IONS-SCNC: 18 MMOL/L (ref 7–15)
AST SERPL W P-5'-P-CCNC: 30 U/L (ref 10–35)
BASOPHILS # BLD AUTO: 0.1 10E3/UL (ref 0–0.2)
BASOPHILS NFR BLD AUTO: 1 %
BILIRUB SERPL-MCNC: 0.7 MG/DL
BUN SERPL-MCNC: 15.6 MG/DL (ref 8–23)
CALCIUM SERPL-MCNC: 9 MG/DL (ref 8.8–10.2)
CHLORIDE SERPL-SCNC: 102 MMOL/L (ref 98–107)
CREAT SERPL-MCNC: 6.12 MG/DL (ref 0.51–0.95)
DEPRECATED HCO3 PLAS-SCNC: 23 MMOL/L (ref 22–29)
EOSINOPHIL # BLD AUTO: 0.2 10E3/UL (ref 0–0.7)
EOSINOPHIL NFR BLD AUTO: 2 %
ERYTHROCYTE [DISTWIDTH] IN BLOOD BY AUTOMATED COUNT: 18.8 % (ref 10–15)
GFR SERPL CREATININE-BSD FRML MDRD: 6 ML/MIN/1.73M2
GLUCOSE SERPL-MCNC: 98 MG/DL (ref 70–99)
HCT VFR BLD AUTO: 29.6 % (ref 35–47)
HGB BLD-MCNC: 9 G/DL (ref 11.7–15.7)
HOLD SPECIMEN: NORMAL
HOLD SPECIMEN: NORMAL
IMM GRANULOCYTES # BLD: 0 10E3/UL
IMM GRANULOCYTES NFR BLD: 1 %
INR PPP: 1.29 (ref 0.85–1.15)
LYMPHOCYTES # BLD AUTO: 0.7 10E3/UL (ref 0.8–5.3)
LYMPHOCYTES NFR BLD AUTO: 8 %
MCH RBC QN AUTO: 31.7 PG (ref 26.5–33)
MCHC RBC AUTO-ENTMCNC: 30.4 G/DL (ref 31.5–36.5)
MCV RBC AUTO: 104 FL (ref 78–100)
MONOCYTES # BLD AUTO: 0.8 10E3/UL (ref 0–1.3)
MONOCYTES NFR BLD AUTO: 10 %
NEUTROPHILS # BLD AUTO: 6.5 10E3/UL (ref 1.6–8.3)
NEUTROPHILS NFR BLD AUTO: 78 %
NRBC # BLD AUTO: 0 10E3/UL
NRBC BLD AUTO-RTO: 0 /100
PLATELET # BLD AUTO: 193 10E3/UL (ref 150–450)
POTASSIUM SERPL-SCNC: 4.4 MMOL/L (ref 3.4–5.3)
PROT SERPL-MCNC: 7 G/DL (ref 6.4–8.3)
RBC # BLD AUTO: 2.84 10E6/UL (ref 3.8–5.2)
SODIUM SERPL-SCNC: 143 MMOL/L (ref 136–145)
WBC # BLD AUTO: 8.2 10E3/UL (ref 4–11)

## 2023-02-02 PROCEDURE — 36415 COLL VENOUS BLD VENIPUNCTURE: CPT | Performed by: EMERGENCY MEDICINE

## 2023-02-02 PROCEDURE — 85025 COMPLETE CBC W/AUTO DIFF WBC: CPT | Performed by: EMERGENCY MEDICINE

## 2023-02-02 PROCEDURE — 82310 ASSAY OF CALCIUM: CPT | Performed by: EMERGENCY MEDICINE

## 2023-02-02 PROCEDURE — 91110 GI TRC IMG INTRAL ESOPH-ILE: CPT | Performed by: INTERNAL MEDICINE

## 2023-02-02 PROCEDURE — 250N000013 HC RX MED GY IP 250 OP 250 PS 637: Performed by: INTERNAL MEDICINE

## 2023-02-02 PROCEDURE — 99284 EMERGENCY DEPT VISIT MOD MDM: CPT | Performed by: EMERGENCY MEDICINE

## 2023-02-02 PROCEDURE — 85610 PROTHROMBIN TIME: CPT | Performed by: EMERGENCY MEDICINE

## 2023-02-02 PROCEDURE — 999N000127 HC STATISTIC PERIPHERAL IV START W US GUIDANCE

## 2023-02-02 PROCEDURE — 99283 EMERGENCY DEPT VISIT LOW MDM: CPT | Performed by: EMERGENCY MEDICINE

## 2023-02-02 RX ORDER — SIMETHICONE 40MG/0.6ML
SUSPENSION, DROPS(FINAL DOSAGE FORM)(ML) ORAL PRN
Status: DISCONTINUED | OUTPATIENT
Start: 2023-02-02 | End: 2023-02-06 | Stop reason: HOSPADM

## 2023-02-02 ASSESSMENT — ACTIVITIES OF DAILY LIVING (ADL)
ADLS_ACUITY_SCORE: 37
ADLS_ACUITY_SCORE: 35
ADLS_ACUITY_SCORE: 37

## 2023-02-02 NOTE — PROGRESS NOTES
BRIEF GI CHART REVIEW NOTE:    02/02/23    Note: patient not seen today, this note is the product of chart review, contacted by ED MD, Dr. Berman, regarding Rachele Martínez.     This is a 82 year old female with history of hep C cirrhosis, well compensated, ESRD on HD, history of multiple AVMs (upper GI and cecum) on monthly depot octreotide injections who was recently admitted 1/6-1/9 with acute on chronic anemia with hgb of 5.6. EGD was done on 1/9/23 without esophageal or gastric varices. She had a recent ED visit on 1/26 for abnormal labs with hgb of 6.6, no signs or symptoms of overt GI bleed. Her daughter called to discuss with advanced endoscopy regarding follow ups. Dr. Baires recommended a video capsule endoscopy with follow up after in clinic. She was prepping for a video capsule endoscopy, however started to have BRBPR so she stopped the prep. Per the patient's daughter, the patient was having heavy bright red rectal bleeding so she presented to the ED.     ED labs with hgb 9.0 (previously 6.9 on 1/26/23 but then was transfused), INR 1.29, plts 270, WBC 7.8, Cr 5.49.    Per Dr. Berman, the patient has not had any stools here and no current evidence of GIB with stable hgb and stable vitals (actually hypertensive). Will plan to move forward with scheduled VCE today especially in setting of BRBPR.       Last procedures:  EGD 1/9/23  Impression:        - Normal esophagus.        - Erythematous mucosa in the gastric body and antrum.        - Normal examined duodenum.        - Normal examined jejunum up to likely proximal mid jejunum. No obvious        lesions seen.     Colonoscopy on 5/28/22  Impression:        - Non-bleeding internal hemorrhoids.        - The examined portion of the ileum was normal.        - Two 2 to 3 mm polyps in the ascending colon, removed with a cold        biopsy forceps. Resected and retrieved.        - Six 2 to 3 mm polyps in the descending colon, removed with a cold        biopsy  forceps. Resected and retrieved.        Bleeding likely from internal hemorrhoids; retroflexion not able to be        performed in rectum due to pt coughing, slow withdrawal of scope did not        reveal significant pathology; no angiectasias         Recommendations:  - Will proceed with video capsule endoscopy today, especially with recent active bleeding   - Will defer decision for admission or discharge to ED provider   - Patient is scheduled for outpatient follow up with Dr. Baires March 1st as per previous plan to follow up after VCE    Patient discussed with on call GI staff Dr. Boom Mercedes PA-C  GI Service  [facility]  Text Page

## 2023-02-02 NOTE — ED PROVIDER NOTES
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 Patient reports that she was scheduled to have an endoscopic pill swallow test  D/y history of bleeding ABMs today. She states that she drank half the prep last night when she started to pass a large volume of bright red blood mixed in with diarrhea. She states that she has had multiple episodes of diarrhea over the past week but that she first noticed blood yesterday. She is not on blood thinners. Denies hemorrhoids or fissures. No recent antibiotics. No abdominal pain, nausea, or vomiting. She is uncertain if there were blood clots in her stool. She often has diarrhea after dialysis. She was also doing a bowel prep for the pill study so she is not worried about a new illness as the cause for diarrhea.  Patient received a blood transfusion 2 units on Tuesday. She is on Sandostatin injections, last injection on 1/31/23.     Past Medical History  Past Medical History:   Diagnosis Date     Anemia      Anxiety and depression      Arthritis      AVM (arteriovenous malformation)      Chronic hepatitis C with cirrhosis (H)      Clotting disorder (H)      ESRD (end stage renal disease) (H)     on dialysis     GI bleed     recurrent     Glaucoma      Hyperlipidemia      Hypertension goal BP (blood pressure) < 140/80      Past Surgical History:   Procedure Laterality Date     CAPSULE/PILL CAM ENDOSCOPY N/A 3/27/2019    Procedure: Capsule/pill cam endoscopy;  Surgeon: Yosvany Ram MD;  Location:  GI     COLONOSCOPY N/A 9/4/2015    Procedure: COMBINED COLONOSCOPY, SINGLE OR MULTIPLE  BIOPSY/POLYPECTOMY BY BIOPSY;  Surgeon: Rupesh Lopez MD;  Location:  GI     COLONOSCOPY N/A 9/19/2018    Procedure: COLONOSCOPY;  enteroscopy small bowel  COLONOSCOPY;  Surgeon: Ankit Baires MD;  Location:  GI     ESOPHAGOSCOPY, GASTROSCOPY, DUODENOSCOPY (EGD), COMBINED N/A 12/18/2014    Procedure: COMBINED ESOPHAGOSCOPY, GASTROSCOPY, DUODENOSCOPY (EGD);  Surgeon: Betsy Carvajal MD;  Location:  GI     ESOPHAGOSCOPY, GASTROSCOPY, DUODENOSCOPY (EGD), COMBINED N/A 4/25/2015    Procedure: COMBINED ESOPHAGOSCOPY, GASTROSCOPY, DUODENOSCOPY (EGD);  Surgeon: Yosvany Ram MD;  Location:  GI     ESOPHAGOSCOPY, GASTROSCOPY, DUODENOSCOPY (EGD), COMBINED N/A 5/5/2015    Procedure: COMBINED ESOPHAGOSCOPY, GASTROSCOPY, DUODENOSCOPY (EGD);  Surgeon: Mariano Mistry MD;  Location:  GI     HC CAPSULE ENDOSCOPY N/A 9/30/2015    Procedure: CAPSULE/PILL CAM ENDOSCOPY;  Surgeon: Pan Dhaliwal MD;  Location: [facility] VASCULAR SURGERY PROCEDURE UNLIST       HYSTERECTOMY  1980    KYREE     LUMPECTOMY BREAST       acetaminophen (TYLENOL) 500 MG tablet  amLODIPine (NORVASC) 5 MG tablet  atorvastatin (LIPITOR) 20 MG tablet  bisacodyl (DULCOLAX) 5 MG EC tablet  Calcium Acetate, Phos Binder, 667 MG CAPS  carvedilol (COREG) 3.125 MG tablet  cilostazol (PLETAL) 100 MG tablet  gabapentin (NEURONTIN) 300 MG capsule  losartan (COZAAR) 25 MG tablet  Multiple Vitamins-Minerals (PRORENAL + D) TABS  naltrexone (DEPADE/REVIA) 50 MG tablet  octreotide (SANDOSTATIN LAR) 20 MG injection  order for DME  order for DME  pantoprazole (PROTONIX) 20 MG EC tablet  polyethylene glycol (GOLYTELY) 236 g suspension  polyethylene glycol (MIRALAX) 17 GM/Dose powder  sertraline (ZOLOFT) 50 MG tablet  ursodiol (ACTIGALL) 300 MG capsule      Allergies   Allergen Reactions     Abacavir Itching     Lisinopril Cough     Diovan Hct Itching     severe     Dust Mites      Hydrochlorothiazide Itching     Severe       No Clinical  Screening - See Comments      History of blood transfusion reactions and pre-treats with Benadryl.      Oxycodone-Acetaminophen      Spironolactone Nausea     Sulfa Drugs Hives     Valsartan Itching     Family History  Family History   Problem Relation Age of Onset     Diabetes Mother      Alzheimer Disease Mother      Substance Abuse Son      Cancer Sister      Soft Tissue Cancer Sister      Breast Cancer Sister      Hyperlipidemia Daughter      Alcoholism Brother      Spine Problems Sister      Social History   Social History     Tobacco Use     Smoking status: Former     Packs/day: 2.00     Years: 45.00     Pack years: 90.00     Types: Cigarettes     Start date: 1953     Quit date: 2002     Years since quittin.2     Smokeless tobacco: Never   Substance Use Topics     Alcohol use: No     Drug use: Yes     Types: Marijuana     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      Past medical history, past surgical history, medications, allergies, family history, and social history were reviewed with the patient. No additional pertinent items.      A medically appropriate review of systems was performed with pertinent positives and negatives noted in the HPI, and all other systems negative.    Physical Exam   BP: (!) 161/82  Pulse: 79  Temp: 96.9  F (36.1  C)  Resp: 18  SpO2: 100 %  Physical Exam  Vitals and nursing note reviewed.   Constitutional:       General: She is not in acute distress.     Appearance: Normal appearance. She is well-developed. She is not ill-appearing or diaphoretic.   HENT:      Head: Normocephalic and atraumatic.      Nose: Nose normal.      Mouth/Throat:      Mouth: Mucous membranes are moist.   Eyes:      General: No scleral icterus.     Conjunctiva/sclera: Conjunctivae normal.   Cardiovascular:      Rate and Rhythm: Normal rate.   Pulmonary:      Effort: Pulmonary effort is normal. No respiratory distress.   Abdominal:      General: There is no  distension.   Genitourinary:     Rectum: Normal. Guaiac result positive. No mass, tenderness, anal fissure, external hemorrhoid or internal hemorrhoid. Normal anal tone.      Comments: No gross blood on rectal exam. Trace solid red pieces but no formed stool. Guaiac positive.   Musculoskeletal:         General: No deformity or signs of injury. Normal range of motion.      Cervical back: Normal range of motion and neck supple. No rigidity.   Skin:     General: Skin is warm and dry.      Coloration: Skin is not jaundiced or pale.   Neurological:      General: No focal deficit present.      Mental Status: She is alert and oriented to person, place, and time.   Psychiatric:         Mood and Affect: Mood normal.         Behavior: Behavior normal.           ED Course, Procedures, & Data     1:30 PM  The patient was seen and examined by Yaquelin Berman MD in Room ED25.     Procedures                     Results for orders placed or performed during the hospital encounter of 02/02/23   Comprehensive metabolic panel     Status: Abnormal   Result Value Ref Range    Sodium 143 136 - 145 mmol/L    Potassium 4.4 3.4 - 5.3 mmol/L    Chloride 102 98 - 107 mmol/L    Carbon Dioxide (CO2) 23 22 - 29 mmol/L    Anion Gap 18 (H) 7 - 15 mmol/L    Urea Nitrogen 15.6 8.0 - 23.0 mg/dL    Creatinine 6.12 (H) 0.51 - 0.95 mg/dL    Calcium 9.0 8.8 - 10.2 mg/dL    Glucose 98 70 - 99 mg/dL    Alkaline Phosphatase 171 (H) 35 - 104 U/L    AST 30 10 - 35 U/L    ALT 9 (L) 10 - 35 U/L    Protein Total 7.0 6.4 - 8.3 g/dL    Albumin 3.9 3.5 - 5.2 g/dL    Bilirubin Total 0.7 <=1.2 mg/dL    GFR Estimate 6 (L) >60 mL/min/1.73m2   CBC with platelets and differential     Status: Abnormal   Result Value Ref Range    WBC Count 8.2 4.0 - 11.0 10e3/uL    RBC Count 2.84 (L) 3.80 - 5.20 10e6/uL    Hemoglobin 9.0 (L) 11.7 - 15.7 g/dL    Hematocrit 29.6 (L) 35.0 - 47.0 %     (H) 78 - 100 fL    MCH 31.7 26.5 - 33.0 pg    MCHC 30.4 (L) 31.5 - 36.5 g/dL    RDW 18.8  (H) 10.0 - 15.0 %    Platelet Count 193 150 - 450 10e3/uL    % Neutrophils 78 %    % Lymphocytes 8 %    % Monocytes 10 %    % Eosinophils 2 %    % Basophils 1 %    % Immature Granulocytes 1 %    NRBCs per 100 WBC 0 <1 /100    Absolute Neutrophils 6.5 1.6 - 8.3 10e3/uL    Absolute Lymphocytes 0.7 (L) 0.8 - 5.3 10e3/uL    Absolute Monocytes 0.8 0.0 - 1.3 10e3/uL    Absolute Eosinophils 0.2 0.0 - 0.7 10e3/uL    Absolute Basophils 0.1 0.0 - 0.2 10e3/uL    Absolute Immature Granulocytes 0.0 <=0.4 10e3/uL    Absolute NRBCs 0.0 10e3/uL   [place] Draw     Status: None    Narrative    The following orders were created for panel order [place] Draw.  Procedure                               Abnormality         Status                     ---------                               -----------         ------                     Extra Blue Top Tube[[id number]]                              Final result               Extra Red Top Tube[[id number]]                               Final result                 Please view results for these tests on the individual orders.   Extra Blue Top Tube     Status: None   Result Value Ref Range    Hold Specimen JIC    Extra Red Top Tube     Status: None   Result Value Ref Range    Hold Specimen JIC    INR     Status: Abnormal   Result Value Ref Range    INR 1.29 (H) 0.85 - 1.15   CBC with platelets differential     Status: Abnormal    Narrative    The following orders were created for panel order CBC with platelets differential.  Procedure                               Abnormality         Status                     ---------                               -----------         ------                     CBC with platelets and d...[[id number]]  Abnormal            Final result                 Please view results for these tests on the individual orders.     Medications - No data to display  Labs Ordered and Resulted from Time of ED Arrival to Time of ED Departure   COMPREHENSIVE METABOLIC PANEL - Abnormal        Result Value    Sodium 143      Potassium 4.4      Chloride 102      Carbon Dioxide (CO2) 23      Anion Gap 18 (*)     Urea Nitrogen 15.6      Creatinine 6.12 (*)     Calcium 9.0      Glucose 98      Alkaline Phosphatase 171 (*)     AST 30      ALT 9 (*)     Protein Total 7.0      Albumin 3.9      Bilirubin Total 0.7      GFR Estimate 6 (*)    CBC WITH PLATELETS AND DIFFERENTIAL - Abnormal    WBC Count 8.2      RBC Count 2.84 (*)     Hemoglobin 9.0 (*)     Hematocrit 29.6 (*)      (*)     MCH 31.7      MCHC 30.4 (*)     RDW 18.8 (*)     Platelet Count 193      % Neutrophils 78      % Lymphocytes 8      % Monocytes 10      % Eosinophils 2      % Basophils 1      % Immature Granulocytes 1      NRBCs per 100 WBC 0      Absolute Neutrophils 6.5      Absolute Lymphocytes 0.7 (*)     Absolute Monocytes 0.8      Absolute Eosinophils 0.2      Absolute Basophils 0.1      Absolute Immature Granulocytes 0.0      Absolute NRBCs 0.0     INR - Abnormal    INR 1.29 (*)      No orders to display          Medical Decision Making  The patient's presentation is strongly suggestive of a chronic illness mild to moderate exacerbation, progression, or side effect of treatment.    The patient's evaluation involved:  review of external note(s) from 2 sources (see separate area of note for details)  ordering and/or review of 3+ test(s) in this encounter (see separate area of note for details)  review of 3+ test result(s) ordered prior to this encounter (see separate area of note for details)  discussion of management or test interpretation with another health professional (see separate area of note for details)    The patient's management involved a decision regarding hospitalization.      Assessment & Plan    Rachele Martínez is a 82 year old female with a past medical history including ESRD on dialysis, AVM of small bowel, cirrhosis of liver, and recurrent GIBs and anemia from GI angiectasias who presents to the Emergency  Department for evaluation of bright red blood in stool.     Ddx: bleeding small bowel AVM, internal hemorrhoids, red food contents, anemia    Patient well appearing, HTN but otherwise normal vitals. No abd pain. No urge to stool and has not had recurrent BM while in the ED the past 2 hours. Rectal exam with trace solid red elements but no formed stool or blood. Guaiac pos but patient is on iron supplementation. Hgb 9. GI consulted and reviewed patient's history. They spoke with GI team and will have them come down to place the pill cam as was originally planned for today. Patient has weekly blood draws at dialysis and they are aware of her h/o anemia so they follow it closely. Discussed with patient and her daughter who are comfortable discharging home and following up with GI as planned with their service. They will return for worsening bleeding.      I have reviewed the nursing notes. I have reviewed the findings, diagnosis, plan and need for follow up with the patient.    New Prescriptions    No medications on file       Final diagnoses:   Blood per rectum     ICherrie, am serving as a trained medical scribe to document services personally performed by Yaquelin Berman MD, based on the provider's statements to me.   Yaquelin OSORIO MD, was physically present and have reviewed and verified the accuracy of this note documented by Cherrie Hussein.    Yaquelin Berman MD  [facility] EMERGENCY DEPARTMENT  2/2/2023     Yaquelin Berman MD  02/02/23 7040

## 2023-02-02 NOTE — OR NURSING
"Pt states she finished half of the video capsule prep \"until clear\" and then she had \"two large bloody stools with diarrhea\", pt did not complete remainder of prep. Pt stated that \"amount of blood scared me\". MD notified. Procedure canceled at this time for workup in ED and additional prep as needed for eval of active bleed in ED. Pt asymptomatic at the moment. Pt discharged and advised to go to emergency department. Pt states understanding. Discharged with all belongings in care of daughter. Wheeled to emergency department in wheelchair.   "

## 2023-02-02 NOTE — PROGRESS NOTES
Type of Form Received: missed visit    Form Received (Date) 2/2/23   Form Filled out Yes 2/6/23   Placed in provider folder Yes

## 2023-02-02 NOTE — TELEPHONE ENCOUNTER
Cold call received , From Charla, daughter of pt.  Charla not w/ mother/Rachele.    Charla states Rachele called her, was unable to finish the prep for todays procedure, she stopped the prep due rectal bleed.  Charla added Rachele to call- see below  Rachele stated she had 2 stools of bright red bleeding during the night- unable to state amount, denies abd/rectal pain, dizziness, SOB, chest pain.   Was going to contact  Endoscopy- to see if procedure can still be done.  Slick upset/anxious [facility]    FYI to  Pre Endoscopy and                  GI - pt of JAYSON Gordon PA-C    Additional Information    Negative: Passed out (i.e., fainted, collapsed and was not responding)    Negative: Shock suspected (e.g., cold/pale/clammy skin, too weak to stand, low BP, rapid pulse)    Negative: Vomiting red blood or black (coffee ground) material    Negative: Sounds like a life-threatening emergency to the triager    Negative: SEVERE rectal bleeding (large blood clots; constant or on and off bleeding)    Negative: SEVERE dizziness (e.g., unable to stand, requires support to walk, feels like passing out now)    Negative: MODERATE rectal bleeding (small blood clots, passing blood without stool, or toilet water turns red) more than once a day    Negative: Bloody, black, or tarry bowel movements  (Exception: Chronic-unchanged black-grey bowel movements and is taking iron pills or Pepto-Bismol.)    Negative: High-risk adult (e.g., prior surgery on aorta, abdominal aortic aneurysm)    Negative: Rectal foreign body (inserted or swallowed)    Negative: Diarrhea is main symptom    Negative: Rectal symptoms    Negative: SEVERE abdominal pain (e.g., excruciating)    Negative: Constant abdominal pain lasting > 2 hours    Negative: Pale skin (pallor) of new-onset or worsening    Negative: Patient sounds very sick or weak to the triager    Protocols used: RECTAL BLEEDING-A-OH

## 2023-02-02 NOTE — TELEPHONE ENCOUNTER
The patient's daughter called in reporting that the patient has been having quite a bit of rectal bleeding since starting her colonoscopy prep.  The patient's daughter reports that the patient was able to complete about half of her prep, but then had to stop.  The daughter reports the patient is having heavy bright red rectal bleeding.  Transferred to the red flag triage line for assessment. Also called the [facility] and gave an update.     Ivett Gordon RN    Endoscopy Procedure Pre Assessment RN

## 2023-02-02 NOTE — OR NURSING
Procedure: Video Capsule Endoscopy   Sedation: None  Consent: Confirmed procedure consent as signed by patient  Pre-procedure Education:     Confirmed completion of bowel prep with pt/RN.    Pre-video capsule instructions reviewed in depth with pt and daughter     Printed materials given to pt for further review.     Pt voices questions answered to satisfaction prior to swallowing capsule.     Education included indication, possible benefits, possible risks, procedure, follow-up with particular attention paid to notation of passing capsule via BM.  Pt specifically instructed not to have MRI until confirmation that capsule has been passed.       Wristband applied     Patient ID label placed on clipboard in GI Physician Room for Dr. Wadsworth.    Patient ID Label placed on Endoscopy Charge (inpatients)/ Book (outpatients & inpatients) clipboard for 24-hour equipment return - data download    NPO Times:   NPO from ingestion of capsule until  1700 (x 2 hrs).  Clear fluids with PO meds after     (2 hrs post ingestion).  Light snack after 2100 (6 hrs post ingestion).  Regular diet after 0300 am (12 hrs post ingestion).     Capsule Insertion: Pt tolerated procedure well.  Monitor with belt applied.   Pt swallowed video capsule with 8 oz H2O + simethacone 2 ml added    Primary Care RN Report:  Report given to Primary Care RN including NPO/clear fluids/light snack/regular diet times upon completion of procedure      Additional Notes:     Isis Kimball, RN, RN  [facility] Endoscopy Unit

## 2023-02-02 NOTE — ED TRIAGE NOTES
Pt ambulatory to triage with kiesha. HX recent blood transfusions. Per pt this AM was scheduled to have endoscopic pill swallow test, but during her prep last night had large amounts of blood from her stool. Pt endorses chronic leg pain, otherwise denies any other symptoms at this time. Pt A&OX4, VSS on RA, pain 10/10.      Triage Assessment     Row Name 02/02/23 1131       Triage Assessment (Adult)    Airway WDL WDL       Respiratory WDL    Respiratory WDL WDL       Skin Circulation/Temperature WDL    Skin Circulation/Temperature WDL WDL       Cardiac WDL    Cardiac WDL WDL       Peripheral/Neurovascular WDL    Peripheral Neurovascular WDL WDL       Cognitive/Neuro/Behavioral WDL    Cognitive/Neuro/Behavioral WDL WDL

## 2023-02-02 NOTE — DISCHARGE INSTRUCTIONS
Please make an appointment to follow up with GI Clinic (phone: [phone]) as scheduled.    Return for worsening bleeding, abdominal pain, chest pain, fainting, or shortness of breath.

## 2023-02-03 ASSESSMENT — ACTIVITIES OF DAILY LIVING (ADL)
ADLS_ACUITY_SCORE: 37

## 2023-02-04 ASSESSMENT — ACTIVITIES OF DAILY LIVING (ADL)
ADLS_ACUITY_SCORE: 37

## 2023-02-05 ASSESSMENT — ACTIVITIES OF DAILY LIVING (ADL)
ADLS_ACUITY_SCORE: 37

## 2023-02-06 ENCOUNTER — TELEPHONE (OUTPATIENT)
Dept: INTERVENTIONAL RADIOLOGY/VASCULAR | Facility: CLINIC | Age: 83
End: 2023-02-06

## 2023-02-06 ASSESSMENT — ACTIVITIES OF DAILY LIVING (ADL)
ADLS_ACUITY_SCORE: 37

## 2023-02-06 NOTE — TELEPHONE ENCOUNTER
----- Message from Christin Payan RN sent at 9/28/2022 12:34 PM CDT -----  Regarding: Return PAD pt for Dr Talya Johnson,     I know Sol sent you a message to call and schedule pt for follow up visit with Dr Babin.  You tried calling and the daughter elvin wanted you to call back     When you call daughter to schedule the return visit push it out until after the first of the year 2023, pt needs to completed the PAD rehab for 3 months (supervised exercise program).  Please give the daughter the scheduling line for these appts :  If you have not heard from the scheduling office within 2 business days, please call [phone]     Thank you,     MALLORY Payan, RN, BSN  Interventional Radiology Nurse Coordinator   Phone:  [phone]    ----- Message -----  From: Christin Payan RN  Sent: 9/26/2022  12:00 AM CDT  To: Christin Payan RN      ----- Message -----  From: Sol Lambert RN  Sent: 9/19/2022   1:59 PM CDT  To: Christin Payan RN    From clinic on 9/19     Dr Babin wants this patient to follow up with PAD rehab clinic, I provided the number for the patient to call  [phone]. He would like you to follow up with her in one week and make sure she has made an appointment.     Three month follow up without imaging. I sent a message to Alex to get her scheduled.

## 2023-02-06 NOTE — TELEPHONE ENCOUNTER
I spoke with the pts daughter Charla she is stating that the pt has not started PAD Rehad. The pt has been in the hospital a few times and has just moved. Charla states that she will be calling to schedule the pt for Rehab and asked that I send a [employer] message as a reminder to schedule a follow up with  once rehab has finished.  I will sent out the [employer] message today.  Alex Yepez on 2/6/2023 at 10:59 AM.

## 2023-02-08 ENCOUNTER — DOCUMENTATION ONLY (OUTPATIENT)
Dept: INTERNAL MEDICINE | Facility: CLINIC | Age: 83
End: 2023-02-08
Payer: MEDICARE

## 2023-02-08 NOTE — TELEPHONE ENCOUNTER
[facility] Call Center    Phone Message    May a detailed message be left on voicemail: yes     Reason for Call: Other: The patient daughter was calling to schedule her Hospital Follow up with Dr. Rodriguez only, they are refusing to see another provider. However her next opening isn't until 3/10/23 for her return visit type because she has no ED visits type available and the next return date is too far out, the patient was offered 2/3/23 which was a date she had to do dialysis, please call patient daughter with alternative options thank you.     Action Taken: Message routed to:  [facility] pcc    Travel Screening: Not Applicable

## 2023-02-08 NOTE — PROGRESS NOTES
Type of Form Received: orders    Form Received (Date) 2/8/23   Form Filled out No   Placed in provider folder Yes

## 2023-02-08 NOTE — PROGRESS NOTES
Type of Form Received: order    Form Received (Date) 2/8/23   Form Filled out Yes 2/14/23   Placed in provider folder Yes

## 2023-02-09 LAB — VIDEO CAPSULE ENDOSCOPY: NORMAL

## 2023-02-10 ENCOUNTER — MEDICAL CORRESPONDENCE (OUTPATIENT)
Dept: HEALTH INFORMATION MANAGEMENT | Facility: CLINIC | Age: 83
End: 2023-02-10
Payer: MEDICARE

## 2023-02-10 NOTE — TELEPHONE ENCOUNTER
Called NIDHI Dunham. Placed appointment at 8:30 am on 2/17/23 on hold for the patient. Callback number provided to schedule. Advised to schedule with any other provider if this date does not work.     Christian MOSQUERA, EMT at 11:15 AM on 2/10/2023.  Primary Care Clinic: [phone]

## 2023-02-13 ENCOUNTER — PREP FOR PROCEDURE (OUTPATIENT)
Dept: GASTROENTEROLOGY | Facility: CLINIC | Age: 83
End: 2023-02-13
Payer: MEDICARE

## 2023-02-13 ENCOUNTER — DOCUMENTATION ONLY (OUTPATIENT)
Dept: INTERNAL MEDICINE | Facility: CLINIC | Age: 83
End: 2023-02-13
Payer: MEDICARE

## 2023-02-13 DIAGNOSIS — K92.1 MELENA: ICD-10-CM

## 2023-02-13 DIAGNOSIS — D50.9 ANEMIA, IRON DEFICIENCY: Primary | ICD-10-CM

## 2023-02-13 NOTE — PROGRESS NOTES
Type of Form Received: missed visit    Form Received (Date) 02/13/23   Form Filled out Yes 2/17/23   Placed in provider folder Yes

## 2023-02-13 NOTE — TELEPHONE ENCOUNTER
2nd attempt. Talked with Charla. Confirmed appointment on 2/17/23 at 8:30 am.     Christian MOSQUERA, EMT at 10:51 AM on 2/13/2023.  Primary Care Clinic: [phone]

## 2023-02-14 ENCOUNTER — PATIENT OUTREACH (OUTPATIENT)
Dept: GASTROENTEROLOGY | Facility: CLINIC | Age: 83
End: 2023-02-14
Payer: MEDICARE

## 2023-02-14 NOTE — PROGRESS NOTES
Called to discuss with patient, spoke with daughter Charla. Questions about the video capsule results, will keep virtual visit with Dr Baires on 3/1 so these questions can get answered.     Procedure/Imaging/Clinic: antegrade single balloon enteroscopy and colonoscopy   Physician: Dequan   Timing: next available   Procedure length: per MD average   Anesthesia: general   Dx: Iron Deficiency Anemia, Melena   Tier: 2   Location: [facility] OR    Pt's daughter agreed to March 9th procedure date at [facility]    Explained will need a , someone to stay with them for 24 hours and should stay in town for 24 hours (within 45 min of Hospital) post procedure    Patient needs to get pre-op physical completed. If outside [facility] system will need physical faxed to number [phone]   If you do not get a preop physical, your procedure could be cancelled, patient voiced understanding*    Preop Plan: Appt with Dr Rodriguez on 2/17    Med Review    Blood thinner -  Cilostazol, guidelines to hold for 2 days prior to procedure. Will send message to Dr Rodriguez for guidance  ASA - none  Diabetic - none    COVID test discussed:    Patient Education r/t procedure:    Does patient have any history of gastric bypass/gastric surgery/altered panc/bili anatomy? none    A pre-op nurse will call 1-2 days prior to the procedure.    NPO/Prep:   Dialysis patient, Golytely bowel prep was not tolerated last time, per her daughter she only got through 1/2 of a jub. Possibly could consider Sutab, if safe for dialsis patients. Prescribe and send to [facility] in [place]     Verbalized understanding of all instructions. All questions answered.     Procedure order placed, message routed to Endo        Loraine Mancera, RN, BSN,   [facility]  Care coordinator

## 2023-02-15 ENCOUNTER — DOCUMENTATION ONLY (OUTPATIENT)
Dept: INTERNAL MEDICINE | Facility: CLINIC | Age: 83
End: 2023-02-15
Payer: MEDICARE

## 2023-02-15 NOTE — PROGRESS NOTES
Type of Form Received: order    Form Received (Date) 2/15/23   Form Filled out No   Placed in provider folder Yes

## 2023-02-17 ENCOUNTER — DOCUMENTATION ONLY (OUTPATIENT)
Dept: INTERNAL MEDICINE | Facility: CLINIC | Age: 83
End: 2023-02-17

## 2023-02-17 ENCOUNTER — OFFICE VISIT (OUTPATIENT)
Dept: INTERNAL MEDICINE | Facility: CLINIC | Age: 83
End: 2023-02-17
Payer: MEDICARE

## 2023-02-17 ENCOUNTER — MEDICAL CORRESPONDENCE (OUTPATIENT)
Dept: HEALTH INFORMATION MANAGEMENT | Facility: CLINIC | Age: 83
End: 2023-02-17

## 2023-02-17 VITALS
BODY MASS INDEX: 23.58 KG/M2 | WEIGHT: 137.4 LBS | SYSTOLIC BLOOD PRESSURE: 186 MMHG | HEART RATE: 71 BPM | DIASTOLIC BLOOD PRESSURE: 75 MMHG | OXYGEN SATURATION: 97 %

## 2023-02-17 DIAGNOSIS — K31.811 ANGIODYSPLASIA OF STOMACH AND DUODENUM WITH HEMORRHAGE: ICD-10-CM

## 2023-02-17 DIAGNOSIS — N18.6 END STAGE RENAL DISEASE (H): ICD-10-CM

## 2023-02-17 DIAGNOSIS — I73.9 PERIPHERAL VASCULAR DISEASE (H): Primary | ICD-10-CM

## 2023-02-17 DIAGNOSIS — I10 HYPERTENSION GOAL BP (BLOOD PRESSURE) < 140/80: Chronic | ICD-10-CM

## 2023-02-17 DIAGNOSIS — D50.9 ANEMIA, IRON DEFICIENCY: Primary | ICD-10-CM

## 2023-02-17 DIAGNOSIS — K92.1 MELENA: ICD-10-CM

## 2023-02-17 DIAGNOSIS — K55.20 AVM (ARTERIOVENOUS MALFORMATION) OF SMALL BOWEL, ACQUIRED: ICD-10-CM

## 2023-02-17 PROCEDURE — 99214 OFFICE O/P EST MOD 30 MIN: CPT | Performed by: INTERNAL MEDICINE

## 2023-02-17 RX ORDER — OXYCODONE AND ACETAMINOPHEN 5; 325 MG/1; MG/1
1 TABLET ORAL DAILY PRN
Qty: 30 TABLET | Refills: 0 | Status: ON HOLD | OUTPATIENT
Start: 2023-02-17 | End: 2023-05-13

## 2023-02-17 RX ORDER — BISACODYL 5 MG
TABLET, DELAYED RELEASE (ENTERIC COATED) ORAL
Qty: 4 TABLET | Refills: 0 | Status: ON HOLD | OUTPATIENT
Start: 2023-02-17 | End: 2023-05-13

## 2023-02-17 NOTE — PROGRESS NOTES
[facility] INTERNAL MEDICINE [address]  [address] [facility] [address]  Phone: [phone]  Fax: [phone]  Primary Provider: Jessica Grimes  Pre-op Performing Provider: JESSICA GRIMES      PREOPERATIVE EVALUATION:  Today's date: 2/17/2023    Rachele Martínez is a 82 year old female who presents for a preoperative evaluation.    Surgical Information:  Surgery/Procedure: colonoscopy/EGD  Surgery Location: [facility]  Surgeon: Dr. Baires  Surgery Date: 3/9  Where patient plans to recover: At home with family  Fax number for surgical facility: Note does not need to be faxed, will be available electronically in Epic.    Type of Anesthesia Anticipated: Local with MAC    Assessment & Plan     The proposed surgical procedure is considered LOW risk.    MED REC REQUIRED  Post Medication Reconciliation Status:  Discharge medications reconciled and changed, see notes/orders    Medication Instructions:  Patient is to take all scheduled medications on the day of surgery    RECOMMENDATION:  APPROVAL GIVEN to proceed with proposed procedure, without further diagnostic evaluation.    Other issues addressed:  Peripheral vascular disease (H)  This is very limiting to her QOL.  Planning to start another PAD rehab session  Unfortunately the cilostasol is no longer effective for her pain management, and can also increase her risk of bleeding.  Therefore, we have elected to stop it.  She is unable to take NSAIDs and has found little benefit from tylenol alone.  Will very cautiously try on percocet, only before PT or exercise sessions to help improve her ability to participate.  Her daughter will monitor closely for dizziness or confusion.  Pain contract signed.  - oxyCODONE-acetaminophen (PERCOCET) 5-325 MG tablet  Dispense: 30 tablet; Refill: 0    AVM (arteriovenous malformation) of small bowel, acquired  Angiodysplasia of stomach and duodenum with hemorrhage  Upcoming  EGD/colonoscopy as above  Will stop cilostasol given recurrent bleeding    End stage renal disease (H)  Cont HD    Hypertension goal BP (blood pressure) < 140/80  Above goal today.  Managed by HD clinic.  Encouraged her to discuss with HD provider        I spent a total of 30 minutes on the day of the visit.   Time spent doing chart review, history and exam, documentation and further activities per the note      Subjective     HPI related to upcoming procedure:     Needs colonoscopy/EGD for recent GI bleeding. Has hx of AVMs, although hgb had been stable for years    No Cp  Has some stable SOW  Has been doing OT and PT and home, just stopped  Legs continue to hurt  Rec'd PAD rehab  Has gym at her new appointment  PAD continues to significant limit her QOL    Health Care Directive:  Patient does not have a Health Care Directive or Living Will: Discussed advance care planning with patient; however, patient declined at this time.    Preoperative Review of :   reviewed - controlled substances reflected in medication list.          Review of Systems  CONSTITUTIONAL: NEGATIVE for fever, chills, change in weight  ENT/MOUTH: NEGATIVE for ear, mouth and throat problems  RESP: NEGATIVE for significant cough or SOB  CV: NEGATIVE for chest pain, palpitations or peripheral edema    Patient Active Problem List    Diagnosis Date Noted     Peripheral vascular disease (H) 10/30/2020     Priority: Medium     Spinal stenosis of lumbar region with neurogenic claudication 06/12/2019     Priority: Medium     Angiodysplasia of stomach and duodenum with hemorrhage 08/29/2016     Priority: Medium     Tendency to fall 07/20/2016     Priority: Medium     Secondary hyperparathyroidism of renal origin (H) 07/01/2016     Priority: Medium     Coagulation defect, unspecified (H) 06/29/2016     Priority: Medium     Other disorders of bone development and growth, other site 06/29/2016     Priority: Medium     End stage renal disease (H)  05/05/2016     Priority: Medium     Nephrologist is Dr. Tarango   [facility] [phone], fax [phone]         Unspecified cirrhosis of liver (H) 03/31/2016     Priority: Medium     Congenital arteriovenous malformation 03/31/2016     Priority: Medium     AVM (arteriovenous malformation) of small bowel, acquired 03/14/2016     Priority: Medium     Anemia of chronic disease 03/02/2016     Priority: Medium     Iron deficiency anemia due to chronic blood loss 01/31/2016     Priority: Medium     Due to AVMs       History of hepatitis C 07/28/2015     Priority: Medium     SVR, 7/2015       Hyperlipidemia with target LDL less than 130 02/27/2015     Priority: Medium     Diagnosis updated by automated process. Provider to review and confirm.       Cataract 11/23/2012     Priority: Medium     Right   Problem list name updated by automated process. Provider to review       Depression with anxiety 11/23/2012     Priority: Medium     Problem list name updated by automated process. Provider to review       Hypertension goal BP (blood pressure) < 140/80 08/02/2003     Priority: Medium      Past Medical History:   Diagnosis Date     Anemia      Anxiety and depression      Arthritis      AVM (arteriovenous malformation)      Chronic hepatitis C with cirrhosis (H)      Clotting disorder (H)      ESRD (end stage renal disease) (H)     on dialysis     GI bleed     recurrent     Glaucoma      Hyperlipidemia      Hypertension goal BP (blood pressure) < 140/80      Past Surgical History:   Procedure Laterality Date     CAPSULE/PILL CAM ENDOSCOPY N/A 3/27/2019    Procedure: Capsule/pill cam endoscopy;  Surgeon: Yosvany Ram MD;  Location:  GI     CAPSULE/PILL CAM ENDOSCOPY N/A 2/2/2023    Procedure: IMAGING PROCEDURE, GI TRACT, INTRALUMINAL, VIA CAPSULE;  Surgeon: Mulu Nur DO;  Location: [facility]     COLONOSCOPY N/A 9/4/2015    Procedure: COMBINED COLONOSCOPY, SINGLE OR MULTIPLE BIOPSY/POLYPECTOMY BY  BIOPSY;  Surgeon: Rupesh Lopez MD;  Location:  GI     COLONOSCOPY N/A 9/19/2018    Procedure: COLONOSCOPY;  enteroscopy small bowel  COLONOSCOPY;  Surgeon: Ankit Baires MD;  Location:  GI     ESOPHAGOSCOPY, GASTROSCOPY, DUODENOSCOPY (EGD), COMBINED N/A 12/18/2014    Procedure: COMBINED ESOPHAGOSCOPY, GASTROSCOPY, DUODENOSCOPY (EGD);  Surgeon: Betsy Carvajal MD;  Location:  GI     ESOPHAGOSCOPY, GASTROSCOPY, DUODENOSCOPY (EGD), COMBINED N/A 4/25/2015    Procedure: COMBINED ESOPHAGOSCOPY, GASTROSCOPY, DUODENOSCOPY (EGD);  Surgeon: Yosvany Ram MD;  Location:  GI     ESOPHAGOSCOPY, GASTROSCOPY, DUODENOSCOPY (EGD), COMBINED N/A 5/5/2015    Procedure: COMBINED ESOPHAGOSCOPY, GASTROSCOPY, DUODENOSCOPY (EGD);  Surgeon: Mariano Mistry MD;  Location:  GI     HC CAPSULE ENDOSCOPY N/A 9/30/2015    Procedure: CAPSULE/PILL CAM ENDOSCOPY;  Surgeon: Pan Dhaliwal MD;  Location: [facility] VASCULAR SURGERY PROCEDURE UNLIST       HYSTERECTOMY  1980    KYREE     LUMPECTOMY BREAST       Current Outpatient Medications   Medication Sig Dispense Refill     atorvastatin (LIPITOR) 20 MG tablet TAKE 1 TABLET BY MOUTH EVERY DAY 90 tablet 2     Calcium Acetate, Phos Binder, 667 MG CAPS TAKE 2 CAPSULE BY MOUTH THREE TIMES A DAY WITH MEALS  8     gabapentin (NEURONTIN) 300 MG capsule Take 1 capsule (300 mg) by mouth At Bedtime 90 capsule 3     losartan (COZAAR) 25 MG tablet Take 100 mg by mouth daily Patient reports taking 100 mg daily.       Multiple Vitamins-Minerals (PRORENAL + D) TABS Take 1 tablet by mouth daily       octreotide (SANDOSTATIN LAR) 20 MG injection Inject 20 mg into the muscle every 28 days       oxyCODONE-acetaminophen (PERCOCET) 5-325 MG tablet Take 1 tablet by mouth daily as needed for pain Take prior to working with PT for PAD rehab 30 tablet 0     pantoprazole (PROTONIX) 20 MG EC tablet Take 1 tablet (20 mg) by mouth 2 times daily (before meals) *take 30-60 minutes  before 180 tablet 1     polyethylene glycol (MIRALAX) 17 GM/Dose powder As needed       sertraline (ZOLOFT) 50 MG tablet Take 2 tablets (100 mg) by mouth daily 180 tablet 3     ursodiol (ACTIGALL) 300 MG capsule Take 2 capsules (600 mg) by mouth 2 times daily With meals 120 capsule 1       Allergies   Allergen Reactions     Abacavir Itching     Lisinopril Cough     Diovan Hct Itching     severe     Dust Mites      Hydrochlorothiazide Itching     Severe       No Clinical Screening - See Comments      History of blood transfusion reactions and pre-treats with Benadryl.      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         Objective     BP (!) 186/75 (BP Location: Right arm, Patient Position: Sitting, Cuff Size: Adult Regular)   Pulse 71   Wt 62.3 kg (137 lb 6.4 oz)   SpO2 97%   BMI 23.58 kg/m      Physical Exam  GENERAL APPEARANCE: healthy, alert and no distress  HENT: ear canals and TM's normal and nose and mouth without ulcers or lesions  RESP: lungs clear to auscultation - no rales, rhonchi or wheezes  CV: regular rate and rhythm, normal S1 S2,no murmur, click or rub   ABDOMEN: soft, nontender, no HSM or masses and bowel sounds normal  NEURO: Normal strength and tone, sensory exam grossly normal, mentation intact and speech normal    Recent Labs   Lab Test 23  1236 23  1233 23  1450   HGB  --  9.0* 6.9*   PLT  --  193 270   INR 1.29*  --  1.20*   NA  --  143 141   POTASSIUM  --  4.4 4.4   CR  --  6.12* 5.49*        Diagnostics:  No  labs were ordered during this visit. She was advised to bring a copy of labs done at HD  No EKG this visit, completed in the last 90 days.    Revised Cardiac Risk Index (RCRI):  The patient has the following serious cardiovascular risks for perioperative complications:   - Serum Creatinine >2.0 mg/dl = 1 point     RCRI Interpretation: 1 point: Class II (low risk - 0.9% complication rate)       Signed Electronically by: Agnieszka Rodriguez MD  Copy of this evaluation report is provided to requesting physician.

## 2023-02-17 NOTE — NURSING NOTE
Rachele Martínez is a 82 year old female that presents in clinic today for the following:     Chief Complaint   Patient presents with     Hospital F/U     Pt here for hospital follow up; pt has procedure on 3/9       The patient's allergies and medications were reviewed. The patient's vitals were obtained without incident. The patient does not have any other questions for the provider.     Renee Hanks, EMT at 8:23 AM on 2/17/2023.  Primary Care Clinic: [phone]

## 2023-02-17 NOTE — LETTER
Opioid / Opioid Plus Controlled Substance Agreement    This is an agreement between you and your provider about the safe and appropriate use of controlled substance/opioids prescribed by your care team. Controlled substances are medicines that can cause physical and mental dependence (abuse).    There are strict laws about having and using these medicines. We here at [facility] are committing to working with you in your efforts to get better. To support you in this work, we ll help you schedule regular office appointments for medicine refills. If we must cancel or change your appointment for any reason, we ll make sure you have enough medicine to last until your next appointment.     As a Provider, I will:    Listen carefully to your concerns and treat you with respect.     Recommend a treatment plan that I believe is in your best interest. This plan may involve therapies other than opioid pain medication.     Talk with you often about the possible benefits, and the risk of harm of any medicine that we prescribe for you.     Provide a plan on how to taper (discontinue or go off) using this medicine if the decision is made to stop its use.    As a Patient, I understand that opioid(s):     Are a controlled substance prescribed by my care team to help me function or work and manage my condition(s).     Are strong medicines and can cause serious side effects such as:    Drowsiness, which can seriously affect my driving ability    A lower breathing rate, enough to cause death    Harm to my thinking ability     Depression     Abuse of and addiction to this medicine    Need to be taken exactly as prescribed. Combining opioids with certain medicines or chemicals (such as illegal drugs, sedatives, sleeping pills, and benzodiazepines) can be dangerous or even fatal. If I stop opioids suddenly, I may have severe withdrawal symptoms.    Do not work for all types of pain nor for all patients. If they re not helpful, I may  be asked to stop them.        The risks, benefits and side effects of these medicine(s) were explained to me. I agree that:  1. I will take part in other treatments as advised by my care team. This may be psychiatry or counseling, physical therapy, behavioral therapy, group treatment or a referral to a specialist.     2. I will keep all my appointments. I understand that this is part of the monitoring of opioids. My care team may require an office visit for EVERY opioid/controlled substance refill. If I miss appointments or don t follow instructions, my care team may stop my medicine.    3. I will take my medicines as prescribed. I will not change the dose or schedule unless my care team tells me to. There will be no refills if I run out early.     4. I may be asked to come to the clinic and complete a urine drug test or complete a pill count at any time. If I don t give a urine sample or participate in a pill count, the care team may stop my medicine.    5. I will only receive prescriptions from this clinic for chronic pain. If I am treated by another provider for acute pain issues, I will tell them that I am taking opioid pain medication for chronic pain and that I have a treatment agreement with this provider. I will inform my [facility] care team within one business day if I am given a prescription for any pain medication by another healthcare provider. My [facility] care team can contact other providers and pharmacists about my use of any medicines.    6. It is up to me to make sure that I don t run out of my medicines on weekends or holidays. If my care team is willing to refill my opioid prescription without a visit, I must request refills only during office hours. Refills may take up to 3 business days to process. I will use one pharmacy to fill all my opioid and other controlled substance prescriptions. I will notify the clinic about any changes to my insurance or medication  availability.    7. I am responsible for my prescriptions. If the medicine/prescription is lost, stolen or destroyed, it will not be replaced. I also agree not to share controlled substance medicines with anyone.    8. I am aware I should not use any illegal or recreational drugs. I agree not to drink alcohol unless my care team says I can.       9. If I enroll in the [place] Medical Cannabis program, I will tell my care team prior to my next refill.     10. I will tell my care team right away if I become pregnant, have a new medical problem treated outside of my regular clinic, or have a change in my medications.    11. I understand that this medicine can affect my thinking, judgment and reaction time. Alcohol and drugs affect the brain and body, which can affect the safety of my driving. Being under the influence of alcohol or drugs can affect my decision-making, behaviors, personal safety, and the safety of others. Driving while impaired (DWI) can occur if a person is driving, operating, or in physical control of a car, motorcycle, boat, snowmobile, ATV, motorbike, off-road vehicle, or any other motor vehicle ([place] Statute 169A.20). I understand the risk if I choose to drive or operate any vehicle or machinery.    I understand that if I do not follow any of the conditions above, my prescriptions or treatment may be stopped or changed.          Opioids  What You Need to Know    What are opioids?   Opioids are pain medicines that must be prescribed by a doctor. They are also known as narcotics.     Examples are:   1. morphine (MS Contin, Evelina)  2. oxycodone (Oxycontin)  3. oxycodone and acetaminophen (Percocet)  4. hydrocodone and acetaminophen (Vicodin, Norco)   5. fentanyl patch (Duragesic)   6. hydromorphone (Dilaudid)   7. methadone  8. codeine (Tylenol #3)     What do opioids do well?   Opioids are best for severe short-term pain such as after a surgery or injury. They may work well for cancer pain. They may  help some people with long-lasting (chronic) pain.     What do opioids NOT do well?   Opioids never get rid of pain entirely, and they don t work well for most patients with chronic pain. Opioids don t reduce swelling, one of the causes of pain.                                    Other ways to manage chronic pain and improve function include:       Treat the health problem that may be causing pain    Anti-inflammation medicines, which reduce swelling and tenderness, such as ibuprofen (Advil, Motrin) or naproxen (Aleve)    Acetaminophen (Tylenol)    Antidepressants and anti-seizure medicines, especially for nerve pain    Topical treatments such as patches or creams    Injections or nerve blocks    Chiropractic or osteopathic treatment    Acupuncture, massage, deep breathing, meditation, visual imagery, aromatherapy    Use heat or ice at the pain site    Physical therapy     Exercise    Stop smoking    Take part in therapy       Risks and side effects     Talk to your doctor before you start or decide to keep taking opioids. Possible side effects include:      Lowering your breathing rate enough to cause death    Overdose, including death, especially if taking higher than prescribed doses    Worse depression symptoms; less pleasure in things you usually enjoy    Feeling tired or sluggish    Slower thoughts or cloudy thinking    Being more sensitive to pain over time; pain is harder to control    Trouble sleeping or restless sleep    Changes in hormone levels (for example, less testosterone)    Changes in sex drive or ability to have sex    Constipation    Unsafe driving    Itching and sweating    Dizziness    Nausea, throwing up and dry mouth    What else should I know about opioids?    Opioids may lead to dependence, tolerance, or addiction.      Dependence means that if you stop or reduce the medicine too quickly, you will have withdrawal symptoms. These include loose poop (diarrhea), jitters, flu-like symptoms,  nervousness and tremors. Dependence is not the same as addiction.                       Tolerance means needing higher doses over time to get the same effect. This may increase the chance of serious side effects.      Addiction is when people improperly use a substance that harms their body, their mind or their relations with others. Use of opiates can cause a relapse of addiction if you have a history of drug or alcohol abuse.      People who have used opioids for a long time may have a lower quality of life, worse depression, higher levels of pain and more visits to doctors.    You can overdose on opioids. Take these steps to lower your risk of overdose:    1. Recognize the signs:  Signs of overdose include decrease or loss of consciousness (blackout), slowed breathing, trouble waking up and blue lips. If someone is worried about overdose, they should call 911.    2. Talk to your doctor about Narcan (naloxone).   If you are at risk for overdose, you may be given a prescription for Narcan. This medicine very quickly reverses the effects of opioids.   If you overdose, a friend or family member can give you Narcan while waiting for the ambulance. They need to know the signs of overdose and how to give Narcan.     3. Don't use alcohol or street drugs.   Taking them with opioids can cause death.    4. Do not take any of these medicines unless your doctor says it s OK. Taking these with opioids can cause death:    Benzodiazepines, such as lorazepam (Ativan), alprazolam (Xanax) or diazepam (Valium)    Muscle relaxers, such as cyclobenzaprine (Flexeril)    Sleeping pills like zolpidem (Ambien)     Other opioids      How to keep you and other people safe while taking opioids:    1. Never share your opioids with others.  Opioid medicines are regulated by the Drug Enforcement Agency ([place]). Selling or sharing medications is a criminal act.    2. Be sure to store opioids in a secure place, locked up if possible. Young children  can easily swallow them and overdose.    3. When you are traveling with your medicines, keep them in the original bottles. If you use a pill box, be sure you also carry a copy of your medicine list from your clinic or pharmacy.    4. Safe disposal of opioids    Most pharmacies have places to get rid of medicine, called disposal kiosks. Medicine disposal options are also available in every [facility]. Search your county and  medication disposal  to find more options. You can find more details at:  [email or web address]-[email or web address]     I agree that my provider, clinic care team, and pharmacy may work with any city, state or federal law enforcement agency that investigates the misuse, sale, or other diversion of my controlled medicine. I will allow my provider to discuss my care with, or share a copy of, this agreement with any other treating provider, pharmacy or emergency room where I receive care.    I have read this agreement and have asked questions about anything I did not understand.    _______________________________________________________  Patient Signature - Rachele Martínez _____________________                   Date     _______________________________________________________  Provider Signature - Agnieszka Rodriguez MD   _____________________                   Date     _______________________________________________________  Witness Signature (required if provider not present while patient signing)   _____________________                   Date

## 2023-02-17 NOTE — PROGRESS NOTES
Type of Form Received: order    Form Received (Date) 2/17/23   Form Filled out Yes 3/3/23   Placed in provider folder Yes

## 2023-02-17 NOTE — PROGRESS NOTES
Type of Form Received: missed visit    Form Received (Date) 2/17/23   Form Filled out Yes 3/3/23   Placed in provider folder Yes

## 2023-02-18 DIAGNOSIS — K92.2 GASTROINTESTINAL HEMORRHAGE, UNSPECIFIED GASTROINTESTINAL HEMORRHAGE TYPE: ICD-10-CM

## 2023-02-20 ENCOUNTER — DOCUMENTATION ONLY (OUTPATIENT)
Dept: GASTROENTEROLOGY | Facility: CLINIC | Age: 83
End: 2023-02-20
Payer: MEDICARE

## 2023-02-20 NOTE — PROGRESS NOTES
Called PT and spoke with Daughter.   Daughter mentioned that PT has dialysis during visit time, will call back to confirm that visit can proceed.    Called to remind patient of their upcoming appointment with our GI clinic, on 03/01/23 at 11:40 AM with Dr. Ankit Baires. This appointment is scheduled as a video visit. You will receive a call approximately 30 minutes prior to check you in, you must be in [place] for this visit., if your appointment is virtual (video or telephone) you need to be in [place] for the visit. To reschedule or cancel patient to call [phone].      SK

## 2023-02-21 ENCOUNTER — DOCUMENTATION ONLY (OUTPATIENT)
Dept: INTERNAL MEDICINE | Facility: CLINIC | Age: 83
End: 2023-02-21
Payer: MEDICARE

## 2023-02-21 RX ORDER — PANTOPRAZOLE SODIUM 20 MG/1
20 TABLET, DELAYED RELEASE ORAL
Qty: 180 TABLET | Refills: 3 | Status: ON HOLD | OUTPATIENT
Start: 2023-02-21 | End: 2023-05-25

## 2023-02-21 NOTE — PROGRESS NOTES
Type of Form Received: missed visit    Form Received (Date) 2/21/23   Form Filled out Yes 3/3/23   Placed in provider folder Yes

## 2023-02-21 NOTE — PROGRESS NOTES
Called Patient and spoke with Daughter; appointment confirmed.    Called to remind patient of their upcoming appointment with our GI clinic, on 03/01/23 at 11:40 AM with Dr. Ankit Baires. This appointment is scheduled as a video visit. You will receive a call approximately 30 minutes prior to check you in, you must be in [place] for this visit., if your appointment is virtual (video or telephone) you need to be in [place] for the visit. To reschedule or cancel patient to call [phone].        SK

## 2023-02-22 ENCOUNTER — DOCUMENTATION ONLY (OUTPATIENT)
Dept: INTERNAL MEDICINE | Facility: CLINIC | Age: 83
End: 2023-02-22
Payer: MEDICARE

## 2023-02-22 NOTE — PROGRESS NOTES
Type of Form Received: missed visit    Form Received (Date) 2/22/23   Form Filled out Ys 3/3/23   Placed in provider folder Yes

## 2023-02-24 ENCOUNTER — DOCUMENTATION ONLY (OUTPATIENT)
Dept: INTERNAL MEDICINE | Facility: CLINIC | Age: 83
End: 2023-02-24
Payer: MEDICARE

## 2023-02-24 ENCOUNTER — PATIENT OUTREACH (OUTPATIENT)
Dept: GASTROENTEROLOGY | Facility: CLINIC | Age: 83
End: 2023-02-24
Payer: MEDICARE

## 2023-02-24 NOTE — PROGRESS NOTES
Discussed the goltyly prep with Charla, that it is the safest option for HD / kidney patient's. Asked her to refer to the split dose instructions that were sent via [employer] last week.  Also updated that the virtual visit with Dr Baires on 3/1 will need to be moved back to 12:40pm. She was in agreement with this change/timing.    Message routed to clinic coordinators to make this scheduling change.    Loraine Mancera, RN, BSN,   [facility]  Care coordinator

## 2023-02-24 NOTE — PROGRESS NOTES
Type of Form Received: order    Form Received (Date) 2/24/23   Form Filled out Yes 3/3/23   Placed in provider folder Yes

## 2023-02-25 DIAGNOSIS — K74.60 CIRRHOSIS OF LIVER WITHOUT ASCITES, UNSPECIFIED HEPATIC CIRRHOSIS TYPE (H): ICD-10-CM

## 2023-02-25 DIAGNOSIS — L29.9 ITCHING: ICD-10-CM

## 2023-02-28 ENCOUNTER — INFUSION THERAPY VISIT (OUTPATIENT)
Dept: INFUSION THERAPY | Facility: CLINIC | Age: 83
End: 2023-02-28
Payer: MEDICARE

## 2023-02-28 VITALS — HEART RATE: 69 BPM | DIASTOLIC BLOOD PRESSURE: 75 MMHG | OXYGEN SATURATION: 92 % | SYSTOLIC BLOOD PRESSURE: 187 MMHG

## 2023-02-28 DIAGNOSIS — K55.20 AVM (ARTERIOVENOUS MALFORMATION) OF SMALL BOWEL, ACQUIRED: ICD-10-CM

## 2023-02-28 DIAGNOSIS — K31.811 ANGIODYSPLASIA OF STOMACH AND DUODENUM WITH HEMORRHAGE: Primary | ICD-10-CM

## 2023-02-28 DIAGNOSIS — D50.0 IRON DEFICIENCY ANEMIA DUE TO CHRONIC BLOOD LOSS: ICD-10-CM

## 2023-02-28 PROCEDURE — 99207 PR NO CHARGE LOS: CPT

## 2023-02-28 PROCEDURE — 96372 THER/PROPH/DIAG INJ SC/IM: CPT | Performed by: NURSE PRACTITIONER

## 2023-02-28 RX ADMIN — OCTREOTIDE ACETATE 20 MG: KIT INTRAMUSCULAR at 12:15

## 2023-02-28 NOTE — TELEPHONE ENCOUNTER
ursodiol (ACTIGALL) 300 MG capsule  Last Written Prescription Date:  1/31/23  Last Fill Quantity: 60,   # refills: 0  Last Office Visit : 1/31/23  Future Office visit: Follow-up in clinic in one year      Routing refill request to provider for review/approval because:  ursadiol not on the  [facility] refill protocol

## 2023-02-28 NOTE — PROGRESS NOTES
Infusion Nursing Note:  Rachele Martínez presents today for   Chief Complaint   Patient presents with     Allied Health Visit     Sandostatin     Patient seen by provider today: No   present during visit today: Not Applicable.    Note: Patient was assessed by Ebony Ngo RN prior to injection.    Post Infusion Assessment:  Patient tolerated injection without incident.  Site patent and intact, free from redness, edema or discomfort.     Discharge Plan:   Patient discharged in stable condition accompanied by: self.  Departure Mode: Ambulatory.      Florence To CMA

## 2023-03-01 ENCOUNTER — PREP FOR PROCEDURE (OUTPATIENT)
Dept: GASTROENTEROLOGY | Facility: CLINIC | Age: 83
End: 2023-03-01

## 2023-03-01 ENCOUNTER — VIRTUAL VISIT (OUTPATIENT)
Dept: GASTROENTEROLOGY | Facility: CLINIC | Age: 83
End: 2023-03-01
Attending: INTERNAL MEDICINE
Payer: MEDICARE

## 2023-03-01 DIAGNOSIS — K92.2 SMALL BOWEL BLEED NOT REQUIRING MORE THAN 4 UNITS OF BLOOD IN 24 HOURS, ICU, OR SURGERY: Primary | ICD-10-CM

## 2023-03-01 DIAGNOSIS — K55.20 AVM (ARTERIOVENOUS MALFORMATION) OF SMALL BOWEL, ACQUIRED: ICD-10-CM

## 2023-03-01 PROCEDURE — 99214 OFFICE O/P EST MOD 30 MIN: CPT | Mod: VID | Performed by: INTERNAL MEDICINE

## 2023-03-01 PROCEDURE — G0463 HOSPITAL OUTPT CLINIC VISIT: HCPCS | Mod: PN,GT | Performed by: INTERNAL MEDICINE

## 2023-03-01 RX ORDER — URSODIOL 300 MG/1
600 CAPSULE ORAL 2 TIMES DAILY
Qty: 120 CAPSULE | Refills: 1 | OUTPATIENT
Start: 2023-03-01

## 2023-03-01 NOTE — NURSING NOTE
Is the patient currently in the state of [place]? YES    Visit mode:VIDEO    If the visit is dropped, the patient can be reconnected by: VIDEO VISIT: Text to cell phone: [phone]    Will anyone else be joining the visit? YES: How would they like to receive their invitation?       How would you like to obtain your AVS? MyChart    Are changes needed to the allergy or medication list? NO    Patient denies any changes since echeck-in regarding medication and allergies and states all information entered during echeck-in remains accurate.    Reason for visit: Return Visit    Mela VILLALBA

## 2023-03-01 NOTE — PROGRESS NOTES
Video-Visit Details    Type of service:  Video Visit    Video Start Time (time video started): 12:33 pm    Video End Time (time video stopped): 12:45 pm    Originating Location (pt. Location): Home        Distant Location (provider location):  On-site    Mode of Communication:  Video Conference via [facility]      INTERVENTIONAL ENDOSCOPY OUTPATIENT CLINIC CONSULT  DATE OF SERVICE: 03/01/23   Reason for Consultation: chronic small bowel bleeding from angioectasias    ASSESSMENT:  Rachele is a 82 year old female with a PMHx of ESRD on HD and h/o HCV s/p treatment with SVR and compensated cirrhosis who is here for follow up for recurrent GI bleeding from angioectasias in the stomach, small bowel, and cecum s/p prior endoscopic therapy in ~2018 and chronic monthly octreotide depot injections who recently developed recurrent anemia to a hgb of 6.6 and hematochezia.     I reviewed her capsule study. A few small bowel angioectasias were noted. We will need to proceed with a single balloon enteroscopy again to address these to prevent recurrent anemia. She had a colonoscopy last year that did not show any recurrent colonic angioectasias so I think we can forgo repeating that. They are amenable to proceeding.      RECOMMENDATIONS:  - Proceed with antegrade single balloon enteroscopy  - Continue monthly octreotide    Ankit Baires MD  [facility]  Division of Gastroenterology and Hepatology  [facility] [address] [address]    ________________________________________________________________  HPI:  Rachele is a 82 year old female with a PMHx of ESRD on HD and h/o HCV s/p treatment with SVR and compensated cirrhosis who is here for follow up for recurrent GI bleeding from angioectasias in the stomach, small bowel, and cecum s/p prior endoscopic therapy in ~2018 and chronic monthly octreotide depot injections who recently developed recurrent anemia to a hgb of 6.6 and  hematochezia.   Her daughter is with her for this video visit. Rachele had been doing well for a number of years but earlier this year developed progressive fatigue and a Hgb check at dialysis showed significant anemia requiring transfusion. While prepping for the video capsule study she developed hematochezia and presented to our ED. This self resolved and the capsule study was performed.   She has been on monthly octreotide 20 mg depot shots that she receives at the infusion center and has been on this nearly 3 years. She had a colonoscopy in [facility] last year on 5/28/22 that was a good prep and just showed a few polyps that were removed. Today she reports feeling well. She denies any further episodes of hematochezia or melena and her hgb checks have been holding around 9-10.    PMHx:  Past Medical History:   Diagnosis Date     Anemia      Anxiety and depression      Arthritis      AVM (arteriovenous malformation)      Chronic hepatitis C with cirrhosis (H)      Clotting disorder (H)      ESRD (end stage renal disease) (H)     on dialysis     GI bleed     recurrent     Glaucoma      Hyperlipidemia      Hypertension goal BP (blood pressure) < 140/80        PSurgHx:  Past Surgical History:   Procedure Laterality Date     CAPSULE/PILL CAM ENDOSCOPY N/A 3/27/2019    Procedure: Capsule/pill cam endoscopy;  Surgeon: Yosvany Ram MD;  Location: [facility]     CAPSULE/PILL CAM ENDOSCOPY N/A 2/2/2023    Procedure: IMAGING PROCEDURE, GI TRACT, INTRALUMINAL, VIA CAPSULE;  Surgeon: Mulu Nur DO;  Location: [facility]     COLONOSCOPY N/A 9/4/2015    Procedure: COMBINED COLONOSCOPY, SINGLE OR MULTIPLE BIOPSY/POLYPECTOMY BY BIOPSY;  Surgeon: Rupesh Lopez MD;  Location: [facility]     COLONOSCOPY N/A 9/19/2018    Procedure: COLONOSCOPY;  enteroscopy small bowel  COLONOSCOPY;  Surgeon: Ankit Baires MD;  Location: [facility]     ESOPHAGOSCOPY, GASTROSCOPY, DUODENOSCOPY (EGD), COMBINED N/A 12/18/2014    Procedure:  COMBINED ESOPHAGOSCOPY, GASTROSCOPY, DUODENOSCOPY (EGD);  Surgeon: Betsy Carvajal MD;  Location:  GI     ESOPHAGOSCOPY, GASTROSCOPY, DUODENOSCOPY (EGD), COMBINED N/A 4/25/2015    Procedure: COMBINED ESOPHAGOSCOPY, GASTROSCOPY, DUODENOSCOPY (EGD);  Surgeon: Yosvany Ram MD;  Location:  GI     ESOPHAGOSCOPY, GASTROSCOPY, DUODENOSCOPY (EGD), COMBINED N/A 5/5/2015    Procedure: COMBINED ESOPHAGOSCOPY, GASTROSCOPY, DUODENOSCOPY (EGD);  Surgeon: Mariano Mistry MD;  Location:  GI     HC CAPSULE ENDOSCOPY N/A 9/30/2015    Procedure: CAPSULE/PILL CAM ENDOSCOPY;  Surgeon: Pan Dhaliwal MD;  Location: [facility] VASCULAR SURGERY PROCEDURE UNLIST       HYSTERECTOMY  1980    KYREE     LUMPECTOMY BREAST         MEDS:  Current Outpatient Medications   Medication     atorvastatin (LIPITOR) 20 MG tablet     bisacodyl (DULCOLAX) 5 MG EC tablet     Calcium Acetate, Phos Binder, 667 MG CAPS     gabapentin (NEURONTIN) 300 MG capsule     losartan (COZAAR) 25 MG tablet     Multiple Vitamins-Minerals (PRORENAL + D) TABS     octreotide (SANDOSTATIN LAR) 20 MG injection     oxyCODONE-acetaminophen (PERCOCET) 5-325 MG tablet     pantoprazole (PROTONIX) 20 MG EC tablet     polyethylene glycol (MIRALAX) 17 GM/Dose powder     sertraline (ZOLOFT) 50 MG tablet     ursodiol (ACTIGALL) 300 MG capsule     No current facility-administered medications for this visit.     ALLERGIES:    Allergies   Allergen Reactions     Abacavir Itching     Lisinopril Cough     Diovan Hct Itching     severe     Dust Mites      Hydrochlorothiazide Itching     Severe       No Clinical Screening - See Comments      History of blood transfusion reactions and pre-treats with Benadryl.      Oxycodone-Acetaminophen      Spironolactone Nausea     Sulfa Drugs Hives     Valsartan Itching     FHx:  Family History   Problem Relation Age of Onset     Diabetes Mother      Alzheimer Disease Mother      Substance Abuse Son      Cancer Sister       Soft Tissue Cancer Sister      Breast Cancer Sister      Hyperlipidemia Daughter      Alcoholism Brother      Spine Problems Sister        SOCIAL Hx:  Social History     Socioeconomic History     Marital status:      Spouse name: Not on file     Number of children: Not on file     Years of education: Not on file     Highest education level: Not on file   Occupational History     Not on file   Tobacco Use     Smoking status: Former     Packs/day: 2.00     Years: 45.00     Pack years: 90.00     Types: Cigarettes     Start date: 1953     Quit date: 2002     Years since quittin.2     Smokeless tobacco: Never   Substance and Sexual Activity     Alcohol use: No     Drug use: Yes     Types: Marijuana     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     Sexual activity: Not Currently   Other Topics Concern     Parent/sibling w/ CABG, MI or angioplasty before 65F 55M? No   Social History Narrative     Not on file     Social Determinants of Health     Financial Resource Strain: Not on file   Food Insecurity: Not on file   Transportation Needs: Not on file   Physical Activity: Not on file   Stress: Not on file   Social Connections: Not on file   Intimate Partner Violence: Not on file   Housing Stability: Not on file       ROS: A comprehensive Review of Systems was asked and answered in the negative unless specifically commented upon in the HPI    Physical Exam  Gen: A&Ox3, NAD  HEENT: Moist mucus membranes, no scleral icterus.  Lungs: no respiratory distress      LABS:  Admission on 2023, Discharged on 2023   Component Date Value Ref Range Status     Sodium 2023 143  136 - 145 mmol/L Final     Potassium 2023 4.4  3.4 - 5.3 mmol/L Final     Chloride 2023 102  98 - 107 mmol/L Final     Carbon Dioxide (CO2) 2023 23  22 - 29 mmol/L Final     Anion Gap 2023 18 (H)  7 - 15 mmol/L Final     Urea Nitrogen 2023 15.6  8.0 - 23.0 mg/dL Final      Creatinine 02/02/2023 6.12 (H)  0.51 - 0.95 mg/dL Final     Calcium 02/02/2023 9.0  8.8 - 10.2 mg/dL Final     Glucose 02/02/2023 98  70 - 99 mg/dL Final     Alkaline Phosphatase 02/02/2023 171 (H)  35 - 104 U/L Final     AST 02/02/2023 30  10 - 35 U/L Final     ALT 02/02/2023 9 (L)  10 - 35 U/L Final     Protein Total 02/02/2023 7.0  6.4 - 8.3 g/dL Final     Albumin 02/02/2023 3.9  3.5 - 5.2 g/dL Final     Bilirubin Total 02/02/2023 0.7  <=1.2 mg/dL Final     GFR Estimate 02/02/2023 6 (L)  >60 mL/min/1.73m2 Final    eGFR calculated using 2021 CKD-EPI equation.     WBC Count 02/02/2023 8.2  4.0 - 11.0 10e3/uL Final     RBC Count 02/02/2023 2.84 (L)  3.80 - 5.20 10e6/uL Final     Hemoglobin 02/02/2023 9.0 (L)  11.7 - 15.7 g/dL Final     Hematocrit 02/02/2023 29.6 (L)  35.0 - 47.0 % Final     MCV 02/02/2023 104 (H)  78 - 100 fL Final     MCH 02/02/2023 31.7  26.5 - 33.0 pg Final     MCHC 02/02/2023 30.4 (L)  31.5 - 36.5 g/dL Final     RDW 02/02/2023 18.8 (H)  10.0 - 15.0 % Final     Platelet Count 02/02/2023 193  150 - 450 10e3/uL Final     % Neutrophils 02/02/2023 78  % Final     % Lymphocytes 02/02/2023 8  % Final     % Monocytes 02/02/2023 10  % Final     % Eosinophils 02/02/2023 2  % Final     % Basophils 02/02/2023 1  % Final     % Immature Granulocytes 02/02/2023 1  % Final     NRBCs per 100 WBC 02/02/2023 0  <1 /100 Final     Absolute Neutrophils 02/02/2023 6.5  1.6 - 8.3 10e3/uL Final     Absolute Lymphocytes 02/02/2023 0.7 (L)  0.8 - 5.3 10e3/uL Final     Absolute Monocytes 02/02/2023 0.8  0.0 - 1.3 10e3/uL Final     Absolute Eosinophils 02/02/2023 0.2  0.0 - 0.7 10e3/uL Final     Absolute Basophils 02/02/2023 0.1  0.0 - 0.2 10e3/uL Final     Absolute Immature Granulocytes 02/02/2023 0.0  <=0.4 10e3/uL Final     Absolute NRBCs 02/02/2023 0.0  10e3/uL Final     Hold Specimen 02/02/2023 JIC   Final     Hold Specimen 02/02/2023 JIC   Final     INR 02/02/2023 1.29 (H)  0.85 - 1.15 Final     Video Capsule  Endoscopy 02/09/2023    Final                    Value:[address] [place] [address] [phone]     Endoscopy Department  _______________________________________________________________________________  Patient Name: Rachele Martínez        Procedure Date: 2/9/2023 3:16 PM  MRN: [id number]                       Account Number: [id number]  YOB: 1940              Admit Type: Emergency Department  Age: 82                               Room:  GI VIDEO CAPSULE  Gender: Female                        Note Status: Finalized  Attending MD: RICK CHAPPELL MD        Total Sedation Time:   _______________________________________________________________________________     Procedure:             Video capsule endoscopy  Indications:           Iron Deficiency Anemia, Melena, No cause found at                          upper endoscopy.                         Melena  Providers:             RICK CHAPPELL MD  Referring MD:            Medicines:             None  Complications:         N                          o immediate complications.  _______________________________________________________________________________  Procedure:             Pre-Anesthesia Assessment:                         - Prior to the procedure, a History and Physical was                          performed, and patient medications and allergies were                          reviewed. The patient's tolerance of previous                          anesthesia was also reviewed. The risks and benefits                          of the procedure and the sedation options and risks                          were discussed with the patient. All questions were                          answered, and informed consent was obtained. Prior                          Anticoagulants: The patient has taken no anticoagulant                          or antiplatelet agents. ASA Grade Assessment: II - A                           patient with mild systemic disease. After reviewing                          the risks and benefits, the patient was deemed                           in                          satisfactory condition to undergo the procedure.                         The video capsule endoscopy was accomplished without                          difficulty. The patient tolerated the procedure well.                                                                                   Findings:       Images of the esophagus, stomach and small bowel were obtained from the        swallowed capsule and reviewed.       The gastric passage time of the capsule enteroscope was 0-hours        17-minutes.       The small bowel passage time of the capsule enteroscope was 6-hours        45-minutes.       A single small angioectasia without bleeding was seen in the small bowel        at 0-hours 20-minutes. It was localized in extent. Proximal duodenum,        probably D2.       A sessile 3 mm polyp with no stigmata of recent bleeding was found on        the anterior wall of the gastric body.       Lymphangiectasia was found in the duodenum (probable second portion of                                  the duodenum).       A single spot with no bleeding was found in the small bowel (proximal        small bowel).       A single dark blue spot about 6 mm with no bleeding was found in the        small bowel at 1-hour 21-minutes.       A single small angioectasia without bleeding was seen in the small bowel        at 4-hours 9-minutes. It was localized in extent.       The capsule entered the colon.       There was no active bleeding seen anywhere during this exam.                                                                                   Impression:            - Gastric polyp(s).                         - A single angioectasia without bleeding in the small                          bowel.                         - Duodenal lymphangiectasia.                          - A single mucosal spot with no bleeding in the small                          bowel.                         - A single mucosal spot with no bleeding in the small                          bowel.                                                   - A single angioectasia without bleeding in the small                          bowel.  Recommendation:        - Resume previous diet.                         - Return to referring physician as previously                          scheduled.                                                                                     Rick Polo MD  ________________  RICK POLO MD  2/9/2023 3:34:00 PM  I was physically present for the entire viewing portion of the exam.  __________________________  Signature of teaching physician  [id number] POLO MD  Number of Addenda: 0    Note Initiated On: 2/9/2023 3:16 PM  Scope In:  Scope Out:         2/13/23  Ferritin 148  TIBC 237  %sat 16%

## 2023-03-01 NOTE — LETTER
3/1/2023         RE: Rachele Martínez  [address] [facility] [address]        Dear Colleague,    Thank you for referring your patient, Rachele Martínez, to the [facility]. Please see a copy of my visit note below.      INTERVENTIONAL ENDOSCOPY OUTPATIENT CLINIC CONSULT  DATE OF SERVICE: 03/01/23   Reason for Consultation: chronic small bowel bleeding from angioectasias    ASSESSMENT:  Rachele is a 82 year old female with a PMHx of ESRD on HD and h/o HCV s/p treatment with SVR and compensated cirrhosis who is here for follow up for recurrent GI bleeding from angioectasias in the stomach, small bowel, and cecum s/p prior endoscopic therapy in ~2018 and chronic monthly octreotide depot injections who recently developed recurrent anemia to a hgb of 6.6 and hematochezia.     I reviewed her capsule study. A few small bowel angioectasias were noted. We will need to proceed with a single balloon enteroscopy again to address these to prevent recurrent anemia. She had a colonoscopy last year that did not show any recurrent colonic angioectasias so I think we can forgo repeating that. They are amenable to proceeding.      RECOMMENDATIONS:  - Proceed with antegrade single balloon enteroscopy  - Continue monthly octreotide    Ankit Baires MD  [facility]  Division of Gastroenterology and Hepatology  [facility] [address] [address]    ________________________________________________________________  HPI:  Rachele is a 82 year old female with a PMHx of ESRD on HD and h/o HCV s/p treatment with SVR and compensated cirrhosis who is here for follow up for recurrent GI bleeding from angioectasias in the stomach, small bowel, and cecum s/p prior endoscopic therapy in ~2018 and chronic monthly octreotide depot injections who recently developed recurrent anemia to a hgb of 6.6 and hematochezia.   Her daughter is with her for this video  visit. Rachele had been doing well for a number of years but earlier this year developed progressive fatigue and a Hgb check at dialysis showed significant anemia requiring transfusion. While prepping for the video capsule study she developed hematochezia and presented to our ED. This self resolved and the capsule study was performed.   She has been on monthly octreotide 20 mg depot shots that she receives at the infusion center and has been on this nearly 3 years. She had a colonoscopy in [facility] last year on 5/28/22 that was a good prep and just showed a few polyps that were removed. Today she reports feeling well. She denies any further episodes of hematochezia or melena and her hgb checks have been holding around 9-10.    PMHx:  Past Medical History:   Diagnosis Date     Anemia      Anxiety and depression      Arthritis      AVM (arteriovenous malformation)      Chronic hepatitis C with cirrhosis (H)      Clotting disorder (H)      ESRD (end stage renal disease) (H)     on dialysis     GI bleed     recurrent     Glaucoma      Hyperlipidemia      Hypertension goal BP (blood pressure) < 140/80        PSurgHx:  Past Surgical History:   Procedure Laterality Date     CAPSULE/PILL CAM ENDOSCOPY N/A 3/27/2019    Procedure: Capsule/pill cam endoscopy;  Surgeon: Yosvany Ram MD;  [facility]     CAPSULE/PILL CAM ENDOSCOPY N/A 2/2/2023    Procedure: IMAGING PROCEDURE, GI TRACT, INTRALUMINAL, VIA CAPSULE;  Surgeon: Mulu Nur DO;  [facility]     COLONOSCOPY N/A 9/4/2015    Procedure: COMBINED COLONOSCOPY, SINGLE OR MULTIPLE BIOPSY/POLYPECTOMY BY BIOPSY;  Surgeon: Rupesh Lopez MD;  [facility]     COLONOSCOPY N/A 9/19/2018    Procedure: COLONOSCOPY;  enteroscopy small bowel  COLONOSCOPY;  Surgeon: Ankit Baires MD;  [facility]     ESOPHAGOSCOPY, GASTROSCOPY, DUODENOSCOPY (EGD), COMBINED N/A 12/18/2014    Procedure: COMBINED ESOPHAGOSCOPY, GASTROSCOPY, DUODENOSCOPY (EGD);   Surgeon: Betsy Carvajal MD;  Location:  GI     ESOPHAGOSCOPY, GASTROSCOPY, DUODENOSCOPY (EGD), COMBINED N/A 4/25/2015    Procedure: COMBINED ESOPHAGOSCOPY, GASTROSCOPY, DUODENOSCOPY (EGD);  Surgeon: Yosvany Ram MD;  Location:  GI     ESOPHAGOSCOPY, GASTROSCOPY, DUODENOSCOPY (EGD), COMBINED N/A 5/5/2015    Procedure: COMBINED ESOPHAGOSCOPY, GASTROSCOPY, DUODENOSCOPY (EGD);  Surgeon: Mariano Mistry MD;  Location:  GI     HC CAPSULE ENDOSCOPY N/A 9/30/2015    Procedure: CAPSULE/PILL CAM ENDOSCOPY;  Surgeon: Pan Dhaliwal MD;  Location: [facility] VASCULAR SURGERY PROCEDURE UNLIST       HYSTERECTOMY  1980    KYREE     LUMPECTOMY BREAST         MEDS:  Current Outpatient Medications   Medication     atorvastatin (LIPITOR) 20 MG tablet     bisacodyl (DULCOLAX) 5 MG EC tablet     Calcium Acetate, Phos Binder, 667 MG CAPS     gabapentin (NEURONTIN) 300 MG capsule     losartan (COZAAR) 25 MG tablet     Multiple Vitamins-Minerals (PRORENAL + D) TABS     octreotide (SANDOSTATIN LAR) 20 MG injection     oxyCODONE-acetaminophen (PERCOCET) 5-325 MG tablet     pantoprazole (PROTONIX) 20 MG EC tablet     polyethylene glycol (MIRALAX) 17 GM/Dose powder     sertraline (ZOLOFT) 50 MG tablet     ursodiol (ACTIGALL) 300 MG capsule     No current facility-administered medications for this visit.     ALLERGIES:    Allergies   Allergen Reactions     Abacavir Itching     Lisinopril Cough     Diovan Hct Itching     severe     Dust Mites      Hydrochlorothiazide Itching     Severe       No Clinical Screening - See Comments      History of blood transfusion reactions and pre-treats with Benadryl.      Oxycodone-Acetaminophen      Spironolactone Nausea     Sulfa Drugs Hives     Valsartan Itching     FHx:  Family History   Problem Relation Age of Onset     Diabetes Mother      Alzheimer Disease Mother      Substance Abuse Son      Cancer Sister      Soft Tissue Cancer Sister      Breast Cancer Sister       Hyperlipidemia Daughter      Alcoholism Brother      Spine Problems Sister        SOCIAL Hx:  Social History     Socioeconomic History     Marital status:      Spouse name: Not on file     Number of children: Not on file     Years of education: Not on file     Highest education level: Not on file   Occupational History     Not on file   Tobacco Use     Smoking status: Former     Packs/day: 2.00     Years: 45.00     Pack years: 90.00     Types: Cigarettes     Start date: 1953     Quit date: 2002     Years since quittin.2     Smokeless tobacco: Never   Substance and Sexual Activity     Alcohol use: No     Drug use: Yes     Types: Marijuana     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     Sexual activity: Not Currently   Other Topics Concern     Parent/sibling w/ CABG, MI or angioplasty before 65F 55M? No   Social History Narrative     Not on file     Social Determinants of Health     Financial Resource Strain: Not on file   Food Insecurity: Not on file   Transportation Needs: Not on file   Physical Activity: Not on file   Stress: Not on file   Social Connections: Not on file   Intimate Partner Violence: Not on file   Housing Stability: Not on file       ROS: A comprehensive Review of Systems was asked and answered in the negative unless specifically commented upon in the HPI    Physical Exam  Gen: A&Ox3, NAD  HEENT: Moist mucus membranes, no scleral icterus.  Lungs: no respiratory distress      LABS:  Admission on 2023, Discharged on 2023   Component Date Value Ref Range Status     Sodium 2023 143  136 - 145 mmol/L Final     Potassium 2023 4.4  3.4 - 5.3 mmol/L Final     Chloride 2023 102  98 - 107 mmol/L Final     Carbon Dioxide (CO2) 2023 23  22 - 29 mmol/L Final     Anion Gap 2023 18 (H)  7 - 15 mmol/L Final     Urea Nitrogen 2023 15.6  8.0 - 23.0 mg/dL Final     Creatinine 2023 6.12 (H)  0.51 - 0.95 mg/dL Final      Calcium 02/02/2023 9.0  8.8 - 10.2 mg/dL Final     Glucose 02/02/2023 98  70 - 99 mg/dL Final     Alkaline Phosphatase 02/02/2023 171 (H)  35 - 104 U/L Final     AST 02/02/2023 30  10 - 35 U/L Final     ALT 02/02/2023 9 (L)  10 - 35 U/L Final     Protein Total 02/02/2023 7.0  6.4 - 8.3 g/dL Final     Albumin 02/02/2023 3.9  3.5 - 5.2 g/dL Final     Bilirubin Total 02/02/2023 0.7  <=1.2 mg/dL Final     GFR Estimate 02/02/2023 6 (L)  >60 mL/min/1.73m2 Final    eGFR calculated using 2021 CKD-EPI equation.     WBC Count 02/02/2023 8.2  4.0 - 11.0 10e3/uL Final     RBC Count 02/02/2023 2.84 (L)  3.80 - 5.20 10e6/uL Final     Hemoglobin 02/02/2023 9.0 (L)  11.7 - 15.7 g/dL Final     Hematocrit 02/02/2023 29.6 (L)  35.0 - 47.0 % Final     MCV 02/02/2023 104 (H)  78 - 100 fL Final     MCH 02/02/2023 31.7  26.5 - 33.0 pg Final     MCHC 02/02/2023 30.4 (L)  31.5 - 36.5 g/dL Final     RDW 02/02/2023 18.8 (H)  10.0 - 15.0 % Final     Platelet Count 02/02/2023 193  150 - 450 10e3/uL Final     % Neutrophils 02/02/2023 78  % Final     % Lymphocytes 02/02/2023 8  % Final     % Monocytes 02/02/2023 10  % Final     % Eosinophils 02/02/2023 2  % Final     % Basophils 02/02/2023 1  % Final     % Immature Granulocytes 02/02/2023 1  % Final     NRBCs per 100 WBC 02/02/2023 0  <1 /100 Final     Absolute Neutrophils 02/02/2023 6.5  1.6 - 8.3 10e3/uL Final     Absolute Lymphocytes 02/02/2023 0.7 (L)  0.8 - 5.3 10e3/uL Final     Absolute Monocytes 02/02/2023 0.8  0.0 - 1.3 10e3/uL Final     Absolute Eosinophils 02/02/2023 0.2  0.0 - 0.7 10e3/uL Final     Absolute Basophils 02/02/2023 0.1  0.0 - 0.2 10e3/uL Final     Absolute Immature Granulocytes 02/02/2023 0.0  <=0.4 10e3/uL Final     Absolute NRBCs 02/02/2023 0.0  10e3/uL Final     Hold Specimen 02/02/2023 JIC   Final     Hold Specimen 02/02/2023 JIC   Final     INR 02/02/2023 1.29 (H)  0.85 - 1.15 Final     Video Capsule Endoscopy 02/09/2023    Final                    Value:[facility]  [address] [place] [address] [phone]     Endoscopy Department  _______________________________________________________________________________  Patient Name: Rachele Martínez        Procedure Date: 2/9/2023 3:16 PM  MRN: [id number]                       Account Number: [id number]  YOB: 1940              Admit Type: Emergency Department  Age: 82                               Room:  GI VIDEO CAPSULE  Gender: Female                        Note Status: Finalized  Attending MD: RICK CHAPPELL MD        Total Sedation Time:   _______________________________________________________________________________     Procedure:             Video capsule endoscopy  Indications:           Iron Deficiency Anemia, Melena, No cause found at                          upper endoscopy.                         Melena  Providers:             RICK CHAPPELL MD  Referring MD:            Medicines:             None  Complications:         N                          o immediate complications.  _______________________________________________________________________________  Procedure:             Pre-Anesthesia Assessment:                         - Prior to the procedure, a History and Physical was                          performed, and patient medications and allergies were                          reviewed. The patient's tolerance of previous                          anesthesia was also reviewed. The risks and benefits                          of the procedure and the sedation options and risks                          were discussed with the patient. All questions were                          answered, and informed consent was obtained. Prior                          Anticoagulants: The patient has taken no anticoagulant                          or antiplatelet agents. ASA Grade Assessment: II - A                          patient with mild systemic disease. After reviewing                           the risks and benefits, the patient was deemed                           in                          satisfactory condition to undergo the procedure.                         The video capsule endoscopy was accomplished without                          difficulty. The patient tolerated the procedure well.                                                                                   Findings:       Images of the esophagus, stomach and small bowel were obtained from the        swallowed capsule and reviewed.       The gastric passage time of the capsule enteroscope was 0-hours        17-minutes.       The small bowel passage time of the capsule enteroscope was 6-hours        45-minutes.       A single small angioectasia without bleeding was seen in the small bowel        at 0-hours 20-minutes. It was localized in extent. Proximal duodenum,        probably D2.       A sessile 3 mm polyp with no stigmata of recent bleeding was found on        the anterior wall of the gastric body.       Lymphangiectasia was found in the duodenum (probable second portion of                                  the duodenum).       A single spot with no bleeding was found in the small bowel (proximal        small bowel).       A single dark blue spot about 6 mm with no bleeding was found in the        small bowel at 1-hour 21-minutes.       A single small angioectasia without bleeding was seen in the small bowel        at 4-hours 9-minutes. It was localized in extent.       The capsule entered the colon.       There was no active bleeding seen anywhere during this exam.                                                                                   Impression:            - Gastric polyp(s).                         - A single angioectasia without bleeding in the small                          bowel.                         - Duodenal lymphangiectasia.                         - A single mucosal spot with no bleeding in  the small                          bowel.                         - A single mucosal spot with no bleeding in the small                          bowel.                                                   - A single angioectasia without bleeding in the small                          bowel.  Recommendation:        - Resume previous diet.                         - Return to referring physician as previously                          scheduled.                                                                                     Rick Polo MD  ________________  RICK POLO MD  2/9/2023 3:34:00 PM  I was physically present for the entire viewing portion of the exam.  __________________________  Signature of teaching physician  [id number] POLO MD  Number of Addenda: 0    Note Initiated On: 2/9/2023 3:16 PM  Scope In:  Scope Out:         2/13/23  Ferritin 148  TIBC 237  %sat 16%        Sincerely,    Ankit Baires MD

## 2023-03-02 ENCOUNTER — TELEPHONE (OUTPATIENT)
Dept: INTERNAL MEDICINE | Facility: CLINIC | Age: 83
End: 2023-03-02
Payer: MEDICARE

## 2023-03-02 NOTE — TELEPHONE ENCOUNTER
Central Prior Authorization Team   Phone: [phone]      PA Initiation    Medication: oxyCODONE-acetaminophen (PERCOCET) 5-325 MG tablet  Insurance Company: [employer] Part D - Phone [phone] Fax [phone]  Pharmacy Filling the Rx: [facility] [id number] [place] [facility] [address] [place]  Filling Pharmacy Phone: [phone]  Filling Pharmacy Fax:    Start Date: 3/2/2023

## 2023-03-02 NOTE — TELEPHONE ENCOUNTER
In the next few weeks you may receive a Press Ganey survey regarding your most recent clinic visit with us.  Please take a few moments to accurately evaluate your visit. We strive to provide you with the best medical care. Your feedback will assist us in achieving this. Again, thank you for your time and we look forward to your next visit.         If we need to contact you regarding any test results, we will make 2 attempts to reach you at the number you have listed during your office visit today. If we are unable to reach you, a letter with your results and any further instructions will be mailed to you home.      [facility]- Lung Cancer Screening   Telephone: [phone]  M-F 8 a.m- 8 p.m  Sat 7:30-4 pm  You will be notified at the time of your call, if your test requires insurance pre-authorization.  [email or web address]     Prior Authorization Approval    Authorization Effective Date: 3/2/2023  Authorization Expiration Date: 4/1/2023  Medication: oxyCODONE-acetaminophen (PERCOCET) 5-325 MG tablet  Approved Dose/Quantity:   Reference #:     Insurance Company: [employer] Part D - Phone [phone] Fax [phone]  Expected CoPay:       CoPay Card Available:      Foundation Assistance Needed:    Which Pharmacy is filling the prescription (Not needed for infusion/clinic administered): [facility] [id number] [place] [facility], [place] - [address] [facility]  Pharmacy Notified: Yes-Pharmacy will notify patient when ready.  Patient Notified: No

## 2023-03-02 NOTE — TELEPHONE ENCOUNTER
Prior Authorization Retail Medication Request    Medication/Dose: oxyCODONE-acetaminophen (PERCOCET) 5-325 MG tablet  ICD code (if different than what is on RX):    Previously Tried and Failed:    Rationale:      Insurance Name:  [employer]  Insurance ID:  [id number]      Pharmacy Information (if different than what is on RX)  Name:  [facility] [id number] [place] [facility] [address]  Phone:  [phone]

## 2023-03-03 ENCOUNTER — DOCUMENTATION ONLY (OUTPATIENT)
Dept: INTERNAL MEDICINE | Facility: CLINIC | Age: 83
End: 2023-03-03
Payer: MEDICARE

## 2023-03-03 ENCOUNTER — MEDICAL CORRESPONDENCE (OUTPATIENT)
Dept: HEALTH INFORMATION MANAGEMENT | Facility: CLINIC | Age: 83
End: 2023-03-03
Payer: MEDICARE

## 2023-03-03 NOTE — PROGRESS NOTES
Type of Form Received: orders    Form Received (Date) 3/3/23   Form Filled out Yes 3/13/23   Placed in provider folder Yes

## 2023-03-07 ENCOUNTER — PATIENT OUTREACH (OUTPATIENT)
Dept: GASTROENTEROLOGY | Facility: CLINIC | Age: 83
End: 2023-03-07
Payer: MEDICARE

## 2023-03-07 NOTE — PROGRESS NOTES
Spoke with Charla, Ana was also in the room, wanted to ensure they were aware that the colonoscopy portion of procedure was cancelled ([employer] message was not read after clinic). Also to relay that timing of day for procedure had changed d/t 1st case cancellation.    Pt and daughter were both unsure if they could make a 5:30am arrival. Said that Rachele was considering cancelling procedure because of the timing.     Message routed to Dr Baires and scheduling    1600  Returned call, able to move procedure back to 9:30am with 7:30am arrival. Charla agreeable to this timing.    Loraine Mancera, RN, BSN,   [facility]  Care coordinator

## 2023-03-09 ENCOUNTER — ANESTHESIA EVENT (OUTPATIENT)
Dept: SURGERY | Facility: CLINIC | Age: 83
End: 2023-03-09
Payer: MEDICARE

## 2023-03-09 ENCOUNTER — HOSPITAL ENCOUNTER (OUTPATIENT)
Facility: CLINIC | Age: 83
Discharge: HOME | End: 2023-03-09
Attending: INTERNAL MEDICINE | Admitting: INTERNAL MEDICINE
Payer: MEDICARE

## 2023-03-09 ENCOUNTER — ANESTHESIA (OUTPATIENT)
Dept: SURGERY | Facility: CLINIC | Age: 83
End: 2023-03-09
Payer: MEDICARE

## 2023-03-09 VITALS
BODY MASS INDEX: 23.71 KG/M2 | OXYGEN SATURATION: 98 % | SYSTOLIC BLOOD PRESSURE: 179 MMHG | HEIGHT: 64 IN | DIASTOLIC BLOOD PRESSURE: 72 MMHG | WEIGHT: 138.89 LBS | TEMPERATURE: 97.7 F | RESPIRATION RATE: 18 BRPM | HEART RATE: 66 BPM

## 2023-03-09 LAB — PROVATION GI EXAM: NORMAL

## 2023-03-09 PROCEDURE — 999N000141 HC STATISTIC PRE-PROCEDURE NURSING ASSESSMENT: Performed by: INTERNAL MEDICINE

## 2023-03-09 PROCEDURE — 710N000012 HC RECOVERY PHASE 2, PER MINUTE: Performed by: INTERNAL MEDICINE

## 2023-03-09 PROCEDURE — 250N000009 HC RX 250: Performed by: NURSE ANESTHETIST, CERTIFIED REGISTERED

## 2023-03-09 PROCEDURE — 258N000003 HC RX IP 258 OP 636: Performed by: NURSE ANESTHETIST, CERTIFIED REGISTERED

## 2023-03-09 PROCEDURE — 272N000001 HC OR GENERAL SUPPLY STERILE: Performed by: INTERNAL MEDICINE

## 2023-03-09 PROCEDURE — 250N000011 HC RX IP 250 OP 636: Performed by: NURSE ANESTHETIST, CERTIFIED REGISTERED

## 2023-03-09 PROCEDURE — 250N000009 HC RX 250: Performed by: INTERNAL MEDICINE

## 2023-03-09 PROCEDURE — 710N000009 HC RECOVERY PHASE 1, LEVEL 1, PER MIN: Performed by: INTERNAL MEDICINE

## 2023-03-09 PROCEDURE — 370N000017 HC ANESTHESIA TECHNICAL FEE, PER MIN: Performed by: INTERNAL MEDICINE

## 2023-03-09 PROCEDURE — 360N000083 HC SURGERY LEVEL 3 W/ FLUORO, PER MIN: Performed by: INTERNAL MEDICINE

## 2023-03-09 PROCEDURE — 250N000025 HC SEVOFLURANE, PER MIN: Performed by: INTERNAL MEDICINE

## 2023-03-09 RX ORDER — KETOROLAC TROMETHAMINE 30 MG/ML
15 INJECTION, SOLUTION INTRAMUSCULAR; INTRAVENOUS
Status: DISCONTINUED | OUTPATIENT
Start: 2023-03-09 | End: 2023-03-09 | Stop reason: HOSPADM

## 2023-03-09 RX ORDER — NALOXONE HYDROCHLORIDE 0.4 MG/ML
0.4 INJECTION, SOLUTION INTRAMUSCULAR; INTRAVENOUS; SUBCUTANEOUS
Status: CANCELLED | OUTPATIENT
Start: 2023-03-09

## 2023-03-09 RX ORDER — SODIUM CHLORIDE, SODIUM LACTATE, POTASSIUM CHLORIDE, CALCIUM CHLORIDE 600; 310; 30; 20 MG/100ML; MG/100ML; MG/100ML; MG/100ML
INJECTION, SOLUTION INTRAVENOUS CONTINUOUS
Status: DISCONTINUED | OUTPATIENT
Start: 2023-03-09 | End: 2023-03-09 | Stop reason: HOSPADM

## 2023-03-09 RX ORDER — ONDANSETRON 2 MG/ML
4 INJECTION INTRAMUSCULAR; INTRAVENOUS EVERY 6 HOURS PRN
Status: CANCELLED | OUTPATIENT
Start: 2023-03-09

## 2023-03-09 RX ORDER — ONDANSETRON 2 MG/ML
4 INJECTION INTRAMUSCULAR; INTRAVENOUS
Status: DISCONTINUED | OUTPATIENT
Start: 2023-03-09 | End: 2023-03-09 | Stop reason: HOSPADM

## 2023-03-09 RX ORDER — HYDROMORPHONE HYDROCHLORIDE 1 MG/ML
0.4 INJECTION, SOLUTION INTRAMUSCULAR; INTRAVENOUS; SUBCUTANEOUS EVERY 5 MIN PRN
Status: DISCONTINUED | OUTPATIENT
Start: 2023-03-09 | End: 2023-03-09 | Stop reason: HOSPADM

## 2023-03-09 RX ORDER — FENTANYL CITRATE 50 UG/ML
INJECTION, SOLUTION INTRAMUSCULAR; INTRAVENOUS PRN
Status: DISCONTINUED | OUTPATIENT
Start: 2023-03-09 | End: 2023-03-09

## 2023-03-09 RX ORDER — DEXAMETHASONE SODIUM PHOSPHATE 4 MG/ML
4 INJECTION, SOLUTION INTRA-ARTICULAR; INTRALESIONAL; INTRAMUSCULAR; INTRAVENOUS; SOFT TISSUE
Status: DISCONTINUED | OUTPATIENT
Start: 2023-03-09 | End: 2023-03-09 | Stop reason: HOSPADM

## 2023-03-09 RX ORDER — ONDANSETRON 4 MG/1
4 TABLET, ORALLY DISINTEGRATING ORAL EVERY 30 MIN PRN
Status: DISCONTINUED | OUTPATIENT
Start: 2023-03-09 | End: 2023-03-09 | Stop reason: HOSPADM

## 2023-03-09 RX ORDER — NALOXONE HYDROCHLORIDE 0.4 MG/ML
0.2 INJECTION, SOLUTION INTRAMUSCULAR; INTRAVENOUS; SUBCUTANEOUS
Status: CANCELLED | OUTPATIENT
Start: 2023-03-09

## 2023-03-09 RX ORDER — PROPOFOL 10 MG/ML
INJECTION, EMULSION INTRAVENOUS PRN
Status: DISCONTINUED | OUTPATIENT
Start: 2023-03-09 | End: 2023-03-09

## 2023-03-09 RX ORDER — LIDOCAINE 40 MG/G
CREAM TOPICAL
Status: DISCONTINUED | OUTPATIENT
Start: 2023-03-09 | End: 2023-03-09 | Stop reason: HOSPADM

## 2023-03-09 RX ORDER — SODIUM CHLORIDE, SODIUM LACTATE, POTASSIUM CHLORIDE, CALCIUM CHLORIDE 600; 310; 30; 20 MG/100ML; MG/100ML; MG/100ML; MG/100ML
INJECTION, SOLUTION INTRAVENOUS CONTINUOUS PRN
Status: DISCONTINUED | OUTPATIENT
Start: 2023-03-09 | End: 2023-03-09

## 2023-03-09 RX ORDER — ONDANSETRON 2 MG/ML
INJECTION INTRAMUSCULAR; INTRAVENOUS PRN
Status: DISCONTINUED | OUTPATIENT
Start: 2023-03-09 | End: 2023-03-09

## 2023-03-09 RX ORDER — LIDOCAINE HYDROCHLORIDE 20 MG/ML
INJECTION, SOLUTION INFILTRATION; PERINEURAL PRN
Status: DISCONTINUED | OUTPATIENT
Start: 2023-03-09 | End: 2023-03-09

## 2023-03-09 RX ORDER — ONDANSETRON 2 MG/ML
4 INJECTION INTRAMUSCULAR; INTRAVENOUS EVERY 30 MIN PRN
Status: DISCONTINUED | OUTPATIENT
Start: 2023-03-09 | End: 2023-03-09 | Stop reason: HOSPADM

## 2023-03-09 RX ORDER — FENTANYL CITRATE 50 UG/ML
25 INJECTION, SOLUTION INTRAMUSCULAR; INTRAVENOUS EVERY 5 MIN PRN
Status: DISCONTINUED | OUTPATIENT
Start: 2023-03-09 | End: 2023-03-09 | Stop reason: HOSPADM

## 2023-03-09 RX ORDER — FENTANYL CITRATE 50 UG/ML
50 INJECTION, SOLUTION INTRAMUSCULAR; INTRAVENOUS EVERY 5 MIN PRN
Status: DISCONTINUED | OUTPATIENT
Start: 2023-03-09 | End: 2023-03-09 | Stop reason: HOSPADM

## 2023-03-09 RX ORDER — DEXAMETHASONE SODIUM PHOSPHATE 4 MG/ML
INJECTION, SOLUTION INTRA-ARTICULAR; INTRALESIONAL; INTRAMUSCULAR; INTRAVENOUS; SOFT TISSUE PRN
Status: DISCONTINUED | OUTPATIENT
Start: 2023-03-09 | End: 2023-03-09

## 2023-03-09 RX ORDER — LABETALOL 20 MG/4 ML (5 MG/ML) INTRAVENOUS SYRINGE
PRN
Status: DISCONTINUED | OUTPATIENT
Start: 2023-03-09 | End: 2023-03-09

## 2023-03-09 RX ORDER — HYDROMORPHONE HYDROCHLORIDE 1 MG/ML
0.2 INJECTION, SOLUTION INTRAMUSCULAR; INTRAVENOUS; SUBCUTANEOUS EVERY 5 MIN PRN
Status: DISCONTINUED | OUTPATIENT
Start: 2023-03-09 | End: 2023-03-09 | Stop reason: HOSPADM

## 2023-03-09 RX ORDER — ONDANSETRON 4 MG/1
4 TABLET, ORALLY DISINTEGRATING ORAL EVERY 6 HOURS PRN
Status: CANCELLED | OUTPATIENT
Start: 2023-03-09

## 2023-03-09 RX ORDER — FLUMAZENIL 0.1 MG/ML
0.2 INJECTION, SOLUTION INTRAVENOUS
Status: CANCELLED | OUTPATIENT
Start: 2023-03-09 | End: 2023-03-09

## 2023-03-09 RX ORDER — LABETALOL HYDROCHLORIDE 5 MG/ML
10 INJECTION, SOLUTION INTRAVENOUS
Status: DISCONTINUED | OUTPATIENT
Start: 2023-03-09 | End: 2023-03-09 | Stop reason: HOSPADM

## 2023-03-09 RX ORDER — PROCHLORPERAZINE MALEATE 5 MG
5 TABLET ORAL EVERY 6 HOURS PRN
Status: CANCELLED | OUTPATIENT
Start: 2023-03-09

## 2023-03-09 RX ORDER — SODIUM CHLORIDE 9 MG/ML
500 INJECTION, SOLUTION INTRAVENOUS CONTINUOUS
Status: DISCONTINUED | OUTPATIENT
Start: 2023-03-09 | End: 2023-03-09 | Stop reason: HOSPADM

## 2023-03-09 RX ADMIN — SUGAMMADEX 200 MG: 100 INJECTION, SOLUTION INTRAVENOUS at 10:17

## 2023-03-09 RX ADMIN — DEXAMETHASONE SODIUM PHOSPHATE 8 MG: 4 INJECTION, SOLUTION INTRA-ARTICULAR; INTRALESIONAL; INTRAMUSCULAR; INTRAVENOUS; SOFT TISSUE at 09:01

## 2023-03-09 RX ADMIN — GLUCAGON 0.4 MG: KIT at 09:25

## 2023-03-09 RX ADMIN — PROPOFOL 100 MG: 10 INJECTION, EMULSION INTRAVENOUS at 09:01

## 2023-03-09 RX ADMIN — PROPOFOL 20 MG: 10 INJECTION, EMULSION INTRAVENOUS at 09:58

## 2023-03-09 RX ADMIN — ONDANSETRON 4 MG: 2 INJECTION INTRAMUSCULAR; INTRAVENOUS at 10:13

## 2023-03-09 RX ADMIN — FENTANYL CITRATE 100 MCG: 50 INJECTION, SOLUTION INTRAMUSCULAR; INTRAVENOUS at 09:01

## 2023-03-09 RX ADMIN — SODIUM CHLORIDE, POTASSIUM CHLORIDE, SODIUM LACTATE AND CALCIUM CHLORIDE: 600; 310; 30; 20 INJECTION, SOLUTION INTRAVENOUS at 08:57

## 2023-03-09 RX ADMIN — Medication 30 MG: at 09:02

## 2023-03-09 RX ADMIN — LIDOCAINE HYDROCHLORIDE 100 MG: 20 INJECTION, SOLUTION INFILTRATION; PERINEURAL at 09:01

## 2023-03-09 RX ADMIN — LABETALOL 20 MG/4 ML (5 MG/ML) INTRAVENOUS SYRINGE 5 MG: at 09:10

## 2023-03-09 ASSESSMENT — ACTIVITIES OF DAILY LIVING (ADL)
ADLS_ACUITY_SCORE: 39
ADLS_ACUITY_SCORE: 39

## 2023-03-09 NOTE — ANESTHESIA POSTPROCEDURE EVALUATION
Patient: Rachele Martínez    Procedure: Procedure(s):  antegrade single balloon enteroscopy with argon plasma coagulation of arteriovenous malformations       Anesthesia Type:  General    Note:  Disposition: Outpatient   Postop Pain Control: Uneventful            Sign Out: Well controlled pain   PONV: No   Neuro/Psych: Uneventful            Sign Out: Acceptable/Baseline neuro status   Airway/Respiratory: Uneventful            Sign Out: Acceptable/Baseline resp. status   CV/Hemodynamics: Uneventful            Sign Out: Acceptable CV status; No obvious hypovolemia; No obvious fluid overload   Other NRE: NONE   DID A NON-ROUTINE EVENT OCCUR? No           Last vitals:  Vitals Value Taken Time   /50 03/09/23 1130   Temp 36.6  C (97.9  F) 03/09/23 1115   Pulse 66 03/09/23 1131   Resp 14 03/09/23 1132   SpO2 93 % 03/09/23 1131   Vitals shown include unvalidated device data.    Electronically Signed By: Rafat Esquivel MD  March 9, 2023  11:46 AM

## 2023-03-09 NOTE — ANESTHESIA PREPROCEDURE EVALUATION
Anesthesia Pre-Procedure Evaluation    Patient: Rachele Martínez   MRN: [id number] : 1940        Procedure : Procedure(s):  antegrade single balloon enteroscopy          Past Medical History:   Diagnosis Date     Anemia      Anxiety and depression      Arthritis      AVM (arteriovenous malformation)      Chronic hepatitis C with cirrhosis (H)      Clotting disorder (H)      ESRD (end stage renal disease) (H)     on dialysis     GI bleed     recurrent     Glaucoma      Hyperlipidemia      Hypertension goal BP (blood pressure) < 140/80       Past Surgical History:   Procedure Laterality Date     CAPSULE/PILL CAM ENDOSCOPY N/A 3/27/2019    Procedure: Capsule/pill cam endoscopy;  Surgeon: Yosvany Ram MD;  Location: [facility]     CAPSULE/PILL CAM ENDOSCOPY N/A 2023    Procedure: IMAGING PROCEDURE, GI TRACT, INTRALUMINAL, VIA CAPSULE;  Surgeon: Mulu Nur DO;  Location: [facility]     COLONOSCOPY N/A 2015    Procedure: COMBINED COLONOSCOPY, SINGLE OR MULTIPLE BIOPSY/POLYPECTOMY BY BIOPSY;  Surgeon: Rupesh Lopez MD;  Location: [facility]     COLONOSCOPY N/A 2018    Procedure: COLONOSCOPY;  enteroscopy small bowel  COLONOSCOPY;  Surgeon: Ankit Baires MD;  Location: [facility]     ESOPHAGOSCOPY, GASTROSCOPY, DUODENOSCOPY (EGD), COMBINED N/A 2014    Procedure: COMBINED ESOPHAGOSCOPY, GASTROSCOPY, DUODENOSCOPY (EGD);  Surgeon: Betsy Carvajal MD;  Location: [facility]     ESOPHAGOSCOPY, GASTROSCOPY, DUODENOSCOPY (EGD), COMBINED N/A 2015    Procedure: COMBINED ESOPHAGOSCOPY, GASTROSCOPY, DUODENOSCOPY (EGD);  Surgeon: Yosvany Ram MD;  Location: [facility]     ESOPHAGOSCOPY, GASTROSCOPY, DUODENOSCOPY (EGD), COMBINED N/A 2015    Procedure: COMBINED ESOPHAGOSCOPY, GASTROSCOPY, DUODENOSCOPY (EGD);  Surgeon: Mariano Mistry MD;  Location: [facility]     HC CAPSULE ENDOSCOPY N/A 2015    Procedure: CAPSULE/PILL CAM ENDOSCOPY;  Surgeon: Pan Dhaliwal MD;  Location: [facility]      HC VASCULAR SURGERY PROCEDURE UNLIST       HYSTERECTOMY      KYREE     LUMPECTOMY BREAST        Allergies   Allergen Reactions     Abacavir Itching     Lisinopril Cough     Diovan Hct Itching     severe     Dust Mites      Hydrochlorothiazide Itching     Severe       No Clinical Screening - See Comments      History of blood transfusion reactions and pre-treats with Benadryl.      Oxycodone-Acetaminophen      3/8/2023 Currently taking with no problems     Spironolactone Nausea     Sulfa Drugs Hives     Valsartan Itching      Social History     Tobacco Use     Smoking status: Former     Packs/day: 2.00     Years: 45.00     Pack years: 90.00     Types: Cigarettes     Start date: 1953     Quit date: 2002     Years since quittin.3     Smokeless tobacco: Never   Substance Use Topics     Alcohol use: No      Wt Readings from Last 1 Encounters:   23 63 kg (138 lb 14.2 oz)        Anesthesia Evaluation   Pt has had prior anesthetic. Type: General.        ROS/MED HX  ENT/Pulmonary:  - neg pulmonary ROS     Neurologic:       Cardiovascular: Comment: 3/30/2022    Interpretation Summary:    Hyperdynamic LV function, LVEF=65-70% with sigmoid septum resulting in  systolic anterior motion of the mitral valve without significant LVOT  obstruction. Trileaflet aortic sclerosis without stenosis.While neither of  these findings is hemodynamically significant, both represent a plausible  basis for a systolic murmur. Both findings were also present on the prior  study from 3/8/2018.  No significant valvular abnormalities are noted.     This study was compared with the study from 3/26/19 and 3/8/18: LVEF is  increased from 3/26/19 and is now hyperdynamic, resulting in more prominent  SUN. Findings are unchanged from 3/8/18. The regional wall motion abnormal  abnormalities described on the report from 3/26/2019 are not appreciated on  today's study and are also not appreciated on direct review of the images  from  the prior study.    (+) Dyslipidemia hypertension-----    METS/Exercise Tolerance:     Hematologic: Comments: Lab Test        02/02/23 02/02/23 01/26/23 01/26/23                       1236          1233          1450          1346          WBC           --          8.2          7.8          8.2           HGB           --          9.0*         6.9*         6.6*          MCV           --          104*         108*         107*          PLT           --          193          270          244           INR          1.29*         --          1.20*        1.22*          Lab Test        02/02/23 01/26/23 01/26/23                       1233          1450          1346          NA           143          141          142           POTASSIUM    4.4          4.4          4.6           CHLORIDE     102          103          104           CO2          23           26           28            BUN          15.6         14.5         14.6          CR           6.12*        5.49*        5.45*         ANIONGAP     18*          12           10            BELGICA          9.0          9.5          9.3           GLC          98           106*         109*              (+) anemia,     Musculoskeletal:   (+) arthritis,     GI/Hepatic: Comment: Melena    (+) hepatitis type C and Alcoholic, liver disease,     Renal/Genitourinary: Comment:   ESRD (end stage renal disease)   on dialysis      (+) renal disease, type: CRI,     Endo:  - neg endo ROS     Psychiatric/Substance Use:     (+) psychiatric history anxiety and depression     Infectious Disease:  - neg infectious disease ROS     Malignancy:  - neg malignancy ROS     Other:  - neg other ROS          Physical Exam    Airway        Mallampati: II   TM distance: > 3 FB   Neck ROM: full   Mouth opening: > 3 cm    Respiratory Devices and Support         Dental       (+) Minor Abnormalities - some fillings, tiny chips      Cardiovascular   cardiovascular exam normal           Pulmonary   pulmonary exam normal                OUTSIDE LABS:  CBC:   Lab Results   Component Value Date    WBC 8.2 02/02/2023    WBC 7.8 01/26/2023    HGB 9.0 (L) 02/02/2023    HGB 6.9 (LL) 01/26/2023    HCT 29.6 (L) 02/02/2023    HCT 23.5 (L) 01/26/2023     02/02/2023     01/26/2023     BMP:   Lab Results   Component Value Date     02/02/2023     01/26/2023    POTASSIUM 4.4 02/02/2023    POTASSIUM 4.4 01/26/2023    CHLORIDE 102 02/02/2023    CHLORIDE 103 01/26/2023    CO2 23 02/02/2023    CO2 26 01/26/2023    BUN 15.6 02/02/2023    BUN 14.5 01/26/2023    CR 6.12 (H) 02/02/2023    CR 5.49 (H) 01/26/2023    GLC 98 02/02/2023     (H) 01/26/2023     COAGS:   Lab Results   Component Value Date    PTT 37 03/26/2019    INR 1.29 (H) 02/02/2023     POC:   Lab Results   Component Value Date     (H) 04/24/2015     HEPATIC:   Lab Results   Component Value Date    ALBUMIN 3.9 02/02/2023    PROTTOTAL 7.0 02/02/2023    ALT 9 (L) 02/02/2023    AST 30 02/02/2023    ALKPHOS 171 (H) 02/02/2023    BILITOTAL 0.7 02/02/2023     OTHER:   Lab Results   Component Value Date    LACT 1.0 03/26/2019    BELGICA 9.0 02/02/2023    PHOS 2.9 03/28/2019    MAG 1.7 03/12/2016    LIPASE 63 (L) 03/26/2019       Anesthesia Plan    ASA Status:  3      Anesthesia Type: General.     - Airway: ETT   Induction: Intravenous, Propofol.   Maintenance: Balanced.        Consents    Anesthesia Plan(s) and associated risks, benefits, and realistic alternatives discussed. Questions answered and patient/representative(s) expressed understanding.    - Discussed:     - Discussed with:  Patient      - Extended Intubation/Ventilatory Support Discussed: No.      - Patient is DNR/DNI Status: No    Use of blood products discussed: Yes.     - Discussed with: Patient.     - Consented: consented to blood products            Reason for refusal: other.     Postoperative Care    Pain management: IV analgesics.   PONV prophylaxis:  Ondansetron (or other 5HT-3), Dexamethasone or Solumedrol     Comments:                Rafat Esquivel MD

## 2023-03-09 NOTE — ANESTHESIA POSTPROCEDURE EVALUATION
Patient: Rachele Martínez    Procedure: Procedure(s):  antegrade single balloon enteroscopy with argon plasma coagulation of arteriovenous malformations       Anesthesia Type:  General    Note:  Disposition: Outpatient   Postop Pain Control: Uneventful            Sign Out: Well controlled pain   PONV: No   Neuro/Psych: Uneventful            Sign Out: Acceptable/Baseline neuro status   Airway/Respiratory: Uneventful            Sign Out: Acceptable/Baseline resp. status   CV/Hemodynamics: Uneventful            Sign Out: Acceptable CV status; No obvious hypovolemia; No obvious fluid overload   Other NRE: NONE   DID A NON-ROUTINE EVENT OCCUR? No           Last vitals:  Vitals Value Taken Time   /62 03/09/23 1115   Temp 36.6  C (97.9  F) 03/09/23 1115   Pulse 66 03/09/23 1116   Resp 8 03/09/23 1116   SpO2 96 % 03/09/23 1116   Vitals shown include unvalidated device data.    Electronically Signed By: Ryan Fuller  March 9, 2023  11:18 AM

## 2023-03-09 NOTE — ANESTHESIA PROCEDURE NOTES
Airway       Patient location during procedure: OR       Procedure Start/Stop Times: 3/9/2023 9:04 AM  Staff -        CRNA: Loraine La APRN CRNA       Performed By: CRNA  Consent for Airway        Urgency: elective  Indications and Patient Condition       Indications for airway management: cal-procedural       Induction type:intravenous       Mask difficulty assessment: 1 - vent by mask    Final Airway Details       Final airway type: endotracheal airway       Successful airway: ETT - single  Endotracheal Airway Details        ETT size (mm): 7.0       Cuffed: yes       Successful intubation technique: direct laryngoscopy       DL Blade Type: Smart 2       Grade View of Cords: 1       Adjucts: stylet       Position: Right       Measured from: gums/teeth       Secured at (cm): 21       Bite block used: Oral Airway (padded with gauze)    Post intubation assessment        Placement verified by: capnometry, equal breath sounds and chest rise        Number of attempts at approach: 1       Secured with: silk tape       Ease of procedure: easy       Dentition: Intact and Unchanged (patient edentolous)    Medication(s) Administered   Medication Administration Time: 3/9/2023 9:04 AM

## 2023-03-09 NOTE — DISCHARGE INSTRUCTIONS
[facility]  Same-Day Surgery   Adult Discharge Orders & Instructions     For 24 hours after surgery    Get plenty of rest.  A responsible adult must stay with you for at least 24 hours after you leave the hospital.   Do not drive or use heavy equipment.  If you have weakness or tingling, don't drive or use heavy equipment until this feeling goes away.  Do not drink alcohol.  Avoid strenuous or risky activities.  Ask for help when climbing stairs.   You may feel lightheaded.  IF so, sit for a few minutes before standing.  Have someone help you get up.   If you have nausea (feel sick to your stomach): Drink only clear liquids such as apple juice, ginger ale, broth or 7-Up.  Rest may also help.  Be sure to drink enough fluids.  Move to a regular diet as you feel able.  You may have a slight fever. Call the doctor if your fever is over 100 F (37.7 C) (taken under the tongue) or lasts longer than 24 hours.  You may have a dry mouth, a sore throat, muscle aches or trouble sleeping.  These should go away after 24 hours.  Do not make important or legal decisions.   Call your doctor for any of the followin.  Signs of infection (fever, growing tenderness at the surgery site, a large amount of drainage or bleeding, severe pain, foul-smelling drainage, redness, swelling).    2. It has been over 8 to 10 hours since surgery and you are still not able to urinate (pass water).    3.  Headache for over 24 hours.      To contact a doctor, call Dr. Baires's office at [phone] or:    '   [phone] and ask for the resident on call for   Gastroenterology (answered 24 hours a day)  '   Emergency Department:    [facility]: [phone]       (TTY for hearing impaired: [phone])

## 2023-03-10 ENCOUNTER — MEDICAL CORRESPONDENCE (OUTPATIENT)
Dept: HEALTH INFORMATION MANAGEMENT | Facility: CLINIC | Age: 83
End: 2023-03-10
Payer: MEDICARE

## 2023-03-28 ENCOUNTER — INFUSION THERAPY VISIT (OUTPATIENT)
Dept: INFUSION THERAPY | Facility: CLINIC | Age: 83
End: 2023-03-28
Payer: MEDICARE

## 2023-03-28 VITALS
DIASTOLIC BLOOD PRESSURE: 60 MMHG | SYSTOLIC BLOOD PRESSURE: 116 MMHG | TEMPERATURE: 97.9 F | OXYGEN SATURATION: 95 % | RESPIRATION RATE: 16 BRPM | HEART RATE: 71 BPM

## 2023-03-28 DIAGNOSIS — K55.20 AVM (ARTERIOVENOUS MALFORMATION) OF SMALL BOWEL, ACQUIRED: ICD-10-CM

## 2023-03-28 DIAGNOSIS — K31.811 ANGIODYSPLASIA OF STOMACH AND DUODENUM WITH HEMORRHAGE: Primary | ICD-10-CM

## 2023-03-28 DIAGNOSIS — D50.0 IRON DEFICIENCY ANEMIA DUE TO CHRONIC BLOOD LOSS: ICD-10-CM

## 2023-03-28 PROCEDURE — 96372 THER/PROPH/DIAG INJ SC/IM: CPT | Performed by: NURSE PRACTITIONER

## 2023-03-28 PROCEDURE — 99207 PR NO CHARGE LOS: CPT

## 2023-03-28 RX ADMIN — OCTREOTIDE ACETATE 20 MG: KIT INTRAMUSCULAR at 12:02

## 2023-03-28 ASSESSMENT — PAIN SCALES - GENERAL: PAINLEVEL: SEVERE PAIN (7)

## 2023-04-08 ENCOUNTER — HEALTH MAINTENANCE LETTER (OUTPATIENT)
Age: 83
End: 2023-04-08

## 2023-04-25 ENCOUNTER — INFUSION THERAPY VISIT (OUTPATIENT)
Dept: INFUSION THERAPY | Facility: CLINIC | Age: 83
End: 2023-04-25
Payer: MEDICARE

## 2023-04-25 VITALS
RESPIRATION RATE: 16 BRPM | SYSTOLIC BLOOD PRESSURE: 110 MMHG | DIASTOLIC BLOOD PRESSURE: 58 MMHG | HEART RATE: 80 BPM | TEMPERATURE: 98 F | OXYGEN SATURATION: 98 %

## 2023-04-25 DIAGNOSIS — K31.811 ANGIODYSPLASIA OF STOMACH AND DUODENUM WITH HEMORRHAGE: Primary | ICD-10-CM

## 2023-04-25 DIAGNOSIS — K55.20 AVM (ARTERIOVENOUS MALFORMATION) OF SMALL BOWEL, ACQUIRED: ICD-10-CM

## 2023-04-25 DIAGNOSIS — D50.0 IRON DEFICIENCY ANEMIA DUE TO CHRONIC BLOOD LOSS: ICD-10-CM

## 2023-04-25 PROCEDURE — 99207 PR NO CHARGE LOS: CPT

## 2023-04-25 PROCEDURE — 96372 THER/PROPH/DIAG INJ SC/IM: CPT | Performed by: NURSE PRACTITIONER

## 2023-04-25 RX ADMIN — OCTREOTIDE ACETATE 20 MG: KIT INTRAMUSCULAR at 12:11

## 2023-04-25 ASSESSMENT — PAIN SCALES - GENERAL: PAINLEVEL: SEVERE PAIN (7)

## 2023-04-25 NOTE — PROGRESS NOTES
Infusion Nursing Note:  Rachele LORRIE Martínez presents today for Sandostatin.    Patient seen by provider today: No   present during visit today: Not Applicable.    Note: Assessment performed by Holly PATINO RN prior to injection today. Patient denies symptoms/concerns following previous injection.    Intravenous Access:  No Intravenous access/labs at this visit.    Treatment Conditions:  Not Applicable.      Post Infusion Assessment:  Patient tolerated injection without incident.  Site patent and intact, free from redness, edema or discomfort.       Discharge Plan:   Patient discharged in stable condition accompanied by: self.  Departure Mode: Ambulatory.      Vilma Ariza LPN

## 2023-05-12 ENCOUNTER — APPOINTMENT (OUTPATIENT)
Dept: ULTRASOUND IMAGING | Facility: CLINIC | Age: 83
DRG: 091 | End: 2023-05-12
Attending: EMERGENCY MEDICINE
Payer: MEDICARE

## 2023-05-12 ENCOUNTER — APPOINTMENT (OUTPATIENT)
Dept: CT IMAGING | Facility: CLINIC | Age: 83
DRG: 091 | End: 2023-05-12
Attending: EMERGENCY MEDICINE
Payer: MEDICARE

## 2023-05-12 ENCOUNTER — HOSPITAL ENCOUNTER (INPATIENT)
Facility: CLINIC | Age: 83
LOS: 13 days | Discharge: NURSING, REHAB OR LONG-TERM CARE | DRG: 091 | End: 2023-05-25
Attending: EMERGENCY MEDICINE | Admitting: PEDIATRICS
Payer: MEDICARE

## 2023-05-12 DIAGNOSIS — R04.2 HEMOPTYSIS: Primary | ICD-10-CM

## 2023-05-12 DIAGNOSIS — M54.41 CHRONIC BILATERAL LOW BACK PAIN WITH RIGHT-SIDED SCIATICA: ICD-10-CM

## 2023-05-12 DIAGNOSIS — K92.2 GASTROINTESTINAL HEMORRHAGE, UNSPECIFIED GASTROINTESTINAL HEMORRHAGE TYPE: ICD-10-CM

## 2023-05-12 DIAGNOSIS — G89.29 CHRONIC BILATERAL LOW BACK PAIN WITH RIGHT-SIDED SCIATICA: ICD-10-CM

## 2023-05-12 DIAGNOSIS — M62.81 GENERALIZED MUSCLE WEAKNESS: ICD-10-CM

## 2023-05-12 DIAGNOSIS — M54.50 CHRONIC BILATERAL LOW BACK PAIN WITHOUT SCIATICA: ICD-10-CM

## 2023-05-12 DIAGNOSIS — R41.89 COGNITIVE IMPAIRMENT: ICD-10-CM

## 2023-05-12 DIAGNOSIS — G89.29 CHRONIC BILATERAL LOW BACK PAIN WITHOUT SCIATICA: ICD-10-CM

## 2023-05-12 DIAGNOSIS — M79.662 PAIN OF LEFT LOWER LEG: ICD-10-CM

## 2023-05-12 DIAGNOSIS — L29.9 ITCHING: ICD-10-CM

## 2023-05-12 DIAGNOSIS — M51.369 LUMBAR DEGENERATIVE DISC DISEASE: ICD-10-CM

## 2023-05-12 LAB
ALBUMIN SERPL BCG-MCNC: 4.1 G/DL (ref 3.5–5.2)
ALP SERPL-CCNC: 217 U/L (ref 35–104)
ALT SERPL W P-5'-P-CCNC: 34 U/L (ref 10–35)
ANION GAP SERPL CALCULATED.3IONS-SCNC: 21 MMOL/L (ref 7–15)
AST SERPL W P-5'-P-CCNC: 80 U/L (ref 10–35)
ATRIAL RATE - MUSE: 80 BPM
BASOPHILS # BLD AUTO: 0 10E3/UL (ref 0–0.2)
BASOPHILS NFR BLD AUTO: 0 %
BILIRUB SERPL-MCNC: 0.7 MG/DL
BUN SERPL-MCNC: 34.4 MG/DL (ref 8–23)
CALCIUM SERPL-MCNC: 9.2 MG/DL (ref 8.8–10.2)
CHLORIDE SERPL-SCNC: 95 MMOL/L (ref 98–107)
CREAT SERPL-MCNC: 5.77 MG/DL (ref 0.51–0.95)
D DIMER PPP FEU-MCNC: 4.44 UG/ML FEU (ref 0–0.5)
DEPRECATED HCO3 PLAS-SCNC: 22 MMOL/L (ref 22–29)
DIASTOLIC BLOOD PRESSURE - MUSE: NORMAL MMHG
EOSINOPHIL # BLD AUTO: 0 10E3/UL (ref 0–0.7)
EOSINOPHIL NFR BLD AUTO: 0 %
ERYTHROCYTE [DISTWIDTH] IN BLOOD BY AUTOMATED COUNT: 15.6 % (ref 10–15)
GFR SERPL CREATININE-BSD FRML MDRD: 7 ML/MIN/1.73M2
GLUCOSE SERPL-MCNC: 123 MG/DL (ref 70–99)
HCT VFR BLD AUTO: 31.9 % (ref 35–47)
HGB BLD-MCNC: 10 G/DL (ref 11.7–15.7)
HOLD SPECIMEN: NORMAL
IMM GRANULOCYTES # BLD: 0.1 10E3/UL
IMM GRANULOCYTES NFR BLD: 1 %
INTERPRETATION ECG - MUSE: NORMAL
LYMPHOCYTES # BLD AUTO: 0.5 10E3/UL (ref 0.8–5.3)
LYMPHOCYTES NFR BLD AUTO: 4 %
MAGNESIUM SERPL-MCNC: 2.2 MG/DL (ref 1.7–2.3)
MCH RBC QN AUTO: 30.1 PG (ref 26.5–33)
MCHC RBC AUTO-ENTMCNC: 31.3 G/DL (ref 31.5–36.5)
MCV RBC AUTO: 96 FL (ref 78–100)
MONOCYTES # BLD AUTO: 1.5 10E3/UL (ref 0–1.3)
MONOCYTES NFR BLD AUTO: 14 %
NEUTROPHILS # BLD AUTO: 9.2 10E3/UL (ref 1.6–8.3)
NEUTROPHILS NFR BLD AUTO: 81 %
NRBC # BLD AUTO: 0 10E3/UL
NRBC BLD AUTO-RTO: 0 /100
P AXIS - MUSE: 49 DEGREES
PHOSPHATE SERPL-MCNC: 3.7 MG/DL (ref 2.5–4.5)
PLATELET # BLD AUTO: 275 10E3/UL (ref 150–450)
POTASSIUM SERPL-SCNC: 3.4 MMOL/L (ref 3.4–5.3)
PR INTERVAL - MUSE: 176 MS
PROT SERPL-MCNC: 8.3 G/DL (ref 6.4–8.3)
QRS DURATION - MUSE: 98 MS
QT - MUSE: 424 MS
QTC - MUSE: 489 MS
R AXIS - MUSE: -17 DEGREES
RBC # BLD AUTO: 3.32 10E6/UL (ref 3.8–5.2)
SODIUM SERPL-SCNC: 138 MMOL/L (ref 136–145)
SYSTOLIC BLOOD PRESSURE - MUSE: NORMAL MMHG
T AXIS - MUSE: 84 DEGREES
TROPONIN T SERPL HS-MCNC: 82 NG/L
TROPONIN T SERPL HS-MCNC: 85 NG/L
VENTRICULAR RATE- MUSE: 80 BPM
WBC # BLD AUTO: 11.3 10E3/UL (ref 4–11)

## 2023-05-12 PROCEDURE — 250N000011 HC RX IP 250 OP 636: Performed by: EMERGENCY MEDICINE

## 2023-05-12 PROCEDURE — G1010 CDSM STANSON: HCPCS

## 2023-05-12 PROCEDURE — 84484 ASSAY OF TROPONIN QUANT: CPT | Performed by: EMERGENCY MEDICINE

## 2023-05-12 PROCEDURE — 93005 ELECTROCARDIOGRAM TRACING: CPT | Performed by: EMERGENCY MEDICINE

## 2023-05-12 PROCEDURE — 80053 COMPREHEN METABOLIC PANEL: CPT | Performed by: EMERGENCY MEDICINE

## 2023-05-12 PROCEDURE — G1010 CDSM STANSON: HCPCS | Mod: GC | Performed by: RADIOLOGY

## 2023-05-12 PROCEDURE — 93925 LOWER EXTREMITY STUDY: CPT

## 2023-05-12 PROCEDURE — 70450 CT HEAD/BRAIN W/O DYE: CPT | Mod: 26 | Performed by: RADIOLOGY

## 2023-05-12 PROCEDURE — 250N000013 HC RX MED GY IP 250 OP 250 PS 637: Performed by: EMERGENCY MEDICINE

## 2023-05-12 PROCEDURE — 250N000009 HC RX 250: Performed by: EMERGENCY MEDICINE

## 2023-05-12 PROCEDURE — 93010 ELECTROCARDIOGRAM REPORT: CPT | Performed by: EMERGENCY MEDICINE

## 2023-05-12 PROCEDURE — 85025 COMPLETE CBC W/AUTO DIFF WBC: CPT | Performed by: EMERGENCY MEDICINE

## 2023-05-12 PROCEDURE — 72131 CT LUMBAR SPINE W/O DYE: CPT | Mod: 26 | Performed by: RADIOLOGY

## 2023-05-12 PROCEDURE — 93970 EXTREMITY STUDY: CPT

## 2023-05-12 PROCEDURE — 71275 CT ANGIOGRAPHY CHEST: CPT | Mod: 26 | Performed by: RADIOLOGY

## 2023-05-12 PROCEDURE — 84100 ASSAY OF PHOSPHORUS: CPT | Performed by: EMERGENCY MEDICINE

## 2023-05-12 PROCEDURE — 99285 EMERGENCY DEPT VISIT HI MDM: CPT | Mod: 25 | Performed by: EMERGENCY MEDICINE

## 2023-05-12 PROCEDURE — 36415 COLL VENOUS BLD VENIPUNCTURE: CPT | Performed by: EMERGENCY MEDICINE

## 2023-05-12 PROCEDURE — 93925 LOWER EXTREMITY STUDY: CPT | Mod: 26 | Performed by: RADIOLOGY

## 2023-05-12 PROCEDURE — 72131 CT LUMBAR SPINE W/O DYE: CPT | Mod: MF

## 2023-05-12 PROCEDURE — 93970 EXTREMITY STUDY: CPT | Mod: 26 | Performed by: STUDENT IN AN ORGANIZED HEALTH CARE EDUCATION/TRAINING PROGRAM

## 2023-05-12 PROCEDURE — 83735 ASSAY OF MAGNESIUM: CPT | Performed by: EMERGENCY MEDICINE

## 2023-05-12 PROCEDURE — 85379 FIBRIN DEGRADATION QUANT: CPT | Performed by: EMERGENCY MEDICINE

## 2023-05-12 PROCEDURE — 120N000002 HC R&B MED SURG/OB UMMC

## 2023-05-12 RX ORDER — OXYCODONE HYDROCHLORIDE 5 MG/1
5 TABLET ORAL ONCE
Status: COMPLETED | OUTPATIENT
Start: 2023-05-12 | End: 2023-05-12

## 2023-05-12 RX ORDER — IOPAMIDOL 755 MG/ML
58 INJECTION, SOLUTION INTRAVASCULAR ONCE
Status: COMPLETED | OUTPATIENT
Start: 2023-05-12 | End: 2023-05-12

## 2023-05-12 RX ADMIN — OXYCODONE HYDROCHLORIDE 5 MG: 5 TABLET ORAL at 19:12

## 2023-05-12 RX ADMIN — SODIUM CHLORIDE, PRESERVATIVE FREE 84 ML: 5 INJECTION INTRAVENOUS at 21:11

## 2023-05-12 RX ADMIN — IOPAMIDOL 58 ML: 755 INJECTION, SOLUTION INTRAVENOUS at 21:11

## 2023-05-12 ASSESSMENT — ACTIVITIES OF DAILY LIVING (ADL)
ADLS_ACUITY_SCORE: 37

## 2023-05-12 NOTE — ED TRIAGE NOTES
Pt to ed w/ c/o bilat leg pain (left worse than right), dizziness when ambulating since Friday. She reports that she was seen in another ED for this issue Friday .

## 2023-05-12 NOTE — ED NOTES
CT staff stated that they could not get pt onto CT machine due to pt being weak to move.CT notified writer that they will come for pt for CT scan once pt is in a streture. Charge nurse was notified

## 2023-05-12 NOTE — ED PROVIDER NOTES
[place] EMERGENCY DEPARTMENT ([facility])    5/12/23       ED PROVIDER NOTE    History     Chief Complaint   Patient presents with     Leg Pain     Dizziness     HPI   Rachele Martínez is a 83 year old female with past medical history significant for ESRD on HD (MWF), chronic hepatitis C with cirrhosis, recurrent GI bleed, AVM, clotting disorder, HTN, HLD who presents to the ED for pain to the bilateral lower extremities left greater than right, generalized weakness, low back pain.  Patient was evaluated at outside hospital emergency department 4 days ago for similar symptoms, she had noted that she has been having lightheadedness without syncope, dysuria, left knee pain.  COVID swab was negative.  Labs were not acutely changed from her baseline.  She notes that her symptoms have persisted, causing her to come into the emergency department today for reevaluation.  She did have a full run of dialysis today, finish the entire run.  She notes that her lightheadedness is exacerbated after dialysis runs.  No syncope.  No chest pain or shortness of breath.  No fevers, chills, or cough.  In regard to her lower extremity pain she notes that it is the left knee and calf and also notes some baseline chronic lower abdominal pain that sometimes radiates to the left lower extremity.  She denies any jamie focal weakness in the lower extremities.  Denies any numbness.  She notes some pins and needlelike sensation in the bilateral feet and bilateral hands intermittently for the past several weeks to months.  In regard to the dysuria, she does note that she does make a small amount of urine each day, denies any hematuria or flank pain or abdominal pain.  She denies any falls, head injury, or headache.  Her daughter does note that she lives alone and she has been concerned with her episodes of lightheadedness that she may not be safe to be at home.  No jamie vertigo.  No loss of consciousness.  Patient also notes that  she has had episodes of hemoptysis, in further discussion with her she states that this has been ongoing for the past several weeks.  No hematemesis or melanotic/bloody stools.  The patient denies any other physical concerns today.     Past Medical History  Past Medical History:   Diagnosis Date     Anemia      Anxiety and depression      Arthritis      AVM (arteriovenous malformation)      Chronic hepatitis C with cirrhosis (H)      Clotting disorder (H)      ESRD (end stage renal disease) (H)     on dialysis     GI bleed     recurrent     Glaucoma      Hyperlipidemia      Hypertension goal BP (blood pressure) < 140/80      Past Surgical History:   Procedure Laterality Date     CAPSULE/PILL CAM ENDOSCOPY N/A 3/27/2019    Procedure: Capsule/pill cam endoscopy;  Surgeon: Yosvany Ram MD;  Location: [facility]     CAPSULE/PILL CAM ENDOSCOPY N/A 2/2/2023    Procedure: IMAGING PROCEDURE, GI TRACT, INTRALUMINAL, VIA CAPSULE;  Surgeon: Mulu Nur DO;  Location: [facility]     COLONOSCOPY N/A 9/4/2015    Procedure: COMBINED COLONOSCOPY, SINGLE OR MULTIPLE BIOPSY/POLYPECTOMY BY BIOPSY;  Surgeon: Rupesh Lopez MD;  Location: [facility]     COLONOSCOPY N/A 9/19/2018    Procedure: COLONOSCOPY;  enteroscopy small bowel  COLONOSCOPY;  Surgeon: Ankit Baires MD;  Location: [facility]     ENTEROSCOPY SMALL BOWEL N/A 3/9/2023    Procedure: antegrade single balloon enteroscopy with argon plasma coagulation of arteriovenous malformations;  Surgeon: Ankit Baires MD;  Location: [facility]     ESOPHAGOSCOPY, GASTROSCOPY, DUODENOSCOPY (EGD), COMBINED N/A 12/18/2014    Procedure: COMBINED ESOPHAGOSCOPY, GASTROSCOPY, DUODENOSCOPY (EGD);  Surgeon: Betsy Carvajal MD;  Location: [facility]     ESOPHAGOSCOPY, GASTROSCOPY, DUODENOSCOPY (EGD), COMBINED N/A 4/25/2015    Procedure: COMBINED ESOPHAGOSCOPY, GASTROSCOPY, DUODENOSCOPY (EGD);  Surgeon: Yosvany Ram MD;  Location: [facility]     ESOPHAGOSCOPY, GASTROSCOPY, DUODENOSCOPY (EGD),  COMBINED N/A 2015    Procedure: COMBINED ESOPHAGOSCOPY, GASTROSCOPY, DUODENOSCOPY (EGD);  Surgeon: Mariano Mistry MD;  Location:  GI      CAPSULE ENDOSCOPY N/A 2015    Procedure: CAPSULE/PILL CAM ENDOSCOPY;  Surgeon: Pan Dhaliwal MD;  Location:  GI      VASCULAR SURGERY PROCEDURE UNLIST       HYSTERECTOMY      KYREE     LUMPECTOMY BREAST       atorvastatin (LIPITOR) 20 MG tablet  bisacodyl (DULCOLAX) 5 MG EC tablet  Calcium Acetate, Phos Binder, 667 MG CAPS  gabapentin (NEURONTIN) 300 MG capsule  LOPERAMIDE HCL PO  losartan (COZAAR) 25 MG tablet  Multiple Vitamins-Minerals (PRORENAL + D) TABS  octreotide (SANDOSTATIN LAR) 20 MG injection  oxyCODONE-acetaminophen (PERCOCET) 5-325 MG tablet  pantoprazole (PROTONIX) 20 MG EC tablet  polyethylene glycol (MIRALAX) 17 GM/Dose powder  sertraline (ZOLOFT) 50 MG tablet  ursodiol (ACTIGALL) 300 MG capsule      Allergies   Allergen Reactions     Abacavir Itching     Lisinopril Cough     Dust Mites      Hydrochlorothiazide Itching     Severe       No Clinical Screening - See Comments      History of blood transfusion reactions and pre-treats with Benadryl.      Spironolactone Nausea     Sulfa Antibiotics Hives     Valsartan Itching     Valsartan-Hydrochlorothiazide Itching     severe     Family History  Family History   Problem Relation Age of Onset     Diabetes Mother      Alzheimer Disease Mother      Substance Abuse Son      Cancer Sister      Soft Tissue Cancer Sister      Breast Cancer Sister      Hyperlipidemia Daughter      Alcoholism Brother      Spine Problems Sister      Social History   Social History     Tobacco Use     Smoking status: Former     Packs/day: 2.00     Years: 45.00     Pack years: 90.00     Types: Cigarettes     Start date: 1953     Quit date: 2002     Years since quittin.4     Smokeless tobacco: Never   Substance Use Topics     Alcohol use: No     Drug use: Yes     Types: Marijuana     Comment: 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      Past medical history, past surgical history, medications, allergies, family history, and social history were reviewed with the patient. No additional pertinent items.      Physical Exam   BP: (!) 148/63  Pulse: 81  Temp: 97.9  F (36.6  C)  Resp: 16  Height: 162.6 cm (5' 4\")  Weight: 63 kg (139 lb)  SpO2: 100 %     Physical Exam  Vitals and nursing note reviewed.   Constitutional:       General: She is not in acute distress.     Appearance: She is normal weight. She is not ill-appearing, toxic-appearing or diaphoretic.   HENT:      Head: Normocephalic and atraumatic.      Right Ear: External ear normal.      Left Ear: External ear normal.      Nose: Nose normal.      Mouth/Throat:      Mouth: Mucous membranes are moist.   Eyes:      Extraocular Movements: Extraocular movements intact.      Conjunctiva/sclera: Conjunctivae normal.      Pupils: Pupils are equal, round, and reactive to light.   Cardiovascular:      Rate and Rhythm: Normal rate and regular rhythm.      Pulses: Normal pulses.      Heart sounds: Normal heart sounds. No murmur heard.     No friction rub. No gallop.      Comments: Patient has baseline peripheral arterial disease, making pulses in the bilateral lower extremities somewhat difficult, however she has brisk capillary refill in both bilateral lower extremities are warm and well-perfused distally.  Pulmonary:      Effort: Pulmonary effort is normal. No respiratory distress.      Breath sounds: Normal breath sounds. No stridor. No wheezing, rhonchi or rales.   Abdominal:      General: Bowel sounds are normal. There is no distension.      Palpations: Abdomen is soft.      Tenderness: There is no abdominal tenderness. There is no right CVA tenderness, left CVA tenderness, guarding or rebound.   Musculoskeletal:         General: Normal range of motion.      Cervical back: Normal range of motion and neck supple. No rigidity or tenderness.      Right " lower leg: No edema.      Left lower leg: No edema.      Comments: Midline lower lumbar tenderness to palpation without any step-offs.  No midline thoracic tenderness to palpation.  Normal range of motion of the bilateral lower extremities.  Tenderness to palpation left thigh and calf.   Skin:     General: Skin is warm.      Capillary Refill: Capillary refill takes less than 2 seconds.   Neurological:      General: No focal deficit present.      Mental Status: She is alert.      Comments: GCS 15.  Cranial nerves II through XII intact.  Finger-nose-finger normal bilaterally.  Strength 4+/5 in the upper and lower extremities.  Intact sensation in all distributions of the bilateral upper and lower extremities.    Psychiatric:         Mood and Affect: Mood normal.         Behavior: Behavior normal.       ED Course, Procedures, & Data     ED Course as of 05/12/23 2320   Fri May 12, 2023   1607 9/19/22 lower extremity arterial US:   IMPRESSION:  1. RIGHT:       A. Noncompressible resting FAISAL.       B. TBI is ABNORMAL, 0.50, previously 0.53.       C. Mildly abnormal pulse volume recordings suggestive of iliac  disease.     2. LEFT:       A. Noncompressible resting FAISAL.       B. TBI is ABNORMAL, 0.58, previously 0.66.       C. Mildly abnormal pulse volume recordings suggestive of iliac  disease.   1608 1/27/22 US abdomen:   Aorta and IVC: The visualized portions of the aorta and IVC are  unremarkable. The aorta measures 1.6 cm in diameter and the IVC  measures 1.4 cm in diameter.   1707 WBC(!): 11.3   1707 Hemoglobin(!): 10.0  On chart review, this is at baseline.    1707 Platelet Count: 275   1722 Phosphorus: 3.7   1722 Magnesium: 2.2   1722 D-Dimer Quantitative(!): 4.44   1728 Troponin T, High Sensitivity(!): 85  No previous available for comparison.    1729 Sodium: 138   1729 Potassium: 3.4   1729 Creatinine(!): 5.77  ESRD on dialysis    1729 GFR Estimate(!): 7  ESRD on dialysis    1729 Urea Nitrogen(!): 34.4   1729  Anion Gap(!): 21   1729 Carbon Dioxide (CO2): 22   1729 Glucose(!): 123   1729 Alkaline Phosphatase(!): 217  On chart review, this is at baseline.    1729 AST(!): 80   1729 ALT: 34   1729 Bilirubin Total: 0.7   1848 Head CT w/o contrast  1. No acute intracranial pathology.  2. Mild cerebral atrophy and sequela of chronic small vessel ischemic  disease.   1848 Lumbar spine CT w/o contrast  1. No acute fracture or traumatic subluxation.  2. Moderate to severe degenerative changes of the lumbar spine most  significant at the levels of L1-2, L3-4 and L5-S1.  3. Abdominal atherosclerosis.   1914 US Lower Extremity Arterial Duplex Bilateral  Right leg: Peripheral arterial disease with poststenotic wave forms distal to the SFA at the origin. Patent right lower extremity arteries.     Left leg: Peripheral arterial disease with post stenotic wave forms distal to the origin of the SFA. Patent left lower survey arteries.   2028 Troponin T, High Sensitivity(!): 82  No acute change.    2029 US Lower Extremity Venous Duplex Bilateral  Normal per preliminary read.    2131 CT Chest Pulmonary Embolism w Contrast  1. No pulmonary embolism identified.  2. No other worrisome findings in the chest and upper abdomen.     Procedures: none.        ED Course Selections:        EKG Interpretation:      Interpreted by Cecily Hernandez MD  Time reviewed: 16:20  Symptoms at time of EKG: Generalized weakness    Rhythm: sinus rhythm with occasional PVCs  Rate: normal  Axis: normal  Ectopy: none  Conduction: normal  ST Segments/ T Waves: No ST elevation or depression, isolated T wave inversion in lead III  Q Waves: none  Comparison to prior: Compared with EKG dated January 26, 2023, no acute changes, has PVCs instead of PACs on EKG today when compared to previous.    Clinical Impression: Sinus rhythm with occasional PVCs, without acute ST elevation or depression.  _______________________________________________      Medications   oxyCODONE (ROXICODONE)  tablet 5 mg (5 mg Oral $Given 5/12/23 1912)   CT saline (84 mLs Intravenous $Given 5/12/23 2111)   iopamidol (ISOVUE-370) solution 58 mL (58 mLs Intravenous $Given 5/12/23 2111)       Critical care was not performed.     Medical Decision Making  The patient's presentation was of high complexity (an acute health issue posing potential threat to life or bodily function).    The patient's evaluation involved:  ordering and/or review of 3+ test(s) in this encounter (see separate area of note for details)  discussion of management or test interpretation with another health professional (see separate area of note for details)    The patient's management necessitated high risk (a decision regarding hospitalization).      Assessment & Plan    Nursing notes and vital signs reviewed.     Presentation, exam, and workup is most consistent with hemoptysis, acute on chronic low back and lower extremity pain, generalized weakness with difficulty ambulating at home.    Please see HPI, ROS, exam, and ED course above for pertinent history and findings. In short, the patient presented to the ED for evaluation of acute on chronic lower extremity pain, new hemoptysis, generalized weakness with difficulty ambulating and taking care of herself at home.    In regard to her hemoptysis, this has been ongoing for the past 3 to 4 weeks.  She did have 2 episodes here dime to quarter size clot of blood that does not appear to be coming from her nose with no epistaxis here.  She does note that she has been coughing this up, no hematemesis or bloody/melanotic stools.  CT chest without evidence for PE or large mass, will need further work-up during her inpatient course.  Otherwise, her EKG is without acute abnormalities and her high-sensitivity troponin initially is 85 without previous available for comparison, not acutely changed at 2 hours at 82.  Denies any jamie chest pain or shortness of breath.  She has end-stage renal disease on dialysis, so  certainly can be a chronic retainer of troponin leak, at this time lower suspicion for ACS and no indication for Cath Lab activation or heparinization.    In regard to her acute on chronic lower extremity pain, this is in the setting of known peripheral arterial disease, limbs are warm and well-perfused on exam, lower extremity arterial ultrasound without acute change from her previous, no indication for CTA imaging at this time.  In regard to her low back pain, no recent falls or injuries, CT shows moderate to severe degenerative changes, but she has intact sensation and symmetric 4+/5 strength although she is having difficulty ambulating due to generalized weakness and pain in her lower extremities.  At this time, there is no indication for emergent MRI, certainly needs PT and OT and possible acute rehab placement.      Discussed with the patient and her daughter several options including outpatient follow-up with PT and OT in the home versus outpatient versus admission for more acute rehab placement.  In discussion with the patient and her daughter, the patient lives alone at home and she has had several near falls and difficulty ambulating.  Her daughter is not able to help her stand up from a seated position and herself has cardiac disease and so she does not feel safe with the patient going home with her or her living with the patient at home until able to get stronger because she is afraid that she is not able to care for her.      Discussed the patient with internal medicine, who will admit the patient for further monitoring, evaluation, and treatment. Rechecked the patient, findings and plan explained to the patient, who consents to admission.     Disposition: The patient was admitted to the internal medicine service in stable condition.      Final diagnoses:   Hemoptysis   Pain of left lower leg   Chronic bilateral low back pain without sciatica   Generalized muscle weakness       Cecily Hernandez MD   [facility]  [facility] EMERGENCY DEPARTMENT  5/12/2023     Cecily Hernandez MD  05/12/23 [id number]

## 2023-05-12 NOTE — LETTER
Transition Communication Hand-off for Care Transitions to Next Level of Care Provider    Name: Rachele Martínez  : 1940  MRN #: [id number]  Primary Care Provider: Agnieszka Rodriguez     Primary Clinic: [address] [place] [address] FLO[address] 4  [facility] [address]     Reason for Hospitalization:  Generalized muscle weakness [M62.81]  Pain of left lower leg [M79.662]  Hemoptysis [R04.2]  Chronic bilateral low back pain without sciatica [M54.50, G89.29]  Admit Date/Time: 2023  3:56 PM  Discharge Date: 23  Payor Source: Payor: MEDICARE / Plan: MEDICARE / Product Type: Medicare /            Reason for Communication Hand-off Referral: Other Continuity of care    Discharge Plan: Patient is discharging to TCU at [facility] in [place], [place].       Concern for non-adherence with plan of care:   Y/N No  Discharge Needs Assessment:  Needs      Flowsheet Row Most Recent Value   Equipment Currently Used at Home --  [Unable to assess.]            Follow-up plan:    Future Appointments   Date Time Provider Department Center   2023  1:00 PM UR PT WAITLIST [facility]PT [place]   2023  2:15 PM Kathrin Martell OTR UROT [place]   2023 10:00 AM [address] [facility] [place]   2023 11:30 AM [address] [facility] [place]                 [facility] Recommendations:      DEON Laird    AVS/Discharge Summary is the source of truth; this is a helpful guide for improved communication of patient story

## 2023-05-12 NOTE — LETTER
Recipient: [place] [facility]          Sender: DEON Neri [phone]          Date: May 25, 2023  Patient Name:  Rachele Martínez  Patient YOB: 1940  Routing Message:  please see attached Physician Orders.        The documents accompanying this notice contain confidential information belonging to the sender.  This information is intended only for the use of the individual or entity named above.  The authorized recipient of this information is prohibited from disclosing this information to any other party and is required to destroy the information after its stated need has been fulfilled, unless otherwise required by state law.    If you are not the intended recipient, you are hereby notified that any disclosure, copy, distribution or action taken in reliance on the contents of these documents is strictly prohibited.  If you have received this document in error, please return it by fax to [phone] with a note on the cover sheet explaining why you are returning it (e.g. not your patient, not your provider, etc.).  If you need further assistance, please call .  Documents may also be returned by mail to [employer], , [address], [place], [place] [address].

## 2023-05-13 ENCOUNTER — APPOINTMENT (OUTPATIENT)
Dept: OCCUPATIONAL THERAPY | Facility: CLINIC | Age: 83
DRG: 091 | End: 2023-05-13
Attending: PEDIATRICS
Payer: MEDICARE

## 2023-05-13 LAB
ANION GAP SERPL CALCULATED.3IONS-SCNC: 16 MMOL/L (ref 7–15)
BUN SERPL-MCNC: 51.5 MG/DL (ref 8–23)
CALCIUM SERPL-MCNC: 9.2 MG/DL (ref 8.8–10.2)
CHLORIDE SERPL-SCNC: 96 MMOL/L (ref 98–107)
CREAT SERPL-MCNC: 7.39 MG/DL (ref 0.51–0.95)
DEPRECATED HCO3 PLAS-SCNC: 25 MMOL/L (ref 22–29)
ERYTHROCYTE [DISTWIDTH] IN BLOOD BY AUTOMATED COUNT: 15.4 % (ref 10–15)
GFR SERPL CREATININE-BSD FRML MDRD: 5 ML/MIN/1.73M2
GLUCOSE SERPL-MCNC: 117 MG/DL (ref 70–99)
HCT VFR BLD AUTO: 29.6 % (ref 35–47)
HGB BLD-MCNC: 9.3 G/DL (ref 11.7–15.7)
MCH RBC QN AUTO: 30.5 PG (ref 26.5–33)
MCHC RBC AUTO-ENTMCNC: 31.4 G/DL (ref 31.5–36.5)
MCV RBC AUTO: 97 FL (ref 78–100)
PLATELET # BLD AUTO: 261 10E3/UL (ref 150–450)
POTASSIUM SERPL-SCNC: 4.2 MMOL/L (ref 3.4–5.3)
RBC # BLD AUTO: 3.05 10E6/UL (ref 3.8–5.2)
SODIUM SERPL-SCNC: 137 MMOL/L (ref 136–145)
WBC # BLD AUTO: 9.7 10E3/UL (ref 4–11)

## 2023-05-13 PROCEDURE — 250N000013 HC RX MED GY IP 250 OP 250 PS 637: Performed by: PEDIATRICS

## 2023-05-13 PROCEDURE — 99207 PR NO BILLABLE SERVICE THIS VISIT: CPT | Performed by: STUDENT IN AN ORGANIZED HEALTH CARE EDUCATION/TRAINING PROGRAM

## 2023-05-13 PROCEDURE — 99223 1ST HOSP IP/OBS HIGH 75: CPT | Performed by: INTERNAL MEDICINE

## 2023-05-13 PROCEDURE — 120N000002 HC R&B MED SURG/OB UMMC

## 2023-05-13 PROCEDURE — 250N000011 HC RX IP 250 OP 636: Performed by: PEDIATRICS

## 2023-05-13 PROCEDURE — 82310 ASSAY OF CALCIUM: CPT | Performed by: PEDIATRICS

## 2023-05-13 PROCEDURE — 97166 OT EVAL MOD COMPLEX 45 MIN: CPT | Mod: GO

## 2023-05-13 PROCEDURE — 97535 SELF CARE MNGMENT TRAINING: CPT | Mod: GO

## 2023-05-13 PROCEDURE — 99223 1ST HOSP IP/OBS HIGH 75: CPT | Mod: AI | Performed by: PEDIATRICS

## 2023-05-13 PROCEDURE — 36415 COLL VENOUS BLD VENIPUNCTURE: CPT | Performed by: PEDIATRICS

## 2023-05-13 PROCEDURE — 85027 COMPLETE CBC AUTOMATED: CPT | Performed by: PEDIATRICS

## 2023-05-13 RX ORDER — LIDOCAINE 40 MG/G
CREAM TOPICAL
Status: DISCONTINUED | OUTPATIENT
Start: 2023-05-13 | End: 2023-05-25 | Stop reason: HOSPADM

## 2023-05-13 RX ORDER — LOSARTAN POTASSIUM 50 MG/1
100 TABLET ORAL DAILY
Status: DISCONTINUED | OUTPATIENT
Start: 2023-05-13 | End: 2023-05-13

## 2023-05-13 RX ORDER — B,C/FERROUS FUM/FA/D3/ZINC OX 8MG-800MCG
1 TABLET ORAL DAILY
Status: DISCONTINUED | OUTPATIENT
Start: 2023-05-13 | End: 2023-05-14

## 2023-05-13 RX ORDER — OXYCODONE HYDROCHLORIDE 5 MG/1
5 TABLET ORAL DAILY PRN
Status: DISCONTINUED | OUTPATIENT
Start: 2023-05-13 | End: 2023-05-13

## 2023-05-13 RX ORDER — ATORVASTATIN CALCIUM 20 MG/1
20 TABLET, FILM COATED ORAL EVERY EVENING
Status: DISCONTINUED | OUTPATIENT
Start: 2023-05-13 | End: 2023-05-25 | Stop reason: HOSPADM

## 2023-05-13 RX ORDER — SERTRALINE HYDROCHLORIDE 100 MG/1
100 TABLET, FILM COATED ORAL DAILY
Status: DISCONTINUED | OUTPATIENT
Start: 2023-05-13 | End: 2023-05-25 | Stop reason: HOSPADM

## 2023-05-13 RX ORDER — NALOXONE HYDROCHLORIDE 0.4 MG/ML
0.4 INJECTION, SOLUTION INTRAMUSCULAR; INTRAVENOUS; SUBCUTANEOUS
Status: DISCONTINUED | OUTPATIENT
Start: 2023-05-13 | End: 2023-05-25 | Stop reason: HOSPADM

## 2023-05-13 RX ORDER — ONDANSETRON 2 MG/ML
4 INJECTION INTRAMUSCULAR; INTRAVENOUS EVERY 6 HOURS PRN
Status: DISCONTINUED | OUTPATIENT
Start: 2023-05-13 | End: 2023-05-25 | Stop reason: HOSPADM

## 2023-05-13 RX ORDER — CALCIUM ACETATE 667 MG/1
1334 CAPSULE ORAL
Status: DISCONTINUED | OUTPATIENT
Start: 2023-05-13 | End: 2023-05-25 | Stop reason: HOSPADM

## 2023-05-13 RX ORDER — NALOXONE HYDROCHLORIDE 0.4 MG/ML
0.2 INJECTION, SOLUTION INTRAMUSCULAR; INTRAVENOUS; SUBCUTANEOUS
Status: DISCONTINUED | OUTPATIENT
Start: 2023-05-13 | End: 2023-05-25 | Stop reason: HOSPADM

## 2023-05-13 RX ORDER — HEPARIN SODIUM 5000 [USP'U]/.5ML
5000 INJECTION, SOLUTION INTRAVENOUS; SUBCUTANEOUS EVERY 12 HOURS
Status: DISCONTINUED | OUTPATIENT
Start: 2023-05-13 | End: 2023-05-15

## 2023-05-13 RX ORDER — AMLODIPINE BESYLATE 5 MG/1
5 TABLET ORAL DAILY
Status: ON HOLD | COMMUNITY
End: 2023-05-25

## 2023-05-13 RX ORDER — OXYCODONE HYDROCHLORIDE 5 MG/1
5 TABLET ORAL EVERY 6 HOURS PRN
Status: DISCONTINUED | OUTPATIENT
Start: 2023-05-13 | End: 2023-05-15

## 2023-05-13 RX ORDER — ONDANSETRON 4 MG/1
4 TABLET, ORALLY DISINTEGRATING ORAL EVERY 6 HOURS PRN
Status: DISCONTINUED | OUTPATIENT
Start: 2023-05-13 | End: 2023-05-25 | Stop reason: HOSPADM

## 2023-05-13 RX ORDER — PANTOPRAZOLE SODIUM 20 MG/1
20 TABLET, DELAYED RELEASE ORAL
Status: DISCONTINUED | OUTPATIENT
Start: 2023-05-13 | End: 2023-05-14

## 2023-05-13 RX ORDER — LOSARTAN POTASSIUM 50 MG/1
50 TABLET ORAL DAILY
Status: DISCONTINUED | OUTPATIENT
Start: 2023-05-13 | End: 2023-05-25 | Stop reason: HOSPADM

## 2023-05-13 RX ORDER — POLYETHYLENE GLYCOL 3350 17 G/17G
17 POWDER, FOR SOLUTION ORAL DAILY PRN
Status: DISCONTINUED | OUTPATIENT
Start: 2023-05-13 | End: 2023-05-25 | Stop reason: HOSPADM

## 2023-05-13 RX ORDER — ACETAMINOPHEN 650 MG/1
650 SUPPOSITORY RECTAL EVERY 6 HOURS PRN
Status: DISCONTINUED | OUTPATIENT
Start: 2023-05-13 | End: 2023-05-25 | Stop reason: HOSPADM

## 2023-05-13 RX ORDER — GABAPENTIN 300 MG/1
300 CAPSULE ORAL AT BEDTIME
Status: DISCONTINUED | OUTPATIENT
Start: 2023-05-13 | End: 2023-05-25 | Stop reason: HOSPADM

## 2023-05-13 RX ORDER — ACETAMINOPHEN 325 MG/1
650 TABLET ORAL EVERY 6 HOURS PRN
Status: DISCONTINUED | OUTPATIENT
Start: 2023-05-13 | End: 2023-05-25 | Stop reason: HOSPADM

## 2023-05-13 RX ORDER — LOSARTAN POTASSIUM 50 MG/1
100 TABLET ORAL DAILY
Status: ON HOLD | COMMUNITY
End: 2023-05-25

## 2023-05-13 RX ADMIN — HEPARIN SODIUM 5000 UNITS: 5000 INJECTION, SOLUTION INTRAVENOUS; SUBCUTANEOUS at 20:53

## 2023-05-13 RX ADMIN — HEPARIN SODIUM 5000 UNITS: 5000 INJECTION, SOLUTION INTRAVENOUS; SUBCUTANEOUS at 09:49

## 2023-05-13 RX ADMIN — CALCIUM ACETATE 1334 MG: 667 CAPSULE ORAL at 18:22

## 2023-05-13 RX ADMIN — GABAPENTIN 300 MG: 300 CAPSULE ORAL at 21:14

## 2023-05-13 RX ADMIN — ATORVASTATIN CALCIUM 20 MG: 20 TABLET, FILM COATED ORAL at 20:51

## 2023-05-13 RX ADMIN — CALCIUM ACETATE 1334 MG: 667 CAPSULE ORAL at 09:48

## 2023-05-13 RX ADMIN — OXYCODONE HYDROCHLORIDE 5 MG: 5 TABLET ORAL at 04:24

## 2023-05-13 RX ADMIN — PANTOPRAZOLE SODIUM 20 MG: 20 TABLET, DELAYED RELEASE ORAL at 09:48

## 2023-05-13 RX ADMIN — LOSARTAN POTASSIUM 50 MG: 50 TABLET, FILM COATED ORAL at 09:48

## 2023-05-13 RX ADMIN — OXYCODONE HYDROCHLORIDE 5 MG: 5 TABLET ORAL at 17:11

## 2023-05-13 RX ADMIN — GABAPENTIN 300 MG: 300 CAPSULE ORAL at 03:41

## 2023-05-13 RX ADMIN — ACETAMINOPHEN 650 MG: 325 TABLET ORAL at 03:41

## 2023-05-13 RX ADMIN — PANTOPRAZOLE SODIUM 20 MG: 20 TABLET, DELAYED RELEASE ORAL at 17:11

## 2023-05-13 RX ADMIN — SERTRALINE HYDROCHLORIDE 100 MG: 100 TABLET ORAL at 09:48

## 2023-05-13 RX ADMIN — OXYCODONE HYDROCHLORIDE 5 MG: 5 TABLET ORAL at 11:21

## 2023-05-13 ASSESSMENT — ACTIVITIES OF DAILY LIVING (ADL)
ADLS_ACUITY_SCORE: 28
VISION_MANAGEMENT: GLASSES
ADLS_ACUITY_SCORE: 37
ADLS_ACUITY_SCORE: 24
DIFFICULTY_EATING/SWALLOWING: NO
ADLS_ACUITY_SCORE: 28
CONCENTRATING,_REMEMBERING_OR_MAKING_DECISIONS_DIFFICULTY: NO
ADLS_ACUITY_SCORE: 39
ADLS_ACUITY_SCORE: 37
TOILETING_ISSUES: NO
FALL_HISTORY_WITHIN_LAST_SIX_MONTHS: NO
ADLS_ACUITY_SCORE: 37
DOING_ERRANDS_INDEPENDENTLY_DIFFICULTY: NO
WALKING_OR_CLIMBING_STAIRS_DIFFICULTY: NO
ADLS_ACUITY_SCORE: 24
EQUIPMENT_CURRENTLY_USED_AT_HOME: OTHER (SEE COMMENTS)
ADLS_ACUITY_SCORE: 24
DRESSING/BATHING_DIFFICULTY: NO
CHANGE_IN_FUNCTIONAL_STATUS_SINCE_ONSET_OF_CURRENT_ILLNESS/INJURY: YES
ADLS_ACUITY_SCORE: 28
WEAR_GLASSES_OR_BLIND: YES
ADLS_ACUITY_SCORE: 39
ADLS_ACUITY_SCORE: 39

## 2023-05-13 NOTE — PROGRESS NOTES
Pt A&Ox4. Able to make needs known. R PIV SL. Assist of 1 with walker and GB. Continent bowel and bladder -- last BM per pt 5/12. Denies SOB, chest pain, and nausea/vomiting. Pt reports numbness/tingling in bilateral feet baseline per pt. Pt c/o back pain rated pain 8/10; administered prn oxycodone. No acute events. Call light in reach.

## 2023-05-13 NOTE — PROGRESS NOTES
05/13/23 0805   Appointment Info   Signing Clinician's Name / Credentials (OT) Kathrin Martell, OTR/L   Living Environment   People in Home child(lake), dependent   Current Living Arrangements independent living facility   Living Environment Comments facility has elevator; walk-in shower; pt reported that she is interested in looking into senior care   Self-Care   Usual Activity Tolerance good   Current Activity Tolerance moderate   Equipment Currently Used at Home shower chair;grab bar, tub/shower;raised toilet seat;cane, quad;walker, rolling;grab bar, toilet  (long handle shoe horn)   Fall history within last six months no   Activity/Exercise/Self-Care Comment ind with ADLs; daughter is able to assist as needed with cooking/cleaning and driving   General Information   Onset of Illness/Injury or Date of Surgery 05/12/23   Referring Physician Dr. Hood   Patient/Family Therapy Goal Statement (OT) to do stuff and walk like normal   Additional Occupational Profile Info/Pertinent History of Current Problem per chart, 83 year old female with a history of ESRD (on HD MWF), HTN, PVD, anemia 2/2 ESRD and recurrent GI bleeds (2/2 AVMs), and chronic hep C with compensated cirrhosis admitted on 5/12/2023 for weakness. She had acute-onset weakness approximately 1 week ago that involves her bilateral lower extremities that is associated with low back pain. She does not have saddle anesthesia, though she does endorse some intermittent episodes of incontinence (though unclear if these are related to her inability to make it to the bathroom due to weakness). Suspect multifactorial etiology. She requires hospitalization for further workup.   Existing Precautions/Restrictions fall   Limitations/Impairments safety/cognitive   Left Lower Extremity (Weight-bearing Status) full weight-bearing (FWB)   Right Lower Extremity (Weight-bearing Status) full weight-bearing (FWB)   General Observations and Info per note, primary doc has concerns about  cog changes   Cognitive Status Examination   Orientation Status orientation to person, place and time   Affect/Mental Status (Cognitive) WFL   Follows Commands WFL;follows one-step commands;75-90% accuracy;verbal cues/prompting required   Visual Perception   Visual Impairment/Limitations corrective lenses full-time   Sensory   Sensory Quick Adds bilateral LE   Sensory Comments n/t in B toes   Pain Assessment   Patient Currently in Pain Yes, see Vital Sign flowsheet   Posture   Posture not impaired   Range of Motion Comprehensive   General Range of Motion no range of motion deficits identified   Strength Comprehensive (MMT)   General Manual Muscle Testing (MMT) Assessment no strength deficits identified   Muscle Tone Assessment   Muscle Tone Quick Adds No deficits were identified   Coordination   Upper Extremity Coordination No deficits were identified   Bed Mobility   Bed Mobility supine-sit   Supine-Sit [place] (Bed Mobility) supervision   Transfers   Transfers bed-chair transfer;sit-stand transfer;toilet transfer   Transfer Skill: Bed to Chair/Chair to Bed   Bed-Chair [place] (Transfers) contact guard   Sit-Stand Transfer   Sit-Stand [place] (Transfers) contact guard   Toilet Transfer   [place] Level (Toilet Transfer) contact guard   Balance   Balance Assessment standing balance: static   Balance Comments slight LOB while standing; pt able to self correct with FWW   Activities of Daily Living   BADL Assessment/Intervention lower body dressing;grooming   Lower Body Dressing Assessment/Training   White[place] Level (Lower Body Dressing) moderate assist (50% patient effort)   Grooming Assessment/Training   [place] Level (Grooming) contact guard assist   Clinical Impression   Criteria for Skilled Therapeutic Interventions Met (OT) Yes, treatment indicated   OT Diagnosis decreased ADL independence   Influenced by the following impairments generalized muscle weakness   OT Problem  List-Impairments impacting ADL problems related to;activity tolerance impaired;balance;cognition;pain;strength   Assessment of Occupational Performance 3-5 Performance Deficits   Identified Performance Deficits dressing, bed mob, nnfunc mob, toileting   Planned Therapy Interventions (OT) ADL retraining;cognition   Clinical Decision Making Complexity (OT) moderate complexity   Anticipated Equipment Needs Upon Discharge (OT) reacher;other (see comments)  (sock aid)   Risk & Benefits of therapy have been explained evaluation/treatment results reviewed   OT Total Evaluation Time   OT Eval, Moderate Complexity Minutes (46081) 10   OT Goals   Therapy Frequency (OT) Daily   OT Predicted Duration/Target Date for Goal Attainment 05/20/23   OT Goals Lower Body Dressing;Hygiene/Grooming;Bed Mobility;Toilet Transfer/Toileting   OT: Hygiene/Grooming modified independent;while standing   OT: Lower Body Dressing Modified independent;using adaptive equipment   OT: Bed Mobility Modified independent;supine to/from sitting   OT: Toilet Transfer/Toileting Modified independent;using adaptive equipment   Interventions   Interventions Quick Adds Self-Care/Home Management   Self-Care/Home Management   Self-Care/Home Mgmt/ADL, Compensatory, Meal Prep Minutes (41550) 30   Symptoms Noted During/After Treatment (Meal Preparation/Planning Training) increased pain   Treatment Detail/Skilled Intervention Pt supine in bed upon OT arrival and agreeable to therapy. Pt SBA for supine to sit bed mob and reported significant pain. Educ on log roll tech and use of AE for LB dressing. Pt Min A for LB dressing to doff brief while supine in bed, don brief with reacher and don socks with sock aid while sitting EOB and standing with FWW. Pt CGA for STS, bed, chair and toilet transfers with FWW. Pt expereinced slight LOB during first STS and was able to self correct with FWW. When sitting back onto toilet, pt hit head on toilet flusher pipe on wall behind  toilet. Pt reports that she was okay, nurse notified. Educ on importance of leaning forward during sitting/standing to help with balance. Pt CGA for g/h at sink while standing for ~4 minutes. Pt leaned on counter top with forearms and reported that is what she does at home to help with the pain. Pt left in chair with chair alarm on and all needs within reach.   OT Discharge Planning   OT Plan LB dressing with AE PRN, cog screen, standing activity tolerance   OT Discharge Recommendation (DC Rec) home with home care occupational therapy;home with assist   OT Rationale for DC Rec Pt is below ADL baseline and would benefit from continued skilled OT to increase ADL independence and activity tolerance. Pt reports that she is interested in an skilled nursing and would benefit from increased assistance at home as needed. Pt would benefit from home OT for activity/environmental modifications for safety.   OT Brief overview of current status CGA-Min A for ADLs and func mob   Total Session Time   Timed Code Treatment Minutes 30   Total Session Time (sum of timed and untimed services) 40

## 2023-05-13 NOTE — PROGRESS NOTES
"CLINICAL NUTRITION SERVICES - ASSESSMENT NOTE     Nutrition Prescription    RECOMMENDATIONS FOR MDs/PROVIDERS TO ORDER:  Recommend liberalizing diet as able (Na, K, Phos all WNL) to promote oral intake    Malnutrition Status:    Severe malnutrition in the context of acute on chronic illness.     Recommendations already ordered by Registered Dietitian (RD):  Nepro BID between meals (first choice butter pecan, second choice berry)    Future/Additional Recommendations:  Monitor PO intake, supplement use, labs, and weights     REASON FOR ASSESSMENT  Rachele Martínez is a/an 83 year old female assessed by the dietitian for Provider Order - Nutrition assessment    PMH  History of ESRD (on HD MWF), HTN, PVD, anemia 2/2 ESRD and recurrent GI bleeds (2/2 AVMs), and chronic hep C with compensated cirrhosis admitted on 5/12/2023 for weakness.     NUTRITION HISTORY  Met with pt in room. She was sleeping in her recliner. She reports poor appetite and intake for the past week. She estimates eating about 50% of her usual intake during this time. She typically eats 2 meals/day and drinks 1-2 Nepro. She follows a renal diet at home. Her UBW is ~130 lbs. She has some difficulty with chewing so she tries to eat softer foods. She is followed by a RD at dialysis.     CURRENT NUTRITION ORDERS  Diet: Renal  Intake/Tolerance: no meals ordered since admit, recently admitted    LABS  Labs reviewed  BUN: 51.5 (H)  Cr: 7.39 (H)  Alk Phos: 217 (H)  AST: 80 (H)  Na, K, Phos WNL    MEDICATIONS  Medications reviewed  Calcium acetate, TID with meals  Protonix  ProRenal + D    ANTHROPOMETRICS  Height: 162.6 cm (5' 4\")  Most Recent Weight: 57.2 kg (126 lb 1.7 oz)    IBW: 54.7 kg  BMI: Normal BMI  Weight History:   Wt Readings from Last 10 Encounters:   05/13/23 57.2 kg (126 lb 1.7 oz)   03/09/23 63 kg (138 lb 14.2 oz)   02/17/23 62.3 kg (137 lb 6.4 oz)   01/31/23 62.6 kg (138 lb 1.6 oz)   12/06/22 59.9 kg (132 lb)   11/08/22 59.7 kg (131 lb 9.6 oz) "   08/16/22 58.7 kg (129 lb 4.8 oz)   05/24/22 58.8 kg (129 lb 9.6 oz)   04/26/22 58.1 kg (128 lb)   03/01/22 54 kg (119 lb)   9% wt loss in 2 months, current wt in line with weight from 1 year ago    Dosing Weight: 57.2 kg (admit wt)    ASSESSED NUTRITION NEEDS  Estimated Energy Needs: 3955-7709 kcals/day (25 - 30 kcals/kg)  Justification: Maintenance  Estimated Protein Needs: 69-86 grams protein/day (1.2 - 1.5 grams of pro/kg)  Justification: Dialysis  Estimated Fluid Needs: 1 mL/kcal  Justification: Maintenance and Per provider pending fluid status    PHYSICAL FINDINGS  See malnutrition section below.    MALNUTRITION  % Intake: </= 50% for >/= 5 days (severe)  % Weight Loss: > 7.5% in 3 months (severe)  Subcutaneous Fat Loss: Facial region: moderate and Upper arm: moderate  Muscle Loss: Temporal: moderate, Thoracic region (clavicle, acromium bone, deltoid, trapezius, pectoral): moderate and Upper arm (bicep, tricep): moderate - suspect some losses are age realted  Fluid Accumulation/Edema: None noted  Malnutrition Diagnosis: Severe malnutrition in the context of acute on chronic illness.     NUTRITION DIAGNOSIS  Inadequate oral intake related to decreased appetite as evidenced by pt report, 9% wt loss in 2 months, and moderate fat and muscle wasting.       INTERVENTIONS  Implementation  Nutrition Education: Provided education on role of RD in care. Discussed available snacks/supplements to optimize oral intake. Encouraged small, frequent meals. Discussed higher protein needs with dialysis.    Medical food supplement therapy - Nepro BID     Goals  Patient to consume % of nutritionally adequate meal trays TID, or the equivalent with supplements/snacks.     Monitoring/Evaluation  Progress toward goals will be monitored and evaluated per protocol.    Florence Staley RD, LD  Weekend/Holiday RD pager: [phone]

## 2023-05-13 NOTE — CONSULTS
Nephrology consult  Reason for consult: ESKD  Consulting provider: Erwin    84yo F with ESKD (MWF) admitted with 7 days of weakness in her legs. She also reports low back pain and intermittent incontinence. She recently had her BP medications titrated and experienced lightheadedness and dizziness. She also reports dysuria. 10pt ROS is otherwise negative. She last dialyzed 5/12.     PMHx  ESKD - MWF, Zac Campbell   - CHIOMA Clancy AVG   - EDW 60.5kg  From 5/8 HD note  Medication Sig Start Date End Date   Epoetin Duane (Epogen)   7000 units IVP 3X Week During Dialysis 4/26/2023 4/24/2024   Korsuva (difelikefalin) IVP Initial Dose (likely 65mcg)   1.3 mL IVP Every Treatment Post Dialysis 5/3/2023 7/23/2023   Sodium Chloride (0.9%)   300 mL IV Every Treatment 8/1/2022 7/31/2023   Vitamin D (Calcitriol) Oral   0.75 mcg ORAL Every Treatment 2/15/2023 2/14/2024    Hypertension  PVD  Anemia  GIB  Hep C  Depression    Fam hx - no CKD    Soc Hx - lives alone with cats   - denies tob, occasional marijuana    All - multiple    Medications reviewed    Exam  Gen - nad  Head/neck - NC/AT, neck supple, op clear  Eyes - anicteric  Ears - nl external exam  Nose - nl external exam  CV - rrr, no rub  Resp - cta bilat  Abd - BS+, NT/ND  Ext - trace edema  Skin - no rash  Neuro - strength 5/5 throughout  Psych - pleasant, appropriate    Labs  K 4.2   Cr 7.4   Ca 9.2   Hgb 9.3    A/Rec: 84yo F with ESKD admitted with weakness. Agree with primary team that this could be partially due to recent uptitration of BP meds but is also likely multifactorial. Agree with PT/OT evaluation and ongoing therapy.  ESKD - no acute indication for RRT   - plan for HD Monday with meds per outpatient Rx   - appears close to euvolemic, no plans to alter dry weight    Eleuterio Martins MD  [phone]

## 2023-05-13 NOTE — PLAN OF CARE
Goal Outcome Evaluation:      Plan of Care Reviewed With: patient    Overall Patient Progress:  (Initial)Overall Patient Progress:  (Initial)    Outcome Evaluation: Pt to consume small, frequent meals and use Nepro oral supplement to meet estimated nutrition needs.

## 2023-05-13 NOTE — PROGRESS NOTES
[facility]    Medicine Progress Note - Hospitalist Service, GOLD TEAM 21    Date of Admission:  5/12/2023    Assessment & Plan     83 year old female with a history of ESRD (on HD MWF), HTN, PVD, anemia 2/2 ESRD and recurrent GI bleeds (2/2 AVMs), and chronic hep C with compensated cirrhosis admitted on 5/12/2023 for weakness. She had acute-onset weakness approximately 1 week ago that involves her bilateral lower extremities that is associated with low back pain.        # Weakness  Strongly suspect that her weakness is multifactorial. She notes that her blood pressure medications were recently increased, and she has had lightheadedness and dizziness since that time. In addition, she recently had a GI bleed and has ongoing hemoptysis per her report. Her Hgb on presentation was 10, and Hgb has been 9-10 recently when she is not acutely bleeding. She does take a narcotic in the outpatient setting, and with her history of ESRD, this could be accumulating and causing weakness. Further, she is on a statin; this is not a new medication but statin-induced myopathy is possible. CT of her head and lumbar spine showed no acute process that explains her weakness. She does still make some urine and has had intermittent burning with urination. On chart review, it appears that her PCP Dr. Rodriguez has had some concerns about her ability to care for herself for some time and asked her to start considering assisted living due to some cognitive impairment. At discharge from the hospital in January, PT and OT also recommended home PT/OT services.   - Decreased losartan from 100 mg daily to 50 mg daily   - PT and OT consults placed   - Wean opioids as tolerated  - Consider dose-reducing or discontinuing statin   - UA ordered collect when able  - Nutrition consult placed  - B12/Vit D and Iron screen       # Chronic anemia 2/2 renal disease  # Recent GI bleed (Jan 2023)  # Hemoptysis   # AVM  of the small bowel, stomach, and duodenum   Baseline Hgb previously 7s-9s but recently 9-11. Last EGD on 3/9. Endoscopic treatment of duodenal AVMs in march 2023. Ongoing episodes of hemoptysis for at least the past month.   - Continue home protonix 20 mg BID  - Trend CBCs  - Monitor for evidence of hemoptysis. Hold heparin due to her hx of bleeding   - Iron studies   - Pt takes Octreotide injections outpt to prevent bleeding.     # ESRD on HD (MWF)  # Secondary hyperparathyroidism   Has been stable on dialysis for some time. Last run Friday 5/12.   - Neph consulted for dialysis  - Continue home phos binder     # Chronic lower extremity pain  # Lumbar spinal stenosis with neurogenic claudication   # PAD  Lower extremity ultrasounds showed peripheral artery disease. Pt has hx of PAD and on PAD rehab following with vascular surgery. She was previously on cilostasol, but this was not effective and increased her risk of bleeding. She was started on a small dose of percocet for severe pain prior to PT sessions. CT lumbar spine with acute issues.   - Tylenol and oxycodone PRNs ordered due to acute pain, wean oxycodone to home dose as soon as possible. Pt would like oxy for now.   - Continue home gabapentin 300 mg qhs  - Repeat FAISAL     # Chronic hepatitis C with compensated cirrhosis  Complicated by:  - No hx of Ascites  - Hx of GI bleed due to AVM, no history of varices  - No hx of HE  EGD: 8/14/2018, normal esophagus, bleeding angiodysplastic lesions in stomach and duodenum.  Last EGD on 3/9. Endoscopic treatment of duodenal/stomach/Jejunum AVMs   HCC: 5/17/2018, no liver lesions.   - Follow up with GI outpt     # Depression  - Continue home sertraline 100 mg daily     # HTN  - Decreased home losartan as above     # HLD  - Continuing home statin           # Anion Gap Metabolic Acidosis: Highest Anion Gap = 21 mmol/L in last 2 days, will monitor and treat as appropriate      # Hypertension: Noted on problem list       #  Severe Malnutrition: based on nutrition assessment             Diet: Combination Diet Regular Diet Adult; Renal Diet (dialysis)  Snacks/Supplements Adult: Nepro Oral Supplement; Between Meals    DVT Prophylaxis: Heparin subcutaneous held due to bleeding. SCDs ordered   Rodriguez Catheter: Not present  Lines: None     Cardiac Monitoring: None  Code Status: No CPR- Pre-arrest intubation OK      Clinically Significant Risk Factors Present on Admission        Disposition Plan      Expected Discharge Date: 05/14/2023        Discharge Comments: Pending PT/OT evaluation and medical work up          SIDNEY BERRIOS MD  Hospitalist Service, GOLD TEAM [address] [place] [address]  Securely message with [employer] (more info)  Text page via [facility] Paging/Directory   See signed in provider for up to date coverage information  ______________________________________________________________________    Interval History   No major events overnight. Both daughter at bedside updated. Will continue medical work up as above. PT/OT to work with the pt. Pt is lying in bed and eating breakfast with relief of pain so far with medications.    Physical Exam   Vital Signs: Temp: 99.5  F (37.5  C) Temp src: Oral BP: (!) 155/73 Pulse: 74   Resp: 16 SpO2: 96 % O2 Device: None (Room air)    Weight: 126 lbs 1.65 oz    Lying in bed with no acute distress     Medical Decision Making       55 MINUTES SPENT BY ME on the date of service doing chart review, history, exam, documentation & further activities per the note.      Data   ------------------------- PAST 24 HR DATA REVIEWED -----------------------------------------------

## 2023-05-13 NOTE — PHARMACY-ADMISSION MEDICATION HISTORY
NEUROSURGERY OPERATIVE NOTE    Surgery Date: 12/04/19  Patient Name: Teresa Chowdary  Patient MR#: [id number]  Surgeon: Dr. Susan Lee.  [place]  Co-Surgeon: None  Assistant: None    Anesthesia:  GETA  Length: 1 hr  Antibiotics: IV  Specimen: None  EBL:  <20cc  UO Pharmacist Admission Medication History    Admission medication history is complete. The information provided in this note is only as accurate as the sources available at the time of the update.    Medication reconciliation/reorder completed by provider prior to medication history? Yes    Information Source(s): Family member and CareEverywhere/SureScripts via phone    Pertinent Information: Patient's daughter Charla ([phone]) confirmed patient's medication list. Despite fill history showing Velphoro, Charla states patient is taking Phoslo. Family with bring in Prorenal +D.     Changes made to PTA medication list:    Added: amlodipine    Deleted: old colonoscopy prep meds, not taking Percocet or ursodiol per Charla    Changed: losartan dose per Charla was 100 mg daily PTA      Allergies reviewed with patient and updates made in EHR: no    Medication History Completed By: Romelia Lee [facility] 5/13/2023 5:45 PM    Prior to Admission medications    Medication Sig Last Dose Taking? Auth Provider Long Term End Date   amLODIPine (NORVASC) 5 MG tablet Take 5 mg by mouth daily Past Week Yes Unknown, Entered By History No    atorvastatin (LIPITOR) 20 MG tablet TAKE 1 TABLET BY MOUTH EVERY DAY  Patient taking differently: every evening Past Week Yes Agnieszka Rodriguez MD Yes    Calcium Acetate, Phos Binder, 667 MG CAPS TAKE 2 CAPSULE BY MOUTH THREE TIMES A DAY WITH MEALS Past Week Yes Reported, Patient     gabapentin (NEURONTIN) 300 MG capsule Take 1 capsule (300 mg) by mouth At Bedtime Past Week Yes Agnieszka Rodriguez MD Yes    losartan (COZAAR) 50 MG tablet Take 100 mg by mouth daily Past Week Yes Unknown, Entered By History No    Multiple Vitamins-Minerals (PRORENAL + D) TABS Take 1 tablet by mouth daily Past Week at pt supplied Yes Reported, Patient     octreotide (SANDOSTATIN LAR) 20 MG injection Inject 20 mg into the muscle every 28 days 4/25/2023 Yes Ankit Baires MD No    pantoprazole (PROTONIX) 20 MG EC  Next, an incision was made behind the ear and the cervical fascia was dissected caudally.   A tunneling device was passed from this incision subcutaneously down to the abdominal incision and the peritoneal catheter was passed through the tunneler and nelson tablet Take 1 tablet (20 mg) by mouth 2 times daily (before meals) *take 30-60 minutes before Past Week Yes Agnieszka Rodriguez MD     polyethylene glycol (MIRALAX) 17 GM/Dose powder Take 17 g by mouth daily as needed As needed Unknown Yes Reported, Patient     sertraline (ZOLOFT) 50 MG tablet Take 2 tablets (100 mg) by mouth daily Past Week Yes Agnieszka Rodriguez MD Yes

## 2023-05-13 NOTE — H&P
[facility]    History and Physical - Hospitalist Service, GOLD TEAM        Date of Admission:  5/12/2023    Assessment & Plan      Rachele Martínez is a 83 year old female with a history of ESRD (on HD MWF), HTN, PVD, anemia 2/2 ESRD and recurrent GI bleeds (2/2 AVMs), and chronic hep C with compensated cirrhosis admitted on 5/12/2023 for weakness. She had acute-onset weakness approximately 1 week ago that involves her bilateral lower extremities that is associated with low back pain. She does not have saddle anesthesia, though she does endorse some intermittent episodes of incontinence (though unclear if these are related to her inability to make it to the bathroom due to weakness). Suspect multifactorial etiology. She requires hospitalization for further workup.     # Weakness  Strongly suspect that her weakness is multifactorial. She notes that her blood pressure medications were recently increased, and she has had lightheadedness and dizziness since that time. In addition, she recently had a GI bleed and has ongoing hemoptysis per her report. Her Hgb on presentation was 10, and Hgb has been 9-10 recently when she is not acutely bleeding. She does take a narcotic in the outpatient setting, and with her history of ESRD, this could be accumulating and causing weakness. Further, she is on a statin; this is not a new medication but statin-induced myopathy is possible. CT of her head and lumbar spine showed no acute process that explains her weakness. She does still make some urine and has had intermittent burning with urination. On chart review, it appears that her PCP Dr. Rodriguez has had some concerns about her ability to care for herself for some time and asked her to start considering assisted living due to some cognitive impairment. At discharge from the hospital in January, PT and OT also recommended home PT/OT services.   - Decreased losartan from 100 mg daily  to 50 mg daily   - PT and OT consults placed   - Wean opioids as tolerated  - Consider dose-reducing or discontinuing statin   - UA ordered  - Nutrition consult placed  - CT head showed no acute changes but could consider MRI to better evaluate for prior stroke    # Chronic anemia 2/2 renal disease  # Recent GI bleed (Jan 2023)  # Hemoptysis   # AVM of the small bowel, stomach, and duodenum   Baseline Hgb previously 7s-9s but recently 9-11. Last EGD on 3/9. Endoscopic treatment of duodenal AVMs in Jan 2023. Ongoing episodes of hemoptysis for at least the past month.   - Continue home protonix 20 mg BID  - Trend CBCs  - Monitor for evidence of hemoptysis    # ESRD on HD (MWF)  # Secondary hyperparathyroidism   Has been stable on dialysis for some time. Last run Friday 5/12.   - Neph consulted for dialysis  - Continue home phos binder    # Chronic lower extremity pain  # Lumbar spinal stenosis with neurogenic claudication   Lower extremity ultrasounds showed peripheral artery disease (prelim read). Lower extremity doppler in process. She has been involved with PAD rehab. She was previously on cilostasol, but this was not effective and increased her risk of bleeding. She was started on a small dose of percocet for severe pain prior to PT sessions.   - Tylenol and oxycodone PRNs ordered due to acute pain, wean oxycodone to home dose as soon as possible   - Continue home gabapentin 300 mg qhs  - Consider ABIs, but unclear if this would be beneficial due to prior bleeding on cilostasol; however, also possibly a candidate for revascularization      # Chronic hepatitis C with compensated cirrhosis  - Follows with GI, last visit 1/31/23 with annual US monitoring     # Depression  - Continue home sertraline 100 mg daily    # HTN  - Decreased home losartan as above    # HLD  - Continuing home statin        Diet: Combination Diet Regular Diet Adult; Renal Diet (dialysis)    DVT Prophylaxis: Heparin SQ  Rodriguez Catheter: Not  present  Lines: None     Cardiac Monitoring: None  Code Status: No CPR- Pre-arrest intubation OK    Clinically Significant Risk Factors Present on Admission             # Anion Gap Metabolic Acidosis: Highest Anion Gap = 21 mmol/L in last 2 days, will monitor and treat as appropriate      # Hypertension: Noted on problem list               Disposition Plan      Expected Discharge Date: 05/14/2023                  TOVA RODAS MD  Hospitalist Service, [facility]  Securely message with [employer] (more info)  Text page via [facility] Paging/Directory   See signed in provider for up to date coverage information    ______________________________________________________________________    Chief Complaint   Weakness    History is obtained from the patient    History of Present Illness   Rachele Martínez is a 83 year old female with a history of ESRD (on HD MWF), HTN, PVD, anemia 2/2 ESRD and recurrent GI bleeds (2/2 AVMs), and chronic hep C with compensated cirrhosis admitted on 5/12/2023 for weakness.  She says that the weakness began abruptly approximately 1 week ago.  Prior to then, she was walking around her home and doing her normal daily activities.  She says that the weakness seemed to happen very suddenly and mostly involves her bilateral lower extremities and low back.  The weakness is associated with pain in her low back.  She says that her blood pressure medications were recently increased due to hypertension, and she has noted lightheadedness and dizziness since that medication change.  The symptoms of lightheadedness and dizziness are worse after her dialysis runs.  She had her last dialysis run on the day of presentation, and she completed the run.  She does still produce some urine and has noted intermittent dysuria.  In addition, she has been having episodes of hemoptysis for the past several weeks.  She was previously admitted to the hospital for GI  bleed in January 2023 and was found to have multiple AVMs.  She has since had EGDs with procedures for these AVMs.  Her hemoglobin had been stabilizing and was 10.0 on presentation.  She has not had melena or hematochezia.    She also notes some numbness in her bilateral lateral lower feet, though this has been ongoing for some time and preceded her current symptoms.  She says that the gabapentin used to help with her lower extremity numbness and tingling, but has not been helping recently.  She does have a known history of PVD.  She was on cilostasol in the past but had to discontinue this due to GI bleeding.    She has not had any sick symptoms or appetite changes recently.  She has not had syncope or loss consciousness recently.  She has not had fevers, chills, cough, chest pain, shortness of breath, or abdominal pain.  She does endorse a few episodes of incontinence, though it is unclear whether this was due to her inability to quickly moved to the restroom.    ED Course:  In the ED, vitals were notable for /63. Labs showed WBC 11.3, Hgb 10.0, Cr 5.77, AST 80, and alk phos 217. Trops were stable from 85 -> 82. D-dimer was 4.4. CT head showed no acute intracranial pathology but did show chronic vascular changes. CT lumbar spine showed degenerative changes. CTPE showed no evidence of PE or mass. Prelim read of lower extremity arterial doppler showed PAD. She received oral medications for pain control and was admitted for further management.        Past Medical History    Past Medical History:   Diagnosis Date     Anemia      Anxiety and depression      Arthritis      AVM (arteriovenous malformation)      Chronic hepatitis C with cirrhosis (H)      Clotting disorder (H)      ESRD (end stage renal disease) (H)     on dialysis     GI bleed     recurrent     Glaucoma      Hyperlipidemia      Hypertension goal BP (blood pressure) < 140/80        Past Surgical History   Past Surgical History:   Procedure  Laterality Date     CAPSULE/PILL CAM ENDOSCOPY N/A 3/27/2019    Procedure: Capsule/pill cam endoscopy;  Surgeon: Yosvany Ram MD;  Location: [facility]     CAPSULE/PILL CAM ENDOSCOPY N/A 2/2/2023    Procedure: IMAGING PROCEDURE, GI TRACT, INTRALUMINAL, VIA CAPSULE;  Surgeon: Mulu Nur DO;  Location: [facility]     COLONOSCOPY N/A 9/4/2015    Procedure: COMBINED COLONOSCOPY, SINGLE OR MULTIPLE BIOPSY/POLYPECTOMY BY BIOPSY;  Surgeon: Rupesh Lopez MD;  Location: [facility]     COLONOSCOPY N/A 9/19/2018    Procedure: COLONOSCOPY;  enteroscopy small bowel  COLONOSCOPY;  Surgeon: Ankit Baires MD;  Location: [facility]     ENTEROSCOPY SMALL BOWEL N/A 3/9/2023    Procedure: antegrade single balloon enteroscopy with argon plasma coagulation of arteriovenous malformations;  Surgeon: Ankit Baires MD;  Location: [facility]     ESOPHAGOSCOPY, GASTROSCOPY, DUODENOSCOPY (EGD), COMBINED N/A 12/18/2014    Procedure: COMBINED ESOPHAGOSCOPY, GASTROSCOPY, DUODENOSCOPY (EGD);  Surgeon: Betsy Carvajal MD;  Location: [facility]     ESOPHAGOSCOPY, GASTROSCOPY, DUODENOSCOPY (EGD), COMBINED N/A 4/25/2015    Procedure: COMBINED ESOPHAGOSCOPY, GASTROSCOPY, DUODENOSCOPY (EGD);  Surgeon: Yosvany Ram MD;  Location: [facility]     ESOPHAGOSCOPY, GASTROSCOPY, DUODENOSCOPY (EGD), COMBINED N/A 5/5/2015    Procedure: COMBINED ESOPHAGOSCOPY, GASTROSCOPY, DUODENOSCOPY (EGD);  Surgeon: Mariano Mistry MD;  Location:  GI     HC CAPSULE ENDOSCOPY N/A 9/30/2015    Procedure: CAPSULE/PILL CAM ENDOSCOPY;  Surgeon: Pan Dhaliwal MD;  Location:  GI     HC VASCULAR SURGERY PROCEDURE UNLIST       HYSTERECTOMY  1980    KYREE     LUMPECTOMY BREAST         Prior to Admission Medications   Prior to Admission Medications   Prescriptions Last Dose Informant Patient Reported? Taking?   Calcium Acetate, Phos Binder, 667 MG CAPS   Yes No   Sig: TAKE 2 CAPSULE BY MOUTH THREE TIMES A DAY WITH MEALS   LOPERAMIDE HCL PO   Yes No   Sig: Take 4 mg by  "mouth   Multiple Vitamins-Minerals (PRORENAL + D) TABS   Yes No   Sig: Take 1 tablet by mouth daily   atorvastatin (LIPITOR) 20 MG tablet   No No   Sig: TAKE 1 TABLET BY MOUTH EVERY DAY   Patient taking differently: every evening   bisacodyl (DULCOLAX) 5 MG EC tablet   No No   Sig: Refer to \"Getting Ready for a Colonoscopy\" instruction handout   gabapentin (NEURONTIN) 300 MG capsule   No No   Sig: Take 1 capsule (300 mg) by mouth At Bedtime   losartan (COZAAR) 25 MG tablet   Yes No   Sig: Take 100 mg by mouth daily Patient reports taking 100 mg daily. Takes in the evening   octreotide (SANDOSTATIN LAR) 20 MG injection   Yes No   Sig: Inject 20 mg into the muscle every 28 days   oxyCODONE-acetaminophen (PERCOCET) 5-325 MG tablet   No No   Sig: Take 1 tablet by mouth daily as needed for pain Take prior to working with PT for PAD rehab   pantoprazole (PROTONIX) 20 MG EC tablet   No No   Sig: Take 1 tablet (20 mg) by mouth 2 times daily (before meals) *take 30-60 minutes before   polyethylene glycol (MIRALAX) 17 GM/Dose powder   Yes No   Sig: As needed   sertraline (ZOLOFT) 50 MG tablet   No No   Sig: Take 2 tablets (100 mg) by mouth daily   ursodiol (ACTIGALL) 300 MG capsule   No No   Sig: Take 2 capsules (600 mg) by mouth 2 times daily With meals      Facility-Administered Medications: None        Allergies   Allergies   Allergen Reactions     Abacavir Itching     Lisinopril Cough     Dust Mites      Hydrochlorothiazide Itching     Severe       No Clinical Screening - See Comments      History of blood transfusion reactions and pre-treats with Benadryl.      Spironolactone Nausea     Sulfa Antibiotics Hives     Valsartan Itching     Valsartan-Hydrochlorothiazide Itching     severe        Physical Exam   Vital Signs: Temp: 99.5  F (37.5  C) Temp src: Oral BP: (!) 155/73 Pulse: 74   Resp: 16 SpO2: 96 % O2 Device: None (Room air)    Weight: 126 lbs 1.65 oz    Constitutional: awake, alert, cooperative, no apparent " distress, and appears stated age  Eyes: extra-ocular muscles intact and sclera clear  ENT: normocepalic, without obvious abnormality, MMM, neck supple  Respiratory: No increased work of breathing, good air exchange, clear to auscultation bilaterally, no crackles or wheezing  Cardiovascular: regular rate and rhythm, normal S1 and S2, 3/6 systolic murmur, and no edema  GI: normal bowel sounds, soft, non-distended and non-tender  Skin: normal skin color, texture, turgor  Neurologic: A&Ox3. CN II-XII grossly intact. Strength 5/5 in bilateral upper extremities. Strength 4/5 in bilateral lower extremities. Sensation grossly intact throughout upper and lower extremities.     Medical Decision Making             Data     I have personally reviewed the following data over the past 24 hrs:    11.3 (H)  \   10.0 (L)   / 275     138 95 (L) 34.4 (H) /  123 (H)   3.4 22 5.77 (H) \       ALT: 34 AST: 80 (H) AP: 217 (H) TBILI: 0.7   ALB: 4.1 TOT PROTEIN: 8.3 LIPASE: N/A       Trop: 82 (H) BNP: N/A       INR:  N/A PTT:  N/A   D-dimer:  4.44 (H) Fibrinogen:  N/A       Imaging results reviewed over the past 24 hrs:   Recent Results (from the past 24 hour(s))   Head CT w/o contrast    Narrative    EXAM: CT HEAD W/O CONTRAST  5/12/2023 6:28 PM     HISTORY:  AMS       COMPARISON:  MR brain 5/12/2018.    TECHNIQUE: Using multidetector thin collimation helical acquisition  technique, axial, coronal and sagittal CT images from the skull base  to the vertex were obtained without intravenous contrast.   (topogram) image(s) also obtained and reviewed.    FINDINGS:    Mild-to-moderate diffuse cerebral atrophy. Periventricular patchy  hypodensities.    No acute intracranial hemorrhage, mass effect, or midline shift. No  acute loss of gray-white matter differentiation in the cerebral  hemispheres. Ventricles are proportionate to the cerebral sulci. Clear  basal cisterns.    The bony calvaria and the bones of the skull base are normal.  The  visualized portions of the paranasal sinuses and mastoid air cells are  clear. Grossly normal orbits.       Impression    IMPRESSION:  1. No acute intracranial pathology.  2. Mild to moderate cerebral atrophy and sequela of chronic small  vessel ischemic disease.    I have personally reviewed the examination and initial interpretation  and I agree with the findings.    ELSA WALTON MD         SYSTEM ID:  [id number]   Lumbar spine CT w/o contrast    Narrative    EXAM: CT LUMBAR SPINE W/O CONTRAST  5/12/2023 6:29 PM     HISTORY:  Low back pain; Low back pain; Low back pain, no complicating  feature; No chronic LBP duration >= 3 months       COMPARISON:  MR lumbar spine 3/3/2000 and    TECHNIQUE: Using multidetector thin collimation helical acquisition  technique, axial, sagittal and coronal 3 mm thickness CT  reconstructions were obtained through the lumbar spine without  intravenous contrast.  Images were viewed in bone and soft tissue  windows.    FINDINGS:  There are 5 lumbar type vertebrae, used for the purposes of this  dictation. Trace anterolisthesis of L5 on S1. Marked disc space  narrowing at the levels of L1-2, L3-4 and L5-S1. There are severe  degenerative changes of the vertebral body endplates predominantly at  the 1-L2 intervertebral disc space, L3-L4 intervertebral disc space,  superior L5 vertebral body endplate and the L5-S1 intervertebral disc  space and adjacent endplates.. No acute fracture. No suspicious  osseous lesion.    Findings on a level by level basis are as follows:    L1-L2: Moderate bilateral neural foraminal narrowing. No significant  spinal canal stenosis.    L2-L3: Moderate left and mild right neural foraminal narrowing. No  significant spinal canal narrowing..    L3-L4: Prominent posterior osteophyte with mild narrowing of the  spinal canal. Moderate bilateral neural foraminal narrowing.    L4-L5: No spinal canal or neural foraminal stenosis.    L5-S1: Moderate bilateral neural  foraminal narrowing. No significant  spinal canal narrowing..    Severe atherosclerosis of the abdominal aorta..      Impression    IMPRESSION:  1. No acute fracture or traumatic subluxation.  2. Moderate to severe degenerative changes of the lumbar spine most  significant at the levels of L1-2, L3-4 and L5-S1.  3. Abdominal atherosclerosis..    I have personally reviewed the examination and initial interpretation  and I agree with the findings.    ELSA WALTON MD         SYSTEM ID:  [id number]   US Lower Extremity Arterial Duplex Bilateral    Impression    RESIDENT PRELIMINARY INTERPRETATION  Impression: Peripheral arterial disease.    1. Right leg: Peripheral arterial disease with poststenotic wave forms  distal to the SFA at the origin.  Patent right lower extremity  arteries.    2. Left leg: Peripheral arterial disease with post stenotic wave forms  distal to the origin of the SFA.  Patent left lower survey arteries.      Guidelines:    [facility] duplex criteria for lower limb arterial  occlusive disease    Percent stenosis:     Normal (1-19%): Peak systolic velocity (cm/s): <150, End-diastolic  velocity (cm/s): <40, Velocity ratio (Vr): <1.5, Distal arterial  waveform: Triphasic    20-49%: Peak systolic velocity (cm/s): 150-200, End-diastolic velocity  (cm/s): <40, Velocity ratio (Vr): 1.5-2.0, Distal arterial waveform:  Triphasic    50-75%: Peak systolic velocity (cm/s): 200-300, End-diastolic velocity  (cm/s): <90, Velocity ratio (Vr): 2.0-3.9, Distal arterial waveform:  Poststenotic turbulence distal to stenosis, monophasic distal waveform    >75%: Peak systolic velocity (cm/s): >300, End-diastolic velocity  (cm/s): <90, Velocity ratio (Vr): >4.0, Distal arterial waveform:  Dampened distal waveform and low PSV/EDV* in the stenosis    Occlusion: Absent flow by color Doppler/pulsed Doppler spectral  analysis; length of occlusion estimated from distance between exit and  reentry collateral  arteries    *PSV = peak systolic velocity, EDV = end-diastolic velocity  http://link.ramey.com/chapter/10.1007/[email or web address]_23/fulltext  html   CT Chest Pulmonary Embolism w Contrast    Narrative    CT CHEST PULMONARY EMBOLISM W CONTRAST, 5/12/2023 9:18 PM    History: Hemoptysis, +dimer    Comparison: None.    Technique: Helical acquisition of CT images of the chest from the lung  apices to the kidneys were acquired after the administration of  intravenous contrast according to the CT pulmonary angiogram protocol.  Axial images were reconstructed in 1 and 3 mm slice thickness. Coronal  reconstructions performed. Three-dimensional (3D) post-processed  angiographic images were reconstructed, archived to PACS and used in  the interpretation of this study.    Contrast dose: iopamidol (ISOVUE-370) solution 58 mL    Findings:   The contrast bolus is adequate.  There is no pulmonary embolism. No  evidence of infection.  No suspicious pulmonary nodules.  No pleural  effusion or pneumothorax.    Mediastinum: The heart is not enlarged. There is no pericardial  effusion. The ascending aorta and main pulmonary arteries are within  normal limits for diameter.    Upper abdomen: There is reflux of contrast into the hepatic veins.  Otherwise, the appearance of the upper abdomen is unremarkable.    Bones and soft tissues: No acute or aggressive osseous lesions.  Scattered degenerative changes of the thoracic and lumbar spine.      Impression    Impression:  1. No pulmonary embolism identified.  2. No other worrisome findings in the chest and upper abdomen.    In the event of a positive result for acute pulmonary embolism  resulting in right heart strain, consider calling the   [facility] patient placement ([phone]) for PERT (Pulmonary Embolism  Response Team) Activation?    PERT -- Pulmonary Embolism Response Team (Multidisciplinary team  including cardiology, interventional radiology, critical care,  hematology)    I have  personally reviewed the examination and initial interpretation  and I agree with the findings.    VERNON LOPES MD         SYSTEM ID:  [id number]

## 2023-05-13 NOTE — UTILIZATION REVIEW
Admission Status; Secondary Review Determination     Admission Date: 5/12/2023  3:56 PM       Under the authority of the Utilization Management Committee, the utilization review process indicated a secondary review on the above patient.  The review outcome is based on review of the medical records, discussions with staff, and applying clinical experience noted on the date of the review.        (x)      Inpatient Status Appropriate - This patient's medical care is consistent with medical management for inpatient care and reasonable inpatient medical practice.       RATIONALE FOR DETERMINATION      Brief clinical presentation, information copied from the chart, abbreviated and edited for relevant content:     Complex patient with multiple issues, not able to discharge today.       Rachele Martínez is a 83 year old female with a history of ESRD, HTN, PVD, anemia 2/2 ESRD and recurrent GI bleeds (2/2 AVMs), and chronic hep C with compensated cirrhosis admitted on 5/12/2023 for weakness. She had acute-onset weakness approximately 1 week ago that involves her bilateral lower extremities that is associated with low back pain.  Strongly suspect that her weakness is multifactorial. She notes that her blood pressure medications were recently increased, and she has had lightheadedness and dizziness since that time. In addition, she recently had a GI bleed and has ongoing hemoptysis per her report. Her Hgb on presentation was 10, and Hgb has been 9-10 recently when she is not acutely bleeding. Further, she is on a statin; this is not a new medication but statin-induced myopathy is possible. CT of her head and lumbar spine showed no acute process that explains her weakness. She does still make some urine and has had intermittent burning with urination. On chart review, it appears that her PCP had some concerns about her ability to care for herself for some time and asked her to start considering assisted living due to some  cognitive impairment. Plan for medication adjustment, monitoring,  Wean opioids as tolerated, Consider dose-reducing or discontinuing statin. nutrition consult placed. CT head showed no acute changes but could consider MRI to better evaluate for prior stroke          At the time of admission with the information available to the attending physician, more than 2 nights hospital complex care was anticipated. Also, there was a risk of adverse outcome if patient was treated outpatient or observation. High intensity of services anticipated. Inpatient admission appropriate based on Medicare guidelines.       The information on this document is developed by the utilization review team in order for the business office to ensure compliance.  This only denotes the appropriateness of proper admission status and does not reflect the quality of care rendered.         The definitions of Inpatient Status and Observation Status used in making the determination above are those provided in the CMS Coverage Manual, Chapter 1 and Chapter 6, section 70.4.      Sincerely,      Blanquita Mitchell MD   Utilization Review/ Case Management  [facility].

## 2023-05-13 NOTE — PLAN OF CARE
"  VS: VSS   O2: Room air   Output: Continent of B&B; Patient is mostly aneuric d/t kidney failure   Last BM: SMOOTH   Activity: Assist of one staff w/ ADLs & mobility   Skin: CDI   Pain: Patient c/o pain rated 9/10 to lower back/bilateral flank pain & BLEs   CMS: Intact   Dressing: N/A   Diet: Combo- regular/renal   LDA: PIV LUE- saline locked   Equipment: Front wheeled walker   Plan: TBD   Additional Info: Patient is A&Ox4       Problem: Plan of Care - These are the overarching goals to be used throughout the patient stay.    Goal: Plan of Care Review  Description: The Plan of Care Review/Shift note should be completed every shift.  The Outcome Evaluation is a brief statement about your assessment that the patient is improving, declining, or no change.  This information will be displayed automatically on your shift note.  Outcome: Progressing  Goal: Patient-Specific Goal (Individualized)  Description: You can add care plan individualizations to a care plan. Examples of Individualization might be:  \"Parent requests to be called daily at 9am for status\", \"I have a hard time hearing out of my right ear\", or \"Do not touch me to wake me up as it startles me\".  Outcome: Progressing  Goal: Absence of Hospital-Acquired Illness or Injury  Outcome: Progressing  Goal: Optimal Comfort and Wellbeing  Outcome: Progressing  Goal: Readiness for Transition of Care  Outcome: Progressing  Intervention: Mutually Develop Transition Plan  Recent Flowsheet Documentation  Taken 5/13/2023 1200 by Ivory Liao, RN  Equipment Currently Used at Home: other (see comments)     Problem: Oral Intake Inadequate  Goal: Improved Oral Intake  Outcome: Progressing     Problem: Activity Intolerance  Goal: Enhanced Capacity and Energy  Outcome: Progressing     Problem: Depression  Goal: Improved Mood  Outcome: Progressing     Problem: Anxiety  Goal: Anxiety Reduction or Resolution  Outcome: Progressing     Problem: Pain Acute  Goal: Optimal Pain " Control and Function  Outcome: Progressing   Goal Outcome Evaluation:

## 2023-05-13 NOTE — PROGRESS NOTES
Patient alert and oriented x4, calm and cooperative with cares. Patient denies chest pain or SOB, does complain of lower back pain and BLE pain. Reported having weakness on BLE and numbness that is not new. PRN tylenol and oxycodone given, scheduled gabapentin given. VS wnl, RA saturating well. Denied coughing up bloody sputum recently, LS clear. IV patent, nothing infusing. Bed alarm on, call light within reach. Educated patient to call prior to getting up.  Will continue with plan of care.

## 2023-05-14 ENCOUNTER — APPOINTMENT (OUTPATIENT)
Dept: OCCUPATIONAL THERAPY | Facility: CLINIC | Age: 83
DRG: 091 | End: 2023-05-14
Payer: MEDICARE

## 2023-05-14 ENCOUNTER — APPOINTMENT (OUTPATIENT)
Dept: PHYSICAL THERAPY | Facility: CLINIC | Age: 83
DRG: 091 | End: 2023-05-14
Attending: PEDIATRICS
Payer: MEDICARE

## 2023-05-14 ENCOUNTER — APPOINTMENT (OUTPATIENT)
Dept: GENERAL RADIOLOGY | Facility: CLINIC | Age: 83
DRG: 091 | End: 2023-05-14
Attending: PHYSICIAN ASSISTANT
Payer: MEDICARE

## 2023-05-14 LAB
ALBUMIN SERPL BCG-MCNC: 3.5 G/DL (ref 3.5–5.2)
ALP SERPL-CCNC: 159 U/L (ref 35–104)
ALT SERPL W P-5'-P-CCNC: 22 U/L (ref 10–35)
ANION GAP SERPL CALCULATED.3IONS-SCNC: 19 MMOL/L (ref 7–15)
APTT PPP: 34 SECONDS (ref 22–38)
AST SERPL W P-5'-P-CCNC: 33 U/L (ref 10–35)
BILIRUB SERPL-MCNC: 0.4 MG/DL
BUN SERPL-MCNC: 72.7 MG/DL (ref 8–23)
CALCIUM SERPL-MCNC: 9 MG/DL (ref 8.8–10.2)
CHLORIDE SERPL-SCNC: 96 MMOL/L (ref 98–107)
CREAT SERPL-MCNC: 9.47 MG/DL (ref 0.51–0.95)
DEPRECATED HCO3 PLAS-SCNC: 23 MMOL/L (ref 22–29)
ERYTHROCYTE [DISTWIDTH] IN BLOOD BY AUTOMATED COUNT: 15.3 % (ref 10–15)
ERYTHROCYTE [DISTWIDTH] IN BLOOD BY AUTOMATED COUNT: 15.3 % (ref 10–15)
FERRITIN SERPL-MCNC: 436 NG/ML (ref 11–328)
GFR SERPL CREATININE-BSD FRML MDRD: 4 ML/MIN/1.73M2
GLUCOSE SERPL-MCNC: 98 MG/DL (ref 70–99)
HCT VFR BLD AUTO: 27.1 % (ref 35–47)
HCT VFR BLD AUTO: 27.2 % (ref 35–47)
HGB BLD-MCNC: 8.6 G/DL (ref 11.7–15.7)
HGB BLD-MCNC: 8.9 G/DL (ref 11.7–15.7)
INR PPP: 1.38 (ref 0.85–1.15)
IRON BINDING CAPACITY (ROCHE): 173 UG/DL (ref 240–430)
IRON SATN MFR SERPL: 18 % (ref 15–46)
IRON SERPL-MCNC: 31 UG/DL (ref 37–145)
MCH RBC QN AUTO: 30.7 PG (ref 26.5–33)
MCH RBC QN AUTO: 31.4 PG (ref 26.5–33)
MCHC RBC AUTO-ENTMCNC: 31.7 G/DL (ref 31.5–36.5)
MCHC RBC AUTO-ENTMCNC: 32.7 G/DL (ref 31.5–36.5)
MCV RBC AUTO: 96 FL (ref 78–100)
MCV RBC AUTO: 97 FL (ref 78–100)
PLATELET # BLD AUTO: 303 10E3/UL (ref 150–450)
PLATELET # BLD AUTO: 324 10E3/UL (ref 150–450)
POTASSIUM SERPL-SCNC: 4.8 MMOL/L (ref 3.4–5.3)
PROT SERPL-MCNC: 7.1 G/DL (ref 6.4–8.3)
RBC # BLD AUTO: 2.8 10E6/UL (ref 3.8–5.2)
RBC # BLD AUTO: 2.83 10E6/UL (ref 3.8–5.2)
SODIUM SERPL-SCNC: 138 MMOL/L (ref 136–145)
VIT B12 SERPL-MCNC: 1645 PG/ML (ref 232–1245)
WBC # BLD AUTO: 10 10E3/UL (ref 4–11)
WBC # BLD AUTO: 10.1 10E3/UL (ref 4–11)

## 2023-05-14 PROCEDURE — 82306 VITAMIN D 25 HYDROXY: CPT | Performed by: STUDENT IN AN ORGANIZED HEALTH CARE EDUCATION/TRAINING PROGRAM

## 2023-05-14 PROCEDURE — 82607 VITAMIN B-12: CPT | Performed by: STUDENT IN AN ORGANIZED HEALTH CARE EDUCATION/TRAINING PROGRAM

## 2023-05-14 PROCEDURE — 83550 IRON BINDING TEST: CPT | Performed by: STUDENT IN AN ORGANIZED HEALTH CARE EDUCATION/TRAINING PROGRAM

## 2023-05-14 PROCEDURE — 97116 GAIT TRAINING THERAPY: CPT | Mod: GP

## 2023-05-14 PROCEDURE — 36415 COLL VENOUS BLD VENIPUNCTURE: CPT | Performed by: STUDENT IN AN ORGANIZED HEALTH CARE EDUCATION/TRAINING PROGRAM

## 2023-05-14 PROCEDURE — 80053 COMPREHEN METABOLIC PANEL: CPT | Performed by: STUDENT IN AN ORGANIZED HEALTH CARE EDUCATION/TRAINING PROGRAM

## 2023-05-14 PROCEDURE — 120N000002 HC R&B MED SURG/OB UMMC

## 2023-05-14 PROCEDURE — 97535 SELF CARE MNGMENT TRAINING: CPT | Mod: GO

## 2023-05-14 PROCEDURE — 36415 COLL VENOUS BLD VENIPUNCTURE: CPT | Performed by: PHYSICIAN ASSISTANT

## 2023-05-14 PROCEDURE — C9113 INJ PANTOPRAZOLE SODIUM, VIA: HCPCS | Performed by: PHYSICIAN ASSISTANT

## 2023-05-14 PROCEDURE — 250N000013 HC RX MED GY IP 250 OP 250 PS 637: Performed by: PEDIATRICS

## 2023-05-14 PROCEDURE — 82728 ASSAY OF FERRITIN: CPT | Performed by: STUDENT IN AN ORGANIZED HEALTH CARE EDUCATION/TRAINING PROGRAM

## 2023-05-14 PROCEDURE — 97129 THER IVNTJ 1ST 15 MIN: CPT | Mod: GO

## 2023-05-14 PROCEDURE — 99233 SBSQ HOSP IP/OBS HIGH 50: CPT | Performed by: INTERNAL MEDICINE

## 2023-05-14 PROCEDURE — 97530 THERAPEUTIC ACTIVITIES: CPT | Mod: GP

## 2023-05-14 PROCEDURE — 99233 SBSQ HOSP IP/OBS HIGH 50: CPT | Performed by: STUDENT IN AN ORGANIZED HEALTH CARE EDUCATION/TRAINING PROGRAM

## 2023-05-14 PROCEDURE — 71045 X-RAY EXAM CHEST 1 VIEW: CPT | Mod: 26 | Performed by: RADIOLOGY

## 2023-05-14 PROCEDURE — 97161 PT EVAL LOW COMPLEX 20 MIN: CPT | Mod: GP

## 2023-05-14 PROCEDURE — 71045 X-RAY EXAM CHEST 1 VIEW: CPT

## 2023-05-14 PROCEDURE — 250N000011 HC RX IP 250 OP 636: Performed by: PHYSICIAN ASSISTANT

## 2023-05-14 PROCEDURE — 85730 THROMBOPLASTIN TIME PARTIAL: CPT | Performed by: PHYSICIAN ASSISTANT

## 2023-05-14 PROCEDURE — 85027 COMPLETE CBC AUTOMATED: CPT | Performed by: STUDENT IN AN ORGANIZED HEALTH CARE EDUCATION/TRAINING PROGRAM

## 2023-05-14 PROCEDURE — 250N000011 HC RX IP 250 OP 636: Performed by: PEDIATRICS

## 2023-05-14 PROCEDURE — 85610 PROTHROMBIN TIME: CPT | Performed by: PHYSICIAN ASSISTANT

## 2023-05-14 RX ORDER — VIT B COMP NO.3/FOLIC/C/BIOTIN 1 MG-60 MG
1 TABLET ORAL DAILY
Status: DISCONTINUED | OUTPATIENT
Start: 2023-05-15 | End: 2023-05-25 | Stop reason: HOSPADM

## 2023-05-14 RX ADMIN — PANTOPRAZOLE SODIUM 40 MG: 40 INJECTION, POWDER, FOR SOLUTION INTRAVENOUS at 20:20

## 2023-05-14 RX ADMIN — PANTOPRAZOLE SODIUM 20 MG: 20 TABLET, DELAYED RELEASE ORAL at 09:43

## 2023-05-14 RX ADMIN — ATORVASTATIN CALCIUM 20 MG: 20 TABLET, FILM COATED ORAL at 20:20

## 2023-05-14 RX ADMIN — PANTOPRAZOLE SODIUM 20 MG: 20 TABLET, DELAYED RELEASE ORAL at 16:05

## 2023-05-14 RX ADMIN — OXYCODONE HYDROCHLORIDE 5 MG: 5 TABLET ORAL at 17:05

## 2023-05-14 RX ADMIN — SERTRALINE HYDROCHLORIDE 100 MG: 100 TABLET ORAL at 09:43

## 2023-05-14 RX ADMIN — LOSARTAN POTASSIUM 50 MG: 50 TABLET, FILM COATED ORAL at 09:43

## 2023-05-14 RX ADMIN — GABAPENTIN 300 MG: 300 CAPSULE ORAL at 22:16

## 2023-05-14 RX ADMIN — CALCIUM ACETATE 1334 MG: 667 CAPSULE ORAL at 13:01

## 2023-05-14 RX ADMIN — CALCIUM ACETATE 1334 MG: 667 CAPSULE ORAL at 17:09

## 2023-05-14 RX ADMIN — CALCIUM ACETATE 1334 MG: 667 CAPSULE ORAL at 09:43

## 2023-05-14 ASSESSMENT — ACTIVITIES OF DAILY LIVING (ADL)
ADLS_ACUITY_SCORE: 28

## 2023-05-14 NOTE — PROGRESS NOTES
"Rachele Martínez is a 83 year old female patient.  1. Hemoptysis    2. Pain of left lower leg    3. Chronic bilateral low back pain without sciatica    4. Generalized muscle weakness      Past Medical History:   Diagnosis Date     Anemia      Anxiety and depression      Arthritis      AVM (arteriovenous malformation)      Chronic hepatitis C with cirrhosis (H)      Clotting disorder (H)      ESRD (end stage renal disease) (H)     on dialysis     GI bleed     recurrent     Glaucoma      Hyperlipidemia      Hypertension goal BP (blood pressure) < 140/80      No current outpatient medications on file.     Allergies   Allergen Reactions     Abacavir Itching     Lisinopril Cough     Dust Mites      Hydrochlorothiazide Itching     Severe       No Clinical Screening - See Comments      History of blood transfusion reactions and pre-treats with Benadryl.      Spironolactone Nausea     Sulfa Antibiotics Hives     Valsartan Itching     Valsartan-Hydrochlorothiazide Itching     severe     Principal Problem:    Generalized muscle weakness  Active Problems:    Pain of left lower leg    Hemoptysis    Chronic bilateral low back pain without sciatica    Blood pressure (!) 156/58, pulse 67, temperature 98.6  F (37  C), temperature source Oral, resp. rate 16, height 1.626 m (5' 4\"), weight 57.2 kg (126 lb 1.7 oz), SpO2 98 %, not currently breastfeeding.    Fatigued and oriented X4 ( Confused at times)  Denies any new onset numbness and tingling (baseline N/T bilateral feet)   Reports mild knee pain managed with hot packs  Denies nausea   Intermittent cough cough  No urine out put       Samy Bowman, RN  5/14/2023    "

## 2023-05-14 NOTE — PLAN OF CARE
"  VS: VSS   O2: Room air   Output: Continent; Pt has dx ESRD & does not produce urine   Last BM:    Activity: A1   Skin: CDI   Pain: Patient c/o mild lower back/flank pain & pain to BLEs   CMS: Intact   Dressing: CDI   Diet: Combo diet- regular/renal   LDA: PIV LUE   Equipment: Front wheeled walker   Plan: TBD   Additional Info: Patient reports coughing up blood this AM; Dr. Harrington notified & heparin discontinued       Problem: Plan of Care - These are the overarching goals to be used throughout the patient stay.    Goal: Plan of Care Review  Description: The Plan of Care Review/Shift note should be completed every shift.  The Outcome Evaluation is a brief statement about your assessment that the patient is improving, declining, or no change.  This information will be displayed automatically on your shift note.  Outcome: Progressing  Flowsheets (Taken 5/14/2023 5541)  Plan of Care Reviewed With:   patient   child  Overall Patient Progress: improving  Goal: Patient-Specific Goal (Individualized)  Description: You can add care plan individualizations to a care plan. Examples of Individualization might be:  \"Parent requests to be called daily at 9am for status\", \"I have a hard time hearing out of my right ear\", or \"Do not touch me to wake me up as it startles me\".  Outcome: Progressing  Goal: Absence of Hospital-Acquired Illness or Injury  Outcome: Progressing  Goal: Optimal Comfort and Wellbeing  Outcome: Progressing  Goal: Readiness for Transition of Care  Outcome: Progressing     Problem: Oral Intake Inadequate  Goal: Improved Oral Intake  Outcome: Progressing     Problem: Activity Intolerance  Goal: Enhanced Capacity and Energy  Outcome: Progressing     Problem: Depression  Goal: Improved Mood  Outcome: Progressing     Problem: Anxiety  Goal: Anxiety Reduction or Resolution  Outcome: Progressing     Problem: Pain Acute  Goal: Optimal Pain Control and Function  Outcome: Progressing   Goal Outcome Evaluation:      " Plan of Care Reviewed With: patient, child    Overall Patient Progress: improvingOverall Patient Progress: improving

## 2023-05-14 NOTE — PROGRESS NOTES
Internal Medicine Progress Note (cross cover)       Assessment and plan:  Rachele Martínez is a 83 year old female with a history of ESRD (on HD MWF), HTN, PVD, anemia 2/2 ESRD and recurrent GI bleeds (2/2 AVMs), and chronic hep C with compensated cirrhosis admitted on 5/12/2023 for weakness. Medicine cross covered call this evening due to patient coughing up blood clots.     Hemoptysis vs hematemesis   Patient reports she has had ongoing episodes of hemoptysis for the past month. Notes she coughs up blood about twice per day. Today has had two episodes of hemoptysis which she notes is more than she has been coughing at home. This evening had episodes of hemoptysis with clotted blood <1 tbsp. CT chest with contrast on admission 5/12 with no PE or worrisome finding in the chest and upper abdomen. Concern reported hemoptysis may be hematemesis with possible recurrent upper GI bleed. Last EGD 3/9/23 with endoscopic treatment of duodenal/stomach/Jejunum AVMs. Patient denies hematochezia or melena. Denies SOB, dyspnea, lightheadedness, dizziness. PTA patient does monthly octreotide injections outpatient to prevent bleeding. Baseline hgb 7-9, hgb 10 on admission, down trended to 8.6 this AM.   -Stat CBC, INR, PTT   -Start IV Protonix   -Stat CXR   -Discussed with GI, plan for GI consult, okay for AM with possible EGD tomorrow. Agree with IV Protonix, okay to home on IV octreotide this evening.   -GI consult, routine unless increased frequency or amount of blood clots then would again discuss with GI tonight/overnight   -Npo at midnight for possible EGD in AM.   -If ongoing hemoptysis and EGD without acute findings, would consider pulmonary consult for possible bronchoscopy    ADDENDUM:   CBC stable at 8.9, INR 1.38.   CXR with no focal consolidations     Case was discussed with attending, Dr. Brandon.       Objective:  BP (!) 156/58 (BP Location: Left arm, Patient Position: Semi-Howard's, Cuff Size: Adult Regular)    "Pulse 67   Temp 98.6  F (37  C) (Oral)   Resp 16   Ht 1.626 m (5' 4\")   Wt 57.2 kg (126 lb 1.7 oz)   SpO2 98%   BMI 21.65 kg/m      Vitals signs reviewed and noted    GENERAL: Alert and oriented x 3. NAD.   HEENT: Anicteric sclera. Mucous membranes moist.   CV: RRR. S1, S2. 3/6 murmurs appreciated.   RESPIRATORY: Effort normal on RA. Lungs CTAB with no wheezing, rales, rhonchi.   GI: Abdomen soft and non distended with normoactive bowel sounds present in all quadrants. No tenderness, rebound, guarding.   NEUROLOGICAL: No focal deficits. Moves all extremities.      Pertinent labs and procedures were reviewed.     Subjective:   Patient reports recurrent episodes of hemoptysis for the last several weeks. Reports she has been coughing up blood clots and bright red blood about two times per day. Today, reports coughing up blood clots which is more than she has been coughing up at home. Denies chest pain, SOB, dyspnea, epistaxis, abdominal pain, melena, hematochezia.     Senia Garcia PA-C  [facility]  Securely message with the Vocera Web Console (learn more here)  Text page via [employer] Paging/Directory      "

## 2023-05-14 NOTE — PROGRESS NOTES
Nephrology attending    S: Pt reports persistent weakness. Now also with hemoptysis. Denies N/V/F/C. 4pt ROS otherwise negative.    O:   156/68   98% on RA  Gen - nad  HENT - neck supple  CV - rrr, no rub  Resp - cta bilaterally  Abd - BS+, NT/ND  Ext - trace edema    Labs noted  Medications reviewed    A/Rec: 84yo F with ESKD admitted with weakness and now with hemoptysis.  ESKD - no acute indication for RRT   - plan for HD Monday with meds per outpatient Rx   - appears close to euvolemic, no plans to alter dry weight    Hemoptysis - primary team aware and evaluating.    Anemia - 7k epo with each HD    Eleuterio Martins MD  [phone]

## 2023-05-15 ENCOUNTER — APPOINTMENT (OUTPATIENT)
Dept: PHYSICAL THERAPY | Facility: CLINIC | Age: 83
DRG: 091 | End: 2023-05-15
Payer: MEDICARE

## 2023-05-15 LAB
ALBUMIN SERPL BCG-MCNC: 3.6 G/DL (ref 3.5–5.2)
ALP SERPL-CCNC: 180 U/L (ref 35–104)
ALT SERPL W P-5'-P-CCNC: 19 U/L (ref 10–35)
ANION GAP SERPL CALCULATED.3IONS-SCNC: 21 MMOL/L (ref 7–15)
AST SERPL W P-5'-P-CCNC: 28 U/L (ref 10–35)
BILIRUB SERPL-MCNC: 0.4 MG/DL
BUN SERPL-MCNC: 87.4 MG/DL (ref 8–23)
CALCIUM SERPL-MCNC: 9.2 MG/DL (ref 8.8–10.2)
CHLORIDE SERPL-SCNC: 97 MMOL/L (ref 98–107)
CREAT SERPL-MCNC: 11.43 MG/DL (ref 0.51–0.95)
CRP SERPL-MCNC: 62.84 MG/L
DEPRECATED CALCIDIOL+CALCIFEROL SERPL-MC: 24 UG/L (ref 20–75)
DEPRECATED HCO3 PLAS-SCNC: 22 MMOL/L (ref 22–29)
ERYTHROCYTE [DISTWIDTH] IN BLOOD BY AUTOMATED COUNT: 15.5 % (ref 10–15)
GFR SERPL CREATININE-BSD FRML MDRD: 3 ML/MIN/1.73M2
GLUCOSE SERPL-MCNC: 121 MG/DL (ref 70–99)
HBV SURFACE AB SERPL IA-ACNC: 5.92 M[IU]/ML
HBV SURFACE AB SERPL IA-ACNC: NONREACTIVE M[IU]/ML
HBV SURFACE AG SERPL QL IA: NONREACTIVE
HCT VFR BLD AUTO: 27.4 % (ref 35–47)
HGB BLD-MCNC: 8.7 G/DL (ref 11.7–15.7)
MCH RBC QN AUTO: 30.6 PG (ref 26.5–33)
MCHC RBC AUTO-ENTMCNC: 31.8 G/DL (ref 31.5–36.5)
MCV RBC AUTO: 97 FL (ref 78–100)
PLATELET # BLD AUTO: 373 10E3/UL (ref 150–450)
POTASSIUM SERPL-SCNC: 5 MMOL/L (ref 3.4–5.3)
PROT SERPL-MCNC: 7.4 G/DL (ref 6.4–8.3)
RBC # BLD AUTO: 2.84 10E6/UL (ref 3.8–5.2)
SODIUM SERPL-SCNC: 140 MMOL/L (ref 136–145)
WBC # BLD AUTO: 10.5 10E3/UL (ref 4–11)

## 2023-05-15 PROCEDURE — 99233 SBSQ HOSP IP/OBS HIGH 50: CPT

## 2023-05-15 PROCEDURE — 86481 TB AG RESPONSE T-CELL SUSP: CPT | Performed by: STUDENT IN AN ORGANIZED HEALTH CARE EDUCATION/TRAINING PROGRAM

## 2023-05-15 PROCEDURE — 250N000011 HC RX IP 250 OP 636: Performed by: PHYSICIAN ASSISTANT

## 2023-05-15 PROCEDURE — 250N000013 HC RX MED GY IP 250 OP 250 PS 637: Performed by: PEDIATRICS

## 2023-05-15 PROCEDURE — 258N000003 HC RX IP 258 OP 636: Performed by: INTERNAL MEDICINE

## 2023-05-15 PROCEDURE — 97110 THERAPEUTIC EXERCISES: CPT | Mod: GP

## 2023-05-15 PROCEDURE — 99207 PR CDG-CUT & PASTE-POTENTIAL IMPACT ON LEVEL: CPT | Performed by: STUDENT IN AN ORGANIZED HEALTH CARE EDUCATION/TRAINING PROGRAM

## 2023-05-15 PROCEDURE — 5A1D70Z PERFORMANCE OF URINARY FILTRATION, INTERMITTENT, LESS THAN 6 HOURS PER DAY: ICD-10-PCS | Performed by: INTERNAL MEDICINE

## 2023-05-15 PROCEDURE — 86140 C-REACTIVE PROTEIN: CPT | Performed by: STUDENT IN AN ORGANIZED HEALTH CARE EDUCATION/TRAINING PROGRAM

## 2023-05-15 PROCEDURE — 99222 1ST HOSP IP/OBS MODERATE 55: CPT | Mod: GC | Performed by: INTERNAL MEDICINE

## 2023-05-15 PROCEDURE — 85027 COMPLETE CBC AUTOMATED: CPT | Performed by: STUDENT IN AN ORGANIZED HEALTH CARE EDUCATION/TRAINING PROGRAM

## 2023-05-15 PROCEDURE — 97530 THERAPEUTIC ACTIVITIES: CPT | Mod: GP

## 2023-05-15 PROCEDURE — 120N000002 HC R&B MED SURG/OB UMMC

## 2023-05-15 PROCEDURE — 634N000001 HC RX 634: Performed by: INTERNAL MEDICINE

## 2023-05-15 PROCEDURE — 80053 COMPREHEN METABOLIC PANEL: CPT | Performed by: STUDENT IN AN ORGANIZED HEALTH CARE EDUCATION/TRAINING PROGRAM

## 2023-05-15 PROCEDURE — C9113 INJ PANTOPRAZOLE SODIUM, VIA: HCPCS | Performed by: PHYSICIAN ASSISTANT

## 2023-05-15 PROCEDURE — 86706 HEP B SURFACE ANTIBODY: CPT | Performed by: INTERNAL MEDICINE

## 2023-05-15 PROCEDURE — 36415 COLL VENOUS BLD VENIPUNCTURE: CPT | Performed by: STUDENT IN AN ORGANIZED HEALTH CARE EDUCATION/TRAINING PROGRAM

## 2023-05-15 PROCEDURE — 90937 HEMODIALYSIS REPEATED EVAL: CPT

## 2023-05-15 PROCEDURE — 99233 SBSQ HOSP IP/OBS HIGH 50: CPT | Performed by: STUDENT IN AN ORGANIZED HEALTH CARE EDUCATION/TRAINING PROGRAM

## 2023-05-15 PROCEDURE — 250N000013 HC RX MED GY IP 250 OP 250 PS 637: Performed by: STUDENT IN AN ORGANIZED HEALTH CARE EDUCATION/TRAINING PROGRAM

## 2023-05-15 PROCEDURE — 87340 HEPATITIS B SURFACE AG IA: CPT | Performed by: INTERNAL MEDICINE

## 2023-05-15 RX ORDER — POLYETHYLENE GLYCOL 3350 17 G/17G
17 POWDER, FOR SOLUTION ORAL 2 TIMES DAILY
Status: DISCONTINUED | OUTPATIENT
Start: 2023-05-15 | End: 2023-05-20

## 2023-05-15 RX ORDER — OXYCODONE HYDROCHLORIDE 5 MG/1
5 TABLET ORAL DAILY PRN
Status: DISCONTINUED | OUTPATIENT
Start: 2023-05-15 | End: 2023-05-17

## 2023-05-15 RX ADMIN — Medication 1 TABLET: at 18:05

## 2023-05-15 RX ADMIN — PANTOPRAZOLE SODIUM 40 MG: 40 INJECTION, POWDER, FOR SOLUTION INTRAVENOUS at 20:02

## 2023-05-15 RX ADMIN — GABAPENTIN 300 MG: 300 CAPSULE ORAL at 21:34

## 2023-05-15 RX ADMIN — SERTRALINE HYDROCHLORIDE 100 MG: 100 TABLET ORAL at 18:05

## 2023-05-15 RX ADMIN — SODIUM CHLORIDE 300 ML: 9 INJECTION, SOLUTION INTRAVENOUS at 13:53

## 2023-05-15 RX ADMIN — PANTOPRAZOLE SODIUM 40 MG: 40 INJECTION, POWDER, FOR SOLUTION INTRAVENOUS at 10:55

## 2023-05-15 RX ADMIN — EPOETIN ALFA-EPBX 7000 UNITS: 10000 INJECTION, SOLUTION INTRAVENOUS; SUBCUTANEOUS at 16:17

## 2023-05-15 RX ADMIN — LOSARTAN POTASSIUM 50 MG: 50 TABLET, FILM COATED ORAL at 18:05

## 2023-05-15 RX ADMIN — ACETAMINOPHEN 650 MG: 325 TABLET ORAL at 00:05

## 2023-05-15 RX ADMIN — SODIUM CHLORIDE 250 ML: 9 INJECTION, SOLUTION INTRAVENOUS at 13:53

## 2023-05-15 RX ADMIN — Medication: at 13:53

## 2023-05-15 RX ADMIN — ATORVASTATIN CALCIUM 20 MG: 20 TABLET, FILM COATED ORAL at 20:02

## 2023-05-15 RX ADMIN — POLYETHYLENE GLYCOL 3350 17 G: 17 POWDER, FOR SOLUTION ORAL at 20:02

## 2023-05-15 RX ADMIN — CALCIUM ACETATE 1334 MG: 667 CAPSULE ORAL at 18:05

## 2023-05-15 RX ADMIN — OXYCODONE HYDROCHLORIDE 5 MG: 5 TABLET ORAL at 06:36

## 2023-05-15 ASSESSMENT — ACTIVITIES OF DAILY LIVING (ADL)
ADLS_ACUITY_SCORE: 28
ADLS_ACUITY_SCORE: 28
ADLS_ACUITY_SCORE: 32
ADLS_ACUITY_SCORE: 28
DEPENDENT_IADLS:: CLEANING;COOKING;TRANSPORTATION
ADLS_ACUITY_SCORE: 32
ADLS_ACUITY_SCORE: 32
ADLS_ACUITY_SCORE: 28

## 2023-05-15 NOTE — PLAN OF CARE
[id number]    Pt alert and oriented X 4. VSS, on RA. Infrequent cough with blood and sputum. Was seen by GI Dr. Pt stated she had a BM yesterday which she reported was green; rectal exam completed by MD. Able to make needs known and use call light appropriately. Denied SOB , chest pain, headaches and dizziness. C/o mild pain and discomfort, heating pad in place. Pt able to turn and repositioned self. Up at bedside with therapy this shift. Had hemodialysis at bedside this shift, 1 L fluid removed. NPO at breakfast, poor intake noted at lunch, did not eat dinner;stated that she's wasn't really hungry. No acute changes noted this shift. Will continue to monitor and follow POC.     Vivian Villar RN

## 2023-05-15 NOTE — PLAN OF CARE
Goal Outcome Evaluation: [id number].  Outcome Evaluation: Patient is alert and orientedx3, able to make needs known, denied chest pain, denied SOB, N/V. Generalized pain-utelizing scheduled and prn pain meds. Patient is continent of both B/B, LBM- 5/13, AX1 with walker and GB, LPIV-SL. Patient to be NPO as from midnight d/t a procedure scheduled tomorrow 5/15.Patient denied staright cath to obtain urine sample. No acute event during shift, nursing will continue to monitor..      Plan of Care Reviewed With: patient    Overall Patient Progress: no changeOverall Patient Progress: no change

## 2023-05-15 NOTE — PROGRESS NOTES
HEMODIALYSIS TREATMENT NOTE    Date: 5/15/2023  Time: 5:52 PM    Data:  Pre Wt:   59.4kg  Desired Wt:   58.4 kg   Post Wt:  58.4kg (calculated post dialysis)  Weight change:   -1kg  Ultrafiltration - Post Run Net Total Removed (mL): 1000 mL  Vascular Access Status: Graft, patent, cannulated with 15g needles, achieved hemostasis within 10 minutes  Dialyzer Rinse: Streaked, Light  Total Blood Volume Processed: 70.2 L   Total Dialysis (Treatment) Time: 3.5 hours   Dialysate Bath: K 2, Ca 2.5  Heparin: None    Lab:   Hepatitis B antigen and antibodies    Interventions/Assessment:  Patient reported SOB prior to treatment start, but denied any CP or NVD. Patient had episode of bloody sputum x1 while on treatment. Was seen by pulmonology during run. UF goal set for 1L per MD order and achieved without issue. Patient started and ended treatment with hypertension per her baseline, but did have improved SBP during treatment. Patient sleepy during treatment and seemed a bit confused, needing a few reminders to not move arm and leave chux pad. Patient alert and stable post treatment. Report given to CINDY Park.      Plan:    Per Renal.

## 2023-05-15 NOTE — CONSULTS
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 Yesterday evening pt developed hemoptysis, but primary team is concerned that this could be hematemesis manifesting as hemoptysis and have requested GI evaluation.     Pt has no evidence of any overt GI bleeding. This is in contrast to previous episodes of GI bleeding.  where she had melena or heamtochezia. Hb/Hct is at near normal range. She is describing blood  Tinged sputum rather than bloody vomitus. Overall, low suspicion for upper GI bleeding at this point.      Recommendations  -- No indication for any further GI work up at this point.   -- Evaluation for hemoptysis as per primary (pt does have a history of smoking in the past)   -- Inpatient GI team will sign off from actively rounding on this patient, please call us if there is any evidence of overt Gi bleeding       Gastroenterology outpatient follow up recommendations: Non urgent follow up with Dr. Baires at the time of discharge.     Thank you for involving us in this patient's care. Please do not hesitate to contact the GI service with any questions or concerns.     Pt care plan discussed with Dr. Levine, GI staff physician.    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 Yesterday evening pt developed hemoptysis, but primary team is concerned that this could be hematemesis manifesting as hemoptysis and have requested GI evaluation.     Pt clearly reports that she never vomited blood but instead is spitting it out every time she coughs. She added that this happended for the first time with the first episode being yesterday.     She is known to have small bowel AV malformations dating back to 2014, she has experienced melena or bright red bleeding per rectum whenever there was active bleeding from these lesions. She ha few procedures in 2019 to address these AV malformations and has been on monthly octreotide infections since 2019 and has done relatively well until Jan 2023.     In Jan 2023, she was admitted to [facility] with symptomatic anemia, Hb was 5.6, she was evaluated by [facility], underwent EGD which was negative for any active source of bleeding. Following this, VCE was ordered by her primary gastroenterologist  in 2/2023 and this revealed actively bleeding small bowel AV malformations. On 3/9/23 she underwent device assisted upper endoscopy and APC was performed on AVMs in the stomach, duodenum and jejunum.     Pt reports that since this last procedure, she hasn't noticed any dark stool or bright red blood per stool. She also denied any abd pain/nausea or vomiting. She reports being complaint with monthly octreotide injections.          Data:   Key relevant labs:     Key relevant imaging:           Previous Endoscopy:   EGD 3/9/2023  Impression:            - Normal esophagus.                          - A single non-bleeding angioectasia in the stomach.                           Treated with argon plasma coagulation (APC).                          - Multiple bleeding angioectasias in the duodenum.                          Treated with argon plasma coagulation (APC).                          - Multiple non-bleeding angioectasias in the jejunum.                          Treated with argon plasma coagulation (APC).                          - A tattoo was seen in the jejunum. The tattoo site                          marks the depth of maximum advancement of the scope on                          a previous exam. Able to advance ~20 cm further and                          treat an additional angioectasia.     VCE 2/9/2023    Impression:            - Gastric polyp(s).                          - A single angioectasia without bleeding in the small                          bowel.                          - Duodenal lymphangiectasia.                          - A single mucosal spot with no bleeding in the small                          bowel.                          - A single mucosal spot with no bleeding in the small                          bowel.                          - A single angioectasia without bleeding in the small                          bowel.          Medications:     Medications Prior to Admission   Medication Sig Dispense Refill Last Dose     amLODIPine (NORVASC) 5 MG tablet Take 5 mg by mouth daily   Past Week     atorvastatin (LIPITOR) 20 MG tablet TAKE 1 TABLET BY MOUTH EVERY DAY (Patient taking differently: every evening) 90 tablet 2 Past Week     Calcium Acetate, Phos Binder, 667 MG CAPS TAKE 2 CAPSULE BY MOUTH THREE TIMES A DAY WITH MEALS  8 Past Week     gabapentin (NEURONTIN) 300 MG capsule Take 1 capsule (300 mg) by mouth At Bedtime 90 capsule 3 Past Week     losartan (COZAAR) 50 MG tablet Take 100 mg by mouth daily   Past Week     Multiple Vitamins-Minerals (PRORENAL + D) TABS Take 1 tablet by mouth daily   Past Week at pt supplied     octreotide (SANDOSTATIN LAR) 20 MG  "injection Inject 20 mg into the muscle every 28 days   4/25/2023     pantoprazole (PROTONIX) 20 MG EC tablet Take 1 tablet (20 mg) by mouth 2 times daily (before meals) *take 30-60 minutes before 180 tablet 3 Past Week     polyethylene glycol (MIRALAX) 17 GM/Dose powder Take 17 g by mouth daily as needed As needed   Unknown     sertraline (ZOLOFT) 50 MG tablet Take 2 tablets (100 mg) by mouth daily 180 tablet 3 Past Week             Physical Exam:   BP (!) 163/66 (BP Location: Left arm, Patient Position: Semi-Howard's, Cuff Size: Adult Regular)   Pulse 80   Temp 99.1  F (37.3  C) (Oral)   Resp 16   Ht 1.626 m (5' 4\")   Wt 59.4 kg (130 lb 15.3 oz)   SpO2 96%   BMI 22.48 kg/m    Wt:   Wt Readings from Last 2 Encounters:   05/15/23 59.4 kg (130 lb 15.3 oz)   03/09/23 63 kg (138 lb 14.2 oz)      Constitutional: cooperative, pleasant, not dyspneic/diaphoretic, no acute distress  Eyes: Sclera anicteric/injected  Ears/nose/mouth/throat:hearing intact  CV: No edema  Respiratory: Unlabored breathing  Abd: Soft, non tender   Per rectal (done with her RN as chaperone): internal hemorrhoids, rectal vault empty, no blood or melena   Skin: warm, perfused, no jaundice  Neuro: AAO x 3  Psych: Normal affect  MSK: No gross deformities          Past Medical History:   Reviewed and edited as appropriate  Past Medical History:   Diagnosis Date     Anemia      Anxiety and depression      Arthritis      AVM (arteriovenous malformation)      Chronic hepatitis C with cirrhosis (H)      Clotting disorder (H)      ESRD (end stage renal disease) (H)     on dialysis     GI bleed     recurrent     Glaucoma      Hyperlipidemia      Hypertension goal BP (blood pressure) < 140/80             Past Surgical History:   Reviewed and edited as appropriate   Past Surgical History:   Procedure Laterality Date     CAPSULE/PILL CAM ENDOSCOPY N/A 3/27/2019    Procedure: Capsule/pill cam endoscopy;  Surgeon: Yosvany Ram MD;  Location: [facility]     " CAPSULE/PILL CAM ENDOSCOPY N/A 2/2/2023    Procedure: IMAGING PROCEDURE, GI TRACT, INTRALUMINAL, VIA CAPSULE;  Surgeon: Mulu Nur DO;  Location: [facility]     COLONOSCOPY N/A 9/4/2015    Procedure: COMBINED COLONOSCOPY, SINGLE OR MULTIPLE BIOPSY/POLYPECTOMY BY BIOPSY;  Surgeon: Rupesh Lopez MD;  Location: [facility]     COLONOSCOPY N/A 9/19/2018    Procedure: COLONOSCOPY;  enteroscopy small bowel  COLONOSCOPY;  Surgeon: Ankit Baires MD;  Location: [facility]     ENTEROSCOPY SMALL BOWEL N/A 3/9/2023    Procedure: antegrade single balloon enteroscopy with argon plasma coagulation of arteriovenous malformations;  Surgeon: Ankit Baires MD;  Location: [facility]     ESOPHAGOSCOPY, GASTROSCOPY, DUODENOSCOPY (EGD), COMBINED N/A 12/18/2014    Procedure: COMBINED ESOPHAGOSCOPY, GASTROSCOPY, DUODENOSCOPY (EGD);  Surgeon: Betsy Carvajal MD;  Location: [facility]     ESOPHAGOSCOPY, GASTROSCOPY, DUODENOSCOPY (EGD), COMBINED N/A 4/25/2015    Procedure: COMBINED ESOPHAGOSCOPY, GASTROSCOPY, DUODENOSCOPY (EGD);  Surgeon: Yosvany Ram MD;  Location: [facility]     ESOPHAGOSCOPY, GASTROSCOPY, DUODENOSCOPY (EGD), COMBINED N/A 5/5/2015    Procedure: COMBINED ESOPHAGOSCOPY, GASTROSCOPY, DUODENOSCOPY (EGD);  Surgeon: Mariano Mistry MD;  Location:  GI     HC CAPSULE ENDOSCOPY N/A 9/30/2015    Procedure: CAPSULE/PILL CAM ENDOSCOPY;  Surgeon: Pan Dhaliwal MD;  Location: [facility]     HC VASCULAR SURGERY PROCEDURE UNLIST       HYSTERECTOMY  1980    KYREE     LUMPECTOMY BREAST              Social History:   Smoking history: Former smoker, quit 25 years ago           Family History:   Reviewed and edited as appropriate  Family History   Problem Relation Age of Onset     Diabetes Mother      Alzheimer Disease Mother      Substance Abuse Son      Cancer Sister      Soft Tissue Cancer Sister      Breast Cancer Sister      Hyperlipidemia Daughter      Alcoholism Brother      Spine Problems Sister             Allergies:   Reviewed  and edited as appropriate     Allergies   Allergen Reactions     Abacavir Itching     Lisinopril Cough     Dust Mites      Hydrochlorothiazide Itching     Severe       No Clinical Screening - See Comments      History of blood transfusion reactions and pre-treats with Benadryl.      Spironolactone Nausea     Sulfa Antibiotics Hives     Valsartan Itching     Valsartan-Hydrochlorothiazide Itching     severe              Review of Systems:     A complete 10 point review of systems was performed and is negative except as noted in the HPI

## 2023-05-15 NOTE — PROGRESS NOTES
Nephrology Progress Note  05/15/2023     Ms Martínez is an 83 year old female with a history of ESRD (on HD MW), HTN, PVD, anemia 2/2 ESRD and recurrent GI bleeds (2/2 AVMs), and chronic hep C with compensated cirrhosis admitted on 5/12/2023 for weakness. She had acute-onset weakness approximately 1 week ago that involves her bilateral lower extremities that is associated with low back pain. She also reports abdominal pain, hemoptysis and diarrhea    Assessment & Recommendations:     1. ESRD - Patient receives OP HD at [facility], under the care of Dr Tarango.    - HD orders: TW 60.5 kg, RUE AVG, EPO 7000 U IV q run, Calcitriol 0.75 mcg po q run   - Continue [facility] schedule while inpatient   - Avoid venipuncture/blood pressures right arm   - No heparin given hemoptysis    2. Volume status - No edema/dyspnea/hypoxia. TW 60.5 kg. Wt today 59.4 kg . Pre run b/ps 150-160's/. Anuric.    - UF goal today is 1 kg   - Please stand for pre run weights   - Continue I/O. Should be on a 1.5 liter fluid restriction/day    3. HTN - Pre run b/ps 150-160's/ but c/o abdominal pain. No edema. CXR unremarkable. Currently on Losartan 50 mg every day.    - Will continue current Losartan dose for now given pre run K of 5.0    4. Electrolytes - Pre run K 5.0 Na 140    5. Acid base - Pre run bicarb 22    6 BMD - Ca 9.2, albumin 3.6 Phos 3.7   - Continue Phoslo 1334 mg TID w/meals    7. Anemia, h/o recurrent GIB 2/2 AVMs - Hgb 8.7   - On IV PPI   - May be having EGD today   - Doses with Octreotide q 28 days in OP setting   - Continue Epo 7000 unit(s) IV q run    Recommendations were communicated to primary team via progress note    Steph Donald, NP   Division of Renal Disease and Hypertension  [facility]  alek [facility] Web Console    Interval History :   Nursing and provider notes from last 24 hours reviewed.  Scheduled for routine HD today    Review of Systems:   I reviewed the following systems:  GI: Patient reports abdominal  "pain and diarrhea  Neuro: alert  Constitutional:  no fever or chills  CV: denies dyspnea, CP or edema.    RESP: Continues with hemoptysis  : Anuric    Physical Exam:   No intake/output data recorded.   BP (!) 176/75   Pulse 70   Temp 98.7  F (37.1  C) (Axillary)   Resp 16   Ht 1.626 m (5' 4\")   Wt 59.4 kg (130 lb 15.3 oz)   SpO2 96%   BMI 22.48 kg/m       GENERAL APPEARANCE: Pleasant female in NAD  EYES:  No scleral icterus, pupils equal  PULM: lungs CTA. Breathing is non labored. Not on supplemental oxygen   CV: RRR     -edema none   GI: soft, NT. Non distended   INTEGUMENT: no cyanosis  NEURO:  Alert/interactive  : Anuric   Access : JOSE RAMON AVG    Labs:   All labs reviewed by me  Electrolytes/Renal - Recent Labs   Lab Test 05/15/23  0732 05/14/23  0741 05/13/23  0645 05/12/23  1636 11/21/19  1044 03/28/19  0456 09/16/18  0501 09/14/18  1159 03/13/16  0446 03/12/16  0552    138 137 138   < > 136   < > 139   < > 143   POTASSIUM 5.0 4.8 4.2 3.4   < > 3.8   < > 4.7   < > 3.6   CHLORIDE 97* 96* 96* 95*   < > 99   < > 99   < > 116*   CO2 22 23 25 22   < > 28   < > 29   < > 19*   BUN 87.4* 72.7* 51.5* 34.4*   < > 14   < > 48*   < > 34*   CR 11.43* 9.47* 7.39* 5.77*   < > 4.30*   < > 8.26*   < > 2.76*   * 98 117* 123*   < > 102*   < > 121*   < > 104*   BELGICA 9.2 9.0 9.2 9.2   < > 8.7   < > 8.2*   < > 7.9*   MAG  --   --   --  2.2  --   --   --   --   --  1.7   PHOS  --   --   --  3.7  --  2.9  --  3.6  --  3.0    < > = values in this interval not displayed.       CBC -   Recent Labs   Lab Test 05/15/23  0732 05/14/23  1747 05/14/23  0741   WBC 10.5 10.0 10.1   HGB 8.7* 8.9* 8.6*    324 303       LFTs -   Recent Labs   Lab Test 05/15/23  0732 05/14/23  0741 05/12/23  1636   ALKPHOS 180* 159* 217*   BILITOTAL 0.4 0.4 0.7   ALT 19 22 34   AST 28 33 80*   PROTTOTAL 7.4 7.1 8.3   ALBUMIN 3.6 3.5 4.1       Iron Panel -   Recent Labs   Lab Test 05/14/23  0741 01/26/23  1450 01/27/22  1304 10/24/18  1506 "   IRON 31* 34*  --  241*   IRONSAT 18 13*  --  57*   KSASI 436*  --    < > 176    < > = values in this interval not displayed.         Imaging:    EXAM: XR CHEST 1 VIEW  5/14/2023 7:10 PM      HISTORY:  hemoptysis        COMPARISON:  CT, 5/12/2023     FINDINGS:   AP upright view of the chest.     Trachea is midline. Cardiomediastinal silhouette and pulmonary  vasculature are within normal limits. No focal airspace opacity,  pleural effusion or appreciable pneumothorax. Atherosclerotic  calcification of the thoracic aorta.     No acute osseous abnormality. Visualized upper abdomen is  unremarkable.                                                                        IMPRESSION: No focal consolidation     I have personally reviewed the examination and initial interpretation  and I agree with the findings.     SUZAN CESAR MD     Current Medications:    atorvastatin  20 mg Oral QPM     calcium acetate  1,334 mg Oral TID w/meals     epoetin christofer-epbx  7,000 Units Intravenous Once in dialysis/CRRT     gabapentin  300 mg Oral At Bedtime     losartan  50 mg Oral Daily     multivitamin RENAL  1 tablet Oral Daily     pantoprazole  40 mg Intravenous BID     polyethylene glycol  17 g Oral BID     sertraline  100 mg Oral Daily     sodium chloride (PF)  3 mL Intracatheter Q8H       - MEDICATION INSTRUCTIONS -       Steph Donald, EDISON

## 2023-05-15 NOTE — PLAN OF CARE
Goal Outcome Evaluation:  [id number]    Patient is A&O x4. Call light within reach, able to make needs known effectively. Needs assistance x1 with gait belt and walker.    RA. NPO except meds. PIV on L, SL, intact and patent. Fistula on R for dialysis. LBM: 05/13. Complaints of pain rated as 8/10 managed with PRN Tylenol and Oxy. Denies SOB, chest pain, n/v and n/t.    Frequent checks done, no acute events.

## 2023-05-15 NOTE — CONSULTS
"[facility]   Pulmonary   Consult Note 5/15/2023  Rachele Martínez MRN: [id number]    We were consulted for evaluation of \"Intermittent hemoptysis. CT chest neg. On RA with stable Hb. Has hx of GI bleed. GI consulted and not concerned about a GI bleed.\"     Assessment & Plan      Small volume hemoptysis for 1 week  Sore throat for 1 week  Chronic Anemia of ESRD  Normal chest CTA (no parenchymal abnormalities, no PE)  Hx of GI AVMs seen on prior endoscopy  Chronic Hep C w/ Compensated Cirrhosis  ESRD on HD  HTN    The etiology of the patient's recent report of small-volume hemoptysis is unclear at this time.  However, we are reassured by the description as it is only streaking of small amounts of blood and clear mucus and she has no respiratory symptoms otherwise.  No evidence of jamie liquid blood on history.  We are also reassured by her normal chest CT without parenchymal infiltrate or evidence of contrast extravasation given this is a CTA. It is possible that she has a small amount of blood in her sputum due to intermittent cough in the setting of 1 week of sore throat and ESRD/platelet dysfunction. Pulmonary embolus ruled out on CT.  No evidence of pneumonia, pulmonary abscess, bronchiectasis, suspicious nodule/mass consistent with malignancy, or CHF based on history. Given this information there is no indication for bronchoscopy unless the patient has high volume (>2 ounces) of liquid blood, which generally speaking patients with high-volume hemoptysis ultimately will need intubation and ICU care and present with significant respiratory distress which is not the case at this time.    - Consider work up of one week of sore throat (RVP, flu/covid/rsv)  - No indication for sputum culture or abx at this time  - No indication for bronchoscopy    Patient seen & discussed w/  Dr. Simmons, who agrees with the above assessment and plan.    Margarito Messer MD  Pulmonary and Critical Care Medicine " Fellow  5/15/2023          History of Present Illness:   83 yof hx of ESRD on HD MWF, HTN, PVD, anemia of ESRD, recurrent GI bleeding 2/2 AVMs, and crhonic hep C with compensated cirrhosis admit 5/12/23 w/ 1 week of weakness. H&P reviewed and notable for acute onset weakness for 1 week including B/L LE, associated with low back pain. Some concern for medication induced hypotension, narcotic related side effects, statin induced myopathy, and overall physical deconditioning in the setting of advanced age and multiple medical comorbidities.     Today the patient reports that she has been having small-volume hemoptysis described as blood clot/streaking in clear mucus and denies any jamie blood coming up when she coughs.  She says that it is always streaks/small clots of blood and has never been liquid or in the amount of 1 to 2 ounces.  She denies any epistaxis or sinus infections or nasal drainage.  She also reports a history of 1 week sore throat of unclear etiology.  She denies any chest pain, shortness of breath, wheezing, fever, chills, night sweats, or other respiratory symptoms at this time.  She does report a history of smoking cigarettes but says she quit over 30 years ago and also has a history of smoking marijuana however denies any recent use or similar symptoms when smoking cigarettes or marijuana.  She denies any recent pulmonary infection/pneumonia or colds.  She denies any longstanding history of coughing up blood.  She denies any other symptoms at this time.  She is currently on her dialysis run without any issues.  When asked to see if we could see her hemoptysis she does not currently have a sample available to see is unable to produce a sample for us.    - Afebrile, /70s, satting 96% on RA   - CRP 62, WBC 10 (10, 9, 11), Hgb 8.7 (8.9, 8.6, 9.3, 10), dimer 4.44, INR 1.29, plt 373, no infectious testing on file  - CTA 5/12/2023 w/o pulmonary embolism or other abnormalities within the lungs           Review of Symptoms:   10-point ROS reviewed, & found negative w/ exceptions noted in the HPI.          Past Medical History:     Past Medical History:   Diagnosis Date     Anemia      Anxiety and depression      Arthritis      AVM (arteriovenous malformation)      Chronic hepatitis C with cirrhosis (H)      Clotting disorder (H)      ESRD (end stage renal disease) (H)     on dialysis     GI bleed     recurrent     Glaucoma      Hyperlipidemia      Hypertension goal BP (blood pressure) < 140/80        Past Surgical History:   Procedure Laterality Date     CAPSULE/PILL CAM ENDOSCOPY N/A 3/27/2019    Procedure: Capsule/pill cam endoscopy;  Surgeon: Yosvany Ram MD;  Location: [facility]     CAPSULE/PILL CAM ENDOSCOPY N/A 2/2/2023    Procedure: IMAGING PROCEDURE, GI TRACT, INTRALUMINAL, VIA CAPSULE;  Surgeon: Mulu Nur DO;  Location: [facility]     COLONOSCOPY N/A 9/4/2015    Procedure: COMBINED COLONOSCOPY, SINGLE OR MULTIPLE BIOPSY/POLYPECTOMY BY BIOPSY;  Surgeon: Rupesh Lopez MD;  Location: [facility]     COLONOSCOPY N/A 9/19/2018    Procedure: COLONOSCOPY;  enteroscopy small bowel  COLONOSCOPY;  Surgeon: Ankit Baires MD;  Location: [facility]     ENTEROSCOPY SMALL BOWEL N/A 3/9/2023    Procedure: antegrade single balloon enteroscopy with argon plasma coagulation of arteriovenous malformations;  Surgeon: Ankit Baires MD;  Location: [facility]     ESOPHAGOSCOPY, GASTROSCOPY, DUODENOSCOPY (EGD), COMBINED N/A 12/18/2014    Procedure: COMBINED ESOPHAGOSCOPY, GASTROSCOPY, DUODENOSCOPY (EGD);  Surgeon: Betsy Carvajal MD;  Location: [facility]     ESOPHAGOSCOPY, GASTROSCOPY, DUODENOSCOPY (EGD), COMBINED N/A 4/25/2015    Procedure: COMBINED ESOPHAGOSCOPY, GASTROSCOPY, DUODENOSCOPY (EGD);  Surgeon: Yosvany Ram MD;  Location: [facility]     ESOPHAGOSCOPY, GASTROSCOPY, DUODENOSCOPY (EGD), COMBINED N/A 5/5/2015    Procedure: COMBINED ESOPHAGOSCOPY, GASTROSCOPY, DUODENOSCOPY (EGD);  Surgeon: Mariano Mistry MD;   Location:  GI      CAPSULE ENDOSCOPY N/A 9/30/2015    Procedure: CAPSULE/PILL CAM ENDOSCOPY;  Surgeon: Pan Dhaliwal MD;  Location:  GI      VASCULAR SURGERY PROCEDURE UNLIST       HYSTERECTOMY  1980    KYREE     LUMPECTOMY BREAST              Allergies:     Allergies   Allergen Reactions     Abacavir Itching     Lisinopril Cough     Dust Mites      Hydrochlorothiazide Itching     Severe       No Clinical Screening - See Comments      History of blood transfusion reactions and pre-treats with Benadryl.      Spironolactone Nausea     Sulfa Antibiotics Hives     Valsartan Itching     Valsartan-Hydrochlorothiazide Itching     severe             Outpatient Medications:     No current facility-administered medications on file prior to encounter.  amLODIPine (NORVASC) 5 MG tablet, Take 5 mg by mouth daily  atorvastatin (LIPITOR) 20 MG tablet, TAKE 1 TABLET BY MOUTH EVERY DAY (Patient taking differently: every evening)  Calcium Acetate, Phos Binder, 667 MG CAPS, TAKE 2 CAPSULE BY MOUTH THREE TIMES A DAY WITH MEALS  gabapentin (NEURONTIN) 300 MG capsule, Take 1 capsule (300 mg) by mouth At Bedtime  losartan (COZAAR) 50 MG tablet, Take 100 mg by mouth daily  Multiple Vitamins-Minerals (PRORENAL + D) TABS, Take 1 tablet by mouth daily  octreotide (SANDOSTATIN LAR) 20 MG injection, Inject 20 mg into the muscle every 28 days  pantoprazole (PROTONIX) 20 MG EC tablet, Take 1 tablet (20 mg) by mouth 2 times daily (before meals) *take 30-60 minutes before  polyethylene glycol (MIRALAX) 17 GM/Dose powder, Take 17 g by mouth daily as needed As needed  sertraline (ZOLOFT) 50 MG tablet, Take 2 tablets (100 mg) by mouth daily              Family History:     Family History   Problem Relation Age of Onset     Diabetes Mother      Alzheimer Disease Mother      Substance Abuse Son      Cancer Sister      Soft Tissue Cancer Sister      Breast Cancer Sister      Hyperlipidemia Daughter      Alcoholism Brother      Spine  "Problems Sister                Social History:     Social History     Tobacco Use     Smoking status: Former     Packs/day: 2.00     Years: 45.00     Pack years: 90.00     Types: Cigarettes     Start date: 1953     Quit date: 2002     Years since quittin.4     Smokeless tobacco: Never   Substance Use Topics     Alcohol use: No     Drug use: Yes     Types: Marijuana     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             Physical Exam:   /52 (BP Location: Left leg)   Pulse 73   Temp 99.1  F (37.3  C) (Oral)   Resp 16   Ht 1.626 m (5' 4\")   Wt 59.4 kg (130 lb 15.3 oz)   SpO2 96%   BMI 22.48 kg/m      General: no acute distress  HENT: external ears without visible abnormalities, no rhinorrhea, no epistaxis  Lungs: CTAB, no accessory muscle use  Heart: RRR  Abdomen: non-distended  Extremities: no clubbing or cyanosis  Skin: no visible rashes, no mottling  Neurologic: moving all 4 extremities spontaneously, awake and alert          Data:   Pertinent data reviewed and discussed above      "

## 2023-05-15 NOTE — PROGRESS NOTES
[facility]    Medicine Progress Note - Hospitalist Service, GOLD TEAM 21    Date of Admission:  5/12/2023    Assessment & Plan     83 year old female with a history of ESRD (on HD MWF), HTN, PVD, anemia 2/2 ESRD and recurrent GI bleeds (2/2 AVMs), and chronic hep C with compensated cirrhosis admitted on 5/12/2023 for weakness. She had acute-onset weakness approximately 1 week ago that involves her bilateral lower extremities that is associated with low back pain.        # Weakness  Strongly suspect that her weakness is multifactorial. She notes that her blood pressure medications were recently increased, and she has had lightheadedness and dizziness since that time. In addition, she recently had a GI bleed and has ongoing hemoptysis per her report. Her Hgb on presentation was 10, and Hgb has been 9-10 recently when she is not acutely bleeding. She does take a narcotic in the outpatient setting, and with her history of ESRD, this could be accumulating and causing weakness. Further, she is on a statin; this is not a new medication but statin-induced myopathy is possible. CT of her head and lumbar spine showed no acute process that explains her weakness. She does still make some urine and has had intermittent burning with urination. On chart review, it appears that her PCP Dr. Rodriguez has had some concerns about her ability to care for herself for some time and asked her to start considering assisted living due to some cognitive impairment. At discharge from the hospital in January, PT and OT also recommended home PT/OT services.   - Decreased losartan from 100 mg daily to 50 mg daily   - PT and OT consults placed. Likely will need placement.  - Wean opioid to once only when working with therapy  - Consider dose-reducing or discontinuing statin   - UA ordered collect when able  - Nutrition consult placed  - B12/Vit D and Iron screen  - Work up as below        # Chronic  anemia 2/2 renal disease  # Recent GI bleed (Jan 2023)  # Hemoptysis: Intermittent  # AVM of the small bowel, stomach, and duodenum   Baseline Hgb previously 7s-9s but recently 9-11. Last EGD on 3/9. Endoscopic treatment of duodenal AVMs in march 2023. Ongoing episodes of hemoptysis for at least the past month. CT chest this admission is neg for PE or other lung process   - IV PPI BID for now with GI consult. Pt takes Octreotide injections outpt to prevent bleeding.  - Will get GOLD test, Sputum Cx and CRP. Will touch base with pulm also as GI is not concerned about a GI bleed at this point. If not a lung source then likely pt will need ENT outpt     # ESRD on HD (MWF)  # Secondary hyperparathyroidism   Has been stable on dialysis for some time. Last run Friday 5/12.   - Neph consulted for dialysis  - Continue home phos binder     # Chronic lower extremity pain  # Lumbar spinal stenosis with neurogenic claudication   # PAD  Lower extremity ultrasounds showed peripheral artery disease. Pt has hx of PAD and on PAD rehab following with vascular surgery. She was previously on cilostasol, but this was not effective and increased her risk of bleeding. She was started on a small dose of percocet for severe pain prior to PT sessions. CT lumbar spine with acute issues.   - Tylenol and oxycodone PRNs home dose   - Continue home gabapentin 300 mg qhs  - Repeat FAISAL likely as outpt and follow up with vascular     # Chronic hepatitis C with compensated cirrhosis  Complicated by:  - No hx of Ascites  - Hx of GI bleed due to AVM, no history of varices  - No hx of HE  EGD: 8/14/2018, normal esophagus, bleeding angiodysplastic lesions in stomach and duodenum.  Last EGD on 3/9. Endoscopic treatment of duodenal/stomach/Jejunum AVMs   HCC: 5/17/2018, no liver lesions.   - Will add bowel regimen  - Follow up with GI outpt     # Depression  - Continue home sertraline 100 mg daily     # HTN  - Decreased home losartan as above     # HLD  -  Continuing home statin           # Anion Gap Metabolic Acidosis: Highest Anion Gap = 21 mmol/L in last 2 days, will monitor and treat as appropriate      # Hypertension: Noted on problem list       # Severe Malnutrition: based on nutrition assessment             Diet: Snacks/Supplements Adult: Nepro Oral Supplement; Between Meals  NPO per Anesthesia Guidelines for Procedure/Surgery Except for: Meds    DVT Prophylaxis: Heparin subcutaneous held due to bleeding. SCDs ordered   Rodriguez Catheter: Not present  Lines: None     Cardiac Monitoring: None  Code Status: No CPR- Pre-arrest intubation OK      Clinically Significant Risk Factors        Disposition Plan      Expected Discharge Date: 05/17/2023        Discharge Comments: Will need placement. Has GI bleed and will need to be evaluated by GI 1st          SIDNEY BERRIOS MD  Hospitalist Service, GOLD TEAM [address] [place] [address]  Securely message with [employer] (more info)  Text page via [employer] Paging/Directory   See signed in provider for up to date coverage information  ______________________________________________________________________    Interval History   More hemoptysis overnight. Pt continues to be HDS. IV PPI BID ordered. Hb is stable. GI consulted and not concerned for a GI bleed at this time. Will consult pulm and get labs above. Daughter and pt updated. Will reduce oxy to home dose which is with therapy only as pt is more sleepy today. Will add bowel regimen also.    Physical Exam   Vital Signs: Temp: 99.1  F (37.3  C) Temp src: Oral BP: (!) 163/66 Pulse: 80   Resp: 16 SpO2: 96 % O2 Device: None (Room air)    Weight: 126 lbs 1.65 oz    Lying in bed with no acute distress     Medical Decision Making       55 MINUTES SPENT BY ME on the date of service doing chart review, history, exam, documentation & further activities per the note.      Data   ------------------------- PAST 24 HR DATA REVIEWED  -----------------------------------------------

## 2023-05-15 NOTE — PLAN OF CARE
VS:       Pt A/O X 4. Afebrile. VSS.  Denies nausea, shortness of breath, and chest pain.     Output:     Continent of bowel and bladder   Activity:       Pt up w/ assist of 1 walker and GB     Pain:     Gneralized pain- meds given   Diet:       Pt is on a reg diet/ pills whole/ thin liquids     LDA:       PIV is patent in the left and SL     Equipment:     Walker. Belongings   Plan:       Pt is able to make needs known and the call light is within the pt's reach. Continue to monitor.       Additional Info:       Pt has been coughing up blood clots. Provider notified. Orders placed and followed. Pt is having procedure tomorrow, NPO after midnight.

## 2023-05-15 NOTE — CONSULTS
Care Management Initial Consult    General Information  Assessment completed with: Patient,    Type of CM/SW Visit: Initial Assessment    Primary Care Provider verified and updated as needed: Yes   Readmission within the last 30 days: no previous admission in last 30 days      Reason for Consult: discharge planning  Advance Care Planning: Advance Care Planning Reviewed: no concerns identified          Communication Assessment  Patient's communication style: spoken language (English or Bilingual)    Hearing Difficulty or Deaf: no   Wear Glasses or Blind: yes    Cognitive  Cognitive/Neuro/Behavioral: WDL  Level of Consciousness: alert  Arousal Level: opens eyes spontaneously  Orientation: oriented x 4  Mood/Behavior: calm, cooperative  Best Language: 0 - No aphasia  Speech: spontaneous, logical, clear    Living Environment:   People in home: alone     Current living Arrangements: apartment      Able to return to prior arrangements: no  Living Arrangement Comments: Patient lives in an apartment on her own. Her children live nearby and provide assistance as needed.    Family/Social Support:  Care provided by: child([place])  Provides care for: no one, unable/limited ability to care for self  Marital Status:   Children, Sibling(s) (Patient has two daughters who live in the area. Daughters are available for assistance as needed and one daughter frequently assists patient. Patient also has a sister Senia who lives in the area.)          Description of Support System: Supportive, Involved    Support Assessment: Adequate family and caregiver support, Adequate social supports    Current Resources:   Patient receiving home care services: No     Community Resources: None  Equipment currently used at home:  (Unable to assess.)  Supplies currently used at home: None    Employment/Financial:  Employment Status:  (Unable to assess.)        Financial Concerns:             Does the patient's insurance plan have a 3 day qualifying  hospital stay waiver?  No    Lifestyle & Psychosocial Needs:  Social Determinants of Health     Tobacco Use: Medium Risk (5/12/2023)    Patient History      Smoking Tobacco Use: Former      Smokeless Tobacco Use: Never      Passive Exposure: Not on file   Alcohol Use: Not on file   Financial Resource Strain: Not on file   Food Insecurity: Not on file   Transportation Needs: Not on file   Physical Activity: Not on file   Stress: Not on file   Social Connections: Not on file   Intimate Partner Violence: Not on file   Depression: Not at risk (1/31/2023)    PHQ-2      PHQ-2 Score: 0   Housing Stability: Not on file       Functional Status:  Prior to admission patient needed assistance:   Dependent ADLs:: Independent  Dependent IADLs:: Cleaning, Cooking, Transportation  Assesssment of Functional Status: Not at  functional baseline, Needs placement in a SNF/[facility] for rehabilitation    Mental Health Status:  Mental Health Status:  (Unable to assess.)       Chemical Dependency Status:  Chemical Dependency Status:  (Unable to assess.)             Values/Beliefs:  Spiritual, Cultural Beliefs, [employer] Practices, Values that affect care:  (Unable to assess.)               Additional Information:    SW met with patient at bedside and introduced self/role. SW completed Initial Assessment. Please see above for assessment information.    As SW was meeting with patient, patient began to fall asleep. SW excused themselves to allow for patient to get some rest.        LG Laird, LSW  6 Med Surg   [facility]  Phone: [phone]  Pager: [phone]

## 2023-05-16 ENCOUNTER — APPOINTMENT (OUTPATIENT)
Dept: PHYSICAL THERAPY | Facility: CLINIC | Age: 83
DRG: 091 | End: 2023-05-16
Payer: MEDICARE

## 2023-05-16 ENCOUNTER — APPOINTMENT (OUTPATIENT)
Dept: OCCUPATIONAL THERAPY | Facility: CLINIC | Age: 83
DRG: 091 | End: 2023-05-16
Payer: MEDICARE

## 2023-05-16 LAB
ALBUMIN SERPL BCG-MCNC: 3.5 G/DL (ref 3.5–5.2)
ALP SERPL-CCNC: 169 U/L (ref 35–104)
ALT SERPL W P-5'-P-CCNC: 18 U/L (ref 10–35)
ANION GAP SERPL CALCULATED.3IONS-SCNC: 17 MMOL/L (ref 7–15)
AST SERPL W P-5'-P-CCNC: 34 U/L (ref 10–35)
BILIRUB SERPL-MCNC: 0.4 MG/DL
BUN SERPL-MCNC: 29.6 MG/DL (ref 8–23)
C PNEUM DNA SPEC QL NAA+PROBE: NOT DETECTED
CALCIUM SERPL-MCNC: 9 MG/DL (ref 8.8–10.2)
CHLORIDE SERPL-SCNC: 94 MMOL/L (ref 98–107)
CREAT SERPL-MCNC: 5.81 MG/DL (ref 0.51–0.95)
DEPRECATED HCO3 PLAS-SCNC: 26 MMOL/L (ref 22–29)
ERYTHROCYTE [DISTWIDTH] IN BLOOD BY AUTOMATED COUNT: 15.7 % (ref 10–15)
FLUAV H1 2009 PAND RNA SPEC QL NAA+PROBE: NOT DETECTED
FLUAV H1 RNA SPEC QL NAA+PROBE: NOT DETECTED
FLUAV H3 RNA SPEC QL NAA+PROBE: NOT DETECTED
FLUAV RNA SPEC QL NAA+PROBE: NEGATIVE
FLUAV RNA SPEC QL NAA+PROBE: NOT DETECTED
FLUBV RNA RESP QL NAA+PROBE: NEGATIVE
FLUBV RNA SPEC QL NAA+PROBE: NOT DETECTED
GFR SERPL CREATININE-BSD FRML MDRD: 7 ML/MIN/1.73M2
GLUCOSE SERPL-MCNC: 102 MG/DL (ref 70–99)
HADV DNA SPEC QL NAA+PROBE: NOT DETECTED
HCOV PNL SPEC NAA+PROBE: NOT DETECTED
HCT VFR BLD AUTO: 27.6 % (ref 35–47)
HGB BLD-MCNC: 8.8 G/DL (ref 11.7–15.7)
HMPV RNA SPEC QL NAA+PROBE: NOT DETECTED
HPIV1 RNA SPEC QL NAA+PROBE: NOT DETECTED
HPIV2 RNA SPEC QL NAA+PROBE: NOT DETECTED
HPIV3 RNA SPEC QL NAA+PROBE: NOT DETECTED
HPIV4 RNA SPEC QL NAA+PROBE: NOT DETECTED
M PNEUMO DNA SPEC QL NAA+PROBE: NOT DETECTED
MCH RBC QN AUTO: 31 PG (ref 26.5–33)
MCHC RBC AUTO-ENTMCNC: 31.9 G/DL (ref 31.5–36.5)
MCV RBC AUTO: 97 FL (ref 78–100)
PLATELET # BLD AUTO: 376 10E3/UL (ref 150–450)
POTASSIUM SERPL-SCNC: 4.9 MMOL/L (ref 3.4–5.3)
PROT SERPL-MCNC: 7.2 G/DL (ref 6.4–8.3)
RBC # BLD AUTO: 2.84 10E6/UL (ref 3.8–5.2)
RSV RNA SPEC NAA+PROBE: NEGATIVE
RSV RNA SPEC QL NAA+PROBE: NOT DETECTED
RSV RNA SPEC QL NAA+PROBE: NOT DETECTED
RV+EV RNA SPEC QL NAA+PROBE: NOT DETECTED
SARS-COV-2 RNA RESP QL NAA+PROBE: NEGATIVE
SODIUM SERPL-SCNC: 137 MMOL/L (ref 136–145)
WBC # BLD AUTO: 9.9 10E3/UL (ref 4–11)

## 2023-05-16 PROCEDURE — 99233 SBSQ HOSP IP/OBS HIGH 50: CPT | Performed by: INTERNAL MEDICINE

## 2023-05-16 PROCEDURE — 87486 CHLMYD PNEUM DNA AMP PROBE: CPT

## 2023-05-16 PROCEDURE — 97110 THERAPEUTIC EXERCISES: CPT | Mod: GP

## 2023-05-16 PROCEDURE — 250N000013 HC RX MED GY IP 250 OP 250 PS 637: Performed by: PEDIATRICS

## 2023-05-16 PROCEDURE — 97535 SELF CARE MNGMENT TRAINING: CPT | Mod: GO

## 2023-05-16 PROCEDURE — 99207 PR CDG-CUT & PASTE-POTENTIAL IMPACT ON LEVEL: CPT | Performed by: INTERNAL MEDICINE

## 2023-05-16 PROCEDURE — 87040 BLOOD CULTURE FOR BACTERIA: CPT | Performed by: INTERNAL MEDICINE

## 2023-05-16 PROCEDURE — 85027 COMPLETE CBC AUTOMATED: CPT | Performed by: STUDENT IN AN ORGANIZED HEALTH CARE EDUCATION/TRAINING PROGRAM

## 2023-05-16 PROCEDURE — 99233 SBSQ HOSP IP/OBS HIGH 50: CPT

## 2023-05-16 PROCEDURE — C9113 INJ PANTOPRAZOLE SODIUM, VIA: HCPCS | Performed by: PHYSICIAN ASSISTANT

## 2023-05-16 PROCEDURE — 97530 THERAPEUTIC ACTIVITIES: CPT | Mod: GP

## 2023-05-16 PROCEDURE — 80053 COMPREHEN METABOLIC PANEL: CPT | Performed by: STUDENT IN AN ORGANIZED HEALTH CARE EDUCATION/TRAINING PROGRAM

## 2023-05-16 PROCEDURE — 36415 COLL VENOUS BLD VENIPUNCTURE: CPT | Performed by: STUDENT IN AN ORGANIZED HEALTH CARE EDUCATION/TRAINING PROGRAM

## 2023-05-16 PROCEDURE — 250N000013 HC RX MED GY IP 250 OP 250 PS 637: Performed by: STUDENT IN AN ORGANIZED HEALTH CARE EDUCATION/TRAINING PROGRAM

## 2023-05-16 PROCEDURE — 87637 SARSCOV2&INF A&B&RSV AMP PRB: CPT

## 2023-05-16 PROCEDURE — 120N000002 HC R&B MED SURG/OB UMMC

## 2023-05-16 PROCEDURE — 250N000011 HC RX IP 250 OP 636: Performed by: PHYSICIAN ASSISTANT

## 2023-05-16 RX ORDER — CALCITRIOL 0.25 UG/1
0.75 CAPSULE, LIQUID FILLED ORAL
Status: DISCONTINUED | OUTPATIENT
Start: 2023-05-17 | End: 2023-05-25 | Stop reason: HOSPADM

## 2023-05-16 RX ADMIN — LOSARTAN POTASSIUM 50 MG: 50 TABLET, FILM COATED ORAL at 09:41

## 2023-05-16 RX ADMIN — GABAPENTIN 300 MG: 300 CAPSULE ORAL at 21:51

## 2023-05-16 RX ADMIN — POLYETHYLENE GLYCOL 3350 17 G: 17 POWDER, FOR SOLUTION ORAL at 09:42

## 2023-05-16 RX ADMIN — SERTRALINE HYDROCHLORIDE 100 MG: 100 TABLET ORAL at 09:40

## 2023-05-16 RX ADMIN — CALCIUM ACETATE 1334 MG: 667 CAPSULE ORAL at 19:40

## 2023-05-16 RX ADMIN — ACETAMINOPHEN 650 MG: 325 TABLET ORAL at 16:12

## 2023-05-16 RX ADMIN — PANTOPRAZOLE SODIUM 40 MG: 40 INJECTION, POWDER, FOR SOLUTION INTRAVENOUS at 09:50

## 2023-05-16 RX ADMIN — CALCIUM ACETATE 1334 MG: 667 CAPSULE ORAL at 09:40

## 2023-05-16 RX ADMIN — POLYETHYLENE GLYCOL 3350 17 G: 17 POWDER, FOR SOLUTION ORAL at 19:40

## 2023-05-16 RX ADMIN — PANTOPRAZOLE SODIUM 40 MG: 40 INJECTION, POWDER, FOR SOLUTION INTRAVENOUS at 19:41

## 2023-05-16 RX ADMIN — ATORVASTATIN CALCIUM 20 MG: 20 TABLET, FILM COATED ORAL at 19:40

## 2023-05-16 RX ADMIN — Medication 1 TABLET: at 09:42

## 2023-05-16 RX ADMIN — ACETAMINOPHEN 650 MG: 325 TABLET ORAL at 02:05

## 2023-05-16 RX ADMIN — CALCIUM ACETATE 1334 MG: 667 CAPSULE ORAL at 12:53

## 2023-05-16 ASSESSMENT — ACTIVITIES OF DAILY LIVING (ADL)
ADLS_ACUITY_SCORE: 32

## 2023-05-16 NOTE — PROGRESS NOTES
"[id number]    Plan of Care Reviewed With: Patient    Overall Patient Progress: No Change    Outcome Evaluation: Pt is A & O x4 on RA, intermittently forgetful. C/O 5/10 L shoulder pain - administered PRN Tylenol & applied T pump. Denies nausea, chest pain & SOB. Pt has L PIV - SL & RUE fistula. Per pt report, BLE numbness & tingling present per baseline. Pt is incontinent - brief in place & incontinence cares provided. Pt is assist x1 w/ walker & gait belt, voids spontaneously & uses call light appropriately.     Shift Updates    Per orders, collected influenza A/B, COVID-19 & respiratory panel PCR & sent to lab.     Pt and family would like PRN Vicodin ordered instead of Roxicodone - MD notified.     Vitals: BP (!) 149/72 (BP Location: Left arm, Patient Position: Sitting, Cuff Size: Adult Regular)   Pulse 72   Temp 98.8  F (37.1  C) (Oral)   Resp 18   Ht 1.626 m (5' 4\")   Wt 59.4 kg (130 lb 15.3 oz)   SpO2 99%   BMI 22.48 kg/m      Plan: TBD, PT is recommending TCU & will need to be evaluated by GI first. Continue w/ plan of care.   "

## 2023-05-16 NOTE — PROGRESS NOTES
Brief hospitalist note     Notified regarding low grade temp overnight (T max 100.7, while using heating pads but did not improve when removed). Being worked up for sore throat/URI symptoms already, otherwise no new focal symptoms. Does not make urine on HD, recent CXR and labs stable. Will add blood culture to complete work-up given her risk, though may be related to evolving URI     Micki Hampton MD   Internal Medicine & Pediatrics Hospitalist   Pager: [phone]

## 2023-05-16 NOTE — PLAN OF CARE
Goal Outcome Evaluation:  [id number]    Patient is A&O x4. Forgetful at times. Call light within reach, able to make needs known effectively. Needs assistance x1 with gait belt and walker.     RA. Renal diet. PIV on L, SL, intact and patent. Fistula on R for dialysis. LBM: 05/14. Heat pads in place. 0012-temp elevated. Turned off heat pads. PRN Tylenol given. Provider notified. Denies pain, SOB, chest pain, n/v and n/t.     Frequent checks done, no acute events.

## 2023-05-16 NOTE — PROGRESS NOTES
[facility]    Medicine Progress Note - Hospitalist Service, GOLD TEAM 19    Date of Admission:  5/12/2023    Assessment & Plan     83 year old female with a history of ESRD (on HD MWF), HTN, PVD, anemia 2/2 ESRD and recurrent GI bleeds (2/2 AVMs), and chronic hep C with compensated cirrhosis admitted on 5/12/2023 for weakness. She had acute-onset weakness approximately 1 week ago that involves her bilateral lower extremities that is associated with low back pain.        # Weakness  Strongly suspect that her weakness is multifactorial. She notes that her blood pressure medications were recently increased, and she has had lightheadedness and dizziness since that time. In addition, she recently had a GI bleed and has ongoing hemoptysis per her report. Her Hgb on presentation was 10, and Hgb has been 9-10 recently when she is not acutely bleeding. She does take a narcotic in the outpatient setting, and with her history of ESRD, this could be accumulating and causing weakness. Further, she is on a statin; this is not a new medication but statin-induced myopathy is possible. CT of her head and lumbar spine showed no acute process that explains her weakness. She does still make some urine and has had intermittent burning with urination. On chart review, it appears that her PCP Dr. Rodriguez has had some concerns about her ability to care for herself for some time and asked her to start considering assisted living due to some cognitive impairment. At discharge from the hospital in January, PT and OT also recommended home PT/OT services.   - Decreased losartan from 100 mg daily to 50 mg daily   - PT and OT consults placed. Likely will need placement.  - opioid weaned   - Consider dose-reducing or discontinuing statin   - UA ordered collect when able  - Nutrition consult placed  - B12/Vit D and Iron screen WNL  - Work up as below        # Chronic anemia 2/2 renal disease  # Recent  GI bleed (Jan 2023)  # Hemoptysis: Intermittent  # AVM of the small bowel, stomach, and duodenum   Baseline Hgb previously 7s-9s but recently 9-11. Last EGD on 3/9. Endoscopic treatment of duodenal AVMs in march 2023. Ongoing episodes of hemoptysis for at least the past month. CT chest this admission is neg for PE or other lung process   - IV PPI BID for now with GI consult.   Pt takes Octreotide injections outpt to prevent bleeding.  - Will get GOLD test, Sputum Cx and CRP.   - GI signed off, pulm rec send RVP and URI panel, no bronch, signed off      # ESRD on HD (MWF)  # Secondary hyperparathyroidism   Has been stable on dialysis for some time. Last run Friday 5/12.   - Neph consulted for dialysis  - Continue home phos binder     # Chronic lower extremity pain  # Lumbar spinal stenosis with neurogenic claudication   # PAD  Lower extremity ultrasounds showed peripheral artery disease. Pt has hx of PAD and on PAD rehab following with vascular surgery. She was previously on cilostasol, but this was not effective and increased her risk of bleeding. She was started on a small dose of percocet for severe pain prior to PT sessions. CT lumbar spine with acute issues.   - Tylenol and oxycodone PRNs home dose   - Continue home gabapentin 300 mg qhs  - Repeat FAISAL likely as outpt and follow up with vascular     # Chronic hepatitis C with compensated cirrhosis  Complicated by:  - No hx of Ascites  - Hx of GI bleed due to AVM, no history of varices  - No hx of HE  EGD: 8/14/2018, normal esophagus, bleeding angiodysplastic lesions in stomach and duodenum.  Last EGD on 3/9. Endoscopic treatment of duodenal/stomach/Jejunum AVMs   HCC: 5/17/2018, no liver lesions.   - Will add bowel regimen  - Follow up with GI outpt     # Depression  - Continue home sertraline 100 mg daily     # HTN  - Decreased home losartan as above     # HLD  - Continuing home statin     # Anion Gap Metabolic Acidosis: Highest Anion Gap = 21 mmol/L in last 2  days, will monitor and treat as appropriate      # Hypertension: Noted on problem list       # Severe Malnutrition: based on nutrition assessment            Diet: Snacks/Supplements Adult: Nepro Oral Supplement; Between Meals  Renal Diet (dialysis)    DVT Prophylaxis: Heparin subcutaneous held due to bleeding. SCDs ordered   Rodriguez Catheter: Not present  Lines: None     Cardiac Monitoring: None  Code Status: No CPR- Pre-arrest intubation OK      Clinically Significant Risk Factors        Disposition Plan      Expected Discharge Date: 05/18/2023      Destination: other (comment) (PT is recommending TCU. This has not been discussed with patient yet.)  Discharge Comments: Will need placement. Has GI bleed and will need to be evaluated by GI 1st          Daphne Dominguez MD  Hospitalist Service, GOLD TEAM 19  [facility]  Securely message with [employer] (more info)  Text page via [employer] Paging/Directory   See signed in provider for up to date coverage information  ______________________________________________________________________    Interval History   Updated daughter  GI  And pulm signed off  Follow RVP for sore throat   No chest pain/sob/NVD  Improved mentation today    Physical Exam   Vital Signs: Temp: 98.8  F (37.1  C) Temp src: Oral BP: (!) 147/56 Pulse: 69   Resp: 16 SpO2: 99 % O2 Device: None (Room air)    Weight: 130 lbs 15.25 oz    General: No acute distress, breathing comfortably on room air  Neuro: EOMI, PERRLA. Facial muscles symmetric, strength/sensation grossly intact.  HEENT: Anicteric sclera. Oropharynx is clear. No lymphadenopathy.  Chest/Lungs: No accessory respiratory muscle use. Adequate air movement throughout. CTAB.  CV: Normal rate, regular rhythm. nl S1/S2. No m/r/g. Cap refill &lt;2s.  Abd: BS+. Soft, NTND.  Ext: Warm, well-perfused. Strong distal pulses.      Medical Decision Making       55 MINUTES SPENT BY ME on the date of service doing chart  review, history, exam, documentation & further activities per the note.      Data   ------------------------- PAST 24 HR DATA REVIEWED -----------------------------------------------

## 2023-05-16 NOTE — PROGRESS NOTES
Care Management Follow Up    Length of Stay (days): 4    Expected Discharge Date: 05/18/2023     Concerns to be Addressed: discharge planning     Patient plan of care discussed at interdisciplinary rounds: Yes    Anticipated Discharge Disposition:  Transitional Care     Anticipated Discharge Services:  Post acute therapies  Anticipated Discharge DME:  None    Patient/family educated on Medicare website which has current facility and service quality ratings:  yes  Education Provided on the Discharge Plan:  yes  Patient/Family in Agreement with the Plan:  yes    Referrals Placed by CM/SW:    Private pay costs discussed: Not applicable    Additional Information:    SW met with patient at bedside. Patient's daughter, Charla, was also present. SW explained the treatment team's recommendation for TCU. Patient is agreeable to TCU stay. SW presented 'Care Compare' list and Charla stated that she will review and give SW preferences later today.    2:00 PM  SW met with patient and Charla at bedside. Charla requested more time to look over list. She stated that she will be visiting tomorrow morning and can give preferences then. SW agreed as patient is not yet medically ready for discharge.        LG Laird, LSW  6 Med Surg   [facility]  Phone: [phone]  Pager: [phone]

## 2023-05-16 NOTE — PROGRESS NOTES
Nephrology Progress Note  05/16/2023     Ms Martínez is an 83 year old female with a history of ESRD (on HD [facility]), HTN, PVD, anemia 2/2 ESRD and recurrent GI bleeds (2/2 AVMs), and chronic hep C with compensated cirrhosis admitted on 5/12/2023 for weakness. She had acute-onset weakness approximately 1 week ago that involves her bilateral lower extremities that is associated with low back pain. She also reports abdominal pain, hemoptysis and diarrhea    Assessment & Recommendations:     1. ESRD - Patient receives OP HD at [facility], under the care of Dr Tarango.    - HD orders: TW 60.5 kg, RUE AVG, EPO 7000 U IV q run, Calcitriol 0.75 mcg po q run   - Continue [facility] schedule while inpatient   - Avoid venipuncture/blood pressures right arm   - No heparin given hemoptysis    2. Volume status - No edema/dyspnea/hypoxia. TW 60.5 kg. Pre run weight on 5/15 was 59.4 kg . B/Ps 150's/. Anuric.    - Tolerated 1 kg UF yesterday   - Please stand for pre run weights   - Continue I/O. Should be on a 1.5 liter fluid restriction/day    3. HTN - B/ps today 150's/. No edema. CXR unremarkable. CT chest unremarkable. Currently on Losartan 50 mg every day.    - Continue current Losartan dose     4. Electrolytes - No acute concerns. K 4.9 Na 137     5. Acid base - No acute concerns. Bicarb 26    6 BMD - Ca 9.0, albumin 3.6 Phos 3.7   - Continue Phoslo 1334 mg TID w/meals    7. Anemia, h/o recurrent GIB 2/2 AVMs - Hgb 8.8   - On IV PPI   - GI did not feel patient required EGD    - Doses with Octreotide q 28 days in OP setting   - Continue Epo 7000 unit(s) IV q run    Recommendations were communicated to primary team via progress note    Steph Donald NP   Division of Renal Disease and Hypertension  [facility]  [employer] Web Console    Interval History :   Nursing and provider notes from last 24 hours reviewed.  No acute renal concerns  Scheduled for routine HD tomorrow    Review of Systems:   I reviewed the following  "systems:  GI: No complaints  Neuro: alert  Constitutional:  no fever or chills  CV: denies dyspnea, CP or edema.    RESP: No further hemoptysis today  : Patient reports being anuric but > 550 ml of urine documented yesterday    Physical Exam:   I/O last 3 completed shifts:  In: 180 [P.O.:180]  Out: 1550 [Urine:550; Other:1000]   BP (!) 149/72 (BP Location: Left arm, Patient Position: Sitting, Cuff Size: Adult Regular)   Pulse 72   Temp 98.8  F (37.1  C) (Oral)   Resp 18   Ht 1.626 m (5' 4\")   Wt 59.4 kg (130 lb 15.3 oz)   SpO2 99%   BMI 22.48 kg/m       GENERAL APPEARANCE: Pleasant female in NAD. Much more awake today. Daughter present  EYES:  No scleral icterus, pupils equal  PULM: lungs CTA. Breathing is non labored. Not on supplemental oxygen   CV: RRR     -edema none   GI: soft, NT. Non distended   INTEGUMENT: no cyanosis  NEURO:  Alert/interactive  : Anuric   Access : JOSE RAMON AVG    Labs:   All labs reviewed by me  Electrolytes/Renal -   Recent Labs   Lab Test 05/16/23  0713 05/15/23  0732 05/14/23  0741 05/13/23  0645 05/12/23  1636 11/21/19  1044 03/28/19  0456 09/16/18  0501 09/14/18  1159 03/13/16  0446 03/12/16  0552    140 138   < > 138   < > 136   < > 139   < > 143   POTASSIUM 4.9 5.0 4.8   < > 3.4   < > 3.8   < > 4.7   < > 3.6   CHLORIDE 94* 97* 96*   < > 95*   < > 99   < > 99   < > 116*   CO2 26 22 23   < > 22   < > 28   < > 29   < > 19*   BUN 29.6* 87.4* 72.7*   < > 34.4*   < > 14   < > 48*   < > 34*   CR 5.81* 11.43* 9.47*   < > 5.77*   < > 4.30*   < > 8.26*   < > 2.76*   * 121* 98   < > 123*   < > 102*   < > 121*   < > 104*   BELGICA 9.0 9.2 9.0   < > 9.2   < > 8.7   < > 8.2*   < > 7.9*   MAG  --   --   --   --  2.2  --   --   --   --   --  1.7   PHOS  --   --   --   --  3.7  --  2.9  --  3.6  --  3.0    < > = values in this interval not displayed.       CBC -   Recent Labs   Lab Test 05/16/23  0713 05/15/23  0732 05/14/23  1747   WBC 9.9 10.5 10.0   HGB 8.8* 8.7* 8.9*    373 " 324       LFTs -   Recent Labs   Lab Test 05/16/23  0713 05/15/23  0732 05/14/23  0741   ALKPHOS 169* 180* 159*   BILITOTAL 0.4 0.4 0.4   ALT 18 19 22   AST 34 28 33   PROTTOTAL 7.2 7.4 7.1   ALBUMIN 3.5 3.6 3.5       Iron Panel -   Recent Labs   Lab Test 05/14/23  0741 01/26/23  1450 01/27/22  1304 10/24/18  1506   IRON 31* 34*  --  241*   IRONSAT 18 13*  --  57*   KASSI 436*  --    < > 176    < > = values in this interval not displayed.         Imaging:    CT CHEST PULMONARY EMBOLISM W CONTRAST, 5/12/2023 9:18 PM     History: Hemoptysis, +dimer     Comparison: None.     Technique: Helical acquisition of CT images of the chest from the lung  apices to the kidneys were acquired after the administration of  intravenous contrast according to the CT pulmonary angiogram protocol.  Axial images were reconstructed in 1 and 3 mm slice thickness. Coronal  reconstructions performed. Three-dimensional (3D) post-processed  angiographic images were reconstructed, archived to PACS and used in  the interpretation of this study.     Contrast dose: iopamidol (ISOVUE-370) solution 58 mL     Findings:   The contrast bolus is adequate.  There is no pulmonary embolism. No  evidence of infection.  No suspicious pulmonary nodules.  No pleural  effusion or pneumothorax.     Mediastinum: The heart is not enlarged. There is no pericardial  effusion. The ascending aorta and main pulmonary arteries are within  normal limits for diameter.     Upper abdomen: There is reflux of contrast into the hepatic veins.  Otherwise, the appearance of the upper abdomen is unremarkable.     Bones and soft tissues: No acute or aggressive osseous lesions.  Scattered degenerative changes of the thoracic and lumbar spine.                                                                      Impression:  1. No pulmonary embolism identified.  2. No other worrisome findings in the chest and upper abdomen.    Current Medications:    atorvastatin  20 mg Oral QPM      calcium acetate  1,334 mg Oral TID w/meals     gabapentin  300 mg Oral At Bedtime     losartan  50 mg Oral Daily     multivitamin RENAL  1 tablet Oral Daily     pantoprazole  40 mg Intravenous BID     polyethylene glycol  17 g Oral BID     sertraline  100 mg Oral Daily     sodium chloride (PF)  3 mL Intracatheter Q8H       Steph Donald, NP

## 2023-05-16 NOTE — PROGRESS NOTES
Cross cover note:    Small-volume hemoptysis  Sore throat  Asked to follow patient for pulmonary recommendations.  Recommendation as follows:  - Consider work up of one week of sore throat (RVP, flu/covid/rsv)  - Ordered covid, influenza A and B, respiratory panel PCR to be done in AM      BAILEY Luke CNP  Internal Medicine [facility] Hospitalist  Text paging via [facility] is appreciated [facility] Paging/Directory  May 15, 2023

## 2023-05-16 NOTE — CARE PLAN
VS:    O2: SpO2 >90% and stable on RA. LS clear and equal bilaterally. Denies chest pain and SOB.    Output: Pt is continent of bowels and  bladder    Last BM: 05/14, denies abdominal discomfort. BS active / passing flatus.    Activity: Up with assist of 1,  gait belt with walker   Skin: WDL    Pain: Pt. Reported mild shoulder pain, managed with heat pads   CMS: Intact, Alert to self intermittent confusion to time and place. Denies numbness and tingling.   Dressing: N/A   Diet: Renal diet.   LDA: Left PIV SL, intact and patent. Fistula on R for dialysis.   Equipment: IV pole, personal belongings,    Plan: Continue with plan of care. Call light within reach, pt able to make needs known.    Additional Info: Daughter wants PRN oxycodone be change to Vicodin. No coughing blood this shift.

## 2023-05-17 ENCOUNTER — APPOINTMENT (OUTPATIENT)
Dept: OCCUPATIONAL THERAPY | Facility: CLINIC | Age: 83
DRG: 091 | End: 2023-05-17
Payer: MEDICARE

## 2023-05-17 LAB
ALBUMIN SERPL BCG-MCNC: 3.5 G/DL (ref 3.5–5.2)
ALP SERPL-CCNC: 169 U/L (ref 35–104)
ALT SERPL W P-5'-P-CCNC: 16 U/L (ref 10–35)
ANION GAP SERPL CALCULATED.3IONS-SCNC: 18 MMOL/L (ref 7–15)
AST SERPL W P-5'-P-CCNC: 26 U/L (ref 10–35)
BILIRUB SERPL-MCNC: 0.4 MG/DL
BUN SERPL-MCNC: 45.1 MG/DL (ref 8–23)
CALCIUM SERPL-MCNC: 9.1 MG/DL (ref 8.8–10.2)
CHLORIDE SERPL-SCNC: 92 MMOL/L (ref 98–107)
CREAT SERPL-MCNC: 7.78 MG/DL (ref 0.51–0.95)
CRP SERPL-MCNC: 70.04 MG/L
DEPRECATED HCO3 PLAS-SCNC: 24 MMOL/L (ref 22–29)
ERYTHROCYTE [DISTWIDTH] IN BLOOD BY AUTOMATED COUNT: 15.4 % (ref 10–15)
GAMMA INTERFERON BACKGROUND BLD IA-ACNC: 10 IU/ML
GFR SERPL CREATININE-BSD FRML MDRD: 5 ML/MIN/1.73M2
GLUCOSE SERPL-MCNC: 113 MG/DL (ref 70–99)
HCT VFR BLD AUTO: 28 % (ref 35–47)
HGB BLD-MCNC: 8.7 G/DL (ref 11.7–15.7)
M TB IFN-G BLD-IMP: ABNORMAL
M TB IFN-G CD4+ BCKGRND COR BLD-ACNC: 0 IU/ML
MCH RBC QN AUTO: 30.2 PG (ref 26.5–33)
MCHC RBC AUTO-ENTMCNC: 31.1 G/DL (ref 31.5–36.5)
MCV RBC AUTO: 97 FL (ref 78–100)
MITOGEN IGNF BCKGRD COR BLD-ACNC: 0 IU/ML
MITOGEN IGNF BCKGRD COR BLD-ACNC: 0 IU/ML
PLATELET # BLD AUTO: 423 10E3/UL (ref 150–450)
POTASSIUM SERPL-SCNC: 4.9 MMOL/L (ref 3.4–5.3)
PROCALCITONIN SERPL IA-MCNC: 1.86 NG/ML
PROT SERPL-MCNC: 7.4 G/DL (ref 6.4–8.3)
QUANTIFERON MITOGEN: 10 IU/ML
QUANTIFERON NIL TUBE: 10 IU/ML
QUANTIFERON TB1 TUBE: 10 IU/ML
QUANTIFERON TB2 TUBE: 10
RBC # BLD AUTO: 2.88 10E6/UL (ref 3.8–5.2)
SODIUM SERPL-SCNC: 134 MMOL/L (ref 136–145)
WBC # BLD AUTO: 10.3 10E3/UL (ref 4–11)

## 2023-05-17 PROCEDURE — 250N000013 HC RX MED GY IP 250 OP 250 PS 637: Performed by: INTERNAL MEDICINE

## 2023-05-17 PROCEDURE — 36415 COLL VENOUS BLD VENIPUNCTURE: CPT | Performed by: STUDENT IN AN ORGANIZED HEALTH CARE EDUCATION/TRAINING PROGRAM

## 2023-05-17 PROCEDURE — 250N000013 HC RX MED GY IP 250 OP 250 PS 637

## 2023-05-17 PROCEDURE — 120N000002 HC R&B MED SURG/OB UMMC

## 2023-05-17 PROCEDURE — 250N000013 HC RX MED GY IP 250 OP 250 PS 637: Performed by: STUDENT IN AN ORGANIZED HEALTH CARE EDUCATION/TRAINING PROGRAM

## 2023-05-17 PROCEDURE — 86140 C-REACTIVE PROTEIN: CPT | Performed by: INTERNAL MEDICINE

## 2023-05-17 PROCEDURE — 84145 PROCALCITONIN (PCT): CPT | Performed by: INTERNAL MEDICINE

## 2023-05-17 PROCEDURE — 99207 PR CDG-CUT & PASTE-POTENTIAL IMPACT ON LEVEL: CPT | Performed by: INTERNAL MEDICINE

## 2023-05-17 PROCEDURE — 99233 SBSQ HOSP IP/OBS HIGH 50: CPT | Performed by: INTERNAL MEDICINE

## 2023-05-17 PROCEDURE — 82310 ASSAY OF CALCIUM: CPT | Performed by: STUDENT IN AN ORGANIZED HEALTH CARE EDUCATION/TRAINING PROGRAM

## 2023-05-17 PROCEDURE — 258N000003 HC RX IP 258 OP 636

## 2023-05-17 PROCEDURE — 80053 COMPREHEN METABOLIC PANEL: CPT | Performed by: STUDENT IN AN ORGANIZED HEALTH CARE EDUCATION/TRAINING PROGRAM

## 2023-05-17 PROCEDURE — 99233 SBSQ HOSP IP/OBS HIGH 50: CPT

## 2023-05-17 PROCEDURE — C9113 INJ PANTOPRAZOLE SODIUM, VIA: HCPCS | Performed by: PHYSICIAN ASSISTANT

## 2023-05-17 PROCEDURE — 250N000011 HC RX IP 250 OP 636: Performed by: PHYSICIAN ASSISTANT

## 2023-05-17 PROCEDURE — 97110 THERAPEUTIC EXERCISES: CPT | Mod: GO

## 2023-05-17 PROCEDURE — 90937 HEMODIALYSIS REPEATED EVAL: CPT

## 2023-05-17 PROCEDURE — 85027 COMPLETE CBC AUTOMATED: CPT | Performed by: STUDENT IN AN ORGANIZED HEALTH CARE EDUCATION/TRAINING PROGRAM

## 2023-05-17 PROCEDURE — 250N000013 HC RX MED GY IP 250 OP 250 PS 637: Performed by: PEDIATRICS

## 2023-05-17 PROCEDURE — 634N000001 HC RX 634

## 2023-05-17 RX ORDER — HYDROCODONE BITARTRATE AND ACETAMINOPHEN 5; 325 MG/1; MG/1
1 TABLET ORAL 2 TIMES DAILY PRN
Status: DISCONTINUED | OUTPATIENT
Start: 2023-05-17 | End: 2023-05-25 | Stop reason: HOSPADM

## 2023-05-17 RX ORDER — PANTOPRAZOLE SODIUM 40 MG/1
40 TABLET, DELAYED RELEASE ORAL
Status: DISCONTINUED | OUTPATIENT
Start: 2023-05-18 | End: 2023-05-25 | Stop reason: HOSPADM

## 2023-05-17 RX ORDER — HYDROXYZINE HYDROCHLORIDE 25 MG/1
25 TABLET, FILM COATED ORAL EVERY 6 HOURS PRN
Status: DISCONTINUED | OUTPATIENT
Start: 2023-05-17 | End: 2023-05-25 | Stop reason: HOSPADM

## 2023-05-17 RX ADMIN — OXYCODONE HYDROCHLORIDE 5 MG: 5 TABLET ORAL at 00:17

## 2023-05-17 RX ADMIN — GABAPENTIN 300 MG: 300 CAPSULE ORAL at 22:00

## 2023-05-17 RX ADMIN — ATORVASTATIN CALCIUM 20 MG: 20 TABLET, FILM COATED ORAL at 19:59

## 2023-05-17 RX ADMIN — Medication 1 TABLET: at 09:07

## 2023-05-17 RX ADMIN — POLYETHYLENE GLYCOL 3350 17 G: 17 POWDER, FOR SOLUTION ORAL at 19:59

## 2023-05-17 RX ADMIN — ACETAMINOPHEN 650 MG: 325 TABLET ORAL at 09:26

## 2023-05-17 RX ADMIN — ACETAMINOPHEN 650 MG: 325 TABLET ORAL at 16:25

## 2023-05-17 RX ADMIN — EPOETIN ALFA-EPBX 8000 UNITS: 10000 INJECTION, SOLUTION INTRAVENOUS; SUBCUTANEOUS at 13:14

## 2023-05-17 RX ADMIN — HYDROXYZINE HYDROCHLORIDE 25 MG: 25 TABLET, FILM COATED ORAL at 18:02

## 2023-05-17 RX ADMIN — CALCIUM ACETATE 1334 MG: 667 CAPSULE ORAL at 16:25

## 2023-05-17 RX ADMIN — CALCIUM ACETATE 1334 MG: 667 CAPSULE ORAL at 09:07

## 2023-05-17 RX ADMIN — SODIUM CHLORIDE 250 ML: 9 INJECTION, SOLUTION INTRAVENOUS at 12:42

## 2023-05-17 RX ADMIN — PANTOPRAZOLE SODIUM 40 MG: 40 INJECTION, POWDER, FOR SOLUTION INTRAVENOUS at 09:08

## 2023-05-17 RX ADMIN — PANTOPRAZOLE SODIUM 40 MG: 40 INJECTION, POWDER, FOR SOLUTION INTRAVENOUS at 19:59

## 2023-05-17 RX ADMIN — CALCIUM ACETATE 1334 MG: 667 CAPSULE ORAL at 18:46

## 2023-05-17 RX ADMIN — CALCITRIOL CAPSULES 0.25 MCG 0.75 MCG: 0.25 CAPSULE ORAL at 09:07

## 2023-05-17 RX ADMIN — Medication: at 12:43

## 2023-05-17 RX ADMIN — LOSARTAN POTASSIUM 50 MG: 50 TABLET, FILM COATED ORAL at 09:08

## 2023-05-17 RX ADMIN — SODIUM CHLORIDE 300 ML: 9 INJECTION, SOLUTION INTRAVENOUS at 12:41

## 2023-05-17 RX ADMIN — SERTRALINE HYDROCHLORIDE 100 MG: 100 TABLET ORAL at 09:08

## 2023-05-17 RX ADMIN — Medication: at 12:42

## 2023-05-17 ASSESSMENT — ACTIVITIES OF DAILY LIVING (ADL)
ADLS_ACUITY_SCORE: 34
ADLS_ACUITY_SCORE: 34
ADLS_ACUITY_SCORE: 32
ADLS_ACUITY_SCORE: 32
ADLS_ACUITY_SCORE: 34
ADLS_ACUITY_SCORE: 32
ADLS_ACUITY_SCORE: 34
ADLS_ACUITY_SCORE: 34
ADLS_ACUITY_SCORE: 32

## 2023-05-17 NOTE — PROGRESS NOTES
Care Management Follow Up    Length of Stay (days): 5    Expected Discharge Date: 05/18/2023     Concerns to be Addressed: discharge planning     Patient plan of care discussed at interdisciplinary rounds: Yes    Anticipated Discharge Disposition:  TCU     Anticipated Discharge Services:  Post acute therapies  Anticipated Discharge DME:  None    Patient/family educated on Medicare website which has current facility and service quality ratings:  yes  Education Provided on the Discharge Plan:  yes  Patient/Family in Agreement with the Plan:  yes    Referrals Placed by CM/SW:  TCU    [facility]  [address] [place] [address]  [phone]  5/17: Referral sent via destination on [facility]  [address] [place] [address]  [phone]  5/17: Referral sent via destination on [facility]   [address] [place] [address]  [phone]  5/17: Referral sent via destination on [facility]  [address] [place] [address]  Phone: [phone]  5/17: Referral sent via destination on [facility]  [address] [place] [address]  Phone: [phone]  5/17: Referral sent via destination on [facility]  [address] [place] [address]  [phone]  5/17: Referral sent via destination on [facility] 4th floor  [address] [place] [address]  [phone]  Admissions: [phone]  Fax: [phone]  RN to RN:  [phone]      Private pay costs discussed: Not applicable    Additional Information:    SW met with patient and daughter (Charla) at bedside and gathered TCU preferences. SW sent referrals (see above).        DANIELA LairdW, LSW  6 Med Surg   [facility]  Phone: [phone]  Pager:  [phone]

## 2023-05-17 NOTE — PROGRESS NOTES
"  VS: /48   Pulse 74   Temp 99.4  F (37.4  C) (Oral)   Resp 16   Ht 1.626 m (5' 4\")   Wt 59.4 kg (130 lb 15.3 oz)   SpO2 100%   BMI 22.48 kg/m     O2: On RA. Denies SOB and chest pain.    Output: Pt is incontinent of bladder and bowel.    Last BM: 05/16. Bowel sounds active in all Quadrants.    Activity: Assist x1 with Walker.    Skin: WDL    Pain: Pt rated pain a 9. PRN tylenol was offered.    CMS: WDL except baseline numbness/tingling.    Dressing: No dressing.    Diet: Strict I and Os. Renal Diet.    LDA: Right arm fistula present and Left PIV flushed.    Equipment: Pt belongings and call light in bed.    Plan: Follow POC.    Additional Info: Inpatient dialysis done today.       "

## 2023-05-17 NOTE — PLAN OF CARE
"Alert and oriented x 4. Completed dialysis this shift. Med-compliant. Rated pain 9/10 for pain \"all over\". Received PRN Tylenol. Upon re-check, pain was 7/10. Patient has a T-Pump for pain. She requested that the heat be turned down, the heat was lowered one level. Patient ambulated to the toilet once this shift with assist of 1 with gait belt and walker. Left PIV is patent and saline-locked.   Patient is on strict intake and output, this is tracked in the flowsheet. Had a bowel movement this shift.   Problem: Plan of Care - These are the overarching goals to be used throughout the patient stay.    Goal: Absence of Hospital-Acquired Illness or Injury  Outcome: Progressing  Intervention: Prevent Skin Injury  Recent Flowsheet Documentation  Taken 5/17/2023 1803 by Jennifer Burton RN  Body Position: position changed independently  Goal: Optimal Comfort and Wellbeing  Outcome: Progressing  Intervention: Monitor Pain and Promote Comfort  Recent Flowsheet Documentation  Taken 5/17/2023 1625 by Jennifer Burton RN  Pain Management Interventions: medication (see MAR)     Problem: Pain Acute  Goal: Optimal Pain Control and Function  Outcome: Progressing  Intervention: Develop Pain Management Plan  Recent Flowsheet Documentation  Taken 5/17/2023 1625 by Jennifer Burton, RN  Pain Management Interventions: medication (see MAR)   Goal Outcome Evaluation:                        "

## 2023-05-17 NOTE — PLAN OF CARE
"BP (!) 166/67 (BP Location: Left arm, Patient Position: Supine, Cuff Size: Adult Regular)   Pulse 73   Temp 98.5  F (36.9  C) (Oral)   Resp 18   Ht 1.626 m (5' 4\")   Wt 59.4 kg (130 lb 15.3 oz)   SpO2 99%   BMI 22.48 kg/m      A&O X 3, Calm and cooperative, sating 99% on RA  C/O generalized pain, 9/10 back and shoulder pain  PRN oxycodone given. Assist x 1 with walker, has not been OOB  Strict I/O maintained, Right arm fistula, thrill present. Inpatient Dialysis on MWF. L PIV secured with tap, flushed. Incontinent of B/B, briefs changed, No acute changes overnight. Plan is TCU  "

## 2023-05-17 NOTE — PROGRESS NOTES
Nephrology Progress Note  05/17/2023     Ms Martínez is an 83 year old female with a history of ESRD (on HD [facility]), HTN, PVD, anemia 2/2 ESRD and recurrent GI bleeds (2/2 AVMs), and chronic hep C with compensated cirrhosis admitted on 5/12/2023 for weakness. She had acute-onset weakness approximately 1 week ago that involves her bilateral lower extremities that is associated with low back pain. She also reports abdominal pain, hemoptysis and diarrhea    Assessment & Recommendations:     1. ESRD - Patient receives OP HD at [facility], under the care of Dr Tarango.    - HD orders: TW 60.5 kg, RUE AVG, EPO 7000 U IV q run, Calcitriol 0.75 mcg po q run   - Continue [facility] schedule while inpatient   - Avoid venipuncture/blood pressures right arm   - No heparin given hemoptysis    2. Volume status - No edema/dyspnea/hypoxia. TW 60.5 kg. Pre run weight on 5/15 was 59.4 kg . No pre run weight obtained today. B/Ps 150's/. Anuric.    - UF goal today is 1 kg    - Please stand for pre run weights   - Continue I/O. Should be on a 1.5 liter fluid restriction/day    3. HTN - B/ps today 150's/. No edema. CXR unremarkable. CT chest unremarkable. Currently on Losartan 50 mg every day.    - Continue current Losartan dose     4. Electrolytes - No acute concerns. K 4.9 Na 134     5. Acid base - No acute concerns. Bicarb 24    6 BMD - Ca 9.1, albumin 3.5 Phos 3.7   - Continue Phoslo 1334 mg TID w/meals    7. Anemia, h/o recurrent GIB 2/2 AVMs - Hgb 8.7   - On IV PPI   - GI did not feel patient required EGD    - Doses with Octreotide q 28 days in OP setting   - Continue Epo 7000 unit(s) IV q run    Recommendations were communicated to primary team via progress note    Steph Donald, NP   Division of Renal Disease and Hypertension  [facility]  [employer] [facility] Web Console    Interval History :   Nursing and provider notes from last 24 hours reviewed.  No acute renal concerns  Scheduled for routine HD today    Review of Systems:   I  "reviewed the following systems:  GI: Feeling improved today. Less achey  Neuro: alert  Constitutional:  no fever or chills  CV: denies dyspnea, CP or edema.    RESP: No further hemoptysis   : Anuric     Physical Exam:   I/O last 3 completed shifts:  In: 555 [P.O.:555]  Out: 200 [Urine:200]   BP (!) 145/57   Pulse 70   Temp 99.4  F (37.4  C) (Oral)   Resp 15   Ht 1.626 m (5' 4\")   Wt 59.4 kg (130 lb 15.3 oz)   SpO2 100%   BMI 22.48 kg/m       GENERAL APPEARANCE: Pleasant female in NAD. Sitting up for breakfast  EYES:  No scleral icterus, pupils equal  PULM: lungs CTA. Breathing is non labored. Not on supplemental oxygen   CV: RRR     -edema none   GI: soft, NT. Non distended   INTEGUMENT: no cyanosis  NEURO:  Alert/interactive  : Anuric   Access : JOSE RAMON AVG    Labs:   All labs reviewed by me  Electrolytes/Renal -   Recent Labs   Lab Test 05/17/23  0651 05/16/23  0713 05/15/23  0732 05/13/23  0645 05/12/23  1636 11/21/19  1044 03/28/19  0456 09/16/18  0501 09/14/18  1159 03/13/16  0446 03/12/16  0552   * 137 140   < > 138   < > 136   < > 139   < > 143   POTASSIUM 4.9 4.9 5.0   < > 3.4   < > 3.8   < > 4.7   < > 3.6   CHLORIDE 92* 94* 97*   < > 95*   < > 99   < > 99   < > 116*   CO2 24 26 22   < > 22   < > 28   < > 29   < > 19*   BUN 45.1* 29.6* 87.4*   < > 34.4*   < > 14   < > 48*   < > 34*   CR 7.78* 5.81* 11.43*   < > 5.77*   < > 4.30*   < > 8.26*   < > 2.76*   * 102* 121*   < > 123*   < > 102*   < > 121*   < > 104*   BELGICA 9.1 9.0 9.2   < > 9.2   < > 8.7   < > 8.2*   < > 7.9*   MAG  --   --   --   --  2.2  --   --   --   --   --  1.7   PHOS  --   --   --   --  3.7  --  2.9  --  3.6  --  3.0    < > = values in this interval not displayed.       CBC -   Recent Labs   Lab Test 05/17/23  0651 05/16/23  0713 05/15/23  0732   WBC 10.3 9.9 10.5   HGB 8.7* 8.8* 8.7*    376 373       LFTs -   Recent Labs   Lab Test 05/17/23  0651 05/16/23  0713 05/15/23  0732   ALKPHOS 169* 169* 180*   BILITOTAL " 0.4 0.4 0.4   ALT 16 18 19   AST 26 34 28   PROTTOTAL 7.4 7.2 7.4   ALBUMIN 3.5 3.5 3.6       Iron Panel -   Recent Labs   Lab Test 05/14/23  0741 01/26/23  1450 01/27/22  1304 10/24/18  1506   IRON 31* 34*  --  241*   IRONSAT 18 13*  --  57*   KASSI 436*  --    < > 176    < > = values in this interval not displayed.         Imaging:    CT CHEST PULMONARY EMBOLISM W CONTRAST, 5/12/2023 9:18 PM     History: Hemoptysis, +dimer     Comparison: None.     Technique: Helical acquisition of CT images of the chest from the lung  apices to the kidneys were acquired after the administration of  intravenous contrast according to the CT pulmonary angiogram protocol.  Axial images were reconstructed in 1 and 3 mm slice thickness. Coronal  reconstructions performed. Three-dimensional (3D) post-processed  angiographic images were reconstructed, archived to PACS and used in  the interpretation of this study.     Contrast dose: iopamidol (ISOVUE-370) solution 58 mL     Findings:   The contrast bolus is adequate.  There is no pulmonary embolism. No  evidence of infection.  No suspicious pulmonary nodules.  No pleural  effusion or pneumothorax.     Mediastinum: The heart is not enlarged. There is no pericardial  effusion. The ascending aorta and main pulmonary arteries are within  normal limits for diameter.     Upper abdomen: There is reflux of contrast into the hepatic veins.  Otherwise, the appearance of the upper abdomen is unremarkable.     Bones and soft tissues: No acute or aggressive osseous lesions.  Scattered degenerative changes of the thoracic and lumbar spine.                                                                      Impression:  1. No pulmonary embolism identified.  2. No other worrisome findings in the chest and upper abdomen.    Current Medications:    atorvastatin  20 mg Oral QPM     calcitRIOL  0.75 mcg Oral Q Mon Wed Fri AM     calcium acetate  1,334 mg Oral TID w/meals     epoetin christofer-epbx  8,000 Units  Intravenous Once in dialysis/CRRT     gabapentin  300 mg Oral At Bedtime     losartan  50 mg Oral Daily     multivitamin RENAL  1 tablet Oral Daily     pantoprazole  40 mg Intravenous BID     polyethylene glycol  17 g Oral BID     sertraline  100 mg Oral Daily     sodium chloride (PF)  3 mL Intracatheter Q8H       - MEDICATION INSTRUCTIONS -       Steph Donald, NP

## 2023-05-17 NOTE — PROGRESS NOTES
HEMODIALYSIS TREATMENT NOTE    Date: 5/17/2023  Time: 3:46 PM    Data:  Pre Wt: 59.4 kg (130 lb 15.3 oz)   Post Wt: 58.3 kg (128 lb 8.5 oz)  Weight change: 1.1 kg  Ultrafiltration - Post Run Net Total Removed (mL): 1100 mL  Vascular Access Status: Fistula  patent  Dialyzer Rinse: Light  Total Blood Volume Processed: 68 L   Total Dialysis (Treatment) Time: 2 hrs 55 min   Dialysate Bath: K 2, Ca 2.5  Heparin: None    Lab:   No    Interventions:  Set UF goal to 1.5 L to establish crit-line and UF pulling sensitivity. Good flow on R brachial AVF, 15 ga needles, no lidocaine, Qb 450. Monitored closely. Steady crit line.     Tolerated run well. VS WNL at end. Stasis in AVF in 8 minutes; dsg applied, c/d/i.     Assessment:  A&O. Somnolent but arouses to voice. BP show mild HTN prior to start. R AVF +T&B.      Plan:    Next run per nephro.     Edin Flores, BSN, RN

## 2023-05-17 NOTE — PROGRESS NOTES
[facility]    Medicine Progress Note - Hospitalist Service, GOLD TEAM 17    Date of Admission:  5/12/2023    Assessment & Plan     83 year old female with a history of ESRD (on HD MWF), HTN, PVD, anemia 2/2 ESRD and recurrent GI bleeds (2/2 AVMs), and chronic hep C with compensated cirrhosis admitted on 5/12/2023 for weakness. She had acute-onset weakness approximately 1 week ago that involves her bilateral lower extremities that is associated with low back pain.       Today's changes: 5/17/2023  Overall doing well. Cough better. No hemoptysis or any bleeding for >24 hours. Fever better. Breathing fine on RA. Eating better.   RPV neg.   Switch ppi to po  Switch oxycodone to vicodin per daughter, pt request.   TCU placement          # Gen Weakness  Strongly suspect that her weakness is multifactorial. She notes that her blood pressure medications were recently increased, and she has had lightheadedness and dizziness since that time. In addition, she recently had a GI bleed and has ongoing hemoptysis per her report. Her Hgb on presentation was 10, and Hgb has been 9-10 recently when she is not acutely bleeding. She does take a narcotic in the outpatient setting, and with her history of ESRD, this could be accumulating and causing weakness. Further, she is on a statin; this is not a new medication but statin-induced myopathy is possible. CT of her head and lumbar spine showed no acute process that explains her weakness. She does still make some urine and has had intermittent burning with urination. On chart review, it appears that her PCP Dr. Rodriguez has had some concerns about her ability to care for herself for some time and asked her to start considering assisted living due to some cognitive impairment. At discharge from the hospital in January, PT and OT also recommended home PT/OT services.   - Decreased losartan from 100 mg daily to 50 mg daily   - PT and OT  consults placed. Likely will need tcu placement.  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 Last EGD on 3/9. Endoscopic treatment of duodenal AVMs in march 2023. Ongoing episodes of hemoptysis for at least the past month. CT chest this admission is neg for PE or other lung process   - IV PPI BID --> 5/17: switch to po.   - Pt takes Octreotide injections outpt to prevent bleeding.  - Will get GOLD test, Sputum Cx and CRP.   - GI signed off, pulm rec send RVP and URI panel, no bronch, signed off      # ESRD on HD (MWF)  # Secondary hyperparathyroidism   Has been stable on dialysis for some time. Last run Friday 5/12.   - Neph consulted for dialysis  - Continue home phos binder     # Chronic lower extremity pain  # Lumbar spinal stenosis with neurogenic claudication   # PAD  Lower extremity ultrasounds showed peripheral artery disease. Pt has hx of PAD and on PAD rehab following with vascular surgery. She was previously on cilostasol, but this was not effective and increased her risk of bleeding. She was started on a small dose of percocet for severe pain prior to PT sessions. CT lumbar spine with acute issues.   - Tylenol and vicodin PRNs home dose   - Continue home gabapentin 300 mg qhs  - Repeat FAISAL likely as outpt and follow up with vascular     # Chronic hepatitis C with compensated cirrhosis  Complicated by:  - No hx of Ascites  - Hx of GI bleed due to AVM, no history of varices  - No hx of HE  EGD: 8/14/2018, normal esophagus, bleeding angiodysplastic lesions in stomach and duodenum.  Last EGD on 3/9. Endoscopic treatment of duodenal/stomach/Jejunum AVMs   HCC: 5/17/2018, no  liver lesions.   - Will add bowel regimen  - Follow up with GI outpt     # Depression  - Continue home sertraline 100 mg daily     # HTN  - Decreased home losartan as above     # HLD  - Continuing home statin     # Anion Gap Metabolic Acidosis: Highest Anion Gap = 21 mmol/L in last 2 days, will monitor and treat as appropriate      # Hypertension: Noted on problem list       # Severe Malnutrition: based on nutrition assessment            Diet: Snacks/Supplements Adult: Nepro Oral Supplement; Between Meals  Renal Diet (dialysis)    DVT Prophylaxis: Heparin subcutaneous held due to bleeding. SCDs ordered   Rodriguez Catheter: Not present  Lines: None     Cardiac Monitoring: None  Code Status: No CPR- Pre-arrest intubation OK      Clinically Significant Risk Factors        Disposition Plan      Expected Discharge Date: 05/19/2023    Discharge Delays: Placement - LTC  Destination: other (comment) (PT is recommending TCU. This has not been discussed with patient yet.)  Discharge Comments: Will need placement. Has GI bleed and will need to be evaluated by GI 1st          Idris Kellogg MD  Hospitalist Service, GOLD TEAM [address] [place] [address]  Securely message with [employer] (more info)  Text page via [employer] Paging/Directory   See signed in provider for up to date coverage information  ______________________________________________________________________    Interval History   Doing better  Daughter at bedside  No bleeding  No fever chills.   gen weakness  No chest pain/sob/NVD  Alert oriented.     Physical Exam   Vital Signs: Temp: 99.4  F (37.4  C) Temp src: Oral BP: 103/40 Pulse: 76   Resp: 16 SpO2: 97 % O2 Device: None (Room air)    Weight: 130 lbs 15.25 oz    General: No acute distress, breathing comfortably on room air  Neuro: EOMI, PERRLA. Facial muscles symmetric, strength/sensation grossly intact.  HEENT: Anicteric sclera. Oropharynx is clear. No lymphadenopathy.  Chest/Lungs: No  accessory respiratory muscle use. Adequate air movement throughout. CTAB.  CV: Normal rate, regular rhythm. nl S1/S2.   Abd: BS+. Soft, NTND.  Ext: Warm, well-perfused. Strong distal pulses.      Medical Decision Making     45 MINUTES SPENT BY ME on the date of service doing chart review, history, exam, documentation & further activities per the note.      Data   ------------------------- PAST 24 HR DATA REVIEWED -----------------------------------------------         CMPRecent Labs   Lab 05/17/23  0651 05/16/23  0713 05/15/23  0732 05/14/23  0741 05/13/23  0645 05/12/23  1636   * 137 140 138   < > 138   POTASSIUM 4.9 4.9 5.0 4.8   < > 3.4   CHLORIDE 92* 94* 97* 96*   < > 95*   CO2 24 26 22 23   < > 22   ANIONGAP 18* 17* 21* 19*   < > 21*   * 102* 121* 98   < > 123*   BUN 45.1* 29.6* 87.4* 72.7*   < > 34.4*   CR 7.78* 5.81* 11.43* 9.47*   < > 5.77*   GFRESTIMATED 5* 7* 3* 4*   < > 7*   BELGICA 9.1 9.0 9.2 9.0   < > 9.2   MAG  --   --   --   --   --  2.2   PHOS  --   --   --   --   --  3.7   PROTTOTAL 7.4 7.2 7.4 7.1  --  8.3   ALBUMIN 3.5 3.5 3.6 3.5  --  4.1   BILITOTAL 0.4 0.4 0.4 0.4  --  0.7   ALKPHOS 169* 169* 180* 159*  --  217*   AST 26 34 28 33  --  80*   ALT 16 18 19 22  --  34    < > = values in this interval not displayed.     CBC  Recent Labs   Lab 05/17/23 0651 05/16/23  0713 05/15/23  0732 05/14/23  1747   WBC 10.3 9.9 10.5 10.0   RBC 2.88* 2.84* 2.84* 2.83*   HGB 8.7* 8.8* 8.7* 8.9*   HCT 28.0* 27.6* 27.4* 27.2*   MCV 97 97 97 96   MCH 30.2 31.0 30.6 31.4   MCHC 31.1* 31.9 31.8 32.7   RDW 15.4* 15.7* 15.5* 15.3*    376 373 324     INR  Recent Labs   Lab 05/14/23  1747   INR 1.38*     Arterial Blood GasNo lab results found in last 7 days.

## 2023-05-18 ENCOUNTER — APPOINTMENT (OUTPATIENT)
Dept: PHYSICAL THERAPY | Facility: CLINIC | Age: 83
DRG: 091 | End: 2023-05-18
Payer: MEDICARE

## 2023-05-18 ENCOUNTER — APPOINTMENT (OUTPATIENT)
Dept: GENERAL RADIOLOGY | Facility: CLINIC | Age: 83
DRG: 091 | End: 2023-05-18
Attending: INTERNAL MEDICINE
Payer: MEDICARE

## 2023-05-18 LAB
ANION GAP SERPL CALCULATED.3IONS-SCNC: 15 MMOL/L (ref 7–15)
BUN SERPL-MCNC: 26 MG/DL (ref 8–23)
CALCIUM SERPL-MCNC: 9.5 MG/DL (ref 8.8–10.2)
CHLORIDE SERPL-SCNC: 91 MMOL/L (ref 98–107)
CREAT SERPL-MCNC: 5.45 MG/DL (ref 0.51–0.95)
DEPRECATED HCO3 PLAS-SCNC: 27 MMOL/L (ref 22–29)
ERYTHROCYTE [DISTWIDTH] IN BLOOD BY AUTOMATED COUNT: 15.5 % (ref 10–15)
GFR SERPL CREATININE-BSD FRML MDRD: 7 ML/MIN/1.73M2
GLUCOSE SERPL-MCNC: 107 MG/DL (ref 70–99)
HCT VFR BLD AUTO: 26.7 % (ref 35–47)
HGB BLD-MCNC: 8.6 G/DL (ref 11.7–15.7)
MCH RBC QN AUTO: 30.9 PG (ref 26.5–33)
MCHC RBC AUTO-ENTMCNC: 32.2 G/DL (ref 31.5–36.5)
MCV RBC AUTO: 96 FL (ref 78–100)
PLATELET # BLD AUTO: 445 10E3/UL (ref 150–450)
POTASSIUM SERPL-SCNC: 4.3 MMOL/L (ref 3.4–5.3)
RBC # BLD AUTO: 2.78 10E6/UL (ref 3.8–5.2)
SODIUM SERPL-SCNC: 133 MMOL/L (ref 136–145)
WBC # BLD AUTO: 9.7 10E3/UL (ref 4–11)

## 2023-05-18 PROCEDURE — 71046 X-RAY EXAM CHEST 2 VIEWS: CPT

## 2023-05-18 PROCEDURE — 36415 COLL VENOUS BLD VENIPUNCTURE: CPT | Performed by: INTERNAL MEDICINE

## 2023-05-18 PROCEDURE — 97530 THERAPEUTIC ACTIVITIES: CPT | Mod: GP

## 2023-05-18 PROCEDURE — 85027 COMPLETE CBC AUTOMATED: CPT | Performed by: STUDENT IN AN ORGANIZED HEALTH CARE EDUCATION/TRAINING PROGRAM

## 2023-05-18 PROCEDURE — 80048 BASIC METABOLIC PNL TOTAL CA: CPT | Performed by: INTERNAL MEDICINE

## 2023-05-18 PROCEDURE — 250N000013 HC RX MED GY IP 250 OP 250 PS 637: Performed by: PEDIATRICS

## 2023-05-18 PROCEDURE — 99233 SBSQ HOSP IP/OBS HIGH 50: CPT

## 2023-05-18 PROCEDURE — 120N000002 HC R&B MED SURG/OB UMMC

## 2023-05-18 PROCEDURE — 99232 SBSQ HOSP IP/OBS MODERATE 35: CPT | Performed by: INTERNAL MEDICINE

## 2023-05-18 PROCEDURE — 250N000013 HC RX MED GY IP 250 OP 250 PS 637: Performed by: INTERNAL MEDICINE

## 2023-05-18 PROCEDURE — 71046 X-RAY EXAM CHEST 2 VIEWS: CPT | Mod: 26 | Performed by: RADIOLOGY

## 2023-05-18 PROCEDURE — 97110 THERAPEUTIC EXERCISES: CPT | Mod: GP

## 2023-05-18 PROCEDURE — 250N000013 HC RX MED GY IP 250 OP 250 PS 637: Performed by: STUDENT IN AN ORGANIZED HEALTH CARE EDUCATION/TRAINING PROGRAM

## 2023-05-18 RX ADMIN — PANTOPRAZOLE SODIUM 40 MG: 40 TABLET, DELAYED RELEASE ORAL at 06:39

## 2023-05-18 RX ADMIN — CALCIUM ACETATE 1334 MG: 667 CAPSULE ORAL at 13:35

## 2023-05-18 RX ADMIN — PANTOPRAZOLE SODIUM 40 MG: 40 TABLET, DELAYED RELEASE ORAL at 18:04

## 2023-05-18 RX ADMIN — Medication 1 TABLET: at 09:08

## 2023-05-18 RX ADMIN — CALCIUM ACETATE 1334 MG: 667 CAPSULE ORAL at 18:05

## 2023-05-18 RX ADMIN — SERTRALINE HYDROCHLORIDE 100 MG: 100 TABLET ORAL at 09:08

## 2023-05-18 RX ADMIN — LOSARTAN POTASSIUM 50 MG: 50 TABLET, FILM COATED ORAL at 09:08

## 2023-05-18 RX ADMIN — ATORVASTATIN CALCIUM 20 MG: 20 TABLET, FILM COATED ORAL at 19:49

## 2023-05-18 RX ADMIN — CALCIUM ACETATE 1334 MG: 667 CAPSULE ORAL at 09:08

## 2023-05-18 RX ADMIN — GABAPENTIN 300 MG: 300 CAPSULE ORAL at 21:45

## 2023-05-18 RX ADMIN — POLYETHYLENE GLYCOL 3350 17 G: 17 POWDER, FOR SOLUTION ORAL at 09:08

## 2023-05-18 RX ADMIN — ACETAMINOPHEN 650 MG: 325 TABLET ORAL at 11:03

## 2023-05-18 RX ADMIN — POLYETHYLENE GLYCOL 3350 17 G: 17 POWDER, FOR SOLUTION ORAL at 19:49

## 2023-05-18 ASSESSMENT — ACTIVITIES OF DAILY LIVING (ADL)
ADLS_ACUITY_SCORE: 34

## 2023-05-18 NOTE — PROGRESS NOTES
Nephrology Progress Note  05/18/2023     Ms Martínez is an 83 year old female with a history of ESRD (on HD [facility]), HTN, PVD, anemia 2/2 ESRD and recurrent GI bleeds (2/2 AVMs), and chronic hep C with compensated cirrhosis admitted on 5/12/2023 for weakness. She had acute-onset weakness approximately 1 week ago that involves her bilateral lower extremities that is associated with low back pain. She also reports abdominal pain, hemoptysis and diarrhea    Assessment & Recommendations:     1. ESRD - Patient receives OP HD at [facility], under the care of Dr Tarango.    - HD orders: TW 60.5 kg, RUE AVG, EPO 7000 U IV q run, Calcitriol 0.75 mcg po q run   - Continue [facility] schedule while inpatient   - Avoid venipuncture/blood pressures right arm   - No heparin given hemoptysis    2. Volume status - No edema/dyspnea/hypoxia. TW 60.5 kg. Post run weight 58.3 kg on 5/17/23.  B/Ps 120's/. Anuric. Intake 680 ml   - Tolerated 1.1 kg UF yesterday   - Tomorrow will decrease TW to 58 kg   - Please stand for pre run weights   - Continue I/O. Should be on a 1.5 liter fluid restriction/day    3. HTN - B/ps today 120's/. No edema. CXR unremarkable. CT chest unremarkable. Currently on Losartan 50 mg every day.    - Continue current Losartan dose     4. Electrolytes - No acute concerns. K 4.3 Na 133    5. Acid base - No acute concerns. Bicarb 27    6 BMD - Ca 9.5, albumin 3.5 Phos 3.7   - Continue Phoslo 1334 mg TID w/meals    7. Anemia, h/o recurrent GIB 2/2 AVMs - Hgb 8.6   - On PPI   - GI did not feel patient required EGD    - Doses with Octreotide q 28 days in OP setting   - Continue Epo 7000 unit(s) IV q run    Recommendations were communicated to primary team via progress note    Steph Donald, NP   Division of Renal Disease and Hypertension  [facility]  [employer] Web Console    Interval History :   Nursing and provider notes from last 24 hours reviewed.  No acute renal concerns  Tolerated HD yesterday w/o  "complication  Awaiting TCU placement    Review of Systems:   I reviewed the following systems:  GI: Feeling good. Dreamed of being in the [employer]  Neuro: alert  Constitutional:  no fever or chills  CV: denies dyspnea, CP or edema.    RESP: No further hemoptysis   : Anuric     Physical Exam:   I/O last 3 completed shifts:  In: 680 [P.O.:680]  Out: 1100 [Other:1100]   /50 (BP Location: Left arm)   Pulse 74   Temp 99.1  F (37.3  C) (Oral)   Resp 18   Ht 1.626 m (5' 4\")   Wt 58.3 kg (128 lb 8.5 oz)   SpO2 97%   BMI 22.06 kg/m       GENERAL APPEARANCE: Pleasant female in NAD. Sitting up for breakfast  EYES:  No scleral icterus, pupils equal  PULM: lungs CTA. Breathing is non labored. Not on supplemental oxygen   CV: RRR     -edema none   GI: soft, NT. Non distended   INTEGUMENT: no cyanosis  NEURO:  Alert/interactive  : Anuric   Access : JOSE RAMON AVG    Labs:   All labs reviewed by me  Electrolytes/Renal -   Recent Labs   Lab Test 05/18/23  0908 05/17/23  0651 05/16/23  0713 05/13/23  0645 05/12/23  1636 11/21/19  1044 03/28/19  0456 09/16/18  0501 09/14/18  1159 03/13/16  0446 03/12/16  0552   * 134* 137   < > 138   < > 136   < > 139   < > 143   POTASSIUM 4.3 4.9 4.9   < > 3.4   < > 3.8   < > 4.7   < > 3.6   CHLORIDE 91* 92* 94*   < > 95*   < > 99   < > 99   < > 116*   CO2 27 24 26   < > 22   < > 28   < > 29   < > 19*   BUN 26.0* 45.1* 29.6*   < > 34.4*   < > 14   < > 48*   < > 34*   CR 5.45* 7.78* 5.81*   < > 5.77*   < > 4.30*   < > 8.26*   < > 2.76*   * 113* 102*   < > 123*   < > 102*   < > 121*   < > 104*   BELGICA 9.5 9.1 9.0   < > 9.2   < > 8.7   < > 8.2*   < > 7.9*   MAG  --   --   --   --  2.2  --   --   --   --   --  1.7   PHOS  --   --   --   --  3.7  --  2.9  --  3.6  --  3.0    < > = values in this interval not displayed.       CBC -   Recent Labs   Lab Test 05/18/23  0908 05/17/23  0651 05/16/23  0713   WBC 9.7 10.3 9.9   HGB 8.6* 8.7* 8.8*    413 555       LFTs -   Recent " Labs   Lab Test 05/17/23  0651 05/16/23  0713 05/15/23  0732   ALKPHOS 169* 169* 180*   BILITOTAL 0.4 0.4 0.4   ALT 16 18 19   AST 26 34 28   PROTTOTAL 7.4 7.2 7.4   ALBUMIN 3.5 3.5 3.6       Iron Panel -   Recent Labs   Lab Test 05/14/23  0741 01/26/23  1450 01/27/22  1304 10/24/18  1506   IRON 31* 34*  --  241*   IRONSAT 18 13*  --  57*   KASSI 436*  --    < > 176    < > = values in this interval not displayed.         Imaging:    CT CHEST PULMONARY EMBOLISM W CONTRAST, 5/12/2023 9:18 PM     History: Hemoptysis, +dimer     Comparison: None.     Technique: Helical acquisition of CT images of the chest from the lung  apices to the kidneys were acquired after the administration of  intravenous contrast according to the CT pulmonary angiogram protocol.  Axial images were reconstructed in 1 and 3 mm slice thickness. Coronal  reconstructions performed. Three-dimensional (3D) post-processed  angiographic images were reconstructed, archived to PACS and used in  the interpretation of this study.     Contrast dose: iopamidol (ISOVUE-370) solution 58 mL     Findings:   The contrast bolus is adequate.  There is no pulmonary embolism. No  evidence of infection.  No suspicious pulmonary nodules.  No pleural  effusion or pneumothorax.     Mediastinum: The heart is not enlarged. There is no pericardial  effusion. The ascending aorta and main pulmonary arteries are within  normal limits for diameter.     Upper abdomen: There is reflux of contrast into the hepatic veins.  Otherwise, the appearance of the upper abdomen is unremarkable.     Bones and soft tissues: No acute or aggressive osseous lesions.  Scattered degenerative changes of the thoracic and lumbar spine.                                                                      Impression:  1. No pulmonary embolism identified.  2. No other worrisome findings in the chest and upper abdomen.    Current Medications:    atorvastatin  20 mg Oral QPM     calcitRIOL  0.75 mcg Oral Q  Mon Wed Fri AM     calcium acetate  1,334 mg Oral TID w/meals     gabapentin  300 mg Oral At Bedtime     losartan  50 mg Oral Daily     multivitamin RENAL  1 tablet Oral Daily     pantoprazole  40 mg Oral BID AC     polyethylene glycol  17 g Oral BID     sertraline  100 mg Oral Daily     sodium chloride (PF)  3 mL Intracatheter Q8H       Steph Donald, NP

## 2023-05-18 NOTE — PROGRESS NOTES
[facility]    Medicine Progress Note - Hospitalist Service, GOLD TEAM 17    Date of Admission:  5/12/2023    Assessment & Plan     83 year old female with a history of ESRD (on HD MWF), HTN, PVD, anemia 2/2 ESRD and recurrent GI bleeds (2/2 AVMs), and chronic hep C with compensated cirrhosis admitted on 5/12/2023 for weakness. She had acute-onset weakness approximately 1 week ago that involves her bilateral lower extremities that is associated with low back pain.       Today's changes: 5/18/2023    Overall doing well. Cough better. No hemoptysis or any bleeding for >48 hours. Fever better. Breathing fine on RA. Eating better.  On follow-up chest x-ray 5/18: No acute airspace disease.  UA pending.  Makes very little urine  Dialysis per nephrology.  No other concern.  Daughter at bedside.  Awaiting tissue placement.       # Gen Weakness  Strongly suspect that her weakness is multifactorial. She notes that her blood pressure medications were recently increased, and she has had lightheadedness and dizziness since that time. In addition, she recently had a GI bleed and has ongoing hemoptysis per her report. Her Hgb on presentation was 10, and Hgb has been 9-10 recently when she is not acutely bleeding. She does take a narcotic in the outpatient setting, and with her history of ESRD, this could be accumulating and causing weakness. Further, she is on a statin; this is not a new medication but statin-induced myopathy is possible. CT of her head and lumbar spine showed no acute process that explains her weakness. She does still make some urine and has had intermittent burning with urination. On chart review, it appears that her PCP Dr. Rodriguez has had some concerns about her ability to care for herself for some time and asked her to start considering assisted living due to some cognitive impairment. At discharge from the hospital in January, PT and OT also recommended home  PT/OT services.   - Decreased losartan from 100 mg daily to 50 mg daily   - PT and OT consults placed. Likely will need tcu placement.  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 Last EGD on 3/9. Endoscopic treatment of duodenal AVMs in march 2023. Ongoing episodes of hemoptysis for at least the past month. CT chest this admission is neg for PE or other lung process   - IV PPI BID --> 5/17: switch to po.   - Pt takes Octreotide injections outpt to prevent bleeding.  - Will get GOLD test, Sputum Cx and CRP.   - GI signed off, pulm rec send RVP and URI panel, no bronch, signed off      # ESRD on HD (MWF)  # Secondary hyperparathyroidism   Has been stable on dialysis for some time. Last run Friday 5/12.   - Neph consulted for dialysis  - Continue home phos binder     # Chronic lower extremity pain  # Lumbar spinal stenosis with neurogenic claudication   # PAD  Lower extremity ultrasounds showed peripheral artery disease. Pt has hx of PAD and on PAD rehab following with vascular surgery. She was previously on cilostasol, but this was not effective and increased her risk of bleeding. She was started on a small dose of percocet for severe pain prior to PT sessions. CT lumbar spine with acute issues.   - Tylenol and vicodin PRNs home dose   - Continue home gabapentin 300 mg qhs  - Repeat FAISAL likely as outpt and follow up with vascular     # Chronic hepatitis C with compensated cirrhosis  Complicated by:  - No hx of Ascites  - Hx of GI bleed due to AVM, no history of varices  - No hx of HE  EGD: 8/14/2018, normal esophagus, bleeding angiodysplastic lesions in stomach and duodenum.  Last EGD  on 3/9. Endoscopic treatment of duodenal/stomach/Jejunum AVMs   HCC: 5/17/2018, no liver lesions.   - Will add bowel regimen  - Follow up with GI outpt     # Depression  - Continue home sertraline 100 mg daily     # HTN  - Decreased home losartan as above     # HLD  - Continuing home statin     # Anion Gap Metabolic Acidosis: Highest Anion Gap = 21 mmol/L in last 2 days, will monitor and treat as appropriate      # Hypertension: Noted on problem list       # Severe Malnutrition: based on nutrition assessment            Diet: Snacks/Supplements Adult: Nepro Oral Supplement; Between Meals  Renal Diet (dialysis)    DVT Prophylaxis: Heparin subcutaneous held due to bleeding. SCDs ordered   Rodriguez Catheter: Not present  Lines: None     Cardiac Monitoring: None  Code Status: No CPR- Pre-arrest intubation OK      Clinically Significant Risk Factors        Disposition Plan     Expected Discharge Date: 05/19/2023    Discharge Delays: Placement - TCU  Destination: other (comment) (PT is recommending TCU. This has not been discussed with patient yet.)  Discharge Comments: Will need placement. Has GI bleed and will need to be evaluated by GI 1st          Idris Kellogg MD  Hospitalist Service, GOLD TEAM [address] [place] [address]  Securely message with [employer] (more info)  Text page via [employer] Paging/Directory   See signed in provider for up to date coverage information  ______________________________________________________________________    Interval History   Doing better  Daughter at bedside  No bleeding  No fever chills.   gen weakness  No chest pain/sob/NVD  Alert oriented.     Physical Exam   Vital Signs: Temp: 99.1  F (37.3  C) Temp src: Oral BP: 123/50 Pulse: 74   Resp: 18 SpO2: 97 % O2 Device: None (Room air)    Weight: 128 lbs 8.45 oz    General: No acute distress, breathing comfortably on room air  Neuro: EOMI, PERRLA. Facial muscles symmetric, strength/sensation grossly  intact.  HEENT: Anicteric sclera. Oropharynx is clear. No lymphadenopathy.  Chest/Lungs: No accessory respiratory muscle use. Adequate air movement throughout. CTAB.  CV: Normal rate, regular rhythm. nl S1/S2.   Abd: BS+. Soft, NTND.  Ext: Warm, well-perfused. Strong distal pulses.      Medical Decision Making     45 MINUTES SPENT BY ME on the date of service doing chart review, history, exam, documentation & further activities per the note.      Data   ------------------------- PAST 24 HR DATA REVIEWED -----------------------------------------------         CMP  Recent Labs   Lab 05/17/23  0651 05/16/23  0713 05/15/23  0732 05/14/23  0741 05/13/23  0645 05/12/23  1636   * 137 140 138   < > 138   POTASSIUM 4.9 4.9 5.0 4.8   < > 3.4   CHLORIDE 92* 94* 97* 96*   < > 95*   CO2 24 26 22 23   < > 22   ANIONGAP 18* 17* 21* 19*   < > 21*   * 102* 121* 98   < > 123*   BUN 45.1* 29.6* 87.4* 72.7*   < > 34.4*   CR 7.78* 5.81* 11.43* 9.47*   < > 5.77*   GFRESTIMATED 5* 7* 3* 4*   < > 7*   BELGICA 9.1 9.0 9.2 9.0   < > 9.2   MAG  --   --   --   --   --  2.2   PHOS  --   --   --   --   --  3.7   PROTTOTAL 7.4 7.2 7.4 7.1  --  8.3   ALBUMIN 3.5 3.5 3.6 3.5  --  4.1   BILITOTAL 0.4 0.4 0.4 0.4  --  0.7   ALKPHOS 169* 169* 180* 159*  --  217*   AST 26 34 28 33  --  80*   ALT 16 18 19 22  --  34    < > = values in this interval not displayed.     CBC  Recent Labs   Lab 05/18/23  0908 05/17/23  0651 05/16/23  0713 05/15/23  0732   WBC 9.7 10.3 9.9 10.5   RBC 2.78* 2.88* 2.84* 2.84*   HGB 8.6* 8.7* 8.8* 8.7*   HCT 26.7* 28.0* 27.6* 27.4*   MCV 96 97 97 97   MCH 30.9 30.2 31.0 30.6   MCHC 32.2 31.1* 31.9 31.8   RDW 15.5* 15.4* 15.7* 15.5*    [phone]     INR  Recent Labs   Lab 05/14/23  1747   INR 1.38*     Arterial Blood GasNo lab results found in last 7 days.

## 2023-05-18 NOTE — PROGRESS NOTES
Care Management Follow Up    Length of Stay (days): 6    Expected Discharge Date: 05/19/2023     Concerns to be Addressed: discharge planning     Patient plan of care discussed at interdisciplinary rounds: Yes    Anticipated Discharge Disposition:  TCU     Anticipated Discharge Services:  Post acute therapies  Anticipated Discharge DME:  None    Patient/family educated on Medicare website which has current facility and service quality ratings:  yes  Education Provided on the Discharge Plan:  yes  Patient/Family in Agreement with the Plan:  yes  Private pay costs discussed: Not applicable    Referrals Placed by CM/SW:       [facility]   [address] [place] [address]  [phone]  5/17: Referral sent via destination on Epic  5/18: Tried to leave VM with admissions but VM box was full.      [facility]  [address] [place] [address]  Phone: [phone]  5/17: Referral sent via destination on Epic  5/18: Called admissions and line kept ringing.      [facility]  [address] [place] [address]  Phone: [phone]  5/17: Referral sent via destination on Epic  5/18: Left VM with admissions- Tori to call writer back.      [facility]  [address] [place] [address]  [phone]  5/17: Referral sent via destination on Epic  5/18: Left VM with admissions and requested a call back.      [facility] 4th floor  [facility] [address] [place] [address]  [phone]  Admissions: [phone]  Fax: [phone]  RN to RN:  [phone]  5/18: Messaged [facility] liaison about TCU referral.    Declined:    [facility]  [address] [place] [address]  [phone]  5/17: Referral sent via destination on Epic  5/18: Spoke with admissions to check status of patient referral. They don't have beds right now. They also only work  with TBIs, strokes and spinal chord injuries.       [facility] ([facility])  [address] [place] [address]  [phone]  5/17: Referral sent via destination on Epic  5/18: Spoke with admissions. They declined her admission due to they don't take dialysis patients. They cannot provide assistance to acute care needs.        Additional Information:    Writer called the above listed TCUs to check if they can accept patient.Writer received a VM from Rachele and she wants to tour the TCU before writer finds one. Writer called her back and confirmed this and let her know writer is looking for placements still.        Lianna Butler, ANTONIO, LGSW  6 Med Surg   [facility]

## 2023-05-18 NOTE — PLAN OF CARE
"Goal Outcome Evaluation:     VS: /51 (BP Location: Left arm)   Pulse 70   Temp 98.8  F (37.1  C) (Oral)   Resp 16   Ht 1.626 m (5' 4\")   Wt 58.3 kg (128 lb 8.5 oz)   SpO2 98%   BMI 22.06 kg/m        O2:  Room air   Output:  Patient has dialysis 3x/week.   Last BM:  Last BM yesterday   Activity:  Assist of x1 with walker and gait belt.    Up for meals?  Yes.    Skin:  visible skin appears intact.    Pain:  reports pain in back. Tylenol given PRN and heating pad placed on bed. Pain reassessed and patient reports relief.    CMS:  Patient denied chest pain; denied numbness or tingling in extremities.    Dressing:  N/A   Diet:  Renal diet   LDA:  IV port on L. Arm SL. AV fistula on R. Arm for dialysis   Equipment:  Walker, chair alarm, personal belongings, call light within reach.    Plan:  Plan of care ongoing.    Additional Info:  Family visited today in the morning, 5/18.                                         "

## 2023-05-18 NOTE — PLAN OF CARE
Goal Outcome Evaluation:  1900-0700      Patient is A&O x4. Forgetful at times. Call light within reach, able to make needs known effectively. Needs assistance x1 with gait belt and walker.     RA. Renal diet. PIV on L, SL, intact and patent. Fistula on R for dialysis. LBM: 05/17. Denies pain, SOB, chest pain, n/v and n/t.     Frequent checks done, no acute events. Possible for discharge to TCU.

## 2023-05-19 LAB
ERYTHROCYTE [DISTWIDTH] IN BLOOD BY AUTOMATED COUNT: 15.6 % (ref 10–15)
HCT VFR BLD AUTO: 26.8 % (ref 35–47)
HGB BLD-MCNC: 8.3 G/DL (ref 11.7–15.7)
MCH RBC QN AUTO: 30.6 PG (ref 26.5–33)
MCHC RBC AUTO-ENTMCNC: 31 G/DL (ref 31.5–36.5)
MCV RBC AUTO: 99 FL (ref 78–100)
PLATELET # BLD AUTO: 518 10E3/UL (ref 150–450)
RBC # BLD AUTO: 2.71 10E6/UL (ref 3.8–5.2)
WBC # BLD AUTO: 9 10E3/UL (ref 4–11)

## 2023-05-19 PROCEDURE — 36415 COLL VENOUS BLD VENIPUNCTURE: CPT | Performed by: STUDENT IN AN ORGANIZED HEALTH CARE EDUCATION/TRAINING PROGRAM

## 2023-05-19 PROCEDURE — 90937 HEMODIALYSIS REPEATED EVAL: CPT

## 2023-05-19 PROCEDURE — 250N000013 HC RX MED GY IP 250 OP 250 PS 637: Performed by: STUDENT IN AN ORGANIZED HEALTH CARE EDUCATION/TRAINING PROGRAM

## 2023-05-19 PROCEDURE — 258N000003 HC RX IP 258 OP 636

## 2023-05-19 PROCEDURE — 250N000013 HC RX MED GY IP 250 OP 250 PS 637

## 2023-05-19 PROCEDURE — 99232 SBSQ HOSP IP/OBS MODERATE 35: CPT | Performed by: INTERNAL MEDICINE

## 2023-05-19 PROCEDURE — 120N000002 HC R&B MED SURG/OB UMMC

## 2023-05-19 PROCEDURE — 250N000011 HC RX IP 250 OP 636: Performed by: PEDIATRICS

## 2023-05-19 PROCEDURE — 250N000013 HC RX MED GY IP 250 OP 250 PS 637: Performed by: INTERNAL MEDICINE

## 2023-05-19 PROCEDURE — 634N000001 HC RX 634

## 2023-05-19 PROCEDURE — 85014 HEMATOCRIT: CPT | Performed by: STUDENT IN AN ORGANIZED HEALTH CARE EDUCATION/TRAINING PROGRAM

## 2023-05-19 PROCEDURE — 99233 SBSQ HOSP IP/OBS HIGH 50: CPT

## 2023-05-19 PROCEDURE — 250N000013 HC RX MED GY IP 250 OP 250 PS 637: Performed by: PEDIATRICS

## 2023-05-19 RX ORDER — AMLODIPINE BESYLATE 5 MG/1
5 TABLET ORAL DAILY
Status: DISCONTINUED | OUTPATIENT
Start: 2023-05-19 | End: 2023-05-25 | Stop reason: HOSPADM

## 2023-05-19 RX ORDER — HYDRALAZINE HYDROCHLORIDE 25 MG/1
25 TABLET, FILM COATED ORAL 4 TIMES DAILY PRN
Status: DISCONTINUED | OUTPATIENT
Start: 2023-05-19 | End: 2023-05-22

## 2023-05-19 RX ADMIN — SODIUM CHLORIDE 300 ML: 9 INJECTION, SOLUTION INTRAVENOUS at 11:30

## 2023-05-19 RX ADMIN — LOSARTAN POTASSIUM 50 MG: 50 TABLET, FILM COATED ORAL at 09:41

## 2023-05-19 RX ADMIN — POLYETHYLENE GLYCOL 3350 17 G: 17 POWDER, FOR SOLUTION ORAL at 09:41

## 2023-05-19 RX ADMIN — AMLODIPINE BESYLATE 5 MG: 5 TABLET ORAL at 16:21

## 2023-05-19 RX ADMIN — CALCIUM ACETATE 1334 MG: 667 CAPSULE ORAL at 12:28

## 2023-05-19 RX ADMIN — PANTOPRAZOLE SODIUM 40 MG: 40 TABLET, DELAYED RELEASE ORAL at 16:21

## 2023-05-19 RX ADMIN — CALCIUM ACETATE 1334 MG: 667 CAPSULE ORAL at 09:41

## 2023-05-19 RX ADMIN — Medication 1 TABLET: at 09:41

## 2023-05-19 RX ADMIN — SERTRALINE HYDROCHLORIDE 100 MG: 100 TABLET ORAL at 09:41

## 2023-05-19 RX ADMIN — PANTOPRAZOLE SODIUM 40 MG: 40 TABLET, DELAYED RELEASE ORAL at 06:37

## 2023-05-19 RX ADMIN — ATORVASTATIN CALCIUM 20 MG: 20 TABLET, FILM COATED ORAL at 20:02

## 2023-05-19 RX ADMIN — CALCITRIOL CAPSULES 0.25 MCG 0.75 MCG: 0.25 CAPSULE ORAL at 09:41

## 2023-05-19 RX ADMIN — CALCIUM ACETATE 1334 MG: 667 CAPSULE ORAL at 17:52

## 2023-05-19 RX ADMIN — POLYETHYLENE GLYCOL 3350 17 G: 17 POWDER, FOR SOLUTION ORAL at 20:02

## 2023-05-19 RX ADMIN — GABAPENTIN 300 MG: 300 CAPSULE ORAL at 22:16

## 2023-05-19 RX ADMIN — Medication: at 11:31

## 2023-05-19 RX ADMIN — SODIUM CHLORIDE 250 ML: 9 INJECTION, SOLUTION INTRAVENOUS at 11:31

## 2023-05-19 RX ADMIN — EPOETIN ALFA-EPBX 8000 UNITS: 10000 INJECTION, SOLUTION INTRAVENOUS; SUBCUTANEOUS at 11:36

## 2023-05-19 RX ADMIN — ONDANSETRON 4 MG: 4 TABLET, ORALLY DISINTEGRATING ORAL at 14:15

## 2023-05-19 RX ADMIN — HYDROCODONE BITARTRATE AND ACETAMINOPHEN 1 TABLET: 5; 325 TABLET ORAL at 03:10

## 2023-05-19 ASSESSMENT — ACTIVITIES OF DAILY LIVING (ADL)
ADLS_ACUITY_SCORE: 30
ADLS_ACUITY_SCORE: 30
ADLS_ACUITY_SCORE: 34
ADLS_ACUITY_SCORE: 30
ADLS_ACUITY_SCORE: 30
ADLS_ACUITY_SCORE: 34
ADLS_ACUITY_SCORE: 30
ADLS_ACUITY_SCORE: 34
ADLS_ACUITY_SCORE: 30
ADLS_ACUITY_SCORE: 30
ADLS_ACUITY_SCORE: 34
ADLS_ACUITY_SCORE: 34

## 2023-05-19 NOTE — PROGRESS NOTES
Care Management Follow Up    Length of Stay (days): 7    Expected Discharge Date: 05/20/2023     Concerns to be Addressed: discharge planning     Patient plan of care discussed at interdisciplinary rounds: Yes    Anticipated Discharge Disposition:  TCU     Anticipated Discharge Services:  Post acute therapies  Anticipated Discharge DME:  None    Patient/family educated on Medicare website which has current facility and service quality ratings:  Yes  Education Provided on the Discharge Plan: Yes    Patient/Family in Agreement with the Plan:  Yes    Private pay costs discussed: Not applicable    Additional Information:    Writer spoke with [facility] liaison-Clary Castillo about TCU referral. They declined her admission due to they don't have any open beds at this time. They stated she needs a higher LOC. She would be a better fit for a community TCU at this time or assisted living potentially. They stated writer can re-refer her to them if writer gets many other denials from other TCU agencies. Patient needs a placement that accepts dialysis.       Lianna Butler, ANTONIO, LGSW  6 Med Surg   [facility]

## 2023-05-19 NOTE — PROGRESS NOTES
[facility]    Medicine Progress Note - Hospitalist Service, GOLD TEAM 17    Date of Admission:  5/12/2023    Assessment & Plan     83 year old female with a history of ESRD (on HD MWF), HTN, PVD, anemia 2/2 ESRD and recurrent GI bleeds (2/2 AVMs), and chronic hep C with compensated cirrhosis admitted on 5/12/2023 for weakness. She had acute-onset weakness approximately 1 week ago that involves her bilateral lower extremities that is associated with low back pain.       Today's changes: 5/19/2023  Overall doing well.  No hemoptysis or any bleeding for >72 hours. Afebrile.  follow-up chest x-ray 5/18: No acute airspace disease.  Hgb: 5.19: 8.3. stable.   Dialysis per nephrology.  Awaiting tcu placement.  BP high: resume amlod 5 mg daily.        # Gen Weakness  Strongly suspect that her weakness is multifactorial. She notes that her blood pressure medications were recently increased, and she has had lightheadedness and dizziness since that time. In addition, she recently had a GI bleed and has ongoing hemoptysis per her report. Her Hgb on presentation was 10, and Hgb has been 9-10 recently. She does take a narcotic in the outpatient setting, and with her history of ESRD, this could be accumulating and causing weakness. Further, she is on a statin; this is not a new medication but statin-induced myopathy is possible. CT of her head and lumbar spine showed no acute process that explains her weakness. She does still make some urine and has had intermittent burning with urination. On chart review, it appears that her PCP Dr. Rodriguez has had some concerns about her ability to care for herself for some time and asked her to start considering assisted living due to some cognitive impairment. At discharge from the hospital in January, PT and OT also recommended home PT/OT services.   - Decreased losartan from 100 mg daily to 50 mg daily - titrate as needed.   - PT and OT consults  placed. aw tcu placement.  - opioid weaned down as able.   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 Last EGD on 3/9. Endoscopic treatment of duodenal AVMs in march 2023. Ongoing episodes of hemoptysis for at least the past month. CT chest this admission is neg for PE or other lung process   - IV PPI BID --> 5/17: switched to po.   - Pt takes Octreotide injections outpt to prevent bleeding.  - RVP: negative.   - hemoptysis resolved.   - GI signed off, pulm : no bronch, signed off.       # ESRD on HD (MWF)  # Secondary hyperparathyroidism   Has been stable on dialysis for some time. Last run Friday 5/12.   - Neph consulted for dialysis  - Continue home phos binder     # Chronic lower extremity pain  # Lumbar spinal stenosis with neurogenic claudication   # PAD  Lower extremity ultrasounds showed peripheral artery disease. Pt has hx of PAD and on PAD rehab following with vascular surgery. She was previously on cilostasol, but this was not effective and increased her risk of bleeding. She was started on a small dose of percocet for severe pain prior to PT sessions. CT lumbar spine with acute issues.   - Tylenol and vicodin PRNs home dose   - Continue home gabapentin 300 mg qhs  - Repeat FAISAL likely as outpt and follow up with vascular     # Chronic hepatitis C with compensated cirrhosis  Complicated by:  - No hx of Ascites  - Hx of GI bleed due to AVM, no history of varices  - No hx of HE  EGD: 8/14/2018, normal esophagus, bleeding angiodysplastic lesions in stomach and duodenum.  Last EGD on 3/9. Endoscopic treatment of duodenal/stomach/Jejunum AVMs   HCC: 5/17/2018, no liver lesions.   - Will add bowel regimen  - Follow up with GI outpt     # Depression  - Continue home  sertraline 100 mg daily     # HTN  - losartan. Amlod.   - PRN hydralazine   - Monitor.      # HLD  - Continuing home statin       # Severe Malnutrition: based on nutrition assessment . Supplements per nutrition.          Diet: Snacks/Supplements Adult: Nepro Oral Supplement; Between Meals  Renal Diet (dialysis)    DVT Prophylaxis: Heparin subcutaneous held due to bleeding. SCDs ordered   Rodriguez Catheter: Not present  Lines: None     Cardiac Monitoring: None  Code Status: No CPR- Pre-arrest intubation OK      Clinically Significant Risk Factors        Disposition Plan     Expected Discharge Date: 05/20/2023    Discharge Delays: Placement - TCU  *Medically Ready for Discharge  Destination: other (comment) (PT is recommending TCU. This has not been discussed with patient yet.)  Discharge Comments: Will need placement. Has GI bleed and will need to be evaluated by GI 1st          Idris Kellogg MD  Hospitalist Service, GOLD TEAM [address] [place] [address]  Securely message with [employer] (more info)  Text page via [employer] Paging/Directory   See signed in provider for up to date coverage information  ______________________________________________________________________    Interval History   Doing better  No bleeding  No fever chills.   gen weakness  No chest pain/sob/NVD  Alert oriented.     Physical Exam   Vital Signs: Temp: 98  F (36.7  C) Temp src: Oral BP: (!) 177/63 Pulse: 71   Resp: 19 SpO2: 99 % O2 Device: None (Room air)    Weight: 127 lbs 3.2 oz    General Appearance: Awake, interactive, NAD  Respiratory: Normal work of breathing. On RA  Cardiovascular: RRR  GI: Soft. NT. ND.  Extremities: Distally wwp. No pedal edema  Neuro: Grossly non focal.   Others: Stable mood.         Medical Decision Making     45 MINUTES SPENT BY ME on the date of service doing chart review, history, exam, documentation & further activities per the note.      Data   ------------------------- PAST 24 HR DATA  REVIEWED -----------------------------------------------         CMP  Recent Labs   Lab 05/18/23 0908 05/17/23  0651 05/16/23 0713 05/15/23  0732 05/14/23  0741 05/13/23  0645 05/12/23  1636   * 134* 137 140 138   < > 138   POTASSIUM 4.3 4.9 4.9 5.0 4.8   < > 3.4   CHLORIDE 91* 92* 94* 97* 96*   < > 95*   CO2 27 24 26 22 23   < > 22   ANIONGAP 15 18* 17* 21* 19*   < > 21*   * 113* 102* 121* 98   < > 123*   BUN 26.0* 45.1* 29.6* 87.4* 72.7*   < > 34.4*   CR 5.45* 7.78* 5.81* 11.43* 9.47*   < > 5.77*   GFRESTIMATED 7* 5* 7* 3* 4*   < > 7*   BELGICA 9.5 9.1 9.0 9.2 9.0   < > 9.2   MAG  --   --   --   --   --   --  2.2   PHOS  --   --   --   --   --   --  3.7   PROTTOTAL  --  7.4 7.2 7.4 7.1  --  8.3   ALBUMIN  --  3.5 3.5 3.6 3.5  --  4.1   BILITOTAL  --  0.4 0.4 0.4 0.4  --  0.7   ALKPHOS  --  169* 169* 180* 159*  --  217*   AST  --  26 34 28 33  --  80*   ALT  --  16 18 19 22  --  34    < > = values in this interval not displayed.     CBC  Recent Labs   Lab 05/19/23 0827 05/18/23 0908 05/17/23 0651 05/16/23  0713   WBC 9.0 9.7 10.3 9.9   RBC 2.71* 2.78* 2.88* 2.84*   HGB 8.3* 8.6* 8.7* 8.8*   HCT 26.8* 26.7* 28.0* 27.6*   MCV 99 96 97 97   MCH 30.6 30.9 30.2 31.0   MCHC 31.0* 32.2 31.1* 31.9   RDW 15.6* 15.5* 15.4* 15.7*   * [phone]     INR  Recent Labs   Lab 05/14/23  1747   INR 1.38*     Arterial Blood GasNo lab results found in last 7 days.

## 2023-05-19 NOTE — PROGRESS NOTES
Date: 5/19/2023  Time: 3:30 PM     Data:  Pre Wt:   57.7 kg  Desired Wt:   To be established  Post Wt:  57.5 kg (estimated)  Weight change:  0.2 kg  Ultrafiltration - Post Run Net Total Removed (mL):  700 ml  Vascular Access Status: Graft patent  Dialyzer Rinse:  Light  Total Blood Volume Processed: 61.1 L   Total Dialysis (Treatment) Time:   3.0 Hrs  Dialysate Bath: K 3, Ca 2.25  Heparin: Heparin: None     Lab:   No     Interventions:  Dialysis done through right upper arm AV Graft using 15 gauge needles  No UF/fluid removal set initially, pre weight 57.7 kg (TW 58.0 kg).   Then after 1 hour, patient had her meal and oral fluid intake of approximately 500 ml added to the goal. Provider wanted to try 57.5 kg post weight, Final UF set to 1.0 Liter accommodating prime volume and rinseback  ,   Epogen 8000 units administered per MAR  After 2 hours, patient wishes to go to bathroom to have a bowel movement, Tx paused and patient is brought to bathroom with PCN's help. Dialysis restarted for 1 hour remaining. BP were stable.  Treatment terminated 20 minutes early due to clotting of the dialyzer and venous chamber. Provider notified.  Decannulation done post HD, hemostasis is achieved in 15 minutes  Pressure dressing is applied, to be removed after 4-6 hours  Report given to PCN     Assessment:  A/O x 4, calm and cooperative, denies pain  Lung sounds clear anterior and lateral BUL, diminished BLL  Right upper arm AV Graft has good thrill and bruit                Plan:    Per Renal team        DANIELA LandinN, RN  Acute Dialysis RN  [facility]

## 2023-05-19 NOTE — PROGRESS NOTES
Care Management Follow Up    Length of Stay (days): 7    Expected Discharge Date: 05/20/2023     Concerns to be Addressed: discharge planning     Patient plan of care discussed at interdisciplinary rounds: Yes    Anticipated Discharge Disposition:  TCU     Anticipated Discharge Services:  Post acute therapies  Anticipated Discharge DME:  None    Patient/family educated on Medicare website which has current facility and service quality ratings:  Yes  Education Provided on the Discharge Plan:  Yes  Patient/Family in Agreement with the Plan:  Yes    Referrals Placed by CM/SW:      [facility]   [address] [place] [address]  [phone]  5/17: Referral sent via destination on Epic  5/18: Tried to leave VM with admissions but VM box was full.   5/19: Left VM with admissions to check status of referral.      [facility]  [address] [place] [address]  Phone: [phone]  5/17: Referral sent via destination on Epic  5/18: Called admissions and line kept ringing.   5/19: Called admissions- Stephanie and left a VM to call back.     [facility]  [address] [place] [address]  Phone: [phone]  5/17: Referral sent via destination on Epic  5/18: Left VM with admissions- Tori to call writer back.   5/19: Left VM with Tori in admissions and requested a call back.     [facility]  [address] [place] [address]  [phone]  5/17: Referral sent via destination on Epic  5/18: Left VM with admissions and requested a call back.   5/19: Called admissions and they don't have openings until next Wednesday.         Declined:     [facility]  [address] [place] [address]  [phone]  5/17: Referral sent via destination on Epic  5/18: Spoke with admissions to check status of patient referral. They don't have  beds right now. They also only work with TBIs, strokes and spinal chord injuries.         [facility] ([facility])  [address] [place] [address]  [phone]  5/17: Referral sent via destination on Epic  5/18: Spoke with admissions. They declined her admission due to they don't take dialysis patients. They cannot provide assistance to acute care needs.    [facility] 4th floor  [address] [place] [address]  [phone]  Admissions: [phone]  Fax: [phone]  RN to RN:  [phone]  5/18: Messaged [facility] liaison about TCU referral.  5/19: Declined due to no open beds. They stated she needs a higher LOC.    Private pay costs discussed: Not applicable    Additional Information:  Writer spoke with [facility] liaison-Clary Castillo about TCU referral. They declined her admission due to they don't have any open beds at this time. They stated she needs a higher LOC. She would be a better fit for a community TCU at this time or assisted living potentially. They stated writer can re-refer her to them if writer gets many other denials from other TCU agencies. Patient needs a placement that accepts dialysis. Writer called the above listed TCUs again to check if they received the referral.    ANTONIO Castro, SW  6 Med Surg   [facility]

## 2023-05-19 NOTE — PLAN OF CARE
Goal Outcome Evaluation:  1900-0700        Patient is A&O x4. Forgetful at times. Call light within reach, able to make needs known effectively. Needs assistance x1 with gait belt and walker.     RA. Renal diet. PIV on L, SL, intact and patent. Fistula on R for dialysis. LBM: 05/17. Reports pain managed with [place]. Denies SOB, chest pain, n/v and n/t.     Frequent checks done, no acute events. Possible for discharge to TCU.

## 2023-05-19 NOTE — PROGRESS NOTES
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 She had acute-onset weakness approximately 1 week ago that involves her bilateral lower extremities that is associated with low back pain. She also reports abdominal pain, hemoptysis and diarrhea    Assessment & Recommendations:     1. ESRD - Patient receives OP HD at [facility], under the care of Dr Tarango.    - HD orders: TW 60.5 kg, RUE AVG, EPO 7000 U IV q run, Calcitriol 0.75 mcg po q run   - Continue [facility] schedule while inpatient   - Avoid venipuncture/blood pressures right arm   - No heparin given hemoptysis    2. Volume status - No edema/dyspnea/hypoxia. TW 60.5 kg. Pre run weight 57.7 kg today.  B/Ps 140's/. Anuric. Intake 500 ml   - Tolerated 1.1 kg UF yesterday   - Today will decrease TW to 57.5 kg   - Please stand for pre run weights   - Continue I/O. Should be on a 1.5 liter fluid restriction/day    3. HTN - B/ps today 140's/. No edema. CXR unremarkable. CT chest unremarkable. Currently on Losartan 50 mg every day.    - Continue current Losartan dose     4. Electrolytes - No acute concerns. K 4.3 Na 133    5. Acid base - No acute concerns. Bicarb 27    6 BMD - Ca 9.5, albumin 3.5 Phos 3.7   - Continue Phoslo 1334 mg TID w/meals    7. Anemia, h/o recurrent GIB 2/2 AVMs - Hgb 8.3   - On PPI   - GI did not feel patient required EGD    - Doses with Octreotide q 28 days in OP setting   - Continue Epo 7000 unit(s) IV q run    Recommendations were communicated to primary team via progress note    Steph Donald, NP   Division of Renal Disease and Hypertension  [facility]  [facility] [place] Web Console    Interval History :   Nursing and provider notes from last 24 hours reviewed.  No acute renal concerns  Scheduled for routine HD today  Awaiting TCU  "placement    Review of Systems:   I reviewed the following systems:  GI: Feeling good.   Neuro: alert  Constitutional:  no fever or chills  CV: denies dyspnea, CP or edema.    RESP: No further hemoptysis   : Anuric     Physical Exam:   I/O last 3 completed shifts:  In: 500 [P.O.:500]  Out: -    /56   Pulse 68   Temp 98  F (36.7  C) (Oral)   Resp 19   Ht 1.626 m (5' 4\")   Wt 57.7 kg (127 lb 3.2 oz)   SpO2 98%   BMI 21.83 kg/m       GENERAL APPEARANCE: Pleasant female in NAD. No complaints  EYES:  No scleral icterus, pupils equal  PULM: lungs CTA. Breathing is non labored. Not on supplemental oxygen   CV: RRR     -edema none   GI: soft, NT. Non distended   INTEGUMENT: no cyanosis  NEURO:  Alert/interactive  : Anuric   Access : JOSE ARMON AVG    Labs:   All labs reviewed by me  Electrolytes/Renal -   Recent Labs   Lab Test 05/18/23  0908 05/17/23  0651 05/16/23  0713 05/13/23  0645 05/12/23  1636 11/21/19  1044 03/28/19  0456 09/16/18  0501 09/14/18  1159 03/13/16  0446 03/12/16  0552   * 134* 137   < > 138   < > 136   < > 139   < > 143   POTASSIUM 4.3 4.9 4.9   < > 3.4   < > 3.8   < > 4.7   < > 3.6   CHLORIDE 91* 92* 94*   < > 95*   < > 99   < > 99   < > 116*   CO2 27 24 26   < > 22   < > 28   < > 29   < > 19*   BUN 26.0* 45.1* 29.6*   < > 34.4*   < > 14   < > 48*   < > 34*   CR 5.45* 7.78* 5.81*   < > 5.77*   < > 4.30*   < > 8.26*   < > 2.76*   * 113* 102*   < > 123*   < > 102*   < > 121*   < > 104*   BELGICA 9.5 9.1 9.0   < > 9.2   < > 8.7   < > 8.2*   < > 7.9*   MAG  --   --   --   --  2.2  --   --   --   --   --  1.7   PHOS  --   --   --   --  3.7  --  2.9  --  3.6  --  3.0    < > = values in this interval not displayed.       CBC -   Recent Labs   Lab Test 05/19/23  0827 05/18/23  0908 05/17/23  0651   WBC 9.0 9.7 10.3   HGB 8.3* 8.6* 8.7*   * 445 423       LFTs -   Recent Labs   Lab Test 05/17/23  0651 05/16/23  0713 05/15/23  0732   ALKPHOS 169* 169* 180*   BILITOTAL 0.4 0.4 0.4   ALT " 16 18 19   AST 26 34 28   PROTTOTAL 7.4 7.2 7.4   ALBUMIN 3.5 3.5 3.6       Iron Panel -   Recent Labs   Lab Test 05/14/23  0741 01/26/23  1450 01/27/22  1304 10/24/18  1506   IRON 31* 34*  --  241*   IRONSAT 18 13*  --  57*   KASSI 436*  --    < > 176    < > = values in this interval not displayed.         Imaging:    CT CHEST PULMONARY EMBOLISM W CONTRAST, 5/12/2023 9:18 PM     History: Hemoptysis, +dimer     Comparison: None.     Technique: Helical acquisition of CT images of the chest from the lung  apices to the kidneys were acquired after the administration of  intravenous contrast according to the CT pulmonary angiogram protocol.  Axial images were reconstructed in 1 and 3 mm slice thickness. Coronal  reconstructions performed. Three-dimensional (3D) post-processed  angiographic images were reconstructed, archived to PACS and used in  the interpretation of this study.     Contrast dose: iopamidol (ISOVUE-370) solution 58 mL     Findings:   The contrast bolus is adequate.  There is no pulmonary embolism. No  evidence of infection.  No suspicious pulmonary nodules.  No pleural  effusion or pneumothorax.     Mediastinum: The heart is not enlarged. There is no pericardial  effusion. The ascending aorta and main pulmonary arteries are within  normal limits for diameter.     Upper abdomen: There is reflux of contrast into the hepatic veins.  Otherwise, the appearance of the upper abdomen is unremarkable.     Bones and soft tissues: No acute or aggressive osseous lesions.  Scattered degenerative changes of the thoracic and lumbar spine.                                                                      Impression:  1. No pulmonary embolism identified.  2. No other worrisome findings in the chest and upper abdomen.    Current Medications:    atorvastatin  20 mg Oral QPM     calcitRIOL  0.75 mcg Oral Q Mon Wed Fri AM     calcium acetate  1,334 mg Oral TID w/meals     gabapentin  300 mg Oral At Bedtime     losartan  50  mg Oral Daily     multivitamin RENAL  1 tablet Oral Daily     pantoprazole  40 mg Oral BID AC     polyethylene glycol  17 g Oral BID     sertraline  100 mg Oral Daily     sodium chloride (PF)  3 mL Intracatheter Q8H       - MEDICATION INSTRUCTIONS -       Steph Donald, NP

## 2023-05-19 NOTE — PROGRESS NOTES
CLINICAL NUTRITION SERVICES - REASSESSMENT NOTE     Nutrition Prescription    RECOMMENDATIONS FOR MDs/PROVIDERS TO ORDER:  - Nephrology noted pt should be on a 1.5 L fluid restriction.  Would you like to order this.  Pt likely taking in more than this without a formal fluid restriction and miralax is ordered 2x/d (=480 ml water).  Please consider adjusting bowel Rx.      Malnutrition Status:    Severe malnutrition in the context of chronic disease    Recommendations already ordered by Registered Dietitian (RD):  - Continue current supplements.  - Collaborate with other providers--re: above.     Future/Additional Recommendations:  - follow po intake, labs, GI function.      EVALUATION OF THE PROGRESS TOWARD GOALS   Diet: Renal (dialysis) with Nepro BID between meals (first choices butter pecan, 2nd choice berry).   Intake: Intake earlier in stay was 0-25%, but lately 50 to 75%, of most meals documented.  With Nepro BID and 2 to 3 meals/day she is receiving adequate kcal and protein.      Pt is a poor historian and states her intake has been poor.  She is snacking from a bag of sweets.  She reports that she was drinking 3 Nepro a day at home.  (Prior RD note states she reported 1x/d).  Family visiting at the time of my visit and didn't offer other info.  Pt noted she preferred Butter Pecan to the vanilla flavor in room.  Explained butter pecan flavor likely not available.   NEW FINDINGS   -Wt: Wt loss of 8.4% within past 3 months.   05/19/23 0836 57.7 kg (127 lb 3.2 oz) Standing scale   05/17/23 1545 58.3 kg (128 lb 8.5 oz) --   05/15/23 0029 59.4 kg (130 lb 15.3 oz) Bed scale   05/13/23 0400 57.2 kg (126 lb 1.7 oz) Bed scale   05/12/23 1530 63 kg (139 lb) Likely stated based on 3/9 wt     03/09/23 63 kg (138 lb 14.2 oz)       -Labs:   5/18  Na 133,  K+ 4.3 (wnl)  5/12:  Phos 3.7 (no h/o elevated levels)  Mg 3.7  CRP 70.04 5/17  Hgb 8.3 5/19 (h/o AVMs--treated with octreotide q 28 in OP setting)      -GI: Last BM:  Large 5/17 (pt receiving miralax usually 2x/d).     -Renal: Per neprhology note, TW decreased from 60.5 to 57.5 kg.  Note also mentions pt should be on a 1.5 fluid restriction (not currently ordered).     -Meds:   Calcitriol q MWF, phoslo TID, EPO w/ HD run, renal multivit, protonix EC, Miralax 2x/d (would contribute 480 ml/d)    MALNUTRITION  % Intake: Decreased intake does not meet criteria--intake improving since admission  % Weight Loss: > 7.5% in 3 months (severe)  Subcutaneous Fat Loss: Facial region:  mild  Muscle Loss: Temporal:  moderate, Thoracic region (clavicle, acromium bone, deltoid, trapezius, pectoral):  moderate, Upper arm (bicep, tricep):  severe, Dorsal hand:  moderate and Posterior calf:  Severe (may be skewed by pt's difficulty in flexing gastrocnemius)  Fluid Accumulation/Edema: None noted  Malnutrition Diagnosis: Severe malnutrition in the context of chronic disease    Previous Goals   Patient to consume % of nutritionally adequate meal trays TID, or the equivalent with supplements/snacks.  Evaluation: Unable to evaluate--limited documentation of meals and pt states poor intake    Previous Nutrition Diagnosis  Inadequate oral intake related to decreased appetite as evidenced by pt report, 9% wt loss in 2 months, and moderate fat and muscle wasting.     Evaluation: possibly increasing per nursing reports which contradict pt's    CURRENT NUTRITION DIAGNOSIS  Inadequate oral intake related to decreased appetite as evidenced by pt report, 9% wt loss in 2 months, and mild to severe fat and muscle wasting.       INTERVENTIONS  Implementation  Education--answered family's questions about NFPE.  Noted nephrology PA mentioned need for 1.5 L FR and that pt was receiving miralax BID.  I explained I would mention with provider if he agreed to add fluid restriction and possibly adjust bowel Rx as miralax standing orders require 8 oz water per dose.     Continue current supplements.  Collaborate with  other providers.     Goals  Patient to consume % of nutritionally adequate meal trays TID, or the equivalent with supplements/snacks.    Monitoring/Evaluation  Progress toward goals will be monitored and evaluated per protocol.    Agnieszka Newell RD (Becky), LD   6B and 8A Med/surg (M-F) Pager: [phone]  Weekend/holiday pager: [phone]

## 2023-05-19 NOTE — PLAN OF CARE
Pt A&O x4. A of 1 w/ gait belt and walker. Had dialysis run today. During shift, dialysis had to pause so pt could go to bathroom. Pt voided 100 ml and and had large loose stool. Pt dizzy and nauseous after BM. Emesis x1. PRN Zofran given and nausea subsided after. Could not finish full dialysis run, look at dialysis note for more info. PIV removed per pt preference. Continue w/ plan of care.

## 2023-05-20 ENCOUNTER — APPOINTMENT (OUTPATIENT)
Dept: OCCUPATIONAL THERAPY | Facility: CLINIC | Age: 83
DRG: 091 | End: 2023-05-20
Payer: MEDICARE

## 2023-05-20 PROCEDURE — 99207 PR CDG-CUT & PASTE-POTENTIAL IMPACT ON LEVEL: CPT | Performed by: INTERNAL MEDICINE

## 2023-05-20 PROCEDURE — 250N000009 HC RX 250: Performed by: INTERNAL MEDICINE

## 2023-05-20 PROCEDURE — 97110 THERAPEUTIC EXERCISES: CPT | Mod: GO | Performed by: OCCUPATIONAL THERAPIST

## 2023-05-20 PROCEDURE — 120N000002 HC R&B MED SURG/OB UMMC

## 2023-05-20 PROCEDURE — 99232 SBSQ HOSP IP/OBS MODERATE 35: CPT | Performed by: INTERNAL MEDICINE

## 2023-05-20 PROCEDURE — 250N000013 HC RX MED GY IP 250 OP 250 PS 637: Performed by: INTERNAL MEDICINE

## 2023-05-20 PROCEDURE — 250N000013 HC RX MED GY IP 250 OP 250 PS 637: Performed by: PEDIATRICS

## 2023-05-20 PROCEDURE — 97535 SELF CARE MNGMENT TRAINING: CPT | Mod: GO | Performed by: OCCUPATIONAL THERAPIST

## 2023-05-20 PROCEDURE — 250N000013 HC RX MED GY IP 250 OP 250 PS 637: Performed by: STUDENT IN AN ORGANIZED HEALTH CARE EDUCATION/TRAINING PROGRAM

## 2023-05-20 RX ORDER — CODEINE PHOSPHATE AND GUAIFENESIN 10; 100 MG/5ML; MG/5ML
10 SOLUTION ORAL ONCE
Status: COMPLETED | OUTPATIENT
Start: 2023-05-20 | End: 2023-05-20

## 2023-05-20 RX ORDER — BENZONATATE 100 MG/1
100 CAPSULE ORAL 3 TIMES DAILY PRN
Status: DISCONTINUED | OUTPATIENT
Start: 2023-05-20 | End: 2023-05-25 | Stop reason: HOSPADM

## 2023-05-20 RX ORDER — CODEINE PHOSPHATE AND GUAIFENESIN 10; 100 MG/5ML; MG/5ML
10 SOLUTION ORAL EVERY 6 HOURS PRN
Status: DISCONTINUED | OUTPATIENT
Start: 2023-05-20 | End: 2023-05-22

## 2023-05-20 RX ORDER — AMOXICILLIN 250 MG
2 CAPSULE ORAL 2 TIMES DAILY
Status: DISCONTINUED | OUTPATIENT
Start: 2023-05-20 | End: 2023-05-25 | Stop reason: HOSPADM

## 2023-05-20 RX ORDER — OXYMETAZOLINE HYDROCHLORIDE 0.05 G/100ML
2 SPRAY NASAL 2 TIMES DAILY
Status: COMPLETED | OUTPATIENT
Start: 2023-05-20 | End: 2023-05-21

## 2023-05-20 RX ORDER — ALBUTEROL SULFATE 90 UG/1
2 AEROSOL, METERED RESPIRATORY (INHALATION) EVERY 4 HOURS PRN
Status: DISCONTINUED | OUTPATIENT
Start: 2023-05-20 | End: 2023-05-25 | Stop reason: HOSPADM

## 2023-05-20 RX ADMIN — SALINE NASAL SPRAY 2 SPRAY: 1.5 SOLUTION NASAL at 19:29

## 2023-05-20 RX ADMIN — GUAIFENESIN AND CODEINE PHOSPHATE 10 ML: 100; 10 SOLUTION ORAL at 11:34

## 2023-05-20 RX ADMIN — ACETAMINOPHEN 650 MG: 325 TABLET ORAL at 06:42

## 2023-05-20 RX ADMIN — GABAPENTIN 300 MG: 300 CAPSULE ORAL at 21:10

## 2023-05-20 RX ADMIN — Medication 1 TABLET: at 07:40

## 2023-05-20 RX ADMIN — Medication 2 SPRAY: at 11:34

## 2023-05-20 RX ADMIN — Medication 1 LOZENGE: at 11:34

## 2023-05-20 RX ADMIN — ATORVASTATIN CALCIUM 20 MG: 20 TABLET, FILM COATED ORAL at 19:29

## 2023-05-20 RX ADMIN — SALINE NASAL SPRAY 2 SPRAY: 1.5 SOLUTION NASAL at 11:34

## 2023-05-20 RX ADMIN — CALCIUM ACETATE 1334 MG: 667 CAPSULE ORAL at 17:30

## 2023-05-20 RX ADMIN — PANTOPRAZOLE SODIUM 40 MG: 40 TABLET, DELAYED RELEASE ORAL at 17:21

## 2023-05-20 RX ADMIN — PANTOPRAZOLE SODIUM 40 MG: 40 TABLET, DELAYED RELEASE ORAL at 06:37

## 2023-05-20 RX ADMIN — Medication 2 SPRAY: at 19:29

## 2023-05-20 RX ADMIN — CALCIUM ACETATE 1334 MG: 667 CAPSULE ORAL at 11:34

## 2023-05-20 RX ADMIN — AMLODIPINE BESYLATE 5 MG: 5 TABLET ORAL at 07:40

## 2023-05-20 RX ADMIN — LOSARTAN POTASSIUM 50 MG: 50 TABLET, FILM COATED ORAL at 07:40

## 2023-05-20 RX ADMIN — SENNOSIDES AND DOCUSATE SODIUM 2 TABLET: 50; 8.6 TABLET ORAL at 19:29

## 2023-05-20 RX ADMIN — SERTRALINE HYDROCHLORIDE 100 MG: 100 TABLET ORAL at 07:40

## 2023-05-20 RX ADMIN — CALCIUM ACETATE 1334 MG: 667 CAPSULE ORAL at 07:40

## 2023-05-20 ASSESSMENT — ACTIVITIES OF DAILY LIVING (ADL)
ADLS_ACUITY_SCORE: 30

## 2023-05-20 NOTE — PLAN OF CARE
Goal Outcome Evaluation:  [id number]    Patient is A&O x4. Forgetful at times. Call light within reach, able to make needs known effectively. Needs assistance x1 with gait belt and walker.     RA. Renal diet. PIV on L, SL, intact and patent. Fistula on R for dialysis. LBM: 05/19. Reports pain managed with Tylenol. Denies SOB, chest pain, n/v and n/t.     Frequent checks done, no acute events. Possible for discharge to TCU.

## 2023-05-20 NOTE — PROGRESS NOTES
"VS: BP (!) 147/61 (BP Location: Left arm)   Pulse 67   Temp 98.2  F (36.8  C) (Oral)   Resp 18   Ht 1.626 m (5' 4\")   Wt 57.7 kg (127 lb 3.2 oz)   SpO2 97%   BMI 21.83 kg/m       O2: Sating >90% on RA. Lung sounds clear. Denies chest pain and SOB.   Output: Voids spontaneously and adequately.    Last BM: LBM 5/20    Activity: Up with 1x assist, walker and gait belt    Skin: No concerns    Pain: Pt. Denied pain this shift    CMS: A&Ox4. Numbness in lower extremities    Dressing: N/A   Diet: Renal diet     LDA: NO PIV Fistula R arm    Equipment: IV pole, FWW, gait belt, and personal belongings. Call light within reach and uses appropriately.   Plan:  TCU    Additional Info: No acute changes this shift Continue POC       [id number]   "

## 2023-05-20 NOTE — PROGRESS NOTES
Care Management Follow Up    Length of Stay (days): 8    Expected Discharge Date: 05/20/2023     Concerns to be Addressed: discharge planning     Patient plan of care discussed at interdisciplinary rounds: Yes    Anticipated Discharge Disposition:  TCU     Anticipated Discharge Services:  PT/OT  Anticipated Discharge DME:  To be determined    Patient/family educated on Medicare website which has current facility and service quality ratings:  yes  Education Provided on the Discharge Plan: yes Patient/Family in Agreement with the Plan:  yes    Referrals Placed by CM/SW:  See below  Private pay costs discussed: Not applicable    Additional Information:  SW called following facilities re: TCU placement as pt is medically ready for discharge.    [facility]   [address] [place] [address]  [phone]  5/20: message left, await call back to 6 med surg, 8a, 10 ICU pager     [facility]  [address] [place] [address]  Phone: [phone]  5/20: voicemail message left. Was told admissions rep will be in on 5/21 and leaves at 1pm. Provided 6 med/surg 8a and 10 ICU pager number.      [facility]  [address] [place] [address]  Phone: [phone]  5/20: no admissions over weekend      RAMESH Jiang, ANTONIO        Social Work and Care Management Department       SEARCHABLE in [email or web address] - search SOCIAL WORK       [place] (0800 - 1630) Saturday and Sunday     Units: 4A, 4C, & 4E Pager: [phone]     Units: 5A & 5B Pager: [phone]     Units: 6A & 6B   Pager: [phone]     Units: 6C & 6D Pager: [phone]     Units: 7A &7B  Pager: [phone]     Units: 7C, 7D, & 5C Pager: [phone]     Unit: [place] ED Pager: [phone]      [facility] (2218-6703) Saturday and Sunday      Units: 5 Ortho, 5 Med/Surg & WB ED  Pager:[phone]     Units: 6 Med/Surg, 8A, & 10A ICU  Pager:  [phone]        After hours (1630 - 0000) Saturday & Sunday; (5370-4201) Mon-Fri; (9156-3190) [facility] Recognized Holidays     Units: ALL  Pager: [phone]

## 2023-05-20 NOTE — PROGRESS NOTES
[facility]    Medicine Progress Note - Hospitalist Service, GOLD TEAM 17    Date of Admission:  5/12/2023    Assessment & Plan     83 year old female with a history of ESRD (on HD MWF), HTN, PVD, anemia 2/2 ESRD and recurrent GI bleeds (2/2 AVMs), and chronic hep C with compensated cirrhosis admitted on 5/12/2023 for weakness. She had acute-onset weakness approximately 1 week ago that involves her bilateral lower extremities that is associated with low back pain.       Today's changes: 5/20/2023    Overall doing well.  Episode of hemoptysis this am w/mild streak of blood plus little nose bleed. Mild sore throat. Otherwise doing well.  VSS.   Start on robitussin codeine, tessalon prn. Nasal saline and afrin spray. Monitor closely for more hemoptysis.   Initiate FR 1500 ml/day- per Nephrology  Discontinue miralax and start senna colace 2 tab bid ( to reduce volume intake)  Hgb: 5.19: 8.3. stable.   Dialysis per nephrology.  Awaiting tcu placement.         # Gen Weakness  Strongly suspect that her weakness is multifactorial. She notes that her blood pressure medications were recently increased, and she has had lightheadedness and dizziness since that time. In addition, she recently had a GI bleed and has ongoing hemoptysis per her report. Her Hgb on presentation was 10, and Hgb has been 9-10 recently. She does take a narcotic in the outpatient setting, and with her history of ESRD, this could be accumulating and causing weakness. Further, she is on a statin; this is not a new medication but statin-induced myopathy is possible. CT of her head and lumbar spine showed no acute process that explains her weakness. She does still make some urine and has had intermittent burning with urination. On chart review, it appears that her PCP Dr. Rodriguez has had some concerns about her ability to care for herself for some time and asked her to start considering assisted living due to  some cognitive impairment. At discharge from the hospital in January, PT and OT also recommended home PT/OT services.   - Decreased losartan from 100 mg daily to 50 mg daily - titrate as needed.   - PT and OT consults placed. aw tcu placement.  - opioid weaned down as able.   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 Last EGD on 3/9. Endoscopic treatment of duodenal AVMs in march 2023. Ongoing episodes of hemoptysis for at least the past month. CT chest this admission is neg for PE or other lung process   - IV PPI BID --> 5/17: switched to po.   - Pt takes Octreotide injections outpt to prevent bleeding.  - RVP: negative.   - hemoptysis resolved.   - GI signed off, pulm : no bronch, signed off.       # ESRD on HD (MWF)  # Secondary hyperparathyroidism   Has been stable on dialysis for some time. Last run Friday 5/12.   - Neph consulted for dialysis  - Continue home phos binder     # Chronic lower extremity pain  # Lumbar spinal stenosis with neurogenic claudication   # PAD  Lower extremity ultrasounds showed peripheral artery disease. Pt has hx of PAD and on PAD rehab following with vascular surgery. She was previously on cilostasol, but this was not effective and increased her risk of bleeding. She was started on a small dose of percocet for severe pain prior to PT sessions. CT lumbar spine with acute issues.   - Tylenol and vicodin PRNs home dose   - Continue home gabapentin 300 mg qhs  - Repeat FAISAL likely as outpt and follow up with vascular     # Chronic hepatitis C with compensated cirrhosis  Complicated by:  - No hx of Ascites  - Hx of GI bleed due to AVM, no history of varices  - No hx of HE  EGD: 8/14/2018, normal esophagus, bleeding angiodysplastic lesions in  stomach and duodenum.  Last EGD on 3/9. Endoscopic treatment of duodenal/stomach/Jejunum AVMs   HCC: 5/17/2018, no liver lesions.   - Will add bowel regimen  - Follow up with GI outpt     # Depression  - Continue home sertraline 100 mg daily     # HTN  - losartan. Amlod.   - PRN hydralazine   - Monitor.      # HLD  - Continuing home statin       # Severe Malnutrition: based on nutrition assessment . Supplements per nutrition.          Diet: Snacks/Supplements Adult: Nepro Oral Supplement; Between Meals  Renal Diet (dialysis)  Fluid restriction 1500 ML FLUID    DVT Prophylaxis: Heparin subcutaneous held due to bleeding. SCDs ordered   Rodriguez Catheter: Not present  Lines: None     Cardiac Monitoring: None  Code Status: No CPR- Pre-arrest intubation OK      Clinically Significant Risk Factors        Disposition Plan     Expected Discharge Date: 05/20/2023    Discharge Delays: Placement - TCU  *Medically Ready for Discharge  Destination: other (comment) (PT is recommending TCU. This has not been discussed with patient yet.)  Discharge Comments: Will need placement. Has GI bleed and will need to be evaluated by GI 1st          Idris Kellogg MD  Hospitalist Service, GOLD TEAM [address] [place] [address]  Securely message with [employer] (more info)  Text page via [facility] Paging/Directory   See signed in provider for up to date coverage information  ______________________________________________________________________    Interval History   Doing better  No bleeding  No fever chills.   gen weakness  No chest pain/sob/NVD  Alert oriented.     Physical Exam   Vital Signs: Temp: 98.3  F (36.8  C) Temp src: Oral BP: 125/56 Pulse: 83   Resp: 18 SpO2: 98 % O2 Device: None (Room air)    Weight: 127 lbs 3.2 oz    General Appearance: Awake, interactive, NAD  Respiratory: Normal work of breathing. On RA  Cardiovascular: RRR  GI: Soft. NT. ND.  Extremities: Distally wwp. No pedal edema  Neuro: Grossly non  focal.   Others: Stable mood.         Medical Decision Making     45 MINUTES SPENT BY ME on the date of service doing chart review, history, exam, documentation & further activities per the note.      Data   ------------------------- PAST 24 HR DATA REVIEWED -----------------------------------------------         CMP  Recent Labs   Lab 05/18/23  0908 05/17/23  0651 05/16/23  0713 05/15/23  0732 05/14/23  0741   * 134* 137 140 138   POTASSIUM 4.3 4.9 4.9 5.0 4.8   CHLORIDE 91* 92* 94* 97* 96*   CO2 27 24 26 22 23   ANIONGAP 15 18* 17* 21* 19*   * 113* 102* 121* 98   BUN 26.0* 45.1* 29.6* 87.4* 72.7*   CR 5.45* 7.78* 5.81* 11.43* 9.47*   GFRESTIMATED 7* 5* 7* 3* 4*   BELGICA 9.5 9.1 9.0 9.2 9.0   PROTTOTAL  --  7.4 7.2 7.4 7.1   ALBUMIN  --  3.5 3.5 3.6 3.5   BILITOTAL  --  0.4 0.4 0.4 0.4   ALKPHOS  --  169* 169* 180* 159*   AST  --  26 34 28 33   ALT  --  16 18 19 22     CBC  Recent Labs   Lab 05/19/23  0827 05/18/23  0908 05/17/23  0651 05/16/23  0713   WBC 9.0 9.7 10.3 9.9   RBC 2.71* 2.78* 2.88* 2.84*   HGB 8.3* 8.6* 8.7* 8.8*   HCT 26.8* 26.7* 28.0* 27.6*   MCV 99 96 97 97   MCH 30.6 30.9 30.2 31.0   MCHC 31.0* 32.2 31.1* 31.9   RDW 15.6* 15.5* 15.4* 15.7*   * 445 423 376     INR  Recent Labs   Lab 05/14/23  1747   INR 1.38*     Arterial Blood GasNo lab results found in last 7 days.

## 2023-05-20 NOTE — PLAN OF CARE
Goal Outcome Evaluation:      Plan of Care Reviewed With: patient, child    Overall Patient Progress: improvingOverall Patient Progress: improving    Outcome Evaluation: Pt states intake poor but RNs charting 50-75% when meals documented and receiving high calorie and protein content from meals/Nepro.

## 2023-05-20 NOTE — PROGRESS NOTES
"  VS: /56 (BP Location: Left arm, Patient Position: Semi-Howard's, Cuff Size: Adult Regular)   Pulse 83   Temp 98.3  F (36.8  C) (Oral)   Resp 18   Ht 1.626 m (5' 4\")   Wt 57.7 kg (127 lb 3.2 oz)   SpO2 98%   BMI 21.83 kg/m     O2: On RA   Output: Intermittently incontinent of bladder. Continent of bowel.    Last BM: 05/20. Bowel sounds active in all quadrants.    Activity: Assist of 1 with walker and gait belt.    Skin: Skin WDL.    Pain: Reports pain 3 out of 10.    CMS: Pt denied SOB, chest pain, and nausea and vomitting. Pt has baseline numbness and tingling.    Dressing: N/A   Diet: Renal Diet.    LDA: NO IV ACCESS. Right arm fistula    Equipment: Pt belongings and call light within reach.    Plan: Follow POC    Additional Info: Pt on 1500 ml fluid restriction and pt is also on strict I & O.       "

## 2023-05-21 ENCOUNTER — APPOINTMENT (OUTPATIENT)
Dept: PHYSICAL THERAPY | Facility: CLINIC | Age: 83
DRG: 091 | End: 2023-05-21
Payer: MEDICARE

## 2023-05-21 LAB
BACTERIA BLD CULT: NO GROWTH
HCT VFR BLD AUTO: 23.9 % (ref 35–47)
HGB BLD-MCNC: 7.2 G/DL (ref 11.7–15.7)

## 2023-05-21 PROCEDURE — 250N000013 HC RX MED GY IP 250 OP 250 PS 637: Performed by: STUDENT IN AN ORGANIZED HEALTH CARE EDUCATION/TRAINING PROGRAM

## 2023-05-21 PROCEDURE — 250N000013 HC RX MED GY IP 250 OP 250 PS 637: Performed by: INTERNAL MEDICINE

## 2023-05-21 PROCEDURE — 99232 SBSQ HOSP IP/OBS MODERATE 35: CPT | Performed by: INTERNAL MEDICINE

## 2023-05-21 PROCEDURE — 36415 COLL VENOUS BLD VENIPUNCTURE: CPT | Performed by: INTERNAL MEDICINE

## 2023-05-21 PROCEDURE — 250N000013 HC RX MED GY IP 250 OP 250 PS 637: Performed by: PEDIATRICS

## 2023-05-21 PROCEDURE — 97530 THERAPEUTIC ACTIVITIES: CPT | Mod: GP | Performed by: PHYSICAL THERAPIST

## 2023-05-21 PROCEDURE — 85018 HEMOGLOBIN: CPT | Performed by: INTERNAL MEDICINE

## 2023-05-21 PROCEDURE — 120N000002 HC R&B MED SURG/OB UMMC

## 2023-05-21 PROCEDURE — 99207 PR CDG-CUT & PASTE-POTENTIAL IMPACT ON LEVEL: CPT | Performed by: INTERNAL MEDICINE

## 2023-05-21 PROCEDURE — 97116 GAIT TRAINING THERAPY: CPT | Mod: GP | Performed by: PHYSICAL THERAPIST

## 2023-05-21 RX ADMIN — AMLODIPINE BESYLATE 5 MG: 5 TABLET ORAL at 08:22

## 2023-05-21 RX ADMIN — PANTOPRAZOLE SODIUM 40 MG: 40 TABLET, DELAYED RELEASE ORAL at 16:32

## 2023-05-21 RX ADMIN — SALINE NASAL SPRAY 1 SPRAY: 1.5 SOLUTION NASAL at 19:13

## 2023-05-21 RX ADMIN — PANTOPRAZOLE SODIUM 40 MG: 40 TABLET, DELAYED RELEASE ORAL at 08:22

## 2023-05-21 RX ADMIN — ATORVASTATIN CALCIUM 20 MG: 20 TABLET, FILM COATED ORAL at 19:13

## 2023-05-21 RX ADMIN — SALINE NASAL SPRAY 1 SPRAY: 1.5 SOLUTION NASAL at 08:27

## 2023-05-21 RX ADMIN — Medication 2 SPRAY: at 08:28

## 2023-05-21 RX ADMIN — CALCIUM ACETATE 1334 MG: 667 CAPSULE ORAL at 19:13

## 2023-05-21 RX ADMIN — SALINE NASAL SPRAY 1 SPRAY: 1.5 SOLUTION NASAL at 12:33

## 2023-05-21 RX ADMIN — Medication 2 SPRAY: at 19:14

## 2023-05-21 RX ADMIN — HYDROCODONE BITARTRATE AND ACETAMINOPHEN 1 TABLET: 5; 325 TABLET ORAL at 16:36

## 2023-05-21 RX ADMIN — GABAPENTIN 300 MG: 300 CAPSULE ORAL at 21:39

## 2023-05-21 RX ADMIN — CALCIUM ACETATE 1334 MG: 667 CAPSULE ORAL at 12:33

## 2023-05-21 RX ADMIN — CALCIUM ACETATE 1334 MG: 667 CAPSULE ORAL at 08:22

## 2023-05-21 RX ADMIN — SALINE NASAL SPRAY 1 SPRAY: 1.5 SOLUTION NASAL at 16:32

## 2023-05-21 RX ADMIN — LOSARTAN POTASSIUM 50 MG: 50 TABLET, FILM COATED ORAL at 08:22

## 2023-05-21 RX ADMIN — ACETAMINOPHEN 650 MG: 325 TABLET ORAL at 21:45

## 2023-05-21 RX ADMIN — Medication 1 TABLET: at 08:22

## 2023-05-21 RX ADMIN — ACETAMINOPHEN 650 MG: 325 TABLET ORAL at 08:22

## 2023-05-21 RX ADMIN — SERTRALINE HYDROCHLORIDE 100 MG: 100 TABLET ORAL at 08:22

## 2023-05-21 ASSESSMENT — ACTIVITIES OF DAILY LIVING (ADL)
ADLS_ACUITY_SCORE: 30

## 2023-05-21 NOTE — PROGRESS NOTES
"[id number]    Plan of Care Reviewed With: Patient    Overall Patient Progress: No Change    Outcome Evaluation: Pt is A & O x4 on RA. C/O 5/10 generalized neck pain - administered PRN Tylenol. Denies nausea, chest pain & SOB. Pt does not have IV access & has a R arm fistula. Per pt report, BLE numbness present per baseline. Pt is intermittently incontinent of bladder & continent of bowel - last BM 5/20 per pt report. Pt is assist x1 w/ walker & gait belt & uses call light appropriately.    Shift Updates    Strict I/O & 1500 ml fluid restriction maintained & documented throughout shift - 760 ml left.    Vitals: BP (!) 141/55 (BP Location: Left arm)   Pulse 76   Temp 98.6  F (37  C) (Oral)   Resp 18   Ht 1.626 m (5' 4\")   Wt 57.7 kg (127 lb 3.2 oz)   SpO2 96%   BMI 21.83 kg/m      Plan: Medically ready for discharge, pending TCU placement. Continue w/ plan of care.   "

## 2023-05-21 NOTE — PROGRESS NOTES
"BP (!) 154/59 (BP Location: Left arm)   Pulse 70   Temp 98.6  F (37  C) (Oral)   Resp 18   Ht 1.626 m (5' 4\")   Wt 60.5 kg (133 lb 4.8 oz)   SpO2 95%   BMI 22.88 kg/m       VSS ex HTN, calm and cooperative, A&Ox4. Denies chest pain, SOB, n/v, numbness/tingling, and dizziess  LS clear on room air  BS active, LBM 5/21. Pt on hemodialysis, minimal voiding  Ax1 with walker and gait belt  Skin intact  R fistula for hemodialysis  Pt c/o generalized neck pain, 9/10. PRN pain medication administered with some relief (see MAR)  Renal diet with 1,500mL fluid restriction. Able to make needs known. Dialysis scheduled for tomorrow 5/22  Call light in reach, bed alarm on, continue POC    Pt coughing up bright red blood around 2030, provider notified. Hemoglobin and hematocrit labs ordered q8h (see previous note).  "

## 2023-05-21 NOTE — PLAN OF CARE
"               VS: /57 (BP Location: Left arm, Patient Position: Semi-Howard's, Cuff Size: Adult Regular)   Pulse 72   Temp 98.8  F (37.1  C) (Oral)   Resp 18   Ht 1.626 m (5' 4\")   Wt 57.7 kg (127 lb 3.2 oz)   SpO2 96%   BMI 21.83 kg/m     O2: SpO2 > 96% and stable on RA. LS clear and equal bilaterally. Denies chest pain and SOB.    Output: Voids without any concerns. Incontinent of bladder but continent of bowel   Last BM: No BM this shift   Activity: One person assist with walker and gait belt   Skin: WDL except Fistula right arm   Pain: Denied any pain or discomfort this shift   CMS: AOx4. Reported baseline numbness and tingling BLE   Dressing: N/A   Diet: Renal diet. Denies nausea/vomiting.    LDA: No PIV Fistula to right arm   Equipment: Personal belongings,    Plan: Anticipated discharge to TCU. Continue with plan of care. Call light within reach.       "

## 2023-05-21 NOTE — PROGRESS NOTES
Care Management Follow Up    Length of Stay (days): 9    Expected Discharge Date: 05/20/2023     Concerns to be Addressed: discharge planning     Patient plan of care discussed at interdisciplinary rounds: Yes    Anticipated Discharge Disposition:  TCU     Anticipated Discharge Services:  TCU therapy services  Anticipated Discharge DME:  n/a    Patient/family educated on Medicare website which has current facility and service quality ratings:  yes  Education Provided on the Discharge Plan:  yes  Patient/Family in Agreement with the Plan:  yes    Referrals Placed by CM/SW:      [facility]   [address] [place]  [address]  Phone: [phone]  Admissions: [phone]  Fax: [phone]  - 5/21: per SW note 5/20, admissions is supposed to be in today 5/21 until 1pm. SW called at 10:31am, got transferred to admissions phone, and left a VM for Stephanie in admissions. SW left the 6C SW to call today and the 6MedSurg/8A/10ICU pager as well. SW called Stephanie's direct phone line at 11:21am and the phone went directly to  so SW didn't leave one as one was left about an hour ago. SW to call once more this afternoon. SW called admissions again at 2:26pm and left a VM and left the 6MedSurg/8A/10ICU pager as well.    Private pay costs discussed: Not applicable    Additional Information:  [facility] is following for discharge planning.    See above and SW note 5/20 for updates.    Gray Christianson, ANTONIO, LGSW  5/21/2023  6C/6D      Social Work and Care Management Department       SEARCHABLE in [employer] - search SOCIAL WORK       [facility] (0800 - 1630) Saturday and Sunday     Units: 4A, 4C, & 4E Pager: [phone]     Units: 5A & 5B Pager: [phone]     Units: 6A & 6B   Pager: [phone]     Units: 6C & 6D Pager: [phone]     Units: 7A &7B  Pager: [phone]     Units: 7C, 7D, & 5C Pager: [phone]     Unit: [facility] ED Pager: [phone]      [facility] ([phone]) Saturday and Sunday       Units: 5 Ortho, 5 Med/Surg & WB ED  Pager:[phone]     Units: 6 Med/Surg, 8A, & 10A ICU  Pager: [phone]        After hours (1630 - 0000) Saturday & Sunday; ([id number]) Mon-Fri; (7629-1580) [facility] Recognized Holidays     Units: ALL  Pager: [phone]

## 2023-05-21 NOTE — PROGRESS NOTES
[facility]    Medicine Progress Note - Hospitalist Service, GOLD TEAM 17    Date of Admission:  5/12/2023    Assessment & Plan     83 year old female with a history of ESRD (on HD MWF), HTN, PVD, anemia 2/2 ESRD and recurrent GI bleeds (2/2 AVMs), and chronic hep C with compensated cirrhosis admitted on 5/12/2023 for weakness. She had acute-onset weakness approximately 1 week ago that involves her bilateral lower extremities that is associated with low back pain.       Today's changes: 5/21/2023      Overall doing well.  Denies any hemoptysis currently.  No fever.  No chest pain or shortness of breath.  VSS.   Monitor closely for more hemoptysis.   Hemoglobin stable 8.3.  Dialysis per nephrology.  Awaiting tcu placement.         # Gen Weakness  Strongly suspect that her weakness is multifactorial. She notes that her blood pressure medications were recently increased, and she has had lightheadedness and dizziness since that time. In addition, she recently had a GI bleed and has ongoing hemoptysis per her report. Her Hgb on presentation was 10, and Hgb has been 9-10 recently. She does take a narcotic in the outpatient setting, and with her history of ESRD, this could be accumulating and causing weakness. Further, she is on a statin; this is not a new medication but statin-induced myopathy is possible. CT of her head and lumbar spine showed no acute process that explains her weakness. She does still make some urine and has had intermittent burning with urination. On chart review, it appears that her PCP Dr. Rodriguez has had some concerns about her ability to care for herself for some time and asked her to start considering assisted living due to some cognitive impairment. At discharge from the hospital in January, PT and OT also recommended home PT/OT services.   - Decreased losartan from 100 mg daily to 50 mg daily - titrate as needed.   - PT and OT consults placed. aw  tcu placement.  - opioid weaned down as able.   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 Last EGD on 3/9. Endoscopic treatment of duodenal AVMs in march 2023. Ongoing episodes of hemoptysis for at least the past month. CT chest this admission is neg for PE or other lung process   - IV PPI BID --> 5/17: switched to po.   - Pt takes Octreotide injections outpt to prevent bleeding.  - RVP: negative.   - hemoptysis resolved.   - GI signed off, pulm : no bronch, signed off.       # ESRD on HD (MWF)  # Secondary hyperparathyroidism   Has been stable on dialysis for some time. Last run Friday 5/12.   - Neph consulted for dialysis  - Continue home phos binder     # Chronic lower extremity pain  # Lumbar spinal stenosis with neurogenic claudication   # PAD  Lower extremity ultrasounds showed peripheral artery disease. Pt has hx of PAD and on PAD rehab following with vascular surgery. She was previously on cilostasol, but this was not effective and increased her risk of bleeding. She was started on a small dose of percocet for severe pain prior to PT sessions. CT lumbar spine with acute issues.   - Tylenol and vicodin PRNs home dose   - Continue home gabapentin 300 mg qhs  - Repeat FAISAL likely as outpt and follow up with vascular     # Chronic hepatitis C with compensated cirrhosis  Complicated by:  - No hx of Ascites  - Hx of GI bleed due to AVM, no history of varices  - No hx of HE  EGD: 8/14/2018, normal esophagus, bleeding angiodysplastic lesions in stomach and duodenum.  Last EGD on 3/9. Endoscopic treatment of duodenal/stomach/Jejunum AVMs   HCC: 5/17/2018, no liver lesions.   - Will add bowel regimen  - Follow up with GI outpt     # Depression  - Continue home sertraline 100  mg daily     # HTN  - losartan. Amlod.   - PRN hydralazine   - Monitor.      # HLD  - Continuing home statin       # Severe Malnutrition: based on nutrition assessment . Supplements per nutrition.          Diet: Snacks/Supplements Adult: Nepro Oral Supplement; Between Meals  Renal Diet (dialysis)  Fluid restriction 1500 ML FLUID    DVT Prophylaxis: Heparin subcutaneous held due to bleeding. SCDs ordered   Rodriguez Catheter: Not present  Lines: None     Cardiac Monitoring: None  Code Status: No CPR- Pre-arrest intubation OK      Clinically Significant Risk Factors        Disposition Plan           Idris Kellogg MD  Hospitalist Service, GOLD TEAM 17  [facility]  Securely message with [employer] (more info)  Text page via [employer] Paging/Directory   See signed in provider for up to date coverage information  ______________________________________________________________________    Interval History   Doing better  No bleeding  No fever chills.   gen weakness  No chest pain/sob/NVD  Alert oriented.     Physical Exam   Vital Signs: Temp: 98.6  F (37  C) Temp src: Oral BP: (!) 141/55 Pulse: 76   Resp: 18 SpO2: 96 % O2 Device: None (Room air)    Weight: 127 lbs 3.2 oz    General Appearance: Awake, interactive, NAD  Respiratory: Normal work of breathing. On RA  Cardiovascular: RRR  GI: Soft. NT. ND.  Extremities: Distally wwp. No pedal edema  Neuro: Grossly non focal.   Others: Stable mood.         Medical Decision Making     40 MINUTES SPENT BY ME on the date of service doing chart review, history, exam, documentation & further activities per the note.      Data   ------------------------- PAST 24 HR DATA REVIEWED -----------------------------------------------         CMP  Recent Labs   Lab 05/18/23  0908 05/17/23  0651 05/16/23  0713 05/15/23  0732   * 134* 137 140   POTASSIUM 4.3 4.9 4.9 5.0   CHLORIDE 91* 92* 94* 97*   CO2 27 24 26 22   ANIONGAP 15 18* 17* 21*   * 113*  102* 121*   BUN 26.0* 45.1* 29.6* 87.4*   CR 5.45* 7.78* 5.81* 11.43*   GFRESTIMATED 7* 5* 7* 3*   BELGICA 9.5 9.1 9.0 9.2   PROTTOTAL  --  7.4 7.2 7.4   ALBUMIN  --  3.5 3.5 3.6   BILITOTAL  --  0.4 0.4 0.4   ALKPHOS  --  169* 169* 180*   AST  --  26 34 28   ALT  --  16 18 19     CBC  Recent Labs   Lab 05/19/23  0827 05/18/23  0908 05/17/23  0651 05/16/23  0713   WBC 9.0 9.7 10.3 9.9   RBC 2.71* 2.78* 2.88* 2.84*   HGB 8.3* 8.6* 8.7* 8.8*   HCT 26.8* 26.7* 28.0* 27.6*   MCV 99 96 97 97   MCH 30.6 30.9 30.2 31.0   MCHC 31.0* 32.2 31.1* 31.9   RDW 15.6* 15.5* 15.4* 15.7*   * 445 423 376     INR  Recent Labs   Lab 05/14/23  1747   INR 1.38*     Arterial Blood GasNo lab results found in last 7 days.

## 2023-05-22 ENCOUNTER — APPOINTMENT (OUTPATIENT)
Dept: GENERAL RADIOLOGY | Facility: CLINIC | Age: 83
DRG: 091 | End: 2023-05-22
Attending: INTERNAL MEDICINE
Payer: MEDICARE

## 2023-05-22 LAB
ANION GAP SERPL CALCULATED.3IONS-SCNC: 17 MMOL/L (ref 7–15)
BUN SERPL-MCNC: 61.8 MG/DL (ref 8–23)
CALCIUM SERPL-MCNC: 8.8 MG/DL (ref 8.8–10.2)
CHLORIDE SERPL-SCNC: 94 MMOL/L (ref 98–107)
CREAT SERPL-MCNC: 9.69 MG/DL (ref 0.51–0.95)
DEPRECATED HCO3 PLAS-SCNC: 23 MMOL/L (ref 22–29)
ERYTHROCYTE [DISTWIDTH] IN BLOOD BY AUTOMATED COUNT: 16.1 % (ref 10–15)
GFR SERPL CREATININE-BSD FRML MDRD: 4 ML/MIN/1.73M2
GLUCOSE SERPL-MCNC: 103 MG/DL (ref 70–99)
HCT VFR BLD AUTO: 22.9 % (ref 35–47)
HCT VFR BLD AUTO: 24.5 % (ref 35–47)
HCT VFR BLD AUTO: 24.8 % (ref 35–47)
HGB BLD-MCNC: 7.2 G/DL (ref 11.7–15.7)
HGB BLD-MCNC: 7.7 G/DL (ref 11.7–15.7)
HGB BLD-MCNC: 7.9 G/DL (ref 11.7–15.7)
MCH RBC QN AUTO: 30.6 PG (ref 26.5–33)
MCHC RBC AUTO-ENTMCNC: 31.4 G/DL (ref 31.5–36.5)
MCV RBC AUTO: 97 FL (ref 78–100)
PLATELET # BLD AUTO: 458 10E3/UL (ref 150–450)
POTASSIUM SERPL-SCNC: 5 MMOL/L (ref 3.4–5.3)
RBC # BLD AUTO: 2.35 10E6/UL (ref 3.8–5.2)
SODIUM SERPL-SCNC: 134 MMOL/L (ref 136–145)
WBC # BLD AUTO: 10 10E3/UL (ref 4–11)

## 2023-05-22 PROCEDURE — 90935 HEMODIALYSIS ONE EVALUATION: CPT | Performed by: INTERNAL MEDICINE

## 2023-05-22 PROCEDURE — 99207 PR CDG-CUT & PASTE-POTENTIAL IMPACT ON LEVEL: CPT | Performed by: INTERNAL MEDICINE

## 2023-05-22 PROCEDURE — 258N000003 HC RX IP 258 OP 636: Performed by: INTERNAL MEDICINE

## 2023-05-22 PROCEDURE — 85014 HEMATOCRIT: CPT | Performed by: INTERNAL MEDICINE

## 2023-05-22 PROCEDURE — 36415 COLL VENOUS BLD VENIPUNCTURE: CPT | Performed by: INTERNAL MEDICINE

## 2023-05-22 PROCEDURE — 250N000011 HC RX IP 250 OP 636: Performed by: INTERNAL MEDICINE

## 2023-05-22 PROCEDURE — 80048 BASIC METABOLIC PNL TOTAL CA: CPT | Performed by: INTERNAL MEDICINE

## 2023-05-22 PROCEDURE — 250N000013 HC RX MED GY IP 250 OP 250 PS 637: Performed by: PEDIATRICS

## 2023-05-22 PROCEDURE — 90937 HEMODIALYSIS REPEATED EVAL: CPT

## 2023-05-22 PROCEDURE — 634N000001 HC RX 634: Performed by: INTERNAL MEDICINE

## 2023-05-22 PROCEDURE — 71045 X-RAY EXAM CHEST 1 VIEW: CPT | Mod: 26 | Performed by: RADIOLOGY

## 2023-05-22 PROCEDURE — 250N000013 HC RX MED GY IP 250 OP 250 PS 637

## 2023-05-22 PROCEDURE — 250N000013 HC RX MED GY IP 250 OP 250 PS 637: Performed by: INTERNAL MEDICINE

## 2023-05-22 PROCEDURE — 120N000002 HC R&B MED SURG/OB UMMC

## 2023-05-22 PROCEDURE — 250N000013 HC RX MED GY IP 250 OP 250 PS 637: Performed by: STUDENT IN AN ORGANIZED HEALTH CARE EDUCATION/TRAINING PROGRAM

## 2023-05-22 PROCEDURE — 99233 SBSQ HOSP IP/OBS HIGH 50: CPT | Performed by: INTERNAL MEDICINE

## 2023-05-22 PROCEDURE — 71045 X-RAY EXAM CHEST 1 VIEW: CPT

## 2023-05-22 RX ORDER — CODEINE PHOSPHATE AND GUAIFENESIN 10; 100 MG/5ML; MG/5ML
10 SOLUTION ORAL 2 TIMES DAILY
Status: DISCONTINUED | OUTPATIENT
Start: 2023-05-22 | End: 2023-05-25 | Stop reason: HOSPADM

## 2023-05-22 RX ORDER — CODEINE PHOSPHATE AND GUAIFENESIN 10; 100 MG/5ML; MG/5ML
10 SOLUTION ORAL 4 TIMES DAILY PRN
Status: DISCONTINUED | OUTPATIENT
Start: 2023-05-22 | End: 2023-05-22

## 2023-05-22 RX ORDER — HYDRALAZINE HYDROCHLORIDE 25 MG/1
25-50 TABLET, FILM COATED ORAL 4 TIMES DAILY PRN
Status: DISCONTINUED | OUTPATIENT
Start: 2023-05-22 | End: 2023-05-25 | Stop reason: HOSPADM

## 2023-05-22 RX ORDER — CODEINE PHOSPHATE AND GUAIFENESIN 10; 100 MG/5ML; MG/5ML
10 SOLUTION ORAL 2 TIMES DAILY PRN
Status: DISCONTINUED | OUTPATIENT
Start: 2023-05-22 | End: 2023-05-25 | Stop reason: HOSPADM

## 2023-05-22 RX ORDER — CODEINE PHOSPHATE AND GUAIFENESIN 10; 100 MG/5ML; MG/5ML
10 SOLUTION ORAL 3 TIMES DAILY
Status: DISCONTINUED | OUTPATIENT
Start: 2023-05-22 | End: 2023-05-22

## 2023-05-22 RX ADMIN — SENNOSIDES AND DOCUSATE SODIUM 2 TABLET: 50; 8.6 TABLET ORAL at 21:03

## 2023-05-22 RX ADMIN — SERTRALINE HYDROCHLORIDE 100 MG: 100 TABLET ORAL at 08:01

## 2023-05-22 RX ADMIN — LOSARTAN POTASSIUM 50 MG: 50 TABLET, FILM COATED ORAL at 08:01

## 2023-05-22 RX ADMIN — HYDROCODONE BITARTRATE AND ACETAMINOPHEN 1 TABLET: 5; 325 TABLET ORAL at 00:13

## 2023-05-22 RX ADMIN — CALCIUM ACETATE 1334 MG: 667 CAPSULE ORAL at 21:03

## 2023-05-22 RX ADMIN — SODIUM CHLORIDE 300 ML: 9 INJECTION, SOLUTION INTRAVENOUS at 11:26

## 2023-05-22 RX ADMIN — CALCITRIOL CAPSULES 0.25 MCG 0.75 MCG: 0.25 CAPSULE ORAL at 08:01

## 2023-05-22 RX ADMIN — CALCIUM ACETATE 1334 MG: 667 CAPSULE ORAL at 15:46

## 2023-05-22 RX ADMIN — ATORVASTATIN CALCIUM 20 MG: 20 TABLET, FILM COATED ORAL at 21:04

## 2023-05-22 RX ADMIN — SALINE NASAL SPRAY 1 SPRAY: 1.5 SOLUTION NASAL at 08:02

## 2023-05-22 RX ADMIN — SALINE NASAL SPRAY 2 SPRAY: 1.5 SOLUTION NASAL at 21:04

## 2023-05-22 RX ADMIN — CALCIUM ACETATE 1334 MG: 667 CAPSULE ORAL at 08:01

## 2023-05-22 RX ADMIN — GABAPENTIN 300 MG: 300 CAPSULE ORAL at 22:06

## 2023-05-22 RX ADMIN — HYDROCODONE BITARTRATE AND ACETAMINOPHEN 1 TABLET: 5; 325 TABLET ORAL at 15:46

## 2023-05-22 RX ADMIN — Medication 1 TABLET: at 08:01

## 2023-05-22 RX ADMIN — GUAIFENESIN AND CODEINE PHOSPHATE 10 ML: 10; 100 LIQUID ORAL at 21:03

## 2023-05-22 RX ADMIN — SODIUM CHLORIDE 250 ML: 9 INJECTION, SOLUTION INTRAVENOUS at 11:26

## 2023-05-22 RX ADMIN — EPOETIN ALFA-EPBX 8000 UNITS: 10000 INJECTION, SOLUTION INTRAVENOUS; SUBCUTANEOUS at 11:50

## 2023-05-22 RX ADMIN — PANTOPRAZOLE SODIUM 40 MG: 40 TABLET, DELAYED RELEASE ORAL at 15:46

## 2023-05-22 RX ADMIN — PANTOPRAZOLE SODIUM 40 MG: 40 TABLET, DELAYED RELEASE ORAL at 08:01

## 2023-05-22 RX ADMIN — Medication: at 11:29

## 2023-05-22 RX ADMIN — GUAIFENESIN AND CODEINE PHOSPHATE 10 ML: 10; 100 LIQUID ORAL at 15:47

## 2023-05-22 RX ADMIN — OCTREOTIDE ACETATE 20 MG: KIT at 17:34

## 2023-05-22 RX ADMIN — AMLODIPINE BESYLATE 5 MG: 5 TABLET ORAL at 08:01

## 2023-05-22 RX ADMIN — SALINE NASAL SPRAY 2 SPRAY: 1.5 SOLUTION NASAL at 15:47

## 2023-05-22 ASSESSMENT — ACTIVITIES OF DAILY LIVING (ADL)
ADLS_ACUITY_SCORE: 30
ADLS_ACUITY_SCORE: 26
ADLS_ACUITY_SCORE: 30
ADLS_ACUITY_SCORE: 26
ADLS_ACUITY_SCORE: 26
ADLS_ACUITY_SCORE: 24
ADLS_ACUITY_SCORE: 30

## 2023-05-22 NOTE — PROGRESS NOTES
HEMODIALYSIS TREATMENT NOTE    Date: 5/22/2023  Time: 12:24 PM    Data:  Pre Wt: 60.5   Desired Wt:   kg   Post Wt: 58.6 (estimated)  Weight change: 1.9 kg  Ultrafiltration - Post Run Net Total Removed (mL):1900mL  Vascular Access Status: patent  Dialyzer Rinse: Light  Total Blood Volume Processed: 66 L Liters  Total Dialysis (Treatment) Time: 3 Hours    Lab:   No    Interventions:  3 hr HD via right Upper AVG at 400 BFR using 15 gauge needles. 2 liter net UF goal maintaning SBP>100. Epo 8000 units.     Assessment:  Pt tolerated well. Normotensive throughout. Pt cooperative. Rested throughout tx. Oxygenating well on RA. No obvious signs of edema. Breathing regularly. Access cannulated without difficulty, good pressures at 400 BFR.        Plan:    Per Renal

## 2023-05-22 NOTE — PLAN OF CARE
Goal Outcome Evaluation: Pt to complete 3 hour HD and 2 liter net removal maintaining SBP>100.

## 2023-05-22 NOTE — PROGRESS NOTES
"Nephrology Dialysis visit note: 5/22/23    This patient was seen and examined while on dialysis. Laboratory results and nurses' notes were reviewed    S: She is doing well on the run. Denies any symptoms. Had one episode of hemoptysis last night which she and her daughter thinks was a large amount of blood     /59 (BP Location: Left arm)   Pulse 71   Temp 98.1  F (36.7  C) (Oral)   Resp 16   Ht 1.626 m (5' 4\")   Wt 60.5 kg (133 lb 4.8 oz)   SpO2 97%   BMI 22.88 kg/m    Gen - nad  HENT - neck supple  CV - rrr, no rub  Resp - cta bilat  Abd - BS+, NT/ND  Ext - no edema    Labs noted  Medications reviewed    Assessment:  82yo F with ESKD admitted with weakness, has ongoing intermittent hemoptysis vs UGI bleed. Awaiting TCU placement     # ESRD:     - [facility] - Dr Tarango      - TW; 60.5 Kg     - Access: rt UE AVG     - Secondary hyperparathyroidism: Calcitriol 0.75 mcg PO Q run     - Anemia of ESRD + Anemia from acute blood loss : EPO 7000 international unit(s) Q run, PRBC transfusion not needed yet      - No heparin due to hemoptysis     # Hypertension   # Intermittent hemoptysis  # GI bleed from AVM of small bowel. Stomach and duodenum  # PAD with neurogenic claudication - on gabapentin   # Chronic hep C, compensated cirrhosis   # Depression     Tolerating HD well. She is euvolemic on exam and her blood pressures are acceptable while on amlodipine 5 mg daily and Losartan 50 mg daily. Her weakness is improving slowly, appetite remains poor. There still remains concern for hemoptysis/UGI bleed for which is being discussed with pulm. Hb dropped to 7.2 today.      Plan:   --Next HD planned for Wednesday   --will increase the EPO to 38828 international unit(s) with the next run       Skyla Han MD   [phone]    "

## 2023-05-22 NOTE — PROGRESS NOTES
"[id number]    Plan of Care Reviewed With: Patient    Overall Patient Progress: No Change    Outcome Evaluation: Pt is A & O x4 on RA. Pt C/O 5/10 generalized neck pain. Denies nausea, chest pain & SOB. Pt does not have IV access & has a R arm fistula. Per pt report, baseline BLE numbness. Pt is intermittently incontinent of bladder & continent of bowel. Pt is assist x1 w/ walker & gait belt & uses call light appropriately.    Shift Updates    Strict I/O & 1500 ml fluid restriction maintained & documented throughout shift.    Notify MD if pt experiences hemoptysis and/or melena.    Pt received dialysis today during shift.    Vitals: /59 (BP Location: Left arm)   Pulse 70   Temp 97  F (36.1  C) (Oral)   Resp 16   Ht 1.626 m (5' 4\")   Wt 60.5 kg (133 lb 4.8 oz)   SpO2 97%   BMI 22.88 kg/m      Plan: Medically ready for discharge, pending TCU placement. Continue w/ plan of care.  "

## 2023-05-22 NOTE — PROGRESS NOTES
Pt A&Ox4. Able to make needs known. No IV access. R arm fistula. Assist of 1 with walker and GB. Incontinent bowel and incontinent bladder -- pt produces little to no urine output; last BM 5/22. Denies SOB, chest pain, and nausea/vomiting. Pt reports tingling in bilateral feet baseline per pt. Pt c/o neck and head pain rated pain 8/10; administered prn Norco. No blood in sputum this shift. No acute events. Call light in reach.

## 2023-05-22 NOTE — PROVIDER NOTIFICATION
Provider notified of pt coughing up bright red blood. Writer notified provider that this had also occurred on Monday, May 15th and was seen by GI. Provider aware, H&H ordered q8h. Staff to continue monitoring     Hemoglobin @2232 was 7.2 Provider notified, stated no transfusion until Hemoglobin is under 7.

## 2023-05-22 NOTE — PROGRESS NOTES
Care Management Follow Up    Length of Stay (days): 10    Expected Discharge Date: 05/22/2023     Concerns to be Addressed: discharge planning     Patient plan of care discussed at interdisciplinary rounds: Yes    Anticipated Discharge Disposition:  TCU     Anticipated Discharge Services:  Post acute therapies  Anticipated Discharge DME:  None    Patient/family educated on Medicare website which has current facility and service quality ratings:  Yes  Education Provided on the Discharge Plan:  Yes  Patient/Family in Agreement with the Plan: Yes     Referrals Placed by CM/SW:  None  Private pay costs discussed: Not applicable    Additional Information:    Writer met with patient and her daughter to go over TCUs of her preference. Writer informed her of the TCUs that declined her or had no open beds. Writer gave her a new list of TCUs to look at and stated writer will call her or speak with her tomorrow about her new choices.    ANTONIO Castro, LGSW  6 Med Surg   [facility]

## 2023-05-22 NOTE — PLAN OF CARE
Goal Outcome Evaluation:  Prn Norco given. Pt stated pain relieved. Pt did not void during the night and brief was dry.    Orientation: Aox4  Bowel: Cont/incont  Bladder: Continent,/incontinent, urgency  Pain: Neck, L shoulder, L side of jaw  Ambulation/Transfers: Ax1 walker and gait belt  Blood sugars: None  Diet/ Liquids: Renal Diet, Strict I&O, Fluid rx 1500  Tubes/ Lines/ Drains: None  Tube Feeding: None  Oxygen: None  Skin: Intact

## 2023-05-22 NOTE — PROGRESS NOTES
[facility]    Medicine Progress Note - Hospitalist Service, GOLD TEAM 17    Date of Admission:  5/12/2023    Assessment & Plan     83 year old female with a history of ESRD (on HD MWF), HTN, PVD, anemia 2/2 ESRD and recurrent GI bleeds (2/2 AVMs), and chronic hep C with compensated cirrhosis admitted on 5/12/2023 for weakness. She had acute-onset weakness approximately 1 week ago that involves her bilateral lower extremities that is associated with low back pain.       Today's changes:   5/22/2023    ON episode of more hemoptysis, follow-up hgb 7.2. no more episode this am. hgb after HD 7.9. intermittent cough+.   - dw Pulmonary, unlikely severe hemoptysis. No new recommendations. Contact GI prn if hgb continues to downtrend.   - will try scheduled robitussin with codeine. Nasal saline spray, afrin prn for nose bleeding.   - monitor/document hemoptysis or melena or hematemesis.   - resume pta Q 28 days octreotide inj.  - Monitor vitals. Stable currently.   - Dialysis per nephrology.  - Awaiting tcu placement.         # Gen Weakness  Strongly suspect that her weakness is multifactorial. She notes that her blood pressure medications were recently increased, and she has had lightheadedness and dizziness since that time. In addition, she recently had a GI bleed and has ongoing hemoptysis per her report. Her Hgb on presentation was 10, and Hgb has been 9-10 recently. She does take a narcotic in the outpatient setting, and with her history of ESRD, this could be accumulating and causing weakness. Further, she is on a statin; this is not a new medication but statin-induced myopathy is possible. CT of her head and lumbar spine showed no acute process that explains her weakness. She does still make some urine and has had intermittent burning with urination. On chart review, it appears that her PCP Dr. Rodriguez has had some concerns about her ability to care for herself for some  time and asked her to start considering assisted living due to some cognitive impairment. At discharge from the hospital in January, PT and OT also recommended home PT/OT services.   - Decreased losartan from 100 mg daily to 50 mg daily - titrate as needed.   - PT and OT consults placed. aw tcu placement.  - opioid weaned down as able.   - Monitor po intake. Nutrition consulted.   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 Last EGD on 3/9. Endoscopic treatment of duodenal AVMs in march 2023. Ongoing episodes of hemoptysis for at least the past month. CT chest this admission is neg for PE or other lung process   - IV PPI BID --> 5/17: switched to po.   - Pt takes Octreotide injections q 28 days,  outpt to prevent bleeding. Resume 5/22/2023  - EP per Nephrology  - RVP: negative.   - hemoptysis resolved.   - GI signed off, pulm : no bronch, signed off.       # ESRD on HD (MWF)  # Secondary hyperparathyroidism   Has been stable on dialysis for some time. Last run Friday 5/12.   - Neph consulted for dialysis  - Continue home phos binder     # Chronic lower extremity pain  # Lumbar spinal stenosis with neurogenic claudication   # PAD  Lower extremity ultrasounds showed peripheral artery disease. Pt has hx of PAD and on PAD rehab following with vascular surgery. She was previously on cilostasol, but this was not effective and increased her risk of bleeding. She was started on a small dose of percocet for severe pain prior to PT sessions. CT lumbar spine with acute issues.   - Tylenol and vicodin PRNs home dose   - Continue home gabapentin 300 mg qhs  - Repeat FAISAL likely as outpt and follow up with vascular     # Chronic hepatitis C with compensated cirrhosis  Complicated by:  - No hx of Ascites  - Hx of GI bleed due to AVM, no history of  varices  - No hx of HE  EGD: 8/14/2018, normal esophagus, bleeding angiodysplastic lesions in stomach and duodenum.  Last EGD on 3/9. Endoscopic treatment of duodenal/stomach/Jejunum AVMs   HCC: 5/17/2018, no liver lesions.   - Will add bowel regimen  - Follow up with GI outpt     # Depression  - Continue home sertraline 100 mg daily     # HTN  - losartan. Amlod.   - PRN hydralazine   - Monitor.      # HLD  - Continuing home statin       # Severe Malnutrition: based on nutrition assessment . Supplements per nutrition.          Diet: Snacks/Supplements Adult: Nepro Oral Supplement; Between Meals  Renal Diet (dialysis)  Fluid restriction 1500 ML FLUID    DVT Prophylaxis: Heparin subcutaneous held due to bleeding. SCDs ordered   Rodriguez Catheter: Not present  Lines: None     Cardiac Monitoring: None  Code Status: No CPR- Pre-arrest intubation OK      Clinically Significant Risk Factors        Disposition Plan      Expected Discharge Date: 05/22/2023    Discharge Delays: Placement - TCU  *Medically Ready for Discharge  Destination: other (comment) (PT is recommending TCU. This has not been discussed with patient yet.)  Discharge Comments: Will need placement. Has GI bleed and will need to be evaluated by GI 1st        Idris Kellogg MD  Hospitalist Service, GOLD TEAM [address] [place] [address]  Securely message with [employer] (more info)  Text page via [employer] Paging/Directory   See signed in provider for up to date coverage information  ______________________________________________________________________    Interval History    ON with hemoptysis episode as above.   Currently doing well.   Intermittent cough+   No visible bleeding at current.   No fever chills.   gen weakness  No chest pain/sob/NVD  Alert oriented.     Physical Exam   Vital Signs: Temp: 98.1  F (36.7  C) Temp src: Oral BP: (!) 153/52 Pulse: 66   Resp: 16 SpO2: 99 % O2 Device: None (Room air)    Weight: 133 lbs 4.8  oz    General Appearance: Awake, interactive, NAD  Respiratory: Normal work of breathing. On RA  Cardiovascular: RRR  GI: Soft. NT. ND.  Extremities: Distally wwp. No pedal edema  Neuro: Grossly non focal.   Others: Stable mood.         Medical Decision Making     45 MINUTES SPENT BY ME on the date of service doing chart review, history, exam, documentation & further activities per the note.      Data   ------------------------- PAST 24 HR DATA REVIEWED -----------------------------------------------         CMP  Recent Labs   Lab 05/22/23  0858 05/18/23  0908 05/17/23  0651 05/16/23  0713   * 133* 134* 137   POTASSIUM 5.0 4.3 4.9 4.9   CHLORIDE 94* 91* 92* 94*   CO2 23 27 24 26   ANIONGAP 17* 15 18* 17*   * 107* 113* 102*   BUN 61.8* 26.0* 45.1* 29.6*   CR 9.69* 5.45* 7.78* 5.81*   GFRESTIMATED 4* 7* 5* 7*   BELGICA 8.8 9.5 9.1 9.0   PROTTOTAL  --   --  7.4 7.2   ALBUMIN  --   --  3.5 3.5   BILITOTAL  --   --  0.4 0.4   ALKPHOS  --   --  169* 169*   AST  --   --  26 34   ALT  --   --  16 18     CBC  Recent Labs   Lab 05/22/23  1416 05/22/23  0858 05/21/23  2232 05/19/23  0827 05/18/23  0908 05/17/23  0651   WBC  --  10.0  --  9.0 9.7 10.3   RBC  --  2.35*  --  2.71* 2.78* 2.88*   HGB 7.9* 7.2* 7.2* 8.3* 8.6* 8.7*   HCT 24.5* 22.9* 23.9* 26.8* 26.7* 28.0*   MCV  --  97  --  99 96 97   MCH  --  30.6  --  30.6 30.9 30.2   MCHC  --  31.4*  --  31.0* 32.2 31.1*   RDW  --  16.1*  --  15.6* 15.5* 15.4*   PLT  --  458*  --  518* 445 423     INR  No lab results found in last 7 days.  Arterial Blood GasNo lab results found in last 7 days.

## 2023-05-23 ENCOUNTER — APPOINTMENT (OUTPATIENT)
Dept: PHYSICAL THERAPY | Facility: CLINIC | Age: 83
DRG: 091 | End: 2023-05-23
Payer: MEDICARE

## 2023-05-23 ENCOUNTER — APPOINTMENT (OUTPATIENT)
Dept: OCCUPATIONAL THERAPY | Facility: CLINIC | Age: 83
DRG: 091 | End: 2023-05-23
Payer: MEDICARE

## 2023-05-23 LAB — HGB BLD-MCNC: 7.4 G/DL (ref 11.7–15.7)

## 2023-05-23 PROCEDURE — 250N000013 HC RX MED GY IP 250 OP 250 PS 637: Performed by: STUDENT IN AN ORGANIZED HEALTH CARE EDUCATION/TRAINING PROGRAM

## 2023-05-23 PROCEDURE — 250N000013 HC RX MED GY IP 250 OP 250 PS 637: Performed by: INTERNAL MEDICINE

## 2023-05-23 PROCEDURE — 36415 COLL VENOUS BLD VENIPUNCTURE: CPT | Performed by: INTERNAL MEDICINE

## 2023-05-23 PROCEDURE — 250N000013 HC RX MED GY IP 250 OP 250 PS 637: Performed by: PEDIATRICS

## 2023-05-23 PROCEDURE — 97530 THERAPEUTIC ACTIVITIES: CPT | Mod: GO

## 2023-05-23 PROCEDURE — 99207 PR CDG-CUT & PASTE-POTENTIAL IMPACT ON LEVEL: CPT | Performed by: INTERNAL MEDICINE

## 2023-05-23 PROCEDURE — 120N000002 HC R&B MED SURG/OB UMMC

## 2023-05-23 PROCEDURE — 97110 THERAPEUTIC EXERCISES: CPT | Mod: GO

## 2023-05-23 PROCEDURE — 97530 THERAPEUTIC ACTIVITIES: CPT | Mod: GP | Performed by: REHABILITATION PRACTITIONER

## 2023-05-23 PROCEDURE — 85018 HEMOGLOBIN: CPT | Performed by: INTERNAL MEDICINE

## 2023-05-23 PROCEDURE — 99232 SBSQ HOSP IP/OBS MODERATE 35: CPT | Performed by: INTERNAL MEDICINE

## 2023-05-23 RX ORDER — LIDOCAINE 4 G/G
1 PATCH TOPICAL
Status: DISCONTINUED | OUTPATIENT
Start: 2023-05-23 | End: 2023-05-25 | Stop reason: HOSPADM

## 2023-05-23 RX ADMIN — ATORVASTATIN CALCIUM 20 MG: 20 TABLET, FILM COATED ORAL at 21:18

## 2023-05-23 RX ADMIN — LIDOCAINE PATCH 4% 1 PATCH: 40 PATCH TOPICAL at 01:55

## 2023-05-23 RX ADMIN — Medication 1 MG: at 01:55

## 2023-05-23 RX ADMIN — GUAIFENESIN AND CODEINE PHOSPHATE 10 ML: 10; 100 LIQUID ORAL at 07:59

## 2023-05-23 RX ADMIN — SALINE NASAL SPRAY 1 SPRAY: 1.5 SOLUTION NASAL at 18:41

## 2023-05-23 RX ADMIN — GUAIFENESIN AND CODEINE PHOSPHATE 10 ML: 10; 100 LIQUID ORAL at 21:19

## 2023-05-23 RX ADMIN — PANTOPRAZOLE SODIUM 40 MG: 40 TABLET, DELAYED RELEASE ORAL at 16:25

## 2023-05-23 RX ADMIN — CALCIUM ACETATE 1334 MG: 667 CAPSULE ORAL at 07:59

## 2023-05-23 RX ADMIN — GABAPENTIN 300 MG: 300 CAPSULE ORAL at 21:18

## 2023-05-23 RX ADMIN — LIDOCAINE PATCH 4% 1 PATCH: 40 PATCH TOPICAL at 21:18

## 2023-05-23 RX ADMIN — ACETAMINOPHEN 650 MG: 325 TABLET ORAL at 16:25

## 2023-05-23 RX ADMIN — AMLODIPINE BESYLATE 5 MG: 5 TABLET ORAL at 07:59

## 2023-05-23 RX ADMIN — GUAIFENESIN AND CODEINE PHOSPHATE 10 ML: 100; 10 SOLUTION ORAL at 16:26

## 2023-05-23 RX ADMIN — Medication 1 LOZENGE: at 16:25

## 2023-05-23 RX ADMIN — Medication 1 TABLET: at 07:59

## 2023-05-23 RX ADMIN — SERTRALINE HYDROCHLORIDE 100 MG: 100 TABLET ORAL at 07:59

## 2023-05-23 RX ADMIN — PANTOPRAZOLE SODIUM 40 MG: 40 TABLET, DELAYED RELEASE ORAL at 08:00

## 2023-05-23 RX ADMIN — LOSARTAN POTASSIUM 50 MG: 50 TABLET, FILM COATED ORAL at 07:59

## 2023-05-23 RX ADMIN — CALCIUM ACETATE 1334 MG: 667 CAPSULE ORAL at 13:11

## 2023-05-23 RX ADMIN — SENNOSIDES AND DOCUSATE SODIUM 2 TABLET: 50; 8.6 TABLET ORAL at 21:18

## 2023-05-23 RX ADMIN — SENNOSIDES AND DOCUSATE SODIUM 2 TABLET: 50; 8.6 TABLET ORAL at 07:59

## 2023-05-23 RX ADMIN — CALCIUM ACETATE 1334 MG: 667 CAPSULE ORAL at 18:41

## 2023-05-23 ASSESSMENT — ACTIVITIES OF DAILY LIVING (ADL)
ADLS_ACUITY_SCORE: 26

## 2023-05-23 NOTE — PROGRESS NOTES
Care Management Follow Up    Length of Stay (days): 11    Expected Discharge Date: 05/24/2023     Concerns to be Addressed: discharge planning     Patient plan of care discussed at interdisciplinary rounds: Yes    Anticipated Discharge Disposition:  TCU     Anticipated Discharge Services:  Post acute therapies  Anticipated Discharge DME:  None    Patient/family educated on Medicare website which has current facility and service quality ratings:  yes  Education Provided on the Discharge Plan:  yes  Patient/Family in Agreement with the Plan:  yes    Referrals Placed by CM/SW:  TCU     [facility]  [address] [place] [address]  Phone: [phone]  Admissions: [phone]  5/17: Referral sent via destination on Epic  5/18: Called admissions and line kept ringing.   5/19: Called admissions- Stephanie and left a VM to call back.  5/23: SW left VM for Janene in admissions requesting call back regarding referral status.     [facility]  [address] [place] [address]  Phone: [phone]  5/17: Referral sent via destination on Epic  5/18: Left VM with admissions- Tori to call writer back.   5/19: Left VM with Tori in admissions and requested a call back.  5/23:  Left VM with Regulo in admissions and requested a call back regarding referral status.     [facility]  [address] [place] [address]  [phone]  5/17: Referral sent via destination on Epic  5/18: Left VM with admissions and requested a call back.   5/19: Called admissions and they don't have openings until next Wednesday.   5/23: Spoke with representative, no beds until Monday.        Declined:     [facility]   [address] [place], [place] [address] [phone]  5/17: Referral sent via destination on Epic  5/18: Tried to leave VM with admissions but VM box was full.   5/19: Left VM with admissions  to check status of referral.   5/23: Writer spoke with Caryn in admissions. They will review and get back to . Received call, unable to accept patient due to Hemo-dialysis. This may change in a couple of weeks.    [facility]  [address] [place] [address]  [phone]  5/17: Referral sent via destination on Epic  5/18: Spoke with admissions to check status of patient referral. They don't have beds right now. They also only work with TBIs, strokes and spinal chord injuries.         [facility]  [address] [place] [address]  [phone]  5/17: Referral sent via destination on Epic  5/18: Spoke with admissions. They declined her admission due to they don't take dialysis patients. They cannot provide assistance to acute care needs.     [facility] 4th floor  [facility] [address] [place] [address]  [phone]  Admissions: [phone]  Fax: [phone]  RN to RN:  [phone]  5/18: Messaged [facility] liaison about TCU referral.  5/19: Declined due to no open beds. They stated she needs a higher LOC.    Private pay costs discussed: Not applicable    Additional Information:    GLORIA called patient's daughter, Charla, to gather additional TCU preferences. Charla did not answer so SW left a VM.    GLORIA followed up on referrals as above.      Halle Treadwell, DANIELAW, LSW  6 Med Surg   [facility]  Phone: [phone]  Pager: [phone]

## 2023-05-23 NOTE — PLAN OF CARE
VS: Temp: 99  F (37.2  C) Temp src: Oral BP: 120/55 Pulse: 75   Resp: 18 SpO2: 96 % O2 Device: None (Room air)      O2: 96 % RA. Denied sob/cp    Output: Voids spontaneously    Last BM: 5/23/2023. Bowel sounds active. Small amount. Unable to assess clearly.    Activity: SBA with walker and gait belt    Up for meals? Yes,    Skin: Visible skin intact.    Pain: Denied pain this shift   CMS: Baseline numbness/tingling    Dressing: None    Diet: Renal diet    LDA: L PIV    Plan: Continue with plan of care    Additional Info:     Fluids restriction 1500  Fall precaution, bed and chair alarm  Monitor stool. Hat in place.

## 2023-05-23 NOTE — PROGRESS NOTES
"  VS: BP (!) 113/91 (BP Location: Left arm)   Pulse 79   Temp 98.5  F (36.9  C) (Oral)   Resp 18   Ht 1.626 m (5' 4\")   Wt 60.5 kg (133 lb 4.8 oz)   SpO2 98%   BMI 22.88 kg/m       O2: Stable on RA   Output: Little to no urine produced by patient. Intermittent incontinence of bladder. Continent of bowels.    Last BM: 5/22. Bowel sounds active in all quadrants.    Activity: Assist x1 with walker. Slept through the night.    Skin: WDL    Pain: Reported of shoulder pain and headache. Lidocaine applied on L shoulder for pain.    CMS: WDL except baseline tingling in LE.    Dressing: N/A   Diet: Renal Diet. Strict I & O. 1500 ml fluid restriction.    LDA: NO IV ACCESS. Right arm fistula present.    Equipment: Pt belongings and call light within reach.    Plan: Follow POC. Pending TCU placement.    Additional Info: PRN melatonin and Lidocaine patch applied during shift. No blood in sputum this shift. Lotion applied to BLE for dryness. Notify provider for any hemoptysis or melena.         "

## 2023-05-23 NOTE — PROGRESS NOTES
[facility]    Medicine Progress Note - Hospitalist Service, GOLD TEAM 17    Date of Admission:  5/12/2023    Assessment & Plan     83 year old female with a history of ESRD (on HD MWF), HTN, PVD, anemia 2/2 ESRD and recurrent GI bleeds (2/2 AVMs), and chronic hep C with compensated cirrhosis admitted on 5/12/2023 for weakness. She had acute-onset weakness approximately 1 week ago that involves her bilateral lower extremities that is associated with low back pain.       Today's changes:   5/23/2023    No acute events overnight. HGB stable. No more episodes of hemoptysis. I discussed the case with pt's daughter at bedside.     # Gen Weakness  Strongly suspect that her weakness is multifactorial. She notes that her blood pressure medications were recently increased, and she has had lightheadedness and dizziness since that time. In addition, she recently had a GI bleed and has ongoing hemoptysis per her report. Her Hgb on presentation was 10, and Hgb has been 9-10 recently. She does take a narcotic in the outpatient setting, and with her history of ESRD, this could be accumulating and causing weakness. Further, she is on a statin; this is not a new medication but statin-induced myopathy is possible. CT of her head and lumbar spine showed no acute process that explains her weakness. She does still make some urine and has had intermittent burning with urination. On chart review, it appears that her PCP Dr. Rodriguez has had some concerns about her ability to care for herself for some time and asked her to start considering assisted living due to some cognitive impairment. At discharge from the hospital in January, PT and OT also recommended home PT/OT services.   - Decreased losartan from 100 mg daily to 50 mg daily - titrate as needed.   - PT and OT consults placed. aw tcu placement.  - opioid weaned down as able.   - Monitor po intake. Nutrition consulted.   - B12/Vit D and  Iron screen WNL       # Chronic anemia 2/2 renal disease  # Recent GI bleed (Jan 2023)  # Hemoptysis: Intermittent  # AVM of the small bowel, stomach, and duodenum   Baseline Hgb previously 7s-9s but recently 9-11. Last EGD on 3/9. Endoscopic treatment of duodenal AVMs in march 2023. Ongoing episodes of hemoptysis for at least the past month. CT chest this admission is neg for PE or other lung process   - IV PPI BID --> 5/17: switched to po.   - Pt takes Octreotide injections q 28 days,  outpt to prevent bleeding. Resume 5/22/2023  - EP per Nephrology  - RVP: negative.   - hemoptysis resolved.   - GI signed off, pulm : no bronch, signed off.       # ESRD on HD (MWF)  # Secondary hyperparathyroidism   Has been stable on dialysis for some time. Last run Friday 5/12.   - Neph consulted for dialysis  - Continue home phos binder     # Chronic lower extremity pain  # Lumbar spinal stenosis with neurogenic claudication   # PAD  Lower extremity ultrasounds showed peripheral artery disease. Pt has hx of PAD and on PAD rehab following with vascular surgery. She was previously on cilostasol, but this was not effective and increased her risk of bleeding. She was started on a small dose of percocet for severe pain prior to PT sessions. CT lumbar spine with acute issues.   - Tylenol and vicodin PRNs home dose   - Continue home gabapentin 300 mg qhs  - Repeat FAISAL likely as outpt and follow up with vascular     # Chronic hepatitis C with compensated cirrhosis  Complicated by:  - No hx of Ascites  - Hx of GI bleed due to AVM, no history of varices  - No hx of HE  EGD: 8/14/2018, normal esophagus, bleeding angiodysplastic lesions in stomach and duodenum.  Last EGD on 3/9. Endoscopic treatment of duodenal/stomach/Jejunum AVMs   HCC: 5/17/2018, no liver lesions.   - Will add bowel regimen  - Follow up with GI outpt     # Depression  - Continue home sertraline 100 mg daily     # HTN  - losartan. Amlod.   - PRN hydralazine   - Monitor.       # HLD  - Continuing home statin     # Severe Malnutrition: based on nutrition assessment . Supplements per nutrition.        Diet: Snacks/Supplements Adult: Nepro Oral Supplement; Between Meals  Renal Diet (dialysis)  Fluid restriction 1500 ML FLUID    DVT Prophylaxis: Heparin subcutaneous held due to bleeding. SCDs ordered   Rodriguez Catheter: Not present  Lines: None     Cardiac Monitoring: None  Code Status: No CPR- Pre-arrest intubation OK      Clinically Significant Risk Factors        Disposition Plan      Expected Discharge Date: 05/24/2023    Discharge Delays: Placement - TCU  *Medically Ready for Discharge  Destination: other (comment) (PT is recommending TCU. This has not been discussed with patient yet.)  Discharge Comments: Will need placement. Has GI bleed and will need to be evaluated by GI 1st        Angel Luis Reno MD  Hospitalist Service, GOLD TEAM [address] [place] [address]  Securely message with [employer] (more info)  Text page via [facility] Paging/Directory   See signed in provider for up to date coverage information  ______________________________________________________________________    Interval History      No acute events overnight.   She was pleasant. Sitting on a chair.   Currently doing well.   No significant cough or hemoptysis today.   No fever chills.   No chest pain/sob/NVD    Physical Exam   Vital Signs: Temp: 99  F (37.2  C) Temp src: Oral BP: 120/55 Pulse: 75   Resp: 18 SpO2: 96 % O2 Device: None (Room air)    Weight: 133 lbs 4.8 oz    General Appearance: Awake, interactive, NAD  Respiratory: Normal work of breathing. On RA  Cardiovascular: RRR  GI: Soft. NT. ND.  Extremities: Distally wwp. No pedal edema  Neuro: Grossly non focal.   Others: Stable mood.         Medical Decision Making     45 MINUTES SPENT BY ME on the date of service doing chart review, history, exam, documentation & further activities per the note.      Data    ------------------------- PAST 24 HR DATA REVIEWED -----------------------------------------------         CMP  Recent Labs   Lab 05/22/23  0858 05/18/23  0908 05/17/23  0651   * 133* 134*   POTASSIUM 5.0 4.3 4.9   CHLORIDE 94* 91* 92*   CO2 23 27 24   ANIONGAP 17* 15 18*   * 107* 113*   BUN 61.8* 26.0* 45.1*   CR 9.69* 5.45* 7.78*   GFRESTIMATED 4* 7* 5*   BELGICA 8.8 9.5 9.1   PROTTOTAL  --   --  7.4   ALBUMIN  --   --  3.5   BILITOTAL  --   --  0.4   ALKPHOS  --   --  169*   AST  --   --  26   ALT  --   --  16     CBC  Recent Labs   Lab 05/23/23  0621 05/22/23  1812 05/22/23  1416 05/22/23  0858 05/21/23  2232 05/19/23  0827 05/18/23  0908 05/17/23  0651   WBC  --   --   --  10.0  --  9.0 9.7 10.3   RBC  --   --   --  2.35*  --  2.71* 2.78* 2.88*   HGB 7.4* 7.7* 7.9* 7.2* 7.2* 8.3* 8.6* 8.7*   HCT  --  24.8* 24.5* 22.9* 23.9* 26.8* 26.7* 28.0*   MCV  --   --   --  97  --  99 96 97   MCH  --   --   --  30.6  --  30.6 30.9 30.2   MCHC  --   --   --  31.4*  --  31.0* 32.2 31.1*   RDW  --   --   --  16.1*  --  15.6* 15.5* 15.4*   PLT  --   --   --  458*  --  518* 445 423     INR  No lab results found in last 7 days.  Arterial Blood GasNo lab results found in last 7 days.

## 2023-05-24 PROCEDURE — 250N000013 HC RX MED GY IP 250 OP 250 PS 637: Performed by: STUDENT IN AN ORGANIZED HEALTH CARE EDUCATION/TRAINING PROGRAM

## 2023-05-24 PROCEDURE — 258N000003 HC RX IP 258 OP 636: Performed by: INTERNAL MEDICINE

## 2023-05-24 PROCEDURE — 99207 PR CDG-CUT & PASTE-POTENTIAL IMPACT ON LEVEL: CPT | Performed by: INTERNAL MEDICINE

## 2023-05-24 PROCEDURE — 99232 SBSQ HOSP IP/OBS MODERATE 35: CPT | Performed by: INTERNAL MEDICINE

## 2023-05-24 PROCEDURE — 250N000013 HC RX MED GY IP 250 OP 250 PS 637: Performed by: INTERNAL MEDICINE

## 2023-05-24 PROCEDURE — 250N000013 HC RX MED GY IP 250 OP 250 PS 637

## 2023-05-24 PROCEDURE — 90935 HEMODIALYSIS ONE EVALUATION: CPT | Performed by: INTERNAL MEDICINE

## 2023-05-24 PROCEDURE — 634N000001 HC RX 634: Performed by: INTERNAL MEDICINE

## 2023-05-24 PROCEDURE — 250N000013 HC RX MED GY IP 250 OP 250 PS 637: Performed by: PEDIATRICS

## 2023-05-24 PROCEDURE — 120N000002 HC R&B MED SURG/OB UMMC

## 2023-05-24 RX ADMIN — SERTRALINE HYDROCHLORIDE 100 MG: 100 TABLET ORAL at 08:53

## 2023-05-24 RX ADMIN — GUAIFENESIN AND CODEINE PHOSPHATE 10 ML: 10; 100 LIQUID ORAL at 08:53

## 2023-05-24 RX ADMIN — CALCITRIOL CAPSULES 0.25 MCG 0.75 MCG: 0.25 CAPSULE ORAL at 08:52

## 2023-05-24 RX ADMIN — Medication: at 13:36

## 2023-05-24 RX ADMIN — AMLODIPINE BESYLATE 5 MG: 5 TABLET ORAL at 08:53

## 2023-05-24 RX ADMIN — PANTOPRAZOLE SODIUM 40 MG: 40 TABLET, DELAYED RELEASE ORAL at 16:54

## 2023-05-24 RX ADMIN — CALCIUM ACETATE 1334 MG: 667 CAPSULE ORAL at 18:48

## 2023-05-24 RX ADMIN — SODIUM CHLORIDE 100 ML: 9 INJECTION, SOLUTION INTRAVENOUS at 15:31

## 2023-05-24 RX ADMIN — SENNOSIDES AND DOCUSATE SODIUM 2 TABLET: 50; 8.6 TABLET ORAL at 08:52

## 2023-05-24 RX ADMIN — PANTOPRAZOLE SODIUM 40 MG: 40 TABLET, DELAYED RELEASE ORAL at 08:52

## 2023-05-24 RX ADMIN — ACETAMINOPHEN 650 MG: 325 TABLET ORAL at 04:11

## 2023-05-24 RX ADMIN — CALCIUM ACETATE 1334 MG: 667 CAPSULE ORAL at 08:52

## 2023-05-24 RX ADMIN — SODIUM CHLORIDE 250 ML: 9 INJECTION, SOLUTION INTRAVENOUS at 13:36

## 2023-05-24 RX ADMIN — SODIUM CHLORIDE 300 ML: 9 INJECTION, SOLUTION INTRAVENOUS at 13:35

## 2023-05-24 RX ADMIN — Medication 1 TABLET: at 08:52

## 2023-05-24 RX ADMIN — ATORVASTATIN CALCIUM 20 MG: 20 TABLET, FILM COATED ORAL at 19:47

## 2023-05-24 RX ADMIN — EPOETIN ALFA-EPBX 10000 UNITS: 10000 INJECTION, SOLUTION INTRAVENOUS; SUBCUTANEOUS at 16:26

## 2023-05-24 RX ADMIN — LIDOCAINE PATCH 4% 1 PATCH: 40 PATCH TOPICAL at 19:47

## 2023-05-24 RX ADMIN — LOSARTAN POTASSIUM 50 MG: 50 TABLET, FILM COATED ORAL at 08:53

## 2023-05-24 RX ADMIN — GUAIFENESIN AND CODEINE PHOSPHATE 10 ML: 10; 100 LIQUID ORAL at 19:46

## 2023-05-24 RX ADMIN — CALCIUM ACETATE 1334 MG: 667 CAPSULE ORAL at 11:25

## 2023-05-24 RX ADMIN — GABAPENTIN 300 MG: 300 CAPSULE ORAL at 21:54

## 2023-05-24 RX ADMIN — SENNOSIDES AND DOCUSATE SODIUM 2 TABLET: 50; 8.6 TABLET ORAL at 19:47

## 2023-05-24 ASSESSMENT — ACTIVITIES OF DAILY LIVING (ADL)
ADLS_ACUITY_SCORE: 26

## 2023-05-24 NOTE — PROGRESS NOTES
Care Management Follow Up    Length of Stay (days): 12    Expected Discharge Date: 05/24/2023     Concerns to be Addressed: discharge planning     Patient plan of care discussed at interdisciplinary rounds: Yes    Anticipated Discharge Disposition:  Discharge planning     Anticipated Discharge Services:  Post acute therapies  Anticipated Discharge DME:  None    Patient/family educated on Medicare website which has current facility and service quality ratings:  yes  Education Provided on the Discharge Plan:  yes  Patient/Family in Agreement with the Plan:  yes    Referrals Placed by CM/SW:  TCU    [facility]  [address] [place] [address]  [phone]  5/24: Referral sent via Epic destination    [facility]  [address] [place] [address]  Phone: [phone]  5/24: Referral sent via Epic destination    [facility]  [address] [place] [address]  Phone: [phone]  Admissions: [phone]  5/17: Referral sent via destination on [facility]  5/18: Called admissions and line kept ringing.   5/19: Called admissions- Stephanie and left a VM to call back.  5/23:  left VM for Janene in admissions requesting call back regarding referral status.     [facility]  [address] [place] [address]  Phone: [phone]  5/17: Referral sent via destination on Epic  5/18: Left VM with admissions- Tori to call writer back.   5/19: Left VM with Tori in admissions and requested a call back.  5/23:  Left VM with Regulo in admissions and requested a call back regarding referral status.     [facility]  [address] [place] [address]  [phone]  5/17: Referral sent via destination on Epic  5/18: Left VM with admissions and requested a call back.   5/19: Called admissions and they don't have openings until next Wednesday.   5/23: Spoke with representative, no beds until  Monday.        Declined:     [facility]   [address] [place] [address]  [phone]  5/17: Referral sent via destination on Epic  5/18: Tried to leave VM with admissions but VM box was full.   5/19: Left VM with admissions to check status of referral.   5/23: Writer spoke with Caryn in admissions. They will review and get back to . Received call, unable to accept patient due to Hemo-dialysis. This may change in a couple of weeks.     [facility]  [address] [place] [address]  [phone]  5/17: Referral sent via destination on Epic  5/18: Spoke with admissions to check status of patient referral. They don't have beds right now. They also only work with TBIs, strokes and spinal chord injuries.        [facility])  [address] [place] [address]  [phone]  5/17: Referral sent via destination on Epic  5/18: Spoke with admissions. They declined her admission due to they don't take dialysis patients. They cannot provide assistance to acute care needs.     [facility] 4th floor  [address] [place]  [address]  Admissions: [phone]  Fax: [phone]  RN to RN:  [phone]  5/18: Messaged [facility] liaison about TCU referral.  5/19: Declined due to no open beds. They stated she needs a higher LOC.    Private pay costs discussed: Not applicable    Additional Information:    SW called and spoke with patient's daughter, Charla. SW gathered additional preferences and sent referrals.        Halle Treadwell, BSW, LSW  6 Med Surg   [facility]  Phone: [phone]  Pager: [phone]

## 2023-05-24 NOTE — PROGRESS NOTES
Care Management Follow Up    Length of Stay (days): 12    Expected Discharge Date: 05/24/2023     Concerns to be Addressed: discharge planning     Patient plan of care discussed at interdisciplinary rounds: Yes    Anticipated Discharge Disposition:  TCU     Anticipated Discharge Services:  Post acute therapies  Anticipated Discharge DME:  None    Patient/family educated on Medicare website which has current facility and service quality ratings:  Yes  Education Provided on the Discharge Plan:  Yes  Patient/Family in Agreement with the Plan:  Yes    Referrals Placed by CM/SW:  TCU  Private pay costs discussed: Not applicable    Additional Information:    Writer spoke with the daughter to check if she reviewed any of the TCUs writer sent her. She did not review them in detail. She may have a few in mind she would like to look into/ to make referrals for. She will call writer back later to let her know which TCUs she prefers.        ANTONIO Castro, LGSW  6 Med Surg   [facility]

## 2023-05-24 NOTE — PROGRESS NOTES
[facility]    Medicine Progress Note - Hospitalist Service, GOLD TEAM 17    Date of Admission:  5/12/2023    Assessment & Plan     83 year old female with a history of ESRD (on HD MWF), HTN, PVD, anemia 2/2 ESRD and recurrent GI bleeds (2/2 AVMs), and chronic hep C with compensated cirrhosis admitted on 5/12/2023 for weakness. She had acute-onset weakness approximately 1 week ago that involves her bilateral lower extremities that is associated with low back pain.       Today's changes:   5/24/2023    No acute events overnight. No more episodes of hemoptysis or cough. I discussed the case with pt's daughter at bedside.   She was pleasant. Patient waiting for placement.     # Gen Weakness  Strongly suspect that her weakness is multifactorial. She notes that her blood pressure medications were recently increased, and she has had lightheadedness and dizziness since that time. In addition, she recently had a GI bleed and has ongoing hemoptysis per her report. Her Hgb on presentation was 10, and Hgb has been 9-10 recently. She does take a narcotic in the outpatient setting, and with her history of ESRD, this could be accumulating and causing weakness. Further, she is on a statin; this is not a new medication but statin-induced myopathy is possible. CT of her head and lumbar spine showed no acute process that explains her weakness. She does still make some urine and has had intermittent burning with urination. On chart review, it appears that her PCP Dr. Rodriguez has had some concerns about her ability to care for herself for some time and asked her to start considering assisted living due to some cognitive impairment. At discharge from the hospital in January, PT and OT also recommended home PT/OT services.   - Decreased losartan from 100 mg daily to 50 mg daily - titrate as needed.   - PT and OT following the patient. Waiting for TCU placement.   - Opioid weaned down as  able.   - Monitor po intake.   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 Last EGD on 3/9. Endoscopic treatment of duodenal AVMs in march 2023. Ongoing episodes of hemoptysis for at least the past month. CT chest this admission is neg for PE or other lung process   - IV PPI BID --> 5/17: Switched to po.   - Pt takes Octreotide injections q 28 days, outpt to prevent bleeding. Patient received last dose on 5/22/2023.  - EP per Nephrology.   - RVP: negative.   - Hemoptysis resolved.   - GI signed off. Pulmonology: no bronch, signed off. No more episodes of hemoptysis in the last 2 days.        # ESRD on HD (MWF)  # Secondary hyperparathyroidism   Has been stable on dialysis for some time. Last run Friday 5/12.   - Neph consulted for dialysis  - Continue home phos binder     # Chronic lower extremity pain  # Lumbar spinal stenosis with neurogenic claudication   # PAD  Lower extremity ultrasounds showed peripheral artery disease. Pt has hx of PAD and on PAD rehab following with vascular surgery. She was previously on cilostasol, but this was not effective and increased her risk of bleeding. She was started on a small dose of percocet for severe pain prior to PT sessions. CT lumbar spine with acute issues.   - Tylenol and vicodin PRNs home dose   - Continue home gabapentin 300 mg qhs  - Repeat FAISAL likely as outpt and follow up with vascular     # Chronic hepatitis C with compensated cirrhosis  Complicated by:  - No hx of Ascites  - Hx of GI bleed due to AVM, no history of varices  - No hx of HE  EGD: 8/14/2018, normal esophagus, bleeding angiodysplastic lesions in stomach and duodenum.  Last EGD on 3/9. Endoscopic treatment of duodenal/stomach/Jejunum AVMs   HCC: 5/17/2018, no liver lesions.   - Will add bowel regimen  - Follow up with  GI outpt     # Depression  - Continue home sertraline 100 mg daily     # HTN  - losartan. Amlod.   - PRN hydralazine   - Monitor.      # HLD  - Continuing home statin     # Severe Malnutrition: based on nutrition assessment . Supplements per nutrition.        Diet: Snacks/Supplements Adult: Nepro Oral Supplement; Between Meals  Renal Diet (dialysis)  Fluid restriction 1500 ML FLUID    DVT Prophylaxis: Heparin subcutaneous held due to bleeding. SCDs ordered   Rodriguez Catheter: Not present  Lines: None     Cardiac Monitoring: None  Code Status: No CPR- Pre-arrest intubation OK      Clinically Significant Risk Factors        Disposition Plan     Expected Discharge Date: 05/24/2023    Discharge Delays: Placement - TCU  *Medically Ready for Discharge  Destination: other (comment) (PT is recommending TCU. This has not been discussed with patient yet.)  Discharge Comments: Will need placement. Has GI bleed and will need to be evaluated by GI 1st        Angel Luis Reno MD  Hospitalist Service, GOLD TEAM [address] [place] [address]  Securely message with 71lbs (more info)  Text page via [facility] Paging/Directory   See signed in provider for up to date coverage information  ______________________________________________________________________    Interval History      No acute events overnight.   She was pleasant.   Sitting on a chair.   No new issues.   Currently doing well.   No significant cough or hemoptysis.   No fever chills.   No chest pain/sob/NVD    Physical Exam   Vital Signs: Temp: 98.5  F (36.9  C) Temp src: Oral BP: 133/54 Pulse: 68   Resp: 17 SpO2: 99 % O2 Device: None (Room air)    Weight: 133 lbs 4.8 oz    General Appearance: Awake, interactive, NAD  Respiratory: Normal work of breathing. On RA  Cardiovascular: RRR  GI: Soft. NT. ND.  Extremities: Distally wwp. No pedal edema  Neuro: Grossly non focal.   Others: Stable mood.         Medical Decision Making     45 MINUTES SPENT  BY ME on the date of service doing chart review, history, exam, documentation & further activities per the note.      Data   ------------------------- PAST 24 HR DATA REVIEWED -----------------------------------------------         CMP  Recent Labs   Lab 05/22/23  0858 05/18/23  0908   * 133*   POTASSIUM 5.0 4.3   CHLORIDE 94* 91*   CO2 23 27   ANIONGAP 17* 15   * 107*   BUN 61.8* 26.0*   CR 9.69* 5.45*   GFRESTIMATED 4* 7*   BELGICA 8.8 9.5     CBC  Recent Labs   Lab 05/23/23  0621 05/22/23  1812 05/22/23  1416 05/22/23  0858 05/21/23  2232 05/19/23  0827 05/18/23  0908   WBC  --   --   --  10.0  --  9.0 9.7   RBC  --   --   --  2.35*  --  2.71* 2.78*   HGB 7.4* 7.7* 7.9* 7.2* 7.2* 8.3* 8.6*   HCT  --  24.8* 24.5* 22.9* 23.9* 26.8* 26.7*   MCV  --   --   --  97  --  99 96   MCH  --   --   --  30.6  --  30.6 30.9   MCHC  --   --   --  31.4*  --  31.0* 32.2   RDW  --   --   --  16.1*  --  15.6* 15.5*   PLT  --   --   --  458*  --  518* 445     INR  No lab results found in last 7 days.  Arterial Blood GasNo lab results found in last 7 days.

## 2023-05-24 NOTE — PROGRESS NOTES
VS: Vital signs:  Temp: 98.7  F (37.1  C) Temp src: Oral BP: (!) 146/57 Pulse: 72   Resp: 18 SpO2: 99 % O2 Device: None (Room air)       O2: 99% on RA   Output: Voids spontaneously    Last BM: 5/23/23   Activity: SBA with walker and gait belt   Skin: intact   Pain: 7/10 PRN Tylenol given    CMS: A&O X4, N/T in lower extremities    Dressing: None    Diet: Ronal    LDA: None, R fistula    Equipment: Walker, gait belt, personal belongings    Plan: POC   Additional Info:

## 2023-05-24 NOTE — PLAN OF CARE
"Goal Outcome Evaluation:      Plan of Care Reviewed With: patient, child    VS: BP (!) 142/50   Pulse 67   Temp 98.5  F (36.9  C) (Oral)   Resp 16   Ht 1.626 m (5' 4\")   Wt 60.5 kg (133 lb 4.8 oz)   SpO2 97%   BMI 22.88 kg/m     O2: Room air, denies SOB and chest pain   Output: Minimal output due to dialysis. On 1500 mL restriction   Last BM: 05/24   Activity: SBA w walker   Up for meals? Yes   Skin: No new deficits noted   Pain: Has chronic back pain    CMS: Alert and oriented x4, able to make needs known. Has baseline n/t in feet   Dressing: N/a   Diet: Renal diet   LDA: R fistula    Plan: Continue with plan of care. Dialysis MWF   Equipment IV pole, personal belongings in room, call light within reach          "

## 2023-05-24 NOTE — PROGRESS NOTES
[id number]    Patient is alert and oriented x 4, calm, and able to make needs known effectively. Needs SBA with the use of walker for transfers and cares.     Continent of bowel and bladder. Last BM today. Brownish/greenish colored stool. Voiding without difficulty.    Renal diet. Appetite is fair. Ate 50% of meal. Fluid restriction of 1500 ml maintained due to patient being on dialysis. Denies abdominal pain or discomfort.     No PIV access. Fistula intact at right arm.     Patient coughed up bloody sputum once at 1600. Provider notified and no new orders received. PRN robitussin provided for cough. PRN tylenol administered for pain at left neck and shoulder. Oral lozenges effective for sore throat.     VSS. Patient denies any new concerns. Continue with plan of care.

## 2023-05-24 NOTE — PROGRESS NOTES
HEMODIALYSIS TREATMENT NOTE    Date: 5/24/2023  Time: 4:48 PM    Data:  Pre Wt: 60.5 kg (133 lb 6.1 oz)   Post Wt: 60 kg (132 lb 4.4 oz)  Weight change: 0.5 kg  Ultrafiltration - Post Run Net Total Removed (mL): 500 mL  Vascular Access Status: Fistula  patent  Dialyzer Rinse: Light  Total Blood Volume Processed: 69 L   Total Dialysis (Treatment) Time: 3 hrs   Dialysate Bath: K 3, Ca 2.25  Heparin: None    Lab:   No    Interventions:  Set UF goal to 1 L to start. Easy cannulation of shallow R brachial AVF, no lidocaine, 15 ga needles, excellent flow. Epogen given. Reduced UF goal at nearly 2 hrs r/t pt complain of LE cramping; NS bolus given, resolved.     Pt requested to come off the run after 3 hrs. Rinsed back.     Tolerated run well. VS WNL at end. Stasis in AVF in 8 minutes, dsg applied, c/d/i.     Assessment:  A&O. VS WNL prior to start. R AVF +T&B. Repositions per self.      Plan:    Next run per nephro.     Edin Flores, DANIELAN, RN

## 2023-05-25 ENCOUNTER — APPOINTMENT (OUTPATIENT)
Dept: PHYSICAL THERAPY | Facility: CLINIC | Age: 83
DRG: 091 | End: 2023-05-25
Payer: MEDICARE

## 2023-05-25 ENCOUNTER — APPOINTMENT (OUTPATIENT)
Dept: OCCUPATIONAL THERAPY | Facility: CLINIC | Age: 83
DRG: 091 | End: 2023-05-25
Payer: MEDICARE

## 2023-05-25 ENCOUNTER — MEDICAL CORRESPONDENCE (OUTPATIENT)
Dept: HEALTH INFORMATION MANAGEMENT | Facility: CLINIC | Age: 83
End: 2023-05-25

## 2023-05-25 VITALS
TEMPERATURE: 98.4 F | BODY MASS INDEX: 22.76 KG/M2 | DIASTOLIC BLOOD PRESSURE: 54 MMHG | SYSTOLIC BLOOD PRESSURE: 145 MMHG | OXYGEN SATURATION: 97 % | HEIGHT: 64 IN | HEART RATE: 73 BPM | RESPIRATION RATE: 17 BRPM | WEIGHT: 133.3 LBS

## 2023-05-25 LAB
ERYTHROCYTE [DISTWIDTH] IN BLOOD BY AUTOMATED COUNT: 15.8 % (ref 10–15)
HCT VFR BLD AUTO: 23 % (ref 35–47)
HGB BLD-MCNC: 7.2 G/DL (ref 11.7–15.7)
MCH RBC QN AUTO: 30.6 PG (ref 26.5–33)
MCHC RBC AUTO-ENTMCNC: 31.3 G/DL (ref 31.5–36.5)
MCV RBC AUTO: 98 FL (ref 78–100)
PLATELET # BLD AUTO: 407 10E3/UL (ref 150–450)
PLATELET # BLD AUTO: 407 10E3/UL (ref 150–450)
RBC # BLD AUTO: 2.35 10E6/UL (ref 3.8–5.2)
WBC # BLD AUTO: 8.6 10E3/UL (ref 4–11)

## 2023-05-25 PROCEDURE — 99239 HOSP IP/OBS DSCHRG MGMT >30: CPT | Performed by: INTERNAL MEDICINE

## 2023-05-25 PROCEDURE — 97530 THERAPEUTIC ACTIVITIES: CPT | Mod: GP

## 2023-05-25 PROCEDURE — 97530 THERAPEUTIC ACTIVITIES: CPT | Mod: GO

## 2023-05-25 PROCEDURE — 250N000013 HC RX MED GY IP 250 OP 250 PS 637: Performed by: STUDENT IN AN ORGANIZED HEALTH CARE EDUCATION/TRAINING PROGRAM

## 2023-05-25 PROCEDURE — 36415 COLL VENOUS BLD VENIPUNCTURE: CPT | Performed by: PEDIATRICS

## 2023-05-25 PROCEDURE — 250N000013 HC RX MED GY IP 250 OP 250 PS 637: Performed by: PEDIATRICS

## 2023-05-25 PROCEDURE — 85027 COMPLETE CBC AUTOMATED: CPT | Performed by: INTERNAL MEDICINE

## 2023-05-25 PROCEDURE — 97535 SELF CARE MNGMENT TRAINING: CPT | Mod: GO

## 2023-05-25 PROCEDURE — 85049 AUTOMATED PLATELET COUNT: CPT | Performed by: PEDIATRICS

## 2023-05-25 PROCEDURE — 97110 THERAPEUTIC EXERCISES: CPT | Mod: GP

## 2023-05-25 PROCEDURE — 250N000013 HC RX MED GY IP 250 OP 250 PS 637: Performed by: INTERNAL MEDICINE

## 2023-05-25 RX ORDER — AMLODIPINE BESYLATE 5 MG/1
5 TABLET ORAL DAILY
Qty: 30 TABLET | Refills: 0 | Status: SHIPPED | OUTPATIENT
Start: 2023-05-25 | End: 2023-06-20

## 2023-05-25 RX ORDER — ATORVASTATIN CALCIUM 20 MG/1
20 TABLET, FILM COATED ORAL DAILY
Qty: 30 TABLET | Refills: 0 | Status: SHIPPED | OUTPATIENT
Start: 2023-05-25 | End: 2023-09-29

## 2023-05-25 RX ORDER — ALBUTEROL SULFATE 90 UG/1
2 AEROSOL, METERED RESPIRATORY (INHALATION) EVERY 4 HOURS PRN
Qty: 18 G | Refills: 0 | Status: SHIPPED | OUTPATIENT
Start: 2023-05-25 | End: 2023-06-20

## 2023-05-25 RX ORDER — GABAPENTIN 300 MG/1
300 CAPSULE ORAL AT BEDTIME
Qty: 30 CAPSULE | Refills: 0 | Status: SHIPPED | OUTPATIENT
Start: 2023-05-25 | End: 2023-06-21

## 2023-05-25 RX ORDER — PANTOPRAZOLE SODIUM 20 MG/1
20 TABLET, DELAYED RELEASE ORAL
Qty: 60 TABLET | Refills: 0 | Status: SHIPPED | OUTPATIENT
Start: 2023-05-25 | End: 2023-09-29

## 2023-05-25 RX ORDER — HYDROXYZINE HYDROCHLORIDE 25 MG/1
25 TABLET, FILM COATED ORAL EVERY 6 HOURS PRN
Qty: 20 TABLET | Refills: 0 | Status: ON HOLD | OUTPATIENT
Start: 2023-05-25 | End: 2023-07-24

## 2023-05-25 RX ORDER — CALCIUM ACETATE 667 MG/1
CAPSULE ORAL
Qty: 60 CAPSULE | Refills: 0 | Status: SHIPPED | OUTPATIENT
Start: 2023-05-25 | End: 2023-07-20

## 2023-05-25 RX ORDER — LOSARTAN POTASSIUM 50 MG/1
50 TABLET ORAL DAILY
Qty: 30 TABLET | Refills: 0 | Status: ON HOLD | OUTPATIENT
Start: 2023-05-26 | End: 2023-07-06

## 2023-05-25 RX ORDER — VIT B COMP NO.3/FOLIC/C/BIOTIN 1 MG-60 MG
1 TABLET ORAL DAILY
Qty: 30 TABLET | Refills: 0 | Status: SHIPPED | OUTPATIENT
Start: 2023-05-26 | End: 2023-06-21

## 2023-05-25 RX ORDER — BENZONATATE 100 MG/1
100 CAPSULE ORAL 3 TIMES DAILY PRN
Qty: 30 CAPSULE | Refills: 0 | Status: ON HOLD | OUTPATIENT
Start: 2023-05-25 | End: 2023-07-06

## 2023-05-25 RX ADMIN — AMLODIPINE BESYLATE 5 MG: 5 TABLET ORAL at 08:17

## 2023-05-25 RX ADMIN — GUAIFENESIN AND CODEINE PHOSPHATE 10 ML: 10; 100 LIQUID ORAL at 08:18

## 2023-05-25 RX ADMIN — LOSARTAN POTASSIUM 50 MG: 50 TABLET, FILM COATED ORAL at 08:17

## 2023-05-25 RX ADMIN — CALCIUM ACETATE 1334 MG: 667 CAPSULE ORAL at 08:17

## 2023-05-25 RX ADMIN — SERTRALINE HYDROCHLORIDE 100 MG: 100 TABLET ORAL at 08:18

## 2023-05-25 RX ADMIN — PANTOPRAZOLE SODIUM 40 MG: 40 TABLET, DELAYED RELEASE ORAL at 15:37

## 2023-05-25 RX ADMIN — HYDROCODONE BITARTRATE AND ACETAMINOPHEN 1 TABLET: 5; 325 TABLET ORAL at 15:41

## 2023-05-25 RX ADMIN — CALCIUM ACETATE 1334 MG: 667 CAPSULE ORAL at 11:11

## 2023-05-25 RX ADMIN — Medication 1 TABLET: at 08:17

## 2023-05-25 RX ADMIN — SENNOSIDES AND DOCUSATE SODIUM 2 TABLET: 50; 8.6 TABLET ORAL at 08:17

## 2023-05-25 RX ADMIN — PANTOPRAZOLE SODIUM 40 MG: 40 TABLET, DELAYED RELEASE ORAL at 08:17

## 2023-05-25 ASSESSMENT — ACTIVITIES OF DAILY LIVING (ADL)
ADLS_ACUITY_SCORE: 24
ADLS_ACUITY_SCORE: 24
ADLS_ACUITY_SCORE: 26
ADLS_ACUITY_SCORE: 24
ADLS_ACUITY_SCORE: 26
ADLS_ACUITY_SCORE: 26
ADLS_ACUITY_SCORE: 24

## 2023-05-25 NOTE — DISCHARGE SUMMARY
[facility]  Hospitalist Discharge Summary      Date of Admission:  5/12/2023  Date of Discharge:  5/25/2023  Discharging Provider: Angel Luis Reno MD  Discharge Service: Hospitalist Service, GOLD TEAM 17    Discharge Diagnoses     Chronic anemia   Generalized weakness   Hemoptysis   ESRD on HD    Clinically Significant Risk Factors     # Severe Malnutrition: based on nutrition assessment      Follow-ups Needed After Discharge   Follow-up Appointments     Adult [facility] Follow-up and recommended labs and tests      Follow up with primary care provider, Agnieszka Rodriguez, within 7   days for hospital follow- up.  The following labs/tests are recommended:   CBC.      Appointments on [place] and/or [facility] (with [facility] or [facility]   provider or service). Call [phone] if you haven't heard regarding   these appointments within 7 days of discharge.             Unresulted Labs Ordered in the Past 30 Days of this Admission     No orders found from 4/12/2023 to 5/13/2023.          Discharge Disposition   Discharged to home  Condition at discharge: Stable    Hospital Course     Rachele Martínez is an 83 year old female with a history of ESRD (on HD MWF), HTN, PVD, anemia 2/2 ESRD and recurrent GI bleeds (2/2 AVMs), and chronic hep C with compensated cirrhosis admitted on 5/12/2023 for weakness and deconditioning. She had acute-onset weakness approximately 1 week ago that involves her bilateral lower extremities that is associated with low back pain. Patient was admitted to the medical floor. Imaging studies were unremarkable as below. Patient was evaluated by PT and OT and they recommended TCU on discharge.     # Weakness   - Patient was discharged to TCU.      # Chronic anemia 2/2 renal disease  # Recent GI bleed (Jan 2023)  # Hemoptysis: Intermittent  # AVM of the small bowel, stomach, and duodenum   Baseline Hgb previously 7s-9s but recently 9-11. Last EGD on  3/9. Endoscopic treatment of duodenal AVMs in march 2023. Ongoing episodes of hemoptysis for at least the past month. CT chest this admission is neg for PE or other lung process   - Continue on PPI.  - Pt takes Octreotide injections q 28 days, outpt to prevent bleeding. Patient received last dose on 5/22/2023.  - RVP: negative.   - Hemoptysis resolved.   - GI signed off. Pulmonology: no bronch, signed off. No more episodes of hemoptysis.   - Continue monitoring HGB as outpatient.       # ESRD on HD (MWF)  # Secondary hyperparathyroidism   - Continue on HD as outpatient.      # Chronic lower extremity pain  # Lumbar spinal stenosis with neurogenic claudication   # PAD  Lower extremity ultrasounds showed peripheral artery disease. Pt has hx of PAD and on PAD rehab following with vascular surgery. She was previously on cilostasol, but this was not effective and increased her risk of bleeding. She was started on a small dose of percocet for severe pain prior to PT sessions. CT lumbar spine with acute issues.   - Tylenol and vicodin PRNs home dose   - Continue home gabapentin 300 mg qhs  - Repeat FAISAL likely as outpt and follow up with vascular     # Chronic hepatitis C with compensated cirrhosis  Complicated by:  - No hx of Ascites  - Hx of GI bleed due to AVM, no history of varices  - No hx of HE  EGD: 8/14/2018, normal esophagus, bleeding angiodysplastic lesions in stomach and duodenum.  Last EGD on 3/9. Endoscopic treatment of duodenal/stomach/Jejunum AVMs   HCC: 5/17/2018, no liver lesions.   - Will add bowel regimen  - Follow up with GI outpt     # Depression  - Continue home sertraline 100 mg daily     # HTN  -Blood pressure stable continue on current antihypertensive regimen.  - PRN hydralazine   - Monitor.      # HLD  - Continuing home statin      # Severe Malnutrition: based on nutrition assessment . Supplements per nutrition.        Consultations This Hospital Stay   NUTRITION SERVICES ADULT IP  CONSULT  PHYSICAL THERAPY ADULT IP CONSULT  OCCUPATIONAL THERAPY ADULT IP CONSULT  NEPHROLOGY ESRD ADULT IP CONSULT  NEPHROLOGY ESRD ADULT IP CONSULT  GI LUMINAL ADULT IP CONSULT  PULMONARY GENERAL ADULT IP CONSULT  CARE MANAGEMENT / SOCIAL WORK IP CONSULT  GI LUMINAL ADULT IP CONSULT    Code Status   No CPR- Pre-arrest intubation OK    Time Spent on this Encounter   I, Angel Luis Reno MD, personally saw the patient today and spent greater than 30 minutes discharging this patient.       Angel Luis Reno MD  [facility] MED SURG  [facility] [address]  Phone: [phone]  Fax: [phone]  ______________________________________________________________________    Physical Exam   Vital Signs: Temp: 99.2  F (37.3  C) Temp src: Oral BP: 130/50 Pulse: 73   Resp: 17 SpO2: 97 % O2 Device: None (Room air)    Weight: 133 lbs 4.8 oz  General Appearance:  Awake, interactive, NAD  Respiratory: Normal work of breathing. On RA  Cardiovascular: RRR  GI: Soft. NT. ND.  Extremities: Distally wwp. No pedal edema  Neuro: Grossly non focal.   Others: Stable mood.           Primary Care Physician   Agnieszka Rodriguez    Discharge Orders      CBC with platelets     Reason for your hospital stay    83 year old female with a history of ESRD (on HD MWF), HTN, PVD, anemia 2/2 ESRD and recurrent GI bleeds (2/2 AVMs), and chronic hep C with compensated cirrhosis admitted on 5/12/2023 for weakness. She had acute-onset weakness approximately 1 week ago that involves her bilateral lower extremities that is associated with low back pain.     Activity    Your activity upon discharge: activity as tolerated     Adult [facility] Follow-up and recommended labs and tests    Follow up with primary care provider, Agnieszka Rodriguez, within 7 days for hospital follow- up.  The following labs/tests are recommended: CBC.      Appointments on [place] and/or [facility] (with [facility] or [facility] provider or  service). Call [phone] if you haven't heard regarding these appointments within 7 days of discharge.     Diet    Follow this diet upon discharge: Orders Placed This Encounter      Snacks/Supplements Adult: Nepro Oral Supplement; Between Meals      Fluid restriction 1500 ML FLUID      Renal Diet (dialysis)       Significant Results and Procedures      Results for orders placed or performed during the hospital encounter of 05/12/23   Lumbar spine CT w/o contrast    Narrative    EXAM: CT LUMBAR SPINE W/O CONTRAST  5/12/2023 6:29 PM     HISTORY:  Low back pain; Low back pain; Low back pain, no complicating  feature; No chronic LBP duration >= 3 months       COMPARISON:  MR lumbar spine 3/3/2000 and    TECHNIQUE: Using multidetector thin collimation helical acquisition  technique, axial, sagittal and coronal 3 mm thickness CT  reconstructions were obtained through the lumbar spine without  intravenous contrast.  Images were viewed in bone and soft tissue  windows.    FINDINGS:  There are 5 lumbar type vertebrae, used for the purposes of this  dictation. Trace anterolisthesis of L5 on S1. Marked disc space  narrowing at the levels of L1-2, L3-4 and L5-S1. There are severe  degenerative changes of the vertebral body endplates predominantly at  the 1-L2 intervertebral disc space, L3-L4 intervertebral disc space,  superior L5 vertebral body endplate and the L5-S1 intervertebral disc  space and adjacent endplates.. No acute fracture. No suspicious  osseous lesion.    Findings on a level by level basis are as follows:    L1-L2: Moderate bilateral neural foraminal narrowing. No significant  spinal canal stenosis.    L2-L3: Moderate left and mild right neural foraminal narrowing. No  significant spinal canal narrowing..    L3-L4: Prominent posterior osteophyte with mild narrowing of the  spinal canal. Moderate bilateral neural foraminal narrowing.    L4-L5: No spinal canal or neural foraminal stenosis.    L5-S1: Moderate  bilateral neural foraminal narrowing. No significant  spinal canal narrowing..    Severe atherosclerosis of the abdominal aorta..      Impression    IMPRESSION:  1. No acute fracture or traumatic subluxation.  2. Moderate to severe degenerative changes of the lumbar spine most  significant at the levels of L1-2, L3-4 and L5-S1.  3. Abdominal atherosclerosis..    I have personally reviewed the examination and initial interpretation  and I agree with the findings.    ELSA WALTON MD         SYSTEM ID:  [id number]   Head CT w/o contrast    Narrative    EXAM: CT HEAD W/O CONTRAST  5/12/2023 6:28 PM     HISTORY:  AMS       COMPARISON:  MR brain 5/12/2018.    TECHNIQUE: Using multidetector thin collimation helical acquisition  technique, axial, coronal and sagittal CT images from the skull base  to the vertex were obtained without intravenous contrast.   (topogram) image(s) also obtained and reviewed.    FINDINGS:    Mild-to-moderate diffuse cerebral atrophy. Periventricular patchy  hypodensities.    No acute intracranial hemorrhage, mass effect, or midline shift. No  acute loss of gray-white matter differentiation in the cerebral  hemispheres. Ventricles are proportionate to the cerebral sulci. Clear  basal cisterns.    The bony calvaria and the bones of the skull base are normal. The  visualized portions of the paranasal sinuses and mastoid air cells are  clear. Grossly normal orbits.       Impression    IMPRESSION:  1. No acute intracranial pathology.  2. Mild to moderate cerebral atrophy and sequela of chronic small  vessel ischemic disease.    I have personally reviewed the examination and initial interpretation  and I agree with the findings.    ELSA WALTON MD         SYSTEM ID:  [id number]   US Lower Extremity Arterial Duplex Bilateral    Narrative    Exam: Duplex ultrasound of bilateral lower extremity arteries dated  5/12/2023 6:43 PM     Clinical information: Pain and history of PAD     Comparison: CT  abdomen and pelvis with leg runoff 7/27/2021    Technique: Grayscale (B-mode), color Doppler, and duplex spectral  Doppler ultrasound of the lower extremity arteries. Velocity  measurements obtained with angle correction of 60 degrees or less.    Ordering provider: Cecily Hernandez    Findings:     Right lower extremity:     Common femoral artery: Velocity: 396 cm/sec. Waveforms: Biphasic ,  turbulent flow within the artery.  Profunda femoral artery: Velocity: 226 cm/sec. Waveforms: Monophasic  SFA at the origin: Velocity: 446 cm/sec. Waveforms: Monophasic  Proximal SFA just past the origin: 11 9 cm/s, parvus tardus waveforms.  Mid SFA:Velocity:  97 cm/sec. Waveforms: Monophasic  Distal SFA: Velocity: 85 cm/sec. Waveforms: Monophasic    Popliteal artery: Velocity: 48 cm/sec. Waveforms: Monophasic    PTA ankle: Velocity: 58 cm/sec. Waveforms: Monophasic  WINNIE ankle: Velocity: 46 cm/sec. Waveforms: Monophasic    Left lower extremity:    Common femoral artery: Velocity: 203 cm/sec. Waveforms: Biphasic  Deep femoral artery: Velocity: 168 cm/sec. Waveforms: Biphasic  Origin of SFA: 333, waveforms: Biphasic  Proximal SFA: Velocity: 4040 cm/sec. Waveforms: Monophasic  Mid SFA: Velocity: 68 cm/sec. Waveforms: Monophasic, parvus tardus  Distal SFA: Velocity: 65 cm/sec. Waveforms: Monophasic, parvus tardus    Popliteal artery, proximal: Velocity: 50 cm/sec. Waveforms: Monophasic    PTA ankle: Velocity: 21 cm/sec. Waveforms: Monophasic from a parvus  tardus  WINNIE ankle: Velocity: 61 cm/sec. Waveforms: Monophasic      Impression    Impression: Peripheral arterial disease.    1. Right leg: Atherosclerotic stenoses of the common femoral artery  and origin of superficial femoral artery, both assessed to be greater  than 75%. Post stenotic wave forms distal to the SFA at the origin.   Otherwise patent right lower extremity arteries.    2. Left leg: Atherosclerotic stenosis of the left common femoral  artery, 50-75% and origin of the left  superficial femoral artery,  greater than 75%. Post stenotic wave forms distal to the origin of the  SFA.  Otherwise patent left lower survey arteries.      Guidelines:    [employer] duplex criteria for lower limb arterial  occlusive disease    Percent stenosis:     Normal (1-19%): Peak systolic velocity (cm/s): <150, End-diastolic  velocity (cm/s): <40, Velocity ratio (Vr): <1.5, Distal arterial  waveform: Triphasic    20-49%: Peak systolic velocity (cm/s): 150-200, End-diastolic velocity  (cm/s): <40, Velocity ratio (Vr): 1.5-2.0, Distal arterial waveform:  Triphasic    50-75%: Peak systolic velocity (cm/s): 200-300, End-diastolic velocity  (cm/s): <90, Velocity ratio (Vr): 2.0-3.9, Distal arterial waveform:  Poststenotic turbulence distal to stenosis, monophasic distal waveform    >75%: Peak systolic velocity (cm/s): >300, End-diastolic velocity  (cm/s): <90, Velocity ratio (Vr): >4.0, Distal arterial waveform:  Dampened distal waveform and low PSV/EDV* in the stenosis    Occlusion: Absent flow by color Doppler/pulsed Doppler spectral  analysis; length of occlusion estimated from distance between exit and  reentry collateral arteries    *PSV = peak systolic velocity, EDV = end-diastolic velocity  http://link.ramey.com/chapter/10.1007/559-3-8481-4005-4_23/fulltext  html    I have personally reviewed the examination and initial interpretation  and I agree with the findings.    MORE VALENCIA MD         SYSTEM ID:  [id number]   CT Chest Pulmonary Embolism w Contrast    Narrative    CT CHEST PULMONARY EMBOLISM W CONTRAST, 5/12/2023 9:18 PM    History: Hemoptysis, +dimer    Comparison: None.    Technique: Helical acquisition of CT images of the chest from the lung  apices to the kidneys were acquired after the administration of  intravenous contrast according to the CT pulmonary angiogram protocol.  Axial images were reconstructed in 1 and 3 mm slice thickness. Coronal  reconstructions performed.  Three-dimensional (3D) post-processed  angiographic images were reconstructed, archived to PACS and used in  the interpretation of this study.    Contrast dose: iopamidol (ISOVUE-370) solution 58 mL    Findings:   The contrast bolus is adequate.  There is no pulmonary embolism. No  evidence of infection.  No suspicious pulmonary nodules.  No pleural  effusion or pneumothorax.    Mediastinum: The heart is not enlarged. There is no pericardial  effusion. The ascending aorta and main pulmonary arteries are within  normal limits for diameter.    Upper abdomen: There is reflux of contrast into the hepatic veins.  Otherwise, the appearance of the upper abdomen is unremarkable.    Bones and soft tissues: No acute or aggressive osseous lesions.  Scattered degenerative changes of the thoracic and lumbar spine.      Impression    Impression:  1. No pulmonary embolism identified.  2. No other worrisome findings in the chest and upper abdomen.    In the event of a positive result for acute pulmonary embolism  resulting in right heart strain, consider calling the   [facility] patient placement ([phone]) for PERT (Pulmonary Embolism  Response Team) Activation?    PERT -- Pulmonary Embolism Response Team (Multidisciplinary team  including cardiology, interventional radiology, critical care,  hematology)    I have personally reviewed the examination and initial interpretation  and I agree with the findings.    VERNON LOPES MD         SYSTEM ID:  [id number]   XR Chest 1 View    Narrative    EXAM: XR CHEST 1 VIEW  5/14/2023 7:10 PM     HISTORY:  hemoptysis       COMPARISON:  CT, 5/12/2023    FINDINGS:   AP upright view of the chest.    Trachea is midline. Cardiomediastinal silhouette and pulmonary  vasculature are within normal limits. No focal airspace opacity,  pleural effusion or appreciable pneumothorax. Atherosclerotic  calcification of the thoracic aorta.    No acute osseous abnormality. Visualized upper abdomen  is  unremarkable.        Impression    IMPRESSION: No focal consolidation    I have personally reviewed the examination and initial interpretation  and I agree with the findings.    SUZAN CESAR MD         SYSTEM ID:  [id number]   XR Chest 2 Views    Narrative    EXAM: XR CHEST 2 VIEWS  5/18/2023 11:15 AM     HISTORY:  low grade fever, cough, elevatd crp. procal.       COMPARISON:  5/14/2023    FINDINGS:   AP and lateral sitting radiograph of the chest. Trachea is midline.  Cardiomediastinal silhouette is within normal limits. Mildly prominent  pulmonary vasculature, likely accentuated by AP projection.  Atherosclerotic calcifications of the aortic arch. No focal airspace  opacity, pleural effusion or appreciable pneumothorax.    Degenerative changes of bilateral shoulders. No acute osseous  abnormality. Visualized upper abdomen is unremarkable.        Impression    IMPRESSION:  No acute airspace disease. Possible mild pulmonary vascular  congestion.    I have personally reviewed the examination and initial interpretation  and I agree with the findings.    STUART SEGUNDO DO         SYSTEM ID:  [id number]   XR Chest Port 1 View    Narrative    EXAM: XR CHEST PORT 1 VIEW  5/22/2023 3:55 PM     HISTORY:  hemoptysis       COMPARISON:  Chest radiograph 5/18/2023    TECHNIQUE: Single portable AP view of the chest    FINDINGS:   The trachea is midline. The cardiomediastinal silhouette is within  normal limits. The pulmonary vasculature is distinct. No appreciable  pneumothorax or pleural effusion. No focal airspace consolidation. No  acute osseous abnormality. Coarse calcifications in the left breast.  Calcification at the aortic knob.      Impression    IMPRESSION: No acute airspace disease. Atherosclerosis.    I have personally reviewed the examination and initial interpretation  and I agree with the findings.    MARK IDAS MD         SYSTEM ID:  [id number]       Discharge Medications   Current Discharge Medication  List      START taking these medications    Details   albuterol (PROAIR HFA/PROVENTIL HFA/VENTOLIN HFA) 108 (90 Base) MCG/ACT inhaler Inhale 2 puffs into the lungs every 4 hours as needed for wheezing, shortness of breath or cough  Qty: 18 g, Refills: 0    Comments: Pharmacy may dispense brand covered by insurance (Proair, or proventil or ventolin or generic albuterol inhaler)  Associated Diagnoses: Hemoptysis      benzocaine-menthol (CHLORASEPTIC) 6-10 MG lozenge Place 1 lozenge inside cheek every hour as needed for sore throat  Qty: 30 lozenge, Refills: 0    Associated Diagnoses: Hemoptysis      benzonatate (TESSALON) 100 MG capsule Take 1 capsule (100 mg) by mouth 3 times daily as needed for cough  Qty: 30 capsule, Refills: 0    Associated Diagnoses: Hemoptysis      hydrOXYzine (ATARAX) 25 MG tablet Take 1 tablet (25 mg) by mouth every 6 hours as needed for itching or anxiety  Qty: 20 tablet, Refills: 0    Associated Diagnoses: Hemoptysis      multivitamin RENAL (RENAVITE RX/NEPHROVITE) 1 tablet tablet Take 1 tablet by mouth daily  Qty: 30 tablet, Refills: 0    Associated Diagnoses: Hemoptysis         CONTINUE these medications which have CHANGED    Details   amLODIPine (NORVASC) 5 MG tablet Take 1 tablet (5 mg) by mouth daily  Qty: 30 tablet, Refills: 0    Associated Diagnoses: Hemoptysis      atorvastatin (LIPITOR) 20 MG tablet Take 1 tablet (20 mg) by mouth daily  Qty: 30 tablet, Refills: 0    Associated Diagnoses: Cognitive impairment      calcium acetate (PHOSLO) 667 MG CAPS capsule TAKE 2 CAPSULE BY MOUTH THREE TIMES A DAY WITH MEALS  Qty: 60 capsule, Refills: 0    Associated Diagnoses: Hemoptysis      gabapentin (NEURONTIN) 300 MG capsule Take 1 capsule (300 mg) by mouth At Bedtime  Qty: 30 capsule, Refills: 0    Associated Diagnoses: Lumbar degenerative disc disease; Chronic bilateral low back pain with right-sided sciatica      losartan (COZAAR) 50 MG tablet Take 1 tablet (50 mg) by mouth daily  Qty: 30  tablet, Refills: 0    Associated Diagnoses: Hemoptysis      pantoprazole (PROTONIX) 20 MG EC tablet Take 1 tablet (20 mg) by mouth 2 times daily (before meals) *take 30-60 minutes before  Qty: 60 tablet, Refills: 0    Associated Diagnoses: Gastrointestinal hemorrhage, unspecified gastrointestinal hemorrhage type      sertraline (ZOLOFT) 50 MG tablet Take 2 tablets (100 mg) by mouth daily  Qty: 60 tablet, Refills: 0    Associated Diagnoses: Itching         CONTINUE these medications which have NOT CHANGED    Details   octreotide (SANDOSTATIN LAR) 20 MG injection Inject 20 mg into the muscle every 28 days      polyethylene glycol (MIRALAX) 17 GM/Dose powder Take 17 g by mouth daily as needed As needed         STOP taking these medications       Multiple Vitamins-Minerals (PRORENAL + D) TABS Comments:   Reason for Stopping:             Allergies   Allergies   Allergen Reactions     Abacavir Itching     Lisinopril Cough     Dust Mites      Hydrochlorothiazide Itching     Severe       No Clinical Screening - See Comments      History of blood transfusion reactions and pre-treats with Benadryl.      Spironolactone Nausea     Sulfa Antibiotics Hives     Valsartan Itching     Valsartan-Hydrochlorothiazide Itching     severe

## 2023-05-25 NOTE — PROGRESS NOTES
Care Management Discharge Note    Discharge Date: 05/27/2023       Discharge Disposition:  TCU    [facility]  [address] [place] [address]  Phone: [phone]      Discharge Services:  Post acute therapies    Discharge DME:  None    Discharge Transportation: [facility] W/C ride    Private pay costs discussed: Not applicable    Does the patient's insurance plan have a 3 day qualifying hospital stay waiver?  No    PAS Confirmation Code:  [id number]  Patient/family educated on Medicare website which has current facility and service quality ratings:      Education Provided on the Discharge Plan:  yes  Persons Notified of Discharge Plans: bedside nurse, charge nurse, family, provider, facility  Patient/Family in Agreement with the Plan:  yes    Handoff Referral Completed: Yes    Additional Information:    GLORIA called and spoke with patient's daughter, Charla. GLORIA explained that patient has been accepted to TCU and it is hospital policy that patient discharge to accepting facility. SW explained that the facility has a bed open today and may not be able to accept her tomorrow. Charla stated that she was told by the doctor and social workers that patient could stay at the hospital until Monday when Charla returns from her trip. SW noted that this is not the case as that is not hospital policy. SW explained that patient is medically ready for discharge. Charla began asking questions about how she is medically ready and SW noted that they will have the doctor call her.    SW spoke to provider who will call patient's daughter.    SW met with patient at bedside. SW explained plan for discharge. Patient is agreeable to discharge today but did ask her other daughter, Mayte, to visit the facility. Patient called Mayte on the phone and Mayte stated that she will visit now.   SW presented Important Message from Medicare. SW explained form and SW explained that if patient is unwilling to discharge to facility today, she can  call the number of the form to appeal the discharge. GLORIA showed her where the phone number is. Patient signed form. GLORIA gave patient a copy. GLORIA faxed to HIM and placed hard copy in hard chart.    GLORIA called admissions at facility. GLORIA confirmed with admissions that patient takes a cab to dialysis at 10am and her daughter picks her up at 2pm. Dialysis is at [facility] in [place] on [address]. Orders can be faxed to [phone].    GLORIA called [facility] and scheduled w/c ride for  5:09-5:54. GLORIA informed bedside nurse.    2:57 PM  GLORIA faxed orders to facility.    Patient requested to talk to GLORIA. Patient stated that Mayte toured the facility and it is really nice and she is happy to go.        Halle Treadwell, DANIELAW, LSW  8 Med Surg   [facility]  Phone: [phone]  Pager: [phone]

## 2023-05-25 NOTE — PLAN OF CARE
"Goal Outcome Evaluation:      Plan of Care Reviewed With: patient    Overall Patient Progress: improving     6MS DISCHARGE    D: Patient discharged to TCU at 1810. Patient accompanied by EMS transport.    I: Discharge prescriptions given to patient. All discharge medications and instructions reviewed with patient. Patient instructed to seek care if experiencing worsening symptoms, such as worsening pain. Other phone numbers to call with questions or concerns after discharge reviewed. No PIV. Education completed.    A: patient verbalized understanding of discharge medications and instructions. Prescribed home medications given to patient.  Belongings returned to patient.    P: Patient to follow-up on care with nephrology.      VS: BP (!) 145/54 (BP Location: Left arm)   Pulse 73   Temp 98.4  F (36.9  C) (Oral)   Resp 17   Ht 1.626 m (5' 4\")   Wt 60.5 kg (133 lb 4.8 oz)   SpO2 97%   BMI 22.88 kg/m     O2: Room air, denies SOB and chest pain   Output: Minimal output due to dialysis. On 1500 mL restriction   Last BM: 05/24   Activity: SBA w walker   Up for meals? Yes   Skin: No new deficits noted   Pain: Has chronic back pain , norco given once   CMS: Alert and oriented x4, able to make needs known. Has baseline n/t in feet   Dressing: N/a   Diet: Renal diet   LDA: R fistula    Plan: Continue with plan of care. Transfer to TCU in [place] this evening Dialysis MWF   Equipment IV pole, personal belongings in room, call light within reach        "

## 2023-05-25 NOTE — PLAN OF CARE
"VS: Blood pressure (!) 143/55, pulse 77, temperature 100.1  F (37.8  C), temperature source Oral, resp. rate 18, height 1.626 m (5' 4\"), weight 60.5 kg (133 lb 4.8 oz), SpO2 95 %, not currently breastfeeding.     O2: Stable on room air, denies chest pain and shortness of breath   Output: Minimal output due to dialysis   Activity: SBA with walker   Up for meals? Yes   Skin: No new deficits noticied   Pain: Chronic back pain managed with lidocaine patch   CMS: Intact, baseline numbness and tingling in feet, A/Ox4   Dressing: N/a    Diet: Regular renal   LDA: r fistula   Equipment: personal belongings, call light within reach    Plan: MWF dialysis, awaiting TCU placement, POC continues                           "

## 2023-05-25 NOTE — PROGRESS NOTES
SPIRITUAL HEALTH SERVICES Progress Note  [facility] ([facility]) 6B    Due to patient's length of stay I offered a visit yesterday (5/24). Patient expressed she was feeling much better than when admitted and is hoping to return home. Her main source of distress was her cat and returning to her cat. She identifies as [employer] and appreciated a prayer. This  provided a prayer.     Robby Wheeler MDiv  Chaplain Resident  Pager [phone]      * [facility] remains available 24/7 for emergent requests/referrals, either by having the switchboard page the on-call  or by entering an ASAP/STAT consult in Epic (this will also page the on-call ). Routine Epic consults receive an initial response within 24 hours.*

## 2023-05-25 NOTE — PROGRESS NOTES
Care Management Follow Up    Length of Stay (days): 13    Expected Discharge Date: 05/27/2023     Concerns to be Addressed: discharge planning     Patient plan of care discussed at interdisciplinary rounds: Yes    Anticipated Discharge Disposition:  TCU     Anticipated Discharge Services:  TCU  Anticipated Discharge DME:  NA    Patient/family educated on Medicare website which has current facility and service quality ratings:    Education Provided on the Discharge Plan:  Yes  Patient/Family in Agreement with the Plan:  Yes    Referrals Placed by CM/SW:  See below  Private pay costs discussed: Not applicable    Additional Information:    [facility]  [address] [place] [address]  Phone: [phone]  5/24: Referral sent via Epic destination  5/25: Writer called her. They accepted her online.       Declined:    [facility]  [address] [place] [address]  [phone]  5/24: Referral sent via Epic destination  5/25: They declined the referral. She is too complex for the TCU. She has a lot going on and there current patients are also complex. They are trying to minimize complexities there.     [facility]   [address] [place] [address]  [phone]  5/17: Referral sent via destination on Epic  5/18: Tried to leave  with admissions but VM box was full.   5/19: Left VM with admissions to check status of referral.   5/23: Writer spoke with Caryn in admissions. They will review and get back to . Received call, unable to accept patient due to Hemo-dialysis. This may change in a couple of weeks.     [facility]  [address] [place] [address]  [phone]  5/17: Referral sent via destination on Epic  5/18: Spoke with admissions to check status of patient referral. They don't have beds right now. They also only work with TBIs, strokes and spinal chord injuries.         [facility] ([facility])  [address] [place] [address]  [phone]  5/17: Referral sent via destination on Epic  5/18: Spoke with admissions. They declined her admission due to they don't take dialysis patients. They cannot provide assistance to acute care needs.     [facility] 4th floor  [address] [place]  [address]  Admissions: [phone]  Fax: [phone]  RN to RN:  [phone]  5/18: Messaged [facility] liaison about TCU referral.  5/19: Declined due to no open beds. They stated she needs a higher LOC.    Met with patient at bedside to talk with her. Her daughter is on vacation until next week. She stated her other daughter will be visiting today after 1:30pm. Writer stated she can visit her at that time and see if her daughter has any preferences for TCUs. Writer called [facility] and they stated she was accepted there. She can come today. They need the orders in before 4pm today. Writer called Leti and updated her with this information. She stated she wants to tour the place before accepting it. Daughter is out of town and she said another family member may be able to tour the place today so patient can discharge today.     Writer called [facility] to see if someone can tour the location today. They stated someone can come and tour anytime before 4pm and they can walk in and tour it.     See Danika soto for discharge details.    Lianna Butler, ANTONIO, LGSW  6 Med Surg   [facility]

## 2023-05-25 NOTE — PROGRESS NOTES
Nephrology Dialysis note:  This patient was seen and examined while on dialysis. Laboratory results and nurses' notes were reviewed.  No changes to management of volume, anemia, BMD, acidosis, or electrolytes.  Diagnosis - ESRD on HD   Skyla Han MD   [phone]

## 2023-05-25 NOTE — PROGRESS NOTES
[facility]    Medicine Progress Note - Hospitalist Service, GOLD TEAM 17    Date of Admission:  5/12/2023    Assessment & Plan     83 year old female with a history of ESRD (on HD MWF), HTN, PVD, anemia 2/2 ESRD and recurrent GI bleeds (2/2 AVMs), and chronic hep C with compensated cirrhosis admitted on 5/12/2023 for weakness. She had acute-onset weakness approximately 1 week ago that involves her bilateral lower extremities that is associated with low back pain.       Today's changes:   5/25/2023    No acute events overnight.  Patient was pleasant this morning.  Social work found a TCU facility for the patient but patient's daughter refused to take the patient there.  Patient's daughter currently is out of state and is adamant about touring the facility before the patient goes there.  Social work having a conversation with patient's daughter about this.  No further episodes of hemoptysis.  No significant change in patient's hemoglobin.    # Gen Weakness  Strongly suspect that her weakness is multifactorial. She notes that her blood pressure medications were recently increased, and she has had lightheadedness and dizziness since that time. In addition, she recently had a GI bleed and has ongoing hemoptysis per her report. Her Hgb on presentation was 10, and Hgb has been 9-10 recently. She does take a narcotic in the outpatient setting, and with her history of ESRD, this could be accumulating and causing weakness. Further, she is on a statin; this is not a new medication but statin-induced myopathy is possible. CT of her head and lumbar spine showed no acute process that explains her weakness. She does still make some urine and has had intermittent burning with urination. On chart review, it appears that her PCP Dr. Rodriguez has had some concerns about her ability to care for herself for some time and asked her to start considering assisted living due to some cognitive  impairment. At discharge from the hospital in January, PT and OT also recommended home PT/OT services.   - Decreased losartan from 100 mg daily to 50 mg daily - titrate as needed.   - PT and OT following the patient. Waiting for TCU placement.   - Opioid weaned down as able.   - Monitor po intake.   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 Last EGD on 3/9. Endoscopic treatment of duodenal AVMs in march 2023. Ongoing episodes of hemoptysis for at least the past month. CT chest this admission is neg for PE or other lung process   - IV PPI BID --> 5/17: Switched to po.   - Pt takes Octreotide injections q 28 days, outpt to prevent bleeding. Patient received last dose on 5/22/2023.  - EP per Nephrology.   - RVP: negative.   - Hemoptysis resolved.   - GI signed off. Pulmonology: no bronch, signed off. No more episodes of hemoptysis.       # ESRD on HD (MWF)  # Secondary hyperparathyroidism   Has been stable on dialysis for some time. Last run Friday 5/12.   - Neph consulted for dialysis  - Continue home phos binder     # Chronic lower extremity pain  # Lumbar spinal stenosis with neurogenic claudication   # PAD  Lower extremity ultrasounds showed peripheral artery disease. Pt has hx of PAD and on PAD rehab following with vascular surgery. She was previously on cilostasol, but this was not effective and increased her risk of bleeding. She was started on a small dose of percocet for severe pain prior to PT sessions. CT lumbar spine with acute issues.   - Tylenol and vicodin PRNs home dose   - Continue home gabapentin 300 mg qhs  - Repeat FAISAL likely as outpt and follow up with vascular     # Chronic hepatitis C with compensated cirrhosis  Complicated by:  - No hx of Ascites  - Hx of GI bleed due to AVM, no history of varices  - No hx of  HE  EGD: 8/14/2018, normal esophagus, bleeding angiodysplastic lesions in stomach and duodenum.  Last EGD on 3/9. Endoscopic treatment of duodenal/stomach/Jejunum AVMs   HCC: 5/17/2018, no liver lesions.   - Will add bowel regimen  - Follow up with GI outpt     # Depression  - Continue home sertraline 100 mg daily     # HTN  -Blood pressure stable continue on current antihypertensive regimen.  - PRN hydralazine   - Monitor.      # HLD  - Continuing home statin     # Severe Malnutrition: based on nutrition assessment . Supplements per nutrition.        Diet: Snacks/Supplements Adult: Nepro Oral Supplement; Between Meals  Renal Diet (dialysis)  Fluid restriction 1500 ML FLUID    DVT Prophylaxis: Heparin subcutaneous held due to bleeding. SCDs ordered   Rodriguez Catheter: Not present  Lines: None     Cardiac Monitoring: None  Code Status: No CPR- Pre-arrest intubation OK      Clinically Significant Risk Factors        Disposition Plan      Expected Discharge Date: 05/27/2023    Discharge Delays: Placement - TCU  *Medically Ready for Discharge  Destination: other (comment) (PT is recommending TCU. This has not been discussed with patient yet.)  Discharge Comments: Will need placement. Has GI bleed and will need to be evaluated by GI 1st        Angel Luis Reno MD  Hospitalist Service, GOLD TEAM [address] [place] [address]  Securely message with [employer] (more info)  Text page via [employer] Paging/Directory   See signed in provider for up to date coverage information  ______________________________________________________________________    Interval History      No acute events overnight.   She was pleasant.   No new issues.   No significant cough or hemoptysis.   No fever chills.   No chest pain/sob/NVD    Physical Exam   Vital Signs: Temp: 99.2  F (37.3  C) Temp src: Oral BP: 130/50 Pulse: 73   Resp: 17 SpO2: 97 % O2 Device: None (Room air)    Weight: 133 lbs 4.8 oz    General  Appearance: Awake, interactive, NAD  Respiratory: Normal work of breathing. On RA  Cardiovascular: RRR  GI: Soft. NT. ND.  Extremities: Distally wwp. No pedal edema  Neuro: Grossly non focal.   Others: Stable mood.         Medical Decision Making     45 MINUTES SPENT BY ME on the date of service doing chart review, history, exam, documentation & further activities per the note.      Data   ------------------------- PAST 24 HR DATA REVIEWED -----------------------------------------------         CMP  Recent Labs   Lab 05/22/23  0858   *   POTASSIUM 5.0   CHLORIDE 94*   CO2 23   ANIONGAP 17*   *   BUN 61.8*   CR 9.69*   GFRESTIMATED 4*   BELGICA 8.8     CBC  Recent Labs   Lab 05/25/23  0744 05/23/23  0621 05/22/23  1812 05/22/23  1416 05/22/23  0858 05/21/23  2232 05/19/23  0827   WBC 8.6  --   --   --  10.0  --  9.0   RBC 2.35*  --   --   --  2.35*  --  2.71*   HGB 7.2* 7.4* 7.7* 7.9* 7.2*   < > 8.3*   HCT 23.0*  --  24.8* 24.5* 22.9*   < > 26.8*   MCV 98  --   --   --  97  --  99   MCH 30.6  --   --   --  30.6  --  30.6   MCHC 31.3*  --   --   --  31.4*  --  31.0*   RDW 15.8*  --   --   --  16.1*  --  15.6*     407  --   --   --  458*  --  518*    < > = values in this interval not displayed.     INR  No lab results found in last 7 days.  Arterial Blood GasNo lab results found in last 7 days.

## 2023-05-26 ENCOUNTER — PATIENT OUTREACH (OUTPATIENT)
Dept: CARE COORDINATION | Facility: CLINIC | Age: 83
End: 2023-05-26
Payer: MEDICARE

## 2023-05-26 NOTE — PROGRESS NOTES
[facility] [place]    Background: Transitional Care Management program identified per system criteria and reviewed by [facility] team for possible outreach.    Assessment: Upon chart review, CCRC Team member will not proceed with patient outreach related to this episode of Transitional Care Management program due to reason below:    Non-[facility] TCU: CCRC team member noted patient discharged to TCU/ARU/LTACH. Patient is not established with a [facility] currently supported by Primary Care-Care Coordination therefore handoff to Primary Care-Care Coordination is not appropriate at this time.    Plan: Transitional Care Management episode addressed appropriately per reason noted above.      Kim Edwards MA  [facility]cted [facility] [place], [facility]    *Connected Care Resource Team does NOT follow patient ongoing. Referrals are identified based on internal discharge reports and the outreach is to ensure patient has an understanding of their discharge instructions.

## 2023-05-26 NOTE — PROGRESS NOTES
Occupational Therapy Discharge Summary    Reason for therapy discharge:    Discharged to transitional care facility.    Progress towards therapy goal(s). See goals on Care Plan in [facility] electronic health record for goal details.  Goals not met.  Barriers to achieving goals:   discharge from facility.    Therapy recommendation(s):    Continued therapy is recommended.  Rationale/Recommendations:  to increase ADL independence and activity tolerance.

## 2023-05-26 NOTE — PLAN OF CARE
Physical Therapy Discharge Summary    Reason for therapy discharge:    Discharged to transitional care facility.    Progress towards therapy goal(s). See goals on Care Plan in [facility] electronic health record for goal details.  Goals partially met.  Barriers to achieving goals:   discharge from facility.    Therapy recommendation(s):    Continued therapy is recommended.  Rationale/Recommendations:  continue progression of safety and ind w/ mobility at TCU.

## 2023-05-31 ENCOUNTER — LAB REQUISITION (OUTPATIENT)
Dept: LAB | Facility: CLINIC | Age: 83
DRG: 377 | End: 2023-05-31
Payer: MEDICARE

## 2023-05-31 DIAGNOSIS — N18.6 END STAGE RENAL DISEASE (H): ICD-10-CM

## 2023-05-31 DIAGNOSIS — R04.2 HEMOPTYSIS: ICD-10-CM

## 2023-06-01 ENCOUNTER — HOSPITAL ENCOUNTER (INPATIENT)
Facility: CLINIC | Age: 83
LOS: 4 days | Discharge: NURSING, REHAB OR LONG-TERM CARE | DRG: 377 | End: 2023-06-05
Attending: INTERNAL MEDICINE | Admitting: STUDENT IN AN ORGANIZED HEALTH CARE EDUCATION/TRAINING PROGRAM
Payer: MEDICARE

## 2023-06-01 DIAGNOSIS — Z87.891 PERSONAL HISTORY OF TOBACCO USE, PRESENTING HAZARDS TO HEALTH: ICD-10-CM

## 2023-06-01 DIAGNOSIS — K31.811 ANGIODYSPLASIA OF STOMACH AND DUODENUM WITH HEMORRHAGE: ICD-10-CM

## 2023-06-01 DIAGNOSIS — K92.2 UPPER GASTROINTESTINAL BLEEDING: ICD-10-CM

## 2023-06-01 DIAGNOSIS — Z99.2 DEPENDENCE ON RENAL DIALYSIS (H): ICD-10-CM

## 2023-06-01 DIAGNOSIS — N18.6 ESRD (END STAGE RENAL DISEASE) ON DIALYSIS (H): ICD-10-CM

## 2023-06-01 DIAGNOSIS — K92.2 CHRONIC GI BLEEDING: ICD-10-CM

## 2023-06-01 DIAGNOSIS — Z99.2 ESRD (END STAGE RENAL DISEASE) ON DIALYSIS (H): ICD-10-CM

## 2023-06-01 DIAGNOSIS — I12.0 BENIGN HYPERTENSIVE KIDNEY DISEASE WITH CHRONIC KIDNEY DISEASE STAGE V OR END STAGE RENAL DISEASE (H): ICD-10-CM

## 2023-06-01 DIAGNOSIS — N18.6 END STAGE RENAL DISEASE (H): ICD-10-CM

## 2023-06-01 DIAGNOSIS — D64.9 SYMPTOMATIC ANEMIA: Primary | ICD-10-CM

## 2023-06-01 DIAGNOSIS — D62 ANEMIA DUE TO BLOOD LOSS, ACUTE: ICD-10-CM

## 2023-06-01 LAB
ABO/RH(D): NORMAL
ALBUMIN SERPL BCG-MCNC: 3.2 G/DL (ref 3.5–5.2)
ALBUMIN SERPL BCG-MCNC: 3.4 G/DL (ref 3.5–5.2)
ALP SERPL-CCNC: 125 U/L (ref 35–104)
ALP SERPL-CCNC: 128 U/L (ref 35–104)
ALT SERPL W P-5'-P-CCNC: 10 U/L (ref 10–35)
ALT SERPL W P-5'-P-CCNC: 7 U/L (ref 10–35)
ANION GAP SERPL CALCULATED.3IONS-SCNC: 12 MMOL/L (ref 7–15)
ANION GAP SERPL CALCULATED.3IONS-SCNC: 13 MMOL/L (ref 7–15)
ANTIBODY SCREEN: NEGATIVE
AST SERPL W P-5'-P-CCNC: 22 U/L (ref 10–35)
AST SERPL W P-5'-P-CCNC: 27 U/L (ref 10–35)
BASOPHILS # BLD AUTO: 0.1 10E3/UL (ref 0–0.2)
BASOPHILS NFR BLD AUTO: 1 %
BILIRUB SERPL-MCNC: 0.3 MG/DL
BILIRUB SERPL-MCNC: 0.4 MG/DL
BLD PROD TYP BPU: NORMAL
BLOOD COMPONENT TYPE: NORMAL
BUN SERPL-MCNC: 28.5 MG/DL (ref 8–23)
BUN SERPL-MCNC: 31.4 MG/DL (ref 8–23)
CALCIUM SERPL-MCNC: 9.1 MG/DL (ref 8.8–10.2)
CALCIUM SERPL-MCNC: 9.1 MG/DL (ref 8.8–10.2)
CHLORIDE SERPL-SCNC: 96 MMOL/L (ref 98–107)
CHLORIDE SERPL-SCNC: 99 MMOL/L (ref 98–107)
CODING SYSTEM: NORMAL
CREAT SERPL-MCNC: 6.79 MG/DL (ref 0.51–0.95)
CREAT SERPL-MCNC: 7.64 MG/DL (ref 0.51–0.95)
CROSSMATCH: NORMAL
CRP SERPL-MCNC: 15.2 MG/L
DEPRECATED HCO3 PLAS-SCNC: 26 MMOL/L (ref 22–29)
DEPRECATED HCO3 PLAS-SCNC: 27 MMOL/L (ref 22–29)
EOSINOPHIL # BLD AUTO: 0.4 10E3/UL (ref 0–0.7)
EOSINOPHIL NFR BLD AUTO: 5 %
ERYTHROCYTE [DISTWIDTH] IN BLOOD BY AUTOMATED COUNT: 16.7 % (ref 10–15)
ERYTHROCYTE [DISTWIDTH] IN BLOOD BY AUTOMATED COUNT: 17 % (ref 10–15)
GFR SERPL CREATININE-BSD FRML MDRD: 5 ML/MIN/1.73M2
GFR SERPL CREATININE-BSD FRML MDRD: 6 ML/MIN/1.73M2
GLUCOSE SERPL-MCNC: 135 MG/DL (ref 70–99)
GLUCOSE SERPL-MCNC: 97 MG/DL (ref 70–99)
HCO3 BLDV-SCNC: 27 MMOL/L (ref 21–28)
HCT VFR BLD AUTO: 19.1 % (ref 35–47)
HCT VFR BLD AUTO: 20.4 % (ref 35–47)
HGB BLD-MCNC: 5.6 G/DL (ref 11.7–15.7)
HGB BLD-MCNC: 6 G/DL (ref 11.7–15.7)
IMM GRANULOCYTES # BLD: 0 10E3/UL
IMM GRANULOCYTES NFR BLD: 0 %
INR PPP: 1.29 (ref 0.85–1.15)
ISSUE DATE AND TIME: NORMAL
LACTATE BLD-SCNC: 1.1 MMOL/L
LYMPHOCYTES # BLD AUTO: 0.7 10E3/UL (ref 0.8–5.3)
LYMPHOCYTES NFR BLD AUTO: 8 %
MAGNESIUM SERPL-MCNC: 2 MG/DL (ref 1.7–2.3)
MCH RBC QN AUTO: 30.2 PG (ref 26.5–33)
MCH RBC QN AUTO: 30.4 PG (ref 26.5–33)
MCHC RBC AUTO-ENTMCNC: 29.3 G/DL (ref 31.5–36.5)
MCHC RBC AUTO-ENTMCNC: 29.4 G/DL (ref 31.5–36.5)
MCV RBC AUTO: 103 FL (ref 78–100)
MCV RBC AUTO: 104 FL (ref 78–100)
MONOCYTES # BLD AUTO: 0.8 10E3/UL (ref 0–1.3)
MONOCYTES NFR BLD AUTO: 9 %
NEUTROPHILS # BLD AUTO: 6.5 10E3/UL (ref 1.6–8.3)
NEUTROPHILS NFR BLD AUTO: 77 %
NRBC # BLD AUTO: 0 10E3/UL
NRBC BLD AUTO-RTO: 0 /100
PCO2 BLDV: 44 MM HG (ref 40–50)
PH BLDV: 7.4 [PH] (ref 7.32–7.43)
PLATELET # BLD AUTO: 372 10E3/UL (ref 150–450)
PLATELET # BLD AUTO: 386 10E3/UL (ref 150–450)
PO2 BLDV: 41 MM HG (ref 25–47)
POTASSIUM SERPL-SCNC: 4 MMOL/L (ref 3.4–5.3)
POTASSIUM SERPL-SCNC: 4.3 MMOL/L (ref 3.4–5.3)
PROT SERPL-MCNC: 6.2 G/DL (ref 6.4–8.3)
PROT SERPL-MCNC: 7 G/DL (ref 6.4–8.3)
RBC # BLD AUTO: 1.84 10E6/UL (ref 3.8–5.2)
RBC # BLD AUTO: 1.99 10E6/UL (ref 3.8–5.2)
RETICS # AUTO: 0.1 10E6/UL (ref 0.03–0.1)
RETICS/RBC NFR AUTO: 5.5 % (ref 0.5–2)
SAO2 % BLDV: 76 % (ref 94–100)
SODIUM SERPL-SCNC: 135 MMOL/L (ref 136–145)
SODIUM SERPL-SCNC: 138 MMOL/L (ref 136–145)
SPECIMEN EXPIRATION DATE: NORMAL
TROPONIN T SERPL HS-MCNC: 65 NG/L
UNIT ABO/RH: NORMAL
UNIT NUMBER: NORMAL
UNIT STATUS: NORMAL
UNIT TYPE ISBT: 5100
WBC # BLD AUTO: 8.5 10E3/UL (ref 4–11)
WBC # BLD AUTO: 9.1 10E3/UL (ref 4–11)

## 2023-06-01 PROCEDURE — 85045 AUTOMATED RETICULOCYTE COUNT: CPT | Performed by: NURSE PRACTITIONER

## 2023-06-01 PROCEDURE — 250N000013 HC RX MED GY IP 250 OP 250 PS 637: Performed by: STUDENT IN AN ORGANIZED HEALTH CARE EDUCATION/TRAINING PROGRAM

## 2023-06-01 PROCEDURE — 83735 ASSAY OF MAGNESIUM: CPT | Performed by: INTERNAL MEDICINE

## 2023-06-01 PROCEDURE — 84484 ASSAY OF TROPONIN QUANT: CPT | Performed by: INTERNAL MEDICINE

## 2023-06-01 PROCEDURE — 99285 EMERGENCY DEPT VISIT HI MDM: CPT | Mod: 25 | Performed by: INTERNAL MEDICINE

## 2023-06-01 PROCEDURE — 86901 BLOOD TYPING SEROLOGIC RH(D): CPT | Performed by: INTERNAL MEDICINE

## 2023-06-01 PROCEDURE — 36415 COLL VENOUS BLD VENIPUNCTURE: CPT | Performed by: NURSE PRACTITIONER

## 2023-06-01 PROCEDURE — P9604 ONE-WAY ALLOW PRORATED TRIP: HCPCS | Performed by: NURSE PRACTITIONER

## 2023-06-01 PROCEDURE — 86923 COMPATIBILITY TEST ELECTRIC: CPT | Performed by: INTERNAL MEDICINE

## 2023-06-01 PROCEDURE — 86140 C-REACTIVE PROTEIN: CPT | Performed by: INTERNAL MEDICINE

## 2023-06-01 PROCEDURE — 120N000002 HC R&B MED SURG/OB UMMC

## 2023-06-01 PROCEDURE — 36415 COLL VENOUS BLD VENIPUNCTURE: CPT | Performed by: INTERNAL MEDICINE

## 2023-06-01 PROCEDURE — 85014 HEMATOCRIT: CPT | Performed by: NURSE PRACTITIONER

## 2023-06-01 PROCEDURE — 82374 ASSAY BLOOD CARBON DIOXIDE: CPT | Performed by: NURSE PRACTITIONER

## 2023-06-01 PROCEDURE — 85610 PROTHROMBIN TIME: CPT | Performed by: INTERNAL MEDICINE

## 2023-06-01 PROCEDURE — 99223 1ST HOSP IP/OBS HIGH 75: CPT | Mod: FS | Performed by: STUDENT IN AN ORGANIZED HEALTH CARE EDUCATION/TRAINING PROGRAM

## 2023-06-01 PROCEDURE — 85018 HEMOGLOBIN: CPT | Performed by: NURSE PRACTITIONER

## 2023-06-01 PROCEDURE — 99207 PR APP CREDIT; MD BILLING SHARED VISIT: CPT | Performed by: NURSE PRACTITIONER

## 2023-06-01 PROCEDURE — 85025 COMPLETE CBC W/AUTO DIFF WBC: CPT | Performed by: INTERNAL MEDICINE

## 2023-06-01 PROCEDURE — 93010 ELECTROCARDIOGRAM REPORT: CPT | Performed by: INTERNAL MEDICINE

## 2023-06-01 PROCEDURE — 93005 ELECTROCARDIOGRAM TRACING: CPT | Performed by: INTERNAL MEDICINE

## 2023-06-01 PROCEDURE — 80053 COMPREHEN METABOLIC PANEL: CPT | Performed by: INTERNAL MEDICINE

## 2023-06-01 PROCEDURE — 250N000013 HC RX MED GY IP 250 OP 250 PS 637: Performed by: NURSE PRACTITIONER

## 2023-06-01 PROCEDURE — 82803 BLOOD GASES ANY COMBINATION: CPT

## 2023-06-01 PROCEDURE — P9016 RBC LEUKOCYTES REDUCED: HCPCS | Performed by: INTERNAL MEDICINE

## 2023-06-01 RX ORDER — DIPHENHYDRAMINE HCL 25 MG
25 CAPSULE ORAL ONCE
Status: COMPLETED | OUTPATIENT
Start: 2023-06-01 | End: 2023-06-01

## 2023-06-01 RX ORDER — ACETAMINOPHEN 325 MG/1
650 TABLET ORAL EVERY 6 HOURS PRN
Status: DISCONTINUED | OUTPATIENT
Start: 2023-06-01 | End: 2023-06-05 | Stop reason: HOSPADM

## 2023-06-01 RX ORDER — ALBUTEROL SULFATE 90 UG/1
2 AEROSOL, METERED RESPIRATORY (INHALATION) EVERY 4 HOURS PRN
Status: DISCONTINUED | OUTPATIENT
Start: 2023-06-01 | End: 2023-06-05 | Stop reason: HOSPADM

## 2023-06-01 RX ORDER — PANTOPRAZOLE SODIUM 20 MG/1
20 TABLET, DELAYED RELEASE ORAL
Status: DISCONTINUED | OUTPATIENT
Start: 2023-06-02 | End: 2023-06-01

## 2023-06-01 RX ORDER — BISACODYL 5 MG
10 TABLET, DELAYED RELEASE (ENTERIC COATED) ORAL DAILY PRN
Status: DISCONTINUED | OUTPATIENT
Start: 2023-06-01 | End: 2023-06-05 | Stop reason: HOSPADM

## 2023-06-01 RX ORDER — ACETAMINOPHEN 650 MG/1
650 SUPPOSITORY RECTAL EVERY 6 HOURS PRN
Status: DISCONTINUED | OUTPATIENT
Start: 2023-06-01 | End: 2023-06-05 | Stop reason: HOSPADM

## 2023-06-01 RX ORDER — ATORVASTATIN CALCIUM 10 MG/1
20 TABLET, FILM COATED ORAL DAILY
Status: DISCONTINUED | OUTPATIENT
Start: 2023-06-02 | End: 2023-06-05 | Stop reason: HOSPADM

## 2023-06-01 RX ORDER — ACETAMINOPHEN 325 MG/1
650 TABLET ORAL ONCE
Status: COMPLETED | OUTPATIENT
Start: 2023-06-01 | End: 2023-06-01

## 2023-06-01 RX ORDER — LOSARTAN POTASSIUM 50 MG/1
50 TABLET ORAL DAILY
Status: DISCONTINUED | OUTPATIENT
Start: 2023-06-02 | End: 2023-06-04

## 2023-06-01 RX ORDER — LIDOCAINE 40 MG/G
CREAM TOPICAL
Status: DISCONTINUED | OUTPATIENT
Start: 2023-06-01 | End: 2023-06-05 | Stop reason: HOSPADM

## 2023-06-01 RX ORDER — ONDANSETRON 4 MG/1
4 TABLET, ORALLY DISINTEGRATING ORAL EVERY 6 HOURS PRN
Status: DISCONTINUED | OUTPATIENT
Start: 2023-06-01 | End: 2023-06-05 | Stop reason: HOSPADM

## 2023-06-01 RX ORDER — GABAPENTIN 300 MG/1
300 CAPSULE ORAL AT BEDTIME
Status: DISCONTINUED | OUTPATIENT
Start: 2023-06-01 | End: 2023-06-05 | Stop reason: HOSPADM

## 2023-06-01 RX ORDER — DIPHENHYDRAMINE HYDROCHLORIDE 50 MG/ML
25 INJECTION INTRAMUSCULAR; INTRAVENOUS ONCE
Status: DISCONTINUED | OUTPATIENT
Start: 2023-06-01 | End: 2023-06-01

## 2023-06-01 RX ORDER — POLYETHYLENE GLYCOL 3350 17 G/17G
17 POWDER, FOR SOLUTION ORAL DAILY PRN
Status: DISCONTINUED | OUTPATIENT
Start: 2023-06-01 | End: 2023-06-05 | Stop reason: HOSPADM

## 2023-06-01 RX ORDER — AMLODIPINE BESYLATE 5 MG/1
5 TABLET ORAL DAILY
Status: DISCONTINUED | OUTPATIENT
Start: 2023-06-02 | End: 2023-06-05 | Stop reason: HOSPADM

## 2023-06-01 RX ORDER — ONDANSETRON 2 MG/ML
4 INJECTION INTRAMUSCULAR; INTRAVENOUS EVERY 6 HOURS PRN
Status: DISCONTINUED | OUTPATIENT
Start: 2023-06-01 | End: 2023-06-05 | Stop reason: HOSPADM

## 2023-06-01 RX ORDER — SERTRALINE HYDROCHLORIDE 100 MG/1
100 TABLET, FILM COATED ORAL DAILY
Status: DISCONTINUED | OUTPATIENT
Start: 2023-06-02 | End: 2023-06-05 | Stop reason: HOSPADM

## 2023-06-01 RX ORDER — HYDROXYZINE HYDROCHLORIDE 25 MG/1
25 TABLET, FILM COATED ORAL EVERY 6 HOURS PRN
Status: DISCONTINUED | OUTPATIENT
Start: 2023-06-01 | End: 2023-06-02

## 2023-06-01 RX ORDER — CALCIUM ACETATE 667 MG/1
1334 CAPSULE ORAL
Status: DISCONTINUED | OUTPATIENT
Start: 2023-06-01 | End: 2023-06-05 | Stop reason: HOSPADM

## 2023-06-01 RX ORDER — BISACODYL 5 MG
5 TABLET, DELAYED RELEASE (ENTERIC COATED) ORAL DAILY PRN
Status: DISCONTINUED | OUTPATIENT
Start: 2023-06-01 | End: 2023-06-05 | Stop reason: HOSPADM

## 2023-06-01 RX ORDER — PANTOPRAZOLE SODIUM 20 MG/1
20 TABLET, DELAYED RELEASE ORAL
Status: DISCONTINUED | OUTPATIENT
Start: 2023-06-01 | End: 2023-06-05 | Stop reason: HOSPADM

## 2023-06-01 RX ADMIN — DIPHENHYDRAMINE HYDROCHLORIDE 25 MG: 25 CAPSULE ORAL at 20:35

## 2023-06-01 RX ADMIN — PANTOPRAZOLE SODIUM 20 MG: 20 TABLET, DELAYED RELEASE ORAL at 22:14

## 2023-06-01 RX ADMIN — ACETAMINOPHEN 650 MG: 325 TABLET ORAL at 20:34

## 2023-06-01 RX ADMIN — CALCIUM ACETATE 1334 MG: 667 CAPSULE ORAL at 22:14

## 2023-06-01 RX ADMIN — GABAPENTIN 300 MG: 300 CAPSULE ORAL at 22:09

## 2023-06-01 ASSESSMENT — ENCOUNTER SYMPTOMS
CONFUSION: 1
SHORTNESS OF BREATH: 1
WHEEZING: 0
DIFFICULTY URINATING: 0
FEVER: 0
ADENOPATHY: 0
FATIGUE: 1
CHILLS: 0
NECK PAIN: 0
VOMITING: 0
TROUBLE SWALLOWING: 0
NAUSEA: 0
ABDOMINAL PAIN: 0
WEAKNESS: 1
COUGH: 0
PALPITATIONS: 0
BACK PAIN: 0

## 2023-06-01 ASSESSMENT — ACTIVITIES OF DAILY LIVING (ADL)
ADLS_ACUITY_SCORE: 37
ADLS_ACUITY_SCORE: 35
ADLS_ACUITY_SCORE: 37

## 2023-06-01 NOTE — H&P
[facility]    History and Physical - Hospitalist Service, GOLD TEAM        Date of Admission:  6/1/2023    Assessment & Plan      Rachele Martínez is an 83 year old woman admitted on 6/1/2023. She has a history of ESRD on MWF dialysis, mild dementia, hypertension, hyperlipidemia, anemia and recurrent GI bleeds secondary to AVMs, chronic hep C, cirrhosis and recent admission for weakness 5/12-5/25 who is admitted with recurrent anemia.    1) Acute anemia, suspected GI bleed secondary to known AVMs - Hemoglobin 6 today drown from 7.2 on discharge 5/25.  No melena, epistaxis or hematemesis, although she had two small episodes of hemoptysis while in the TCU this past week.  Just s/p EGD 3/9 with argon plasma coagulation treatment of multiple angiectasias in the stomach, duodenum and jejunum.  - Given previous reactions to transfusion, will pre-medicate with 25 mg benadryl.  Will transfuse with one unit to start with.  - Hgb q8h  - Suspect AVMs as source given prior EGDs.  Will hold off on protonix.  - Consult gastroenterology  - Normally on monthly octreotide injections    2) History of ESRD - On MWF dialysis  - Consult nephrology for inpatient dialysis  - Continue home Phoslo    3) History of hypertension  - Continue home amlodipine, losartan    4) History of depression  - Continue home sertraline    5) History of hyperlipidemia  - Continue home atorvastatin    6) Mild dementia - Family concerned that patient cannot be left alone safely.  - Fall precautions, low threshold to initiate 1:1 sitter if any concerns per nursing staff    7) History of opiate abuse - Family also concerned patient may ask for opiate pain medication and would like us to be cautious.       Diet:   Regular  DVT Prophylaxis: Pneumatic Compression Devices  Rodriguez Catheter: Not present  Lines: None     Cardiac Monitoring: None  Code Status:   Full    Clinically Significant Risk Factors Present on  Admission              # Hypoalbuminemia: Lowest albumin = 3.2 g/dL at 6/1/2023  6:39 AM, will monitor as appropriate  # Coagulation Defect: INR = 1.29 (Ref range: 0.85 - 1.15) and/or PTT = 34 Seconds (Ref range: 22 - 38 Seconds), will monitor for bleeding    # Hypertension: Noted on problem list               Disposition Plan      Expected Discharge Date: 06/03/2023                The patient's care was discussed with the Attending Physician, Dr. Bryant.    BAILEY [facility]  Hospitalist Service, [facility]  Securely message with [employer] (more info)  Text page via [facility] Paging/Directory   See signed in provider for up to date coverage information    ______________________________________________________________________    Chief Complaint   Fatigue, abnormal hemoglobin    History is obtained from the patient    History of Present Illness   Rachele Martínez is an 83 year old woman admitted on 6/1/2023. She has a history of ESRD on MWF dialysis, mild dementia, hypertension, hyperlipidemia, anemia and recurrent GI bleeds secondary to AVMs, chronic hep C, cirrhosis and recent admission for weakness 5/12-5/25 who is admitted with recurrent anemia.    The patient and her daughter tell me she was just discharged to a TCU after her stay at [facility].  While there she has been working with PT and OT but today felt too weak to try anything with the.  Staff checked labs and her hemoglobin came back low at 5.6, down from 7 when she was discharged.  She has had two episodes of scant hemoptysis, but no melena, epistaxis or hemoptysis.  She denies any recent trauma.    I spoke with the patient's daughter who cautions that she has some mild memory issues and tends to confabulate.  She is also worried that the patient has a history of opiate use and may ask for pain medications once her daughter has left.      Past Medical History    Past Medical History:    Diagnosis Date     Anemia      Anxiety and depression      Arthritis      AVM (arteriovenous malformation)      Chronic hepatitis C with cirrhosis (H)      Clotting disorder (H)      ESRD (end stage renal disease) (H)     on dialysis     GI bleed     recurrent     Glaucoma      Hyperlipidemia      Hypertension goal BP (blood pressure) < 140/80        Past Surgical History   Past Surgical History:   Procedure Laterality Date     CAPSULE/PILL CAM ENDOSCOPY N/A 3/27/2019    Procedure: Capsule/pill cam endoscopy;  Surgeon: Yosvany Ram MD;  Location: [facility]     CAPSULE/PILL CAM ENDOSCOPY N/A 2/2/2023    Procedure: IMAGING PROCEDURE, GI TRACT, INTRALUMINAL, VIA CAPSULE;  Surgeon: Mulu Nur DO;  Location: [facility]     COLONOSCOPY N/A 9/4/2015    Procedure: COMBINED COLONOSCOPY, SINGLE OR MULTIPLE BIOPSY/POLYPECTOMY BY BIOPSY;  Surgeon: Rupesh Lopez MD;  Location: [facility]     COLONOSCOPY N/A 9/19/2018    Procedure: COLONOSCOPY;  enteroscopy small bowel  COLONOSCOPY;  Surgeon: Ankit Baires MD;  Location: [facility]     ENTEROSCOPY SMALL BOWEL N/A 3/9/2023    Procedure: antegrade single balloon enteroscopy with argon plasma coagulation of arteriovenous malformations;  Surgeon: Ankit Baires MD;  Location: [facility]     ESOPHAGOSCOPY, GASTROSCOPY, DUODENOSCOPY (EGD), COMBINED N/A 12/18/2014    Procedure: COMBINED ESOPHAGOSCOPY, GASTROSCOPY, DUODENOSCOPY (EGD);  Surgeon: Betsy Carvajal MD;  Location: [facility]     ESOPHAGOSCOPY, GASTROSCOPY, DUODENOSCOPY (EGD), COMBINED N/A 4/25/2015    Procedure: COMBINED ESOPHAGOSCOPY, GASTROSCOPY, DUODENOSCOPY (EGD);  Surgeon: Yosvany Ram MD;  Location: [facility]     ESOPHAGOSCOPY, GASTROSCOPY, DUODENOSCOPY (EGD), COMBINED N/A 5/5/2015    Procedure: COMBINED ESOPHAGOSCOPY, GASTROSCOPY, DUODENOSCOPY (EGD);  Surgeon: Mariano Mistry MD;  Location: [facility]     HC CAPSULE ENDOSCOPY N/A 9/30/2015    Procedure: CAPSULE/PILL CAM ENDOSCOPY;  Surgeon: Pan Dhaliwal  MD;  Location:  GI     HC VASCULAR SURGERY PROCEDURE UNLIST       HYSTERECTOMY  1980    KYREE     LUMPECTOMY BREAST         Prior to Admission Medications   Prior to Admission Medications   Prescriptions Last Dose Informant Patient Reported? Taking?   albuterol (PROAIR HFA/PROVENTIL HFA/VENTOLIN HFA) 108 (90 Base) MCG/ACT inhaler   No No   Sig: Inhale 2 puffs into the lungs every 4 hours as needed for wheezing, shortness of breath or cough   amLODIPine (NORVASC) 5 MG tablet   No No   Sig: Take 1 tablet (5 mg) by mouth daily   atorvastatin (LIPITOR) 20 MG tablet   No No   Sig: Take 1 tablet (20 mg) by mouth daily   benzocaine-menthol (CHLORASEPTIC) 6-10 MG lozenge   No No   Sig: Place 1 lozenge inside cheek every hour as needed for sore throat   benzonatate (TESSALON) 100 MG capsule   No No   Sig: Take 1 capsule (100 mg) by mouth 3 times daily as needed for cough   calcium acetate (PHOSLO) 667 MG CAPS capsule   No No   Sig: TAKE 2 CAPSULE BY MOUTH THREE TIMES A DAY WITH MEALS   gabapentin (NEURONTIN) 300 MG capsule   No No   Sig: Take 1 capsule (300 mg) by mouth At Bedtime   hydrOXYzine (ATARAX) 25 MG tablet   No No   Sig: Take 1 tablet (25 mg) by mouth every 6 hours as needed for itching or anxiety   losartan (COZAAR) 50 MG tablet   No No   Sig: Take 1 tablet (50 mg) by mouth daily   multivitamin RENAL (RENAVITE RX/NEPHROVITE) 1 tablet tablet   No No   Sig: Take 1 tablet by mouth daily   octreotide (SANDOSTATIN LAR) 20 MG injection   Yes No   Sig: Inject 20 mg into the muscle every 28 days   pantoprazole (PROTONIX) 20 MG EC tablet   No No   Sig: Take 1 tablet (20 mg) by mouth 2 times daily (before meals) *take 30-60 minutes before   polyethylene glycol (MIRALAX) 17 GM/Dose powder   Yes No   Sig: Take 17 g by mouth daily as needed As needed   sertraline (ZOLOFT) 50 MG tablet   No No   Sig: Take 2 tablets (100 mg) by mouth daily      Facility-Administered Medications: None           Physical Exam   Vital Signs: Temp:  98.2  F (36.8  C) Temp src: Oral BP: 137/54 Pulse: 77   Resp: 20 SpO2: 98 % O2 Device: None (Room air)      Physical Exam   Constitutional:   Well nourished, well developed, resting comfortably   Cardiovascular: Regular rate and rhythm, 4/6 systolic murmur.  Pulmonary/Chest: Clear to auscultation bilaterally, with no wheezes or retractions. No respiratory distress.  GI: Soft with good bowel sounds.  Non-tender, non-distended, with no guarding, no rebound, no peritoneal signs.   Musculoskeletal:  No edema or clubbing   Skin: Skin is warm and dry. No rash noted.   Neurological: Alert and oriented to person, place, and time. Nonfocal exam  Psychiatric:  Normal mood and affect.      Medical Decision Making       35 MINUTES SPENT BY ME on the date of service doing chart review, history, exam, documentation & further activities per the note.      Data   CBC, CMP, prior EGDs reviewed

## 2023-06-01 NOTE — ED PROVIDER NOTES
[place] [facility] DEPARTMENT ([facility])    6/01/23       ED PROVIDER NOTE    History     Chief Complaint   Patient presents with     Abnormal Labs     HPI  Rachele Martínez is a 83 year old female with a history of ESRD (on HD MWF), HTN, HLD, PVD, anemia 2/2 ESRD and recurrent GI bleeds (2/2 AVMs), and chronic hep C with compensated cirrhosis, recent admission 05/12/2023- 05/25/2023 for chronic anemia, generalized weakness, hemoptysis discharged to TCU who presents to the ED BIBA from TCU for hemoglobin of 5.6. She has been generally weak and lethargic for a couple of weeks. She last had dialysis yesterday. She has a past history of chronic gastrointestinal bleeding from AVM.s She gets weekly octreotide injections. She has not noted overt bleeding or blood in the stool. She has no fever, chills, URI symptoms, cough, sputum. She is somewhat short of breath. She did not have a bowel movement today. She has not noted blood in the stool. She has no nausea, vomiting or abdominal pain.    Past Medical History  Past Medical History:   Diagnosis Date     Anemia      Anxiety and depression      Arthritis      AVM (arteriovenous malformation)      Chronic hepatitis C with cirrhosis (H)      Clotting disorder (H)      ESRD (end stage renal disease) (H)     on dialysis     GI bleed     recurrent     Glaucoma      Hyperlipidemia      Hypertension goal BP (blood pressure) < 140/80      Past Surgical History:   Procedure Laterality Date     CAPSULE/PILL CAM ENDOSCOPY N/A 3/27/2019    Procedure: Capsule/pill cam endoscopy;  Surgeon: Yosvany Ram MD;  Location: [facility]     CAPSULE/PILL CAM ENDOSCOPY N/A 2/2/2023    Procedure: IMAGING PROCEDURE, GI TRACT, INTRALUMINAL, VIA CAPSULE;  Surgeon: Mulu Nur DO;  Location: [facility]     COLONOSCOPY N/A 9/4/2015    Procedure: COMBINED COLONOSCOPY, SINGLE OR MULTIPLE BIOPSY/POLYPECTOMY BY BIOPSY;  Surgeon: Rupesh Lopez MD;  Location: [facility]     COLONOSCOPY  N/A 9/19/2018    Procedure: COLONOSCOPY;  enteroscopy small bowel  COLONOSCOPY;  Surgeon: Ankit Baires MD;  Location: [facility]     ENTEROSCOPY SMALL BOWEL N/A 3/9/2023    Procedure: antegrade single balloon enteroscopy with argon plasma coagulation of arteriovenous malformations;  Surgeon: Ankit Baires MD;  Location: [facility]     ESOPHAGOSCOPY, GASTROSCOPY, DUODENOSCOPY (EGD), COMBINED N/A 12/18/2014    Procedure: COMBINED ESOPHAGOSCOPY, GASTROSCOPY, DUODENOSCOPY (EGD);  Surgeon: Betsy Carvajal MD;  Location: [facility]     ESOPHAGOSCOPY, GASTROSCOPY, DUODENOSCOPY (EGD), COMBINED N/A 4/25/2015    Procedure: COMBINED ESOPHAGOSCOPY, GASTROSCOPY, DUODENOSCOPY (EGD);  Surgeon: Yosvany Ram MD;  Location: [facility]     ESOPHAGOSCOPY, GASTROSCOPY, DUODENOSCOPY (EGD), COMBINED N/A 5/5/2015    Procedure: COMBINED ESOPHAGOSCOPY, GASTROSCOPY, DUODENOSCOPY (EGD);  Surgeon: Mariano Mistry MD;  Location: [facility]     HC CAPSULE ENDOSCOPY N/A 9/30/2015    Procedure: CAPSULE/PILL CAM ENDOSCOPY;  Surgeon: Pan Dhaliwal MD;  [facility]     HC VASCULAR SURGERY PROCEDURE UNLIST       HYSTERECTOMY  1980    KYREE     LUMPECTOMY BREAST       albuterol (PROAIR HFA/PROVENTIL HFA/VENTOLIN HFA) 108 (90 Base) MCG/ACT inhaler  amLODIPine (NORVASC) 5 MG tablet  atorvastatin (LIPITOR) 20 MG tablet  benzocaine-menthol (CHLORASEPTIC) 6-10 MG lozenge  benzonatate (TESSALON) 100 MG capsule  calcium acetate (PHOSLO) 667 MG CAPS capsule  gabapentin (NEURONTIN) 300 MG capsule  hydrOXYzine (ATARAX) 25 MG tablet  losartan (COZAAR) 50 MG tablet  multivitamin RENAL (RENAVITE RX/NEPHROVITE) 1 tablet tablet  octreotide (SANDOSTATIN LAR) 20 MG injection  pantoprazole (PROTONIX) 20 MG EC tablet  polyethylene glycol (MIRALAX) 17 GM/Dose powder  sertraline (ZOLOFT) 50 MG tablet      Allergies   Allergen Reactions     Abacavir Itching     Lisinopril Cough     Dust Mites      Hydrochlorothiazide Itching     Severe       No Clinical Screening -  See Comments      History of blood transfusion reactions and pre-treats with Benadryl.      Spironolactone Nausea     Sulfa Antibiotics Hives     Valsartan Itching     Valsartan-Hydrochlorothiazide Itching     severe     Family History  Family History   Problem Relation Age of Onset     Diabetes Mother      Alzheimer Disease Mother      Substance Abuse Son      Cancer Sister      Soft Tissue Cancer Sister      Breast Cancer Sister      Hyperlipidemia Daughter      Alcoholism Brother      Spine Problems Sister      Social History   Social History     Tobacco Use     Smoking status: Former     Packs/day: 2.00     Years: 45.00     Pack years: 90.00     Types: Cigarettes     Start date: 1953     Quit date: 2002     Years since quittin.5     Smokeless tobacco: Never   Substance Use Topics     Alcohol use: No     Drug use: Yes     Types: Marijuana     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      Past medical history, past surgical history, medications, allergies, family history, and social history were reviewed with the patient. No additional pertinent items.      Review of Systems   Constitutional: Positive for fatigue. Negative for chills and fever.   HENT: Negative for congestion and trouble swallowing.    Eyes: Negative for visual disturbance.   Respiratory: Positive for shortness of breath. Negative for cough and wheezing.    Cardiovascular: Negative for chest pain, palpitations and leg swelling.   Gastrointestinal: Negative for abdominal pain, nausea and vomiting.   Genitourinary: Negative for difficulty urinating.   Musculoskeletal: Negative for back pain and neck pain.   Neurological: Positive for weakness (generalized).   Hematological: Negative for adenopathy.   Psychiatric/Behavioral: Positive for confusion (minimal).       Physical Exam   BP: 137/54  Pulse: 77  Temp: 98.2  F (36.8  C)  Resp: 20  SpO2: 98 %  Physical Exam  Vitals and nursing note reviewed.    Constitutional:       Appearance: She is ill-appearing.   HENT:      Head: Normocephalic and atraumatic.      Right Ear: External ear normal.      Left Ear: External ear normal.      Nose: Nose normal.      Mouth/Throat:      Mouth: Mucous membranes are moist.   Eyes:      General: No scleral icterus.     Extraocular Movements: Extraocular movements intact.      Pupils: Pupils are equal, round, and reactive to light.   Cardiovascular:      Rate and Rhythm: Normal rate and regular rhythm.      Heart sounds: No murmur heard.  Pulmonary:      Effort: Pulmonary effort is normal.      Breath sounds: Normal breath sounds. No wheezing or rales.   Abdominal:      Tenderness: There is no abdominal tenderness. There is no guarding.   Musculoskeletal:      Cervical back: Normal range of motion and neck supple.      Right lower leg: No edema.      Left lower leg: No edema.   Lymphadenopathy:      Cervical: No cervical adenopathy.   Skin:     General: Skin is warm and dry.   Neurological:      General: No focal deficit present.      Mental Status: She is alert.   Psychiatric:         Mood and Affect: Mood normal.         Behavior: Behavior is withdrawn.           ED Course, Procedures, & Data      Procedures       ED Course Selections:        EKG Interpretation:      Interpreted by EKATERINA HORNE MD  Time reviewed: 1511  Symptoms at time of EKG: None   Rhythm: normal sinus   Rate: Normal  Axis: Normal  Ectopy: none  Conduction: normal  ST Segments/ T Waves: No ST-T wave changes and No acute ischemic changes  Q Waves: aVl  Comparison to prior: Unchanged    Clinical Impression: normal EKG      Labs/Imaging    Results for orders placed or performed during the hospital encounter of 06/01/23 (from the past 24 hour(s))   EKG 12-lead, tracing only   Result Value Ref Range    Systolic Blood Pressure  mmHg    Diastolic Blood Pressure  mmHg    Ventricular Rate 75 BPM    Atrial Rate 75 BPM    IL Interval 208 ms    QRS Duration 96 ms      ms    QTc 464 ms    P Axis 68 degrees    R AXIS 53 degrees    T Axis 84 degrees    Interpretation ECG Sinus rhythm  Normal ECG      CBC with platelets differential    Narrative    The following orders were created for panel order CBC with platelets differential.  Procedure                               Abnormality         Status                     ---------                               -----------         ------                     CBC with platelets and d...[[id number]]  Abnormal            Final result                 Please view results for these tests on the individual orders.   ABO/Rh type and screen    Narrative    The following orders were created for panel order ABO/Rh type and screen.  Procedure                               Abnormality         Status                     ---------                               -----------         ------                     Adult Type and Screen[[id number]]                            Final result                 Please view results for these tests on the individual orders.   INR   Result Value Ref Range    INR 1.29 (H) 0.85 - 1.15   Comprehensive metabolic panel   Result Value Ref Range    Sodium 138 136 - 145 mmol/L    Potassium 4.0 3.4 - 5.3 mmol/L    Chloride 99 98 - 107 mmol/L    Carbon Dioxide (CO2) 26 22 - 29 mmol/L    Anion Gap 13 7 - 15 mmol/L    Urea Nitrogen 31.4 (H) 8.0 - 23.0 mg/dL    Creatinine 7.64 (H) 0.51 - 0.95 mg/dL    Calcium 9.1 8.8 - 10.2 mg/dL    Glucose 135 (H) 70 - 99 mg/dL    Alkaline Phosphatase 125 (H) 35 - 104 U/L    AST 22 10 - 35 U/L    ALT 7 (L) 10 - 35 U/L    Protein Total 7.0 6.4 - 8.3 g/dL    Albumin 3.4 (L) 3.5 - 5.2 g/dL    Bilirubin Total 0.4 <=1.2 mg/dL    GFR Estimate 5 (L) >60 mL/min/1.73m2   Troponin T, High Sensitivity   Result Value Ref Range    Troponin T, High Sensitivity 65 (H) <=14 ng/L   Magnesium   Result Value Ref Range    Magnesium 2.0 1.7 - 2.3 mg/dL   CRP inflammation   Result Value Ref Range    CRP Inflammation  15.20 (H) <5.00 mg/L   CBC with platelets and differential   Result Value Ref Range    WBC Count 8.5 4.0 - 11.0 10e3/uL    RBC Count 1.99 (L) 3.80 - 5.20 10e6/uL    Hemoglobin 6.0 (LL) 11.7 - 15.7 g/dL    Hematocrit 20.4 (L) 35.0 - 47.0 %     (H) 78 - 100 fL    MCH 30.2 26.5 - 33.0 pg    MCHC 29.4 (L) 31.5 - 36.5 g/dL    RDW 17.0 (H) 10.0 - 15.0 %    Platelet Count 372 150 - 450 10e3/uL    % Neutrophils 77 %    % Lymphocytes 8 %    % Monocytes 9 %    % Eosinophils 5 %    % Basophils 1 %    % Immature Granulocytes 0 %    NRBCs per 100 WBC 0 <1 /100    Absolute Neutrophils 6.5 1.6 - 8.3 10e3/uL    Absolute Lymphocytes 0.7 (L) 0.8 - 5.3 10e3/uL    Absolute Monocytes 0.8 0.0 - 1.3 10e3/uL    Absolute Eosinophils 0.4 0.0 - 0.7 10e3/uL    Absolute Basophils 0.1 0.0 - 0.2 10e3/uL    Absolute Immature Granulocytes 0.0 <=0.4 10e3/uL    Absolute NRBCs 0.0 10e3/uL   Adult Type and Screen   Result Value Ref Range    ABO/RH(D) O POS     Antibody Screen Negative Negative    SPECIMEN EXPIRATION DATE [id number]    iStat Gases (lactate) venous, POCT   Result Value Ref Range    Lactic Acid POCT 1.1 <=2.0 mmol/L    Bicarbonate Venous POCT 27 21 - 28 mmol/L    O2 Sat, Venous POCT 76 (L) 94 - 100 %    pCO2V Venous POCT 44 40 - 50 mm Hg    pH Venous POCT 7.40 7.32 - 7.43    pO2 Venous POCT 41 25 - 47 mm Hg   Prepare red blood cells (unit)   Result Value Ref Range    Blood Component Type Red Blood Cells     Product Code R4750P78     Unit Status Transfused     Unit Number [id number]     CROSSMATCH Compatible     CODING SYSTEM [id number]     ISSUE DATE AND TIME [id number]     UNIT ABO/RH O+     UNIT TYPE ISBT 5100    Hemoglobin   Result Value Ref Range    Hemoglobin 7.0 (L) 11.7 - 15.7 g/dL             Results for orders placed or performed during the hospital encounter of 06/01/23   INR     Status: Abnormal   Result Value Ref Range    INR 1.29 (H) 0.85 - 1.15   Comprehensive metabolic panel     Status: Abnormal   Result  Value Ref Range    Sodium 138 136 - 145 mmol/L    Potassium 4.0 3.4 - 5.3 mmol/L    Chloride 99 98 - 107 mmol/L    Carbon Dioxide (CO2) 26 22 - 29 mmol/L    Anion Gap 13 7 - 15 mmol/L    Urea Nitrogen 31.4 (H) 8.0 - 23.0 mg/dL    Creatinine 7.64 (H) 0.51 - 0.95 mg/dL    Calcium 9.1 8.8 - 10.2 mg/dL    Glucose 135 (H) 70 - 99 mg/dL    Alkaline Phosphatase 125 (H) 35 - 104 U/L    AST 22 10 - 35 U/L    ALT 7 (L) 10 - 35 U/L    Protein Total 7.0 6.4 - 8.3 g/dL    Albumin 3.4 (L) 3.5 - 5.2 g/dL    Bilirubin Total 0.4 <=1.2 mg/dL    GFR Estimate 5 (L) >60 mL/min/1.73m2   Troponin T, High Sensitivity     Status: Abnormal   Result Value Ref Range    Troponin T, High Sensitivity 65 (H) <=14 ng/L   Magnesium     Status: Normal   Result Value Ref Range    Magnesium 2.0 1.7 - 2.3 mg/dL   CRP inflammation     Status: Abnormal   Result Value Ref Range    CRP Inflammation 15.20 (H) <5.00 mg/L   CBC with platelets and differential     Status: Abnormal   Result Value Ref Range    WBC Count 8.5 4.0 - 11.0 10e3/uL    RBC Count 1.99 (L) 3.80 - 5.20 10e6/uL    Hemoglobin 6.0 (LL) 11.7 - 15.7 g/dL    Hematocrit 20.4 (L) 35.0 - 47.0 %     (H) 78 - 100 fL    MCH 30.2 26.5 - 33.0 pg    MCHC 29.4 (L) 31.5 - 36.5 g/dL    RDW 17.0 (H) 10.0 - 15.0 %    Platelet Count 372 150 - 450 10e3/uL    % Neutrophils 77 %    % Lymphocytes 8 %    % Monocytes 9 %    % Eosinophils 5 %    % Basophils 1 %    % Immature Granulocytes 0 %    NRBCs per 100 WBC 0 <1 /100    Absolute Neutrophils 6.5 1.6 - 8.3 10e3/uL    Absolute Lymphocytes 0.7 (L) 0.8 - 5.3 10e3/uL    Absolute Monocytes 0.8 0.0 - 1.3 10e3/uL    Absolute Eosinophils 0.4 0.0 - 0.7 10e3/uL    Absolute Basophils 0.1 0.0 - 0.2 10e3/uL    Absolute Immature Granulocytes 0.0 <=0.4 10e3/uL    Absolute NRBCs 0.0 10e3/uL   iStat Gases (lactate) venous, POCT     Status: Abnormal   Result Value Ref Range    Lactic Acid POCT 1.1 <=2.0 mmol/L    Bicarbonate Venous POCT 27 21 - 28 mmol/L    O2 Sat, Venous  POCT 76 (L) 94 - 100 %    pCO2V Venous POCT 44 40 - 50 mm Hg    pH Venous POCT 7.40 7.32 - 7.43    pO2 Venous POCT 41 25 - 47 mm Hg   Hemoglobin     Status: Abnormal   Result Value Ref Range    Hemoglobin 7.0 (L) 11.7 - 15.7 g/dL   EKG 12-lead, tracing only     Status: None (Preliminary result)   Result Value Ref Range    Systolic Blood Pressure  mmHg    Diastolic Blood Pressure  mmHg    Ventricular Rate 75 BPM    Atrial Rate 75 BPM    MA Interval 208 ms    QRS Duration 96 ms     ms    QTc 464 ms    P Axis 68 degrees    R AXIS 53 degrees    T Axis 84 degrees    Interpretation ECG Sinus rhythm  Normal ECG      Adult Type and Screen     Status: None   Result Value Ref Range    ABO/RH(D) O POS     Antibody Screen Negative Negative    SPECIMEN EXPIRATION DATE 20230604235900    Prepare red blood cells (unit)     Status: None   Result Value Ref Range    Blood Component Type Red Blood Cells     Product Code M9242R15     Unit Status Transfused     Unit Number [id number]     CROSSMATCH Compatible     CODING SYSTEM [id number]     ISSUE DATE AND TIME [id number]     UNIT ABO/RH O+     UNIT TYPE ISBT 5100    CBC with platelets differential     Status: Abnormal    Narrative    The following orders were created for panel order CBC with platelets differential.  Procedure                               Abnormality         Status                     ---------                               -----------         ------                     CBC with platelets and d...[[id number]]  Abnormal            Final result                 Please view results for these tests on the individual orders.   ABO/Rh type and screen     Status: None    Narrative    The following orders were created for panel order ABO/Rh type and screen.  Procedure                               Abnormality         Status                     ---------                               -----------         ------                     Adult Type and Screen[[id number]]                             Final result                 Please view results for these tests on the individual orders.     Medications   albuterol (PROVENTIL HFA/VENTOLIN HFA) inhaler (has no administration in time range)   amLODIPine (NORVASC) tablet 5 mg (has no administration in time range)   atorvastatin (LIPITOR) tablet 20 mg (has no administration in time range)   calcium acetate (PHOSLO) capsule 1,334 mg (1,334 mg Oral $Given 6/1/23 2214)   gabapentin (NEURONTIN) capsule 300 mg (300 mg Oral $Given 6/1/23 2209)   hydrOXYzine (ATARAX) tablet 25 mg (has no administration in time range)   losartan (COZAAR) tablet 50 mg (has no administration in time range)   sertraline (ZOLOFT) tablet 100 mg (has no administration in time range)   lidocaine 1 % 0.1-1 mL (has no administration in time range)   lidocaine (LMX4) cream (has no administration in time range)   sodium chloride (PF) 0.9% PF flush 3 mL (3 mLs Intracatheter $Given 6/1/23 2035)   sodium chloride (PF) 0.9% PF flush 3 mL (has no administration in time range)   melatonin tablet 1 mg (has no administration in time range)   acetaminophen (TYLENOL) tablet 650 mg (has no administration in time range)     Or   acetaminophen (TYLENOL) Suppository 650 mg (has no administration in time range)   bisacodyl (DULCOLAX) EC tablet 5 mg (has no administration in time range)     Or   bisacodyl (DULCOLAX) EC tablet 10 mg (has no administration in time range)   polyethylene glycol (MIRALAX) Packet 17 g (has no administration in time range)   ondansetron (ZOFRAN ODT) ODT tab 4 mg (has no administration in time range)     Or   ondansetron (ZOFRAN) injection 4 mg (has no administration in time range)   pantoprazole (PROTONIX) EC tablet 20 mg (20 mg Oral $Given 6/1/23 2214)   acetaminophen (TYLENOL) tablet 650 mg (650 mg Oral $Given 6/1/23 2034)   diphenhydrAMINE (BENADRYL) capsule 25 mg (25 mg Oral $Given 6/1/23 2035)     Labs Ordered and Resulted from Time of ED Arrival to Time of ED  Departure   INR - Abnormal       Result Value    INR 1.29 (*)    COMPREHENSIVE METABOLIC PANEL - Abnormal    Sodium 138      Potassium 4.0      Chloride 99      Carbon Dioxide (CO2) 26      Anion Gap 13      Urea Nitrogen 31.4 (*)     Creatinine 7.64 (*)     Calcium 9.1      Glucose 135 (*)     Alkaline Phosphatase 125 (*)     AST 22      ALT 7 (*)     Protein Total 7.0      Albumin 3.4 (*)     Bilirubin Total 0.4      GFR Estimate 5 (*)    TROPONIN T, HIGH SENSITIVITY - Abnormal    Troponin T, High Sensitivity 65 (*)    CRP INFLAMMATION - Abnormal    CRP Inflammation 15.20 (*)    CBC WITH PLATELETS AND DIFFERENTIAL - Abnormal    WBC Count 8.5      RBC Count 1.99 (*)     Hemoglobin 6.0 (*)     Hematocrit 20.4 (*)      (*)     MCH 30.2      MCHC 29.4 (*)     RDW 17.0 (*)     Platelet Count 372      % Neutrophils 77      % Lymphocytes 8      % Monocytes 9      % Eosinophils 5      % Basophils 1      % Immature Granulocytes 0      NRBCs per 100 WBC 0      Absolute Neutrophils 6.5      Absolute Lymphocytes 0.7 (*)     Absolute Monocytes 0.8      Absolute Eosinophils 0.4      Absolute Basophils 0.1      Absolute Immature Granulocytes 0.0      Absolute NRBCs 0.0     ISTAT GASES LACTATE VENOUS POCT - Abnormal    Lactic Acid POCT 1.1      Bicarbonate Venous POCT 27      O2 Sat, Venous POCT 76 (*)     pCO2V Venous POCT 44      pH Venous POCT 7.40      pO2 Venous POCT 41     HEMOGLOBIN - Abnormal    Hemoglobin 7.0 (*)    MAGNESIUM - Normal    Magnesium 2.0     TYPE AND SCREEN, ADULT    ABO/RH(D) O POS      Antibody Screen Negative      SPECIMEN EXPIRATION DATE [id number]     PREPARE RED BLOOD CELLS (UNIT)    Blood Component Type Red Blood Cells      Product Code R5220Q07      Unit Status Transfused      Unit Number [id number]      CROSSMATCH Compatible      CODING SYSTEM [id number]      ISSUE DATE AND TIME [id number]      UNIT ABO/RH O+      UNIT TYPE ISBT 5100     PREPARE RED BLOOD CELLS (UNIT)   TRANSFUSE RED  BLOOD CELLS (UNIT)   ABO/RH TYPE AND SCREEN     No orders to display          Critical care was not performed.     Medical Decision Making  The patient's presentation was of high complexity (a chronic illness severe exacerbation, progression, or side effect of treatment).    The patient's evaluation involved:  ordering and/or review of 3+ test(s) in this encounter (see separate area of note for details)    The patient's management necessitated high risk (a decision regarding hospitalization).      Assessment & Plan    Impression:  Elderly female with ESRD on dialysis and chronic GI bleeding from AVMs has needed transfusion support in the past due to chronic uncontrolled GI blood loss. She was recently admitted on the FB service with failure to thrive. She has mild confusion. She was discharged to TCU. She was sent in today from TCU due to mild increase in confusion and hemoglobin drop from 7+ range to 5.5. She has no symptoms of overt GI bleeding. Hemoglobin on recheck is 6. She has had past reactions to transfusions and needs premedication with benadryl. She will need acute dialysis and electrolyte monitoring after transfusion. She will be admitted to the medicine service.    I have reviewed the nursing notes. I have reviewed the findings, diagnosis, plan and need for follow up with the patient.    New Prescriptions    No medications on file       Final diagnoses:   Anemia due to blood loss, acute   ESRD (end stage renal disease) on dialysis (H)   Chronic GI bleeding         [facility] EMERGENCY DEPARTMENT  6/1/2023     Davis Garces MD  06/02/23 0101

## 2023-06-02 ENCOUNTER — APPOINTMENT (OUTPATIENT)
Dept: GENERAL RADIOLOGY | Facility: CLINIC | Age: 83
DRG: 377 | End: 2023-06-02
Attending: INTERNAL MEDICINE
Payer: MEDICARE

## 2023-06-02 LAB
ATRIAL RATE - MUSE: 75 BPM
BLD PROD TYP BPU: NORMAL
BLOOD COMPONENT TYPE: NORMAL
CODING SYSTEM: NORMAL
CROSSMATCH: NORMAL
DIASTOLIC BLOOD PRESSURE - MUSE: NORMAL MMHG
ERYTHROCYTE [DISTWIDTH] IN BLOOD BY AUTOMATED COUNT: 21 % (ref 10–15)
FERRITIN SERPL-MCNC: 222 NG/ML (ref 11–328)
HCT VFR BLD AUTO: 29 % (ref 35–47)
HGB BLD-MCNC: 7 G/DL (ref 11.7–15.7)
HGB BLD-MCNC: 9.2 G/DL (ref 11.7–15.7)
HOLD SPECIMEN: NORMAL
INTERPRETATION ECG - MUSE: NORMAL
IRON BINDING CAPACITY (ROCHE): 203 UG/DL (ref 240–430)
IRON SATN MFR SERPL: 31 % (ref 15–46)
IRON SERPL-MCNC: 62 UG/DL (ref 37–145)
ISSUE DATE AND TIME: NORMAL
MCH RBC QN AUTO: 29.1 PG (ref 26.5–33)
MCHC RBC AUTO-ENTMCNC: 31.7 G/DL (ref 31.5–36.5)
MCV RBC AUTO: 92 FL (ref 78–100)
P AXIS - MUSE: 68 DEGREES
PLATELET # BLD AUTO: 378 10E3/UL (ref 150–450)
PR INTERVAL - MUSE: 208 MS
QRS DURATION - MUSE: 96 MS
QT - MUSE: 416 MS
QTC - MUSE: 464 MS
R AXIS - MUSE: 53 DEGREES
RBC # BLD AUTO: 3.16 10E6/UL (ref 3.8–5.2)
RETICS # AUTO: 0.15 10E6/UL (ref 0.03–0.1)
RETICS/RBC NFR AUTO: 4.8 % (ref 0.5–2)
SYSTOLIC BLOOD PRESSURE - MUSE: NORMAL MMHG
T AXIS - MUSE: 84 DEGREES
UNIT ABO/RH: NORMAL
UNIT NUMBER: NORMAL
UNIT STATUS: NORMAL
UNIT TYPE ISBT: 5100
VENTRICULAR RATE- MUSE: 75 BPM
WBC # BLD AUTO: 11.6 10E3/UL (ref 4–11)

## 2023-06-02 PROCEDURE — 250N000013 HC RX MED GY IP 250 OP 250 PS 637: Performed by: STUDENT IN AN ORGANIZED HEALTH CARE EDUCATION/TRAINING PROGRAM

## 2023-06-02 PROCEDURE — 82728 ASSAY OF FERRITIN: CPT | Performed by: PHYSICIAN ASSISTANT

## 2023-06-02 PROCEDURE — 120N000002 HC R&B MED SURG/OB UMMC

## 2023-06-02 PROCEDURE — 99221 1ST HOSP IP/OBS SF/LOW 40: CPT | Performed by: PHYSICIAN ASSISTANT

## 2023-06-02 PROCEDURE — 71046 X-RAY EXAM CHEST 2 VIEWS: CPT

## 2023-06-02 PROCEDURE — 71046 X-RAY EXAM CHEST 2 VIEWS: CPT | Mod: 26 | Performed by: RADIOLOGY

## 2023-06-02 PROCEDURE — 258N000003 HC RX IP 258 OP 636: Performed by: STUDENT IN AN ORGANIZED HEALTH CARE EDUCATION/TRAINING PROGRAM

## 2023-06-02 PROCEDURE — 250N000013 HC RX MED GY IP 250 OP 250 PS 637: Performed by: NURSE PRACTITIONER

## 2023-06-02 PROCEDURE — 250N000011 HC RX IP 250 OP 636: Performed by: INTERNAL MEDICINE

## 2023-06-02 PROCEDURE — 99222 1ST HOSP IP/OBS MODERATE 55: CPT | Performed by: INTERNAL MEDICINE

## 2023-06-02 PROCEDURE — 250N000013 HC RX MED GY IP 250 OP 250 PS 637: Performed by: INTERNAL MEDICINE

## 2023-06-02 PROCEDURE — 99233 SBSQ HOSP IP/OBS HIGH 50: CPT | Performed by: INTERNAL MEDICINE

## 2023-06-02 PROCEDURE — 90937 HEMODIALYSIS REPEATED EVAL: CPT

## 2023-06-02 PROCEDURE — P9016 RBC LEUKOCYTES REDUCED: HCPCS | Performed by: INTERNAL MEDICINE

## 2023-06-02 PROCEDURE — 634N000001 HC RX 634: Performed by: STUDENT IN AN ORGANIZED HEALTH CARE EDUCATION/TRAINING PROGRAM

## 2023-06-02 PROCEDURE — 83550 IRON BINDING TEST: CPT | Performed by: PHYSICIAN ASSISTANT

## 2023-06-02 PROCEDURE — 99223 1ST HOSP IP/OBS HIGH 75: CPT | Mod: FS | Performed by: PHYSICIAN ASSISTANT

## 2023-06-02 PROCEDURE — 85045 AUTOMATED RETICULOCYTE COUNT: CPT | Performed by: PHYSICIAN ASSISTANT

## 2023-06-02 PROCEDURE — 85027 COMPLETE CBC AUTOMATED: CPT | Performed by: NURSE PRACTITIONER

## 2023-06-02 PROCEDURE — 250N000011 HC RX IP 250 OP 636: Performed by: STUDENT IN AN ORGANIZED HEALTH CARE EDUCATION/TRAINING PROGRAM

## 2023-06-02 PROCEDURE — 5A1D70Z PERFORMANCE OF URINARY FILTRATION, INTERMITTENT, LESS THAN 6 HOURS PER DAY: ICD-10-PCS | Performed by: STUDENT IN AN ORGANIZED HEALTH CARE EDUCATION/TRAINING PROGRAM

## 2023-06-02 PROCEDURE — 36415 COLL VENOUS BLD VENIPUNCTURE: CPT | Performed by: NURSE PRACTITIONER

## 2023-06-02 RX ORDER — HYDROXYZINE HYDROCHLORIDE 25 MG/1
25 TABLET, FILM COATED ORAL EVERY 6 HOURS PRN
Status: DISCONTINUED | OUTPATIENT
Start: 2023-06-02 | End: 2023-06-05 | Stop reason: HOSPADM

## 2023-06-02 RX ORDER — DIPHENHYDRAMINE HYDROCHLORIDE 50 MG/ML
25 INJECTION INTRAMUSCULAR; INTRAVENOUS EVERY 6 HOURS PRN
Status: DISCONTINUED | OUTPATIENT
Start: 2023-06-02 | End: 2023-06-05 | Stop reason: HOSPADM

## 2023-06-02 RX ORDER — DIPHENHYDRAMINE HCL 25 MG
25 CAPSULE ORAL EVERY 6 HOURS PRN
Status: DISCONTINUED | OUTPATIENT
Start: 2023-06-02 | End: 2023-06-05 | Stop reason: HOSPADM

## 2023-06-02 RX ORDER — LABETALOL 100 MG/1
100 TABLET, FILM COATED ORAL EVERY 6 HOURS PRN
Status: DISCONTINUED | OUTPATIENT
Start: 2023-06-02 | End: 2023-06-05 | Stop reason: HOSPADM

## 2023-06-02 RX ADMIN — IRON SUCROSE 100 MG: 20 INJECTION, SOLUTION INTRAVENOUS at 12:04

## 2023-06-02 RX ADMIN — LABETALOL HYDROCHLORIDE 100 MG: 100 TABLET ORAL at 10:11

## 2023-06-02 RX ADMIN — PANTOPRAZOLE SODIUM 20 MG: 20 TABLET, DELAYED RELEASE ORAL at 07:33

## 2023-06-02 RX ADMIN — ATORVASTATIN CALCIUM 20 MG: 20 TABLET, FILM COATED ORAL at 07:33

## 2023-06-02 RX ADMIN — SERTRALINE HYDROCHLORIDE 100 MG: 100 TABLET ORAL at 07:33

## 2023-06-02 RX ADMIN — LOSARTAN POTASSIUM 50 MG: 50 TABLET, FILM COATED ORAL at 07:33

## 2023-06-02 RX ADMIN — GABAPENTIN 300 MG: 300 CAPSULE ORAL at 21:56

## 2023-06-02 RX ADMIN — CALCIUM ACETATE 1334 MG: 667 CAPSULE ORAL at 07:36

## 2023-06-02 RX ADMIN — SODIUM CHLORIDE 250 ML: 9 INJECTION, SOLUTION INTRAVENOUS at 12:03

## 2023-06-02 RX ADMIN — Medication 5 MG: at 21:56

## 2023-06-02 RX ADMIN — SODIUM CHLORIDE 300 ML: 9 INJECTION, SOLUTION INTRAVENOUS at 12:03

## 2023-06-02 RX ADMIN — EPOETIN ALFA-EPBX 10000 UNITS: 10000 INJECTION, SOLUTION INTRAVENOUS; SUBCUTANEOUS at 12:04

## 2023-06-02 RX ADMIN — AMLODIPINE BESYLATE 5 MG: 5 TABLET ORAL at 07:33

## 2023-06-02 RX ADMIN — DIPHENHYDRAMINE HYDROCHLORIDE 25 MG: 50 INJECTION, SOLUTION INTRAMUSCULAR; INTRAVENOUS at 06:14

## 2023-06-02 RX ADMIN — PANTOPRAZOLE SODIUM 20 MG: 20 TABLET, DELAYED RELEASE ORAL at 17:10

## 2023-06-02 RX ADMIN — CALCIUM ACETATE 1334 MG: 667 CAPSULE ORAL at 17:10

## 2023-06-02 RX ADMIN — Medication: at 12:05

## 2023-06-02 ASSESSMENT — ACTIVITIES OF DAILY LIVING (ADL)
WEAR_GLASSES_OR_BLIND: YES
CHANGE_IN_FUNCTIONAL_STATUS_SINCE_ONSET_OF_CURRENT_ILLNESS/INJURY: YES
SWALLOWING: 0-->SWALLOWS FOODS/LIQUIDS WITHOUT DIFFICULTY (DEVELOPMENTALLY APPROPRIATE)
EATING: 0-->ASSISTANCE NEEDED (DEVELOPMENTALLY APPROPRIATE)
BATHING: 1-->ASSISTANCE NEEDED
TOILETING_ASSISTANCE: TOILETING DIFFICULTY, ASSISTANCE 1 PERSON
ADLS_ACUITY_SCORE: 37
ADLS_ACUITY_SCORE: 37
ADLS_ACUITY_SCORE: 39
ADLS_ACUITY_SCORE: 37
ADLS_ACUITY_SCORE: 39
FALL_HISTORY_WITHIN_LAST_SIX_MONTHS: NO
ADLS_ACUITY_SCORE: 39
CONCENTRATING,_REMEMBERING_OR_MAKING_DECISIONS_DIFFICULTY: YES
TOILETING: 1-->ASSISTANCE (EQUIPMENT/PERSON) NEEDED
WALKING_OR_CLIMBING_STAIRS_DIFFICULTY: YES
DRESS: 1-->ASSISTANCE (EQUIPMENT/PERSON) NEEDED (NOT DEVELOPMENTALLY APPROPRIATE)
ADLS_ACUITY_SCORE: 43
DIFFICULTY_COMMUNICATING: YES
SWALLOWING: 0-->SWALLOWS FOODS/LIQUIDS WITHOUT DIFFICULTY
TRANSFERRING: 1-->ASSISTANCE (EQUIPMENT/PERSON) NEEDED
HEARING_DIFFICULTY_OR_DEAF: NO
ADLS_ACUITY_SCORE: 43
TOILETING_ISSUES: YES
DRESSING/BATHING: BATHING DIFFICULTY, ASSISTANCE 1 PERSON
DRESS: 1-->ASSISTANCE (EQUIPMENT/PERSON) NEEDED
TRANSFERRING: 1-->ASSISTANCE (EQUIPMENT/PERSON) NEEDED (NOT DEVELOPMENTALLY APPROPRIATE)
EATING: 0-->INDEPENDENT
COMMUNICATION: OTHER (SEE COMMENTS)
DOING_ERRANDS_INDEPENDENTLY_DIFFICULTY: YES
VISION_MANAGEMENT: GLASSES
WALKING_OR_CLIMBING_STAIRS: AMBULATION DIFFICULTY, REQUIRES EQUIPMENT;AMBULATION DIFFICULTY, ASSISTANCE 1 PERSON
DRESSING/BATHING_DIFFICULTY: YES
ADLS_ACUITY_SCORE: 39
ADLS_ACUITY_SCORE: 43
EQUIPMENT_CURRENTLY_USED_AT_HOME: WALKER, STANDARD
TOILETING: 1-->ASSISTANCE (EQUIPMENT/PERSON) NEEDED (NOT DEVELOPMENTALLY APPROPRIATE)
DIFFICULTY_EATING/SWALLOWING: NO

## 2023-06-02 NOTE — CONSULTS
Hematology Consult Note   Date of Service: 06/02/2023    Patient: Rachele Martínez  MRN: [id number]  Admission Date: 6/1/2023  Hospital Day # Hospital Day: 2  Primary Outpatient Hematologist: Dr. Cari Dominguez     Reason for Consult: Please consider anti-angiogenic agent (bevacizumab) for angioectasias failing multiple treatment modalities     Assessment & Plan:   Rachele Martínez is a 83 year old female with recurrent GI bleeds since 2015 2/2 upper GI and cecal angioectasias, on monthly octreotide injections since 2019, chronic anemia in ESRD and GIB, hemoptysis 5/2023, treated hep c, compensated cirrhosis, ESRD on dialysis, PAD, who was [facility] for lethargy/weakness and was found to have acute on chronic anemia (Hb 5.6 from prior hb ~7), c/f GIB although not overt.  Vit B12 and iron adequate.  She is on supplemental iron and epo three times per week w/ dialysis.     Octreotide seemed to control GI bleeding until 1/2023, and she is s/p most recent endoscopic tx 3/2023.  She was seen by GI team this admission who feel she has failed medical/conservative management and no further GI interventions are recommended.  During last admission 5/25 she also had some hemoptysis, CT chest negative, per daughter may have had several more episodes at [facility]    #Multifactorial anemia  -ESRD on dialysis   -Epoetin Duane (Epogen) 5200 units IVP 3X Week During Dialysis    -Iron Sucrose (Venofer) 100 mg IVP 3X Week During Dialysis  -GIB   -?hemoptysis     Recommendations:   -Continue to monitor hb and transfusion PRN  -Continue epo and iron infusions with dialysis three times per week   -Would not start bevacizumab during admission, can be discussed as outpatient and after discussion with her primary gastroenterologist, Dr. Baires     Patient was seen and plan of care was discussed with attending physician Dr. Ward     Hematology will continue to follow. Please don't hesitate to contact the Fellow On-Call with  questions.    I spent 30 minutes face-to-face or coordinating care of Rachele Martínez.  Over 50% of our time on the unit was spent counseling the patient and/or coordinating care regarding anemia, AVMs    Ofe Sandy PA-C  [facility]  [phone]      History of Present Illness:    Rachele Martínez is a 83 year old femalewith recurrent GI bleeds 2/2 upper GI and cecal angioectasias, on monthly octreotide injections, chronic anemia in ESRD and GIB, treated hep c, compensated cirrhosis without varices, ascites/HE, ESRD on dialysis, PAD, who was admitted 6/1/23 for significant, symptomatic anemia (Hb 5.6).      Regarding her chronic GIB, it seems Octreotide had been controlling her GIB until Jan 2023 when she was admitted to [facility] with symptomatic  anemia, Hb was 5.6, she was evaluated by [facility], underwent EGD which was negative for any active source of bleeding. Following this, VCE was ordered by her primary gastroenterologist  in 2/2023 and this revealed actively bleeding small bowel AV malformations. On 3/9/23 she underwent device assisted upper endoscopy and APC was performd on AVMs in the stomach, duodenum and jejunum.     She had admission recently from 5/12-5/25/23 at [facility] for weakness and it was noted she had some intermittent hemoptysis with negative CT chest.  Pulmonary did not advise bronch.     She was at [facility] when she became weak and labs showed hb 5.7.  She has received 2 units pRBC and had adequate increase in hb to 9.  She denies any overt bleeding.  Her daughter states she had a few more episodes of coughing blood but unable to quantify.  No new medications.  She also reports he mother is more confused.          Hematologic History:  #Anemia of chronic blood loss and CKD on dialysis   -Epoetin Duane (Epogen) 5200 units IVP 3X Week During Dialysis   -Iron Sucrose (Venofer) 100 mg IVP 3X Week During Dialysis    --11/12/2015: Seen by Dr. Dominguez for ongoing severe  anemia   -The patient was first noted to have a GI bleed with overt melena in 04/2015.  At that time, she had an EGD, which showed ulcerations, and on biopsy had severe chronic active gastritis, and the H. pylori staining was positive.  She was transfused and placed on Slow Fe tablets 3 times a day.  She came back a couple of days later, again with melena.  She was started on omeprazole with this, but was never placed on antibiotics to treat the H. pylori, from what I can see, and from what she knows.  She continued to have problems with black stools and anemia, and needed another red blood cell transfusion in September.  She underwent EGD and colonoscopy on 09/04/2015.  The EGD showed nonbleeding angioectasias, which were treated with APC.  The colonoscopy showed adenomas, which were biopsied with pathology results of tubular adenomas x3, and a random biopsy showed no abnormality.  Since this time, she continued to have melena, although she is not sure what to think since she is taking the Slow Fe, which she takes 2 or 3 times a day.   -Dr. Dominguez concluded this is not a primary hematology problem but a GI problem and recommended IV iron, given that PO iron would be hard to absorb in her case.  No signs of hemolysis.  Epo a little low for degree of anemia but had adequate retic count so able to make red cells, no epo replacement indicated   1/14/2016: Visit with Dr. Dominguez (last visit noted)  -Given IV iron, but not effective in raising hgb due to ongoing bleeding.   I will arrange for 2 units PRBCs within the next few days, since she is so symptomatic.  Her retic count is elevated, so her bone marrow is clearly capable of making RBCs, just can't keep up. She does not have a primary hematology poblem, but has Nalini bleeding problem. I am referring her back to GI.   1/6/23-1/9/23: Admitted to [facility] with acute on chronic anemia (Hb dropped from 10.1 12/26/22 to 5.6), suspected GI bleed.  Has chronic black stools  2/2 iron containing phosphate binder.  Underwent transfusion x 2 units, push EDG that did not show source of bleeding on 1/9.  She was started on protonix and continued on octreotide monthly.   3/1/23: Video visit with primary GI, Dr. Ankit Baires   3/9/23 she underwent device assisted upper endoscopy and APC was performd on AVMs in the stomach, duodenum and jejunum.     Review of Systems: Pertinent positive and negative systems described in HPI; the remainder of the 14 systems are negative    Past Medical History:  Past Medical History:   Diagnosis Date     Anemia      Anxiety and depression      Arthritis      AVM (arteriovenous malformation)      Chronic hepatitis C with cirrhosis (H)      Clotting disorder (H)      ESRD (end stage renal disease) (H)     on dialysis     GI bleed     recurrent     Glaucoma      Hyperlipidemia      Hypertension goal BP (blood pressure) < 140/80        Past Surgical History:  Past Surgical History:   Procedure Laterality Date     CAPSULE/PILL CAM ENDOSCOPY N/A 3/27/2019    Procedure: Capsule/pill cam endoscopy;  Surgeon: Yosvany Ram MD;  Location: [facility]     CAPSULE/PILL CAM ENDOSCOPY N/A 2/2/2023    Procedure: IMAGING PROCEDURE, GI TRACT, INTRALUMINAL, VIA CAPSULE;  Surgeon: Mulu Nur DO;  Location: [facility]     COLONOSCOPY N/A 9/4/2015    Procedure: COMBINED COLONOSCOPY, SINGLE OR MULTIPLE BIOPSY/POLYPECTOMY BY BIOPSY;  Surgeon: Rupesh Lopez MD;  Location: [facility]     COLONOSCOPY N/A 9/19/2018    Procedure: COLONOSCOPY;  enteroscopy small bowel  COLONOSCOPY;  Surgeon: Ankit Baires MD;  Location: [facility]     ENTEROSCOPY SMALL BOWEL N/A 3/9/2023    Procedure: antegrade single balloon enteroscopy with argon plasma coagulation of arteriovenous malformations;  Surgeon: Ankit Baires MD;  Location: [facility]     ESOPHAGOSCOPY, GASTROSCOPY, DUODENOSCOPY (EGD), COMBINED N/A 12/18/2014    Procedure: COMBINED ESOPHAGOSCOPY, GASTROSCOPY, DUODENOSCOPY (EGD);  Surgeon:  Betsy Carvajal MD;  Location:  GI     ESOPHAGOSCOPY, GASTROSCOPY, DUODENOSCOPY (EGD), COMBINED N/A 2015    Procedure: COMBINED ESOPHAGOSCOPY, GASTROSCOPY, DUODENOSCOPY (EGD);  Surgeon: Yosvany Ram MD;  Location:  GI     ESOPHAGOSCOPY, GASTROSCOPY, DUODENOSCOPY (EGD), COMBINED N/A 2015    Procedure: COMBINED ESOPHAGOSCOPY, GASTROSCOPY, DUODENOSCOPY (EGD);  Surgeon: Mariano Mistry MD;  Location:  GI      CAPSULE ENDOSCOPY N/A 2015    Procedure: CAPSULE/PILL CAM ENDOSCOPY;  Surgeon: Pan Dhaliwal MD;  Location: 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     Sexual activity: Not Currently   Other Topics Concern     Parent/sibling w/ CABG, MI or angioplasty before 65F 55M? No        Family History  Family History   Problem Relation Age of Onset     Diabetes Mother      Alzheimer Disease Mother      Substance Abuse Son      Cancer Sister      Soft Tissue Cancer Sister      Breast Cancer Sister      Hyperlipidemia Daughter      Alcoholism Brother      Spine Problems Sister    No hx of AVMs, bleeding disorders     Outpatient Medications:  No current facility-administered medications on file prior to encounter.  albuterol (PROAIR HFA/PROVENTIL HFA/VENTOLIN HFA) 108 (90 Base) MCG/ACT inhaler, Inhale 2 puffs into the lungs every 4 hours as needed for wheezing, shortness of breath or cough  amLODIPine (NORVASC) 5 MG tablet, Take 1 tablet  (5 mg) by mouth daily  atorvastatin (LIPITOR) 20 MG tablet, Take 1 tablet (20 mg) by mouth daily  benzocaine-menthol (CHLORASEPTIC) 6-10 MG lozenge, Place 1 lozenge inside cheek every hour as needed for sore throat  benzonatate (TESSALON) 100 MG capsule, Take 1 capsule (100 mg) by mouth 3 times daily as needed for cough  calcium acetate (PHOSLO) 667 MG CAPS capsule, TAKE 2 CAPSULE BY MOUTH THREE TIMES A DAY WITH MEALS  gabapentin (NEURONTIN) 300 MG capsule, Take 1 capsule (300 mg) by mouth At Bedtime  losartan (COZAAR) 50 MG tablet, Take 1 tablet (50 mg) by mouth daily  multivitamin RENAL (RENAVITE RX/NEPHROVITE) 1 tablet tablet, Take 1 tablet by mouth daily  pantoprazole (PROTONIX) 20 MG EC tablet, Take 1 tablet (20 mg) by mouth 2 times daily (before meals) *take 30-60 minutes before  polyethylene glycol (MIRALAX) 17 GM/Dose powder, Take 17 g by mouth daily as needed As needed  sertraline (ZOLOFT) 50 MG tablet, Take 2 tablets (100 mg) by mouth daily  sucroferric oxyhydroxide (VELPHORO) 500 MG CHEW chewable tablet, Take 500 mg by mouth 2 times daily  hydrOXYzine (ATARAX) 25 MG tablet, Take 1 tablet (25 mg) by mouth every 6 hours as needed for itching or anxiety  octreotide (SANDOSTATIN LAR) 20 MG injection, Inject 20 mg into the muscle every 28 days         Physical Exam:    /63   Pulse 65   Temp 98.5  F (36.9  C) (Oral)   Resp 15   SpO2 97%   Gen: Appears chronically ill, frail, NAD, daughter at bedside   HEENT: NC/AT   CV: Normal rate, regular rhythm. No m/r/g  Pulm: CTAB, no wheezing, normal work of breathing.  On RA   Abd: Soft, nt/nd, no rebound/guarding  Ext: Warm and well perfused. No lower extremity edema  Skin: No rash, cyanosis or petechial lesion  Neuro: Alert able to answer some questions appropriately     Labs & Studies: I personally reviewed the following studies:  ROUTINE LABS (Last four results):  CMP  Recent Labs   Lab 06/01/23  1546 06/01/23  0639    135*   POTASSIUM 4.0 4.3    CHLORIDE 99 96*   CO2 26 27   ANIONGAP 13 12   * 97   BUN 31.4* 28.5*   CR 7.64* 6.79*   GFRESTIMATED 5* 6*   BELGICA 9.1 9.1   MAG 2.0  --    PROTTOTAL 7.0 6.2*   ALBUMIN 3.4* 3.2*   BILITOTAL 0.4 0.3   ALKPHOS 125* 128*   AST 22 27   ALT 7* 10     CBC  Recent Labs   Lab 06/02/23  0942 06/01/23  2341 06/01/23  1546 06/01/23  0639   WBC 11.6*  --  8.5 9.1   RBC 3.16*  --  1.99* 1.84*   HGB 9.2* 7.0* 6.0* 5.6*   HCT 29.0*  --  20.4* 19.1*   MCV 92  --  103* 104*   MCH 29.1  --  30.2 30.4   MCHC 31.7  --  29.4* 29.3*   RDW 21.0*  --  17.0* 16.7*     --  372 386     INR  Recent Labs   Lab 06/01/23  1546   INR 1.29*

## 2023-06-02 NOTE — PROGRESS NOTES
HEMODIALYSIS TREATMENT NOTE    Date: 6/2/2023  Time: 1:29 PM    Data:  Pre Wt:     Desired Wt:   kg   Post Wt:    Weight change:   kg  Ultrafiltration - Post Run Net Total Removed (mL):  2700  Vascular Access Status: patent  Dialyzer Rinse: clear   Total Blood Volume Processed: 61.8 L Liters  Total Dialysis (Treatment) Time: 3.5 Hours    Lab:   No    Interventions:  3.5 hr HD via right AVG using 15 gauge needles. 2-3 L UF goal as tolerated maintaining SBP >110. See MAR and flow sheet.     Assessment:  Writer assumed care for pt at 1330. Pt tolerating well. Pt had BM during run (brown, colin) no observation of blood. Pt cooperative and resting through course. Pt cramping with 20 minutes left in tx. Pt rinsed back, pt stable and cramping subsided. 2700ml net removed.      Plan:    Per Renal

## 2023-06-02 NOTE — PROVIDER NOTIFICATION
"MD paged: \"FYI pt bp 180/77 after starting blood transfusion. Took it multiple times. Pt has no other symptoms and all other VSS\"  "

## 2023-06-02 NOTE — CONSULTS
GASTROENTEROLOGY CONSULTATION      Date of Admission:  6/1/2023          ASSESSMENT AND RECOMMENDATIONS:     83 year old female with a history of ESRD on HD, recurrent GI bleeding, gastric/small bowel/cecal AVMs, Hep C s/p treatment, compensated cirrhosis who was transferred from TCU with lethargy/weakness and acute on chronic anemia. GI was consulted for evaluation of possible GI bleed.    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 Likely this is in the setting of obscure, intermittent bleeding related to her known AVMs, with possible contribution from other causes (eg. ESRD). Follows with Dr. Baires outpatient.     She is known to have small bowel AV malformations dating back to 2014, she has experienced melena or bright red bleeding per rectum whenever there was active bleeding from these lesions. She had few procedures in 2019 to address these AV malformations and has been on monthly octreotide infections since 2019 and has done relatively well until Jan 2023.     In Jan 2023, she was admitted to [facility] with symptomatic anemia, Hb was 5.6, she was evaluated by [facility], underwent EGD which was negative for any active source of bleeding. Following this, VCE was ordered by her primary gastroenterologist  in 2/2023 and this revealed actively bleeding small bowel AV malformations. On 3/9/23 she underwent device assisted upper endoscopy and APC was performed on AVMs in the stomach, duodenum and jejunum.     Has known compensated cirrhosis, follows with hepatology clinic, no history of varices/ascites/HE.     Patient has failed medical/conservative management with Octreotide infusions and has had multiple GI interventions in the past, including recently. At this point, there is no point in further GI interventions, and patient should discuss about  Palliative care options and goals of care. The one last option that could be considered to be offered is Bevacizumab, that is sometimes offered in patients with HHT with severe, recurrent GI bleeding resistant to multiple transfusions/interventions.     RECOMMENDATIONS    - Ensure 2 large bore IVs (18G or larger)  - Ensure active type&screen  - Transfuse if Hg<7  - CBC daily  - IV PPIs BID  - Continue IVFs per primary team  - Hold anticoagulants, NSAIDs  - Recommend Hematology consult to discuss about Bevacizumab as a treatment option, as discussed above.   - Recommend Palliative Care consult.   - Continue outpatient iron/blood transfusions/Octreotide injections as needed.  - GI will sign off at this time.     Gastroenterology follow up recommendations: None.     Thank you for involving us in this patient's care. Please do not hesitate to contact the GI service with any questions or concerns.     Patient care plan discussed with Dr. Lopez, GI staff physician.    Wood Curran MD  Gastroenterology Fellow  Pager             Chief Complaint:   We were asked by Medicine service to evaluate this patient with concern for GIB.    History is obtained from the patient and the medical record.          History of Present Illness:   Rachele Martínez is a 83 year old female with a history of ESRD on HD, recurrent GI bleeding, gastric/small bowel/cecal AVMs, Hep C s/p treatment, compensated cirrhosis who was transferred from TCU with lethargy/weakness and acute on chronic anemia. GI was consulted for evaluation of possible GI bleed.    She is known to have small bowel AV malformations dating back to 2014, she has experienced melena or bright red bleeding per rectum whenever there was active bleeding from these lesions. She had few procedures in 2019 to address these AV malformations and has been on monthly octreotide infections since 2019 and has done relatively well until Jan 2023. Then had significant  anemia earlier this year, had VCE with multiple AVMs in stomach, duodenum, jejunum, underwent device assisted upper endoscopy and APC by Dr. Baires.      Patient now presents from TCU with lethargy, weakness, found to have a Hg of 5.6. Received 1U pRBC. HD had to be stopped yesterday due to ongoing diarrhea (green in color, no blood). Was recently at [facility], with no evidence of GI bleed and stable hemoglobin (again with fatigue at that time), but reported hemoptysis.      Patient was seen this morning, looked very sleepy and tired, almost unable to provide any history.         Past Medical History:   Reviewed and edited as appropriate  Past Medical History:   Diagnosis Date     Anemia      Anxiety and depression      Arthritis      AVM (arteriovenous malformation)      Chronic hepatitis C with cirrhosis (H)      Clotting disorder (H)      ESRD (end stage renal disease) (H)     on dialysis     GI bleed     recurrent     Glaucoma      Hyperlipidemia      Hypertension goal BP (blood pressure) < 140/80             Past Surgical History:   Reviewed and edited as appropriate   Past Surgical History:   Procedure Laterality Date     CAPSULE/PILL CAM ENDOSCOPY N/A 3/27/2019    Procedure: Capsule/pill cam endoscopy;  Surgeon: Yosvany Ram MD;  Location:  GI     CAPSULE/PILL CAM ENDOSCOPY N/A 2/2/2023    Procedure: IMAGING PROCEDURE, GI TRACT, INTRALUMINAL, VIA CAPSULE;  Surgeon: Mulu Nur DO;  Location:  GI     COLONOSCOPY N/A 9/4/2015    Procedure: COMBINED COLONOSCOPY, SINGLE OR MULTIPLE BIOPSY/POLYPECTOMY BY BIOPSY;  Surgeon: Rupesh Lopez MD;  Location:  GI     COLONOSCOPY N/A 9/19/2018    Procedure: COLONOSCOPY;  enteroscopy small bowel  COLONOSCOPY;  Surgeon: Ankit Baires MD;  Location:  GI     ENTEROSCOPY SMALL BOWEL N/A 3/9/2023    Procedure: antegrade single balloon enteroscopy with argon plasma coagulation of arteriovenous malformations;  Surgeon: Ankit Baires MD;  Location:   OR     ESOPHAGOSCOPY, GASTROSCOPY, DUODENOSCOPY (EGD), COMBINED N/A 12/18/2014    Procedure: COMBINED ESOPHAGOSCOPY, GASTROSCOPY, DUODENOSCOPY (EGD);  Surgeon: Betsy Carvajal MD;  Location:  GI     ESOPHAGOSCOPY, GASTROSCOPY, DUODENOSCOPY (EGD), COMBINED N/A 4/25/2015    Procedure: COMBINED ESOPHAGOSCOPY, GASTROSCOPY, DUODENOSCOPY (EGD);  Surgeon: Yosvany Ram MD;  Location:  GI     ESOPHAGOSCOPY, GASTROSCOPY, DUODENOSCOPY (EGD), COMBINED N/A 5/5/2015    Procedure: COMBINED ESOPHAGOSCOPY, GASTROSCOPY, DUODENOSCOPY (EGD);  Surgeon: Mariano Mistry MD;  Location: [facility]     HC CAPSULE ENDOSCOPY N/A 9/30/2015    Procedure: CAPSULE/PILL CAM ENDOSCOPY;  Surgeon: Pan Dhaliwal MD;  Location: [facility] VASCULAR SURGERY PROCEDURE UNLIST       HYSTERECTOMY  1980    KYREE     LUMPECTOMY BREAST              Previous Endoscopy:     Results for orders placed or performed during the hospital encounter of 09/18/18   COLONOSCOPY   Result Value Ref Range    COLONOSCOPY       [address] [place] [address] [phone]     Endoscopy Department  _______________________________________________________________________________  Patient Name: Rachele Martínez        Procedure Date: 9/19/2018 12:46 PM  MRN: [id number]                       Account Number: [id number]  YOB: 1940              Admit Type: Inpatient  Age: 78                                Gender: Female  Note Status: Supervisor Override      Attending MD: Ankit Baires MD  Pause for the Cause: time out performed Total Sedation Time:   _______________________________________________________________________________     Procedure:           Colonoscopy  Indications:         Hematochezia  Providers:           Ankit Baires MD, Gayle Patel RN, Ke Campbell MD  Referring MD:        Sujit Amin  Medicines:           See the other procedure note for documentation of the                         administered medications, Midazolam 1 mg IV,  Fentanyl 25                        micrograms IV  Complications:       No immediate complications. Estimated blood loss:                        Minimal.  _______________________________________________________________________________  Procedure:           Pre-Anesthesia Assessment:                       - Prior to the procedure, a History and Physical was                        performed, and patient medications and allergies were                        reviewed. The patient is competent. The risks and                        benefits of the procedure and the sedation options and                        risks were discussed with the patient. All questions                        were answered and informed consent was obtained. Patient                        identification and proposed procedure were verified by                        the physician and the nurse in the procedure room.                        Mental Status Examination: alert and oriented. Airway                        [place]: normal oropharyngeal airway and neck                        mobility. Respiratory Examination: clear to                        auscultation. CV Examination: normal. Prophylactic                        Antibiotics: The patient does not require prophylactic                        antibiotics. Prior Anticoagulants: The patient has taken                        no previous anticoagulant or antiplatelet agents. ASA                        Grade Assessment: III - A patient with severe systemic                        disease. After reviewing the risks and benefits, the                        patient was deemed in satisfactory condition to undergo                        the procedure. The anesthesia plan was to use moderate                        sedation / analgesia (conscious sedation). Immediately                        prior to administration of medications, the patient was                         re-assessed for adequacy to receive sedatives. The heart                        rate, re spiratory rate, oxygen saturations, blood                        pressure, adequacy of pulmonary ventilation, and                        response to care were monitored throughout the                        procedure. The physical status of the patient was                        re-assessed after the procedure.                       After obtaining informed consent, the colonoscope was                        passed under direct vision. Throughout the procedure,                        the patient's blood pressure, pulse, and oxygen                        saturations were monitored continuously. The Colonoscope                        was introduced through the anus and advanced to the                        terminal ileum. The colonoscopy was performed without                        difficulty. The patient tolerated the procedure well.                        The quality of the bowel preparation was evaluated using                        the BBPS ([place] Bowel Preparation Scale) with scor es                        of: Right Colon = 3, Transverse Colon = 3 and Left Colon                        = 3 (entire mucosa seen well with no residual staining,                        small fragments of stool or opaque liquid). The total                        BBPS score equals 9.                                                                                   Findings:       The perianal and digital rectal examinations were normal. Pertinent        negatives include no palpable rectal lesions.       Old blood clots in the colon lumen. The terminal ileum appeared normal.       A few large localized angioectasias with stigmata of recent bleeding        were found in the cecum. Coagulation for bleeding prevention using argon        plasma at 0.5 liters/minute and 20 m[place]illan was successful. Estimated blood        loss was  minimal.       The exam was otherwise without abnormality.       External hemorrhoids were found during retroflexion. The hemorrhoids        were large.                                                                                    Moderate Sedation:       Moderate (conscious) sedation was administered by the endoscopy nurse        and supervised by the endoscopist. The patient's oxygen saturation,        heart rate, blood pressure and response to care were monitored. Total        physician intraservice time was 36 minutes.  Impression:          - Old blood clots were seen in the colon lumen.                       - The examined portion of the ileum was normal.                       - A few recently bleeding colonic angioectasias. Treated                        with argon plasma coagulation (APC).                       - The examination was otherwise normal.                       - External hemorrhoids.                       - No specimens collected.                       - Exam also adequate for CRC screening purposes as she                        was due for surveillance  Recommendation:      - [facility] patient to [facility] for [facility] care.                        - Suspect the bright red blood was from the hemorrhoids                        and the melena was from the upper GI tract and cecal                        angioectasias.                       - Continue to monitor Hgb and stool output                       - Continue medical management of chronic                        angioectasia/small bowel bleeding with octreotide and IV                        iron infusions with dialysis                       - Repeat colonoscopy in 5 years given prior history of                        tubular adenomas.                                                                                     Ankit Baires MD  ________________  Ankit Baires MD  9/19/2018 3:42:02 PM  I was physically present for the entire viewing  portion of the exam.  __________________________  Signature of teaching physician  Jillian/Libertad Baires MD    __________________  Ke Campbell MD  Number of Addenda: 0    Note Initiated On: 2018 12:46 PM  Scope In:   Scope Out:              Social History:   Reviewed and edited as appropriate  Social History     Socioeconomic History     Marital status:      Spouse name: Not on file     Number of children: Not on file     Years of education: Not on file     Highest education level: Not on file   Occupational History     Not on file   Tobacco Use     Smoking status: Former     Packs/day: 2.00     Years: 45.00     Pack years: 90.00     Types: Cigarettes     Start date: 1953     Quit date: 2002     Years since quittin.5     Smokeless tobacco: Never   Vaping Use     Vaping status: Not on file   Substance and Sexual Activity     Alcohol use: No     Drug use: Yes     Types: Marijuana     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     Sexual activity: Not Currently   Other Topics Concern     Parent/sibling w/ CABG, MI or angioplasty before 65F 55M? No   Social History Narrative     Not on file     Social Determinants of Health     Financial Resource Strain: Not on file   Food Insecurity: Not on file   Transportation Needs: Not on file   Physical Activity: Not on file   Stress: Not on file   Social Connections: Not on file   Intimate Partner Violence: Not on file   Housing Stability: Not on file            Family History:   Reviewed and edited as appropriate  No known history of gastrointestinal/liver disease or  gastrointestinal malignancies       Allergies:   Reviewed and edited as appropriate     Allergies   Allergen Reactions     Abacavir Itching     Lisinopril Cough     Dust Mites      Hydrochlorothiazide Itching     Severe       No Clinical Screening - See Comments      History of blood transfusion reactions and pre-treats with Benadryl.      Spironolactone Nausea      Sulfa Antibiotics Hives     Valsartan Itching     Valsartan-Hydrochlorothiazide Itching     severe            Medications:     Current Facility-Administered Medications   Medication     0.9% sodium chloride BOLUS     0.9% sodium chloride BOLUS     0.9% sodium chloride BOLUS     acetaminophen (TYLENOL) tablet 650 mg    Or     acetaminophen (TYLENOL) Suppository 650 mg     albuterol (PROVENTIL HFA/VENTOLIN HFA) inhaler     amLODIPine (NORVASC) tablet 5 mg     atorvastatin (LIPITOR) tablet 20 mg     bisacodyl (DULCOLAX) EC tablet 5 mg    Or     bisacodyl (DULCOLAX) EC tablet 10 mg     calcium acetate (PHOSLO) capsule 1,334 mg     diphenhydrAMINE (BENADRYL) capsule 25 mg    Or     diphenhydrAMINE (BENADRYL) injection 25 mg     epoetin christofer-epbx (RETACRIT) injection 10,000 Units     gabapentin (NEURONTIN) capsule 300 mg     hydrOXYzine (ATARAX) tablet 25 mg     iron sucrose (VENOFER) injection 100 mg     labetalol (NORMODYNE) tablet 100 mg     lidocaine (LMX4) cream     lidocaine 1 % 0.1-1 mL     lidocaine 1 % 0.5 mL     lidocaine 1 % 0.5 mL     losartan (COZAAR) tablet 50 mg     melatonin tablet 1 mg     No heparin via hemodialysis machine     ondansetron (ZOFRAN ODT) ODT tab 4 mg    Or     ondansetron (ZOFRAN) injection 4 mg     pantoprazole (PROTONIX) EC tablet 20 mg     polyethylene glycol (MIRALAX) Packet 17 g     sertraline (ZOLOFT) tablet 100 mg     sodium chloride (PF) 0.9% PF flush 3 mL     sodium chloride (PF) 0.9% PF flush 3 mL     Stop Heparin 60 minutes before end of treatment     Current Outpatient Medications   Medication Sig     albuterol (PROAIR HFA/PROVENTIL HFA/VENTOLIN HFA) 108 (90 Base) MCG/ACT inhaler Inhale 2 puffs into the lungs every 4 hours as needed for wheezing, shortness of breath or cough     amLODIPine (NORVASC) 5 MG tablet Take 1 tablet (5 mg) by mouth daily     atorvastatin (LIPITOR) 20 MG tablet Take 1 tablet (20 mg) by mouth daily     benzocaine-menthol (CHLORASEPTIC) 6-10 MG  lozenge Place 1 lozenge inside cheek every hour as needed for sore throat     benzonatate (TESSALON) 100 MG capsule Take 1 capsule (100 mg) by mouth 3 times daily as needed for cough     calcium acetate (PHOSLO) 667 MG CAPS capsule TAKE 2 CAPSULE BY MOUTH THREE TIMES A DAY WITH MEALS     gabapentin (NEURONTIN) 300 MG capsule Take 1 capsule (300 mg) by mouth At Bedtime     hydrOXYzine (ATARAX) 25 MG tablet Take 1 tablet (25 mg) by mouth every 6 hours as needed for itching or anxiety     losartan (COZAAR) 50 MG tablet Take 1 tablet (50 mg) by mouth daily     multivitamin RENAL (RENAVITE RX/NEPHROVITE) 1 tablet tablet Take 1 tablet by mouth daily     octreotide (SANDOSTATIN LAR) 20 MG injection Inject 20 mg into the muscle every 28 days     pantoprazole (PROTONIX) 20 MG EC tablet Take 1 tablet (20 mg) by mouth 2 times daily (before meals) *take 30-60 minutes before     polyethylene glycol (MIRALAX) 17 GM/Dose powder Take 17 g by mouth daily as needed As needed     sertraline (ZOLOFT) 50 MG tablet Take 2 tablets (100 mg) by mouth daily             Review of Systems:     A complete review of systems was performed and is negative except as noted in the HPI           Physical Exam:   BP (!) 196/79   Pulse 69   Temp 98  F (36.7  C)   Resp 18   SpO2 96%   Wt:   Wt Readings from Last 2 Encounters:   05/21/23 60.5 kg (133 lb 4.8 oz)   03/09/23 63 kg (138 lb 14.2 oz)      Constitutional: lethargic, tired.  Eyes: Sclera anicteric/injected  Ears/nose/mouth/throat: Normal oropharynx without ulcers or exudate, mucus membranes moist  Neck: supple  CV: RRR, No edema  Respiratory: Unlabored breathing  Abdomen: No scars, Nondistended, +bs, no hepatosplenomegaly, nontender, no peritoneal signs  Skin: warm, perfused, no jaundice  Neuro: AAO x 3  Psych: Normal affect  MSK: Normal gait         Data:   Labs and imaging below were independently reviewed and interpreted    BMP  Recent Labs   Lab 06/01/23  1546 06/01/23  0639    135*    POTASSIUM 4.0 4.3   CHLORIDE 99 96*   BELGICA 9.1 9.1   CO2 26 27   BUN 31.4* 28.5*   CR 7.64* 6.79*   * 97     CBC  Recent Labs   Lab 06/01/23  2341 06/01/23  1546 06/01/23  0639   WBC  --  8.5 9.1   RBC  --  1.99* 1.84*   HGB 7.0* 6.0* 5.6*   HCT  --  20.4* 19.1*   MCV  --  103* 104*   MCH  --  30.2 30.4   MCHC  --  29.4* 29.3*   RDW  --  17.0* 16.7*   PLT  --  372 386     INR  Recent Labs   Lab 06/01/23  1546   INR 1.29*     LFTs  Recent Labs   Lab 06/01/23  1546 06/01/23  0639   ALKPHOS 125* 128*   AST 22 27   ALT 7* 10   BILITOTAL 0.4 0.3   PROTTOTAL 7.0 6.2*   ALBUMIN 3.4* 3.2*      PANCNo lab results found in last 7 days.    Imaging:    EGD 3/9/2023  Impression:            - Normal esophagus.                          - A single non-bleeding angioectasia in the stomach.                          Treated with argon plasma coagulation (APC).                          - Multiple bleeding angioectasias in the duodenum.                          Treated with argon plasma coagulation (APC).                          - Multiple non-bleeding angioectasias in the jejunum.                          Treated with argon plasma coagulation (APC).                          - A tattoo was seen in the jejunum. The tattoo site                          marks the depth of maximum advancement of the scope on                          a previous exam. Able to advance ~20 cm further and                          treat an additional angioectasia.      VCE 2/9/2023     Impression:            - Gastric polyp(s).                          - A single angioectasia without bleeding in the small                          bowel.                          - Duodenal lymphangiectasia.                          - A single mucosal spot with no bleeding in the small                          bowel.                          - A single mucosal spot with no bleeding in the small                          bowel.                          - A single  angioectasia without bleeding in the small                          bowel.

## 2023-06-02 NOTE — PROGRESS NOTES
[facility]    Medicine Progress Note - Hospitalist Service, GOLD TEAM 11    Date of Admission:  6/1/2023    Assessment & Plan      Rachele Martínez is an 83 year old woman admitted on 6/1/2023. She has a history of ESRD on MWF dialysis, mild dementia, hypertension, hyperlipidemia, anemia and recurrent GI bleeds secondary to AVMs, chronic hep C, cirrhosis and recent admission for weakness 5/12-5/25 who is admitted with recurrent anemia.    Acute on chronic anemia, suspected GI bleed secondary to known AVMs - Hemoglobin 6 on admission drown from 7.2 on discharge 5/25.  No melena, epistaxis or hematemesis, although she had two small episodes of hemoptysis while in the TCU this past week. Just s/p EGD 3/9 with argon plasma coagulation treatment of multiple angiectasias in the stomach, duodenum and jejunum. Received blood transfusion in ED. Hemoglobin now increased to 9. GI consulted, no indication for immediate intervention. Has failed medical and conservative management including multiple endoscopic interventions and medications.   - Monitor hemoglobin daily, no overt GI bleed  - OK to continue monthly octreotide injections  - Hematology consulted to consider Bevacizumab    Hemoptysis  No current hemoptysis. Chest x-ray without definite consolidation or opacity. If recurs, would get high resolution CT chest without contrast.    Dementia    Leukocytosis  Family concerned that patient cannot be left alone safely. Has been more confused lately. Chest x-ray without pneumonia. No abdominal symptoms, no rashes. Not making urine so unable to get UA.  - Delirium precautions  - Schedule melatonin HS    History of ESRD - On MWF dialysis  - Consult nephrology for inpatient dialysis  - Continue home Phoslo    History of hypertension  - Continue home amlodipine, losartan    History of depression  - Continue home sertraline    History of hyperlipidemia  - Continue home  atorvastatin    History of opiate abuse - Family also concerned patient may ask for opiate pain medication and would like us to be cautious.         Diet: Regular Diet Adult    DVT Prophylaxis: Pneumatic Compression Devices  Rodriguez Catheter: Not present  Lines: None     Cardiac Monitoring: None  Code Status: Full Code      Clinically Significant Risk Factors Present on Admission              # Hypoalbuminemia: Lowest albumin = 3.2 g/dL at 6/1/2023  6:39 AM, will monitor as appropriate  # Coagulation Defect: INR = 1.29 (Ref range: 0.85 - 1.15) and/or PTT = 34 Seconds (Ref range: 22 - 38 Seconds), will monitor for bleeding    # Hypertension: Noted on problem list               Disposition Plan     Expected Discharge Date: 06/03/2023                  Artis Valdez MD  Hospitalist Service, GOLD TEAM [address] [place] [address]  Securely message with [employer] (more info)  Text page via [facility] Paging/Directory   See signed in provider for up to date coverage information  ______________________________________________________________________    Interval History   Feels OK today. Daughter says more tired and not with it in the past few weeks. No blood or black stools. No abdominal pain. Not making urine. Was coughing up blood a little yesterday. No difficulty breathing currently.    Physical Exam   Vital Signs: Temp: 98.5  F (36.9  C) Temp src: Oral BP: (!) 154/66 Pulse: 68   Resp: 14 SpO2: 99 % O2 Device: None (Room air)    Weight: 0 lbs 0 oz  Gen: Sitting comfortably in bed, falls asleep intermittently, no distress  Abd: Soft, nontender, nondistended    Medical Decision Making       50 MINUTES SPENT BY ME on the date of service doing chart review, history, exam, documentation & further activities per the note.      Data

## 2023-06-02 NOTE — MEDICATION SCRIBE - ADMISSION MEDICATION HISTORY
Medication Scribe Admission Medication History    Admission medication history is complete. The information provided in this note is only as accurate as the sources available at the time of the update.    Medication reconciliation/reorder completed by provider prior to medication history? Yes    Information Source(s): Facility ([facility]/) medication list/MAR via N/A    Pertinent Information:     Changes made to PTA medication list:    Added: Velphoro    Deleted: None    Changed: None    Medication Affordability:       Allergies reviewed with patient and updates made in EHR: no    Medication History Completed By: Chelly Ndiaye 6/2/2023 8:49 AM    Prior to Admission medications    Medication Sig Last Dose Taking? Auth Provider Long Term End Date   albuterol (PROAIR HFA/PROVENTIL HFA/VENTOLIN HFA) 108 (90 Base) MCG/ACT inhaler Inhale 2 puffs into the lungs every 4 hours as needed for wheezing, shortness of breath or cough  at prn Yes Angel Luis Reno MD Yes    amLODIPine (NORVASC) 5 MG tablet Take 1 tablet (5 mg) by mouth daily 6/1/2023 at 0800 Yes Angel Luis Reno MD Yes    atorvastatin (LIPITOR) 20 MG tablet Take 1 tablet (20 mg) by mouth daily 6/1/2023 at 0800 Yes Angel Luis Reno MD Yes    benzocaine-menthol (CHLORASEPTIC) 6-10 MG lozenge Place 1 lozenge inside cheek every hour as needed for sore throat  at prn Yes Angel Luis Reno MD     benzonatate (TESSALON) 100 MG capsule Take 1 capsule (100 mg) by mouth 3 times daily as needed for cough  at prn Yes Angel Luis Reno MD     calcium acetate (PHOSLO) 667 MG CAPS capsule TAKE 2 CAPSULE BY MOUTH THREE TIMES A DAY WITH MEALS 6/1/2023 at 0800 Yes Angel Luis Reno MD     gabapentin (NEURONTIN) 300 MG capsule Take 1 capsule (300 mg) by mouth At Bedtime Past Week at 2100 Yes Angel Luis Reno MD Yes    losartan (COZAAR) 50 MG tablet Take 1 tablet (50 mg) by mouth daily 6/1/2023 at 0800 Yes Angel Luis Reno MD Yes     multivitamin RENAL (RENAVITE RX/NEPHROVITE) 1 tablet tablet Take 1 tablet by mouth daily 6/1/2023 at 0800 Yes Angel Luis Reno MD     pantoprazole (PROTONIX) 20 MG EC tablet Take 1 tablet (20 mg) by mouth 2 times daily (before meals) *take 30-60 minutes before 6/1/2023 at 0730 Yes Angel Luis Reno MD     polyethylene glycol (MIRALAX) 17 GM/Dose powder Take 17 g by mouth daily as needed As needed  at prn Yes Reported, Patient     sertraline (ZOLOFT) 50 MG tablet Take 2 tablets (100 mg) by mouth daily 6/1/2023 at 0800 Yes Angel Luis Reno MD Yes    sucroferric oxyhydroxide (VELPHORO) 500 MG CHEW chewable tablet Take 500 mg by mouth 2 times daily 6/1/2023 at 0800 Yes Reported, Patient     hydrOXYzine (ATARAX) 25 MG tablet Take 1 tablet (25 mg) by mouth every 6 hours as needed for itching or anxiety  at prn  Angel Luis Reno MD     octreotide (SANDOSTATIN LAR) 20 MG injection Inject 20 mg into the muscle every 28 days   Ankit Baires MD No

## 2023-06-02 NOTE — PROGRESS NOTES
Status: Chronic Anemia  Neuro: A&o x4  GI: +bs +flatus no BM.  :Voiding spont  Resp: R.A  Mobility: SBA  Cardiac: WDL  Skin: Intact  Lines/Drains: L piv @ infusing blood unit   Pain: Denies pain  Behavior: Calm  VS: Blood pressure (!) 167/133, pulse 73, temperature 98  F (36.7  C), temperature source Oral, resp. rate 18, SpO2 100 %, not currently breastfeeding.

## 2023-06-02 NOTE — CONSULTS
Nephrology Initial Consult  June 2, 2023      Rachele Martínez MRN:[id number] YOB: 1940  Date of Admission:6/1/2023  Primary care provider: Agnieszka Rodriguez  Requesting physician: Andriy Bryant MD    ASSESSMENT AND RECOMMENDATIONS:   Rachele Martínez is an 83 year old female with PMH of ESKD, mild dementia, hypertension, hyperlipidemia, anemia and recurrent GI bleeds secondary to AVMs, chronic hep C with cirrhosis, admitted with recurrent anemia     ESKD: dialyzes MWF at [facility] with Dr. Tarango. Run time: 3.5 hrs. Access: RUE AVG. EDW 60 kg  - HD per MWF schedule    BP/Volume: 60 kg  - no weight done yet, UF goal 3 kg given shortened runs the past two times and BP's in 180's    Anemia of CKD on blood loss anemia: venofer 100 mg qtx, epogen 5200 units per run  - ordered epogen 10K and venofer 100 mg for today  - s/p 2 units PRBC  - no interventions, per GI    BMD: continue PTA PO calcitriol 1.0 mcg MWF, Phoslo 2 tabs TID WM    Recommendations were communicated to primary team via this note         SHANNON Charles   Division of Renal Disease and Hypertension  [phone]      REASON FOR CONSULT: ESKD/dialysis    HISTORY OF PRESENT ILLNESS:  Rachele Martínez is an 83 year old female with PMH of ESKD, mild dementia, hypertension, hyperlipidemia, anemia and recurrent GI bleeds secondary to AVMs, chronic hep C with cirrhosis, admitted with recurrent anemia. Now s/p 2 units PRBC. Per GI consult, no interventions planned. Likely with discharge today. Pt is seen on dialysis, attempting 3 kg UF given elevated pressures and several shortened HD runs due to diarrhea. No current n/v, CP, SOB, chills      PAST MEDICAL HISTORY:  Reviewed with patient on 06/02/2023     Past Medical History:   Diagnosis Date     Anemia      Anxiety and depression      Arthritis      AVM (arteriovenous malformation)      Chronic hepatitis C with cirrhosis (H)      Clotting disorder (H)      ESRD  (end stage renal disease) (H)     on dialysis     GI bleed     recurrent     Glaucoma      Hyperlipidemia      Hypertension goal BP (blood pressure) < 140/80        Past Surgical History:   Procedure Laterality Date     CAPSULE/PILL CAM ENDOSCOPY N/A 3/27/2019    Procedure: Capsule/pill cam endoscopy;  Surgeon: Yosvany Ram MD;  Location: [facility]     CAPSULE/PILL CAM ENDOSCOPY N/A 2/2/2023    Procedure: IMAGING PROCEDURE, GI TRACT, INTRALUMINAL, VIA CAPSULE;  Surgeon: Mulu uNr DO;  Location: [facility]     COLONOSCOPY N/A 9/4/2015    Procedure: COMBINED COLONOSCOPY, SINGLE OR MULTIPLE BIOPSY/POLYPECTOMY BY BIOPSY;  Surgeon: Rupesh Lopez MD;  Location: [facility]     COLONOSCOPY N/A 9/19/2018    Procedure: COLONOSCOPY;  enteroscopy small bowel  COLONOSCOPY;  Surgeon: Ankit Baires MD;  Location: [facility]     ENTEROSCOPY SMALL BOWEL N/A 3/9/2023    Procedure: antegrade single balloon enteroscopy with argon plasma coagulation of arteriovenous malformations;  Surgeon: Ankit Baires MD;  Location: [facility]     ESOPHAGOSCOPY, GASTROSCOPY, DUODENOSCOPY (EGD), COMBINED N/A 12/18/2014    Procedure: COMBINED ESOPHAGOSCOPY, GASTROSCOPY, DUODENOSCOPY (EGD);  Surgeon: Betsy Carvajal MD;  Location: [facility]     ESOPHAGOSCOPY, GASTROSCOPY, DUODENOSCOPY (EGD), COMBINED N/A 4/25/2015    Procedure: COMBINED ESOPHAGOSCOPY, GASTROSCOPY, DUODENOSCOPY (EGD);  Surgeon: Yosvany Ram MD;  Location: [facility]     ESOPHAGOSCOPY, GASTROSCOPY, DUODENOSCOPY (EGD), COMBINED N/A 5/5/2015    Procedure: COMBINED ESOPHAGOSCOPY, GASTROSCOPY, DUODENOSCOPY (EGD);  Surgeon: Mariano Mistry MD;  Location: [facility]     HC CAPSULE ENDOSCOPY N/A 9/30/2015    Procedure: CAPSULE/PILL CAM ENDOSCOPY;  Surgeon: Pan Dhaliwal MD;  Location: [facility]     HC VASCULAR SURGERY PROCEDURE UNLIST       HYSTERECTOMY  1980    KYREE     LUMPECTOMY BREAST          MEDICATIONS:  PTA Meds  Prior to Admission medications    Medication Sig Last Dose  Taking? Auth Provider Long Term End Date   albuterol (PROAIR HFA/PROVENTIL HFA/VENTOLIN HFA) 108 (90 Base) MCG/ACT inhaler Inhale 2 puffs into the lungs every 4 hours as needed for wheezing, shortness of breath or cough   Angel Luis Reno MD Yes    amLODIPine (NORVASC) 5 MG tablet Take 1 tablet (5 mg) by mouth daily   Angel Luis Reno MD Yes    atorvastatin (LIPITOR) 20 MG tablet Take 1 tablet (20 mg) by mouth daily   Angel Luis Reno MD Yes    benzocaine-menthol (CHLORASEPTIC) 6-10 MG lozenge Place 1 lozenge inside cheek every hour as needed for sore throat   Angel Luis Reno MD     benzonatate (TESSALON) 100 MG capsule Take 1 capsule (100 mg) by mouth 3 times daily as needed for cough   Angel Luis Reno MD     calcium acetate (PHOSLO) 667 MG CAPS capsule TAKE 2 CAPSULE BY MOUTH THREE TIMES A DAY WITH MEALS   Angel Luis Reno MD     gabapentin (NEURONTIN) 300 MG capsule Take 1 capsule (300 mg) by mouth At Bedtime   Angel Luis Reno MD Yes    hydrOXYzine (ATARAX) 25 MG tablet Take 1 tablet (25 mg) by mouth every 6 hours as needed for itching or anxiety   Angel Luis Reno MD     losartan (COZAAR) 50 MG tablet Take 1 tablet (50 mg) by mouth daily   Angel Luis Reno MD Yes    multivitamin RENAL (RENAVITE RX/NEPHROVITE) 1 tablet tablet Take 1 tablet by mouth daily   Angel Luis Reno MD     octreotide (SANDOSTATIN LAR) 20 MG injection Inject 20 mg into the muscle every 28 days   Ankit Baires MD No    pantoprazole (PROTONIX) 20 MG EC tablet Take 1 tablet (20 mg) by mouth 2 times daily (before meals) *take 30-60 minutes before   Angel Luis Reno MD     polyethylene glycol (MIRALAX) 17 GM/Dose powder Take 17 g by mouth daily as needed As needed   Reported, Patient     sertraline (ZOLOFT) 50 MG tablet Take 2 tablets (100 mg) by mouth daily   Angel Luis Reno MD Yes       Current Meds    amLODIPine  5 mg Oral Daily     atorvastatin  20 mg  Oral Daily     calcium acetate  1,334 mg Oral TID w/meals     gabapentin  300 mg Oral At Bedtime     losartan  50 mg Oral Daily     pantoprazole  20 mg Oral BID AC     sertraline  100 mg Oral Daily     sodium chloride (PF)  3 mL Intracatheter Q8H     Infusion Meds      ALLERGIES:    Allergies   Allergen Reactions     Abacavir Itching     Lisinopril Cough     Dust Mites      Hydrochlorothiazide Itching     Severe       No Clinical Screening - See Comments      History of blood transfusion reactions and pre-treats with Benadryl.      Spironolactone Nausea     Sulfa Antibiotics Hives     Valsartan Itching     Valsartan-Hydrochlorothiazide Itching     severe       REVIEW OF SYSTEMS:  A comprehensive of systems was negative except as noted above.    SOCIAL HISTORY:   Social History     Socioeconomic History     Marital status:      Spouse name: Not on file     Number of children: Not on file     Years of education: Not on file     Highest education level: Not on file   Occupational History     Not on file   Tobacco Use     Smoking status: Former     Packs/day: 2.00     Years: 45.00     Pack years: 90.00     Types: Cigarettes     Start date: 1953     Quit date: 2002     Years since quittin.5     Smokeless tobacco: Never   Vaping Use     Vaping status: Not on file   Substance and Sexual Activity     Alcohol use: No     Drug use: Yes     Types: Marijuana     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     Sexual activity: Not Currently   Other Topics Concern     Parent/sibling w/ CABG, MI or angioplasty before 65F 55M? No   Social History Narrative     Not on file     Social Determinants of Health     Financial Resource Strain: Not on file   Food Insecurity: Not on file   Transportation Needs: Not on file   Physical Activity: Not on file   Stress: Not on file   Social Connections: Not on file   Intimate Partner Violence: Not on file   Housing Stability: Not on file     Reviewed  with patient      FAMILY MEDICAL HISTORY:   Family History   Problem Relation Age of Onset     Diabetes Mother      Alzheimer Disease Mother      Substance Abuse Son      Cancer Sister      Soft Tissue Cancer Sister      Breast Cancer Sister      Hyperlipidemia Daughter      Alcoholism Brother      Spine Problems Sister      No known family history of kidney disease  Reviewed with patient    PHYSICAL EXAM:   Temp  Av.9  F (36.6  C)  Min: 97.7  F (36.5  C)  Max: 98.2  F (36.8  C)      Pulse  Av.6  Min: 68  Max: 81 Resp  Av.7  Min: 18  Max: 20  SpO2  Av.1 %  Min: 96 %  Max: 100 %       BP (!) 196/79   Pulse 69   Temp 98  F (36.7  C)   Resp 18   SpO2 96%    Date 23 0700 - 23 0659   Shift 5619-8379 0514-3943 5238-9240 24 Hour Total   INTAKE   Blood Components 330   330   Shift Total 330   330   OUTPUT   Shift Total       Weight (kg)          Admit       GENERAL APPEARANCE: alert, NAD  EYES: no scleral icterus, pupils equal  Pulmonary: lungs clear to auscultation with equal breath sounds bilaterally   CV: RRR   - Edema trace  GI: soft   MS: no evidence of inflammation in joints, no muscle tenderness  : no armando  SKIN: no rash, warm, dry, no cyanosis  NEURO: face symmetric, a/o3  Access: RUE AVG    LABS:   I have reviewed the following labs:  CMP  Recent Labs   Lab 23  1546 23  0639    135*   POTASSIUM 4.0 4.3   CHLORIDE 99 96*   CO2 26 27   ANIONGAP 13 12   * 97   BUN 31.4* 28.5*   CR 7.64* 6.79*   GFRESTIMATED 5* 6*   BELGICA 9.1 9.1   MAG 2.0  --    PROTTOTAL 7.0 6.2*   ALBUMIN 3.4* 3.2*   BILITOTAL 0.4 0.3   ALKPHOS 125* 128*   AST 22 27   ALT 7* 10     CBC  Recent Labs   Lab 23  2341 23  1546 23  0639   HGB 7.0* 6.0* 5.6*   WBC  --  8.5 9.1   RBC  --  1.99* 1.84*   HCT  --  20.4* 19.1*   MCV  --  103* 104*   MCH  --  30.2 30.4   MCHC  --  29.4* 29.3*   RDW  --  17.0* 16.7*   PLT  --  372 386     INR  Recent Labs   Lab 23  1546   INR  1.29*     ABG  Recent Labs   Lab 06/01/23  1602   PH 7.40      URINE STUDIES  Recent Labs   Lab Test 03/11/16  1449 03/05/16  1440 05/04/15  1213   COLOR Light Yellow Yellow Light Yellow   APPEARANCE Clear Clear Clear   URINEGLC 30* 30* Negative   URINEBILI Negative Negative Negative   URINEKETONE Negative Negative Negative   SG 1.009 1.011 1.008   UBLD Trace* Small* Trace*   URINEPH 6.0 6.0 6.5   PROTEIN 300* 300* 300*   NITRITE Negative Negative Negative   LEUKEST Negative Negative Negative   RBCU 1 1 <1   WBCU <1 4* 2     No lab results found.  PTH  Recent Labs   Lab Test 03/12/16  0552   PTHI 147*     IRON STUDIES  Recent Labs   Lab Test 05/14/23  0741 01/26/23  1450 01/27/22  1304 10/24/18  1506 09/14/18  1159 06/21/16  1404 03/13/16  0446 03/01/16  1140 01/14/16  0944 11/12/15  1005 08/21/15  0924   IRON 31* 34*  --  241* 55 30* 25* 21*  --  35 34*   * 266  --  419 279 284 369 482*  --  399 445*   IRONSAT 18 13*  --  57* 20 11* 7* 4*  --  9* 8*   KASSI 436*  --  106 176 229 67 18 11 51 40 18       IMAGING:  Reviewed    SHANNON Charles

## 2023-06-03 ENCOUNTER — APPOINTMENT (OUTPATIENT)
Dept: CT IMAGING | Facility: CLINIC | Age: 83
DRG: 377 | End: 2023-06-03
Attending: STUDENT IN AN ORGANIZED HEALTH CARE EDUCATION/TRAINING PROGRAM
Payer: MEDICARE

## 2023-06-03 LAB
ANION GAP SERPL CALCULATED.3IONS-SCNC: 15 MMOL/L (ref 7–15)
BUN SERPL-MCNC: 19.9 MG/DL (ref 8–23)
CALCIUM SERPL-MCNC: 8.5 MG/DL (ref 8.8–10.2)
CHLORIDE SERPL-SCNC: 91 MMOL/L (ref 98–107)
CREAT SERPL-MCNC: 6.05 MG/DL (ref 0.51–0.95)
DEPRECATED HCO3 PLAS-SCNC: 25 MMOL/L (ref 22–29)
ERYTHROCYTE [DISTWIDTH] IN BLOOD BY AUTOMATED COUNT: 20.3 % (ref 10–15)
GFR SERPL CREATININE-BSD FRML MDRD: 6 ML/MIN/1.73M2
GLUCOSE SERPL-MCNC: 106 MG/DL (ref 70–99)
HCT VFR BLD AUTO: 29.4 % (ref 35–47)
HGB BLD-MCNC: 9 G/DL (ref 11.7–15.7)
MAGNESIUM SERPL-MCNC: 1.8 MG/DL (ref 1.7–2.3)
MCH RBC QN AUTO: 29 PG (ref 26.5–33)
MCHC RBC AUTO-ENTMCNC: 30.6 G/DL (ref 31.5–36.5)
MCV RBC AUTO: 95 FL (ref 78–100)
PHOSPHATE SERPL-MCNC: 3.9 MG/DL (ref 2.5–4.5)
PLATELET # BLD AUTO: 325 10E3/UL (ref 150–450)
POTASSIUM SERPL-SCNC: 4.6 MMOL/L (ref 3.4–5.3)
RBC # BLD AUTO: 3.1 10E6/UL (ref 3.8–5.2)
SODIUM SERPL-SCNC: 131 MMOL/L (ref 136–145)
WBC # BLD AUTO: 10.9 10E3/UL (ref 4–11)

## 2023-06-03 PROCEDURE — 250N000013 HC RX MED GY IP 250 OP 250 PS 637: Performed by: STUDENT IN AN ORGANIZED HEALTH CARE EDUCATION/TRAINING PROGRAM

## 2023-06-03 PROCEDURE — 120N000002 HC R&B MED SURG/OB UMMC

## 2023-06-03 PROCEDURE — 80048 BASIC METABOLIC PNL TOTAL CA: CPT | Performed by: INTERNAL MEDICINE

## 2023-06-03 PROCEDURE — 36415 COLL VENOUS BLD VENIPUNCTURE: CPT | Performed by: INTERNAL MEDICINE

## 2023-06-03 PROCEDURE — 250N000013 HC RX MED GY IP 250 OP 250 PS 637: Performed by: INTERNAL MEDICINE

## 2023-06-03 PROCEDURE — 83735 ASSAY OF MAGNESIUM: CPT | Performed by: INTERNAL MEDICINE

## 2023-06-03 PROCEDURE — 99233 SBSQ HOSP IP/OBS HIGH 50: CPT | Performed by: INTERNAL MEDICINE

## 2023-06-03 PROCEDURE — G1010 CDSM STANSON: HCPCS | Mod: GC | Performed by: RADIOLOGY

## 2023-06-03 PROCEDURE — 250N000013 HC RX MED GY IP 250 OP 250 PS 637: Performed by: NURSE PRACTITIONER

## 2023-06-03 PROCEDURE — G1010 CDSM STANSON: HCPCS

## 2023-06-03 PROCEDURE — 99232 SBSQ HOSP IP/OBS MODERATE 35: CPT | Performed by: STUDENT IN AN ORGANIZED HEALTH CARE EDUCATION/TRAINING PROGRAM

## 2023-06-03 PROCEDURE — 85014 HEMATOCRIT: CPT | Performed by: INTERNAL MEDICINE

## 2023-06-03 PROCEDURE — 84100 ASSAY OF PHOSPHORUS: CPT | Performed by: INTERNAL MEDICINE

## 2023-06-03 PROCEDURE — 70450 CT HEAD/BRAIN W/O DYE: CPT | Mod: 26 | Performed by: RADIOLOGY

## 2023-06-03 RX ADMIN — CALCIUM ACETATE 1334 MG: 667 CAPSULE ORAL at 17:20

## 2023-06-03 RX ADMIN — AMLODIPINE BESYLATE 5 MG: 5 TABLET ORAL at 09:23

## 2023-06-03 RX ADMIN — SERTRALINE HYDROCHLORIDE 100 MG: 100 TABLET ORAL at 09:23

## 2023-06-03 RX ADMIN — GABAPENTIN 300 MG: 300 CAPSULE ORAL at 21:40

## 2023-06-03 RX ADMIN — PANTOPRAZOLE SODIUM 20 MG: 20 TABLET, DELAYED RELEASE ORAL at 17:20

## 2023-06-03 RX ADMIN — CALCIUM ACETATE 1334 MG: 667 CAPSULE ORAL at 09:23

## 2023-06-03 RX ADMIN — Medication 5 MG: at 21:40

## 2023-06-03 RX ADMIN — PANTOPRAZOLE SODIUM 20 MG: 20 TABLET, DELAYED RELEASE ORAL at 09:23

## 2023-06-03 RX ADMIN — ATORVASTATIN CALCIUM 20 MG: 20 TABLET, FILM COATED ORAL at 09:23

## 2023-06-03 RX ADMIN — LABETALOL HYDROCHLORIDE 100 MG: 100 TABLET ORAL at 22:32

## 2023-06-03 RX ADMIN — LOSARTAN POTASSIUM 50 MG: 50 TABLET, FILM COATED ORAL at 09:23

## 2023-06-03 RX ADMIN — CALCIUM ACETATE 1334 MG: 667 CAPSULE ORAL at 12:58

## 2023-06-03 ASSESSMENT — ACTIVITIES OF DAILY LIVING (ADL)
ADLS_ACUITY_SCORE: 45
ADLS_ACUITY_SCORE: 45
ADLS_ACUITY_SCORE: 43
ADLS_ACUITY_SCORE: 45

## 2023-06-03 NOTE — PROGRESS NOTES
Hematology Progress Note   Date of Service: 06/03/2023    Patient: Rachele Martínez  MRN: [id number]  Admission Date: 6/1/2023  Hospital Day # Hospital Day: 3  Primary Outpatient Hematologist: Dr. Cari Dominguez     Reason for Consult: Please consider anti-angiogenic agent (bevacizumab) for angioectasias failing multiple treatment modalities     Assessment & Plan:   Rachele Martínez is a 83 year old female with recurrent GI bleeds since 2015 2/2 upper GI and cecal angioectasias, on monthly octreotide injections since 2019, chronic anemia in ESRD and GIB, hemoptysis 5/2023, treated hep c, compensated cirrhosis, ESRD on dialysis, PAD, who was [facility] for lethargy/weakness and was found to have acute on chronic anemia (Hb 5.6 from prior hb ~7), c/f GIB although not overt.  Vit B12 and iron adequate.  She is on supplemental iron and epo three times per week w/ dialysis.     Octreotide seemed to control GI bleeding until 1/2023, and she is s/p most recent endoscopic tx 3/2023.  She was seen by GI team this admission who feel she has failed medical/conservative management and no further GI interventions are recommended.  During last admission 5/25 she also had some hemoptysis, CT chest negative, per daughter may have had several more episodes at [facility]    #Multifactorial anemia  -ESRD on dialysis   -Epoetin Duane (Epogen) 5200 units IVP 3X Week During Dialysis    -Iron Sucrose (Venofer) 100 mg IVP 3X Week During Dialysis  -GIB   -?hemoptysis   - Mental status change and generalized weakness, still persistent despite improved hgb, but improving mental status today.    Recommendations:   -Continue to monitor hb and transfusion PRN Hgb <7, will need a schedule for this as an outpatient.  -Continue epo and iron infusions with dialysis three times per week   -Would not start antiangiogenesis agent during admission. Will need to coordinate with outpatient GI doctor regarding whether another scope is needed,  and their opinion for consideration for anti-angiogenic agents. She does not have HHT as she does not have significant epistaxis nor does she have a family history, however there may be evidence for using thalidomide even for GI angioectasias without a diagnosis of HHT (bevacizumab is less studied outside of HHT).   - Refer to hematology as outpatient for further discussion.    Hematology will continue to follow. Please don't hesitate to contact the Fellow On-Call with questions.    On the date of service, 06/03/2023, I spent 50 minutes on the patient unit personally reviewing medical records and medications, reviewing vital signs, labs, and imaging results as summarized above, discussing the patient's case on rounds with the fellow, obtaining a history from the patient, performing a physical exam, counseling and educating the patient on the diagnosis and treatment, communication with the primary team, evaluating a potentially life or organ threatening problem, intensively monitoring treatments with high risk of toxicity, coordinating care and documenting in the electronic medical record.    Thank you for allowing me to participate in the care of this patient. Please do not hesitate to contact me if there are any concerns or questions.     Alejandro Ward MD   of Medicine  Classical Hematology and Blood and Marrow Transplantation  Division of Hematology, Oncology, and Transplantation  [facility]      Interval History:  Mental status improved today, she is talking a lot more than yesterday and she is eating well. Had a possible choking episode just now. Still cant walk.    History of Present Illness:    Rachele Martínez is a 83 year old femalewith recurrent GI bleeds 2/2 upper GI and cecal angioectasias, on monthly octreotide injections, chronic anemia in ESRD and GIB, treated hep c, compensated cirrhosis without varices, ascites/HE, ESRD on dialysis, PAD, who was admitted 6/1/23 for  significant, symptomatic anemia (Hb 5.6).      Regarding her chronic GIB, it seems Octreotide had been controlling her GIB until Jan 2023 when she was admitted to [facility] with symptomatic  anemia, Hb was 5.6, she was evaluated by [facility], underwent EGD which was negative for any active source of bleeding. Following this, VCE was ordered by her primary gastroenterologist  in 2/2023 and this revealed actively bleeding small bowel AV malformations. On 3/9/23 she underwent device assisted upper endoscopy and APC was performd on AVMs in the stomach, duodenum and jejunum.     She had admission recently from 5/12-5/25/23 at [facility] for weakness and it was noted she had some intermittent hemoptysis with negative CT chest.  Pulmonary did not advise bronch.     She was at TCU when she became weak and labs showed hb 5.7.  She has received 2 units pRBC and had adequate increase in hb to 9.  She denies any overt bleeding.  Her daughter states she had a few more episodes of coughing blood but unable to quantify.  No new medications.  She also reports he mother is more confused.          Hematologic History:  #Anemia of chronic blood loss and CKD on dialysis   -Epoetin Duane (Epogen) 5200 units IVP 3X Week During Dialysis   -Iron Sucrose (Venofer) 100 mg IVP 3X Week During Dialysis    --11/12/2015: Seen by Dr. Dominguez for ongoing severe anemia   -The patient was first noted to have a GI bleed with overt melena in 04/2015.  At that time, she had an EGD, which showed ulcerations, and on biopsy had severe chronic active gastritis, and the H. pylori staining was positive.  She was transfused and placed on Slow Fe tablets 3 times a day.  She came back a couple of days later, again with melena.  She was started on omeprazole with this, but was never placed on antibiotics to treat the H. pylori, from what I can see, and from what she knows.  She continued to have problems with black stools and anemia, and needed  another red blood cell transfusion in September.  She underwent EGD and colonoscopy on 09/04/2015.  The EGD showed nonbleeding angioectasias, which were treated with APC.  The colonoscopy showed adenomas, which were biopsied with pathology results of tubular adenomas x3, and a random biopsy showed no abnormality.  Since this time, she continued to have melena, although she is not sure what to think since she is taking the Slow Fe, which she takes 2 or 3 times a day.   -Dr. Dominguez concluded this is not a primary hematology problem but a GI problem and recommended IV iron, given that PO iron would be hard to absorb in her case.  No signs of hemolysis.  Epo a little low for degree of anemia but had adequate retic count so able to make red cells, no epo replacement indicated   1/14/2016: Visit with Dr. Dominguez (last visit noted)  -Given IV iron, but not effective in raising hgb due to ongoing bleeding.   I will arrange for 2 units PRBCs within the next few days, since she is so symptomatic.  Her retic count is elevated, so her bone marrow is clearly capable of making RBCs, just can't keep up. She does not have a primary hematology poblem, but has Nalini bleeding problem. I am referring her back to GI.   1/6/23-1/9/23: Admitted to [facility] with acute on chronic anemia (Hb dropped from 10.1 12/26/22 to 5.6), suspected GI bleed.  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  Underwent transfusion x 2 units, push EDG that did not show source of bleeding on 1/9.  She was started on protonix and continued on octreotide monthly.   3/1/23: Video visit with primary GI, Dr. Ankit Baires   3/9/23 she underwent device assisted upper endoscopy and APC was performd on AVMs in the stomach, duodenum and jejunum.     Review of Systems: Pertinent positive and negative systems described in HPI; the remainder of the 14 systems are negative    Physical Exam:    BP (!) 142/44 (BP Location: Left arm)   Pulse 65   Temp 98.2   F (36.8  C) (Oral)   Resp 18   SpO2 94%   Gen: Appears chronically ill, frail, NAD, daughter at bedside   HEENT: NC/AT   CV: Normal rate, regular rhythm. No m/r/g  Pulm: CTAB, no wheezing, normal work of breathing.  On RA   Abd: Soft, nt/nd, no rebound/guarding  Ext: Warm and well perfused. No lower extremity edema  Skin: No rash, cyanosis or petechial lesion  Neuro: Alert able to answer some questions appropriately     Labs & Studies: I personally reviewed the following studies:  ROUTINE LABS (Last four results):  CMP  Recent Labs   Lab 06/03/23  0806 06/01/23  1546 06/01/23  0639   * 138 135*   POTASSIUM 4.6 4.0 4.3   CHLORIDE 91* 99 96*   CO2 25 26 27   ANIONGAP 15 13 12   * 135* 97   BUN 19.9 31.4* 28.5*   CR 6.05* 7.64* 6.79*   GFRESTIMATED 6* 5* 6*   BELGICA 8.5* 9.1 9.1   MAG 1.8 2.0  --    PHOS 3.9  --   --    PROTTOTAL  --  7.0 6.2*   ALBUMIN  --  3.4* 3.2*   BILITOTAL  --  0.4 0.3   ALKPHOS  --  125* 128*   AST  --  22 27   ALT  --  7* 10     CBC  Recent Labs   Lab 06/03/23  0806 06/02/23  0942 06/01/23  2341 06/01/23  1546 06/01/23  0639   WBC 10.9 11.6*  --  8.5 9.1   RBC 3.10* 3.16*  --  1.99* 1.84*   HGB 9.0* 9.2* 7.0* 6.0* 5.6*   HCT 29.4* 29.0*  --  20.4* 19.1*   MCV 95 92  --  103* 104*   MCH 29.0 29.1  --  30.2 30.4   MCHC 30.6* 31.7  --  29.4* 29.3*   RDW 20.3* 21.0*  --  17.0* 16.7*    378  --  372 386     INR  Recent Labs   Lab 06/01/23  1546   INR 1.29*

## 2023-06-03 NOTE — PLAN OF CARE
Goal Outcome Evaluation:  Patient alert, mentation seems to be improving since yesterday. Ax2 with walker and GB when standing to bedside commode. Does not make urine as patient is on HD. LBM 6/3.  PT/OT consult would be great. Denies pain. Went for CT scan today.         Patient did choke on her food today during lunch- ensure patient is sitting up all the way and she may need assistance with meals.

## 2023-06-03 NOTE — PROGRESS NOTES
[facility]    Medicine Progress Note - Hospitalist Service, GOLD TEAM 8    Date of Admission:  6/1/2023    Assessment & Plan      Rachele Martínez is an 83 year old woman admitted on 6/1/2023. She has a history of ESRD on MWF dialysis, mild dementia, hypertension, hyperlipidemia, anemia and recurrent GI bleeds secondary to AVMs, chronic hep C, cirrhosis and recent admission for weakness 5/12-5/25 who is admitted with recurrent anemia.    Acute on chronic anemia, suspected GI bleed secondary to known AVMs - Hemoglobin 6 on admission drown from 7.2 on discharge 5/25.  No melena, epistaxis or hematemesis, although she had two small episodes of hemoptysis while in the TCU this past week. Just s/p EGD 3/9 with argon plasma coagulation treatment of multiple angiectasias in the stomach, duodenum and jejunum. Received blood transfusion in ED. Hemoglobin now increased to 9. GI consulted, no indication for immediate intervention. Has failed medical and conservative management including multiple endoscopic interventions and medications.   - Monitor hemoglobin daily, no overt GI bleed  - OK to continue monthly octreotide injections  - Hematology consulted to consider Bevacizumab; thalidomide may rather be an option but not while inpatient. Bevacizumab only studied in HHT.    -Epoetin Duane (Epogen) 5200 units IVP 3X Week During Dialysis   -Iron Sucrose (Venofer) 100 mg IVP 3X Week During Dialysis  - Will need outpt GI team to follow routine Hb monitoring and transfusion; defer to outpt team timing/need for repeat endoscopy  - Will need further discussion with GI to understand to what extent this is truly an end-stage process, what risks with further endoscopy  - Even if not end-stage angioectasia in the short-term, may still benefit from early involvement of palliative medicine; consider consultation vs outpt referral. Did not ask directly, but family and patient certainly act with  a stance of restorative goal of care.  - Likely return to TCU 6/4 or 6/5 when outpt plan can be determined    Hemoptysis  No current hemoptysis. Chest x-ray without definite consolidation or opacity. If recurs, would get high resolution CT chest without contrast.    Acute toxic metabolic encephalopathy (uremia?), improving  Dementia    Leukocytosis, resolved  Family concerned that patient cannot be left alone safely. Has been more confused lately. Chest x-ray without pneumonia. No abdominal symptoms, no rashes. Not making urine so unable to get UA. Acute part of AMS seems to correlate with recently missed/reduced hemodialysis.  - Delirium precautions  - Schedule melatonin HS  - Noncon CTH - pending    History of ESRD  Hyponatremia   - On MWF dialysis  - Nephrology following for inpatient dialysis  - Continue home Phoslo  - Monitor sodium    History of hypertension  - Continue home amlodipine, losartan    History of depression  - Continue home sertraline    History of hyperlipidemia  - Continue home atorvastatin    History of opiate abuse - Family also concerned patient may ask for opiate pain medication and would like us to be cautious.         Diet: Regular Diet Adult    DVT Prophylaxis: Pneumatic Compression Devices  Rodriguez Catheter: Not present  Lines: None     Cardiac Monitoring: None  Code Status: Full Code      Clinically Significant Risk Factors              # Hypoalbuminemia: Lowest albumin = 3.2 g/dL at 6/1/2023  6:39 AM, will monitor as appropriate  # Coagulation Defect: INR = 1.29 (Ref range: 0.85 - 1.15) and/or PTT = 34 Seconds (Ref range: 22 - 38 Seconds), will monitor for bleeding    # Hypertension: Noted on problem list                 Disposition Plan     Expected Discharge Date: 06/03/2023                  Duane Mccormick DO  Hospitalist Service, GOLD TEAM [address] [place] [address]  Securely message with [employer] (more info)  Text page via [facility] Paging/Directory   See  "signed in provider for up to date coverage information  ______________________________________________________________________    Interval History   Feeling better today. Right sided neck and shoulder pain has improved but possibly starting on left side. Denies abdominal pain. Remembers anemia as the reason for hospitalization only with prompting. Denies headache. No recent falls.    Daughter Charla at bedside and helps with history. Reports that confusion has significantly improved. A staffperson at the TCU reported he gave Rachele \"an injection for her enlarged heart\" which is not a problem or med Rachele has.  Does relay that her mother missed one dialysis session and had two shortened (2-hour I believe) sessions in this past week or so.    Physical Exam   Vital Signs: Temp: 97  F (36.1  C) Temp src: Oral BP: (!) 156/58 Pulse: 71   Resp: 18 SpO2: 94 % O2 Device: None (Room air)    Weight: 0 lbs 0 oz  Gen: Sitting comfortably in bed, falls asleep intermittently, no distress   Abd: Soft, nontender, nondistended  Neuro: Answers questions appropriately. Aware that she was previously confused. Oriented to self and place but not circumstances. +5/5  and gross UE flexion/extension biltarally. Moves BLE spontaneously.  MSK: Mild tenderness at right trapezius and lateral neck. Normal painless passive ROM b/l shoulder.    Medical Decision Making       50 MINUTES SPENT BY ME on the date of service doing chart review, history, exam, documentation & further activities per the note.      Data       "

## 2023-06-03 NOTE — PLAN OF CARE
Goal Outcome Evaluation:      Plan of Care Reviewed With: patient    Overall Patient Progress: improving    Outcome Evaluation: Alert and oriented . Denies pain. Intermittently disoriented to place and time . Forgetful . Bed alarm in place Stable  On room air. Monitoring hgb .

## 2023-06-03 NOTE — PLAN OF CARE
"Admission/Transfer from: [facility]  2 RN skin assessment completed. Yes w/ Laura  Significant findings include: R shin scabs ( per pt, \" I have been picking on them\")  WO Nurse Consult Ordered? No                        "

## 2023-06-04 ENCOUNTER — APPOINTMENT (OUTPATIENT)
Dept: PHYSICAL THERAPY | Facility: CLINIC | Age: 83
DRG: 377 | End: 2023-06-04
Attending: NURSE PRACTITIONER
Payer: MEDICARE

## 2023-06-04 ENCOUNTER — APPOINTMENT (OUTPATIENT)
Dept: OCCUPATIONAL THERAPY | Facility: CLINIC | Age: 83
DRG: 377 | End: 2023-06-04
Attending: NURSE PRACTITIONER
Payer: MEDICARE

## 2023-06-04 LAB
ANION GAP SERPL CALCULATED.3IONS-SCNC: 16 MMOL/L (ref 7–15)
BUN SERPL-MCNC: 36.7 MG/DL (ref 8–23)
CALCIUM SERPL-MCNC: 8.6 MG/DL (ref 8.8–10.2)
CHLORIDE SERPL-SCNC: 92 MMOL/L (ref 98–107)
CREAT SERPL-MCNC: 8.29 MG/DL (ref 0.51–0.95)
DEPRECATED HCO3 PLAS-SCNC: 22 MMOL/L (ref 22–29)
ERYTHROCYTE [DISTWIDTH] IN BLOOD BY AUTOMATED COUNT: 18.9 % (ref 10–15)
GFR SERPL CREATININE-BSD FRML MDRD: 4 ML/MIN/1.73M2
GLUCOSE SERPL-MCNC: 222 MG/DL (ref 70–99)
HCT VFR BLD AUTO: 30.3 % (ref 35–47)
HGB BLD-MCNC: 9 G/DL (ref 11.7–15.7)
MCH RBC QN AUTO: 28.7 PG (ref 26.5–33)
MCHC RBC AUTO-ENTMCNC: 29.7 G/DL (ref 31.5–36.5)
MCV RBC AUTO: 97 FL (ref 78–100)
PLATELET # BLD AUTO: 323 10E3/UL (ref 150–450)
POTASSIUM SERPL-SCNC: 4.5 MMOL/L (ref 3.4–5.3)
RBC # BLD AUTO: 3.14 10E6/UL (ref 3.8–5.2)
SODIUM SERPL-SCNC: 130 MMOL/L (ref 136–145)
WBC # BLD AUTO: 9.4 10E3/UL (ref 4–11)

## 2023-06-04 PROCEDURE — 97110 THERAPEUTIC EXERCISES: CPT | Mod: GP

## 2023-06-04 PROCEDURE — 250N000013 HC RX MED GY IP 250 OP 250 PS 637: Performed by: INTERNAL MEDICINE

## 2023-06-04 PROCEDURE — 99232 SBSQ HOSP IP/OBS MODERATE 35: CPT | Performed by: STUDENT IN AN ORGANIZED HEALTH CARE EDUCATION/TRAINING PROGRAM

## 2023-06-04 PROCEDURE — 82310 ASSAY OF CALCIUM: CPT | Performed by: STUDENT IN AN ORGANIZED HEALTH CARE EDUCATION/TRAINING PROGRAM

## 2023-06-04 PROCEDURE — 250N000013 HC RX MED GY IP 250 OP 250 PS 637: Performed by: NURSE PRACTITIONER

## 2023-06-04 PROCEDURE — 250N000013 HC RX MED GY IP 250 OP 250 PS 637: Performed by: STUDENT IN AN ORGANIZED HEALTH CARE EDUCATION/TRAINING PROGRAM

## 2023-06-04 PROCEDURE — 97535 SELF CARE MNGMENT TRAINING: CPT | Mod: GO

## 2023-06-04 PROCEDURE — 97166 OT EVAL MOD COMPLEX 45 MIN: CPT | Mod: GO

## 2023-06-04 PROCEDURE — 36415 COLL VENOUS BLD VENIPUNCTURE: CPT | Performed by: STUDENT IN AN ORGANIZED HEALTH CARE EDUCATION/TRAINING PROGRAM

## 2023-06-04 PROCEDURE — 97161 PT EVAL LOW COMPLEX 20 MIN: CPT | Mod: GP

## 2023-06-04 PROCEDURE — 85027 COMPLETE CBC AUTOMATED: CPT | Performed by: STUDENT IN AN ORGANIZED HEALTH CARE EDUCATION/TRAINING PROGRAM

## 2023-06-04 PROCEDURE — 97530 THERAPEUTIC ACTIVITIES: CPT | Mod: GO

## 2023-06-04 PROCEDURE — 120N000002 HC R&B MED SURG/OB UMMC

## 2023-06-04 PROCEDURE — 99233 SBSQ HOSP IP/OBS HIGH 50: CPT | Performed by: INTERNAL MEDICINE

## 2023-06-04 RX ORDER — LOSARTAN POTASSIUM 50 MG/1
100 TABLET ORAL DAILY
Status: DISCONTINUED | OUTPATIENT
Start: 2023-06-05 | End: 2023-06-05 | Stop reason: HOSPADM

## 2023-06-04 RX ORDER — LOSARTAN POTASSIUM 50 MG/1
100 TABLET ORAL ONCE
Status: COMPLETED | OUTPATIENT
Start: 2023-06-04 | End: 2023-06-04

## 2023-06-04 RX ADMIN — SERTRALINE HYDROCHLORIDE 100 MG: 100 TABLET ORAL at 08:07

## 2023-06-04 RX ADMIN — CALCIUM ACETATE 1334 MG: 667 CAPSULE ORAL at 13:10

## 2023-06-04 RX ADMIN — GABAPENTIN 300 MG: 300 CAPSULE ORAL at 19:59

## 2023-06-04 RX ADMIN — CALCIUM ACETATE 1334 MG: 667 CAPSULE ORAL at 08:07

## 2023-06-04 RX ADMIN — CALCIUM ACETATE 1334 MG: 667 CAPSULE ORAL at 17:37

## 2023-06-04 RX ADMIN — PANTOPRAZOLE SODIUM 20 MG: 20 TABLET, DELAYED RELEASE ORAL at 16:49

## 2023-06-04 RX ADMIN — ATORVASTATIN CALCIUM 20 MG: 20 TABLET, FILM COATED ORAL at 08:07

## 2023-06-04 RX ADMIN — AMLODIPINE BESYLATE 5 MG: 5 TABLET ORAL at 08:08

## 2023-06-04 RX ADMIN — LOSARTAN POTASSIUM 100 MG: 50 TABLET, FILM COATED ORAL at 03:02

## 2023-06-04 RX ADMIN — LABETALOL HYDROCHLORIDE 100 MG: 100 TABLET ORAL at 22:32

## 2023-06-04 RX ADMIN — Medication 5 MG: at 21:25

## 2023-06-04 RX ADMIN — PANTOPRAZOLE SODIUM 20 MG: 20 TABLET, DELAYED RELEASE ORAL at 08:07

## 2023-06-04 ASSESSMENT — ACTIVITIES OF DAILY LIVING (ADL)
ADLS_ACUITY_SCORE: 45
ADLS_ACUITY_SCORE: 48
ADLS_ACUITY_SCORE: 48
ADLS_ACUITY_SCORE: 45
ADLS_ACUITY_SCORE: 45
ADLS_ACUITY_SCORE: 48
ADLS_ACUITY_SCORE: 45

## 2023-06-04 NOTE — PROGRESS NOTES
[facility]    Medicine Progress Note - Hospitalist Service, GOLD TEAM 8    Date of Admission:  6/1/2023    Assessment & Plan      Rachele Martínez is an 83 year old woman admitted on 6/1/2023. She has a history of ESRD on MWF dialysis, mild dementia, hypertension, hyperlipidemia, anemia and recurrent GI bleeds secondary to AVMs, chronic hep C, cirrhosis and recent admission for weakness 5/12-5/25 who is admitted with recurrent anemia.    Acute on chronic anemia, suspected GI bleed secondary to known AVMs - Hemoglobin 6 on admission drown from 7.2 on discharge 5/25.  No melena, epistaxis or hematemesis, although she had two small episodes of hemoptysis while in the TCU this past week. Just s/p EGD 3/9 with argon plasma coagulation treatment of multiple angiectasias in the stomach, duodenum and jejunum. Received blood transfusion in ED. Hemoglobin now increased to 9. GI consulted, no indication for immediate intervention. Has undergone multiple endoscopic interventions and medications.   - Monitor hemoglobin while admitted, no overt GI bleed  - OK to continue monthly octreotide injections  - Hematology consulted to consider Bevacizumab; thalidomide may rather be an option but not while inpatient. Bevacizumab only studied in HHT.    -Epoetin Duane (Epogen) 5200 units IVP 3X Week During Dialysis   -Iron Sucrose (Venofer) 100 mg IVP 3X Week During Dialysis  - Repeat Hb one week after discharge  - Discussed with outpatient GI/endoscopist Dr. Baires; he will arrange outpatient follow-up and repeat capsule endoscopy after discharge  - PT/OT consulted - continue to recommend TCU (family report she had been slated for discharge to home 6/6, but functional decline observed in the past week)  - Likely return to TCU 6/5 after HD    Acute toxic metabolic encephalopathy (uremia?), improving  Dementia    Leukocytosis, resolved  Family concerned that patient cannot be left alone safely.  Has been more confused lately at TCU. No evidence for infection or metabolic derangement. Acute part of AMS seems to correlate with recently missed/reduced hemodialysis, possibly suggesting decreased clearance as cause.  - Delirium precautions  - Schedule melatonin HS  - Noncon CTH - no acute pathology    Hemoptysis, reported  No current hemoptysis. Chest x-ray without definite consolidation or opacity. If recurs, would get high resolution CT chest without contrast.    History of ESRD  Hyponatremia   - On MWF dialysis; tolerated full run with 2.7L UF 6/2/23  - Nephrology following for inpatient dialysis  - Continue home Phoslo  - Monitor sodium    History of hypertension  - Continue home amlodipine, losartan    History of depression  - Continue home sertraline    History of hyperlipidemia  - Continue home atorvastatin    History of opiate abuse  Family also concerned patient may ask for opiate pain medication and would like us to be cautious. So far no such request.     Diet: Regular Diet Adult    DVT Prophylaxis: Pneumatic Compression Devices; no pharmacologic due to bleeding  Rodriguez Catheter: Not present  Lines: None     Cardiac Monitoring: None  Code Status: Full Code      Clinically Significant Risk Factors              # Hypoalbuminemia: Lowest albumin = 3.2 g/dL at 6/1/2023  6:39 AM, will monitor as appropriate     # Hypertension: Noted on problem list                 Disposition Plan      Expected Discharge Date: 06/05/2023, 12:00 PM      Discharge Comments: After HD Monday 6/5, return to TCU          Duane Mccormick DO  Hospitalist Service, GOLD TEAM [address] [place] [address]  Securely message with [employer] (more info)  Text page via [facility] Paging/Directory   See signed in provider for up to date coverage information  ______________________________________________________________________    Interval History   Feeling better today. No neck/shoulder pain today. Denies abdominal pain.  Remembers anemia as the reason for hospitalization only with prompting. Denies headache. No recent falls.    Daughter Charla at bedside, gave updates.    Discussed with SW and with TCU, HD need - anticipate unable to return today, probably tomorrow for coordination need.    Physical Exam   Vital Signs: Temp: 98.3  F (36.8  C) Temp src: Oral BP: (!) 151/54 Pulse: 74   Resp: 17 SpO2: 97 % O2 Device: None (Room air)    Weight: 0 lbs 0 oz  Gen: Sitting comfortably in bed, falls asleep intermittently, no distress   Abd: Soft, nontender, nondistended.  Resp: Lungs clear to ausc bilaterally, relaxed effort.  CV: Regular rhythm, normal rate.  Neuro: Answers questions appropriately. Aware that she was previously confused. Oriented to self and place but only partly to circumstances. Moves extremities symmetrically. Speech is clear.    Medical Decision Making       50 MINUTES SPENT BY ME on the date of service doing chart review, history, exam, documentation & further activities per the note.      Data

## 2023-06-04 NOTE — PROGRESS NOTES
6/4/2023  2:29 AM    HOSPITAL MEDICINE NOTE:  Paged for elevated BP - has been elevated all day yesterday.  Plan:  I increased losartan (Cozaar ) dose to 100mg QD starting now.    Delonte Palmer MD  [phone] pager

## 2023-06-04 NOTE — PROGRESS NOTES
Occupational Therapy Evaluation 06/04/23 0840   Appointment Info   Signing Clinician's Name / Credentials (OT) Estelle Porter, OTR/L   Living Environment   People in Home alone   Current Living Arrangements independent living facility   Home Accessibility no concerns   Transportation Anticipated family or friend will provide   Living Environment Comments Pt reports she lives in ILF alone, has WIS with GB and shower chair. Has raised height toilet. Pt's daughter is supportive and able to assist part-time.   Self-Care   Usual Activity Tolerance moderate   Current Activity Tolerance fair   Equipment Currently Used at Home walker, rolling;shower chair;grab bar, tub/shower   Fall history within last six months no  (Pt reports she has not falled in the last 6 months, however per chart pt did experience 1 fall.)   Activity/Exercise/Self-Care Comment Pt reports she is Mod IND in ADLs at baseline. She ambulates with a 4WW, bathes with a shower chair and GB. Pt recently at [facility] where she was working on ambulation. Pt is planning to move into [facility] when able to.   Instrumental Activities of Daily Living (IADL)   IADL Comments Pt reports her daughter typically assists with IADLs including cooking, med mgmt, and driving.   General Information   Onset of Illness/Injury or Date of Surgery 06/01/23   Referring Physician Jayro Marinelli APRN CNP   Patient/Family Therapy Goal Statement (OT) Be able to walk again, go home.   Additional Occupational Profile Info/Pertinent History of Current Problem Rachele Martínez is an 83 year old woman admitted on 6/1/2023. She has a history of ESRD on MWF dialysis, mild dementia, hypertension, hyperlipidemia, anemia and recurrent GI bleeds secondary to AVMs, chronic hep C, cirrhosis and recent admission for weakness 5/12-5/25 who is admitted with recurrent anemia.   Existing Precautions/Restrictions fall   Limitations/Impairments safety/cognitive;sensory   Cognitive Status Examination    Orientation Status orientation to person, place and time   Affect/Mental Status (Cognitive) low arousal/lethargic   Follows Commands WFL   Memory Deficit moderate deficit;short-term memory;working memory   Attention Deficit moderate deficit;arousal/alertness;concentration;requires cues/redirection to task   Executive Function Deficit moderate deficit;information processing;organization/sequencing;planning/decision-making;problem-solving/reasoning   Cognitive Status Comments Pt presents with moderate cognitive deficits this date. Pt endorses recent cognitive deficits, especially in memory, which have worsened. Pt with low arousal this date, needed significant cueing to maintain alertness.   Visual Perception   Visual Impairment/Limitations corrective lenses full-time;diplopia;blurry vision  (Pt reports recent intermittent blurry vision and double vision.)   Sensory   Sensory Quick Adds bilateral LE;bilateral UE   Bilateral UE Sensory Assessment impaired   Bilateral LE Sensory Assessment impaired   Sensory Comments Pt with n/t in B hands and feet.   Pain Assessment   Patient Currently in Pain Yes, see Vital Sign flowsheet  (Pain in BLE which worsens with movement.)   Range of Motion Comprehensive   General Range of Motion bilateral upper extremity ROM WFL   Strength Comprehensive (MMT)   General Manual Muscle Testing (MMT) Assessment other (see comments)   Comment, General Manual Muscle Testing (MMT) Assessment Generalized weakness   Coordination   Fine Motor Coordination B FMC impaired   Bed Mobility   Bed Mobility supine-sit   Supine-Sit [place] (Bed Mobility) contact guard   Assistive Device (Bed Mobility) bed rails   Transfers   Transfers sit-stand transfer   Sit-Stand Transfer   Sit-Stand [place] (Transfers) minimum assist (75% patient effort);2 person assist;verbal cues   Assistive Device (Sit-Stand Transfers) walker, front-wheeled   Balance   Balance Assessment sitting balance: static;sitting  balance: dynamic;sit to stand balance;standing balance: static;standing balance: dynamic   Balance Comments Pt with impaired seated and standing balance. Pt required SBA-Min A to maintain seated balance.   Activities of Daily Living   BADL Assessment/Intervention bathing;lower body dressing;grooming;toileting   Bathing Assessment/Intervention   [place] Level (Bathing) moderate assist (50% patient effort);set up;verbal cues   Comment, (Bathing) Per clinical judgement   Assistive Devices (Bathing) shower chair   Lower Body Dressing Assessment/Training   [place] Level (Lower Body Dressing) minimum assist (75% patient effort);set up;verbal cues   Position (Lower Body Dressing) edge of bed sitting   Grooming Assessment/Training   [place] Level (Grooming) contact guard assist;set up   Position (Grooming) edge of bed sitting   Toileting   Assistive Devices (Toileting) commode chair   Comment, (Toileting) Per clinical judgement   [place] Level (Toileting) minimum assist (75% patient effort);set up;verbal cues   Clinical Impression   Criteria for Skilled Therapeutic Interventions Met (OT) Yes, treatment indicated   OT Diagnosis Decreased ADL IND and safety   OT Problem List-Impairments impacting ADL problems related to;activity tolerance impaired;balance;cognition;coordination;mobility;sensation;strength;pain;vision   Assessment of Occupational Performance 5 or more Performance Deficits   Identified Performance Deficits Dressing, bathing, toileting, grooming, functional transfers, bed mobility   Planned Therapy Interventions (OT) ADL retraining;balance training;cognition;fine motor coordination training;strengthening;transfer training;visual perception;progressive activity/exercise   Clinical Decision Making Complexity (OT) moderate complexity   Risk & Benefits of therapy have been explained evaluation/treatment results reviewed;care plan/treatment goals reviewed;current/potential barriers  reviewed;participants included;patient   Clinical Impression Comments Pt presents to IP OT with decreased ADL IND and safety. Pt would benefit from OT services to progress functional performance and support safe d/c planning.   OT Goals   Therapy Frequency (OT) 5 times/wk   OT Predicted Duration/Target Date for Goal Attainment 06/25/23   OT Goals Hygiene/Grooming;Lower Body Dressing;Lower Body Bathing;Toilet Transfer/Toileting;Cognition;Transfers   OT: Hygiene/Grooming supervision/stand-by assist;while standing   OT: Lower Body Dressing Supervision/stand-by assist   OT: Lower Body Bathing Supervision/stand-by assist   OT: Transfer Supervision/stand-by assist  (Shower transfer to shower chair)   OT: Toilet Transfer/Toileting Supervision/stand-by assist;toilet transfer;cleaning and garment management   OT: Cognitive Patient/caregiver will verbalize understanding of cognitive assessment results/recommendations as needed for safe discharge planning   Interventions   Interventions Quick Adds Therapeutic Activity;Self-Care/Home Management   OT Discharge Planning   OT Plan Monitor cognition, EOB ADLs, functional transfers to BSC and chair, UB strengthening   OT Discharge Recommendation (DC Rec) Transitional Care Facility   OT Rationale for DC Rec Pt is currently performing well below functional baseline, limited by cognition, pain, coordination, sensation, strength, and activity tolerance. Pt currently requiring A x 2 for mobility and A x 1-2 for ADLs. Recommend TCU at d/c in order to progress functional IND and safety.   OT Brief overview of current status Min A x 2 with FWW and verbal cueing for pivot transfer, lethargic

## 2023-06-04 NOTE — PROGRESS NOTES
06/04/23 0914   Appointment Info   Signing Clinician's Name / Credentials (PT) Felicity Garcia DPT   Rehab Comments (PT) cognition, buckling knees   Living Environment   People in Home alone   Current Living Arrangements independent living facility   Home Accessibility no concerns   Transportation Anticipated family or friend will provide   Living Environment Comments Pt lives in ILF with walk in shower. Expressed interest in transitioning to [facility]   Self-Care   Usual Activity Tolerance good   Current Activity Tolerance poor   Regular Exercise No   Equipment Currently Used at Home walker, rolling   Fall history within last six months no  (Pt indicates no, chart review states 1 prior to previous admission)   Number of times patient has fallen within last six months 1   Activity/Exercise/Self-Care Comment Pt reports previously utilizing 4WW for upright mobility [place], but recently rehabbing at TCU. Only able to ambulate household distances per report. Daughter assists with IADLs   General Information   Onset of Illness/Injury or Date of Surgery 06/01/23   Referring Physician Jayro Marinelli, BAILEY CNP   Patient/Family Therapy Goals Statement (PT) To walk and be able to go home   Pertinent History of Current Problem (include personal factors and/or comorbidities that impact the POC) Rachele Martínez is an 83 year old woman admitted on 6/1/2023. She has a history of ESRD on MWF dialysis, mild dementia, hypertension, hyperlipidemia, anemia and recurrent GI bleeds secondary to AVMs, chronic hep C, cirrhosis and recent admission for weakness 5/12-5/25 who is admitted with recurrent anemia.   Existing Precautions/Restrictions fall   Cognition   Affect/Mental Status (Cognition) low arousal/lethargic   Orientation Status (Cognition) oriented to;person;time   Follows Commands (Cognition) follows one-step commands;follows two-step commands;75-90% accuracy;50-74% accuracy   Behavioral Issues overwhelmed easily   Safety  Deficit (Cognition) at risk behavior observed;insight into deficits/self-awareness;problem-solving;judgment;safety precautions awareness   Pain Assessment   Patient Currently in Pain Yes, see Vital Sign flowsheet  (BLEs per baseline neuropathy)   Integumentary/Edema   Integumentary/Edema no deficits were identifed   Posture    Posture Protracted shoulders;Forward head position;Kyphosis   Range of Motion (ROM)   ROM Comment BLE WFL   Strength (Manual Muscle Testing)   Strength (Manual Muscle Testing) Deficits observed during functional mobility   Strength Comments Generalized weakness in BLEs with upright mobility, knee buckling with functional movement   Bed Mobility   Comment, (Bed Mobility) Sup>sit with CGA   Transfers   Comment, (Transfers) STS with [place] to FWW   Gait/Stairs (Locomotion)   Comment, (Gait/Stairs) Amb with modA and FWW x 12'   Balance   Sitting Balance: Static fair balance   Sitting Balance: Dynamic poor balance   Standing Balance: Static fair balance   Standing Balance: Dynamic poor balance   Balance Quick Add Sitting balance: Static;Sitting balance: dynamic;Standing balance: static;Standing balance: dynamic   Sensory Examination   Sensory Perception Comments BLE baseline numbness/tingling in stocking glove pattern   Coordination   Coordination no deficits were identified   Muscle Tone   Muscle Tone no deficits were identified   Clinical Impression   Criteria for Skilled Therapeutic Intervention Yes, treatment indicated   PT Diagnosis (PT) Impaired functional mobility   Influenced by the following impairments Generalized weakness, decreased strength, impaired balance, pain, decreased sensation   Functional limitations due to impairments Decreased independence with bed mobility, transfers, gait   Clinical Presentation (PT Evaluation Complexity) Stable/Uncomplicated   Clinical Presentation Rationale Clinical judgment   Clinical Decision Making (Complexity) low complexity   Planned Therapy  Interventions (PT) balance training;bed mobility training;gait training;home exercise program;neuromuscular re-education;ROM (range of motion);strengthening;transfer training   Anticipated Equipment Needs at Discharge (PT) other (see comments)  (TBD pending progression with therapies)   Risk & Benefits of therapy have been explained evaluation/treatment results reviewed;risks/benefits reviewed;care plan/treatment goals reviewed;patient   PT Total Evaluation Time   PT Eval, Low Complexity Minutes (40495) 7   Physical Therapy Goals   PT Frequency 5x/week   PT Predicted Duration/Target Date for Goal Attainment 07/21/23   PT Goals Bed Mobility;Transfers;Gait   PT: Bed Mobility Independent;Supine to/from sit   PT: Transfers Modified independent;Sit to/from stand;Bed to/from chair;Assistive device   PT: Gait Modified independent;25 feet;Assistive device   PT Discharge Planning   PT Plan Gait with chair follow, STS reps for functional strengthening, seated HEP   PT Discharge Recommendation (DC Rec) Transitional Care Facility   PT Rationale for DC Rec Pt currently below functional baseline, only able to pivot safely with assist of 2 at this time and FWW. Unable to return safely to home 2/2 high fall risk, moderate deconditioning, and impaired cognition. She would benefit from ongoing rehab to progress IND with function.   PT Brief overview of current status A x 2 pivot to bedside chair/commode   Total Session Time   Timed Code Treatment Minutes 23   Total Session Time (sum of timed and untimed services) 30

## 2023-06-04 NOTE — PROGRESS NOTES
Hematology Progress Note   Date of Service: 06/04/2023    Patient: Rachele Martínez  MRN: [id number]  Admission Date: 6/1/2023  Hospital Day # Hospital Day: 4  Primary Outpatient Hematologist: Dr. Cari Dominguez     Reason for Consult: Please consider anti-angiogenic agent (bevacizumab) for angioectasias failing multiple treatment modalities     Assessment & Plan:   Rachele Martínez is a 83 year old female with recurrent GI bleeds since 2015 2/2 upper GI and cecal angioectasias, on monthly octreotide injections since 2019, chronic anemia in ESRD and GIB, hemoptysis 5/2023, treated hep c, compensated cirrhosis, ESRD on dialysis, PAD, who was [facility] for lethargy/weakness and was found to have acute on chronic anemia (Hb 5.6 from prior hb ~7), c/f GIB although not overt.  Vit B12 and iron adequate.  She is on supplemental iron and epo three times per week w/ dialysis.     Octreotide seemed to control GI bleeding until 1/2023, and she is s/p most recent endoscopic tx 3/2023.  She was seen by GI team this admission who feel she has failed medical/conservative management and no further GI interventions are recommended.  During last admission 5/25 she also had some hemoptysis, CT chest negative, per daughter may have had several more episodes at [facility]    #Multifactorial anemia  -ESRD on dialysis   -Epoetin Duane (Epogen) 5200 units IVP 3X Week During Dialysis    -Iron Sucrose (Venofer) 100 mg IVP 3X Week During Dialysis  -GIB   -?hemoptysis, no further episodes seen here  - Mental status change and generalized weakness, was persistent despite improved hgb, but improving mental status now. Thought to be from missed dialysis session    Recommendations:   -Continue to monitor hb and transfusion PRN Hgb <7, appreciate Dr. Mccormick discussed with Dr. Baires (outpt GI) regarding outpatient monitoring/transfusions and possible plan for capsule endoscopy.  -Continue epo and iron infusions with dialysis three  times per week   -Would not start antiangiogenesis agent during admission. She does not have HHT as she does not have significant epistaxis nor does she have a family history, however there may be evidence for using thalidomide even for GI angioectasias without a diagnosis of HHT (bevacizumab is less studied outside of HHT).   - Refer to hematology as outpatient for further discussion.    Hematology will continue to follow. Please don't hesitate to contact the Fellow On-Call with questions.    On the date of service, 06/04/2023, I spent 50 minutes on the patient unit personally reviewing medical records and medications, reviewing vital signs, labs, and imaging results as summarized above, discussing the patient's case on rounds with the fellow, obtaining a history from the patient, performing a physical exam, counseling and educating the patient on the diagnosis and treatment, communication with the primary team, evaluating a potentially life or organ threatening problem, intensively monitoring treatments with high risk of toxicity, coordinating care and documenting in the electronic medical record.    Thank you for allowing me to participate in the care of this patient. Please do not hesitate to contact me if there are any concerns or questions.     Alejandro Ward MD   of Medicine  Classical Hematology and Blood and Marrow Transplantation  Division of Hematology, Oncology, and Transplantation  [facility]      Interval History:  I missed her daughter Charla, but Rachele says she is doing well.       Hematologic History:  #Anemia of chronic blood loss and CKD on dialysis   -Epoetin Duane (Epogen) 5200 units IVP 3X Week During Dialysis   -Iron Sucrose (Venofer) 100 mg IVP 3X Week During Dialysis    --11/12/2015: Seen by Dr. Dominguez for ongoing severe anemia   -The patient was first noted to have a GI bleed with overt melena in 04/2015.  At that time, she had an EGD, which showed ulcerations, and  on biopsy had severe chronic active gastritis, and the H. pylori staining was positive.  She was transfused and placed on Slow Fe tablets 3 times a day.  She came back a couple of days later, again with melena.  She was started on omeprazole with this, but was never placed on antibiotics to treat the H. pylori, from what I can see, and from what she knows.  She continued to have problems with black stools and anemia, and needed another red blood cell transfusion in September.  She underwent EGD and colonoscopy on 09/04/2015.  The EGD showed nonbleeding angioectasias, which were treated with APC.  The colonoscopy showed adenomas, which were biopsied with pathology results of tubular adenomas x3, and a random biopsy showed no abnormality.  Since this time, she continued to have melena, although she is not sure what to think since she is taking the Slow Fe, which she takes 2 or 3 times a day.   -Dr. Dominguez concluded this is not a primary hematology problem but a GI problem and recommended IV iron, given that PO iron would be hard to absorb in her case.  No signs of hemolysis.  Epo a little low for degree of anemia but had adequate retic count so able to make red cells, no epo replacement indicated   1/14/2016: Visit with Dr. Dominguez (last visit noted)  -Given IV iron, but not effective in raising hgb due to ongoing bleeding.   I will arrange for 2 units PRBCs within the next few days, since she is so symptomatic.  Her retic count is elevated, so her bone marrow is clearly capable of making RBCs, just can't keep up. She does not have a primary hematology poblem, but has Nalini bleeding problem. I am referring her back to GI.   1/6/23-1/9/23: Admitted to [facility] with acute on chronic anemia (Hb dropped from 10.1 12/26/22 to 5.6), suspected GI bleed.  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  Underwent transfusion x 2 units, push EDG that did not show source of bleeding on 1/9.  She was started on  protonix and continued on octreotide monthly.   3/1/23: Video visit with primary GI, Dr. Ankit Baires   3/9/23 she underwent device assisted upper endoscopy and APC was performd on AVMs in the stomach, duodenum and jejunum.     Review of Systems: Pertinent positive and negative systems described in HPI; the remainder of the 14 systems are negative    Physical Exam:    BP (!) 151/54 (BP Location: Left arm)   Pulse 74   Temp 98.3  F (36.8  C) (Oral)   Resp 17   SpO2 97%   Gen: NAD, she is awake, alert, answers questions appropriately, appears comfortable  HEENT: NC/AT   CV: warm well perfused  Pulm: breathing comfortably on RA, symmetrical   Abd: Soft, nd  Ext: Warm and well perfused. No lower extremity edema  Skin: No rash, cyanosis or petechial lesion  Neuro: Alert able to answer questions appropriately     Labs & Studies: I personally reviewed the following studies:  ROUTINE LABS (Last four results):  CMP  Recent Labs   Lab 06/04/23  1213 06/03/23  0806 06/01/23  1546 06/01/23  0639   * 131* 138 135*   POTASSIUM 4.5 4.6 4.0 4.3   CHLORIDE 92* 91* 99 96*   CO2 22 25 26 27   ANIONGAP 16* 15 13 12   * 106* 135* 97   BUN 36.7* 19.9 31.4* 28.5*   CR 8.29* 6.05* 7.64* 6.79*   GFRESTIMATED 4* 6* 5* 6*   BELGICA 8.6* 8.5* 9.1 9.1   MAG  --  1.8 2.0  --    PHOS  --  3.9  --   --    PROTTOTAL  --   --  7.0 6.2*   ALBUMIN  --   --  3.4* 3.2*   BILITOTAL  --   --  0.4 0.3   ALKPHOS  --   --  125* 128*   AST  --   --  22 27   ALT  --   --  7* 10     CBC  Recent Labs   Lab 06/04/23  1213 06/03/23  0806 06/02/23  0942 06/01/23  2341 06/01/23  1546   WBC 9.4 10.9 11.6*  --  8.5   RBC 3.14* 3.10* 3.16*  --  1.99*   HGB 9.0* 9.0* 9.2* 7.0* 6.0*   HCT 30.3* 29.4* 29.0*  --  20.4*   MCV 97 95 92  --  103*   MCH 28.7 29.0 29.1  --  30.2   MCHC 29.7* 30.6* 31.7  --  29.4*   RDW 18.9* 20.3* 21.0*  --  17.0*    325 378  --  372     INR  Recent Labs   Lab 06/01/23  1546   INR 1.29*

## 2023-06-04 NOTE — PLAN OF CARE
Goal Outcome Evaluation:      Plan of Care Reviewed With: patient    Overall Patient Progress: improving    Outcome Evaluation: Alert and oriented x 4 this shift. Used call light appropriately. Denies pain . Up to commode with assist of 2. Hypertensive this shift with no rimrpovement with prn Labatalol. Dr. Red informed with order for one time dose of Losartan 100 mg . BP this morning is 156/49. Waiting for CT of head final result.

## 2023-06-05 ENCOUNTER — APPOINTMENT (OUTPATIENT)
Dept: OCCUPATIONAL THERAPY | Facility: CLINIC | Age: 83
DRG: 377 | End: 2023-06-05
Payer: MEDICARE

## 2023-06-05 ENCOUNTER — LAB REQUISITION (OUTPATIENT)
Dept: LAB | Facility: CLINIC | Age: 83
End: 2023-06-05
Payer: MEDICARE

## 2023-06-05 VITALS
TEMPERATURE: 98.5 F | WEIGHT: 129.63 LBS | SYSTOLIC BLOOD PRESSURE: 161 MMHG | BODY MASS INDEX: 22.25 KG/M2 | HEART RATE: 69 BPM | OXYGEN SATURATION: 99 % | RESPIRATION RATE: 17 BRPM | DIASTOLIC BLOOD PRESSURE: 59 MMHG

## 2023-06-05 DIAGNOSIS — D64.9 ANEMIA, UNSPECIFIED: ICD-10-CM

## 2023-06-05 PROCEDURE — 99233 SBSQ HOSP IP/OBS HIGH 50: CPT | Mod: FS | Performed by: PHYSICIAN ASSISTANT

## 2023-06-05 PROCEDURE — 99232 SBSQ HOSP IP/OBS MODERATE 35: CPT | Performed by: PHYSICIAN ASSISTANT

## 2023-06-05 PROCEDURE — 258N000003 HC RX IP 258 OP 636: Performed by: STUDENT IN AN ORGANIZED HEALTH CARE EDUCATION/TRAINING PROGRAM

## 2023-06-05 PROCEDURE — 90937 HEMODIALYSIS REPEATED EVAL: CPT

## 2023-06-05 PROCEDURE — 250N000013 HC RX MED GY IP 250 OP 250 PS 637: Performed by: STUDENT IN AN ORGANIZED HEALTH CARE EDUCATION/TRAINING PROGRAM

## 2023-06-05 PROCEDURE — 99238 HOSP IP/OBS DSCHRG MGMT 30/<: CPT | Performed by: STUDENT IN AN ORGANIZED HEALTH CARE EDUCATION/TRAINING PROGRAM

## 2023-06-05 PROCEDURE — 634N000001 HC RX 634: Performed by: STUDENT IN AN ORGANIZED HEALTH CARE EDUCATION/TRAINING PROGRAM

## 2023-06-05 PROCEDURE — 250N000013 HC RX MED GY IP 250 OP 250 PS 637: Performed by: INTERNAL MEDICINE

## 2023-06-05 PROCEDURE — 97110 THERAPEUTIC EXERCISES: CPT | Mod: GO

## 2023-06-05 PROCEDURE — 250N000013 HC RX MED GY IP 250 OP 250 PS 637: Performed by: NURSE PRACTITIONER

## 2023-06-05 PROCEDURE — 97535 SELF CARE MNGMENT TRAINING: CPT | Mod: GO

## 2023-06-05 RX ADMIN — SODIUM CHLORIDE 300 ML: 9 INJECTION, SOLUTION INTRAVENOUS at 09:53

## 2023-06-05 RX ADMIN — PANTOPRAZOLE SODIUM 20 MG: 20 TABLET, DELAYED RELEASE ORAL at 08:05

## 2023-06-05 RX ADMIN — Medication: at 10:44

## 2023-06-05 RX ADMIN — LOSARTAN POTASSIUM 100 MG: 50 TABLET, FILM COATED ORAL at 08:05

## 2023-06-05 RX ADMIN — EPOETIN ALFA-EPBX 10000 UNITS: 10000 INJECTION, SOLUTION INTRAVENOUS; SUBCUTANEOUS at 10:43

## 2023-06-05 RX ADMIN — AMLODIPINE BESYLATE 5 MG: 5 TABLET ORAL at 14:15

## 2023-06-05 RX ADMIN — ATORVASTATIN CALCIUM 20 MG: 20 TABLET, FILM COATED ORAL at 08:05

## 2023-06-05 RX ADMIN — SERTRALINE HYDROCHLORIDE 100 MG: 100 TABLET ORAL at 08:05

## 2023-06-05 RX ADMIN — SODIUM CHLORIDE 250 ML: 9 INJECTION, SOLUTION INTRAVENOUS at 09:54

## 2023-06-05 RX ADMIN — CALCIUM ACETATE 1334 MG: 667 CAPSULE ORAL at 08:06

## 2023-06-05 RX ADMIN — LABETALOL HYDROCHLORIDE 100 MG: 100 TABLET ORAL at 04:02

## 2023-06-05 ASSESSMENT — ACTIVITIES OF DAILY LIVING (ADL)
ADLS_ACUITY_SCORE: 48

## 2023-06-05 NOTE — PLAN OF CARE
Goal Outcome Evaluation:      Plan of Care Reviewed With: patient    Overall Patient Progress: improvingOverall Patient Progress: improving    Outcome Evaluation: Pt admitted with weakness and anemia. Pt A&Ox4, with some forgetfulness. Pt denied any pain. Pt's BP elevated to 167/63, pt will receive PO labetalol. Pt tolerated her regular diet. Pt had one BM. Pt used the assist of two to the commode. Pt able to make her needs known. Plan to discharge back to TCU tomorrow. Continue with plan of care.

## 2023-06-05 NOTE — PROGRESS NOTES
Nephrology Progress Note  06/05/2023         Assessment & Recommendations:   Rachele Martínez is an 83 year old female with PMH of ESKD, mild dementia, hypertension, hyperlipidemia, anemia and recurrent GI bleeds secondary to AVMs, chronic hep C with cirrhosis, admitted with recurrent anemia/GIB     ESKD: dialyzes MWF at [facility] with Dr. Tarango. Run time: 3.5 hrs. Access: RUE AVG. EDW 60 kg  - HD per MWF schedule     BP/Volume: 60 kg  - elevated pressures, pt said her EDW was recently challenged at OP HD unit and she cramps if wt is under 60 kg.   - Pre HD wt today 61.9 kg, UF to dry wt     Anemia of CKD on blood loss anemia: venofer 100 mg qtx, epogen 5200 units per run  - ordered epogen 10K and venofer 100 mg for today  - s/p 2 units PRBC  - no interventions, per GI     BMD: Ca 8.6, alb 3.4, phos 3.9; continue PTA PO calcitriol 1.0 mcg MWF, Phoslo 2 tabs TID WM  - continue PTA meds      Recommendations were communicated to primary team via this note      SHANNON Charles   Division of Renal Disease and Hypertension  P [phone]    Interval History :   Seen on dialysis, stable run so far. No n/v, CP, SOB, chills    Review of Systems:   4 point ROS neg other than as noted above    Physical Exam:   I/O last 3 completed shifts:  In: 720 [P.O.:720]  Out: -    /59   Pulse 62   Temp 98.4  F (36.9  C) (Oral)   Resp 16   SpO2 95%      GENERAL APPEARANCE: NAD, alert  EYES: no scleral icterus, pupils equal  PULM: CTA  CV: RRR     -edema trace   GI: soft  INTEGUMENT: no cyanosis, no rash  NEURO: no asterixis   Access RUE AVG    Labs:   All labs reviewed by me  Electrolytes/Renal - Recent Labs   Lab Test 06/04/23  1213 06/03/23  0806 06/01/23  1546 05/13/23  0645 05/12/23  1636 11/21/19  1044 03/28/19  0456   * 131* 138   < > 138   < > 136   POTASSIUM 4.5 4.6 4.0   < > 3.4   < > 3.8   CHLORIDE 92* 91* 99   < > 95*   < > 99   CO2 22 25 26   < > 22   < > 28   BUN 36.7* 19.9 31.4*   < > 34.4*    < > 14   CR 8.29* 6.05* 7.64*   < > 5.77*   < > 4.30*   * 106* 135*   < > 123*   < > 102*   BELGICA 8.6* 8.5* 9.1   < > 9.2   < > 8.7   MAG  --  1.8 2.0  --  2.2  --   --    PHOS  --  3.9  --   --  3.7  --  2.9    < > = values in this interval not displayed.       CBC -   Recent Labs   Lab Test 06/04/23  1213 06/03/23  0806 06/02/23  0942   WBC 9.4 10.9 11.6*   HGB 9.0* 9.0* 9.2*    325 378       LFTs -   Recent Labs   Lab Test 06/01/23  1546 06/01/23  0639 05/17/23  0651   ALKPHOS 125* 128* 169*   BILITOTAL 0.4 0.3 0.4   ALT 7* 10 16   AST 22 27 26   PROTTOTAL 7.0 6.2* 7.4   ALBUMIN 3.4* 3.2* 3.5       Iron Panel -   Recent Labs   Lab Test 06/02/23  0942 05/14/23  0741 01/26/23  1450   IRON 62 31* 34*   IRONSAT 31 18 13*   KASSI 222 436*  --          Imaging:  Reviewed    Current Medications:    amLODIPine  5 mg Oral Daily     atorvastatin  20 mg Oral Daily     calcium acetate  1,334 mg Oral TID w/meals     gabapentin  300 mg Oral At Bedtime     losartan  100 mg Oral Daily     melatonin  5 mg Oral At Bedtime     pantoprazole  20 mg Oral BID AC     sertraline  100 mg Oral Daily     sodium chloride (PF)  3 mL Intracatheter Q8H       - MEDICATION INSTRUCTIONS -       SHANNON Charles

## 2023-06-05 NOTE — PROGRESS NOTES
HEMODIALYSIS TREATMENT NOTE    Date: 6/5/2023  Time: 1:49 PM    Data:  Pre Wt:     Desired Wt:   kg   Post Wt:  58.8 kg (standing)  Weight change:  2 kg  Ultrafiltration - Post Run Net Total Removed (mL): 2000 mL  Vascular Access Status: patent  Dialyzer Rinse: Streaked, Light  Total Blood Volume Processed: 79.4 L Liters  Total Dialysis (Treatment) Time: 3.5 Hours    Lab:   No  Interventions:  Epoetin    Assessment:  Pt ran for 3.5 hrs on a K2/ Ca 2.5 bath with a /  and 2 L pulled with no complications. RAVF positive for T/B. Pt ate meal tray during tx. Pt rinsed back and hemostasis achieved in about 10 mins. Pt alert and oriented x4. Report given to PCN.     Plan:    Per renal team

## 2023-06-05 NOTE — PLAN OF CARE
Goal Outcome Evaluation:      Plan of Care Reviewed With: patient    Problem: Plan of Care - These are the overarching goals to be used throughout the patient stay.    Goal: Plan of Care Review  6/5/2023 1008 by Ivett Byrnes RN  Outcome: Progressing    Pt A&Ox4, VSS on RA ex HTNsive, up to BSC A1 with W+GB. Denies pain. PIV SL. R AVF. HD done today, ~3L removed. Anticipated discharge back to TCU [facility] this afternoon after HD, wheelchair ride arranged between [id number]. RNCC faxed orders already. Continue to monitor and with POC.

## 2023-06-05 NOTE — PLAN OF CARE
Goal Outcome Evaluation:      Plan of Care Reviewed With: patient    Overall Patient Progress: improving    Outcome Evaluation: Alert and oriented x 4. Deneis pain. Up to commode with assist of 1 and walker. Hypertensive, prn Labetalol given. BP this morning is 152/44. Plan is to discharge back to TCU after Dialysis today.

## 2023-06-05 NOTE — CONSULTS
Care Management Initial Consult    General Information  Assessment completed with: Children, VM-chart review, Eric  Type of CM/SW Visit: Initial Assessment    Primary Care Provider verified and updated as needed: Yes   Readmission within the last 30 days: current reason for admission unrelated to previous admission      Reason for Consult: discharge planning  Advance Care Planning: Advance Care Planning Reviewed: concerns discussed        Communication Assessment  Patient's communication style: spoken language (English or Bilingual)    Hearing Difficulty or Deaf: no   Wear Glasses or Blind: yes    Cognitive  Cognitive/Neuro/Behavioral: WDL  Level of Consciousness: alert  Arousal Level: opens eyes spontaneously  Orientation: oriented x 4  Mood/Behavior: calm, cooperative  Best Language: 0 - No aphasia  Speech: clear    Living Environment:   People in home: facility resident     Current living Arrangements: assisted living  Name of Facility: [facility]   Able to return to prior arrangements: yes       Family/Social Support:  Care provided by: other (see comments) (TCU staff)  Provides care for: no one, unable/limited ability to care for self  Marital Status:   Children          Description of Support System: Supportive, Involved       Current Resources:   Patient receiving home care services:       Community Resources:    Equipment currently used at home: walker, rolling  Supplies currently used at home:      Employment/Financial:  Employment Status:          Financial Concerns:        Does the patient's insurance plan have a 3 day qualifying hospital stay waiver?  No    Lifestyle & Psychosocial Needs:  Social Determinants of Health     Tobacco Use: Medium Risk (5/12/2023)    Patient History      Smoking Tobacco Use: Former      Smokeless Tobacco Use: Never      Passive Exposure: Not on file   Alcohol Use: Not on file   Financial Resource Strain: Not on file   Food Insecurity: Not on file    Transportation Needs: Not on file   Physical Activity: Not on file   Stress: Not on file   Social Connections: Not on file   Intimate Partner Violence: Not on file   Depression: Not at risk (1/31/2023)    PHQ-2      PHQ-2 Score: 0   Housing Stability: Not on file       Functional Status:  Prior to admission patient needed assistance:      Mental Health Status: SMOOTH        Chemical Dependency Status: SMOOTH        Values/Beliefs:  Spiritual, Cultural Beliefs, [employer] Practices, Values that affect care:               Additional Information:    Background from H&P note: Rachele Martínez is an 83 year old woman admitted on 6/1/2023. She has a history of ESRD on MWF dialysis, mild dementia, hypertension, hyperlipidemia, anemia and recurrent GI bleeds secondary to AVMs, chronic hep C, cirrhosis and recent admission for weakness 5/12-5/25 who is admitted with recurrent anemia.    Patient's status reviewed by chart. CMA is needed d/t elevated risk score and discharge planning. Pt is at dialysis. RNCC talked to the phone with daughter Charla to complete care management assessment. RNCC introduced self, the role in care, and the nature of the care management assessment.     Pt is from [facility] in [place]. She has a lot support from family, including daughter Charla. The facility is able to take pt back today as she is medically stable. DAISHA ride arranged with picking up window: 15:. Writer faxed discharge order and talked to TCU and [facility] dialysis center informing discharge plan.     Care Management Discharge Note    Discharge Date: 06/05/2023     Discharge Disposition: Transitional Care    Discharge Services:  TCU    Discharge DME: None    Discharge Transportation: health plan transportation    Private pay costs discussed: transportation costs    Does the patient's insurance plan have a 3 day qualifying hospital stay waiver?  No    PAS Confirmation Code:  NA  Patient/family educated on Medicare website  which has current facility and service quality ratings: no    Education Provided on the Discharge Plan:  yes  Persons Notified of Discharge Plans: pt, daughter Charla  Patient/Family in Agreement with the Plan: yes    Handoff Referral Completed: Yes    Services:    [facility], MWF  [address] [place] [address].  Ph: [phone]  Fx: [phone]    [facility]CU  [address] [place] [address]   Phone: [phone]  Nursing station: [phone] or [phone]  Fax: [phone]      Honey Baker RN  5A RN Care Coordinator  Phone: [phone]  Pager: [phone]  For Weekend & Holiday on call RN Care Coordinator:  (Tasks: Home care, home infusion, medical equipment/oxygen, transportation, IMM & OLIVERA forms, etc.)  [facility] ([phone]) Saturday & Sunday; ([phone])  Recognized Holidays  Pager: [phone] Units: 4A, 4C, 4E, 5A & 5B

## 2023-06-05 NOTE — DISCHARGE SUMMARY
[facility]  Hospitalist Discharge Summary      Date of Admission:  6/1/2023  Date of Discharge:  6/5/2023  Discharging Provider: Duane Mccormick DO  Discharge Service: Hospitalist Service, GOLD TEAM 8    Discharge Diagnoses   Acute on chronic anemia  Suspected GI bleed secondary to stomach/small intestine angioectasias   Acute toxic metabolic encephalopathy (uremia?), resolved  Neurocognitive disorder  Leukocytosis, resolved  Hemoptysis, reported  History of ESRD  Hyponatremia  History of hypertension  History of depression  History of hyperlipidemia      Clinically Significant Risk Factors          Follow-ups Needed After Discharge   Follow-up Appointments     Follow Up and recommended labs and tests      Follow up with longterm physician.  The following labs/tests are   recommended: CBC/hemoglobin.            1. Monitor hemoglobin routinely in setting of occult GIB.  2. Return to outpatient GI team.  3. TCU for rehabilitation.  4. Resume MWF hemodialysis. Last run today 6/5/2023for 3.5h with 2L ultrafiltration.    Discharge Disposition   Discharged to rehabilitation facility  Condition at discharge: Good    Hospital Course   Rachele Martínez is an 83 year old woman admitted on 6/1/2023. She has a history of ESRD on MWF dialysis, mild dementia, hypertension, hyperlipidemia, anemia and recurrent GI bleeds secondary to AVMs, chronic hep C, cirrhosis and recent admission for weakness 5/12-5/25 who is admitted with recurrent anemia.    Acute on chronic anemia  Suspected GI bleed secondary to stomach/small intestine angioectasias   Hemoglobin 6 on admission drown from 7.2 on discharge 5/25.  No melena, epistaxis or hematemesis, although she reports two instances of small hemoptysis while in the TCU this past week. Just s/p EGD 3/9 with argon plasma coagulation treatment of multiple angioectasias in the stomach, duodenum and jejunum. Received blood transfusion in ED.  Hemoglobin now increased to 9. GI consulted, no indication for immediate intervention.   - Repeat CBC in 3-5 days  -continue monthly octreotide injections  - Hematology consulted to consider Bevacizumab; thalidomide may rather be an option but not while inpatient. Bevacizumab only studied in HHT.  - Epoetin Duane (Epogen) 5200 units IVP 3X Week During Dialysis   - Iron Sucrose (Venofer) 100 mg IVP 3X Week During Dialysis  - Discussed with outpatient GI/endoscopist Dr. Baires; he will arrange outpatient follow-up and repeat capsule endoscopy after discharge  - PT/OT consulted - continue to recommend TCU (family report she had been slated for discharge to home 6/6, but functional decline observed in the past week)  - return to TCU 6/5 after HD    Acute toxic metabolic encephalopathy (uremia?), resolved  Neurocognitive disorder  Leukocytosis, resolved  Family concerned that patient cannot be left alone safely. Has been more confused lately at TCU. No evidence for infection or metabolic derangement. Acute part of AMS seems to correlate with recently missed/reduced hemodialysis, possibly suggesting decreased clearance as cause.  - Delirium precautions  - Schedule melatonin HS  - Noncon CTH - no acute pathology    Hemoptysis, reported  No current hemoptysis. Chest x-ray without definite consolidation or opacity. If recurs, would get high resolution CT chest without contrast.    History of ESRD  Hyponatremia   - On MWF dialysis; tolerated full run with 2.7L UF 6/2/23  - Nephrology following for inpatient dialysis  - Continue home Phoslo 2 tabs TID WM    History of hypertension  - Continue home amlodipine, losartan    History of depression  - Continue home sertraline    History of hyperlipidemia  - Continue home atorvastatin    Consultations This Hospital Stay   NEPHROLOGY ESRD ADULT IP CONSULT  GI LUMINAL ADULT IP CONSULT  HEMATOLOGY ADULT IP CONSULT  PALLIATIVE CARE ADULT IP CONSULT  PHYSICAL THERAPY ADULT IP  CONSULT  OCCUPATIONAL THERAPY ADULT IP CONSULT  PHYSICAL THERAPY ADULT IP CONSULT  OCCUPATIONAL THERAPY ADULT IP CONSULT    Code Status   Full Code    Time Spent on this Encounter   I, Duane Mccormick DO, personally saw the patient today and spent less than or equal to 30 minutes discharging this patient.       Duane Mccormick DO  [facility] [address] [address]  Phone: [phone]  ______________________________________________________________________    Physical Exam   Vital Signs: Temp: 98.4  F (36.9  C) Temp src: Oral BP: 136/59 Pulse: 62   Resp: 16 SpO2: 95 % O2 Device: None (Room air)      Wt Readings from Last 4 Encounters:   06/05/23 58.8 kg (129 lb 10.1 oz)   05/21/23 60.5 kg (133 lb 4.8 oz)   03/09/23 63 kg (138 lb 14.2 oz)   02/17/23 62.3 kg (137 lb 6.4 oz)     Gen: Sitting comfortably in bed, awake and alert on HD.  Resp: Lungs clear to ausc bilaterally, relaxed effort.  CV: Regular rhythm, normal rate.  Neuro: Answers questions appropriately. Alert and oriented.       Primary Care Physician   Agnieszka Rodriguez    Discharge Orders      CBC with platelets     General info for SNF    Length of Stay Estimate: Short Term Care: Estimated # of Days <30  Condition at Discharge: Improving  Level of care:skilled   Rehabilitation Potential: Excellent  Admission H&P remains valid and up-to-date: Yes  Recent Chemotherapy: N/A  Use Nursing Home Standing Orders: Yes     Mantoux instructions    Give two-step Mantoux (PPD) Per Facility Policy Yes     Follow Up and recommended labs and tests    Follow up with group home physician.  The following labs/tests are recommended: CBC/hemoglobin.     Reason for your hospital stay    You were hospitalized for anemia. Blood counts improved and held stable after transfusion.     Activity - Up ad rosalinda     Full Code     Physical Therapy Adult Consult    Evaluate and treat as clinically indicated.    Reason:  hospitalization; deconditioning      Occupational Therapy Adult Consult    Evaluate and treat as clinically indicated.    Reason:  hospitalization; deconditioning     Fall precautions     Diet    Follow this diet upon discharge: Orders Placed This Encounter      Renal Diet Adult       Significant Results and Procedures   Most Recent 3 CBC's:Recent Labs   Lab Test 06/04/23  1213 06/03/23  0806 06/02/23  0942   WBC 9.4 10.9 11.6*   HGB 9.0* 9.0* 9.2*   MCV 97 95 92    325 378       Discharge Medications   Current Discharge Medication List      CONTINUE these medications which have NOT CHANGED    Details   albuterol (PROAIR HFA/PROVENTIL HFA/VENTOLIN HFA) 108 (90 Base) MCG/ACT inhaler Inhale 2 puffs into the lungs every 4 hours as needed for wheezing, shortness of breath or cough  Qty: 18 g, Refills: 0    Comments: Pharmacy may dispense brand covered by insurance (Proair, or proventil or ventolin or generic albuterol inhaler)  Associated Diagnoses: Hemoptysis      amLODIPine (NORVASC) 5 MG tablet Take 1 tablet (5 mg) by mouth daily  Qty: 30 tablet, Refills: 0    Associated Diagnoses: Hemoptysis      atorvastatin (LIPITOR) 20 MG tablet Take 1 tablet (20 mg) by mouth daily  Qty: 30 tablet, Refills: 0    Associated Diagnoses: Cognitive impairment      benzocaine-menthol (CHLORASEPTIC) 6-10 MG lozenge Place 1 lozenge inside cheek every hour as needed for sore throat  Qty: 30 lozenge, Refills: 0    Associated Diagnoses: Hemoptysis      benzonatate (TESSALON) 100 MG capsule Take 1 capsule (100 mg) by mouth 3 times daily as needed for cough  Qty: 30 capsule, Refills: 0    Associated Diagnoses: Hemoptysis      calcium acetate (PHOSLO) 667 MG CAPS capsule TAKE 2 CAPSULE BY MOUTH THREE TIMES A DAY WITH MEALS  Qty: 60 capsule, Refills: 0    Associated Diagnoses: Hemoptysis      gabapentin (NEURONTIN) 300 MG capsule Take 1 capsule (300 mg) by mouth At Bedtime  Qty: 30 capsule, Refills: 0    Associated Diagnoses: Lumbar degenerative disc disease; Chronic  bilateral low back pain with right-sided sciatica      losartan (COZAAR) 50 MG tablet Take 1 tablet (50 mg) by mouth daily  Qty: 30 tablet, Refills: 0    Associated Diagnoses: Hemoptysis      multivitamin RENAL (RENAVITE RX/NEPHROVITE) 1 tablet tablet Take 1 tablet by mouth daily  Qty: 30 tablet, Refills: 0    Associated Diagnoses: Hemoptysis      pantoprazole (PROTONIX) 20 MG EC tablet Take 1 tablet (20 mg) by mouth 2 times daily (before meals) *take 30-60 minutes before  Qty: 60 tablet, Refills: 0    Associated Diagnoses: Gastrointestinal hemorrhage, unspecified gastrointestinal hemorrhage type      polyethylene glycol (MIRALAX) 17 GM/Dose powder Take 17 g by mouth daily as needed As needed      sertraline (ZOLOFT) 50 MG tablet Take 2 tablets (100 mg) by mouth daily  Qty: 60 tablet, Refills: 0    Associated Diagnoses: Itching      sucroferric oxyhydroxide (VELPHORO) 500 MG CHEW chewable tablet Take 500 mg by mouth 2 times daily      hydrOXYzine (ATARAX) 25 MG tablet Take 1 tablet (25 mg) by mouth every 6 hours as needed for itching or anxiety  Qty: 20 tablet, Refills: 0    Associated Diagnoses: Hemoptysis      octreotide (SANDOSTATIN LAR) 20 MG injection Inject 20 mg into the muscle every 28 days           Allergies   Allergies   Allergen Reactions     Abacavir Itching     Lisinopril Cough     Dust Mites      Hydrochlorothiazide Itching     Severe       No Clinical Screening - See Comments      History of blood transfusion reactions and pre-treats with Benadryl.      Spironolactone Nausea     Sulfa Antibiotics Hives     Valsartan Itching     Valsartan-Hydrochlorothiazide Itching     severe

## 2023-06-05 NOTE — PROGRESS NOTES
Hematology Progress Note   Date of Service: 06/05/2023    Patient: Rachele Martínez  MRN: [id number]  Admission Date: 6/1/2023  Hospital Day # Hospital Day: 5  Primary Outpatient Hematologist: Dr. Cari Dominguez     Reason for Consult: Please consider anti-angiogenic agent (bevacizumab) for angioectasias failing multiple treatment modalities     Assessment & Plan:   Rachele Martínez is a 83 year old female with recurrent GI bleeds since 2015 2/2 upper GI and cecal angioectasias, on monthly octreotide injections since 2019, chronic anemia in ESRD and GIB, hemoptysis 5/2023, treated hep c, compensated cirrhosis, ESRD on dialysis, PAD, who [facility] for lethargy/weakness and was found to have acute on chronic anemia (Hb 5.6 from prior hb ~7), c/f GIB although not overt.  Vit B12 and iron adequate.  She is on supplemental iron and epo three times per week w/ dialysis.     Octreotide seemed to control GI bleeding until 1/2023, and she is s/p most recent endoscopic tx 3/2023.  She was seen by GI team this admission who feel she has failed medical/conservative management and no further GI interventions are recommended.  During last admission 5/25 she also had some hemoptysis, CT chest negative, per daughter may have had several more episodes at [facility]    #Multifactorial anemia  -ESRD on dialysis   -Epoetin Duane (Epogen) 5200 units IVP 3X Week During Dialysis    -Iron Sucrose (Venofer) 100 mg IVP 3X Week During Dialysis  -GIB   -?hemoptysis, no further episodes seen here  - Mental status change and generalized weakness, was persistent despite improved hgb, but improving mental status now. Thought to be from missed dialysis session    Recommendations:   -Continue to monitor hb and transfusion PRN Hgb <7, appreciate Dr. Mccormick discussed with Dr. Baires (outpt GI) regarding outpatient monitoring/transfusions and possible plan for capsule endoscopy.  -Continue epo and iron infusions with dialysis three  times per week   -Would not start antiangiogenesis agent during admission. She does not have HHT as she does not have significant epistaxis nor does she have a family history, however there may be evidence for using thalidomide even for GI angioectasias without a diagnosis of HHT (bevacizumab is less studied outside of HHT).   - Refer to hematology as outpatient for further discussion.    Hematology will sign off. Please don't hesitate to contact the Fellow On-Call with questions.  I spent 15 min in counseling and coordination of care.     Ofe Sandy PA-C  [facility]  [phone]      Interval History:  Seen in dialysis today.  Denies any bleeding.  May go home today after dialysis, pending TCU placement.      Hematologic History:  #Anemia of chronic blood loss and CKD on dialysis   -Epoetin Duane (Epogen) 5200 units IVP 3X Week During Dialysis   -Iron Sucrose (Venofer) 100 mg IVP 3X Week During Dialysis    --11/12/2015: Seen by Dr. Dominguez for ongoing severe anemia   -The patient was first noted to have a GI bleed with overt melena in 04/2015.  At that time, she had an EGD, which showed ulcerations, and on biopsy had severe chronic active gastritis, and the H. pylori staining was positive.  She was transfused and placed on Slow Fe tablets 3 times a day.  She came back a couple of days later, again with melena.  She was started on omeprazole with this, but was never placed on antibiotics to treat the H. pylori, from what I can see, and from what she knows.  She continued to have problems with black stools and anemia, and needed another red blood cell transfusion in September.  She underwent EGD and colonoscopy on 09/04/2015.  The EGD showed nonbleeding angioectasias, which were treated with APC.  The colonoscopy showed adenomas, which were biopsied with pathology results of tubular adenomas x3, and a random biopsy showed no abnormality.  Since this time, she continued to have melena, although she is not sure what  to think since she is taking the Slow Fe, which she takes 2 or 3 times a day.   -Dr. Dominguez concluded this is not a primary hematology problem but a GI problem and recommended IV iron, given that PO iron would be hard to absorb in her case.  No signs of hemolysis.  Epo a little low for degree of anemia but had adequate retic count so able to make red cells, no epo replacement indicated   1/14/2016: Visit with Dr. Dominguez (last visit noted)  -Given IV iron, but not effective in raising hgb due to ongoing bleeding.   I will arrange for 2 units PRBCs within the next few days, since she is so symptomatic.  Her retic count is elevated, so her bone marrow is clearly capable of making RBCs, just can't keep up. She does not have a primary hematology poblem, but has a GI bleeding problem. I am referring her back to GI.   1/6/23-1/9/23: Admitted to [facility] with acute on chronic anemia (Hb dropped from 10.1 12/26/22 to 5.6), suspected GI bleed.  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  Underwent transfusion x 2 units, push EDG that did not show source of bleeding on 1/9.  She was started on protonix and continued on octreotide monthly.   3/1/23: Video visit with primary GI, Dr. Ankit Baires   3/9/23 she underwent device assisted upper endoscopy and APC was performd on AVMs in the stomach, duodenum and jejunum.     Review of Systems: Pertinent positive and negative systems described in HPI; the remainder of the 14 systems are negative    Physical Exam:    BP (!) 180/66 (BP Location: Left arm, Cuff Size: Adult Regular)   Pulse 60   Temp 97.7  F (36.5  C) (Oral)   Resp 16   Wt 58.8 kg (129 lb 10.1 oz)   SpO2 98%   BMI 22.25 kg/m    Gen: NAD, she is awake, alert, answers questions appropriately, appears comfortable.  Seen in dialysis   HEENT: NC/AT   CV: warm well perfused  Pulm: breathing comfortably on RA, non labored   Ext: Warm and well perfused. No lower extremity edema  Skin: No rash,  cyanosis or petechial lesion  Neuro: Alert able to answer questions appropriately     Labs & Studies: I personally reviewed the following studies:  ROUTINE LABS (Last four results):  CMP  Recent Labs   Lab 06/04/23  1213 06/03/23  0806 06/01/23  1546 06/01/23  0639   * 131* 138 135*   POTASSIUM 4.5 4.6 4.0 4.3   CHLORIDE 92* 91* 99 96*   CO2 22 25 26 27   ANIONGAP 16* 15 13 12   * 106* 135* 97   BUN 36.7* 19.9 31.4* 28.5*   CR 8.29* 6.05* 7.64* 6.79*   GFRESTIMATED 4* 6* 5* 6*   BELGICA 8.6* 8.5* 9.1 9.1   MAG  --  1.8 2.0  --    PHOS  --  3.9  --   --    PROTTOTAL  --   --  7.0 6.2*   ALBUMIN  --   --  3.4* 3.2*   BILITOTAL  --   --  0.4 0.3   ALKPHOS  --   --  125* 128*   AST  --   --  22 27   ALT  --   --  7* 10     CBC  Recent Labs   Lab 06/04/23  1213 06/03/23  0806 06/02/23  0942 06/01/23  2341 06/01/23  1546   WBC 9.4 10.9 11.6*  --  8.5   RBC 3.14* 3.10* 3.16*  --  1.99*   HGB 9.0* 9.0* 9.2* 7.0* 6.0*   HCT 30.3* 29.4* 29.0*  --  20.4*   MCV 97 95 92  --  103*   MCH 28.7 29.0 29.1  --  30.2   MCHC 29.7* 30.6* 31.7  --  29.4*   RDW 18.9* 20.3* 21.0*  --  17.0*    325 378  --  372     INR  Recent Labs   Lab 06/01/23  1546   INR 1.29*

## 2023-06-06 LAB
ERYTHROCYTE [DISTWIDTH] IN BLOOD BY AUTOMATED COUNT: 19.1 % (ref 10–15)
HCT VFR BLD AUTO: 28.9 % (ref 35–47)
HGB BLD-MCNC: 8.9 G/DL (ref 11.7–15.7)
MCH RBC QN AUTO: 29.7 PG (ref 26.5–33)
MCHC RBC AUTO-ENTMCNC: 30.8 G/DL (ref 31.5–36.5)
MCV RBC AUTO: 96 FL (ref 78–100)
PLATELET # BLD AUTO: 347 10E3/UL (ref 150–450)
RBC # BLD AUTO: 3 10E6/UL (ref 3.8–5.2)
WBC # BLD AUTO: 7.4 10E3/UL (ref 4–11)

## 2023-06-06 PROCEDURE — 36415 COLL VENOUS BLD VENIPUNCTURE: CPT | Performed by: FAMILY MEDICINE

## 2023-06-06 PROCEDURE — P9603 ONE-WAY ALLOW PRORATED MILES: HCPCS | Performed by: FAMILY MEDICINE

## 2023-06-06 PROCEDURE — 85014 HEMATOCRIT: CPT | Performed by: FAMILY MEDICINE

## 2023-06-06 NOTE — PLAN OF CARE
Occupational Therapy Discharge Summary    Reason for therapy discharge:    Discharged to transitional care facility.    Progress towards therapy goal(s). See goals on Care Plan in [facility] electronic health record for goal details.  Goals partially met.  Barriers to achieving goals:   limited tolerance for therapy.    Therapy recommendation(s):    Continued therapy is recommended.  Rationale/Recommendations:  Progress strength, tolerance and safety with ADLs.

## 2023-06-06 NOTE — PLAN OF CARE
Physical Therapy Discharge Summary    Reason for therapy discharge:    Discharged to transitional care facility.    Progress towards therapy goal(s). See goals on Care Plan in [facility] electronic health record for goal details.  Goals not met.  Barriers to achieving goals:   discharge from facility.    Therapy recommendation(s):    Continued therapy is recommended.  Rationale/Recommendations:  To improve functional mobility status.

## 2023-06-07 ENCOUNTER — PATIENT OUTREACH (OUTPATIENT)
Dept: GASTROENTEROLOGY | Facility: CLINIC | Age: 83
End: 2023-06-07
Payer: MEDICARE

## 2023-06-07 NOTE — TELEPHONE ENCOUNTER
I spoke with Rachele's daughter, Charla, Rachele is at a TCU right now,  will arrange a virtual visit on Tues 6/20 to discuss next steps. She mentioned being told that further endoscopies were not advised, message routed to Dr Baires with this information.     She did ask about her octreotide injection, scheduled for 6/20 as well, if needing to be continued. Will follow up with her if not needed.    Message routed to clinic coordinator to schedule.    Loraine Mancera, RN, BSN,   [facility]  Care coordinator

## 2023-06-12 NOTE — NURSING NOTE
"Oncology Rooming Note    October 10, 2017 2:08 PM   Rachele Martínez is a 77 year old female who presents for:    Chief Complaint   Patient presents with     Oncology Clinic Visit     New patient visit related PA-sid     Initial Vitals: /72  Pulse 81  Temp 98.3  F (36.8  C) (Oral)  Resp 18  Ht 1.626 m (5' 4.02\")  Wt 62.6 kg (138 lb)  SpO2 99%  BMI 23.68 kg/m2 Estimated body mass index is 23.68 kg/(m^2) as calculated from the following:    Height as of this encounter: 1.626 m (5' 4.02\").    Weight as of this encounter: 62.6 kg (138 lb). Body surface area is 1.68 meters squared.  No Pain (0) Comment: Data Unavailable   No LMP recorded. Patient is postmenopausal.  Allergies reviewed: Yes  Medications reviewed: Yes    Medications: Medication refills not needed today.  Pharmacy name entered into [place] :    [id number] - [place], [place] - [address] [address] [place] [address]    Clinical concerns: No new concerns. Provider was notified.    10 minutes for nursing intake (face to face time)     Margarita Strickland LPN            "
18

## 2023-06-20 ENCOUNTER — LAB (OUTPATIENT)
Dept: LAB | Facility: CLINIC | Age: 83
End: 2023-06-20
Payer: MEDICARE

## 2023-06-20 ENCOUNTER — INFUSION THERAPY VISIT (OUTPATIENT)
Dept: INFUSION THERAPY | Facility: CLINIC | Age: 83
End: 2023-06-20
Payer: MEDICARE

## 2023-06-20 ENCOUNTER — VIRTUAL VISIT (OUTPATIENT)
Dept: GASTROENTEROLOGY | Facility: CLINIC | Age: 83
End: 2023-06-20
Attending: INTERNAL MEDICINE
Payer: MEDICARE

## 2023-06-20 VITALS
DIASTOLIC BLOOD PRESSURE: 73 MMHG | RESPIRATION RATE: 14 BRPM | TEMPERATURE: 98.4 F | SYSTOLIC BLOOD PRESSURE: 184 MMHG | HEART RATE: 76 BPM | OXYGEN SATURATION: 96 %

## 2023-06-20 DIAGNOSIS — K55.20 AVM (ARTERIOVENOUS MALFORMATION) OF SMALL BOWEL, ACQUIRED: ICD-10-CM

## 2023-06-20 DIAGNOSIS — D50.0 IRON DEFICIENCY ANEMIA DUE TO CHRONIC BLOOD LOSS: ICD-10-CM

## 2023-06-20 DIAGNOSIS — D50.0 IRON DEFICIENCY ANEMIA DUE TO CHRONIC BLOOD LOSS: Primary | Chronic | ICD-10-CM

## 2023-06-20 DIAGNOSIS — K31.811 ANGIODYSPLASIA OF STOMACH AND DUODENUM WITH HEMORRHAGE: Primary | ICD-10-CM

## 2023-06-20 DIAGNOSIS — D50.0 IRON DEFICIENCY ANEMIA DUE TO CHRONIC BLOOD LOSS: Chronic | ICD-10-CM

## 2023-06-20 LAB
ERYTHROCYTE [DISTWIDTH] IN BLOOD BY AUTOMATED COUNT: 17.2 % (ref 10–15)
FERRITIN SERPL-MCNC: 600 NG/ML (ref 11–328)
HCT VFR BLD AUTO: 28.5 % (ref 35–47)
HGB BLD-MCNC: 8.6 G/DL (ref 11.7–15.7)
IRON BINDING CAPACITY (ROCHE): 212 UG/DL (ref 240–430)
IRON SATN MFR SERPL: 17 % (ref 15–46)
IRON SERPL-MCNC: 36 UG/DL (ref 37–145)
MCH RBC QN AUTO: 30.1 PG (ref 26.5–33)
MCHC RBC AUTO-ENTMCNC: 30.2 G/DL (ref 31.5–36.5)
MCV RBC AUTO: 100 FL (ref 78–100)
PLATELET # BLD AUTO: 289 10E3/UL (ref 150–450)
RBC # BLD AUTO: 2.86 10E6/UL (ref 3.8–5.2)
WBC # BLD AUTO: 10.8 10E3/UL (ref 4–11)

## 2023-06-20 PROCEDURE — 36416 COLLJ CAPILLARY BLOOD SPEC: CPT

## 2023-06-20 PROCEDURE — 82728 ASSAY OF FERRITIN: CPT

## 2023-06-20 PROCEDURE — 96372 THER/PROPH/DIAG INJ SC/IM: CPT | Performed by: NURSE PRACTITIONER

## 2023-06-20 PROCEDURE — 83540 ASSAY OF IRON: CPT

## 2023-06-20 PROCEDURE — 99207 PR NO CHARGE LOS: CPT

## 2023-06-20 PROCEDURE — 85027 COMPLETE CBC AUTOMATED: CPT

## 2023-06-20 PROCEDURE — 83550 IRON BINDING TEST: CPT

## 2023-06-20 PROCEDURE — 99214 OFFICE O/P EST MOD 30 MIN: CPT | Mod: VID | Performed by: INTERNAL MEDICINE

## 2023-06-20 RX ADMIN — OCTREOTIDE ACETATE 20 MG: KIT INTRAMUSCULAR at 10:47

## 2023-06-20 NOTE — PROGRESS NOTES
Infusion Nursing Note:  Rachele LORRIE Martínez presents today for Sandostatin.    Patient seen by provider today: Yes: Dr. Baires   present during visit today: Not Applicable.    Note: Assessment performed by Rose REESE RN prior to injection today. Patient denies symptoms/concerns following previous injection.    Intravenous Access:  No Intravenous access/labs at this visit.    Treatment Conditions:  Not Applicable.      Post Infusion Assessment:  Patient tolerated injection without incident.  Site patent and intact, free from redness, edema or discomfort.       Discharge Plan:   Patient discharged in stable condition accompanied by: daughter.  Departure Mode: Wheelchair.      Vilma Ariza LPN

## 2023-06-20 NOTE — LETTER
6/20/2023         RE: Rachele Martínez  [address]  [place] [address]        Dear Colleague,    Thank you for referring your patient, Rachele Martínez, to the [facility]. Please see a copy of my visit note below.    Virtual Visit Details    Type of service:  Video Visit   Video Start Time: 9:04 AM  Video End Time:9:25 AM    Originating Location (pt. Location): TCU    Distant Location (provider location):  Off-site  Platform used for Video Visit: Kimmy       INTERVENTIONAL ENDOSCOPY OUTPATIENT CLINIC CONSULT  DATE OF SERVICE: 06/20/23   Reason for Consultation: chronic small bowel bleeding from angioectasias    ASSESSMENT:  Rachele is a 83 year old female with a PMHx of ESRD on HD and h/o HCV s/p treatment with SVR and compensated cirrhosis who is here for follow up for recurrent GI bleeding from angioectasias in the stomach, small bowel, and cecum s/p prior endoscopic therapy in ~2018 and chronic monthly octreotide depot injections who recently developed recurrent anemia to a hgb of 5.6 here for follow up. She is currently residing in at [facility] at [place] and is accompanied with her daughter Charla. However, today Rachele is quite confused and having difficulty keeping her eyes open. Per Charla, this is due to a mistake with two pain patches having been applied to Rachele which were removed today.    Rachele had recent recurrent anemia without overt GI bleeding noted. I'm not sure if this would be associated with a 'shot' Rachele received while at her TCU prior to her hospitalization as Charla is suspicious of. I can't quite tell what that could have been.     If we are to reinvestigate endoscopically to address/ablate any recurrent angioectasias, I would recommend both an antegrade enteroscopy and colonoscopy given her prior history/burden of disease. However, with her current mental status we should hold off. Additionally, she will have  difficulty tolerating a bowel prep even more so now. For now I recommend rechecking her hgb and iron studies to get a sense of the trend. Ultimately, we may just try to manage this medically with octreotide and intermittent transfusions.     Will defer to Rachele's PCP in regards to helping reconcile her medications with her rehab facility.    RECOMMENDATIONS:  - Repeat CBC and iron studies  - Continue monthly octreotide injections    Ankit Baires MD  [facility]  Division of Gastroenterology and Hepatology  [facility] [address]  [address] [place] [id number]    ________________________________________________________________  HPI:  Rachele is a 83 year old female with a PMHx of ESRD on HD and h/o HCV s/p treatment with SVR and compensated cirrhosis who is here for follow up for recurrent GI bleeding from angioectasias in the stomach, small bowel, and cecum s/p prior endoscopic therapy in ~2018 and chronic monthly octreotide depot injections who recently developed recurrent anemia to a hgb of 5.6 here for follow up. She is currently residing in at [facility] at [place] and is accompanied with her daughter Charla. However, today Rachele is quite confused and having difficulty keeping her eyes open. Per Charla, this is due to a mistake with two pain patches having been applied to Rachele which were removed today.    In late May Rachele was at a TCU working with PT/OT but developed significant weakness. Her hemoglobin was checked and was 5.6 and she was admitted to [facility]. She was not having any overt GI bleeding symptoms per Charla. No melena or hematochezia. Charla is concerned that prior to her feeling week she received a 'shot' at the TCU and was told it was for an enlarged heart which Rachele does not have. It's not clear to Charla what this injection was and she is concerned that it may have caused the acute drop in her hemoglobin. Earlier in May  she was admitted with weakness as well with a Hgb in the 7s. Apparently, she had been having hemoptysis. CT Chest was essentially normal and no further hemoptysis noted.    Prior to this year, she had been doing relatively well. She has been on monthly octreotide 20 mg depot shots that she receives at the infusion center and has been on this for ~3 years.  Her Hgb had been holding up around ~9 following enteroscopy procedures to ablate small bowel angioectasias.    Charla has several concerns about inaccuracies with the medications her mother is receiving at the rehab and is trying to reconcile this. Rachele is going in to [facility] today to receive her monthly octreotide depot injection.     PMHx:  Past Medical History:   Diagnosis Date    Anemia     Anxiety and depression     Arthritis     AVM (arteriovenous malformation)     Chronic hepatitis C with cirrhosis (H)     Clotting disorder (H)     ESRD (end stage renal disease) (H)     on dialysis    GI bleed     recurrent    Glaucoma     Hyperlipidemia     Hypertension goal BP (blood pressure) < 140/80        PSurgHx:  Past Surgical History:   Procedure Laterality Date    CAPSULE/PILL CAM ENDOSCOPY N/A 3/27/2019    Procedure: Capsule/pill cam endoscopy;  Surgeon: Yosvany Ram MD;  Location: [facility]    CAPSULE/PILL CAM ENDOSCOPY N/A 2/2/2023    Procedure: IMAGING PROCEDURE, GI TRACT, INTRALUMINAL, VIA CAPSULE;  Surgeon: Mulu Nur DO;  Location: [facility]    COLONOSCOPY N/A 9/4/2015    Procedure: COMBINED COLONOSCOPY, SINGLE OR MULTIPLE BIOPSY/POLYPECTOMY BY BIOPSY;  Surgeon: Rupesh Lopez MD;  Location: [facility]    COLONOSCOPY N/A 9/19/2018    Procedure: COLONOSCOPY;  enteroscopy small bowel  COLONOSCOPY;  Surgeon: Ankit Baires MD;  Location: [facility]    ENTEROSCOPY SMALL BOWEL N/A 3/9/2023    Procedure: antegrade single balloon enteroscopy with argon plasma coagulation of arteriovenous malformations;  Surgeon: Ankit Baires MD;   Location: [facility]    ESOPHAGOSCOPY, GASTROSCOPY, DUODENOSCOPY (EGD), COMBINED N/A 12/18/2014    Procedure: COMBINED ESOPHAGOSCOPY, GASTROSCOPY, DUODENOSCOPY (EGD);  Surgeon: Betsy Carvajal MD;  Location: [facility]    ESOPHAGOSCOPY, GASTROSCOPY, DUODENOSCOPY (EGD), COMBINED N/A 4/25/2015    Procedure: COMBINED ESOPHAGOSCOPY, GASTROSCOPY, DUODENOSCOPY (EGD);  Surgeon: Yosvany Ram MD;  Location:  GI    ESOPHAGOSCOPY, GASTROSCOPY, DUODENOSCOPY (EGD), COMBINED N/A 5/5/2015    Procedure: COMBINED ESOPHAGOSCOPY, GASTROSCOPY, DUODENOSCOPY (EGD);  Surgeon: Mariano Mistry MD;  Location:  GI    HC CAPSULE ENDOSCOPY N/A 9/30/2015    Procedure: CAPSULE/PILL CAM ENDOSCOPY;  Surgeon: Pan Dhaliwal MD;  Location:  GI     VASCULAR SURGERY PROCEDURE UNLIST      HYSTERECTOMY  1980    KYREE    LUMPECTOMY BREAST         MEDS:  Current Outpatient Medications   Medication    atorvastatin (LIPITOR) 20 MG tablet    benzonatate (TESSALON) 100 MG capsule    calcium acetate (PHOSLO) 667 MG CAPS capsule    gabapentin (NEURONTIN) 300 MG capsule    hydrOXYzine (ATARAX) 25 MG tablet    losartan (COZAAR) 50 MG tablet    multivitamin RENAL (RENAVITE RX/NEPHROVITE) 1 tablet tablet    octreotide (SANDOSTATIN LAR) 20 MG injection    pantoprazole (PROTONIX) 20 MG EC tablet    polyethylene glycol (MIRALAX) 17 GM/Dose powder    sertraline (ZOLOFT) 50 MG tablet    sucroferric oxyhydroxide (VELPHORO) 500 MG CHEW chewable tablet     No current facility-administered medications for this visit.     ALLERGIES:    Allergies   Allergen Reactions    Abacavir Itching    Lisinopril Cough    Dust Mites     Hydrochlorothiazide Itching     Severe      No Clinical Screening - See Comments      History of blood transfusion reactions and pre-treats with Benadryl.     Spironolactone Nausea    Sulfa Antibiotics Hives    Valsartan Itching    Valsartan-Hydrochlorothiazide Itching     severe     FHx:  Family History   Problem Relation Age of  Onset    Diabetes Mother     Alzheimer Disease Mother     Substance Abuse Son     Cancer Sister     Soft Tissue Cancer Sister     Breast Cancer Sister     Hyperlipidemia Daughter     Alcoholism Brother     Spine Problems Sister        SOCIAL Hx:  Social History     Socioeconomic History    Marital status:      Spouse name: Not on file    Number of children: Not on file    Years of education: Not on file    Highest education level: Not on file   Occupational History    Not on file   Tobacco Use    Smoking status: Former     Packs/day: 2.00     Years: 45.00     Pack years: 90.00     Types: Cigarettes     Start date: 1953     Quit date: 2002     Years since quittin.5    Smokeless tobacco: Never   Vaping Use    Vaping status: Not on file   Substance and Sexual Activity    Alcohol use: No    Drug use: Yes     Types: Marijuana     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    Sexual activity: Not Currently   Other Topics Concern    Parent/sibling w/ CABG, MI or angioplasty before 65F 55M? No   Social History Narrative    Not on file     Social Determinants of Health     Financial Resource Strain: Not on file   Food Insecurity: Not on file   Transportation Needs: Not on file   Physical Activity: Not on file   Stress: Not on file   Social Connections: Not on file   Intimate Partner Violence: Not on file   Housing Stability: Not on file       ROS: A comprehensive Review of Systems was asked and answered in the negative unless specifically commented upon in the HPI    Physical Exam  Gen: somnolent, NAD,   HEENT: Moist mucus membranes, no scleral icterus.  Lungs: no respiratory distress      LABS:  Lab Requisition on 2023   Component Date Value Ref Range Status    WBC Count 2023 7.4  4.0 - 11.0 10e3/uL Final    RBC Count 2023 3.00 (L)  3.80 - 5.20 10e6/uL Final    Hemoglobin 2023 8.9 (L)  11.7 - 15.7 g/dL Final    Hematocrit 2023 28.9 (L)  35.0 - 47.0  % Final    MCV 06/06/2023 96  78 - 100 fL Final    MCH 06/06/2023 29.7  26.5 - 33.0 pg Final    MCHC 06/06/2023 30.8 (L)  31.5 - 36.5 g/dL Final    RDW 06/06/2023 19.1 (H)  10.0 - 15.0 % Final    Platelet Count 06/06/2023 347  150 - 450 10e3/uL Final     6/2/23  Post-transfusion iron studies  Ferritin 222  Iron 62  TIBC 203 L  %sat 31      Ankit Baires MD

## 2023-06-20 NOTE — NURSING NOTE
Is the patient currently in the state of [place]? YES    Visit mode:VIDEO    If the visit is dropped, the patient can be reconnected by: VIDEO VISIT: Text to cell phone: [phone]    Will anyone else be joining the visit? NO      How would you like to obtain your AVS? MyChart    Are changes needed to the allergy or medication list? NO  Charla is requesting current medication list from rehab facility.   Patient is currently in a rehab facility. She is staying at [facility].   Charla states her mother is very confused and did not recognize her on  6/19/2023. Charla states patient had two  Buprenorphine Transdermal System 5mcg/hr pain patches on when she arrived at the facility on 6/19/2023. Patient was grabbing her thigh in pain but she is unable to communicate pain per Charla.     Charla also mentioned her mother was given a shot in her abdomen for an enlarged heart. Charla states her mother does not have an enlarged heart.     Reason for visit: ALVIN Marion, Virtual Facilitator

## 2023-06-20 NOTE — PROGRESS NOTES
Virtual Visit Details    Type of service:  Video Visit   Video Start Time: 9:04 AM  Video End Time:9:25 AM    Originating Location (pt. Location): TCU    Distant Location (provider location):  Off-site  Platform used for Video Visit: Kimmy       INTERVENTIONAL ENDOSCOPY OUTPATIENT CLINIC CONSULT  DATE OF SERVICE: 06/20/23   Reason for Consultation: chronic small bowel bleeding from angioectasias    ASSESSMENT:  Rachele is a 83 year old female with a PMHx of ESRD on HD and h/o HCV s/p treatment with SVR and compensated cirrhosis who is here for follow up for recurrent GI bleeding from angioectasias in the stomach, small bowel, and cecum s/p prior endoscopic therapy in ~2018 and chronic monthly octreotide depot injections who recently developed recurrent anemia to a hgb of 5.6 here for follow up. She is currently residing in at [facility] at [place] and is accompanied with her daughter Charla. However, today Rachele is quite confused and having difficulty keeping her eyes open. Per Charla, this is due to a mistake with two pain patches having been applied to Rachele which were removed today.    Rachele had recent recurrent anemia without overt GI bleeding noted. I'm not sure if this would be associated with a 'shot' Rachele received while at her TCU prior to her hospitalization as Charla is suspicious of. I can't quite tell what that could have been.     If we are to reinvestigate endoscopically to address/ablate any recurrent angioectasias, I would recommend both an antegrade enteroscopy and colonoscopy given her prior history/burden of disease. However, with her current mental status we should hold off. Additionally, she will have difficulty tolerating a bowel prep even more so now. For now I recommend rechecking her hgb and iron studies to get a sense of the trend. Ultimately, we may just try to manage this medically with octreotide and intermittent transfusions.     Will defer to Rachele's PCP  in regards to helping reconcile her medications with her rehab facility.    RECOMMENDATIONS:  - Repeat CBC and iron studies  - Continue monthly octreotide injections    Ankit Baires MD  [facility]  Division of Gastroenterology and Hepatology  [facility] [address] [place], [place] [address]    ________________________________________________________________  HPI:  Rachele is a 83 year old female with a PMHx of ESRD on HD and h/o HCV s/p treatment with SVR and compensated cirrhosis who is here for follow up for recurrent GI bleeding from angioectasias in the stomach, small bowel, and cecum s/p prior endoscopic therapy in ~2018 and chronic monthly octreotide depot injections who recently developed recurrent anemia to a hgb of 5.6 here for follow up. She is currently residing in at [facility] at [place] and is accompanied with her daughter Charla. However, today Rachele is quite confused and having difficulty keeping her eyes open. Per Charla, this is due to a mistake with two pain patches having been applied to Rachele which were removed today.    In late May Rachele was at a TCU working with PT/OT but developed significant weakness. Her hemoglobin was checked and was 5.6 and she was admitted to [facility]. She was not having any overt GI bleeding symptoms per Charla. No melena or hematochezia. Charla is concerned that prior to her feeling week she received a 'shot' at the TCU and was told it was for an enlarged heart which Rachele does not have. It's not clear to Charla what this injection was and she is concerned that it may have caused the acute drop in her hemoglobin. Earlier in May she was admitted with weakness as well with a Hgb in the 7s. Apparently, she had been having hemoptysis. CT Chest was essentially normal and no further hemoptysis noted.    Prior to this year, she had been doing relatively well. She has been on monthly octreotide 20 mg  depot shots that she receives at the infusion center and has been on this for ~3 years.  Her Hgb had been holding up around ~9 following enteroscopy procedures to ablate small bowel angioectasias.    Charla has several concerns about inaccuracies with the medications her mother is receiving at the rehab and is trying to reconcile this. Rachele is going in to [facility] today to receive her monthly octreotide depot injection.     PMHx:  Past Medical History:   Diagnosis Date     Anemia      Anxiety and depression      Arthritis      AVM (arteriovenous malformation)      Chronic hepatitis C with cirrhosis (H)      Clotting disorder (H)      ESRD (end stage renal disease) (H)     on dialysis     GI bleed     recurrent     Glaucoma      Hyperlipidemia      Hypertension goal BP (blood pressure) < 140/80        PSurgHx:  Past Surgical History:   Procedure Laterality Date     CAPSULE/PILL CAM ENDOSCOPY N/A 3/27/2019    Procedure: Capsule/pill cam endoscopy;  Surgeon: Yosvany Ram MD;  [facility]     CAPSULE/PILL CAM ENDOSCOPY N/A 2/2/2023    Procedure: IMAGING PROCEDURE, GI TRACT, INTRALUMINAL, VIA CAPSULE;  Surgeon: Mulu Nur DO;  [facility]     COLONOSCOPY N/A 9/4/2015    Procedure: COMBINED COLONOSCOPY, SINGLE OR MULTIPLE BIOPSY/POLYPECTOMY BY BIOPSY;  Surgeon: Rupesh Lopez MD;  [facility]     COLONOSCOPY N/A 9/19/2018    Procedure: COLONOSCOPY;  enteroscopy small bowel  COLONOSCOPY;  Surgeon: Ankit Baires MD;  [facility]     ENTEROSCOPY SMALL BOWEL N/A 3/9/2023    Procedure: antegrade single balloon enteroscopy with argon plasma coagulation of arteriovenous malformations;  Surgeon: Ankit Baires MD;  [facility]     ESOPHAGOSCOPY, GASTROSCOPY, DUODENOSCOPY (EGD), COMBINED N/A 12/18/2014    Procedure: COMBINED ESOPHAGOSCOPY, GASTROSCOPY, DUODENOSCOPY (EGD);  Surgeon: Betsy Carvajal MD;  [facility]     ESOPHAGOSCOPY, GASTROSCOPY,  DUODENOSCOPY (EGD), COMBINED N/A 4/25/2015    Procedure: COMBINED ESOPHAGOSCOPY, GASTROSCOPY, DUODENOSCOPY (EGD);  Surgeon: Yosvany Ram MD;  Location:  GI     ESOPHAGOSCOPY, GASTROSCOPY, DUODENOSCOPY (EGD), COMBINED N/A 5/5/2015    Procedure: COMBINED ESOPHAGOSCOPY, GASTROSCOPY, DUODENOSCOPY (EGD);  Surgeon: Mariano Mistry MD;  Location:  GI     HC CAPSULE ENDOSCOPY N/A 9/30/2015    Procedure: CAPSULE/PILL CAM ENDOSCOPY;  Surgeon: Pan Dhaliwal MD;  Location:  GI      VASCULAR SURGERY PROCEDURE UNLIST       HYSTERECTOMY  1980    KYREE     LUMPECTOMY BREAST         MEDS:  Current Outpatient Medications   Medication     atorvastatin (LIPITOR) 20 MG tablet     benzonatate (TESSALON) 100 MG capsule     calcium acetate (PHOSLO) 667 MG CAPS capsule     gabapentin (NEURONTIN) 300 MG capsule     hydrOXYzine (ATARAX) 25 MG tablet     losartan (COZAAR) 50 MG tablet     multivitamin RENAL (RENAVITE RX/NEPHROVITE) 1 tablet tablet     octreotide (SANDOSTATIN LAR) 20 MG injection     pantoprazole (PROTONIX) 20 MG EC tablet     polyethylene glycol (MIRALAX) 17 GM/Dose powder     sertraline (ZOLOFT) 50 MG tablet     sucroferric oxyhydroxide (VELPHORO) 500 MG CHEW chewable tablet     No current facility-administered medications for this visit.     ALLERGIES:    Allergies   Allergen Reactions     Abacavir Itching     Lisinopril Cough     Dust Mites      Hydrochlorothiazide Itching     Severe       No Clinical Screening - See Comments      History of blood transfusion reactions and pre-treats with Benadryl.      Spironolactone Nausea     Sulfa Antibiotics Hives     Valsartan Itching     Valsartan-Hydrochlorothiazide Itching     severe     FHx:  Family History   Problem Relation Age of Onset     Diabetes Mother      Alzheimer Disease Mother      Substance Abuse Son      Cancer Sister      Soft Tissue Cancer Sister      Breast Cancer Sister      Hyperlipidemia Daughter      Alcoholism Brother      Spine Problems  Sister        SOCIAL Hx:  Social History     Socioeconomic History     Marital status:      Spouse name: Not on file     Number of children: Not on file     Years of education: Not on file     Highest education level: Not on file   Occupational History     Not on file   Tobacco Use     Smoking status: Former     Packs/day: 2.00     Years: 45.00     Pack years: 90.00     Types: Cigarettes     Start date: 1953     Quit date: 2002     Years since quittin.5     Smokeless tobacco: Never   Vaping Use     Vaping status: Not on file   Substance and Sexual Activity     Alcohol use: No     Drug use: Yes     Types: Marijuana     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     Sexual activity: Not Currently   Other Topics Concern     Parent/sibling w/ CABG, MI or angioplasty before 65F 55M? No   Social History Narrative     Not on file     Social Determinants of Health     Financial Resource Strain: Not on file   Food Insecurity: Not on file   Transportation Needs: Not on file   Physical Activity: Not on file   Stress: Not on file   Social Connections: Not on file   Intimate Partner Violence: Not on file   Housing Stability: Not on file       ROS: A comprehensive Review of Systems was asked and answered in the negative unless specifically commented upon in the HPI    Physical Exam  Gen: somnolent, NAD,   HEENT: Moist mucus membranes, no scleral icterus.  Lungs: no respiratory distress      LABS:  Lab Requisition on 2023   Component Date Value Ref Range Status     WBC Count 2023 7.4  4.0 - 11.0 10e3/uL Final     RBC Count 2023 3.00 (L)  3.80 - 5.20 10e6/uL Final     Hemoglobin 2023 8.9 (L)  11.7 - 15.7 g/dL Final     Hematocrit 2023 28.9 (L)  35.0 - 47.0 % Final     MCV 2023 96  78 - 100 fL Final     MCH 2023 29.7  26.5 - 33.0 pg Final     MCHC 2023 30.8 (L)  31.5 - 36.5 g/dL Final     RDW 2023 19.1 (H)  10.0 - 15.0 % Final      Platelet Count 06/06/2023 347  150 - 450 10e3/uL Final     6/2/23  Post-transfusion iron studies  Ferritin 222  Iron 62  TIBC 203 L  %sat 31

## 2023-06-21 ENCOUNTER — APPOINTMENT (OUTPATIENT)
Dept: CT IMAGING | Facility: CLINIC | Age: 83
DRG: 304 | End: 2023-06-21
Attending: STUDENT IN AN ORGANIZED HEALTH CARE EDUCATION/TRAINING PROGRAM
Payer: MEDICARE

## 2023-06-21 ENCOUNTER — APPOINTMENT (OUTPATIENT)
Dept: GENERAL RADIOLOGY | Facility: CLINIC | Age: 83
DRG: 304 | End: 2023-06-21
Attending: STUDENT IN AN ORGANIZED HEALTH CARE EDUCATION/TRAINING PROGRAM
Payer: MEDICARE

## 2023-06-21 ENCOUNTER — HOSPITAL ENCOUNTER (INPATIENT)
Facility: CLINIC | Age: 83
LOS: 15 days | Discharge: NURSING, REHAB OR LONG-TERM CARE | DRG: 304 | End: 2023-07-06
Attending: STUDENT IN AN ORGANIZED HEALTH CARE EDUCATION/TRAINING PROGRAM | Admitting: STUDENT IN AN ORGANIZED HEALTH CARE EDUCATION/TRAINING PROGRAM
Payer: MEDICARE

## 2023-06-21 DIAGNOSIS — I10 HYPERTENSION GOAL BP (BLOOD PRESSURE) < 140/80: Primary | Chronic | ICD-10-CM

## 2023-06-21 DIAGNOSIS — I16.0 HYPERTENSIVE URGENCY: ICD-10-CM

## 2023-06-21 DIAGNOSIS — K92.2 GASTROINTESTINAL HEMORRHAGE, UNSPECIFIED GASTROINTESTINAL HEMORRHAGE TYPE: ICD-10-CM

## 2023-06-21 DIAGNOSIS — T50.901A OVERDOSE, ACCIDENTAL OR UNINTENTIONAL, INITIAL ENCOUNTER: ICD-10-CM

## 2023-06-21 DIAGNOSIS — G93.40 ACUTE ENCEPHALOPATHY: ICD-10-CM

## 2023-06-21 LAB
ABO/RH(D): NORMAL
ALBUMIN SERPL BCG-MCNC: 4.3 G/DL (ref 3.5–5.2)
ALP SERPL-CCNC: 250 U/L (ref 35–104)
ALT SERPL W P-5'-P-CCNC: 18 U/L (ref 0–50)
AMMONIA PLAS-SCNC: 17 UMOL/L (ref 11–51)
ANION GAP SERPL CALCULATED.3IONS-SCNC: 18 MMOL/L (ref 7–15)
ANTIBODY SCREEN: NEGATIVE
AST SERPL W P-5'-P-CCNC: 30 U/L (ref 0–45)
ATRIAL RATE - MUSE: 0 BPM
ATRIAL RATE - MUSE: 39 BPM
BASE EXCESS BLDV CALC-SCNC: 5.7 MMOL/L (ref -7.7–1.9)
BASOPHILS # BLD AUTO: 0.1 10E3/UL (ref 0–0.2)
BASOPHILS NFR BLD AUTO: 0 %
BILIRUB SERPL-MCNC: 0.8 MG/DL
BUN SERPL-MCNC: 37.7 MG/DL (ref 8–23)
CALCIUM SERPL-MCNC: 9.4 MG/DL (ref 8.8–10.2)
CHLORIDE SERPL-SCNC: 97 MMOL/L (ref 98–107)
CREAT SERPL-MCNC: 7.87 MG/DL (ref 0.51–0.95)
DEPRECATED HCO3 PLAS-SCNC: 26 MMOL/L (ref 22–29)
DIASTOLIC BLOOD PRESSURE - MUSE: NORMAL MMHG
DIASTOLIC BLOOD PRESSURE - MUSE: NORMAL MMHG
EOSINOPHIL # BLD AUTO: 0.1 10E3/UL (ref 0–0.7)
EOSINOPHIL NFR BLD AUTO: 1 %
ERYTHROCYTE [DISTWIDTH] IN BLOOD BY AUTOMATED COUNT: 16.9 % (ref 10–15)
GFR SERPL CREATININE-BSD FRML MDRD: 5 ML/MIN/1.73M2
GLUCOSE SERPL-MCNC: 126 MG/DL (ref 70–99)
HCO3 BLDV-SCNC: 30 MMOL/L (ref 21–28)
HCT VFR BLD AUTO: 28.5 % (ref 35–47)
HGB BLD-MCNC: 8.2 G/DL (ref 11.7–15.7)
HOLD SPECIMEN: NORMAL
IMM GRANULOCYTES # BLD: 0.1 10E3/UL
IMM GRANULOCYTES NFR BLD: 1 %
INR PPP: 1.28 (ref 0.85–1.15)
INTERPRETATION ECG - MUSE: NORMAL
INTERPRETATION ECG - MUSE: NORMAL
LACTATE SERPL-SCNC: 0.8 MMOL/L (ref 0.7–2)
LIPASE SERPL-CCNC: 38 U/L (ref 13–60)
LYMPHOCYTES # BLD AUTO: 0.5 10E3/UL (ref 0.8–5.3)
LYMPHOCYTES NFR BLD AUTO: 4 %
MAGNESIUM SERPL-MCNC: 2.4 MG/DL (ref 1.7–2.3)
MCH RBC QN AUTO: 29.1 PG (ref 26.5–33)
MCHC RBC AUTO-ENTMCNC: 28.8 G/DL (ref 31.5–36.5)
MCV RBC AUTO: 101 FL (ref 78–100)
MONOCYTES # BLD AUTO: 1 10E3/UL (ref 0–1.3)
MONOCYTES NFR BLD AUTO: 8 %
NEUTROPHILS # BLD AUTO: 10.4 10E3/UL (ref 1.6–8.3)
NEUTROPHILS NFR BLD AUTO: 86 %
NRBC # BLD AUTO: 0 10E3/UL
NRBC BLD AUTO-RTO: 0 /100
O2/TOTAL GAS SETTING VFR VENT: 21 %
P AXIS - MUSE: NORMAL DEGREES
P AXIS - MUSE: NORMAL DEGREES
PCO2 BLDV: 43 MM HG (ref 40–50)
PH BLDV: 7.46 [PH] (ref 7.32–7.43)
PLATELET # BLD AUTO: 333 10E3/UL (ref 150–450)
PO2 BLDV: 52 MM HG (ref 25–47)
POTASSIUM SERPL-SCNC: 5.2 MMOL/L (ref 3.4–5.3)
PR INTERVAL - MUSE: NORMAL MS
PR INTERVAL - MUSE: NORMAL MS
PROCALCITONIN SERPL IA-MCNC: 2.29 NG/ML
PROT SERPL-MCNC: 8.4 G/DL (ref 6.4–8.3)
QRS DURATION - MUSE: 0 MS
QRS DURATION - MUSE: 180 MS
QT - MUSE: 0 MS
QT - MUSE: 492 MS
QTC - MUSE: 0 MS
QTC - MUSE: 634 MS
R AXIS - MUSE: 0 DEGREES
R AXIS - MUSE: 258 DEGREES
RBC # BLD AUTO: 2.82 10E6/UL (ref 3.8–5.2)
SODIUM SERPL-SCNC: 141 MMOL/L (ref 136–145)
SPECIMEN EXPIRATION DATE: NORMAL
SYSTOLIC BLOOD PRESSURE - MUSE: NORMAL MMHG
SYSTOLIC BLOOD PRESSURE - MUSE: NORMAL MMHG
T AXIS - MUSE: 0 DEGREES
T AXIS - MUSE: 198 DEGREES
T4 FREE SERPL-MCNC: 1.12 NG/DL (ref 0.9–1.7)
TROPONIN T SERPL HS-MCNC: 58 NG/L
TROPONIN T SERPL HS-MCNC: 62 NG/L
TSH SERPL DL<=0.005 MIU/L-ACNC: 10.1 UIU/ML (ref 0.3–4.2)
VENTRICULAR RATE- MUSE: 0 BPM
VENTRICULAR RATE- MUSE: 100 BPM
VIT B12 SERPL-MCNC: 3150 PG/ML (ref 232–1245)
WBC # BLD AUTO: 12.1 10E3/UL (ref 4–11)

## 2023-06-21 PROCEDURE — 83735 ASSAY OF MAGNESIUM: CPT | Performed by: STUDENT IN AN ORGANIZED HEALTH CARE EDUCATION/TRAINING PROGRAM

## 2023-06-21 PROCEDURE — 84439 ASSAY OF FREE THYROXINE: CPT | Performed by: STUDENT IN AN ORGANIZED HEALTH CARE EDUCATION/TRAINING PROGRAM

## 2023-06-21 PROCEDURE — 84145 PROCALCITONIN (PCT): CPT | Performed by: STUDENT IN AN ORGANIZED HEALTH CARE EDUCATION/TRAINING PROGRAM

## 2023-06-21 PROCEDURE — 85025 COMPLETE CBC W/AUTO DIFF WBC: CPT | Performed by: STUDENT IN AN ORGANIZED HEALTH CARE EDUCATION/TRAINING PROGRAM

## 2023-06-21 PROCEDURE — 80053 COMPREHEN METABOLIC PANEL: CPT | Performed by: STUDENT IN AN ORGANIZED HEALTH CARE EDUCATION/TRAINING PROGRAM

## 2023-06-21 PROCEDURE — 70450 CT HEAD/BRAIN W/O DYE: CPT | Mod: 26 | Performed by: RADIOLOGY

## 2023-06-21 PROCEDURE — 36415 COLL VENOUS BLD VENIPUNCTURE: CPT | Performed by: STUDENT IN AN ORGANIZED HEALTH CARE EDUCATION/TRAINING PROGRAM

## 2023-06-21 PROCEDURE — 84484 ASSAY OF TROPONIN QUANT: CPT | Performed by: STUDENT IN AN ORGANIZED HEALTH CARE EDUCATION/TRAINING PROGRAM

## 2023-06-21 PROCEDURE — 999N000127 HC STATISTIC PERIPHERAL IV START W US GUIDANCE

## 2023-06-21 PROCEDURE — 84484 ASSAY OF TROPONIN QUANT: CPT

## 2023-06-21 PROCEDURE — 71045 X-RAY EXAM CHEST 1 VIEW: CPT | Mod: 26 | Performed by: RADIOLOGY

## 2023-06-21 PROCEDURE — 120N000002 HC R&B MED SURG/OB UMMC

## 2023-06-21 PROCEDURE — 84238 ASSAY NONENDOCRINE RECEPTOR: CPT

## 2023-06-21 PROCEDURE — 36415 COLL VENOUS BLD VENIPUNCTURE: CPT

## 2023-06-21 PROCEDURE — 84443 ASSAY THYROID STIM HORMONE: CPT | Performed by: STUDENT IN AN ORGANIZED HEALTH CARE EDUCATION/TRAINING PROGRAM

## 2023-06-21 PROCEDURE — 86850 RBC ANTIBODY SCREEN: CPT | Performed by: STUDENT IN AN ORGANIZED HEALTH CARE EDUCATION/TRAINING PROGRAM

## 2023-06-21 PROCEDURE — 250N000011 HC RX IP 250 OP 636: Performed by: STUDENT IN AN ORGANIZED HEALTH CARE EDUCATION/TRAINING PROGRAM

## 2023-06-21 PROCEDURE — 93005 ELECTROCARDIOGRAM TRACING: CPT | Performed by: STUDENT IN AN ORGANIZED HEALTH CARE EDUCATION/TRAINING PROGRAM

## 2023-06-21 PROCEDURE — 99222 1ST HOSP IP/OBS MODERATE 55: CPT | Mod: GC | Performed by: STUDENT IN AN ORGANIZED HEALTH CARE EDUCATION/TRAINING PROGRAM

## 2023-06-21 PROCEDURE — 85610 PROTHROMBIN TIME: CPT | Performed by: STUDENT IN AN ORGANIZED HEALTH CARE EDUCATION/TRAINING PROGRAM

## 2023-06-21 PROCEDURE — 99285 EMERGENCY DEPT VISIT HI MDM: CPT | Mod: 25 | Performed by: STUDENT IN AN ORGANIZED HEALTH CARE EDUCATION/TRAINING PROGRAM

## 2023-06-21 PROCEDURE — 999N000285 HC STATISTIC VASC ACCESS LAB DRAW WITH PIV START

## 2023-06-21 PROCEDURE — 87040 BLOOD CULTURE FOR BACTERIA: CPT | Performed by: STUDENT IN AN ORGANIZED HEALTH CARE EDUCATION/TRAINING PROGRAM

## 2023-06-21 PROCEDURE — 250N000013 HC RX MED GY IP 250 OP 250 PS 637

## 2023-06-21 PROCEDURE — 86901 BLOOD TYPING SEROLOGIC RH(D): CPT | Performed by: STUDENT IN AN ORGANIZED HEALTH CARE EDUCATION/TRAINING PROGRAM

## 2023-06-21 PROCEDURE — 82140 ASSAY OF AMMONIA: CPT | Performed by: STUDENT IN AN ORGANIZED HEALTH CARE EDUCATION/TRAINING PROGRAM

## 2023-06-21 PROCEDURE — 99222 1ST HOSP IP/OBS MODERATE 55: CPT | Performed by: INTERNAL MEDICINE

## 2023-06-21 PROCEDURE — 83690 ASSAY OF LIPASE: CPT | Performed by: STUDENT IN AN ORGANIZED HEALTH CARE EDUCATION/TRAINING PROGRAM

## 2023-06-21 PROCEDURE — 82607 VITAMIN B-12: CPT

## 2023-06-21 PROCEDURE — 93010 ELECTROCARDIOGRAM REPORT: CPT | Performed by: STUDENT IN AN ORGANIZED HEALTH CARE EDUCATION/TRAINING PROGRAM

## 2023-06-21 PROCEDURE — 70450 CT HEAD/BRAIN W/O DYE: CPT | Mod: MA

## 2023-06-21 PROCEDURE — 71045 X-RAY EXAM CHEST 1 VIEW: CPT

## 2023-06-21 PROCEDURE — 83605 ASSAY OF LACTIC ACID: CPT | Performed by: STUDENT IN AN ORGANIZED HEALTH CARE EDUCATION/TRAINING PROGRAM

## 2023-06-21 PROCEDURE — 82803 BLOOD GASES ANY COMBINATION: CPT | Performed by: STUDENT IN AN ORGANIZED HEALTH CARE EDUCATION/TRAINING PROGRAM

## 2023-06-21 PROCEDURE — 258N000003 HC RX IP 258 OP 636

## 2023-06-21 PROCEDURE — 250N000011 HC RX IP 250 OP 636

## 2023-06-21 RX ORDER — LABETALOL HYDROCHLORIDE 5 MG/ML
10 INJECTION, SOLUTION INTRAVENOUS ONCE
Status: COMPLETED | OUTPATIENT
Start: 2023-06-21 | End: 2023-06-21

## 2023-06-21 RX ORDER — ALBUTEROL SULFATE 90 UG/1
2 AEROSOL, METERED RESPIRATORY (INHALATION) EVERY 4 HOURS PRN
COMMUNITY
End: 2023-07-20

## 2023-06-21 RX ORDER — VIT B COMP NO.3/FOLIC/C/BIOTIN 1 MG-60 MG
1 TABLET ORAL DAILY
Status: DISCONTINUED | OUTPATIENT
Start: 2023-06-22 | End: 2023-07-06 | Stop reason: HOSPADM

## 2023-06-21 RX ORDER — LOSARTAN POTASSIUM 50 MG/1
50 TABLET ORAL ONCE
Status: COMPLETED | OUTPATIENT
Start: 2023-06-21 | End: 2023-06-21

## 2023-06-21 RX ORDER — IOPAMIDOL 755 MG/ML
71 INJECTION, SOLUTION INTRAVASCULAR ONCE
Status: DISCONTINUED | OUTPATIENT
Start: 2023-06-21 | End: 2023-06-21

## 2023-06-21 RX ORDER — CALCIUM ACETATE 667 MG/1
667 CAPSULE ORAL
Status: DISCONTINUED | OUTPATIENT
Start: 2023-06-21 | End: 2023-06-26

## 2023-06-21 RX ORDER — PANTOPRAZOLE SODIUM 20 MG/1
20 TABLET, DELAYED RELEASE ORAL
Status: DISCONTINUED | OUTPATIENT
Start: 2023-06-21 | End: 2023-07-06 | Stop reason: HOSPADM

## 2023-06-21 RX ORDER — AMLODIPINE BESYLATE 5 MG/1
5 TABLET ORAL DAILY
Status: ON HOLD | COMMUNITY
End: 2023-07-24

## 2023-06-21 RX ORDER — AMOXICILLIN 250 MG
2 CAPSULE ORAL 2 TIMES DAILY PRN
Status: DISCONTINUED | OUTPATIENT
Start: 2023-06-21 | End: 2023-07-06 | Stop reason: HOSPADM

## 2023-06-21 RX ORDER — LOSARTAN POTASSIUM 50 MG/1
50 TABLET ORAL DAILY
Status: DISCONTINUED | OUTPATIENT
Start: 2023-06-22 | End: 2023-07-03

## 2023-06-21 RX ORDER — AMLODIPINE BESYLATE 5 MG/1
5 TABLET ORAL ONCE
Status: COMPLETED | OUTPATIENT
Start: 2023-06-21 | End: 2023-06-21

## 2023-06-21 RX ORDER — HYDRALAZINE HYDROCHLORIDE 20 MG/ML
10 INJECTION INTRAMUSCULAR; INTRAVENOUS EVERY 6 HOURS PRN
Status: DISCONTINUED | OUTPATIENT
Start: 2023-06-21 | End: 2023-06-22

## 2023-06-21 RX ORDER — AMOXICILLIN 250 MG
1 CAPSULE ORAL 2 TIMES DAILY PRN
Status: DISCONTINUED | OUTPATIENT
Start: 2023-06-21 | End: 2023-07-06 | Stop reason: HOSPADM

## 2023-06-21 RX ORDER — LABETALOL HYDROCHLORIDE 5 MG/ML
10 INJECTION, SOLUTION INTRAVENOUS EVERY 4 HOURS PRN
Status: DISCONTINUED | OUTPATIENT
Start: 2023-06-21 | End: 2023-06-22

## 2023-06-21 RX ORDER — ATORVASTATIN CALCIUM 20 MG/1
20 TABLET, FILM COATED ORAL DAILY
Status: DISCONTINUED | OUTPATIENT
Start: 2023-06-22 | End: 2023-07-06 | Stop reason: HOSPADM

## 2023-06-21 RX ORDER — LIDOCAINE 40 MG/G
CREAM TOPICAL
Status: DISCONTINUED | OUTPATIENT
Start: 2023-06-21 | End: 2023-07-06 | Stop reason: HOSPADM

## 2023-06-21 RX ORDER — PREGABALIN 50 MG/1
50 CAPSULE ORAL DAILY
COMMUNITY
End: 2023-07-25

## 2023-06-21 RX ORDER — SERTRALINE HYDROCHLORIDE 100 MG/1
100 TABLET, FILM COATED ORAL DAILY
Status: DISCONTINUED | OUTPATIENT
Start: 2023-06-22 | End: 2023-07-06 | Stop reason: HOSPADM

## 2023-06-21 RX ORDER — POLYETHYLENE GLYCOL 3350 17 G/17G
17 POWDER, FOR SOLUTION ORAL DAILY PRN
Status: DISCONTINUED | OUTPATIENT
Start: 2023-06-21 | End: 2023-07-06 | Stop reason: HOSPADM

## 2023-06-21 RX ORDER — AMLODIPINE BESYLATE 5 MG/1
5 TABLET ORAL DAILY
Status: DISCONTINUED | OUTPATIENT
Start: 2023-06-22 | End: 2023-07-06 | Stop reason: HOSPADM

## 2023-06-21 RX ORDER — CEFEPIME HYDROCHLORIDE 1 G/1
1 INJECTION, POWDER, FOR SOLUTION INTRAMUSCULAR; INTRAVENOUS EVERY 24 HOURS
Status: DISCONTINUED | OUTPATIENT
Start: 2023-06-21 | End: 2023-06-23

## 2023-06-21 RX ADMIN — AMLODIPINE BESYLATE 5 MG: 5 TABLET ORAL at 16:26

## 2023-06-21 RX ADMIN — VANCOMYCIN HYDROCHLORIDE 1250 MG: 10 INJECTION, POWDER, LYOPHILIZED, FOR SOLUTION INTRAVENOUS at 17:30

## 2023-06-21 RX ADMIN — CEFEPIME HYDROCHLORIDE 1 G: 1 INJECTION, POWDER, FOR SOLUTION INTRAMUSCULAR; INTRAVENOUS at 14:35

## 2023-06-21 RX ADMIN — SUCROFERRIC OXYHYDROXIDE 500 MG: 500 TABLET, CHEWABLE ORAL at 19:58

## 2023-06-21 RX ADMIN — LABETALOL HYDROCHLORIDE 10 MG: 5 INJECTION, SOLUTION INTRAVENOUS at 17:27

## 2023-06-21 ASSESSMENT — ACTIVITIES OF DAILY LIVING (ADL)
ADLS_ACUITY_SCORE: 37

## 2023-06-21 NOTE — CONSULTS
GASTROENTEROLOGY CONSULTATION    Date of Admission:  6/21/2023       ASSESSMENT AND RECOMMENDATIONS:   83 year old female with chronic anemia with gastric/small bowel/colonic AVMs on octreotide, ESRD on HD, compensated cirrhosis with HCV s/p treated with SVR, who is admitted 6/21 for altered mental status, and stable anemia without overt bleeding. GI consulted for possible bleeding.    # AVMs of gastric, small bowel, and colon  # Anemia, macrocytic, high retics count, due to chronic bleeding    Hgb 8.2 from baseline of 9. No overt bleeding. With current altered mental status and no overt bleeding and stable Hgb, would not pursue any GI procedure at this time. If overt bleeding, would need push enteroscopy and colonoscopy given hx of multiple areas of AVMs in the past.    # Altered mental status    RECOMMENDATIONS  - Trend Hgb daily  - Please closely monitor patient bowel movement with color/blood content  - There is no role of fecal occult blood in acute GI bleeding. This should be used only in the setting of colon cancer screening  - Consider IV iron if indicates  - Please call GI if overt bleeding seen    Gastroenterology follow up recommendations: Dr. Baires as previous  GI will sign off.  Thank you for involving us in this patient's care. Please do not hesitate to contact the GI service with any questions or concerns.     Patient care plan discussed with Dr. Oscar, GI staff physician.    Levi Montanez MD  GI fellow          Chief Complaint:   We were asked to evaluate this patient with known AVM and chronic anemia  History is obtained from the patient and the medical record.          History of Present Illness:   Rachele Martínez is a 83 year old female with chronic anemia with gastric/small bowel/colonic AVMs on octreotide, ESRD on HD, compensated cirrhosis with HCV s/p treated with SVR, who is admitted 6/21 for altered mental status, and stable anemia without overt bleeding. GI consulted for  possible bleeding.      Patient has known AVMs from gastric, small bowel, and colonic since at least 2015 which was treated intermittently with APC. Had anemia and hematochezia 2/2023. Last procedure was with Dr. Baires in 3/2023 with multiple gastric and small bowel to jejunal AVMs s/p APC. She is taking pantoprazole 20 mg BID, and octreotide 20 mg IM q 28 days. Was recently admitted 6/1-6/5/23 with acute on chronic anemia without overt bleeding. Was recently seen by Dr. Baires yesterday for follow-up of AVMs. Decision was not to pursue further intervention and manage conservatively for now given altered mental status and concerns that she might not be able to tolerates bowel preparation.     She was sent from TCU ([facility] [place]) to ED due to confusion which per daughter has been ongoing for a week, and worsening for the past 3 days. No fever/chills. Had one bowel movement at the ED (seen by RN, reported dark brown, with a streak of blood). Denies hematemesis. No abdominal pain.     NSAID use: not in medication list.    Prior endoscopy:   EGD 3/9/23 for anemia with known AVMs in small bowel:   - AVM in stomach s/p APC,   - multiple AVMs in second part/3rd part/4th part duodenum s/p APC.     Video capsule endoscopy 2/9/23: AVMs in small bowel.    Colonoscopy 9/18/18 for hematochezia   - Old blood clots were seen in the colon lumen.                        - The examined portion of the ileum was normal.                        - A few recently bleeding colonic angioectasias. Treated                        with argon plasma coagulation (APC).                        - The examination was otherwise normal.                        - External hemorrhoids.   Plan: - Repeat colonoscopy in 5 years given prior history of                        tubular adenomas.           Past Medical History:   Reviewed and edited as appropriate  Past Medical History:   Diagnosis Date     Anemia      Anxiety and depression      Arthritis       AVM (arteriovenous malformation)      Chronic hepatitis C with cirrhosis (H)      Clotting disorder (H)      ESRD (end stage renal disease) (H)     on dialysis     GI bleed     recurrent     Glaucoma      Hyperlipidemia      Hypertension goal BP (blood pressure) < 140/80             Past Surgical History:   Reviewed and edited as appropriate   Past Surgical History:   Procedure Laterality Date     CAPSULE/PILL CAM ENDOSCOPY N/A 3/27/2019    Procedure: Capsule/pill cam endoscopy;  Surgeon: Yosvany Ram MD;  [facility]     CAPSULE/PILL CAM ENDOSCOPY N/A 2/2/2023    Procedure: IMAGING PROCEDURE, GI TRACT, INTRALUMINAL, VIA CAPSULE;  Surgeon: Mulu Nur DO;  [facility]     COLONOSCOPY N/A 9/4/2015    Procedure: COMBINED COLONOSCOPY, SINGLE OR MULTIPLE BIOPSY/POLYPECTOMY BY BIOPSY;  Surgeon: Rupesh Lopez MD;  [facility]     COLONOSCOPY N/A 9/19/2018    Procedure: COLONOSCOPY;  enteroscopy small bowel  COLONOSCOPY;  Surgeon: Ankit Baires MD;  [facility]     ENTEROSCOPY SMALL BOWEL N/A 3/9/2023    Procedure: antegrade single balloon enteroscopy with argon plasma coagulation of arteriovenous malformations;  Surgeon: Ankit Baires MD;  [facility]     ESOPHAGOSCOPY, GASTROSCOPY, DUODENOSCOPY (EGD), COMBINED N/A 12/18/2014    Procedure: COMBINED ESOPHAGOSCOPY, GASTROSCOPY, DUODENOSCOPY (EGD);  Surgeon: Betsy Carvajal MD;  [facility]     ESOPHAGOSCOPY, GASTROSCOPY, DUODENOSCOPY (EGD), COMBINED N/A 4/25/2015    Procedure: COMBINED ESOPHAGOSCOPY, GASTROSCOPY, DUODENOSCOPY (EGD);  Surgeon: Yosvany Ram MD;  [facility]     ESOPHAGOSCOPY, GASTROSCOPY, DUODENOSCOPY (EGD), COMBINED N/A 5/5/2015    Procedure: COMBINED ESOPHAGOSCOPY, GASTROSCOPY, DUODENOSCOPY (EGD);  Surgeon: Mariano Mistry MD;  [facility]     HC CAPSULE ENDOSCOPY N/A 9/30/2015    Procedure: CAPSULE/PILL CAM ENDOSCOPY;  Surgeon: Pan Dhaliwal MD;  [facility]     HC  VASCULAR SURGERY PROCEDURE UNLIST       HYSTERECTOMY      KYREE     LUMPECTOMY BREAST              Social History:   Reviewed and edited as appropriate  Social History     Socioeconomic History     Marital status:      Spouse name: Not on file     Number of children: Not on file     Years of education: Not on file     Highest education level: Not on file   Occupational History     Not on file   Tobacco Use     Smoking status: Former     Packs/day: 2.00     Years: 45.00     Pack years: 90.00     Types: Cigarettes     Start date: 1953     Quit date: 2002     Years since quittin.5     Smokeless tobacco: Never   Substance and Sexual Activity     Alcohol use: No     Drug use: Yes     Types: Marijuana     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     Sexual activity: Not Currently   Other Topics Concern     Parent/sibling w/ CABG, MI or angioplasty before 65F 55M? No   Social History Narrative     Not on file     Social Determinants of Health     Financial Resource Strain: Not on file   Food Insecurity: Not on file   Transportation Needs: Not on file   Physical Activity: Not on file   Stress: Not on file   Social Connections: Not on file   Intimate Partner Violence: Not on file   Housing Stability: Not on file            Family History:   Reviewed and edited as appropriate  No known history of gastrointestinal/liver disease or  gastrointestinal malignancies       Allergies:   Reviewed and edited as appropriate     Allergies   Allergen Reactions     Abacavir Itching     Lisinopril Cough     Dust Mites      Hydrochlorothiazide Itching     Severe       No Clinical Screening - See Comments      History of blood transfusion reactions and pre-treats with Benadryl.      Spironolactone Nausea     Sulfa Antibiotics Hives     Valsartan Itching     Valsartan-Hydrochlorothiazide Itching     severe            Medications:   Reviewed         Review of Systems:     A complete review of  systems was performed and is negative except as noted in the HPI           Physical Exam:   BP (!) 224/87   Pulse 91   Resp 20   SpO2 98%   Wt:   Wt Readings from Last 2 Encounters:   06/05/23 58.8 kg (129 lb 10.1 oz)   05/21/23 60.5 kg (133 lb 4.8 oz)      Constitutional: agitated, confused  HEENT: Sclera anicteric  CV: No edema  Respiratory: Unlabored breathing  Abdomen: Non-distended, refused further abdominal exam  Neuro: Alert, confused, agitated, moving all 4 extremities equally         Data:   Labs and imaging below were independently reviewed and interpreted    Imaging: Reviewed.  CT 3/2022  1. No discrete pancreatic mass. Continued mild prominence of the  pancreatic duct measuring up to 3 mm.     2. Cirrhotic morphology of the liver.   a. Mild mesenteric edema, trace ascites, and dilated portal/splenic  veins suggests early portal hypertension.  b. Small arterially enhancing lesions in the liver without evidence of  washout on portal imaging, which may represent small flash filling  hemangiomas. LIRADS 3.     3. Layering gallbladder sludge. No evidence of acute cholecystitis.     4. Marked cortical atrophy with numerous cortical cysts.    US liver 1/2023  Moderate echogenic oval-shaped density in the gallbladder  suggestive of large sludge ball, cannot exclude small stones  intermixed in this area. No wall thickening or biliary dilation. No  liver masses. US LI-RADS 1: Negative. Small right renal cyst with  chronic renal medical disease of right kidney. Please correlate with  renal function evaluation.

## 2023-06-21 NOTE — LETTER
[facility] [address] [address]  [phone]    FACSIMILE TRANSMITTAL SHEET    TO: [employer]    COMPANY: [employer]   FAX NUMBER:  [phone]  PHONE NUMBER: [phone]     FROM: ANABEL Au  PHONE: [phone]  DATE: 07/06/23  NUMBER OF PAGES: n/a    _____URGENT _____REVIEW ONLY _____PLEASE COMMENT____PLEASE REPLY    NOTES/COMMENTS:     IP HD Notes - RN & MD SALLY Martínez - [birth year]    Resume Care 7/8/23  MWF: [phone] [phone], with Dr. Tarango              IF YOU DID NOT RECEIVE THE CORRECT NUMBER OF PAGES OR THE FAX DID NOT COME THROUGH CLEARLY, PLEASE CALL THE SENDER     CONFIDENTIALITY STATEMENT: Confidential information that may accompany this transmission contains protected health information under state and federal law and is legally privileged. This information is intended only for the use of the individual or entity named above and may be used only for carrying out treatment, payment or other healthcare operations. The recipient or person responsible for delivering this information is prohibited by law from disclosing this information without proper authorization to any other party, unless required to do so by law or regulation. If you are not the intended recipient, you are hereby notified that any review, dissemination, distribution, or copying of this message is strictly prohibited. If you have received this communication in error, please destroy the materials and contact us immediately by calling the number listed above. No response indicates that the information was received by the appropriate authorized party

## 2023-06-21 NOTE — ED NOTES
Bed: ED22  Expected date:   Expected time:   Means of arrival:   Comments:  N730 Altered mental status 25min ETA at 8485

## 2023-06-21 NOTE — PROGRESS NOTES
Brief Nephrology Note:    Pt dialyzed Monday per her usual MWF schedule (see OP dialysis note in Epic). Labs are ok today. BP's severely elevated due to not taking anti-hypertensives today, would restart meds and please include amlodipine 5 mg qday which was just started on 6/19. Plan to dialyze first thing in the morning. Please page with any concerns.    Zora Villagomez PA-C  [phone]

## 2023-06-21 NOTE — LETTER
[facility] [address] [address]  [phone]    FACSIMILE TRANSMITTAL SHEET    TO: [facility]  COMPANY: [facility]  FAX NUMBER: [phone]   PHONE NUMBER: [phone]     FROM: ANABEL Au  PHONE: [phone]  DATE: 07/06/23  NUMBER OF PAGES: n/a    _____URGENT __x__REVIEW ONLY _____PLEASE COMMENT____PLEASE REPLY    NOTES/COMMENTS:  Joanna HOGUE ([birth year])                                      IF YOU DID NOT RECEIVE THE CORRECT NUMBER OF PAGES OR THE FAX DID NOT COME THROUGH CLEARLY, PLEASE CALL THE SENDER     CONFIDENTIALITY STATEMENT: Confidential information that may accompany this transmission contains protected health information under state and federal law and is legally privileged. This information is intended only for the use of the individual or entity named above and may be used only for carrying out treatment, payment or other healthcare operations. The recipient or person responsible for delivering this information is prohibited by law from disclosing this information without proper authorization to any other party, unless required to do so by law or regulation. If you are not the intended recipient, you are hereby notified that any review, dissemination, distribution, or copying of this message is strictly prohibited. If you have received this communication in error, please destroy the materials and contact us immediately by calling the number listed above. No response indicates that the information was received by the appropriate authorized party

## 2023-06-21 NOTE — ED PROVIDER NOTES
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  Patient presents to the emergency department due to concerns for altered mental status.  Reported to be ongoing for at least 3 days.  Symptoms somewhat better today than previously as patient is speaking.  Patient was found with 2 Suboxone patches on at her nursing home.  She missed her dialysis appointment today due to her altered mental status.  She also refused to take her medications which is reportedly unusual for her.  Presents with her daughters.  1 daughter stated she thought her mother's face was \"crooked \"but when smiling reported everything appeared normal.  The patient is stating that something hurts but is unable to tell me what.  She is not endorsing shortness of breath.  No reported fevers or chills.    Past Medical History  Past Medical History:   Diagnosis Date     Anemia      Anxiety and depression      Arthritis      AVM (arteriovenous malformation)      Chronic hepatitis C with cirrhosis (H)      Clotting disorder (H)      ESRD (end stage renal disease) (H)     on dialysis     GI bleed     recurrent     Glaucoma      Hyperlipidemia      Hypertension goal BP (blood pressure) < 140/80      Past Surgical History:   Procedure Laterality Date     CAPSULE/PILL CAM ENDOSCOPY N/A 3/27/2019    Procedure: Capsule/pill cam endoscopy;  Surgeon: Yosvany Ram MD;  Location: [facility]     CAPSULE/PILL CAM ENDOSCOPY N/A 2/2/2023    Procedure: IMAGING PROCEDURE, GI TRACT, INTRALUMINAL, VIA CAPSULE;  Surgeon: Mulu Nur DO;  Location: [facility]     COLONOSCOPY N/A 9/4/2015    Procedure: COMBINED COLONOSCOPY, SINGLE OR MULTIPLE " BIOPSY/POLYPECTOMY BY BIOPSY;  Surgeon: Rupesh Lopez MD;  Location: [facility]     COLONOSCOPY N/A 9/19/2018    Procedure: COLONOSCOPY;  enteroscopy small bowel  COLONOSCOPY;  Surgeon: Ankit Baires MD;  Location:  GI     ENTEROSCOPY SMALL BOWEL N/A 3/9/2023    Procedure: antegrade single balloon enteroscopy with argon plasma coagulation of arteriovenous malformations;  Surgeon: Ankit Baires MD;  Location: [facility]     ESOPHAGOSCOPY, GASTROSCOPY, DUODENOSCOPY (EGD), COMBINED N/A 12/18/2014    Procedure: COMBINED ESOPHAGOSCOPY, GASTROSCOPY, DUODENOSCOPY (EGD);  Surgeon: Betsy Carvajal MD;  Location:  GI     ESOPHAGOSCOPY, GASTROSCOPY, DUODENOSCOPY (EGD), COMBINED N/A 4/25/2015    Procedure: COMBINED ESOPHAGOSCOPY, GASTROSCOPY, DUODENOSCOPY (EGD);  Surgeon: Yosvany Ram MD;  Location:  GI     ESOPHAGOSCOPY, GASTROSCOPY, DUODENOSCOPY (EGD), COMBINED N/A 5/5/2015    Procedure: COMBINED ESOPHAGOSCOPY, GASTROSCOPY, DUODENOSCOPY (EGD);  Surgeon: Mariano Mistry MD;  Location:  GI     HC CAPSULE ENDOSCOPY N/A 9/30/2015    Procedure: CAPSULE/PILL CAM ENDOSCOPY;  Surgeon: Pan Dhaliwal MD;  Location:  GI     HC VASCULAR SURGERY PROCEDURE UNLIST       HYSTERECTOMY  1980    KYREE     LUMPECTOMY BREAST       atorvastatin (LIPITOR) 20 MG tablet  benzonatate (TESSALON) 100 MG capsule  calcium acetate (PHOSLO) 667 MG CAPS capsule  gabapentin (NEURONTIN) 300 MG capsule  hydrOXYzine (ATARAX) 25 MG tablet  losartan (COZAAR) 50 MG tablet  multivitamin RENAL (RENAVITE RX/NEPHROVITE) 1 tablet tablet  octreotide (SANDOSTATIN LAR) 20 MG injection  pantoprazole (PROTONIX) 20 MG EC tablet  polyethylene glycol (MIRALAX) 17 GM/Dose powder  sertraline (ZOLOFT) 50 MG tablet  sucroferric oxyhydroxide (VELPHORO) 500 MG CHEW chewable tablet      Allergies   Allergen Reactions     Abacavir Itching     Lisinopril Cough     Dust Mites      Hydrochlorothiazide Itching     Severe       No Clinical Screening - See  Comments      History of blood transfusion reactions and pre-treats with Benadryl.      Spironolactone Nausea     Sulfa Antibiotics Hives     Valsartan Itching     Valsartan-Hydrochlorothiazide Itching     severe     Family History  Family History   Problem Relation Age of Onset     Diabetes Mother      Alzheimer Disease Mother      Substance Abuse Son      Cancer Sister      Soft Tissue Cancer Sister      Breast Cancer Sister      Hyperlipidemia Daughter      Alcoholism Brother      Spine Problems Sister      Social History   Social History     Tobacco Use     Smoking status: Former     Packs/day: 2.00     Years: 45.00     Pack years: 90.00     Types: Cigarettes     Start date: 1953     Quit date: 2002     Years since quittin.5     Smokeless tobacco: Never   Substance Use Topics     Alcohol use: No     Drug use: Yes     Types: Marijuana     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      Past medical history, past surgical history, medications, allergies, family history, and social history were reviewed with the patient. No additional pertinent items.      A complete review of systems was attempted but limited due to altered mental status.    Physical Exam   BP: (!) 225/86  Pulse: 99  Resp: 26  SpO2: 100 %  Physical Exam  Vital Signs Reviewed  Gen: Well nourished, well developed, resting comfortably, no acute distress  HEENT: NC/AT, PERRL, EOMI, MMM  Neck: Supple, FROM, no meningeal signs  CV: Regular Rate, no murmur/rub/gallop  Lungs/Chest: Normal Effort, CTAB  Abd: Non-distended, non-tender without rigidity rebound or guarding  MSK/Back: FROM, no visible deformity  Neuro: Alert and oriented to self.  GCS 14.  Cranial nerves II through XII unremarkable.  Extremity movement is symmetric with grossly intact tone and sensation.  Skin: Warm, Dry, Intact, no visible lesions    ED Course, Procedures, & Data       Patient seen and evaluated in ED 22 with family present  Hemoccult  positive  Labs obtained, sepsis fluids deferred due to lack of hypotension, good clinical perfusion and concern for volume overload due to ESRD with missed dialysis  Started on broad-spectrum antibiotics  X-ray and head CT obtained without acute findings.  Labs reviewed, slight elevation in BUN.  No major electrolyte abnormalities to explain symptoms.  Lactic acid within normal limits.  Procalcitonin is elevated at 2.29 which is higher than expected even in the setting of ESRD.  CBC is notable for a slight decrease in hemoglobin to 8.2 but still within one-point of baseline.  Mild leukocytosis is present.  Troponin is elevated likely secondary to demand.  EKG with significant artifact but no obvious ALEXANDRA.  Patient declining repeat attempts at EKG.  Patient appears somewhat improved laughing and joking with family in the room.  Nephrology made aware to schedule nonurgent dialysis  GI consulted -little acute concern given patient's history of AVMs that actively bleed relative stability  Labetalol 10 mg and home BP meds ordered  Admitted to medicine for further management    Procedures       ED Course Selections:        EKG Interpretation:      Interpreted by Georges Joy MD  Time reviewed: 1155  Symptoms at time of EKG: AMS   Rhythm: normal sinus   Rate: Normal  ST Segments/ T Waves: No acute ischemic changes    Clinical Impression: Limited by severe artifact, NSR, no clear ALEXANDRA                Results for orders placed or performed during the hospital encounter of 06/21/23   CT Head w/o Contrast     Status: None    Narrative    CT HEAD W/O CONTRAST 6/21/2023 12:45 PM    History: AMS x3 days     Comparison: 6/3/2023    Technique: Using multidetector thin collimation helical acquisition  technique, axial, coronal and sagittal CT images from the skull base  to the vertex were obtained without intravenous contrast.   (topogram) image(s) also obtained and reviewed.    Findings: There is no intracranial hemorrhage,  mass effect, or midline  shift. No acute loss of gray-white differentiation. Moderate  generalized atrophy. Moderate confluent and scattered hypodensity in  the periventricular white matter similar to prior and most suggestive  of chronic small vessel ischemic disease. The basal cisterns are  clear.    No acute calvarial fractures. Visualized paranasal sinuses and mastoid  air cells are relatively clear. Degenerative changes of the  temporomandibular joints. Calcifications of the carotid siphons.      Impression    Impression:    1. No acute intracranial pathology.  2. White matter changes similar to prior and most suggestive of  chronic small vessel ischemic disease.    I have personally reviewed the examination and initial interpretation  and I agree with the findings.    EKATERINA MAXWELL MD         SYSTEM ID:  [id number]   XR Chest Port 1 View     Status: None    Narrative    EXAM: XR CHEST PORT 1 VIEW  6/21/2023 12:25 PM     HISTORY:  AMS, ? PNA       COMPARISON:  6/2/2023    FINDINGS:   Portable AP semiupright view of the chest.    Trachea is midline. Cardiomediastinal silhouette and pulmonary  vasculature are within normal limits. Atherosclerotic calcification of  the aortic arch. No focal airspace opacity, pleural effusion or  appreciable pneumothorax.    No acute osseous abnormality. Visualized upper abdomen is  unremarkable.        Impression    IMPRESSION: No focal consolidation to suggest pneumonia.    I have personally reviewed the examination and initial interpretation  and I agree with the findings.    SUZAN CESAR MD         SYSTEM ID:  [id number]   INR     Status: Abnormal   Result Value Ref Range    INR 1.28 (H) 0.85 - 1.15   Comprehensive metabolic panel     Status: Abnormal   Result Value Ref Range    Sodium 141 136 - 145 mmol/L    Potassium 5.2 3.4 - 5.3 mmol/L    Chloride 97 (L) 98 - 107 mmol/L    Carbon Dioxide (CO2) 26 22 - 29 mmol/L    Anion Gap 18 (H) 7 - 15 mmol/L    Urea Nitrogen 37.7 (H)  8.0 - 23.0 mg/dL    Creatinine 7.87 (H) 0.51 - 0.95 mg/dL    Calcium 9.4 8.8 - 10.2 mg/dL    Glucose 126 (H) 70 - 99 mg/dL    Alkaline Phosphatase 250 (H) 35 - 104 U/L    AST 30 0 - 45 U/L    ALT 18 0 - 50 U/L    Protein Total 8.4 (H) 6.4 - 8.3 g/dL    Albumin 4.3 3.5 - 5.2 g/dL    Bilirubin Total 0.8 <=1.2 mg/dL    GFR Estimate 5 (L) >60 mL/min/1.73m2   Lipase     Status: Normal   Result Value Ref Range    Lipase 38 13 - 60 U/L   Lactic acid whole blood     Status: Normal   Result Value Ref Range    Lactic Acid 0.8 0.7 - 2.0 mmol/L   Ammonia     Status: Normal   Result Value Ref Range    Ammonia 17 11 - 51 umol/L   Procalcitonin     Status: Abnormal   Result Value Ref Range    Procalcitonin 2.29 (H) <0.05 ng/mL   Troponin T, High Sensitivity     Status: Abnormal   Result Value Ref Range    Troponin T, High Sensitivity 58 (H) <=14 ng/L   Magnesium     Status: Abnormal   Result Value Ref Range    Magnesium 2.4 (H) 1.7 - 2.3 mg/dL   TSH with free T4 reflex     Status: Abnormal   Result Value Ref Range    TSH 10.10 (H) 0.30 - 4.20 uIU/mL   Blood gas venous     Status: Abnormal   Result Value Ref Range    pH Venous 7.46 (H) 7.32 - 7.43    pCO2 Venous 43 40 - 50 mm Hg    pO2 Venous 52 (H) 25 - 47 mm Hg    Bicarbonate Venous 30 (H) 21 - 28 mmol/L    Base Excess/Deficit (+/-) 5.7 (H) -7.7 - 1.9 mmol/L    FIO2 21    CBC with platelets and differential     Status: Abnormal   Result Value Ref Range    WBC Count 12.1 (H) 4.0 - 11.0 10e3/uL    RBC Count 2.82 (L) 3.80 - 5.20 10e6/uL    Hemoglobin 8.2 (L) 11.7 - 15.7 g/dL    Hematocrit 28.5 (L) 35.0 - 47.0 %     (H) 78 - 100 fL    MCH 29.1 26.5 - 33.0 pg    MCHC 28.8 (L) 31.5 - 36.5 g/dL    RDW 16.9 (H) 10.0 - 15.0 %    Platelet Count 333 150 - 450 10e3/uL    % Neutrophils 86 %    % Lymphocytes 4 %    % Monocytes 8 %    % Eosinophils 1 %    % Basophils 0 %    % Immature Granulocytes 1 %    NRBCs per 100 WBC 0 <1 /100    Absolute Neutrophils 10.4 (H) 1.6 - 8.3 10e3/uL     Absolute Lymphocytes 0.5 (L) 0.8 - 5.3 10e3/uL    Absolute Monocytes 1.0 0.0 - 1.3 10e3/uL    Absolute Eosinophils 0.1 0.0 - 0.7 10e3/uL    Absolute Basophils 0.1 0.0 - 0.2 10e3/uL    Absolute Immature Granulocytes 0.1 <=0.4 10e3/uL    Absolute NRBCs 0.0 10e3/uL   T4 free     Status: Normal   Result Value Ref Range    Free T4 1.12 0.90 - 1.70 ng/dL   EKG 12-lead, tracing only     Status: None (Preliminary result)   Result Value Ref Range    Systolic Blood Pressure  mmHg    Diastolic Blood Pressure  mmHg    Ventricular Rate 100 BPM    Atrial Rate 39 BPM    NM Interval  ms    QRS Duration 180 ms     ms    QTc 634 ms    P Axis  degrees    R AXIS 258 degrees    T Axis 198 degrees    Interpretation ECG        Suspect arm lead reversal, interpretation assumes no reversal  Wide QRS rhythm with occasional Premature ventricular complexes  Non-specific intra-ventricular conduction block  Possible Anterolateral infarct , age undetermined  Abnormal ECG     CBC with platelets differential     Status: Abnormal    Narrative    The following orders were created for panel order CBC with platelets differential.  Procedure                               Abnormality         Status                     ---------                               -----------         ------                     CBC with platelets and d...[[id number]]  Abnormal            Final result                 Please view results for these tests on the individual orders.   ABO/Rh type and screen     Status: None (In process)    Narrative    The following orders were created for panel order ABO/Rh type and screen.  Procedure                               Abnormality         Status                     ---------                               -----------         ------                     Adult Type and Screen[[id number]]                            In process                   Please view results for these tests on the individual orders.     Medications   ceFEPIme  (MAXIPIME) 1g vial to attach to  ml bag for ADULTS or NS 50 ml bag for PEDS (1 g Intravenous $New Bag 6/21/23 3622)   vancomycin place palafox - receiving intermittent dosing (has no administration in time range)   vancomycin (VANCOCIN) 1,250 mg in 0.9% NaCl 250 mL intermittent infusion (has no administration in time range)   pantoprazole (PROTONIX) IV push injection 40 mg (has no administration in time range)   iopamidol (ISOVUE-370) solution 71 mL (has no administration in time range)   sodium chloride (PF) 0.9% PF flush 77 mL (has no administration in time range)   losartan (COZAAR) tablet 50 mg (has no administration in time range)   labetalol (NORMODYNE/TRANDATE) injection 10 mg (has no administration in time range)     Labs Ordered and Resulted from Time of ED Arrival to Time of ED Departure   INR - Abnormal       Result Value    INR 1.28 (*)    COMPREHENSIVE METABOLIC PANEL - Abnormal    Sodium 141      Potassium 5.2      Chloride 97 (*)     Carbon Dioxide (CO2) 26      Anion Gap 18 (*)     Urea Nitrogen 37.7 (*)     Creatinine 7.87 (*)     Calcium 9.4      Glucose 126 (*)     Alkaline Phosphatase 250 (*)     AST 30      ALT 18      Protein Total 8.4 (*)     Albumin 4.3      Bilirubin Total 0.8      GFR Estimate 5 (*)    PROCALCITONIN - Abnormal    Procalcitonin 2.29 (*)    TROPONIN T, HIGH SENSITIVITY - Abnormal    Troponin T, High Sensitivity 58 (*)    MAGNESIUM - Abnormal    Magnesium 2.4 (*)    TSH WITH FREE T4 REFLEX - Abnormal    TSH 10.10 (*)    BLOOD GAS VENOUS - Abnormal    pH Venous 7.46 (*)     pCO2 Venous 43      pO2 Venous 52 (*)     Bicarbonate Venous 30 (*)     Base Excess/Deficit (+/-) 5.7 (*)     FIO2 21     CBC WITH PLATELETS AND DIFFERENTIAL - Abnormal    WBC Count 12.1 (*)     RBC Count 2.82 (*)     Hemoglobin 8.2 (*)     Hematocrit 28.5 (*)      (*)     MCH 29.1      MCHC 28.8 (*)     RDW 16.9 (*)     Platelet Count 333      % Neutrophils 86      % Lymphocytes 4      %  Monocytes 8      % Eosinophils 1      % Basophils 0      % Immature Granulocytes 1      NRBCs per 100 WBC 0      Absolute Neutrophils 10.4 (*)     Absolute Lymphocytes 0.5 (*)     Absolute Monocytes 1.0      Absolute Eosinophils 0.1      Absolute Basophils 0.1      Absolute Immature Granulocytes 0.1      Absolute NRBCs 0.0     LIPASE - Normal    Lipase 38     LACTIC ACID WHOLE BLOOD - Normal    Lactic Acid 0.8     AMMONIA - Normal    Ammonia 17     T4 FREE - Normal    Free T4 1.12     ROUTINE UA WITH MICROSCOPIC REFLEX TO CULTURE   TROPONIN T, HIGH SENSITIVITY   TYPE AND SCREEN, ADULT   BLOOD CULTURE   BLOOD CULTURE   URINE CULTURE   ABO/RH TYPE AND SCREEN     CT Head w/o Contrast   Final Result   Impression:      1. No acute intracranial pathology.   2. White matter changes similar to prior and most suggestive of   chronic small vessel ischemic disease.      I have personally reviewed the examination and initial interpretation   and I agree with the findings.      EKATERINA MAXWELL MD            SYSTEM ID:  [id number]      XR Chest Port 1 View   Final Result   IMPRESSION: No focal consolidation to suggest pneumonia.      I have personally reviewed the examination and initial interpretation   and I agree with the findings.      SUZAN CESAR MD            SYSTEM ID:  [id number]      CTA Abdomen Pelvis with Contrast    (Results Pending)          Critical care was not performed.     Medical Decision Making  The patient's presentation was of high complexity (an acute health issue posing potential threat to life or bodily function).    The patient's evaluation involved:  an assessment requiring an independent historian (see separate area of note for details)  ordering and/or review of 3+ test(s) in this encounter (see separate area of note for details)  discussion of management or test interpretation with another health professional (Nephrology, GI)    The patient's management necessitated high risk (a decision regarding  hospitalization).      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  Patient presents to the emergency department due to concerns for altered mental status.  On arrival she was hypertensive in the setting of missed doses of her home medication.  She had a high normal heart rate, was breathing easily on room air in no acute distress and saturating well.  Her examination was nonfocal.  Symptoms of been ongoing for several days so suspicion for acute CVA is lower.  She has no meningeal signs on exam.    EKG was attempted, there was unexplained artifact but overall it did not appear to suggest ALEXANDRA.  Patient refused to repeat.  Head CT and chest x-ray are unremarkable with no explanation for her symptoms.  Her TSH was elevated but T4 is within normal limits.  No evidence of elevated ammonia or concern for hepatic encephalopathy.  Her hemoglobin is slightly below baseline but not by much.  She has a known history of actively oozing arteriovenous malformations.  GI was consulted who recommended no further acute management.  She has a mild leukocytosis and an elevated procalcitonin was empirically covered on broad-spectrum antibiotics.  She has no major metabolic abnormalities to explain her symptoms in terms of her CMP.  She does have a slight elevation in uremia.  Nephrology was consulted and will dialyze the patient on a nonurgent basis.    The patient was somewhat improved after some observation in the emergency department.  Given the fact that there were 2 Suboxone patches present on her there is a possible pharmaceutical misadventure that could be at play.  Straight cath for UTI was attempted but insufficient amount of urine was obtained.    Patient will get some CT imaging of the abdomen pelvis to look for any acute  abnormalities that could explain infection or her symptoms.    To be admitted to medicine for further evaluation and management.  Receiving labetalol and her home blood pressure medications in the interim for blood pressure management.    I have reviewed the nursing notes. I have reviewed the findings, diagnosis, plan and need for follow up with the patient.    New Prescriptions    No medications on file       Final diagnoses:   Hypertensive urgency   Acute encephalopathy   Overdose, accidental or unintentional, initial encounter - Two suboxone patches placed at care facility   Gastrointestinal hemorrhage, unspecified gastrointestinal hemorrhage type       Georges Joy Jr., MD   [facility] EMERGENCY DEPARTMENT  6/21/2023     Georges Joy MD  06/21/23 1549

## 2023-06-21 NOTE — PHARMACY-VANCOMYCIN DOSING SERVICE
Pharmacy Vancomycin Initial Note  Date of Service 2023  Patient's  1940  83 year old, female    Indication: Sepsis    Current estimated CrCl = HEMODIALYSIS.    Creatinine for last 3 days  2023: 11:17 AM Creatinine 7.87 mg/dL    Recent Vancomycin Level(s) for last 3 days  No results found for requested labs within last 3 days.      Vancomycin IV Administrations (past 72 hours)      No vancomycin orders with administrations in past 72 hours.                Nephrotoxins and other renal medications (From now, onward)    Start     Dose/Rate Route Frequency Ordered Stop    23 1330  vancomycin (VANCOCIN) 1,250 mg in 0.9% NaCl 250 mL intermittent infusion         1,250 mg  over 90 Minutes Intravenous ONCE 23 1326      23 1325  vancomycin place palafox - receiving intermittent dosing         1 each Intravenous SEE ADMIN INSTRUCTIONS 23 1326            Contrast Orders - past 72 hours (72h ago, onward)    None            Plan:  1. Start vancomycin 1250 mg IV once then followed by intermittent dosing of vancomycin.   2. Vancomycin monitoring method: Renal Replacement Therapy  3. Vancomycin therapeutic monitoring goal: 15-20 mg/L  4. Pharmacy will check vancomycin levels as appropriate in 1-3 Days.    5. Serum creatinine levels will be ordered N/A.      EARL CHIU RP

## 2023-06-21 NOTE — LETTER
[facility] [address] [address]  [phone]    FACSIMILE TRANSMITTAL SHEET      TO: [facility]  COMPANY: [facility]  FAX NUMBER: [phone]   PHONE NUMBER: [phone]     FROM: ANABEL Au  PHONE: [phone]  DATE: 07/06/23  NUMBER OF PAGES: n/a    _____URGENT __x__REVIEW ONLY _____PLEASE COMMENT____PLEASE REPLY    NOTES/COMMENTS:  - SALLY HOGUE ([birth year])      + ORDERS..   PRIOR FAX: ERROR, missing IATF MD ORDERS                                  IF YOU DID NOT RECEIVE THE CORRECT NUMBER OF PAGES OR THE FAX DID NOT COME THROUGH CLEARLY, PLEASE CALL THE SENDER     CONFIDENTIALITY STATEMENT: Confidential information that may accompany this transmission contains protected health information under state and federal law and is legally privileged. This information is intended only for the use of the individual or entity named above and may be used only for carrying out treatment, payment or other healthcare operations. The recipient or person responsible for delivering this information is prohibited by law from disclosing this information without proper authorization to any other party, unless required to do so by law or regulation. If you are not the intended recipient, you are hereby notified that any review, dissemination, distribution, or copying of this message is strictly prohibited. If you have received this communication in error, please destroy the materials and contact us immediately by calling the number listed above. No response indicates that the information was received by the appropriate authorized party

## 2023-06-21 NOTE — LETTER
[facility] [address] [address]  [phone]    FACSIMILE TRANSMITTAL SHEET    TO: Carlos  COMPANY: [employer]   FAX NUMBER: [phone]  PHONE NUMBER: [phone]     FROM: Julius LITTLE  PHONE: [phone]  DATE: 07/06/23  NUMBER OF PAGES: n/a    _____URGENT __x__REVIEW ONLY _____PLEASE COMMENT____PLEASE REPLY    NOTES/COMMENTS: SALLY HOGUE [birth year]    Med List w/ details                                      IF YOU DID NOT RECEIVE THE CORRECT NUMBER OF PAGES OR THE FAX DID NOT COME THROUGH CLEARLY, PLEASE CALL THE SENDER     CONFIDENTIALITY STATEMENT: Confidential information that may accompany this transmission contains protected health information under state and federal law and is legally privileged. This information is intended only for the use of the individual or entity named above and may be used only for carrying out treatment, payment or other healthcare operations. The recipient or person responsible for delivering this information is prohibited by law from disclosing this information without proper authorization to any other party, unless required to do so by law or regulation. If you are not the intended recipient, you are hereby notified that any review, dissemination, distribution, or copying of this message is strictly prohibited. If you have received this communication in error, please destroy the materials and contact us immediately by calling the number listed above. No response indicates that the information was received by the appropriate authorized party

## 2023-06-21 NOTE — LETTER
Recipient: Mindi          Sender: Vikas WILSON          Date: July 1, 2023  Patient Name:  Rachele Martínez  Patient YOB: 1940  Routing Message:  Please consider her for =: [place] [facility], [place] [facility], and [place]. Will be medically ready on 7/3/23. Please reach out with any questions! Vikas WILSON [phone]        The documents accompanying this notice contain confidential information belonging to the sender.  This information is intended only for the use of the individual or entity named above.  The authorized recipient of this information is prohibited from disclosing this information to any other party and is required to destroy the information after its stated need has been fulfilled, unless otherwise required by state law.    If you are not the intended recipient, you are hereby notified that any disclosure, copy, distribution or action taken in reliance on the contents of these documents is strictly prohibited.  If you have received this document in error, please return it by fax to [phone] with a note on the cover sheet explaining why you are returning it (e.g. not your patient, not your provider, etc.).  If you need further assistance, please call .  Documents may also be returned by mail to [facility], , [address], [place], [place] [address].

## 2023-06-21 NOTE — ED TRIAGE NOTES
Patient BIBA with AMS and elevated BP. Per facility stopped eating and drinking yesterday. Today refused medications. Has been AMS for three days. Dialysis MWF staff is unsure if she has been going to her runs. . EKG NSR.

## 2023-06-21 NOTE — H&P
====================================================  Resident/Fellow Attestation   I, Fletcher Rodriguez MD, was present with the medical/ROSE student who participated in the service and in the documentation of the note.  I have verified the history and personally performed the physical exam and medical decision making.  I agree with the assessment and plan of care as documented in the note and edited by me.     Fletcher Rodriguez MD  PGY2  Date of Service (when I saw the patient): 06/21/23   ====================================================    [facility]    History and Physical - Medicine Service, MAROON TEAM 3       Date of Admission:  6/21/2023    Assessment & Plan      Rachele Martínez is a 83 year old female with a h/o HTN, chronic Hep C with cirrhosis, ESRD, and hyperlipidemia who was admitted for hypertensive urgency and subacute progressive encephalopathy. Pt's daughters express concern over patient's worsening state of confusion over last 2 weeks, accelerated over last few days. Pt's daughters are also concerned Rachele has not been receiving her medications appropriately at [facility], perhaps also receiving erroneous medications. Head CT (6/21) shows no acute intracranial path, evidence of chronic small vessel ischemic dx. Chest CT (6/21) unremarkable.     Acute Conditions:   Hypertensive Urgency  Head CT (6/21) shows no acute intracranial path; see evidence of chronic small vessel ischemic dx. Unable to assess for focal neurological deficits given cooperation with exam, but moving all four extremities spontaneously and cranial nerves seem grossly intact. Uncertain etiology of encephalopathy, see below, may be a contribution of HTN but encephalopathy began prior to chronicity of HTN. Troponin elevated in ED (6/21); EKG reassuring and no symptoms of angina. No other e/o end-organ damage.  - Overnight tonight (6/21 into 6/22) goal of SBP ~180-200  w/ PRN IV Hydralazine and IV Labetalol available for SBP>200 given initial pressures at ~244 SBP at highest  - PTA Amlodipine and Losartan are ordered; however, pt spitting out PO meds this evening, will readdress in the am  - Reassess after dialysis tomorrow 6/22, suspect this will also help with BP management    Subacute Progressive Encephalopathy  Ddx: hypertensive urgency vs. infectious process (UTI vs. Other) vs. medication toxicity (either prescribed or erroneous medications as provided at assisted living) vs. Behavioral disturbance vs traumatic exposure vs b12 deficiency vs. Refractory encephalopahty 2/2 Subclinical hypothyroidism (I.e. hashimoto's encephalopathy).   Head CT (6/21) shows no acute intracranial path, evidence of chronic small vessel ischemic dx. Labs show no hyperammonemia and normal lactic acid. Procalcitonin elevated, infectious cause cannot be ruled out. INR elevated, suspected d/t h/o cirrhosis.   -Monitor and reassess based on tx for hypertensive emergency   -Abx: Cefepime (6/21-present), Vancomycin (6/21-present)   -Ordered vitamin B12 serum     Acute on Chronic Iron Deficiency Anemia vs Anemia of Chronic Disease   Will consider IV supplementation based on review of iron studies. Soluble transferrin receptor add-on.    Concern for Neglect or Maltreatment at prior living facility  Consulted SW and care coordinator particularly as Pt's daughter's are interested in a new placement for long-term care.     Chronic Conditions:   End-Stage Renal Disease  Renal consulted, recs appreciated. MWF regular dialysis schedule; scheduled for inpatient dialysis 6/22 Thursday after missed dialysis Wednesday 6/21/23.    Hyperlipidemia   Continue PTA atorvastatin    Anxiety & Depression   Continue PTA sertraline      Diet:   Regular as tolerated (waxing and waning mental status)  DVT Prophylaxis: VTE Prophylaxis contraindicated due to encephalopathy precluding   Rodriguez Catheter: Not present  Fluids:  N/A  Lines: None     Cardiac Monitoring: None  Code Status:   Full, verified with family at bedside on admission    Clinically Significant Risk Factors Present on Admission               # Coagulation Defect: INR = 1.28 (Ref range: 0.85 - 1.15) and/or PTT = 34 Seconds (Ref range: 22 - 38 Seconds), will monitor for bleeding    # Hypertension: Noted on problem list               Disposition Plan      Expected Discharge Date: 06/23/2023                The patient's care was discussed with the Attending Physician, Dr. Light.      Mehreen Daley  Medical Student  Medicine Service, [address]  ______________________________________________________________________    Chief Complaint   Hypertensive Urgency and Altered Mental Status (suspicious for encephalopathy)     History obtained from Pt's two daughters.     History of Present Illness   Rachele Martínez is a 83 year old female with a h/o HTN, chronic Hep C with cirrhosis, ESRD, and hyperlipidemia who was admitted for hypertensive urgency and altered mental status suspicious for encephalopathy. Pt was admitted to [facility] on May 25th following a hospital admission at [facility]. As reported by daughters: Pt's baseline includes talking interactively, walking on own, with mild memory impairment as 'is expected' with age. Baseline mental status was last seen prior to prior hospital admission at [facility]. Pt's daughters express concern over patient's worsening state of confusion over last 2 weeks, accelerated over last few days. Pt's daughters are also concerned Rachele has not been receiving her medications appropriately at the assisted living center, perhaps also receiving erroneous medications. Interested in replacement to a different rehab center.       Past Medical History    Past Medical History:   Diagnosis Date     Anemia      Anxiety and depression      Arthritis      AVM (arteriovenous  malformation)      Chronic hepatitis C with cirrhosis (H)      Clotting disorder (H)      ESRD (end stage renal disease) (H)     on dialysis     GI bleed     recurrent     Glaucoma      Hyperlipidemia      Hypertension goal BP (blood pressure) < 140/80        Past Surgical History   Past Surgical History:   Procedure Laterality Date     CAPSULE/PILL CAM ENDOSCOPY N/A 3/27/2019    Procedure: Capsule/pill cam endoscopy;  Surgeon: Yosvany Ram MD;  Location: [facility]     CAPSULE/PILL CAM ENDOSCOPY N/A 2/2/2023    Procedure: IMAGING PROCEDURE, GI TRACT, INTRALUMINAL, VIA CAPSULE;  Surgeon: Mulu Nur DO;  Location: [facility]     COLONOSCOPY N/A 9/4/2015    Procedure: COMBINED COLONOSCOPY, SINGLE OR MULTIPLE BIOPSY/POLYPECTOMY BY BIOPSY;  Surgeon: Rupesh Lopez MD;  Location: [facility]     COLONOSCOPY N/A 9/19/2018    Procedure: COLONOSCOPY;  enteroscopy small bowel  COLONOSCOPY;  Surgeon: Ankit Baires MD;  Location: [facility]     ENTEROSCOPY SMALL BOWEL N/A 3/9/2023    Procedure: antegrade single balloon enteroscopy with argon plasma coagulation of arteriovenous malformations;  Surgeon: Ankit Baires MD;  Location: [facility]     ESOPHAGOSCOPY, GASTROSCOPY, DUODENOSCOPY (EGD), COMBINED N/A 12/18/2014    Procedure: COMBINED ESOPHAGOSCOPY, GASTROSCOPY, DUODENOSCOPY (EGD);  Surgeon: Betsy Carvajal MD;  Location: [facility]     ESOPHAGOSCOPY, GASTROSCOPY, DUODENOSCOPY (EGD), COMBINED N/A 4/25/2015    Procedure: COMBINED ESOPHAGOSCOPY, GASTROSCOPY, DUODENOSCOPY (EGD);  Surgeon: Yosvany Ram MD;  Location: [facility]     ESOPHAGOSCOPY, GASTROSCOPY, DUODENOSCOPY (EGD), COMBINED N/A 5/5/2015    Procedure: COMBINED ESOPHAGOSCOPY, GASTROSCOPY, DUODENOSCOPY (EGD);  Surgeon: Mariano Mistry MD;  Location: [facility]     HC CAPSULE ENDOSCOPY N/A 9/30/2015    Procedure: CAPSULE/PILL CAM ENDOSCOPY;  Surgeon: Pan Dhaliwal MD;  Location: [facility]     HC VASCULAR SURGERY PROCEDURE UNLIST       HYSTERECTOMY  1980    KYREE      LUMPECTOMY BREAST         Prior to Admission Medications   Prior to Admission Medications   Prescriptions Last Dose Informant Patient Reported? Taking?   atorvastatin (LIPITOR) 20 MG tablet   No No   Sig: Take 1 tablet (20 mg) by mouth daily   benzonatate (TESSALON) 100 MG capsule   No No   Sig: Take 1 capsule (100 mg) by mouth 3 times daily as needed for cough   calcium acetate (PHOSLO) 667 MG CAPS capsule   No No   Sig: TAKE 2 CAPSULE BY MOUTH THREE TIMES A DAY WITH MEALS   gabapentin (NEURONTIN) 300 MG capsule   No No   Sig: Take 1 capsule (300 mg) by mouth At Bedtime   hydrOXYzine (ATARAX) 25 MG tablet   No No   Sig: Take 1 tablet (25 mg) by mouth every 6 hours as needed for itching or anxiety   losartan (COZAAR) 50 MG tablet   No No   Sig: Take 1 tablet (50 mg) by mouth daily   multivitamin RENAL (RENAVITE RX/NEPHROVITE) 1 tablet tablet   No No   Sig: Take 1 tablet by mouth daily   octreotide (SANDOSTATIN LAR) 20 MG injection   Yes No   Sig: Inject 20 mg into the muscle every 28 days   pantoprazole (PROTONIX) 20 MG EC tablet   No No   Sig: Take 1 tablet (20 mg) by mouth 2 times daily (before meals) *take 30-60 minutes before   polyethylene glycol (MIRALAX) 17 GM/Dose powder   Yes No   Sig: Take 17 g by mouth daily as needed As needed   sertraline (ZOLOFT) 50 MG tablet   No No   Sig: Take 2 tablets (100 mg) by mouth daily   sucroferric oxyhydroxide (VELPHORO) 500 MG CHEW chewable tablet   Yes No   Sig: Take 500 mg by mouth 2 times daily      Facility-Administered Medications: None        Physical Exam   Vital Signs:     BP: (!) 224/87 Pulse: 91   Resp: 20 SpO2: 98 %      Weight: 0 lbs 0 oz    General: Awake, oriented to self but confused. Agitated.   CV: Regular rate and rhythm. No BLE edema.   Respiratory: unlabored breathing, clear to auscultation bilaterally.     Medical Decision Making       Please see A&P for additional details of medical decision making.      Data   ------------------------- PAST 24 HR  DATA REVIEWED -----------------------------------------------    I have personally reviewed the following data over the past 24 hrs:    12.1 (H)  \   8.2 (L)   / 333     141 97 (L) 37.7 (H) /  126 (H)   5.2 26 7.87 (H) \       ALT: 18 AST: 30 AP: 250 (H) TBILI: 0.8   ALB: 4.3 TOT PROTEIN: 8.4 (H) LIPASE: 38       Trop: 62 (H) BNP: N/A       TSH: 10.10 (H) T4: 1.12 A1C: N/A       Procal: 2.29 (H) CRP: N/A Lactic Acid: 0.8       INR:  1.28 (H) PTT:  N/A   D-dimer:  N/A Fibrinogen:  N/A       Imaging results reviewed over the past 24 hrs:   Recent Results (from the past 24 hour(s))   XR Chest Port 1 View    Narrative    EXAM: XR CHEST PORT 1 VIEW  6/21/2023 12:25 PM     HISTORY:  AMS, ? PNA       COMPARISON:  6/2/2023    FINDINGS:   Portable AP semiupright view of the chest.    Trachea is midline. Cardiomediastinal silhouette and pulmonary  vasculature are within normal limits. Atherosclerotic calcification of  the aortic arch. No focal airspace opacity, pleural effusion or  appreciable pneumothorax.    No acute osseous abnormality. Visualized upper abdomen is  unremarkable.        Impression    IMPRESSION: No focal consolidation to suggest pneumonia.    I have personally reviewed the examination and initial interpretation  and I agree with the findings.    SUZAN CESAR MD         SYSTEM ID:  [id number]   CT Head w/o Contrast    Narrative    CT HEAD W/O CONTRAST 6/21/2023 12:45 PM    History: AMS x3 days     Comparison: 6/3/2023    Technique: Using multidetector thin collimation helical acquisition  technique, axial, coronal and sagittal CT images from the skull base  to the vertex were obtained without intravenous contrast.   (topogram) image(s) also obtained and reviewed.    Findings: There is no intracranial hemorrhage, mass effect, or midline  shift. No acute loss of gray-white differentiation. Moderate  generalized atrophy. Moderate confluent and scattered hypodensity in  the periventricular white matter  similar to prior and most suggestive  of chronic small vessel ischemic disease. The basal cisterns are  clear.    No acute calvarial fractures. Visualized paranasal sinuses and mastoid  air cells are relatively clear. Degenerative changes of the  temporomandibular joints. Calcifications of the carotid siphons.      Impression    Impression:    1. No acute intracranial pathology.  2. White matter changes similar to prior and most suggestive of  chronic small vessel ischemic disease.    I have personally reviewed the examination and initial interpretation  and I agree with the findings.    EKATERINA MAXWELL MD         SYSTEM ID:  [id number]

## 2023-06-22 ENCOUNTER — APPOINTMENT (OUTPATIENT)
Dept: ULTRASOUND IMAGING | Facility: CLINIC | Age: 83
DRG: 304 | End: 2023-06-22
Payer: MEDICARE

## 2023-06-22 ENCOUNTER — APPOINTMENT (OUTPATIENT)
Dept: CT IMAGING | Facility: CLINIC | Age: 83
DRG: 304 | End: 2023-06-22
Payer: MEDICARE

## 2023-06-22 LAB
ANION GAP SERPL CALCULATED.3IONS-SCNC: 16 MMOL/L (ref 7–15)
ATRIAL RATE - MUSE: 83 BPM
BUN SERPL-MCNC: 47.6 MG/DL (ref 8–23)
CALCIUM SERPL-MCNC: 8.7 MG/DL (ref 8.8–10.2)
CHLORIDE SERPL-SCNC: 101 MMOL/L (ref 98–107)
CREAT SERPL-MCNC: 9.51 MG/DL (ref 0.51–0.95)
DEPRECATED HCO3 PLAS-SCNC: 26 MMOL/L (ref 22–29)
DIASTOLIC BLOOD PRESSURE - MUSE: NORMAL MMHG
ERYTHROCYTE [DISTWIDTH] IN BLOOD BY AUTOMATED COUNT: 16.7 % (ref 10–15)
GFR SERPL CREATININE-BSD FRML MDRD: 4 ML/MIN/1.73M2
GLUCOSE SERPL-MCNC: 122 MG/DL (ref 70–99)
HBV SURFACE AB SERPL IA-ACNC: 7.76 M[IU]/ML
HBV SURFACE AB SERPL IA-ACNC: NONREACTIVE M[IU]/ML
HBV SURFACE AG SERPL QL IA: NONREACTIVE
HCT VFR BLD AUTO: 27.8 % (ref 35–47)
HGB BLD-MCNC: 8.4 G/DL (ref 11.7–15.7)
INTERPRETATION ECG - MUSE: NORMAL
MAGNESIUM SERPL-MCNC: 2.3 MG/DL (ref 1.7–2.3)
MCH RBC QN AUTO: 30.3 PG (ref 26.5–33)
MCHC RBC AUTO-ENTMCNC: 30.2 G/DL (ref 31.5–36.5)
MCV RBC AUTO: 100 FL (ref 78–100)
P AXIS - MUSE: 47 DEGREES
PHOSPHATE SERPL-MCNC: 4.5 MG/DL (ref 2.5–4.5)
PLATELET # BLD AUTO: 312 10E3/UL (ref 150–450)
POTASSIUM SERPL-SCNC: 5.4 MMOL/L (ref 3.4–5.3)
PR INTERVAL - MUSE: 192 MS
QRS DURATION - MUSE: 100 MS
QT - MUSE: 414 MS
QTC - MUSE: 486 MS
R AXIS - MUSE: 50 DEGREES
RBC # BLD AUTO: 2.77 10E6/UL (ref 3.8–5.2)
SODIUM SERPL-SCNC: 143 MMOL/L (ref 136–145)
SYSTOLIC BLOOD PRESSURE - MUSE: NORMAL MMHG
T AXIS - MUSE: 81 DEGREES
VANCOMYCIN SERPL-MCNC: 11.7 UG/ML
VENTRICULAR RATE- MUSE: 83 BPM
WBC # BLD AUTO: 9.6 10E3/UL (ref 4–11)

## 2023-06-22 PROCEDURE — 99232 SBSQ HOSP IP/OBS MODERATE 35: CPT | Mod: GC | Performed by: STUDENT IN AN ORGANIZED HEALTH CARE EDUCATION/TRAINING PROGRAM

## 2023-06-22 PROCEDURE — 80202 ASSAY OF VANCOMYCIN: CPT | Performed by: STUDENT IN AN ORGANIZED HEALTH CARE EDUCATION/TRAINING PROGRAM

## 2023-06-22 PROCEDURE — 250N000011 HC RX IP 250 OP 636

## 2023-06-22 PROCEDURE — 80048 BASIC METABOLIC PNL TOTAL CA: CPT

## 2023-06-22 PROCEDURE — 250N000013 HC RX MED GY IP 250 OP 250 PS 637

## 2023-06-22 PROCEDURE — 255N000002 HC RX 255 OP 636: Mod: JZ | Performed by: STUDENT IN AN ORGANIZED HEALTH CARE EDUCATION/TRAINING PROGRAM

## 2023-06-22 PROCEDURE — 71260 CT THORAX DX C+: CPT | Mod: 26 | Performed by: STUDENT IN AN ORGANIZED HEALTH CARE EDUCATION/TRAINING PROGRAM

## 2023-06-22 PROCEDURE — A9585 GADOBUTROL INJECTION: HCPCS | Mod: JZ | Performed by: STUDENT IN AN ORGANIZED HEALTH CARE EDUCATION/TRAINING PROGRAM

## 2023-06-22 PROCEDURE — 86706 HEP B SURFACE ANTIBODY: CPT | Performed by: INTERNAL MEDICINE

## 2023-06-22 PROCEDURE — 74177 CT ABD & PELVIS W/CONTRAST: CPT | Mod: MG

## 2023-06-22 PROCEDURE — 85027 COMPLETE CBC AUTOMATED: CPT

## 2023-06-22 PROCEDURE — 5A1D70Z PERFORMANCE OF URINARY FILTRATION, INTERMITTENT, LESS THAN 6 HOURS PER DAY: ICD-10-PCS | Performed by: PHYSICIAN ASSISTANT

## 2023-06-22 PROCEDURE — 634N000001 HC RX 634: Mod: JZ | Performed by: STUDENT IN AN ORGANIZED HEALTH CARE EDUCATION/TRAINING PROGRAM

## 2023-06-22 PROCEDURE — 84100 ASSAY OF PHOSPHORUS: CPT

## 2023-06-22 PROCEDURE — 258N000003 HC RX IP 258 OP 636: Performed by: STUDENT IN AN ORGANIZED HEALTH CARE EDUCATION/TRAINING PROGRAM

## 2023-06-22 PROCEDURE — 250N000011 HC RX IP 250 OP 636: Mod: JZ | Performed by: STUDENT IN AN ORGANIZED HEALTH CARE EDUCATION/TRAINING PROGRAM

## 2023-06-22 PROCEDURE — 120N000002 HC R&B MED SURG/OB UMMC

## 2023-06-22 PROCEDURE — 250N000011 HC RX IP 250 OP 636: Performed by: STUDENT IN AN ORGANIZED HEALTH CARE EDUCATION/TRAINING PROGRAM

## 2023-06-22 PROCEDURE — 36415 COLL VENOUS BLD VENIPUNCTURE: CPT | Performed by: STUDENT IN AN ORGANIZED HEALTH CARE EDUCATION/TRAINING PROGRAM

## 2023-06-22 PROCEDURE — 76705 ECHO EXAM OF ABDOMEN: CPT | Mod: 26 | Performed by: RADIOLOGY

## 2023-06-22 PROCEDURE — 83735 ASSAY OF MAGNESIUM: CPT

## 2023-06-22 PROCEDURE — 87340 HEPATITIS B SURFACE AG IA: CPT | Performed by: INTERNAL MEDICINE

## 2023-06-22 PROCEDURE — 74177 CT ABD & PELVIS W/CONTRAST: CPT | Mod: 26 | Performed by: STUDENT IN AN ORGANIZED HEALTH CARE EDUCATION/TRAINING PROGRAM

## 2023-06-22 PROCEDURE — G1010 CDSM STANSON: HCPCS | Mod: GC | Performed by: STUDENT IN AN ORGANIZED HEALTH CARE EDUCATION/TRAINING PROGRAM

## 2023-06-22 PROCEDURE — 76705 ECHO EXAM OF ABDOMEN: CPT

## 2023-06-22 PROCEDURE — 36415 COLL VENOUS BLD VENIPUNCTURE: CPT | Performed by: INTERNAL MEDICINE

## 2023-06-22 PROCEDURE — 99222 1ST HOSP IP/OBS MODERATE 55: CPT | Performed by: PHYSICIAN ASSISTANT

## 2023-06-22 PROCEDURE — G1010 CDSM STANSON: HCPCS

## 2023-06-22 PROCEDURE — 90937 HEMODIALYSIS REPEATED EVAL: CPT

## 2023-06-22 RX ORDER — LABETALOL HYDROCHLORIDE 5 MG/ML
10 INJECTION, SOLUTION INTRAVENOUS ONCE
Status: COMPLETED | OUTPATIENT
Start: 2023-06-22 | End: 2023-06-22

## 2023-06-22 RX ORDER — LABETALOL HYDROCHLORIDE 5 MG/ML
10 INJECTION, SOLUTION INTRAVENOUS
Status: DISCONTINUED | OUTPATIENT
Start: 2023-06-23 | End: 2023-06-28

## 2023-06-22 RX ORDER — GADOBUTROL 604.72 MG/ML
7.5 INJECTION INTRAVENOUS ONCE
Status: COMPLETED | OUTPATIENT
Start: 2023-06-22 | End: 2023-06-22

## 2023-06-22 RX ORDER — IOPAMIDOL 755 MG/ML
80 INJECTION, SOLUTION INTRAVASCULAR ONCE
Status: COMPLETED | OUTPATIENT
Start: 2023-06-22 | End: 2023-06-22

## 2023-06-22 RX ORDER — LABETALOL HYDROCHLORIDE 5 MG/ML
10 INJECTION, SOLUTION INTRAVENOUS EVERY 4 HOURS PRN
Status: DISCONTINUED | OUTPATIENT
Start: 2023-06-22 | End: 2023-06-22

## 2023-06-22 RX ORDER — HYDRALAZINE HYDROCHLORIDE 20 MG/ML
10 INJECTION INTRAMUSCULAR; INTRAVENOUS EVERY 4 HOURS PRN
Status: DISCONTINUED | OUTPATIENT
Start: 2023-06-22 | End: 2023-06-22

## 2023-06-22 RX ORDER — HYDRALAZINE HYDROCHLORIDE 20 MG/ML
10 INJECTION INTRAMUSCULAR; INTRAVENOUS
Status: DISCONTINUED | OUTPATIENT
Start: 2023-06-23 | End: 2023-06-28

## 2023-06-22 RX ORDER — CALCITRIOL 0.25 UG/1
1 CAPSULE, LIQUID FILLED ORAL
Status: DISCONTINUED | OUTPATIENT
Start: 2023-06-23 | End: 2023-07-06 | Stop reason: HOSPADM

## 2023-06-22 RX ORDER — METRONIDAZOLE 500 MG/100ML
500 INJECTION, SOLUTION INTRAVENOUS EVERY 8 HOURS
Status: DISCONTINUED | OUTPATIENT
Start: 2023-06-22 | End: 2023-06-22

## 2023-06-22 RX ADMIN — GADOBUTROL 6 ML: 604.72 INJECTION INTRAVENOUS at 18:23

## 2023-06-22 RX ADMIN — SODIUM CHLORIDE 300 ML: 9 INJECTION, SOLUTION INTRAVENOUS at 07:53

## 2023-06-22 RX ADMIN — HYDRALAZINE HYDROCHLORIDE 10 MG: 20 INJECTION INTRAMUSCULAR; INTRAVENOUS at 21:52

## 2023-06-22 RX ADMIN — LABETALOL HYDROCHLORIDE 10 MG: 5 INJECTION, SOLUTION INTRAVENOUS at 13:09

## 2023-06-22 RX ADMIN — IRON SUCROSE 100 MG: 20 INJECTION, SOLUTION INTRAVENOUS at 10:28

## 2023-06-22 RX ADMIN — IOPAMIDOL 80 ML: 755 INJECTION, SOLUTION INTRAVENOUS at 14:40

## 2023-06-22 RX ADMIN — CEFEPIME HYDROCHLORIDE 1 G: 1 INJECTION, POWDER, FOR SOLUTION INTRAMUSCULAR; INTRAVENOUS at 12:37

## 2023-06-22 RX ADMIN — VANCOMYCIN HYDROCHLORIDE 750 MG: 1 INJECTION, POWDER, LYOPHILIZED, FOR SOLUTION INTRAVENOUS at 20:02

## 2023-06-22 RX ADMIN — LABETALOL HYDROCHLORIDE 10 MG: 5 INJECTION, SOLUTION INTRAVENOUS at 14:14

## 2023-06-22 RX ADMIN — EPOETIN ALFA-EPBX 10000 UNITS: 10000 INJECTION, SOLUTION INTRAVENOUS; SUBCUTANEOUS at 10:29

## 2023-06-22 RX ADMIN — HYDRALAZINE HYDROCHLORIDE 10 MG: 20 INJECTION INTRAMUSCULAR; INTRAVENOUS at 16:12

## 2023-06-22 RX ADMIN — LABETALOL HYDROCHLORIDE 10 MG: 5 INJECTION, SOLUTION INTRAVENOUS at 22:35

## 2023-06-22 RX ADMIN — Medication: at 07:53

## 2023-06-22 RX ADMIN — SODIUM CHLORIDE 250 ML: 9 INJECTION, SOLUTION INTRAVENOUS at 07:53

## 2023-06-22 RX ADMIN — LABETALOL HYDROCHLORIDE 10 MG: 5 INJECTION, SOLUTION INTRAVENOUS at 03:52

## 2023-06-22 ASSESSMENT — ACTIVITIES OF DAILY LIVING (ADL)
ADLS_ACUITY_SCORE: 37
ADLS_ACUITY_SCORE: 53
ADLS_ACUITY_SCORE: 37
ADLS_ACUITY_SCORE: 47
ADLS_ACUITY_SCORE: 37
ADLS_ACUITY_SCORE: 37
ADLS_ACUITY_SCORE: 47
ADLS_ACUITY_SCORE: 53
ADLS_ACUITY_SCORE: 37
ADLS_ACUITY_SCORE: 51
ADLS_ACUITY_SCORE: 37
ADLS_ACUITY_SCORE: 37

## 2023-06-22 NOTE — PROVIDER NOTIFICATION
Paged Nayeli 3 intern:    Pt's family with pt in dialysis, concerned that patient looks worse than yesterday, would like to speak with you directly.  Please address,

## 2023-06-22 NOTE — PROGRESS NOTES
[facility]    Progress Note - Medicine Service, ONEIL TEAM 3       Date of Admission:  6/21/2023    Assessment & Plan   Rachele Martínez is a 83 year old female with a h/o HTN, chronic Hep C with cirrhosis, ESRD, and hyperlipidemia who was admitted for hypertensive urgency and subacute progressive encephalopathy. Pt's daughters express concern over patient's worsening state of confusion over last 2 weeks, accelerated over last few days. Pt's daughters are also concerned Rachele has not been receiving her medications appropriately at [facility], perhaps also receiving erroneous medications. Head CT (6/21) shows no acute intracranial path, evidence of chronic small vessel ischemic dx.     Changes Today:   - Ordered CT abdomen/pelvis and MRI head with contrast (without sedation) to assess for worsening encephalopathy during dialysis today 6/22. Nephrology OK with contrast  - Restart oral PTA amlodipine and losartan as tolerated, otherwise continue IV hydralazine and IV labetalol for SBP > 180  - Plan for repeat HD on 6/23  - Family updated at bedside    Acute Conditions:   Hypertensive Urgency  Head CT (6/21) shows no acute intracranial path; see evidence of chronic small vessel ischemic dx. Unable to assess for focal neurological deficits given cooperation with exam, but moving all four extremities spontaneously and cranial nerves seem grossly intact. Uncertain etiology of encephalopathy, see below, may be a contribution of HTN but encephalopathy began prior to chronicity of HTN. Troponin elevated in ED (6/21); EKG reassuring and no symptoms of angina. No other e/o end-organ damage.  - Goal of SBP < 180 today 6/22 w/ PRN IV Hydralazine and IV Labetalol available for SBP> 180  - Restart PTA amlodipine and losartan as tolerated      Subacute Progressive Encephalopathy, Worsening   Ddx: hypertensive urgency vs. infectious process (UTI vs. Other) vs.  medication toxicity (either prescribed or erroneous medications as provided at assisted living) vs. Behavioral disturbance vs traumatic exposure vs. Refractory encephalopahty 2/2 Subclinical hypothyroidism (I.e. hashimoto's encephalopathy).   Head CT () shows no acute intracranial path, evidence of chronic small vessel ischemic dx. Labs show no hyperammonemia and normal lactic acid. Procalcitonin elevated, infectious cause cannot be ruled out. INR elevated, suspected d/t h/o cirrhosis. Vit B12 elevated, not concerned abt deficiency. TSH elevated, but fT4 WNL. Ammonia and lactic acid WNL. VB. Will cover w/ empiric abx for now.    -Ordered CT abdomen/pelvis and MRI head with contrast to assess for worsening encephalopathy during dialysis today . Nephrology OK with contrast, will dialyze tomorrow .  -Abx: Cefepime (-present), Vancomycin (-present)      Acute on Chronic Iron Deficiency Anemia vs Anemia of Chronic Disease   IV supplementation provided in dialysis.   - Consider soluble transferrin receptor add-on.     Concern for Neglect or Maltreatment at prior living facility  Consulted SW and care coordinator particularly as Pt's daughter's are interested in a new placement for long-term care.      Chronic Conditions:   End-Stage Renal Disease  Renal consulted, recs appreciated. MWF regular dialysis schedule; inpatient dialysis today  AM after missed dialysis 23. Repeat dialysis tomorrow .      Hyperlipidemia   Continue PTA atorvastatin     Anxiety & Depression   Continue PTA sertraline        Diet: Combination Diet Regular Diet Adult    DVT Prophylaxis: Pneumatic Compression Devices  Rodriguez Catheter: Not present  Fluids: None  Lines: None     Cardiac Monitoring: None  Code Status: Full Code      Clinically Significant Risk Factors Present on Admission        # Hyperkalemia: Highest K = 5.4 mmol/L in last 2 days, will monitor as appropriate        # Coagulation  Defect: INR = 1.28 (Ref range: 0.85 - 1.15) and/or PTT = 34 Seconds (Ref range: 22 - 38 Seconds), will monitor for bleeding    # Hypertension: Noted on problem list               Disposition Plan     Expected Discharge Date: 06/23/2023      Destination: nursing home          The patient's care was discussed with the Attending Physician, Dr. Purcell.    Mehreen Daley  Medical Student  Medicine Service, [address]     Resident/Fellow Attestation   I, Ebony Huntley MD, was present with the medical/ROSE student who participated in the service and in the documentation of the note.  I have verified the history and personally performed the physical exam and medical decision making.  I agree with the assessment and plan of care as documented in the note.      Ebony Huntley MD  Internal Medicine, PGY-1  See signed in provider for up to date coverage information    ______________________________________________________________________    Interval History   Less agitated, allowed for physical exam in AM today. Following commands inconsistently. Screamed with dialysis being started.     Physical Exam   Vital Signs: Temp: 97.8  F (36.6  C) Temp src: Axillary BP: (!) 128/92 Pulse: 75   Resp: 18 SpO2: 98 % O2 Device: None (Room air)    Weight: 0 lbs 0 oz    General: Awake, oriented to self but confused.   CV: Regular rate and rhythm. No BLE edema.   Respiratory: unlabored breathing, clear to auscultation bilaterally.   Abd: soft, non-distended, non-tender.   Neuro: weakly squeezed with R hand upon request, squeezed L hand strongly.     Medical Decision Making             Data

## 2023-06-22 NOTE — PHARMACY-VANCOMYCIN DOSING SERVICE
Pharmacy Vancomycin Note  Date of Service 2023  Patient's  1940   83 year old, female    Indication: Sepsis  Day of Therapy: 2  Current vancomycin regimen:  Intermittent dosing  Current vancomycin monitoring method: Trough (Method 2 = manual dose calculation)  Current vancomycin therapeutic monitoring goal: 15-20 mg/L    Current estimated CrCl = Estimated Creatinine Clearance: 4.2 mL/min (A) (based on SCr of 9.51 mg/dL (H)).  On HD    Creatinine for last 3 days  2023: 11:17 AM Creatinine 7.87 mg/dL  2023:  7:52 AM Creatinine 9.51 mg/dL    Recent Vancomycin Levels (past 3 days)  2023:  2:04 PM Vancomycin 11.7 ug/mL    Vancomycin IV Administrations (past 72 hours)                   vancomycin (VANCOCIN) 1,250 mg in 0.9% NaCl 250 mL intermittent infusion (mg) 1,250 mg New Bag 23 1730                Nephrotoxins and other renal medications (From now, onward)    Start     Dose/Rate Route Frequency Ordered Stop    23 1700  vancomycin (VANCOCIN) 750 mg in sodium chloride 0.9 % 250 mL intermittent infusion         750 mg  over 90 Minutes Intravenous ONCE 23 1519      23 1325  vancomycin place palafox - receiving intermittent dosing         1 each Intravenous SEE ADMIN INSTRUCTIONS 23 1326               Contrast Orders - past 72 hours (72h ago, onward)    Start     Dose/Rate Route Frequency Stop    23 1400  iopamidol (ISOVUE-370) solution 80 mL         80 mL Intravenous ONCE 23 1440    23 1440  iopamidol (ISOVUE-370) solution 71 mL  Status:  Discontinued         71 mL Intravenous ONCE 23 1607          Interpretation of levels and current regimen:  Vancomycin level is reflective of subtherapeutic level, post HD run  Renal Function: ESRD on Dialysis    Plan:  1. Continue intermittent dosing. IV vancomycin 750 mg once  2. Vancomycin monitoring method: Trough (Method 2 = manual dose calculation)  3. Vancomycin therapeutic monitoring goal: 15-20  mg/L  4. Pharmacy will check vancomycin levels as appropriate in 1-3 Days.  5. Serum creatinine levels will be ordered daily for the first week of therapy and at least twice weekly for subsequent weeks.    Сергей Augustine RPH

## 2023-06-22 NOTE — CONSULTS
Nephrology Initial Consult  June 22, 2023      Rachele Martínez MRN:[id number] YOB: 1940  Date of Admission:6/21/2023  Primary care provider: Agnieszka Rodriguez  Requesting physician: Ryan Purcell*    ASSESSMENT AND RECOMMENDATIONS:   Rachele Martínez is an 83 year old female with PMH of ESKD, mild dementia, HTN, anemia and recurrent GI bleeds secondary to AVMs, chronic hep C with cirrhosis, admitted with AMS     ESKD: dialyzes MWF at [facility] with Dr. Tarango. Run time: 3.5 hrs. Access: RUE AVG. EDW 60 kg  - HD today and again tomorrow per MWF schedule     BP/Volume: 60 kg  -recent admission, pt said her EDW was recently challenged at OP HD unit and she cramps if wt is under 60 kg.   -No wt yet this admission, gentle UF today, SBP > 120  - continue on amlodipine 5 mg qday, losartan 50 mg qday  - rapid correction of BP with restarting minimal regimen supports concern for pt not getting her anti-hypertensives at facility      Anemia of CKD: hgb 8's; venofer 100 mg qtx, epogen 5200 units per run  - ordered epo 10K and venofer 100 mg today       BMD: Ca 8.7, alb 4.3, phos 4.5, Vit D 24; continue PTA PO calcitriol 1.0 mcg MWF, Phoslo 2 tabs TID WM, velphoro 1 tab tid WM  - continue PTA meds     AMS: w/u underway per primary team; blood cx NGTD, on empiric antibx. Head CT unrevealing. Delirium, opioids on ddx. BP's very elevated on admission, now controlled  - note, dialysis note on Monday indicated she was at her baseline, no issues  - plan is for brain MRI today       Recommendations were communicated to primary team via this note         SHANNON Charles   Division of Renal Disease and Hypertension  [phone]      REASON FOR CONSULT: ESKD/dialysis    HISTORY OF PRESENT ILLNESS:  Rachele Martínez is an 83 year old female with PMH of ESKD, mild dementia, HTN, anemia and recurrent GI bleeds secondary to AVMs, chronic hep C with cirrhosis, admitted with AMS. Workup is  underway, per primary team. So far unrevealing with unremarkable head CT, neg blood cx (on empiric antibx), not improving with blood pressure control; some concern for delirium. Will have MRI today. Pt is seen on dialysis, minimal UF as BP's 120's before starting HD, want to avoid further dropping pressures given 200's on admission. Pt is very confused, raising her R arm continually with no explanation, has been doing this all morning per sitter.     PAST MEDICAL HISTORY:  Reviewed with patient on 06/22/2023     Past Medical History:   Diagnosis Date     Anemia      Anxiety and depression      Arthritis      AVM (arteriovenous malformation)      Chronic hepatitis C with cirrhosis (H)      Clotting disorder (H)      ESRD (end stage renal disease) (H)     on dialysis     GI bleed     recurrent     Glaucoma      Hyperlipidemia      Hypertension goal BP (blood pressure) < 140/80        Past Surgical History:   Procedure Laterality Date     CAPSULE/PILL CAM ENDOSCOPY N/A 3/27/2019    Procedure: Capsule/pill cam endoscopy;  Surgeon: Yosvany Ram MD;  Location: [facility]     CAPSULE/PILL CAM ENDOSCOPY N/A 2/2/2023    Procedure: IMAGING PROCEDURE, GI TRACT, INTRALUMINAL, VIA CAPSULE;  Surgeon: Mulu Nur DO;  Location: [facility]     COLONOSCOPY N/A 9/4/2015    Procedure: COMBINED COLONOSCOPY, SINGLE OR MULTIPLE BIOPSY/POLYPECTOMY BY BIOPSY;  Surgeon: Rupesh Lopez MD;  Location: [facility]     COLONOSCOPY N/A 9/19/2018    Procedure: COLONOSCOPY;  enteroscopy small bowel  COLONOSCOPY;  Surgeon: Ankit Baires MD;  Location: [facility]     ENTEROSCOPY SMALL BOWEL N/A 3/9/2023    Procedure: antegrade single balloon enteroscopy with argon plasma coagulation of arteriovenous malformations;  Surgeon: Ankit Baires MD;  Location: [facility]     ESOPHAGOSCOPY, GASTROSCOPY, DUODENOSCOPY (EGD), COMBINED N/A 12/18/2014    Procedure: COMBINED ESOPHAGOSCOPY, GASTROSCOPY, DUODENOSCOPY (EGD);  Surgeon: Betsy Carvajal MD;   Location:  GI     ESOPHAGOSCOPY, GASTROSCOPY, DUODENOSCOPY (EGD), COMBINED N/A 4/25/2015    Procedure: COMBINED ESOPHAGOSCOPY, GASTROSCOPY, DUODENOSCOPY (EGD);  Surgeon: Yosvany Ram MD;  Location:  GI     ESOPHAGOSCOPY, GASTROSCOPY, DUODENOSCOPY (EGD), COMBINED N/A 5/5/2015    Procedure: COMBINED ESOPHAGOSCOPY, GASTROSCOPY, DUODENOSCOPY (EGD);  Surgeon: Mariano Mistry MD;  Location:  GI     HC CAPSULE ENDOSCOPY N/A 9/30/2015    Procedure: CAPSULE/PILL CAM ENDOSCOPY;  Surgeon: Pan hDaliwal MD;  Location:  GI      VASCULAR SURGERY PROCEDURE UNLIST       HYSTERECTOMY  1980    KYREE     LUMPECTOMY BREAST          MEDICATIONS:  PTA Meds  Prior to Admission medications    Medication Sig Last Dose Taking? Auth Provider Long Term End Date   albuterol (PROAIR HFA/PROVENTIL HFA/VENTOLIN HFA) 108 (90 Base) MCG/ACT inhaler Inhale 2 puffs into the lungs every 4 hours as needed for shortness of breath, wheezing or cough 6/4/2023 at unknown Yes Reported, Patient No    amLODIPine (NORVASC) 5 MG tablet Take 5 mg by mouth daily 5 mg orally one time daily 6/20/2023 at 8am Yes Reported, Patient No    atorvastatin (LIPITOR) 20 MG tablet Take 1 tablet (20 mg) by mouth daily 6/20/2023 at 8am Yes Angel Luis Reno MD Yes    benzonatate (TESSALON) 100 MG capsule Take 1 capsule (100 mg) by mouth 3 times daily as needed for cough Unknown at as needed Yes Angel Luis Reno MD     calcium acetate (PHOSLO) 667 MG CAPS capsule TAKE 2 CAPSULE BY MOUTH THREE TIMES A DAY WITH MEALS 6/20/2023 at 8pm Yes Angel Luis Reno MD     hydrOXYzine (ATARAX) 25 MG tablet Take 1 tablet (25 mg) by mouth every 6 hours as needed for itching or anxiety 6/4/2023 at unknown Yes Angel Luis Reno MD     losartan (COZAAR) 50 MG tablet Take 1 tablet (50 mg) by mouth daily 6/20/2023 at 8am Yes Angel Luis Reno MD Yes    Multiple Vitamin-Folic Acid TABS Take 1 tablet by mouth daily 6/20/2023 at 8am Yes Reported,  Patient     pantoprazole (PROTONIX) 20 MG EC tablet Take 1 tablet (20 mg) by mouth 2 times daily (before meals) *take 30-60 minutes before 6/20/2023 at 1700 Yes Angel Luis Reno MD     polyethylene glycol (MIRALAX) 17 GM/Dose powder Take 17 g by mouth daily as needed As needed 6/4/2023 at unknown Yes Reported, Patient     pregabalin (LYRICA) 50 MG capsule Take 50 mg by mouth daily 1 capsule by mouth daily in the evening. 6/19/2023 at 2000 Yes Reported, Patient Yes    sertraline (ZOLOFT) 50 MG tablet Take 2 tablets (100 mg) by mouth daily 6/20/2023 at 8am Yes Angel Luis Reno MD Yes    sucroferric oxyhydroxide (VELPHORO) 500 MG CHEW chewable tablet Take 500 mg by mouth 2 times daily 6/20/2023 at 2000 Yes Reported, Patient        Current Meds    amLODIPine  5 mg Oral Daily     atorvastatin  20 mg Oral Daily     [START ON 6/23/2023] calcitRIOL  1 mcg Oral Once per day on Mon Wed Fri     calcium acetate  667 mg Oral TID w/meals     ceFEPIme  1 g Intravenous Q24H     - MEDICATION INSTRUCTIONS -   Does not apply See Admin Instructions     losartan  50 mg Oral Daily     multivitamin RENAL  1 tablet Oral Daily     pantoprazole  20 mg Oral BID AC     sertraline  100 mg Oral Daily     sodium chloride (PF)  3 mL Intracatheter Q8H     sucroferric oxyhydroxide  500 mg Oral BID     vancomycin place palafox - receiving intermittent dosing  1 each Intravenous See Admin Instructions     Infusion Meds    - MEDICATION INSTRUCTIONS -         ALLERGIES:    Allergies   Allergen Reactions     Abacavir Itching     Lisinopril Cough     Dust Mites      Hydrochlorothiazide Itching     Severe       No Clinical Screening - See Comments      History of blood transfusion reactions and pre-treats with Benadryl.      Spironolactone Nausea     Sulfa Antibiotics Hives     Valsartan Itching     Valsartan-Hydrochlorothiazide Itching     severe       REVIEW OF SYSTEMS:  A comprehensive of systems was negative except as noted  above.    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     Sexual activity: Not Currently   Other Topics Concern     Parent/sibling w/ CABG, MI or angioplasty before 65F 55M? No   Social History Narrative     Not on file     Social Determinants of Health     Financial Resource Strain: Not on file   Food Insecurity: Not on file   Transportation Needs: Not on file   Physical Activity: Not on file   Stress: Not on file   Social Connections: Not on file   Intimate Partner Violence: Not on file   Housing Stability: Not on file     Reviewed with patient   Daughters accompanies Rachele Martínez in hospital room    FAMILY MEDICAL HISTORY:   Family History   Problem Relation Age of Onset     Diabetes Mother      Alzheimer Disease Mother      Substance Abuse Son      Cancer Sister      Soft Tissue Cancer Sister      Breast Cancer Sister      Hyperlipidemia Daughter      Alcoholism Brother      Spine Problems Sister      No known family history of kidney disease  Reviewed with patient     PHYSICAL EXAM:   Temp  Av.1  F (36.7  C)  Min: 97.8  F (36.6  C)  Max: 98.4  F (36.9  C)      Pulse  Av.3  Min: 65  Max: 99 Resp  Av.8  Min: 0  Max: 29  SpO2  Av.8 %  Min: 95 %  Max: 100 %       /58   Pulse 75   Temp 97.8  F (36.6  C) (Axillary)   Resp 18   SpO2 98%       Admit       GENERAL APPEARANCE:  confused  EYES: no scleral icterus, pupils equal  Pulmonary: lungs clear to auscultation with equal breath sounds bilaterally   CV:  regular rhythm, normal rate    - Edema none  GI: soft   MS: no evidence of inflammation in joints, no muscle tenderness  : no armando  SKIN: no rash, warm, dry, no cyanosis  NEURO: facial droop s/p CVA, confusion    LABS:   I have reviewed the following labs:  CMP  Recent Labs   Lab 06/22/23  0752 06/21/23  1117    141   POTASSIUM 5.4* 5.2   CHLORIDE 101 97*   CO2 26 26   ANIONGAP 16* 18*   * 126*   BUN 47.6* 37.7*   CR 9.51* 7.87*   GFRESTIMATED 4* 5*   BELGICA 8.7* 9.4   MAG 2.3 2.4*   PHOS 4.5  --    PROTTOTAL  --  8.4*   ALBUMIN  --  4.3   BILITOTAL  --  0.8   ALKPHOS  --  250*   AST  --  30   ALT  --  18     CBC  Recent Labs   Lab 06/22/23  0752 06/21/23  1224 06/20/23  1122   HGB 8.4* 8.2* 8.6*   WBC 9.6 12.1* 10.8   RBC 2.77* 2.82* 2.86*   HCT 27.8* 28.5* 28.5*    101* 100   MCH 30.3 29.1 30.1   MCHC 30.2* 28.8* 30.2*   RDW 16.7* 16.9* 17.2*    333 289     INR  Recent Labs   Lab 06/21/23  1117   INR 1.28*     ABG  Recent Labs   Lab 06/21/23  1144   O2PER 21      URINE STUDIES  Recent Labs   Lab Test 03/11/16  1449 03/05/16  1440 05/04/15  1213   COLOR Light Yellow Yellow Light Yellow   APPEARANCE Clear Clear Clear   URINEGLC 30* 30* Negative   URINEBILI Negative Negative Negative   URINEKETONE Negative Negative Negative   SG 1.009 1.011 1.008   UBLD Trace* Small* Trace*   URINEPH 6.0 6.0 6.5   PROTEIN 300* 300* 300*   NITRITE Negative Negative Negative   LEUKEST Negative Negative Negative   RBCU 1 1 <1   WBCU <1 4* 2     No lab results found.  PTH  Recent Labs   Lab Test 03/12/16  0552   PTHI 147*     IRON STUDIES  Recent Labs   Lab Test 06/20/23  1122 06/02/23  0942 05/14/23  0741 01/26/23  1450 01/27/22  1304 10/24/18  1506 09/14/18  1159 06/21/16  1404 03/13/16  0446 03/01/16  1140 01/14/16  0944 11/12/15  1005 08/21/15  0924   IRON 36* 62 31* 34*  --   241* 55 30* 25* 21*  --  35 34*   * 203* 173* 266  --  419 279 284 369 482*  --  399 445*   IRONSAT 17 31 18 13*  --  57* 20 11* 7* 4*  --  9* 8*   KASSI 600* 222 436*  --  106 176 229 67 18 11 51 40 18       IMAGING:  Reviewed    oZra Villagomez PA-C

## 2023-06-22 NOTE — MEDICATION SCRIBE - ADMISSION MEDICATION HISTORY
Medication Scribe Admission Medication History    Admission medication history is complete. The information provided in this note is only as accurate as the sources available at the time of the update.    Medication reconciliation/reorder completed by provider prior to medication history? Yes    Information Source(s): Facility ([facility]/) medication list/MAR via N/A    Pertinent Information:   Patient is not taking Renavite RX Nephrovite multivitamins, gabapentin 300 mg capsule, Sandostatin LAR 20 mg injection.    Changes made to PTA medication list:    Added: albuterol sulfate  (90 Base), pregabalin 50 mg capsule, amlodipine 5 mg tablet,multivitamin formula with folic acid.    Deleted: gabapentin 300 MG capsule, Renal RX Nephrovite tablet,  Sandostatin LAR 20 mg injection,    Changed: None    Medication Affordability:  Not including over the counter (OTC) medications, was there a time in the past 3 months when you did not take your medications as prescribed because of cost?: No    Allergies reviewed with patient and updates made in EHR: yes    Medication History Completed By: Jennifer Hanna 6/21/2023 9:59 PM    Prior to Admission medications    Medication Sig Last Dose Taking? Auth Provider Long Term End Date   albuterol (PROAIR HFA/PROVENTIL HFA/VENTOLIN HFA) 108 (90 Base) MCG/ACT inhaler Inhale 2 puffs into the lungs every 4 hours as needed for shortness of breath, wheezing or cough 6/4/2023 at unknown Yes Reported, Patient No    amLODIPine (NORVASC) 5 MG tablet Take 5 mg by mouth daily 5 mg orally one time daily 6/20/2023 at 8am Yes Reported, Patient No    atorvastatin (LIPITOR) 20 MG tablet Take 1 tablet (20 mg) by mouth daily 6/20/2023 at 8am Yes Angel Luis Reno MD Yes    benzonatate (TESSALON) 100 MG capsule Take 1 capsule (100 mg) by mouth 3 times daily as needed for cough Unknown at as needed Yes Angel Luis Reno MD     calcium acetate (PHOSLO) 667 MG CAPS capsule TAKE 2 CAPSULE BY  MOUTH THREE TIMES A DAY WITH MEALS 6/20/2023 at 8pm Yes Angel Luis Reno MD     hydrOXYzine (ATARAX) 25 MG tablet Take 1 tablet (25 mg) by mouth every 6 hours as needed for itching or anxiety 6/4/2023 at unknown Yes Angel Luis Reno MD     losartan (COZAAR) 50 MG tablet Take 1 tablet (50 mg) by mouth daily 6/20/2023 at 8am Yes Angel Luis Reno MD Yes    Multiple Vitamin-Folic Acid TABS Take 1 tablet by mouth daily 6/20/2023 at 8am Yes Reported, Patient     pantoprazole (PROTONIX) 20 MG EC tablet Take 1 tablet (20 mg) by mouth 2 times daily (before meals) *take 30-60 minutes before 6/20/2023 at 1700 Yes Angel Luis Reno MD     polyethylene glycol (MIRALAX) 17 GM/Dose powder Take 17 g by mouth daily as needed As needed  Yes Reported, Patient     pregabalin (LYRICA) 50 MG capsule Take 50 mg by mouth daily 1 capsule by mouth daily in the evening. 6/19/2023 at 2000 Yes Reported, Patient Yes    sertraline (ZOLOFT) 50 MG tablet Take 2 tablets (100 mg) by mouth daily 6/20/2023 at 8am Yes Angel Luis Reno MD Yes    sucroferric oxyhydroxide (VELPHORO) 500 MG CHEW chewable tablet Take 500 mg by mouth 2 times daily 6/20/2023 at 2000 Yes Reported, Patient

## 2023-06-22 NOTE — PROGRESS NOTES
Care Management Follow Up    Length of Stay (days): 1    Expected Discharge Date: 06/23/2023     Concerns to be Addressed: discharge planning     Patient plan of care discussed at interdisciplinary rounds: No    Anticipated Discharge Disposition: Skilled Nursing Facility, Transitional Care     Anticipated Discharge Services:  N/A  Anticipated Discharge DME:  N/A    Patient/family educated on Medicare website which has current facility and service quality ratings:  Yes  Education Provided on the Discharge Plan:  Yes  Patient/Family in Agreement with the Plan: unable to assess    Referrals Placed by CM/SW:  Referrals have been made to the following facilities and their status is as follows:    [facility] [place]  P: [phone]  P: [phone] - Admissions  F: [phone]  - Family reports they would like to try to go to another facility, but they are still holding the bed for pt at this . Writer faxed medical records for SNF. Pt has an 18 day bed hold.     Private pay costs discussed: Not applicable    Additional Information:  Chart reviewed. Case discussed with bedside RN and medical provider.    Writer met with pt and pt's daughters, Charla, and Day. Discussed their concerns with pt being at . Charla confirmed that they are still holding the bed, but Day reported that she did not want pt returning. Writer explained that while we can try to send out referrals to other SNFs, writer also explained that if/when pt is determined to be medically ready for discharge, she may have to return to [facility].  A list of facilities was given to daughter to review. Writer encouraged family to select from the facilities.       [facility] [place]  P: [phone]  P: [phone] - Admissions  F: [phone]  - Writer left VM with DON at  requesting a call back.   - Writer received call from Kayleigh, who is a NP at [facility], who is essentially functioning as pt's PCP while at the . Kayleigh  reported that pt was only supposed to be on one suboxone patch at a time and was supposed to have been discontinued the day before pt presented to ED. Kayleigh confirmed that pt did have 2 patches on at the same time, but they were removed about a week prior to this admission.  - Writer spoke with Kayleigh again, who reports that they have documentation that the patches were removed at an earlier date.   - Writer had conversation with ARIANNA. DON confirmed he had the suboxone dermal patches removed earlier this week. Pt still has a bed hold. DON requested medical records be faxed to their admissions.      ________________    ANTONIO Guillory, [facility]  ED/Observation   [facility]  Phone: [phone]  Pager: [phone]  Fax: [phone]    On-call pager, [phone], 4:00pm to midnight

## 2023-06-22 NOTE — PROGRESS NOTES
Date: 6/22/2023  Time: 11:45 AM     Data:  Pre Wt:   58.8 kg (not recent)  Desired Wt:   To be established  Post Wt:  58.1 kg (estimated)  Weight change:  0.7 kg  Ultrafiltration - Post Run Net Total Removed (mL):  700 ml  Vascular Access Status: Fistula patent  Dialyzer Rinse:  Light  Total Blood Volume Processed: 70.1 L   Total Dialysis (Treatment) Time:   3.5 Hrs  Dialysate Bath: K 2, Ca 2.5  Heparin: Heparin: None     Lab:   Yes   BMP, CBC, Hepatitis B antigen + antibody     Interventions:  Dialysis done through Right upper arm AV Fistula using 15 gauge needles  UF set to 1.3 Liters, accommodating  priming and rinse back volumes   ,   Epogen 10,000 units and Iron 100 mg administered per MAR  CritLine showed a stable Profile B throughout the run, but her BP still dropped to 87/71 mmHg, UF off to 0.7 Liter (net) and was given NS bolus of 100 ml  Primary Team visited her during the run and answered family's concerns regarding change in her alertness/mental status compared from the previous day  Decannulation done post HD, hemostasis is achieved in 10 minutes  Pressure dressing is applied, to be removed after 4-6 hours  Report given to PCN, sent back to ED22 in stable condition     Assessment:  Alert, disoriented, calm and cooperative, denies pain  Lung sounds clear anterior and lateral BUL/BLL  Right upper arm AV Fistula has good thrill and bruit                Plan:    Per Renal team        DANIELA LanidnN, RN  Acute Dialysis RN  [facility]
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
## 2023-06-23 LAB
ANION GAP SERPL CALCULATED.3IONS-SCNC: 16 MMOL/L (ref 7–15)
BUN SERPL-MCNC: 18.8 MG/DL (ref 8–23)
CALCIUM SERPL-MCNC: 8.6 MG/DL (ref 8.8–10.2)
CHLORIDE SERPL-SCNC: 95 MMOL/L (ref 98–107)
CREAT SERPL-MCNC: 5.46 MG/DL (ref 0.51–0.95)
DEPRECATED HCO3 PLAS-SCNC: 22 MMOL/L (ref 22–29)
ERYTHROCYTE [DISTWIDTH] IN BLOOD BY AUTOMATED COUNT: 16.9 % (ref 10–15)
GFR SERPL CREATININE-BSD FRML MDRD: 7 ML/MIN/1.73M2
GLUCOSE SERPL-MCNC: 112 MG/DL (ref 70–99)
HCT VFR BLD AUTO: 28.8 % (ref 35–47)
HGB BLD-MCNC: 8.6 G/DL (ref 11.7–15.7)
LACTATE SERPL-SCNC: 0.9 MMOL/L (ref 0.7–2)
MAGNESIUM SERPL-MCNC: 1.9 MG/DL (ref 1.7–2.3)
MCH RBC QN AUTO: 29.7 PG (ref 26.5–33)
MCHC RBC AUTO-ENTMCNC: 29.9 G/DL (ref 31.5–36.5)
MCV RBC AUTO: 99 FL (ref 78–100)
PHOSPHATE SERPL-MCNC: 3.9 MG/DL (ref 2.5–4.5)
PLATELET # BLD AUTO: 367 10E3/UL (ref 150–450)
POTASSIUM SERPL-SCNC: 4.5 MMOL/L (ref 3.4–5.3)
RBC # BLD AUTO: 2.9 10E6/UL (ref 3.8–5.2)
SODIUM SERPL-SCNC: 133 MMOL/L (ref 136–145)
STFR SERPL-MCNC: 5.5 MG/L
VANCOMYCIN SERPL-MCNC: 20.7 UG/ML
WBC # BLD AUTO: 10.7 10E3/UL (ref 4–11)

## 2023-06-23 PROCEDURE — 80202 ASSAY OF VANCOMYCIN: CPT | Performed by: STUDENT IN AN ORGANIZED HEALTH CARE EDUCATION/TRAINING PROGRAM

## 2023-06-23 PROCEDURE — 36415 COLL VENOUS BLD VENIPUNCTURE: CPT

## 2023-06-23 PROCEDURE — 250N000011 HC RX IP 250 OP 636: Mod: JZ

## 2023-06-23 PROCEDURE — 99232 SBSQ HOSP IP/OBS MODERATE 35: CPT | Mod: GC | Performed by: STUDENT IN AN ORGANIZED HEALTH CARE EDUCATION/TRAINING PROGRAM

## 2023-06-23 PROCEDURE — 80048 BASIC METABOLIC PNL TOTAL CA: CPT

## 2023-06-23 PROCEDURE — 36415 COLL VENOUS BLD VENIPUNCTURE: CPT | Performed by: STUDENT IN AN ORGANIZED HEALTH CARE EDUCATION/TRAINING PROGRAM

## 2023-06-23 PROCEDURE — 999N000111 HC STATISTIC OT IP EVAL DEFER

## 2023-06-23 PROCEDURE — 90937 HEMODIALYSIS REPEATED EVAL: CPT

## 2023-06-23 PROCEDURE — 85027 COMPLETE CBC AUTOMATED: CPT

## 2023-06-23 PROCEDURE — 258N000003 HC RX IP 258 OP 636: Performed by: STUDENT IN AN ORGANIZED HEALTH CARE EDUCATION/TRAINING PROGRAM

## 2023-06-23 PROCEDURE — 83735 ASSAY OF MAGNESIUM: CPT

## 2023-06-23 PROCEDURE — 83605 ASSAY OF LACTIC ACID: CPT | Performed by: STUDENT IN AN ORGANIZED HEALTH CARE EDUCATION/TRAINING PROGRAM

## 2023-06-23 PROCEDURE — 84100 ASSAY OF PHOSPHORUS: CPT

## 2023-06-23 PROCEDURE — 120N000002 HC R&B MED SURG/OB UMMC

## 2023-06-23 PROCEDURE — 250N000013 HC RX MED GY IP 250 OP 250 PS 637

## 2023-06-23 PROCEDURE — 99233 SBSQ HOSP IP/OBS HIGH 50: CPT | Performed by: PHYSICIAN ASSISTANT

## 2023-06-23 RX ORDER — MIRTAZAPINE 15 MG/1
7.5 TABLET, ORALLY DISINTEGRATING ORAL AT BEDTIME
Status: DISCONTINUED | OUTPATIENT
Start: 2023-06-23 | End: 2023-06-23

## 2023-06-23 RX ORDER — CEFTRIAXONE 1 G/1
1 INJECTION, POWDER, FOR SOLUTION INTRAMUSCULAR; INTRAVENOUS EVERY 24 HOURS
Status: COMPLETED | OUTPATIENT
Start: 2023-06-23 | End: 2023-06-25

## 2023-06-23 RX ORDER — LACTULOSE 10 G/15ML
100 SOLUTION ORAL
Status: DISCONTINUED | OUTPATIENT
Start: 2023-06-23 | End: 2023-06-23

## 2023-06-23 RX ORDER — RAMELTEON 8 MG/1
8 TABLET ORAL AT BEDTIME
Status: DISCONTINUED | OUTPATIENT
Start: 2023-06-23 | End: 2023-07-06 | Stop reason: HOSPADM

## 2023-06-23 RX ORDER — LACTULOSE 10 G/15ML
200 SOLUTION ORAL 2 TIMES DAILY
Status: DISCONTINUED | OUTPATIENT
Start: 2023-06-23 | End: 2023-06-24

## 2023-06-23 RX ORDER — BISACODYL 10 MG
10 SUPPOSITORY, RECTAL RECTAL DAILY PRN
Status: DISCONTINUED | OUTPATIENT
Start: 2023-06-23 | End: 2023-07-06 | Stop reason: HOSPADM

## 2023-06-23 RX ORDER — LACTULOSE 10 G/15ML
20 SOLUTION ORAL
Status: DISCONTINUED | OUTPATIENT
Start: 2023-06-23 | End: 2023-06-23

## 2023-06-23 RX ADMIN — LOSARTAN POTASSIUM 50 MG: 50 TABLET, FILM COATED ORAL at 08:13

## 2023-06-23 RX ADMIN — HYDRALAZINE HYDROCHLORIDE 10 MG: 20 INJECTION INTRAMUSCULAR; INTRAVENOUS at 21:49

## 2023-06-23 RX ADMIN — HYDRALAZINE HYDROCHLORIDE 10 MG: 20 INJECTION INTRAMUSCULAR; INTRAVENOUS at 00:19

## 2023-06-23 RX ADMIN — HYDRALAZINE HYDROCHLORIDE 10 MG: 20 INJECTION INTRAMUSCULAR; INTRAVENOUS at 03:16

## 2023-06-23 RX ADMIN — Medication: at 13:02

## 2023-06-23 RX ADMIN — AMLODIPINE BESYLATE 5 MG: 5 TABLET ORAL at 08:13

## 2023-06-23 RX ADMIN — SODIUM CHLORIDE 300 ML: 9 INJECTION, SOLUTION INTRAVENOUS at 13:02

## 2023-06-23 RX ADMIN — CEFTRIAXONE SODIUM 1 G: 1 INJECTION, POWDER, FOR SOLUTION INTRAMUSCULAR; INTRAVENOUS at 13:12

## 2023-06-23 RX ADMIN — SODIUM CHLORIDE 250 ML: 9 INJECTION, SOLUTION INTRAVENOUS at 13:02

## 2023-06-23 ASSESSMENT — ACTIVITIES OF DAILY LIVING (ADL)
ADLS_ACUITY_SCORE: 51

## 2023-06-23 NOTE — PHARMACY-VANCOMYCIN DOSING SERVICE
Pharmacy Vancomycin Note  Date of Service 2023  Patient's  1940   83 year old, female    Indication: Sepsis  Day of Therapy: 3  Current vancomycin regimen:  Intermittent dosing based on levels   Current vancomycin monitoring method: Trough (Method 2 = manual dose calculation)  Current vancomycin therapeutic monitoring goal: 15-20 mg/L    Current estimated CrCl = Estimated Creatinine Clearance: 7.4 mL/min (A) (based on SCr of 5.46 mg/dL (H)).    Creatinine for last 3 days  2023: 11:17 AM Creatinine 7.87 mg/dL  2023:  7:52 AM Creatinine 9.51 mg/dL  2023:  6:43 AM Creatinine 5.46 mg/dL    Recent Vancomycin Levels (past 3 days)  2023:  2:04 PM Vancomycin 11.7 ug/mL  2023:  6:43 AM Vancomycin 20.7 ug/mL    Vancomycin IV Administrations (past 72 hours)                   vancomycin (VANCOCIN) 750 mg in sodium chloride 0.9 % 250 mL intermittent infusion (mg) 750 mg New Bag 23    vancomycin (VANCOCIN) 1,250 mg in 0.9% NaCl 250 mL intermittent infusion (mg) 1,250 mg New Bag 23 1730                Nephrotoxins and other renal medications (From now, onward)    Start     Dose/Rate Route Frequency Ordered Stop    23 1325  vancomycin place palafox - receiving intermittent dosing         1 each Intravenous SEE ADMIN INSTRUCTIONS 23 1326               Contrast Orders - past 72 hours (72h ago, onward)    Start     Dose/Rate Route Frequency Stop    23 1825  gadobutrol (GADAVIST) injection 7.5 mL         7.5 mL Intravenous ONCE 23 1823    23 1400  iopamidol (ISOVUE-370) solution 80 mL         80 mL Intravenous ONCE 23 1440    23 1440  iopamidol (ISOVUE-370) solution 71 mL  Status:  Discontinued         71 mL Intravenous ONCE 23 1607          Interpretation of levels and current regimen:  Vancomycin level is reflective of therapeutic level, however expecting about 25-30 percent clearance post-HD    Has serum creatinine changed  greater than 50% in last 72 hours: No    Urine output:  anuric    Renal Function: ESRD on Dialysis    Plan:  1. Give a one time 750 mg dose post-HD  2. Vancomycin monitoring method: Trough (Method 2 = manual dose calculation)  3. Vancomycin therapeutic monitoring goal: 15-20 mg/L  4. Pharmacy will check vancomycin levels as appropriate in 1-3 Days.  5. Serum creatinine levels will be ordered a minimum of twice weekly.    Thanks for the consult  Howie Hoover RPH

## 2023-06-23 NOTE — PROGRESS NOTES
HEMODIALYSIS TREATMENT NOTE    Date: 6/23/2023  Time: 4:54 PM    Data:  Pre Wt: Not weighed pre per LEESA ZAVALA   Desired Wt: 1 kg   Post Wt:  Not weighed as pt is lift.   Weight change:  1 kg  Ultrafiltration - Post Run Net Total Removed (mL): 1000 mL  Vascular Access Status: patent  Dialyzer Rinse: Streaked  Total Blood Volume Processed: 54.94 L Liters  Total Dialysis (Treatment) Time: 3hrs Hours    Lab:   No labs drawn.     POST HD Assessment/Interventions:  Tolerated remainder of HD Tx. Slightly hypertensive - SBP in 150's. Remainder VSS. Appears comfortable with no signs of distress. Tolerated 1kg UF removal. AVG sites held for 10 mins each, no signs of bleeding. New dressing applied. Sitter present at bedside.      Report given to CINDY HERNANDEZ Made aware that pt triggered septic criteria after departure and will require labs/vitals per protocol.      Plan:    As per Nephrology Team.

## 2023-06-23 NOTE — PLAN OF CARE
5B OT: Cancel/Defer     OT orders received and acknowledged. Pt did not follow simple commands (wave, squeeze hand, raise hand), however responsive to name only. Pt demo'd repetitive movements throughout with no purposeful movement. Unable to assess cognition thoroughly, will complete OT orders. Should pt's cognitive state clear, please place another OT order, thank you.
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
## 2023-06-23 NOTE — PROVIDER NOTIFICATION
Provider notified (name): Gagandeep Tate MD  Reason for notification: Pt update  Recommendation/request given to provider: Pt /86, 188/67. Hydralazine and labetalol given due to elevated SBP. Recheck after meds is still 188/57.   Response from provider: Frequency of medications adjusted

## 2023-06-23 NOTE — PROGRESS NOTES
Admission          6/22/23 0942 pm  -----------------------------------------------------------  Reason for admission: hypertensive crisis  Primary team notified of pt arrival.  Admitted from: ED  Via: BED  Accompanied by: transport and sitter  Belongings: Placed in closet (clothes)  Admission Profile: not complete, patient unable to answer  Teaching: orientation to unit and call light- call light within reach, call don't fall, use of console, meal times, when to call for the RN, and enforced importance of safety   Access: PIV left hand, with vancomycin drip.   Telemetry:No tele orders  Ht./Wt.: complete  Code Status verified on armband: yes  2 RN Skin Assessment Completed By: thierry, no skin issues noted  Med Rec completed: no  Suction/Ambu bag/Flowmeter at bedside: yes  Is patient having diarrhea upon admission- if YES fill out testing algorithm : no    C. Diff Testing Algorithm (MUST be marked YES)   1. 3 or more loose stools in 24 hrs. [] Yes [] No       Additional symptoms:(At least ONE must be marked yes)   1. Abdominal pain/discomfort [] Yes [] No   2. Fever at least 38C (100.4 F) [] Yes [] No   3. Elevated WBC(>11,000) [] Yes [] No       Exclusion Criteria:  (MUST be marked YES)   1. Off laxatives for at least 48 hrs. [] Yes [] No       Pt status: Recieved from ED with sitter, vitals checked. Hypertensive. BP -195 systolic, Iv hydralazine given, Afebrile. GCS-10/15 (e4v2m4), indomprehensibel sounds, awake and does not follow commands. On room air, sat - 95%. PIv at left hand, Av fistulat at right arm, positive bruits/thrills.     Temp:  [97.8  F (36.6  C)-98  F (36.7  C)] 98  F (36.7  C)  Pulse:  [65-87] 76  Resp:  [8-29] 16  BP: ()/() 188/67  SpO2:  [95 %-100 %] 98 %

## 2023-06-23 NOTE — PLAN OF CARE
Blood pressure (!) 141/57, pulse 82, temperature 98.3  F (36.8  C), temperature source Oral, resp. rate 17, SpO2 100 %, not currently breastfeeding.    Pt disoriented x 4. Unable to make any needs known and barely communicates verbally. Pt refused to take all oral medications this morning but writer was able to have pt take prescribed BP medication d/t recent HTN. Also has poor appetite and has not consumed any food today.     Has been looking at wall intermittently and then suddenly jumping prior to dialysis. Pt was having scary visual hallucinations based on nonverbals. Pt was jumping and gasping every half hour or even more frequently.     Had dialysis today, one liter removed. HTN resolved post dialysis. Pt has been sleeping since returning back to floor and has not been showing nonverbal signs of visual hallucinations.     Remains an assist of two, incontinent    Enema ordered for pt but d/t pt being uncooperative and then at dialysis it was not administered.     Family at the bedside today, voiced concerns about pts past care at TCU facility and current condition. Team notified and did converse with family.     Continue with plan of care

## 2023-06-23 NOTE — PLAN OF CARE
Assumed cares: [id number]  Vitals: VSS on RA except SBP, consistently 180s, PRN hydralazine given x2, SBP dropped to 166, 172, 164, continued to monitor  Pain: Unable to assess due to disorientation. Pt appears not to be in distress  Neuro: Disoriented x4  Cardiac: WDL  Respiratory: WDL  GI/: WDL  Skin: No new skin changes  IV/Drains: L PIV- SL  Activity: Assist x2 with lift  Behavior: Pt is calm.     Plan of Care: Follow patient plan of care      Goal Outcome Evaluation:      Plan of Care Reviewed With: patient    Overall Patient Progress: no changeOverall Patient Progress: no change    Outcome Evaluation: Continue with plan of care

## 2023-06-23 NOTE — PROGRESS NOTES
HEMODIALYSIS TREATMENT NOTE    Date: 6/23/2023  Time: 2:11 PM      Data:  Pre Wt:  58.8 kg not recent  Desired Wt:   To be established  Post Wt: 58  kg (estimated)  Weight change: -1 kg  Ultrafiltration - Post Run Net Total Removed (mL):  1000 ml  Vascular Access Status: Graft patent  Dialyzer Rinse:  Light  Total Blood Volume Processed:  L   Total Dialysis (Treatment) Time:   3 Hrs  Dialysate Bath: K 2, Ca 2.5  Heparin: Heparin: None     Lab:   No     Interventions:  Dialysis done through right AV Fistula using 15 gauge needles  UF set to 2 Liters, accommodating  priming and rinse back volumes  ,   Medications administered per BURTON Tanner showed a  Profile C throughout the run       Assessment:   Pt somewhat agitated, pt not verbal, has 1 to 1 sitter, no obvious signs of distress noted   AV Fistula has good thrill and bruit, pt bending arm at times so BFR at 350 to reduce alarms. No obvious edema. Care turned over to Jonathan LANDERS. Pt BP dropped to remainder 130 systolic at 1 hour armando. UF off remainder of run.                 Plan:    Per Renal team        DANIELA WhiteN, RN  Acute Dialysis RN  [facility]

## 2023-06-23 NOTE — PROGRESS NOTES
Neuro: GCS - 10 - 12 (E4 v2-4 m4), disoriented x4 confussed. Only answer to yes or no questions.  Cardiac: SR. SBP - 180-190's, Prn labetalol, hydralazine given. Bp - rechecked pending.   Respiratory: Sating 98% on RA.  GI/: Anuric. HD pt on MWF sched.  Diet/appetite: Unable to assess swallowing. Unable to follow commands  Activity: Assist of 2, turned to sides.   Pain: At acceptable level on current regimen. No complaints of pain, CPOT as assessment tool.   Skin: No new deficits noted.  LDA's: 1 PIV left arm.     Plan: For Bp comtrol  Continue with POC. Notify primary team with changes.

## 2023-06-23 NOTE — PROGRESS NOTES
Nephrology Progress Note  06/23/2023         Assessment & Recommendations:   Rachele Martínez is an 83 year old female with PMH of ESKD, mild dementia, HTN, anemia and recurrent GI bleeds secondary to AVMs, chronic hep C with cirrhosis, admitted with AMS     ESKD: dialyzes MWF at [facility] with Dr. Tarango. Run time: 3.5 hrs. Access: RUE AVG. EDW 60 kg  - HD per MWF schedule     BP/Volume: 60 kg  -recent admission, pt said her EDW was recently challenged at OP HD unit and she cramps if wt is under 60 kg.   -No wt yet this admission, 2 kg UF goal today given BP's in 170's, will maintain SBP > 130 to avoid relative hypotension  - continue on amlodipine 5 mg qday, losartan 50 mg qday        Anemia of CKD: hgb 8's; venofer 100 mg qtx, epogen 5200 units per run  - continue PTA epo 5200 units per HD        BMD: Ca 8's, alb 4.3, phos 3.9, Vit D 24; continue PTA PO calcitriol 1.0 mcg MWF, Phoslo 2 tabs TID WM, velphoro 1 tab tid WM  - continue PTA meds      AMS: w/u underway per primary team; blood cx NGTD, on empiric antibx for potential UTI (no urine sample, minimal UOP, treating empirically to see if AMS resolves). Head CT unrevealing. Delirium, opioids on ddx. BP's very elevated on admission, now controlled  - note, dialysis note on Monday indicated she was at her baseline, no issues  - on delirium precautions, may have brain MRI if encephalopathy doesn't improve     Recommendations were communicated to primary team via this note         SHANNON Charles   Division of Renal Disease and Hypertension  [phone]    Interval History :   Seen on dialysis, elevated BP's, attempting 2 kg, will keep SBP > 130 to avoid relative hypotension. AMS somewhat improved from yesterday but pt still won't reply to questions or follow commands. Being treated for possible UTI and is on delirium precautions. If no improvement, next step brain MRI.    Review of Systems:   Unable, AMS as above    Physical Exam:   I/O last 3  completed shifts:  In: 0   Out: 700 [Other:700]   BP (!) 168/69   Pulse 87   Temp 98.1  F (36.7  C) (Axillary)   Resp (!) 9   SpO2 100%      GENERAL APPEARANCE: NAD  EYES:  no scleral icterus, pupils equal  PULM: CTA  CV: RRR     -edema none  GI: soft   INTEGUMENT: no cyanosis, no rash  NEURO: confused, doesn't respond to questions or commands  Access R AVG    Labs:   All labs reviewed by me  Electrolytes/Renal - Recent Labs   Lab Test 06/23/23  0643 06/22/23  0752 06/21/23  1117 06/04/23  1213 06/03/23  0806   * 143 141   < > 131*   POTASSIUM 4.5 5.4* 5.2   < > 4.6   CHLORIDE 95* 101 97*   < > 91*   CO2 22 26 26   < > 25   BUN 18.8 47.6* 37.7*   < > 19.9   CR 5.46* 9.51* 7.87*   < > 6.05*   * 122* 126*   < > 106*   BELGICA 8.6* 8.7* 9.4   < > 8.5*   MAG 1.9 2.3 2.4*  --  1.8   PHOS 3.9 4.5  --   --  3.9    < > = values in this interval not displayed.       CBC -   Recent Labs   Lab Test 06/23/23  0643 06/22/23  0752 06/21/23  1224   WBC 10.7 9.6 12.1*   HGB 8.6* 8.4* 8.2*    312 333       LFTs -   Recent Labs   Lab Test 06/21/23  1117 06/01/23  1546 06/01/23  0639   ALKPHOS 250* 125* 128*   BILITOTAL 0.8 0.4 0.3   ALT 18 7* 10   AST 30 22 27   PROTTOTAL 8.4* 7.0 6.2*   ALBUMIN 4.3 3.4* 3.2*       Iron Panel -   Recent Labs   Lab Test 06/20/23  1122 06/02/23  0942 05/14/23  0741   IRON 36* 62 31*   IRONSAT 17 31 18   KASSI 600* 222 436*         Imaging:  Reviewed    Current Medications:    amLODIPine  5 mg Oral Daily     atorvastatin  20 mg Oral Daily     calcitRIOL  1 mcg Oral Once per day on Mon Wed Fri     calcium acetate  667 mg Oral TID w/meals     cefTRIAXone  1 g Intravenous Q24H     - MEDICATION INSTRUCTIONS -   Does not apply See Admin Instructions     lactulose  200 g Rectal BID     losartan  50 mg Oral Daily     multivitamin RENAL  1 tablet Oral Daily     pantoprazole  20 mg Oral BID AC     ramelteon  8 mg Oral At Bedtime     sertraline  100 mg Oral Daily     sodium chloride (PF)  3 mL  Intracatheter Q8H     sucroferric oxyhydroxide  500 mg Oral BID       - MEDICATION INSTRUCTIONS -       Zora Villagomez PA

## 2023-06-24 LAB
ALBUMIN SERPL BCG-MCNC: 4 G/DL (ref 3.5–5.2)
ALP SERPL-CCNC: 194 U/L (ref 35–104)
ALT SERPL W P-5'-P-CCNC: 12 U/L (ref 0–50)
ANION GAP SERPL CALCULATED.3IONS-SCNC: 17 MMOL/L (ref 7–15)
AST SERPL W P-5'-P-CCNC: 35 U/L (ref 0–45)
BILIRUB DIRECT SERPL-MCNC: 0.26 MG/DL (ref 0–0.3)
BILIRUB SERPL-MCNC: 0.5 MG/DL
BUN SERPL-MCNC: 15.7 MG/DL (ref 8–23)
CALCIUM SERPL-MCNC: 8.9 MG/DL (ref 8.8–10.2)
CHLORIDE SERPL-SCNC: 93 MMOL/L (ref 98–107)
CREAT SERPL-MCNC: 4.4 MG/DL (ref 0.51–0.95)
DEPRECATED HCO3 PLAS-SCNC: 24 MMOL/L (ref 22–29)
ERYTHROCYTE [DISTWIDTH] IN BLOOD BY AUTOMATED COUNT: 16.9 % (ref 10–15)
GFR SERPL CREATININE-BSD FRML MDRD: 9 ML/MIN/1.73M2
GLUCOSE SERPL-MCNC: 96 MG/DL (ref 70–99)
HCT VFR BLD AUTO: 30 % (ref 35–47)
HGB BLD-MCNC: 8.9 G/DL (ref 11.7–15.7)
MAGNESIUM SERPL-MCNC: 1.8 MG/DL (ref 1.7–2.3)
MCH RBC QN AUTO: 29.3 PG (ref 26.5–33)
MCHC RBC AUTO-ENTMCNC: 29.7 G/DL (ref 31.5–36.5)
MCV RBC AUTO: 99 FL (ref 78–100)
PHOSPHATE SERPL-MCNC: 4.2 MG/DL (ref 2.5–4.5)
PLATELET # BLD AUTO: 383 10E3/UL (ref 150–450)
POTASSIUM SERPL-SCNC: 3.9 MMOL/L (ref 3.4–5.3)
PROT SERPL-MCNC: 7.7 G/DL (ref 6.4–8.3)
RBC # BLD AUTO: 3.04 10E6/UL (ref 3.8–5.2)
SODIUM SERPL-SCNC: 134 MMOL/L (ref 136–145)
WBC # BLD AUTO: 10.3 10E3/UL (ref 4–11)

## 2023-06-24 PROCEDURE — 250N000011 HC RX IP 250 OP 636: Mod: JZ

## 2023-06-24 PROCEDURE — 999N000128 HC STATISTIC PERIPHERAL IV START W/O US GUIDANCE

## 2023-06-24 PROCEDURE — 84100 ASSAY OF PHOSPHORUS: CPT

## 2023-06-24 PROCEDURE — 83735 ASSAY OF MAGNESIUM: CPT

## 2023-06-24 PROCEDURE — 120N000002 HC R&B MED SURG/OB UMMC

## 2023-06-24 PROCEDURE — 85027 COMPLETE CBC AUTOMATED: CPT

## 2023-06-24 PROCEDURE — 250N000013 HC RX MED GY IP 250 OP 250 PS 637

## 2023-06-24 PROCEDURE — 80053 COMPREHEN METABOLIC PANEL: CPT

## 2023-06-24 PROCEDURE — 36415 COLL VENOUS BLD VENIPUNCTURE: CPT

## 2023-06-24 PROCEDURE — 82248 BILIRUBIN DIRECT: CPT

## 2023-06-24 PROCEDURE — 99232 SBSQ HOSP IP/OBS MODERATE 35: CPT | Mod: GC | Performed by: STUDENT IN AN ORGANIZED HEALTH CARE EDUCATION/TRAINING PROGRAM

## 2023-06-24 RX ORDER — OLANZAPINE 10 MG/2ML
5 INJECTION, POWDER, FOR SOLUTION INTRAMUSCULAR
Status: DISCONTINUED | OUTPATIENT
Start: 2023-06-24 | End: 2023-06-24

## 2023-06-24 RX ORDER — OLANZAPINE 10 MG/2ML
2.5 INJECTION, POWDER, FOR SOLUTION INTRAMUSCULAR
Status: COMPLETED | OUTPATIENT
Start: 2023-06-24 | End: 2023-06-24

## 2023-06-24 RX ORDER — HALOPERIDOL 5 MG/ML
0.5 INJECTION INTRAMUSCULAR EVERY 6 HOURS
Status: DISCONTINUED | OUTPATIENT
Start: 2023-06-24 | End: 2023-06-26

## 2023-06-24 RX ORDER — ACETAMINOPHEN 325 MG/10.15ML
650 LIQUID ORAL EVERY 6 HOURS PRN
Status: DISCONTINUED | OUTPATIENT
Start: 2023-06-24 | End: 2023-07-06 | Stop reason: HOSPADM

## 2023-06-24 RX ORDER — OLANZAPINE 10 MG/2ML
2.5 INJECTION, POWDER, FOR SOLUTION INTRAMUSCULAR
Status: DISCONTINUED | OUTPATIENT
Start: 2023-06-24 | End: 2023-06-24

## 2023-06-24 RX ADMIN — HALOPERIDOL LACTATE 0.5 MG: 5 INJECTION, SOLUTION INTRAMUSCULAR at 12:05

## 2023-06-24 RX ADMIN — CEFTRIAXONE SODIUM 1 G: 1 INJECTION, POWDER, FOR SOLUTION INTRAMUSCULAR; INTRAVENOUS at 12:04

## 2023-06-24 RX ADMIN — HALOPERIDOL LACTATE 0.5 MG: 5 INJECTION, SOLUTION INTRAMUSCULAR at 23:10

## 2023-06-24 RX ADMIN — OLANZAPINE 2.5 MG: 10 INJECTION, POWDER, FOR SOLUTION INTRAMUSCULAR at 03:46

## 2023-06-24 RX ADMIN — HALOPERIDOL LACTATE 0.5 MG: 5 INJECTION, SOLUTION INTRAMUSCULAR at 17:07

## 2023-06-24 RX ADMIN — HYDRALAZINE HYDROCHLORIDE 10 MG: 20 INJECTION INTRAMUSCULAR; INTRAVENOUS at 21:55

## 2023-06-24 ASSESSMENT — ACTIVITIES OF DAILY LIVING (ADL)
ADLS_ACUITY_SCORE: 51

## 2023-06-24 NOTE — PLAN OF CARE
Cares from: [id number]     V/S & pain: VSS on RA, generalized pain attempted to give prn tylenol but pt only received a small amount and spit out the rest    Neuro: A/O x1- pt responds to name, calm and cooperative, mostly non-verbal  Respiratory: stable on RA, lung sounds clear/equal bilaterally  Skin: no new skin concerns present-  GI/: anuric-pt on HD, no BM    Nutrition: regular diet w/ poor intake, took bites of apple sauce, pudding and broth from family   Lines/drains: L PIV SL, R AVF   Activity: up ax2 w/ lift, pt got up to recliner this evening   Labs: no RN managed labs      Events this shift: no acute events this shift. Pt remains vitally stable on RA. A/O x1- pt responds to name and was smiling most of shift. non-verbal but this afternoon pt perked up a little and was saying 'yeah/no' also was talking to family w/ clear little sentences. This morning pt didn't follow any commands but this afternoon pt more cooperative and intermittently following commands. Family present at bedside most of the shift. awaiting safe discharge plans, Q2 hour rounding completed, call light w/in reach and able to make needs known, will continue to monitor.     Plan: continue w/ poc, awaiting safe discharge plans       Goal Outcome Evaluation:      Plan of Care Reviewed With: patient    Overall Patient Progress: improvingOverall Patient Progress: improving    Outcome Evaluation: mentation improving, intermittently saying words

## 2023-06-24 NOTE — PROGRESS NOTES
[facility]    Progress Note - Medicine Service, [facility] TEAM 3       Date of Admission:  6/21/2023    Assessment & Plan   Rachele Martínez is a 83 year old female with a h/o HTN, chronic Hep C with cirrhosis, ESRD, and hyperlipidemia who was admitted for hypertensive urgency and subacute progressive encephalopathy. Pt's daughters express concern over patient's worsening state of confusion over last 2 weeks, accelerated over last few days. Pt's daughters are also concerned Rachele has not been receiving her medications appropriately at [facility], perhaps also receiving erroneous medications. Head CT (6/21) shows no acute intracranial path, evidence of chronic small vessel ischemic dx.     Changes Today:   - Unable to complete MRI without sedation as Pt was moving, will consider ordering head MRI with sedation as needed if encephalopathy does not improve w/ delirium precautions and empiric tx of UTI  - Trial low dose scheduled haldol, 0.5mg Q6H, in the setting of suspected delirium  - Continue ceftriaxone for possible UTI (empiric tx)  - D/c lactulose enema BID  - Continue remeron and melatonin at bedtime  - Restart oral PTA amlodipine and losartan as tolerated, otherwise continue IV hydralazine and IV labetalol w/ goal SBP < 180  - Last HD, 6/23, next 6/26    Acute Conditions:   Hypertensive Urgency  Head CT (6/21) shows no acute intracranial path; see evidence of chronic small vessel ischemic dx. Unable to assess for focal neurological deficits given cooperation with exam, but moving all four extremities spontaneously and cranial nerves seem grossly intact. Uncertain etiology of encephalopathy, see below, may be a contribution of HTN but encephalopathy began prior to chronicity of HTN. Troponin elevated in ED (6/21); EKG reassuring and no symptoms of angina. No other e/o end-organ damage.  - Goal of SBP < 180  w/ PRN IV Hydralazine and IV Labetalol  available  - Continue PTA amlodipine and losartan as tolerated      Subacute Progressive Encephalopathy  Ddx: hypertensive urgency vs. infectious process (UTI vs. Other) vs. Hospital-acquired delirium vs. medication toxicity (either prescribed or erroneous medications as provided at assisted living) vs. Behavioral disturbance vs traumatic exposure vs. nonconvulsive status vs ischemic stroke.   Head CT () shows no acute intracranial path, evidence of chronic small vessel ischemic dx. Labs show no hyperammonemia and normal lactic acid. Procalcitonin elevated, infectious cause cannot be ruled out. INR elevated, suspected d/t h/o cirrhosis. Vit B12 elevated, not concerned abt deficiency. TSH elevated, but fT4 WNL. Ammonia and lactic acid WNL. VB. Will cover w/ empiric abx for now.    - Unable to complete MRI without sedation  as pt is moving.  - Discussed trailing low dose scheduled haldol in the setting od delirium, discussed with pharmacy and okay to trial in the setting of HD.   - Will consider ordering head MRI with sedation as needed if encephalopathy does not improve w/  delirium precautions and empiric tx of UTI. Additionally discussed using Zyrexa to aid in obtaining MRI, family is open to the idea but it is reasonable to trial haldol for a few days prior to this.   -Abx: Start Ceftriaxone  to treat for possible UTI (unable to dx with urine culture, treating empirically)    -Cefepime (-), Vancomycin (-)   - discontinued lactulose enema BID; Dulcolax suppository PRN   - Continue remeron and melatonin at bedtime.   - Delirium precautions ordered  - Also considered possibility of Wernicke encephalopathy, will discuss high dose thiamine tomorrow if no improvement with haldol.      Acute on Chronic Iron Deficiency Anemia vs Anemia of Chronic Disease   IV supplementation provided in dialysis .  - Consider soluble transferrin receptor add-on.     Concern for Neglect or  Maltreatment at prior living facility  Consulted SW and care coordinator particularly as Pt's daughter's are interested in a new placement for long-term care.      Chronic Conditions:   End-Stage Renal Disease  Renal consulted, recs appreciated. MWF regular dialysis schedule; inpatient dialysis 6/22 AM after missed dialysis Wednesday 6/21/23 and repeat dialysis today 6/23 to maintain schedule.      Hyperlipidemia   Continue PTA atorvastatin     Anxiety & Depression   Continue PTA sertraline        Diet: Combination Diet Regular Diet Adult    DVT Prophylaxis: Pneumatic Compression Devices  Rodriguez Catheter: Not present  Fluids: None  Lines: None     Cardiac Monitoring: None  Code Status: Full Code      Clinically Significant Risk Factors                  # Hypertension: Noted on problem list                 Disposition Plan      Expected Discharge Date: 06/30/2023      Destination: nursing home  Discharge Comments: Dispo: TBD - fam does not want original facility  Plan: imaging; IV Abx (will not discharge on this); improvement of encephalopathy; nutrition plan; tolerating PO  Progress: BP and mentation still poor        The patient's care was discussed with the Attending Physician, Dr. Purcell.    Rich Sargent MD  Medicine and Pediatrics, PGY1  Medicine Service, [facility] TEAM [address] [place] [address]   See signed in provider for up to date coverage information  ______________________________________________________________________    Interval History   Overnight patient became agitated requiring Zyprexa 2.5mg. She was able to calm down and slept well.       Less agitated and has been smiling and tracking sounds much better today. Nonverbal. Allows for an exam without difficulty. Intermittently is able to take her oral medications.     Physical Exam   Vital Signs: Temp: 98.3  F (36.8  C) Temp src: Oral BP: (!) 102/36 Pulse: 92   Resp: 17 SpO2: 93 % O2 Device: None (Room air)    Weight:  0 lbs 0 oz    General: Awake, tracking sound well, smiling, oriented to self but confused. Nonverbal.   CV: Regular rate and rhythm. No BLE edema.   Respiratory: unlabored breathing, clear to auscultation bilaterally.   Abd: soft, non-distended, non-tender.   Neuro: Does not vocalize in response to questions or follow instructions for neuro exam. Awake and alert. Unable to follow commands; continues to elevate R arm involuntarily, intermittently.      Medical Decision Making             Data

## 2023-06-24 NOTE — PROVIDER NOTIFICATION
HIGH  5B 5239 BW  M3  [place]loane RN [id number]  Pt yelling and throwing herself around in bed- not answering questions. non directable.  Pt not taking anything PO. Can something be ordered to help pt relax? Thanks    Nayeli HEAD text paged at [phone] via [employer]

## 2023-06-24 NOTE — PLAN OF CARE
Goal Outcome Evaluation:      Plan of Care Reviewed With: patient    Overall Patient Progress: decliningOverall Patient Progress: declining         ASSUMED CARES: [id number]   STATUS: Pt admitted 6/21 for HTN Urgency. Hep C+. ESRD- HD (MWF).   NEURO: SMOOTH- Pt nonverbal. Moans and grimaces noticed. Around 0330 pt began screaming out and thrashing in bed. Pt appeared fearful when staff would come close to bed or try and touch pt to console pt.   VS: Elevated BP- Unable to get Q4H VS d/t pt agitation and noncompliance.   ACTIVITY: Pt not OOB this shift. Pt able to shift in bed Ind.   PAIN: SMOOTH. Some grimacing noted with movement.   CARDIAC: No C/o CP.   RESP: No C/o SOB.   GI/: Anuric. Bm x 1 this shift. Incont stool.   DIET: Regular. No PO intake this shift- Pt unable/unwilling to swallow. Pt unable to follow commands.   SKIN: No adjustments made.   LDA'S: L PIV SL- NoNo in place.   LABS: UA needed.   CHANGES THIS SHIFT: Pt with agitation episode this shift- Pt just began screaming and thrashing- unable to redirect- IM Zyprexa given x 1- Helpful and allowed pt to sleep. Unable to give Lactulose enema d/t pt not understanding explanation of what writer was trying to do- Also pt did have BM this shift and attempting to minimize delirium and keep with day/night schedule.   POC: Cont with POC. Discharge plan TBD. Bedside attendant for pt safety. Call light within reach.

## 2023-06-24 NOTE — PROVIDER NOTIFICATION
BIBI  5B 5239   Nayeli 3  [place]lo[place] RN [id number]  Pt unable to take PO meds d/t unable to follow commands and decreased alertness. Also can you clarify IV PRN HTN meds- per MAR give if >180- MD note indicates give if > 200. Thanks    Nayeli HEAD text paged at [phone] via [employer].     Addendum: Leonila called writer back and stated to given if SBP > 180 and MD aware of pt not taking PO meds.

## 2023-06-25 LAB
ANION GAP SERPL CALCULATED.3IONS-SCNC: 20 MMOL/L (ref 7–15)
BUN SERPL-MCNC: 32.2 MG/DL (ref 8–23)
CALCIUM SERPL-MCNC: 8.7 MG/DL (ref 8.8–10.2)
CHLORIDE SERPL-SCNC: 96 MMOL/L (ref 98–107)
CREAT SERPL-MCNC: 7.4 MG/DL (ref 0.51–0.95)
DEPRECATED HCO3 PLAS-SCNC: 22 MMOL/L (ref 22–29)
ERYTHROCYTE [DISTWIDTH] IN BLOOD BY AUTOMATED COUNT: 17.1 % (ref 10–15)
GFR SERPL CREATININE-BSD FRML MDRD: 5 ML/MIN/1.73M2
GLUCOSE SERPL-MCNC: 83 MG/DL (ref 70–99)
HCT VFR BLD AUTO: 31.7 % (ref 35–47)
HGB BLD-MCNC: 9.3 G/DL (ref 11.7–15.7)
MAGNESIUM SERPL-MCNC: 2.1 MG/DL (ref 1.7–2.3)
MCH RBC QN AUTO: 29.8 PG (ref 26.5–33)
MCHC RBC AUTO-ENTMCNC: 29.3 G/DL (ref 31.5–36.5)
MCV RBC AUTO: 102 FL (ref 78–100)
PHOSPHATE SERPL-MCNC: 5.8 MG/DL (ref 2.5–4.5)
PLATELET # BLD AUTO: 456 10E3/UL (ref 150–450)
POTASSIUM SERPL-SCNC: 3.9 MMOL/L (ref 3.4–5.3)
RADIOLOGIST FLAGS: NORMAL
RBC # BLD AUTO: 3.12 10E6/UL (ref 3.8–5.2)
SODIUM SERPL-SCNC: 138 MMOL/L (ref 136–145)
WBC # BLD AUTO: 11 10E3/UL (ref 4–11)

## 2023-06-25 PROCEDURE — 99232 SBSQ HOSP IP/OBS MODERATE 35: CPT | Mod: GC | Performed by: STUDENT IN AN ORGANIZED HEALTH CARE EDUCATION/TRAINING PROGRAM

## 2023-06-25 PROCEDURE — 250N000011 HC RX IP 250 OP 636: Mod: JZ

## 2023-06-25 PROCEDURE — 80048 BASIC METABOLIC PNL TOTAL CA: CPT

## 2023-06-25 PROCEDURE — 83735 ASSAY OF MAGNESIUM: CPT

## 2023-06-25 PROCEDURE — 250N000013 HC RX MED GY IP 250 OP 250 PS 637: Performed by: STUDENT IN AN ORGANIZED HEALTH CARE EDUCATION/TRAINING PROGRAM

## 2023-06-25 PROCEDURE — 258N000001 HC RX 258

## 2023-06-25 PROCEDURE — 85027 COMPLETE CBC AUTOMATED: CPT

## 2023-06-25 PROCEDURE — 120N000002 HC R&B MED SURG/OB UMMC

## 2023-06-25 PROCEDURE — 36415 COLL VENOUS BLD VENIPUNCTURE: CPT

## 2023-06-25 PROCEDURE — 250N000011 HC RX IP 250 OP 636

## 2023-06-25 PROCEDURE — 258N000003 HC RX IP 258 OP 636

## 2023-06-25 PROCEDURE — 84100 ASSAY OF PHOSPHORUS: CPT

## 2023-06-25 PROCEDURE — 250N000013 HC RX MED GY IP 250 OP 250 PS 637

## 2023-06-25 RX ORDER — OLANZAPINE 10 MG/2ML
2.5 INJECTION, POWDER, FOR SOLUTION INTRAMUSCULAR
Status: COMPLETED | OUTPATIENT
Start: 2023-06-25 | End: 2023-06-25

## 2023-06-25 RX ORDER — OLANZAPINE 10 MG/2ML
2.5 INJECTION, POWDER, FOR SOLUTION INTRAMUSCULAR
Status: DISCONTINUED | OUTPATIENT
Start: 2023-06-25 | End: 2023-06-26

## 2023-06-25 RX ORDER — DEXTROSE MONOHYDRATE 100 MG/ML
INJECTION, SOLUTION INTRAVENOUS CONTINUOUS
Status: DISPENSED | OUTPATIENT
Start: 2023-06-25 | End: 2023-06-25

## 2023-06-25 RX ADMIN — HALOPERIDOL LACTATE 0.5 MG: 5 INJECTION, SOLUTION INTRAMUSCULAR at 12:18

## 2023-06-25 RX ADMIN — RAMELTEON 8 MG: 8 TABLET, FILM COATED ORAL at 21:03

## 2023-06-25 RX ADMIN — THIAMINE HYDROCHLORIDE 500 MG: 100 INJECTION, SOLUTION INTRAMUSCULAR; INTRAVENOUS at 17:38

## 2023-06-25 RX ADMIN — OLANZAPINE 2.5 MG: 10 INJECTION, POWDER, FOR SOLUTION INTRAMUSCULAR at 02:25

## 2023-06-25 RX ADMIN — HALOPERIDOL LACTATE 0.5 MG: 5 INJECTION, SOLUTION INTRAMUSCULAR at 23:55

## 2023-06-25 RX ADMIN — DEXTROSE MONOHYDRATE: 100 INJECTION, SOLUTION INTRAVENOUS at 16:39

## 2023-06-25 RX ADMIN — CEFTRIAXONE SODIUM 1 G: 1 INJECTION, POWDER, FOR SOLUTION INTRAMUSCULAR; INTRAVENOUS at 12:18

## 2023-06-25 RX ADMIN — THIAMINE HYDROCHLORIDE 500 MG: 100 INJECTION, SOLUTION INTRAMUSCULAR; INTRAVENOUS at 20:58

## 2023-06-25 RX ADMIN — ACETAMINOPHEN 650 MG: 325 SUSPENSION ORAL at 12:17

## 2023-06-25 RX ADMIN — HALOPERIDOL LACTATE 0.5 MG: 5 INJECTION, SOLUTION INTRAMUSCULAR at 16:40

## 2023-06-25 RX ADMIN — HALOPERIDOL LACTATE 0.5 MG: 5 INJECTION, SOLUTION INTRAMUSCULAR at 05:00

## 2023-06-25 ASSESSMENT — ACTIVITIES OF DAILY LIVING (ADL)
ADLS_ACUITY_SCORE: 55
ADLS_ACUITY_SCORE: 51
ADLS_ACUITY_SCORE: 55
ADLS_ACUITY_SCORE: 51
ADLS_ACUITY_SCORE: 55
ADLS_ACUITY_SCORE: 55
ADLS_ACUITY_SCORE: 51
ADLS_ACUITY_SCORE: 55
ADLS_ACUITY_SCORE: 51
ADLS_ACUITY_SCORE: 55
ADLS_ACUITY_SCORE: 51
ADLS_ACUITY_SCORE: 51

## 2023-06-25 NOTE — PLAN OF CARE
Cares from: [id number]     V/S & pain: VSS on RA ex HTN and intermittently tachy, generalized pain attempted to give prn tylenol but did not receive full dose, spit some out   Neuro: A/O x1- pt responds to name, intermittently following commands, calm and cooperative, mostly non-verbal   Respiratory: stable on RA, lung sounds clear/equal bilaterally  Skin: no new skin concerns present- turn/repo Q2  GI/: anuric-pt on HD, BM x1  Nutrition: regular diet w/ poor intake, took bites of apple and broth from family   Lines/drains: L PIV infusing 75ml/h until 2000, R AVF   Activity: up ax2 w/ lift  Labs: no RN managed labs       Events this shift: no acute events this shift. Pt remains vitally stable on RA. A/O x1- pt responds to name and was smiling at times. Intermittently following commands. Pt more lethargic this shift, on and off sleeping most of the shift. Vitals were not done Q4 this shift to promote rest. She would say 'I love you' to family members but did not speak much this shift. Pt did not take any po meds this shift, started on D10 until 2000 and thiamine infusions. Plan for HD tomorrow at 0740. Possible MRI w/ sedation and NG/LP. Family present at bedside most of the shift. awaiting safe discharge plans, Q2 hour rounding completed, call light w/in reach and able to make needs known, will continue to monitor.     Plan: continue w/ poc, awaiting safe discharge plans    Goal Outcome Evaluation:      Plan of Care Reviewed With: patient    Overall Patient Progress: no changeOverall Patient Progress: no change    Outcome Evaluation: d10 @75ml/h

## 2023-06-25 NOTE — PROVIDER NOTIFICATION
5B 5228 BW  M3  Sloane RN [id number]  Pt with yelling out and agitated- IM Zyprexa given last night with relief- can this be ordered tonight? Haldol given per order. Thanks    Nayeli HEAD text paged at [phone] via [facility]     Addendum: ADILENE PARADA called writer back and stated they would put in one time order for Zyprexa if episode happens again. Currently pt resting comfortably in bed. Writer explained that episodes happen periodically.

## 2023-06-25 NOTE — PROGRESS NOTES
[facility]    Progress Note - Medicine Service, [facility] TEAM 3       Date of Admission:  6/21/2023    Assessment & Plan   Rachele Martínez is a 83 year old female with a h/o HTN, chronic Hep C with cirrhosis, ESRD, and hyperlipidemia who was admitted for hypertensive urgency and subacute progressive encephalopathy. Pt's daughters express concern over patient's worsening state of confusion over last 2 weeks, accelerated over last few days. Pt's daughters are also concerned Rachele has not been receiving her medications appropriately at [facility], perhaps also receiving erroneous medications. Head CT (6/21) shows no acute intracranial path, evidence of chronic small vessel ischemic dx.     Changes Today:   - Unable to complete MRI without sedation as pt was moving, will consider ordering head MRI with sedation (with possible LP and NG) tomorrow if encephalopathy does not improve continue to improve w/ delirium precautions  - Completed abx txt for possible UTI 6/25, ceftriaxone 3 doses   - Continue low dose scheduled haldol, 0.5mg Q6H, in the setting of suspected delirium  - Continue remeron and melatonin at bedtime  - Restart oral PTA amlodipine and losartan as tolerated, otherwise continue IV hydralazine and IV labetalol w/ goal SBP < 180  - Last HD, 6/23, next 6/26    Acute Conditions:   Hypertensive Urgency  Head CT (6/21) shows no acute intracranial path; see evidence of chronic small vessel ischemic dx. Unable to assess for focal neurological deficits given cooperation with exam, but moving all four extremities spontaneously and cranial nerves seem grossly intact. Uncertain etiology of encephalopathy, see below, may be a contribution of HTN but encephalopathy began prior to chronicity of HTN. Troponin elevated in ED (6/21); EKG reassuring and no symptoms of angina. No other e/o end-organ damage.  - Goal of SBP < 180  w/ PRN IV Hydralazine and IV  Labetalol available  - Continue PTA amlodipine and losartan as tolerated      Subacute Progressive Encephalopathy  Ddx: hypertensive urgency vs. infectious process (UTI vs. Other) vs. Hospital-acquired delirium vs. medication toxicity (either prescribed or erroneous medications as provided at assisted living) vs. Behavioral disturbance vs traumatic exposure vs. nonconvulsive status vs ischemic stroke.   Head CT () shows no acute intracranial path, evidence of chronic small vessel ischemic dx. Labs show no hyperammonemia and normal lactic acid. Procalcitonin elevated, infectious cause cannot be ruled out. INR elevated, suspected d/t h/o cirrhosis. Vit B12 elevated, not concerned abt deficiency. TSH elevated, but fT4 WNL. Ammonia and lactic acid WNL. VB. Will cover w/ empiric abx for now.      - Patient has been improving over the last 24 hours in which she is speaking and has been able to recognize family members and had some po intake. Will consider ordering head MRI with sedation tomorrow with possible LP (after discussion with neurology) if encephalopathy does not continue to improve w/  delirium precautions today and overnight.     -Abx:    - Ceftriaxone - to treat for possible UTI (unable to dx with urine culture,  treating empirically)    -Cefepime (-), Vancomycin (-)   - discontinued lactulose enema BID; Dulcolax suppository PRN   - Continue remeron and melatonin at bedtime   - Delirium precautions ordered    Nutrition  Discussed with daughter at bedside. Discussed placing an NG tube if MRI with sedation is needed. Patient was able to po some food and broth yesterday, therefore will reassess tomorrow.      Acute on Chronic Iron Deficiency Anemia vs Anemia of Chronic Disease   IV supplementation provided in dialysis .  - Consider soluble transferrin receptor add-on.     Concern for Neglect or Maltreatment at prior living facility  Consulted SW and care coordinator  particularly as Pt's daughter's are interested in a new placement for long-term care.      Chronic Conditions:   End-Stage Renal Disease  Renal consulted, recs appreciated. MWF regular dialysis schedule; inpatient dialysis 6/22 AM after missed dialysis Wednesday 6/21/23 and repeat dialysis today 6/23 to maintain schedule.      Hyperlipidemia   Continue PTA atorvastatin     Anxiety & Depression   Continue PTA sertraline        Diet: Combination Diet Regular Diet Adult    DVT Prophylaxis: Pneumatic Compression Devices  Rodriguez Catheter: Not present  Fluids: None  Lines: None     Cardiac Monitoring: None  Code Status: Full Code      Clinically Significant Risk Factors             # Anion Gap Metabolic Acidosis: Highest Anion Gap = 20 mmol/L in last 2 days, will monitor and treat as appropriate      # Hypertension: Noted on problem list                 Disposition Plan      Expected Discharge Date: 06/30/2023      Destination: nursing home  Discharge Comments: Dispo: TBD - fam does not want original facility  Plan: imaging; IV Abx (will not discharge on this); improvement of encephalopathy; nutrition plan; tolerating PO  Progress: BP and mentation still poor        The patient's care was discussed with the Attending Physician, Dr. Purcell.    Rich Sargent MD  Medicine and Pediatrics, PGY1  Medicine Service, [facility] TEAM [address] [place] [address]   See signed in provider for up to date coverage information  ______________________________________________________________________    Interval History   Overnight patient became agitated requiring Zyprexa 2.5mg. She was able to calm down and slept well. Overnight patient was talking. Daughter at bedside reports that she has noticed improvements and patient is speaking to her and able to recognize pictures of her cat. She was able to eat some applesauce and broth yesterday.        Physical Exam   Vital Signs: Temp: 97.7  F (36.5  C) Temp src:  Axillary BP: (!) 161/56 Pulse: 87   Resp: 16 SpO2: 97 % O2 Device: None (Room air)    Weight: 120 lbs 9.47 oz    General: Awake, tracking to name well, smiling, oriented to self but confused. Did point to the TV and was mumbling a few words.   CV: Regular rate and rhythm. No BLE edema.   Respiratory: unlabored breathing, clear to auscultation bilaterally.   Abd: soft, non-distended, non-tender.   Neuro: Does not vocalize in response to questions. Does respond to name. Patient was able to do handgrip bilaterally. Able to follow some commands, able to give thumbs up. Awake and alert.     Medical Decision Making             Data

## 2023-06-25 NOTE — PLAN OF CARE
"Goal Outcome Evaluation:      Plan of Care Reviewed With: patient    Overall Patient Progress: decliningOverall Patient Progress: declining         ASSUMED CARES: [id number]   STATUS: Pt admitted 6/21 for HTN Urgency. Hep C+. ESRD- HD (MWF).   NEURO: SMOOTH- Pt nonverbal for the most part, but did make some statements today \"Oh God\" and \"What the fuck\". Pt also noted to be calling out for mom at HS- stating \"Mama\" \"Mama\"- Emotional support provided. Pt notes to be jumpy at times- When writer was sitting in chair next to bed and got up from a seated position- pt flinched back and appeared scared. Helpful to slowly explain next steps and proceeded slowly. Improvement with following commands- Pt able to  writers hands and wiggle toes upon request,   VS: Elevated BP- PRN Hydral given x 1. All other VSS on RA. Did not obtain Q4H VS d/t allowing pt to sleep to promote day/night schedule- pt non-cooperative this AM with VS.   ACTIVITY: A2 with lift. Pt up in chair for beginning of shift.   PAIN: SMOOTH. Some grimacing noted with movement.   CARDIAC: No C/o CP.   RESP: No C/o SOB.   GI/: Anuric. Incont stool. No BM this shift.   DIET: Regular. No PO intake this shift- Pt declined to drink or take any PO meds.   SKIN: No adjustments made.   LDA'S: L PIV SL.   CHANGES THIS SHIFT: Scheduled Haldol given per order. Pt appeared to be in better mood at the beginning of the shift- able to follow more commands than previous worked shift. Pt still refusing to take PO meds and continues with elevated BP's. During the night pt with multiple episodes of screaming out- very loudly and non-direct able- Unsure why pt is screaming out. It will happen randomly while pt is lying there awake.   POC: Cont with POC. Discharge plan TBD. Bedside attendant for pt safety. Call light within reach.   "

## 2023-06-26 LAB
ANION GAP SERPL CALCULATED.3IONS-SCNC: 24 MMOL/L (ref 7–15)
BACTERIA BLD CULT: NO GROWTH
BUN SERPL-MCNC: 41.4 MG/DL (ref 8–23)
CALCIUM SERPL-MCNC: 8.5 MG/DL (ref 8.8–10.2)
CHLORIDE SERPL-SCNC: 95 MMOL/L (ref 98–107)
CREAT SERPL-MCNC: 9.44 MG/DL (ref 0.51–0.95)
DEPRECATED HCO3 PLAS-SCNC: 20 MMOL/L (ref 22–29)
ERYTHROCYTE [DISTWIDTH] IN BLOOD BY AUTOMATED COUNT: 17.2 % (ref 10–15)
GFR SERPL CREATININE-BSD FRML MDRD: 4 ML/MIN/1.73M2
GLUCOSE SERPL-MCNC: 96 MG/DL (ref 70–99)
HCT VFR BLD AUTO: 31.2 % (ref 35–47)
HGB BLD-MCNC: 9.3 G/DL (ref 11.7–15.7)
LACTATE SERPL-SCNC: 0.8 MMOL/L (ref 0.7–2)
MAGNESIUM SERPL-MCNC: 2.2 MG/DL (ref 1.7–2.3)
MCH RBC QN AUTO: 30.2 PG (ref 26.5–33)
MCHC RBC AUTO-ENTMCNC: 29.8 G/DL (ref 31.5–36.5)
MCV RBC AUTO: 101 FL (ref 78–100)
PHOSPHATE SERPL-MCNC: 6.9 MG/DL (ref 2.5–4.5)
PLATELET # BLD AUTO: 497 10E3/UL (ref 150–450)
POTASSIUM SERPL-SCNC: 4.1 MMOL/L (ref 3.4–5.3)
RBC # BLD AUTO: 3.08 10E6/UL (ref 3.8–5.2)
SODIUM SERPL-SCNC: 139 MMOL/L (ref 136–145)
WBC # BLD AUTO: 12.9 10E3/UL (ref 4–11)

## 2023-06-26 PROCEDURE — 80048 BASIC METABOLIC PNL TOTAL CA: CPT

## 2023-06-26 PROCEDURE — 258N000003 HC RX IP 258 OP 636

## 2023-06-26 PROCEDURE — 99233 SBSQ HOSP IP/OBS HIGH 50: CPT | Performed by: PHYSICIAN ASSISTANT

## 2023-06-26 PROCEDURE — 99222 1ST HOSP IP/OBS MODERATE 55: CPT | Mod: GC | Performed by: STUDENT IN AN ORGANIZED HEALTH CARE EDUCATION/TRAINING PROGRAM

## 2023-06-26 PROCEDURE — 120N000002 HC R&B MED SURG/OB UMMC

## 2023-06-26 PROCEDURE — 36415 COLL VENOUS BLD VENIPUNCTURE: CPT

## 2023-06-26 PROCEDURE — 258N000003 HC RX IP 258 OP 636: Performed by: STUDENT IN AN ORGANIZED HEALTH CARE EDUCATION/TRAINING PROGRAM

## 2023-06-26 PROCEDURE — 250N000013 HC RX MED GY IP 250 OP 250 PS 637: Performed by: STUDENT IN AN ORGANIZED HEALTH CARE EDUCATION/TRAINING PROGRAM

## 2023-06-26 PROCEDURE — 250N000013 HC RX MED GY IP 250 OP 250 PS 637

## 2023-06-26 PROCEDURE — 999N000248 HC STATISTIC IV INSERT WITH US BY RN

## 2023-06-26 PROCEDURE — 250N000011 HC RX IP 250 OP 636

## 2023-06-26 PROCEDURE — 634N000001 HC RX 634: Mod: JZ | Performed by: STUDENT IN AN ORGANIZED HEALTH CARE EDUCATION/TRAINING PROGRAM

## 2023-06-26 PROCEDURE — 83735 ASSAY OF MAGNESIUM: CPT

## 2023-06-26 PROCEDURE — 84100 ASSAY OF PHOSPHORUS: CPT

## 2023-06-26 PROCEDURE — 258N000001 HC RX 258

## 2023-06-26 PROCEDURE — 83605 ASSAY OF LACTIC ACID: CPT | Performed by: STUDENT IN AN ORGANIZED HEALTH CARE EDUCATION/TRAINING PROGRAM

## 2023-06-26 PROCEDURE — 99233 SBSQ HOSP IP/OBS HIGH 50: CPT | Mod: GC | Performed by: STUDENT IN AN ORGANIZED HEALTH CARE EDUCATION/TRAINING PROGRAM

## 2023-06-26 PROCEDURE — 85014 HEMATOCRIT: CPT

## 2023-06-26 PROCEDURE — 90937 HEMODIALYSIS REPEATED EVAL: CPT

## 2023-06-26 RX ORDER — OLANZAPINE 10 MG/2ML
2.5 INJECTION, POWDER, FOR SOLUTION INTRAMUSCULAR DAILY PRN
Status: DISCONTINUED | OUTPATIENT
Start: 2023-06-26 | End: 2023-06-26

## 2023-06-26 RX ORDER — HALOPERIDOL 5 MG/ML
2 INJECTION INTRAMUSCULAR EVERY 6 HOURS PRN
Status: DISCONTINUED | OUTPATIENT
Start: 2023-06-26 | End: 2023-07-06

## 2023-06-26 RX ORDER — CALCIUM ACETATE 667 MG/1
1334 CAPSULE ORAL
Status: DISCONTINUED | OUTPATIENT
Start: 2023-06-26 | End: 2023-07-06 | Stop reason: HOSPADM

## 2023-06-26 RX ORDER — OLANZAPINE 10 MG/2ML
5 INJECTION, POWDER, FOR SOLUTION INTRAMUSCULAR AT BEDTIME
Status: DISCONTINUED | OUTPATIENT
Start: 2023-06-26 | End: 2023-06-27

## 2023-06-26 RX ORDER — OLANZAPINE 10 MG/2ML
5 INJECTION, POWDER, FOR SOLUTION INTRAMUSCULAR DAILY PRN
Status: DISCONTINUED | OUTPATIENT
Start: 2023-06-26 | End: 2023-06-29

## 2023-06-26 RX ORDER — DEXTROSE MONOHYDRATE 100 MG/ML
INJECTION, SOLUTION INTRAVENOUS CONTINUOUS
Status: ACTIVE | OUTPATIENT
Start: 2023-06-26 | End: 2023-06-26

## 2023-06-26 RX ADMIN — RAMELTEON 8 MG: 8 TABLET, FILM COATED ORAL at 22:58

## 2023-06-26 RX ADMIN — THIAMINE HYDROCHLORIDE 500 MG: 100 INJECTION, SOLUTION INTRAMUSCULAR; INTRAVENOUS at 07:53

## 2023-06-26 RX ADMIN — SODIUM CHLORIDE 300 ML: 9 INJECTION, SOLUTION INTRAVENOUS at 08:07

## 2023-06-26 RX ADMIN — THIAMINE HYDROCHLORIDE 500 MG: 100 INJECTION, SOLUTION INTRAMUSCULAR; INTRAVENOUS at 20:22

## 2023-06-26 RX ADMIN — SODIUM CHLORIDE 250 ML: 9 INJECTION, SOLUTION INTRAVENOUS at 08:08

## 2023-06-26 RX ADMIN — OLANZAPINE 5 MG: 10 INJECTION, POWDER, FOR SOLUTION INTRAMUSCULAR at 23:01

## 2023-06-26 RX ADMIN — DEXTROSE MONOHYDRATE: 100 INJECTION, SOLUTION INTRAVENOUS at 13:35

## 2023-06-26 RX ADMIN — THIAMINE HYDROCHLORIDE 500 MG: 100 INJECTION, SOLUTION INTRAMUSCULAR; INTRAVENOUS at 13:35

## 2023-06-26 RX ADMIN — SUCROFERRIC OXYHYDROXIDE 500 MG: 500 TABLET, CHEWABLE ORAL at 20:22

## 2023-06-26 RX ADMIN — Medication: at 08:08

## 2023-06-26 RX ADMIN — EPOETIN ALFA-EPBX 5200 UNITS: 10000 INJECTION, SOLUTION INTRAVENOUS; SUBCUTANEOUS at 08:36

## 2023-06-26 ASSESSMENT — ACTIVITIES OF DAILY LIVING (ADL)
ADLS_ACUITY_SCORE: 55

## 2023-06-26 NOTE — PROVIDER NOTIFICATION
5B 5228 BW  M3  Sloane RN [id number]  Pt yelling out and agitated- going to give scheduled Haldol now, but wondering if one time IM Zyprexa order can be placed if needed for episodes- helpful in past. Thanks    Nayeli HEAD text paged at [phone] via [employer].     Addendum: MD called writer back and stated ok to wait until AM to have PIV placed.

## 2023-06-26 NOTE — PROGRESS NOTES
Date: 6/26/2023  Time: 1151 AM     Data:  Pre Wt:   53.6 kg  Desired Wt:   To be established  Post Wt:  53.6 kg (estimated)  Weight change:  0 kg  Ultrafiltration - Post Run Net Total Removed (mL):  0 ml  Vascular Access Status: Fistula patent  Dialyzer Rinse:  Light  Total Blood Volume Processed: 77.8 L   Total Dialysis (Treatment) Time:   3.5 Hrs  Dialysate Bath: K 3, Ca 2.25  Heparin: Heparin: None     Lab:   Yes   Lactic Acid (Triggerred Sepsis prior to HD)     Interventions:  Dialysis done through Right upper arm AV Fistula using 15 gauge needles  UF set to 0.3 Liters, accommodating priming and rinse back volumes, Patient is below her DW (60 kg)  ,   Epogen 5,200 units administered per MAR  CritLlenny showed a stable Profile B throughout the run, tolerating UF pull  Decannulation done post HD, hemostasis is achieved in 10 minutes  Pressure dressing is applied, to be removed after 4-6 hours  Report given to PCN, sent back to her room in stable condition     Assessment:  Disoriented to time and place, calm and cooperative, denies pain  Lung sounds clear anterior and lateral BUL, diminished BLL  Right upper arm AV Fistula has good thrill and bruit                Plan:    Per Renal team        DANIELA LandinN, RN  Acute Dialysis RN  [facility]

## 2023-06-26 NOTE — PROGRESS NOTES
Nephrology Progress Note  06/26/2023         Assessment & Recommendations:   Rachele Martínez is an 83 year old female with PMH of ESKD, mild dementia, HTN, anemia and recurrent GI bleeds secondary to AVMs, chronic hep C with cirrhosis, admitted with AMS     ESKD: dialyzes MWF at [facility] with Dr. Tarango. Run time: 3.5 hrs. Access: RUE AVG. EDW 60 kg  - HD per MWF schedule     BP/Volume: 60 kg  - if current weights are accurate, will need to reduce EDW at discharge  - continue on amlodipine 5 mg qday, losartan 50 mg qday  - No UF today        Anemia of CKD: hgb 8-9's; venofer 100 mg qtx, epogen 5200 units per run  - continue PTA epo 5200 units per HD        BMD: Ca 8's, alb 4.0, phos 6.9, Vit D 24; continue PTA PO calcitriol 1.0 mcg MWF, Phoslo 2 tabs TID WM, velphoro 1 tab tid WM  - continue PTA meds      AMS: w/u underway per primary team; blood cx NGTD, on empiric antibx for potential UTI (no urine sample, minimal UOP, treating empirically to see if AMS resolves). Head CT unrevealing. Delirium, opioids on ddx. BP's very elevated on admission, now controlled  - on delirium precautions  - Pt much improved today, able to say where she is and names of children, etc     Recommendations were communicated to primary team via this note         SHANNON Charles   Division of Renal Disease and Hypertension  [phone]    Interval History :   Seen on dialysis, no UF today, pt not eating or drinking yet. Mental status much improved from last week, able to say where she is and names of children. No n/v, CP, SOB, chills    Review of Systems:   Unable, AMS as above    Physical Exam:   I/O last 3 completed shifts:  In: 400 [P.O.:50; I.V.:350]  Out: -    /49   Pulse 93   Temp 99  F (37.2  C) (Oral)   Resp 27   Wt 53.6 kg (118 lb 2.7 oz)   SpO2 97%   BMI 20.28 kg/m       GENERAL APPEARANCE: NAD  EYES:  no scleral icterus, pupils equal  PULM: CTA  CV: RRR     -edema none  GI: soft   INTEGUMENT: no  cyanosis, no rash  NEURO: facial droop, mental status much improved, able to answer questions  Access R AVG    Labs:   All labs reviewed by me  Electrolytes/Renal -   Recent Labs   Lab Test 06/26/23  0612 06/25/23  0736 06/24/23  0559    138 134*   POTASSIUM 4.1 3.9 3.9   CHLORIDE 95* 96* 93*   CO2 20* 22 24   BUN 41.4* 32.2* 15.7   CR 9.44* 7.40* 4.40*   GLC 96 83 96   BELGICA 8.5* 8.7* 8.9   MAG 2.2 2.1 1.8   PHOS 6.9* 5.8* 4.2       CBC -   Recent Labs   Lab Test 06/26/23  0612 06/25/23  0736 06/24/23  0559   WBC 12.9* 11.0 10.3   HGB 9.3* 9.3* 8.9*   * 456* 383       LFTs -   Recent Labs   Lab Test 06/24/23  0559 06/21/23  1117 06/01/23  1546   ALKPHOS 194* 250* 125*   BILITOTAL 0.5 0.8 0.4   ALT 12 18 7*   AST 35 30 22   PROTTOTAL 7.7 8.4* 7.0   ALBUMIN 4.0 4.3 3.4*       Iron Panel -   Recent Labs   Lab Test 06/20/23  1122 06/02/23  0942 05/14/23  0741   IRON 36* 62 31*   IRONSAT 17 31 18   KASSI 600* 222 436*         Imaging:  Reviewed    Current Medications:    amLODIPine  5 mg Oral Daily     atorvastatin  20 mg Oral Daily     calcitRIOL  1 mcg Oral Once per day on Mon Wed Fri     calcium acetate  1,334 mg Oral TID w/meals     - MEDICATION INSTRUCTIONS -   Does not apply See Admin Instructions     haloperidol lactate  0.5 mg Intravenous Q6H     losartan  50 mg Oral Daily     multivitamin RENAL  1 tablet Oral Daily     pantoprazole  20 mg Oral BID AC     ramelteon  8 mg Oral At Bedtime     sertraline  100 mg Oral Daily     sodium chloride (PF)  3 mL Intracatheter Q8H     sucroferric oxyhydroxide  500 mg Oral BID     thiamine  500 mg Intravenous TID    Followed by     [START ON 6/28/2023] thiamine  250 mg Intravenous Daily    Followed by     [START ON 7/3/2023] thiamine  100 mg Oral Daily       - MEDICATION INSTRUCTIONS -       SHANNON Charles

## 2023-06-26 NOTE — CONSULTS
"[facility]  Neurology Consultation    Patient Name:  Rachele Martínez  MRN:  [id number]    :  1940  Date of Service:  2023  Primary care provider:  Agnieszka Rodriguez      Neurology consultation service was asked to see Rachele Martínez by Dr. Rodriguez to evaluate encephalopathy.    Chief Complaint:  Hypertensive urgency    History of Present Illness:   Rachele Martínez is a 83 year old female with history of ESRD on HD, chronic hepatitis C c/b cirrhosis, HTN, HLD, and mood disorder who was admitted on 2023 after presenting with hypertensive urgency and declining mental status at her assisted living facility. Neurology was asked to see Ms. Martínez to assist in work-up of her encephalopathy. History is obtained via a combination of chart review, patient interview, and discussion with the patient's daughter.     Per report, the patient's mentation had been seemingly declining over the two weeks preceding her current hospitalization, with significant worsening over the 3 days immediately prior to her admission. Daughters share that their mother has been residing in the [facility] for rehab following an admission to [facility] back in May for weakness. Daughters express a great deal of concern regarding the care their mother has been receiving at her [facility], noting that on one occasion she was \"poked in the stomach to give a medication for an enlarged heart,\" even though she does not have this condition, nor does she receive any medications via that route. Additionally, daughters report noticing that their mother had two buprenorphine patches on when they visited her, despite this not being one of her medications.     While the patient's altered mentation seemed to be ongoing for the two weeks prior to her presentation, there seemed to be a precipitous decline over the three days immediately prior to admission. This apparently manifested as her refusing " "to speak, eat and take her medications. Daughters (today) express concern that she has not eaten in 6 days. They report that she has never had any prior episodes resembling her current presentation. In terms of her hospital course, daughters feel as though she has had \"good days and bad days,\" with today being one of her good days despite her combativeness with staff. At baseline, the patient is reportedly very interactive and ambulatory. Daughters have reported some baseline memory concerns but they describe these to be within the realm of normal for her age group.     In the ED, patient was noted to be severely hypertensive with systolic blood pressures reaching up into the 240s. A head CT was obtained and showed white matter changes that were similar to prior studies and was negative for hemorrhage or other acute pathology. Since admission, patient's blood pressure has been gradually corrected with reinstatement of her home blood pressure medications and PRNs, but this has not been accompanied by an improvement in mental status. She has been treated with a course of ceftriaxone for a possible urinary tract infection, but unfortunately this, too, did not lead to much benefit in mental status. Primary team notes that her mental status has had some degree of fluctuation, as one might expect in the setting of delirium, but they have not noticed much improvement since employing medications to help promote sleep/wake cycle.       ROS  A comprehensive ROS was performed and pertinent findings were included in HPI.     PMH  Past Medical History:   Diagnosis Date     Anemia      Anxiety and depression      Arthritis      AVM (arteriovenous malformation)      Chronic hepatitis C with cirrhosis (H)      Clotting disorder (H)      ESRD (end stage renal disease) (H)     on dialysis     GI bleed     recurrent     Glaucoma      Hyperlipidemia      Hypertension goal BP (blood pressure) < 140/80      Past Surgical History: "   Procedure Laterality Date     CAPSULE/PILL CAM ENDOSCOPY N/A 3/27/2019    Procedure: Capsule/pill cam endoscopy;  Surgeon: Yosvany Ram MD;  Location: [facility]     CAPSULE/PILL CAM ENDOSCOPY N/A 2/2/2023    Procedure: IMAGING PROCEDURE, GI TRACT, INTRALUMINAL, VIA CAPSULE;  Surgeon: Mulu Nur DO;  Location: [facility]     COLONOSCOPY N/A 9/4/2015    Procedure: COMBINED COLONOSCOPY, SINGLE OR MULTIPLE BIOPSY/POLYPECTOMY BY BIOPSY;  Surgeon: Rupesh Lopez MD;  Location: [facility]     COLONOSCOPY N/A 9/19/2018    Procedure: COLONOSCOPY;  enteroscopy small bowel  COLONOSCOPY;  Surgeon: Ankit Baires MD;  Location: [facility]     ENTEROSCOPY SMALL BOWEL N/A 3/9/2023    Procedure: antegrade single balloon enteroscopy with argon plasma coagulation of arteriovenous malformations;  Surgeon: Ankit Baires MD;  Location: [facility]     ESOPHAGOSCOPY, GASTROSCOPY, DUODENOSCOPY (EGD), COMBINED N/A 12/18/2014    Procedure: COMBINED ESOPHAGOSCOPY, GASTROSCOPY, DUODENOSCOPY (EGD);  Surgeon: Betsy Carvajal MD;  Location: [facility]     ESOPHAGOSCOPY, GASTROSCOPY, DUODENOSCOPY (EGD), COMBINED N/A 4/25/2015    Procedure: COMBINED ESOPHAGOSCOPY, GASTROSCOPY, DUODENOSCOPY (EGD);  Surgeon: Yosvany Ram MD;  Location: [facility]     ESOPHAGOSCOPY, GASTROSCOPY, DUODENOSCOPY (EGD), COMBINED N/A 5/5/2015    Procedure: COMBINED ESOPHAGOSCOPY, GASTROSCOPY, DUODENOSCOPY (EGD);  Surgeon: Mariano Mistry MD;  [facility]     HC CAPSULE ENDOSCOPY N/A 9/30/2015    Procedure: CAPSULE/PILL CAM ENDOSCOPY;  Surgeon: Pan Dhaliwal MD;  [facility]     HC VASCULAR SURGERY PROCEDURE UNLIST       HYSTERECTOMY  1980    KYREE     LUMPECTOMY BREAST         Medications   I have personally reviewed the patient's medication list.     Allergies  I have personally reviewed the patient's allergy list.     Social History  Has been staying at the [facility]. She has two daughters that are involved in her  "care.    Family History    Unable to obtain due to patient factors - encephalopathy      Physical Examination   Vitals: BP (!) 201/64 (BP Location: Left arm)   Pulse 87   Temp 97.6  F (36.4  C) (Oral)   Resp 20   Wt 53.6 kg (118 lb 2.7 oz)   SpO2 98%   BMI 20.28 kg/m    General: Lying in bed, NAD  Head: NC/AT  Respiratory: non-labored on RA  Psych: Affect fluctuates between calm and argumentative. She does not cooperate with formal examination and threatens writer when attempts are made to examine her. Insight is limited and judgement is poor. There is some degree of paranoia.   Neuro:  Mental status: Awake and alert but only intermittently attentive. When asked the month, she responds \"January of '73.\" She is able to identify her daughter by name. Not oriented to place. Does not follow commands. She does not participate in naming or repetition.   Cranial nerves: gaze appears to be conjugate with full eye movements horizontally. There does not appear to be any facial asymmetry at rest. No dysarthria. Remainder of CN exam limited by participation.   Motor: No abnormal movements. Patient does not cooperate with formal strength testing but is observed to be at least antigravity and purposeful in bilateral upper extremities.   Reflexes: SMOOTH due to patient cooperation.  Sensory: SMOOTH due to patient cooperation.  Coordination: SMOOTH due to patient cooperation. No obvious dysmetria grossly.  Gait: SMOOTH due to patient cooperation.     Investigations   I have personally reviewed pertinent labs, tests, and radiological imaging. Discussion of notable findings is included under Impression.     Pertinent labs:  - BUN: 41.4  - Cr: 9.44  - Ammonia: 17  - TSH: 10.10  - T4: 1.12  - WBC: 12.9    Was patient transferred from outside hospital?   No    Impression  Ms. Martínez is an 83-year-old woman admitted on 6/21/2023 after presenting with hypertensive urgency and altered mental status. Per chart review and discussion with the " patient's family and primary team, her mentation seemed to first decline approximately 2 weeks PTA with significant worsening over the three days leading up to her admission with periods in which she would not speak, eat, or take her medications. Upon meeting with her this afternoon, she was awake and alert but largely uncooperative with exam and argumentative, which her daughters report being far from her baseline. Despite her lack of cooperation with exam, she was moving her neck with comfort and did not appear to be lethargic as one might expect in a patient with meningitis. Given her significantly elevated blood pressure at the time of admission, one most consider possibilities such as hypertensive encephalopathy and posterior reversible encephalopathy syndrome (PRES). Seeing as she has not had significant improvement in mental status with treatment of her blood pressure, both of these etiologies seem somewhat less likely. MRI of the brain with and without contrast would be the study of choice to evaluate for PRES, but seeing as she is not currently on any medications that would be implicated as potential underlying causes (e.g., immunosuppressive agents such as tacrolimus), obtaining radiographic evidence of PRES would not significantly change her acute management, which is blood pressure control. Her relative lack of hypodensities on her head CT also provides some reassurance against this possibility, and given that she would likely require general anesthesia for an MRI, I think we can defer this option for now.     The patient did receive two doses of cefepime earlier in her hospital stay, and her ESRD does put her at increased risk for cefepime neurotoxicity. That being said, the fact that her encephalopathy predates her admission argues against this being the primary  of her confusion. While our exam was significantly limited by her cooperation, she did not have any grossly focal or lateralizing  features to suggest stroke as a cause, and stroke would be a highly unlikely possibility to begin with. Non-convulsive status epilepticus was also considered, but her ability to interact (relatively) appropriately argues against this possibility.    Overall, our suspicion is that the patient's encephalopathy was likely iatrogenically precipitated by inappropriate medication administration at her assisted living facility. Reports of fluctuating mental status, ranging from unresponsiveness to combative, also raises concern for delirium. To this end, we propose the recommendations outlined below as we continue to follow the patient and monitor her progress over the coming days.     Recommendations  - Delirium precautions (e.g., frequent reorientation, normal day/night cycles, minimize interruptions to sleep, encourage family visitations)  - Zyprexa 5 mg scheduled at 9 PM this evening +/- another 5 mg one hour later if needed to help promote sleep  - Would hold off on advanced imaging for now seeing as she would likely require sedation  - Neurology will continue to follow     Thank you for involving Neurology in the care of Rachele Martínez.  Please do not hesitate to call with questions/concerns (consult pager [phone]).      Patient was seen and discussed with Dr. Gomez.    Margarito Brooks MD

## 2023-06-26 NOTE — PROGRESS NOTES
Writer spoke with floor pharm- who stated ok to give Thiamine at 2100 even though given at 1738- D/t order stating 3 times daily.

## 2023-06-26 NOTE — PROGRESS NOTES
Care Management Follow Up    CHW spoke with pt's daughter Charla to receive TCU choices. She stated that she hadn't had the time to go over the list. She will review TCU list given by GLORIA today and have choices made by the end of the day.    Moise Chang   Inpatient Community Health Worker  [facility]

## 2023-06-26 NOTE — PLAN OF CARE
Assumed cares [id number]. Alert. Only oriented to self. Restless and intermittently agitated/swearing at staff. Redirectable, especially while family present. Bedside attendant when family not present. Stable on RA. Hypertensive - did not meet PRN parameters. Denied pain. Refused PO medications and majority of assessment. Ax2 w/ lift. Repositioning self in bed. Regular diet, poor intake. No BM. L PIV infusing D10 bolus @ 75 ml/hr. Run of dialysis completed this AM. Plan for possible MRI and LP w/ sedation tomorrow.     Goal Outcome Evaluation:      Plan of Care Reviewed With: patient    Overall Patient Progress: no change

## 2023-06-26 NOTE — PROVIDER NOTIFICATION
5B 5228 BW  M3  Sloane RN [phone]  Pt pulled out PIV on accident- Is it ok to hold off on placing new one until AM? Pt with agitation and yelling out at times. Thanks    Nayeli HEAD text paged at [phone] via [facility]

## 2023-06-26 NOTE — PLAN OF CARE
"ASSUMED CARES: [id number]   STATUS: Pt admitted 6/21 for HTN Urgency. Hep C+. ESRD- HD (MWF).   NEURO: SMOOTH- Pt nonverbal for the most part- Minimal words. Episodes of yelling out and hitting bed- Unsure what provokes episodes.   VS: Elevated BP- Did not give PRN hydral d/t awaiting arrival from pharm- Than when rechecked not within PRN Parameters. All other VSS on RA. Did not obtain Q4H VS d/t allowing pt to sleep to promote sleep.    ACTIVITY: A2 with lift. Weight shifting in bed. Pt allowed full turn x 2 this shift. Pt otherwise will scream when trying to turn- weight shifted.    PAIN: Some grimacing noted with movement. When asked if in pain this shift- pt told sitter \"Yes baby\" but pt unable to express origin of pain.   CARDIAC: No C/o CP.   RESP: No C/o SOB.   GI/: Anuric. Incont stool. No BM this shift.   DIET: Regular. Pt did take a few sips of water with PO HS med.   SKIN: No adjustments made.   LDA'S: R Fistula. Pt pulled out PIV this shift- MD stated ok to wait until AM to have another placed d/t pt episodes of agitation and yelling out.   CHANGES THIS SHIFT: Scheduled Haldol given per order. Pt did take HS Ramelteon this shift with a few sips of H20. Thiamine infusion given per order. D10 bolus finished this shift.   POC: Cont with POC. Plan for possible MRI with sedation- during this possible Lumbar puncture and NG placement? Discharge plan TBD. Plan for HD today. Bedside attendant for pt safety. Call light within reach.     AM /60's- Did not give PRN Hydralazine d/t pt going to HD this AM.    "

## 2023-06-26 NOTE — PROGRESS NOTES
[facility]    Progress Note - Medicine Service, MAROON TEAM 3       Date of Admission:  6/21/2023    Assessment & Plan   Rachele Martínez is a 83 year old female with a h/o HTN, chronic Hep C with cirrhosis, ESRD, and hyperlipidemia who was admitted for hypertensive urgency and subacute progressive encephalopathy. Pt's daughters express concern over patient's worsening state of confusion over last 2 weeks, accelerated over last few days. Pt's daughters are also concerned Rachele has not been receiving her medications appropriately at [facility], perhaps also receiving erroneous medications. Head CT (6/21) shows no acute intracranial path, evidence of chronic small vessel ischemic dx.     Changes Today:   - Dialysis today per [facility] schedule  - Neurology consulted for further assistance - slight improvement today but waxing and waning, further brain imaging and nutritional planning may be challenging if not continuing to improve  - Continuing high-dose thiamine protocol  - Continuing PRN IV antihypertensives for SBP goal of <180 given not taking PO antihypertensives consistently due to agitation    Acute Conditions:   Acute to subacute progressive encephalopathy  Chronicity is interestingly quite acute/subacute, with declining strength and mentation over the course of one month and rather sharp decline to non-verbal, not eating, agitated within one week of admission. Prior to that was independently ambulatory, eating, speaking, normal self, sounds like quite functional. Non-focal neurologic exam grossly though difficult due to cooperation/agitation. Family had some concerns for oversedation at original facility (apparently was found to have two buprenorphine patches applied without family knowledge), though has now had several days here for this to wash out if this/polypharmacy is the case. Reassuring head CT. Metabolic evaluation thus far non-revealing.  No overt infectious evidence though completed empiric course of Ceftriaxnoe for possible cystitis without improvement. Hypertensive encehpalopathy a consideration, though this has not improved w/ BP control and BP control further limited due to not taking PO. Not improving w/ scheduled Haldol nor scheduled Remeron and Melatonin for sleep/wake cycle promotion and requiring PRN Zyprexa nightly. Likely a significant component of hospital delirium clouding the picture as well.  - Today: Seemingly more awake, able to tell nephrology where she is and name kids, was a bit frustrated on our exam but was able to tell us her last name  - Neurology consulted today if may benefit from any additional diagnostic as opposed to continuing to clinically monitor  - May need MRI brain w/ sedation if not continuing to improve, considering best strategy for this  - Has not had nutrition in over a week now, concern may not tolerate NG/J, will need to furtehr discuss with family. In meantime, intermittently giving D10 for a few hours at a time.  - High dose thiamine protocol started 6/25  - Monitoring for urinary retention or constipation - mostly anuric, not tolerating PO bowel regimen nor suppositories/enemas but passing stool intermittently  - Continue remeron and melatonin at bedtime   - Trialed scheduled Haldol over weekend wtihout improvement, discontinued 6/26  - Has prn Zyprexa available if need be, usually requires once per night due to agitation/combativeness  - Delirium precautions    Severe uncontrolled hypertension  Presenting initially w/ SBP ~240's. Appropriately corrected somewhat w/ restarting home medications and gentle IV PRN use; however, remains intermittently elevated up to 180's SBP. No longer taking PO options due to agitation, so limited to IV. No evidence of end-organ damage. Does not seem encephalopathy correlates w/ hypertension as discussion above.  - Goal of SBP < 180  w/ PRN IV Hydralazine and IV Labetalol  available  - Continue PTA amlodipine and losartan as tolerated     Concern for Neglect or Maltreatment at prior living facility  Consulted SW and care coordinator particularly as Pt's daughter's are interested in a new placement for long-term care.      Chronic Conditions:   End-Stage Renal Disease  Renal following for MWF hemodialysis  On home BMD regimen    Acute on Chronic Iron Deficiency Anemia vs Anemia of Chronic Disease / CKD  - EPO per nephrology     Hyperlipidemia  - Continue PTA atorvastatin if tolerates     Anxiety & Depression - Continue PTA sertraline if tolerates       Diet: Regular Diet Adult    DVT Prophylaxis: Pneumatic Compression Devices  Rodriguez Catheter: Not present  Fluids: None  Lines: None     Cardiac Monitoring: None  Code Status: Full Code      Clinically Significant Risk Factors             # Anion Gap Metabolic Acidosis: Highest Anion Gap = 24 mmol/L in last 2 days, will monitor and treat as appropriate      # Hypertension: Noted on problem list                 Disposition Plan      Expected Discharge Date: 06/30/2023      Destination: nursing home  Discharge Comments: Dispo: TBD - fam does not want original facility  Plan: imaging; IV Abx (will not discharge on this); improvement of encephalopathy; nutrition plan; tolerating PO  Progress: BP and mentation still poor - MRI and possible NG/J        The patient's care was discussed with the Attending Physician, Dr. Purcell.    Fletcher Rodriguez MD  Internal Medicine PGY-3  Medicine Service, [address]   See signed in provider for up to date coverage information  ______________________________________________________________________    Interval History   Overnight pt again required 2.5mg Zyprexa due to severe agitation, combativeness, yelling and not able to be redirected. This morning seen in dialysis, seems much more awake and alert. Able to answer limited questions, including how  she is feeling and her last name. Got frustrated at furtehr questions, but apparently earlier was able to tell nephrology where she was and name her children.     Physical Exam   Vital Signs: Temp: 98.9  F (37.2  C) Temp src: Oral BP: 130/51 Pulse: 81   Resp: 20 SpO2: 98 % O2 Device: None (Room air)    Weight: 118 lbs 2.66 oz    General: No acute distress, laying in bed during dialysis  CV: Unable to examine this morning due to pt cooperation  Respiratory: Unable to examine this morning due to pt cooperation  Abd: Unable to examine this morning due to pt cooperation  Neuro: Awake, tarcking to speaker, answering yes/no questions, oriented to person (first and last name) but got a bit frustrated when asked about place, time, or loved ones. Was able to answer these questions to neprhology earleir toady. Not cooperating further with exam due to frustration/agitation.    Medical Decision Making             Data

## 2023-06-27 LAB
ANION GAP SERPL CALCULATED.3IONS-SCNC: 16 MMOL/L (ref 7–15)
BUN SERPL-MCNC: 14.4 MG/DL (ref 8–23)
CALCIUM SERPL-MCNC: 8.8 MG/DL (ref 8.8–10.2)
CHLORIDE SERPL-SCNC: 94 MMOL/L (ref 98–107)
CREAT SERPL-MCNC: 4.88 MG/DL (ref 0.51–0.95)
DEPRECATED HCO3 PLAS-SCNC: 25 MMOL/L (ref 22–29)
ERYTHROCYTE [DISTWIDTH] IN BLOOD BY AUTOMATED COUNT: 16.7 % (ref 10–15)
GFR SERPL CREATININE-BSD FRML MDRD: 8 ML/MIN/1.73M2
GLUCOSE SERPL-MCNC: 104 MG/DL (ref 70–99)
HCT VFR BLD AUTO: 29.5 % (ref 35–47)
HGB BLD-MCNC: 9 G/DL (ref 11.7–15.7)
MAGNESIUM SERPL-MCNC: 1.7 MG/DL (ref 1.7–2.3)
MCH RBC QN AUTO: 30.8 PG (ref 26.5–33)
MCHC RBC AUTO-ENTMCNC: 30.5 G/DL (ref 31.5–36.5)
MCV RBC AUTO: 101 FL (ref 78–100)
PHOSPHATE SERPL-MCNC: 4 MG/DL (ref 2.5–4.5)
PLATELET # BLD AUTO: 403 10E3/UL (ref 150–450)
POTASSIUM SERPL-SCNC: 4 MMOL/L (ref 3.4–5.3)
RBC # BLD AUTO: 2.92 10E6/UL (ref 3.8–5.2)
SODIUM SERPL-SCNC: 135 MMOL/L (ref 136–145)
WBC # BLD AUTO: 8.6 10E3/UL (ref 4–11)

## 2023-06-27 PROCEDURE — 99233 SBSQ HOSP IP/OBS HIGH 50: CPT | Mod: GC | Performed by: STUDENT IN AN ORGANIZED HEALTH CARE EDUCATION/TRAINING PROGRAM

## 2023-06-27 PROCEDURE — 250N000013 HC RX MED GY IP 250 OP 250 PS 637: Performed by: STUDENT IN AN ORGANIZED HEALTH CARE EDUCATION/TRAINING PROGRAM

## 2023-06-27 PROCEDURE — 85027 COMPLETE CBC AUTOMATED: CPT

## 2023-06-27 PROCEDURE — 36415 COLL VENOUS BLD VENIPUNCTURE: CPT

## 2023-06-27 PROCEDURE — 84100 ASSAY OF PHOSPHORUS: CPT

## 2023-06-27 PROCEDURE — 82310 ASSAY OF CALCIUM: CPT

## 2023-06-27 PROCEDURE — 258N000003 HC RX IP 258 OP 636

## 2023-06-27 PROCEDURE — 99232 SBSQ HOSP IP/OBS MODERATE 35: CPT | Mod: GC | Performed by: INTERNAL MEDICINE

## 2023-06-27 PROCEDURE — 83735 ASSAY OF MAGNESIUM: CPT

## 2023-06-27 PROCEDURE — 250N000011 HC RX IP 250 OP 636

## 2023-06-27 PROCEDURE — 120N000002 HC R&B MED SURG/OB UMMC

## 2023-06-27 PROCEDURE — 250N000013 HC RX MED GY IP 250 OP 250 PS 637

## 2023-06-27 RX ORDER — OLANZAPINE 5 MG/1
5 TABLET, ORALLY DISINTEGRATING ORAL AT BEDTIME
Status: DISCONTINUED | OUTPATIENT
Start: 2023-06-27 | End: 2023-06-29

## 2023-06-27 RX ADMIN — CALCIUM ACETATE 1334 MG: 667 CAPSULE ORAL at 17:26

## 2023-06-27 RX ADMIN — PANTOPRAZOLE SODIUM 20 MG: 20 TABLET, DELAYED RELEASE ORAL at 17:26

## 2023-06-27 RX ADMIN — LOSARTAN POTASSIUM 50 MG: 50 TABLET, FILM COATED ORAL at 10:18

## 2023-06-27 RX ADMIN — RAMELTEON 8 MG: 8 TABLET, FILM COATED ORAL at 22:05

## 2023-06-27 RX ADMIN — AMLODIPINE BESYLATE 5 MG: 5 TABLET ORAL at 10:18

## 2023-06-27 RX ADMIN — OLANZAPINE 5 MG: 5 TABLET, ORALLY DISINTEGRATING ORAL at 22:05

## 2023-06-27 RX ADMIN — B-COMPLEX W/ C & FOLIC ACID TAB 1 MG 1 TABLET: 1 TAB at 10:18

## 2023-06-27 RX ADMIN — SUCROFERRIC OXYHYDROXIDE 500 MG: 500 TABLET, CHEWABLE ORAL at 22:08

## 2023-06-27 RX ADMIN — ATORVASTATIN CALCIUM 20 MG: 20 TABLET, FILM COATED ORAL at 10:18

## 2023-06-27 RX ADMIN — PANTOPRAZOLE SODIUM 20 MG: 20 TABLET, DELAYED RELEASE ORAL at 10:18

## 2023-06-27 RX ADMIN — SUCROFERRIC OXYHYDROXIDE 500 MG: 500 TABLET, CHEWABLE ORAL at 10:18

## 2023-06-27 RX ADMIN — SERTRALINE HYDROCHLORIDE 100 MG: 100 TABLET ORAL at 10:18

## 2023-06-27 RX ADMIN — THIAMINE HYDROCHLORIDE 500 MG: 100 INJECTION, SOLUTION INTRAMUSCULAR; INTRAVENOUS at 09:14

## 2023-06-27 ASSESSMENT — ACTIVITIES OF DAILY LIVING (ADL)
ADLS_ACUITY_SCORE: 55
ADLS_ACUITY_SCORE: 57
ADLS_ACUITY_SCORE: 57
ADLS_ACUITY_SCORE: 55
ADLS_ACUITY_SCORE: 56
ADLS_ACUITY_SCORE: 56
ADLS_ACUITY_SCORE: 55
ADLS_ACUITY_SCORE: 56
ADLS_ACUITY_SCORE: 55

## 2023-06-27 NOTE — PROGRESS NOTES
[facility]  Neurology Consultation - Progress Note    Patient Name:  Rachele Martínez  Date of Service:  June 27, 2023    Subjective:    Nursing notes reviewed. Patient amenable to taking oral medications as of yesterday night. Received one dose of Zyprexa around 11 PM yesterday evening. Following Zyprexa administration, patient reportedly slept well throughout the night. Upon initial evaluation this morning, patient was sleeping comfortably in bed. When re-evaluated later this morning, she was up in the chair enjoying her breakfast. States that she is feeling better today and is much more calm.     Objective:    Vitals: BP (!) 176/65 (BP Location: Left arm)   Pulse 81   Temp 98.2  F (36.8  C) (Axillary)   Resp 16   Wt 53.6 kg (118 lb 2.7 oz)   SpO2 98%   BMI 20.28 kg/m    General: Sitting up in chair eating breakfast in no acute distress.  Head: Atraumatic, normocephalic   Neurologic: Examined with daughter at bedside to corroborate responses. Patient is awake and alert. She is able to state the name of her cat, the type of food her cat prefers, and how long she has had her cat. She is also able to state her address and the amount of time that she has lived at that address. All responses confirmed to be true per daughter. In terms of motor function, she is moving bilateral upper extremities purposefully and antigravity. She has full 5/5 strength with dorsi- and plantarflexion of the ankles. Sensation is intact to light touch in bilateral lower extremities. Plantar responses are flexor on the left, equivocal on the right. No clonus elicited with ankle jerk bilaterally.     Pertinent Investigations:    I have personally reviewed most recent and pertinent labs, tests, and radiological images.     Assessment  Ms. Martínez is an 83-year-old woman admitted on 6/21/2023 after presenting from her longterm with hypertensive urgency and altered mental status. Neurology was consulted to  provide guidance in the work-up of her encephalopathy. Please see initial consult note dated 6/26 for an expanded discussion of the differential for her encephalopathy. Given the significant improvement in her mentation this morning, I am reassured against concerning entities such as PRES and suspect that a considerable amount of her confusion and agitation were secondary to delirium. No need to pursue advanced imaging at this time or lumbar puncture for CSF analysis given her upward trajectory. Would continue to emphasize maintenance of good sleep/wake cycle to avoid exacerbation of her improving delirium.     Recommendations:   - No additional neurological work-up indicated at this time  - Continue delirium precautions (e.g., frequent reorientation, normal day/night cycles, minimize interruptions to sleep, encourage family visitations)  - Neurology will sign off at this time but please feel free to reach out with any additional questions or concerns    Thank you for involving Neurology in the care of Rachele Martínez.  Please do not hesitate to call with questions/concerns (consult pager [phone]).      Patient was seen and discussed with Dr. Gomez.    Margarito Brooks MD

## 2023-06-27 NOTE — PLAN OF CARE
Assumed cares [id number]. Alert. Only oriented to self. Intermittently impulsive, bedside attendant for safety. Stable on RA. Hypertensive - did not meet PRN parameters. Agreeable to majority of PO meds. Denied pain. Ax2 pivot to chair/commode. Up in recliner late morning/early afternoon. Regular diet, good appetite. BM x2. L PIV saline locked. Continue with delirium precautions.    Goal Outcome Evaluation:      Plan of Care Reviewed With: patient, child    Overall Patient Progress: improving

## 2023-06-27 NOTE — PROGRESS NOTES
[facility]    Progress Note - Medicine Service, MAROON TEAM 3       Date of Admission:  6/21/2023    Assessment & Plan   Rachele Martínez is a 83 year old female with a h/o HTN, chronic Hep C with cirrhosis, ESRD, and hyperlipidemia who was admitted for hypertensive urgency and subacute progressive encephalopathy. Pt's daughters express concern over patient's worsening state of confusion over last 2 weeks, accelerated over last few days. Pt's daughters are also concerned Rachele has not been receiving her medications appropriately at [facility], perhaps also receiving erroneous medications. Head CT (6/21) shows no acute intracranial path, evidence of chronic small vessel ischemic dx.     Changes Today:   - Dialysis tomorrow per [facility] schedule  - Neurology consulted, do not recommend MRI with sedation at this time as patient has had significant improvement   - Stop high-dose thiamine protocol  - Continuing PRN IV antihypertensives for SBP goal of <180 given not taking PO antihypertensives consistently due to agitation  - Continue zyprexa at nighttime   - Continue delirium precautions    Acute Conditions:   Acute to subacute progressive encephalopathy  Chronicity is interestingly quite acute/subacute, with declining strength and mentation over the course of one month and rather sharp decline to non-verbal, not eating, agitated within one week of admission. Prior to that was independently ambulatory, eating, speaking, normal self, sounds like quite functional. Non-focal neurologic exam grossly though difficult due to cooperation/agitation. Family had some concerns for oversedation at original facility (apparently was found to have two buprenorphine patches applied without family knowledge), though has now had several days here for this to wash out if this/polypharmacy is the case. Reassuring head CT. Metabolic evaluation thus far non-revealing. No overt  infectious evidence though completed empiric course of Ceftriaxnoe for possible cystitis without improvement. Hypertensive encehpalopathy a consideration, though this has not improved w/ BP control and BP control further limited due to not taking PO. Not improving w/ scheduled Haldol nor scheduled Remeron and Melatonin for sleep/wake cycle promotion and requiring PRN Zyprexa nightly which has aided in sleeping overnight. Likely a significant component of hospital delirium clouding the picture as well.  - May need MRI brain w/ sedation if not continuing to improve, considering best strategy for this. Will hold off for now as patient is improving this morning, speaking and eating breakfast   - Nutrition is a concern, however, patient has been eating well over the last 24 hours. Will hold on NG/J for now.   - High dose thiamine protocol stopped 6/27  - Monitoring for urinary retention or constipation - mostly anuric, not tolerating PO bowel regimen nor suppositories/enemas but passing stool intermittently  - Continue remeron and melatonin at bedtime   - Trialed scheduled Haldol over weekend wtihout improvement, discontinued 6/26  - Zyprexa scheduled at bedtime   - Delirium precautions    Hypertensive Urgency Only  Presenting initially w/ SBP ~240's. Appropriately corrected somewhat w/ restarting home medications and gentle IV PRN use; however, remains intermittently elevated up to 180's SBP. No longer taking PO options due to agitation, so limited to IV. No evidence of end-organ damage. Does not seem encephalopathy correlates w/ hypertension as discussion above.  - Goal of SBP < 180  w/ PRN IV Hydralazine and IV Labetalol available  - Continue PTA amlodipine and losartan as tolerated     Concern for Neglect or Maltreatment at prior living facility  Consulted SW and care coordinator particularly as Pt's daughter's are interested in a new placement for long-term care.      Chronic Conditions:   End-Stage Renal  Disease  Renal following for MWF hemodialysis  On home BMD regimen    Acute on Chronic Iron Deficiency Anemia vs Anemia of Chronic Disease / CKD  - EPO per nephrology     Hyperlipidemia  - Continue PTA atorvastatin if tolerates     Anxiety & Depression - Continue PTA sertraline if tolerates       Diet: Regular Diet Adult    DVT Prophylaxis: Pneumatic Compression Devices  Rodriguez Catheter: Not present  Fluids: None  Lines: None     Cardiac Monitoring: None  Code Status: Full Code      Clinically Significant Risk Factors             # Anion Gap Metabolic Acidosis: Highest Anion Gap = 24 mmol/L in last 2 days, will monitor and treat as appropriate      # Hypertension: Noted on problem list                 Disposition Plan     Expected Discharge Date: 06/30/2023      Destination: nursing home  Discharge Comments: Dispo: TBD - fam does not want original facility  Plan: imaging; IV Abx (will not discharge on this); improvement of encephalopathy; nutrition plan; tolerating PO  Progress: BP and mentation still poor - MRI and possible NG/J        The patient's care was discussed with the Attending Dr. Chente Sargent MD    Internal Medicine and Pediatrics PGY1  [facility] Team 3    Pager # [phone]    (Please see sign-in/sign-out for up-to-date physician coverage information)  ______________________________________________________________________    Interval History   Overnight pt again required 2.5mg Zyprexa due to severe agitation, combativeness, yelling and not able to be redirected. Patient was up and sitting in a chair having breakfast, was not pleased to see the team. Did not want to answer questions, denies pain. Did not want to be examined this morning.      Physical Exam   Vital Signs: Temp: 98.2  F (36.8  C) Temp src: Axillary BP: (!) 176/65 Pulse: 81   Resp: 16 SpO2: 98 % O2 Device: None (Room air)    Weight: 118 lbs 2.66 oz    General: No acute distress, sitting in chair eating breakfast  CV: Unable to examine  this morning due to pt cooperation  Respiratory: Unable to examine this morning due to pt cooperation  Abd: Unable to examine this morning due to pt cooperation  Neuro: Awake, tarcking to speaker, answering yes/no questions, oriented to person. Not cooperating further with exam due to frustration/agitation.    Medical Decision Making             Data

## 2023-06-27 NOTE — PLAN OF CARE
Assumed cares from 1900 to 0700.    Pt is alert and oriented to self. Remained calm and redirected by family members in room. Bedside attendent on overnight. High BP, stable on RA. Took all medications whole with sips of water. Assistance X2 with liftt. Pt is able to self repositioning in bed. On regular diet, poor intake. No BM. L PIV infusing D10 TKO. Planning for possible MRI  with sedation on 06/27.     Plan of Care Reviewed With: patient    Overall Patient Progress: no change

## 2023-06-28 ENCOUNTER — APPOINTMENT (OUTPATIENT)
Dept: SPEECH THERAPY | Facility: CLINIC | Age: 83
DRG: 304 | End: 2023-06-28
Payer: MEDICARE

## 2023-06-28 LAB
ANION GAP SERPL CALCULATED.3IONS-SCNC: 15 MMOL/L (ref 7–15)
BUN SERPL-MCNC: 27.8 MG/DL (ref 8–23)
CALCIUM SERPL-MCNC: 8.7 MG/DL (ref 8.8–10.2)
CHLORIDE SERPL-SCNC: 96 MMOL/L (ref 98–107)
CREAT SERPL-MCNC: 7.42 MG/DL (ref 0.51–0.95)
DEPRECATED HCO3 PLAS-SCNC: 25 MMOL/L (ref 22–29)
ERYTHROCYTE [DISTWIDTH] IN BLOOD BY AUTOMATED COUNT: 16 % (ref 10–15)
GFR SERPL CREATININE-BSD FRML MDRD: 5 ML/MIN/1.73M2
GLUCOSE SERPL-MCNC: 106 MG/DL (ref 70–99)
HCT VFR BLD AUTO: 32.2 % (ref 35–47)
HGB BLD-MCNC: 9.7 G/DL (ref 11.7–15.7)
MAGNESIUM SERPL-MCNC: 1.9 MG/DL (ref 1.7–2.3)
MCH RBC QN AUTO: 30.5 PG (ref 26.5–33)
MCHC RBC AUTO-ENTMCNC: 30.1 G/DL (ref 31.5–36.5)
MCV RBC AUTO: 101 FL (ref 78–100)
PHOSPHATE SERPL-MCNC: 4.3 MG/DL (ref 2.5–4.5)
PLATELET # BLD AUTO: 369 10E3/UL (ref 150–450)
POTASSIUM SERPL-SCNC: 3.9 MMOL/L (ref 3.4–5.3)
RBC # BLD AUTO: 3.18 10E6/UL (ref 3.8–5.2)
SODIUM SERPL-SCNC: 136 MMOL/L (ref 136–145)
WBC # BLD AUTO: 9.5 10E3/UL (ref 4–11)

## 2023-06-28 PROCEDURE — 258N000003 HC RX IP 258 OP 636: Performed by: INTERNAL MEDICINE

## 2023-06-28 PROCEDURE — 92526 ORAL FUNCTION THERAPY: CPT | Mod: GN

## 2023-06-28 PROCEDURE — 250N000011 HC RX IP 250 OP 636: Mod: JZ

## 2023-06-28 PROCEDURE — 80048 BASIC METABOLIC PNL TOTAL CA: CPT

## 2023-06-28 PROCEDURE — 99233 SBSQ HOSP IP/OBS HIGH 50: CPT | Performed by: PHYSICIAN ASSISTANT

## 2023-06-28 PROCEDURE — 92610 EVALUATE SWALLOWING FUNCTION: CPT | Mod: GN

## 2023-06-28 PROCEDURE — 90937 HEMODIALYSIS REPEATED EVAL: CPT

## 2023-06-28 PROCEDURE — 250N000013 HC RX MED GY IP 250 OP 250 PS 637

## 2023-06-28 PROCEDURE — 83735 ASSAY OF MAGNESIUM: CPT

## 2023-06-28 PROCEDURE — 120N000002 HC R&B MED SURG/OB UMMC

## 2023-06-28 PROCEDURE — 999N000128 HC STATISTIC PERIPHERAL IV START W/O US GUIDANCE

## 2023-06-28 PROCEDURE — 84100 ASSAY OF PHOSPHORUS: CPT

## 2023-06-28 PROCEDURE — 36415 COLL VENOUS BLD VENIPUNCTURE: CPT

## 2023-06-28 PROCEDURE — 258N000003 HC RX IP 258 OP 636

## 2023-06-28 PROCEDURE — 99232 SBSQ HOSP IP/OBS MODERATE 35: CPT | Mod: GC | Performed by: INTERNAL MEDICINE

## 2023-06-28 PROCEDURE — 250N000013 HC RX MED GY IP 250 OP 250 PS 637: Performed by: STUDENT IN AN ORGANIZED HEALTH CARE EDUCATION/TRAINING PROGRAM

## 2023-06-28 PROCEDURE — 250N000011 HC RX IP 250 OP 636

## 2023-06-28 PROCEDURE — 634N000001 HC RX 634: Mod: JZ | Performed by: INTERNAL MEDICINE

## 2023-06-28 PROCEDURE — 85014 HEMATOCRIT: CPT

## 2023-06-28 RX ADMIN — LOSARTAN POTASSIUM 50 MG: 50 TABLET, FILM COATED ORAL at 09:14

## 2023-06-28 RX ADMIN — SODIUM CHLORIDE 250 ML: 9 INJECTION, SOLUTION INTRAVENOUS at 13:07

## 2023-06-28 RX ADMIN — EPOETIN ALFA-EPBX 5200 UNITS: 10000 INJECTION, SOLUTION INTRAVENOUS; SUBCUTANEOUS at 15:08

## 2023-06-28 RX ADMIN — B-COMPLEX W/ C & FOLIC ACID TAB 1 MG 1 TABLET: 1 TAB at 09:14

## 2023-06-28 RX ADMIN — AMLODIPINE BESYLATE 5 MG: 5 TABLET ORAL at 09:14

## 2023-06-28 RX ADMIN — SUCROFERRIC OXYHYDROXIDE 500 MG: 500 TABLET, CHEWABLE ORAL at 09:14

## 2023-06-28 RX ADMIN — SODIUM CHLORIDE 300 ML: 9 INJECTION, SOLUTION INTRAVENOUS at 13:07

## 2023-06-28 RX ADMIN — OLANZAPINE 5 MG: 5 TABLET, ORALLY DISINTEGRATING ORAL at 22:12

## 2023-06-28 RX ADMIN — THIAMINE HYDROCHLORIDE 250 MG: 100 INJECTION, SOLUTION INTRAMUSCULAR; INTRAVENOUS at 09:13

## 2023-06-28 RX ADMIN — SERTRALINE HYDROCHLORIDE 100 MG: 100 TABLET ORAL at 09:14

## 2023-06-28 RX ADMIN — SUCROFERRIC OXYHYDROXIDE 500 MG: 500 TABLET, CHEWABLE ORAL at 22:11

## 2023-06-28 RX ADMIN — ATORVASTATIN CALCIUM 20 MG: 20 TABLET, FILM COATED ORAL at 09:14

## 2023-06-28 RX ADMIN — Medication: at 13:08

## 2023-06-28 RX ADMIN — PANTOPRAZOLE SODIUM 20 MG: 20 TABLET, DELAYED RELEASE ORAL at 09:14

## 2023-06-28 RX ADMIN — RAMELTEON 8 MG: 8 TABLET, FILM COATED ORAL at 22:12

## 2023-06-28 ASSESSMENT — ACTIVITIES OF DAILY LIVING (ADL)
TOILETING: 1-->ASSISTANCE (EQUIPMENT/PERSON) NEEDED (NOT DEVELOPMENTALLY APPROPRIATE)
TOILETING_ISSUES: YES
ADLS_ACUITY_SCORE: 52
ADLS_ACUITY_SCORE: 54
ADLS_ACUITY_SCORE: 54
BATHING: 1-->ASSISTANCE NEEDED
TOILETING_ASSISTANCE: TOILETING DIFFICULTY, ASSISTANCE 1 PERSON
ADLS_ACUITY_SCORE: 50
EQUIPMENT_CURRENTLY_USED_AT_HOME: WALKER, ROLLING
WEAR_GLASSES_OR_BLIND: YES
DRESS: 1-->ASSISTANCE (EQUIPMENT/PERSON) NEEDED (NOT DEVELOPMENTALLY APPROPRIATE)
VISION_MANAGEMENT: GLASSES
ADLS_ACUITY_SCORE: 54
ADLS_ACUITY_SCORE: 54
FALL_HISTORY_WITHIN_LAST_SIX_MONTHS: NO
WALKING_OR_CLIMBING_STAIRS_DIFFICULTY: YES
ADLS_ACUITY_SCORE: 50
ADLS_ACUITY_SCORE: 50
ADLS_ACUITY_SCORE: 54
DRESSING/BATHING: BATHING DIFFICULTY, ASSISTANCE 1 PERSON
WALKING_OR_CLIMBING_STAIRS: AMBULATION DIFFICULTY, REQUIRES EQUIPMENT;AMBULATION DIFFICULTY, ASSISTANCE 1 PERSON
TOILETING: 1-->ASSISTANCE (EQUIPMENT/PERSON) NEEDED
TRANSFERRING: 1-->ASSISTANCE (EQUIPMENT/PERSON) NEEDED
CONCENTRATING,_REMEMBERING_OR_MAKING_DECISIONS_DIFFICULTY: YES
DRESSING/BATHING_DIFFICULTY: YES
DOING_ERRANDS_INDEPENDENTLY_DIFFICULTY: YES
ADLS_ACUITY_SCORE: 50
DIFFICULTY_EATING/SWALLOWING: NO
ADLS_ACUITY_SCORE: 54
TRANSFERRING: 1-->ASSISTANCE (EQUIPMENT/PERSON) NEEDED (NOT DEVELOPMENTALLY APPROPRIATE)
ADLS_ACUITY_SCORE: 54
DRESS: 1-->ASSISTANCE (EQUIPMENT/PERSON) NEEDED
CHANGE_IN_FUNCTIONAL_STATUS_SINCE_ONSET_OF_CURRENT_ILLNESS/INJURY: YES

## 2023-06-28 NOTE — PLAN OF CARE
Assumed cares from 2300 to 0700    Pt is alert and oriented to self. Remained calm and redirected by bedside attendent on overnight. High BP, refused Hydralazine, stable on RA. Assistance X2 with liftt. Pt is able to self repositioning in bed. On regular diet, poor intake. No BM. L PIV SL. HD on 06/28 at 07:40.     Plan of Care Reviewed With: patient    Overall Patient Progress: no change

## 2023-06-28 NOTE — PLAN OF CARE
BP (!) 172/73 (BP Location: Left arm)   Pulse 75   Temp 97.9  F (36.6  C) (Oral)   Resp 16   Wt 53.6 kg (118 lb 2.7 oz)   SpO2 97%   BMI 20.28 kg/m      Care from: 1900 - 2330      VS & Pain: VSS ex hard BP. Denied pain this shift.    Neuro: A&Ox2-3. Disoriented to situation.     Respiratory: Stable on RA.    Nutrition: Regular diet.    Skin: No new skin concerns this shift.    Lines: L piv SL.     Activity: Assist of 2 to turn/pivot.     Events this shift: Continues on bedside attendant for agitation/confusion.    Plan: Awaiting speech consult due to multiple instances of coughing when eating. Plan for dialysis run tomorrow 6/28.

## 2023-06-28 NOTE — PROGRESS NOTES
CLINICAL NUTRITION SERVICES - ASSESSMENT NOTE     Nutrition Prescription    RECOMMENDATIONS FOR MDs/PROVIDERS TO ORDER:  Regular diet as tolerated     Malnutrition Status:    Severe malnutrition in the context of acute presentation     Recommendations already ordered by Registered Dietitian (RD):  Ordered Ensure plus with bkf and lunch trays to optimize intake    Future/Additional Recommendations:  PO improvement        REASON FOR ASSESSMENT  Rachele Martínez is a/an 83 year old female assessed by the dietitian for LOSx7  Also received + screen on day 7 of admit for unsure wt loss ??     Chart reviewed:  PMH: HTN, Chronic Hep C with cirrhosis, ESRD, and hyperlipidemia    Admitted for hypertensive urgency and subacute progressive encephalopathy. Pt's daughters express concern over patient's worsening state of confusion over last 2 weeks, accelerated over last few days.     Head CT (6/21) shows no acute intracranial path, evidence of chronic small vessel ischemic dx.   ESRD: On dialysis tomorrow per [facility] schedule  awaiting safe discharge plan      NUTRITION HISTORY  Presented from [facility],  Per chart note, patient declining strength and mentation over the course of one month and rather sharp decline to non-verbal, not eating, agitated within one week of admission. Prior to that was independently ambulatory, eating, speaking, normal self, sounds like quite functional    Met with patient today. Family in the room. AMS appears to be improving today. Patient sound asleep. Family notes patient with reduce in oral intake PTA. Okay with receiving oral supplements to improve nutrition. Improving significantly today. Patient ate a bowl of salad brought in by daughter today. Snacking and eating meals 100% today.     CURRENT NUTRITION ORDERS  Diet: Regular  Intake/Tolerance: Tolerating a regular diet w/ fair appetite.     LABS  BUN: 27.8 (H), Cr: 7.42 (H), GFR: 5 (L)  Na+: 136  K+: 3.9, Mg++:1.9, Phos: 4.3  -> all within normal range   Glucose: 106 (H)      MEDICATIONS  Received high-dose thiamine protocol  Venofer, Epogen, Calcitriol, Phos LO      ANTHROPOMETRICS  Height: 0 cm (Data Unavailable) 162.6 cm   Most Recent Weight: 53.6 kg (118 lb 2.7 oz) lowest wt this admit on 6/25 ( however all wt during this admit has been bed scale)   IBW: 54.6 kg ( 98% IbW)  BMI: 21.38 kg /m2  Normal BMI  Weight History: EDW 60 kg per nephrology . Current wt << EDW   Wt Readings from Last 10 Encounters:   06/28/23 56.5 kg (124 lb 9 oz)   06/05/23 58.8 kg (129 lb 10.1 oz)   05/21/23 60.5 kg (133 lb 4.8 oz)   03/09/23 63 kg (138 lb 14.2 oz)   02/17/23 62.3 kg (137 lb 6.4 oz)   01/31/23 62.6 kg (138 lb 1.6 oz)   12/06/22 59.9 kg (132 lb)   11/08/22 59.7 kg (131 lb 9.6 oz)   08/16/22 58.7 kg (129 lb 4.8 oz)   05/24/22 58.8 kg (129 lb 9.6 oz)       Dosing Weight: 54 kg driest wt this admit ( EDW previously was 60 kg)     ASSESSED NUTRITION NEEDS  Estimated Energy Needs: 1350- 1620 kcals/day (25 - 30 kcals/kg)  Justification: Maintenance  Estimated Protein Needs: 65- 81 grams protein/day (1.2 - 1.5 grams of pro/kg)  Justification: Dialysis  Estimated Fluid Needs:HD  Justification: Per provider pending fluid status    PHYSICAL FINDINGS  See malnutrition section below.    MALNUTRITION  % Intake: < 75% for > 7 days (moderate)  % Weight Loss: > 5% in 1 month (severe)  Subcutaneous Fat Loss: Facial region: Moderate and Upper arm: Moderate  Muscle Loss: Temporal: Moderate, Facial & jaw region: Moderate and Upper arm (bicep, tricep): severe   Fluid Accumulation/Edema: None noted  Malnutrition Diagnosis: Severe malnutrition in the context of acute presentation     NUTRITION DIAGNOSIS  Inadequate oral intake related to altered mentation as evidenced by variable intake since admit and significant improve in po and appetite with improvement in mentation today     INTERVENTIONS  Implementation  Nutrition Education: Not appropriate at this time due to  patient condition   Medical food supplement therapy     Goals  Patient to consume % of nutritionally adequate meal trays TID, or the equivalent with supplements/snacks.     Monitoring/Evaluation  Progress toward goals will be monitored and evaluated per protocol.      Mike Lopes RD/VICENTA  5A ([id number])/5B RD pager: [phone]  Weekend/Holiday RD pager: [phone]
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
## 2023-06-28 NOTE — PLAN OF CARE
Cares from: [id number]     V/S & pain: VSS on RA ex HTN, denies pain   Neuro: A/O x3/4- intermittently disorientated to situation, calm and cooperative   Respiratory: stable on RA, clear/diminished lung sounds  Skin: no new skin concerns present  GI/: oliguria on HD, BM x1  Nutrition: tolerating a regular diet w/ fair appetite and adequate po intake, denies N/V  Lines/drains: L PIV SL, R AVF  Activity: up Ax2 w/ gb/fww  Labs: no RN managed labs      Events this shift: no acute events this shift. Pt remains vitally stable on RA. Patient more alert this shift but still slightly confused. No behaviors, calm and cooperative all shift. Tolerating a regular diet w/ fair appetite. Up Ax2- patient was able to stand and pivot to commode and to recliner chair w/ assistance. Patient did mention wanting to get stronger to walk, PT consult placed. Tolerated a run of HD this afternoon. Family present at bedside, awaiting safe discharge plans, Q2 hour rounding completed, call light w/in reach and able to make needs known, will continue to monitor.     Plan: continue w/ poc, awaiting safe discharge plans       Goal Outcome Evaluation:      Plan of Care Reviewed With: patient    Overall Patient Progress: no changeOverall Patient Progress: no change    Outcome Evaluation: more alert but still slightly confused

## 2023-06-28 NOTE — PROGRESS NOTES
06/28/23 1041   Appointment Info   Signing Clinician's Name / Credentials (SLP) Alma Delia Perez MS CCC-SLP   General Information   Onset of Illness/Injury or Date of Surgery 06/21/23   Referring Physician Rich Sargent MD   Patient/Family Therapy Goal Statement (SLP) None stated   Pertinent History of Current Problem Rachele Martínze is a 83 year old female with a h/o HTN, chronic Hep C with cirrhosis, ESRD, and hyperlipidemia who was admitted for hypertensive urgency and subacute progressive encephalopathy. Pt's daughters express concern over patient's worsening state of confusion over last 2 weeks, accelerated over last few days. Pt's daughters are also concerned Rachele has not been receiving her medications appropriately at [facility], perhaps also receiving erroneous medications. Head CT (6/21) shows no acute intracranial path, evidence of chronic small vessel ischemic dx. Clinical swallow eval completed per MD orders to further assess oropharyngeal swallow function.   Type of Evaluation   Type of Evaluation Swallow Evaluation   Oral Motor   Oral Musculature generally intact   Structural Abnormalities none present   Mucosal Quality adequate   Dentition (Oral Motor)   Dentition (Oral Motor) adequate dentition;dental appliance/dentures   Dental Appliance/Denture (Oral Motor) upper and lower   Facial Symmetry (Oral Motor)   Facial Symmetry (Oral Motor) WNL   Lip Function (Oral Motor)   Lip Range of Motion (Oral Motor) WNL   Tongue Function (Oral Motor)   Tongue ROM (Oral Motor) WNL   Jaw Function (Oral Motor)   Jaw Function (Oral Motor) WNL   Cough/Swallow/Gag Reflex (Oral Motor)   Volitional Throat Clear/Cough (Oral Motor) WNL   Vocal Quality/Secretion Management (Oral Motor)   Vocal Quality (Oral Motor) WNL   Secretion Management (Oral Motor) WNL   General Swallowing Observations   Past History of Dysphagia Pt reports intermittent coughing with PO for the past 3 weeks. Pt states these  "instances are \"random\" and do not correlate with certain foods.   Comment, General Swallowing Observations Pt upright in chair, with meal tray present.   Respiratory Support (General Swallowing Observations) none   Current Diet/Method of Nutritional Intake (General Swallowing Observations, NIS) thin liquids (level 0);regular diet   Swallowing Evaluation Clinical swallow evaluation   Clinical Swallow Evaluation   Feeding Assistance no assistance needed   Clinical Swallow Evaluation Textures Trialed thin liquids;solid foods   Clinical Swallow Eval: Thin Liquid Texture Trial   Mode of Presentation, Thin Liquids self-fed;cup   Volume of Liquid or Food Presented 4 oz   Oral Phase of Swallow WFL   Pharyngeal Phase of Swallow intact   Diagnostic Statement No overt s/sx of aspiration   Clinical Swallow Evaluation: Solid Food Texture Trial   Mode of Presentation self-fed   Volume Presented 2 pieces marley, pancakes   Oral Phase WFL   Pharyngeal Phase intact   Diagnostic Statement No overt s/sx of aspiration. Mildly prolonged but functional time for mastication.   Swallowing Recommendations   Diet Consistency Recommendations thin liquids (level 0);regular diet   Supervision Level for Intake patient independent   Mode of Delivery Recommendations bolus size, small;slow rate of intake   Swallowing Maneuver Recommendations alternate food and liquid intake   Monitoring/Assistance Required (Eating/Swallowing) monitor for cough or change in vocal quality with intake;stop eating activities when fatigue is present   Recommended Feeding/Eating Techniques (Swallow Eval) maintain upright sitting position for eating;maintain upright posture during/after eating for 30 minutes;minimize distractions during oral intake;provide 6 smaller meals throughout day;set-up and prepare tray   Medication Administration Recommendations, Swallowing (SLP) as tolerated   Instrumental Assessment Recommendations instrumental evaluation not recommended at this " time   General Therapy Interventions   Planned Therapy Interventions Dysphagia Treatment   Dysphagia treatment Instruction of safe swallow strategies;Compensatory strategies for swallowing   Clinical Impression   Criteria for Skilled Therapeutic Interventions Met (SLP Eval) Current level of function same as previous level of function   SLP Diagnosis Oropharyngeal swallow WFL   Risks & Benefits of therapy have been explained evaluation/treatment results reviewed;care plan/treatment goals reviewed;risks/benefits reviewed;current/potential barriers reviewed;participants voiced agreement with care plan;participants included;patient   Clinical Impression Comments Clincial swallow eval completed per MD orders. Pt presents with functional oropharyngeal swallowing skills. Pt with upper and lower dentures in place, [facility]. Pt tolerated thin liquids via straw and regular solid textures with no overt s/sx of aspiration. Pt required prolonged but functional time for mastication with regular soldi textures. Recommend regular textures and thin liquids. Pt should be fully upright and alert for all PO, take small sips/bites, slow pacing, and alternate between consistencies. Consider OP VFSS if pt continues to report dysphagia. Pt verbalized good understanding. No additional IP ST needs indicated.   SLP Discharge Recommendation home with assist   SLP Rationale for DC Rec no additional ST needs indicated   SLP Brief overview of current status  Recommend regular textures and thin liquids. Pt should be fully upright and alert for all PO, take small sips/bites, slow pacing, and alternate between consistencies. Consider OP VFSS if pt continues to report dysphagia.   Total Session Time   Total Session Time (sum of timed and untimed services) 22

## 2023-06-28 NOTE — PROGRESS NOTES
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 Tarango. Run time: 3.5 hrs. Access: RUE AVG. EDW 60 kg  - HD per MWF schedule     BP/Volume: 60 kg  - if current weights are accurate, will need to reduce EDW at discharge  - continue on amlodipine 5 mg qday, losartan 50 mg qday  - gentle UF today        Anemia of CKD: hgb 8-9's; venofer 100 mg qtx, epogen 5200 units per run  - continue PTA epo 5200 units per HD        BMD: Ca 8's, alb 4.0, phos 6.9, Vit D 24; continue PTA PO calcitriol 1.0 mcg MWF, Phoslo 2 tabs TID WM, velphoro 1 tab tid WM  - continue PTA meds      AMS, resolving: w/u underway per primary team; blood cx NGTD, on empiric antibx for potential UTI (no urine sample, minimal UOP, treating empirically to see if AMS resolves). Head CT unrevealing. Delirium, opioids on ddx. BP's very elevated on admission, now controlled  - on delirium precautions  - Pt continues to improve    Pruritis, chronic: recommend trying Sarna lotion     Recommendations were communicated to primary team via this note         SHANNON Charles   Division of Renal Disease and Hypertension  [phone]    Interval History :   Seen on dialysis, gentle UF. Mental status continues to improve. No n/v, CP, SOB, chills    Review of Systems:   4 point ROS neg other than as noted above    Physical Exam:   No intake/output data recorded.   BP (!) 152/73   Pulse 80   Temp 98.7  F (37.1  C) (Oral)   Resp 19   Wt 56.5 kg (124 lb 9 oz)   SpO2 99%   BMI 21.38 kg/m       GENERAL APPEARANCE: NAD  EYES:  no scleral icterus, pupils equal  PULM: CTA  CV: RRR     -edema none  GI: soft   INTEGUMENT: no cyanosis, no rash  NEURO: facial droop, mental status much improved  questions  Access  R AVG    Labs:   All labs reviewed by me  Electrolytes/Renal -   Recent Labs   Lab Test 06/28/23  0757 06/27/23  0605 06/26/23  0612    135* 139   POTASSIUM 3.9 4.0 4.1   CHLORIDE 96* 94* 95*   CO2 25 25 20*   BUN 27.8* 14.4 41.4*   CR 7.42* 4.88* 9.44*   * 104* 96   BELGICA 8.7* 8.8 8.5*   MAG 1.9 1.7 2.2   PHOS 4.3 4.0 6.9*       CBC -   Recent Labs   Lab Test 06/28/23  0757 06/27/23  0605 06/26/23  0612   WBC 9.5 8.6 12.9*   HGB 9.7* 9.0* 9.3*    403 497*       LFTs -   Recent Labs   Lab Test 06/24/23  0559 06/21/23  1117 06/01/23  1546   ALKPHOS 194* 250* 125*   BILITOTAL 0.5 0.8 0.4   ALT 12 18 7*   AST 35 30 22   PROTTOTAL 7.7 8.4* 7.0   ALBUMIN 4.0 4.3 3.4*       Iron Panel -   Recent Labs   Lab Test 06/20/23  1122 06/02/23  0942 05/14/23  0741   IRON 36* 62 31*   IRONSAT 17 31 18   KASSI 600* 222 436*         Imaging:  Reviewed    Current Medications:    amLODIPine  5 mg Oral Daily     atorvastatin  20 mg Oral Daily     calcitRIOL  1 mcg Oral Once per day on Mon Wed Fri     calcium acetate  1,334 mg Oral TID w/meals     - MEDICATION INSTRUCTIONS -   Does not apply See Admin Instructions     epoetin christofer-epbx  5,200 Units Intravenous Once in dialysis/CRRT     losartan  50 mg Oral Daily     multivitamin RENAL  1 tablet Oral Daily     OLANZapine zydis  5 mg Oral At Bedtime     pantoprazole  20 mg Oral BID AC     ramelteon  8 mg Oral At Bedtime     sertraline  100 mg Oral Daily     sodium chloride (PF)  3 mL Intracatheter Q8H     sucroferric oxyhydroxide  500 mg Oral BID       - MEDICATION INSTRUCTIONS -       SHANNON Charles

## 2023-06-29 ENCOUNTER — APPOINTMENT (OUTPATIENT)
Dept: PHYSICAL THERAPY | Facility: CLINIC | Age: 83
DRG: 304 | End: 2023-06-29
Payer: MEDICARE

## 2023-06-29 PROCEDURE — 120N000002 HC R&B MED SURG/OB UMMC

## 2023-06-29 PROCEDURE — 999N000127 HC STATISTIC PERIPHERAL IV START W US GUIDANCE

## 2023-06-29 PROCEDURE — 250N000013 HC RX MED GY IP 250 OP 250 PS 637

## 2023-06-29 PROCEDURE — 97530 THERAPEUTIC ACTIVITIES: CPT | Mod: GP

## 2023-06-29 PROCEDURE — 99232 SBSQ HOSP IP/OBS MODERATE 35: CPT | Mod: GC | Performed by: INTERNAL MEDICINE

## 2023-06-29 PROCEDURE — 97161 PT EVAL LOW COMPLEX 20 MIN: CPT | Mod: GP

## 2023-06-29 PROCEDURE — 999N000248 HC STATISTIC IV INSERT WITH US BY RN

## 2023-06-29 PROCEDURE — 250N000013 HC RX MED GY IP 250 OP 250 PS 637: Performed by: STUDENT IN AN ORGANIZED HEALTH CARE EDUCATION/TRAINING PROGRAM

## 2023-06-29 RX ORDER — OLANZAPINE 5 MG/1
5 TABLET, ORALLY DISINTEGRATING ORAL
Status: DISCONTINUED | OUTPATIENT
Start: 2023-06-29 | End: 2023-06-29

## 2023-06-29 RX ADMIN — ATORVASTATIN CALCIUM 20 MG: 20 TABLET, FILM COATED ORAL at 09:00

## 2023-06-29 RX ADMIN — SERTRALINE HYDROCHLORIDE 100 MG: 100 TABLET ORAL at 08:59

## 2023-06-29 RX ADMIN — RAMELTEON 8 MG: 8 TABLET, FILM COATED ORAL at 22:14

## 2023-06-29 RX ADMIN — PANTOPRAZOLE SODIUM 20 MG: 20 TABLET, DELAYED RELEASE ORAL at 09:00

## 2023-06-29 RX ADMIN — CALCIUM ACETATE 1334 MG: 667 CAPSULE ORAL at 18:43

## 2023-06-29 RX ADMIN — Medication 2.5 MG: at 22:15

## 2023-06-29 RX ADMIN — LOSARTAN POTASSIUM 50 MG: 50 TABLET, FILM COATED ORAL at 09:03

## 2023-06-29 RX ADMIN — CALCIUM ACETATE 1334 MG: 667 CAPSULE ORAL at 13:50

## 2023-06-29 RX ADMIN — CALCIUM ACETATE 1334 MG: 667 CAPSULE ORAL at 09:05

## 2023-06-29 RX ADMIN — B-COMPLEX W/ C & FOLIC ACID TAB 1 MG 1 TABLET: 1 TAB at 09:03

## 2023-06-29 RX ADMIN — PANTOPRAZOLE SODIUM 20 MG: 20 TABLET, DELAYED RELEASE ORAL at 16:44

## 2023-06-29 RX ADMIN — AMLODIPINE BESYLATE 5 MG: 5 TABLET ORAL at 09:00

## 2023-06-29 RX ADMIN — SUCROFERRIC OXYHYDROXIDE 500 MG: 500 TABLET, CHEWABLE ORAL at 20:30

## 2023-06-29 RX ADMIN — SUCROFERRIC OXYHYDROXIDE 500 MG: 500 TABLET, CHEWABLE ORAL at 09:07

## 2023-06-29 ASSESSMENT — ACTIVITIES OF DAILY LIVING (ADL)
ADLS_ACUITY_SCORE: 54
ADLS_ACUITY_SCORE: 54
ADLS_ACUITY_SCORE: 48
ADLS_ACUITY_SCORE: 54
ADLS_ACUITY_SCORE: 48
ADLS_ACUITY_SCORE: 54
ADLS_ACUITY_SCORE: 54
ADLS_ACUITY_SCORE: 48
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
## 2023-06-29 NOTE — PROGRESS NOTES
06/29/23 0800   Appointment Info   Signing Clinician's Name / Credentials (PT) Deb Yanes, PHILL   Student Supervision Direct supervision provided;Direct Patient Contact Provided   Living Environment   People in Home alone   Current Living Arrangements apartment;independent living facility   Home Accessibility no concerns  (no stairs to enter, elevator access)   Transportation Anticipated family or friend will provide  (Per last eval)   Living Environment Comments No stairs to enter apt building, elevator access, reports not recieving any assistive services from apt complex, on welfare   Self-Care   Usual Activity Tolerance good  (Per last eval)   Current Activity Tolerance poor   Regular Exercise No   Equipment Currently Used at Home shower chair;grab bar, toilet;grab bar, tub/shower;walker, standard  (Motorized scooter)   Fall history within last six months yes   Number of times patient has fallen within last six months 1   Activity/Exercise/Self-Care Comment reports previously IND w/ no AD use w/in her apt but previous eval reported FWW use in apt, motorized scooter used in hallway predict using for community ambulation, daughters assist w/ any needs + groceries   General Information   Onset of Illness/Injury or Date of Surgery 06/21/23   Referring Physician Rich Sargent MD   Patient/Family Therapy Goals Statement (PT) Return to prior level of independence   Pertinent History of Current Problem (include personal factors and/or comorbidities that impact the POC) Rachele Martínez is a 83 year old female with a h/o HTN, chronic Hep C with cirrhosis, ESRD, and hyperlipidemia who was admitted for hypertensive urgency and subacute progressive encephalopathy. Pt's daughters express concern over patient's worsening state of confusion over last 2 weeks, accelerated over last few days. Pt's daughters are also concerned Rachele has not been receiving her medications appropriately at [facility],  perhaps also receiving erroneous medications. Head CT (6/21) shows no acute intracranial path, evidence of chronic small vessel ischemic dx. Encephalopathy likley multifactorial, delirium and polypharmacy. Patient has shown significant improvement and has improving mentation and mental status.   Existing Precautions/Restrictions fall   Cognition   Orientation Status (Cognition) oriented to;person   Follows Commands (Cognition) repetition of directions required;follows two-step commands;increased processing time needed   Safety Deficit (Cognition) safety precautions awareness;minimal deficit;awareness of need for assistance   Cognitive Status Comments pt reports being confused multiple times, asks for time to think   Pain Assessment   Patient Currently in Pain No   Integumentary/Edema   Integumentary/Edema other (describe)   Integumentary/Edema Comments observed dry and thin skin, no edema observed   Posture    Posture Protracted shoulders;Forward head position   Range of Motion (ROM)   Range of Motion ROM is WFL   ROM Comment kay PHAN IR, pt reported difficulty reaching in upper cabinets   Strength (Manual Muscle Testing)   Strength (Manual Muscle Testing) strength is WFL   Strength Comments strength assessment >3/5   Bed Mobility   Bed Mobility rolling left;scooting/bridging;supine-sit   Rolling Left [place] (Bed Mobility) independent   Scooting/Bridging [place] (Bed Mobility) modified independence   Supine-Sit [place] (Bed Mobility) modified independence   Assistive Device (Bed Mobility) bed rails   Comment, (Bed Mobility) Pt IND-[place]an[place] w/ bed mobility, scooting requiring increased effort w/ use of bedrail   Transfers   Transfers sit-stand transfer;toilet transfer   Transfer Safety Concerns Noted decreased sequencing ability   Impairments Contributing to Impaired Transfers decreased strength   Comment, (Transfers) Required minAx2 for safety, modAx2 for chair and toilet, repeated cues for hand and  foot placement   Sit-Stand Transfer   Sit-Stand [place] (Transfers) minimum assist (75% patient effort);2 person assist;verbal cues;nonverbal cues (demo/gesture)   Assistive Device (Sit-Stand Transfers) walker, front-wheeled   Toilet Transfer   [place] Level (Toilet Transfer) verbal cues;minimum assist (75% patient effort);2 person assist   Assistive Device (Toilet Transfer) walker, front-wheeled   Type (Toilet Transfer) stand-sit;sit-stand   Gait/Stairs (Locomotion)   [place] Level (Gait) contact guard;2 person assist   Assistive Device (Gait) walker, front-wheeled   Distance in Feet 30'   Pattern (Gait) step-through   Deviations/Abnormal Patterns (Gait) gait speed decreased;base of support, narrow;[place] decreased;stride length decreased   Comment, (Gait/Stairs) difficulty managing FWW, forward flexed position   Sensory Examination   Sensory Perception WFL   Clinical Impression   Criteria for Skilled Therapeutic Intervention Yes, treatment indicated   PT Diagnosis (PT) Impaired activity tolerance, weakness   Influenced by the following impairments decr strength, endurance, cognition   Functional limitations due to impairments transfers, gait   Clinical Presentation (PT Evaluation Complexity) Stable/Uncomplicated   Clinical Presentation Rationale pt presentation, chart review, clinical reasoning   Clinical Decision Making (Complexity) low complexity   Planned Therapy Interventions (PT) balance training;bed mobility training;gait training;home exercise program;neuromuscular re-education;patient/family education;postural re-education;ROM (range of motion);stair training;strengthening;stretching;transfer training;progressive activity/exercise;home program guidelines   Anticipated Equipment Needs at Discharge (PT)   (TBD)   Risk & Benefits of therapy have been explained evaluation/treatment results reviewed;care plan/treatment goals reviewed;risks/benefits reviewed;current/potential barriers  reviewed;participants voiced agreement with care plan;participants included;patient   Clinical Impression Comments pt s/p hypertensive urgency and progressing encephalopathy. pt presents below baseline, needing frequent and repeated cueing for safe mobility d/t weakness and confusion. would benefit from skilled therapy to return to prior level of independence.   PT Total Evaluation Time   PT Eval, Low Complexity Minutes (85777) 28  (pt's confusion and processing lag required incr time for subjective and objective.)   Physical Therapy Goals   PT Frequency 4x/week   PT Predicted Duration/Target Date for Goal Attainment 07/06/23   PT Goals Transfers;Gait   PT: Transfers Modified independent;Sit to/from stand;Bed to/from chair;Assistive device   PT: Gait Supervision/stand-by assist;Standard walker;Assistive device;100 feet                  PT Discharge Planning   PT Plan gait w/ FWW, repeated STS from various surfaces, UE ROM/strength   PT Discharge Recommendation (DC Rec) Transitional Care Facility   PT Rationale for DC Rec pt currently below baseline, w/ safety concerns d/t confusion, weakness, and decr activity tolerance. would benefit from skilled therapy to progress toward returning to IND living   PT Brief overview of current status Ax2 w/ FWW   Total Session Time   Timed Code Treatment Minutes 30   Total Session Time (sum of timed and untimed services) 58

## 2023-06-29 NOTE — PLAN OF CARE
Goal Outcome Evaluation:      Plan of Care Reviewed With: patient, family    Overall Patient Progress: improvingOverall Patient Progress: improving    Outcome Evaluation: tolerating regular diet with improve in oral intake

## 2023-06-29 NOTE — PLAN OF CARE
BP (!) 148/62 (BP Location: Left arm)   Pulse 85   Temp 98.7  F (37.1  C) (Oral)   Resp 19   Wt 56.5 kg (124 lb 9 oz)   SpO2 99%   BMI 21.38 kg/m      Care from: 1900 - 2330       VS & Pain: VSS ex hard BP. Denied pain this shift.    Neuro: A&Ox4 but intermittent confusion/forgetfulness. Calm and cooperative with cares.    Respiratory: Stable on RA.    Nutrition: Regular diet.    Skin: No new skin concerns this shift.    Lines: L piv SL.     Activity: Assist of 2 to turn/pivot.     Events this shift: Continues on bedside attendant for agitation/confusion. Orientation/menation/behavior appears to be improving.     Plan: Continue with plan of care, awaiting safe discharge plan. Receives dialysis on MWF schedule.

## 2023-06-29 NOTE — PLAN OF CARE
[facility] cares [id number]     BP (!) 146/43 (BP Location: Left arm)   Pulse 84   Temp 98.4  F (36.9  C) (Oral)   Resp 18   Wt 56.5 kg (124 lb 9 oz)   SpO2 98%   BMI 21.38 kg/m       Pain: Patient denied pain.   Neuro: A&Ox4. Forgetful at times.    Respiratory: Lungs clear and equal, diminished in lower lobes. On RA.   Cardiac/Neurovascular: HR and pulse WDL. Baseline numbness/tingling in lower extremity feet.   GI/: Patient on hemodialysis. Intermittent incontinence at times. Smear BM today.   Nutrition: Regular with thin liquids.   Activity: Assist of 2 with GB and walker  Skin: No concerns.   Lines: Left PIV (SL).   Events this shift: No new changes today. Worked with PT/OT today. Able to sit in chair today. Dialysis tomorrow.      Plan: Continue with plan of care.     Goal Outcome Evaluation:      Plan of Care Reviewed With: patient    Overall Patient Progress: improvingOverall Patient Progress: improving    Outcome Evaluation: Continue with plan of care.

## 2023-06-29 NOTE — PLAN OF CARE
Goal Outcome Evaluation:      Plan of Care Reviewed With: patient    Overall Patient Progress: no changeOverall Patient Progress: no change     Assumes cares from [id number]. VSS on RA. Pt is A&O and can make needs known, bedside attendant for safety. L PIV was removed at the beginning of the shift, vascular put in a new L PIV. Pt slept between cares.     Plan: Continue following care plan

## 2023-06-30 ENCOUNTER — APPOINTMENT (OUTPATIENT)
Dept: OCCUPATIONAL THERAPY | Facility: CLINIC | Age: 83
DRG: 304 | End: 2023-06-30
Payer: MEDICARE

## 2023-06-30 LAB
ANION GAP SERPL CALCULATED.3IONS-SCNC: 11 MMOL/L (ref 7–15)
BUN SERPL-MCNC: 33.9 MG/DL (ref 8–23)
CALCIUM SERPL-MCNC: 9.1 MG/DL (ref 8.8–10.2)
CHLORIDE SERPL-SCNC: 103 MMOL/L (ref 98–107)
CREAT SERPL-MCNC: 6.85 MG/DL (ref 0.51–0.95)
DEPRECATED HCO3 PLAS-SCNC: 27 MMOL/L (ref 22–29)
ERYTHROCYTE [DISTWIDTH] IN BLOOD BY AUTOMATED COUNT: 16.7 % (ref 10–15)
GFR SERPL CREATININE-BSD FRML MDRD: 6 ML/MIN/1.73M2
GLUCOSE SERPL-MCNC: 99 MG/DL (ref 70–99)
HCT VFR BLD AUTO: 30.9 % (ref 35–47)
HGB BLD-MCNC: 9 G/DL (ref 11.7–15.7)
MAGNESIUM SERPL-MCNC: 1.8 MG/DL (ref 1.7–2.3)
MCH RBC QN AUTO: 30 PG (ref 26.5–33)
MCHC RBC AUTO-ENTMCNC: 29.1 G/DL (ref 31.5–36.5)
MCV RBC AUTO: 103 FL (ref 78–100)
PHOSPHATE SERPL-MCNC: 3.2 MG/DL (ref 2.5–4.5)
PLATELET # BLD AUTO: 376 10E3/UL (ref 150–450)
POTASSIUM SERPL-SCNC: 4.2 MMOL/L (ref 3.4–5.3)
RBC # BLD AUTO: 3 10E6/UL (ref 3.8–5.2)
SODIUM SERPL-SCNC: 141 MMOL/L (ref 136–145)
WBC # BLD AUTO: 8.8 10E3/UL (ref 4–11)

## 2023-06-30 PROCEDURE — 90937 HEMODIALYSIS REPEATED EVAL: CPT

## 2023-06-30 PROCEDURE — 84100 ASSAY OF PHOSPHORUS: CPT

## 2023-06-30 PROCEDURE — 250N000013 HC RX MED GY IP 250 OP 250 PS 637: Performed by: STUDENT IN AN ORGANIZED HEALTH CARE EDUCATION/TRAINING PROGRAM

## 2023-06-30 PROCEDURE — 83735 ASSAY OF MAGNESIUM: CPT

## 2023-06-30 PROCEDURE — 250N000013 HC RX MED GY IP 250 OP 250 PS 637

## 2023-06-30 PROCEDURE — 99233 SBSQ HOSP IP/OBS HIGH 50: CPT | Performed by: PHYSICIAN ASSISTANT

## 2023-06-30 PROCEDURE — 120N000002 HC R&B MED SURG/OB UMMC

## 2023-06-30 PROCEDURE — 634N000001 HC RX 634: Mod: JZ | Performed by: INTERNAL MEDICINE

## 2023-06-30 PROCEDURE — 85027 COMPLETE CBC AUTOMATED: CPT

## 2023-06-30 PROCEDURE — 97165 OT EVAL LOW COMPLEX 30 MIN: CPT | Mod: GO

## 2023-06-30 PROCEDURE — 99232 SBSQ HOSP IP/OBS MODERATE 35: CPT | Mod: GC | Performed by: INTERNAL MEDICINE

## 2023-06-30 PROCEDURE — 36415 COLL VENOUS BLD VENIPUNCTURE: CPT

## 2023-06-30 PROCEDURE — 97535 SELF CARE MNGMENT TRAINING: CPT | Mod: GO

## 2023-06-30 PROCEDURE — 80048 BASIC METABOLIC PNL TOTAL CA: CPT

## 2023-06-30 RX ADMIN — PANTOPRAZOLE SODIUM 20 MG: 20 TABLET, DELAYED RELEASE ORAL at 08:37

## 2023-06-30 RX ADMIN — EPOETIN ALFA-EPBX 5200 UNITS: 10000 INJECTION, SOLUTION INTRAVENOUS; SUBCUTANEOUS at 14:28

## 2023-06-30 RX ADMIN — CALCIUM ACETATE 1334 MG: 667 CAPSULE ORAL at 08:37

## 2023-06-30 RX ADMIN — SUCROFERRIC OXYHYDROXIDE 500 MG: 500 TABLET, CHEWABLE ORAL at 19:46

## 2023-06-30 RX ADMIN — Medication 2.5 MG: at 21:21

## 2023-06-30 RX ADMIN — CALCITRIOL CAPSULES 0.25 MCG 1 MCG: 0.25 CAPSULE ORAL at 12:25

## 2023-06-30 RX ADMIN — B-COMPLEX W/ C & FOLIC ACID TAB 1 MG 1 TABLET: 1 TAB at 08:38

## 2023-06-30 RX ADMIN — Medication: at 14:30

## 2023-06-30 RX ADMIN — RAMELTEON 8 MG: 8 TABLET, FILM COATED ORAL at 21:21

## 2023-06-30 RX ADMIN — ATORVASTATIN CALCIUM 20 MG: 20 TABLET, FILM COATED ORAL at 08:38

## 2023-06-30 RX ADMIN — LOSARTAN POTASSIUM 50 MG: 50 TABLET, FILM COATED ORAL at 08:38

## 2023-06-30 RX ADMIN — PANTOPRAZOLE SODIUM 20 MG: 20 TABLET, DELAYED RELEASE ORAL at 17:29

## 2023-06-30 RX ADMIN — CALCIUM ACETATE 1334 MG: 667 CAPSULE ORAL at 12:23

## 2023-06-30 RX ADMIN — SUCROFERRIC OXYHYDROXIDE 500 MG: 500 TABLET, CHEWABLE ORAL at 08:40

## 2023-06-30 RX ADMIN — SERTRALINE HYDROCHLORIDE 100 MG: 100 TABLET ORAL at 08:38

## 2023-06-30 RX ADMIN — AMLODIPINE BESYLATE 5 MG: 5 TABLET ORAL at 08:38

## 2023-06-30 ASSESSMENT — ACTIVITIES OF DAILY LIVING (ADL)
ADLS_ACUITY_SCORE: 48

## 2023-06-30 NOTE — PROGRESS NOTES
"Occupational Therapy Evaluation       06/30/23 1000   Appointment Info   Signing Clinician's Name / Credentials (OT) Mya Petersen OTR/L   Living Environment   People in Home alone   Current Living Arrangements   (assist living facility)   Home Accessibility no concerns   Transportation Anticipated family or friend will provide   Living Environment Comments Pt reported she lives in an [facility] apartment with walk in shower, grab bar, shower chair. No stairs.   Self-Care   Usual Activity Tolerance good   Current Activity Tolerance poor   Equipment Currently Used at Home shower chair;grab bar, toilet;grab bar, tub/shower;walker, standard  (scooter)   Fall history within last six months yes   Number of times patient has fallen within last six months 1   Activity/Exercise/Self-Care Comment mod IND at baseline per pt   Instrumental Activities of Daily Living (IADL)   IADL Comments receives assist   General Information   Onset of Illness/Injury or Date of Surgery 06/21/23   Referring Physician Rich Sargent MD   Patient/Family Therapy Goal Statement (OT) to be more independent   Additional Occupational Profile Info/Pertinent History of Current Problem Per chart, \"Rachele Martínez is a 83 year old female with a h/o HTN, chronic Hep C with cirrhosis, ESRD, and hyperlipidemia who was admitted for hypertensive urgency and subacute progressive encephalopathy. Pt's daughters express concern over patient's worsening state of confusion over last 2 weeks, accelerated over last few days. Pt's daughters are also concerned Rachele has not been receiving her medications appropriately at [facility], perhaps also receiving erroneous medications. Head CT (6/21) shows no acute intracranial path, evidence of chronic small vessel ischemic dx. Encephalopathy likley multifactorial, delirium and polypharmacy. Patient has shown significant improvement and has improving mentation and mental status.\"   Existing " Precautions/Restrictions fall   Left Upper Extremity (Weight-bearing Status) full weight-bearing (FWB)   Right Upper Extremity (Weight-bearing Status) full weight-bearing (FWB)   Left Lower Extremity (Weight-bearing Status) full weight-bearing (FWB)   Right Lower Extremity (Weight-bearing Status) full weight-bearing (FWB)   General Observations and Info Activity: up ad rosalinda   Cognitive Status Examination   Orientation Status orientation to person, place and time   Affect/Mental Status (Cognitive) confused   Follows Commands WFL   Memory Deficit moderate deficit;short-term memory;working memory   Executive Function Deficit insight/awareness of deficits;judgment;planning/decision-making;organization/sequencing;problem-solving/reasoning   Cognitive Status Comments Pt with mild confusion, able to follow multi step commands and demonstrates insight into current functioning. Conversationally appropriate   Visual Perception   Visual Impairment/Limitations corrective lenses full-time   Sensory   Sensory Comments numbness and tingling in toes   Pain Assessment   Patient Currently in Pain No   Posture   Posture forward head position;protracted shoulders   Range of Motion Comprehensive   General Range of Motion bilateral upper extremity ROM WFL   Strength Comprehensive (MMT)   Comment, General Manual Muscle Testing (MMT) Assessment Generalized weakness   Coordination   Coordination Comments bilateral fine motor coordination impaired, pt endorsed difficulty manipulating small items   Bed Mobility   Bed Mobility supine-sit;sit-supine   Supine-Sit [place] (Bed Mobility) contact guard   Sit-Supine [place] (Bed Mobility) contact guard   Transfers   Transfers sit-stand transfer   Transfer Skill: Bed to Chair/Chair to Bed   Bed-Chair [place] (Transfers) minimum assist (75% patient effort)   Assistive Device (Bed-Chair Transfers) standard walker   Sit-Stand Transfer   Sit-Stand [place] (Transfers) minimum assist  (75% patient effort)   Assistive Device (Sit-Stand Transfers) walker, front-wheeled   Balance   Balance Comments Dynamic standing balance impaired, required use of FWW   Activities of Daily Living   BADL Assessment/Intervention bathing;upper body dressing;lower body dressing;grooming;toileting   Bathing Assessment/Intervention   [place] Level (Bathing) moderate assist (50% patient effort)   Comment, (Bathing) Per clinical judgement   Position (Bathing) supported sitting   Upper Body Dressing Assessment/Training   Comment, (Upper Body Dressing) Per clinical judgement   [place] Level (Upper Body Dressing) supervision   Lower Body Dressing Assessment/Training   [place] Level (Lower Body Dressing) supervision   Grooming Assessment/Training   [place] Level (Grooming) supervision   Toileting   [place] Level (Toileting) minimum assist (75% patient effort)   Clinical Impression   Criteria for Skilled Therapeutic Interventions Met (OT) Yes, treatment indicated   OT Diagnosis decreased ADL and IADL independence   OT Problem List-Impairments impacting ADL problems related to;activity tolerance impaired;balance;cognition;mobility;strength   Assessment of Occupational Performance 3-5 Performance Deficits   Identified Performance Deficits ADL including bathing, dressing, toileting; IADL   Planned Therapy Interventions (OT) ADL retraining;IADL retraining;cognition;strengthening;transfer training;home program guidelines;progressive activity/exercise   Clinical Decision Making Complexity (OT) low complexity   Risk & Benefits of therapy have been explained evaluation/treatment results reviewed;care plan/treatment goals reviewed;risks/benefits reviewed;current/potential barriers reviewed;participants voiced agreement with care plan;participants included;patient   Clinical Impression Comments Pt presents below funcitonal baseline, limited by weakness, decreased balance, and cognition impacting ADL independence.  Pt would benefit from continued OT to progress ADL toward PLOF.   OT Total Evaluation Time   OT Eval, Low Complexity Minutes (47683) 10   OT Goals   Therapy Frequency (OT) 5 times/wk   OT Predicted Duration/Target Date for Goal Attainment 07/21/23   OT Goals Hygiene/Grooming;Lower Body Dressing;Lower Body Bathing;Bed Mobility;Transfers;Toilet Transfer/Toileting   OT: Hygiene/Grooming modified independent   OT: Lower Body Dressing Supervision/stand-by assist   OT: Lower Body Bathing Supervision/stand-by assist   OT: Bed Mobility Modified independent   OT: Transfer Supervision/stand-by assist  (shower transfer)   OT: Toilet Transfer/Toileting Supervision/stand-by assist   OT: Cognitive Patient/caregiver will verbalize understanding of cognitive assessment results/recommendations as needed for safe discharge planning   Self-Care/Home Management   Self-Care/Home Mgmt/ADL, Compensatory, Meal Prep Minutes (35314) 25   Symptoms Noted During/After Treatment (Meal Preparation/Planning Training) fatigue   Treatment Detail/Skilled Intervention OT: Following evaluation, treatment indicated. Facilitated ADL and functional in room mobility for increased IND and strengthening. Pt sat EoB CGA, reported no dizziness. Pt stood with min A from EoB using FWW and verbal cues for hand placement. Pt walked to bathroom with intermittent min A for balance. Completed toileting with CGA for toilet transfer and set up for clothing management. Pt completed LB dressing with SBA EoB for safety. Pt walked approximately 15 ft in room with intermittent min A using FWW. Pt returned to chair, left seated in chair with feet elevated, call light within reach, all needs met.   OT Discharge Planning   OT Plan OT: standing ADLs, FB dressing, monitor cognition, progress functional standing tolerance and dynamic standing balance   OT Discharge Recommendation (DC Rec) Transitional Care Facility   OT Rationale for DC Rec Pt presents below funcitonal baseline,  limited by weakness, decreased balance, and cognition impacting ADL independence. At this time, pt would benefit from short TCU stay to progress strength and independence with ADL and IADL and facilitate safe discharge planning. Pending LOS, pt may progress to being able to discharge to half-way, will update as appropriate.   OT Brief overview of current status min A w FWW, wc follow for hallway distances   Total Session Time   Timed Code Treatment Minutes 25   Total Session Time (sum of timed and untimed services) 35

## 2023-06-30 NOTE — PLAN OF CARE
Blood pressure (!) 174/68, pulse 74, temperature 98.2  F (36.8  C), temperature source Oral, resp. rate 21, weight 59.6 kg (131 lb 6.3 oz), SpO2 99 %, not currently breastfeeding.    Goal Outcome Evaluation:hypertensive.at this time pt is at dialysis .A&Ox4 used call light appropriately,denied pain and nausea,good appetite ,no BM,voided x 1 not measured. pt is on dialysis ,right arm fistula,left arm PIV.LS clear,BS+,up with assist of 1-2 with w/GB.Brain  MRI for confusion today.Will continue to monitor.
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
## 2023-06-30 NOTE — PROGRESS NOTES
HEMODIALYSIS TREATMENT NOTE    Date: 6/30/2023  Time: 4:38 PM    Data:  Pre Wt: 59.6 kg   Desired Wt:   To be established   Post Wt:  57.6 kg  Weight change: -2.0  kg  Ultrafiltration - Post Run Net Total Removed (mL): 2000 mL  Vascular Access Status: patent  Dialyzer Rinse: Streaked  Total Blood Volume Processed: 68.4 L Liters  Total Dialysis (Treatment) Time: 3.5 Hours    Lab:   No    Interventions & Assessment:  Received from [facility] via bed on room air.  A/O x4, denies pain, denies SOB, denies N/V/D.  iHD started via right AVG, + strong bruit and thrill noted.  Used gauge 15 needle. Pt was hemodynamically stable on HD, Tx tolerated well and uneventful. Net UF removed 2.0 L.  Due meds given.  Blood rinse back done, needle removed and bleeding controlled, hemostasis achieved in 10 mins.  Handoff report given to PCN   See MAR, flow sheet and MD orders for further details.      Plan:    Per renal team

## 2023-06-30 NOTE — PROGRESS NOTES
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 Tarango. Run time: 3.5 hrs. Access: RUE AVG. EDW 60 kg  - HD per MWF schedule     BP/Volume: 60 kg  - if current weights are accurate, will need to reduce EDW at discharge  - continue on amlodipine 5 mg qday, losartan 50 mg qday  - 2 kg UF goal        Anemia of CKD: hgb 8-9's; venofer 100 mg qtx, epogen 5200 units per run  - continue PTA epo 5200 units per HD        BMD: Ca 8-9's, alb 4.0, phos 3.2, Vit D 24; continue PTA PO calcitriol 1.0 mcg MWF, Phoslo 2 tabs TID WM, velphoro 1 tab tid WM (held Phoslo for now 6/30)  - continue PTA meds      AMS, resolving: multifactorial: likely delirium and polypharmacy; blood cx NGTD, completed empiric antibx for potential UTI (no urine sample, minimal UOP, treating empirically to see if AMS resolves). Head CT unrevealing. BP's very elevated on admission, now controlled       Pruritis, chronic: recommend trying Sarna lotion     Recommendations were communicated to primary team via this note         SHANNON Charles   Division of Renal Disease and Hypertension  [phone]    Interval History :   Seen on dialysis, gentle UF. Mental status continues to improve, back to baseline other than some word finding issues, per pt.  No n/v, CP, SOB, chills    Review of Systems:   4 point ROS neg other than as noted above    Physical Exam:   I/O last 3 completed shifts:  In: 240 [P.O.:240]  Out: -    /50 (BP Location: Right arm)   Pulse 82   Temp 98.2  F (36.8  C) (Oral)   Resp 18   Wt 59.6 kg (131 lb 6.3 oz)   SpO2 98%   BMI 22.55 kg/m       GENERAL APPEARANCE: NAD  EYES:  no scleral icterus, pupils equal  PULM: CTA  CV: RRR     -edema none  GI: soft    INTEGUMENT: no cyanosis, no rash  NEURO: facial droop, a/o3  Access R AVG    Labs:   All labs reviewed by me  Electrolytes/Renal -   Recent Labs   Lab Test 06/30/23  0706 06/28/23  0757 06/27/23  0605    136 135*   POTASSIUM 4.2 3.9 4.0   CHLORIDE 103 96* 94*   CO2 27 25 25   BUN 33.9* 27.8* 14.4   CR 6.85* 7.42* 4.88*   GLC 99 106* 104*   BELGICA 9.1 8.7* 8.8   MAG 1.8 1.9 1.7   PHOS 3.2 4.3 4.0       CBC -   Recent Labs   Lab Test 06/30/23  0706 06/28/23  0757 06/27/23  0605   WBC 8.8 9.5 8.6   HGB 9.0* 9.7* 9.0*    369 403       LFTs -   Recent Labs   Lab Test 06/24/23  0559 06/21/23  1117 06/01/23  1546   ALKPHOS 194* 250* 125*   BILITOTAL 0.5 0.8 0.4   ALT 12 18 7*   AST 35 30 22   PROTTOTAL 7.7 8.4* 7.0   ALBUMIN 4.0 4.3 3.4*       Iron Panel -   Recent Labs   Lab Test 06/20/23  1122 06/02/23  0942 05/14/23  0741   IRON 36* 62 31*   IRONSAT 17 31 18   KASSI 600* 222 436*         Imaging:  Reviewed    Current Medications:    sodium chloride 0.9%  250 mL Intravenous Once in dialysis/CRRT     sodium chloride 0.9%  300 mL Hemodialysis Machine Once     amLODIPine  5 mg Oral Daily     atorvastatin  20 mg Oral Daily     calcitRIOL  1 mcg Oral Once per day on Mon Wed Fri     calcium acetate  1,334 mg Oral TID w/meals     - MEDICATION INSTRUCTIONS -   Does not apply See Admin Instructions     epoetin christofer-epbx  5,200 Units Intravenous Once in dialysis/CRRT     losartan  50 mg Oral Daily     multivitamin RENAL  1 tablet Oral Daily     - MEDICATION INSTRUCTIONS -   Does not apply Once     OLANZapine zydis  2.5 mg Oral At Bedtime     pantoprazole  20 mg Oral BID AC     ramelteon  8 mg Oral At Bedtime     sertraline  100 mg Oral Daily     sodium chloride (PF)  3 mL Intracatheter Q8H     sucroferric oxyhydroxide  500 mg Oral BID       - MEDICATION INSTRUCTIONS -       Zora Villagomez, PA

## 2023-06-30 NOTE — PLAN OF CARE
Goal Outcome Evaluation:      Plan of Care Reviewed With: patient    Overall Patient Progress: no changeOverall Patient Progress: no change    Outcome Evaluation: Continue with plan of care, monitor confusion      Assumes cares [id number]. VSS on RA. Pt is A&O but is having some confusion. Pt stated she is concerned about her increased confusion and difficulty finding words, states she wants to talk to the doctors about it when they come by today. Pt also wants to talk about removing IV and having no IV access. Pt slept for most of the shift between cares.    Plan: Continue following care plan and monitoring confusion.

## 2023-07-01 ENCOUNTER — APPOINTMENT (OUTPATIENT)
Dept: CT IMAGING | Facility: CLINIC | Age: 83
DRG: 304 | End: 2023-07-01
Payer: MEDICARE

## 2023-07-01 PROCEDURE — 99232 SBSQ HOSP IP/OBS MODERATE 35: CPT | Mod: GC | Performed by: INTERNAL MEDICINE

## 2023-07-01 PROCEDURE — G1010 CDSM STANSON: HCPCS

## 2023-07-01 PROCEDURE — 250N000013 HC RX MED GY IP 250 OP 250 PS 637

## 2023-07-01 PROCEDURE — 120N000002 HC R&B MED SURG/OB UMMC

## 2023-07-01 PROCEDURE — G1010 CDSM STANSON: HCPCS | Mod: GC | Performed by: RADIOLOGY

## 2023-07-01 PROCEDURE — 99232 SBSQ HOSP IP/OBS MODERATE 35: CPT | Performed by: INTERNAL MEDICINE

## 2023-07-01 PROCEDURE — 70450 CT HEAD/BRAIN W/O DYE: CPT | Mod: 26 | Performed by: RADIOLOGY

## 2023-07-01 PROCEDURE — 250N000013 HC RX MED GY IP 250 OP 250 PS 637: Performed by: STUDENT IN AN ORGANIZED HEALTH CARE EDUCATION/TRAINING PROGRAM

## 2023-07-01 RX ADMIN — Medication 2.5 MG: at 21:15

## 2023-07-01 RX ADMIN — DOCUSATE SODIUM 50 MG AND SENNOSIDES 8.6 MG 1 TABLET: 8.6; 5 TABLET, FILM COATED ORAL at 21:00

## 2023-07-01 RX ADMIN — AMLODIPINE BESYLATE 5 MG: 5 TABLET ORAL at 07:57

## 2023-07-01 RX ADMIN — LOSARTAN POTASSIUM 50 MG: 50 TABLET, FILM COATED ORAL at 07:57

## 2023-07-01 RX ADMIN — B-COMPLEX W/ C & FOLIC ACID TAB 1 MG 1 TABLET: 1 TAB at 07:57

## 2023-07-01 RX ADMIN — ACETAMINOPHEN 650 MG: 325 SUSPENSION ORAL at 21:00

## 2023-07-01 RX ADMIN — ATORVASTATIN CALCIUM 20 MG: 20 TABLET, FILM COATED ORAL at 07:57

## 2023-07-01 RX ADMIN — SUCROFERRIC OXYHYDROXIDE 500 MG: 500 TABLET, CHEWABLE ORAL at 07:57

## 2023-07-01 RX ADMIN — RAMELTEON 8 MG: 8 TABLET, FILM COATED ORAL at 21:15

## 2023-07-01 RX ADMIN — SUCROFERRIC OXYHYDROXIDE 500 MG: 500 TABLET, CHEWABLE ORAL at 21:00

## 2023-07-01 RX ADMIN — SERTRALINE HYDROCHLORIDE 100 MG: 100 TABLET ORAL at 07:57

## 2023-07-01 RX ADMIN — PANTOPRAZOLE SODIUM 20 MG: 20 TABLET, DELAYED RELEASE ORAL at 17:34

## 2023-07-01 RX ADMIN — PANTOPRAZOLE SODIUM 20 MG: 20 TABLET, DELAYED RELEASE ORAL at 07:57

## 2023-07-01 ASSESSMENT — ACTIVITIES OF DAILY LIVING (ADL)
ADLS_ACUITY_SCORE: 49
ADLS_ACUITY_SCORE: 48
ADLS_ACUITY_SCORE: 49
ADLS_ACUITY_SCORE: 48
ADLS_ACUITY_SCORE: 49
ADLS_ACUITY_SCORE: 50
ADLS_ACUITY_SCORE: 48
ADLS_ACUITY_SCORE: 50
ADLS_ACUITY_SCORE: 49

## 2023-07-01 NOTE — PLAN OF CARE
/56 (BP Location: Left arm)   Pulse 79   Temp 98.1  F (36.7  C) (Oral)   Resp 18   Wt 61.4 kg (135 lb 5.8 oz)   SpO2 100%   BMI 23.23 kg/m      Care from: 0904-4188    VSS on room air, denied pain. A&O x4, forgetful. Good appetite and oral intake, no BM this shift, pt on HD. L PIV is saline locked, R fistula is CDI. Diminished lung sounds in BLL. Numbness and tingling present in toes at baseline per pt. Generalized weakness, mobility is moderately impaired. Assist of 2, gait belt, and walker. Head CT was done. Call light within reach and bed alarm on. Continue to follow poc. Waiting for placement.      Goal Outcome Evaluation:    Plan of Care Reviewed With: patient    Overall Patient Progress: no change    Outcome Evaluation: A&O x4, forgetful, continue to monitor mental status, waiting for placement

## 2023-07-01 NOTE — PLAN OF CARE
Assumed cares from 6073-2327. Vital signs stable on RA. Up with 1 and walker. Denies pain. Fair appetite. BM x1 this shift. Oriented x4 but forgetful. TCU placement pending. Will continue to monitor.         Goal Outcome Evaluation:      Plan of Care Reviewed With: patient    Overall Patient Progress: no changeOverall Patient Progress: no change    Outcome Evaluation: encouarge activity, encourage oral intake, monitor mental status

## 2023-07-01 NOTE — PLAN OF CARE
Cares from: [id number]    V/S & pain: vitally stable, denies pain  Neuro: alert, orientedx3 (bit confused)  Respiratory: on room air,Lungs clear and equal, diminished in lower lobes.   Skin: no new concern  GI/: on HD, anuric  Nutrition: Regular diet with thin liquids  Lines/drains: LPIV   Activity: assist of 1 with walker    Events this shift: slept in between cares. Rounds done. No complaints. call light w/in reach and able to make needs known, will continue to monitor.    Plan:  pending placement

## 2023-07-01 NOTE — PROGRESS NOTES
Care Management Follow Up    Length of Stay (days): 10    Expected Discharge Date: 07/03/2023     Concerns to be Addressed: discharge planning     Patient plan of care discussed at interdisciplinary rounds: No    Anticipated Discharge Disposition: Skilled Nursing Facility, Transitional Care     Anticipated Discharge Services:  TCU  Anticipated Discharge DME:  TBD    Patient/family educated on Medicare website which has current facility and service quality ratings: yes  Education Provided on the Discharge Plan: yes  Patient/Family in Agreement with the Plan: unable to assess    Referrals Placed by CM/SW:  TCU  Private pay costs discussed: Not applicable    Additional Information:  Spoke with daughter Charla and she was able to provide the following referrals that are now pending below.    Pending Referrals:   [facility]   [address] [place] [address]  Ph: [phone]  Adm:[phone]   Fax: [phone]    [facility] [place]  [address] [place]  [address]  P: [phone]  F: [phone]    [facility]  [address]  [place]  [address]  Ph: [phone]  Adm: [phone]  Fax: [phone]    [facility]  [address] [place] [address]  KELLY Dacostaa laurence Mindi: [phone]  Fax: [phone]    [facility]  [address] [place]  [address]  P: [phone]  F: [phone]  Admissions: [phone]    [facility]   [address] [place] [address]  [address]  P: [phone]  P: [phone] - Admissions  F: [phone]    [facility]   [address] [address]  Ph: [phone]  Adm: [phone]  Fax: [phone]    Sent in Communications tab the [place] Referrals to laurence [facility] Facility Liaison (covers 44 facilities; Mindi Chavis P: [phone]; F: [phone])        ANTONIO Mendoza, [facility]   Covering  5A/B   Ph: [phone]

## 2023-07-01 NOTE — PLAN OF CARE
Goal Outcome Evaluation:    6151-7782  /53 (BP Location: Left arm)   Pulse 80   Temp 98.9  F (37.2  C) (Oral)   Resp 18   Wt 61.4 kg (135 lb 5.8 oz)   SpO2 100%   BMI 23.23 kg/m      Pt. A&O, afebrile and stable on RA. Return from dialysis @ 1730. Denies pain and SOB. Up to bathroom with an assist x1 with walker/gb. Tolerating reg with good appetite. Denies nausea. x1 BM, on HD. Daughter at bedside for support.  Plan: No acute change, continue w/poc

## 2023-07-01 NOTE — PROGRESS NOTES
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 Tarango. Run time: 3.5 hrs. Access: RUE AVG. EDW 60 kg  - HD per MWF schedule     BP/Volume: 60 kg  - if current weights are accurate, will need to reduce EDW at discharge  - continue on amlodipine 5 mg qday, losartan 50 mg qday        Anemia of CKD: hgb 8-9's; venofer 100 mg qtx, epogen 5200 units per run  - continue PTA epo 5200 units per HD        BMD: Ca 8-9's, alb 4.0, phos 3.2, Vit D 24; continue PTA PO calcitriol 1.0 mcg MWF, Phoslo 2 tabs TID WM, velphoro 1 tab tid WM (holding Phoslo for now 6/30)  - continue PTA meds      AMS, resolving: multifactorial: likely delirium and polypharmacy; blood cx NGTD, completed empiric antibx for potential UTI (no urine sample, minimal UOP, treating empirically to see if AMS resolves). Head CT unrevealing. BP's very elevated on admission, now controlled       Pruritis, chronic: recommend trying Sarna lotion     Recommendations were communicated to primary team via this note         Sarah Georges Jain MD   Division of Renal Disease and Hypertension  P [phone]    Interval History :   Seen in room, awaiting MRI today. Mental status back to baseline other than some word finding issues, per pt.  No n/v, CP, SOB, chills.     Review of Systems:   4 point ROS neg other than as noted above    Physical Exam:   I/O last 3 completed shifts:  In: 240 [P.O.:240]  Out: 2001 [Urine:1; Other:2000]   /51   Pulse 73   Temp 99  F (37.2  C) (Oral)   Resp 18   Wt 61.4 kg (135 lb 5.8 oz)   SpO2 99%   BMI 23.23 kg/m       GENERAL APPEARANCE: NAD  EYES:  no scleral icterus, pupils equal  PULM: CTA  CV: RRR     -edema none  GI: soft   INTEGUMENT: no cyanosis,  no rash  NEURO: facial droop, a/o3  Access R AVG    Labs:   All labs reviewed by me  Electrolytes/Renal -   Recent Labs   Lab Test 06/30/23  0706 06/28/23  0757 06/27/23  0605    136 135*   POTASSIUM 4.2 3.9 4.0   CHLORIDE 103 96* 94*   CO2 27 25 25   BUN 33.9* 27.8* 14.4   CR 6.85* 7.42* 4.88*   GLC 99 106* 104*   BELGICA 9.1 8.7* 8.8   MAG 1.8 1.9 1.7   PHOS 3.2 4.3 4.0       CBC -   Recent Labs   Lab Test 06/30/23  0706 06/28/23  0757 06/27/23  0605   WBC 8.8 9.5 8.6   HGB 9.0* 9.7* 9.0*    369 403       LFTs -   Recent Labs   Lab Test 06/24/23  0559 06/21/23  1117 06/01/23  1546   ALKPHOS 194* 250* 125*   BILITOTAL 0.5 0.8 0.4   ALT 12 18 7*   AST 35 30 22   PROTTOTAL 7.7 8.4* 7.0   ALBUMIN 4.0 4.3 3.4*       Iron Panel -   Recent Labs   Lab Test 06/20/23  1122 06/02/23  0942 05/14/23  0741   IRON 36* 62 31*   IRONSAT 17 31 18   KASSI 600* 222 436*         Imaging:  Reviewed    Current Medications:    amLODIPine  5 mg Oral Daily     atorvastatin  20 mg Oral Daily     calcitRIOL  1 mcg Oral Once per day on Mon Wed Fri     [Held by provider] calcium acetate  1,334 mg Oral TID w/meals     - MEDICATION INSTRUCTIONS -   Does not apply See Admin Instructions     losartan  50 mg Oral Daily     multivitamin RENAL  1 tablet Oral Daily     OLANZapine zydis  2.5 mg Oral At Bedtime     pantoprazole  20 mg Oral BID AC     ramelteon  8 mg Oral At Bedtime     sertraline  100 mg Oral Daily     sodium chloride (PF)  3 mL Intracatheter Q8H     sucroferric oxyhydroxide  500 mg Oral BID       Sarah Jain MD

## 2023-07-02 ENCOUNTER — APPOINTMENT (OUTPATIENT)
Dept: PHYSICAL THERAPY | Facility: CLINIC | Age: 83
DRG: 304 | End: 2023-07-02
Payer: MEDICARE

## 2023-07-02 PROCEDURE — 97116 GAIT TRAINING THERAPY: CPT | Mod: GP

## 2023-07-02 PROCEDURE — 250N000013 HC RX MED GY IP 250 OP 250 PS 637: Performed by: INTERNAL MEDICINE

## 2023-07-02 PROCEDURE — 250N000013 HC RX MED GY IP 250 OP 250 PS 637: Performed by: STUDENT IN AN ORGANIZED HEALTH CARE EDUCATION/TRAINING PROGRAM

## 2023-07-02 PROCEDURE — 250N000013 HC RX MED GY IP 250 OP 250 PS 637

## 2023-07-02 PROCEDURE — 120N000002 HC R&B MED SURG/OB UMMC

## 2023-07-02 PROCEDURE — 99232 SBSQ HOSP IP/OBS MODERATE 35: CPT | Mod: GC | Performed by: INTERNAL MEDICINE

## 2023-07-02 PROCEDURE — 99233 SBSQ HOSP IP/OBS HIGH 50: CPT | Performed by: INTERNAL MEDICINE

## 2023-07-02 PROCEDURE — 97110 THERAPEUTIC EXERCISES: CPT | Mod: GP

## 2023-07-02 RX ORDER — AMMONIUM LACTATE 12 G/100G
CREAM TOPICAL
Status: DISCONTINUED | OUTPATIENT
Start: 2023-07-02 | End: 2023-07-06 | Stop reason: HOSPADM

## 2023-07-02 RX ADMIN — ATORVASTATIN CALCIUM 20 MG: 20 TABLET, FILM COATED ORAL at 08:30

## 2023-07-02 RX ADMIN — Medication 2.5 MG: at 22:16

## 2023-07-02 RX ADMIN — PANTOPRAZOLE SODIUM 20 MG: 20 TABLET, DELAYED RELEASE ORAL at 16:58

## 2023-07-02 RX ADMIN — SUCROFERRIC OXYHYDROXIDE 500 MG: 500 TABLET, CHEWABLE ORAL at 20:06

## 2023-07-02 RX ADMIN — B-COMPLEX W/ C & FOLIC ACID TAB 1 MG 1 TABLET: 1 TAB at 08:30

## 2023-07-02 RX ADMIN — PANTOPRAZOLE SODIUM 20 MG: 20 TABLET, DELAYED RELEASE ORAL at 08:30

## 2023-07-02 RX ADMIN — AMLODIPINE BESYLATE 5 MG: 5 TABLET ORAL at 08:31

## 2023-07-02 RX ADMIN — LOSARTAN POTASSIUM 50 MG: 50 TABLET, FILM COATED ORAL at 08:30

## 2023-07-02 RX ADMIN — SERTRALINE HYDROCHLORIDE 100 MG: 100 TABLET ORAL at 08:31

## 2023-07-02 RX ADMIN — Medication: at 11:48

## 2023-07-02 RX ADMIN — RAMELTEON 8 MG: 8 TABLET, FILM COATED ORAL at 22:16

## 2023-07-02 RX ADMIN — SUCROFERRIC OXYHYDROXIDE 500 MG: 500 TABLET, CHEWABLE ORAL at 08:31

## 2023-07-02 ASSESSMENT — ACTIVITIES OF DAILY LIVING (ADL)
ADLS_ACUITY_SCORE: 49

## 2023-07-02 NOTE — PLAN OF CARE
2503-0402  Status: Pt admitted for increased confusion, encepholapathy, polypharm, and delirium.   Vitals: VSS, on RA.   Neuros: A&Ox4, although forgetful. Calm and cooperative.   IV: PIV-SL and AVG fist in arm.   Labs/Electrolytes: WNL  Resp/trach: Denies SOB.   Diet: Regular diet, poor appetite.   Bowel status: LBM 7/2  : Doesn't make urine, on HD.   Skin: Intact.   Pain: Denies pain but does c/o itchyness topical was ordered.   Activity: A1 GB/W  Plan: Awaiting for placement.   Updates this shift:  Applied topical cream as patient was very itchy throughout shift. Pt had shower today and went outside with daughter.
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
## 2023-07-02 NOTE — PLAN OF CARE
Assumed cares from [id number]. Vital signs stable on RA. Up with 1 and walker. Denies pain. Poor appetite, only drinking apple juice. BM x1 this today. Anuric. Oriented x4 but forgetful. Plan for dialysis tomorrowTCU placement pending. Will continue to monitor.      Goal Outcome Evaluation:      Plan of Care Reviewed With: patient    Overall Patient Progress: no changeOverall Patient Progress: no change    Outcome Evaluation: encouarge activity, encourage oral intake, monitor mental status

## 2023-07-03 ENCOUNTER — HOSPITAL ENCOUNTER (INPATIENT)
Facility: SKILLED NURSING FACILITY | Age: 83
End: 2023-07-03
Attending: INTERNAL MEDICINE | Admitting: INTERNAL MEDICINE
Payer: MEDICARE

## 2023-07-03 LAB
ANION GAP SERPL CALCULATED.3IONS-SCNC: 18 MMOL/L (ref 7–15)
BUN SERPL-MCNC: 65.2 MG/DL (ref 8–23)
CALCIUM SERPL-MCNC: 9.3 MG/DL (ref 8.8–10.2)
CHLORIDE SERPL-SCNC: 95 MMOL/L (ref 98–107)
CREAT SERPL-MCNC: 8.09 MG/DL (ref 0.51–0.95)
DEPRECATED HCO3 PLAS-SCNC: 22 MMOL/L (ref 22–29)
ERYTHROCYTE [DISTWIDTH] IN BLOOD BY AUTOMATED COUNT: 16 % (ref 10–15)
GFR SERPL CREATININE-BSD FRML MDRD: 5 ML/MIN/1.73M2
GLUCOSE SERPL-MCNC: 110 MG/DL (ref 70–99)
HCT VFR BLD AUTO: 30.2 % (ref 35–47)
HGB BLD-MCNC: 9 G/DL (ref 11.7–15.7)
MAGNESIUM SERPL-MCNC: 2.2 MG/DL (ref 1.7–2.3)
MCH RBC QN AUTO: 30.5 PG (ref 26.5–33)
MCHC RBC AUTO-ENTMCNC: 29.8 G/DL (ref 31.5–36.5)
MCV RBC AUTO: 102 FL (ref 78–100)
PHOSPHATE SERPL-MCNC: 3.3 MG/DL (ref 2.5–4.5)
PLATELET # BLD AUTO: 332 10E3/UL (ref 150–450)
POTASSIUM SERPL-SCNC: 4.7 MMOL/L (ref 3.4–5.3)
RBC # BLD AUTO: 2.95 10E6/UL (ref 3.8–5.2)
SODIUM SERPL-SCNC: 135 MMOL/L (ref 136–145)
WBC # BLD AUTO: 11.9 10E3/UL (ref 4–11)

## 2023-07-03 PROCEDURE — 36415 COLL VENOUS BLD VENIPUNCTURE: CPT

## 2023-07-03 PROCEDURE — 84100 ASSAY OF PHOSPHORUS: CPT

## 2023-07-03 PROCEDURE — 83036 HEMOGLOBIN GLYCOSYLATED A1C: CPT

## 2023-07-03 PROCEDURE — 250N000013 HC RX MED GY IP 250 OP 250 PS 637: Performed by: STUDENT IN AN ORGANIZED HEALTH CARE EDUCATION/TRAINING PROGRAM

## 2023-07-03 PROCEDURE — 258N000003 HC RX IP 258 OP 636: Performed by: INTERNAL MEDICINE

## 2023-07-03 PROCEDURE — 85027 COMPLETE CBC AUTOMATED: CPT

## 2023-07-03 PROCEDURE — 634N000001 HC RX 634: Mod: JZ | Performed by: INTERNAL MEDICINE

## 2023-07-03 PROCEDURE — 90937 HEMODIALYSIS REPEATED EVAL: CPT

## 2023-07-03 PROCEDURE — 250N000013 HC RX MED GY IP 250 OP 250 PS 637

## 2023-07-03 PROCEDURE — 80048 BASIC METABOLIC PNL TOTAL CA: CPT

## 2023-07-03 PROCEDURE — 99232 SBSQ HOSP IP/OBS MODERATE 35: CPT | Mod: GC | Performed by: INTERNAL MEDICINE

## 2023-07-03 PROCEDURE — 120N000002 HC R&B MED SURG/OB UMMC

## 2023-07-03 PROCEDURE — 250N000011 HC RX IP 250 OP 636: Performed by: INTERNAL MEDICINE

## 2023-07-03 PROCEDURE — 83735 ASSAY OF MAGNESIUM: CPT

## 2023-07-03 PROCEDURE — 80061 LIPID PANEL: CPT

## 2023-07-03 PROCEDURE — 99233 SBSQ HOSP IP/OBS HIGH 50: CPT | Performed by: PHYSICIAN ASSISTANT

## 2023-07-03 RX ORDER — LOSARTAN POTASSIUM 100 MG/1
100 TABLET ORAL DAILY
Status: DISCONTINUED | OUTPATIENT
Start: 2023-07-04 | End: 2023-07-06 | Stop reason: HOSPADM

## 2023-07-03 RX ORDER — HEPARIN SODIUM 1000 [USP'U]/ML
500 INJECTION, SOLUTION INTRAVENOUS; SUBCUTANEOUS CONTINUOUS
Status: DISCONTINUED | OUTPATIENT
Start: 2023-07-03 | End: 2023-07-03

## 2023-07-03 RX ADMIN — ATORVASTATIN CALCIUM 20 MG: 20 TABLET, FILM COATED ORAL at 08:12

## 2023-07-03 RX ADMIN — EPOETIN ALFA-EPBX 5000 UNITS: 10000 INJECTION, SOLUTION INTRAVENOUS; SUBCUTANEOUS at 12:52

## 2023-07-03 RX ADMIN — PANTOPRAZOLE SODIUM 20 MG: 20 TABLET, DELAYED RELEASE ORAL at 18:10

## 2023-07-03 RX ADMIN — SERTRALINE HYDROCHLORIDE 100 MG: 100 TABLET ORAL at 08:12

## 2023-07-03 RX ADMIN — CALCITRIOL CAPSULES 0.25 MCG 1 MCG: 0.25 CAPSULE ORAL at 11:20

## 2023-07-03 RX ADMIN — SUCROFERRIC OXYHYDROXIDE 500 MG: 500 TABLET, CHEWABLE ORAL at 20:23

## 2023-07-03 RX ADMIN — HEPARIN SODIUM 500 UNITS/HR: 1000 INJECTION INTRAVENOUS; SUBCUTANEOUS at 12:51

## 2023-07-03 RX ADMIN — LOSARTAN POTASSIUM 50 MG: 50 TABLET, FILM COATED ORAL at 08:12

## 2023-07-03 RX ADMIN — SUCROFERRIC OXYHYDROXIDE 500 MG: 500 TABLET, CHEWABLE ORAL at 08:12

## 2023-07-03 RX ADMIN — PANTOPRAZOLE SODIUM 20 MG: 20 TABLET, DELAYED RELEASE ORAL at 08:12

## 2023-07-03 RX ADMIN — SODIUM CHLORIDE 300 ML: 9 INJECTION, SOLUTION INTRAVENOUS at 12:51

## 2023-07-03 RX ADMIN — B-COMPLEX W/ C & FOLIC ACID TAB 1 MG 1 TABLET: 1 TAB at 08:11

## 2023-07-03 RX ADMIN — SODIUM CHLORIDE 250 ML: 9 INJECTION, SOLUTION INTRAVENOUS at 12:52

## 2023-07-03 RX ADMIN — HEPARIN SODIUM 500 UNITS: 1000 INJECTION INTRAVENOUS; SUBCUTANEOUS at 12:51

## 2023-07-03 RX ADMIN — RAMELTEON 8 MG: 8 TABLET, FILM COATED ORAL at 21:44

## 2023-07-03 RX ADMIN — AMLODIPINE BESYLATE 5 MG: 5 TABLET ORAL at 08:12

## 2023-07-03 ASSESSMENT — ACTIVITIES OF DAILY LIVING (ADL)
ADLS_ACUITY_SCORE: 49

## 2023-07-03 NOTE — PROGRESS NOTES
Care Management Follow Up    Length of Stay (days): 12  Expected Discharge Date: 07/06/2023   Concerns to be Addressed: discharge planning     Patient plan of care discussed at interdisciplinary rounds: Yes  Anticipated Discharge Disposition: Transitional Care  Anticipated Discharge Services: OP HD   Anticipated Discharge DME:  4WW  Patient/family educated on Medicare website which has current facility and service quality ratings:  yes  Education Provided on the Discharge Plan:  yes  Patient/Family in Agreement with the Plan: n/a    Referrals Placed by CM/SW:      OP Dialysis    Zac Campbell (MWF with Dr. Tarango)  [address]  [place] [address]  Phone: [phone]  Fax: [phone]    [facility]   [address] [place] [address]  Ph: [phone]  Adm:[phone]   Fax: [phone]     [facility] Nicole Coello  [address] [place] [address]  [address]  P: [phone]  F: [phone]     [facility]  [address]  [address]  Ph: [phone]  Adm: [phone]  Fax: [phone]    kelly Campbell [facility]   [address]   [place] [address]  Ph: [phone]  Adm: [phone]  Fax: [phone]     [facility]  [address] [place] [address]  A [place] liaison Mindi: [phone]  Fax: [phone]    [facility] (covers 44 facilities)   adrianna Sawant (Criselda)   P: [phone];   F: [phone]     [facility]  [address] [place]  [address]  P: [phone]  F: [phone]  Admissions: [phone]     [facility]   [address] [place] [address]  [address]  P: [phone]  P: [phone] - Admissions  F: [phone]     Private pay costs discussed: Not applicable    Additional Information:  Pending placement with ongoing discharge process. OP HD location garnered from Nephrology notes; HD facility notified of  discharge process and anticipated resumption of care; subsequent confirmation of destination needed. Family has provided NEMT for pt in past and is aware of alternate contracted services, TBD. CM team to continue to follow and support safe discharge planning.        Julius Packer RN BSN  5B RNCC  Phone: [phone]  Pager: [phone]    For Weekend & Holiday on call RN Care Coordinator:  (Tasks: Home care, home infusion, medical equipment, transportation, IMM & OLIVERA forms, etc.)  [facility] (0800 - 1630) Saturday and Sunday    Units: 4A, 4C, 4E, 5A and 5B: [phone]    Units: 6A, 6B, 6C, 6D: [phone]    Units: 7A, 7B, 7C, 7D, and 5C: [phone]    [facility] (1020-7002) Saturday and Sunday    Units: 5 Ortho, 8A, 10 ICU, & Pediatric Units: [phone]
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
## 2023-07-03 NOTE — PROGRESS NOTES
Cares from: 1900-0730   BP (!) 174/64 (BP Location: Left arm)   Pulse 78   Temp 98.2  F (36.8  C) (Oral)   Resp 18   Wt 56.8 kg (125 lb 3.5 oz)   SpO2 97%   BMI 21.49 kg/m    V/S & pain: VSS,  Reported no pain  Neuro: A& Ox4, calm and pleasant  Respiratory: Non labored breathing on RA, no distress,bilateral lung expansion, diminished in lower lobes.   Skin: no new concern  GI/: On IHD, anuric, BM  Nutrition: Regular diet with thin liquids and snack suppliment  Lines/drains: Lt PIV, AVG Rt arm  Activity: assist of 1 with walker   Lab; Mg/ P  RN managed protocol     Events this shift: slept in between cares. 2 hrly rounding done. call light w/in reach and able to make needs known, will continue to monitor.     Plan:  pending placement, For Dialysis today.

## 2023-07-03 NOTE — PROGRESS NOTES
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 Tarango. Run time: 3.5 hrs. Access: RUE AVG. EDW 60 kg  - HD per MWF schedule     BP/Volume: 60 kg  - if current weights are accurate, will need to reduce EDW at discharge  - continue on amlodipine 5 mg qday, losartan 50 mg qday        Anemia of CKD: hgb 8-9's; venofer 100 mg qtx, epogen 5200 units per run  - continue PTA epo 5200 units per HD        BMD: Ca 8-9's, alb 4.0, phos 3.2, Vit D 24; continue PTA PO calcitriol 1.0 mcg MWF, Phoslo 2 tabs TID WM, velphoro 1 tab tid WM (holding Phoslo for now 6/30)  - continue PTA meds      AMS, resolved: multifactorial: likely delirium and polypharmacy; blood cx NGTD, completed empiric antibx for potential UTI (no urine sample, minimal UOP, treating empirically to see if AMS resolves). Head CT unrevealing. BP's very elevated on admission, now controlled       Pruritis, chronic: recommend trying Sarna lotion     Recommendations were communicated to primary team via this note         Sarah Georges Jain MD   Division of Renal Disease and Hypertension  P [phone]    Interval History :   Seen in room, daughter in room. Had CT brain without acute findings.     Review of Systems:   4 point ROS neg other than as noted above    Physical Exam:   I/O last 3 completed shifts:  In: 240 [P.O.:240]  Out: -    /48 (BP Location: Left arm)   Pulse 82   Temp 98.6  F (37  C) (Oral)   Resp 18   Wt 56.8 kg (125 lb 3.5 oz)   SpO2 97%   BMI 21.49 kg/m       GENERAL APPEARANCE: NAD  EYES:  no scleral icterus, pupils equal  PULM: CTA  CV: RRR     -edema none  GI: soft   INTEGUMENT: no cyanosis, no rash  NEURO: facial droop, a/o3  Access R AVG    Labs:   All labs  reviewed by me  Electrolytes/Renal -   Recent Labs   Lab Test 06/30/23  0706 06/28/23  0757 06/27/23  0605    136 135*   POTASSIUM 4.2 3.9 4.0   CHLORIDE 103 96* 94*   CO2 27 25 25   BUN 33.9* 27.8* 14.4   CR 6.85* 7.42* 4.88*   GLC 99 106* 104*   BELGICA 9.1 8.7* 8.8   MAG 1.8 1.9 1.7   PHOS 3.2 4.3 4.0       CBC -   Recent Labs   Lab Test 06/30/23  0706 06/28/23  0757 06/27/23  0605   WBC 8.8 9.5 8.6   HGB 9.0* 9.7* 9.0*    369 403       LFTs -   Recent Labs   Lab Test 06/24/23  0559 06/21/23  1117 06/01/23  1546   ALKPHOS 194* 250* 125*   BILITOTAL 0.5 0.8 0.4   ALT 12 18 7*   AST 35 30 22   PROTTOTAL 7.7 8.4* 7.0   ALBUMIN 4.0 4.3 3.4*       Iron Panel -   Recent Labs   Lab Test 06/20/23  1122 06/02/23  0942 05/14/23  0741   IRON 36* 62 31*   IRONSAT 17 31 18   KASSI 600* 222 436*         Imaging:  Reviewed    Current Medications:    amLODIPine  5 mg Oral Daily     atorvastatin  20 mg Oral Daily     calcitRIOL  1 mcg Oral Once per day on Mon Wed Fri     [Held by provider] calcium acetate  1,334 mg Oral TID w/meals     - MEDICATION INSTRUCTIONS -   Does not apply See Admin Instructions     losartan  50 mg Oral Daily     multivitamin RENAL  1 tablet Oral Daily     OLANZapine zydis  2.5 mg Oral At Bedtime     pantoprazole  20 mg Oral BID AC     ramelteon  8 mg Oral At Bedtime     sertraline  100 mg Oral Daily     sodium chloride (PF)  3 mL Intracatheter Q8H     sucroferric oxyhydroxide  500 mg Oral BID       Sarah Jain MD

## 2023-07-03 NOTE — PROGRESS NOTES
HEMODIALYSIS TREATMENT NOTE    Date: 7/3/2023  Time: 5:29 PM    Data:  Pre Wt: 56.8 kg  Desired Wt: 54.8  kg   Post Wt:  55 kg  Weight change: 1.8  kg  Ultrafiltration - Post Run Net Total Removed (mL): 1800mL  Vascular Access Status: patent  Dialyzer Rinse: Streaked  Total Blood Volume Processed: 84.71 L Liters  Total Dialysis (Treatment) Time: 3.5 Hours    Lab:   No    Interventions:  Epoetin  Heparin bolus and gtt.   AVF cannulated 15 gauge needle    Assessment:  Pt ran for 3.5 hrs on a K2/ Ca 2.5 bath with a /  and 2 L pulled with no complications. RAVF positive for T/B. Pt started hypertensive, with fluid removal BP decreased. Pt ate meal tray during tx.Pt rinsed back and hemostasis achieved in about 10 mins . Pt alert and oriented x4. Report given to PCN.     Plan:    Per renal team

## 2023-07-03 NOTE — PLAN OF CARE
Goal Outcome Evaluation:      Plan of Care Reviewed With: patient    Time: 07:00-15:30     Reason for admit: Hypertensive urgency  Vitals: Hypertensive  Activity: Activity as tolerated, AO2 with gait belt and walker  Neuro: Alert and oriented x4, forgetful  Mood/Behavior: Calm and cooperative  Lines/Drains: L PIV, SL  Cardiac: WNL  Resp: Lung sounds clear, but diminished at the base. No cough noted  Diet: Regular diet  GI/: Did not void, on hemodialysis  Skin: Skin is dry  Pain: No c/o pain except body aches, saying she is not comfortable sleeping on the bed.  Labs/Imaging: Labs are unremarkable     New this shift: Went down to dialysis around noon. Ate lunch there.     Plan: Continue with POC

## 2023-07-03 NOTE — PROGRESS NOTES
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 Tarango. Run time: 3.5 hrs. Access: RUE AVG. EDW 60 kg  - HD per MWF schedule     BP/Volume: 60 kg  - if current weights are accurate, will need to reduce EDW at discharge  - continue on amlodipine 5 mg qday, increased losartan from 50 to 100 mg qday (starting 7/4)  - Hold anti-hypertensives before dialysis, BP's drop significantly with UF  - 2 kg UF goal        Anemia of CKD: hgb 8-9's; venofer 100 mg qtx, epogen 5200 units per run  - continue PTA epo 5200 units per HD        BMD: Ca 8-9's, alb 4.0, phos 3.3, Vit D 24; continue PTA PO calcitriol 1.0 mcg MWF, Phoslo 2 tabs TID WM, velphoro 1 tab tid WM (held Phoslo for now 6/30)  - continue PTA meds      AMS, resolved: multifactorial: likely delirium and polypharmacy; blood cx NGTD, completed empiric antibx for potential UTI (no urine sample, minimal UOP, treating empirically to see if AMS resolves). Head CT unrevealing. BP's very elevated on admission, now controlled       Pruritis, chronic: recommend trying Sarna lotion     Recommendations were communicated to primary team via this note         Zora Villagomez PA   Division of Renal Disease and Hypertension  P [phone]    Interval History :   Seen on dialysis, 2 kg UF goal, BP's are coming down into 160's from 180's. Increasing losartan to 100 mg qday. No n/v, CP, SOB, chills    Review of Systems:   4 point ROS neg other than as noted above    Physical Exam:   I/O last 3 completed shifts:  In: 240 [P.O.:240]  Out: -    BP (!) 187/70   Pulse 75   Temp 98  F (36.7  C) (Oral)   Resp 16   Wt 56.8 kg (125 lb 3.5 oz)   SpO2 99%   BMI 21.49 kg/m       GENERAL APPEARANCE: NAD  EYES:   no scleral icterus, pupils equal  PULM: CTA  CV: RRR     -edema none  GI: soft   INTEGUMENT: no cyanosis, no rash  NEURO: facial droop, a/o3  Access R AVG    Labs:   All labs reviewed by me  Electrolytes/Renal -   Recent Labs   Lab Test 07/03/23  0552 06/30/23  0706 06/28/23  0757   * 141 136   POTASSIUM 4.7 4.2 3.9   CHLORIDE 95* 103 96*   CO2 22 27 25   BUN 65.2* 33.9* 27.8*   CR 8.09* 6.85* 7.42*   * 99 106*   BELGICA 9.3 9.1 8.7*   MAG 2.2 1.8 1.9   PHOS 3.3 3.2 4.3       CBC -   Recent Labs   Lab Test 07/03/23  0552 06/30/23  0706 06/28/23  0757   WBC 11.9* 8.8 9.5   HGB 9.0* 9.0* 9.7*    376 369       LFTs -   Recent Labs   Lab Test 06/24/23  0559 06/21/23  1117 06/01/23  1546   ALKPHOS 194* 250* 125*   BILITOTAL 0.5 0.8 0.4   ALT 12 18 7*   AST 35 30 22   PROTTOTAL 7.7 8.4* 7.0   ALBUMIN 4.0 4.3 3.4*       Iron Panel -   Recent Labs   Lab Test 06/20/23  1122 06/02/23  0942 05/14/23  0741   IRON 36* 62 31*   IRONSAT 17 31 18   KASSI 600* 222 436*         Imaging:  Reviewed    Current Medications:    amLODIPine  5 mg Oral Daily     atorvastatin  20 mg Oral Daily     calcitRIOL  1 mcg Oral Once per day on Mon Wed Fri     [Held by provider] calcium acetate  1,334 mg Oral TID w/meals     - MEDICATION INSTRUCTIONS -   Does not apply See Admin Instructions     losartan  50 mg Oral Daily     multivitamin RENAL  1 tablet Oral Daily     pantoprazole  20 mg Oral BID AC     ramelteon  8 mg Oral At Bedtime     sertraline  100 mg Oral Daily     sodium chloride (PF)  3 mL Intracatheter Q8H     sucroferric oxyhydroxide  500 mg Oral BID       heparin (porcine) 500 Units/hr (07/03/23 1251)     - MEDICATION INSTRUCTIONS -       SHANNON Charles

## 2023-07-04 LAB
CHOLEST SERPL-MCNC: 91 MG/DL
HBA1C MFR BLD: 4.7 %
HDLC SERPL-MCNC: 40 MG/DL
LDLC SERPL CALC-MCNC: 37 MG/DL
NONHDLC SERPL-MCNC: 51 MG/DL
TRIGL SERPL-MCNC: 69 MG/DL

## 2023-07-04 PROCEDURE — 250N000013 HC RX MED GY IP 250 OP 250 PS 637

## 2023-07-04 PROCEDURE — 250N000013 HC RX MED GY IP 250 OP 250 PS 637: Performed by: INTERNAL MEDICINE

## 2023-07-04 PROCEDURE — 250N000013 HC RX MED GY IP 250 OP 250 PS 637: Performed by: STUDENT IN AN ORGANIZED HEALTH CARE EDUCATION/TRAINING PROGRAM

## 2023-07-04 PROCEDURE — 99232 SBSQ HOSP IP/OBS MODERATE 35: CPT | Mod: GC | Performed by: INTERNAL MEDICINE

## 2023-07-04 PROCEDURE — 120N000002 HC R&B MED SURG/OB UMMC

## 2023-07-04 RX ORDER — HYDROXYZINE HYDROCHLORIDE 25 MG/1
50 TABLET, FILM COATED ORAL EVERY 6 HOURS PRN
Status: DISCONTINUED | OUTPATIENT
Start: 2023-07-04 | End: 2023-07-06 | Stop reason: HOSPADM

## 2023-07-04 RX ORDER — ASPIRIN 81 MG/1
81 TABLET, CHEWABLE ORAL DAILY
Status: DISCONTINUED | OUTPATIENT
Start: 2023-07-04 | End: 2023-07-04

## 2023-07-04 RX ORDER — HYDROXYZINE HYDROCHLORIDE 25 MG/1
25 TABLET, FILM COATED ORAL EVERY 6 HOURS PRN
Status: DISCONTINUED | OUTPATIENT
Start: 2023-07-04 | End: 2023-07-06 | Stop reason: HOSPADM

## 2023-07-04 RX ADMIN — ATORVASTATIN CALCIUM 20 MG: 20 TABLET, FILM COATED ORAL at 09:41

## 2023-07-04 RX ADMIN — HYDROXYZINE HYDROCHLORIDE 25 MG: 25 TABLET, FILM COATED ORAL at 02:46

## 2023-07-04 RX ADMIN — PANTOPRAZOLE SODIUM 20 MG: 20 TABLET, DELAYED RELEASE ORAL at 09:41

## 2023-07-04 RX ADMIN — PANTOPRAZOLE SODIUM 20 MG: 20 TABLET, DELAYED RELEASE ORAL at 15:39

## 2023-07-04 RX ADMIN — SUCROFERRIC OXYHYDROXIDE 500 MG: 500 TABLET, CHEWABLE ORAL at 21:14

## 2023-07-04 RX ADMIN — ASPIRIN 81 MG CHEWABLE TABLET 81 MG: 81 TABLET CHEWABLE at 15:46

## 2023-07-04 RX ADMIN — HYDROXYZINE HYDROCHLORIDE 50 MG: 25 TABLET, FILM COATED ORAL at 09:41

## 2023-07-04 RX ADMIN — SUCROFERRIC OXYHYDROXIDE 500 MG: 500 TABLET, CHEWABLE ORAL at 09:54

## 2023-07-04 RX ADMIN — B-COMPLEX W/ C & FOLIC ACID TAB 1 MG 1 TABLET: 1 TAB at 09:41

## 2023-07-04 RX ADMIN — LOSARTAN POTASSIUM 100 MG: 100 TABLET, FILM COATED ORAL at 09:41

## 2023-07-04 RX ADMIN — AMLODIPINE BESYLATE 5 MG: 5 TABLET ORAL at 09:41

## 2023-07-04 RX ADMIN — SERTRALINE HYDROCHLORIDE 100 MG: 100 TABLET ORAL at 09:41

## 2023-07-04 RX ADMIN — RAMELTEON 8 MG: 8 TABLET, FILM COATED ORAL at 21:14

## 2023-07-04 ASSESSMENT — ACTIVITIES OF DAILY LIVING (ADL)
ADLS_ACUITY_SCORE: 49

## 2023-07-04 NOTE — PLAN OF CARE
Goal Outcome Evaluation:      Plan of Care Reviewed With: patient    Overall Patient Progress: no changeOverall Patient Progress: no change    Outcome Evaluation: Encourage oral intake, A+Ox4    Assumed cares: 5668-7652  EVENTS: Dialysis completed    VS: VSS on RA  Temp: 98.1  F (36.7  C)    BP: 114/87    Pulse: 75    Resp: 26    SpO2: 98 %    O2 Device: None (Room air)        LABS: elevated WBC  LINES: PIV saline locked. R AV fistula.   NEURO: A&Ox4  GI/: Anuric, on hemodialysis. Last BM in AM. Fair appetite.   PAIN: Pt denies pain  ACTIVITY: Up assist x2 to bedside commode. Turn q2hrs assist x2 w/ pillow offloading.     Goal Outcome Evaluation:  Plan of Care Reviewed With: patient  Overall Patient Progress: no change
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
## 2023-07-04 NOTE — PLAN OF CARE
Assumed cares: 6428-0280.   EVENTS: No change on shift.      VS: VSS on RA except HTN  Temp: 98.5  F (36.9  C)    BP: (!) 162/55    Pulse: 80    Resp: 17    SpO2: 98 %    O2 Device: None (Room air)        LABS: elevated WBC  LINES: PIV saline locked. R AV fistula.   NEURO: A&Ox4. Delirium precautions in place.   GI/: Anuric, on hemodialysis. Last BM in AM. Poor appetite.   PAIN: Pt denies pain  ACTIVITY: Up assist x1 to bathroom w/ walker.     Plan: Awaiting placement. Dialysis tomorrow.      Goal Outcome Evaluation:  Plan of Care Reviewed With: patient  Overall Patient Progress: no change

## 2023-07-04 NOTE — PLAN OF CARE
Goal Outcome Evaluation:      Plan of Care Reviewed With: patient    Overall Patient Progress: no changeOverall Patient Progress: no change     A:  patient denies pain.  Did have itching skin.  Patient applied lotion with little relief.  MD called and order for atarax received. Given to patient with no further complaints of itching.     R:  continue to monitor and treat per plan of care.

## 2023-07-04 NOTE — PLAN OF CARE
Time of care: 3722-2273    83 y.o. female admitted with hypertensive urgency. She is full code and primary is Maroon 3. No isolation, thin liquid diet, assist x1 with walker. No IV and there is a sticky note to the providers to verify no IV necessary. Denies pain, VS q8, no BG, VSS on RA. She is alert and oriented and able to make needs known. No significant changes this shift. Continue to monitor for changes.       Goal Outcome Evaluation: Ongoing, progressing    Plan of Care Reviewed With: patient    Overall Patient Progress: no change

## 2023-07-05 ENCOUNTER — APPOINTMENT (OUTPATIENT)
Dept: OCCUPATIONAL THERAPY | Facility: CLINIC | Age: 83
DRG: 304 | End: 2023-07-05
Payer: MEDICARE

## 2023-07-05 ENCOUNTER — APPOINTMENT (OUTPATIENT)
Dept: PHYSICAL THERAPY | Facility: CLINIC | Age: 83
DRG: 304 | End: 2023-07-05
Payer: MEDICARE

## 2023-07-05 LAB
ALBUMIN SERPL BCG-MCNC: 3.5 G/DL (ref 3.5–5.2)
ALP SERPL-CCNC: 169 U/L (ref 35–104)
ALT SERPL W P-5'-P-CCNC: 12 U/L (ref 0–50)
ANION GAP SERPL CALCULATED.3IONS-SCNC: 14 MMOL/L (ref 7–15)
AST SERPL W P-5'-P-CCNC: 28 U/L (ref 0–45)
BASOPHILS # BLD MANUAL: 0.2 10E3/UL (ref 0–0.2)
BASOPHILS NFR BLD MANUAL: 2 %
BILIRUB DIRECT SERPL-MCNC: <0.2 MG/DL (ref 0–0.3)
BILIRUB SERPL-MCNC: 0.4 MG/DL
BUN SERPL-MCNC: 70.9 MG/DL (ref 8–23)
CALCIUM SERPL-MCNC: 9 MG/DL (ref 8.8–10.2)
CHLORIDE SERPL-SCNC: 94 MMOL/L (ref 98–107)
CREAT SERPL-MCNC: 6.42 MG/DL (ref 0.51–0.95)
DEPRECATED HCO3 PLAS-SCNC: 25 MMOL/L (ref 22–29)
EOSINOPHIL # BLD MANUAL: 0.3 10E3/UL (ref 0–0.7)
EOSINOPHIL NFR BLD MANUAL: 4 %
ERYTHROCYTE [DISTWIDTH] IN BLOOD BY AUTOMATED COUNT: 15.3 % (ref 10–15)
ERYTHROCYTE [DISTWIDTH] IN BLOOD BY AUTOMATED COUNT: 15.6 % (ref 10–15)
GFR SERPL CREATININE-BSD FRML MDRD: 6 ML/MIN/1.73M2
GLUCOSE SERPL-MCNC: 99 MG/DL (ref 70–99)
HCT VFR BLD AUTO: 26 % (ref 35–47)
HCT VFR BLD AUTO: 26.5 % (ref 35–47)
HGB BLD-MCNC: 7.9 G/DL (ref 11.7–15.7)
HGB BLD-MCNC: 8.1 G/DL (ref 11.7–15.7)
HOLD SPECIMEN: NORMAL
IRON BINDING CAPACITY (ROCHE): 195 UG/DL (ref 240–430)
IRON SATN MFR SERPL: 23 % (ref 15–46)
IRON SERPL-MCNC: 44 UG/DL (ref 37–145)
LDH SERPL L TO P-CCNC: 226 U/L (ref 0–250)
LYMPHOCYTES # BLD MANUAL: 0.4 10E3/UL (ref 0.8–5.3)
LYMPHOCYTES NFR BLD MANUAL: 5 %
MAGNESIUM SERPL-MCNC: 2.1 MG/DL (ref 1.7–2.3)
MCH RBC QN AUTO: 29.6 PG (ref 26.5–33)
MCH RBC QN AUTO: 30.2 PG (ref 26.5–33)
MCHC RBC AUTO-ENTMCNC: 30.4 G/DL (ref 31.5–36.5)
MCHC RBC AUTO-ENTMCNC: 30.6 G/DL (ref 31.5–36.5)
MCV RBC AUTO: 97 FL (ref 78–100)
MCV RBC AUTO: 99 FL (ref 78–100)
MONOCYTES # BLD MANUAL: 1.1 10E3/UL (ref 0–1.3)
MONOCYTES NFR BLD MANUAL: 13 %
NEUTROPHILS # BLD MANUAL: 6.2 10E3/UL (ref 1.6–8.3)
NEUTROPHILS NFR BLD MANUAL: 76 %
PHOSPHATE SERPL-MCNC: 3.7 MG/DL (ref 2.5–4.5)
PLAT MORPH BLD: ABNORMAL
PLATELET # BLD AUTO: 262 10E3/UL (ref 150–450)
PLATELET # BLD AUTO: 273 10E3/UL (ref 150–450)
POTASSIUM SERPL-SCNC: 4.7 MMOL/L (ref 3.4–5.3)
PROT SERPL-MCNC: 6.8 G/DL (ref 6.4–8.3)
RBC # BLD AUTO: 2.62 10E6/UL (ref 3.8–5.2)
RBC # BLD AUTO: 2.74 10E6/UL (ref 3.8–5.2)
RBC MORPH BLD: ABNORMAL
RETICS # AUTO: 0.08 10E6/UL (ref 0.03–0.1)
RETICS/RBC NFR AUTO: 2.8 % (ref 0.5–2)
SODIUM SERPL-SCNC: 133 MMOL/L (ref 136–145)
WBC # BLD AUTO: 8.2 10E3/UL (ref 4–11)
WBC # BLD AUTO: 8.2 10E3/UL (ref 4–11)

## 2023-07-05 PROCEDURE — 83615 LACTATE (LD) (LDH) ENZYME: CPT

## 2023-07-05 PROCEDURE — 97530 THERAPEUTIC ACTIVITIES: CPT | Mod: GP

## 2023-07-05 PROCEDURE — 634N000001 HC RX 634: Mod: JZ | Performed by: INTERNAL MEDICINE

## 2023-07-05 PROCEDURE — 83550 IRON BINDING TEST: CPT

## 2023-07-05 PROCEDURE — 85060 BLOOD SMEAR INTERPRETATION: CPT | Performed by: PATHOLOGY

## 2023-07-05 PROCEDURE — 97530 THERAPEUTIC ACTIVITIES: CPT | Mod: GO

## 2023-07-05 PROCEDURE — 83735 ASSAY OF MAGNESIUM: CPT

## 2023-07-05 PROCEDURE — 97535 SELF CARE MNGMENT TRAINING: CPT | Mod: GO

## 2023-07-05 PROCEDURE — 85007 BL SMEAR W/DIFF WBC COUNT: CPT

## 2023-07-05 PROCEDURE — 258N000003 HC RX IP 258 OP 636: Performed by: INTERNAL MEDICINE

## 2023-07-05 PROCEDURE — 250N000013 HC RX MED GY IP 250 OP 250 PS 637

## 2023-07-05 PROCEDURE — 82248 BILIRUBIN DIRECT: CPT

## 2023-07-05 PROCEDURE — 80053 COMPREHEN METABOLIC PANEL: CPT

## 2023-07-05 PROCEDURE — 85027 COMPLETE CBC AUTOMATED: CPT

## 2023-07-05 PROCEDURE — 99233 SBSQ HOSP IP/OBS HIGH 50: CPT | Mod: FS | Performed by: PHYSICIAN ASSISTANT

## 2023-07-05 PROCEDURE — 84100 ASSAY OF PHOSPHORUS: CPT

## 2023-07-05 PROCEDURE — 83010 ASSAY OF HAPTOGLOBIN QUANT: CPT

## 2023-07-05 PROCEDURE — 90937 HEMODIALYSIS REPEATED EVAL: CPT

## 2023-07-05 PROCEDURE — 250N000013 HC RX MED GY IP 250 OP 250 PS 637: Performed by: INTERNAL MEDICINE

## 2023-07-05 PROCEDURE — 120N000002 HC R&B MED SURG/OB UMMC

## 2023-07-05 PROCEDURE — 99231 SBSQ HOSP IP/OBS SF/LOW 25: CPT | Mod: GC | Performed by: INTERNAL MEDICINE

## 2023-07-05 PROCEDURE — 85045 AUTOMATED RETICULOCYTE COUNT: CPT

## 2023-07-05 PROCEDURE — 36415 COLL VENOUS BLD VENIPUNCTURE: CPT

## 2023-07-05 PROCEDURE — 250N000013 HC RX MED GY IP 250 OP 250 PS 637: Performed by: STUDENT IN AN ORGANIZED HEALTH CARE EDUCATION/TRAINING PROGRAM

## 2023-07-05 RX ADMIN — CALCITRIOL CAPSULES 0.25 MCG 1 MCG: 0.25 CAPSULE ORAL at 16:21

## 2023-07-05 RX ADMIN — Medication: at 22:41

## 2023-07-05 RX ADMIN — RAMELTEON 8 MG: 8 TABLET, FILM COATED ORAL at 22:38

## 2023-07-05 RX ADMIN — HYDROXYZINE HYDROCHLORIDE 50 MG: 25 TABLET, FILM COATED ORAL at 22:40

## 2023-07-05 RX ADMIN — B-COMPLEX W/ C & FOLIC ACID TAB 1 MG 1 TABLET: 1 TAB at 09:24

## 2023-07-05 RX ADMIN — PANTOPRAZOLE SODIUM 20 MG: 20 TABLET, DELAYED RELEASE ORAL at 09:24

## 2023-07-05 RX ADMIN — SUCROFERRIC OXYHYDROXIDE 500 MG: 500 TABLET, CHEWABLE ORAL at 09:24

## 2023-07-05 RX ADMIN — SODIUM CHLORIDE 250 ML: 9 INJECTION, SOLUTION INTRAVENOUS at 10:47

## 2023-07-05 RX ADMIN — ATORVASTATIN CALCIUM 20 MG: 20 TABLET, FILM COATED ORAL at 09:24

## 2023-07-05 RX ADMIN — SERTRALINE HYDROCHLORIDE 100 MG: 100 TABLET ORAL at 09:24

## 2023-07-05 RX ADMIN — SUCROFERRIC OXYHYDROXIDE 500 MG: 500 TABLET, CHEWABLE ORAL at 20:11

## 2023-07-05 RX ADMIN — Medication: at 20:13

## 2023-07-05 RX ADMIN — PANTOPRAZOLE SODIUM 20 MG: 20 TABLET, DELAYED RELEASE ORAL at 16:21

## 2023-07-05 RX ADMIN — SODIUM CHLORIDE 300 ML: 9 INJECTION, SOLUTION INTRAVENOUS at 10:47

## 2023-07-05 RX ADMIN — Medication: at 10:48

## 2023-07-05 RX ADMIN — EPOETIN ALFA-EPBX 5200 UNITS: 10000 INJECTION, SOLUTION INTRAVENOUS; SUBCUTANEOUS at 11:39

## 2023-07-05 ASSESSMENT — ACTIVITIES OF DAILY LIVING (ADL)
ADLS_ACUITY_SCORE: 49

## 2023-07-05 NOTE — PLAN OF CARE
RN assumed cares at 6520-6836     Vitals: /58, otherwise VSS on room air.  Pain: denied pain  Neuro: A&Ox4; calm and cooperative.   Cardiac: WDL.   Respiratory: Lung sounds clear. No respiratory distress noted overnight.  GI/: On dialysis. No BM noted overnight.  IV/Drains: No PIV. AV fistula for dialysis.  Skin: No new concerns.  Activity: Assist x1 with walker.   Nutrition: Regular diet, thin liquids.    Events: No acute events overnight. Pt slept between cares. Q2hr rounding completed. Call light within reach and is able to make needs known.     Plan: Dialysis (M, W, F) today 7/5. Discharge pending placement.        Goal Outcome Evaluation:      Plan of Care Reviewed With: patient    Overall Patient Progress: no changeOverall Patient Progress: no change    Outcome Evaluation: dialysis in AM, awaiting placement

## 2023-07-05 NOTE — PLAN OF CARE
Cares 0700 to 1900    /52 (BP Location: Left arm)   Pulse 70   Temp 97.7  F (36.5  C) (Axillary)   Resp 16   Wt 56.8 kg (125 lb 4.8 oz)   SpO2 100%   BMI 21.51 kg/m      VSS on room air. Alert & oriented x4. Denies pain. Flat affect, withdrawn. Denies SOB. Pt had dialysis today, 1.5L fluid removed. Pt has poor PO intake, ate breakfast. Waiting on placement.     Goal Outcome Evaluation:    Plan of Care Reviewed With: patient    Overall Patient Progress: no changeOverall Patient Progress: no change    Outcome Evaluation: Dialysis completed today. Waiting on placement.
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
## 2023-07-05 NOTE — PROGRESS NOTES
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 with Dr. Tarango. Run time: 3.5 hrs. Access: RUE AVG. EDW 60 kg  - HD per MWF schedule     BP/Volume: 60 kg; BP's very labile with inter-dialytic hypotension - if current weights are accurate, will need to reduce EDW at discharge  - continue on amlodipine 5 mg qday, increased losartan from 50 to 100 mg qday (starting 7/4)  - Hold anti-hypertensives before dialysis, BP's drop significantly with UF  - 2 kg UF goal  -   post HD standing wt today 56.8 kg        Anemia of CKD: hgb 8-9's; venofer 100 mg qtx, epogen 5200 units per run  - continue PTA epo 5200 units per HD        BMD: Ca 8-9's, alb 3.5, phos 3.7, Vit D 24;   - continue PTA PO calcitriol 1.0 mcg MWF  - HELD since 6/30: Phoslo 2 tabs TID WM  - continue velphoro 1 tab tid WM        AMS, resolved: multifactorial: likely delirium and polypharmacy; blood cx NGTD, completed empiric antibx for potential UTI (no urine sample, minimal UOP, treating empirically to see if AMS resolves). Head CT unrevealing. BP's very elevated on admission, now controlled. Some concern for TIA, started on ASA       Pruritis, chronic: recommend trying Sarna lotion     Recommendations were communicated to primary team via this note         SHANNON Charles   Division of Renal Disease and Hypertension  [phone]    Interval History :   Seen on dialysis, 2 kg UF goal, may be limited by hypotension as pt's pressures are dropping as per usual. No n/v, CP, SOB, chills    Review of Systems:   4 point ROS neg other than as noted above    Physical Exam:   No intake/output data recorded.   /45   Pulse 72   Temp 97.8  F (36.6  C) (Oral)   Resp 24   Wt 55.4 kg (122  lb 2.2 oz)   SpO2 96%   BMI 20.96 kg/m       GENERAL APPEARANCE: NAD  EYES:  no scleral icterus, pupils equal  PULM: CTA  CV: RRR     -edema none  GI: soft   INTEGUMENT: no cyanosis, no rash  NEURO: facial droop, a/o3  Access R AVG    Labs:   All labs reviewed by me  Electrolytes/Renal -   Recent Labs   Lab Test 07/05/23  0638 07/03/23  0552 06/30/23  0706   * 135* 141   POTASSIUM 4.7 4.7 4.2   CHLORIDE 94* 95* 103   CO2 25 22 27   BUN 70.9* 65.2* 33.9*   CR 6.42* 8.09* 6.85*   GLC 99 110* 99   BELGICA 9.0 9.3 9.1   MAG 2.1 2.2 1.8   PHOS 3.7 3.3 3.2       CBC -   Recent Labs   Lab Test 07/05/23  0638 07/03/23  0552 06/30/23  0706   WBC 8.2 11.9* 8.8   HGB 7.9* 9.0* 9.0*    332 376       LFTs -   Recent Labs   Lab Test 07/05/23  0638 06/24/23  0559 06/21/23  1117   ALKPHOS 169* 194* 250*   BILITOTAL 0.4 0.5 0.8   ALT 12 12 18   AST 28 35 30   PROTTOTAL 6.8 7.7 8.4*   ALBUMIN 3.5 4.0 4.3       Iron Panel -   Recent Labs   Lab Test 06/20/23  1122 06/02/23  0942 05/14/23  0741   IRON 36* 62 31*   IRONSAT 17 31 18   KASSI 600* 222 436*         Imaging:  Reviewed    Current Medications:    amLODIPine  5 mg Oral Daily     atorvastatin  20 mg Oral Daily     calcitRIOL  1 mcg Oral Once per day on Mon Wed Fri     [Held by provider] calcium acetate  1,334 mg Oral TID w/meals     - MEDICATION INSTRUCTIONS -   Does not apply See Admin Instructions     losartan  100 mg Oral Daily     multivitamin RENAL  1 tablet Oral Daily     pantoprazole  20 mg Oral BID AC     ramelteon  8 mg Oral At Bedtime     sertraline  100 mg Oral Daily     sodium chloride (PF)  3 mL Intracatheter Q8H     sucroferric oxyhydroxide  500 mg Oral BID       - MEDICATION INSTRUCTIONS -       SHANNON Charles

## 2023-07-05 NOTE — PROGRESS NOTES
Care Management Discharge Note    Discharge Date: 07/06/2023  Discharge Disposition: Transitional Care  Discharge Services:  OP HD; PT/OT  Discharge DME:  4WW  Discharge Transportation: family or friend will provide  Private pay costs discussed: transportation costs  Does the patient's insurance plan have a 3 day qualifying hospital stay waiver?  No    PAS Confirmation Code:  [id number]  Patient/family educated on Medicare website which has current facility and service quality ratings:  yes    Education Provided on the Discharge Plan:  yes  Persons Notified of Discharge Plans: Family; Pt; Provider, Bedside RN, Charge RN  Patient/Family in Agreement with the Plan: unable to assess    Handoff Referral Completed: Yes     [facility]  [address] [place] [address]  [address]  Ph: [phone]  Adm: [phone]  Fax: [phone]     OP Dialysis    [facility] (MWF: 9568 -3987, with Dr. Tarango)  [address] [place] [address]  Phone: [phone]  Fax: [phone]    [facility] Transportation   Wheelchair ride  [phone]  Pick-up Window: [phone]    FAMILY  Charla - [phone]   Dorri - [phone]       Additional Information:  RNCC received feedback from [place] ([facility] which there is TCU availability at [facility] or [facility]; Subsequently, RNCC confirmed OP HD chair time (noted above) and paged provider. Discussion with daughter, Charla, reported the [facility] in [place] or [place] was not a desired destination after touring one of the facilities; distance to/from OP HD is not an issue, as pt has good NEMT support via insurance. Alternate facilities include [facility] (family pursuing subsequent care home/LTC placement). Daughter notified; daughter confirmed HD chair time and arranges NEMT. [employer] set up for this afternoon, details above. RNCC to conclude following upon safe departure from floor and facility.        Julius Packer RN BSN  5B  RNCC  Phone: [phone]  Pager: [phone]    For Weekend & Holiday on call RN Care Coordinator:  (Tasks: Home care, home infusion, medical equipment, transportation, IMM & OLIVERA forms, etc.)  [place] (0800 - 1630) Saturday and Sunday    Units: 4A, 4C, 4E, 5A and 5B: [phone]    Units: 6A, 6B, 6C, 6D: [phone]    Units: 7A, 7B, 7C, 7D, and 5C: [phone]    [facility] ([phone]) Saturday and Sunday    Units: 5 Ortho, 8A, 10 ICU, & Pediatric Units: [phone]

## 2023-07-05 NOTE — PROGRESS NOTES
Care Management Follow Up      [facility]   [address] [place] [address]  Ph: [phone]  Adm:[phone]   Fax: [phone]  7/5:CHW spoke with admissions and they haven openings. CHW sent referral via Epic.     [facility]iaison (covers 44 facilities)   Mindi Chavis, adrianna above ([place])   P: [phone];   F: [phone]  7/5: CHW LVM for admissions to f/u on referral; requested they call RNCC back.      Jaelyn Coello  [address] [place] [address]  [address]  P: [phone]  F: [phone]  7/5: CHW LVM for admissions to f/u on referral; requested they call RNCC back.      [facility]  [address] [place] [address]  A [facility] liaison [place]: [phone]  Fax: [phone]  7/5:CHW LVM for admissions to f/u on referral; requested they call RNCC back.      [facility]   [address]   [place] [address]  Ph: [phone]  Adm: [phone]  Fax: [phone]  7/5:CHW LVM for admissions to f/u on referral; requested they call RNCC back.      [facility]  [address] [place]  [address]  Ph: [phone]  Adm: [phone]  Fax: [phone]  7/5: CHW spoke with admissions they will review and call RNCC back.     [facility]  [address] [place]  [address]  P: [phone]  F: [phone]  Admissions: [phone]  7/5: CHW spoke with admissions they will review and call RNCC back.      [facility]   [address] [place], [place] [address]  [place]  [address]  P: [phone]  P: [phone] - Admissions  F: [phone]  7/5:CHW LVM for admissions to f/u on referral; requested they call RNCC back.    Moise Chang   Inpatient Community Health Worker  [facility] 5A & 5B

## 2023-07-05 NOTE — PROGRESS NOTES
CLINICAL NUTRITION SERVICES - REASSESSMENT NOTE     Nutrition Prescription    RECOMMENDATIONS FOR MDs/PROVIDERS TO ORDER:  Diet as tolerated per provider     Malnutrition Status:    Moderate malnutrition in the context of acute presentation     Recommendations already ordered by Registered Dietitian (RD):  Continue with ensure plus BID    Future/Additional Recommendations:  PO improvement       EVALUATION OF THE PROGRESS TOWARD GOALS   Diet: Regular diet with thin liquids + Strawberry ensure with bkf and lunch trays   Intake: eating % of meals with good appetite per nursing.       NEW FINDINGS   PMH: HTN, Chronic Hep C with cirrhosis, ESRD, and hyperlipidemia  Admitted for hypertensive urgency and subacute progressive encephalopathy. Patient has shown significant improvement and has improving mentation and mental status.   ESRD: On dialysis   Awaiting safe discharge plan    GI/stool:  Last BM: x2 (7/3)    Wt trend:  54.7 kg (120 lb 9.5 oz) admit wt on 6/24 ( bed scale)  56.8 kg (125 lb 4.8 oz) standing wt today 7/5  EDW 60 kg per nephrology. Current wt < EDW     Labs noted:  BUN:70.9, Cr: 6.42, GFR: 6 -> On HD  Na+: 133 (L)  K+/Phos: normal range   Glucose: 99    MALNUTRITION  % Intake: Decreased intake does not meet criteria  % Weight Loss: Previously > 5% in 1 month (severe)- On HD  Subcutaneous Fat Loss: Facial region: Moderate  Muscle Loss: Temporal: Moderate, Facial & jaw region: Moderate and Upper arm (bicep, tricep): severe   Fluid Accumulation/Edema: None noted  Malnutrition Diagnosis: Moderate malnutrition in the context of acute presentation     Previous Goals   Patient to consume % of nutritionally adequate meal trays TID, or the equivalent with supplements/snacks.  Evaluation: Met intermittently     Previous Nutrition Diagnosis  Inadequate oral intake related to altered mentation as evidenced by variable intake since admit and significant improve in po and appetite with improvement in  mentation today   Evaluation: Improving    CURRENT NUTRITION DIAGNOSIS  Inadequate oral intake related to altered mentation as evidenced by variable intake since admit and significant improve in po and appetite with improvement in mentation intermittently      INTERVENTIONS  Implementation  Continue with ensure plus BID    Goals  Patient to consume % of nutritionally adequate meal trays TID, or the equivalent with supplements/snacks.    Monitoring/Evaluation  Progress toward goals will be monitored and evaluated per protocol.    Mike Lopes RD/VICENTA  5A [phone] RD pager: [phone]  Weekend/Holiday RD pager: [phone]

## 2023-07-05 NOTE — PROGRESS NOTES
Care Management Follow Up    CHW completed Pre-Admission Screening needed to be completed before admissions to TCU. Pre-Admission Screening number is as follows: [id number]    Moise Chang   [facility] Community Health Worker  [facility] 5A & 5B       .

## 2023-07-06 ENCOUNTER — APPOINTMENT (OUTPATIENT)
Dept: PHYSICAL THERAPY | Facility: CLINIC | Age: 83
DRG: 304 | End: 2023-07-06
Payer: MEDICARE

## 2023-07-06 ENCOUNTER — APPOINTMENT (OUTPATIENT)
Dept: OCCUPATIONAL THERAPY | Facility: CLINIC | Age: 83
DRG: 304 | End: 2023-07-06
Payer: MEDICARE

## 2023-07-06 VITALS
TEMPERATURE: 98 F | RESPIRATION RATE: 17 BRPM | DIASTOLIC BLOOD PRESSURE: 46 MMHG | OXYGEN SATURATION: 100 % | BODY MASS INDEX: 21.51 KG/M2 | WEIGHT: 125.3 LBS | SYSTOLIC BLOOD PRESSURE: 116 MMHG | HEART RATE: 77 BPM

## 2023-07-06 LAB
BASOPHILS # BLD AUTO: 0.1 10E3/UL (ref 0–0.2)
BASOPHILS NFR BLD AUTO: 1 %
EOSINOPHIL # BLD AUTO: 0.3 10E3/UL (ref 0–0.7)
EOSINOPHIL NFR BLD AUTO: 4 %
ERYTHROCYTE [DISTWIDTH] IN BLOOD BY AUTOMATED COUNT: 15.7 % (ref 10–15)
HAPTOGLOB SERPL-MCNC: 188 MG/DL (ref 32–197)
HCT VFR BLD AUTO: 27.8 % (ref 35–47)
HGB BLD-MCNC: 8.2 G/DL (ref 11.7–15.7)
IMM GRANULOCYTES # BLD: 0.1 10E3/UL
IMM GRANULOCYTES NFR BLD: 1 %
LYMPHOCYTES # BLD AUTO: 0.8 10E3/UL (ref 0.8–5.3)
LYMPHOCYTES NFR BLD AUTO: 10 %
MCH RBC QN AUTO: 30 PG (ref 26.5–33)
MCHC RBC AUTO-ENTMCNC: 29.5 G/DL (ref 31.5–36.5)
MCV RBC AUTO: 102 FL (ref 78–100)
MONOCYTES # BLD AUTO: 1.4 10E3/UL (ref 0–1.3)
MONOCYTES NFR BLD AUTO: 17 %
NEUTROPHILS # BLD AUTO: 5.5 10E3/UL (ref 1.6–8.3)
NEUTROPHILS NFR BLD AUTO: 67 %
NRBC # BLD AUTO: 0 10E3/UL
NRBC BLD AUTO-RTO: 0 /100
PATH REPORT.COMMENTS IMP SPEC: NORMAL
PATH REPORT.COMMENTS IMP SPEC: NORMAL
PATH REPORT.FINAL DX SPEC: NORMAL
PATH REPORT.MICROSCOPIC SPEC OTHER STN: NORMAL
PATH REPORT.MICROSCOPIC SPEC OTHER STN: NORMAL
PATH REPORT.RELEVANT HX SPEC: NORMAL
PLATELET # BLD AUTO: 279 10E3/UL (ref 150–450)
RBC # BLD AUTO: 2.73 10E6/UL (ref 3.8–5.2)
WBC # BLD AUTO: 8.3 10E3/UL (ref 4–11)

## 2023-07-06 PROCEDURE — 36415 COLL VENOUS BLD VENIPUNCTURE: CPT

## 2023-07-06 PROCEDURE — 97530 THERAPEUTIC ACTIVITIES: CPT | Mod: GO

## 2023-07-06 PROCEDURE — 99238 HOSP IP/OBS DSCHRG MGMT 30/<: CPT | Mod: GC | Performed by: INTERNAL MEDICINE

## 2023-07-06 PROCEDURE — 97535 SELF CARE MNGMENT TRAINING: CPT | Mod: GO

## 2023-07-06 PROCEDURE — 97110 THERAPEUTIC EXERCISES: CPT | Mod: GP

## 2023-07-06 PROCEDURE — 250N000013 HC RX MED GY IP 250 OP 250 PS 637

## 2023-07-06 PROCEDURE — 85025 COMPLETE CBC W/AUTO DIFF WBC: CPT

## 2023-07-06 PROCEDURE — 250N000013 HC RX MED GY IP 250 OP 250 PS 637: Performed by: INTERNAL MEDICINE

## 2023-07-06 PROCEDURE — 97116 GAIT TRAINING THERAPY: CPT | Mod: GP

## 2023-07-06 RX ORDER — LOSARTAN POTASSIUM 100 MG/1
100 TABLET ORAL DAILY
Qty: 30 TABLET | Refills: 0 | DISCHARGE
Start: 2023-07-07 | End: 2023-07-21

## 2023-07-06 RX ADMIN — AMLODIPINE BESYLATE 5 MG: 5 TABLET ORAL at 09:19

## 2023-07-06 RX ADMIN — B-COMPLEX W/ C & FOLIC ACID TAB 1 MG 1 TABLET: 1 TAB at 09:20

## 2023-07-06 RX ADMIN — LOSARTAN POTASSIUM 100 MG: 100 TABLET, FILM COATED ORAL at 09:20

## 2023-07-06 RX ADMIN — ATORVASTATIN CALCIUM 20 MG: 20 TABLET, FILM COATED ORAL at 09:20

## 2023-07-06 RX ADMIN — PANTOPRAZOLE SODIUM 20 MG: 20 TABLET, DELAYED RELEASE ORAL at 09:19

## 2023-07-06 RX ADMIN — SERTRALINE HYDROCHLORIDE 100 MG: 100 TABLET ORAL at 09:19

## 2023-07-06 RX ADMIN — SUCROFERRIC OXYHYDROXIDE 500 MG: 500 TABLET, CHEWABLE ORAL at 09:20

## 2023-07-06 ASSESSMENT — ACTIVITIES OF DAILY LIVING (ADL)
ADLS_ACUITY_SCORE: 49
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
## 2023-07-06 NOTE — PLAN OF CARE
RN assumed cares at [id number]     Vitals: HTN at 130/47, otherwise VSS on room air.  Pain: denied pain.  Neuro: A&Ox4; calm and cooperative.   Cardiac: WDL.  Peripheral neurovascular: WDL.  Respiratory: Lung sounds clear, diminished. No respiratory distress noted overnight.  GI/: On HD; oliguria. 1x BM during shift.   IV/Drains: No PIV, ok per pt order.  Skin: Pt c/o itchy skin. Ammonium lactate x2 given. Hydroxyzine x1 given. Pt report relief.  Activity: Assist x1 with gb & walker.  Nutrition: Regular diet, thin liquids. Poor dinner intake. Pt drank ensure x2.     Events: Strict I&Os. No acute events overnight. Pt slept between cares. Q2hr rounding completed. Call light within reach and is able to make needs known.      Plan: Encourage food intake. Per provider note, anemia workup ongoing. Awaiting placement.       Goal Outcome Evaluation:      Plan of Care Reviewed With: patient    Overall Patient Progress: no changeOverall Patient Progress: no change    Outcome Evaluation: waiting placement

## 2023-07-06 NOTE — PLAN OF CARE
RN 0700- discharge    Vital signs: /46 (BP Location: Left arm)   Pulse 77   Temp 98  F (36.7  C) (Oral)   Resp 17   Wt 56.8 kg (125 lb 4.8 oz)   SpO2 100%   BMI 21.51 kg/m      Status: admitted 6/21 for hypertensive urgency and subacute progressive encephalopathy  Neuro: AOX4  Pain/Nausea: denies pain and nausea  Cardiac: hypertensive at times but otherwise VSS on room air  Respiratory: WNL on room air  Mobility: assist x 1 with walker and gait belt  Diet: regular diet with thin liquids. Tolerating  Labs: reviewed  LDAs: none. No PIV is okay per provider  Skin/incisions: intact. Skin itchy  GI/: continent B/B  Plan: patient discharged to [facility] @9518

## 2023-07-07 ENCOUNTER — DOCUMENTATION ONLY (OUTPATIENT)
Dept: OTHER | Facility: CLINIC | Age: 83
End: 2023-07-07

## 2023-07-07 NOTE — PLAN OF CARE
Occupational Therapy Discharge Summary    Reason for therapy discharge:    Discharged to transitional care facility.    Progress towards therapy goal(s). See goals on Care Plan in [facility] electronic health record for goal details.  Goals partially met.  Barriers to achieving goals:   discharge from facility.    Therapy recommendation(s):    Continued therapy is recommended.  Rationale/Recommendations:  continued OT to progress ADL and IADL.

## 2023-07-07 NOTE — PLAN OF CARE
Physical Therapy Discharge Summary    Reason for therapy discharge:    Discharged to transitional care facility.    Progress towards therapy goal(s). See goals on Care Plan in [facility] electronic health record for goal details.  Goals partially met.  Barriers to achieving goals:   discharge from facility.    Therapy recommendation(s):    Continued therapy is recommended.  Rationale/Recommendations:  Pt would benefit from continued PT intervention to progress endurance.

## 2023-07-14 ENCOUNTER — TELEPHONE (OUTPATIENT)
Dept: GASTROENTEROLOGY | Facility: CLINIC | Age: 83
End: 2023-07-14
Payer: MEDICARE

## 2023-07-14 ENCOUNTER — TELEPHONE (OUTPATIENT)
Dept: INTERNAL MEDICINE | Facility: CLINIC | Age: 83
End: 2023-07-14
Payer: MEDICARE

## 2023-07-14 NOTE — TELEPHONE ENCOUNTER
[facility] Call Center    Phone Message    May a detailed message be left on voicemail: yes     Reason for Call: Other: PCC :  Patient was seen in hospital for confusion due to TCU over medicating her, she needs a follow up with Dr Rodriguez and there is nothing for a few months, can you find something sooner?  Also needs an order to be sent to new TCU; [place] [address] [place] [address] [place]  [address], phone:[phone].      Action Taken: Other: PCC    Travel Screening: Not Applicable                                                                         32 year old female POD#4 s/p open ventral hernia repair with mesh, and abdominal wall reconstruction with adjacent tissue transfer performed by Dr. Carballo and Dr. Chance, discharged 11/9/22, now returns with postoperative ileus.

## 2023-07-17 NOTE — TELEPHONE ENCOUNTER
RN called patient's daughter, Charla, and helped her make a video appt for patient with Dr. Rodriguez for next Tuesday 7/25.  Charla will discuss whether patient should be taking low dose ASA at the video appt.    Gayle Paul RN on 7/17/2023 at 11:30 AM

## 2023-07-18 ENCOUNTER — INFUSION THERAPY VISIT (OUTPATIENT)
Dept: INFUSION THERAPY | Facility: CLINIC | Age: 83
End: 2023-07-18
Payer: COMMERCIAL

## 2023-07-18 VITALS
OXYGEN SATURATION: 100 % | WEIGHT: 127.5 LBS | DIASTOLIC BLOOD PRESSURE: 78 MMHG | SYSTOLIC BLOOD PRESSURE: 192 MMHG | HEART RATE: 75 BPM | TEMPERATURE: 98 F | BODY MASS INDEX: 21.89 KG/M2 | RESPIRATION RATE: 18 BRPM

## 2023-07-18 DIAGNOSIS — K55.20 AVM (ARTERIOVENOUS MALFORMATION) OF SMALL BOWEL, ACQUIRED: ICD-10-CM

## 2023-07-18 DIAGNOSIS — K31.811 ANGIODYSPLASIA OF STOMACH AND DUODENUM WITH HEMORRHAGE: Primary | ICD-10-CM

## 2023-07-18 DIAGNOSIS — D50.0 IRON DEFICIENCY ANEMIA DUE TO CHRONIC BLOOD LOSS: ICD-10-CM

## 2023-07-18 PROCEDURE — 99207 PR NO CHARGE LOS: CPT

## 2023-07-18 PROCEDURE — 96372 THER/PROPH/DIAG INJ SC/IM: CPT | Performed by: NURSE PRACTITIONER

## 2023-07-18 RX ORDER — ACETAMINOPHEN 325 MG/1
325-650 TABLET ORAL EVERY 6 HOURS PRN
COMMUNITY

## 2023-07-18 RX ADMIN — OCTREOTIDE ACETATE 20 MG: KIT INTRAMUSCULAR at 12:28

## 2023-07-18 NOTE — PROGRESS NOTES
Infusion Nursing Note:  Rachele Martínez presents today for Sandostatin.    Patient seen by provider today: No   present during visit today: Not Applicable.    Note: Patient expressed no new medical concerns. Patient's blood pressure elevated this visit. Per patient and daughter she has been having elevated blood pressures lately even up into the 200's systolic. Per patient's daughter this has been an ongoing issue where they change medications and she goes either too low or she goes too high. Patient denied any headaches, blurred vision, dizziness, chest pain. Advised patient to be seen in emergency room if any symptoms arise and for her TCU to monitor her blood pressure closely. Also advised patient's daughter to reach out to provider to make him aware so medications can be adjusted. Patient and daughter verbalized understanding.      Intravenous Access:  No Intravenous access/labs at this visit.    Treatment Conditions:  Not Applicable.      Post Infusion Assessment:  Patient tolerated injection without incident.  Site patent and intact, free from redness, edema or discomfort.       Dis[facility] Plan:   [facility]  Patient will return 8/17/23 for next appointment.   Patient discharged in stable condition accompanied by: self and daughter.  Departure Mode: Wheelchair.      Margi Fofana RN

## 2023-07-20 ENCOUNTER — APPOINTMENT (OUTPATIENT)
Dept: ULTRASOUND IMAGING | Facility: CLINIC | Age: 83
DRG: 124 | End: 2023-07-20
Attending: STUDENT IN AN ORGANIZED HEALTH CARE EDUCATION/TRAINING PROGRAM
Payer: MEDICARE

## 2023-07-20 ENCOUNTER — APPOINTMENT (OUTPATIENT)
Dept: CT IMAGING | Facility: CLINIC | Age: 83
DRG: 124 | End: 2023-07-20
Attending: EMERGENCY MEDICINE
Payer: MEDICARE

## 2023-07-20 ENCOUNTER — HOSPITAL ENCOUNTER (INPATIENT)
Facility: CLINIC | Age: 83
LOS: 4 days | Discharge: NURSING, REHAB OR LONG-TERM CARE | DRG: 124 | End: 2023-07-24
Attending: EMERGENCY MEDICINE | Admitting: INTERNAL MEDICINE
Payer: MEDICARE

## 2023-07-20 ENCOUNTER — MEDICAL CORRESPONDENCE (OUTPATIENT)
Dept: HEALTH INFORMATION MANAGEMENT | Facility: CLINIC | Age: 83
End: 2023-07-20

## 2023-07-20 DIAGNOSIS — E03.9 HYPOTHYROIDISM, UNSPECIFIED TYPE: ICD-10-CM

## 2023-07-20 DIAGNOSIS — D63.8 ANEMIA OF CHRONIC DISEASE: ICD-10-CM

## 2023-07-20 DIAGNOSIS — E78.5 HYPERLIPIDEMIA WITH TARGET LDL LESS THAN 130: Chronic | ICD-10-CM

## 2023-07-20 DIAGNOSIS — K55.20 AVM (ARTERIOVENOUS MALFORMATION) OF SMALL BOWEL, ACQUIRED: ICD-10-CM

## 2023-07-20 DIAGNOSIS — I10 HYPERTENSION GOAL BP (BLOOD PRESSURE) < 140/80: Chronic | ICD-10-CM

## 2023-07-20 DIAGNOSIS — R04.2 HEMOPTYSIS: ICD-10-CM

## 2023-07-20 DIAGNOSIS — I10 HYPERTENSION, UNSPECIFIED TYPE: ICD-10-CM

## 2023-07-20 DIAGNOSIS — I16.0 HYPERTENSIVE URGENCY: Primary | ICD-10-CM

## 2023-07-20 DIAGNOSIS — N18.6 END STAGE RENAL DISEASE (H): ICD-10-CM

## 2023-07-20 DIAGNOSIS — F41.8 DEPRESSION WITH ANXIETY: ICD-10-CM

## 2023-07-20 DIAGNOSIS — M89.28 OTHER DISORDERS OF BONE DEVELOPMENT AND GROWTH, OTHER SITE: ICD-10-CM

## 2023-07-20 DIAGNOSIS — R44.3 HALLUCINATIONS: ICD-10-CM

## 2023-07-20 DIAGNOSIS — N25.81 SECONDARY HYPERPARATHYROIDISM OF RENAL ORIGIN (H): ICD-10-CM

## 2023-07-20 DIAGNOSIS — M48.062 SPINAL STENOSIS OF LUMBAR REGION WITH NEUROGENIC CLAUDICATION: ICD-10-CM

## 2023-07-20 DIAGNOSIS — D50.0 IRON DEFICIENCY ANEMIA DUE TO CHRONIC BLOOD LOSS: Chronic | ICD-10-CM

## 2023-07-20 DIAGNOSIS — M62.81 GENERALIZED MUSCLE WEAKNESS: ICD-10-CM

## 2023-07-20 DIAGNOSIS — G89.29 CHRONIC BILATERAL LOW BACK PAIN WITHOUT SCIATICA: ICD-10-CM

## 2023-07-20 DIAGNOSIS — D68.9 COAGULATION DEFECT, UNSPECIFIED (H): ICD-10-CM

## 2023-07-20 DIAGNOSIS — D64.9 SYMPTOMATIC ANEMIA: ICD-10-CM

## 2023-07-20 DIAGNOSIS — Z86.19 HISTORY OF HEPATITIS C: ICD-10-CM

## 2023-07-20 DIAGNOSIS — R53.1 GENERALIZED WEAKNESS: ICD-10-CM

## 2023-07-20 DIAGNOSIS — R29.6 TENDENCY TO FALL: ICD-10-CM

## 2023-07-20 DIAGNOSIS — M79.662 PAIN OF LEFT LOWER LEG: ICD-10-CM

## 2023-07-20 DIAGNOSIS — Q27.30 CONGENITAL ARTERIOVENOUS MALFORMATION: ICD-10-CM

## 2023-07-20 DIAGNOSIS — K31.811 ANGIODYSPLASIA OF STOMACH AND DUODENUM WITH HEMORRHAGE: ICD-10-CM

## 2023-07-20 DIAGNOSIS — M54.50 CHRONIC BILATERAL LOW BACK PAIN WITHOUT SCIATICA: ICD-10-CM

## 2023-07-20 DIAGNOSIS — I73.9 PERIPHERAL VASCULAR DISEASE (H): ICD-10-CM

## 2023-07-20 LAB
ALBUMIN SERPL BCG-MCNC: 3.8 G/DL (ref 3.5–5.2)
ALP SERPL-CCNC: 190 U/L (ref 35–104)
ALT SERPL W P-5'-P-CCNC: 19 U/L (ref 0–50)
AMMONIA PLAS-SCNC: 16 UMOL/L (ref 11–51)
ANION GAP SERPL CALCULATED.3IONS-SCNC: 12 MMOL/L (ref 7–15)
AST SERPL W P-5'-P-CCNC: 44 U/L (ref 0–45)
BASOPHILS # BLD AUTO: 0.1 10E3/UL (ref 0–0.2)
BASOPHILS NFR BLD AUTO: 1 %
BILIRUB SERPL-MCNC: 0.6 MG/DL
BUN SERPL-MCNC: 21.5 MG/DL (ref 8–23)
CALCIUM SERPL-MCNC: 9.1 MG/DL (ref 8.8–10.2)
CHLORIDE SERPL-SCNC: 99 MMOL/L (ref 98–107)
CREAT SERPL-MCNC: 5.72 MG/DL (ref 0.51–0.95)
DEPRECATED HCO3 PLAS-SCNC: 27 MMOL/L (ref 22–29)
EOSINOPHIL # BLD AUTO: 0.3 10E3/UL (ref 0–0.7)
EOSINOPHIL NFR BLD AUTO: 3 %
ERYTHROCYTE [DISTWIDTH] IN BLOOD BY AUTOMATED COUNT: 15.8 % (ref 10–15)
GFR SERPL CREATININE-BSD FRML MDRD: 7 ML/MIN/1.73M2
GLUCOSE SERPL-MCNC: 130 MG/DL (ref 70–99)
HCT VFR BLD AUTO: 30 % (ref 35–47)
HGB BLD-MCNC: 8.6 G/DL (ref 11.7–15.7)
IMM GRANULOCYTES # BLD: 0.1 10E3/UL
IMM GRANULOCYTES NFR BLD: 1 %
LYMPHOCYTES # BLD AUTO: 0.5 10E3/UL (ref 0.8–5.3)
LYMPHOCYTES NFR BLD AUTO: 4 %
MAGNESIUM SERPL-MCNC: 2.1 MG/DL (ref 1.7–2.3)
MCH RBC QN AUTO: 29 PG (ref 26.5–33)
MCHC RBC AUTO-ENTMCNC: 28.7 G/DL (ref 31.5–36.5)
MCV RBC AUTO: 101 FL (ref 78–100)
MONOCYTES # BLD AUTO: 1.3 10E3/UL (ref 0–1.3)
MONOCYTES NFR BLD AUTO: 12 %
NEUTROPHILS # BLD AUTO: 8.8 10E3/UL (ref 1.6–8.3)
NEUTROPHILS NFR BLD AUTO: 79 %
NRBC # BLD AUTO: 0 10E3/UL
NRBC BLD AUTO-RTO: 0 /100
PHOSPHATE SERPL-MCNC: 2.2 MG/DL (ref 2.5–4.5)
PLATELET # BLD AUTO: 289 10E3/UL (ref 150–450)
POTASSIUM SERPL-SCNC: 4.4 MMOL/L (ref 3.4–5.3)
PROT SERPL-MCNC: 7.6 G/DL (ref 6.4–8.3)
RBC # BLD AUTO: 2.97 10E6/UL (ref 3.8–5.2)
SODIUM SERPL-SCNC: 138 MMOL/L (ref 136–145)
T4 FREE SERPL-MCNC: 0.71 NG/DL (ref 0.9–1.7)
TSH SERPL DL<=0.005 MIU/L-ACNC: 11 UIU/ML (ref 0.3–4.2)
WBC # BLD AUTO: 11.1 10E3/UL (ref 4–11)

## 2023-07-20 PROCEDURE — 99285 EMERGENCY DEPT VISIT HI MDM: CPT | Performed by: EMERGENCY MEDICINE

## 2023-07-20 PROCEDURE — 36415 COLL VENOUS BLD VENIPUNCTURE: CPT | Performed by: EMERGENCY MEDICINE

## 2023-07-20 PROCEDURE — 85004 AUTOMATED DIFF WBC COUNT: CPT | Performed by: EMERGENCY MEDICINE

## 2023-07-20 PROCEDURE — 250N000013 HC RX MED GY IP 250 OP 250 PS 637

## 2023-07-20 PROCEDURE — 80053 COMPREHEN METABOLIC PANEL: CPT | Performed by: EMERGENCY MEDICINE

## 2023-07-20 PROCEDURE — 250N000013 HC RX MED GY IP 250 OP 250 PS 637: Performed by: EMERGENCY MEDICINE

## 2023-07-20 PROCEDURE — 83735 ASSAY OF MAGNESIUM: CPT

## 2023-07-20 PROCEDURE — 84443 ASSAY THYROID STIM HORMONE: CPT | Performed by: EMERGENCY MEDICINE

## 2023-07-20 PROCEDURE — G0257 UNSCHED DIALYSIS ESRD PT HOS: HCPCS

## 2023-07-20 PROCEDURE — 70450 CT HEAD/BRAIN W/O DYE: CPT | Mod: 26 | Performed by: RADIOLOGY

## 2023-07-20 PROCEDURE — 76705 ECHO EXAM OF ABDOMEN: CPT

## 2023-07-20 PROCEDURE — 250N000013 HC RX MED GY IP 250 OP 250 PS 637: Performed by: STUDENT IN AN ORGANIZED HEALTH CARE EDUCATION/TRAINING PROGRAM

## 2023-07-20 PROCEDURE — 82140 ASSAY OF AMMONIA: CPT | Performed by: EMERGENCY MEDICINE

## 2023-07-20 PROCEDURE — 99284 EMERGENCY DEPT VISIT MOD MDM: CPT | Performed by: EMERGENCY MEDICINE

## 2023-07-20 PROCEDURE — G1010 CDSM STANSON: HCPCS | Mod: GC | Performed by: RADIOLOGY

## 2023-07-20 PROCEDURE — 258N000003 HC RX IP 258 OP 636

## 2023-07-20 PROCEDURE — 76705 ECHO EXAM OF ABDOMEN: CPT | Mod: 26 | Performed by: RADIOLOGY

## 2023-07-20 PROCEDURE — 99223 1ST HOSP IP/OBS HIGH 75: CPT | Mod: GC | Performed by: INTERNAL MEDICINE

## 2023-07-20 PROCEDURE — 99222 1ST HOSP IP/OBS MODERATE 55: CPT | Mod: GC | Performed by: INTERNAL MEDICINE

## 2023-07-20 PROCEDURE — 84100 ASSAY OF PHOSPHORUS: CPT

## 2023-07-20 PROCEDURE — 120N000002 HC R&B MED SURG/OB UMMC

## 2023-07-20 PROCEDURE — 90935 HEMODIALYSIS ONE EVALUATION: CPT

## 2023-07-20 PROCEDURE — 250N000011 HC RX IP 250 OP 636: Mod: JZ | Performed by: EMERGENCY MEDICINE

## 2023-07-20 PROCEDURE — 999N000128 HC STATISTIC PERIPHERAL IV START W/O US GUIDANCE

## 2023-07-20 PROCEDURE — G1010 CDSM STANSON: HCPCS

## 2023-07-20 PROCEDURE — 84439 ASSAY OF FREE THYROXINE: CPT | Performed by: EMERGENCY MEDICINE

## 2023-07-20 RX ORDER — SERTRALINE HYDROCHLORIDE 100 MG/1
100 TABLET, FILM COATED ORAL DAILY
Status: DISCONTINUED | OUTPATIENT
Start: 2023-07-21 | End: 2023-07-24 | Stop reason: HOSPADM

## 2023-07-20 RX ORDER — AMLODIPINE BESYLATE 5 MG/1
5 TABLET ORAL DAILY
Status: DISCONTINUED | OUTPATIENT
Start: 2023-07-21 | End: 2023-07-24

## 2023-07-20 RX ORDER — AMOXICILLIN 250 MG
1 CAPSULE ORAL 2 TIMES DAILY PRN
Status: DISCONTINUED | OUTPATIENT
Start: 2023-07-20 | End: 2023-07-24 | Stop reason: HOSPADM

## 2023-07-20 RX ORDER — AMOXICILLIN 250 MG
2 CAPSULE ORAL 2 TIMES DAILY PRN
Status: DISCONTINUED | OUTPATIENT
Start: 2023-07-20 | End: 2023-07-24 | Stop reason: HOSPADM

## 2023-07-20 RX ORDER — HYDRALAZINE HYDROCHLORIDE 20 MG/ML
10 INJECTION INTRAMUSCULAR; INTRAVENOUS EVERY 6 HOURS PRN
Status: DISCONTINUED | OUTPATIENT
Start: 2023-07-20 | End: 2023-07-24 | Stop reason: HOSPADM

## 2023-07-20 RX ORDER — LIDOCAINE 40 MG/G
CREAM TOPICAL
Status: DISCONTINUED | OUTPATIENT
Start: 2023-07-20 | End: 2023-07-24 | Stop reason: HOSPADM

## 2023-07-20 RX ORDER — ONDANSETRON 2 MG/ML
4 INJECTION INTRAMUSCULAR; INTRAVENOUS EVERY 6 HOURS PRN
Status: DISCONTINUED | OUTPATIENT
Start: 2023-07-20 | End: 2023-07-24 | Stop reason: HOSPADM

## 2023-07-20 RX ORDER — PREGABALIN 50 MG/1
50 CAPSULE ORAL DAILY
Status: DISCONTINUED | OUTPATIENT
Start: 2023-07-21 | End: 2023-07-24 | Stop reason: HOSPADM

## 2023-07-20 RX ORDER — MULTIPLE VITAMINS W/ MINERALS TAB 9MG-400MCG
1 TAB ORAL DAILY
Status: DISCONTINUED | OUTPATIENT
Start: 2023-07-21 | End: 2023-07-24 | Stop reason: HOSPADM

## 2023-07-20 RX ORDER — ATORVASTATIN CALCIUM 20 MG/1
20 TABLET, FILM COATED ORAL DAILY
Status: DISCONTINUED | OUTPATIENT
Start: 2023-07-21 | End: 2023-07-24 | Stop reason: HOSPADM

## 2023-07-20 RX ORDER — ONDANSETRON 4 MG/1
4 TABLET, ORALLY DISINTEGRATING ORAL EVERY 6 HOURS PRN
Status: DISCONTINUED | OUTPATIENT
Start: 2023-07-20 | End: 2023-07-24 | Stop reason: HOSPADM

## 2023-07-20 RX ORDER — HYDROXYZINE HYDROCHLORIDE 25 MG/1
25 TABLET, FILM COATED ORAL EVERY 6 HOURS PRN
Status: DISCONTINUED | OUTPATIENT
Start: 2023-07-20 | End: 2023-07-24 | Stop reason: HOSPADM

## 2023-07-20 RX ORDER — PANTOPRAZOLE SODIUM 20 MG/1
20 TABLET, DELAYED RELEASE ORAL
Status: DISCONTINUED | OUTPATIENT
Start: 2023-07-20 | End: 2023-07-24 | Stop reason: HOSPADM

## 2023-07-20 RX ORDER — HYDRALAZINE HYDROCHLORIDE 10 MG/1
10 TABLET, FILM COATED ORAL ONCE
Status: COMPLETED | OUTPATIENT
Start: 2023-07-20 | End: 2023-07-20

## 2023-07-20 RX ORDER — LOSARTAN POTASSIUM 50 MG/1
100 TABLET ORAL DAILY
Status: DISCONTINUED | OUTPATIENT
Start: 2023-07-21 | End: 2023-07-24 | Stop reason: HOSPADM

## 2023-07-20 RX ORDER — ACETAMINOPHEN 325 MG/1
975 TABLET ORAL EVERY 6 HOURS PRN
Status: DISCONTINUED | OUTPATIENT
Start: 2023-07-20 | End: 2023-07-24 | Stop reason: HOSPADM

## 2023-07-20 RX ORDER — HYDRALAZINE HYDROCHLORIDE 20 MG/ML
10 INJECTION INTRAMUSCULAR; INTRAVENOUS EVERY 6 HOURS PRN
Status: DISCONTINUED | OUTPATIENT
Start: 2023-07-20 | End: 2023-07-20

## 2023-07-20 RX ADMIN — SODIUM CHLORIDE 300 ML: 9 INJECTION, SOLUTION INTRAVENOUS at 18:49

## 2023-07-20 RX ADMIN — SUCROFERRIC OXYHYDROXIDE 500 MG: 500 TABLET, CHEWABLE ORAL at 16:35

## 2023-07-20 RX ADMIN — PANTOPRAZOLE SODIUM 20 MG: 20 TABLET, DELAYED RELEASE ORAL at 16:35

## 2023-07-20 RX ADMIN — HYDRALAZINE HYDROCHLORIDE 10 MG: 10 TABLET, FILM COATED ORAL at 15:11

## 2023-07-20 RX ADMIN — Medication: at 18:49

## 2023-07-20 RX ADMIN — SODIUM CHLORIDE 250 ML: 9 INJECTION, SOLUTION INTRAVENOUS at 18:49

## 2023-07-20 ASSESSMENT — ACTIVITIES OF DAILY LIVING (ADL)
ADLS_ACUITY_SCORE: 39
ADLS_ACUITY_SCORE: 37
ADLS_ACUITY_SCORE: 39

## 2023-07-20 NOTE — ED TRIAGE NOTES
BIBA from LTC for dizziness, weakness, hypertension, started yesterday before dialysis and persists till today. Hypertensive per 's. Per staff at LTC pt has been experiencing hallucinations

## 2023-07-20 NOTE — PROGRESS NOTES
HEMODIALYSIS TREATMENT NOTE    Date: 7/20/2023  Time: 6:08 PM    Data:  Pre Wt: 57.8kg est  Desired Wt: - 1.5 to -2  kg  Post Wt: -2 kg  Weight change: -2 kg  Ultrafiltration - Post Run Net Total Removed (mL): 2000 mL  Vascular Access Status: patent  Dialyzer Rinse: Light streaked  Total Blood Volume Processed: UF only, Seq  Total Dialysis (Treatment) Time: 2  Hours  Access: AVG right upper arm  Needle size: 15 g x 2  Bleeding time: arterial site 10 mins and venous 10 mins  BFR: 400 DFR: SEQ     Lab:  No     Assessment:  -Alert, disoriented to time and place, calm and cooperative. Can communicate needs   -AVG  ?upper right arm,  with present bruit and thrill.     Interventions:  Accessed with ?15 g  X 2 needles, ?400 to 450 BFR  achieved and maintained. Ran for 2 hrs.  Vital signs monitored every 15 min and PRN. Remained hemodynamically stable throughout hemodialysis.   Post rinsed back successfully, needles pulled with ease,  pressure dressing applied and secured with gauze once homeostasis achieved.    See HD flow sheet and MAR for details.    Hand off report given to primary RN.  Plan:      Per renal team

## 2023-07-20 NOTE — CONSULTS
Nephrology Initial Consult  July 20, 2023      Rachele Martínez MRN:[id number] YOB: 1940  Date of Admission:7/20/2023  Primary care provider: Agnieszka Rodriguez  Requesting physician: Taina Vergara MD    ASSESSMENT AND RECOMMENDATIONS:   Rachele Martínez is a 83 year old female admitted on 7/20/2023. She has ESRD on HD since 7 years.She has also Hypertension ,hyperlipidemia and anxiety/depression .  Felt weak and dizzy after  her regular dialysis yesterday .Which has persisted afterwards and has some hallucination .    # ESRD on HD   She is on M/W/F schedule of dialysis .Her dry weight used to be 54 Kg.current weight is 57.8.Seems to have some fluid excess .    - Isolated UF 1500-2000ml over 2 hours now.    - Maintain regular RRT on M/W/F.    - resume regular dialysis tomorrow.      # Hypertension     Her BP has been high despite amlodipine 5 ,losartan 100mg and hydralazine 10mg prn 6 hourly.She is 4kg above her dry weight right now .Overall she seems to have high blood pressure from fluid overload.      - Isolated UF 1500-2000ml over 2 hours .    - Continue antihypertensive medication .    # Anemia of CKD       HB - 8.6 .iron store is adequate            # ?Hypothyroidism         - TSH- 11,free 0.7 ,she is not on any medication ,perhaps this might contribute to her changes .         - May needs treatment     Recommendations were communicated to primary team via     Seen and discussed with Dr. Dunia Washington MD  Division of Renal Disease and Hypertension  [facility]  [email or web address]  Vocera Web Console        REASON FOR CONSULT:     ESRD on HD ,uncontrolled Hypertension.    HISTORY OF PRESENT ILLNESS:  Admitting provider and nursing notes reviewed  Rachele Martínez is a 83 year old with ESRD on hd since 07 years via Right arm AVG .she has also hypertension ,hyperlipidemia and anxiety/Depression .She came in with weakness and dizzyness which started off after she completed her  hemodialysis yesterday.The symptoms persisted after wards .She does not have fever ,cough or chest pain .she has been compliant to her medication.    PAST MEDICAL HISTORY:  Reviewed with patient on 07/20/2023     Past Medical History:   Diagnosis Date     Anemia      Anxiety and depression      Arthritis      AVM (arteriovenous malformation)      Chronic hepatitis C with cirrhosis (H)      Clotting disorder (H)      ESRD (end stage renal disease) (H)     on dialysis     GI bleed     recurrent     Glaucoma      Hyperlipidemia      Hypertension goal BP (blood pressure) < 140/80        Past Surgical History:   Procedure Laterality Date     CAPSULE/PILL CAM ENDOSCOPY N/A 3/27/2019    Procedure: Capsule/pill cam endoscopy;  Surgeon: Yosvany Ram MD;  [facility]     CAPSULE/PILL CAM ENDOSCOPY N/A 2/2/2023    Procedure: IMAGING PROCEDURE, GI TRACT, INTRALUMINAL, VIA CAPSULE;  Surgeon: Mulu Nur DO;  [facility]     COLONOSCOPY N/A 9/4/2015    Procedure: COMBINED COLONOSCOPY, SINGLE OR MULTIPLE BIOPSY/POLYPECTOMY BY BIOPSY;  Surgeon: Rupesh Lopez MD;  [facility]     COLONOSCOPY N/A 9/19/2018    Procedure: COLONOSCOPY;  enteroscopy small bowel  COLONOSCOPY;  Surgeon: Ankit Baires MD;  [facility]     ENTEROSCOPY SMALL BOWEL N/A 3/9/2023    Procedure: antegrade single balloon enteroscopy with argon plasma coagulation of arteriovenous malformations;  Surgeon: Ankit Baires MD;  [facility]     ESOPHAGOSCOPY, GASTROSCOPY, DUODENOSCOPY (EGD), COMBINED N/A 12/18/2014    Procedure: COMBINED ESOPHAGOSCOPY, GASTROSCOPY, DUODENOSCOPY (EGD);  Surgeon: Betsy Carvajal MD;  [facility]     ESOPHAGOSCOPY, GASTROSCOPY, DUODENOSCOPY (EGD), COMBINED N/A 4/25/2015    Procedure: COMBINED ESOPHAGOSCOPY, GASTROSCOPY, DUODENOSCOPY (EGD);  Surgeon: Yosvany Ram MD;  [facility]     ESOPHAGOSCOPY, GASTROSCOPY, DUODENOSCOPY (EGD), COMBINED N/A 5/5/2015    Procedure:  COMBINED ESOPHAGOSCOPY, GASTROSCOPY, DUODENOSCOPY (EGD);  Surgeon: Mariano Mistry MD;  Location:  GI      CAPSULE ENDOSCOPY N/A 9/30/2015    Procedure: CAPSULE/PILL CAM ENDOSCOPY;  Surgeon: Pan Dhaliwal MD;  Location:  GI      VASCULAR SURGERY PROCEDURE UNLIST       HYSTERECTOMY  1980    KYREE     LUMPECTOMY BREAST          MEDICATIONS:  PTA Meds  Prior to Admission medications    Medication Sig Last Dose Taking? Auth Provider Long Term End Date   acetaminophen (TYLENOL) 325 MG tablet Take 325-650 mg by mouth every 6 hours as needed for mild pain   Reported, Patient     amLODIPine (NORVASC) 5 MG tablet Take 5 mg by mouth daily 5 mg orally one time daily   Reported, Patient No    atorvastatin (LIPITOR) 20 MG tablet Take 1 tablet (20 mg) by mouth daily   Angel Luis Reno MD Yes    hydrOXYzine (ATARAX) 25 MG tablet Take 1 tablet (25 mg) by mouth every 6 hours as needed for itching or anxiety   Angel Luis Reno MD     losartan (COZAAR) 100 MG tablet Take 1 tablet (100 mg) by mouth daily   Kriss Michael MD Yes    Multiple Vitamin-Folic Acid TABS Take 1 tablet by mouth daily   Reported, Patient     pantoprazole (PROTONIX) 20 MG EC tablet Take 1 tablet (20 mg) by mouth 2 times daily (before meals) *take 30-60 minutes before   Angel Luis Reno MD     polyethylene glycol (MIRALAX) 17 GM/Dose powder Take 17 g by mouth daily as needed As needed   Reported, Patient     pregabalin (LYRICA) 50 MG capsule Take 50 mg by mouth daily 1 capsule by mouth daily in the evening.   Reported, Patient Yes    sertraline (ZOLOFT) 50 MG tablet Take 2 tablets (100 mg) by mouth daily   Angel Luis Reno MD Yes    sucroferric oxyhydroxide (VELPHORO) 500 MG CHEW chewable tablet Take 500 mg by mouth 2 times daily   Reported, Patient        Current Meds    [START ON 7/21/2023] amLODIPine  5 mg Oral Daily     [START ON 7/21/2023] atorvastatin  20 mg Oral Daily     [START ON 7/21/2023] losartan  100 mg  Oral Daily     [START ON 2023] multivitamin w/minerals  1 tablet Oral Daily     pantoprazole  20 mg Oral BID AC     [START ON 2023] pregabalin  50 mg Oral Daily     [START ON 2023] sertraline  100 mg Oral Daily     sodium chloride (PF)  3 mL Intracatheter Q8H     sucroferric oxyhydroxide  500 mg Oral TID w/meals     Infusion Meds      ALLERGIES:    Allergies   Allergen Reactions     Abacavir Itching     Lisinopril Cough     Dust Mites      Hydrochlorothiazide Itching     Severe       No Clinical Screening - See Comments      History of blood transfusion reactions and pre-treats with Benadryl.      Spironolactone Nausea     Sulfa Antibiotics Hives     Valsartan Itching     Valsartan-Hydrochlorothiazide Itching     severe       REVIEW OF SYSTEMS:  A comprehensive of systems was negative except as noted above.    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     Sexual activity: Not Currently   Other Topics Concern     Parent/sibling w/ CABG, MI or angioplasty before 65F 55M? No   Social History Narrative     Not on file     Social Determinants of Health     Financial Resource Strain: Not on file   Food Insecurity: Not on file   Transportation Needs: Not on file   Physical Activity: Not on file   Stress: Not on file   Social Connections: Not on file   Intimate Partner Violence: Not on file   Housing  Stability: Not on file     Reviewed with patient   Daughter accompanies Rachele Martínez in hospital room    FAMILY MEDICAL HISTORY:   Family History   Problem Relation Age of Onset     Diabetes Mother      Alzheimer Disease Mother      Substance Abuse Son      Cancer Sister      Soft Tissue Cancer Sister      Breast Cancer Sister      Hyperlipidemia Daughter      Alcoholism Brother      Spine Problems Sister      Reviewed with patient     PHYSICAL EXAM:   Temp  Av.3  F (36.8  C)  Min: 98.1  F (36.7  C)  Max: 98.4  F (36.9  C)      Pulse  Av  Min: 75  Max: 84 Resp  Av.5  Min: 16  Max: 17  SpO2  Av.5 %  Min: 96 %  Max: 97 %       BP (!) 180/78   Pulse 84   Temp 98.4  F (36.9  C) (Oral)   Resp 17   SpO2 97%       Admit       GENERAL APPEARANCE: no distress, sleepy   EYES: no scleral icterus, pupils equal  Endo: no goiter, no moon facies  Lymphatics: no cervical or supraclavicular LAD  Pulmonary: lungs clear to auscultation with equal breath sounds bilaterally, no clubbing  CV: regular rhythm, normal rate, no rub   - JVD -ve    - Edema -ve  GI: soft, nontender, normal bowel sounds  MS: no evidence of inflammation in joints, no muscle tenderness  : no armando  SKIN: no rash, warm, dry, no cyanosis  NEURO: face symmetric, no asterixis     LABS:   CMP  Recent Labs   Lab 23  1110      POTASSIUM 4.4   CHLORIDE 99   CO2 27   ANIONGAP 12   *   BUN 21.5   CR 5.72*   GFRESTIMATED 7*   BELGICA 9.1   MAG 2.1   PHOS 2.2*   PROTTOTAL 7.6   ALBUMIN 3.8   BILITOTAL 0.6   ALKPHOS 190*   AST 44   ALT 19     CBC  Recent Labs   Lab 23  1110   HGB 8.6*   WBC 11.1*   RBC 2.97*   HCT 30.0*   *   MCH 29.0   MCHC 28.7*   RDW 15.8*        INRNo lab results found in last 7 days.  ABGNo lab results found in last 7 days.   URINE STUDIES  Recent Labs   Lab Test 16  1449 16  1440 05/04/15  1213   COLOR Light Yellow Yellow Light Yellow   APPEARANCE Clear Clear Clear   URINEGLC  30* 30* Negative   URINEBILI Negative Negative Negative   URINEKETONE Negative Negative Negative   SG 1.009 1.011 1.008   UBLD Trace* Small* Trace*   URINEPH 6.0 6.0 6.5   PROTEIN 300* 300* 300*   NITRITE Negative Negative Negative   LEUKEST Negative Negative Negative   RBCU 1 1 <1   WBCU <1 4* 2     No lab results found.  PTH  Recent Labs   Lab Test 03/12/16  0552   PTHI 147*     IRON STUDIES  Recent Labs   Lab Test 07/05/23  1439 06/20/23  1122 06/02/23  0942 05/14/23  0741 01/26/23  1450 01/27/22  1304 10/24/18  1506 09/14/18  1159 06/21/16  1404 03/13/16  0446 03/01/16  1140 01/14/16  0944 11/12/15  1005 08/21/15  0924   IRON 44 36* 62 31* 34*  --  241* 55 30* 25* 21*  --  35 34*   * 212* 203* 173* 266  --  419 279 284 369 482*  --  399 445*   IRONSAT 23 17 31 18 13*  --  57* 20 11* 7* 4*  --  9* 8*   KASSI  --  600* 222 436*  --  106 176 229 67 18 11 51 40 18       IMAGING:  All imaging studies reviewed by me.     Reyna Washington MD

## 2023-07-20 NOTE — ED PROVIDER NOTES
ED Provider Note  [facility]      History     Chief Complaint   Patient presents with     Dizziness     Weakness and HTN     HPI  Rachele Martínez is a 83 year old female with a history notable for ESRD on hemodialysis, HTN who presents with generalized fatigue, weakness, hypertension, and hallucinations.  Symptoms started yesterday prior to dialysis and have persisted.  Patient has had a similar occurrence recently that was thought to be secondary to anemia and improved after being given transfusion.  No known precipitating factors however daughter does note that previously occurred after monthly octreotide injection.  She also notes that patient had considerable edema to both feet yesterday but this has completely resolved today.  No fevers or infectious symptoms.  No other symptoms noted.    Past Medical History  Past Medical History:   Diagnosis Date     Anemia      Anxiety and depression      Arthritis      AVM (arteriovenous malformation)      Chronic hepatitis C with cirrhosis (H)      Clotting disorder (H)      ESRD (end stage renal disease) (H)     on dialysis     GI bleed     recurrent     Glaucoma      Hyperlipidemia      Hypertension goal BP (blood pressure) < 140/80      Past Surgical History:   Procedure Laterality Date     CAPSULE/PILL CAM ENDOSCOPY N/A 3/27/2019    Procedure: Capsule/pill cam endoscopy;  Surgeon: Yosvany Ram MD;  [facility]     CAPSULE/PILL CAM ENDOSCOPY N/A 2/2/2023    Procedure: IMAGING PROCEDURE, GI TRACT, INTRALUMINAL, VIA CAPSULE;  Surgeon: Mulu Nur DO;  [facility]     COLONOSCOPY N/A 9/4/2015    Procedure: COMBINED COLONOSCOPY, SINGLE OR MULTIPLE BIOPSY/POLYPECTOMY BY BIOPSY;  Surgeon: Rupesh Lopez MD;  [facility]     COLONOSCOPY N/A 9/19/2018    Procedure: COLONOSCOPY;  enteroscopy small bowel  COLONOSCOPY;  Surgeon: Ankit Baires MD;  [facility]     ENTEROSCOPY SMALL BOWEL N/A 3/9/2023    Procedure:  antegrade single balloon enteroscopy with argon plasma coagulation of arteriovenous malformations;  Surgeon: Ankit Baires MD;  [facility]     ESOPHAGOSCOPY, GASTROSCOPY, DUODENOSCOPY (EGD), COMBINED N/A 12/18/2014    Procedure: COMBINED ESOPHAGOSCOPY, GASTROSCOPY, DUODENOSCOPY (EGD);  Surgeon: Betsy Carvajal MD;  [facility]     ESOPHAGOSCOPY, GASTROSCOPY, DUODENOSCOPY (EGD), COMBINED N/A 4/25/2015    Procedure: COMBINED ESOPHAGOSCOPY, GASTROSCOPY, DUODENOSCOPY (EGD);  Surgeon: Yosvany Ram MD;  [facility]     ESOPHAGOSCOPY, GASTROSCOPY, DUODENOSCOPY (EGD), COMBINED N/A 5/5/2015    Procedure: COMBINED ESOPHAGOSCOPY, GASTROSCOPY, DUODENOSCOPY (EGD);  Surgeon: Mariano Mistry MD;  Location:  GI     HC CAPSULE ENDOSCOPY N/A 9/30/2015    Procedure: CAPSULE/PILL CAM ENDOSCOPY;  Surgeon: Pan Dhaliwal MD;  [facility]      VASCULAR SURGERY PROCEDURE UNLIST       HYSTERECTOMY  1980    KYREE     LUMPECTOMY BREAST       acetaminophen (TYLENOL) 325 MG tablet  amLODIPine (NORVASC) 5 MG tablet  atorvastatin (LIPITOR) 20 MG tablet  hydrOXYzine (ATARAX) 25 MG tablet  losartan (COZAAR) 100 MG tablet  Multiple Vitamin-Folic Acid TABS  pantoprazole (PROTONIX) 20 MG EC tablet  polyethylene glycol (MIRALAX) 17 GM/Dose powder  pregabalin (LYRICA) 50 MG capsule  sertraline (ZOLOFT) 50 MG tablet  sucroferric oxyhydroxide (VELPHORO) 500 MG CHEW chewable tablet      Allergies   Allergen Reactions     Abacavir Itching     Lisinopril Cough     Dust Mites      Hydrochlorothiazide Itching     Severe       No Clinical Screening - See Comments      History of blood transfusion reactions and pre-treats with Benadryl.      Spironolactone Nausea     Sulfa Antibiotics Hives     Valsartan Itching     Valsartan-Hydrochlorothiazide Itching     severe     Family History  Family History   Problem Relation Age of Onset     Diabetes Mother      Alzheimer Disease Mother      Substance Abuse Son      Cancer  Sister      Soft Tissue Cancer Sister      Breast Cancer Sister      Hyperlipidemia Daughter      Alcoholism Brother      Spine Problems Sister      Social History   Social History     Tobacco Use     Smoking status: Former     Packs/day: 2.00     Years: 45.00     Pack years: 90.00     Types: Cigarettes     Start date: 1953     Quit date: 2002     Years since quittin.6     Smokeless tobacco: Never   Substance Use Topics     Alcohol use: No     Drug use: Yes     Types: Marijuana     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      Past medical history, past surgical history, medications, allergies, family history, and social history were reviewed with the patient. No additional pertinent items.      A medically appropriate review of systems was performed with pertinent positives and negatives noted in the HPI, and all other systems negative.    Physical Exam      Physical Exam  Vitals and nursing note reviewed.   Constitutional:       General: She is not in acute distress.     Appearance: She is well-developed. She is not diaphoretic.   HENT:      Head: Normocephalic and atraumatic.      Mouth/Throat:      Pharynx: No oropharyngeal exudate.   Eyes:      General: No scleral icterus.        Right eye: No discharge.         Left eye: No discharge.      Pupils: Pupils are equal, round, and reactive to light.   Cardiovascular:      Rate and Rhythm: Normal rate and regular rhythm.      Heart sounds: Normal heart sounds. No murmur heard.     No friction rub. No gallop.   Pulmonary:      Effort: Pulmonary effort is normal. No respiratory distress.      Breath sounds: Normal breath sounds. No wheezing.   Chest:      Chest wall: No tenderness.   Abdominal:      General: Bowel sounds are normal. There is no distension.      Palpations: Abdomen is soft.      Tenderness: There is no abdominal tenderness.   Musculoskeletal:         General: No tenderness or deformity. Normal range of motion.       Cervical back: Normal range of motion and neck supple.   Skin:     General: Skin is warm and dry.      Coloration: Skin is not pale.      Findings: No erythema or rash.   Neurological:      Mental Status: She is alert and oriented to person, place, and time.      Cranial Nerves: No cranial nerve deficit.           ED Course, Procedures, & Data      Procedures                      No results found for any visits on 07/20/23.  Medications - No data to display  Labs Ordered and Resulted from Time of ED Arrival to Time of ED Departure - No data to display  No orders to display              Assessment & Plan    This is an 83-year-old female who presents with hypertension, fatigue and hallucinations.  This began yesterday.  Patient has had similar occurrences in the past.  No known precipitating factors.  On exam patient appears fatigued but is able to converse.  No other acute abnormalities on exam.  Lab work shows no acute abnormalities.  Ammonia is elevated.  Head CT shows no acute abnormalities.  Symptoms have occurred after octreotide injections discussed this with Pharmacy who notes that this can cause thyroid dysfunction.  Thyroid studies have been ordered and are pending at this time.  This time we do not know cause of patient's symptoms but I believe she would benefit from admission for further monitoring work-up and treatment.  Patient given hydralazine due to hypotension.  We will admit for further monitoring work-up and treatment.    I have reviewed the nursing notes. I have reviewed the findings, diagnosis, plan and need for follow up with the patient.    New Prescriptions    No medications on file       Final diagnoses:   None       Ke Hines DO  [facility] EMERGENCY DEPARTMENT  7/20/2023     Ke Hines,   07/20/23 [id number]

## 2023-07-20 NOTE — H&P
Resident/Fellow Attestation   I, Brian Real MD, was present with the medical/ROSE student who participated in the service and in the documentation of the note.  I have verified the history and personally performed the physical exam and medical decision making.  I agree with the assessment and plan of care as documented in the note.      Brian Real MD  PGY3  Date of Service (when I saw the patient): 07/20/23       [facility]    History and Physical - Medicine Service, [facility] TEAM 2       Date of Admission:  7/20/2023    Assessment & Plan      Rachele Martínez is a 83 year old female admitted on 7/20/2023. She has a history of HTN, Anemia, Hyperlipidemia, Anxiety/ Depression and ESRD and is admitted for dizziness, weakness, HTN and hallucinations that began yesterday afternoon following dialysis.     #Fatigue/ Hallucinations  Pt reports that she is having weird dreams. No auditory hallucinations. Pt believed that we were in a hospital room within a grocery store. Patient was recently discharged on 7/6/23 with declining strength and mental status over course of one month with acute change to non-verbal, not eating, and agitation. Head CT obtained today and negative for acute intracranial pathology. Metabolic evaluation non-revealing. Slight elevation in WBC (11.1). HHx of anemia (Hb 8.6 and hematocrit 30 today). Vitally stable, afebrile, blood pressures ranging in the 180-190's/80's. Exam benign for any focal neurologic deficit, patient is only oriented to person and time. Differential for the fatigue includes uncontrolled hypertension (present today and at dialysis yesterday), medications, metabolic derangements, delirium, uremia, and developing psychiatric conditions. Based on the objective data and HPI as below, most likely due to uncontrolled hypertension causing hallucinations and fatigue in a frail, elderly patient on chronic HD.   - CTM, repeat labs in AM to  evaluate for develop metabolic causes  - Unable to obtain U/A, patient does not make urine (attempted straight cath at last hospitalization, led to severe agitation)  - TSH/T4 levels pending  - Avoid narcotics  - PT/OT consults  - Delirium precautions  - Fall precautions  - Controlling hypertension as below    #ESRD  Pt undergoes dialysis M/W/F and, per her daughter, never misses apts. BMP is within normal limits with exception of Cr (5.72).   - Consult Nephrology  - Dialysis M/ W/ F, due for HD run tomorrow  - Continue PTA Velphoro 500 mg TID with meals    #Elevated Alkaline Phosphatase  Level elevated at 190. All other LFT's/bilirubin WNL. ED ordered abdominal U/S, still pending     #Hypertension  Presenting with SBP >180, unsure if medications were taken this morning when speaking with both daughters at bedside, Unable to find phone number to TCU at this time.   - Continue amlodipine 5mg and losartan 100 mg   - Hydralazine 10 mg q6h PRN for systolic BP >180, diastolic BP >100     Chronic Disease Management:  # GERD: Continue PTA Pantoprazole 20 mg BID  # Chronic Pain: Continue PTA Lyrica 50 mg daily  # Depression: Continue Zoloft 100 mg daily  # HLD: Continue Atorvastatin 20 mg daily         Diet: Renal Diet (dialysis)    DVT Prophylaxis: Pneumatic Compression Devices  Rodriguez Catheter: Not present  Fluids: none  Lines: None     Cardiac Monitoring: None  Code Status:   Full code    Clinically Significant Risk Factors Present on Admission                  # Hypertension: Noted on problem list             Disposition Plan      Expected Discharge Date: 07/22/2023                The patient's care was discussed with Dr. Vergara.    Brian Hadfield, [facility]  Medicine Service, [facility] TEAM [address] [place] [address]  Securely message with [employer] (more info)  Text page via [facility] Paging/Directory   See signed in provider for up to date coverage  "information  ______________________________________________________________________    Chief Complaint   Hallucinations and fatigue    History is obtained from the patient and patients daughters Charla and Theresa    History of Present Illness   Rachele Martínez is a 83 year old female who has a history of HTN, Anemia, Hyperlipidemia, Anxiety/ Depression and ESRD and is admitted for dizziness, weakness, HTN and hallucinations that began yesterday afternoon following dialysis. Pts daughter, Charla, stated that she called her mother prior to dialysis to make sure she got into the cab and informed her that she will pick her up after the apt. Charla reports that her mother seemed normal on the phone call. Following dialysis they went to [employer] and Rachele was \"to tired to eat even one little taco.\" Rachele was taken home by daughter and went directly to bed at 1600. Rachele reports that she had weird dreams all night and was confused upon awakening this morning as well. Pt informed my during questions that we were in a hospital room within the grocery store. Rachele reports that with these hallucinations she became very combative and \"was cussing people out.\" When asked further about this her daughters stated that this was the last time she was in the hospital when they attempted to straight cath her and that she felt violated. Daughters reports that there have been no medication changes that they are aware of. When asked of she was producing urine both daughters and Rachele reported that she produces only a few drops. Daughters asked that we give her no narcotics (has HHx of narcotic abuse) and if we do give medications pleas let both daughters know prior to administration of medications.     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)      Clotting disorder (H)      ESRD (end stage renal disease) (H)     on dialysis     GI bleed     recurrent     Glaucoma      Hyperlipidemia      Hypertension goal BP (blood pressure) < 140/80        Past Surgical History   Past Surgical History:   Procedure Laterality Date     CAPSULE/PILL CAM ENDOSCOPY N/A 3/27/2019    Procedure: Capsule/pill cam endoscopy;  Surgeon: Yosvany Ram MD;  Location: [facility]     CAPSULE/PILL CAM ENDOSCOPY N/A 2/2/2023    Procedure: IMAGING PROCEDURE, GI TRACT, INTRALUMINAL, VIA CAPSULE;  Surgeon: Mulu Nur DO;  Location: [facility]     COLONOSCOPY N/A 9/4/2015    Procedure: COMBINED COLONOSCOPY, SINGLE OR MULTIPLE BIOPSY/POLYPECTOMY BY BIOPSY;  Surgeon: Rupesh Lopez MD;  Location: [facility]     COLONOSCOPY N/A 9/19/2018    Procedure: COLONOSCOPY;  enteroscopy small bowel  COLONOSCOPY;  Surgeon: Ankit Baires MD;  Location: [facility]     ENTEROSCOPY SMALL BOWEL N/A 3/9/2023    Procedure: antegrade single balloon enteroscopy with argon plasma coagulation of arteriovenous malformations;  Surgeon: Ankit Baires MD;  Location: [facility]     ESOPHAGOSCOPY, GASTROSCOPY, DUODENOSCOPY (EGD), COMBINED N/A 12/18/2014    Procedure: COMBINED ESOPHAGOSCOPY, GASTROSCOPY, DUODENOSCOPY (EGD);  Surgeon: Betsy Carvajal MD;  Location: [facility]     ESOPHAGOSCOPY, GASTROSCOPY, DUODENOSCOPY (EGD), COMBINED N/A 4/25/2015    Procedure: COMBINED ESOPHAGOSCOPY, GASTROSCOPY, DUODENOSCOPY (EGD);  Surgeon: Yosvany Ram MD;  Location: [facility]     ESOPHAGOSCOPY, GASTROSCOPY, DUODENOSCOPY (EGD), COMBINED N/A 5/5/2015    Procedure: COMBINED ESOPHAGOSCOPY, GASTROSCOPY, DUODENOSCOPY (EGD);  Surgeon: Mariano Mistry MD;  Location: [facility]     HC CAPSULE ENDOSCOPY N/A 9/30/2015    Procedure: CAPSULE/PILL CAM ENDOSCOPY;  Surgeon: Pan Dhaliwal MD;  Location: [facility]     HC VASCULAR SURGERY PROCEDURE UNLIST       HYSTERECTOMY  1980    KYREE     LUMPECTOMY BREAST          Prior to Admission Medications   Prior to Admission Medications   Prescriptions Last Dose Informant Patient Reported? Taking?   Multiple Vitamin-Folic Acid TABS   Yes No   Sig: Take 1 tablet by mouth daily   acetaminophen (TYLENOL) 325 MG tablet   Yes No   Sig: Take 325-650 mg by mouth every 6 hours as needed for mild pain   amLODIPine (NORVASC) 5 MG tablet   Yes No   Sig: Take 5 mg by mouth daily 5 mg orally one time daily   atorvastatin (LIPITOR) 20 MG tablet  Nursing Home No No   Sig: Take 1 tablet (20 mg) by mouth daily   hydrOXYzine (ATARAX) 25 MG tablet  Nursing Home No No   Sig: Take 1 tablet (25 mg) by mouth every 6 hours as needed for itching or anxiety   losartan (COZAAR) 100 MG tablet   No No   Sig: Take 1 tablet (100 mg) by mouth daily   pantoprazole (PROTONIX) 20 MG EC tablet  Nursing Home No No   Sig: Take 1 tablet (20 mg) by mouth 2 times daily (before meals) *take 30-60 minutes before   polyethylene glycol (MIRALAX) 17 GM/Dose powder  Nursing Home Yes No   Sig: Take 17 g by mouth daily as needed As needed   pregabalin (LYRICA) 50 MG capsule   Yes No   Sig: Take 50 mg by mouth daily 1 capsule by mouth daily in the evening.   sertraline (ZOLOFT) 50 MG tablet  Nursing Home No No   Sig: Take 2 tablets (100 mg) by mouth daily   sucroferric oxyhydroxide (VELPHORO) 500 MG CHEW chewable tablet  Nursing Home Yes No   Sig: Take 500 mg by mouth 2 times daily      Facility-Administered Medications: None        Review of Systems    CONSTITUTIONAL: NEGATIVE for fever, chills, change in weight  EYES: no reported changes in vision, eyes are very heavy  ENT/MOUTH: NEGATIVE for ear, mouth and throat problems  RESP: NEGATIVE for significant cough or SOB  CV: NEGATIVE for chest pain, palpitations. Peripheral edema present two days prior but is no longer present  GI: NEGATIVE for nausea, heartburn, or change in bowel habits. Pt reports abdominal pain when sitting up  MUSCULOSKELETAL: NEGATIVE for significant  "arthralgias or myalgia  PSYCHIATRIC:  pt endorses visual hallucinations and weird dreams, no auditory hallucinations     Physical Exam   Vital Signs: Temp: 98.4  F (36.9  C) Temp src: Oral BP: (!) 173/73 Pulse: 82   Resp: 17 SpO2: 96 % O2 Device: None (Room air)    Weight: 0 lbs 0 oz    Constitutional: very fatigued, falling in and out of conversation, cooperative, no apparent distress, and appears stated age  Eyes: Lids and lashes normal, extra ocular muscles intact, sclera clear, conjunctiva normal  ENT: Normocephalic, without obvious abnormality, atraumatic, external ears without lesions  Respiratory: No increased work of breathing, good air exchange, clear to auscultation bilaterally, no crackles or wheezing  Cardiovascular: Normal apical impulse, regular rate and rhythm, normal S1 and S2, no S3 or S4, and no murmur noted  GI: soft, non-distended, very mild tenderness to palpation of the upper right quad, no masses palpated, no hepatosplenomegally  Skin: no bruising or bleeding, normal skin color, texture, turgor and no redness, warmth, or swelling  Musculoskeletal: There is no redness, warmth, or swelling of the joints. Pt able to move all extremities.  Neurologic: Pt was very fatigued and was falling in and out of questioning. Alert to person, possibly time. Place stated as \"grocery store\". Unable to complete other assessment due to poor cooperation.  Neuropsychiatric: General: normal, calm and poor eye contact    Medical Decision Making       Please see A&P for additional details of medical decision making.      Data     I have personally reviewed the following data over the past 24 hrs:    11.1 (H)  \   8.6 (L)   / 289     138 99 21.5 /  130 (H)   4.4 27 5.72 (H) \       ALT: 19 AST: 44 AP: 190 (H) TBILI: 0.6   ALB: 3.8 TOT PROTEIN: 7.6 LIPASE: N/A       TSH: 11.00 (H) T4: 0.71 (L) A1C: N/A       Imaging results reviewed over the past 24 hrs:   Recent Results (from the past 24 hour(s))   CT Head w/o Contrast "    Narrative    CT HEAD W/O CONTRAST 7/20/2023 12:45 PM    History: Fatigue, hallucinations, hypertension.     Comparison: 7/1/2023, 6/21/2023 and 6/3/2023 head CTs    Technique: Using multidetector thin collimation helical acquisition  technique, axial, coronal and sagittal CT images from the skull base  to the vertex were obtained without intravenous contrast.    Findings:     There is no intracranial hemorrhage, mass effect, or midline shift.  Gray/white matter differentiation in both cerebral hemispheres is  preserved. Ventricles are proportionate to the cerebral sulci. The  basal cisterns are clear.     There is patchy, confluent hypoattenuation within the periventricular  white matter which is nonspecific but most suggestive of chronic small  vessel ischemic disease given the patient's age.    The bony calvaria and the bones of the skull base are normal. The  visualized portions of the paranasal sinuses and mastoid air cells are  clear.       Impression    Impression:  1. No acute intracranial pathology.   2. Age related volume loss and presumable leukoaraiosis, unchanged.     I have personally reviewed the examination and initial interpretation  and I agree with the findings.    TAYLOR BLANCO MD         SYSTEM ID:  [id number]

## 2023-07-21 LAB
ALBUMIN SERPL BCG-MCNC: 3.7 G/DL (ref 3.5–5.2)
ALP SERPL-CCNC: 198 U/L (ref 35–104)
ALT SERPL W P-5'-P-CCNC: 13 U/L (ref 0–50)
ANION GAP SERPL CALCULATED.3IONS-SCNC: 17 MMOL/L (ref 7–15)
AST SERPL W P-5'-P-CCNC: 31 U/L (ref 0–45)
BILIRUB SERPL-MCNC: 0.7 MG/DL
BUN SERPL-MCNC: 30.8 MG/DL (ref 8–23)
CALCIUM SERPL-MCNC: 9.2 MG/DL (ref 8.8–10.2)
CHLORIDE SERPL-SCNC: 99 MMOL/L (ref 98–107)
CREAT SERPL-MCNC: 7.3 MG/DL (ref 0.51–0.95)
DEPRECATED HCO3 PLAS-SCNC: 24 MMOL/L (ref 22–29)
ERYTHROCYTE [DISTWIDTH] IN BLOOD BY AUTOMATED COUNT: 15.7 % (ref 10–15)
GFR SERPL CREATININE-BSD FRML MDRD: 5 ML/MIN/1.73M2
GLUCOSE SERPL-MCNC: 123 MG/DL (ref 70–99)
HBV SURFACE AB SERPL IA-ACNC: 6.45 M[IU]/ML
HBV SURFACE AB SERPL IA-ACNC: NONREACTIVE M[IU]/ML
HBV SURFACE AG SERPL QL IA: NONREACTIVE
HCT VFR BLD AUTO: 30.8 % (ref 35–47)
HGB BLD-MCNC: 8.9 G/DL (ref 11.7–15.7)
MCH RBC QN AUTO: 29.1 PG (ref 26.5–33)
MCHC RBC AUTO-ENTMCNC: 28.9 G/DL (ref 31.5–36.5)
MCV RBC AUTO: 101 FL (ref 78–100)
PHOSPHATE SERPL-MCNC: 2.5 MG/DL (ref 2.5–4.5)
PLATELET # BLD AUTO: 286 10E3/UL (ref 150–450)
POTASSIUM SERPL-SCNC: 4.1 MMOL/L (ref 3.4–5.3)
PROT SERPL-MCNC: 7.5 G/DL (ref 6.4–8.3)
RBC # BLD AUTO: 3.06 10E6/UL (ref 3.8–5.2)
SODIUM SERPL-SCNC: 140 MMOL/L (ref 136–145)
WBC # BLD AUTO: 10.4 10E3/UL (ref 4–11)

## 2023-07-21 PROCEDURE — 250N000013 HC RX MED GY IP 250 OP 250 PS 637

## 2023-07-21 PROCEDURE — 99232 SBSQ HOSP IP/OBS MODERATE 35: CPT | Mod: GC | Performed by: INTERNAL MEDICINE

## 2023-07-21 PROCEDURE — 258N000003 HC RX IP 258 OP 636

## 2023-07-21 PROCEDURE — 250N000011 HC RX IP 250 OP 636: Mod: JZ

## 2023-07-21 PROCEDURE — 634N000001 HC RX 634: Mod: JZ

## 2023-07-21 PROCEDURE — 84100 ASSAY OF PHOSPHORUS: CPT

## 2023-07-21 PROCEDURE — 90935 HEMODIALYSIS ONE EVALUATION: CPT

## 2023-07-21 PROCEDURE — 120N000002 HC R&B MED SURG/OB UMMC

## 2023-07-21 PROCEDURE — 36415 COLL VENOUS BLD VENIPUNCTURE: CPT

## 2023-07-21 PROCEDURE — 86706 HEP B SURFACE ANTIBODY: CPT | Performed by: INTERNAL MEDICINE

## 2023-07-21 PROCEDURE — 85027 COMPLETE CBC AUTOMATED: CPT

## 2023-07-21 PROCEDURE — 250N000013 HC RX MED GY IP 250 OP 250 PS 637: Performed by: STUDENT IN AN ORGANIZED HEALTH CARE EDUCATION/TRAINING PROGRAM

## 2023-07-21 PROCEDURE — 80053 COMPREHEN METABOLIC PANEL: CPT

## 2023-07-21 PROCEDURE — 99207 PR NO BILLABLE SERVICE THIS VISIT: CPT | Performed by: STUDENT IN AN ORGANIZED HEALTH CARE EDUCATION/TRAINING PROGRAM

## 2023-07-21 PROCEDURE — 5A1D70Z PERFORMANCE OF URINARY FILTRATION, INTERMITTENT, LESS THAN 6 HOURS PER DAY: ICD-10-PCS | Performed by: INTERNAL MEDICINE

## 2023-07-21 PROCEDURE — 87340 HEPATITIS B SURFACE AG IA: CPT | Performed by: INTERNAL MEDICINE

## 2023-07-21 PROCEDURE — 99233 SBSQ HOSP IP/OBS HIGH 50: CPT | Mod: GC | Performed by: INTERNAL MEDICINE

## 2023-07-21 RX ORDER — LEVOTHYROXINE SODIUM 25 UG/1
50 TABLET ORAL
Status: DISCONTINUED | OUTPATIENT
Start: 2023-07-22 | End: 2023-07-24 | Stop reason: HOSPADM

## 2023-07-21 RX ORDER — LOSARTAN POTASSIUM 50 MG/1
50 TABLET ORAL DAILY
Status: ON HOLD | COMMUNITY
End: 2023-07-24

## 2023-07-21 RX ADMIN — ATORVASTATIN CALCIUM 20 MG: 20 TABLET, FILM COATED ORAL at 08:27

## 2023-07-21 RX ADMIN — SUCROFERRIC OXYHYDROXIDE 500 MG: 500 TABLET, CHEWABLE ORAL at 08:27

## 2023-07-21 RX ADMIN — ACETAMINOPHEN 975 MG: 325 TABLET, FILM COATED ORAL at 22:42

## 2023-07-21 RX ADMIN — Medication 1 TABLET: at 08:26

## 2023-07-21 RX ADMIN — PREGABALIN 50 MG: 50 CAPSULE ORAL at 08:26

## 2023-07-21 RX ADMIN — Medication: at 12:16

## 2023-07-21 RX ADMIN — AMLODIPINE BESYLATE 5 MG: 5 TABLET ORAL at 08:26

## 2023-07-21 RX ADMIN — PANTOPRAZOLE SODIUM 20 MG: 20 TABLET, DELAYED RELEASE ORAL at 08:26

## 2023-07-21 RX ADMIN — HYDRALAZINE HYDROCHLORIDE 10 MG: 20 INJECTION INTRAMUSCULAR; INTRAVENOUS at 05:15

## 2023-07-21 RX ADMIN — IRON SUCROSE 100 MG: 20 INJECTION, SOLUTION INTRAVENOUS at 13:50

## 2023-07-21 RX ADMIN — SERTRALINE HYDROCHLORIDE 100 MG: 100 TABLET ORAL at 08:26

## 2023-07-21 RX ADMIN — LOSARTAN POTASSIUM 100 MG: 50 TABLET, FILM COATED ORAL at 08:27

## 2023-07-21 RX ADMIN — SODIUM CHLORIDE 250 ML: 9 INJECTION, SOLUTION INTRAVENOUS at 12:16

## 2023-07-21 RX ADMIN — EPOETIN ALFA-EPBX 4000 UNITS: 10000 INJECTION, SOLUTION INTRAVENOUS; SUBCUTANEOUS at 13:50

## 2023-07-21 RX ADMIN — SODIUM CHLORIDE 300 ML: 9 INJECTION, SOLUTION INTRAVENOUS at 12:16

## 2023-07-21 RX ADMIN — HYDRALAZINE HYDROCHLORIDE 10 MG: 20 INJECTION INTRAMUSCULAR; INTRAVENOUS at 01:25

## 2023-07-21 ASSESSMENT — ACTIVITIES OF DAILY LIVING (ADL)
ADLS_ACUITY_SCORE: 39
FALL_HISTORY_WITHIN_LAST_SIX_MONTHS: NO
TOILETING_ISSUES: YES
WALKING_OR_CLIMBING_STAIRS: AMBULATION DIFFICULTY, REQUIRES EQUIPMENT;AMBULATION DIFFICULTY, ASSISTANCE 1 PERSON
BATHING: 1-->ASSISTANCE NEEDED
ADLS_ACUITY_SCORE: 40
CONCENTRATING,_REMEMBERING_OR_MAKING_DECISIONS_DIFFICULTY: YES
ADLS_ACUITY_SCORE: 39
DRESS: 0-->ASSISTANCE NEEDED (DEVELOPMENTALLY APPROPRIATE)
TRANSFERRING: 1-->ASSISTANCE (EQUIPMENT/PERSON) NEEDED
WEAR_GLASSES_OR_BLIND: YES
ADLS_ACUITY_SCORE: 39
DIFFICULTY_EATING/SWALLOWING: NO
ADLS_ACUITY_SCORE: 39
WALKING_OR_CLIMBING_STAIRS_DIFFICULTY: YES
DOING_ERRANDS_INDEPENDENTLY_DIFFICULTY: YES
TRANSFERRING: 0-->ASSISTANCE NEEDED (DEVELOPMENTALLY APPROPRIATE)
ADLS_ACUITY_SCORE: 39
ADLS_ACUITY_SCORE: 40
TOILETING: 0-->NOT TOILET TRAINED OR ASSISTANCE NEEDED (DEVELOPMENTALLY APPROPRIATE)
DRESSING/BATHING: BATHING DIFFICULTY, ASSISTANCE 1 PERSON
TOILETING: 1-->ASSISTANCE (EQUIPMENT/PERSON) NEEDED
DRESS: 1-->ASSISTANCE (EQUIPMENT/PERSON) NEEDED
DIFFICULTY_COMMUNICATING: NO
DRESSING/BATHING_DIFFICULTY: YES

## 2023-07-21 NOTE — DISCHARGE SUMMARY
Dialysis Discharge Summary Brief    [facility]  Division of Nephrology  Nephrology Discharge Dialysis Orders  Ph: [phone]  Fax: [phone]    Rachele Martínez  MRN: [id number]  YOB: 1940    [facility]:     [place]  [phone] (Work)  [address] [place] [address]    Primary Nephrologist:      Date of Admission: 7/20/2023  Date of Discharge:   Discharge Diagnosis:    ICD-10-CM    1. Generalized weakness  R53.1       2. Hypertension, unspecified type  I10       3. Hallucinations  R44.3           [] New initiation, new dialysis orders will be faxed.    [yes] Resume all previous dialysis orders with exception as noted below    New Orders (if not applicable put NA):  Estimated Dry Weight 55kg   Dialysis Duration 3.5 hrs   Dialysis Access Right arm ABVG   Antibiotics (dose per dialysis, end date) NA           Labs to be drawn at dialysis Monthly ,routine   Other major changes to dialysis prescription (e.g. Dialysate bath, heparin, blood flow rate, etc)   Estimated dry weight 55kg   Medication changes (also fax the unit a copy of the discharge summary)         none     Name of physician completing this form: Reyna Washington MD, MD

## 2023-07-21 NOTE — PROVIDER NOTIFICATION
Paged the team: pt BP is on mid 180's-190 systolic, do you want to give something?Hydralazine will due at 0700, pls advise    Team responded: Ok to give Hydralazine 10 mg early

## 2023-07-21 NOTE — PROGRESS NOTES
Resident/Fellow Attestation   I, Brian Hadfield, DMD, was present with the medical/ROSE student who participated in the service and in the documentation of the note.  I have verified the history and personally performed the physical exam and medical decision making.  I agree with the assessment and plan of care as documented in the note.      Brian Hadfield, DMD  PGY3  Date of Service (when I saw the patient): 07/20/23       [facility]    History and Physical - Medicine Service, [facility] TEAM 2       Date of Admission:  7/20/2023    Assessment & Plan      Rachele Martínez is a 83 year old female admitted on 7/20/2023. She has a history of HTN, Anemia, Hyperlipidemia, Anxiety/ Depression and ESRD and is admitted for dizziness, weakness, HTN and hallucinations that began yesterday afternoon following dialysis.     Changes today:  - Levothyroxine 50 mcg before breakfast daily  - No narcotics and please consult with daughters prior to changing any medications    #Fatigue/ Hallucinations  Pt reports that she is having weird dreams. No auditory hallucinations. Pt believed that we were in a hospital room within a grocery store. Patient was recently discharged on 7/6/23 with declining strength and mental status over course of one month with acute change to non-verbal, not eating, and agitation. Head CT obtained today and negative for acute intracranial pathology. Metabolic evaluation non-revealing. Slight elevation in WBC (11.1). HHx of anemia (Hb 8.6 and hematocrit 30 today). Vitally stable, afebrile, blood pressures ranging in the 180-190's/80's. Exam benign for any focal neurologic deficit, patient is only oriented to person and time. Differential for the fatigue includes uncontrolled hypertension (present today and at dialysis yesterday), medications, metabolic derangements, delirium, uremia, and developing psychiatric conditions. Based on the objective data and HPI as below,  most likely due to uncontrolled hypertension causing hallucinations and fatigue in a frail, elderly patient on chronic HD.   - CTM, repeat labs in AM to evaluate for develop metabolic causes  - Unable to obtain U/A, patient does not make urine (attempted straight cath at last hospitalization, led to severe agitation)  - TSH (11) and freeT4 (0.71) on 7/20/23    ->Levothyroxine 50 mcg soft gel daily for hypothyroidism  - Avoid narcotics, please consult daughters before giving ANY medication  - PT/OT consults  - Delirium precautions  - Fall precautions  - Controlling hypertension as below    #Hypothyroidism  Due to confusion and delirium TSH and free T4 labs obtained on 7/20/23  - TSH (11) and freeT4 (0.71) on 7/20/23    ->Levothyroxine 50 mcg daily for hypothyroidism ordered    #ESRD  Pt undergoes dialysis M/W/F and, per her daughter, never misses apts. BMP is within normal limits with exception of Cr (5.72).   - Nephrology consulted, continue with Amlodipine 5 mg and Losartan 100 mg; Hydralazine 10 mg PRN if SBP>180  - Dialysis M/ W/ F, due for HD today  - Continue PTA Velphoro 500 mg TID with meals    #Elevated Alkaline Phosphatase  Level elevated at 190. All other LFT's/bilirubin WNL.   - US completed     #Hypertension  Presenting with SBP >180, unsure if medications were taken this morning when speaking with both daughters at bedside, Unable to find phone number to TCU at this time.   - Continue amlodipine 5mg and losartan 100 mg   - Hydralazine 10 mg q6h PRN for systolic BP >180, diastolic BP >100     Chronic Disease Management:  # GERD: Continue PTA Pantoprazole 20 mg BID  # Chronic Pain: Continue PTA Lyrica 50 mg daily  # Depression: Continue Zoloft 100 mg daily  # HLD: Continue Atorvastatin 20 mg daily         Diet: Renal Diet (dialysis)    DVT Prophylaxis: Pneumatic Compression Devices  Rodriguez Catheter: Not present  Fluids: none  Lines: None     Cardiac Monitoring: None  Code Status: Full Code Full  "code    Clinically Significant Risk Factors Present on Admission                  # Hypertension: Noted on problem list             Disposition Plan     Expected Discharge Date: 07/22/2023                The patient's care was discussed with Dr. Vergara.    Brian Hadfield, [facility]  Medicine Service, [facility] TEAM [address] [place] [address]  Securely message with [employer] (more info)  Text page via [employer] Paging/Directory   See signed in provider for up to date coverage information  ______________________________________________________________________    Chief Complaint   Hallucinations and fatigue    History is obtained from the patient and patients daughters Charla and Theresa    History of Present Illness   Rachele Martínez is a 83 year old female who has a history of HTN, Anemia, Hyperlipidemia, Anxiety/ Depression and ESRD and is admitted for dizziness, weakness, HTN and hallucinations that began yesterday afternoon following dialysis. Pts daughter, Charla, stated that she called her mother prior to dialysis to make sure she got into the cab and informed her that she will pick her up after the apt. Charla reports that her mother seemed normal on the phone call. Following dialysis they went to [employer] and Rachele was \"to tired to eat even one little taco.\" Rachele was taken home by daughter and went directly to bed at 1600. Rachele reports that she had weird dreams all night and was confused upon awakening this morning as well. Pt informed my during questions that we were in a hospital room within the grocery store. Rachele reports that with these hallucinations she became very combative and \"was cussing people out.\" When asked further about this her daughters stated that this was the last time she was in the hospital when they attempted to straight cath her and that she felt violated. Daughters reports that there have been no medication changes that they are aware of. When asked of she " was producing urine both daughters and Rachele reported that she produces only a few drops. Daughters asked that we give her no narcotics (has HHx of narcotic abuse) and if we do give medications please let both daughters know prior to administration of medications.     Pt was slightly more awake today but reported that she didn't sleep at all last night and that she just wanted to go home. When provider continued with questions pt just gave heavy sigh and turned away. No reported hallucinations overnight but when asked where she was she reported that she was in her apartment.     Obtained care facilities phone number from daughter Charla. Phone number is [phone]. They reported that Hydralazine was NOT a home medication.     ED course:  - Hydralazine 10 mg tab given at 1511      Past Medical History    Past Medical History:   Diagnosis Date     Anemia      Anxiety and depression      Arthritis      AVM (arteriovenous malformation)      Chronic hepatitis C with cirrhosis (H)      Clotting disorder (H)      ESRD (end stage renal disease) (H)     on dialysis     GI bleed     recurrent     Glaucoma      Hyperlipidemia      Hypertension goal BP (blood pressure) < 140/80        Past Surgical History   Past Surgical History:   Procedure Laterality Date     CAPSULE/PILL CAM ENDOSCOPY N/A 3/27/2019    Procedure: Capsule/pill cam endoscopy;  Surgeon: Yosvayn Ram MD;  Location: [facility]     CAPSULE/PILL CAM ENDOSCOPY N/A 2/2/2023    Procedure: IMAGING PROCEDURE, GI TRACT, INTRALUMINAL, VIA CAPSULE;  Surgeon: Mulu Nur DO;  Location: [facility]     COLONOSCOPY N/A 9/4/2015    Procedure: COMBINED COLONOSCOPY, SINGLE OR MULTIPLE BIOPSY/POLYPECTOMY BY BIOPSY;  Surgeon: Rupesh Lopez MD;  Location: [facility]     COLONOSCOPY N/A 9/19/2018    Procedure: COLONOSCOPY;  enteroscopy small bowel  COLONOSCOPY;  Surgeon: Ankit Baries MD;  Location: [facility]     ENTEROSCOPY SMALL BOWEL N/A 3/9/2023    Procedure: antegrade  single balloon enteroscopy with argon plasma coagulation of arteriovenous malformations;  Surgeon: Ankit Baires MD;  Location: [facility]     ESOPHAGOSCOPY, GASTROSCOPY, DUODENOSCOPY (EGD), COMBINED N/A 12/18/2014    Procedure: COMBINED ESOPHAGOSCOPY, GASTROSCOPY, DUODENOSCOPY (EGD);  Surgeon: Betsy Carvajal MD;  Location: [facility]     ESOPHAGOSCOPY, GASTROSCOPY, DUODENOSCOPY (EGD), COMBINED N/A 4/25/2015    Procedure: COMBINED ESOPHAGOSCOPY, GASTROSCOPY, DUODENOSCOPY (EGD);  Surgeon: Yosvany Ram MD;  Location: [facility]     ESOPHAGOSCOPY, GASTROSCOPY, DUODENOSCOPY (EGD), COMBINED N/A 5/5/2015    Procedure: COMBINED ESOPHAGOSCOPY, GASTROSCOPY, DUODENOSCOPY (EGD);  Surgeon: Mariano Mistry MD;  Location: [facility]     HC CAPSULE ENDOSCOPY N/A 9/30/2015    Procedure: CAPSULE/PILL CAM ENDOSCOPY;  Surgeon: Pan Dhaliwal MD;  Location:  GI      VASCULAR SURGERY PROCEDURE UNLIST       HYSTERECTOMY  1980    KYREE     LUMPECTOMY BREAST         Prior to Admission Medications   Prior to Admission Medications   Prescriptions Last Dose Informant Patient Reported? Taking?   Multiple Vitamin-Folic Acid TABS   Yes No   Sig: Take 1 tablet by mouth daily   acetaminophen (TYLENOL) 325 MG tablet   Yes No   Sig: Take 325-650 mg by mouth every 6 hours as needed for mild pain   amLODIPine (NORVASC) 5 MG tablet   Yes No   Sig: Take 5 mg by mouth daily 5 mg orally one time daily   atorvastatin (LIPITOR) 20 MG tablet  Nursing Home No No   Sig: Take 1 tablet (20 mg) by mouth daily   hydrOXYzine (ATARAX) 25 MG tablet  Nursing Home No No   Sig: Take 1 tablet (25 mg) by mouth every 6 hours as needed for itching or anxiety   losartan (COZAAR) 100 MG tablet   No No   Sig: Take 1 tablet (100 mg) by mouth daily   pantoprazole (PROTONIX) 20 MG EC tablet  Nursing Home No No   Sig: Take 1 tablet (20 mg) by mouth 2 times daily (before meals) *take 30-60 minutes before   polyethylene glycol (MIRALAX) 17 GM/Dose powder  Nursing Home Yes  No   Sig: Take 17 g by mouth daily as needed As needed   pregabalin (LYRICA) 50 MG capsule   Yes No   Sig: Take 50 mg by mouth daily 1 capsule by mouth daily in the evening.   sertraline (ZOLOFT) 50 MG tablet  Nursing Home No No   Sig: Take 2 tablets (100 mg) by mouth daily   sucroferric oxyhydroxide (VELPHORO) 500 MG CHEW chewable tablet  Nursing Home Yes No   Sig: Take 500 mg by mouth 2 times daily      Facility-Administered Medications: None        Review of Systems    CONSTITUTIONAL: NEGATIVE for fever, chills, change in weight  EYES: no reported changes in vision, eyes are very heavy  ENT/MOUTH: NEGATIVE for ear, mouth and throat problems  RESP: NEGATIVE for significant cough or SOB  CV: NEGATIVE for chest pain, palpitations. Peripheral edema present two days prior but is no longer present  GI: NEGATIVE for nausea, heartburn, or change in bowel habits. Pt reports abdominal pain when sitting up  MUSCULOSKELETAL: NEGATIVE for significant arthralgias or myalgia  PSYCHIATRIC:  pt endorses visual hallucinations and weird dreams, no auditory hallucinations     Physical Exam   Vital Signs: Temp: 97.8  F (36.6  C) Temp src: Oral BP: (!) 183/84 Pulse: 81   Resp: 18 SpO2: 100 % O2 Device: (P) None (Room air)    Weight: 0 lbs 0 oz    Constitutional: very fatigued, falling in and out of conversation, cooperative, no apparent distress, and appears stated age  Eyes: Lids and lashes normal, extra ocular muscles intact, sclera clear, conjunctiva normal  ENT: Normocephalic, without obvious abnormality, atraumatic, external ears without lesions  Respiratory: No increased work of breathing, good air exchange,   Cardiovascular: Pt is warm and well perfused  GI: soft, non-distended,   Skin: no bruising or bleeding, normal skin color, texture, turgor and no redness, warmth, or swelling  Musculoskeletal: There is no redness, warmth, or swelling of the joints. Pt able to move all extremities.  Neurologic: Pt was very fatigued and was  falling in and out of questioning. Alert to person, place and time. Poor cooperation and kept looking away or not responding during conversation  Neuropsychiatric: General: normal, calm and poor eye contact    Medical Decision Making       Please see A&P for additional details of medical decision making.      Data     I have personally reviewed the following data over the past 24 hrs:    10.4  \   8.9 (L)   / 286     140 99 30.8 (H) /  123 (H)   4.1 24 7.30 (H) \       ALT: 13 AST: 31 AP: 198 (H) TBILI: 0.7   ALB: 3.7 TOT PROTEIN: 7.5 LIPASE: N/A       TSH: 11.00 (H) T4: 0.71 (L) A1C: N/A       Imaging results reviewed over the past 24 hrs:   Recent Results (from the past 24 hour(s))   CT Head w/o Contrast    Narrative    CT HEAD W/O CONTRAST 7/20/2023 12:45 PM    History: Fatigue, hallucinations, hypertension.     Comparison: 7/1/2023, 6/21/2023 and 6/3/2023 head CTs    Technique: Using multidetector thin collimation helical acquisition  technique, axial, coronal and sagittal CT images from the skull base  to the vertex were obtained without intravenous contrast.    Findings:     There is no intracranial hemorrhage, mass effect, or midline shift.  Gray/white matter differentiation in both cerebral hemispheres is  preserved. Ventricles are proportionate to the cerebral sulci. The  basal cisterns are clear.     There is patchy, confluent hypoattenuation within the periventricular  white matter which is nonspecific but most suggestive of chronic small  vessel ischemic disease given the patient's age.    The bony calvaria and the bones of the skull base are normal. The  visualized portions of the paranasal sinuses and mastoid air cells are  clear.       Impression    Impression:  1. No acute intracranial pathology.   2. Age related volume loss and presumable leukoaraiosis, unchanged.     I have personally reviewed the examination and initial interpretation  and I agree with the findings.    TAYLOR BLANCO MD          SYSTEM ID:  [id number]   US Abdomen Limited (RUQ)    Narrative    EXAMINATION: Limited Abdominal Ultrasound, 7/20/2023 5:30 PM     COMPARISON: 6/22/2023    HISTORY: Elevated alkaline phosphatase, cirrhosis    FINDINGS:   Fluid: No evidence of ascites or pleural effusions.    Liver: Coarsened echotexture and mild nodular contour, measuring 17.4  cm in craniocaudal dimension. There is no focal mass.     Gallbladder: There is no wall thickening, pericholecystic fluid,  positive sonographic Baxter's sign. Echogenic stones and sludge are  grossly similar to prior.    Bile Ducts: No significant intrahepatic or extrahepatic biliary  dilation. The common bile duct measures 6.1 mm in diameter.    Pancreas: Visualized portions of the head and body of the pancreas are  unremarkable.     Kidney: The right kidney measures 8.2 cm long. Echogenic parenchyma.  Multiple cystic lesions with largest measuring up to 1.2 cm. There is  no hydronephrosis or hydroureter, no shadowing renal calculi, or mass.        Impression    IMPRESSION:   1.  Cholelithiasis without sonographic evidence of cholecystitis.  2.  Multicystic appearance of right kidney, similar to prior CT.  3.  Cirrhotic appearance of the liver, similar to prior.    I have personally reviewed the examination and initial interpretation  and I agree with the findings.    ALIYA TSE MD         SYSTEM ID:  [id number]

## 2023-07-21 NOTE — MEDICATION SCRIBE - ADMISSION MEDICATION HISTORY
Medication Scribe Admission Medication History    Admission medication history is complete. The information provided in this note is only as accurate as the sources available at the time of the update.    Medication reconciliation/reorder completed by provider prior to medication history? No    Information Source(s): Facility (U/[facility]/) medication list/MAR via MAR    Pertinent Information: contacted [facility] at [phone] and received the MAR in a Fax    Changes made to PTA medication list:    Added: None    Deleted: Losartan 100 mg    Changed: Losartan 50 mg    Medication Affordability:  Not including over the counter (OTC) medications, was there a time in the past 3 months when you did not take your medications as prescribed because of cost?: Unable to Assess    Allergies reviewed with patient and updates made in EHR: yes    Medication History Completed By: Sia Fay 7/21/2023 4:34 PM    Prior to Admission medications    Medication Sig Last Dose Taking? Auth Provider Long Term End Date   acetaminophen (TYLENOL) 325 MG tablet Take 325-650 mg by mouth every 6 hours as needed for mild pain Unknown at unknown Yes Reported, Patient     amLODIPine (NORVASC) 5 MG tablet Take 5 mg by mouth daily 5 mg orally one time daily 7/20/2023 at am Yes Reported, Patient No    atorvastatin (LIPITOR) 20 MG tablet Take 1 tablet (20 mg) by mouth daily 7/20/2023 at am Yes Angel Luis Reno MD Yes    hydrOXYzine (ATARAX) 25 MG tablet Take 1 tablet (25 mg) by mouth every 6 hours as needed for itching or anxiety Unknown at unknown Yes Angel Luis Reno MD     losartan (COZAAR) 50 MG tablet Take 50 mg by mouth daily 7/20/2023 at am Yes Reported, Patient No    Multiple Vitamin-Folic Acid TABS Take 1 tablet by mouth daily 7/20/2023 at am Yes Reported, Patient     pantoprazole (PROTONIX) 20 MG EC tablet Take 1 tablet (20 mg) by mouth 2 times daily (before meals) *take 30-60 minutes before 7/20/2023 at am Yes  Angel Luis Reno MD     polyethylene glycol (MIRALAX) 17 GM/Dose powder Take 17 g by mouth daily as needed As needed Unknown at unknown Yes Reported, Patient     sertraline (ZOLOFT) 50 MG tablet Take 2 tablets (100 mg) by mouth daily 7/20/2023 at am Yes Angel Luis Reno MD Yes    sucroferric oxyhydroxide (VELPHORO) 500 MG CHEW chewable tablet Take 500 mg by mouth 2 times daily 7/20/2023 at am Yes Reported, Patient     pregabalin (LYRICA) 50 MG capsule Take 50 mg by mouth daily 1 capsule by mouth daily in the evening. 7/19/2023 at pm  Reported, Patient Yes

## 2023-07-21 NOTE — PROGRESS NOTES
HEMODIALYSIS TREATMENT NOTE    Date: 7/21/2023  Time: 4:07 PM    Data:  Weight change:  1.5  kg  Ultrafiltration - Post Run Net Total Removed (mL): 1550 mL  Vascular Access Status: patent  Dialyzer Rinse: Streaked, Light  Total Blood Volume Processed: 87.45 L Liters  Total Dialysis (Treatment) Time: 3.5 Hours    Lab:   No    Interventions:  Epoetin  Venofer  AVG cannulated 15 gauge needles  Needles removed  Pressure dressing applied    Assessment:  Pt ran for 3.5 hrs on a K3/ Ca 2.25 bath with a /  and 1.3 L pulled. RAVG positive for T/B. Pt hypertensive at start of tx, BP decreased with fluid removal, SBP 85, UF off for rest of run, 75 NS with no further issues. Pt rinsed back and hemostasis achieved in about 10 mins. Pt alert and oriented x4. Report given to PCN.     Plan:    Per renal team

## 2023-07-21 NOTE — PROGRESS NOTES
Nephrology Progress Note  07/21/2023         Assessment & Recommendations:   Rachele Martínez is a 83 year old female admitted on 7/20/2023. She has ESRD on HD since 7 years.She has also Hypertension ,hyperlipidemia and anxiety/depression .  Felt weak and dizzy after  her regular dialysis yesterday .Which has persisted afterwards and has some hallucination .    # ESRD on HD   She is on M/W/F Dialysis schedule.Her dry weight used to be 54 Kg.weight on admisson 57.8.Seems to have some fluid excess .Had Isolated UF of 2000ml yesterday .BP a little better but still high .She has also +1 lower limb edema .She still looks fluid overloaded.    - Challenge her dry weight ,bring down to 52.7 .    - HD today with UF 3000ml.    - Maintain regular RRT on M/W/F.      # Hypertension     Her BP a little better but remain in higher range ( -170) despite isolated UF last night .Current medication is amlodipine 5 ,losartan 100mg and hydralazine 10mg prn 6 hourly.       - More UF during dialysis today      - If BP not improved ,consider adding another agent ( b-blocker )    - Continue antihypertensive medication .    # Anemia of CKD       HB - 8.6 .iron store is adequate          # ?Hypothyroidism         - TSH- 11,free 0.7 ,she is not on any medication ,perhaps this might contribute to her changes .         - May needs treatment                Recommendations were communicated to primary team via    Seen and discussed with Dr. Dunia Washington MD   Division of Renal Disease and Hypertension  [facility]  myairmail  Vocera Web Console      Interval History :       ESRD on HD ,uncontrolled Hypertension.Mental status clear today ,oriented to time and place.has tolerated the UF 2000ml with no problem.BP was high and required hydralazine 10 mg iv bolus at 7:00am .          Review of Systems:   I reviewed the following systems:  GI: poor appetite. no nausea or vomiting or diarrhea.   Neuro:  no confusion  Constitutional:  no  fever or chills  CV: no dyspnea or edema.  no chest pain.    Physical Exam:   I/O last 3 completed shifts:  In: 0   Out: 2000 [Other:2000]   /64   Pulse 64   Temp 97.7  F (36.5  C) (Oral)   Resp 17   SpO2 98%      GENERAL APPEARANCE: stable  EYES:  no scleral icterus, pupils equal  HENT: mouth without ulcers or lesions  PULM: lungs clear to auscultation bilaterally, equal air movement,  CV: regular rhythm, normal rate, no rub     -edema +1    GI: soft, non tender, not distended  INTEGUMENT: no cyanosis, no rash  NEURO:  no asterixis   Access - Right arm AVG    Labs:   All labs reviewed by me  Electrolytes/Renal - Recent Labs   Lab Test 07/21/23  0537 07/20/23  1110 07/05/23  0638 07/03/23  0552    138 133* 135*   POTASSIUM 4.1 4.4 4.7 4.7   CHLORIDE 99 99 94* 95*   CO2 24 27 25 22   BUN 30.8* 21.5 70.9* 65.2*   CR 7.30* 5.72* 6.42* 8.09*   * 130* 99 110*   BELGICA 9.2 9.1 9.0 9.3   MAG  --  2.1 2.1 2.2   PHOS 2.5 2.2* 3.7 3.3       CBC -   Recent Labs   Lab Test 07/21/23  0537 07/20/23  1110 07/06/23  0601   WBC 10.4 11.1* 8.3   HGB 8.9* 8.6* 8.2*    289 279       LFTs -   Recent Labs   Lab Test 07/21/23  0537 07/20/23  1110 07/05/23  0638   ALKPHOS 198* 190* 169*   BILITOTAL 0.7 0.6 0.4   ALT 13 19 12   AST 31 44 28   PROTTOTAL 7.5 7.6 6.8   ALBUMIN 3.7 3.8 3.5       Iron Panel -   Recent Labs   Lab Test 07/05/23  1439 06/20/23  1122 06/02/23  0942   IRON 44 36* 62   IRONSAT 23 17 31   KASSI  --  600* 222         Imaging:  All imaging studies reviewed by me.     Current Medications:    amLODIPine  5 mg Oral Daily     atorvastatin  20 mg Oral Daily     epoetin christofer-epbx  4,000 Units Intravenous Once per day on Mon Wed Fri     iron sucrose  100 mg Intravenous Once in dialysis/CRRT     losartan  100 mg Oral Daily     multivitamin w/minerals  1 tablet Oral Daily     pantoprazole  20 mg Oral BID AC     pregabalin  50 mg Oral Daily     sertraline  100 mg Oral Daily     sodium chloride (PF)  3 mL  Intracatheter Q8H     sucroferric oxyhydroxide  500 mg Oral TID w/meals       Reyna Washington MD

## 2023-07-21 NOTE — UTILIZATION REVIEW
Admission Status; Secondary Review Determination   Under the authority of the Utilization Management Committee, the utilization review process indicated a secondary review on Rachele Martínez. The review outcome is based on review of the medical records, discussions with staff, and applying clinical experience noted on the date of the review.   (x) Inpatient Status Appropriate - This patient's medical care is consistent with medical management for inpatient care and reasonable inpatient medical practice.     RATIONALE FOR DETERMINATION   Rachele Martínez is an 83 yr old female who presented on 7/20/23 to ED.  Hx HTN, anemia, HLD, anxiety and depression and ESRD on HD.  She presented with dizziness/weakness and hallucinations/weird dreams.  She has HTN with presenting SBP>180.  BP continues elevated despite amlodipine, losartan and receiving hydralazine IV every 4-6 hours.  Nephrology evaluated and deemed to be volume overload and 4kg above dry weight.  She received UF run for 1500-2000ml on 7/20 and ongoing HD with possible UF again today.  She continues with elevated BP suspect secondary to the volume overload.   This is a conditional review for a Medicare patient, at the time of this review, patient is anticipated to require at least 1 more night of hospital care, however if plan changes and discharged before 2 midnights please send for a post discharge review.  At the time of admission with the information available to the attending physician more than 2 nights Hospital complex care was anticipated, based on patient risk of adverse outcome if treated as outpatient and complex care required. Inpatient admission is appropriate based on the Medicare guidelines.   The information on this document is developed by the utilization review team in order for the business office to ensure compliance. This only denotes the appropriateness of proper admission status and does not reflect the quality of care rendered.   The  definitions of Inpatient Status and Observation Status used in making the determination above are those provided in the CMS Coverage Manual, Chapter 1 and Chapter 6, section 70.4.   Sincerely,   Argentina Wagner MD  Utilization Review  Physician Advisor  [facility]

## 2023-07-21 NOTE — PROGRESS NOTES
Transfer Type: [facility]  Transfer Triage Note    Originating unit:[facility] ED    Final intended location for transfer: [facility] surg or IMC, or ICU    Tele required:  No    Time of admission request: 9:55am    Anticipated to be boarding in ED for >4 hours? No    Patient added to Interhospital transfer list?  Yes    Brief case description:     See today's progress note from rounding team.    Viry Rodriguez MD

## 2023-07-22 ENCOUNTER — APPOINTMENT (OUTPATIENT)
Dept: OCCUPATIONAL THERAPY | Facility: CLINIC | Age: 83
DRG: 124 | End: 2023-07-22
Payer: MEDICARE

## 2023-07-22 LAB
ANION GAP SERPL CALCULATED.3IONS-SCNC: 13 MMOL/L (ref 7–15)
BASOPHILS # BLD AUTO: 0.1 10E3/UL (ref 0–0.2)
BASOPHILS NFR BLD AUTO: 1 %
BUN SERPL-MCNC: 17.7 MG/DL (ref 8–23)
CALCIUM SERPL-MCNC: 9.1 MG/DL (ref 8.8–10.2)
CHLORIDE SERPL-SCNC: 95 MMOL/L (ref 98–107)
CREAT SERPL-MCNC: 4.81 MG/DL (ref 0.51–0.95)
DEPRECATED HCO3 PLAS-SCNC: 28 MMOL/L (ref 22–29)
EOSINOPHIL # BLD AUTO: 0.5 10E3/UL (ref 0–0.7)
EOSINOPHIL NFR BLD AUTO: 6 %
ERYTHROCYTE [DISTWIDTH] IN BLOOD BY AUTOMATED COUNT: 15.8 % (ref 10–15)
GFR SERPL CREATININE-BSD FRML MDRD: 8 ML/MIN/1.73M2
GLUCOSE SERPL-MCNC: 117 MG/DL (ref 70–99)
HCT VFR BLD AUTO: 31.6 % (ref 35–47)
HGB BLD-MCNC: 9.7 G/DL (ref 11.7–15.7)
IMM GRANULOCYTES # BLD: 0 10E3/UL
IMM GRANULOCYTES NFR BLD: 0 %
LYMPHOCYTES # BLD AUTO: 0.7 10E3/UL (ref 0.8–5.3)
LYMPHOCYTES NFR BLD AUTO: 9 %
MCH RBC QN AUTO: 30.3 PG (ref 26.5–33)
MCHC RBC AUTO-ENTMCNC: 30.7 G/DL (ref 31.5–36.5)
MCV RBC AUTO: 99 FL (ref 78–100)
MONOCYTES # BLD AUTO: 1.3 10E3/UL (ref 0–1.3)
MONOCYTES NFR BLD AUTO: 15 %
NEUTROPHILS # BLD AUTO: 5.9 10E3/UL (ref 1.6–8.3)
NEUTROPHILS NFR BLD AUTO: 69 %
NRBC # BLD AUTO: 0 10E3/UL
NRBC BLD AUTO-RTO: 0 /100
PLATELET # BLD AUTO: 275 10E3/UL (ref 150–450)
POTASSIUM SERPL-SCNC: 4.3 MMOL/L (ref 3.4–5.3)
RBC # BLD AUTO: 3.2 10E6/UL (ref 3.8–5.2)
SODIUM SERPL-SCNC: 136 MMOL/L (ref 136–145)
WBC # BLD AUTO: 8.5 10E3/UL (ref 4–11)

## 2023-07-22 PROCEDURE — 97166 OT EVAL MOD COMPLEX 45 MIN: CPT | Mod: GO

## 2023-07-22 PROCEDURE — 80048 BASIC METABOLIC PNL TOTAL CA: CPT | Performed by: INTERNAL MEDICINE

## 2023-07-22 PROCEDURE — 250N000013 HC RX MED GY IP 250 OP 250 PS 637: Performed by: STUDENT IN AN ORGANIZED HEALTH CARE EDUCATION/TRAINING PROGRAM

## 2023-07-22 PROCEDURE — 97535 SELF CARE MNGMENT TRAINING: CPT | Mod: GO

## 2023-07-22 PROCEDURE — 250N000013 HC RX MED GY IP 250 OP 250 PS 637

## 2023-07-22 PROCEDURE — 85025 COMPLETE CBC W/AUTO DIFF WBC: CPT | Performed by: INTERNAL MEDICINE

## 2023-07-22 PROCEDURE — 99207 PR CDG-CUT & PASTE-POTENTIAL IMPACT ON LEVEL: CPT | Performed by: INTERNAL MEDICINE

## 2023-07-22 PROCEDURE — 99232 SBSQ HOSP IP/OBS MODERATE 35: CPT | Performed by: INTERNAL MEDICINE

## 2023-07-22 PROCEDURE — 36415 COLL VENOUS BLD VENIPUNCTURE: CPT | Performed by: INTERNAL MEDICINE

## 2023-07-22 PROCEDURE — 120N000002 HC R&B MED SURG/OB UMMC

## 2023-07-22 RX ADMIN — SUCROFERRIC OXYHYDROXIDE 500 MG: 500 TABLET, CHEWABLE ORAL at 07:45

## 2023-07-22 RX ADMIN — PREGABALIN 50 MG: 50 CAPSULE ORAL at 07:43

## 2023-07-22 RX ADMIN — LOSARTAN POTASSIUM 100 MG: 50 TABLET, FILM COATED ORAL at 07:53

## 2023-07-22 RX ADMIN — AMLODIPINE BESYLATE 5 MG: 5 TABLET ORAL at 07:43

## 2023-07-22 RX ADMIN — ATORVASTATIN CALCIUM 20 MG: 20 TABLET, FILM COATED ORAL at 07:44

## 2023-07-22 RX ADMIN — SUCROFERRIC OXYHYDROXIDE 500 MG: 500 TABLET, CHEWABLE ORAL at 11:06

## 2023-07-22 RX ADMIN — Medication 1 TABLET: at 07:43

## 2023-07-22 RX ADMIN — SUCROFERRIC OXYHYDROXIDE 500 MG: 500 TABLET, CHEWABLE ORAL at 18:56

## 2023-07-22 RX ADMIN — SERTRALINE HYDROCHLORIDE 100 MG: 100 TABLET ORAL at 07:43

## 2023-07-22 RX ADMIN — LEVOTHYROXINE SODIUM 50 MCG: 25 TABLET ORAL at 07:43

## 2023-07-22 RX ADMIN — PANTOPRAZOLE SODIUM 20 MG: 20 TABLET, DELAYED RELEASE ORAL at 07:43

## 2023-07-22 RX ADMIN — PANTOPRAZOLE SODIUM 20 MG: 20 TABLET, DELAYED RELEASE ORAL at 16:08

## 2023-07-22 ASSESSMENT — ACTIVITIES OF DAILY LIVING (ADL)
ADLS_ACUITY_SCORE: 40
DEPENDENT_IADLS:: COOKING;CLEANING;LAUNDRY;SHOPPING;MEAL PREPARATION;MEDICATION MANAGEMENT;MONEY MANAGEMENT;TRANSPORTATION
ADLS_ACUITY_SCORE: 46
ADLS_ACUITY_SCORE: 40

## 2023-07-22 NOTE — PROGRESS NOTES
6MS ADMISSION    D: Patient admitted/transferred from  [place] ED via stretch for Hallucination.     I: Upon arrival to the unit patient was oriented to room, unit, and call light. Patient s height, weight, and vital signs were obtained. Allergies reviewed and allergy band applied. Provider notified of patient s arrival on the unit. Adult AVS completed. Head to toe assessment completed. Education assessment completed. Care plan initiated.    A: Vital signs stable upon admission. Patient rates pain at 7/10. Two RN skin assessment completed Yes. Second RN was Cess. Significant Skin Findings include dry and flaky. [facility] Nurse Consult Ordered No. Bed Algorithm can be found in PCS flow sheets (Support Surface Algorithm) and on IP [facility] NURSE RESOURCE TAB, was this used during this assessment? No. Was a pulsate mattress ordered No.    P: Continue to monitor patient s for increase confusion, vitals, and intervene as needed. Continue with plan of care. Notify provider with any concerns or changes in patient status.

## 2023-07-22 NOTE — PROGRESS NOTES
Brief Patient Acceptance Note     Transferred from  to  for further workup and management of hallucinations and AMS. Last HD was overnight from 7/20-7/21.      She had no concerns on transfer. She appeared well on exam and was warm and well-perfused with comfortable work of breathing. She was awake and alert.     Plan per today's progress note by Tianna Ibrahim and Ursula. No changes at this time.       Indy Hood MD, MPH  Internal Medicine-Pediatrics [facility]

## 2023-07-22 NOTE — PROGRESS NOTES
"   07/22/23 1010   Appointment Info   Signing Clinician's Name / Credentials (OT) Kathrin Martell, OTR/L   Living Environment   People in Home alone   Current Living Arrangements apartment   Home Accessibility no concerns   Living Environment Comments no stairs, building has elevator, walk-in shower   Self-Care   Usual Activity Tolerance moderate   Current Activity Tolerance poor   Equipment Currently Used at Home shower chair;grab bar, toilet;grab bar, tub/shower;walker, rolling;other (see comments)  (scooter)   Fall history within last six months yes   Number of times patient has fallen within last six months 1   Activity/Exercise/Self-Care Comment ind with ADLs/IADLs, uses FWW at home and motorized scooter to go to the mailbox   General Information   Onset of Illness/Injury or Date of Surgery 07/20/23   Referring Physician Dr. Vergara   Patient/Family Therapy Goal Statement (OT) to walk and \"do what I got to do\"   Additional Occupational Profile Info/Pertinent History of Current Problem per chart, Rachele Martínez is a 83 year old female admitted on 7/20/2023. She has a history of HTN, Anemia, Hyperlipidemia, Anxiety/ Depression and ESRD and is admitted for dizziness, weakness, HTN and hallucinations that began yesterday afternoon following dialysis.   Existing Precautions/Restrictions fall   Limitations/Impairments safety/cognitive   Left Upper Extremity (Weight-bearing Status) full weight-bearing (FWB)   Right Upper Extremity (Weight-bearing Status) full weight-bearing (FWB)   Left Lower Extremity (Weight-bearing Status) full weight-bearing (FWB)   Right Lower Extremity (Weight-bearing Status) full weight-bearing (FWB)   General Observations and Info Pt reports \"I feel drunk, but I'm still waking up\"   Cognitive Status Examination   Orientation Status person;place   Affect/Mental Status (Cognitive) WFL   Follows Commands follows one-step commands;50-74% accuracy;increased processing time needed;delayed " response/completion;repetition of directions required;verbal cues/prompting required   Safety Deficit impulsivity;insight into deficits/self-awareness   Visual Perception   Visual Impairment/Limitations corrective lenses full-time   Sensory   Sensory Quick Adds bilateral LE   Sensory Comments n/t in bilateral toes   Pain Assessment   Patient Currently in Pain No   Posture   Posture forward head position   Range of Motion Comprehensive   General Range of Motion no range of motion deficits identified   Strength Comprehensive (MMT)   Comment, General Manual Muscle Testing (MMT) Assessment generalized weakness   Muscle Tone Assessment   Muscle Tone Quick Adds No deficits were identified   Coordination   Upper Extremity Coordination No deficits were identified   Bed Mobility   Bed Mobility supine-sit   Supine-Sit Immaculata (Bed Mobility) contact guard   Transfers   Transfers bed-chair transfer;sit-stand transfer;toilet transfer   Transfer Skill: Bed to Chair/Chair to Bed   Bed-Chair Immaculata (Transfers) contact guard   Sit-Stand Transfer   Sit-Stand Immaculata (Transfers) contact guard   Toilet Transfer   Immaculata Level (Toilet Transfer) minimum assist (75% patient effort)   Balance   Balance Assessment sitting balance: static;sitting balance: dynamic;sit to stand balance;standing balance: static;standing balance: dynamic   Activities of Daily Living   BADL Assessment/Intervention lower body dressing;toileting   Lower Body Dressing Assessment/Training   Immaculata Level (Lower Body Dressing) contact guard assist   Grooming Assessment/Training   Immaculata Level (Grooming) contact guard assist   Toileting   Immaculata Level (Toileting) contact guard assist   Clinical Impression   Criteria for Skilled Therapeutic Interventions Met (OT) Yes, treatment indicated   OT Diagnosis decreased ADL independence   Influenced by the following impairments AMS   OT Problem List-Impairments impacting ADL problems  "related to;activity tolerance impaired;cognition;strength;mobility   Assessment of Occupational Performance 3-5 Performance Deficits   Identified Performance Deficits dressing, toileting, bed mob, func mob   Planned Therapy Interventions (OT) ADL retraining;cognition   Clinical Decision Making Complexity (OT) moderate complexity   Anticipated Equipment Needs Upon Discharge (OT)   (reacher)   Risk & Benefits of therapy have been explained evaluation/treatment results reviewed   OT Total Evaluation Time   OT Eval, Moderate Complexity Minutes (40156) 10   OT Goals   Therapy Frequency (OT) 5 times/wk   OT Predicted Duration/Target Date for Goal Attainment 07/29/23   OT: Hygiene/Grooming modified independent   OT: Lower Body Dressing Supervision/stand-by assist   OT: Lower Body Bathing Supervision/stand-by assist   OT: Bed Mobility Modified independent   OT: Transfer Supervision/stand-by assist   OT: Toilet Transfer/Toileting Supervision/stand-by assist   OT: Cognitive Patient/caregiver will verbalize understanding of cognitive assessment results/recommendations as needed for safe discharge planning   Interventions   Interventions Quick Adds Self-Care/Home Management   Self-Care/Home Management   Self-Care/Home Mgmt/ADL, Compensatory, Meal Prep Minutes (15124) 25   Symptoms Noted During/After Treatment (Meal Preparation/Planning Training) fatigue   Treatment Detail/Skilled Intervention Pt supine in bed upon OT arrival and agreeable to therapy. Educ on role of OT. Pt CGA for supine to sit bed mob with Min A to maintain dynamic sitting unsupported at EOB. Pt reports \"I feel drunk, but I'm still waking up\". Pt CGA for STS, bed, and chair transfers. v/c for hand placement and walker safety. Pt Min A for toilet transfer with FWW. Pt experienced LOB backward during toilet transfer, balance corrected by therapist. v/c for hand placement and walker safety. Pt CGA for toileting including seated cal care and standing clothing " management. v/c for task initiation and repetition of directions. Pt CGA for LB dressing to don socks while sitting EOB, doff/don brief while sitting on toilet and standing with FWW. Pt CGA for standing g/h at sink with FWW. Pt left in chair with all needs within reach and chair alarm on.   OT Discharge Planning   OT Plan transfers, standing activity tolerance, calisthenics standing   OT Discharge Recommendation (DC Rec) Transitional Care Facility;home with assist   OT Rationale for DC Rec Pt below ADL baseline ADL baseline and would benefit from continued skilled OT to increase ADL independence and activity tolerance. Pt would benefit from TCU stay to increase activity tolerance, ADL independece and safety d/t AMS. Pt may be able to d/c if near ADL baseline and cleared AMS before d/c.   OT Brief overview of current status CGA func mob, CGA for ADLs   Total Session Time   Timed Code Treatment Minutes 25   Total Session Time (sum of timed and untimed services) 35

## 2023-07-22 NOTE — PLAN OF CARE
Goal Outcome Evaluation:      Plan of Care Reviewed With: patient    Shift: 9953-1394    VS: BP (!) 166/68 (BP Location: Left arm)   Pulse 73   Temp 98.2  F (36.8  C) (Oral)   Resp 18   SpO2 97%     Pain:  Denies pain  Neuro: A&Ox4, intermittent forgetfulness  Resp: WDL, denies SOB/chest pain.   Diet: Renal diet  Skin: Dry and flaky, skin intact  LDA: L piv, SL  Activity: Assist of 1 with walker, GB  Output: LBM today, continent of B&B- anuria due to hemodialysis.     Call light within reach     Plan:   Continue with plan of care

## 2023-07-22 NOTE — PROGRESS NOTES
[facility]    Medicine Progress Note - Hospitalist Service, GOLD TEAM 17    Date of Admission:  7/20/2023    Assessment & Plan     83 year old female admitted on 7/20/2023. She has a history of HTN, Anemia, Hyperlipidemia, Anxiety/ Depression and ESRD and is admitted for dizziness, weakness, HTN and hallucinations that began yesterday afternoon following dialysis.      ##Events on 7/22/2023:  Patient seems to be on baseline mental status this morning.  However if she is having delirium it could have fluctuating effect on mental status.  Patient does not seem to have any GI,  or respiratory signs of infection.  Discussed with daughter on the phone, agrees that patient seems to be at her baseline mental status.  CBC BMP pending     # Fatigue/ Hallucinations:  Pt reports that she is having weird dreams. No auditory hallucinations. Pt believed that we were in a hospital room within a grocery store. Patient was recently discharged on 7/6/23 with declining strength and mental status over course of one month with acute change to non-verbal, not eating, and agitation. Head CT obtained today and negative for acute intracranial pathology. Metabolic evaluation non-revealing. Slight elevation in WBC (11.1). HHx of anemia (Hb 8.6 and hematocrit 30 today). Vitally stable, afebrile, blood pressures ranging in the 180-190's/80's. Exam benign for any focal neurologic deficit, patient is only oriented to person and time. Differential for the fatigue includes uncontrolled hypertension (present today and at dialysis yesterday), medications, metabolic derangements, delirium, uremia, and developing psychiatric conditions. Based on the objective data and HPI as below, most likely due to uncontrolled hypertension causing hallucinations and fatigue in a frail, elderly patient on chronic HD.   *On 7/2022/2023, patient seems to be at her baseline mental status.  Patient was not hallucinating.  -  Unable to obtain U/A, patient does not make urine (attempted straight cath at last hospitalization, led to severe agitation)  - TSH (11) and freeT4 (0.71) on 7/20/23               ->Levothyroxine 50 mcg soft gel daily for hypothyroidism  - Avoid narcotics, please consult daughters before giving ANY medication  - PT/OT consults  - Delirium precautions  - Fall precautions  - Controlling hypertension as below     # Hypothyroidism:  Due to confusion and delirium TSH and free T4 labs obtained on 7/20/23  - TSH (11) and freeT4 (0.71) on 7/20/23               ->Levothyroxine 50 mcg daily for hypothyroidism ordered     # ESRD:  Pt undergoes dialysis M/W/F and, per her daughter, never misses apts. BMP is within normal limits with exception of Cr (5.72).   - Nephrology consulted, continue with Amlodipine 5 mg and Losartan 100 mg; Hydralazine 10 mg PRN if SBP>180  - Dialysis M/ W/ F, due for HD today  - Continue PTA Velphoro 500 mg TID with meals     # Elevated Alkaline Phosphatase:  Level elevated at 190. All other LFT's/bilirubin WNL.   - US completed     # Hypertension:  Presenting with SBP >180, unsure if medications were taken this morning when speaking with both daughters at bedside, Unable to find phone number to TCU at this time.  *Blood pressure is controlled this morning  - Continue amlodipine 5mg and losartan 100 mg   - Hydralazine 10 mg q6h PRN for systolic BP >180, diastolic BP >100      Chronic Disease Management:  # GERD: Continue PTA Pantoprazole 20 mg BID  # Chronic Pain: Continue PTA Lyrica 50 mg daily  # Depression: Continue Zoloft 100 mg daily  # HLD: Continue Atorvastatin 20 mg daily            Diet: Renal Diet (dialysis)    DVT Prophylaxis: Pneumatic Compression Devices  Rodriguez Catheter: Not present  Fluids: none  Lines: None     Cardiac Monitoring: None  Code Status: Full Code Full code         Clinically Significant Risk Factors                  # Hypertension: Noted on problem list                  Disposition Plan     Expected Discharge Date: 07/22/2023      Destination: nursing home            Leon Santizo MD  Hospitalist Service, GOLD TEAM 17  [facility]  Securely message with [employer] (more info)  Text page via [employer] Paging/Directory   See signed in provider for up to date coverage information  ______________________________________________________________________    Interval History     Overnight events reviewed.  Handoff taken.  Patient interviewed and examined with daughter on the phone.  Patient was not entirely sure why she was in the hospital.  On discussion with daughter, she was sent because she was seeing things.  Patient this morning denies seeing anything.  She denies any chest pain, shortness of breath, lightheadedness or dizziness.  On discussion with daughter, patient seems to be at her baseline mental status.      Physical Exam   Vital Signs: Temp: 98.2  F (36.8  C) Temp src: Oral BP: 137/66 Pulse: 75   Resp: 18 SpO2: 100 % O2 Device: None (Room air)    Weight: 0 lbs 0 oz    General Appearance: Laying in bed in no acute distress  Respiratory: Respiratory clear to auscultation  Cardiovascular: S1, S2 normal  GI: Soft, nontender, bowel sounds positive  Skin: No obvious rashes  Neuro: Alert awake and oriented to self nonfocal    Medical Decision Making       **CLEAR ALL SELECTIONS**      Data   ------------------------- PAST 24 HR DATA REVIEWED -----------------------------------------------        Imaging results reviewed over the past 24 hrs:   No results found for this or any previous visit (from the past 24 hour(s)).

## 2023-07-22 NOTE — PROGRESS NOTES
Daughter Charla stated that no new medication should be added to her mother's medication until she or her sister is notify. Provider notify about family wishes.

## 2023-07-22 NOTE — CONSULTS
Care Management Initial Consult    General Information  Assessment completed with: Rachele Dixon  Type of CM/SW Visit: Initial Assessment    Primary Care Provider verified and updated as needed:     Readmission within the last 30 days: current reason for admission unrelated to previous admission      Reason for Consult: discharge planning  Advance Care Planning: Advance Care Planning Reviewed: no concerns identified          Communication Assessment  Patient's communication style: spoken language (English or Bilingual)    Hearing Difficulty or Deaf: no   Wear Glasses or Blind: yes    Cognitive  Cognitive/Neuro/Behavioral: WDL  Level of Consciousness: intermittent confusion  Arousal Level: opens eyes spontaneously  Orientation: oriented x 4  Mood/Behavior: calm, cooperative  Best Language: 0 - No aphasia  Speech: clear, spontaneous    Living Environment:   People in home: alone, other (see comments) ([facility])     Current living Arrangements: residential facility      Able to return to prior arrangements: yes       Family/Social Support:  Care provided by: child(lake) (TCU staff)  Provides care for: no one, unable/limited ability to care for self     Children          Description of Support System: Supportive, Involved         Current Resources:   Patient receiving home care services: No     Community Resources: [facility] (Elderly waiver)  Equipment currently used at home: shower chair, grab bar, toilet, walker, rolling  Supplies currently used at home: Diabetic Supplies    Employment/Financial:  Employment Status: retired        Financial Concerns: No concerns identified   Referral to Financial Worker: No       Does the patient's insurance plan have a 3 day qualifying hospital stay waiver?  No    Lifestyle & Psychosocial Needs:  Social Determinants of Health     Tobacco Use: Medium Risk (6/20/2023)    Patient History      Smoking Tobacco Use: Former      Smokeless Tobacco Use: Never      Passive  "Exposure: Not on file   Alcohol Use: Not on file   Financial Resource Strain: Not on file   Food Insecurity: Not on file   Transportation Needs: Not on file   Physical Activity: Not on file   Stress: Not on file   Social Connections: Not on file   Intimate Partner Violence: Not on file   Depression: Not at risk (1/31/2023)    PHQ-2      PHQ-2 Score: 0   Housing Stability: Not on file       Functional Status:  Prior to admission patient needed assistance:   Dependent ADLs: Ambulation-walker  Dependent IADLs: Cooking, Cleaning, Laundry, Shopping, Meal Preparation, Medication Management, Money Management, Transportation (Daughter- Charla assists with finances)       Mental Health Status:  Patient was confused about this and was unsure if she had any mental health concerns.          Chemical Dependency Status: She denied any concerns about this.    Values/Beliefs:  Spiritual, Cultural Beliefs, Confucianism Practices, Values that affect care: no               Additional Information:    Per hospitalist- \"83 year old female admitted on 7/20/2023. She has a history of HTN, Anemia, Hyperlipidemia, Anxiety/ Depression and ESRD and is admitted for dizziness, weakness, HTN and hallucinations that began yesterday afternoon following dialysis.\"      Writer met with patient at bedside to complete the CMA. She stated she was living at her own home before but appeared confused to where she was exactly. She stated her family is supportive and writer can talk with her daughter Charla if there are any questions. She has 3 kids. She stated she received some help at home previously.     She stated she is retired and gets social security for income. She stated she gets confused easily. She denied any concerns regarding chemical dependency.    Writer called [facility], where patient was living before. Previous notes state she was there before but it is unclear if she came from the TCU or came from her home. They were unsure if " she had a bed hold so requested writer call back on Monday.    Contacts:    [facility]  PH for admissions-  PH: [phone]  F: [phone]    ANTONIO Castro, [facility]  6 Med Surg   [facility]  Phone: [phone]  Pager: [phone]

## 2023-07-23 ENCOUNTER — APPOINTMENT (OUTPATIENT)
Dept: OCCUPATIONAL THERAPY | Facility: CLINIC | Age: 83
DRG: 124 | End: 2023-07-23
Payer: MEDICARE

## 2023-07-23 LAB
ANION GAP SERPL CALCULATED.3IONS-SCNC: 14 MMOL/L (ref 7–15)
BASOPHILS # BLD AUTO: 0.1 10E3/UL (ref 0–0.2)
BASOPHILS NFR BLD AUTO: 1 %
BUN SERPL-MCNC: 30.3 MG/DL (ref 8–23)
CALCIUM SERPL-MCNC: 9.1 MG/DL (ref 8.8–10.2)
CHLORIDE SERPL-SCNC: 97 MMOL/L (ref 98–107)
CREAT SERPL-MCNC: 6.33 MG/DL (ref 0.51–0.95)
DEPRECATED HCO3 PLAS-SCNC: 26 MMOL/L (ref 22–29)
EOSINOPHIL # BLD AUTO: 0.6 10E3/UL (ref 0–0.7)
EOSINOPHIL NFR BLD AUTO: 8 %
ERYTHROCYTE [DISTWIDTH] IN BLOOD BY AUTOMATED COUNT: 15.4 % (ref 10–15)
GFR SERPL CREATININE-BSD FRML MDRD: 6 ML/MIN/1.73M2
GLUCOSE SERPL-MCNC: 106 MG/DL (ref 70–99)
HCT VFR BLD AUTO: 28.9 % (ref 35–47)
HGB BLD-MCNC: 8.7 G/DL (ref 11.7–15.7)
IMM GRANULOCYTES # BLD: 0 10E3/UL
IMM GRANULOCYTES NFR BLD: 0 %
LYMPHOCYTES # BLD AUTO: 0.8 10E3/UL (ref 0.8–5.3)
LYMPHOCYTES NFR BLD AUTO: 10 %
MCH RBC QN AUTO: 29 PG (ref 26.5–33)
MCHC RBC AUTO-ENTMCNC: 30.1 G/DL (ref 31.5–36.5)
MCV RBC AUTO: 96 FL (ref 78–100)
MONOCYTES # BLD AUTO: 1.1 10E3/UL (ref 0–1.3)
MONOCYTES NFR BLD AUTO: 15 %
NEUTROPHILS # BLD AUTO: 4.9 10E3/UL (ref 1.6–8.3)
NEUTROPHILS NFR BLD AUTO: 66 %
NRBC # BLD AUTO: 0 10E3/UL
NRBC BLD AUTO-RTO: 0 /100
PHOSPHATE SERPL-MCNC: 3.6 MG/DL (ref 2.5–4.5)
PLATELET # BLD AUTO: 287 10E3/UL (ref 150–450)
POTASSIUM SERPL-SCNC: 4.3 MMOL/L (ref 3.4–5.3)
RBC # BLD AUTO: 3 10E6/UL (ref 3.8–5.2)
SODIUM SERPL-SCNC: 137 MMOL/L (ref 136–145)
WBC # BLD AUTO: 7.5 10E3/UL (ref 4–11)

## 2023-07-23 PROCEDURE — 97535 SELF CARE MNGMENT TRAINING: CPT | Mod: GO

## 2023-07-23 PROCEDURE — 36415 COLL VENOUS BLD VENIPUNCTURE: CPT | Performed by: INTERNAL MEDICINE

## 2023-07-23 PROCEDURE — 97530 THERAPEUTIC ACTIVITIES: CPT | Mod: GO

## 2023-07-23 PROCEDURE — 250N000013 HC RX MED GY IP 250 OP 250 PS 637: Performed by: STUDENT IN AN ORGANIZED HEALTH CARE EDUCATION/TRAINING PROGRAM

## 2023-07-23 PROCEDURE — 80048 BASIC METABOLIC PNL TOTAL CA: CPT | Performed by: INTERNAL MEDICINE

## 2023-07-23 PROCEDURE — 99231 SBSQ HOSP IP/OBS SF/LOW 25: CPT | Performed by: INTERNAL MEDICINE

## 2023-07-23 PROCEDURE — 120N000002 HC R&B MED SURG/OB UMMC

## 2023-07-23 PROCEDURE — 84100 ASSAY OF PHOSPHORUS: CPT | Performed by: INTERNAL MEDICINE

## 2023-07-23 PROCEDURE — 85004 AUTOMATED DIFF WBC COUNT: CPT | Performed by: INTERNAL MEDICINE

## 2023-07-23 PROCEDURE — 250N000013 HC RX MED GY IP 250 OP 250 PS 637

## 2023-07-23 RX ADMIN — SERTRALINE HYDROCHLORIDE 100 MG: 100 TABLET ORAL at 07:26

## 2023-07-23 RX ADMIN — PREGABALIN 50 MG: 50 CAPSULE ORAL at 07:25

## 2023-07-23 RX ADMIN — LOSARTAN POTASSIUM 100 MG: 50 TABLET, FILM COATED ORAL at 07:27

## 2023-07-23 RX ADMIN — LEVOTHYROXINE SODIUM 50 MCG: 25 TABLET ORAL at 07:25

## 2023-07-23 RX ADMIN — Medication 1 TABLET: at 07:26

## 2023-07-23 RX ADMIN — SUCROFERRIC OXYHYDROXIDE 500 MG: 500 TABLET, CHEWABLE ORAL at 07:27

## 2023-07-23 RX ADMIN — PANTOPRAZOLE SODIUM 20 MG: 20 TABLET, DELAYED RELEASE ORAL at 07:27

## 2023-07-23 RX ADMIN — SUCROFERRIC OXYHYDROXIDE 500 MG: 500 TABLET, CHEWABLE ORAL at 17:38

## 2023-07-23 RX ADMIN — SUCROFERRIC OXYHYDROXIDE 500 MG: 500 TABLET, CHEWABLE ORAL at 12:45

## 2023-07-23 RX ADMIN — ATORVASTATIN CALCIUM 20 MG: 20 TABLET, FILM COATED ORAL at 07:25

## 2023-07-23 RX ADMIN — PANTOPRAZOLE SODIUM 20 MG: 20 TABLET, DELAYED RELEASE ORAL at 17:38

## 2023-07-23 RX ADMIN — AMLODIPINE BESYLATE 5 MG: 5 TABLET ORAL at 07:26

## 2023-07-23 ASSESSMENT — ACTIVITIES OF DAILY LIVING (ADL)
ADLS_ACUITY_SCORE: 46

## 2023-07-23 NOTE — PROGRESS NOTES
VS: Vital signs:  Temp: 97.6  F (36.4  C) Temp src: Oral BP: (!) 160/69 Pulse: 73   Resp: 18 SpO2: 100 % O2 Device: None (Room air)           O2: Stable on RA, denies SOB or chest pain   Output: Pt on hemodialysis/anuria    Last BM: 7/22/23   Activity: AO1 with gait belt and walker    Skin: Dry and flaky    Pain: Denies    CMS: Alert and oriented x4, tinging present in toes and denies numbness   Dressing: N/A   Diet: Renal    LDA: L PIV SL   Equipment: Walker, gait belt    Plan: Possible discharge    Additional Info:

## 2023-07-23 NOTE — PLAN OF CARE
Physical Therapy: Orders received. Chart reviewed and discussed with care team.? Physical Therapy not indicated due to patient very near baseline level of function and very limited by cognition scoring a 14/30.? Defer discharge recommendations to Occupational Therapy. OT to follow while inpatient.? Will complete orders.

## 2023-07-23 NOTE — PLAN OF CARE
Goal Outcome Evaluation:      Plan of Care Reviewed With: patient    Overall Patient Progress: improving       Shift: 6521-9963     VS: BP (!) 160/63 (BP Location: Left arm)   Pulse 75   Temp 98  F (36.7  C) (Oral)   Resp 18   SpO2 97%      Pain:  Denies pain  Neuro: A&Ox4, intermittent forgetfulness  Resp: WDL, denies SOB/chest pain.   Diet: Renal diet  Skin: Dry and flaky, skin intact  LDA: No PIV, R fistula  Activity: Assist of 1 with walker, GB  Output: LBM 07/22, continent of B&B- anuria due to hemodialysis.      Call light within reach      Plan:   Continue with plan of care. Discharge tomorrow

## 2023-07-23 NOTE — PROGRESS NOTES
[facility]    Medicine Progress Note - Hospitalist Service, GOLD TEAM 17    Date of Admission:  7/20/2023    Assessment & Plan     83 year old female admitted on 7/20/2023. She has a history of HTN, Anemia, Hyperlipidemia, Anxiety/ Depression and ESRD and is admitted for dizziness, weakness, HTN and hallucinations that began yesterday afternoon following dialysis.         # Fatigue/ Hallucinations:  Pt reports that she is having weird dreams. No auditory hallucinations. Pt believed that we were in a hospital room within a grocery store. Patient was recently discharged on 7/6/23 with declining strength and mental status over course of one month with acute change to non-verbal, not eating, and agitation. Head CT obtained today and negative for acute intracranial pathology. Metabolic evaluation non-revealing. Slight elevation in WBC (11.1). HHx of anemia (Hb 8.6 and hematocrit 30 today). Vitally stable, afebrile, blood pressures ranging in the 180-190's/80's. Exam benign for any focal neurologic deficit, patient is only oriented to person and time. Differential for the fatigue includes uncontrolled hypertension (present today and at dialysis yesterday), medications, metabolic derangements, delirium, uremia, and developing psychiatric conditions. Based on the objective data and HPI as below, most likely due to uncontrolled hypertension causing hallucinations and fatigue in a frail, elderly patient on chronic HD.   *Unable to obtain U/A, patient does not make urine (attempted straight cath at last hospitalization, led to severe agitation)  *TSH (11) and freeT4 (0.71) on 7/20/23               ->Levothyroxine 50 mcg soft gel daily for hypothyroidism  *On 7/2022/2023, patient seems to be at her baseline mental status.  Patient was not hallucinating.  *On 7/23/2023, currently does not have any hallucination.  Discussed with RN no reported hallucinations.  Patient seem to be at  her baseline mental status.  *Patient is afebrile.  Has normal sodium potassium calcium.  Patient has normal white count      Plan:  - Avoid narcotics, please consult daughters before giving ANY medication  - PT/OT consults  - Delirium precautions  - Fall precautions  - Controlling hypertension as below     # Hypothyroidism:  Due to confusion and delirium TSH and free T4 labs obtained on 7/20/23  - TSH (11) and freeT4 (0.71) on 7/20/23               ->Levothyroxine 50 mcg daily for hypothyroidism ordered     # ESRD:  Pt undergoes dialysis M/W/F and, per her daughter, never misses apts. BMP is within normal limits with exception of Cr (5.72).   - Nephrology consulted, continue with Amlodipine 5 mg and Losartan 100 mg; Hydralazine 10 mg PRN if SBP>180  - Dialysis M/ W/ F, due for HD today  - Continue PTA Velphoro 500 mg TID with meals     # Elevated Alkaline Phosphatase:  Level elevated at 190. All other LFT's/bilirubin WNL.   Ultrasound on 7/20/2023 showed   1.  Cholelithiasis without sonographic evidence of cholecystitis.  2.  Multicystic appearance of right kidney, similar to prior CT.  3.  Cirrhotic appearance of the liver, similar to prior.  Plan:  -Follow-up outpatient     # Hypertension:  Presenting with SBP >180, unsure if medications were taken this morning when speaking with both daughters at bedside, Unable to find phone number to TCU at this time.  *Blood pressure is controlled this morning  - Continue amlodipine 5mg and losartan 100 mg   - Hydralazine 10 mg q6h PRN for systolic BP >180, diastolic BP >100      Chronic Disease Management:  # GERD: Continue PTA Pantoprazole 20 mg BID  # Chronic Pain: Continue PTA Lyrica 50 mg daily  # Depression: Continue Zoloft 100 mg daily  # HLD: Continue Atorvastatin 20 mg daily            Diet: Renal Diet (dialysis)    DVT Prophylaxis: Pneumatic Compression Devices  Rodriguez Catheter: Not present  Fluids: none  Lines: None     Cardiac Monitoring: None  Code Status: Full  Code Full code         Clinically Significant Risk Factors                  # Hypertension: Noted on problem list                 Disposition Plan       Likely discharge today afternoon versus tomorrow    Leon Santizo MD  Hospitalist Service, GOLD TEAM 17  [facility]  Securely message with [employer] (more info)  Text page via [employer] [facility] Paging/Directory   See signed in provider for up to date coverage information  ______________________________________________________________________    Interval History     Overnight events reviewed.  Patient this morning reports doing well.  Patient denies any nausea vomiting chest pain or shortness of breath  Patient denies any fever or chills  Patient reports when she can go back.    Physical Exam   Vital Signs: Temp: 98.3  F (36.8  C) Temp src: Oral BP: (!) 146/66 Pulse: 69   Resp: 17 SpO2: 99 % O2 Device: None (Room air)    Weight: 0 lbs 0 oz    General Appearance: Laying in bed in no acute distress  Respiratory: Respiratory clear to auscultation  Cardiovascular: S1, S2 normal  GI: Soft, nontender, bowel sounds positive  Skin: No obvious rashes  Neuro: Alert awake and oriented to self nonfocal    Medical Decision Making       **CLEAR ALL SELECTIONS**      Data   ------------------------- PAST 24 HR DATA REVIEWED -----------------------------------------------    I have personally reviewed the following data over the past 24 hrs:    7.5  \   8.7 (L)   / 287     137 97 (L) 30.3 (H) /  106 (H)   4.3 26 6.33 (H) \       Imaging results reviewed over the past 24 hrs:   No results found for this or any previous visit (from the past 24 hour(s)).

## 2023-07-23 NOTE — PROGRESS NOTES
Temp: 98.2  F (36.8  C) Temp src: Oral BP: (!) 166/68 Pulse: 73   Resp: 18 SpO2: 97 % O2 Device: None (Room air)      Patient is alert and oriented X4, pleasant, quiet,on room air, denies pain, denies chest pain, no cough noted. Able to make needs known. Dialysis patient, PIV to left farm, SL, Dialysis port to Right arm. No BP's to right arm. No noted hallucinations, disposition back to [facility] on 7/23

## 2023-07-24 VITALS
TEMPERATURE: 98.2 F | OXYGEN SATURATION: 97 % | HEART RATE: 66 BPM | DIASTOLIC BLOOD PRESSURE: 61 MMHG | SYSTOLIC BLOOD PRESSURE: 135 MMHG | RESPIRATION RATE: 18 BRPM

## 2023-07-24 LAB
ANION GAP SERPL CALCULATED.3IONS-SCNC: 15 MMOL/L (ref 7–15)
BASOPHILS # BLD AUTO: 0.1 10E3/UL (ref 0–0.2)
BASOPHILS NFR BLD AUTO: 2 %
BUN SERPL-MCNC: 44.9 MG/DL (ref 8–23)
CALCIUM SERPL-MCNC: 8.7 MG/DL (ref 8.8–10.2)
CHLORIDE SERPL-SCNC: 97 MMOL/L (ref 98–107)
CREAT SERPL-MCNC: 8.34 MG/DL (ref 0.51–0.95)
DEPRECATED HCO3 PLAS-SCNC: 25 MMOL/L (ref 22–29)
EOSINOPHIL # BLD AUTO: 0.6 10E3/UL (ref 0–0.7)
EOSINOPHIL NFR BLD AUTO: 9 %
ERYTHROCYTE [DISTWIDTH] IN BLOOD BY AUTOMATED COUNT: 15.5 % (ref 10–15)
GFR SERPL CREATININE-BSD FRML MDRD: 4 ML/MIN/1.73M2
GLUCOSE SERPL-MCNC: 109 MG/DL (ref 70–99)
HCT VFR BLD AUTO: 29.1 % (ref 35–47)
HGB BLD-MCNC: 8.9 G/DL (ref 11.7–15.7)
IMM GRANULOCYTES # BLD: 0 10E3/UL
IMM GRANULOCYTES NFR BLD: 1 %
LYMPHOCYTES # BLD AUTO: 0.7 10E3/UL (ref 0.8–5.3)
LYMPHOCYTES NFR BLD AUTO: 10 %
MCH RBC QN AUTO: 29.2 PG (ref 26.5–33)
MCHC RBC AUTO-ENTMCNC: 30.6 G/DL (ref 31.5–36.5)
MCV RBC AUTO: 95 FL (ref 78–100)
MONOCYTES # BLD AUTO: 1 10E3/UL (ref 0–1.3)
MONOCYTES NFR BLD AUTO: 15 %
NEUTROPHILS # BLD AUTO: 4.4 10E3/UL (ref 1.6–8.3)
NEUTROPHILS NFR BLD AUTO: 63 %
NRBC # BLD AUTO: 0 10E3/UL
NRBC BLD AUTO-RTO: 0 /100
PHOSPHATE SERPL-MCNC: 2.7 MG/DL (ref 2.5–4.5)
PLATELET # BLD AUTO: 298 10E3/UL (ref 150–450)
POTASSIUM SERPL-SCNC: 4.7 MMOL/L (ref 3.4–5.3)
RBC # BLD AUTO: 3.05 10E6/UL (ref 3.8–5.2)
SODIUM SERPL-SCNC: 137 MMOL/L (ref 136–145)
WBC # BLD AUTO: 6.9 10E3/UL (ref 4–11)

## 2023-07-24 PROCEDURE — 36415 COLL VENOUS BLD VENIPUNCTURE: CPT | Performed by: INTERNAL MEDICINE

## 2023-07-24 PROCEDURE — 250N000013 HC RX MED GY IP 250 OP 250 PS 637: Performed by: STUDENT IN AN ORGANIZED HEALTH CARE EDUCATION/TRAINING PROGRAM

## 2023-07-24 PROCEDURE — 250N000013 HC RX MED GY IP 250 OP 250 PS 637: Performed by: INTERNAL MEDICINE

## 2023-07-24 PROCEDURE — 99233 SBSQ HOSP IP/OBS HIGH 50: CPT | Mod: FS | Performed by: PHYSICIAN ASSISTANT

## 2023-07-24 PROCEDURE — 90935 HEMODIALYSIS ONE EVALUATION: CPT

## 2023-07-24 PROCEDURE — 84100 ASSAY OF PHOSPHORUS: CPT | Performed by: INTERNAL MEDICINE

## 2023-07-24 PROCEDURE — 99239 HOSP IP/OBS DSCHRG MGMT >30: CPT | Performed by: INTERNAL MEDICINE

## 2023-07-24 PROCEDURE — 258N000003 HC RX IP 258 OP 636: Performed by: PHYSICIAN ASSISTANT

## 2023-07-24 PROCEDURE — 634N000001 HC RX 634: Mod: JZ | Performed by: PHYSICIAN ASSISTANT

## 2023-07-24 PROCEDURE — 85004 AUTOMATED DIFF WBC COUNT: CPT | Performed by: INTERNAL MEDICINE

## 2023-07-24 PROCEDURE — 80048 BASIC METABOLIC PNL TOTAL CA: CPT | Performed by: INTERNAL MEDICINE

## 2023-07-24 PROCEDURE — 250N000013 HC RX MED GY IP 250 OP 250 PS 637

## 2023-07-24 RX ORDER — AMLODIPINE BESYLATE 2.5 MG/1
7.5 TABLET ORAL DAILY
Qty: 90 TABLET | Refills: 0 | Status: SHIPPED | OUTPATIENT
Start: 2023-07-25 | End: 2023-09-29

## 2023-07-24 RX ORDER — LEVOTHYROXINE SODIUM 50 UG/1
50 TABLET ORAL
Qty: 30 TABLET | Refills: 0 | Status: SHIPPED | OUTPATIENT
Start: 2023-07-25 | End: 2023-09-29

## 2023-07-24 RX ORDER — LIDOCAINE 40 MG/G
CREAM TOPICAL
Status: CANCELLED | OUTPATIENT
Start: 2023-07-24

## 2023-07-24 RX ORDER — LOSARTAN POTASSIUM 100 MG/1
100 TABLET ORAL DAILY
Qty: 30 TABLET | Refills: 0 | Status: SHIPPED | OUTPATIENT
Start: 2023-07-24 | End: 2023-09-29

## 2023-07-24 RX ORDER — AMLODIPINE BESYLATE 2.5 MG/1
2.5 TABLET ORAL ONCE
Status: COMPLETED | OUTPATIENT
Start: 2023-07-24 | End: 2023-07-24

## 2023-07-24 RX ADMIN — SERTRALINE HYDROCHLORIDE 100 MG: 100 TABLET ORAL at 08:04

## 2023-07-24 RX ADMIN — Medication 1 TABLET: at 08:03

## 2023-07-24 RX ADMIN — LEVOTHYROXINE SODIUM 50 MCG: 25 TABLET ORAL at 07:07

## 2023-07-24 RX ADMIN — ATORVASTATIN CALCIUM 20 MG: 20 TABLET, FILM COATED ORAL at 08:04

## 2023-07-24 RX ADMIN — PANTOPRAZOLE SODIUM 20 MG: 20 TABLET, DELAYED RELEASE ORAL at 16:38

## 2023-07-24 RX ADMIN — SODIUM CHLORIDE 250 ML: 9 INJECTION, SOLUTION INTRAVENOUS at 12:15

## 2023-07-24 RX ADMIN — EPOETIN ALFA-EPBX 4000 UNITS: 4000 INJECTION, SOLUTION INTRAVENOUS; SUBCUTANEOUS at 12:15

## 2023-07-24 RX ADMIN — AMLODIPINE BESYLATE 5 MG: 5 TABLET ORAL at 08:04

## 2023-07-24 RX ADMIN — SODIUM CHLORIDE 300 ML: 9 INJECTION, SOLUTION INTRAVENOUS at 12:15

## 2023-07-24 RX ADMIN — PANTOPRAZOLE SODIUM 20 MG: 20 TABLET, DELAYED RELEASE ORAL at 07:07

## 2023-07-24 RX ADMIN — Medication: at 12:16

## 2023-07-24 RX ADMIN — AMLODIPINE BESYLATE 2.5 MG: 2.5 TABLET ORAL at 10:19

## 2023-07-24 RX ADMIN — PREGABALIN 50 MG: 50 CAPSULE ORAL at 08:03

## 2023-07-24 RX ADMIN — SUCROFERRIC OXYHYDROXIDE 500 MG: 500 TABLET, CHEWABLE ORAL at 16:38

## 2023-07-24 RX ADMIN — LOSARTAN POTASSIUM 100 MG: 50 TABLET, FILM COATED ORAL at 08:04

## 2023-07-24 ASSESSMENT — ACTIVITIES OF DAILY LIVING (ADL)
ADLS_ACUITY_SCORE: 46

## 2023-07-24 NOTE — PROGRESS NOTES
Called daughter Charla for update and discharge planning. Left voice mail message      Leon Santizo MD  [facility]  Contact information available via [facility] Paging/Directory

## 2023-07-24 NOTE — PROGRESS NOTES
[id number]    Plan of Care reviewed With: Patient    Overall Patient Progress: No Change    Outcome Evaluation: Pt is A & O x4 w/ intermittent confusion on RA. Denies pain, nausea, chest pain & SOB. Pt does not have IV access & has a fistular on RUE. Pt is continent of bowel & has anuria d/t dialysis. Pt was not oob during shift, is assist x1 w/ walker & gait belt & uses call light appropriately.     Shift Updates  Pt completed dialysis in room at shift change, see previous note for details.     Vitals: /61 (BP Location: Left arm, Patient Position: Semi-Howard's, Cuff Size: Adult Regular)   Pulse 66   Temp 98.2  F (36.8  C) (Oral)   Resp 18   SpO2 97%     Plan: Discharge back to skilled nursing facility today. EMS stretcher arriving between [id number]. Continue w/ plan of care.

## 2023-07-24 NOTE — PROGRESS NOTES
[id number]   Patient is a alert orient to herself. She is disoriented to place . She denies N, v pain.     She is on room air. She is doing dialysis now    Plan is to discharge home today.  /51   Pulse 74   Temp 98.2  F (36.8  C) (Oral)   Resp 20   SpO2 98%

## 2023-07-24 NOTE — PROGRESS NOTES
VS: Temp: 98.1  F (36.7  C) Temp src: Oral BP: (!) 152/64 Pulse: 70   Resp: 18 SpO2: 98 % O2 Device: None (Room air)       O2: On room air,deneis SOB, denies chest pain   Output: Minimal void, hemodialysis patient   Last BM: 7/22   Activity: Assist of 1 with walker, gait belt   Skin: Dry, flaky, intact   Pain: Denies   CMS: Alert and oriented X3, intermittent confusion   Dressing: N/A   Diet: RENAL diet   LDA: N/A, dialysis fistula to right arm   Equipment: Walker, personal belongings   Plan: To discharge to [facility] this morning 7/24   Additional Info: NO BP's to right arm, presence of a dialysis fistula

## 2023-07-24 NOTE — PROGRESS NOTES
Date: 7/24/2023  Time: 3:24 PM     Data:  Pre Wt:   57.8 kg  Desired Wt:   To be established  Post Wt:  56.2 kg (estimated)  Weight change:  -1.6 kg  Ultrafiltration - Post Run Net Total Removed (mL):  1653 ml  Vascular Access Status: Graft patent  Dialyzer Rinse:  Light  Total Blood Volume Processed: 72.3 L   Total Dialysis (Treatment) Time:   3 Hrs  Dialysate Bath: K 2, Ca 2.5  Heparin: Heparin: None     Lab:   No     Interventions:  Dialysis done through Right upper arm AV Graft using 15 gauge needles  UF set to 2.3 Liters, accommodating  priming and rinse back volumes  ,   Epogen 4000 administered per MAR  25 minutes to end of run, daughter who was at bedside noted patient was shaky and patient verbalized that she's feeling so much gas in her stomach. Writer checked blood pressure, and could not read. UF OFF, patient suspected hypotensive. 150ml Saline administered. Continued with UF off.  Decannulation done post HD, hemostasis is achieved in 10 minutes  Pressure dressing is applied, to be removed after 4-6 hours  Report given to PCN, left in her room in stable condition     Assessment:  A/O x 3, calm and cooperative, denies pain  PCN reported that she administered Amlodipine 1 hour before start of dialysis, will monitor blood pressure  Lung sounds clear anterior and lateral BUL, diminished BLL  Right arm AV Graft has strong bruit, VSS okay to initiate Tx                Plan:    Per Renal team        DANIELA LandinN, RN  Acute Dialysis RN  [facility]

## 2023-07-24 NOTE — PROGRESS NOTES
Care Management Discharge Note    Discharge Date: 07/22/2023       Discharge Disposition: Skilled Nursing Facility: [facility]    Discharge Services: County Worker    Discharge DME: None    Discharge Transportation: [facility]: [phone] (stretcher due to confusion)    Private pay costs discussed: Not applicable    Does the patient's insurance plan have a 3 day qualifying hospital stay waiver?  No    PAS Confirmation Code:  [place] already done and going back to the same place  Patient/family educated on Medicare website which has current facility and service quality ratings: no    Education Provided on the Discharge Plan:  yes  Persons Notified of Discharge Plans: Patient, family, providers, charge  Patient/Family in Agreement with the Plan: unable to assess    Handoff Referral Completed: Yes    Additional Information:  Got notified from charge that Patient's daughter was asking when she has a ride for today to her TCU.    SW got  Health Stretcher transport from 7:05 - 7:50.    Communicated to charge- she will call daughter to let her know.     Updated Primary Provider Dr. Santizo.      Completed Handoff.    Faxed over AVS and IATF ALL    PCS form completed    [facility]  [address] [place]  [address]  Admissions: [phone]  F: [phone]         ANTONIO Mendoza, LGSW  Float   Covering 6MED   Ph: [phone]

## 2023-07-24 NOTE — PLAN OF CARE
PT order received and chart reviewed. Please refer to deferral written yesterday from previous order. Will complete new set of orders at this time.

## 2023-07-24 NOTE — PROGRESS NOTES
Nephrology Progress Note  07/24/2023         Assessment & Recommendations:   Rachele Martínez is a 83 year old female admitted on 7/20/2023. She has ESRD on HD since 7 years.She has also Hypertension ,hyperlipidemia and anxiety/depression .  Felt weak and dizzy after  her regular dialysis yesterday .Which has persisted afterwards and has some hallucination .    # ESRD on HD   She is on M/W/F Dialysis schedule.Her dry weight used to be 54 Kg.weight on admisson 57.8.Seems to have some fluid excess. BP a little better but still high.      - HD today with UF 1.5- 2 kg UF.       # Hypertension   Her BP is better. Current medication is amlodipine 5, losartan 100mg and hydralazine 10mg prn 6 hourly.     - /57 today     - BP low at end of treatment, received amlodipine before HD     - Continue antihypertensive medication .    # Anemia of CKD       HB - 8.9 .iron store is adequate          # ?Hypothyroidism         - TSH- 11,free 0.7 ,she is not on any medication ,perhaps this might contribute to her changes .         - May need treatment, management per primary team        Recommendations were communicated to primary team via    Discussed with Dr. Birgit KENNEY, PA-C   Division of Renal Disease and Hypertension  [facility]  myairmail  Vocera Web Console      Interval History :   Reviewed provider and nursing notes from past 24 hours. Pt seen on HD. Stable run overall. BP's 100s systolic toward end of treatment but no dizziness or cramping. Pt appears to be at baseline mental status and is stable for discharge today per primary team    Review of Systems:   I reviewed the following systems:  GI: poor appetite. no nausea or vomiting or diarrhea.   Neuro:  awake and answers questions  Constitutional:  no fever or chills  CV: no dyspnea or edema.  no chest pain.    Physical Exam:   I/O last 3 completed shifts:  In: 760 [P.O.:760]  Out: -    BP (!) 171/64   Pulse 74   Temp 98.3  F (36.8  C) (Oral)   Resp 19    SpO2 98%      GENERAL APPEARANCE: stable  EYES:  no scleral icterus, pupils equal  HENT: mouth without ulcers or lesions  PULM: lungs clear to auscultation bilaterally, equal air movement,  CV: regular rhythm, normal rate, no rub     -edema trace LE edema    INTEGUMENT: no cyanosis, no rash  NEURO:  no asterixis   Access - Right arm AVG    Labs:   All labs reviewed by me  Electrolytes/Renal -   Recent Labs   Lab Test 07/24/23  0633 07/23/23  0555 07/22/23  1216 07/21/23  0537 07/20/23  1110 07/05/23  0638 07/03/23  0552    137 136 140 138 133* 135*   POTASSIUM 4.7 4.3 4.3 4.1 4.4 4.7 4.7   CHLORIDE 97* 97* 95* 99 99 94* 95*   CO2 25 26 28 24 27 25 22   BUN 44.9* 30.3* 17.7 30.8* 21.5 70.9* 65.2*   CR 8.34* 6.33* 4.81* 7.30* 5.72* 6.42* 8.09*   * 106* 117* 123* 130* 99 110*   BELGICA 8.7* 9.1 9.1 9.2 9.1 9.0 9.3   MAG  --   --   --   --  2.1 2.1 2.2   PHOS  --  3.6  --  2.5 2.2* 3.7 3.3       CBC -   Recent Labs   Lab Test 07/24/23  0633 07/23/23  0555 07/22/23  1216   WBC 6.9 7.5 8.5   HGB 8.9* 8.7* 9.7*    287 275       LFTs -   Recent Labs   Lab Test 07/21/23  0537 07/20/23  1110 07/05/23  0638   ALKPHOS 198* 190* 169*   BILITOTAL 0.7 0.6 0.4   ALT 13 19 12   AST 31 44 28   PROTTOTAL 7.5 7.6 6.8   ALBUMIN 3.7 3.8 3.5       Iron Panel -   Recent Labs   Lab Test 07/05/23  1439 06/20/23  1122 06/02/23  0942   IRON 44 36* 62   IRONSAT 23 17 31   KASSI  --  600* 222         Imaging:  All imaging studies reviewed by me.     Current Medications:   sodium chloride 0.9%  250 mL Intravenous Once in dialysis/CRRT    sodium chloride 0.9%  300 mL Hemodialysis Machine Once    [START ON 7/25/2023] amLODIPine  7.5 mg Oral Daily    atorvastatin  20 mg Oral Daily    epoetin christofer-epbx  4,000 Units Intravenous Once in dialysis/CRRT    levothyroxine  50 mcg Oral QAM AC    losartan  100 mg Oral Daily    multivitamin w/minerals  1 tablet Oral Daily    - MEDICATION INSTRUCTIONS -   Does not apply Once    pantoprazole  20  mg Oral BID AC    pregabalin  50 mg Oral Daily    sertraline  100 mg Oral Daily    sodium chloride (PF)  3 mL Intracatheter Q8H    sucroferric oxyhydroxide  500 mg Oral TID w/meals      - MEDICATION INSTRUCTIONS -       SEBAS KENNEY, BELLA

## 2023-07-24 NOTE — DISCHARGE SUMMARY
[facility]  Hospitalist Discharge Summary      Date of Admission:  7/20/2023  Date of Discharge:  7/24/2023  Discharging Provider: Leon Santizo MD  Discharge Service: Hospitalist Service, GOLD TEAM 17    Discharge Diagnoses      Fatigue  Hallucinations  Hypothyroidism  ESRD  Elevated Alkaline Phosphatase  Hypertension  GERD  Chronic Pain  Depression  HLD           Clinically Significant Risk Factors          Follow-ups Needed After Discharge   Follow-up Appointments     Adult [facility] Follow-up and recommended labs and tests      Follow up with primary care provider, Agnieszka Rodriguez, within 7   days for hospital follow- up.  The following labs/tests are recommended:   CBC CMP  Follow-up with nephrology  .      Appointments on [place] and/or [facility] (with [facility] or [facility]   provider or service). Call [phone] if you haven't heard regarding   these appointments within 7 days of discharge.            Unresulted Labs Ordered in the Past 30 Days of this Admission       No orders found from 6/20/2023 to 7/21/2023.        These results will be followed up by     Discharge Disposition   Discharged to home  Condition at discharge: Stable    Hospital Course   83 year old female admitted on 7/20/2023. She has a history of HTN, Anemia, Hyperlipidemia, Anxiety/ Depression and ESRD and is admitted for dizziness, weakness, HTN and hallucinations that began yesterday afternoon following dialysis.         # Fatigue  # Hallucinations:  *Pt reported that she is having weird dreams. No auditory hallucinations. Pt believed that we were in a hospital room within a grocery store. Patient was recently discharged on 7/6/23 with declining strength and mental status over course of one month with acute change to non-verbal, not eating, and agitation. Head CT obtained today and negative for acute intracranial pathology. Metabolic evaluation non-revealing. Slight elevation in WBC  (11.1). HHx of anemia (Hb 8.6 and hematocrit 30 today). Vitally stable, afebrile, blood pressures ranging in the 180-190's/80's. Exam benign for any focal neurologic deficit, patient is only oriented to person and time. Differential for the fatigue includes uncontrolled hypertension (present today and at dialysis yesterday), medications, metabolic derangements, delirium, uremia, and developing psychiatric conditions. Based on the objective data and HPI as below, most likely due to uncontrolled hypertension causing hallucinations and fatigue in a frail, elderly patient on chronic HD.   *Unable to obtain U/A, patient does not make urine (attempted straight cath at last hospitalization, led to severe agitation)  *TSH (11) and freeT4 (0.71) on 7/20/23               ->Levothyroxine 50 mcg soft gel daily for hypothyroidism  *On 7/2022/2023, patient seems to be at her baseline mental status.  Patient was not hallucinating.  *On 7/23/2023, currently does not have any hallucination.  Discussed with RN no reported hallucinations.  Patient seem to be at her baseline mental status.Patient is afebrile.  Has normal sodium potassium calcium.  Patient has normal white count  *On 7/24/2023, patient seems to be at her baseline mental status.  Patient was examined with daughter at bedside.  Daughter feels that patient is much clear.  Patient denies any hallucinations.  Patient denies any lightheadedness or dizziness  Sodium is normal, potassium is normal, white count is normal, platelet level is normal.  Patient is afebrile with normal respiratory rate and heart rate.  Patient does not look toxic.  Patient was seen by physical and Occupational Therapy.          Plan:  - Avoid narcotics,  - Delirium precautions  - Fall precautions  - Controlling hypertension as below     # Hypothyroidism:  Due to confusion and delirium TSH and free T4 labs obtained on 7/20/23  - TSH (11) and freeT4 (0.71) on 7/20/23               ->Levothyroxine 50 mcg  daily for hypothyroidism ordered     # ESRD:  Pt undergoes dialysis M/W/F and, per her daughter, never misses apts. BMP is within normal limits with exception of Cr (5.72).   - Nephrology consulted,   - Dialysis M/ W/   - Continue PTA Velphoro 500 mg TID with meals     # Elevated Alkaline Phosphatase:  Level elevated at 190. All other LFT's/bilirubin WNL.   Ultrasound on 7/20/2023 showed   1.  Cholelithiasis without sonographic evidence of cholecystitis.  2.  Multicystic appearance of right kidney, similar to prior CT.  3.  Cirrhotic appearance of the liver, similar to prior.    Plan:  -Follow-up outpatient     # Hypertension:  *Presenting with SBP >180.  *Patient was on amlodipine 5 mg and losartan 50 mg prior to admission.  Valsartan was listed on allergy but patient has been tolerating losartan before.  Discussed with [facility] on 7/24/2023  *Patient's blood pressure medications were titrated.  Amlodipine was increased to 7.5 mg and losartan 100 mg.      Plan:  - Continue amlodipine 7.5 mg daily  - Continue losartan 100 mg daily         Chronic Disease Management:  # GERD: Continue PTA Pantoprazole 20 mg BID  # Chronic Pain: Continue PTA Lyrica 50 mg daily  # Depression: Continue Zoloft 100 mg daily  # HLD: Continue Atorvastatin 20 mg daily            Diet: Renal Diet (dialysis)    DVT Prophylaxis: Pneumatic Compression Devices  Rodriguez Catheter: Not present  Fluids: none  Lines: None     Cardiac Monitoring: None  Code Status: Full Code Full code          Consultations This Hospital Stay   NEPHROLOGY ESRD ADULT IP CONSULT  PHYSICAL THERAPY ADULT IP CONSULT  OCCUPATIONAL THERAPY ADULT IP CONSULT  NURSING TO CONSULT FOR VASCULAR ACCESS CARE IP CONSULT  CARE MANAGEMENT / SOCIAL WORK IP CONSULT  NEPHROLOGY ESRD ADULT IP CONSULT    Code Status   Full Code    Time Spent on this Encounter   Leon OSORIO MD, personally saw the patient today and spent greater than 30 minutes discharging this patient.       Leon Santizo  MD [facility] MED SURG  [address] [address]  Phone: [phone]  Fax: [phone]  ______________________________________________________________________  Overnight events reviewed  Patient this morning reports doing well  She denies any hallucinations.  She denies any pain or discomfort  Patient denies any shortness of breath or chest pain  Evaluated patient again later on with her daughter.  Daughter feels that patient is at her baseline mental status.  She is much improved from yesterday.    Physical Exam   Vital Signs: Temp: 98.2  F (36.8  C) Temp src: Oral BP: 136/63 Pulse: 74   Resp: 18 SpO2: 98 % O2 Device: None (Room air)    Weight: 0 lbs 0 oz    General Appearance:   Laying in bed in no acute distress  Respiratory: Respiratory clear to auscultation  Cardiovascular: S1, S2 normal  GI: Soft, nontender, bowel sounds positive  Skin: No obvious rashes  Neuro: Alert awake and oriented to self nonfocal       Primary Care Physician   Agnieszka Rodriguez    Discharge Orders      Reason for your hospital stay    Admitted for altered mental status, and hypertension     Activity    Your activity upon discharge: activity as tolerated     Monitor and record    Watch for nausea vomiting chest pain shortness of breath lightheadedness dizziness confusion.  If the symptoms occur please call your primary care or come to the ED.     Adult [facility] Follow-up and recommended labs and tests    Follow up with primary care provider, Agnieszka Rodriguez, within 7 days for hospital follow- up.  The following labs/tests are recommended: CBC BMP  Follow-up with nephrology  .      Appointments on [place] and/or [facility] (with [facility] or [facility] provider or service). Call [phone] if you haven't heard regarding these appointments within 7 days of discharge.     Diet    Follow this diet upon discharge: Orders Placed This Encounter      Renal Diet (dialysis)       Significant  Results and Procedures   Most Recent 3 CBC's:  Recent Labs   Lab Test 07/24/23  0633 07/23/23  0555 07/22/23  1216   WBC 6.9 7.5 8.5   HGB 8.9* 8.7* 9.7*   MCV 95 96 99    287 275     Most Recent 3 BMP's:  Recent Labs   Lab Test 07/24/23  0633 07/23/23  0555 07/22/23  1216    137 136   POTASSIUM 4.7 4.3 4.3   CHLORIDE 97* 97* 95*   CO2 25 26 28   BUN 44.9* 30.3* 17.7   CR 8.34* 6.33* 4.81*   ANIONGAP 15 14 13   BELGICA 8.7* 9.1 9.1   * 106* 117*       Discharge Medications   Current Discharge Medication List        START taking these medications    Details   levothyroxine (SYNTHROID/LEVOTHROID) 50 MCG tablet Take 1 tablet (50 mcg) by mouth every morning (before breakfast)  Qty: 30 tablet, Refills: 0    Associated Diagnoses: Hypothyroidism, unspecified type           CONTINUE these medications which have CHANGED    Details   amLODIPine (NORVASC) 2.5 MG tablet Take 3 tablets (7.5 mg) by mouth daily  Qty: 90 tablet, Refills: 0    Associated Diagnoses: Hypertension goal BP (blood pressure) < 140/80      losartan (COZAAR) 100 MG tablet Take 1 tablet (100 mg) by mouth daily  Qty: 30 tablet, Refills: 0    Associated Diagnoses: Hypertension goal BP (blood pressure) < 140/80           CONTINUE these medications which have NOT CHANGED    Details   acetaminophen (TYLENOL) 325 MG tablet Take 325-650 mg by mouth every 6 hours as needed for mild pain      atorvastatin (LIPITOR) 20 MG tablet Take 1 tablet (20 mg) by mouth daily  Qty: 30 tablet, Refills: 0    Associated Diagnoses: Cognitive impairment      Multiple Vitamin-Folic Acid TABS Take 1 tablet by mouth daily      pantoprazole (PROTONIX) 20 MG EC tablet Take 1 tablet (20 mg) by mouth 2 times daily (before meals) *take 30-60 minutes before  Qty: 60 tablet, Refills: 0    Associated Diagnoses: Gastrointestinal hemorrhage, unspecified gastrointestinal hemorrhage type      polyethylene glycol (MIRALAX) 17 GM/Dose powder Take 17 g by mouth daily as needed As  needed      sertraline (ZOLOFT) 50 MG tablet Take 2 tablets (100 mg) by mouth daily  Qty: 60 tablet, Refills: 0    Associated Diagnoses: Itching      sucroferric oxyhydroxide (VELPHORO) 500 MG CHEW chewable tablet Take 500 mg by mouth 2 times daily      pregabalin (LYRICA) 50 MG capsule Take 50 mg by mouth daily 1 capsule by mouth daily in the evening.           STOP taking these medications       hydrOXYzine (ATARAX) 25 MG tablet Comments:   Reason for Stopping:             Allergies   Allergies   Allergen Reactions    Abacavir Itching    Lisinopril Cough    Dust Mites     Hydrochlorothiazide Itching     Severe      No Clinical Screening - See Comments      History of blood transfusion reactions and pre-treats with Benadryl.     Spironolactone Nausea    Sulfa Antibiotics Hives    Valsartan Itching    Valsartan-Hydrochlorothiazide Itching     severe

## 2023-07-25 ENCOUNTER — CARE COORDINATION (OUTPATIENT)
Dept: CARE COORDINATION | Facility: CLINIC | Age: 83
End: 2023-07-25

## 2023-07-25 ENCOUNTER — VIRTUAL VISIT (OUTPATIENT)
Dept: INTERNAL MEDICINE | Facility: CLINIC | Age: 83
End: 2023-07-25
Payer: MEDICARE

## 2023-07-25 DIAGNOSIS — R41.89 COGNITIVE IMPAIRMENT: ICD-10-CM

## 2023-07-25 DIAGNOSIS — I10 HYPERTENSION, UNSPECIFIED TYPE: ICD-10-CM

## 2023-07-25 DIAGNOSIS — E03.9 HYPOTHYROIDISM, UNSPECIFIED TYPE: Primary | ICD-10-CM

## 2023-07-25 DIAGNOSIS — L29.9 ITCHING: ICD-10-CM

## 2023-07-25 DIAGNOSIS — F41.8 DEPRESSION WITH ANXIETY: ICD-10-CM

## 2023-07-25 DIAGNOSIS — M48.062 SPINAL STENOSIS OF LUMBAR REGION WITH NEUROGENIC CLAUDICATION: Primary | ICD-10-CM

## 2023-07-25 PROBLEM — I16.0 HYPERTENSIVE URGENCY: Status: RESOLVED | Noted: 2023-06-21 | Resolved: 2023-07-25

## 2023-07-25 PROBLEM — D64.9 SYMPTOMATIC ANEMIA: Status: RESOLVED | Noted: 2023-06-01 | Resolved: 2023-07-25

## 2023-07-25 PROCEDURE — 99215 OFFICE O/P EST HI 40 MIN: CPT | Mod: 95 | Performed by: INTERNAL MEDICINE

## 2023-07-25 NOTE — PROGRESS NOTES
6MS DISCHARGE    D: Patient discharged to prior TCU at 2045. Patient accompanied by EMS via stretcher.    I: Discharge prescriptions sent to patients TCU facility. All discharge medications and instructions reviewed with pt. Patient instructed to seek care if experiencing worsening symptoms. Other phone numbers to call with questions or concerns after discharge reviewed. Education completed.    A: Pt verbalized understanding of discharge medications and instructions. Prescribed home medications given to patient. Belongings returned to patient.    P: Patient to follow-up w/ PCP within 7 days of discharge.

## 2023-07-25 NOTE — PROGRESS NOTES
VIDEO VISIT    Assessment & Plan   Cognitive impairment  Formal eval with neuropsc on 7/10/2018 was not conclusive for a specific etiology  MRI and time course suggestive of possible vascular dementia  Acute worsening leading to hospitalization likely due to superimposed etiologies including medications and uncontrolled hypertension  Overall she is better today and daughter has plan for safer living situation in assisted living, once she is out of TCU  Will send [employer] message re: repeating neuropsych eval.  Try to avoid sedating meds.  Will stop pregabalin per her preference and because she has not been having any pain    Hypothyroidism, unspecified type  Started on synthroid.  May have some benefit for cognition  - TSH with free T4 reflex    Itching  Will trial tapering the zoloft.  In the past it was started for her itching.  Will decrease to 50mg in 2 weeks  - sertraline (ZOLOFT) 50 MG tablet  Dispense: 60 tablet; Refill: 3  - sertraline (ZOLOFT) 50 MG tablet  Dispense: 90 tablet; Refill: 3      Orders for TCU  Stop the Pregabalin (lyrica) today.  If she develops increased pain after stopping the medication, please notify the clinic  In 2 weeks (on 8/7), please decrease the zoloft from 100mg daily -> 50 mg daily.  If she develops increased itching or anxiety after decreasing the dose, please notify the clinic  Please continue to monitor her BP daily.  Notify the clinic for BP persistently <100/70 or >160/100  Repeat TSH in about 2 months  Follow-up with me in about 2 month      RTC: Return in about 2 months (around 9/25/2023).  I spent a total of 40 minutes on the day of the visit.  Time was spent doing chart review, history and exam, documentation and further activities as noted above.    Agnieszka Rodriguez MD  Securely message with the Vocera Web Console      Chief Complaint   Rachele Martínez is a 83 year old female presents for the following health issues    History of Present Illness       Tobacco  Use      Smoking status: Former        Packs/day: 2.00        Years: 45.00        Pack years: 90        Types: Cigarettes        Start date: 1953        Quit date: 2002        Years since quittin.6      Smokeless tobacco: Never    PHQ-2 Score:       2023     1:09 PM 2022     3:55 PM   PHQ-2 (  [employer])   Q1: Little interest or pleasure in doing things 0 1   Q2: Feeling down, depressed or hopeless 0 0   PHQ-2 Score 0 1       Hospital Follow-up Visit:    Hospital/Nursing Home/IP Rehab Facility: [facility]  Date of Admission: 23  Date of Discharge: 23  Reason(s) for Admission: confusion  Daughter feels like she is at her baseline mental status today  Currently in a TCU  Planning for longer term living arrangement.  Interested in assisted living  She is doing PT to rebuild strength in her legs.  She has been getting stronger.    Getting to HD from TCU using taxi service  BP has been better controlled.  Today was 116.  Yesterday BP was low during dialysis    [facility] - [email or web address]      Was your hospitalization related to COVID-19? No   Problems taking medications regularly:  None  Medication changes since discharge: see orders  Problems adhering to non-medication therapy:  None    Summary of hospitalization:  [facility] discharge summary reviewed  Diagnostic Tests/Treatments reviewed.  Follow up needed: none  Other Healthcare Providers Involved in Patient s Care:         Specialist appointment - hepatology and nephrology  Update since discharge: improved.         Plan of care communicated with patient and family and TCU             ROS    Physical Exam   Gen: no distress, comfortable, pleasant   Respiratory: able to talk in full sentences.  No evidence of respiratory distress  Psychological: appropriate mood       Items reviewed:  Tobacco  Allergies  Meds               ----------------------------------------------------------------------------------------    Virtual Visit Details    Type of service:  Video Visit   Video Start Time: 1:50 PM  Video End Time:2:12 PM    Originating Location (pt. Location): [facility] [place]    Distant Location (provider location):  Off-site  Platform used for Video Visit: Kimmy

## 2023-07-25 NOTE — PROGRESS NOTES
RN faxed orders from Dr. Rodriguez to [facility] @ [phone].    Dr. Rodriguez made the following changes to patient's orders:   1. Stop the Pregabalin (lyrica) today. If she develops increased pain after stopping the medication, please notify the clinic.  [phone]   2. In 2 weeks (on 8/7), please decrease the zoloft from 100mg daily -> 50 mg daily. If she develops increased itching or anxiety after decreasing the dose, please notify the clinic. [phone]   3. Please continue to monitor her BP daily. Notify the clinic for BP persistently <100/70 or >160/100   4. Repeat TSH in about 2 months   5. Follow-up with me in about 2 months     Gayle Paul RN on 7/25/2023 at 3:22 PM

## 2023-07-25 NOTE — PLAN OF CARE
Occupational Therapy Discharge Summary    Reason for therapy discharge:    Discharged to transitional care facility.    Progress towards therapy goal(s). See goals on Care Plan in [facility] electronic health record for goal details.  Goals partially met.  Barriers to achieving goals:   discharge from facility.    Therapy recommendation(s):    Continued therapy is recommended.  Rationale/Recommendations:  to maximize safety and I with ADL.

## 2023-07-25 NOTE — PATIENT INSTRUCTIONS
Orders for TCU  Stop the Pregabalin (lyrica) today.  If she develops increased pain after stopping the medication, please notify the clinic  In 2 weeks (on 8/7), please decrease the zoloft from 100mg daily -> 50 mg daily.  If she develops increased itching or anxiety after decreasing the dose, please notify the clinic  Please continue to monitor her BP daily.  Notify the clinic for BP persistently <100/70 or >160/100  Repeat TSH in about 2 months  Follow-up with me in about 2 month

## 2023-07-25 NOTE — NURSING NOTE
Is the patient currently in the state of [place]? YES    Visit mode:VIDEO    If the visit is dropped, the patient can be reconnected by: VIDEO VISIT: Text to cell phone: [phone]    Will anyone else be joining the visit? NO      How would you like to obtain your AVS? MyChart    Are changes needed to the allergy or medication list? NO    Reason for visit: RECHECK    Pt's daughter Charla has consent to communicate on file.    Pt considering discontinuing Pregabalin as pt is not complaining of leg pain any longer. [facility] phone #[phone] to discontinue.    FAYE RODRIGUEZ

## 2023-07-31 ENCOUNTER — APPOINTMENT (OUTPATIENT)
Dept: GENERAL RADIOLOGY | Facility: CLINIC | Age: 83
DRG: 871 | End: 2023-07-31
Attending: NURSE PRACTITIONER
Payer: MEDICARE

## 2023-07-31 ENCOUNTER — HOSPITAL ENCOUNTER (INPATIENT)
Facility: CLINIC | Age: 83
LOS: 3 days | Discharge: NURSING, REHAB OR LONG-TERM CARE | DRG: 871 | End: 2023-08-03
Attending: EMERGENCY MEDICINE | Admitting: INTERNAL MEDICINE
Payer: MEDICARE

## 2023-07-31 ENCOUNTER — APPOINTMENT (OUTPATIENT)
Dept: CT IMAGING | Facility: CLINIC | Age: 83
DRG: 871 | End: 2023-07-31
Attending: NURSE PRACTITIONER
Payer: MEDICARE

## 2023-07-31 DIAGNOSIS — R07.9 ACUTE CHEST PAIN: ICD-10-CM

## 2023-07-31 DIAGNOSIS — J18.9 PNEUMONIA OF BOTH LUNGS DUE TO INFECTIOUS ORGANISM, UNSPECIFIED PART OF LUNG: ICD-10-CM

## 2023-07-31 DIAGNOSIS — J81.0 ACUTE PULMONARY EDEMA (H): ICD-10-CM

## 2023-07-31 DIAGNOSIS — Z20.822 LAB TEST NEGATIVE FOR COVID-19 VIRUS: Primary | ICD-10-CM

## 2023-07-31 LAB
ALBUMIN SERPL BCG-MCNC: 4 G/DL (ref 3.5–5.2)
ALP SERPL-CCNC: 191 U/L (ref 35–104)
ALT SERPL W P-5'-P-CCNC: 20 U/L (ref 0–50)
ANION GAP SERPL CALCULATED.3IONS-SCNC: 16 MMOL/L (ref 7–15)
AST SERPL W P-5'-P-CCNC: 49 U/L (ref 0–45)
ATRIAL RATE - MUSE: 87 BPM
BASOPHILS # BLD MANUAL: 0 10E3/UL (ref 0–0.2)
BASOPHILS NFR BLD MANUAL: 0 %
BILIRUB SERPL-MCNC: 0.6 MG/DL
BUN SERPL-MCNC: 29.7 MG/DL (ref 8–23)
CALCIUM SERPL-MCNC: 9.3 MG/DL (ref 8.8–10.2)
CHLORIDE SERPL-SCNC: 96 MMOL/L (ref 98–107)
CREAT SERPL-MCNC: 5.82 MG/DL (ref 0.51–0.95)
DEPRECATED HCO3 PLAS-SCNC: 24 MMOL/L (ref 22–29)
DIASTOLIC BLOOD PRESSURE - MUSE: NORMAL MMHG
EOSINOPHIL # BLD MANUAL: 0 10E3/UL (ref 0–0.7)
EOSINOPHIL NFR BLD MANUAL: 0 %
ERYTHROCYTE [DISTWIDTH] IN BLOOD BY AUTOMATED COUNT: 16.5 % (ref 10–15)
FLUAV RNA SPEC QL NAA+PROBE: NEGATIVE
FLUBV RNA RESP QL NAA+PROBE: NEGATIVE
GFR SERPL CREATININE-BSD FRML MDRD: 7 ML/MIN/1.73M2
GLUCOSE SERPL-MCNC: 126 MG/DL (ref 70–99)
HCT VFR BLD AUTO: 31.9 % (ref 35–47)
HGB BLD-MCNC: 9.5 G/DL (ref 11.7–15.7)
HOLD SPECIMEN: NORMAL
INTERPRETATION ECG - MUSE: NORMAL
LACTATE SERPL-SCNC: 2.3 MMOL/L (ref 0.7–2)
LACTATE SERPL-SCNC: 2.4 MMOL/L (ref 0.7–2)
LIPASE SERPL-CCNC: 43 U/L (ref 13–60)
LYMPHOCYTES # BLD MANUAL: 0.2 10E3/UL (ref 0.8–5.3)
LYMPHOCYTES NFR BLD MANUAL: 1 %
MAGNESIUM SERPL-MCNC: 2.1 MG/DL (ref 1.7–2.3)
MCH RBC QN AUTO: 30 PG (ref 26.5–33)
MCHC RBC AUTO-ENTMCNC: 29.8 G/DL (ref 31.5–36.5)
MCV RBC AUTO: 101 FL (ref 78–100)
MONOCYTES # BLD MANUAL: 2.1 10E3/UL (ref 0–1.3)
MONOCYTES NFR BLD MANUAL: 10 %
NEUTROPHILS # BLD MANUAL: 18.2 10E3/UL (ref 1.6–8.3)
NEUTROPHILS NFR BLD MANUAL: 89 %
P AXIS - MUSE: 59 DEGREES
PLAT MORPH BLD: ABNORMAL
PLATELET # BLD AUTO: 290 10E3/UL (ref 150–450)
POLYCHROMASIA BLD QL SMEAR: SLIGHT
POTASSIUM SERPL-SCNC: 4.1 MMOL/L (ref 3.4–5.3)
PR INTERVAL - MUSE: 180 MS
PROT SERPL-MCNC: 7.5 G/DL (ref 6.4–8.3)
QRS DURATION - MUSE: 102 MS
QT - MUSE: 420 MS
QTC - MUSE: 505 MS
R AXIS - MUSE: 8 DEGREES
RBC # BLD AUTO: 3.17 10E6/UL (ref 3.8–5.2)
RBC MORPH BLD: ABNORMAL
RSV RNA SPEC NAA+PROBE: NEGATIVE
SARS-COV-2 RNA RESP QL NAA+PROBE: NEGATIVE
SODIUM SERPL-SCNC: 136 MMOL/L (ref 136–145)
SYSTOLIC BLOOD PRESSURE - MUSE: NORMAL MMHG
T AXIS - MUSE: 80 DEGREES
TROPONIN T SERPL HS-MCNC: 59 NG/L
TROPONIN T SERPL HS-MCNC: 72 NG/L
VENTRICULAR RATE- MUSE: 87 BPM
WBC # BLD AUTO: 20.5 10E3/UL (ref 4–11)

## 2023-07-31 PROCEDURE — 84484 ASSAY OF TROPONIN QUANT: CPT | Performed by: NURSE PRACTITIONER

## 2023-07-31 PROCEDURE — 99285 EMERGENCY DEPT VISIT HI MDM: CPT | Mod: 25 | Performed by: NURSE PRACTITIONER

## 2023-07-31 PROCEDURE — 83605 ASSAY OF LACTIC ACID: CPT | Performed by: INTERNAL MEDICINE

## 2023-07-31 PROCEDURE — 999N000127 HC STATISTIC PERIPHERAL IV START W US GUIDANCE

## 2023-07-31 PROCEDURE — 99223 1ST HOSP IP/OBS HIGH 75: CPT | Mod: FS | Performed by: PHYSICIAN ASSISTANT

## 2023-07-31 PROCEDURE — 120N000002 HC R&B MED SURG/OB UMMC

## 2023-07-31 PROCEDURE — 36415 COLL VENOUS BLD VENIPUNCTURE: CPT | Performed by: NURSE PRACTITIONER

## 2023-07-31 PROCEDURE — G1010 CDSM STANSON: HCPCS | Mod: GC | Performed by: RADIOLOGY

## 2023-07-31 PROCEDURE — 71045 X-RAY EXAM CHEST 1 VIEW: CPT | Mod: 26 | Performed by: RADIOLOGY

## 2023-07-31 PROCEDURE — 71045 X-RAY EXAM CHEST 1 VIEW: CPT

## 2023-07-31 PROCEDURE — 36415 COLL VENOUS BLD VENIPUNCTURE: CPT | Performed by: INTERNAL MEDICINE

## 2023-07-31 PROCEDURE — 74176 CT ABD & PELVIS W/O CONTRAST: CPT | Mod: 26 | Performed by: RADIOLOGY

## 2023-07-31 PROCEDURE — 250N000013 HC RX MED GY IP 250 OP 250 PS 637: Performed by: NURSE PRACTITIONER

## 2023-07-31 PROCEDURE — 93010 ELECTROCARDIOGRAM REPORT: CPT | Performed by: NURSE PRACTITIONER

## 2023-07-31 PROCEDURE — 85027 COMPLETE CBC AUTOMATED: CPT | Performed by: NURSE PRACTITIONER

## 2023-07-31 PROCEDURE — 83690 ASSAY OF LIPASE: CPT | Performed by: EMERGENCY MEDICINE

## 2023-07-31 PROCEDURE — 80053 COMPREHEN METABOLIC PANEL: CPT | Performed by: NURSE PRACTITIONER

## 2023-07-31 PROCEDURE — G1010 CDSM STANSON: HCPCS

## 2023-07-31 PROCEDURE — 99207 PR APP CREDIT; MD BILLING SHARED VISIT: CPT | Performed by: INTERNAL MEDICINE

## 2023-07-31 PROCEDURE — 93005 ELECTROCARDIOGRAM TRACING: CPT | Performed by: NURSE PRACTITIONER

## 2023-07-31 PROCEDURE — 71250 CT THORAX DX C-: CPT | Mod: MG

## 2023-07-31 PROCEDURE — 87637 SARSCOV2&INF A&B&RSV AMP PRB: CPT | Performed by: EMERGENCY MEDICINE

## 2023-07-31 PROCEDURE — 85007 BL SMEAR W/DIFF WBC COUNT: CPT | Performed by: NURSE PRACTITIONER

## 2023-07-31 PROCEDURE — 87040 BLOOD CULTURE FOR BACTERIA: CPT | Performed by: INTERNAL MEDICINE

## 2023-07-31 PROCEDURE — 71250 CT THORAX DX C-: CPT | Mod: 26 | Performed by: RADIOLOGY

## 2023-07-31 PROCEDURE — 250N000011 HC RX IP 250 OP 636: Mod: JZ | Performed by: NURSE PRACTITIONER

## 2023-07-31 PROCEDURE — 83735 ASSAY OF MAGNESIUM: CPT | Performed by: NURSE PRACTITIONER

## 2023-07-31 RX ORDER — ACETAMINOPHEN 325 MG/1
650 TABLET ORAL EVERY 4 HOURS PRN
Status: DISCONTINUED | OUTPATIENT
Start: 2023-07-31 | End: 2023-08-03 | Stop reason: HOSPADM

## 2023-07-31 RX ORDER — CEFTRIAXONE 1 G/1
1 INJECTION, POWDER, FOR SOLUTION INTRAMUSCULAR; INTRAVENOUS ONCE
Status: COMPLETED | OUTPATIENT
Start: 2023-07-31 | End: 2023-07-31

## 2023-07-31 RX ORDER — AZITHROMYCIN 250 MG/1
500 TABLET, FILM COATED ORAL ONCE
Status: COMPLETED | OUTPATIENT
Start: 2023-07-31 | End: 2023-07-31

## 2023-07-31 RX ORDER — OXYCODONE HYDROCHLORIDE 5 MG/1
5 TABLET ORAL ONCE
Status: COMPLETED | OUTPATIENT
Start: 2023-07-31 | End: 2023-07-31

## 2023-07-31 RX ORDER — HEPARIN SODIUM 5000 [USP'U]/.5ML
5000 INJECTION, SOLUTION INTRAVENOUS; SUBCUTANEOUS EVERY 12 HOURS
Status: CANCELLED | OUTPATIENT
Start: 2023-07-31

## 2023-07-31 RX ADMIN — ACETAMINOPHEN 650 MG: 325 TABLET, FILM COATED ORAL at 18:55

## 2023-07-31 RX ADMIN — AZITHROMYCIN 500 MG: 250 TABLET, FILM COATED ORAL at 19:38

## 2023-07-31 RX ADMIN — OXYCODONE HYDROCHLORIDE 5 MG: 5 TABLET ORAL at 14:59

## 2023-07-31 RX ADMIN — CEFTRIAXONE SODIUM 1 G: 1 INJECTION, POWDER, FOR SOLUTION INTRAMUSCULAR; INTRAVENOUS at 19:39

## 2023-07-31 ASSESSMENT — ACTIVITIES OF DAILY LIVING (ADL)
TOILETING_ISSUES: YES
CONCENTRATING,_REMEMBERING_OR_MAKING_DECISIONS_DIFFICULTY: YES
CHANGE_IN_FUNCTIONAL_STATUS_SINCE_ONSET_OF_CURRENT_ILLNESS/INJURY: NO
TRANSFERRING: 0-->INDEPENDENT
TRANSFERRING: 1-->ASSISTANCE (EQUIPMENT/PERSON) NEEDED
ADLS_ACUITY_SCORE: 39
WALKING_OR_CLIMBING_STAIRS_DIFFICULTY: YES
NUMBER_OF_TIMES_PATIENT_HAS_FALLEN_WITHIN_LAST_SIX_MONTHS: 0
ADLS_ACUITY_SCORE: 39
WEAR_GLASSES_OR_BLIND: YES
ADLS_ACUITY_SCORE: 39
FALL_HISTORY_WITHIN_LAST_SIX_MONTHS: YES
DRESS: 0-->INDEPENDENT
DIFFICULTY_EATING/SWALLOWING: NO
DRESS: 0-->INDEPENDENT
ADLS_ACUITY_SCORE: 39
TOILETING: 0-->NOT TOILET TRAINED OR ASSISTANCE NEEDED (DEVELOPMENTALLY APPROPRIATE)
TOILETING_ASSISTANCE: TOILETING DIFFICULTY, REQUIRES EQUIPMENT
DRESSING/BATHING: BATHING DIFFICULTY, REQUIRES EQUIPMENT
ADLS_ACUITY_SCORE: 39
BATHING: 0-->INDEPENDENT
TOILETING: 1-->ASSISTANCE (EQUIPMENT/PERSON) NEEDED
DOING_ERRANDS_INDEPENDENTLY_DIFFICULTY: YES
DRESSING/BATHING_DIFFICULTY: YES

## 2023-07-31 NOTE — ED TRIAGE NOTES
BIBA from [facility] for chest pain. Pt had chest pain prior to HD today. Pt received 1hr of HD, but couldn't tolerate sitting in chair due to her chest pain.      Triage Assessment       Row Name 07/31/23 1408       Triage Assessment (Adult)    Airway WDL WDL       Respiratory WDL    Respiratory WDL X;all    Rhythm/Pattern, Respiratory shortness of breath    Cough Frequency infrequent    Cough Type dry       Skin Circulation/Temperature WDL    Skin Circulation/Temperature WDL WDL       Cardiac WDL    Cardiac WDL X;chest pain       Chest Pain Assessment    Character aching       Peripheral/Neurovascular WDL    Peripheral Neurovascular WDL X;neurovascular assessment lower       LLE Neurovascular Assessment    Sensation LLE tingling present       RLE Neurovascular Assessment    Sensation RLE tingling present       Cognitive/Neuro/Behavioral WDL    Cognitive/Neuro/Behavioral WDL WDL

## 2023-07-31 NOTE — ED PROVIDER NOTES
ED Provider Note  [facility]      History     Chief Complaint   Patient presents with    Chest Pain     HPI  Rachele Martínez is a 83 year old female with a history notable for ESRD on hemodialysis, HTN, chronic hepatitis C with cirrhosis, recurrent GI bleed, AVM, clotting disorder, HTN, HLD who presents from dialysis with chest pain.    She reports she woke up this morning with chest pain, and was able to proceed to her dialysis center for dialysis. She reports during her dialysis run that while sitting in a chair her pain worsened and she presented to the emergency department.  She reports she was able to complete approximately 1 out of 3 hours of her dialysis run.  Patient did denies any shortness of breath, lower extremity swelling, cough, shortness of breath, diaphoresis.  She reports she does not make any urine.  She also reports generalized body aches      Past Medical History  Past Medical History:   Diagnosis Date    Anemia     Anxiety and depression     Arthritis     AVM (arteriovenous malformation)     Chronic hepatitis C with cirrhosis (H)     Clotting disorder (H)     ESRD (end stage renal disease) (H)     on dialysis    GI bleed     recurrent    Glaucoma     Hyperlipidemia     Hypertension goal BP (blood pressure) < 140/80      Past Surgical History:   Procedure Laterality Date    CAPSULE/PILL CAM ENDOSCOPY N/A 3/27/2019    Procedure: Capsule/pill cam endoscopy;  Surgeon: Yosvany Ram MD;  Location: [facility]    CAPSULE/PILL CAM ENDOSCOPY N/A 2/2/2023    Procedure: IMAGING PROCEDURE, GI TRACT, INTRALUMINAL, VIA CAPSULE;  Surgeon: Mulu Nur DO;  Location: [facility]    COLONOSCOPY N/A 9/4/2015    Procedure: COMBINED COLONOSCOPY, SINGLE OR MULTIPLE BIOPSY/POLYPECTOMY BY BIOPSY;  Surgeon: Rupesh Lopez MD;  Location: [facility]    COLONOSCOPY N/A 9/19/2018    Procedure: COLONOSCOPY;  enteroscopy small bowel  COLONOSCOPY;  Surgeon: Ankit Baires MD;  Location: [facility]     ENTEROSCOPY SMALL BOWEL N/A 3/9/2023    Procedure: antegrade single balloon enteroscopy with argon plasma coagulation of arteriovenous malformations;  Surgeon: Ankit Baires MD;  Location: [facility]    ESOPHAGOSCOPY, GASTROSCOPY, DUODENOSCOPY (EGD), COMBINED N/A 12/18/2014    Procedure: COMBINED ESOPHAGOSCOPY, GASTROSCOPY, DUODENOSCOPY (EGD);  Surgeon: Betsy Carvajal MD;  Location: [facility]    ESOPHAGOSCOPY, GASTROSCOPY, DUODENOSCOPY (EGD), COMBINED N/A 4/25/2015    Procedure: COMBINED ESOPHAGOSCOPY, GASTROSCOPY, DUODENOSCOPY (EGD);  Surgeon: Yosvany Ram MD;  Location: [facility]    ESOPHAGOSCOPY, GASTROSCOPY, DUODENOSCOPY (EGD), COMBINED N/A 5/5/2015    Procedure: COMBINED ESOPHAGOSCOPY, GASTROSCOPY, DUODENOSCOPY (EGD);  Surgeon: Mariano Mistry MD;  Location:  GI    HC CAPSULE ENDOSCOPY N/A 9/30/2015    Procedure: CAPSULE/PILL CAM ENDOSCOPY;  Surgeon: Pan Dhaliwal MD;  Location:  GI    HC VASCULAR SURGERY PROCEDURE UNLIST      HYSTERECTOMY  1980    KYREE    LUMPECTOMY BREAST       acetaminophen (TYLENOL) 325 MG tablet  amLODIPine (NORVASC) 2.5 MG tablet  atorvastatin (LIPITOR) 20 MG tablet  levothyroxine (SYNTHROID/LEVOTHROID) 50 MCG tablet  losartan (COZAAR) 100 MG tablet  Multiple Vitamin-Folic Acid TABS  pantoprazole (PROTONIX) 20 MG EC tablet  sertraline (ZOLOFT) 50 MG tablet  sucroferric oxyhydroxide (VELPHORO) 500 MG CHEW chewable tablet      Allergies   Allergen Reactions    Abacavir Itching    Lisinopril Cough    Dust Mites     Hydrochlorothiazide Itching     Severe      No Clinical Screening - See Comments      History of blood transfusion reactions and pre-treats with Benadryl.     Spironolactone Nausea    Sulfa Antibiotics Hives    Valsartan Itching    Valsartan-Hydrochlorothiazide Itching     severe     Family History  Family History   Problem Relation Age of Onset    Diabetes Mother     Alzheimer Disease Mother     Substance Abuse Son     Cancer Sister     Soft Tissue Cancer  Sister     Breast Cancer Sister     Hyperlipidemia Daughter     Alcoholism Brother     Spine Problems Sister      Social History   Social History     Tobacco Use    Smoking status: Former     Packs/day: 2.00     Years: 45.00     Pack years: 90.00     Types: Cigarettes     Start date: 1953     Quit date: 2002     Years since quittin.6    Smokeless tobacco: Never   Substance Use Topics    Alcohol use: No    Drug use: Yes     Types: Marijuana     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         A medically appropriate review of systems was performed with pertinent positives and negatives noted in the HPI, and all other systems negative.    Physical Exam   BP: (!) 196/92  Pulse: 84  Temp: 98.3  F (36.8  C)  Resp: 18  SpO2: 99 %  Physical Exam  Vitals and nursing note reviewed.   HENT:      Head: Normocephalic and atraumatic.   Cardiovascular:      Rate and Rhythm: Normal rate and regular rhythm.      Pulses:           Radial pulses are 2+ on the right side and 2+ on the left side.        Posterior tibial pulses are 2+ on the right side and 2+ on the left side.      Heart sounds: No murmur heard.     Comments: Tenderness to palpation throughout the entire chest wall.  Pulmonary:      Effort: Pulmonary effort is normal.      Breath sounds: Normal breath sounds.   Chest:      Chest wall: Tenderness present.   Abdominal:      Palpations: Abdomen is soft.      Tenderness: There is no abdominal tenderness. There is no rebound.   Musculoskeletal:      Cervical back: Normal range of motion and neck supple.   Skin:     General: Skin is warm.   Neurological:      Mental Status: She is alert and oriented to person, place, and time.   Psychiatric:         Mood and Affect: Mood normal.           ED Course, Procedures, & Data     ED Course as of 23   1627 Patient resting in bed asleep, reports chest pain unchanged after medications.     Procedures       ED Course  Selections:        EKG Interpretation:      Interpreted by BAILEY Terry CNP  Time reviewed: 1410  Symptoms at time of EKG: chest pain   Rhythm: normal sinus   Rate: Normal  Axis: Normal  Ectopy: none  Conduction: QTC: 505  ST Segments/ T Waves: Non-specific ST-T wave changes  Q Waves: none  Comparison to prior: Unchanged from 06/21/2023  Clinical Impression: No acute changes          Results for orders placed or performed during the hospital encounter of 07/31/23   XR Chest Port 1 View     Status: None    Narrative    EXAM: XR CHEST PORT 1 VIEW 7/31/2023 2:29 PM    DEMOGRAPHICS: Female of 83 years    INDICATION: Chest pain, did not complete dialysis run.    COMPARISON: Chest CT 6/22/2023, chest radiograph 6/21/2023.    TECHNIQUE: Single portable AP view of the chest.    FINDINGS:   Bilateral diffuse mixed interstitial and airspace opacities.  Redemonstration of thickening of the right minor fissure. Trachea is  midline. Normal cardiac silhouette. Mediastinum is within normal  limits. Distinct pulmonary vasculature. No significant pleural  effusion. No discernable pneumothorax. Unremarkable upper abdomen. No  acute or suspicious osseous abnormalities. Unremarkable soft tissues.      Impression    IMPRESSION:   1.  Bilateral pulmonary edema. No focal consolidation.    I have personally reviewed the examination and initial interpretation  and I agree with the findings.    MARK DIAS MD         SYSTEM ID:  [id number]   CT Chest Abdomen Pelvis w/o Contrast     Status: None    Narrative    EXAMINATION: CT CHEST ABDOMEN PELVIS W/O CONTRAST, 7/31/2023 5:14 PM    TECHNIQUE:  Helical CT images from the thoracic inlet through the  symphysis pubis were obtained  without contrast.     COMPARISON: 6/22/2023    HISTORY: Chest pain, abdominal pain, leukocytosis.    FINDINGS:    Limited evaluation of the absence of intravenous contrast.    Chest: Atrophic thyroid. No lower cervical or axillary  lymphadenopathy. Mildly  enlarged pulmonary artery measuring  approximately 3.4 cm immediately prior to its bifurcation. Otherwise  normal size and configuration of the major thoracic vessels. Stable  cardiomegaly. Severe atherosclerosis of the coronary arteries. Mitral  annular calcifications. Unremarkable esophagus. No mediastinal  lymphadenopathy. No pneumothorax or pleural effusion. Bibasilar  posterior opacities, likely dependent atelectasis. No focal pulmonary  opacities.    Liver: Limited noncontrast evaluation of the liver demonstrating  cirrhotic hepatic configuration. Previously visualized small  hypoattenuating lesion in the right hepatic dome is not well seen in  the absence of contrast.    Biliary System: Again shows distended gallbladder with layering  gallstones, similar to prior. No substantial pericholecystic  inflammatory changes.    Pancreas: Unremarkable noncontrasted evaluation of the pancreas.    Adrenal glands: No mass or nodules left adrenal nodular hyperplasia  unchanged. Normal right adrenal.    Spleen: Ill-defined hyperattenuation of the anterior spleen, likely to  represent a splenic cyst.    Kidneys: Atrophic polycystic kidneys. No evidence of urinary  obstruction    Gastrointestinal tract : The appendix is indistinct Normal caliber  small bowel.    Mesentery/peritoneum/retroperitoneum: No mass.. Small amount of  perihepatic ascites.    Lymph nodes: No significant lymphadenopathy.    Vasculature: Severe atherosclerotic calcification of abdominal aorta  and its branches with no aneurysmal dilation.  Suspect  aortoiliac-femoral peripheral arterial disease given degree of  atherosclerotic obscuration.    Pelvis: Urinary bladder is normal. Uterus is surgically absent, no  adnexal mass      Soft tissues: Coarse cortication of the bilateral breasts, likely  areas of fat necrosis.    Osseous structures: No aggressive or acute osseous lesion.  Severe  multilevel degenerative changes of the spine with multiple  sclerotic  endplates and areas of disc space narrowing and Schmorl's nodes in the  vertebrae. Degenerative changes of the shoulders and hips.      Impression    IMPRESSION:   1. Bilateral basilar posterior opacities which are favored to  represent dependent atelectasis. Infectious and inflammatory  etiologies cannot be fully ruled out.  2. Limited examination in the absence of intravenous contrast with  multiple unchanged incidental findings including small volume ascites,  cirrhotic configuration of the liver, atrophic cholecystic kidneys,  distended gallbladder with layering gallstones, and diffuse  atherosclerotic disease all similar to prior dated 6/22/2023.    I have personally reviewed the examination and initial interpretation  and I agree with the findings.    MARK DIAS MD         SYSTEM ID:  [id number]   Comprehensive metabolic panel     Status: Abnormal   Result Value Ref Range    Sodium 136 136 - 145 mmol/L    Potassium 4.1 3.4 - 5.3 mmol/L    Chloride 96 (L) 98 - 107 mmol/L    Carbon Dioxide (CO2) 24 22 - 29 mmol/L    Anion Gap 16 (H) 7 - 15 mmol/L    Urea Nitrogen 29.7 (H) 8.0 - 23.0 mg/dL    Creatinine 5.82 (H) 0.51 - 0.95 mg/dL    Calcium 9.3 8.8 - 10.2 mg/dL    Glucose 126 (H) 70 - 99 mg/dL    Alkaline Phosphatase 191 (H) 35 - 104 U/L    AST 49 (H) 0 - 45 U/L    ALT 20 0 - 50 U/L    Protein Total 7.5 6.4 - 8.3 g/dL    Albumin 4.0 3.5 - 5.2 g/dL    Bilirubin Total 0.6 <=1.2 mg/dL    GFR Estimate 7 (L) >60 mL/min/1.73m2   Troponin T, High Sensitivity     Status: Abnormal   Result Value Ref Range    Troponin T, High Sensitivity 59 (H) <=14 ng/L   Magnesium     Status: Normal   Result Value Ref Range    Magnesium 2.1 1.7 - 2.3 mg/dL   Symptomatic Influenza A/B, RSV, & SARS-CoV2 PCR (COVID-19) Nasopharyngeal     Status: Normal    Specimen: Nasopharyngeal; Swab   Result Value Ref Range    Influenza A PCR Negative Negative    Influenza B PCR Negative Negative    RSV PCR Negative Negative    SARS  CoV2 PCR Negative Negative    Narrative    Testing was performed using the Xpert Xpress CoV2/Flu/RSV Assay on the [employer] GeneXpert Instrument. This test should be ordered for the detection of SARS-CoV-2, influenza, and RSV viruses in individuals who meet clinical and/or epidemiological criteria. Test performance is unknown in asymptomatic patients. This test is for in vitro diagnostic use under the FDA EUA for laboratories certified under CLIA to perform high or moderate complexity testing. This test has not been FDA cleared or approved. A negative result does not rule out the presence of PCR inhibitors in the specimen or target RNA in concentration below the limit of detection for the assay. If only one viral target is positive but coinfection with multiple targets is suspected, the sample should be re-tested with another FDA cleared, approved, or authorized test, if coinfection would change clinical management. This test was validated by the [email or web address]. These laboratories are certified under the Clinical Laboratory Improvement Amendments of 1988 (CLIA-88) as qualified to perform high complexity laboratory testing.   Lipase     Status: Normal   Result Value Ref Range    Lipase 43 13 - 60 U/L   CBC with platelets and differential     Status: Abnormal   Result Value Ref Range    WBC Count 20.5 (H) 4.0 - 11.0 10e3/uL    RBC Count 3.17 (L) 3.80 - 5.20 10e6/uL    Hemoglobin 9.5 (L) 11.7 - 15.7 g/dL    Hematocrit 31.9 (L) 35.0 - 47.0 %     (H) 78 - 100 fL    MCH 30.0 26.5 - 33.0 pg    MCHC 29.8 (L) 31.5 - 36.5 g/dL    RDW 16.5 (H) 10.0 - 15.0 %    Platelet Count 290 150 - 450 10e3/uL   Manual Differential     Status: Abnormal   Result Value Ref Range    % Neutrophils 89 %    % Lymphocytes 1 %    % Monocytes 10 %    % Eosinophils 0 %    % Basophils 0 %    Absolute Neutrophils 18.2 (H) 1.6 - 8.3 10e3/uL    Absolute Lymphocytes 0.2 (L) 0.8 - 5.3 10e3/uL    Absolute Monocytes 2.1 (H) 0.0 - 1.3  10e3/uL    Absolute Eosinophils 0.0 0.0 - 0.7 10e3/uL    Absolute Basophils 0.0 0.0 - 0.2 10e3/uL    RBC Morphology Confirmed RBC Indices     Platelet Assessment  Automated Count Confirmed. Platelet morphology is normal.     Automated Count Confirmed. Platelet morphology is normal.    Polychromasia Slight (A) None Seen   Troponin T, High Sensitivity     Status: Abnormal   Result Value Ref Range    Troponin T, High Sensitivity 72 (H) <=14 ng/L   Lactic Acid STAT     Status: Abnormal   Result Value Ref Range    Lactic Acid 2.4 (H) 0.7 - 2.0 mmol/L   Extra Tube     Status: None    Narrative    The following orders were created for panel order Extra Tube.  Procedure                               Abnormality         Status                     ---------                               -----------         ------                     Extra Purple Top Tube[[id number]]                            Final result                 Please view results for these tests on the individual orders.   Extra Purple Top Tube     Status: None   Result Value Ref Range    Hold Specimen [facility]    EKG 12 lead     Status: None (Preliminary result)   Result Value Ref Range    Systolic Blood Pressure  mmHg    Diastolic Blood Pressure  mmHg    Ventricular Rate 87 BPM    Atrial Rate 87 BPM    GA Interval 180 ms    QRS Duration 102 ms     ms    QTc 505 ms    P Axis 59 degrees    R AXIS 8 degrees    T Axis 80 degrees    Interpretation ECG       Sinus rhythm  Nonspecific ST abnormality  Prolonged QT  Abnormal ECG     CBC with platelets differential     Status: Abnormal    Narrative    The following orders were created for panel order CBC with platelets differential.  Procedure                               Abnormality         Status                     ---------                               -----------         ------                     CBC with platelets and d...[[id number]]  Abnormal            Final result               Manual Differential[[id number]]           Abnormal            Final result                 Please view results for these tests on the individual orders.     Medications   acetaminophen (TYLENOL) tablet 650 mg (650 mg Oral $Given 7/31/23 2825)   oxyCODONE (ROXICODONE) tablet 5 mg (5 mg Oral $Given 7/31/23 6579)   cefTRIAXone (ROCEPHIN) 1 g vial to attach to  mL bag for ADULTS or NS 50 mL bag for PEDS (0 g Intravenous Stopped 7/31/23 2018)   azithromycin (ZITHROMAX) tablet 500 mg (500 mg Oral $Given 7/31/23 1938)     Labs Ordered and Resulted from Time of ED Arrival to Time of ED Departure   COMPREHENSIVE METABOLIC PANEL - Abnormal       Result Value    Sodium 136      Potassium 4.1      Chloride 96 (*)     Carbon Dioxide (CO2) 24      Anion Gap 16 (*)     Urea Nitrogen 29.7 (*)     Creatinine 5.82 (*)     Calcium 9.3      Glucose 126 (*)     Alkaline Phosphatase 191 (*)     AST 49 (*)     ALT 20      Protein Total 7.5      Albumin 4.0      Bilirubin Total 0.6      GFR Estimate 7 (*)    TROPONIN T, HIGH SENSITIVITY - Abnormal    Troponin T, High Sensitivity 59 (*)    CBC WITH PLATELETS AND DIFFERENTIAL - Abnormal    WBC Count 20.5 (*)     RBC Count 3.17 (*)     Hemoglobin 9.5 (*)     Hematocrit 31.9 (*)      (*)     MCH 30.0      MCHC 29.8 (*)     RDW 16.5 (*)     Platelet Count 290     DIFFERENTIAL - Abnormal    % Neutrophils 89      % Lymphocytes 1      % Monocytes 10      % Eosinophils 0      % Basophils 0      Absolute Neutrophils 18.2 (*)     Absolute Lymphocytes 0.2 (*)     Absolute Monocytes 2.1 (*)     Absolute Eosinophils 0.0      Absolute Basophils 0.0      RBC Morphology Confirmed RBC Indices      Platelet Assessment        Value: Automated Count Confirmed. Platelet morphology is normal.    Polychromasia Slight (*)    TROPONIN T, HIGH SENSITIVITY - Abnormal    Troponin T, High Sensitivity 72 (*)    LACTIC ACID WHOLE BLOOD - Abnormal    Lactic Acid 2.4 (*)    MAGNESIUM - Normal    Magnesium 2.1     INFLUENZA A/B, RSV, & SARS-COV2  PCR - Normal    Influenza A PCR Negative      Influenza B PCR Negative      RSV PCR Negative      SARS CoV2 PCR Negative     LIPASE - Normal    Lipase 43     LACTIC ACID WHOLE BLOOD   BLOOD CULTURE   BLOOD CULTURE     CT Chest Abdomen Pelvis w/o Contrast   Final Result   IMPRESSION:    1. Bilateral basilar posterior opacities which are favored to   represent dependent atelectasis. Infectious and inflammatory   etiologies cannot be fully ruled out.   2. Limited examination in the absence of intravenous contrast with   multiple unchanged incidental findings including small volume ascites,   cirrhotic configuration of the liver, atrophic cholecystic kidneys,   distended gallbladder with layering gallstones, and diffuse   atherosclerotic disease all similar to prior dated 6/22/2023.      I have personally reviewed the examination and initial interpretation   and I agree with the findings.      MARK DIAS MD            SYSTEM ID:  [id number]      XR Chest Port 1 View   Final Result   IMPRESSION:    1.  Bilateral pulmonary edema. No focal consolidation.      I have personally reviewed the examination and initial interpretation   and I agree with the findings.      MARK DIAS MD            SYSTEM ID:  [id number]             Critical care was not performed.     Medical Decision Making  The patient's presentation was of high complexity (an acute health issue posing potential threat to life or bodily function).    The patient's evaluation involved:  an assessment requiring an independent historian (patient's daughter)  review of external note(s) from 2 sources (internal medicine clinic note, hospital discharge summary)  ordering and/or review of 3+ test(s) in this encounter (see separate area of note for details)  review of 3+ test result(s) ordered prior to this encounter (CBC, CMP, troponin)  independent interpretation of testing performed by another health professional (chest x-ray)    The patient's  management necessitated high risk (a decision regarding hospitalization).    Assessment & Plan    83 year old female who presents with the above complaints.    Clinically, patient appears uncomfortable due to pain, her pain is reproducible on palpation across her chest. Vital signs without significant hypotension, fever or tachycardia. Otherwise on examination, lungs are clear to auscultation, cardiac exam negative for murmurs, lower extremities negative for edema.    Ddx includes, but not limited to, acute coronary syndrome, pulmonary embolism, aortic dissection, pericarditis, pneumothorax, pneumonia.    Patient had an IV placed, and had a CBC, CMP, troponin, magnesium, lipase as well as a COVID test performed.  Patient initially had a portable chest x-ray completed.    CBC was notable for leukocytosis of 20.5, CMP showed a stable potassium and sodium.  COVID testing was negative. Creatinine 5.8, this is in line with her not finishing her dialysis run earlier today. Initial troponin was 59, this is in line with previous troponins.  Magnesium and lipase within normal limits.  I did personally interpret her portable chest x-ray, and at this time it is showing mixed interstitial airspace opacities, which is concerning for bilateral pulmonary edema, there is no current focal consolidation. Following the resulting blood work showing significant leukocytosis, she also developed a fever while in the department, this in combination with her body aches and unrevealing chest x-ray, we did expand her work up and obtained a chest abdomen pelvis CT without contrast as well as blood cultures. I reviewed and agree with the final CT abdomen pelvis read that shows bilateral basilar posterior opacities.     She was given a small dose of oxycodone for her pain, and while she reports that this did not improve her chest pain, she was resting comfortably following this.  Following her measured fever she was also given oral acetaminophen  with improvement in her fever.  Her delta troponin did increase to 73.  While this did increase at the two hour armando, with her reproducible chest pain on exam, lower concern that this mildly elevated troponin that is in line with previous troponin results represents chest pain of a cardiac nature.  She has no pleuritic pain on exam, and with new leukocytosis, fever, lower suspicion pain is from pulmonary embolism or dissection.    With her leukocytosis, fever, complaints of body aches, and opacities seen on CT, will treat patient for community-acquired pneumonia.  She was given first dosing of IV ceftriaxone as well as p.o. azithromycin.  Spoke to the triage hospitalist, and reviewed the patient who accepted patient for admission to [facility] for suspected pneumonia.     I have reviewed the available findings, plan for admission with patient and  her daughter.    New Prescriptions    No medications on file       Final diagnoses:   Acute chest pain   Acute pulmonary edema (H)   Pneumonia of both lungs due to infectious organism, unspecified part of lung       BAILEY Terry CNP  [facility] EMERGENCY DEPARTMENT  7/31/2023     Michael Lynn APRN CNP  07/31/23 2158

## 2023-07-31 NOTE — LETTER
Health Information Management Services               Recipient:    Thang  NELL orders      Sender:    Ana  [phone]        Date: August 3, 2023  Patient Name:  Rachele Martínez  Patient YOB: 1940  Routing Message:            The documents accompanying this notice contain confidential information belonging to the sender.  This information is intended only for the use of the individual or entity named above.  The authorized recipient of this information is prohibited from disclosing this information to any other party and is required to destroy the information after its stated need has been fulfilled, unless otherwise required by state law.      If you are not the intended recipient, you are hereby notified that any disclosure, copy, distribution or action taken in reliance on the contents of these documents is strictly prohibited.  If you have received this document in error, please return it by fax to [phone] with a note on the cover sheet explaining why you are returning it (e.g. not your patient, not your provider, etc.).  If you need further assistance, please call [facility] Centralized Transcription at [phone].  Documents may also be returned by mail to [facility], , [address], [place] [address].

## 2023-07-31 NOTE — DISCHARGE INSTRUCTIONS
-Please take your antibiotics as prescribed. Take your evening dose of doxycycline tonight with some non-dairy containing food. Your next dose of   -Please resume your MWF schedule of hemodialysis

## 2023-07-31 NOTE — ED NOTES
Bed: ED15  Expected date:   Expected time:   Means of arrival:   Comments:  Sternal pain from dialysis

## 2023-08-01 ENCOUNTER — LAB REQUISITION (OUTPATIENT)
Dept: LAB | Facility: CLINIC | Age: 83
End: 2023-08-01
Payer: MEDICARE

## 2023-08-01 DIAGNOSIS — D64.9 ANEMIA, UNSPECIFIED: ICD-10-CM

## 2023-08-01 DIAGNOSIS — I10 ESSENTIAL (PRIMARY) HYPERTENSION: ICD-10-CM

## 2023-08-01 LAB
ANION GAP SERPL CALCULATED.3IONS-SCNC: 11 MMOL/L (ref 7–15)
BUN SERPL-MCNC: 39.4 MG/DL (ref 8–23)
CALCIUM SERPL-MCNC: 8.6 MG/DL (ref 8.8–10.2)
CHLORIDE SERPL-SCNC: 96 MMOL/L (ref 98–107)
CREAT SERPL-MCNC: 6.54 MG/DL (ref 0.51–0.95)
DEPRECATED HCO3 PLAS-SCNC: 27 MMOL/L (ref 22–29)
ERYTHROCYTE [DISTWIDTH] IN BLOOD BY AUTOMATED COUNT: 16.7 % (ref 10–15)
GFR SERPL CREATININE-BSD FRML MDRD: 6 ML/MIN/1.73M2
GLUCOSE SERPL-MCNC: 120 MG/DL (ref 70–99)
HCT VFR BLD AUTO: 29.5 % (ref 35–47)
HGB BLD-MCNC: 9 G/DL (ref 11.7–15.7)
LACTATE SERPL-SCNC: 1.6 MMOL/L (ref 0.7–2)
LACTATE SERPL-SCNC: 2.1 MMOL/L (ref 0.7–2)
MAGNESIUM SERPL-MCNC: 2.2 MG/DL (ref 1.7–2.3)
MCH RBC QN AUTO: 29.9 PG (ref 26.5–33)
MCHC RBC AUTO-ENTMCNC: 30.5 G/DL (ref 31.5–36.5)
MCV RBC AUTO: 98 FL (ref 78–100)
PLATELET # BLD AUTO: 258 10E3/UL (ref 150–450)
POTASSIUM SERPL-SCNC: 4.7 MMOL/L (ref 3.4–5.3)
RBC # BLD AUTO: 3.01 10E6/UL (ref 3.8–5.2)
SODIUM SERPL-SCNC: 134 MMOL/L (ref 136–145)
TROPONIN T SERPL HS-MCNC: 76 NG/L
TROPONIN T SERPL HS-MCNC: 82 NG/L
WBC # BLD AUTO: 18.8 10E3/UL (ref 4–11)

## 2023-08-01 PROCEDURE — 258N000003 HC RX IP 258 OP 636: Performed by: STUDENT IN AN ORGANIZED HEALTH CARE EDUCATION/TRAINING PROGRAM

## 2023-08-01 PROCEDURE — 5A1D70Z PERFORMANCE OF URINARY FILTRATION, INTERMITTENT, LESS THAN 6 HOURS PER DAY: ICD-10-PCS | Performed by: INTERNAL MEDICINE

## 2023-08-01 PROCEDURE — 90935 HEMODIALYSIS ONE EVALUATION: CPT

## 2023-08-01 PROCEDURE — 250N000013 HC RX MED GY IP 250 OP 250 PS 637: Performed by: PHYSICIAN ASSISTANT

## 2023-08-01 PROCEDURE — 250N000013 HC RX MED GY IP 250 OP 250 PS 637

## 2023-08-01 PROCEDURE — 84484 ASSAY OF TROPONIN QUANT: CPT | Performed by: STUDENT IN AN ORGANIZED HEALTH CARE EDUCATION/TRAINING PROGRAM

## 2023-08-01 PROCEDURE — 83605 ASSAY OF LACTIC ACID: CPT | Performed by: PEDIATRICS

## 2023-08-01 PROCEDURE — 250N000013 HC RX MED GY IP 250 OP 250 PS 637: Performed by: STUDENT IN AN ORGANIZED HEALTH CARE EDUCATION/TRAINING PROGRAM

## 2023-08-01 PROCEDURE — 120N000002 HC R&B MED SURG/OB UMMC

## 2023-08-01 PROCEDURE — 258N000003 HC RX IP 258 OP 636: Performed by: INTERNAL MEDICINE

## 2023-08-01 PROCEDURE — 36415 COLL VENOUS BLD VENIPUNCTURE: CPT | Performed by: PEDIATRICS

## 2023-08-01 PROCEDURE — 99232 SBSQ HOSP IP/OBS MODERATE 35: CPT | Mod: GC | Performed by: STUDENT IN AN ORGANIZED HEALTH CARE EDUCATION/TRAINING PROGRAM

## 2023-08-01 PROCEDURE — 250N000011 HC RX IP 250 OP 636: Mod: JZ | Performed by: PHYSICIAN ASSISTANT

## 2023-08-01 PROCEDURE — 80048 BASIC METABOLIC PNL TOTAL CA: CPT | Performed by: PHYSICIAN ASSISTANT

## 2023-08-01 PROCEDURE — 83735 ASSAY OF MAGNESIUM: CPT

## 2023-08-01 PROCEDURE — 99223 1ST HOSP IP/OBS HIGH 75: CPT | Performed by: INTERNAL MEDICINE

## 2023-08-01 PROCEDURE — 258N000003 HC RX IP 258 OP 636

## 2023-08-01 PROCEDURE — 85027 COMPLETE CBC AUTOMATED: CPT | Performed by: PHYSICIAN ASSISTANT

## 2023-08-01 RX ORDER — CEFTRIAXONE 1 G/1
1 INJECTION, POWDER, FOR SOLUTION INTRAMUSCULAR; INTRAVENOUS EVERY 24 HOURS
Status: DISCONTINUED | OUTPATIENT
Start: 2023-08-01 | End: 2023-08-03

## 2023-08-01 RX ORDER — LOSARTAN POTASSIUM 100 MG/1
100 TABLET ORAL DAILY
Status: DISCONTINUED | OUTPATIENT
Start: 2023-08-01 | End: 2023-08-03 | Stop reason: HOSPADM

## 2023-08-01 RX ORDER — ONDANSETRON 4 MG/1
4 TABLET, ORALLY DISINTEGRATING ORAL EVERY 6 HOURS PRN
Status: DISCONTINUED | OUTPATIENT
Start: 2023-08-01 | End: 2023-08-03 | Stop reason: HOSPADM

## 2023-08-01 RX ORDER — LEVOTHYROXINE SODIUM 25 UG/1
50 TABLET ORAL
Status: DISCONTINUED | OUTPATIENT
Start: 2023-08-01 | End: 2023-08-03 | Stop reason: HOSPADM

## 2023-08-01 RX ORDER — ATORVASTATIN CALCIUM 20 MG/1
20 TABLET, FILM COATED ORAL DAILY
Status: DISCONTINUED | OUTPATIENT
Start: 2023-08-01 | End: 2023-08-03 | Stop reason: HOSPADM

## 2023-08-01 RX ORDER — MULTIVITAMIN,THERAPEUTIC
1 TABLET ORAL DAILY
Status: DISCONTINUED | OUTPATIENT
Start: 2023-08-01 | End: 2023-08-03 | Stop reason: HOSPADM

## 2023-08-01 RX ORDER — ONDANSETRON 2 MG/ML
4 INJECTION INTRAMUSCULAR; INTRAVENOUS EVERY 6 HOURS PRN
Status: DISCONTINUED | OUTPATIENT
Start: 2023-08-01 | End: 2023-08-03 | Stop reason: HOSPADM

## 2023-08-01 RX ORDER — OXYCODONE HYDROCHLORIDE 10 MG/1
10 TABLET ORAL ONCE
Status: COMPLETED | OUTPATIENT
Start: 2023-08-01 | End: 2023-08-01

## 2023-08-01 RX ORDER — AZITHROMYCIN 500 MG/1
500 TABLET, FILM COATED ORAL EVERY 24 HOURS
Status: DISCONTINUED | OUTPATIENT
Start: 2023-08-01 | End: 2023-08-01

## 2023-08-01 RX ORDER — SODIUM CHLORIDE 9 MG/ML
INJECTION, SOLUTION INTRAVENOUS
Status: COMPLETED
Start: 2023-08-01 | End: 2023-08-01

## 2023-08-01 RX ORDER — AZITHROMYCIN 250 MG/1
250 TABLET, FILM COATED ORAL EVERY 24 HOURS
Status: DISCONTINUED | OUTPATIENT
Start: 2023-08-01 | End: 2023-08-01

## 2023-08-01 RX ORDER — SERTRALINE HYDROCHLORIDE 100 MG/1
100 TABLET, FILM COATED ORAL DAILY
Status: DISCONTINUED | OUTPATIENT
Start: 2023-08-01 | End: 2023-08-03 | Stop reason: HOSPADM

## 2023-08-01 RX ORDER — SODIUM CHLORIDE, SODIUM LACTATE, POTASSIUM CHLORIDE, CALCIUM CHLORIDE 600; 310; 30; 20 MG/100ML; MG/100ML; MG/100ML; MG/100ML
INJECTION, SOLUTION INTRAVENOUS
Status: COMPLETED
Start: 2023-08-01 | End: 2023-08-01

## 2023-08-01 RX ORDER — DOXYCYCLINE HYCLATE 50 MG/1
100 CAPSULE ORAL EVERY 12 HOURS SCHEDULED
Status: DISCONTINUED | OUTPATIENT
Start: 2023-08-01 | End: 2023-08-03 | Stop reason: HOSPADM

## 2023-08-01 RX ORDER — LIDOCAINE 40 MG/G
CREAM TOPICAL
Status: DISCONTINUED | OUTPATIENT
Start: 2023-08-01 | End: 2023-08-03 | Stop reason: HOSPADM

## 2023-08-01 RX ORDER — PANTOPRAZOLE SODIUM 20 MG/1
20 TABLET, DELAYED RELEASE ORAL
Status: DISCONTINUED | OUTPATIENT
Start: 2023-08-01 | End: 2023-08-03 | Stop reason: HOSPADM

## 2023-08-01 RX ADMIN — SUCROFERRIC OXYHYDROXIDE 500 MG: 500 TABLET, CHEWABLE ORAL at 10:07

## 2023-08-01 RX ADMIN — DOXYCYCLINE HYCLATE 100 MG: 50 CAPSULE ORAL at 12:32

## 2023-08-01 RX ADMIN — LEVOTHYROXINE SODIUM 50 MCG: 25 TABLET ORAL at 08:25

## 2023-08-01 RX ADMIN — SUCROFERRIC OXYHYDROXIDE 500 MG: 500 TABLET, CHEWABLE ORAL at 20:06

## 2023-08-01 RX ADMIN — THERA TABS 1 TABLET: TAB at 09:42

## 2023-08-01 RX ADMIN — SERTRALINE HYDROCHLORIDE 100 MG: 100 TABLET ORAL at 09:43

## 2023-08-01 RX ADMIN — SODIUM CHLORIDE 500 ML: 9 INJECTION, SOLUTION INTRAVENOUS at 20:04

## 2023-08-01 RX ADMIN — ACETAMINOPHEN 650 MG: 325 TABLET, FILM COATED ORAL at 03:31

## 2023-08-01 RX ADMIN — SODIUM CHLORIDE, POTASSIUM CHLORIDE, SODIUM LACTATE AND CALCIUM CHLORIDE 500 ML: 600; 310; 30; 20 INJECTION, SOLUTION INTRAVENOUS at 02:44

## 2023-08-01 RX ADMIN — AMLODIPINE BESYLATE 7.5 MG: 5 TABLET ORAL at 09:58

## 2023-08-01 RX ADMIN — SODIUM CHLORIDE 300 ML: 9 INJECTION, SOLUTION INTRAVENOUS at 16:06

## 2023-08-01 RX ADMIN — LOSARTAN POTASSIUM 100 MG: 100 TABLET, FILM COATED ORAL at 09:59

## 2023-08-01 RX ADMIN — OXYCODONE HYDROCHLORIDE 10 MG: 10 TABLET ORAL at 02:08

## 2023-08-01 RX ADMIN — PANTOPRAZOLE SODIUM 20 MG: 20 TABLET, DELAYED RELEASE ORAL at 09:43

## 2023-08-01 RX ADMIN — ACETAMINOPHEN 650 MG: 325 TABLET, FILM COATED ORAL at 08:26

## 2023-08-01 RX ADMIN — ATORVASTATIN CALCIUM 20 MG: 20 TABLET, FILM COATED ORAL at 09:42

## 2023-08-01 RX ADMIN — SODIUM CHLORIDE 250 ML: 9 INJECTION, SOLUTION INTRAVENOUS at 16:06

## 2023-08-01 RX ADMIN — Medication: at 16:07

## 2023-08-01 RX ADMIN — DOXYCYCLINE HYCLATE 100 MG: 50 CAPSULE ORAL at 20:06

## 2023-08-01 RX ADMIN — CEFTRIAXONE SODIUM 1 G: 1 INJECTION, POWDER, FOR SOLUTION INTRAMUSCULAR; INTRAVENOUS at 20:41

## 2023-08-01 ASSESSMENT — ACTIVITIES OF DAILY LIVING (ADL)
ADLS_ACUITY_SCORE: 33
ADLS_ACUITY_SCORE: 33
ADLS_ACUITY_SCORE: 39
ADLS_ACUITY_SCORE: 33
DEPENDENT_IADLS:: CLEANING;COOKING;LAUNDRY;SHOPPING;MEAL PREPARATION;MEDICATION MANAGEMENT;TRANSPORTATION
ADLS_ACUITY_SCORE: 33

## 2023-08-01 NOTE — PROGRESS NOTES
[facility]    Transfer Progress Note - [facility] Service       Date of Admission:  7/31/2023    Assessment & Plan   Rachele Martínez is a 83 year old female with a past medical history of ESRD on HD, HTN, and HLD who is admitted on 7/31 for chest pain following incomplete dialysis run.     #Shortness of Breath  #Febrile, resolved  #Suspected CAP   #Severe Sepsis, resolved   Symptoms started 7/30 with chest pain, SOB, and non productive coughs. Attempted run at diaylsis 7/31 but ended early d/t worsening chest pain. In ED found to be febrile to 100.9, leukocytosis 20, and lactic acid 2.4 (now downtrended to 1.6). CT AP w/ signs of posterior opacities c/f infection.  >>>>  - s/p 500mL LR bolus over 2 hours, now resolved lactic   - Antibiotics as below    Anti-Micro  Ceftriaxone (7/31- )  Azithromycin (7/31-8/3)    #Chest Pain  #Troponinemia, resolving  Symptoms started 7/30 with chest pain, SOB, and non productive coughs. [facility] but ended early d/t worsening chest pain. Initial trops 52->72->82->76 (following LR bolus). Tropoinin likely 2/2 ESRD leak as well as likely demand ischemia from suspected CAP as above. ED EKG reviewed - no signs of ischemic findings and is stable compared to prior EKG. Exam note for RRR and blowing systolic murmur.   >>>>  - Tele overnight  - No longer need to trend trops since peaked and platuead     #ESRD on HD (MWF)  #Anemia as related to ESRD   As above, ended dialysis today early d/t chest pain (reported completing only 1/3 of run time). Essentially anuric. BP elevated in ED admission initially w/ SBP gabriela the 150s-190s, but this has resolved (likely 2/2 pain and sepsis process above). ED admit labs noted for Na of 134, but stable potassium.   >>>>  - Neprhology consulted for dialysis     Chronic  HTN - Continue PTA amlodipine and losartan  HLD - Continue PTA atorvastatin   Hypothyroidism -  Continue PTA levothyroxine  Depression and Anxiety - Continue PTA Zoloft          Diet: Renal Diet (dialysis)    DVT Prophylaxis: VTE Prophylaxis contraindicated due to hx of GI bleed 2/2 AVM  Rodriguez Catheter: Not present  Fluids: PO  Lines: None     Cardiac Monitoring: ACTIVE order. Indication: Chest pain/ ACS rule out (24 hours)  Code Status: Full Code      Clinically Significant Risk Factors Present on Admission                                 Disposition Plan      Expected Discharge Date: 08/02/2023                The patient's care was discussed with the Attending Physician, Dr. Zaldivar .    DO [facility]  [facility]  Securely message with [facility] (more info)  Text page via [facility] Paging/Directory   See signed in provider for up to date coverage information  ______________________________________________________________________    Interval History   Transferred over to [facility]. Reviewed presentation with patient.     Experienced some mild chest pain, SOB, and non productive cough on 7/30. Woke up 7/31 w/ chest pain still, and went to attend her 3 hour dialysis session on  but was unable to continue this d/t worsening of her chest pain. Brought to ED for further evaluation.     Physical Exam   Vital Signs: Temp: 98.5  F (36.9  C) Temp src: Oral BP: 133/58 Pulse: 72   Resp: 14 SpO2: 99 % O2 Device: None (Room air)    Weight: 130 lbs 4.67 oz  Physical Exam  Constitutional:       General: She is not in acute distress.     Appearance: She is not ill-appearing or toxic-appearing.   Cardiovascular:      Rate and Rhythm: Normal rate.      Heart sounds: Murmur heard.      Systolic murmur is present with a grade of 4/6.   Pulmonary:      Effort: Pulmonary effort is normal. No accessory muscle usage, prolonged expiration or respiratory distress.      Breath sounds: Normal air entry. Examination of the right-lower field reveals rales.  Examination of the left-lower field reveals rales. Rales present.   Musculoskeletal:      Right lower leg: No edema.      Left lower leg: No edema.   Neurological:      Mental Status: She is alert and oriented to person, place, and time. Mental status is at baseline.      GCS: GCS eye subscore is 4. GCS verbal subscore is 5. GCS motor subscore is 6.   Psychiatric:         Attention and Perception: Attention and perception normal.         Mood and Affect: Mood and affect normal.         Speech: Speech normal.         Behavior: Behavior normal. Behavior is cooperative.         Thought Content: Thought content normal.         Cognition and Memory: Cognition and memory normal.         Judgment: Judgment normal.

## 2023-08-01 NOTE — PLAN OF CARE
Pt arrived at 0130 with 10/10 chest & back pain. Provider came and saw pt. Labs are ordered. Pt is on tele, VSS.     Pt does complain of SOB, course crackles in lungs. Provider aware.       [place] [facility]:    Rachele Martínez was admitted from [place] ED for Chest pain/ pneumonia.  2 RN skin assessment: completed by Writer and Alma Delia RN.  Result of skin assessment and interventions/actions: Intact, just dry.  Height, weight: completed? Yes.  Patient belongings & admission documents: see Flowsheets, completed? Yes.

## 2023-08-01 NOTE — PROGRESS NOTES
Care Management Initial Consult    General Information  Assessment completed with: Patient, Children, daughter Charla  Type of CM/SW Visit: Initial Assessment    Primary Care Provider verified and updated as needed: Yes   Readmission within the last 30 days: current reason for admission unrelated to previous admission   Return Category: Exacerbation of disease  Reason for Consult: decision-making  Advance Care Planning: Advance Care Planning Reviewed: no concerns identified          Communication Assessment  Patient's communication style: spoken language (English or Bilingual)    Hearing Difficulty or Deaf: no   Wear Glasses or Blind: yes    Cognitive  Cognitive/Neuro/Behavioral: WDL                      Living Environment:   People in home: alone     Current living Arrangements: other (see comments) (current admit from TCU)  Name of Facility: [facility]   Able to return to prior arrangements: yes  Living Arrangement Comments: Has Bed Hold at [facility]    Family/Social Support:  Care provided by: other (see comments) (self and TCU staff)  Provides care for: no one, unable/limited ability to care for self  Marital Status:   Children          Description of Support System: Supportive, Involved    Support Assessment: Adequate family and caregiver support, Adequate social supports    Current Resources:   Patient receiving home care services: No     Community Resources: [facility] (Elderly waiver currently closed as pt has been in and out of hospital/TCU since May/can be reopened)  Equipment currently used at home: grab bar, toilet, grab bar, tub/shower, shower chair, walker, rolling  Supplies currently used at home:      Employment/Financial:  Employment Status: retired        Financial Concerns:  (pt states if she goes on Elderly waiver and into [facility] they will take everything but $125 a month)   Referral to Financial Worker: No       Does the patient's insurance plan have a 3 day qualifying hospital stay waiver?   No    Lifestyle & Psychosocial Needs:  Social Determinants of Health     Tobacco Use: Medium Risk (7/25/2023)    Patient History     Smoking Tobacco Use: Former     Smokeless Tobacco Use: Never     Passive Exposure: Not on file   Alcohol Use: Not on file   Financial Resource Strain: Not on file   Food Insecurity: Not on file   Transportation Needs: Not on file   Physical Activity: Not on file   Stress: Not on file   Social Connections: Not on file   Intimate Partner Violence: Not on file   Depression: Not at risk (1/31/2023)    PHQ-2     PHQ-2 Score: 0   Housing Stability: Not on file       Functional Status:  Prior to admission patient needed assistance:   Dependent ADLs::Ambulation-walker, Bathing, Dressing  Dependent IADLs: Cleaning, Cooking, Laundry, Shopping, Meal Preparation, Medication Management, Transportation  Assesssment of Functional Status: Not at baseline with mobility, Not at baseline with ADL Functioning    Mental Health Status:  Mental Health Status: No Current Concerns       Chemical Dependency Status:  Chemical Dependency Status: No Current Concerns             Values/Beliefs:  Spiritual, Cultural Beliefs, Hindu Practices, Values that affect care: no       Cultural/Hindu Practices Patient Routinely Participates In: prayer  Values/Beliefs Comment: Enedelia    Additional Information:  NELY  Rachele Martínez is a 83 year old female with a history notable for ESRD on hemodialysis, HTN, chronic hepatitis C with cirrhosis, recurrent GI bleed, AVM, clotting disorder, HTN, HLD who presents from dialysis with chest pain.     She reports she woke up this morning with chest pain, and was able to proceed to her dialysis center for dialysis. She reports during her dialysis run that while sitting in a chair her pain worsened and she presented to the emergency department.  She reports she was able to complete approximately 1 out of 3 hours of her dialysis run.  Patient did denies any shortness of breath,  lower extremity swelling, cough, shortness of breath, diaphoresis.  She reports she does not make any urine.  She also reports generalized body aches.      Met with patient at bedside, daughter Charla was present at bedside also, introduced role of RNCC, completed assessment and discussed DC planning. [place] but was only able to reside their for about 6 weeks. On mothers day patient admitted to the acute setting and was discharged to [facility]CU before she was readmitted back to the acute setting here at [facility], was discharged another TCU, [facility] where patient currently is from. Daughter Charla and her other sister and patients care team do not think it safe that patient ever lives on her own again and that nursing home would be the best, [facility] has [facility] but wont have apartment for about a month but are willing to keep patient in TCU until then. Patient has a cat Pilar who she can take to the nursing home. Charla states she has been going to the apartment to take care of cat.  Patient  has OP HD at [facility] and Charla sets up her rides but HUC at facility would be able to set up also. If able to sit up in WC and not confused we can set up W/C ride at discharge with [facility].    This RNCC reached out to Walter [phone] @ [facility] and verified that patient has a bed hold.       [facility]  [address] [place] [address]  [id number]  Ph: [phone]  Adm: [phone]  Fax: [phone]    OP Dialysis    [facility] (MWF: 2810 -0948, with Dr. Tarango)  [address] [place] [address]  Phone: [phone]  Fax: [phone]     [facility] Transportation   Wheelchair ride  [phone]  Pick-up Window: [phone]     FAMILY  Charla - [phone]   Dorri - [phone]       Ana Kaiser BSN RN CCM  RN Care Coordinator 8A and 10 ICU  [id number] [facility] [place] [address]  [email or web address]   Office: (10  ICU) [phone]   Pager: [phone]

## 2023-08-01 NOTE — PROGRESS NOTES
T 98.1 oral, P 74, R 14, sp02 99 on room air, B/P 122/60 this AM. C/O pain 7/10 in ribs/chest and requested Tylenol. No SOB. No pain noted on reassessment. Generalized weakness requires minimal assistance from staff x 1 to perform ADLs.    1000: telemetry discontinued.    Antibiotic therapy continues with no adverse reactions noted.     1430: report given to dialysis RN. PT will be going down for treatment shortly. Pt denies pain or discomfort to chest at this time and declines Tylenol.    1530: Patient receiving dialysis.    0645: Patient completed dialysis w/no c/o chest pain. Hypertension reported. Will be returning to unit shortly.

## 2023-08-01 NOTE — H&P
[facility]    History and Physical - Hospitalist Service, GOLD TEAM        Date of Admission:  7/31/2023    Assessment & Plan      Rachele Martínez is a 83 year old female with a past medical history of ESRD on HD, HTN, and HLD who presents to the ED from dialysis d/t chest pain.  She is admitted to internal medicine for further treatment and monitoring. She will be transferred to [facility].    Severe Sepsis 2/2 Suspected CAP - Patient presents with chest pain from dialysis.  Patient notes a non-productive cough and chest pain that began yesterday.  Patient febrile in ED to 100.9F, lactic acid 2.4, leukocytosis of 20. CT C/A/P with bibasilar posterior opacities c/f infection. Concerns for pneumonia. S/p Rocephin and Azithromycin in ED.   -Continue antibiotics for CAP coverage.   -repeat lactic acid per protocol   -hold off on IV fluid for now, consider small fluid bolus if repeat troponin remains elevated.     Troponinemia - Initial troponin 59, repeat 72. In keeping with her previous troponin trends. Suspect some troponin leak in setting of ESRD. Chest pain reproducible on exam, EKG w/o ischemic changes.  -CTM    ESRD on HD (MWF) - Only able to complete 1 of 3 hours of Monday's session. Only makes scant amount of urine daily.   -Nephrology consult     HTN - Continue PTA amlodipine and losartan    HLD - Continue PTA atorvastatin     Hypothyroidism - Continue PTA levothyroxine    Depression and Anxiety - Continue PTA Zoloft     Hx of GI Bleed 2/2 AVM - Hgb stable, no signs/symptoms of active bleeding .   -Hold DVT prophylaxis in setting of past GI bleeding.        Diet:  Renal diet  DVT Prophylaxis: SCDs  Rodriguez Catheter: Not present  Lines: None     Cardiac Monitoring: None  Code Status:  Full code status    Clinically Significant Risk Factors Present on Admission                  # Hypertension: Noted on problem list                Disposition Plan      Expected Discharge Date: 08/02/2023                The patient's care was discussed with the Attending Physician, Dr. Honeycutt .    Margaret Jarrett PA-C  Hospitalist Service, [facility]  Securely message with [employer] (more info)  Text page via [facility] Paging/Directory   See signed in provider for up to date coverage information    ______________________________________________________________________    Chief Complaint   Chest pain    History is obtained from the patient and patient's chart     History of Present Illness   Rachele Martínez is a 83 year old female with a PMH as listed above who presents with chest pain. Patient notes chest pain that began yesterday with associated non-productive cough.  She notes she went to dialysis as scheduled today but notes chest pain worsened during dialysis.  She was then brought to the ED for further evaluation. She notes some improvement in her chest pain since ED presentation.  Pain is somewhat reproducible on exam.  Denies associated chills, sputum production, dysuria, change in bowel habits.       Past Medical History    Past Medical History:   Diagnosis Date    Anemia     Anxiety and depression     Arthritis     AVM (arteriovenous malformation)     Chronic hepatitis C with cirrhosis (H)     Clotting disorder (H)     ESRD (end stage renal disease) (H)     on dialysis    GI bleed     recurrent    Glaucoma     Hyperlipidemia     Hypertension goal BP (blood pressure) < 140/80        Past Surgical History   Past Surgical History:   Procedure Laterality Date    CAPSULE/PILL CAM ENDOSCOPY N/A 3/27/2019    Procedure: Capsule/pill cam endoscopy;  Surgeon: Yosvany Ram MD;  Location:  GI    CAPSULE/PILL CAM ENDOSCOPY N/A 2/2/2023    Procedure: IMAGING PROCEDURE, GI TRACT, INTRALUMINAL, VIA CAPSULE;  Surgeon: Mulu Nur DO;  Location:  GI    COLONOSCOPY N/A 9/4/2015    Procedure:  COMBINED COLONOSCOPY, SINGLE OR MULTIPLE BIOPSY/POLYPECTOMY BY BIOPSY;  Surgeon: Rupesh Lopez MD;  Location: [facility]    COLONOSCOPY N/A 9/19/2018    Procedure: COLONOSCOPY;  enteroscopy small bowel  COLONOSCOPY;  Surgeon: Ankit Baires MD;  Location: [facility] GI    ENTEROSCOPY SMALL BOWEL N/A 3/9/2023    Procedure: antegrade single balloon enteroscopy with argon plasma coagulation of arteriovenous malformations;  Surgeon: Ankit Baires MD;  Location: [facility] OR    ESOPHAGOSCOPY, GASTROSCOPY, DUODENOSCOPY (EGD), COMBINED N/A 12/18/2014    Procedure: COMBINED ESOPHAGOSCOPY, GASTROSCOPY, DUODENOSCOPY (EGD);  Surgeon: Betsy Carvajal MD;  Location: [facility] GI    ESOPHAGOSCOPY, GASTROSCOPY, DUODENOSCOPY (EGD), COMBINED N/A 4/25/2015    Procedure: COMBINED ESOPHAGOSCOPY, GASTROSCOPY, DUODENOSCOPY (EGD);  Surgeon: Yosvany Ram MD;  Location: [facility] GI    ESOPHAGOSCOPY, GASTROSCOPY, DUODENOSCOPY (EGD), COMBINED N/A 5/5/2015    Procedure: COMBINED ESOPHAGOSCOPY, GASTROSCOPY, DUODENOSCOPY (EGD);  Surgeon: Mariano Mistry MD;  Location:  GI    HC CAPSULE ENDOSCOPY N/A 9/30/2015    Procedure: CAPSULE/PILL CAM ENDOSCOPY;  Surgeon: Pan Dhaliwal MD;  Location:  GI    HC VASCULAR SURGERY PROCEDURE UNLIST      HYSTERECTOMY  1980    KYREE    LUMPECTOMY BREAST         Prior to Admission Medications   Prior to Admission Medications   Prescriptions Last Dose Informant Patient Reported? Taking?   Multiple Vitamin-Folic Acid TABS 7/30/2023 at unknown  Yes Yes   Sig: Take 1 tablet by mouth daily   acetaminophen (TYLENOL) 325 MG tablet 7/31/2023 at unknown  Yes Yes   Sig: Take 325-650 mg by mouth every 6 hours as needed for mild pain   amLODIPine (NORVASC) 2.5 MG tablet Unknown at unknown  No Yes   Sig: Take 3 tablets (7.5 mg) by mouth daily   atorvastatin (LIPITOR) 20 MG tablet 7/30/2023 at unknown Nursing Home No Yes   Sig: Take 1 tablet (20 mg) by mouth daily   levothyroxine (SYNTHROID/LEVOTHROID) 50 MCG tablet  7/30/2023 at unknown  No Yes   Sig: Take 1 tablet (50 mcg) by mouth every morning (before breakfast)   losartan (COZAAR) 100 MG tablet 7/30/2023 at unknown  No Yes   Sig: Take 1 tablet (100 mg) by mouth daily   pantoprazole (PROTONIX) 20 MG EC tablet 7/31/2023 at 0800 Nursing Home No Yes   Sig: Take 1 tablet (20 mg) by mouth 2 times daily (before meals) *take 30-60 minutes before   sertraline (ZOLOFT) 50 MG tablet 7/31/2023 at 0800  No Yes   Sig: Take 2 tablets (100 mg) by mouth daily for 14 days   sucroferric oxyhydroxide (VELPHORO) 500 MG CHEW chewable tablet 7/31/2023 at unknown Nursing Home Yes Yes   Sig: Take 500 mg by mouth 2 times daily      Facility-Administered Medications: None          Physical Exam   Vital Signs: Temp: 99.8  F (37.7  C) Temp src: Oral BP: 121/49 Pulse: 76   Resp: 15 SpO2: 98 % O2 Device: None (Room air)    Weight: 0 lbs 0 oz    GENERAL: Alert and oriented x 3. NAD. Ambulatory. Cooperative.   HEENT: Anicteric sclera. Mucous membranes moist. NC. AT.   CV: RRR. S1, S2. No murmurs appreciated. TTP on anterior chest  RESPIRATORY: Effort normal on RA Lungs CTAB with no wheezing, rales, rhonchi.   GI: Abdomen soft and non distended with normoactive bowel sounds present in all quadrants. No tenderness  NEUROLOGICAL: No focal deficits. Moves all extremities.    EXTREMITIES: No peripheral edema.   SKIN: No jaundice. No rashes.        Medical Decision Making       75 MINUTES SPENT BY ME on the date of service doing chart review, history, exam, documentation & further activities per the note.      Data   Recent Labs   Lab 07/31/23  1540   WBC 20.5*   HGB 9.5*   *         POTASSIUM 4.1   CHLORIDE 96*   CO2 24   BUN 29.7*   CR 5.82*   ANIONGAP 16*   BELGICA 9.3   *   ALBUMIN 4.0   PROTTOTAL 7.5   BILITOTAL 0.6   ALKPHOS 191*   ALT 20   AST 49*   LIPASE 43

## 2023-08-01 NOTE — MEDICATION SCRIBE - ADMISSION MEDICATION HISTORY
Medication Scribe Admission Medication History    Admission medication history is complete. The information provided in this note is only as accurate as the sources available at the time of the update.    Medication reconciliation/reorder completed by provider prior to medication history? No    Information Source(s): Patient via in-person    Pertinent Information: Pt was unsure of the dose of amlodipine. Pt could also not remember if she takes Iron Sucrose    Changes made to PTA medication list:  Added: None  Deleted: Miralax, Zoloft 50 mg (duplicate)  Changed: None    Medication Affordability:  Not including over the counter (OTC) medications, was there a time in the past 3 months when you did not take your medications as prescribed because of cost?: No    Allergies reviewed with patient and updates made in EHR: yes    Medication History Completed By: Sia Fay 7/31/2023 7:07 PM    Prior to Admission medications    Medication Sig Last Dose Taking? Auth Provider Long Term End Date   acetaminophen (TYLENOL) 325 MG tablet Take 325-650 mg by mouth every 6 hours as needed for mild pain 7/31/2023 at unknown Yes Reported, Patient     amLODIPine (NORVASC) 2.5 MG tablet Take 3 tablets (7.5 mg) by mouth daily Unknown at unknown Yes Leon Santizo MD Yes    atorvastatin (LIPITOR) 20 MG tablet Take 1 tablet (20 mg) by mouth daily 7/30/2023 at unknown Yes Angel Luis Reno MD Yes    levothyroxine (SYNTHROID/LEVOTHROID) 50 MCG tablet Take 1 tablet (50 mcg) by mouth every morning (before breakfast) 7/30/2023 at unknown Yes Loen Santizo MD Yes    losartan (COZAAR) 100 MG tablet Take 1 tablet (100 mg) by mouth daily 7/30/2023 at unknown Yes Leon Santizo MD Yes    Multiple Vitamin-Folic Acid TABS Take 1 tablet by mouth daily 7/30/2023 at unknown Yes Reported, Patient     pantoprazole (PROTONIX) 20 MG EC tablet Take 1 tablet (20 mg) by mouth 2 times daily (before meals) *take 30-60 minutes before 7/31/2023 at 0800  Yes Angel Luis Reno MD     sertraline (ZOLOFT) 50 MG tablet Take 2 tablets (100 mg) by mouth daily for 14 days 7/31/2023 at 0800 Yes Agnieszka Rodriguez MD Yes 8/8/23   sucroferric oxyhydroxide (VELPHORO) 500 MG CHEW chewable tablet Take 500 mg by mouth 2 times daily 7/31/2023 at unknown Yes Reported, Patient

## 2023-08-01 NOTE — PROGRESS NOTES
[facility]    Medicine Progress Note - [facility]       Date of Admission:  7/31/2023    Main Plans for Today  - Nephrology consulted for dialysis, plan for dialysis today 8/1  - Continue CTX (7/31- )  - Discontinued Azithromycin (7/31-8/1) given patient's prolonged QTc  - Start Doxycycline (8/1 - )    Assessment & Plan   Rachele Martínez is a 83 year old female with a past medical history of ESRD on HD, HTN, and HLD who is admitted on 7/31 for chest pain during a dialysis run, found to have pneumonia and hospitalized for IV antibiotics.    #Shortness of Breath  #Febrile, resolved  #Suspected CAP   #Severe Sepsis, resolved   Symptoms started 7/30 with chest pain, SOB, and non productive cough. Attempted dialysis on 7/31 but ended early due to worsening chest pain. In ED found to be febrile at 100.9, leukocytosis 20, and lactic acid 2.4. Lactic acid downtrended to 1.6 following LR bolus. CT AP w/ signs of posterior opacities c/f infection. Patient started on Ceftriaxone (7/31 - ) and Azithromycin (7/31-8/1). Multiple co morbidities including ESRD, CAD, geriatric age put her at particular risk for complications during PNA. Reassuringly patient's VSS, inflammatory labs downtrending, symptomatically improving. If patient continues to improve, tentative plan to discharge with PO antibiotics in 1-2 days. Given prolonged Qtc, will change azithromycin to doxycycline.  - Continue CTX, stop azithromycin and start Doxycyline (8/1 - ) to cover atypicals  - Pending blood cultures    #Chest Pain  #Elevated Troponin, resolving  Symptoms started 7/30 with chest pain, SOB, and non productive coughs. Attempted run at dialysis on 7/31 but ended early due to worsening chest pain. Initial trops 52->72->82->76 (following LR bolus). Troponin likely 2/2 ESRD leak as well as likely demand ischemia from suspected CAP as above. No need for further trending. ED EKG  reviewed - no signs of ischemic findings and is stable compared to prior EKG; however, notable for elongated QTCs. Exam notable for blowing systolic murmur and reproducible tenderness to palpation of the anterior chest wall.   - Tele discontinued.   - Tylenol q4h prn for chest wall pain    #ESRD on HD (MWF)  #Anemia as related to ESRD   As above, ended dialysis on 7/31 early due to chest pain (reported completing only 1/3 of run time). Essentially anuric. BP elevated in ED admission initially w/ SBP gabriela the 150s-190s, but this has resolved (likely 2/2 pain and sepsis process above). ED admit labs noted for Na of 134, but stable potassium.   - Nephrology consulted for dialysis  - Daily CMP for electrolyte trends.    #Cirrhosis  #Hx hepatitis C  #Hx of GI bleed 2/2 angioectasis  Followed by GI outpatient. Had a video capsule enteroscope 2/2/23 for melena, discovered angioectasia but no active bleeding.    Chronic  HTN - Continue PTA amlodipine and losartan  HLD - Continue PTA atorvastatin   Hypothyroidism - Continue PTA levothyroxine  Depression and Anxiety - Continue PTA Zoloft          Diet: Renal Diet (dialysis)    DVT Prophylaxis: VTE Prophylaxis contraindicated due to hx of GI bleed 2/2 AVM  Rodriguez Catheter: Not present  Fluids: PO  Lines: None     Cardiac Monitoring: None  Code Status: Full Code      Clinically Significant Risk Factors Present on Admission                                 Disposition Plan      Expected Discharge Date: 08/02/2023                The patient's care was discussed with the Attending Physician, Dr. Zaldivar .    Eloise Viveros, MS4    Margi Oconnell, PGY-2  [facility]  [facility]  Securely message with [employer] (more info)  Text page via [facility] Paging/Directory   See signed in provider for up to date coverage information  ______________________________________________________________________    Interval History   Patient  was seen and evaluated at bedside. Having some mild chest pain but no SOB or cough. Feeling tired but no other concerns.    Physical Exam   Vital Signs: Temp: 98.1  F (36.7  C) Temp src: Oral BP: 122/60 Pulse: 74   Resp: 11 SpO2: 99 % O2 Device: None (Room air)    Weight: 130 lbs 4.67 oz  Physical Exam  Constitutional:       General: She is not in acute distress.     Appearance: She is not ill-appearing or toxic-appearing.   Cardiovascular:      Rate and Rhythm: Normal rate.      Heart sounds: Murmur heard.      Systolic murmur is present with a grade of 4/6.   Pulmonary:      Effort: Pulmonary effort is normal. No accessory muscle usage, prolonged expiration or respiratory distress.      Breath sounds: Normal air entry. No rales.   Chest:      Chest wall: Tenderness present.   Musculoskeletal:      Right lower leg: No edema.      Left lower leg: No edema.   Neurological:      Mental Status: She is alert and oriented to person, place, and time. Mental status is at baseline.   Psychiatric:         Attention and Perception: Attention and perception normal.         Mood and Affect: Mood and affect normal.         Speech: Speech normal.         Behavior: Behavior normal. Behavior is cooperative.         Thought Content: Thought content normal.         Cognition and Memory: Cognition and memory normal.         Judgment: Judgment normal.       Eloise Viveros, MS4     Resident/Fellow Attestation   I, Margi Oconnell MD, was present with the medical/ROSE student who participated in the service and in the documentation of the note.  I have verified the history and personally performed the physical exam and medical decision making.  I agree with the assessment and plan of care as documented in the note.       Margi Oconnell MD  PGY2  Date of Service (when I saw the patient): 08/1/23

## 2023-08-01 NOTE — PLAN OF CARE
Goal Outcome Evaluation:    Fluid balance: observed drinking small quantities of liquids during the shift. Completed dialysis this shift.

## 2023-08-01 NOTE — CONSULTS
Nephrology Initial Consult  August 1, 2023      Rachele Martínez MRN:[id number] YOB: 1940  Date of Admission:7/31/2023  Primary care provider: Agnieszka Rodriguez  Requesting physician: Angelina Zaldivar DO    ASSESSMENT AND RECOMMENDATIONS:   Rachele Martínez is a 83 year old female with a past medical history of ESRD on HD, HTN, and HLD who is admitted on 7/31 for chest pain during a dialysis run, found to have pneumonia and hospitalized for IV antibiotics.    # ESRD on HD   Out pt dialysis on M/W/F schedule. Her dry weight recently challenged to 54 Kg. Per report only 1 hour of dialysis was done on 7/31 and discontinued for chest pain.   - HD today with UF 1.5- 2 kg UF if possible as her BP noted to be on higher side.     # Hypertension  BP noted to be high. Current medication is amlodipine 7.5 mg, losartan 100mg  - will try to perform UF as she is receiving IV antibiotics and she does not make much urine.     # Anemia of CKD       HB - 8-9 .iron store is adequate. Continue to monitor for now.         # Hypothyroidism, on levothyroxine, continue.    Recommendations were communicated to primary team via this note.    Katelin Pulido MD   Division of Renal Disease and Hypertension  Amcom  [email or web address]  Vocera Web Console    REASON FOR CONSULT: ESRD on HD    HISTORY OF PRESENT ILLNESS:  Admitting provider and nursing notes reviewed  Rachele Martínez is a 83 year old female with a past medical history of ESRD on HD, HTN, and HLD who is admitted on 7/31 for chest pain during a dialysis run, found to have pneumonia and hospitalized for IV antibiotics. Per pt have had this chest pain with deep breaths and cough for few days now. Denied any associated fevers/chills. Endorsed vomiting couple times prior to his dialysis session yesterday. No diarrhea or abdominal pain reported.    PAST MEDICAL HISTORY:  Reviewed with patient on 08/01/2023   Past Medical History:   Diagnosis Date     Anemia       Anxiety and depression      Arthritis      AVM (arteriovenous malformation)      Chronic hepatitis C with cirrhosis (H)      Clotting disorder (H)      ESRD (end stage renal disease) (H)     on dialysis     GI bleed     recurrent     Glaucoma      Hyperlipidemia      Hypertension goal BP (blood pressure) < 140/80        Past Surgical History:   Procedure Laterality Date     CAPSULE/PILL CAM ENDOSCOPY N/A 3/27/2019    Procedure: Capsule/pill cam endoscopy;  Surgeon: Yosvany Ram MD;  Location: [facility]     CAPSULE/PILL CAM ENDOSCOPY N/A 2/2/2023    Procedure: IMAGING PROCEDURE, GI TRACT, INTRALUMINAL, VIA CAPSULE;  Surgeon: Mulu Nur DO;  Location: [facility]     COLONOSCOPY N/A 9/4/2015    Procedure: COMBINED COLONOSCOPY, SINGLE OR MULTIPLE BIOPSY/POLYPECTOMY BY BIOPSY;  Surgeon: Rupesh Lopez MD;  Location: [facility]     COLONOSCOPY N/A 9/19/2018    Procedure: COLONOSCOPY;  enteroscopy small bowel  COLONOSCOPY;  Surgeon: Ankit Baires MD;  Location: [facility]     ENTEROSCOPY SMALL BOWEL N/A 3/9/2023    Procedure: antegrade single balloon enteroscopy with argon plasma coagulation of arteriovenous malformations;  Surgeon: Ankit Baires MD;  Location: [facility]     ESOPHAGOSCOPY, GASTROSCOPY, DUODENOSCOPY (EGD), COMBINED N/A 12/18/2014    Procedure: COMBINED ESOPHAGOSCOPY, GASTROSCOPY, DUODENOSCOPY (EGD);  Surgeon: Betsy Carvajal MD;  Location: [facility]     ESOPHAGOSCOPY, GASTROSCOPY, DUODENOSCOPY (EGD), COMBINED N/A 4/25/2015    Procedure: COMBINED ESOPHAGOSCOPY, GASTROSCOPY, DUODENOSCOPY (EGD);  Surgeon: Yosvany Ram MD;  Location: [facility]     ESOPHAGOSCOPY, GASTROSCOPY, DUODENOSCOPY (EGD), COMBINED N/A 5/5/2015    Procedure: COMBINED ESOPHAGOSCOPY, GASTROSCOPY, DUODENOSCOPY (EGD);  Surgeon: Mariano Mistry MD;  Location: [facility]     HC CAPSULE ENDOSCOPY N/A 9/30/2015    Procedure: CAPSULE/PILL CAM ENDOSCOPY;  Surgeon: Pan Dhaliwal MD;  Location: [facility]     HC VASCULAR SURGERY PROCEDURE  UNLIST       HYSTERECTOMY  1980    KYREE     LUMPECTOMY BREAST          MEDICATIONS:  PTA Meds  Prior to Admission medications    Medication Sig Last Dose Taking? Auth Provider Long Term End Date   acetaminophen (TYLENOL) 325 MG tablet Take 325-650 mg by mouth every 6 hours as needed for mild pain 7/31/2023 at unknown Yes Reported, Patient     amLODIPine (NORVASC) 2.5 MG tablet Take 3 tablets (7.5 mg) by mouth daily Unknown at unknown Yes Leon Santizo MD Yes    atorvastatin (LIPITOR) 20 MG tablet Take 1 tablet (20 mg) by mouth daily 7/30/2023 at unknown Yes Angel Luis Reno MD Yes    levothyroxine (SYNTHROID/LEVOTHROID) 50 MCG tablet Take 1 tablet (50 mcg) by mouth every morning (before breakfast) 7/30/2023 at unknown Yes Leon Santizo MD Yes    losartan (COZAAR) 100 MG tablet Take 1 tablet (100 mg) by mouth daily 7/30/2023 at unknown Yes Leon Santizo MD Yes    Multiple Vitamin-Folic Acid TABS Take 1 tablet by mouth daily 7/30/2023 at unknown Yes Reported, Patient     pantoprazole (PROTONIX) 20 MG EC tablet Take 1 tablet (20 mg) by mouth 2 times daily (before meals) *take 30-60 minutes before 7/31/2023 at 0800 Yes Angel Luis Reno MD     sertraline (ZOLOFT) 50 MG tablet Take 2 tablets (100 mg) by mouth daily for 14 days 7/31/2023 at 0800 Yes Agnieszka Rodriguez MD Yes 8/8/23   sucroferric oxyhydroxide (VELPHORO) 500 MG CHEW chewable tablet Take 500 mg by mouth 2 times daily 7/31/2023 at unknown Yes Reported, Patient        Current Meds    amLODIPine  7.5 mg Oral Daily     atorvastatin  20 mg Oral Daily     cefTRIAXone  1 g Intravenous Q24H     doxycycline hyclate  100 mg Oral Q12H MINDY (08/20)     levothyroxine  50 mcg Oral QAM AC     losartan  100 mg Oral Daily     multivitamin, therapeutic  1 tablet Oral Daily     pantoprazole  20 mg Oral BID AC     sertraline  100 mg Oral Daily     sodium chloride (PF)  3 mL Intracatheter Q8H     sucroferric oxyhydroxide  500 mg Oral BID     Infusion  Meds    - MEDICATION INSTRUCTIONS -         ALLERGIES:    Allergies   Allergen Reactions     Abacavir Itching     Lisinopril Cough     Dust Mites      Hydrochlorothiazide Itching     Severe       No Clinical Screening - See Comments      History of blood transfusion reactions and pre-treats with Benadryl.      Spironolactone Nausea     Sulfa Antibiotics Hives     Valsartan Itching     Valsartan-Hydrochlorothiazide Itching     severe       REVIEW OF SYSTEMS:  A comprehensive of systems was negative except as noted above.    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     Sexual activity: Not Currently   Other Topics Concern     Parent/sibling w/ CABG, MI or angioplasty before 65F 55M? No   Social History Narrative     Not on file     Social Determinants of Health     Financial Resource Strain: Not on file   Food Insecurity: Not on file   Transportation Needs: Not on file   Physical Activity: Not on file   Stress: Not on file   Social Connections: Not on file   Intimate Partner Violence: Not on file   Housing Stability: Not on file     FAMILY MEDICAL HISTORY:   Family History   Problem Relation Age of Onset     Diabetes Mother      Alzheimer Disease Mother      Substance Abuse Son      Cancer Sister      Soft Tissue Cancer Sister      Breast Cancer Sister      Hyperlipidemia Daughter      Alcoholism Brother   "    Spine Problems Sister      No known Family history of kidney disease    PHYSICAL EXAM:   Temp  Av.3  F (37.4  C)  Min: 98.1  F (36.7  C)  Max: 100.9  F (38.3  C)      Pulse  Av.7  Min: 71  Max: 84 Resp  Avg: 15.5  Min: 0  Max: 23  SpO2  Av.3 %  Min: 92 %  Max: 100 %       BP (!) 173/84   Pulse 74   Temp 98.1  F (36.7  C)   Resp 20   Ht 1.626 m (5' 4\")   Wt 59.1 kg (130 lb 4.7 oz)   SpO2 94%   BMI 22.36 kg/m        Admit Weight: 59.1 kg (130 lb 4.7 oz)     GENERAL APPEARANCE:not in acute distress, pt  awake  EYES: no scleral icterus  Lymphatics: no cervical or supraclavicular LAD  Pulmonary:  Anterior lung fields were clear  CV: S1, S2 present   - Edema no  GI: soft, nontender, normal bowel sounds  MS: no evidence of inflammation in joints, no muscle tenderness  : no armando  SKIN: no rash, warm, dry, no cyanosis  NEURO: face symmetric, no asterixis     LABS:   I have reviewed the following labs:  CMP  Recent Labs   Lab 23  0443 23  1540   * 136   POTASSIUM 4.7 4.1   CHLORIDE 96* 96*   CO2 27 24   ANIONGAP 11 16*   * 126*   BUN 39.4* 29.7*   CR 6.54* 5.82*   GFRESTIMATED 6* 7*   BELGICA 8.6* 9.3   MAG 2.2 2.1   PROTTOTAL  --  7.5   ALBUMIN  --  4.0   BILITOTAL  --  0.6   ALKPHOS  --  191*   AST  --  49*   ALT  --  20     CBC  Recent Labs   Lab 23  0443 23  1540   HGB 9.0* 9.5*   WBC 18.8* 20.5*   RBC 3.01* 3.17*   HCT 29.5* 31.9*   MCV 98 101*   MCH 29.9 30.0   MCHC 30.5* 29.8*   RDW 16.7* 16.5*    290     INRNo lab results found in last 7 days.  ABGNo lab results found in last 7 days.   URINE STUDIES  Recent Labs   Lab Test 16  1449 16  1440   COLOR Light Yellow Yellow   APPEARANCE Clear Clear   URINEGLC 30* 30*   URINEBILI Negative Negative   URINEKETONE Negative Negative   SG 1.009 1.011   UBLD Trace* Small*   URINEPH 6.0 6.0   PROTEIN 300* 300*   NITRITE Negative Negative   LEUKEST Negative Negative   RBCU 1 1   WBCU <1 4*     No lab " results found.  PTH  Recent Labs   Lab Test 03/12/16  0552   PTHI 147*     IRON STUDIES  Recent Labs   Lab Test 07/05/23  1439 06/20/23  1122 06/02/23  0942 05/14/23  0741 01/26/23  1450 01/27/22  1304 10/24/18  1506 09/14/18  1159 06/21/16  1404 03/13/16  0446 03/01/16  1140 01/14/16  0944 11/12/15  1005 08/21/15  0924   IRON 44 36* 62 31* 34*  --  241* 55 30* 25* 21*  --  35 34*   * 212* 203* 173* 266  --  419 279 284 369 482*  --  399 445*   IRONSAT 23 17 31 18 13*  --  57* 20 11* 7* 4*  --  9* 8*   KASSI  --  600* 222 436*  --  106 176 229 67 18 11 51 40 18       IMAGING:  All imaging studies reviewed by me.     Katelin Pulido MD

## 2023-08-01 NOTE — PROGRESS NOTES
HEMODIALYSIS TREATMENT NOTE    Date: 8/1/2023  Time: 7:21 PM    Data:  Pre Wt: 59.1 kg (130 lb 4.7 oz)   Desired Wt:   kg   Post Wt: 57.1 kg (125 lb 14.1 oz)  Weight change: 2 kg  Ultrafiltration - Post Run Net Total Removed (mL): 2000 mL  Vascular Access Status: patent  Dialyzer Rinse: Streaked, Light  Total Blood Volume Processed: 73 L Liters  Total Dialysis (Treatment) Time: 3 Hours    Lab:   No    Interventions:  RAVF cannulated 15 gauge needles   Pressure dressing applied    Assessment:  Pt ran for 3 hrs on a K2/ Ca 2.5 bath with a /  and 2 L pulled with no complications. RAVF . Pt rinsed back and hemostasis achieved in about 10 mins. Pt alert and oriented x4 with no complaints. Report given to PCN.      Plan:    Per renal team

## 2023-08-01 NOTE — PLAN OF CARE
VSS. Pt slept well after getting settled.      Respiratory/cardiac: Course crackles for lung sounds. Complains of SOB & chest pain.    Neuro: A/O x4. Forgetful.     Pain: In chest. Oxy was given.     Activity: X1, walker/GB    GI/: Anuric. No BM overnight.     Skin: Intact, dry.     LDA: L PIV, R arm fistula.     Plan: nephrology consult.

## 2023-08-02 LAB
ALBUMIN SERPL BCG-MCNC: 3.4 G/DL (ref 3.5–5.2)
ALP SERPL-CCNC: 150 U/L (ref 35–104)
ALT SERPL W P-5'-P-CCNC: 18 U/L (ref 0–50)
ANION GAP SERPL CALCULATED.3IONS-SCNC: 14 MMOL/L (ref 7–15)
AST SERPL W P-5'-P-CCNC: 25 U/L (ref 0–45)
BILIRUB SERPL-MCNC: 0.5 MG/DL
BUN SERPL-MCNC: 21.3 MG/DL (ref 8–23)
CALCIUM SERPL-MCNC: 8.6 MG/DL (ref 8.8–10.2)
CHLORIDE SERPL-SCNC: 99 MMOL/L (ref 98–107)
CREAT SERPL-MCNC: 4.47 MG/DL (ref 0.51–0.95)
DEPRECATED HCO3 PLAS-SCNC: 26 MMOL/L (ref 22–29)
ERYTHROCYTE [DISTWIDTH] IN BLOOD BY AUTOMATED COUNT: 16.3 % (ref 10–15)
GFR SERPL CREATININE-BSD FRML MDRD: 9 ML/MIN/1.73M2
GLUCOSE BLDC GLUCOMTR-MCNC: 105 MG/DL (ref 70–99)
GLUCOSE SERPL-MCNC: 95 MG/DL (ref 70–99)
HCT VFR BLD AUTO: 29.9 % (ref 35–47)
HGB BLD-MCNC: 9.2 G/DL (ref 11.7–15.7)
LACTATE SERPL-SCNC: 0.9 MMOL/L (ref 0.7–2)
LACTATE SERPL-SCNC: 1.5 MMOL/L (ref 0.7–2)
MAGNESIUM SERPL-MCNC: 1.9 MG/DL (ref 1.7–2.3)
MCH RBC QN AUTO: 29.9 PG (ref 26.5–33)
MCHC RBC AUTO-ENTMCNC: 30.8 G/DL (ref 31.5–36.5)
MCV RBC AUTO: 97 FL (ref 78–100)
PHOSPHATE SERPL-MCNC: 2.9 MG/DL (ref 2.5–4.5)
PLATELET # BLD AUTO: 265 10E3/UL (ref 150–450)
POTASSIUM SERPL-SCNC: 4.1 MMOL/L (ref 3.4–5.3)
PROT SERPL-MCNC: 6.8 G/DL (ref 6.4–8.3)
RBC # BLD AUTO: 3.08 10E6/UL (ref 3.8–5.2)
SODIUM SERPL-SCNC: 139 MMOL/L (ref 136–145)
WBC # BLD AUTO: 12.6 10E3/UL (ref 4–11)

## 2023-08-02 PROCEDURE — 80053 COMPREHEN METABOLIC PANEL: CPT

## 2023-08-02 PROCEDURE — 99232 SBSQ HOSP IP/OBS MODERATE 35: CPT | Performed by: INTERNAL MEDICINE

## 2023-08-02 PROCEDURE — 36415 COLL VENOUS BLD VENIPUNCTURE: CPT | Performed by: INTERNAL MEDICINE

## 2023-08-02 PROCEDURE — 250N000013 HC RX MED GY IP 250 OP 250 PS 637: Performed by: PHYSICIAN ASSISTANT

## 2023-08-02 PROCEDURE — 99232 SBSQ HOSP IP/OBS MODERATE 35: CPT | Mod: GC | Performed by: STUDENT IN AN ORGANIZED HEALTH CARE EDUCATION/TRAINING PROGRAM

## 2023-08-02 PROCEDURE — 84100 ASSAY OF PHOSPHORUS: CPT

## 2023-08-02 PROCEDURE — 85027 COMPLETE CBC AUTOMATED: CPT

## 2023-08-02 PROCEDURE — 250N000013 HC RX MED GY IP 250 OP 250 PS 637

## 2023-08-02 PROCEDURE — 83735 ASSAY OF MAGNESIUM: CPT

## 2023-08-02 PROCEDURE — 120N000002 HC R&B MED SURG/OB UMMC

## 2023-08-02 PROCEDURE — 36415 COLL VENOUS BLD VENIPUNCTURE: CPT

## 2023-08-02 PROCEDURE — 83605 ASSAY OF LACTIC ACID: CPT | Performed by: INTERNAL MEDICINE

## 2023-08-02 PROCEDURE — 83605 ASSAY OF LACTIC ACID: CPT | Performed by: PHYSICIAN ASSISTANT

## 2023-08-02 PROCEDURE — 250N000011 HC RX IP 250 OP 636: Mod: JZ | Performed by: PHYSICIAN ASSISTANT

## 2023-08-02 PROCEDURE — 36415 COLL VENOUS BLD VENIPUNCTURE: CPT | Performed by: PHYSICIAN ASSISTANT

## 2023-08-02 RX ORDER — FAMOTIDINE 10 MG
10 TABLET ORAL EVERY 24 HOURS
Status: DISCONTINUED | OUTPATIENT
Start: 2023-08-02 | End: 2023-08-03

## 2023-08-02 RX ADMIN — FAMOTIDINE 10 MG: 10 TABLET ORAL at 11:07

## 2023-08-02 RX ADMIN — PANTOPRAZOLE SODIUM 20 MG: 20 TABLET, DELAYED RELEASE ORAL at 15:41

## 2023-08-02 RX ADMIN — AMLODIPINE BESYLATE 7.5 MG: 5 TABLET ORAL at 09:28

## 2023-08-02 RX ADMIN — ACETAMINOPHEN 650 MG: 325 TABLET, FILM COATED ORAL at 21:24

## 2023-08-02 RX ADMIN — Medication 1 MG: at 00:28

## 2023-08-02 RX ADMIN — CEFTRIAXONE SODIUM 1 G: 1 INJECTION, POWDER, FOR SOLUTION INTRAMUSCULAR; INTRAVENOUS at 20:37

## 2023-08-02 RX ADMIN — DOXYCYCLINE HYCLATE 100 MG: 50 CAPSULE ORAL at 20:44

## 2023-08-02 RX ADMIN — ACETAMINOPHEN 650 MG: 325 TABLET, FILM COATED ORAL at 00:28

## 2023-08-02 RX ADMIN — ATORVASTATIN CALCIUM 20 MG: 20 TABLET, FILM COATED ORAL at 09:27

## 2023-08-02 RX ADMIN — Medication 1 MG: at 22:44

## 2023-08-02 RX ADMIN — LOSARTAN POTASSIUM 100 MG: 100 TABLET, FILM COATED ORAL at 09:28

## 2023-08-02 RX ADMIN — ACETAMINOPHEN 650 MG: 325 TABLET, FILM COATED ORAL at 15:41

## 2023-08-02 RX ADMIN — THERA TABS 1 TABLET: TAB at 11:07

## 2023-08-02 RX ADMIN — SUCROFERRIC OXYHYDROXIDE 500 MG: 500 TABLET, CHEWABLE ORAL at 20:37

## 2023-08-02 RX ADMIN — DOXYCYCLINE HYCLATE 100 MG: 50 CAPSULE ORAL at 09:27

## 2023-08-02 RX ADMIN — SUCROFERRIC OXYHYDROXIDE 500 MG: 500 TABLET, CHEWABLE ORAL at 11:07

## 2023-08-02 RX ADMIN — SERTRALINE HYDROCHLORIDE 100 MG: 100 TABLET ORAL at 09:28

## 2023-08-02 RX ADMIN — PANTOPRAZOLE SODIUM 20 MG: 20 TABLET, DELAYED RELEASE ORAL at 09:28

## 2023-08-02 RX ADMIN — LEVOTHYROXINE SODIUM 50 MCG: 25 TABLET ORAL at 09:28

## 2023-08-02 ASSESSMENT — ACTIVITIES OF DAILY LIVING (ADL)
ADLS_ACUITY_SCORE: 33
ADLS_ACUITY_SCORE: 39
ADLS_ACUITY_SCORE: 33
ADLS_ACUITY_SCORE: 39
ADLS_ACUITY_SCORE: 39
ADLS_ACUITY_SCORE: 33

## 2023-08-02 NOTE — PROGRESS NOTES
[facility]    Medicine Progress Note - [facility] Medicine Service       Date of Admission:  7/31/2023    Main Plans for Today  - Continue CTX (7/31- ) and Doxycycline (8/1 - )  - Start Pepcid for GERD  - Plan for short course dialysis tmr so she can resume normal MWF schedule Friday    Assessment & Plan   Rachele Martínez is a 83 year old female with a past medical history of ESRD on HD, HTN, and HLD who is admitted on 7/31 for chest pain during a dialysis run, found to have pneumonia and hospitalized for IV antibiotics.    #Shortness of Breath, resolved  #Febrile, resolved  #Suspected CAP   #Severe Sepsis, resolved   Symptoms started 7/30 with chest pain, SOB, and non productive cough. Attempted dialysis on 7/31 but ended early due to worsening chest pain. In ED found to be febrile at 100.9, leukocytosis 20, and lactic acid 2.4. Lactic acid downtrended to 1.6 following LR bolus. CT AP w/ signs of posterior opacities c/f infection. Patient started on Ceftriaxone (7/31 - ) and Azithromycin (7/31-8/1). Multiple co morbidities including ESRD, CAD, geriatric age put her at particular risk for complications during PNA. Reassuringly patient's VSS, inflammatory labs downtrending, symptomatically improving. Given prolonged Qtc, azithromycin was changed to doxycycline on 8/1. Overnight, patient had a brief temperature of 100.8, now WNLs. All other vitals were unremarkable and lactic acid was normal at the time. Blood cultures from 7/31 continue to be negative. Fever most likely related to patient's pneumonia for which we very recently started antibiotics. Will continue to monitor but patient reports feeling symptomatically improved, although still having some mild chest pain. If patient continues to improve, tentative plan to discharge with PO antibiotics in 1-2 days.  - Continue CTX and Doxycyline (7/31- )     Antibiotics:   Azithromycin (7/31)  CTX (7/31-  )  Doxycyclcine (8/1-)    #Chest Pain  #Elevated Troponin, resolving  #CAD  Symptoms started 7/30 with chest pain, SOB, and non productive coughs. Attempted run at dialysis on 7/31 but ended early due to worsening chest pain. Initial trops 52->72->82->76 (following LR bolus). Troponin likely 2/2 ESRD leak as well as likely demand ischemia from suspected CAP as above. No need for further trending. ED EKG reviewed - no signs of ischemic findings and is stable compared to prior EKG; however, notable for elongated QTCs. Exam notable for blowing systolic murmur and reproducible tenderness to palpation of the anterior chest wall. Tele discontinued on 8/1. Patient continues to have reproducible chest pain likely secondary to pneumonia infection, however will have low threshold for ACS workup given her significant CAD.  - Tylenol q4h prn for chest wall pain    #ESRD on HD (MWF)  #Anemia as related to ESRD   As above, ended dialysis on 7/31 early due to chest pain (reported completing only 1/3 of run time). Essentially anuric. BP elevated in ED admission initially w/ SBP gabriela the 150s-190s, but this has resolved (likely 2/2 pain and sepsis process above). ED admit labs noted for Na of 134, but stable potassium. Dialysis was completed on 8/1, will go to a shorter dialysis tomorrow and then return to her M,W,F schedule.  - Nephrology consulted. Plan for short run of HD tomorrow so that she can resume her MWF  - Daily CMP for electrolyte trends.    #GERD  On 8/2, patient reporting pain with swallowing that is similar to her hx of GERD. Is on PTA pantoprazole 20mg BID.   - Start daily pepcid 10mg    #Cirrhosis  #Hx hepatitis C  #Hx of GI bleed 2/2 angioectasis  Followed by GI outpatient. Had a video capsule enteroscope 2/2/23 for melena, discovered angioectasia but no active bleeding.    Chronic  HTN - Continue PTA amlodipine and losartan  HLD - Continue PTA atorvastatin   Hypothyroidism - Continue PTA levothyroxine  Depression  and Anxiety - Continue PTA Zoloft        Diet: Renal Diet (dialysis)    DVT Prophylaxis: VTE Prophylaxis contraindicated due to hx of GI bleed 2/2 AVM  Rodriguez Catheter: Not present  Fluids: PO  Lines: None     Cardiac Monitoring: None  Code Status: Full Code      Clinically Significant Risk Factors              # Hypoalbuminemia: Lowest albumin = 3.4 g/dL at 8/2/2023  5:54 AM, will monitor as appropriate                      Disposition Plan     Expected Discharge Date: 08/02/2023      Destination: nursing home          The patient's care was discussed with the Attending Physician, Dr. Zaldivar .    Eloise Viveros, MS4    Margi Oconnell, PGY-2  [facility] Medicine Service  [facility]  Securely message with [email or web address] (more info)  Text page via [facility] Paging/Directory   See signed in provider for up to date coverage information  ______________________________________________________________________    Interval History   Overnight: At 11:07pm, patient had a fever of 100.8. No change in symptoms.    Patient was seen and evaluated at bedside. Still continuing to have some mild chest pain but states improved from yesterday. No SOB or cough. Reporting some pain with swallowing that is similar to her hx of GERD.    Physical Exam   Vital Signs: Temp: 98.4  F (36.9  C) Temp src: Oral BP: (!) 150/72 Pulse: 78   Resp: 18 SpO2: 93 % O2 Device: None (Room air)    Weight: 130 lbs 4.67 oz  Physical Exam  Constitutional:       General: She is not in acute distress.     Appearance: She is not ill-appearing or toxic-appearing.   Cardiovascular:      Rate and Rhythm: Normal rate.      Heart sounds: Murmur heard.      Systolic murmur is present with a grade of 4/6.   Pulmonary:      Effort: Pulmonary effort is normal. No accessory muscle usage, prolonged expiration or respiratory distress.      Breath sounds: Normal air entry. Examination of the left-lower field reveals rales. Rales present.    Chest:      Chest wall: Tenderness present.   Musculoskeletal:      Right lower leg: No edema.      Left lower leg: No edema.   Neurological:      Mental Status: She is alert and oriented to person, place, and time. Mental status is at baseline.   Psychiatric:         Attention and Perception: Attention and perception normal.         Mood and Affect: Mood and affect normal.         Speech: Speech normal.         Behavior: Behavior normal. Behavior is cooperative.         Thought Content: Thought content normal.         Cognition and Memory: Cognition and memory normal.         Judgment: Judgment normal.       Eloise Viveros, MS4     Resident/Fellow Attestation   I, Margi Oconnell MD, was present with the medical/ROSE student who participated in the service and in the documentation of the note.  I have verified the history and personally performed the physical exam and medical decision making.  I agree with the assessment and plan of care as documented in the note.       Margi Oconnell MD  PGY2  Date of Service (when I saw the patient): 08/2/23

## 2023-08-02 NOTE — PROGRESS NOTES
Temp: 98.4  F (36.9  C) Temp src: Oral BP: 137/56 Pulse: 86   Resp: 16 SpO2: 93 % O2 Device: None (Room air)      Patient is resting, alert and oriented X4, family was at bedside. HD patient, tylenol utilized for pain. Patient triggered sepsis, Lactic acid at 1815 pm is 1.5. No new orders. Continue with POC

## 2023-08-02 NOTE — PLAN OF CARE
"Rachele Martínez is a 83 year old female who is here for PNA and is also on HD.    NURSING ASSESSMENT:  Neuro: WNL  Respiratory: WNL, no SOB  Cardiac: Off telemetry, see flowsheets.   GI/: HD, oliguria, LBM 8/1.  Activity/Musculoskeletal: SBA/Ao1 to transfer. Weight obtained at bedside this morning.   Skin: WNL ex LDA/access (PIV, HD access R arm)  Psychosocial: Pleasant, calm, cooperative.     Changes this shift: Plan was for possible HD today to get patient back on track with normal schedule. HD nurse called writer, plan is for 11AM tomorrow. VSS ex BP in 150s this AM, recheck 140/58. PO temp improved, from last night, at 98.6.    PAIN MANAGEMENT:   Pr has been experiencing and c/o CP that she describes as aching. Pt declined CP quote \"except for when I swallow meds.\" Solids are difficult for her to \"get down\", CLD encouraged but diet remained as regular as to not restrict eating options.      NURSING PLAN:  Care plan updated. HD tomorrow at 11am, WC ride set up by HD nurse. Follow POC.    DISCHARGE DISPOSITION:  Estimated discharge date: Per Claudette's team, pt may possibly discharge tomorrow after HD.    Wood Pham, RN  1454 on 8/2/2023  "

## 2023-08-02 NOTE — PLAN OF CARE
"BP (!) 150/72 (BP Location: Left arm)   Pulse 78   Temp 98.4  F (36.9  C) (Oral)   Resp 18   Ht 1.626 m (5' 4\")   Wt 59.1 kg (130 lb 4.7 oz)   SpO2 93%   BMI 22.36 kg/m      Pt triggered sepsis protocol. LA came back 0.9. Temp of 100.8 overnight - provider notified and tylenol given.     Intermittent complaints of achy chest pain. Pt reports it was the same pain she has had since admission.     Plan: continue care plan.   "

## 2023-08-02 NOTE — PROGRESS NOTES
Nephrology Initial Consult  August 1, 2023      Rachele Martínez MRN:[id number] YOB: 1940  Date of Admission:7/31/2023  Primary care provider: Agnieszka Rodriguez  Requesting physician: Angelina Zaldivar DO    ASSESSMENT AND RECOMMENDATIONS:   Rachele Martínez is a 83 year old female with a past medical history of ESRD on HD, HTN, and HLD who is admitted on 7/31 for chest pain during a dialysis run, found to have pneumonia and hospitalized for IV antibiotics.    # ESRD on HD   Out pt dialysis on M/W/F schedule. Her dry weight recently challenged to 54 Kg. Per report only 1 hour of dialysis was done on 7/31 and discontinued for chest pain.   - HD 8/1 with UF 2 kg, tolerated procedure well with no significant worsening of chest pain.     # Hypertension  BP noted to be high. Current medication is amlodipine 7.5 mg, losartan 100mg  - will try to perform UF as she is receiving IV antibiotics and she does not make much urine.     # Anemia of CKD       HB - 8-9 .iron store is adequate. Continue to monitor for now.         # Hypothyroidism, on levothyroxine, continue.    Recommendations were communicated to primary team via this note.    Katelin Pulido MD   Division of Renal Disease and Hypertension  [facility]  myairmail  Vocera Web Console    Interval history   Pt endorsed continued chest pain worsening with eating solid food. Mentioned that she is hungry but unable to eat because of chest pain. Endorsed that water is going okay with out pain. Denied any SOB this morning.    PAST MEDICAL HISTORY:  Reviewed with patient on 08/02/2023   Past Medical History:   Diagnosis Date    Anemia     Anxiety and depression     Arthritis     AVM (arteriovenous malformation)     Chronic hepatitis C with cirrhosis (H)     Clotting disorder (H)     ESRD (end stage renal disease) (H)     on dialysis    GI bleed     recurrent    Glaucoma     Hyperlipidemia     Hypertension goal BP (blood pressure) < 140/80         Past Surgical History:   Procedure Laterality Date    CAPSULE/PILL CAM ENDOSCOPY N/A 3/27/2019    Procedure: Capsule/pill cam endoscopy;  Surgeon: Yosvany Ram MD;  Location: [facility]    CAPSULE/PILL CAM ENDOSCOPY N/A 2/2/2023    Procedure: IMAGING PROCEDURE, GI TRACT, INTRALUMINAL, VIA CAPSULE;  Surgeon: Mulu Nur DO;  Location: [facility]    COLONOSCOPY N/A 9/4/2015    Procedure: COMBINED COLONOSCOPY, SINGLE OR MULTIPLE BIOPSY/POLYPECTOMY BY BIOPSY;  Surgeon: Rupesh Lopez MD;  Location: [facility]    COLONOSCOPY N/A 9/19/2018    Procedure: COLONOSCOPY;  enteroscopy small bowel  COLONOSCOPY;  Surgeon: Ankit Baires MD;  Location: [facility]    ENTEROSCOPY SMALL BOWEL N/A 3/9/2023    Procedure: antegrade single balloon enteroscopy with argon plasma coagulation of arteriovenous malformations;  Surgeon: Ankit Baires MD;  Location: [facility]    ESOPHAGOSCOPY, GASTROSCOPY, DUODENOSCOPY (EGD), COMBINED N/A 12/18/2014    Procedure: COMBINED ESOPHAGOSCOPY, GASTROSCOPY, DUODENOSCOPY (EGD);  Surgeon: Betsy Carvajal MD;  Location: [facility]    ESOPHAGOSCOPY, GASTROSCOPY, DUODENOSCOPY (EGD), COMBINED N/A 4/25/2015    Procedure: COMBINED ESOPHAGOSCOPY, GASTROSCOPY, DUODENOSCOPY (EGD);  Surgeon: Yosvany Ram MD;  Location: [facility]    ESOPHAGOSCOPY, GASTROSCOPY, DUODENOSCOPY (EGD), COMBINED N/A 5/5/2015    Procedure: COMBINED ESOPHAGOSCOPY, GASTROSCOPY, DUODENOSCOPY (EGD);  Surgeon: Mariano Mistry MD;  Location: [facility]    HC CAPSULE ENDOSCOPY N/A 9/30/2015    Procedure: CAPSULE/PILL CAM ENDOSCOPY;  Surgeon: Pan Dhaliwal MD;  Location: [facility]    HC VASCULAR SURGERY PROCEDURE UNLIST      HYSTERECTOMY  1980    KYREE    LUMPECTOMY BREAST          MEDICATIONS:  PTA Meds  Prior to Admission medications    Medication Sig Last Dose Taking? Auth Provider Long Term End Date   acetaminophen (TYLENOL) 325 MG tablet Take 325-650 mg by mouth every 6 hours as needed for mild pain 7/31/2023 at unknown Yes  Reported, Patient     amLODIPine (NORVASC) 2.5 MG tablet Take 3 tablets (7.5 mg) by mouth daily Unknown at unknown Yes Leon Santizo MD Yes    atorvastatin (LIPITOR) 20 MG tablet Take 1 tablet (20 mg) by mouth daily 7/30/2023 at unknown Yes Angel Luis Reno MD Yes    levothyroxine (SYNTHROID/LEVOTHROID) 50 MCG tablet Take 1 tablet (50 mcg) by mouth every morning (before breakfast) 7/30/2023 at unknown Yes Leon Santizo MD Yes    losartan (COZAAR) 100 MG tablet Take 1 tablet (100 mg) by mouth daily 7/30/2023 at unknown Yes Leon Santizo MD Yes    Multiple Vitamin-Folic Acid TABS Take 1 tablet by mouth daily 7/30/2023 at unknown Yes Reported, Patient     pantoprazole (PROTONIX) 20 MG EC tablet Take 1 tablet (20 mg) by mouth 2 times daily (before meals) *take 30-60 minutes before 7/31/2023 at 0800 Yes Angel Luis Reno MD     sertraline (ZOLOFT) 50 MG tablet Take 2 tablets (100 mg) by mouth daily for 14 days 7/31/2023 at 0800 Yes Agnieszka Rodriguez MD Yes 8/8/23   sucroferric oxyhydroxide (VELPHORO) 500 MG CHEW chewable tablet Take 500 mg by mouth 2 times daily 7/31/2023 at unknown Yes Reported, Patient        Current Meds   amLODIPine  7.5 mg Oral Daily    atorvastatin  20 mg Oral Daily    cefTRIAXone  1 g Intravenous Q24H    doxycycline hyclate  100 mg Oral Q12H [facility] (08/20)    famotidine  10 mg Oral Q24H    levothyroxine  50 mcg Oral QAM AC    losartan  100 mg Oral Daily    multivitamin, therapeutic  1 tablet Oral Daily    pantoprazole  20 mg Oral BID AC    sertraline  100 mg Oral Daily    sodium chloride (PF)  3 mL Intracatheter Q8H    sucroferric oxyhydroxide  500 mg Oral BID     Infusion Meds        ALLERGIES:    Allergies   Allergen Reactions    Abacavir Itching    Lisinopril Cough    Dust Mites     Hydrochlorothiazide Itching     Severe      No Clinical Screening - See Comments      History of blood transfusion reactions and pre-treats with Benadryl.     Spironolactone Nausea    Sulfa  Antibiotics Hives    Valsartan Itching    Valsartan-Hydrochlorothiazide Itching     severe       REVIEW OF SYSTEMS:  A comprehensive of systems was negative except as noted above.    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    Sexual activity: Not Currently   Other Topics Concern    Parent/sibling w/ CABG, MI or angioplasty before 65F 55M? No   Social History Narrative    Not on file     Social Determinants of Health     Financial Resource Strain: Not on file   Food Insecurity: Not on file   Transportation Needs: Not on file   Physical Activity: Not on file   Stress: Not on file   Social Connections: Not on file   Intimate Partner Violence: Not on file   Housing Stability: Not on file     FAMILY MEDICAL HISTORY:   Family History   Problem Relation Age of Onset    Diabetes Mother     Alzheimer Disease Mother     Substance Abuse Son     Cancer Sister     Soft Tissue Cancer Sister     Breast Cancer Sister     Hyperlipidemia Daughter     Alcoholism Brother     Spine Problems Sister      No known Family history of kidney disease    PHYSICAL EXAM:   Temp  Av.3  F (37.4  C)  Min: 98.1  F (36.7  C)  Max: 100.9  F (38.3  C)      Pulse  Av.7  Min: 71  Max: 84 Resp  Avg: 15.5  Min: 0  Max: 23  SpO2  Av.3 %  Min: 92 %  Max: 100 %       BP (!) 152/65 (BP Location: Left arm)   Pulse 91   Temp 98.3  F (36.8  C)  "(Oral)   Resp 18   Ht 1.626 m (5' 4\")   Wt 59.1 kg (130 lb 4.7 oz)   SpO2 96%   BMI 22.36 kg/m        Admit Weight: 59.1 kg (130 lb 4.7 oz)     General : Pt awake, not in acute distress   Lungs : anterior lung fields are clear  Cardiac : S1, S2 present  Abdomen : Soft/ND/NT  LE : Edema noted  Dialysis Access : right AVF    LABS:   I have reviewed the following labs:  CMP  Recent Labs   Lab 08/02/23  0554 08/01/23 0443 07/31/23  1540    134* 136   POTASSIUM 4.1 4.7 4.1   CHLORIDE 99 96* 96*   CO2 26 27 24   ANIONGAP 14 11 16*   GLC 95 120* 126*   BUN 21.3 39.4* 29.7*   CR 4.47* 6.54* 5.82*   GFRESTIMATED 9* 6* 7*   BELGICA 8.6* 8.6* 9.3   MAG 1.9 2.2 2.1   PHOS 2.9  --   --    PROTTOTAL 6.8  --  7.5   ALBUMIN 3.4*  --  4.0   BILITOTAL 0.5  --  0.6   ALKPHOS 150*  --  191*   AST 25  --  49*   ALT 18  --  20     CBC  Recent Labs   Lab 08/02/23  0554 08/01/23 0443 07/31/23  1540   HGB 9.2* 9.0* 9.5*   WBC 12.6* 18.8* 20.5*   RBC 3.08* 3.01* 3.17*   HCT 29.9* 29.5* 31.9*   MCV 97 98 101*   MCH 29.9 29.9 30.0   MCHC 30.8* 30.5* 29.8*   RDW 16.3* 16.7* 16.5*    258 290     INRNo lab results found in last 7 days.  ABGNo lab results found in last 7 days.   URINE STUDIES  Recent Labs   Lab Test 03/11/16  1449 03/05/16  1440   COLOR Light Yellow Yellow   APPEARANCE Clear Clear   URINEGLC 30* 30*   URINEBILI Negative Negative   URINEKETONE Negative Negative   SG 1.009 1.011   UBLD Trace* Small*   URINEPH 6.0 6.0   PROTEIN 300* 300*   NITRITE Negative Negative   LEUKEST Negative Negative   RBCU 1 1   WBCU <1 4*     No lab results found.  PTH  Recent Labs   Lab Test 03/12/16  0552   PTHI 147*     IRON STUDIES  Recent Labs   Lab Test 07/05/23  1439 06/20/23  1122 06/02/23  0942 05/14/23  0741 01/26/23  1450 01/27/22  1304 10/24/18  1506 09/14/18  1159 06/21/16  1404 03/13/16  0446 03/01/16  1140 01/14/16  0944 11/12/15  1005 08/21/15  0924   IRON 44 36* 62 31* 34*  --  241* 55 30* 25* 21*  --  35 34*   * " 212* 203* 173* 266  --  419 279 284 369 482*  --  399 445*   IRONSAT 23 17 31 18 13*  --  57* 20 11* 7* 4*  --  9* 8*   KASSI  --  600* 222 436*  --  106 176 229 67 18 11 51 40 18       IMAGING:  All imaging studies reviewed by me.     Katelin Pulido MD

## 2023-08-03 VITALS
WEIGHT: 130.29 LBS | SYSTOLIC BLOOD PRESSURE: 138 MMHG | BODY MASS INDEX: 22.24 KG/M2 | DIASTOLIC BLOOD PRESSURE: 89 MMHG | TEMPERATURE: 98.2 F | HEIGHT: 64 IN | HEART RATE: 79 BPM | RESPIRATION RATE: 12 BRPM | OXYGEN SATURATION: 97 %

## 2023-08-03 LAB
ALBUMIN SERPL BCG-MCNC: 3.3 G/DL (ref 3.5–5.2)
ALP SERPL-CCNC: 161 U/L (ref 35–104)
ALT SERPL W P-5'-P-CCNC: 12 U/L (ref 0–50)
ANION GAP SERPL CALCULATED.3IONS-SCNC: 12 MMOL/L (ref 7–15)
AST SERPL W P-5'-P-CCNC: 21 U/L (ref 0–45)
BILIRUB SERPL-MCNC: 0.4 MG/DL
BUN SERPL-MCNC: 36.5 MG/DL (ref 8–23)
CALCIUM SERPL-MCNC: 8.5 MG/DL (ref 8.8–10.2)
CHLORIDE SERPL-SCNC: 96 MMOL/L (ref 98–107)
CREAT SERPL-MCNC: 6.51 MG/DL (ref 0.51–0.95)
DEPRECATED HCO3 PLAS-SCNC: 27 MMOL/L (ref 22–29)
ERYTHROCYTE [DISTWIDTH] IN BLOOD BY AUTOMATED COUNT: 15.9 % (ref 10–15)
GFR SERPL CREATININE-BSD FRML MDRD: 6 ML/MIN/1.73M2
GLUCOSE SERPL-MCNC: 93 MG/DL (ref 70–99)
HCT VFR BLD AUTO: 29.3 % (ref 35–47)
HGB BLD-MCNC: 9 G/DL (ref 11.7–15.7)
MAGNESIUM SERPL-MCNC: 2 MG/DL (ref 1.7–2.3)
MCH RBC QN AUTO: 29.2 PG (ref 26.5–33)
MCHC RBC AUTO-ENTMCNC: 30.7 G/DL (ref 31.5–36.5)
MCV RBC AUTO: 95 FL (ref 78–100)
PHOSPHATE SERPL-MCNC: 3.9 MG/DL (ref 2.5–4.5)
PLATELET # BLD AUTO: 288 10E3/UL (ref 150–450)
POTASSIUM SERPL-SCNC: 4.1 MMOL/L (ref 3.4–5.3)
PROT SERPL-MCNC: 6.8 G/DL (ref 6.4–8.3)
RBC # BLD AUTO: 3.08 10E6/UL (ref 3.8–5.2)
SODIUM SERPL-SCNC: 135 MMOL/L (ref 136–145)
WBC # BLD AUTO: 9 10E3/UL (ref 4–11)

## 2023-08-03 PROCEDURE — 90935 HEMODIALYSIS ONE EVALUATION: CPT | Performed by: INTERNAL MEDICINE

## 2023-08-03 PROCEDURE — 250N000013 HC RX MED GY IP 250 OP 250 PS 637

## 2023-08-03 PROCEDURE — 250N000013 HC RX MED GY IP 250 OP 250 PS 637: Performed by: PHYSICIAN ASSISTANT

## 2023-08-03 PROCEDURE — 36415 COLL VENOUS BLD VENIPUNCTURE: CPT

## 2023-08-03 PROCEDURE — 84100 ASSAY OF PHOSPHORUS: CPT

## 2023-08-03 PROCEDURE — 258N000003 HC RX IP 258 OP 636: Performed by: INTERNAL MEDICINE

## 2023-08-03 PROCEDURE — 85027 COMPLETE CBC AUTOMATED: CPT

## 2023-08-03 PROCEDURE — 83735 ASSAY OF MAGNESIUM: CPT

## 2023-08-03 PROCEDURE — 99239 HOSP IP/OBS DSCHRG MGMT >30: CPT | Mod: GC | Performed by: STUDENT IN AN ORGANIZED HEALTH CARE EDUCATION/TRAINING PROGRAM

## 2023-08-03 PROCEDURE — 90935 HEMODIALYSIS ONE EVALUATION: CPT

## 2023-08-03 PROCEDURE — 80053 COMPREHEN METABOLIC PANEL: CPT

## 2023-08-03 RX ORDER — CEFDINIR 300 MG/1
CAPSULE ORAL
Qty: 2 CAPSULE | Refills: 0 | Status: SHIPPED | OUTPATIENT
Start: 2023-08-03 | End: 2023-12-01

## 2023-08-03 RX ORDER — CEFDINIR 300 MG/1
300 CAPSULE ORAL ONCE
Status: COMPLETED | OUTPATIENT
Start: 2023-08-03 | End: 2023-08-03

## 2023-08-03 RX ORDER — DOXYCYCLINE 100 MG/1
100 CAPSULE ORAL 2 TIMES DAILY
Qty: 7 CAPSULE | Refills: 0 | Status: CANCELLED | OUTPATIENT
Start: 2023-08-03 | End: 2023-08-07

## 2023-08-03 RX ORDER — DOXYCYCLINE 100 MG/1
100 CAPSULE ORAL EVERY 12 HOURS
Qty: 7 CAPSULE | Refills: 0 | Status: SHIPPED | OUTPATIENT
Start: 2023-08-03 | End: 2023-08-07

## 2023-08-03 RX ADMIN — SODIUM CHLORIDE 250 ML: 9 INJECTION, SOLUTION INTRAVENOUS at 12:05

## 2023-08-03 RX ADMIN — CEFDINIR 300 MG: 300 CAPSULE ORAL at 16:20

## 2023-08-03 RX ADMIN — LEVOTHYROXINE SODIUM 50 MCG: 25 TABLET ORAL at 09:10

## 2023-08-03 RX ADMIN — SODIUM CHLORIDE 300 ML: 9 INJECTION, SOLUTION INTRAVENOUS at 11:45

## 2023-08-03 RX ADMIN — Medication: at 12:27

## 2023-08-03 RX ADMIN — PANTOPRAZOLE SODIUM 20 MG: 20 TABLET, DELAYED RELEASE ORAL at 16:20

## 2023-08-03 RX ADMIN — DOXYCYCLINE HYCLATE 100 MG: 50 CAPSULE ORAL at 09:11

## 2023-08-03 RX ADMIN — PANTOPRAZOLE SODIUM 20 MG: 20 TABLET, DELAYED RELEASE ORAL at 09:10

## 2023-08-03 RX ADMIN — SUCROFERRIC OXYHYDROXIDE 500 MG: 500 TABLET, CHEWABLE ORAL at 09:10

## 2023-08-03 RX ADMIN — SERTRALINE HYDROCHLORIDE 100 MG: 100 TABLET ORAL at 09:10

## 2023-08-03 RX ADMIN — THERA TABS 1 TABLET: TAB at 09:10

## 2023-08-03 ASSESSMENT — ACTIVITIES OF DAILY LIVING (ADL)
ADLS_ACUITY_SCORE: 39

## 2023-08-03 NOTE — PROGRESS NOTES
0700 - 1530    Pt. Alert and O x4, on RA. Denies N & V at start of shift.    Breathing is unlabored and pt. Reports no SOB. No difficulty swallowing meds.    Pt. Reported pain stating it was time for her HD.     Vomited x1, gave one ginger ale. Pt. reported feeling fine afterwards.    R. Arm AV fistula, PIV L. lower arm.     Skin warm, dry and flaky. Pt. Continues to use moisturizing cream. Denies tingling or numbness sensation.    Taken down for HD at 1100. Waiting for discharge back to [facility].

## 2023-08-03 NOTE — PROGRESS NOTES
Ambulance transport here to bring patient back to TCU, pt given omnicef and protonix prior to discharge. Packet sent with EMS for TCU.

## 2023-08-03 NOTE — PROGRESS NOTES
Care Management Discharge Note    Discharge Date: 8/3/23        Discharge Disposition: Transitional Care    Discharge Services: None    Discharge DME: None    Discharge Transportation; Daughter will transport    Private pay costs discussed: Not applicable    Does the patient's insurance plan have a 3 day qualifying hospital stay waiver?  No    PAS Confirmation Code:  N/A  Patient/family educated on Medicare website which has current facility and service quality ratings:      Education Provided on the Discharge Plan: Yes  Persons Notified of Discharge Plans: faciity, patient  Patient/Family in Agreement with the Plan: yes    Handoff Referral Completed: going back to TCU    Additional Information:  Patient ready to [facility] to [facility]. Would like patient around 4 PM      Spoke with Rey in admissions let her know patient has a service  window of 3:37 to 4:22 and that orders have been faxed.  Notified daughter of  time    Ana SUAREZN RN CCM  RN Care Coordinator 8A and 10 ICU  [address] [place] [address]  [email or web address]   Office: 10 ICU) [phone]   Pager: [phone]

## 2023-08-03 NOTE — PLAN OF CARE
Received lying on bed, alert and oriented x4, in room air  Denies N&V, numbness/tingling sensation.  Breathing pattern is normal, unlabored. No SOB   Complained of pain, managed with Tylenol, kept rested.   Right Arm AV Fistula, HD every MWF, was not done yesterday. For HD today at 11AM  PIV line at left lower arm, saline locked.   Renal Diet, tolerated pill meds, without swallowing difficulty  BM x1, diaper changed.  Monitor I&O  Skin is warm, dry and flaky, applied moisturizing cream.   Verbalized all concerns. Calm and cooperative.   Provided call bell at bedside.   Plan to discharge after HD to nursing home.  No acute changes on this shift.

## 2023-08-03 NOTE — PROGRESS NOTES
Nephrology progress notes  August 3, 2023      Rachele Martínez MRN:[id number] YOB: 1940  Date of Admission:7/31/2023  Primary care provider: Agnieszka Rodriguez  Requesting physician: Angelina Zaldivar DO    ASSESSMENT AND RECOMMENDATIONS:   Rachele Martínez is a 83 year old female with a past medical history of ESRD on HD, HTN, and HLD who is admitted on 7/31 for chest pain during a dialysis run, found to have pneumonia and hospitalized for IV antibiotics.    # ESRD on HD   Out pt dialysis on M/W/F schedule. Her dry weight recently challenged to 54 Kg. Per report only 1 hour of dialysis was done on 7/31 and discontinued for chest pain.   - HD 8/1 with UF 2 kg, tolerated procedure well with no significant worsening of chest pain.  - HD again today 8/3 for 2.5 hours with UF as tolerated.  - pt can to go to her dialysis session tomorrow to follow her MWF schedule out pt.    Pt was evaluated and examined during the dialysis procedure and tolerating treatment fairly.      # Hypertension  BP noted to be high. Current medication is amlodipine 7.5 mg, losartan 100mg  - will try to perform UF as she is receiving IV antibiotics and she does not make much urine.     # Anemia of CKD       HB - 8-9 .iron store is adequate. Continue to monitor for now.         # Hypothyroidism, on levothyroxine, continue.    Recommendations were communicated to primary team via this note.    Katelin Pulido MD   Division of Renal Disease and Hypertension  [facility]  [email or web address]  Vocera Web Console    Interval history   Pt endorsed continued chest pain worsening with eating solid food and vomited once today. Able to tolerate liquids.    PAST MEDICAL HISTORY:  Reviewed with patient on 08/03/2023   Past Medical History:   Diagnosis Date    Anemia     Anxiety and depression     Arthritis     AVM (arteriovenous malformation)     Chronic hepatitis C with cirrhosis (H)     Clotting disorder (H)     ESRD (end stage renal disease)  (H)     on dialysis    GI bleed     recurrent    Glaucoma     Hyperlipidemia     Hypertension goal BP (blood pressure) < 140/80        Past Surgical History:   Procedure Laterality Date    CAPSULE/PILL CAM ENDOSCOPY N/A 3/27/2019    Procedure: Capsule/pill cam endoscopy;  Surgeon: Yosvany Ram MD;  Location: [facility]    CAPSULE/PILL CAM ENDOSCOPY N/A 2/2/2023    Procedure: IMAGING PROCEDURE, GI TRACT, INTRALUMINAL, VIA CAPSULE;  Surgeon: Mulu Nur DO;  Location: [facility]    COLONOSCOPY N/A 9/4/2015    Procedure: COMBINED COLONOSCOPY, SINGLE OR MULTIPLE BIOPSY/POLYPECTOMY BY BIOPSY;  Surgeon: Rupesh Lopez MD;  Location: [facility]    COLONOSCOPY N/A 9/19/2018    Procedure: COLONOSCOPY;  enteroscopy small bowel  COLONOSCOPY;  Surgeon: Ankit Baires MD;  Location: [facility]    ENTEROSCOPY SMALL BOWEL N/A 3/9/2023    Procedure: antegrade single balloon enteroscopy with argon plasma coagulation of arteriovenous malformations;  Surgeon: Ankit Baires MD;  Location: [facility]    ESOPHAGOSCOPY, GASTROSCOPY, DUODENOSCOPY (EGD), COMBINED N/A 12/18/2014    Procedure: COMBINED ESOPHAGOSCOPY, GASTROSCOPY, DUODENOSCOPY (EGD);  Surgeon: Betsy Carvajal MD;  Location: [facility]    ESOPHAGOSCOPY, GASTROSCOPY, DUODENOSCOPY (EGD), COMBINED N/A 4/25/2015    Procedure: COMBINED ESOPHAGOSCOPY, GASTROSCOPY, DUODENOSCOPY (EGD);  Surgeon: Yosvany Ram MD;  Location: [facility]    ESOPHAGOSCOPY, GASTROSCOPY, DUODENOSCOPY (EGD), COMBINED N/A 5/5/2015    Procedure: COMBINED ESOPHAGOSCOPY, GASTROSCOPY, DUODENOSCOPY (EGD);  Surgeon: Mariano Mistry MD;  Location: [facility]    HC CAPSULE ENDOSCOPY N/A 9/30/2015    Procedure: CAPSULE/PILL CAM ENDOSCOPY;  Surgeon: Pan Dhaliwal MD;  Location: [facility]    HC VASCULAR SURGERY PROCEDURE UNLIST      HYSTERECTOMY  1980    KYREE    LUMPECTOMY BREAST          MEDICATIONS:  PTA Meds  Prior to Admission medications    Medication Sig Last Dose Taking? Auth Provider Long Term End Date    acetaminophen (TYLENOL) 325 MG tablet Take 325-650 mg by mouth every 6 hours as needed for mild pain 7/31/2023 at unknown Yes Reported, Patient     amLODIPine (NORVASC) 2.5 MG tablet Take 3 tablets (7.5 mg) by mouth daily Unknown at unknown Yes Leon Santizo MD Yes    atorvastatin (LIPITOR) 20 MG tablet Take 1 tablet (20 mg) by mouth daily 7/30/2023 at unknown Yes Angel Luis Reno MD Yes    levothyroxine (SYNTHROID/LEVOTHROID) 50 MCG tablet Take 1 tablet (50 mcg) by mouth every morning (before breakfast) 7/30/2023 at unknown Yes Leon Santizo MD Yes    losartan (COZAAR) 100 MG tablet Take 1 tablet (100 mg) by mouth daily 7/30/2023 at unknown Yes Leon Santizo MD Yes    Multiple Vitamin-Folic Acid TABS Take 1 tablet by mouth daily 7/30/2023 at unknown Yes Reported, Patient     pantoprazole (PROTONIX) 20 MG EC tablet Take 1 tablet (20 mg) by mouth 2 times daily (before meals) *take 30-60 minutes before 7/31/2023 at 0800 Yes Angel Luis Reno MD     sertraline (ZOLOFT) 50 MG tablet Take 2 tablets (100 mg) by mouth daily for 14 days 7/31/2023 at 0800 Yes Agnieszka Rodriguez MD Yes 8/8/23   sucroferric oxyhydroxide (VELPHORO) 500 MG CHEW chewable tablet Take 500 mg by mouth 2 times daily 7/31/2023 at unknown Yes Reported, Patient        Current Meds   amLODIPine  7.5 mg Oral Daily    atorvastatin  20 mg Oral Daily    cefdinir  300 mg Oral Once    doxycycline hyclate  100 mg Oral Q12H MINDY (08/20)    levothyroxine  50 mcg Oral QAM AC    losartan  100 mg Oral Daily    multivitamin, therapeutic  1 tablet Oral Daily    pantoprazole  20 mg Oral BID AC    sertraline  100 mg Oral Daily    sodium chloride (PF)  3 mL Intracatheter Q8H    sucroferric oxyhydroxide  500 mg Oral BID     Infusion Meds   - MEDICATION INSTRUCTIONS -           ALLERGIES:    Allergies   Allergen Reactions    Abacavir Itching    Lisinopril Cough    Dust Mites     Hydrochlorothiazide Itching     Severe      No Clinical Screening -  See Comments      History of blood transfusion reactions and pre-treats with Benadryl.     Spironolactone Nausea    Sulfa Antibiotics Hives    Valsartan Itching    Valsartan-Hydrochlorothiazide Itching     severe       REVIEW OF SYSTEMS:  A comprehensive of systems was negative except as noted above.    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    Sexual activity: Not Currently   Other Topics Concern    Parent/sibling w/ CABG, MI or angioplasty before 65F 55M? No   Social History Narrative    Not on file     Social Determinants of Health     Financial Resource Strain: Not on file   Food Insecurity: Not on file   Transportation Needs: Not on file   Physical Activity: Not on file   Stress: Not on file   Social Connections: Not on file   Intimate Partner Violence: Not on file   Housing Stability: Not on file     FAMILY MEDICAL HISTORY:   Family History   Problem Relation Age of Onset    Diabetes Mother     Alzheimer Disease Mother     Substance Abuse Son     Cancer Sister     Soft Tissue Cancer Sister     Breast Cancer Sister     Hyperlipidemia Daughter     Alcoholism Brother     Spine Problems Sister      No known Family history of kidney disease    PHYSICAL EXAM:   Temp  Av.3  F (37.4  C)  Min: 98.1  F (36.7  C)  Max: 100.9  F (38.3  C)      Pulse  Av.7  Min: 71  Max: 84 Resp  Avg: 15.5  Min: 0  Max:  "  SpO2  Av.3 %  Min: 92 %  Max: 100 %       BP (!) 152/79   Pulse 70   Temp 99  F (37.2  C) (Oral)   Resp 10   Ht 1.626 m (5' 4\")   Wt 59.1 kg (130 lb 4.7 oz)   SpO2 98%   BMI 22.36 kg/m        Admit Weight: 59.1 kg (130 lb 4.7 oz)     General : Pt awake, not in acute distress   Lungs : anterior lung fields are clear  Cardiac : S1, S2 present  Abdomen : Soft/ND/NT  LE : Edema noted  Dialysis Access : right AVF    LABS:   I have reviewed the following labs:  CMP  Recent Labs   Lab 23  0617 23  1404 23  0554 23  0443 07/31/23  1540   *  --  139 134* 136   POTASSIUM 4.1  --  4.1 4.7 4.1   CHLORIDE 96*  --  99 96* 96*   CO2 27  --  26 27 24   ANIONGAP 12  --  14 11 16*   GLC 93 105* 95 120* 126*   BUN 36.5*  --  21.3 39.4* 29.7*   CR 6.51*  --  4.47* 6.54* 5.82*   GFRESTIMATED 6*  --  9* 6* 7*   BELGICA 8.5*  --  8.6* 8.6* 9.3   MAG 2.0  --  1.9 2.2 2.1   PHOS 3.9  --  2.9  --   --    PROTTOTAL 6.8  --  6.8  --  7.5   ALBUMIN 3.3*  --  3.4*  --  4.0   BILITOTAL 0.4  --  0.5  --  0.6   ALKPHOS 161*  --  150*  --  191*   AST 21  --  25  --  49*   ALT 12  --  18  --  20     CBC  Recent Labs   Lab 23  0617 23  0554 23  0443 07/31/23  1540   HGB 9.0* 9.2* 9.0* 9.5*   WBC 9.0 12.6* 18.8* 20.5*   RBC 3.08* 3.08* 3.01* 3.17*   HCT 29.3* 29.9* 29.5* 31.9*   MCV 95 97 98 101*   MCH 29.2 29.9 29.9 30.0   MCHC 30.7* 30.8* 30.5* 29.8*   RDW 15.9* 16.3* 16.7* 16.5*    265 258 290     INRNo lab results found in last 7 days.  ABGNo lab results found in last 7 days.   URINE STUDIES  Recent Labs   Lab Test 16  1449 16  1440   COLOR Light Yellow Yellow   APPEARANCE Clear Clear   URINEGLC 30* 30*   URINEBILI Negative Negative   URINEKETONE Negative Negative   SG 1.009 1.011   UBLD Trace* Small*   URINEPH 6.0 6.0   PROTEIN 300* 300*   NITRITE Negative Negative   LEUKEST Negative Negative   RBCU 1 1   WBCU <1 4*     No lab results found.  PTH  Recent Labs   Lab Test " 03/12/16  0552   PTHI 147*     IRON STUDIES  Recent Labs   Lab Test 07/05/23  1439 06/20/23  1122 06/02/23  0942 05/14/23  0741 01/26/23  1450 01/27/22  1304 10/24/18  1506 09/14/18  1159 06/21/16  1404 03/13/16  0446 03/01/16  1140 01/14/16  0944 11/12/15  1005 08/21/15  0924   IRON 44 36* 62 31* 34*  --  241* 55 30* 25* 21*  --  35 34*   * 212* 203* 173* 266  --  419 279 284 369 482*  --  399 445*   IRONSAT 23 17 31 18 13*  --  57* 20 11* 7* 4*  --  9* 8*   KASSI  --  600* 222 436*  --  106 176 229 67 18 11 51 40 18       IMAGING:  All imaging studies reviewed by me.     Katelin Pulido MD

## 2023-08-03 NOTE — PROGRESS NOTES
HEMODIALYSIS TREATMENT NOTE    Date: 8/3/2023  Time: 2:32 PM    Data:  Pre Wt: 59.1 kg est  Desired Wt: - 1.5 to -2.5  kg  Post Wt: -2.5 kg  Weight change: -2.5 kg  Ultrafiltration - Post Run Net Total Removed (mL): 2500 mL  Vascular Access Status: patent  Dialyzer Rinse: Light streaked  Total Blood Volume Processed: 63.11 Liters  Total Dialysis (Treatment) Time: 2.5  Hours  Access: AVF right arm  Needle size: 15 g x 2  Bleeding time: arterial site 10 mins and venous 10 mins        Lab:  No     Assessment/Interventions:  - A & O X 4, calm cooperative  -able to make needs known  RA      Ran with K3 Ca2.25 bath for 2.5 hrs via AVF right arm. Lines functioning well. Completed HD tx. Post rinse back done. Applied new dressing, CDI     Meds given: see MAR     See Flowsheet and MAR for further details.     Handoff report given to PCN  and sent back to room stable.               Plan:    Per renal team

## 2023-08-03 NOTE — DISCHARGE SUMMARY
[facility]  Discharge Summary - Medicine & Pediatrics       Date of Admission:  7/31/2023  Date of Discharge:  8/3/2023  Discharging Provider: Dr. Angelina Zaldivar DO  Discharge Service: [facility] Medicine Service    Discharge Diagnoses   #CAP   #CAD  #ESRD on HD (MWF)  #Anemia as related to ESRD   #GERD   #Cirrhosis  #Hx of hepatitis C    Clinically Significant Risk Factors          Follow-ups Needed After Discharge   Follow up with your PCP in 5-7 days for hospital follow up.     Patient should return to her MWF schedule for dialysis with next session on Friday 8/4/23.    Unresulted Labs Ordered in the Past 30 Days of this Admission       Date and Time Order Name Status Description    7/31/2023  6:29 PM Blood Culture Hand, Left Preliminary     7/31/2023  6:29 PM Blood Culture Hand, Left Preliminary         These results will be followed up by PCP.    Discharge Disposition   Discharged to TCU  Condition at discharge: Stable    Hospital Course   Rachele Martínez was admitted on 7/31/2023. She has a history of ESRD on HD, HTN, and CAD presenting for chest pain during a dialysis run and found to have community acquired pneumonia. She received IV ceftriaxone and po doxycycline with significant improvement. She was transitioned to oral antibiotics (doxycycline and cefdinir) on Day 4 and discharged to her TCU. She additionally received hemodialysis while inpatient to cover for her missed dialysis session, now back to normal schedule.    #CAP--improving  #Severe Sepsis--resolved  Patient presented on 7/31 with worsening chest pain, SOB, and nonproductive cough during a dialysis run. Admitted with a fever, leukocytosis and lactic acidosis. CT AP w/ signs of posterior opacities consistent with CAP. Patient treated for CAP with Ceftriaxone 1g q24h and Doxycyline 100mg BID with significant improvement in symptoms. Will be discharged with Cefdinir (every 3 days after  dialysis) and doxycyline 100mg BID until 8/6 for a 7 day course.    #Chest Pain  #Elevated Troponin--resolved  #Hx of CAD  Patient presented with chest pain and SOB during dialysis run. Initial trops elevated but at baseline in setting of ESRD, peaked at 82 then downtrended. EKG without signs of ACS. Patient's chest pain likely secondary to pneumonia and muscles strain from coughing. Improved with treatment of pneumonia with abx and Tylenol. Minimal chest pain on discharge.     #ESRD on HD (MWF)  #Anemia as related to ESRD   Ended dialysis on 7/31 early due to chest pain and SOB. Full session of dialysis completed on 8/1, short session occurred on 8/3. Patient should return to her normal M, W, F schedule on discharge.    #GERD   Hx of GERD with PTA pantoprazole. Symptomatically treated with pepcid. Minimal pain on discharge.    #Cirrhosis  #Hx hepatitis C  #Hx of GI bleed 2/2 angioectasis  Followed by GI outpatient. Had a video capsule enteroscope 2/2/23 for melena, discovered angioectasia but no active bleeding. Not an active problem during this hospitalization.     Chronic  HTN - Continue PTA amlodipine and losartan  HLD - Continue PTA atorvastatin   Hypothyroidism - Continue PTA levothyroxine  Depression and Anxiety - Continue PTA Zoloft      Consultations This Hospital Stay   NURSING TO CONSULT FOR VASCULAR ACCESS CARE IP CONSULT  NEPHROLOGY ESRD ADULT IP CONSULT  CARE MANAGEMENT / SOCIAL WORK IP CONSULT    Code Status   Full Code       The patient was discussed with Dr. Angelina Zaldivar.    Margi Oconnell MD, MS4    Margi Oconnell, PGY-2  [facility]  [facility] MED SURG  [address] [address]  Phone: [phone]  Fax: [phone]  ______________________________________________________________________    Physical Exam   Vital Signs: Temp: 99  F (37.2  C) Temp src: Oral BP: (!) 152/79 Pulse: 70   Resp: 10 SpO2: 98 % O2 Device: None (Room air)    Weight: 130 lbs  4.67 oz  Constitutional: awake, alert, cooperative, no apparent distress, and appears stated age  Respiratory: No increased work of breathing, good air exchange, clear to auscultation bilaterally, improved crackles present on Left-lower field.  Cardiovascular: RRR. Systolic 4/6 murmur present.  Musculoskeletal: There is no redness, warmth, or swelling of the joints.  No edema.  Neurologic: Awake, alert, and oriented. Mental status at baseline.      Primary Care Physician   Agnieszka Rodriguez    Discharge Orders      General info for SNF    Length of Stay Estimate: Long Term Care  Condition at Discharge: Improving  Level of care:skilled   Rehabilitation Potential: Good  Admission H&P remains valid and up-to-date: Yes  Recent Chemotherapy: N/A  Use Nursing Home Standing Orders: Yes     Follow Up and recommended labs and tests    Follow up with primary care provider in 5-7 days to do a hospital follow up and ensure patient's pneumonia symptoms are improving.     Mantoux instructions    Give two-step Mantoux (PPD) Per Facility Policy Yes     Reason for your hospital stay    You were seen in the hospital for an infection in your lungs called pneumonia. You were treated with antibiotics and started to feel better. You were also given dialysis in the hospital for your kidney failure. You started to feel better and went back to your rehab facility.     Activity - Up ad rosalinda     Additional Discharge Instructions    Please give patient her antibiotics with a non-dairy containing foods, such as crackers or toast.     Full Code     Diet    Follow this diet upon discharge: Orders Placed This Encounter      Renal Diet (dialysis)       Significant Results and Procedures   Most Recent 3 CBC's:  Recent Labs   Lab Test 08/03/23  0617 08/02/23  0554 08/01/23  0443   WBC 9.0 12.6* 18.8*   HGB 9.0* 9.2* 9.0*   MCV 95 97 98    265 258     Most Recent 3 BMP's:  Recent Labs   Lab Test 08/03/23  0617 08/02/23  1404 08/02/23  0554  08/01/23  0443   *  --  139 134*   POTASSIUM 4.1  --  4.1 4.7   CHLORIDE 96*  --  99 96*   CO2 27  --  26 27   BUN 36.5*  --  21.3 39.4*   CR 6.51*  --  4.47* 6.54*   ANIONGAP 12  --  14 11   BELGICA 8.5*  --  8.6* 8.6*   GLC 93 105* 95 120*     Most Recent 3 Creatinines:  Recent Labs   Lab Test 08/03/23  0617 08/02/23  0554 08/01/23  0443   CR 6.51* 4.47* 6.54*     Most Recent 3 Troponin's:  Recent Labs   Lab Test 03/27/19  0433 03/27/19  0111 03/26/19  1808 03/26/19  1402 03/26/19  1358 03/05/16  1512   TROPI 0.187* 0.184* 0.175*  --    < >  --    TROPONIN  --   --   --  0.08  --  0.01    < > = values in this interval not displayed.     7-Day Micro Results       Collected Updated Procedure Result Status      07/31/2023 1937 08/02/2023 2216 Blood Culture Hand, Left [[id number]]   Blood from Hand, Left    Preliminary result Component Value   Culture No growth after 2 days  [P]                07/31/2023 1932 08/02/2023 2216 Blood Culture Hand, Left [[id number]]   Blood from Hand, Left    Preliminary result Component Value   Culture No growth after 2 days  [P]                07/31/2023 1508 07/31/2023 1643 Symptomatic Influenza A/B, RSV, & SARS-CoV2 PCR (COVID-19) Nasopharyngeal [[id number]]    Swab from Nasopharyngeal    Final result Component Value   Influenza A PCR Negative   Influenza B PCR Negative   RSV PCR Negative   SARS CoV2 PCR Negative   NEGATIVE: SARS-CoV-2 (COVID-19) RNA not detected, presumed negative.                  Results for orders placed or performed during the hospital encounter of 07/31/23   XR Chest Port 1 View    Narrative    EXAM: XR CHEST PORT 1 VIEW 7/31/2023 2:29 PM    DEMOGRAPHICS: Female of 83 years    INDICATION: Chest pain, did not complete dialysis run.    COMPARISON: Chest CT 6/22/2023, chest radiograph 6/21/2023.    TECHNIQUE: Single portable AP view of the chest.    FINDINGS:   Bilateral diffuse mixed interstitial and airspace opacities.  Redemonstration of thickening of  the right minor fissure. Trachea is  midline. Normal cardiac silhouette. Mediastinum is within normal  limits. Distinct pulmonary vasculature. No significant pleural  effusion. No discernable pneumothorax. Unremarkable upper abdomen. No  acute or suspicious osseous abnormalities. Unremarkable soft tissues.      Impression    IMPRESSION:   1.  Bilateral pulmonary edema. No focal consolidation.    I have personally reviewed the examination and initial interpretation  and I agree with the findings.    MARK DIAS MD         SYSTEM ID:  [id number]   CT Chest Abdomen Pelvis w/o Contrast    Narrative    EXAMINATION: CT CHEST ABDOMEN PELVIS W/O CONTRAST, 7/31/2023 5:14 PM    TECHNIQUE:  Helical CT images from the thoracic inlet through the  symphysis pubis were obtained  without contrast.     COMPARISON: 6/22/2023    HISTORY: Chest pain, abdominal pain, leukocytosis.    FINDINGS:    Limited evaluation of the absence of intravenous contrast.    Chest: Atrophic thyroid. No lower cervical or axillary  lymphadenopathy. Mildly enlarged pulmonary artery measuring  approximately 3.4 cm immediately prior to its bifurcation. Otherwise  normal size and configuration of the major thoracic vessels. Stable  cardiomegaly. Severe atherosclerosis of the coronary arteries. Mitral  annular calcifications. Unremarkable esophagus. No mediastinal  lymphadenopathy. No pneumothorax or pleural effusion. Bibasilar  posterior opacities, likely dependent atelectasis. No focal pulmonary  opacities.    Liver: Limited noncontrast evaluation of the liver demonstrating  cirrhotic hepatic configuration. Previously visualized small  hypoattenuating lesion in the right hepatic dome is not well seen in  the absence of contrast.    Biliary System: Again shows distended gallbladder with layering  gallstones, similar to prior. No substantial pericholecystic  inflammatory changes.    Pancreas: Unremarkable noncontrasted evaluation of the  pancreas.    Adrenal glands: No mass or nodules left adrenal nodular hyperplasia  unchanged. Normal right adrenal.    Spleen: Ill-defined hyperattenuation of the anterior spleen, likely to  represent a splenic cyst.    Kidneys: Atrophic polycystic kidneys. No evidence of urinary  obstruction    Gastrointestinal tract : The appendix is indistinct Normal caliber  small bowel.    Mesentery/peritoneum/retroperitoneum: No mass.. Small amount of  perihepatic ascites.    Lymph nodes: No significant lymphadenopathy.    Vasculature: Severe atherosclerotic calcification of abdominal aorta  and its branches with no aneurysmal dilation.  Suspect  aortoiliac-femoral peripheral arterial disease given degree of  atherosclerotic obscuration.    Pelvis: Urinary bladder is normal. Uterus is surgically absent, no  adnexal mass      Soft tissues: Coarse cortication of the bilateral breasts, likely  areas of fat necrosis.    Osseous structures: No aggressive or acute osseous lesion.  Severe  multilevel degenerative changes of the spine with multiple sclerotic  endplates and areas of disc space narrowing and Schmorl's nodes in the  vertebrae. Degenerative changes of the shoulders and hips.      Impression    IMPRESSION:   1. Bilateral basilar posterior opacities which are favored to  represent dependent atelectasis. Infectious and inflammatory  etiologies cannot be fully ruled out.  2. Limited examination in the absence of intravenous contrast with  multiple unchanged incidental findings including small volume ascites,  cirrhotic configuration of the liver, atrophic cholecystic kidneys,  distended gallbladder with layering gallstones, and diffuse  atherosclerotic disease all similar to prior dated 6/22/2023.    I have personally reviewed the examination and initial interpretation  and I agree with the findings.    MARK DIAS MD         SYSTEM ID:  [id number]     *Note: Due to a large number of results and/or encounters for the  requested time period, some results have not been displayed. A complete set of results can be found in Results Review.       Discharge Medications   Current Discharge Medication List        START taking these medications    Details   cefdinir (OMNICEF) 300 MG capsule Take one pill Friday 8/4 with dinner after dialysis and one pill on Monday 8/7 with dinner.  Qty: 2 capsule, Refills: 0    Associated Diagnoses: Pneumonia of both lungs due to infectious organism, unspecified part of lung      doxycycline hyclate (VIBRAMYCIN) 100 MG capsule Take 1 capsule (100 mg) by mouth every 12 hours for 7 doses Take with non-dairy food  Qty: 7 capsule, Refills: 0    Associated Diagnoses: Pneumonia of both lungs due to infectious organism, unspecified part of lung           CONTINUE these medications which have NOT CHANGED    Details   acetaminophen (TYLENOL) 325 MG tablet Take 325-650 mg by mouth every 6 hours as needed for mild pain      amLODIPine (NORVASC) 2.5 MG tablet Take 3 tablets (7.5 mg) by mouth daily  Qty: 90 tablet, Refills: 0    Associated Diagnoses: Hypertension goal BP (blood pressure) < 140/80      atorvastatin (LIPITOR) 20 MG tablet Take 1 tablet (20 mg) by mouth daily  Qty: 30 tablet, Refills: 0    Associated Diagnoses: Cognitive impairment      levothyroxine (SYNTHROID/LEVOTHROID) 50 MCG tablet Take 1 tablet (50 mcg) by mouth every morning (before breakfast)  Qty: 30 tablet, Refills: 0    Associated Diagnoses: Hypothyroidism, unspecified type      losartan (COZAAR) 100 MG tablet Take 1 tablet (100 mg) by mouth daily  Qty: 30 tablet, Refills: 0    Associated Diagnoses: Hypertension goal BP (blood pressure) < 140/80      Multiple Vitamin-Folic Acid TABS Take 1 tablet by mouth daily      pantoprazole (PROTONIX) 20 MG EC tablet Take 1 tablet (20 mg) by mouth 2 times daily (before meals) *take 30-60 minutes before  Qty: 60 tablet, Refills: 0    Associated Diagnoses: Gastrointestinal hemorrhage, unspecified  gastrointestinal hemorrhage type      sertraline (ZOLOFT) 50 MG tablet Take 2 tablets (100 mg) by mouth daily for 14 days  Qty: 60 tablet, Refills: 3    Associated Diagnoses: Itching      sucroferric oxyhydroxide (VELPHORO) 500 MG CHEW chewable tablet Take 500 mg by mouth 2 times daily           Allergies   Allergies   Allergen Reactions    Abacavir Itching    Lisinopril Cough    Dust Mites     Hydrochlorothiazide Itching     Severe      No Clinical Screening - See Comments      History of blood transfusion reactions and pre-treats with Benadryl.     Spironolactone Nausea    Sulfa Antibiotics Hives    Valsartan Itching    Valsartan-Hydrochlorothiazide Itching     severe

## 2023-08-03 NOTE — CONSULTS
Please see Ana Kaiser, RNCC note for initial assessment completed on 8/1/23.     ANTONIO Hunter, LGSW  5 Med Surg and 10 ICU   [facility]  Phone: [phone]  Pager: [phone]

## 2023-08-05 LAB
BACTERIA BLD CULT: NO GROWTH
BACTERIA BLD CULT: NO GROWTH

## 2023-08-17 ENCOUNTER — INFUSION THERAPY VISIT (OUTPATIENT)
Dept: INFUSION THERAPY | Facility: CLINIC | Age: 83
End: 2023-08-17
Payer: COMMERCIAL

## 2023-08-17 VITALS
DIASTOLIC BLOOD PRESSURE: 70 MMHG | TEMPERATURE: 97.7 F | SYSTOLIC BLOOD PRESSURE: 165 MMHG | RESPIRATION RATE: 16 BRPM | HEART RATE: 85 BPM | OXYGEN SATURATION: 94 %

## 2023-08-17 DIAGNOSIS — K31.811 ANGIODYSPLASIA OF STOMACH AND DUODENUM WITH HEMORRHAGE: ICD-10-CM

## 2023-08-17 DIAGNOSIS — K55.20 AVM (ARTERIOVENOUS MALFORMATION) OF SMALL BOWEL, ACQUIRED: ICD-10-CM

## 2023-08-17 DIAGNOSIS — D50.0 IRON DEFICIENCY ANEMIA DUE TO CHRONIC BLOOD LOSS: Primary | ICD-10-CM

## 2023-08-17 PROCEDURE — 96372 THER/PROPH/DIAG INJ SC/IM: CPT | Performed by: INTERNAL MEDICINE

## 2023-08-17 RX ADMIN — OCTREOTIDE ACETATE 20 MG: KIT INTRAMUSCULAR at 14:50

## 2023-08-17 NOTE — PROGRESS NOTES
Infusion Nursing Note:  Rachele Martínez presents today for Sandostatin.    Patient seen by provider today: No   present during visit today: Not Applicable.    Note: Assessment performed by Lorena REESE RN prior to injection today. Patient denies symptoms/concerns following previous injection.    Intravenous Access:  No Intravenous access/labs at this visit.    Treatment Conditions:  Not Applicable.      Post Infusion Assessment:  Patient tolerated injection without incident.  Site patent and intact, free from redness, edema or discomfort.       Discharge Plan:   Patient discharged in stable condition accompanied by: daughter.  Departure Mode: Cart.      Vilma Ariza LPN

## 2023-08-18 ENCOUNTER — PATIENT OUTREACH (OUTPATIENT)
Dept: CARE COORDINATION | Facility: CLINIC | Age: 83
End: 2023-08-18
Payer: MEDICARE

## 2023-08-18 NOTE — PROGRESS NOTES
Clinic Care Coordination Contact  [facility]/Voicemail       Clinical Data: Care Coordinator Outreach  Outreach attempted x 1. [employer] message sent to patient regarding recent hospitalization status. RN offered CC services and assisted with help for scheduling follow up appointment. Please see [employer] message for further details.   Plan: Care Coordinator will try to reach patient again in 1-2 business days.    VICKY ALVAREZ RN on 8/18/2023 at 10:05 AM

## 2023-08-23 NOTE — PROGRESS NOTES
Clinic Care Coordination Contact  Care Coordination Transition Communication         Clinical Data: Patient was hospitalized at [facility] from 7/31/23 to 8/3/23 with diagnosis of #CAP   #CAD  #ESRD on HD (MWF)  #Anemia as related to ESRD   #GERD   #Cirrhosis  #Hx of hepatitis C.     Transition to Facility:              Facility Name: [facility]    Plan: Patient lives at [facility]- no further outreaches planned, pt is not a candidate.     VICKY ALVAREZ RN on 8/23/2023 at 12:22 PM

## 2023-08-28 ENCOUNTER — TELEPHONE (OUTPATIENT)
Dept: INTERNAL MEDICINE | Facility: CLINIC | Age: 83
End: 2023-08-28
Payer: MEDICARE

## 2023-08-28 NOTE — TELEPHONE ENCOUNTER
Health Call Center    Phone Message    May a detailed message be left on voicemail: yes     Reason for Call: Form or Letter   Type or form/letter needing completion: Patient's daughter Charla, is calling to get a letter for her mom to break the lease at her apartment. Her mom is not able to live on her own anymore due to medical reasons. She needs to be in assisted living. If you could email the letter to [email or web address]. Please call Charla further if needed.  Provider: Dr Rodriguez  Date form needed: ASAP  Once completed: Mail form to Name: Charla Patient's daughter, at Address: [email or web address]    Action Taken: Message routed to:  [facility]    Travel Screening: Not Applicable

## 2023-08-28 NOTE — TELEPHONE ENCOUNTER
Letter from Dr. Rodriguez was scanned to [email or web address].    Gayle Paul RN on 8/28/2023 at 4:23 PM

## 2023-08-28 NOTE — LETTER
Rachele Martínez  [address]   [place] [address]  : 1940  MRN:  [id number]      2023          To whom it may concern,    Rachele Martínez is under [facility].  Please allow her to break her lease early due to medical reasons preventing her from continuing to live independently in her apartment.    Feel free to contact my office if you have any questions or concerns.      Sincerely,        Agnieszka Rodriguez MD  2023

## 2023-08-29 ENCOUNTER — TRANSCRIBE ORDERS (OUTPATIENT)
Dept: OTHER | Age: 83
End: 2023-08-29

## 2023-08-29 DIAGNOSIS — R19.8 GAGGING EPISODE: ICD-10-CM

## 2023-08-29 DIAGNOSIS — K21.9 GASTROESOPHAGEAL REFLUX DISEASE WITHOUT ESOPHAGITIS: ICD-10-CM

## 2023-08-29 DIAGNOSIS — R13.10 DYSPHAGIA, UNSPECIFIED: ICD-10-CM

## 2023-08-29 DIAGNOSIS — T17.308A CHOKING: Primary | ICD-10-CM

## 2023-08-30 ENCOUNTER — DOCUMENTATION ONLY (OUTPATIENT)
Dept: INTERNAL MEDICINE | Facility: CLINIC | Age: 83
End: 2023-08-30
Payer: MEDICARE

## 2023-08-30 NOTE — PROGRESS NOTES
Type of Form Received:     Form Received (Date) 08/25/23   Form Filled out Yes, faxed 9/5   Placed in provider folder Yes

## 2023-09-01 ENCOUNTER — MEDICAL CORRESPONDENCE (OUTPATIENT)
Dept: HEALTH INFORMATION MANAGEMENT | Facility: CLINIC | Age: 83
End: 2023-09-01
Payer: MEDICARE

## 2023-09-05 ENCOUNTER — MEDICAL CORRESPONDENCE (OUTPATIENT)
Dept: HEALTH INFORMATION MANAGEMENT | Facility: CLINIC | Age: 83
End: 2023-09-05
Payer: MEDICARE

## 2023-09-07 ENCOUNTER — MEDICAL CORRESPONDENCE (OUTPATIENT)
Dept: INTERNAL MEDICINE | Facility: CLINIC | Age: 83
End: 2023-09-07
Payer: MEDICARE

## 2023-09-07 ENCOUNTER — TELEPHONE (OUTPATIENT)
Dept: INTERNAL MEDICINE | Facility: CLINIC | Age: 83
End: 2023-09-07
Payer: MEDICARE

## 2023-09-07 NOTE — TELEPHONE ENCOUNTER
Left VM for Kat with verbal Home Care Orders: Skilled Nursing for delay the start of care for home care until Saturday, 09/09/23.  Cecily VANN LPN  [facility]

## 2023-09-07 NOTE — TELEPHONE ENCOUNTER
[facility]    Phone Message    May a detailed message be left on voicemail: yes     Reason for Call: Order(s): Home Care Orders: Skilled Nursing: Kat Clinical supervisor from [facility] calling requesting verbal orders for delay the start of care for home care until Saturday, 09/09/23.     Action Taken: Message routed to:  [facility]    Travel Screening: Not Applicable

## 2023-09-09 ENCOUNTER — MEDICAL CORRESPONDENCE (OUTPATIENT)
Dept: INTERNAL MEDICINE | Facility: CLINIC | Age: 83
End: 2023-09-09
Payer: MEDICARE

## 2023-09-11 ENCOUNTER — DOCUMENTATION ONLY (OUTPATIENT)
Dept: INTERNAL MEDICINE | Facility: CLINIC | Age: 83
End: 2023-09-11
Payer: MEDICARE

## 2023-09-11 NOTE — PROGRESS NOTES
Type of Form Received:     Form Received (Date) 9/11/23   Form Filled out Yes, faxed 9/15   Placed in provider folder Yes

## 2023-09-12 ENCOUNTER — MEDICAL CORRESPONDENCE (OUTPATIENT)
Dept: INTERNAL MEDICINE | Facility: CLINIC | Age: 83
End: 2023-09-12
Payer: MEDICARE

## 2023-09-12 ENCOUNTER — TELEPHONE (OUTPATIENT)
Dept: INTERNAL MEDICINE | Facility: CLINIC | Age: 83
End: 2023-09-12
Payer: MEDICARE

## 2023-09-12 NOTE — TELEPHONE ENCOUNTER
[facility] Call [facility]    Phone Message    May a detailed message be left on voicemail: yes     Reason for Call: Order(s): Home Care Orders: Physical Therapy (PT): Needs verbal orders for P/T, therapeutic exercise, therapeutic functional activity, gait and balance for 1 x a week for 6 weeks.       Action Taken: Message routed to:  [facility]    Travel Screening: Not Applicable

## 2023-09-13 ENCOUNTER — TELEPHONE (OUTPATIENT)
Dept: INTERNAL MEDICINE | Facility: CLINIC | Age: 83
End: 2023-09-13
Payer: MEDICARE

## 2023-09-13 ENCOUNTER — DOCUMENTATION ONLY (OUTPATIENT)
Dept: INTERNAL MEDICINE | Facility: CLINIC | Age: 83
End: 2023-09-13
Payer: MEDICARE

## 2023-09-13 NOTE — TELEPHONE ENCOUNTER
[facility] Call [facility]    Phone Message    May a detailed message be left on voicemail: yes     Reason for Call: Order(s): Home Care Orders: Skilled Nursing: once a week for four weeks.     Action Taken: Message routed to:  [facility]    Travel Screening: Not Applicable

## 2023-09-13 NOTE — TELEPHONE ENCOUNTER
Left VM for [facility] for Home Care Orders: Physical Therapy (PT): Needs verbal orders for P/T, therapeutic exercise, therapeutic functional activity, gait and balance for 1 x a week for 6 weeks. Clinic number left for [facility] if further questions, clarifications, or orders needed.  Cecily VANN LPN  [facility]

## 2023-09-13 NOTE — TELEPHONE ENCOUNTER
Spoke with Jennifer with [facility] and gave verbal orders for Home Care: Skilled Nursing: once a week for four weeks.  Cecily VANN LPN  [facility]     hand grasp, leg strength strong and equal bilaterally

## 2023-09-13 NOTE — PROGRESS NOTES
Type of Form Received:     Form Received (Date) 9/13/23   Form Filled out Yes, faxed 9/15   Placed in provider folder Yes

## 2023-09-14 ENCOUNTER — TELEPHONE (OUTPATIENT)
Dept: INTERNAL MEDICINE | Facility: CLINIC | Age: 83
End: 2023-09-14

## 2023-09-14 ENCOUNTER — INFUSION THERAPY VISIT (OUTPATIENT)
Dept: INFUSION THERAPY | Facility: CLINIC | Age: 83
End: 2023-09-14
Payer: MEDICARE

## 2023-09-14 VITALS
DIASTOLIC BLOOD PRESSURE: 77 MMHG | SYSTOLIC BLOOD PRESSURE: 148 MMHG | WEIGHT: 127 LBS | RESPIRATION RATE: 16 BRPM | OXYGEN SATURATION: 99 % | HEART RATE: 91 BPM | BODY MASS INDEX: 21.8 KG/M2

## 2023-09-14 DIAGNOSIS — K31.811 ANGIODYSPLASIA OF STOMACH AND DUODENUM WITH HEMORRHAGE: ICD-10-CM

## 2023-09-14 DIAGNOSIS — D50.0 IRON DEFICIENCY ANEMIA DUE TO CHRONIC BLOOD LOSS: Primary | ICD-10-CM

## 2023-09-14 DIAGNOSIS — K55.20 AVM (ARTERIOVENOUS MALFORMATION) OF SMALL BOWEL, ACQUIRED: ICD-10-CM

## 2023-09-14 PROCEDURE — 96372 THER/PROPH/DIAG INJ SC/IM: CPT | Performed by: INTERNAL MEDICINE

## 2023-09-14 RX ADMIN — OCTREOTIDE ACETATE 20 MG: KIT INTRAMUSCULAR at 11:58

## 2023-09-14 ASSESSMENT — PAIN SCALES - GENERAL: PAINLEVEL: NO PAIN (0)

## 2023-09-14 NOTE — TELEPHONE ENCOUNTER
Lift chair requires face-to-face visit per insurance.  In-person appointment required.        Srinivasan Fox CMA ([facility]) at 2:40 PM on 9/14/2023

## 2023-09-14 NOTE — TELEPHONE ENCOUNTER
[facility] Call [facility]    Phone Message    May a detailed message be left on voicemail: yes     Reason for Call: Other: Patients daughter calling requesting an order for patient to get a lift reclining chair. Per patients daughter it is a recliner that helps patient stand up with a remote. Patient will call back to state which medical supply store she wants this to go to.      Action Taken: Message routed to:  [facility]    Travel Screening: Not Applicable

## 2023-09-14 NOTE — PROGRESS NOTES
Infusion Nursing Note:  Rachele Martínez presents today for Sandostatin.    Patient seen by provider today: No   present during visit today: Not Applicable.    Note: N/A.    Intravenous Access:  No Intravenous access/labs at this visit.    Treatment Conditions:  Not Applicable.    Post Infusion Assessment:  Patient tolerated injection without incident.     Discharge Plan:   Patient will return 10/12/2023 for next appointment.   Future appts have been reviewed and crosschecked with appt note and plan.  Patient discharged in stable condition accompanied by: daughter.  Departure Mode: Ambulatory.    Dannielle Triplett RN-BSN, PHN, OCN  [facility]

## 2023-09-15 ENCOUNTER — MEDICAL CORRESPONDENCE (OUTPATIENT)
Dept: INTERNAL MEDICINE | Facility: CLINIC | Age: 83
End: 2023-09-15
Payer: MEDICARE

## 2023-09-15 ENCOUNTER — DOCUMENTATION ONLY (OUTPATIENT)
Dept: INTERNAL MEDICINE | Facility: CLINIC | Age: 83
End: 2023-09-15

## 2023-09-15 DIAGNOSIS — Z53.9 DIAGNOSIS NOT YET DEFINED: Primary | ICD-10-CM

## 2023-09-15 PROCEDURE — G0179 MD RECERTIFICATION HHA PT: HCPCS | Performed by: INTERNAL MEDICINE

## 2023-09-15 NOTE — PROGRESS NOTES
Type of Form Received:     Form Received (Date) 9/12/23   Form Filled out Yes, faxed 9/15   Placed in provider folder Yes

## 2023-09-19 ENCOUNTER — TELEPHONE (OUTPATIENT)
Dept: INTERNAL MEDICINE | Facility: CLINIC | Age: 83
End: 2023-09-19
Payer: MEDICARE

## 2023-09-19 NOTE — TELEPHONE ENCOUNTER
[facility] Call [facility]    Phone Message    May a detailed message be left on voicemail: yes     Reason for Call: Dannielle OT [facility], OT, 1x per week for 3 weeks. For self-care, light meal prep, adaptive equipment, energy conservation, and arm strength. OK to LVM.     Action Taken: Message routed to:  [facility]    Travel Screening: Not Applicable

## 2023-09-20 NOTE — TELEPHONE ENCOUNTER
Left detailed VM for Dannielle OT [employer] with verbal orders for home care, OT, 1x per week for 3 weeks, for self-care, light meal prep, adaptive equipment, energy conservation, and arm strength.  Cecily VANN LPN  [facility]

## 2023-09-21 ENCOUNTER — DOCUMENTATION ONLY (OUTPATIENT)
Dept: INTERNAL MEDICINE | Facility: CLINIC | Age: 83
End: 2023-09-21
Payer: MEDICARE

## 2023-09-21 NOTE — PROGRESS NOTES
Type of Form Received:     Form Received (Date) 9/21/23   Form Filled out Yes, faxed 9/25   Placed in provider folder Yes

## 2023-09-22 ENCOUNTER — MEDICAL CORRESPONDENCE (OUTPATIENT)
Dept: HEALTH INFORMATION MANAGEMENT | Facility: CLINIC | Age: 83
End: 2023-09-22
Payer: MEDICARE

## 2023-09-23 ENCOUNTER — LAB REQUISITION (OUTPATIENT)
Dept: LAB | Facility: CLINIC | Age: 83
End: 2023-09-23
Payer: MEDICARE

## 2023-09-23 DIAGNOSIS — R94.6 ABNORMAL RESULTS OF THYROID FUNCTION STUDIES: ICD-10-CM

## 2023-09-26 DIAGNOSIS — Z53.9 DIAGNOSIS NOT YET DEFINED: Primary | ICD-10-CM

## 2023-09-26 DIAGNOSIS — E03.9 HYPOTHYROIDISM, UNSPECIFIED TYPE: ICD-10-CM

## 2023-09-26 DIAGNOSIS — R41.89 COGNITIVE IMPAIRMENT: ICD-10-CM

## 2023-09-26 DIAGNOSIS — I10 HYPERTENSION GOAL BP (BLOOD PRESSURE) < 140/80: Chronic | ICD-10-CM

## 2023-09-26 DIAGNOSIS — K92.2 GASTROINTESTINAL HEMORRHAGE, UNSPECIFIED GASTROINTESTINAL HEMORRHAGE TYPE: ICD-10-CM

## 2023-09-28 ENCOUNTER — LAB (OUTPATIENT)
Dept: LAB | Facility: CLINIC | Age: 83
End: 2023-09-28
Payer: MEDICARE

## 2023-09-28 ENCOUNTER — ANCILLARY PROCEDURE (OUTPATIENT)
Dept: GENERAL RADIOLOGY | Facility: CLINIC | Age: 83
End: 2023-09-28
Attending: NURSE PRACTITIONER
Payer: MEDICARE

## 2023-09-28 ENCOUNTER — ANCILLARY PROCEDURE (OUTPATIENT)
Dept: MAMMOGRAPHY | Facility: CLINIC | Age: 83
End: 2023-09-28
Attending: INTERNAL MEDICINE
Payer: MEDICARE

## 2023-09-28 ENCOUNTER — THERAPY VISIT (OUTPATIENT)
Dept: SPEECH THERAPY | Facility: CLINIC | Age: 83
End: 2023-09-28
Attending: NURSE PRACTITIONER
Payer: MEDICARE

## 2023-09-28 ENCOUNTER — OFFICE VISIT (OUTPATIENT)
Dept: INTERNAL MEDICINE | Facility: CLINIC | Age: 83
End: 2023-09-28
Payer: MEDICARE

## 2023-09-28 VITALS
SYSTOLIC BLOOD PRESSURE: 123 MMHG | HEART RATE: 71 BPM | DIASTOLIC BLOOD PRESSURE: 62 MMHG | BODY MASS INDEX: 21.34 KG/M2 | OXYGEN SATURATION: 97 % | WEIGHT: 124.3 LBS

## 2023-09-28 DIAGNOSIS — R13.10 DYSPHAGIA, UNSPECIFIED: ICD-10-CM

## 2023-09-28 DIAGNOSIS — R13.12 OROPHARYNGEAL DYSPHAGIA: Primary | ICD-10-CM

## 2023-09-28 DIAGNOSIS — R19.8 GAGGING EPISODE: ICD-10-CM

## 2023-09-28 DIAGNOSIS — T17.308A CHOKING: ICD-10-CM

## 2023-09-28 DIAGNOSIS — M62.81 GENERALIZED MUSCLE WEAKNESS: Primary | ICD-10-CM

## 2023-09-28 DIAGNOSIS — R53.81 PHYSICAL DECONDITIONING: ICD-10-CM

## 2023-09-28 DIAGNOSIS — E03.9 HYPOTHYROIDISM, UNSPECIFIED TYPE: ICD-10-CM

## 2023-09-28 DIAGNOSIS — K21.9 GASTROESOPHAGEAL REFLUX DISEASE WITHOUT ESOPHAGITIS: ICD-10-CM

## 2023-09-28 DIAGNOSIS — Z12.31 VISIT FOR SCREENING MAMMOGRAM: ICD-10-CM

## 2023-09-28 LAB — TSH SERPL DL<=0.005 MIU/L-ACNC: 2.92 UIU/ML (ref 0.3–4.2)

## 2023-09-28 PROCEDURE — 77067 SCR MAMMO BI INCL CAD: CPT | Mod: GC | Performed by: RADIOLOGY

## 2023-09-28 PROCEDURE — 74230 X-RAY XM SWLNG FUNCJ C+: CPT | Mod: GC | Performed by: STUDENT IN AN ORGANIZED HEALTH CARE EDUCATION/TRAINING PROGRAM

## 2023-09-28 PROCEDURE — 92610 EVALUATE SWALLOWING FUNCTION: CPT | Mod: GN | Performed by: SPEECH-LANGUAGE PATHOLOGIST

## 2023-09-28 PROCEDURE — 92611 MOTION FLUOROSCOPY/SWALLOW: CPT | Mod: GN | Performed by: SPEECH-LANGUAGE PATHOLOGIST

## 2023-09-28 PROCEDURE — 99213 OFFICE O/P EST LOW 20 MIN: CPT | Mod: GC

## 2023-09-28 PROCEDURE — 77063 BREAST TOMOSYNTHESIS BI: CPT | Mod: GC | Performed by: RADIOLOGY

## 2023-09-28 PROCEDURE — 36415 COLL VENOUS BLD VENIPUNCTURE: CPT | Performed by: PATHOLOGY

## 2023-09-28 PROCEDURE — 84443 ASSAY THYROID STIM HORMONE: CPT | Performed by: PATHOLOGY

## 2023-09-28 RX ORDER — B,C/FERROUS FUM/FA/D3/ZINC OX 8MG-800MCG
1 TABLET ORAL
COMMUNITY
Start: 2023-09-01

## 2023-09-28 RX ORDER — BARIUM SULFATE 400 MG/ML
SUSPENSION ORAL ONCE
Status: COMPLETED | OUTPATIENT
Start: 2023-09-28 | End: 2023-09-28

## 2023-09-28 RX ADMIN — BARIUM SULFATE: 400 SUSPENSION ORAL at 14:04

## 2023-09-28 NOTE — PROGRESS NOTES
I, Ryan King MD saw the patient with the resident, and agree with the resident's findings and plan of care as documented in the resident's note.  /62 (BP Location: Left arm, Patient Position: Sitting, Cuff Size: Adult Regular)   Pulse 71   Wt 56.4 kg (124 lb 4.8 oz)   SpO2 97%   BMI 21.34 kg/m    I personally reviewed vital signs and past record.  Key findings: multiple chronic medical problems   Needs documentation for chair lift. This is medically necessary from my review or her history and my PE observing current activity, unable to get safely out of chair. The diagnosis is gen muscle weakness, deconditioning.

## 2023-09-28 NOTE — PATIENT INSTRUCTIONS
Once you get a lift chair, work with physical therapy on exercises for standing.     We will recheck your thyroid levels and I will message you on MyChart if we need any adjustments with your medication.     Thanks,  Audi

## 2023-09-28 NOTE — NURSING NOTE
Rachele Martínez is a 83 year old female that presents in clinic today for the following:     Chief Complaint   Patient presents with    Orders     Pt here for lift chair forms    Thyroid Problem     Pt recently prescribed levothyroxine and is unsure if labs are up to date       The patient's allergies and medications were reviewed. The patient's vitals were obtained without incident. The patient does not have any other questions for the provider.     Paramjit Light, EMT at 10:04 AM on 9/28/2023.  Primary Care Clinic: [phone]

## 2023-09-28 NOTE — PROGRESS NOTES
"SPEECH LANGUAGE PATHOLOGY EVALUATION    Subjective      Presenting condition or subjective complaint: Pt presents today for video swallow study accompanied by her daughter.   Date of onset: 06/28/23    Relevant medical history: ERSD on HD, HTN, CAD    Prior therapy history for the same diagnosis, illness or injury: Patient seen by IP SLP on 6/28/23 \"Recommend regular textures and thin liquids. Pt should be fully upright and alert for all PO, take small sips/bites, slow pacing, and alternate between consistencies. Consider OP VFSS if pt continues to report dysphagia. \"    Living Environment  Social support: Pt's daughter who is present today, Leti    Patient goals for therapy: To assess her swallow function to see what is causing her \"choking\" episodes     Pain assessment: Pain denied     Objective     SWALLOW EVALUTION  Dysphagia history: Today, pt reports episodes of \"choking\" with both solids and liquids. However, upon further questioning she states the solids/liquids feel stuck in her chest and she has to throat clear to clear the sensation. Denies feeling of items going \"down the wrong tube.\" Denies feeling foods and liquids get stuck up in her throat. She points to her collarbone and below when asked where things get stuck. Denies avoiding any items, odynophagia and difficulty swallowing pills. Reports the episodes are random in nature and are not predictable.     Current Diet/Method of Nutritional Intake: thin liquids (level 0), regular diet        CLINICAL SWALLOW EVALUATION  Oral Motor Function: Dentition: upper and lower dentures  Secretion management: WFL  Mucosal quality: adequate  Mandibular function: intact  Oral labial function: WFL  Lingual function: WFL  Velar function: intact   Buccal function: intact  Laryngeal function: cough, throat clear, volitional swallow, voicing WFL     Level of assist required for feeding: no assistance needed   Textures Trialed:   Clinical Swallow Eval: Thin " Liquids  Mode of presentation: cup, self-fed   Volume presented: 3oz  Preparatory Phase: WFL  Oral Phase: WFL  Pharyngeal phase of swallow: intact   Strategies trialed during procedure: JENNIFER SLP CLINICAL EVAL STRATEGIES: none   Diagnostic statement: No clinical s/sx of penetration/aspiration. Multiple swallows noted     ADDITIONAL EVAL COMPLETED TODAY : yes; rationale: to further assess pharyngeal phase    VIDEOFLUOROSCOPIC SWALLOW STUDY  Radiologist: Resident  Views Taken: left lateral, A/P   Physical location of procedure: [facility]  Patient sitting upright on chair/stool     VFSS textures trialed:   VFSS Eval: Thin Liquids  Mode of Presentation: cup, straw, self-fed   Order of Presentation: Series 1, 2, 6, 9 (AP image not saved)  Preparatory Phase: WFL  Oral Phase: premature spillage   Bolus Location When Swallow Initiated: posterior angle of ramus intermittently valleculae  Pharyngeal Phase: WFL  Rosenbeck's Penetration Aspiration Scale: 2 - contrast enters airway, remains above the vocal cords, no residue remains (penetration)  Diagnostic Statement: Intermittent premature spillage. Intermittent penetration with no residuals remaining in laryngeal vestibule; no aspiration. No significant residuals.     VFSS Eval: Mildly Thick Liquids  Mode of Presentation: cup, self-fed   Order of Presentation: Series 3  Preparatory Phase: WFL  Oral Phase: premature spillage  Bolus Location When Swallow Initiated: valleculae  Pharyngeal Phase: WFL  Rosenbeck's Penetration Aspiration Scale: 1 - no aspiration, contrast does not enter airway  Diagnostic Statement: Premature spillage to valleculae. No penetration/aspiration or significant residuals.    VFSS Eval: Purees  Mode of Presentation: spoon, self-fed   Order of Presentation: Series 4, 5, 11AP  Preparatory Phase: WFL  Oral Phase: premature spillage vs delay  Bolus Location When Swallow Initiated: valleculae  Pharyngeal Phase: WFL  Rosenbeck s Penetration Aspiration Scale:  1 - no aspiration, contrast does not enter airway  Diagnostic Statement: Premature spillage vs delay to valleculae. No penetration/aspiration or significant residuals. AP reveals symmetric bolus flow through oropharynx.    VFSS Eval: Solids  Mode of Presentation: self-fed   Order of Presentation: Series 7,8  Preparatory Phase: WFL  Oral Phase: premature spillage vs delay  Bolus Location When Swallow Initiated: valleculae  Pharyngeal Phase: WFL   Rosenbeck s Penetration Aspiration Scale: 1 - no aspiration, contrast does not enter airway  Diagnostic Statement: Premature spillage vs delay to valleculae. No penetration/aspiration or significant residuals.    VFSS Eval: Barium Tablet  Mode of Presentation: whole tablet taken with thin water by cup   Order of Presentation: Series 10  Preparatory Phase: WFL  Oral Phase: WFL  Bolus Location When Swallow Initiated: posterior angle of ramus  Pharyngeal Phase: WFL  Rosenbeck s Penetration Aspiration Scale: 1 - no aspiration, contrast does not enter airway  Diagnostic Statement: Barium tablet passed through oropharynx without difficulty.     ESOPHAGEAL PHASE OF SWALLOW  esophageal sweep performed during today's videofluoroscopic exam  please refer to radiologist's report for details     SWALLOW ASSESSMENT CLINICAL IMPRESSIONS AND RATIONALE  Diet Consistency Recommendations: thin liquids (level 0), regular diet    Recommended Feeding/Eating Techniques: small bolus size, slow rate of intake, monitor for cough or change in vocal quality with intake, maintain upright sitting position for eating, maintain upright posture during/after eating for 30 minutes, minimize distractions during oral intake   Medication Administration Recommendations: as tolerated      Assessment & Plan   CLINICAL IMPRESSIONS   Medical Diagnosis: Dysphagia, unspecified    Treatment Diagnosis: Minimal oropharyngeal dysphagia   Impression/Assessment:  Pt presents today with minimal oropharyngeal dysphagia  characterized by premature spillage with thin liquids. No significant oropharyngeal residuals with any PO trial texture. Recommend regular textures/thin liquids with use of general safe swallow strategies.      PLAN OF CARE  Evaluation only  -Results and recommendations reviewed with patient and her daughter    Recommended Referrals to Other Professionals:  consider GI  based on findings of esophageal sweep and where patient localizes sensation of food/liquid getting stuck in the upper chest    Education Assessment:   Learner/Method: Patient;Family    Risks and benefits of evaluation/treatment have been explained.   Patient/Family/caregiver agrees with Plan of Care.     Evaluation Time:    SLP Eval: oral/pharyngeal swallow function, clinical minutes (84182): 15  SLP Eval: VideoFluoroscopic Swallow function Minutes (70323): 20    Signing Clinician: Stephany Evans SLP

## 2023-09-28 NOTE — PROGRESS NOTES
PRIMARY CARE CENTER       SUBJECTIVE:  Rachele Martínez is a 83 year old female with a PMHx of ESRD on HD, HTN, CAD who comes in for need for chair lift and questions about thyroid. Presents with her daughter, who is her caretaker.     During hospitalization on 7/20, TSH found to be 11 with free T4 low at 0.71 and levothyroxine was started at 50mcg. She has been doing okay afterwards, no tremors, weight changes (other than with dialysis). Does still have fatigue. She had been in rehab prior and on 9/19/23, did have home care, OT, adaptive equipment ordered. She and her daughter are wondering about a lift chair given her instability with standing.     Medications and allergies reviewed by me today.     OBJECTIVE:    /62 (BP Location: Left arm, Patient Position: Sitting, Cuff Size: Adult Regular)   Pulse 71   Wt 56.4 kg (124 lb 4.8 oz)   SpO2 97%   BMI 21.34 kg/m     Wt Readings from Last 1 Encounters:   09/28/23 56.4 kg (124 lb 4.8 oz)       GENERAL APPEARANCE: healthy, alert and no distress     NECK: no adenopathy, thyroid normal to palpation     RESP: lungs clear to auscultation      CV: regular rates and rhythm, systolic murmur      ABDOMEN:  soft, non-distended     MS: on standing from wheelchair, legs were tremulous and she was unsteady, requiring holding onto to her wheelchair.      SKIN: fistula with palpable thrill on right upper arm      ASSESSMENT/PLAN:    Rachele was seen today for orders and thyroid problem.    Diagnoses and all orders for this visit:    Generalized muscle weakness  Physical deconditioning  On face to face encounter, significant tremor, weakness with standing up requiring holding onto her wheelchair. Given physical decondition, generalized muscle weakness, recommend DME for lift chair. Advised patient and her daughter to work with OT/PT on standing exercises once lift chair is obtained.   -     Lift Chair W Seat Lift Mechanism Order for DME - ONLY FOR  DME    Hypothyroidism, unspecified type  Started during prior admission in July 2023 for encephalopathy and found to have TSH found to be 11 with free T4 low at 0.71 and was started on levothyroxine 50. Will recheck today. If normal TSH, continue current dose.   -     TSH with free T4 reflex; Future     Pt should return to clinic for f/u as needed    Audi Covington MD  Sep 28, 2023    Pt was seen and plan of care discussed with Dr. King.

## 2023-09-29 RX ORDER — PANTOPRAZOLE SODIUM 20 MG/1
TABLET, DELAYED RELEASE ORAL
Qty: 60 TABLET | Refills: 11 | Status: SHIPPED | OUTPATIENT
Start: 2023-09-29 | End: 2024-08-27

## 2023-09-29 RX ORDER — LEVOTHYROXINE SODIUM 50 UG/1
TABLET ORAL
Qty: 30 TABLET | Refills: 11 | Status: SHIPPED | OUTPATIENT
Start: 2023-09-29 | End: 2024-01-19

## 2023-09-29 RX ORDER — LOSARTAN POTASSIUM 100 MG/1
TABLET ORAL
Qty: 30 TABLET | Refills: 11 | Status: SHIPPED | OUTPATIENT
Start: 2023-09-29 | End: 2024-08-31

## 2023-09-29 RX ORDER — AMLODIPINE BESYLATE 2.5 MG/1
TABLET ORAL
Qty: 90 TABLET | Refills: 11 | Status: SHIPPED | OUTPATIENT
Start: 2023-09-29

## 2023-09-29 RX ORDER — ATORVASTATIN CALCIUM 20 MG/1
TABLET, FILM COATED ORAL
Qty: 30 TABLET | Refills: 11 | Status: SHIPPED | OUTPATIENT
Start: 2023-09-29 | End: 2024-08-31

## 2023-09-29 NOTE — TELEPHONE ENCOUNTER
amLODIPine Besylate 2.5 MG Tablet       Last Written Prescription Date:  7-25-23  Last Fill Quantity: 90,   # refills: 0  Losartan Potassium 100 MG Tablet   Last Written Prescription Date:  7-24-23  Last Fill Quantity: 30,   # refills: 0  Last Office Visit : 9-28-23- note unsigned  Future Office visit:  none    BP Readings from Last 3 Encounters:   09/28/23 123/62   09/14/23 (!) 148/77   08/17/23 (!) 165/70      Creatinine   Date Value Ref Range Status   08/03/2023 6.51 (H) 0.51 - 0.95 mg/dL Final   05/26/2020 7.50 (H) 0.52 - 1.04 mg/dL Final        Routing refill request to provider for review/approval because:  Blood pressure out of range   Abn Cr  gap in RF  Last rx at hospital discharge          atorvastatin (LIPITOR) 20 MG tablet   Last Written Prescription Date:  5-25-23  Last Fill Quantity: 30,   # refills: 0  Levothyroxine Sodium 50 MCG Tablet       Last Written Prescription Date:  7-25-23  Last Fill Quantity: 30,   # refills: 0  Pantoprazole Sodium 20 MG Tablet delayed release       Last Written Prescription Date:  5-25-23  Last Fill Quantity: 60,   # refills: 0    Routing refill request to provider for review/approval because:  gap in RF  Last rx at hospital discharge

## 2023-10-05 ENCOUNTER — TELEPHONE (OUTPATIENT)
Dept: INTERNAL MEDICINE | Facility: CLINIC | Age: 83
End: 2023-10-05
Payer: MEDICARE

## 2023-10-05 NOTE — TELEPHONE ENCOUNTER
[facility] for scooter  Phone: [phone]    Lift chair, waiting for provider signed visit notes.         Srinivasan Fox CMA ([facility]) at 12:08 PM on 10/5/2023

## 2023-10-05 NOTE — TELEPHONE ENCOUNTER
[facility] Call Center    Phone Message    May a detailed message be left on voicemail: yes     Reason for Call: Other: Patients daughter called to follow up on the order for a lift chair, this was discussed in appt on 9/28/23, she also needs help with her scooter, it has stopped working, she doesn't know who to call for help with this, please call.        Action Taken: Message routed to:  [facility]    Travel Screening: Not Applicable

## 2023-10-09 NOTE — TELEPHONE ENCOUNTER
Lift chair order and provider visit notes faxed to [facility] today.    Called pt daughter Sanya back.  Lift chair order and addition provider information was faxed to [facility] today.    Scooter battery dead, not charging or working. Pt or daughter will need to call [facility] (where pt got her scooter from). Typically they would set up an appointment to have someone come over to evaluate the scooter to see what repairs are needed or if pt need a new replacement.        Srinivasan Fox CMA ([facility]) at 9:04 AM on 10/9/2023

## 2023-10-10 ENCOUNTER — TELEPHONE (OUTPATIENT)
Dept: GASTROENTEROLOGY | Facility: CLINIC | Age: 83
End: 2023-10-10
Payer: MEDICARE

## 2023-10-11 ENCOUNTER — TELEPHONE (OUTPATIENT)
Dept: INTERNAL MEDICINE | Facility: CLINIC | Age: 83
End: 2023-10-11
Payer: MEDICARE

## 2023-10-11 DIAGNOSIS — M62.81 GENERALIZED MUSCLE WEAKNESS: ICD-10-CM

## 2023-10-11 DIAGNOSIS — R53.1 GENERALIZED WEAKNESS: ICD-10-CM

## 2023-10-11 DIAGNOSIS — M48.062 SPINAL STENOSIS OF LUMBAR REGION WITH NEUROGENIC CLAUDICATION: Primary | ICD-10-CM

## 2023-10-11 NOTE — TELEPHONE ENCOUNTER
[facility] Call Center    Phone Message    May a detailed message be left on voicemail: yes     Reason for Call: Order(s): Home Care Orders: Physical Therapy (PT): Outpatient PT: Eval and Treat  please Fax request to [facility] Fax: [phone]  Please call Dannielle if theres any questions or concerns thank you.    Action Taken: Message routed to:  [facility] pcc    Travel Screening: Not Applicable

## 2023-10-12 ENCOUNTER — INFUSION THERAPY VISIT (OUTPATIENT)
Dept: INFUSION THERAPY | Facility: CLINIC | Age: 83
End: 2023-10-12
Payer: MEDICARE

## 2023-10-12 VITALS
WEIGHT: 122.6 LBS | OXYGEN SATURATION: 98 % | TEMPERATURE: 97.9 F | BODY MASS INDEX: 21.04 KG/M2 | SYSTOLIC BLOOD PRESSURE: 105 MMHG | HEART RATE: 79 BPM | DIASTOLIC BLOOD PRESSURE: 64 MMHG

## 2023-10-12 DIAGNOSIS — K55.20 AVM (ARTERIOVENOUS MALFORMATION) OF SMALL BOWEL, ACQUIRED: ICD-10-CM

## 2023-10-12 DIAGNOSIS — K31.811 ANGIODYSPLASIA OF STOMACH AND DUODENUM WITH HEMORRHAGE: ICD-10-CM

## 2023-10-12 DIAGNOSIS — D50.0 IRON DEFICIENCY ANEMIA DUE TO CHRONIC BLOOD LOSS: Primary | ICD-10-CM

## 2023-10-12 PROCEDURE — 96372 THER/PROPH/DIAG INJ SC/IM: CPT | Performed by: INTERNAL MEDICINE

## 2023-10-12 RX ADMIN — OCTREOTIDE ACETATE 20 MG: KIT INTRAMUSCULAR at 11:59

## 2023-10-12 ASSESSMENT — PAIN SCALES - GENERAL: PAINLEVEL: NO PAIN (0)

## 2023-10-12 NOTE — PROGRESS NOTES
Infusion Nursing Note:  Rachele Martínez presents today for Sandostatin.    Patient seen by provider today: No   present during visit today: Not Applicable.    Note: Pt admits to ongoing fatigue since hospitalized, dizziness yesterday and difficulty walking.  See flow sheet for assessment.      Intravenous Access:  No Intravenous access/labs at this visit.    Treatment Conditions:  Not Applicable.      Post Infusion Assessment:  Patient tolerated injection without incident.  Site patent and intact, free from redness, edema or discomfort.       Discharge Plan:   Patient discharged in stable condition accompanied by: daughter.  Departure Mode: Ambulatory.  Pt will RTC 11/9/23 for sandostatin.      Josias Hicks RN

## 2023-10-16 NOTE — TELEPHONE ENCOUNTER
RN faxed PT referral to [facility] fax @ [phone].    Gayle Paul RN on 10/16/2023 at 7:36 AM

## 2023-10-18 ENCOUNTER — DOCUMENTATION ONLY (OUTPATIENT)
Dept: INTERNAL MEDICINE | Facility: CLINIC | Age: 83
End: 2023-10-18
Payer: MEDICARE

## 2023-10-18 NOTE — PROGRESS NOTES
Type of Form Received:     Form Received (Date) 10/18/23   Form Filled out Yes, faxed 10/20   Placed in provider folder Yes

## 2023-10-19 ENCOUNTER — TELEPHONE (OUTPATIENT)
Dept: INTERNAL MEDICINE | Facility: CLINIC | Age: 83
End: 2023-10-19
Payer: MEDICARE

## 2023-10-19 DIAGNOSIS — E56.9 VITAMIN DEFICIENCY: ICD-10-CM

## 2023-10-19 DIAGNOSIS — G47.00 INSOMNIA: Primary | ICD-10-CM

## 2023-10-19 NOTE — TELEPHONE ENCOUNTER
Faxed outpatient PT referral to fax [phone]           Srinivasan Fox CMA ([facility]) at 3:03 PM on 10/19/2023

## 2023-10-19 NOTE — TELEPHONE ENCOUNTER
[facility] Call Center    Phone Message    May a detailed message be left on voicemail: yes     Reason for Call: Order(s): Home Care Orders: Skilled Nursing: FYI- home care services discharged patient due to met all her goals and no longer needing skilled nursing     Action Taken: Message routed to:  [facility]    Travel Screening: Not Applicable

## 2023-10-20 ENCOUNTER — MEDICAL CORRESPONDENCE (OUTPATIENT)
Dept: HEALTH INFORMATION MANAGEMENT | Facility: CLINIC | Age: 83
End: 2023-10-20
Payer: MEDICARE

## 2023-10-20 RX ORDER — MULTIVITAMIN WITH FOLIC ACID 400 MCG
1 TABLET ORAL DAILY
Qty: 28 TABLET | Refills: 11 | Status: SHIPPED | OUTPATIENT
Start: 2023-10-20 | End: 2024-09-25

## 2023-10-20 RX ORDER — LANOLIN ALCOHOL/MO/W.PET/CERES
3 CREAM (GRAM) TOPICAL AT BEDTIME
Qty: 28 TABLET | Refills: 11 | Status: SHIPPED | OUTPATIENT
Start: 2023-10-20 | End: 2024-09-25

## 2023-10-20 NOTE — TELEPHONE ENCOUNTER
Melatonin 3 MG Tablet    Will file in chart as: melatonin 3 MG tablet   Si TABLET ORALLY AT BEDTIME (DX: INSOMNIA)        Last Written Prescription Date:  historical  Last Office Visit : 23  Future Office visit:  none   Routing refill request to provider for review/approval because:  Medication is reported/historical      Tab-A-Tabatha Tablet    Will file in chart as: Multiple Vitamin (TAB-A-TABATHA) TABS   Si TABLET ORALLY DAILY (DX: SUPPLEMENT)        Last Written Prescription Date:  historical  Last Office Visit : 23  Future Office visit:  none  Routing refill request to provider for review/approval because:  Medication is reported/historical

## 2023-10-31 ENCOUNTER — TRANSFERRED RECORDS (OUTPATIENT)
Dept: HEALTH INFORMATION MANAGEMENT | Facility: CLINIC | Age: 83
End: 2023-10-31

## 2023-11-02 ENCOUNTER — DOCUMENTATION ONLY (OUTPATIENT)
Dept: INTERNAL MEDICINE | Facility: CLINIC | Age: 83
End: 2023-11-02
Payer: MEDICARE

## 2023-11-02 NOTE — PROGRESS NOTES
Type of Form Received: Plan of Treatment    Form Received (Date) 11/01/23   Form Filled out Yes, faxed 11/10   Placed in provider folder Yes

## 2023-11-09 ENCOUNTER — INFUSION THERAPY VISIT (OUTPATIENT)
Dept: INFUSION THERAPY | Facility: CLINIC | Age: 83
End: 2023-11-09
Payer: MEDICARE

## 2023-11-09 VITALS
RESPIRATION RATE: 16 BRPM | OXYGEN SATURATION: 100 % | DIASTOLIC BLOOD PRESSURE: 59 MMHG | HEART RATE: 87 BPM | SYSTOLIC BLOOD PRESSURE: 113 MMHG

## 2023-11-09 DIAGNOSIS — K55.20 AVM (ARTERIOVENOUS MALFORMATION) OF SMALL BOWEL, ACQUIRED: ICD-10-CM

## 2023-11-09 DIAGNOSIS — D50.0 IRON DEFICIENCY ANEMIA DUE TO CHRONIC BLOOD LOSS: Primary | ICD-10-CM

## 2023-11-09 DIAGNOSIS — K31.811 ANGIODYSPLASIA OF STOMACH AND DUODENUM WITH HEMORRHAGE: ICD-10-CM

## 2023-11-09 PROCEDURE — 96372 THER/PROPH/DIAG INJ SC/IM: CPT | Performed by: INTERNAL MEDICINE

## 2023-11-09 RX ORDER — METHOCARBAMOL 500 MG/1
250-500 TABLET, FILM COATED ORAL
COMMUNITY
Start: 2023-11-07

## 2023-11-09 RX ADMIN — OCTREOTIDE ACETATE 20 MG: KIT INTRAMUSCULAR at 11:59

## 2023-11-09 ASSESSMENT — PAIN SCALES - GENERAL: PAINLEVEL: SEVERE PAIN (7)

## 2023-11-09 NOTE — PROGRESS NOTES
Infusion Nursing Note:  Rachele LORRIE Martínez presents today for Sandostatin.    Patient seen by provider today: No   present during visit today: Not Applicable.    Note: Assessment performed by Lorena REESE RN prior to injection today.    Intravenous Access:  No Intravenous access/labs at this visit.    Treatment Conditions:  Not Applicable.      Post Infusion Assessment:  Patient tolerated injection without incident.  Site patent and intact, free from redness, edema or discomfort.       Discharge Plan:   Patient discharged in stable condition accompanied by: daughter.  Departure Mode: Wheelchair.      Vilma Ariza LPN

## 2023-11-10 ENCOUNTER — MEDICAL CORRESPONDENCE (OUTPATIENT)
Dept: HEALTH INFORMATION MANAGEMENT | Facility: CLINIC | Age: 83
End: 2023-11-10
Payer: MEDICARE

## 2023-11-13 ENCOUNTER — DOCUMENTATION ONLY (OUTPATIENT)
Dept: INTERNAL MEDICINE | Facility: CLINIC | Age: 83
End: 2023-11-13
Payer: MEDICARE

## 2023-11-13 NOTE — PROGRESS NOTES
Type of Form Received:     Form Received (Date) 11/13/23   Form Filled out No   Placed in provider folder Yes

## 2023-11-19 ENCOUNTER — MEDICAL CORRESPONDENCE (OUTPATIENT)
Dept: HEALTH INFORMATION MANAGEMENT | Facility: CLINIC | Age: 83
End: 2023-11-19
Payer: MEDICARE

## 2023-11-27 ENCOUNTER — TELEPHONE (OUTPATIENT)
Dept: INTERNAL MEDICINE | Facility: CLINIC | Age: 83
End: 2023-11-27
Payer: MEDICARE

## 2023-11-27 NOTE — TELEPHONE ENCOUNTER
[facility] Call Center    Phone Message    May a detailed message be left on voicemail: yes     Reason for Call: Medication Question or concern regarding medication   Prescription Clarification  Name of Medication: melatonin 3 MG tablet [32815]   Prescribing Provider: Agnieszka Rodriguez MD   Pharmacy: [facility] - [place], [place] - [address] [place] [address]   What on the order needs clarification? Per care facility - melatonin is not working and patient is requesting a medication change.        Action Taken: Other: pcc    Travel Screening: Not Applicable

## 2023-11-28 NOTE — TELEPHONE ENCOUNTER
Message left requesting callback to further discuss.    Irene [facility]) LEESA Quintana

## 2023-11-30 NOTE — TELEPHONE ENCOUNTER
I spoke with Katalina today. She noted that the patient has been using melatonin 3 mg at bedtime consistently. Initially seemed to be helping, though over the past two weeks the patient will wake up at 2 am and be unable to fall back asleep. She would like to know if anything else can be prescribed for this patient.    Irene ([facility]) LEESA Quintana

## 2023-12-01 PROBLEM — J81.0 ACUTE PULMONARY EDEMA (H): Status: RESOLVED | Noted: 2023-07-31 | Resolved: 2023-12-01

## 2023-12-01 PROBLEM — J18.9 PNEUMONIA OF BOTH LUNGS DUE TO INFECTIOUS ORGANISM, UNSPECIFIED PART OF LUNG: Status: RESOLVED | Noted: 2023-07-31 | Resolved: 2023-12-01

## 2023-12-01 PROBLEM — R04.2 HEMOPTYSIS: Status: RESOLVED | Noted: 2023-05-12 | Resolved: 2023-12-01

## 2023-12-01 NOTE — TELEPHONE ENCOUNTER
Need to be cautious given her hx of cognitive impairment.  Would suggest appointment to dsicuss if there is a cause for waking up in the middle of the night such as pain or anxiety.

## 2023-12-07 ENCOUNTER — DOCUMENTATION ONLY (OUTPATIENT)
Dept: INTERNAL MEDICINE | Facility: CLINIC | Age: 83
End: 2023-12-07

## 2023-12-07 ENCOUNTER — INFUSION THERAPY VISIT (OUTPATIENT)
Dept: INFUSION THERAPY | Facility: CLINIC | Age: 83
End: 2023-12-07
Payer: MEDICARE

## 2023-12-07 VITALS
TEMPERATURE: 97.8 F | OXYGEN SATURATION: 99 % | RESPIRATION RATE: 16 BRPM | SYSTOLIC BLOOD PRESSURE: 113 MMHG | DIASTOLIC BLOOD PRESSURE: 55 MMHG | HEART RATE: 87 BPM

## 2023-12-07 DIAGNOSIS — D50.0 IRON DEFICIENCY ANEMIA DUE TO CHRONIC BLOOD LOSS: Primary | ICD-10-CM

## 2023-12-07 DIAGNOSIS — K31.811 ANGIODYSPLASIA OF STOMACH AND DUODENUM WITH HEMORRHAGE: ICD-10-CM

## 2023-12-07 DIAGNOSIS — K55.20 AVM (ARTERIOVENOUS MALFORMATION) OF SMALL BOWEL, ACQUIRED: ICD-10-CM

## 2023-12-07 PROCEDURE — 96372 THER/PROPH/DIAG INJ SC/IM: CPT | Performed by: INTERNAL MEDICINE

## 2023-12-07 RX ADMIN — OCTREOTIDE ACETATE 20 MG: KIT INTRAMUSCULAR at 11:52

## 2023-12-07 NOTE — PROGRESS NOTES
Type of Form Received:     Form Received (Date) 12/7/23   Form Filled out Yes, faxed 12/8   Placed in provider folder Yes

## 2023-12-07 NOTE — PROGRESS NOTES
Infusion Nursing Note:  Rachele Martínez presents today for Sandostatin.    Patient seen by provider today: No   present during visit today: Not Applicable.    Note: Patient expressed that she doesn't urinate very often with dialysis but when she does urinate she stated she has some pain after. Patient stated she will be scheduling an appt with primary care to follow up with this.    Intravenous Access:  No Intravenous access/labs at this visit.    Treatment Conditions:  Not Applicable.    Post Infusion Assessment:  Patient tolerated injection without incident.  Site patent and intact, free from redness, edema or discomfort.     Discharge Plan:   Discharge instructions reviewed with: Patient.  Patient and/or family verbalized understanding of discharge instructions and all questions answered.  Patient discharged in stable condition accompanied by: self.  Departure Mode: Ambulatory.  Future appts have been reviewed and crosschecked with appt note and plan.      éGnesis Nichols RN

## 2023-12-08 ENCOUNTER — MEDICAL CORRESPONDENCE (OUTPATIENT)
Dept: HEALTH INFORMATION MANAGEMENT | Facility: CLINIC | Age: 83
End: 2023-12-08
Payer: MEDICARE

## 2023-12-09 NOTE — NURSING NOTE
Chief Complaint   Patient presents with     Foot Problems     Pt is here to discuss a bump on left foot.      Medication Request     Pt needs refill on gabapentin.     Kimberley Randall LPN at 3:26 PM on 8/9/2019.     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  Thrombocytopenia  Extensive review of records appear to be chronic  Recent evaluation under the care of Dr. Delacruz at the [facility] in [place] on 11/13/2023 patient was noted to have a platelet count of 85 K  Serial platelet count 2017 was 154  2018 was 119K  Significant drop of platelet count to 35 K now  No evidence of bleeding  No obvious precipitating factors  No predating viral illness  B12 folate ok  rheumatoid factor elevated, KAMILAH negative and hepatitis C negative  ultrasound of the liver and spleen ordered  Platelets stable in 80's/ possible immune     2.  Provoked pulmonary emboli right lower lobe 1/12/2023  Has been on therapeutic doses of Eliquis since then  Recent eval neg for VTE  On ASA  May start heparin ppx as plts has been stable >80k        3. Congestive heart failure/ resp failure   EF at 41% with exacerbation  In ICU     4.  Electrolyte imbalance hypocalcemia/hypomagnesemia     5  Advanced age    Thank you for allowing me to participate in the care of this patient.  If you have any questions or concerns, please do not hesitate to call.    Subjective:    Dee Sierra is a 89 year old female    Transferred to ICU for resp/cardiac failure   In mild resp distress  Little lethargic         Allergies: ALLERGIES:  Patient has no known allergies.     Inpatient Medications:    Medications Discontinued During This Encounter   Medication Reason    carvedilol (COREG) tablet 25 mg     losartan (COZAAR) 25 MG tablet Stop Taking at Discharge    furosemide (LASIX INJECT) injection 40 mg     ipratropium-albuterol (DUONEB) 0.5-2.5 (3) MG/3ML nebulizer solution 3 mL     loratadine (CLARITIN) tablet 10 mg     lactated ringers infusion     Potassium Standard Replacement Protocol (Levels 3.5 and lower)     Magnesium Standard Replacement Protocol     Phosphorus Standard Replacement Protocol     magnesium oxide (MAG-OX) tablet 400 mg     furosemide (LASIX) tablet 20 mg     guaiFENesin-DM (MUCINEX DM) 600-30 mg ER tablet 2 tablet     sodium chloride 0.9% infusion     HYDROcodone-acetaminophen (NORCO) 5-325 MG per tablet 1 tablet     furosemide (LASIX INJECT) injection 60 mg      Current Facility-Administered Medications   Medication Dose Route Frequency Provider Last Rate Last Admin      Current Facility-Administered Medications   Medication Dose Route Frequency Provider Last Rate Last Admin    ipratropium-albuterol (DUONEB) 0.5-2.5 (3) MG/3ML nebulizer solution 3 mL  3 mL Nebulization Q4H Resp PRN Alivia Birch CNP        sodium chloride 0.9 % flush bag 25 mL  25 mL Intravenous PRN Joy Gallardo MD        sodium chloride (NORMAL SALINE) 0.9 % bolus 500 mL  500 mL Intravenous PRN Joy Gallardo MD        ondansetron (ZOFRAN) injection 4 mg  4 mg Intravenous BID PRN Joy Gallardo MD        acetaminophen (TYLENOL) tablet 650 mg  650 mg Oral Q4H PRN Joy Gallardo MD        polyethylene glycol (MIRALAX) packet 17 g  17 g Oral Daily PRN Joy Gallardo MD   17 g at 12/05/23 1231    docusate sodium-sennosides (SENOKOT S) 50-8.6 MG 2 tablet  2 tablet Oral Daily PRN  Joy Gallardo MD   2 tablet at 12/05/23 1230    bisacodyl (DULCOLAX) suppository 10 mg  10 mg Rectal Daily PRN Joy Gallardo MD        magnesium hydroxide (MILK OF MAGNESIA) 400 MG/5ML suspension 30 mL  30 mL Oral Daily PRN Joy Gallardo MD   30 mL at 12/05/23 1739    sodium chloride 0.9 % flush bag 25 mL  25 mL Intravenous PRN Joy Gallardo MD        fluticasone (FLONASE) 50 MCG/ACT nasal spray 1 spray  1 spray Nasal Daily PRN Joy Gallardo MD                    Objective:   VITALS:  Visit Vitals  /86   Pulse 65   Temp 98.4 °F (36.9 °C) (Axillary)   Resp 15   Ht 5' 6\" (1.676 m)   Wt 100.7 kg (222 lb 0.1 oz)   SpO2 97%   BMI 35.83 kg/m²       Intake/Output Summary (Last 24 hours) at 12/9/2023 1041  Last data filed at 12/9/2023 1000  Gross per 24 hour   Intake 570 ml   Output 3400 ml   Net -2830 ml            Physical Exam:  Physical Exam  Vitals reviewed.   Constitutional:       General: She is in acute distress.      Appearance: She is ill-appearing.   HENT:      Head: Normocephalic and atraumatic.      Mouth/Throat:      Mouth: Mucous membranes are moist.   Eyes:      Extraocular Movements: Extraocular movements intact.      Pupils: Pupils are equal, round, and reactive to light.   Cardiovascular:      Rate and Rhythm: Normal rate.   Pulmonary:      Effort: Pulmonary effort is normal.   Abdominal:      General: Abdomen is flat.   Neurological:      Mental Status: She is lethargic.          Data Review:    Labs:   Recent Results (from the past 24 hour(s))   Sodium, Urine    Collection Time: 12/08/23  4:45 PM   Result Value Ref Range    Sodium, Urine 11 mmol/L   Protein/Creatinine Ratio, Urine    Collection Time: 12/08/23  4:45 PM   Result Value Ref Range    Protein, Urine 39 (H) <12 mg/dL    Creatinine, Urine 130.50 mg/dL    Protein/ Creatinine Ratio 299 (H) <=199 mgPR/gCR   Osmolality, Urine    Collection Time: 12/08/23  4:45 PM   Result Value Ref Range    Osmolality, Urine 442 50 - 1,200  mOsm/kg   BLOOD GAS, ARTERIAL WITH COOXIMETRY - RESPIRATORY    Collection Time: 12/08/23  4:58 PM   Result Value Ref Range    BASE EXCESS / DEFICIT, ARTERIAL - RESPIRATORY 13 (H) -2 - 3 mmol/L    HCO3, ARTERIAL - RESPIRATORY 44 (HH) 22 - 28 mmol/L    O2 CONTENT, ARTERIAL - RESPIRATORY 16 15 - 23 %    PCO2, ARTERIAL - RESPIRATORY 96 (HH) 32 - 45 mm Hg    PH, ARTERIAL - RESPIRATORY 7.27 (L) 7.35 - 7.45 Units    PO2, ARTERIAL - RESPIRATORY 104 83 - 108 mm Hg    O2 SATURATION, ARTERIAL - RESPIRATORY 97 95 - 99 %    CONDITION - RESPIRATORY ;NC 2L RR18     CARBOXYHEMOGLOBIN - RESPIRATORY 1.6 (H) <1.5 %    FIO2 - RESPIRATORY 24 %    HEMOGLOBIN - RESPIRATORY 11.9 (L) 12.0 - 15.5 g/dL    METHEMOGLOBIN - RESPIRATORY 0.7 <=1.6 %    OXYHEMOGLOBIN, ARTERIAL - RESPIRATORY 94.7 94.0 - 98.0 %    P/F RATIO - RESPIRATORY 433 300 - 500    SITE - RESPIRATORY Right Radial    ELECTROLYTE PANEL - RESPIRATORY    Collection Time: 12/08/23  4:58 PM   Result Value Ref Range    SODIUM - RESPIRATORY 123 (L) 135 - 145 mmol/L    POTASSIUM - RESPIRATORY 5.5 (H) 3.4 - 5.1 mmol/L    CHLORIDE - RESPIRATORY 86 (L) 97 - 110 mmol/L   GLUCOSE - RESPIRATORY    Collection Time: 12/08/23  4:58 PM   Result Value Ref Range    GLUCOSE - RESPIRATORY 149 (H) 65 - 99 mg/dL   CALCIUM, IONIZED - RESPIRATORY    Collection Time: 12/08/23  4:58 PM   Result Value Ref Range    CALCIUM, IONIZED - RESPIRATORY 1.13 (L) 1.15 - 1.29 mmol/L   LACTIC ACID, ARTERIAL - RESPIRATORY    Collection Time: 12/08/23  4:58 PM   Result Value Ref Range    LACTIC ACID, ARTERIAL - RESPIRATORY 0.5 <1.6 mmol/L   GLUCOSE, BEDSIDE - POINT OF CARE    Collection Time: 12/08/23  5:13 PM   Result Value Ref Range    GLUCOSE, BEDSIDE - POINT OF CARE 149 (H) 70 - 99 mg/dL   Thyroid Stimulating Hormone Reflex    Collection Time: 12/08/23  7:34 PM   Result Value Ref Range    TSH 1.162 0.350 - 5.000 mcUnits/mL   Ammonia    Collection Time: 12/08/23  7:34 PM   Result Value Ref Range    Ammonia 16 <=33  mcmol/L   Comprehensive Metabolic Panel    Collection Time: 12/08/23  7:34 PM   Result Value Ref Range    Fasting Status      Sodium 130 (L) 135 - 145 mmol/L    Potassium 5.4 (H) 3.4 - 5.1 mmol/L    Chloride 93 (L) 97 - 110 mmol/L    Carbon Dioxide 39 (H) 21 - 32 mmol/L    Anion Gap 3 (L) 7 - 19 mmol/L    Glucose 145 (H) 70 - 99 mg/dL    BUN 40 (H) 6 - 20 mg/dL    Creatinine 1.03 (H) 0.51 - 0.95 mg/dL    Glomerular Filtration Rate 50 (L) >=60    BUN/Cr 39 (H) 7 - 25    Calcium 8.1 (L) 8.4 - 10.2 mg/dL    Bilirubin, Total 0.6 0.2 - 1.0 mg/dL    GOT/AST 22 <=37 Units/L    GPT/ALT 20 <64 Units/L    Alkaline Phosphatase 70 45 - 117 Units/L    Albumin 2.3 (L) 3.6 - 5.1 g/dL    Protein, Total 6.5 6.4 - 8.2 g/dL    Globulin 4.2 (H) 2.0 - 4.0 g/dL    A/G Ratio 0.5 (L) 1.0 - 2.4   Magnesium    Collection Time: 12/08/23  7:34 PM   Result Value Ref Range    Magnesium 2.4 1.7 - 2.4 mg/dL   Prothrombin Time (INR/PT)    Collection Time: 12/08/23  7:34 PM   Result Value Ref Range    Protime- PT 11.4 9.7 - 11.8 sec    INR 1.0     Fibrinogen Activity    Collection Time: 12/08/23  7:34 PM   Result Value Ref Range    Fibrinogen 325 190 - 425 mg/dL   Partial Thromboplastin Time (PTT)    Collection Time: 12/08/23  7:34 PM   Result Value Ref Range    PTT 26 22 - 32 sec   GLUCOSE, BEDSIDE - POINT OF CARE    Collection Time: 12/08/23 11:59 PM   Result Value Ref Range    GLUCOSE, BEDSIDE - POINT OF CARE 140 (H) 70 - 99 mg/dL   Comprehensive Metabolic Panel    Collection Time: 12/09/23  3:33 AM   Result Value Ref Range    Fasting Status      Sodium 131 (L) 135 - 145 mmol/L    Potassium 4.9 3.4 - 5.1 mmol/L    Chloride 91 (L) 97 - 110 mmol/L    Carbon Dioxide 38 (H) 21 - 32 mmol/L    Anion Gap 7 7 - 19 mmol/L    Glucose 123 (H) 70 - 99 mg/dL    BUN 39 (H) 6 - 20 mg/dL    Creatinine 0.95 0.51 - 0.95 mg/dL    Glomerular Filtration Rate 56 (L) >=60    BUN/Cr 41 (H) 7 - 25    Calcium 8.3 (L) 8.4 - 10.2 mg/dL    Bilirubin, Total 0.5 0.2 - 1.0  mg/dL    GOT/AST 24 <=37 Units/L    GPT/ALT 26 <64 Units/L    Alkaline Phosphatase 72 45 - 117 Units/L    Albumin 2.1 (L) 3.6 - 5.1 g/dL    Protein, Total 6.3 (L) 6.4 - 8.2 g/dL    Globulin 4.2 (H) 2.0 - 4.0 g/dL    A/G Ratio 0.5 (L) 1.0 - 2.4   Magnesium    Collection Time: 12/09/23  3:33 AM   Result Value Ref Range    Magnesium 2.3 1.7 - 2.4 mg/dL   Phosphorus    Collection Time: 12/09/23  3:33 AM   Result Value Ref Range    Phosphorus 4.0 2.4 - 4.7 mg/dL   CBC with Automated Differential (performable only)    Collection Time: 12/09/23  3:33 AM   Result Value Ref Range    WBC 4.7 4.2 - 11.0 K/mcL    RBC 4.13 4.00 - 5.20 mil/mcL    HGB 11.8 (L) 12.0 - 15.5 g/dL    HCT 38.9 36.0 - 46.5 %    MCV 94.2 78.0 - 100.0 fl    MCH 28.6 26.0 - 34.0 pg    MCHC 30.3 (L) 32.0 - 36.5 g/dL    RDW-CV 13.2 11.0 - 15.0 %    RDW-SD 45.1 39.0 - 50.0 fL    PLT 83 (L) 140 - 450 K/mcL    NRBC 0 <=0 /100 WBC    Neutrophil, Percent 51 %    Lymphocytes, Percent 31 %    Mono, Percent 14 %    Eosinophils, Percent 3 %    Basophils, Percent 1 %    Immature Granulocytes 0 %    Absolute Neutrophils 2.4 1.8 - 7.7 K/mcL    Absolute Lymphocytes 1.5 1.0 - 4.0 K/mcL    Absolute Monocytes 0.7 0.3 - 0.9 K/mcL    Absolute Eosinophils  0.2 0.0 - 0.5 K/mcL    Absolute Basophils 0.0 0.0 - 0.3 K/mcL    Absolute Immature Granulocytes 0.0 0.0 - 0.2 K/mcL   BLOOD GAS, ARTERIAL WITH COOXIMETRY - RESPIRATORY    Collection Time: 12/09/23  9:45 AM   Result Value Ref Range    BASE EXCESS / DEFICIT, ARTERIAL - RESPIRATORY 19 (H) -2 - 3 mmol/L    HCO3, ARTERIAL - RESPIRATORY 49 (HH) 22 - 28 mmol/L    O2 CONTENT, ARTERIAL - RESPIRATORY 17 15 - 23 %    PCO2, ARTERIAL - RESPIRATORY 88 (HH) 32 - 45 mm Hg    PH, ARTERIAL - RESPIRATORY 7.35 7.35 - 7.45 Units    PO2, ARTERIAL - RESPIRATORY 123 (H) 83 - 108 mm Hg    O2 SATURATION, ARTERIAL - RESPIRATORY 99 95 - 99 %    CONDITION - RESPIRATORY PT ON 5LPM NC     CARBOXYHEMOGLOBIN - RESPIRATORY 1.7 (H) <1.5 %    FIO2 -  RESPIRATORY 40 %    HEMOGLOBIN - RESPIRATORY 12.2 12.0 - 15.5 g/dL    METHEMOGLOBIN - RESPIRATORY 1.2 <=1.6 %    OXYHEMOGLOBIN, ARTERIAL - RESPIRATORY 95.6 94.0 - 98.0 %    P/F RATIO - RESPIRATORY 308 300 - 500    SITE - RESPIRATORY Right Radial        Imaging: No results found.     I have personally reviewed all pertinent patient data.     By:  Yu Ortega MD 12/9/2023, 10:41 AM    Attending Provider:  Ambika Owusu MD                                  Primary Care Physician:  Perla Simpson DO

## 2024-01-02 DIAGNOSIS — K74.60 CIRRHOSIS OF LIVER WITHOUT ASCITES, UNSPECIFIED HEPATIC CIRRHOSIS TYPE (H): Primary | ICD-10-CM

## 2024-01-02 DIAGNOSIS — L29.9 ITCHING: ICD-10-CM

## 2024-01-04 ENCOUNTER — INFUSION THERAPY VISIT (OUTPATIENT)
Dept: INFUSION THERAPY | Facility: CLINIC | Age: 84
End: 2024-01-04
Attending: NURSE PRACTITIONER
Payer: MEDICARE

## 2024-01-04 VITALS
DIASTOLIC BLOOD PRESSURE: 74 MMHG | RESPIRATION RATE: 16 BRPM | TEMPERATURE: 97.6 F | OXYGEN SATURATION: 97 % | SYSTOLIC BLOOD PRESSURE: 137 MMHG | HEART RATE: 84 BPM

## 2024-01-04 DIAGNOSIS — K31.811 ANGIODYSPLASIA OF STOMACH AND DUODENUM WITH HEMORRHAGE: ICD-10-CM

## 2024-01-04 DIAGNOSIS — K55.20 AVM (ARTERIOVENOUS MALFORMATION) OF SMALL BOWEL, ACQUIRED: ICD-10-CM

## 2024-01-04 DIAGNOSIS — D50.0 IRON DEFICIENCY ANEMIA DUE TO CHRONIC BLOOD LOSS: Primary | ICD-10-CM

## 2024-01-04 PROCEDURE — 96372 THER/PROPH/DIAG INJ SC/IM: CPT | Performed by: INTERNAL MEDICINE

## 2024-01-04 PROCEDURE — 250N000011 HC RX IP 250 OP 636: Performed by: INTERNAL MEDICINE

## 2024-01-04 PROCEDURE — 99207 PR NO CHARGE LOS: CPT

## 2024-01-04 RX ADMIN — OCTREOTIDE ACETATE 20 MG: KIT at 11:43

## 2024-01-04 NOTE — PROGRESS NOTES
Infusion Nursing Note:  Rachele Martínez presents today for Sandostatin.    Patient seen by provider today: No   present during visit today: Not Applicable.    Note: Patient expressed no new medical concerns today.    Intravenous Access:  No Intravenous access/labs at this visit.    Treatment Conditions:  Not Applicable.    Post Infusion Assessment:  Patient tolerated injection without incident.     Discharge Plan:   Discharge instructions reviewed with: Patient and Family.  Patient and/or family verbalized understanding of discharge instructions and all questions answered.  Patient discharged in stable condition accompanied by: self and daughter.  Departure Mode: Ambulatory.  Future appts have been reviewed and crosschecked with appt note and plan.    Génesis Nichols RN

## 2024-01-09 ENCOUNTER — LAB (OUTPATIENT)
Dept: LAB | Facility: CLINIC | Age: 84
End: 2024-01-09
Attending: PHYSICIAN ASSISTANT
Payer: MEDICARE

## 2024-01-09 ENCOUNTER — OFFICE VISIT (OUTPATIENT)
Dept: GASTROENTEROLOGY | Facility: CLINIC | Age: 84
End: 2024-01-09
Attending: PHYSICIAN ASSISTANT
Payer: MEDICARE

## 2024-01-09 ENCOUNTER — ANCILLARY PROCEDURE (OUTPATIENT)
Dept: ULTRASOUND IMAGING | Facility: CLINIC | Age: 84
End: 2024-01-09
Attending: PHYSICIAN ASSISTANT
Payer: MEDICARE

## 2024-01-09 VITALS
SYSTOLIC BLOOD PRESSURE: 159 MMHG | OXYGEN SATURATION: 100 % | BODY MASS INDEX: 20.6 KG/M2 | WEIGHT: 120 LBS | HEART RATE: 79 BPM | DIASTOLIC BLOOD PRESSURE: 70 MMHG

## 2024-01-09 DIAGNOSIS — K74.60 CIRRHOSIS OF LIVER WITHOUT ASCITES, UNSPECIFIED HEPATIC CIRRHOSIS TYPE (H): ICD-10-CM

## 2024-01-09 DIAGNOSIS — L29.9 ITCHING: ICD-10-CM

## 2024-01-09 DIAGNOSIS — K74.60 CIRRHOSIS OF LIVER WITHOUT ASCITES, UNSPECIFIED HEPATIC CIRRHOSIS TYPE (H): Primary | ICD-10-CM

## 2024-01-09 LAB
ALBUMIN SERPL BCG-MCNC: 4.4 G/DL (ref 3.5–5.2)
ALP SERPL-CCNC: 125 U/L (ref 40–150)
ALT SERPL W P-5'-P-CCNC: 8 U/L (ref 0–50)
ANION GAP SERPL CALCULATED.3IONS-SCNC: 12 MMOL/L (ref 7–15)
AST SERPL W P-5'-P-CCNC: 24 U/L (ref 0–45)
BILIRUB DIRECT SERPL-MCNC: 0.26 MG/DL (ref 0–0.3)
BILIRUB SERPL-MCNC: 0.6 MG/DL
BUN SERPL-MCNC: 15.8 MG/DL (ref 8–23)
CALCIUM SERPL-MCNC: 9.4 MG/DL (ref 8.8–10.2)
CHLORIDE SERPL-SCNC: 99 MMOL/L (ref 98–107)
CREAT SERPL-MCNC: 5.86 MG/DL (ref 0.51–0.95)
DEPRECATED HCO3 PLAS-SCNC: 28 MMOL/L (ref 22–29)
EGFRCR SERPLBLD CKD-EPI 2021: 7 ML/MIN/1.73M2
ERYTHROCYTE [DISTWIDTH] IN BLOOD BY AUTOMATED COUNT: 14.7 % (ref 10–15)
GLUCOSE SERPL-MCNC: 103 MG/DL (ref 70–99)
HCT VFR BLD AUTO: 41.6 % (ref 35–47)
HGB BLD-MCNC: 13.1 G/DL (ref 11.7–15.7)
INR PPP: 1.22 (ref 0.85–1.15)
MCH RBC QN AUTO: 32 PG (ref 26.5–33)
MCHC RBC AUTO-ENTMCNC: 31.5 G/DL (ref 31.5–36.5)
MCV RBC AUTO: 102 FL (ref 78–100)
PLATELET # BLD AUTO: 206 10E3/UL (ref 150–450)
POTASSIUM SERPL-SCNC: 5.3 MMOL/L (ref 3.4–5.3)
PROT SERPL-MCNC: 7.7 G/DL (ref 6.4–8.3)
RBC # BLD AUTO: 4.09 10E6/UL (ref 3.8–5.2)
SODIUM SERPL-SCNC: 139 MMOL/L (ref 135–145)
WBC # BLD AUTO: 4.4 10E3/UL (ref 4–11)

## 2024-01-09 PROCEDURE — 80053 COMPREHEN METABOLIC PANEL: CPT | Performed by: PATHOLOGY

## 2024-01-09 PROCEDURE — 36415 COLL VENOUS BLD VENIPUNCTURE: CPT | Performed by: PATHOLOGY

## 2024-01-09 PROCEDURE — 85610 PROTHROMBIN TIME: CPT | Performed by: PATHOLOGY

## 2024-01-09 PROCEDURE — 82248 BILIRUBIN DIRECT: CPT | Performed by: PATHOLOGY

## 2024-01-09 PROCEDURE — 85027 COMPLETE CBC AUTOMATED: CPT | Performed by: PATHOLOGY

## 2024-01-09 PROCEDURE — G0463 HOSPITAL OUTPT CLINIC VISIT: HCPCS | Performed by: PHYSICIAN ASSISTANT

## 2024-01-09 PROCEDURE — 99214 OFFICE O/P EST MOD 30 MIN: CPT | Performed by: PHYSICIAN ASSISTANT

## 2024-01-09 PROCEDURE — 76705 ECHO EXAM OF ABDOMEN: CPT | Performed by: RADIOLOGY

## 2024-01-09 ASSESSMENT — PAIN SCALES - GENERAL: PAINLEVEL: NO PAIN (0)

## 2024-01-09 NOTE — TELEPHONE ENCOUNTER
Medication Question or Refill        What medication are you calling about (include dose and sig)?: Zoloft    Preferred Pharmacy:   [id number] - [place], [place] - [address]  [address]  [place] [address]  Phone: [phone] Fax: [phone]    [facility] - [place], [place] - [address]  [address] [address]  Phone: [phone] Fax: [phone]      Controlled Substance Agreement on file:   CSA -- Patient Level:    CSA: None found at the patient level.       Who prescribed the medication?: Agnieszka Rodriguez MD     Do you need a refill? Yes    When did you use the medication last? <1 month ago    Patient offered an appointment? Yes: Pt has appt scheduled in late January approx 24th  Do you have any questions or concerns?  Yes: pt reports itching concerns      Could we send this information to you in [employer] or would you prefer to receive a phone call?:   Patient would like to be contacted via [employer]

## 2024-01-09 NOTE — PROGRESS NOTES
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 She has normal synthetic function and normal transaminases.     # HCC screening: up-to-date:  - US abdomen in one year    # Variceal screening, up-to-date:   # History of bleedings AVMs:   - Continue follow up with advanced endoscopy as recommended    # Pruritus:   - Continue Naltrexone 50 mg     # Hepatic encephalopathy: no history   # No radiologic evidence of ascites    # FibroScan in one year to assess for need for on-going cirrhosis surveillance and hepatology follow up. She has may have had on-going improvement in fibrosis as she is 9 years out from SVR. Last fibrosis assessment in 2017.    - Follow-up in clinic in one year    Roberto Gordon PA-C   [facility] Hepatology clinic    Total time for E/M services performed on the date of the encounter 30 minutes.  This included review of previous: clinic visits, hospital records, lab results, imaging studies, and procedural documentation. Time also includes patient visit, documentation and discussion with other providers.  The findings from this review are summarized in the above note.    -----------------------------------------------------       HPI:   Rachele Martínez is a 83 year old female presenting for follow-up. Accompanied by her daughter today.     Cirrhosis:  - Hep C  Complicated by:  - No hx of Ascites  - Hx of GI bleed due to AVM, no history of varices  - No hx of HE  EGD/enteroscopy: 3/9/2023: normal esophagus, bleeding angiodysplastic lesions in stomachs/p APC and duodenum s/p APC  HCC: 1/9/2024    Hep C:  - treated with Harvoni x 12, achieved SVR 7/2015  - Fibrosis scan:  2017 Stage 4 fibrosis, 19.9 kilopascals    Patient was last seen by 1/31/2023.   Recent hospitalizations or ER  visits: In July, hospitalized for CAP, severe sepsis. Prolonged hospitalization in March 2023 for weakness and deconditioning, discharged to TCU. Upper Device-Assisted Enteroscopy without Fluoroscopy s/p APC for AVM's in the stomach, duodunum and jejunum  during admission in March 2023     Hospital then rehab (2-3 months). Eating well. Weight is back up to 120 lbs. Regular bowel movements.     Finds naltrexone to be helpful for pruritis, has been without it for the past few months.     Patient denies jaundice, lower extremity edema, abdominal distension or confusion.  Patient also denies melena, hematochezia or hematemesis. Patient denies weight loss, fevers, sweats or chills.    Currently living in assisted living        Medications:     Current Outpatient Medications   Medication    acetaminophen (TYLENOL) 325 MG tablet    amLODIPine (NORVASC) 2.5 MG tablet    atorvastatin (LIPITOR) 20 MG tablet    Epoetin Duane (EPOGEN IJ)    levothyroxine (SYNTHROID/LEVOTHROID) 50 MCG tablet    losartan (COZAAR) 100 MG tablet    melatonin 3 MG tablet    methocarbamol (ROBAXIN) 500 MG tablet    Multiple Vitamin (TAB-A-TABATHA) TABS    Multiple Vitamins-Minerals (PRORENAL + D) TABS    pantoprazole (PROTONIX) 20 MG EC tablet    sucroferric oxyhydroxide (VELPHORO) 500 MG CHEW chewable tablet    sertraline (ZOLOFT) 50 MG tablet     No current facility-administered medications for this visit.            Review of Systems:   10 points ROS was obtained and highlighted in the HPI, otherwise negative.          Physical Exam:   VS: BP (!) 159/70 (BP Location: Left arm, Patient Position: Sitting, Cuff Size: Adult Regular)   Pulse 79   Wt 54.4 kg (120 lb)   SpO2 100%   BMI 20.60 kg/m        Gen: A&Ox3, NAD, well developed  HEENT: non-icteric   Lym- no palpable lymphadenopathy  Abd: soft, NT, ND,   Ext: no edema, intact pulses.   Skin: No rash, no palmar erythema, telangiectasias or jaundice  Neuro: grossly intact, no asterixis   Psych:  "appropriate mood and affects         Data:   Reviewed in person and significant for:    Lab Results   Component Value Date     01/09/2024     05/26/2020      Lab Results   Component Value Date    POTASSIUM 5.3 01/09/2024    POTASSIUM 4.5 01/27/2022    POTASSIUM 4.1 05/26/2020     Lab Results   Component Value Date    CHLORIDE 99 01/09/2024    CHLORIDE 101 01/27/2022    CHLORIDE 98 05/26/2020     Lab Results   Component Value Date    CO2 28 01/09/2024    CO2 28 01/27/2022    CO2 28 05/26/2020     Lab Results   Component Value Date    BUN 15.8 01/09/2024    BUN 18 01/27/2022    BUN 23 05/26/2020     Lab Results   Component Value Date    CR 5.86 01/09/2024    CR 7.50 05/26/2020       Lab Results   Component Value Date    WBC 4.4 01/09/2024    WBC 5.5 05/26/2020     Lab Results   Component Value Date    HGB 13.1 01/09/2024    HGB 12.8 05/26/2020     Lab Results   Component Value Date    HCT 41.6 01/09/2024    HCT 41.1 05/26/2020     Lab Results   Component Value Date     01/09/2024     05/26/2020     Lab Results   Component Value Date     01/09/2024     05/26/2020       Lab Results   Component Value Date    AST 24 01/09/2024    AST 21 05/26/2020     Lab Results   Component Value Date    ALT 8 01/09/2024    ALT 20 05/26/2020     No results found for: \"BILICONJ\"   Lab Results   Component Value Date    BILITOTAL 0.6 01/09/2024    BILITOTAL 1.0 05/26/2020       Lab Results   Component Value Date    ALBUMIN 4.4 01/09/2024    ALBUMIN 4.1 01/27/2022    ALBUMIN 3.8 05/26/2020     Lab Results   Component Value Date    PROTTOTAL 7.7 01/09/2024    PROTTOTAL 8.7 05/26/2020      Lab Results   Component Value Date    ALKPHOS 125 01/09/2024    ALKPHOS 136 05/26/2020       Lab Results   Component Value Date    INR 1.22 01/09/2024    INR 1.31 05/26/2020     EXAMINATION: US ABDOMEN LIMITED, 1/9/2024 11:04 AM      INDICATION: Patient at high risk for HCC. HCC screening; Cirrhosis of  liver without " ascites, unspecified hepatic cirrhosis type (H); Itching     COMPARISON: None.     TECHNIQUE: The abdomen right upper quadrant was scanned in the  standard fashion with specialized ultrasound transducer(s) using both  gray scale and limited color/spectral Doppler techniques.     FINDINGS:      Fluid: No ascites or pleural effusions.     Liver: The liver demonstrates a coarsened echotexture, measuring 16.8  cm in craniocaudal dimension. No focal hepatic mass. No intrahepatic  biliary dilatation. The main portal vein is patent with antegrade  flow. US visualization score: A - No or minimal limitations.     Gallbladder: The gallbladder is well distended and of normal  morphology. No wall thickening, pericholecystic fluid, or sonographic  Baxter's sign. Cholelithiasis.     Bile Ducts: Expected caliber intra and extrahepatic biliary tree. The  common bile duct measures 6.4 mm in diameter.     Pancreas: Visualized portions of the pancreas are unremarkable.     Kidney: The right kidney measures 8.2 cm in long dimension. No  hydronephrosis, hydroureter, shadowing renal calculi, or solid mass.  Small echogenic kidney with numerous small cysts.     Aorta and IVC: The visualized portions of the aorta and IVC are  unremarkable.                                              IMPRESSION:   1. Cirrhosis without focal liver lesion.  a. LI-RADS US Category: US-1 Negative: No US evidence of HCC.  b. Recommend continued surveillance US.  2. Cholelithiasis.  3. Atrophic right kidney with numerous small cysts.

## 2024-01-09 NOTE — NURSING NOTE
Chief Complaint   Patient presents with    RECHECK     1 year follow up     BP (!) 159/70 (BP Location: Left arm, Patient Position: Sitting, Cuff Size: Adult Regular)   Pulse 79   Wt 54.4 kg (120 lb)   SpO2 100%   BMI 20.60 kg/m    Sintia PRADHAN

## 2024-01-09 NOTE — LETTER
1/9/2024         RE: Rachele Martínez  [address]  [facility] [address]        Dear Colleague,    Thank you for referring your patient, Rachele Martínez, to the [facility] [place]. Please see a copy of my visit note below.    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 She has normal synthetic function and normal transaminases.     # HCC screening: up-to-date:  - US abdomen in one year    # Variceal screening, up-to-date:   # History of bleedings AVMs:   - Continue follow up with advanced endoscopy as recommended    # Pruritus:   - Continue Naltrexone 50 mg     # Hepatic encephalopathy: no history   # No radiologic evidence of ascites    # FibroScan in one year to assess for need for on-going cirrhosis surveillance and hepatology follow up. She has may have had on-going improvement in fibrosis as she is 9 years out from SVR. Last fibrosis assessment in 2017.    - Follow-up in clinic in one year    Roberto Gordon PA-C   [facility] Hepatology clinic    Total time for E/M services performed on the date of the encounter 30 minutes.  This included review of previous: clinic visits, hospital records, lab results, imaging studies, and procedural documentation. Time also includes patient visit, documentation and discussion with other providers.  The findings from this review are summarized in the above note.    -----------------------------------------------------       HPI:   Rachele Martínez is a 83 year old female presenting for follow-up. Accompanied by her daughter today.     Cirrhosis:  - Hep C  Complicated by:  - No hx of Ascites  - Hx of GI bleed due to AVM, no history of varices  - No hx of HE  EGD/enteroscopy: 3/9/2023: normal  esophagus, bleeding angiodysplastic lesions in stomachs/p APC and duodenum s/p APC  HCC: 1/9/2024    Hep C:  - treated with Harvoni x 12, achieved SVR 7/2015  - Fibrosis scan:  2017 Stage 4 fibrosis, 19.9 kilopascals    Patient was last seen by 1/31/2023.   Recent hospitalizations or ER visits: In July, hospitalized for CAP, severe sepsis. Prolonged hospitalization in March 2023 for weakness and deconditioning, discharged to TCU. Upper Device-Assisted Enteroscopy without Fluoroscopy s/p APC for AVM's in the stomach, duodunum and jejunum  during admission in March 2023     Hospital then rehab (2-3 months). Eating well. Weight is back up to 120 lbs. Regular bowel movements.     Finds naltrexone to be helpful for pruritis, has been without it for the past few months.     Patient denies jaundice, lower extremity edema, abdominal distension or confusion.  Patient also denies melena, hematochezia or hematemesis. Patient denies weight loss, fevers, sweats or chills.    Currently living in assisted living        Medications:     Current Outpatient Medications   Medication     acetaminophen (TYLENOL) 325 MG tablet     amLODIPine (NORVASC) 2.5 MG tablet     atorvastatin (LIPITOR) 20 MG tablet     Epoetin Duane (EPOGEN IJ)     levothyroxine (SYNTHROID/LEVOTHROID) 50 MCG tablet     losartan (COZAAR) 100 MG tablet     melatonin 3 MG tablet     methocarbamol (ROBAXIN) 500 MG tablet     Multiple Vitamin (TAB-A-TABATHA) TABS     Multiple Vitamins-Minerals (PRORENAL + D) TABS     pantoprazole (PROTONIX) 20 MG EC tablet     sucroferric oxyhydroxide (VELPHORO) 500 MG CHEW chewable tablet     sertraline (ZOLOFT) 50 MG tablet     No current facility-administered medications for this visit.            Review of Systems:   10 points ROS was obtained and highlighted in the HPI, otherwise negative.          Physical Exam:   VS: BP (!) 159/70 (BP Location: Left arm, Patient Position: Sitting, Cuff Size: Adult Regular)   Pulse 79   Wt 54.4 kg  "(120 lb)   SpO2 100%   BMI 20.60 kg/m        Gen: A&Ox3, NAD, well developed  HEENT: non-icteric   Lym- no palpable lymphadenopathy  Abd: soft, NT, ND,   Ext: no edema, intact pulses.   Skin: No rash, no palmar erythema, telangiectasias or jaundice  Neuro: grossly intact, no asterixis   Psych: appropriate mood and affects         Data:   Reviewed in person and significant for:    Lab Results   Component Value Date     01/09/2024     05/26/2020      Lab Results   Component Value Date    POTASSIUM 5.3 01/09/2024    POTASSIUM 4.5 01/27/2022    POTASSIUM 4.1 05/26/2020     Lab Results   Component Value Date    CHLORIDE 99 01/09/2024    CHLORIDE 101 01/27/2022    CHLORIDE 98 05/26/2020     Lab Results   Component Value Date    CO2 28 01/09/2024    CO2 28 01/27/2022    CO2 28 05/26/2020     Lab Results   Component Value Date    BUN 15.8 01/09/2024    BUN 18 01/27/2022    BUN 23 05/26/2020     Lab Results   Component Value Date    CR 5.86 01/09/2024    CR 7.50 05/26/2020       Lab Results   Component Value Date    WBC 4.4 01/09/2024    WBC 5.5 05/26/2020     Lab Results   Component Value Date    HGB 13.1 01/09/2024    HGB 12.8 05/26/2020     Lab Results   Component Value Date    HCT 41.6 01/09/2024    HCT 41.1 05/26/2020     Lab Results   Component Value Date     01/09/2024     05/26/2020     Lab Results   Component Value Date     01/09/2024     05/26/2020       Lab Results   Component Value Date    AST 24 01/09/2024    AST 21 05/26/2020     Lab Results   Component Value Date    ALT 8 01/09/2024    ALT 20 05/26/2020     No results found for: \"BILICONJ\"   Lab Results   Component Value Date    BILITOTAL 0.6 01/09/2024    BILITOTAL 1.0 05/26/2020       Lab Results   Component Value Date    ALBUMIN 4.4 01/09/2024    ALBUMIN 4.1 01/27/2022    ALBUMIN 3.8 05/26/2020     Lab Results   Component Value Date    PROTTOTAL 7.7 01/09/2024    PROTTOTAL 8.7 05/26/2020      Lab Results   Component " Value Date    ALKPHOS 125 01/09/2024    ALKPHOS 136 05/26/2020       Lab Results   Component Value Date    INR 1.22 01/09/2024    INR 1.31 05/26/2020     EXAMINATION: US ABDOMEN LIMITED, 1/9/2024 11:04 AM      INDICATION: Patient at high risk for HCC. HCC screening; Cirrhosis of  liver without ascites, unspecified hepatic cirrhosis type (H); Itching     COMPARISON: None.     TECHNIQUE: The abdomen right upper quadrant was scanned in the  standard fashion with specialized ultrasound transducer(s) using both  gray scale and limited color/spectral Doppler techniques.     FINDINGS:      Fluid: No ascites or pleural effusions.     Liver: The liver demonstrates a coarsened echotexture, measuring 16.8  cm in craniocaudal dimension. No focal hepatic mass. No intrahepatic  biliary dilatation. The main portal vein is patent with antegrade  flow. US visualization score: A - No or minimal limitations.     Gallbladder: The gallbladder is well distended and of normal  morphology. No wall thickening, pericholecystic fluid, or sonographic  Baxter's sign. Cholelithiasis.     Bile Ducts: Expected caliber intra and extrahepatic biliary tree. The  common bile duct measures 6.4 mm in diameter.     Pancreas: Visualized portions of the pancreas are unremarkable.     Kidney: The right kidney measures 8.2 cm in long dimension. No  hydronephrosis, hydroureter, shadowing renal calculi, or solid mass.  Small echogenic kidney with numerous small cysts.     Aorta and IVC: The visualized portions of the aorta and IVC are  unremarkable.                                              IMPRESSION:   1. Cirrhosis without focal liver lesion.  a. LI-RADS US Category: US-1 Negative: No US evidence of HCC.  b. Recommend continued surveillance US.  2. Cholelithiasis.  3. Atrophic right kidney with numerous small cysts.      Again, thank you for allowing me to participate in the care of your patient.        Sincerely,        Roberto Gordon PA-C

## 2024-01-14 RX ORDER — NALTREXONE HYDROCHLORIDE 50 MG/1
50 TABLET, FILM COATED ORAL DAILY
Qty: 30 TABLET | Refills: 11 | Status: SHIPPED | OUTPATIENT
Start: 2024-01-14 | End: 2024-01-19 | Stop reason: SINTOL

## 2024-01-16 ENCOUNTER — PATIENT OUTREACH (OUTPATIENT)
Dept: GASTROENTEROLOGY | Facility: CLINIC | Age: 84
End: 2024-01-16
Payer: MEDICARE

## 2024-01-16 NOTE — TELEPHONE ENCOUNTER
Called Charla Rachele's daughter, pt is doing well, has not had any issues with bleeding. Hgb 13.1 on 1/9 when she saw Roberto in liver clinic.   Has had some nausea lately that is new and is seeing Dr Mchugh for it next week.     Message routed to Dr Baires as update. His octreotide treatment plan is good through Oct 2024.    Loraine Mancera, RN, BSN,   [facility]  Care coordinator

## 2024-01-19 ENCOUNTER — LAB (OUTPATIENT)
Dept: LAB | Facility: CLINIC | Age: 84
End: 2024-01-19
Payer: MEDICARE

## 2024-01-19 ENCOUNTER — OFFICE VISIT (OUTPATIENT)
Dept: INTERNAL MEDICINE | Facility: CLINIC | Age: 84
End: 2024-01-19
Payer: MEDICARE

## 2024-01-19 VITALS
BODY MASS INDEX: 21.51 KG/M2 | WEIGHT: 126 LBS | SYSTOLIC BLOOD PRESSURE: 138 MMHG | HEIGHT: 64 IN | OXYGEN SATURATION: 96 % | DIASTOLIC BLOOD PRESSURE: 69 MMHG | HEART RATE: 62 BPM

## 2024-01-19 DIAGNOSIS — Z99.2 DEPENDENCE ON RENAL DIALYSIS (H): ICD-10-CM

## 2024-01-19 DIAGNOSIS — N18.6 END STAGE RENAL DISEASE (H): ICD-10-CM

## 2024-01-19 DIAGNOSIS — I73.9 PERIPHERAL VASCULAR DISEASE (H): Primary | ICD-10-CM

## 2024-01-19 DIAGNOSIS — E03.9 HYPOTHYROIDISM, UNSPECIFIED TYPE: ICD-10-CM

## 2024-01-19 DIAGNOSIS — N25.81 SECONDARY HYPERPARATHYROIDISM OF RENAL ORIGIN (H): ICD-10-CM

## 2024-01-19 PROBLEM — E11.42 TYPE 2 DIABETES MELLITUS WITH DIABETIC POLYNEUROPATHY, WITHOUT LONG-TERM CURRENT USE OF INSULIN (H): Status: RESOLVED | Noted: 2024-01-19 | Resolved: 2024-01-19

## 2024-01-19 PROBLEM — R07.9 ACUTE CHEST PAIN: Status: RESOLVED | Noted: 2023-07-31 | Resolved: 2024-01-19

## 2024-01-19 PROBLEM — I10 HYPERTENSION, UNSPECIFIED TYPE: Status: RESOLVED | Noted: 2023-07-20 | Resolved: 2024-01-19

## 2024-01-19 PROBLEM — E11.42 TYPE 2 DIABETES MELLITUS WITH DIABETIC POLYNEUROPATHY, WITHOUT LONG-TERM CURRENT USE OF INSULIN (H): Status: ACTIVE | Noted: 2024-01-19

## 2024-01-19 PROBLEM — R44.3 HALLUCINATIONS: Status: RESOLVED | Noted: 2023-07-20 | Resolved: 2024-01-19

## 2024-01-19 LAB — TSH SERPL DL<=0.005 MIU/L-ACNC: 2.32 UIU/ML (ref 0.3–4.2)

## 2024-01-19 PROCEDURE — 84443 ASSAY THYROID STIM HORMONE: CPT | Performed by: PATHOLOGY

## 2024-01-19 PROCEDURE — 36415 COLL VENOUS BLD VENIPUNCTURE: CPT | Performed by: PATHOLOGY

## 2024-01-19 PROCEDURE — 99214 OFFICE O/P EST MOD 30 MIN: CPT | Performed by: INTERNAL MEDICINE

## 2024-01-19 RX ORDER — LEVOTHYROXINE SODIUM 25 UG/1
25 TABLET ORAL DAILY
Qty: 90 TABLET | Refills: 3 | Status: SHIPPED | OUTPATIENT
Start: 2024-01-19

## 2024-01-19 RX ORDER — RESPIRATORY SYNCYTIAL VIRUS VACCINE 120MCG/0.5
0.5 KIT INTRAMUSCULAR ONCE
Qty: 1 EACH | Refills: 0 | Status: CANCELLED | OUTPATIENT
Start: 2024-01-19 | End: 2024-01-19

## 2024-01-19 RX ORDER — GABAPENTIN 300 MG/1
300 CAPSULE ORAL AT BEDTIME
Qty: 90 CAPSULE | Refills: 3 | Status: SHIPPED | OUTPATIENT
Start: 2024-01-19

## 2024-01-19 ASSESSMENT — PAIN SCALES - GENERAL: PAINLEVEL: NO PAIN (0)

## 2024-01-19 NOTE — PROGRESS NOTES
Assessment & Plan   End stage renal disease (H)  Secondary hyperparathyroidism of renal origin (H24)  Dependence on renal dialysis (H24)  Going to dialysis MWF  Has some burning with urination, but no other symptoms of UTI.  She makes very little urine, so likely has bacterial colonization.  No indications for antibiotics at this time    Peripheral vascular disease (H24)  Has had increased pain off the gabapentin  - gabapentin (NEURONTIN) 300 MG capsule  Dispense: 90 capsule; Refill: 3    Itching:  She did not start the naltrexone.  Itching is well controlled on lexapro alone    Insomnia:  Starting gabapentin as above  Could consider remeron if ongoing insomnia, as she describes a poor appetite    Hypothyroidism, unspecified type  - TSH with free T4 reflex      HM:  Thinks she may have gotten additional vaccines at dialysis or assisted living.  Charla will check and send records    RTC: Return in about 4 months (around 2024).  I spent a total of 33 minutes on the day of the visit.  Time was spent doing chart review, history and exam, documentation and further activities as noted above.    Agnieszka Rodriguez MD      Chief Complaint   Rachele Martínez is a 83 year old female presents for the following health issues    History of Present Illness   Feeling wiped out from dialysis.  She is walking with a walker when she is out  No recent falls  She has not been taking the naltrexone        Tobacco Use      Smoking status: Former        Packs/day: 2.00        Years: 45.00        Additional pack years: 0.00        Total pack years: 90.00        Types: Cigarettes        Start date: 1953        Quit date: 2002        Years since quittin.1      Smokeless tobacco: Never    PHQ-2 Score:       2024     7:49 AM 2023     1:09 PM   PHQ-2 (  [employer])   Q1: Little interest or pleasure in doing things 2 0   Q2: Feeling down, depressed or hopeless 0 0   PHQ-2 Score 2 0       ROS    Physical Exam  "  /69 (BP Location: Left arm, Patient Position: Sitting, Cuff Size: Adult Regular)   Pulse 62   Ht 1.626 m (5' 4\")   Wt 57.2 kg (126 lb)   SpO2 96%   BMI 21.63 kg/m    Gen: no distress, comfortable, pleasant   Eyes: anicteric, normal extra-ocular movements   Cardiovascular: regular rate and rhythm, normal S1 and S2, no murmurs, rubs or gallops, peripheral pulses full and symmetric   Skin: no concerning lesions, no jaundice   Psychological: appropriate mood     Items reviewed:  Tobacco  Allergies  Meds            "

## 2024-01-19 NOTE — PROGRESS NOTES
Rachele is a 83 year old that presents in clinic today for the following:     Chief Complaint   Patient presents with    Follow Up     Concerns:  Refill gabapentin   Sleep aid. Pt daughter states sleep issues for pt.            1/19/2024     7:46 AM   Additional Questions   Roomed by MVO   Accompanied by Leti alegre     Screenings as of today     PHQ-2 Total Score (Adult) - Positive if 3 or more points; Administer   PHQ-9 if positive 2        Jose Lenz at 7:52 AM on 1/19/2024

## 2024-01-22 DIAGNOSIS — I10 HYPERTENSION GOAL BP (BLOOD PRESSURE) < 140/80: Primary | ICD-10-CM

## 2024-01-26 RX ORDER — AMLODIPINE BESYLATE 10 MG/1
10 TABLET ORAL 2 TIMES DAILY
Qty: 180 TABLET | Refills: 3 | Status: SHIPPED | OUTPATIENT
Start: 2024-01-26 | End: 2024-07-05

## 2024-01-26 NOTE — TELEPHONE ENCOUNTER
amLODIPine Besylate 10 MG     Last Written Prescription Date:  9/29/23  Last Fill Quantity: 90,   # refills: 11  Last Office Visit : 1/19/24  Future Office visit:  none    Routing refill request to provider for review/approval because:  Refill request does not match med list

## 2024-01-26 NOTE — TELEPHONE ENCOUNTER
AMLODIPINE BESYLATE 10 MG TABS Similar  Add as: amLODIPine (NORVASC) 10 MG tablet           Source: External PharmacyUpdated on: 01/19/2024                Dispense Date Outside Name Pharmacy          01/19/2024 AMLODIPINE BESYLATE 10 MG TABS [address] [phone]   Quantity: 14 tabletDays Supply: 7Source: Surescripts (Claim History, Ambulatory)Unit strength: 10 mgUnit form: TabletAuthorized by: AUTUMN CHAVEZ         01/16/2024 AMLODIPINE BESYLATE 10 MG TABS [address] [phone]   Quantity: 28 tabletDays Supply: 28Source: Surescripts (Claim History, Ambulatory)Unit strength: 10 mgUnit form: TabletAuthorized by: AUTUMN CHAVEZ

## 2024-02-01 ENCOUNTER — INFUSION THERAPY VISIT (OUTPATIENT)
Dept: INFUSION THERAPY | Facility: CLINIC | Age: 84
End: 2024-02-01
Attending: NURSE PRACTITIONER
Payer: MEDICARE

## 2024-02-01 VITALS
DIASTOLIC BLOOD PRESSURE: 59 MMHG | OXYGEN SATURATION: 95 % | SYSTOLIC BLOOD PRESSURE: 114 MMHG | HEART RATE: 76 BPM | RESPIRATION RATE: 16 BRPM | TEMPERATURE: 98.4 F

## 2024-02-01 DIAGNOSIS — K31.811 ANGIODYSPLASIA OF STOMACH AND DUODENUM WITH HEMORRHAGE: ICD-10-CM

## 2024-02-01 DIAGNOSIS — D50.0 IRON DEFICIENCY ANEMIA DUE TO CHRONIC BLOOD LOSS: Primary | ICD-10-CM

## 2024-02-01 DIAGNOSIS — K55.20 AVM (ARTERIOVENOUS MALFORMATION) OF SMALL BOWEL, ACQUIRED: ICD-10-CM

## 2024-02-01 PROCEDURE — 96372 THER/PROPH/DIAG INJ SC/IM: CPT | Performed by: INTERNAL MEDICINE

## 2024-02-01 PROCEDURE — 250N000011 HC RX IP 250 OP 636: Performed by: INTERNAL MEDICINE

## 2024-02-01 PROCEDURE — 99207 PR NO CHARGE LOS: CPT

## 2024-02-01 RX ADMIN — OCTREOTIDE ACETATE 20 MG: KIT at 12:03

## 2024-02-01 ASSESSMENT — PAIN SCALES - GENERAL: PAINLEVEL: NO PAIN (0)

## 2024-02-01 NOTE — PROGRESS NOTES
Infusion Nursing Note:  Rachele Martínez presents today for Sandostatin.    Patient seen by provider today: No   present during visit today: Not Applicable.    Note: Assessment performed by Betsy LIZAMA RN prior to injection today.    Intravenous Access:  No Intravenous access/labs at this visit.    Treatment Conditions:  Not Applicable.      Post Infusion Assessment:  Patient tolerated injection without incident.  Site patent and intact, free from redness, edema or discomfort.       Discharge Plan:   Patient discharged in stable condition accompanied by: daughter.  Departure Mode: Ambulatory.      Vilma Ariza LPN

## 2024-02-29 ENCOUNTER — INFUSION THERAPY VISIT (OUTPATIENT)
Dept: INFUSION THERAPY | Facility: CLINIC | Age: 84
End: 2024-02-29
Attending: NURSE PRACTITIONER
Payer: MEDICARE

## 2024-02-29 VITALS — SYSTOLIC BLOOD PRESSURE: 131 MMHG | OXYGEN SATURATION: 98 % | HEART RATE: 71 BPM | DIASTOLIC BLOOD PRESSURE: 61 MMHG

## 2024-02-29 DIAGNOSIS — K55.20 AVM (ARTERIOVENOUS MALFORMATION) OF SMALL BOWEL, ACQUIRED: ICD-10-CM

## 2024-02-29 DIAGNOSIS — D50.0 IRON DEFICIENCY ANEMIA DUE TO CHRONIC BLOOD LOSS: Primary | ICD-10-CM

## 2024-02-29 DIAGNOSIS — K31.811 ANGIODYSPLASIA OF STOMACH AND DUODENUM WITH HEMORRHAGE: ICD-10-CM

## 2024-02-29 PROCEDURE — 99207 PR NO CHARGE LOS: CPT

## 2024-02-29 PROCEDURE — 96372 THER/PROPH/DIAG INJ SC/IM: CPT | Performed by: INTERNAL MEDICINE

## 2024-02-29 PROCEDURE — 250N000011 HC RX IP 250 OP 636: Performed by: INTERNAL MEDICINE

## 2024-02-29 RX ADMIN — OCTREOTIDE ACETATE 20 MG: KIT at 11:56

## 2024-02-29 NOTE — PROGRESS NOTES
Infusion Nursing Note:  Rachele Martínez presents today for Sandostatin.    Patient seen by provider today: No   present during visit today: Not Applicable.    Note: Assessment performed by Lorena REESE RN prior to injection today. .      Intravenous Access:  No Intravenous access/labs at this visit.    Treatment Conditions:  Not Applicable.      Post Infusion Assessment:  Patient tolerated injection without incident.  Site patent and intact, free from redness, edema or discomfort.       Discharge Plan:   Patient discharged in stable condition accompanied by: daughter.  Departure Mode: Ambulatory.      Theresa Baker MA

## 2024-03-14 ENCOUNTER — TELEPHONE (OUTPATIENT)
Dept: GASTROENTEROLOGY | Facility: CLINIC | Age: 84
End: 2024-03-14
Payer: MEDICARE

## 2024-03-14 DIAGNOSIS — L29.9 ITCHING: ICD-10-CM

## 2024-03-14 DIAGNOSIS — K74.60 CIRRHOSIS OF LIVER WITHOUT ASCITES, UNSPECIFIED HEPATIC CIRRHOSIS TYPE (H): ICD-10-CM

## 2024-03-14 NOTE — TELEPHONE ENCOUNTER
[facility] Call Center    Phone Message    May a detailed message be left on voicemail: yes     Reason for Call: Medication Refill Request    Has the patient contacted the pharmacy for the refill? Yes   Name of medication being requested: sertraline (ZOLOFT) 50 MG tablet    Provider who prescribed the medication: Heidi  Pharmacy: [facility], [address], [place] [address]. Phone [phone], Fax [phone]    Date medication is needed: ASAP    Pt's group home uses a different pharmacy then where Rx had originally been sent. They are trying to place refills and need the order sent to [facility]. Thank you!      Action Taken: Message routed to:  [facility] ([facility]ULT [facility] [[phone]]    Travel Screening: Not Applicable

## 2024-03-15 RX ORDER — NALTREXONE HYDROCHLORIDE 50 MG/1
50 TABLET, FILM COATED ORAL DAILY
Qty: 30 TABLET | Refills: 5 | Status: SHIPPED | OUTPATIENT
Start: 2024-03-15 | End: 2024-03-18 | Stop reason: SINTOL

## 2024-03-15 NOTE — TELEPHONE ENCOUNTER
Per Roberto Gordon PA-C, sertraline not needed as patient is on lexapro.     Naltrexone re-ordered and sent to new pharmacy. Unsure if it will be effective for itching.    Sent patient a [facility] message to confirm.    DANIELA CarlsonN, RN, PHN  Hepatology Clinic  [facility]  [facility]

## 2024-03-18 ENCOUNTER — TELEPHONE (OUTPATIENT)
Dept: GASTROENTEROLOGY | Facility: CLINIC | Age: 84
End: 2024-03-18
Payer: MEDICARE

## 2024-03-18 DIAGNOSIS — L29.9 ITCHING: ICD-10-CM

## 2024-03-18 NOTE — TELEPHONE ENCOUNTER
Naltrexone discontinued. Sertaline 100mg once daily re-ordered. Confirmed with Charla.    DANIELA CarlsonN, RN, PHN  Hepatology Clinic  [facility]  [facility]

## 2024-03-18 NOTE — TELEPHONE ENCOUNTER
Health Call Center    Phone Message    May a detailed message be left on voicemail: yes     Reason for Call: Other: Patient's daughter, Charla, called.  Patient received Naltrexone, instead of sertraline (ZOLOFT) 50 MG tablet.  1) patient will not take naltrexone due to side effects listed  2) patient does not take lexapro  3) patient has taken sertarline for years.  Therefore, they are requesting that Roberto put in RX for sertraline (ZOLOFT) 50 MG tablet.  Please follow up with Charla when this is ordered, as patient has a week's left.     Action Taken: Message routed to:  [facility] ([facility]): [facility] Hepatology Adult [facility]    Travel Screening: Not Applicable

## 2024-04-02 ENCOUNTER — INFUSION THERAPY VISIT (OUTPATIENT)
Dept: INFUSION THERAPY | Facility: CLINIC | Age: 84
End: 2024-04-02
Attending: INTERNAL MEDICINE
Payer: MEDICARE

## 2024-04-02 VITALS — SYSTOLIC BLOOD PRESSURE: 120 MMHG | HEART RATE: 71 BPM | OXYGEN SATURATION: 97 % | DIASTOLIC BLOOD PRESSURE: 59 MMHG

## 2024-04-02 DIAGNOSIS — K31.811 ANGIODYSPLASIA OF STOMACH AND DUODENUM WITH HEMORRHAGE: ICD-10-CM

## 2024-04-02 DIAGNOSIS — D50.0 IRON DEFICIENCY ANEMIA DUE TO CHRONIC BLOOD LOSS: Primary | ICD-10-CM

## 2024-04-02 DIAGNOSIS — K55.20 AVM (ARTERIOVENOUS MALFORMATION) OF SMALL BOWEL, ACQUIRED: ICD-10-CM

## 2024-04-02 PROCEDURE — 250N000011 HC RX IP 250 OP 636: Performed by: INTERNAL MEDICINE

## 2024-04-02 PROCEDURE — 99207 PR NO CHARGE LOS: CPT

## 2024-04-02 PROCEDURE — 96372 THER/PROPH/DIAG INJ SC/IM: CPT | Performed by: INTERNAL MEDICINE

## 2024-04-02 RX ADMIN — OCTREOTIDE ACETATE 20 MG: KIT at 11:33

## 2024-04-02 ASSESSMENT — PAIN SCALES - GENERAL: PAINLEVEL: NO PAIN (0)

## 2024-04-02 NOTE — PROGRESS NOTES
Infusion Nursing Note:  Rachele Martínez presents today for Sandostatin.    Patient seen by provider today: No   present during visit today: Not Applicable.    Note: Assessment performed by LEESA Barrera prior to injection today. .      Intravenous Access:  No Intravenous access/labs at this visit.    Treatment Conditions:  Not Applicable.      Post Infusion Assessment:  Patient tolerated injection without incident.  Site patent and intact, free from redness, edema or discomfort.       Discharge Plan:   Patient discharged in stable condition accompanied by: daughter.  Departure Mode: Wheelchair.      Theresa Baker MA

## 2024-04-30 ENCOUNTER — INFUSION THERAPY VISIT (OUTPATIENT)
Dept: INFUSION THERAPY | Facility: CLINIC | Age: 84
End: 2024-04-30
Attending: INTERNAL MEDICINE
Payer: MEDICARE

## 2024-04-30 VITALS
DIASTOLIC BLOOD PRESSURE: 52 MMHG | RESPIRATION RATE: 16 BRPM | HEART RATE: 84 BPM | SYSTOLIC BLOOD PRESSURE: 109 MMHG | OXYGEN SATURATION: 96 %

## 2024-04-30 DIAGNOSIS — K55.20 AVM (ARTERIOVENOUS MALFORMATION) OF SMALL BOWEL, ACQUIRED: ICD-10-CM

## 2024-04-30 DIAGNOSIS — K31.811 ANGIODYSPLASIA OF STOMACH AND DUODENUM WITH HEMORRHAGE: ICD-10-CM

## 2024-04-30 DIAGNOSIS — D50.0 IRON DEFICIENCY ANEMIA DUE TO CHRONIC BLOOD LOSS: Primary | ICD-10-CM

## 2024-04-30 PROCEDURE — 96372 THER/PROPH/DIAG INJ SC/IM: CPT | Performed by: INTERNAL MEDICINE

## 2024-04-30 PROCEDURE — 99207 PR NO CHARGE LOS: CPT

## 2024-04-30 PROCEDURE — 250N000011 HC RX IP 250 OP 636: Performed by: INTERNAL MEDICINE

## 2024-04-30 RX ADMIN — OCTREOTIDE ACETATE 20 MG: KIT at 12:12

## 2024-04-30 ASSESSMENT — PAIN SCALES - GENERAL: PAINLEVEL: NO PAIN (0)

## 2024-04-30 NOTE — PROGRESS NOTES
Infusion Nursing Note:  Rachele Martínez presents today for Sandostatin .    Patient seen by provider today: No   present during visit today: Not Applicable.    Note: Assessment performed by LEESA Beck prior to injection today. .      Intravenous Access:  No Intravenous access/labs at this visit.    Treatment Conditions:  Not Applicable.      Post Infusion Assessment:  Patient tolerated injection without incident.  Site patent and intact, free from redness, edema or discomfort.       Discharge Plan:   Departure Mode: Wheelchair.      Theresa Baker MA

## 2024-05-21 ENCOUNTER — TELEPHONE (OUTPATIENT)
Dept: GASTROENTEROLOGY | Facility: CLINIC | Age: 84
End: 2024-05-21
Payer: MEDICARE

## 2024-05-21 NOTE — TELEPHONE ENCOUNTER
VM left with [facility] nurse line informing them it is OK to discontinue naltrexon as per fax request.    DANIELA CarlsonN, RN, PHN  Hepatology Clinic  [facility]  [facility]

## 2024-05-22 ENCOUNTER — TELEPHONE (OUTPATIENT)
Dept: GASTROENTEROLOGY | Facility: CLINIC | Age: 84
End: 2024-05-22
Payer: MEDICARE

## 2024-05-22 NOTE — TELEPHONE ENCOUNTER
Left Voicemail (1st Attempt) for the patient to call back and schedule the following:    Appointment type: return  Provider: Dr. Baires  Return date: next available  Specialty phone number: [phone]  Additional appointment(s) needed:   Additonal Notes:     *Please call patient or Charla daughter and coordinate an annual follow up with Dr Baires, due in October. This is for renewal of her Octreotide orders as well. Visit can virtual if that is easiest for her.

## 2024-06-04 ENCOUNTER — INFUSION THERAPY VISIT (OUTPATIENT)
Dept: INFUSION THERAPY | Facility: CLINIC | Age: 84
End: 2024-06-04
Attending: INTERNAL MEDICINE
Payer: MEDICARE

## 2024-06-04 VITALS — DIASTOLIC BLOOD PRESSURE: 51 MMHG | HEART RATE: 72 BPM | SYSTOLIC BLOOD PRESSURE: 121 MMHG | OXYGEN SATURATION: 98 %

## 2024-06-04 DIAGNOSIS — K55.20 AVM (ARTERIOVENOUS MALFORMATION) OF SMALL BOWEL, ACQUIRED: ICD-10-CM

## 2024-06-04 DIAGNOSIS — D50.0 IRON DEFICIENCY ANEMIA DUE TO CHRONIC BLOOD LOSS: Primary | ICD-10-CM

## 2024-06-04 DIAGNOSIS — K31.811 ANGIODYSPLASIA OF STOMACH AND DUODENUM WITH HEMORRHAGE: ICD-10-CM

## 2024-06-04 PROCEDURE — 99207 PR NO CHARGE LOS: CPT

## 2024-06-04 PROCEDURE — 96372 THER/PROPH/DIAG INJ SC/IM: CPT | Performed by: INTERNAL MEDICINE

## 2024-06-04 PROCEDURE — 250N000011 HC RX IP 250 OP 636: Performed by: INTERNAL MEDICINE

## 2024-06-04 RX ADMIN — OCTREOTIDE ACETATE 20 MG: KIT at 12:48

## 2024-06-04 ASSESSMENT — PAIN SCALES - GENERAL: PAINLEVEL: EXTREME PAIN (8)

## 2024-06-04 NOTE — PROGRESS NOTES
Infusion Nursing Note:  Rachele Martínez presents today for Sandostatin.    Patient seen by provider today: No   present during visit today: Not Applicable.    Note: Assessment performed by Holly ZAVALA RN prior to injection today. .      Intravenous Access:  No Intravenous access/labs at this visit.    Treatment Conditions:  Not Applicable.      Post Infusion Assessment:  Patient tolerated injection without incident.  Site patent and intact, free from redness, edema or discomfort.       Discharge Plan:   Patient discharged in stable condition accompanied by: daughter.  Departure Mode: Wheelchair.      Theresa Baker MA

## 2024-06-09 ENCOUNTER — HEALTH MAINTENANCE LETTER (OUTPATIENT)
Age: 84
End: 2024-06-09

## 2024-06-17 ENCOUNTER — DOCUMENTATION ONLY (OUTPATIENT)
Dept: INTERNAL MEDICINE | Facility: CLINIC | Age: 84
End: 2024-06-17
Payer: MEDICARE

## 2024-06-17 NOTE — PROGRESS NOTES
Type of Form Received: [facility] Walker Order    Form Received (Date) 6/17/24   Form Filled out Yes, faxed 6/28   Placed in provider folder Yes

## 2024-06-28 ENCOUNTER — MEDICAL CORRESPONDENCE (OUTPATIENT)
Dept: HEALTH INFORMATION MANAGEMENT | Facility: CLINIC | Age: 84
End: 2024-06-28
Payer: MEDICARE

## 2024-06-28 DIAGNOSIS — I10 HYPERTENSION GOAL BP (BLOOD PRESSURE) < 140/80: ICD-10-CM

## 2024-06-28 NOTE — TELEPHONE ENCOUNTER
Disp Refills Start End RONDA   amLODIPine (NORVASC) 10 MG tablet 180 tablet 3 1/26/2024 -- No   Sig - Route: Take 1 tablet (10 mg) by mouth 2 times daily - Oral     ----------------------  Last office Visit :   1/19/2024  [facility] [place]  Future Office visit:     8/2/2024 7:30 AM (30 min)  [place]   Arrive by:  7:15 AM   [facility] RETURN   [facility]   Agnieszka Rodriguez MD     ---------------------      Routing refill request to provider for review/approval because:   GFR 7(L) 1/24  Script clarification needed      Pass/Fail Protocol Criteria:    Calcium Channel Blockers Protocol  Ctjpau0106/28/2024 02:44 PM   Protocol Details GFR is on file in the past 12 months and most recent GFR is normal    Blood pressure under 140/90 in past 12 months    Medication is active on med list    Recent (12 mo) or future (90 days) visit within the authorizing provider's specialty    Patient is age 18 or older    No active pregnancy on record    No positive pregnancy test in past 12 months      Last Comprehensive Metabolic Panel:  Lab Results   Component Value Date     01/09/2024    POTASSIUM 5.3 01/09/2024    CHLORIDE 99 01/09/2024    CO2 28 01/09/2024    ANIONGAP 12 01/09/2024     (H) 01/09/2024    BUN 15.8 01/09/2024    CR 5.86 (H) 01/09/2024    GFRESTIMATED 7 (L) 01/09/2024    BELGICA 9.4 01/09/2024

## 2024-07-02 ENCOUNTER — INFUSION THERAPY VISIT (OUTPATIENT)
Dept: INFUSION THERAPY | Facility: CLINIC | Age: 84
End: 2024-07-02
Attending: INTERNAL MEDICINE
Payer: MEDICARE

## 2024-07-02 VITALS
DIASTOLIC BLOOD PRESSURE: 75 MMHG | OXYGEN SATURATION: 95 % | SYSTOLIC BLOOD PRESSURE: 173 MMHG | TEMPERATURE: 98.2 F | HEART RATE: 73 BPM | RESPIRATION RATE: 16 BRPM

## 2024-07-02 DIAGNOSIS — K55.20 AVM (ARTERIOVENOUS MALFORMATION) OF SMALL BOWEL, ACQUIRED: ICD-10-CM

## 2024-07-02 DIAGNOSIS — K31.811 ANGIODYSPLASIA OF STOMACH AND DUODENUM WITH HEMORRHAGE: ICD-10-CM

## 2024-07-02 DIAGNOSIS — D50.0 IRON DEFICIENCY ANEMIA DUE TO CHRONIC BLOOD LOSS: Primary | ICD-10-CM

## 2024-07-02 PROCEDURE — 99207 PR NO CHARGE LOS: CPT

## 2024-07-02 PROCEDURE — 96372 THER/PROPH/DIAG INJ SC/IM: CPT | Performed by: INTERNAL MEDICINE

## 2024-07-02 PROCEDURE — 250N000011 HC RX IP 250 OP 636: Performed by: INTERNAL MEDICINE

## 2024-07-02 RX ADMIN — OCTREOTIDE ACETATE 20 MG: KIT at 11:44

## 2024-07-02 ASSESSMENT — PAIN SCALES - GENERAL: PAINLEVEL: NO PAIN (0)

## 2024-07-02 NOTE — PROGRESS NOTES
Infusion Nursing Note:  Rachele Martínez presents today for Sandostatin.    Patient seen by provider today: No   present during visit today: Not Applicable.    Note: The patient reports feeling at her baseline and denies any concerns at this time.      Intravenous Access:  No Intravenous access/labs at this visit.    Treatment Conditions:  Not Applicable.      Post Infusion Assessment:  Patient tolerated injection without incident.       Discharge Plan:   AVS to patient via [employer].  Patient will return 8/1/24 for next appointment. Future appts have been reviewed and crosschecked with appt note and plan.   Patient discharged in stable condition accompanied by: daughter.  Departure Mode: Wheelchair.      Priscilla Purcell RN

## 2024-07-05 RX ORDER — AMLODIPINE BESYLATE 10 MG/1
10 TABLET ORAL DAILY
Qty: 90 TABLET | Refills: 1 | Status: SHIPPED | OUTPATIENT
Start: 2024-07-05

## 2024-08-01 ENCOUNTER — INFUSION THERAPY VISIT (OUTPATIENT)
Dept: INFUSION THERAPY | Facility: CLINIC | Age: 84
End: 2024-08-01
Attending: INTERNAL MEDICINE
Payer: MEDICARE

## 2024-08-01 VITALS
RESPIRATION RATE: 16 BRPM | HEART RATE: 73 BPM | DIASTOLIC BLOOD PRESSURE: 89 MMHG | SYSTOLIC BLOOD PRESSURE: 160 MMHG | OXYGEN SATURATION: 100 %

## 2024-08-01 DIAGNOSIS — K55.20 AVM (ARTERIOVENOUS MALFORMATION) OF SMALL BOWEL, ACQUIRED: ICD-10-CM

## 2024-08-01 DIAGNOSIS — K31.811 ANGIODYSPLASIA OF STOMACH AND DUODENUM WITH HEMORRHAGE: ICD-10-CM

## 2024-08-01 DIAGNOSIS — D50.0 IRON DEFICIENCY ANEMIA DUE TO CHRONIC BLOOD LOSS: Primary | ICD-10-CM

## 2024-08-01 PROCEDURE — 96372 THER/PROPH/DIAG INJ SC/IM: CPT | Performed by: INTERNAL MEDICINE

## 2024-08-01 PROCEDURE — 99207 PR NO CHARGE LOS: CPT

## 2024-08-01 PROCEDURE — 250N000011 HC RX IP 250 OP 636: Performed by: INTERNAL MEDICINE

## 2024-08-01 RX ADMIN — OCTREOTIDE ACETATE 20 MG: KIT at 11:54

## 2024-08-01 ASSESSMENT — PAIN SCALES - GENERAL: PAINLEVEL: NO PAIN (0)

## 2024-08-01 NOTE — PROGRESS NOTES
Infusion Nursing Note:  Rachele Martínez presents today for Sandostatin.    Patient seen by provider today: No   present during visit today: Not Applicable.    Note: Assessment performed by Holly PATINO RN prior to injection today.    Intravenous Access:  No Intravenous access/labs at this visit.    Treatment Conditions:  Not Applicable.      Post Infusion Assessment:  Patient tolerated injection without incident.  Site patent and intact, free from redness, edema or discomfort.       Discharge Plan:   Patient discharged in stable condition accompanied by: daughter.  Departure Mode: Ambulatory.      Vilma Ariza LPN

## 2024-08-22 DIAGNOSIS — I10 HYPERTENSION GOAL BP (BLOOD PRESSURE) < 140/80: Chronic | ICD-10-CM

## 2024-08-22 DIAGNOSIS — K92.2 GASTROINTESTINAL HEMORRHAGE, UNSPECIFIED GASTROINTESTINAL HEMORRHAGE TYPE: ICD-10-CM

## 2024-08-22 DIAGNOSIS — R41.89 COGNITIVE IMPAIRMENT: ICD-10-CM

## 2024-08-27 RX ORDER — PANTOPRAZOLE SODIUM 20 MG/1
TABLET, DELAYED RELEASE ORAL
Qty: 60 TABLET | Refills: 11 | Status: SHIPPED | OUTPATIENT
Start: 2024-08-27

## 2024-08-27 NOTE — TELEPHONE ENCOUNTER
Last Office Visit : 1/19/24  Future Office visit:  9/13/24    pantoprazole (PROTONIX) 20 MG EC tablet       Last Written Prescription Date:  9/29/23  Last Fill Quantity: 60,   # refills: 11  Refilled per protocol    atorvastatin (LIPITOR) 20 MG tablet       Last Written Prescription Date:  9/29/23  Last Fill Quantity: 30,   # refills: 11  Routing refill request to provider for review/approval because:    Antihyperlipidemic agents [id number]/22/2024 03:05 PM   Protocol Details LDL on file in the past 12 months      Last LDL 7/3/23    losartan (COZAAR) 100 MG tablet       Last Written Prescription Date:  9/29/23  Last Fill Quantity: 30,   # refills: 11    Routing refill request to provider for review/approval because:  Angiotensin-II Receptors [id number]/22/2024 03:05 PM   Protocol Details Last blood pressure under 140/90 in past 12 months    Has GFR on file in past 12 months and most recent value is normal   GFR  7  on 1/19/24  BP Readings from Last 3 Encounters:   08/01/24 (!) 160/89   07/02/24 (!) 173/75   06/04/24 121/51   Amie PATINO RN  P Central Nursing/Red Flag Triage & Med Refill Team

## 2024-08-29 ENCOUNTER — INFUSION THERAPY VISIT (OUTPATIENT)
Dept: INFUSION THERAPY | Facility: CLINIC | Age: 84
End: 2024-08-29
Attending: INTERNAL MEDICINE
Payer: MEDICARE

## 2024-08-29 VITALS — HEART RATE: 77 BPM | SYSTOLIC BLOOD PRESSURE: 132 MMHG | OXYGEN SATURATION: 100 % | DIASTOLIC BLOOD PRESSURE: 66 MMHG

## 2024-08-29 DIAGNOSIS — K31.811 ANGIODYSPLASIA OF STOMACH AND DUODENUM WITH HEMORRHAGE: ICD-10-CM

## 2024-08-29 DIAGNOSIS — K55.20 AVM (ARTERIOVENOUS MALFORMATION) OF SMALL BOWEL, ACQUIRED: ICD-10-CM

## 2024-08-29 DIAGNOSIS — D50.0 IRON DEFICIENCY ANEMIA DUE TO CHRONIC BLOOD LOSS: Primary | ICD-10-CM

## 2024-08-29 PROCEDURE — 250N000011 HC RX IP 250 OP 636: Performed by: INTERNAL MEDICINE

## 2024-08-29 PROCEDURE — 96372 THER/PROPH/DIAG INJ SC/IM: CPT | Performed by: INTERNAL MEDICINE

## 2024-08-29 PROCEDURE — 99207 PR NO CHARGE LOS: CPT

## 2024-08-29 RX ADMIN — OCTREOTIDE ACETATE 20 MG: KIT at 12:14

## 2024-08-29 NOTE — PROGRESS NOTES
Infusion Nursing Note:  Rachele Martínez presents today for Sandostatin.    Patient seen by provider today: No   present during visit today: Not Applicable.    Note: Assessment performed by Ebony REESE RN prior to injection today. .      Intravenous Access:  No Intravenous access/labs at this visit.    Treatment Conditions:  Not Applicable.      Post Infusion Assessment:  Patient tolerated injection without incident.  Site patent and intact, free from redness, edema or discomfort.       Discharge Plan:   Departure Mode: Ambulatory.      Theresa Baker MA

## 2024-08-31 RX ORDER — LOSARTAN POTASSIUM 100 MG/1
TABLET ORAL
Qty: 30 TABLET | Refills: 11 | Status: SHIPPED | OUTPATIENT
Start: 2024-08-31

## 2024-08-31 RX ORDER — ATORVASTATIN CALCIUM 20 MG/1
TABLET, FILM COATED ORAL
Qty: 30 TABLET | Refills: 11 | Status: SHIPPED | OUTPATIENT
Start: 2024-08-31

## 2024-09-05 ENCOUNTER — DOCUMENTATION ONLY (OUTPATIENT)
Dept: INTERNAL MEDICINE | Facility: CLINIC | Age: 84
End: 2024-09-05
Payer: MEDICARE

## 2024-09-05 NOTE — PROGRESS NOTES
Type of Form Received: [facility] MD Orders    Form Received (Date) 9/4/24   Form Filled out Yes, faxed 9/9/24   Placed in provider folder Yes

## 2024-09-06 ENCOUNTER — MEDICAL CORRESPONDENCE (OUTPATIENT)
Dept: HEALTH INFORMATION MANAGEMENT | Facility: CLINIC | Age: 84
End: 2024-09-06
Payer: MEDICARE

## 2024-09-18 ENCOUNTER — MYC MEDICAL ADVICE (OUTPATIENT)
Dept: GASTROENTEROLOGY | Facility: CLINIC | Age: 84
End: 2024-09-18
Payer: MEDICARE

## 2024-09-19 DIAGNOSIS — G47.00 INSOMNIA: ICD-10-CM

## 2024-09-19 DIAGNOSIS — E56.9 VITAMIN DEFICIENCY: ICD-10-CM

## 2024-09-25 RX ORDER — MULTIVITAMIN WITH FOLIC ACID 400 MCG
TABLET ORAL
Qty: 28 TABLET | Refills: 4 | Status: SHIPPED | OUTPATIENT
Start: 2024-09-25

## 2024-09-25 RX ORDER — LANOLIN ALCOHOL/MO/W.PET/CERES
CREAM (GRAM) TOPICAL
Qty: 28 TABLET | Refills: 11 | Status: SHIPPED | OUTPATIENT
Start: 2024-09-25

## 2024-09-25 NOTE — TELEPHONE ENCOUNTER
Called to remind patient of their upcoming appointment with our GI clinic, on Oct 2,2024 @ 8:20 a.m with Dr. Dequan Garcia. This appointment is scheduled as a video visit. You will receive a call approximately 30 minutes prior to check you in, you must be in [place] for this visit., if your appointment is virtual (video or telephone) you need to be in [place] for the visit. To reschedule or cancel appointment patient needs to call [phone] option 1.  To confirm appointment patient advised to call back.     Devi Kuo MA

## 2024-09-25 NOTE — TELEPHONE ENCOUNTER
melatonin 3 MG tablet       Last Written Prescription Date:  10/20/23  Last Fill Quantity: 28,   # refills: 11  Last Office Visit : 1/19/24  Future Office visit:  None    Routing refill request to provider for review/approval because:  Drug not on the [facility] or [facility] refill protocol or controlled substance    Multiple Vitamin (TAB-A-TABATHA) TABS       Last Written Prescription Date:  10/20/23  Last Fill Quantity: 28,   # refills: 11  Last Office Visit : 1/19/24  Future Office visit:  None  Refilled per protocol  Amie PATINO RN  [facility] Central Nursing/Red Flag Triage & Med Refill Team

## 2024-09-30 ENCOUNTER — TELEPHONE (OUTPATIENT)
Dept: INTERNAL MEDICINE | Facility: CLINIC | Age: 84
End: 2024-09-30
Payer: MEDICARE

## 2024-09-30 NOTE — TELEPHONE ENCOUNTER
[facility] Call Center    Phone Message    May a detailed message be left on voicemail: yes     Reason for Call: Medication Refill Request    Has the patient contacted the pharmacy for the refill? Yes   Name of medication being requested: amLODIPine (NORVASC) 10 MG tablet [[id number]] (Order [id number])     Provider who prescribed the medication: [place]  Pharmacy: [address]   Date medication is needed: asap   Action Taken: Message routed to:  [facility]    Travel Screening: Not Applicable     Date of Service:

## 2024-09-30 NOTE — TELEPHONE ENCOUNTER
Medication Requested:  amLODIPine (NORVASC) 10 MG tablet 90 tablet 1 7/5/2024 -- No   Sig - Route: Take 1 tablet (10 mg) by mouth daily - Oral     [facility] -  [place] - [address] [facility]     ----------------------  Refills on file

## 2024-10-02 ENCOUNTER — VIRTUAL VISIT (OUTPATIENT)
Dept: GASTROENTEROLOGY | Facility: CLINIC | Age: 84
End: 2024-10-02
Payer: MEDICARE

## 2024-10-02 VITALS — HEIGHT: 64 IN | BODY MASS INDEX: 21.63 KG/M2

## 2024-10-02 DIAGNOSIS — K55.20 AVM (ARTERIOVENOUS MALFORMATION) OF SMALL BOWEL, ACQUIRED: ICD-10-CM

## 2024-10-02 DIAGNOSIS — D50.0 IRON DEFICIENCY ANEMIA DUE TO CHRONIC BLOOD LOSS: Primary | ICD-10-CM

## 2024-10-02 DIAGNOSIS — K31.811 ANGIODYSPLASIA OF STOMACH AND DUODENUM WITH HEMORRHAGE: ICD-10-CM

## 2024-10-02 PROCEDURE — 99213 OFFICE O/P EST LOW 20 MIN: CPT | Mod: 95 | Performed by: INTERNAL MEDICINE

## 2024-10-02 ASSESSMENT — PAIN SCALES - GENERAL: PAINLEVEL: NO PAIN (0)

## 2024-10-02 NOTE — LETTER
10/2/2024      Rachele Martínez  [address]  [facility] [place] [address]      Dear Colleague,    Thank you for referring your patient, Rachele Martínez, to the [facility]. Please see a copy of my visit note below.    Virtual Visit Details    Type of service:  Video Visit   Video Start Time: 8:41 AM  Video End Time:8:48 AM    Originating Location (pt. Location): Home    Distant Location (provider location):  On-site  Platform used for Video Visit: [facility]      INTERVENTIONAL ENDOSCOPY OUTPATIENT CLINIC CONSULT  DATE OF SERVICE: 10/02/24   Reason for Consultation: chronic small bowel bleeding from angioectasias    ASSESSMENT:  Rachele is a 84 year old female with a PMHx of ESRD on HD and h/o HCV s/p treatment with SVR and compensated cirrhosis who is here for follow up for recurrent GI bleeding from angioectasias in the stomach, small bowel, and cecum s/p prior endoscopic therapy (last in 03/2023) and chronic monthly octreotide depot injections who is here for follow up. Per chart review, appears last transfusion was in June when she was found to have a low Hgb ~6 at dialysis.     Now Hgb has been stable around 9 since then. She has continued on monthly octreotide injections and iron infusions per her dialysis team. No overt GI bleeding.     Plan to continue monthly octreotide injections and iron infusions as needed. If patient develops overt GI bleeding with associated drop in Hgb requiring more frequent blood transfusions then could consider repeat endoscopic evaluation. As patient is currently doing well and Hgb is stable will defer for now.     RECOMMENDATIONS:  - Continue monthly octreotide injections    The patient was seen in conjunction with Bertha Walsh PA-C who served as a scribe for today's visit. I have reviewed the note and agree with the above findings and plan.    Ankit Baires MD  [facility]  Division of Gastroenterology and  Hepatology  [facility] [address], [place] [address]    ________________________________________________________________  HPI:  Rachele is a 84 year old female with a PMHx of ESRD on HD and h/o HCV s/p treatment with SVR and compensated cirrhosis who is here for follow up for recurrent GI bleeding from angioectasias in the stomach, small bowel, and cecum s/p prior endoscopic therapy (most recently in 2023) and chronic monthly octreotide depot injections. Overall has been doing well over the past year. Did have an ER visit in June when she was found to have a Hgb ~6 at dialysis, was transfused with a unit of blood. Hgb has since been around 9 and stable.    Patient presents to GI clinic today via video visit with her daughter present. Overall, states she has been doing well. She denies any overt GI bleeding and confirms she has not had a blood transfusion since June. Per her daughter, has been having some issues with fluctuating blood pressures that she is currently working on with her other providers.   She has been on monthly octreotide 20 mg depot shots that she receives at the infusion center and has been on this for ~4 years.    PMHx:  Past Medical History:   Diagnosis Date     Anemia      Anxiety and depression      Arthritis      AVM (arteriovenous malformation)      Chronic hepatitis C with cirrhosis (H)      Clotting disorder (H)      ESRD (end stage renal disease) (H)     on dialysis     GI bleed     recurrent     Glaucoma      Hyperlipidemia      Hypertension goal BP (blood pressure) < 140/80      Type 2 diabetes mellitus with diabetic polyneuropathy, without long-term current use of insulin (H) 1/19/2024       PSurgHx:  Past Surgical History:   Procedure Laterality Date     CAPSULE/PILL CAM ENDOSCOPY N/A 3/27/2019    Procedure: Capsule/pill cam endoscopy;  Surgeon: Yosvany Ram MD;  Location:  GI     CAPSULE/PILL CAM ENDOSCOPY N/A 2/2/2023    Procedure: IMAGING PROCEDURE, GI  TRACT, INTRALUMINAL, VIA CAPSULE;  Surgeon: Mulu Nur DO;  [facility]     COLONOSCOPY N/A 9/4/2015    Procedure: COMBINED COLONOSCOPY, SINGLE OR MULTIPLE BIOPSY/POLYPECTOMY BY BIOPSY;  Surgeon: Rupesh Lopez MD;  [facility]     COLONOSCOPY N/A 9/19/2018    Procedure: COLONOSCOPY;  enteroscopy small bowel  COLONOSCOPY;  Surgeon: Ankit Baires MD;  [facility]: [facility]     ENTEROSCOPY SMALL BOWEL N/A 3/9/2023    Procedure: antegrade single balloon enteroscopy with argon plasma coagulation of arteriovenous malformations;  Surgeon: Ankit Baires MD;  [facility]     ESOPHAGOSCOPY, GASTROSCOPY, DUODENOSCOPY (EGD), COMBINED N/A 12/18/2014    Procedure: COMBINED ESOPHAGOSCOPY, GASTROSCOPY, DUODENOSCOPY (EGD);  Surgeon: Betsy Carvajal MD;  [facility]: [facility]     ESOPHAGOSCOPY, GASTROSCOPY, DUODENOSCOPY (EGD), COMBINED N/A 4/25/2015    Procedure: COMBINED ESOPHAGOSCOPY, GASTROSCOPY, DUODENOSCOPY (EGD);  Surgeon: Yosvany Ram MD;  [facility]     ESOPHAGOSCOPY, GASTROSCOPY, DUODENOSCOPY (EGD), COMBINED N/A 5/5/2015    Procedure: COMBINED ESOPHAGOSCOPY, GASTROSCOPY, DUODENOSCOPY (EGD);  Surgeon: Mariano Mistry MD;  Location:  GI     HC CAPSULE ENDOSCOPY N/A 9/30/2015    Procedure: CAPSULE/PILL CAM ENDOSCOPY;  Surgeon: Pan Dhaliwal MD;  Location:  GI      VASCULAR SURGERY PROCEDURE UNLIST       HYSTERECTOMY  1980    KYREE     LUMPECTOMY BREAST         MEDS:  Current Outpatient Medications   Medication Sig Dispense Refill     acetaminophen (TYLENOL) 325 MG tablet Take 325-650 mg by mouth every 6 hours as needed for mild pain       amLODIPine (NORVASC) 10 MG tablet Take 1 tablet (10 mg) by mouth daily 90 tablet 1     amLODIPine (NORVASC) 2.5 MG tablet 3 TABLETS (7.5MG) ORALLY DAILY (DX: HYPERTENSION) 90 tablet 11     atorvastatin (LIPITOR) 20 MG tablet 1 TABLET ORALLY DAILY (DX: HYPERLIPIDEMIA) 30 tablet 11     gabapentin (NEURONTIN) 300 MG capsule Take 1 capsule (300  mg) by mouth at bedtime 90 capsule 3     levothyroxine (SYNTHROID/LEVOTHROID) 25 MCG tablet Take 1 tablet (25 mcg) by mouth daily 90 tablet 3     losartan (COZAAR) 100 MG tablet 1 TABLET ORALLY DAILY (DX: HYPERTENSION) 30 tablet 11     melatonin 3 MG tablet 1 TABLET ORALLY AT BEDTIME (DX: INSOMNIA) 28 tablet 11     methocarbamol (ROBAXIN) 500 MG tablet Take 250-500 mg by mouth       Multiple Vitamin (TAB-A-TABATHA) TABS 1 TABLET ORALLY DAILY (DX: SUPPLEMENT) 28 tablet 4     Multiple Vitamins-Minerals (PRORENAL + D) TABS Take 1 tablet by mouth daily at 2 pm       pantoprazole (PROTONIX) 20 MG EC tablet 1 TABLET ORALLY 2 TIMES DAILY 30-60 MINUTES BEFORE A MEAL (DX: INDIGESTION) 60 tablet 11     sertraline (ZOLOFT) 50 MG tablet Take 2 tablets (100 mg) by mouth daily 60 tablet 11     sucroferric oxyhydroxide (VELPHORO) 500 MG CHEW chewable tablet Take 500 mg by mouth 2 times daily       No current facility-administered medications for this visit.     ALLERGIES:    Allergies   Allergen Reactions     Abacavir Itching     Lisinopril Cough     Dust Mites      Hydrochlorothiazide Itching     Severe       No Clinical Screening - See Comments      History of blood transfusion reactions and pre-treats with Benadryl.      Spironolactone Nausea     Sulfa Antibiotics Hives     Valsartan Itching     Valsartan-Hydrochlorothiazide Itching     severe     FHx:  Family History   Problem Relation Age of Onset     Diabetes Mother      Alzheimer Disease Mother      Substance Abuse Son      Cancer Sister      Soft Tissue Cancer Sister      Breast Cancer Sister      Hyperlipidemia Daughter      Alcoholism Brother      Spine Problems Sister        SOCIAL Hx:  Social History     Socioeconomic History     Marital status:      Spouse name: Not on file     Number of children: Not on file     Years of education: Not on file     Highest education level: Not on file   Occupational History     Not on file   Tobacco Use     Smoking status: Former      Current packs/day: 0.00     Average packs/day: 2.0 packs/day for 49.9 years (99.8 ttl pk-yrs)     Types: Cigarettes     Start date: 1953     Quit date: 2002     Years since quittin.8     Smokeless tobacco: Never   Substance and Sexual Activity     Alcohol use: No     Drug use: Yes     Types: Marijuana     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     Sexual activity: Not Currently   Other Topics Concern     Parent/sibling w/ CABG, MI or angioplasty before 65F 55M? No   Social History Narrative     Not on file     Social Determinants of Health     Financial Resource Strain: Low Risk  (2024)    Financial Resource Strain      Within the past 12 months, have you or your family members you live with been unable to get utilities (heat, electricity) when it was really needed?: No   Food Insecurity: No Food Insecurity (2024)    Received from [facility]     Hunger Vital Sign      Worried About Running Out of Food in the Last Year: Never true      Ran Out of Food in the Last Year: Never true   Transportation Needs: No Transportation Needs (2024)    Received from [facility]     PRAPARE - Transportation      Lack of Transportation (Medical): No      Lack of Transportation (Non-Medical): No   Physical Activity: Not on file   Stress: Not on file   Social Connections: Not on file   Interpersonal Safety: Not At Risk (2024)    Received from [facility]     Humiliation, Afraid, Rape, and Kick questionnaire      Fear of Current or Ex-Partner: No      Emotionally Abused: No      Physically Abused: No      Sexually Abused: No   Housing Stability: Low Risk  (2024)    Received from [facility]     Housing Stability Vital Sign      Unable to Pay for Housing in the Last Year: No      Number of Times Moved in the Last Year: 1      Homeless in the Last Year: No       ROS: A comprehensive Review of Systems was asked and answered in  the negative unless specifically commented upon in the HPI    Physical Exam  Gen: Alert, oriented, NAD.     Unable to perform further physical exam due to video visit.       LABS:          Again, thank you for allowing me to participate in the care of your patient.        Sincerely,        Ankit Baires MD

## 2024-10-02 NOTE — NURSING NOTE
Current patient location: [address] [place] [address] N   [facility] [address]    Is the patient currently in the state of [place]? YES    Visit mode:VIDEO    If the visit is dropped, the patient can be reconnected by: VIDEO VISIT: Text to cell phone:   Telephone Information:   Mobile [phone]   Mobile [phone]       Will anyone else be joining the visit? NO  (If patient encounters technical issues they should call [phone])    Are changes needed to the allergy or medication list?  Yes Patient takes 10 mg of Amlodipine twice daily.     Are refills needed on medications prescribed by this physician? NO    Rooming Documentation:  Unable to complete questionnaire(s) due to time    09/30/2024 Weight 129 lbs    Reason for visit: RECHECK (Return Patient )    Chelly RODRIGUEZ

## 2024-10-02 NOTE — PROGRESS NOTES
Virtual Visit Details    Type of service:  Video Visit   Video Start Time: 8:41 AM  Video End Time:8:48 AM    Originating Location (pt. Location): Home    Distant Location (provider location):  On-site  Platform used for Video Visit: Kimmy      INTERVENTIONAL ENDOSCOPY OUTPATIENT CLINIC CONSULT  DATE OF SERVICE: 10/02/24   Reason for Consultation: chronic small bowel bleeding from angioectasias    ASSESSMENT:  Rachele is a 84 year old female with a PMHx of ESRD on HD and h/o HCV s/p treatment with SVR and compensated cirrhosis who is here for follow up for recurrent GI bleeding from angioectasias in the stomach, small bowel, and cecum s/p prior endoscopic therapy (last in 03/2023) and chronic monthly octreotide depot injections who is here for follow up. Per chart review, appears last transfusion was in June when she was found to have a low Hgb ~6 at dialysis.     Now Hgb has been stable around 9 since then. She has continued on monthly octreotide injections and iron infusions per her dialysis team. No overt GI bleeding.     Plan to continue monthly octreotide injections and iron infusions as needed. If patient develops overt GI bleeding with associated drop in Hgb requiring more frequent blood transfusions then could consider repeat endoscopic evaluation. As patient is currently doing well and Hgb is stable will defer for now.     RECOMMENDATIONS:  - Continue monthly octreotide injections    The patient was seen in conjunction with Bertha Walsh PA-C who served as a scribe for today's visit. I have reviewed the note and agree with the above findings and plan.    Ankit Baires MD  [facility]  Division of Gastroenterology and Hepatology  [facility] [address] [address]    ________________________________________________________________  HPI:  Rachele is a 84 year old female with a PMHx of ESRD on HD and h/o HCV s/p treatment with SVR and compensated  cirrhosis who is here for follow up for recurrent GI bleeding from angioectasias in the stomach, small bowel, and cecum s/p prior endoscopic therapy (most recently in 2023) and chronic monthly octreotide depot injections. Overall has been doing well over the past year. Did have an ER visit in June when she was found to have a Hgb ~6 at dialysis, was transfused with a unit of blood. Hgb has since been around 9 and stable.    Patient presents to GI clinic today via video visit with her daughter present. Overall, states she has been doing well. She denies any overt GI bleeding and confirms she has not had a blood transfusion since June. Per her daughter, has been having some issues with fluctuating blood pressures that she is currently working on with her other providers.   She has been on monthly octreotide 20 mg depot shots that she receives at the infusion center and has been on this for ~4 years.    PMHx:  Past Medical History:   Diagnosis Date    Anemia     Anxiety and depression     Arthritis     AVM (arteriovenous malformation)     Chronic hepatitis C with cirrhosis (H)     Clotting disorder (H)     ESRD (end stage renal disease) (H)     on dialysis    GI bleed     recurrent    Glaucoma     Hyperlipidemia     Hypertension goal BP (blood pressure) < 140/80     Type 2 diabetes mellitus with diabetic polyneuropathy, without long-term current use of insulin (H) 1/19/2024       PSurgHx:  Past Surgical History:   Procedure Laterality Date    CAPSULE/PILL CAM ENDOSCOPY N/A 3/27/2019    Procedure: Capsule/pill cam endoscopy;  Surgeon: Yosvany Ram MD;  [facility]: [facility]    CAPSULE/PILL CAM ENDOSCOPY N/A 2/2/2023    Procedure: IMAGING PROCEDURE, GI TRACT, INTRALUMINAL, VIA CAPSULE;  Surgeon: Mulu Nur DO;  [facility]: [facility]    COLONOSCOPY N/A 9/4/2015    Procedure: COMBINED COLONOSCOPY, SINGLE OR MULTIPLE BIOPSY/POLYPECTOMY BY BIOPSY;  Surgeon: Rupesh Lopez MD;  [facility]    COLONOSCOPY N/A  9/19/2018    Procedure: COLONOSCOPY;  enteroscopy small bowel  COLONOSCOPY;  Surgeon: Ankit Baires MD;  [facility]    ENTEROSCOPY SMALL BOWEL N/A 3/9/2023    Procedure: antegrade single balloon enteroscopy with argon plasma coagulation of arteriovenous malformations;  Surgeon: Ankit Baires MD;  [facility]    ESOPHAGOSCOPY, GASTROSCOPY, DUODENOSCOPY (EGD), COMBINED N/A 12/18/2014    Procedure: COMBINED ESOPHAGOSCOPY, GASTROSCOPY, DUODENOSCOPY (EGD);  Surgeon: Betsy Carvajal MD;  [facility]    ESOPHAGOSCOPY, GASTROSCOPY, DUODENOSCOPY (EGD), COMBINED N/A 4/25/2015    Procedure: COMBINED ESOPHAGOSCOPY, GASTROSCOPY, DUODENOSCOPY (EGD);  Surgeon: Yosvany Ram MD;  [facility]    ESOPHAGOSCOPY, GASTROSCOPY, DUODENOSCOPY (EGD), COMBINED N/A 5/5/2015    Procedure: COMBINED ESOPHAGOSCOPY, GASTROSCOPY, DUODENOSCOPY (EGD);  Surgeon: Mariano Mistry MD;  [facility]    HC CAPSULE ENDOSCOPY N/A 9/30/2015    Procedure: CAPSULE/PILL CAM ENDOSCOPY;  Surgeon: Pan Dhaliwal MD;  Location:  GI    HC VASCULAR SURGERY PROCEDURE UNLIST      HYSTERECTOMY  1980    KYREE    LUMPECTOMY BREAST         MEDS:  Current Outpatient Medications   Medication Sig Dispense Refill    acetaminophen (TYLENOL) 325 MG tablet Take 325-650 mg by mouth every 6 hours as needed for mild pain      amLODIPine (NORVASC) 10 MG tablet Take 1 tablet (10 mg) by mouth daily 90 tablet 1    amLODIPine (NORVASC) 2.5 MG tablet 3 TABLETS (7.5MG) ORALLY DAILY (DX: HYPERTENSION) 90 tablet 11    atorvastatin (LIPITOR) 20 MG tablet 1 TABLET ORALLY DAILY (DX: HYPERLIPIDEMIA) 30 tablet 11    gabapentin (NEURONTIN) 300 MG capsule Take 1 capsule (300 mg) by mouth at bedtime 90 capsule 3    levothyroxine (SYNTHROID/LEVOTHROID) 25 MCG tablet Take 1 tablet (25 mcg) by mouth daily 90 tablet 3    losartan (COZAAR) 100 MG tablet 1 TABLET ORALLY DAILY (DX: HYPERTENSION) 30 tablet 11    melatonin 3 MG tablet 1 TABLET ORALLY AT BEDTIME  (DX: INSOMNIA) 28 tablet 11    methocarbamol (ROBAXIN) 500 MG tablet Take 250-500 mg by mouth      Multiple Vitamin (TAB-A-TABATHA) TABS 1 TABLET ORALLY DAILY (DX: SUPPLEMENT) 28 tablet 4    Multiple Vitamins-Minerals (PRORENAL + D) TABS Take 1 tablet by mouth daily at 2 pm      pantoprazole (PROTONIX) 20 MG EC tablet 1 TABLET ORALLY 2 TIMES DAILY 30-60 MINUTES BEFORE A MEAL (DX: INDIGESTION) 60 tablet 11    sertraline (ZOLOFT) 50 MG tablet Take 2 tablets (100 mg) by mouth daily 60 tablet 11    sucroferric oxyhydroxide (VELPHORO) 500 MG CHEW chewable tablet Take 500 mg by mouth 2 times daily       No current facility-administered medications for this visit.     ALLERGIES:    Allergies   Allergen Reactions    Abacavir Itching    Lisinopril Cough    Dust Mites     Hydrochlorothiazide Itching     Severe      No Clinical Screening - See Comments      History of blood transfusion reactions and pre-treats with Benadryl.     Spironolactone Nausea    Sulfa Antibiotics Hives    Valsartan Itching    Valsartan-Hydrochlorothiazide Itching     severe     FHx:  Family History   Problem Relation Age of Onset    Diabetes Mother     Alzheimer Disease Mother     Substance Abuse Son     Cancer Sister     Soft Tissue Cancer Sister     Breast Cancer Sister     Hyperlipidemia Daughter     Alcoholism Brother     Spine Problems Sister        SOCIAL Hx:  Social History     Socioeconomic History    Marital status:      Spouse name: Not on file    Number of children: Not on file    Years of education: Not on file    Highest education level: Not on file   Occupational History    Not on file   Tobacco Use    Smoking status: Former     Current packs/day: 0.00     Average packs/day: 2.0 packs/day for 49.9 years (99.8 ttl pk-yrs)     Types: Cigarettes     Start date: 1953     Quit date: 2002     Years since quittin.8    Smokeless tobacco: Never   Substance and Sexual Activity    Alcohol use: No    Drug use: Yes     Types:  Marijuana     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    Sexual activity: Not Currently   Other Topics Concern    Parent/sibling w/ CABG, MI or angioplasty before 65F 55M? No   Social History Narrative    Not on file     Social Determinants of Health     Financial Resource Strain: Low Risk  (1/19/2024)    Financial Resource Strain     Within the past 12 months, have you or your family members you live with been unable to get utilities (heat, electricity) when it was really needed?: No   Food Insecurity: No Food Insecurity (5/19/2024)    Received from [facility]     Hunger Vital Sign     Worried About Running Out of Food in the Last Year: Never true     Ran Out of Food in the Last Year: Never true   Transportation Needs: No Transportation Needs (5/19/2024)    Received from [facility]     PRAPARE - Transportation     Lack of Transportation (Medical): No     Lack of Transportation (Non-Medical): No   Physical Activity: Not on file   Stress: Not on file   Social Connections: Not on file   Interpersonal Safety: Not At Risk (6/26/2024)    Received from [facility]     Humiliation, Afraid, Rape, and Kick questionnaire     Fear of Current or Ex-Partner: No     Emotionally Abused: No     Physically Abused: No     Sexually Abused: No   Housing Stability: Low Risk  (5/19/2024)    Received from [facility]     Housing Stability Vital Sign     Unable to Pay for Housing in the Last Year: No     Number of Times Moved in the Last Year: 1     Homeless in the Last Year: No       ROS: A comprehensive Review of Systems was asked and answered in the negative unless specifically commented upon in the HPI    Physical Exam  Gen: Alert, oriented, NAD.     Unable to perform further physical exam due to video visit.       LABS:

## 2024-10-04 ENCOUNTER — CARE COORDINATION (OUTPATIENT)
Dept: GASTROENTEROLOGY | Facility: CLINIC | Age: 84
End: 2024-10-04
Payer: MEDICARE

## 2024-10-04 DIAGNOSIS — D50.0 IRON DEFICIENCY ANEMIA DUE TO CHRONIC BLOOD LOSS: Primary | ICD-10-CM

## 2024-10-04 DIAGNOSIS — K31.811 ANGIODYSPLASIA OF STOMACH AND DUODENUM WITH HEMORRHAGE: ICD-10-CM

## 2024-10-04 DIAGNOSIS — K55.20 AVM (ARTERIOVENOUS MALFORMATION) OF SMALL BOWEL, ACQUIRED: ICD-10-CM

## 2024-10-04 NOTE — PATIENT INSTRUCTIONS
It was a pleasure meeting with you today and discussing your healthcare plan. Below is a summary of what we covered:     RECOMMENDATIONS:  - Continue monthly octreotide injections    Please see below for any additional questions and scheduling guidelines.    Sign up for [employer]: [employer] patient portal serves as a secure platform for accessing your medical records from the [facility]. Additionally, [employer] facilitates easy, timely, and secure messaging with your care team. If you have not signed up, you may do so by using the provided code or calling [phone].    Coordinating your care after your visit:  There are multiple options for scheduling your follow-up care based on your provider's recommendation.    How do I schedule a follow-up clinic appointment:   After your appointment, you may receive scheduling assistance with the Clinic Coordinators by having a seat in the waiting room and a Clinic Coordinator will call you up to schedule.  Virtual visits or after you leave the clinic:  Your provider has placed a follow-up order in the [employer] portal for scheduling your return appointment. A member of the scheduling team will contact you to schedule.  Virtual [employer] Scheduling: Timely scheduling through [employer] is advised to ensure appointment availability.   Call to schedule: You may schedule your follow-up appointment(s) by calling [phone], option 1.    How do I schedule my endoscopy or colonoscopy procedure:  If a procedure, such as a colonoscopy or upper endoscopy was ordered by your provider, the scheduling team will contact you to schedule this procedure. Or you may choose to call to schedule at   [phone], option 2.  Please allow 20-30 minutes when scheduling a procedure.    How do I get my blood work done? To get your blood work done, you need to schedule a lab appointment at an [facility]. There are multiple ways to schedule:   At the clinic: The Clinic Coordinator you  meet after your visit can help you schedule a lab appointment.   [facility] scheduling: [facility] offers online lab scheduling at all [facility] laboratory locations.   Call to schedule: You can call [phone] to schedule your lab appointment.    How do I schedule my imaging study: To schedule imaging studies, such as CT scans, ultrasounds, MRIs, or X-rays, contact Imaging Services at [phone].    How do I schedule a referral to another doctor: If your provider recommended a referral to another specialist(s), the referral order was placed by your provider. You will receive a phone call to schedule this referral, or you may choose to call the number attached to the referral to self-schedule.    For Post-Visit Question(s):  For any inquiries following today's visit:  Please utilize [facility] messaging and allow 48 hours for reply or contact the Call Center during normal business hours at [phone], option 3.  For Emergent After-hours questions, contact the On-Call GI Fellow through the [facility]  at [phone].  In addition, you may contact your Nurse directly using the provided contact information.    Test Results:  Test results will be accessible via [facility] in compliance with the 21st Century Cures Act. This means that your results will be available to you at the same time as your provider. Often you may see your results before your provider does. Results are reviewed by staff within two weeks with communication follow-up. Results may be released in the patient portal prior to your care team review.    Prescription Refill(s):  Medication prescribed by your provider will be addressed during your visit. For future refills, please coordinate with your pharmacy. If you have not had a recent clinic visit or routine labs, for your safety, your provider may not be able to refill your prescription.           Contacts post-consultation depending on your need:     Schedule Clinic Appointments                         [phone], option 1    Mirella Mancera, RN Care Coordinator           [phone]     Pankaj Almaraz, OR                           [phone]     GI Procedure Scheduling                               [phone], option 2     For urgent/emergent questions after business hours, you may reach the on-call GI Fellow by contacting the [facility]  at [phone].

## 2024-10-15 ENCOUNTER — INFUSION THERAPY VISIT (OUTPATIENT)
Dept: INFUSION THERAPY | Facility: CLINIC | Age: 84
End: 2024-10-15
Attending: INTERNAL MEDICINE
Payer: MEDICARE

## 2024-10-15 VITALS — OXYGEN SATURATION: 99 % | SYSTOLIC BLOOD PRESSURE: 134 MMHG | DIASTOLIC BLOOD PRESSURE: 66 MMHG | HEART RATE: 77 BPM

## 2024-10-15 DIAGNOSIS — K31.811 ANGIODYSPLASIA OF STOMACH AND DUODENUM WITH HEMORRHAGE: ICD-10-CM

## 2024-10-15 DIAGNOSIS — D50.0 IRON DEFICIENCY ANEMIA DUE TO CHRONIC BLOOD LOSS: ICD-10-CM

## 2024-10-15 DIAGNOSIS — K55.20 AVM (ARTERIOVENOUS MALFORMATION) OF SMALL BOWEL, ACQUIRED: Primary | ICD-10-CM

## 2024-10-15 PROCEDURE — 99207 PR NO CHARGE LOS: CPT

## 2024-10-15 PROCEDURE — 250N000011 HC RX IP 250 OP 636: Performed by: INTERNAL MEDICINE

## 2024-10-15 PROCEDURE — 96372 THER/PROPH/DIAG INJ SC/IM: CPT | Performed by: INTERNAL MEDICINE

## 2024-10-15 RX ORDER — OCTREOTIDE ACETATE 20 MG
20 KIT INTRAMUSCULAR ONCE
Status: CANCELLED
Start: 2024-10-24 | End: 2024-10-24

## 2024-10-15 RX ORDER — OCTREOTIDE ACETATE 20 MG
20 KIT INTRAMUSCULAR ONCE
Status: COMPLETED | OUTPATIENT
Start: 2024-10-15 | End: 2024-10-15

## 2024-10-15 RX ADMIN — OCTREOTIDE ACETATE 20 MG: KIT at 15:20

## 2024-10-15 ASSESSMENT — PAIN SCALES - GENERAL: PAINLEVEL: NO PAIN (0)

## 2024-10-15 NOTE — PROGRESS NOTES
Infusion Nursing Note:  Rachele Martínez presents today for Sandostatin.    Patient seen by provider today: No   present during visit today: Not Applicable.    Note: Assessment performed by Holly ZAVALA RN prior to injection today. .      Intravenous Access:  No Intravenous access/labs at this visit.    Treatment Conditions:  Not Applicable.      Post Infusion Assessment:  Patient tolerated injection without incident.  Site patent and intact, free from redness, edema or discomfort.       Discharge Plan:   Departure Mode: Wheelchair.        Theresa Baker MA

## 2024-10-17 ENCOUNTER — DOCUMENTATION ONLY (OUTPATIENT)
Dept: INTERNAL MEDICINE | Facility: CLINIC | Age: 84
End: 2024-10-17
Payer: MEDICARE

## 2024-10-17 NOTE — PROGRESS NOTES
Type of Form Received: [facility] Order-Diagnosis Request    Form Received (Date) 10/17/24   Form Filled out Yes, faxed 10/22/24   Placed in provider folder Yes

## 2024-10-18 ENCOUNTER — MEDICAL CORRESPONDENCE (OUTPATIENT)
Dept: HEALTH INFORMATION MANAGEMENT | Facility: CLINIC | Age: 84
End: 2024-10-18
Payer: MEDICARE

## 2024-11-12 ENCOUNTER — INFUSION THERAPY VISIT (OUTPATIENT)
Dept: INFUSION THERAPY | Facility: CLINIC | Age: 84
End: 2024-11-12
Attending: INTERNAL MEDICINE
Payer: MEDICARE

## 2024-11-12 VITALS — OXYGEN SATURATION: 99 % | SYSTOLIC BLOOD PRESSURE: 148 MMHG | HEART RATE: 73 BPM | DIASTOLIC BLOOD PRESSURE: 63 MMHG

## 2024-11-12 DIAGNOSIS — D50.0 IRON DEFICIENCY ANEMIA DUE TO CHRONIC BLOOD LOSS: ICD-10-CM

## 2024-11-12 DIAGNOSIS — K31.811 ANGIODYSPLASIA OF STOMACH AND DUODENUM WITH HEMORRHAGE: Primary | ICD-10-CM

## 2024-11-12 DIAGNOSIS — K55.20 AVM (ARTERIOVENOUS MALFORMATION) OF SMALL BOWEL, ACQUIRED: ICD-10-CM

## 2024-11-12 PROCEDURE — 99207 PR NO CHARGE LOS: CPT

## 2024-11-12 PROCEDURE — 250N000011 HC RX IP 250 OP 636: Performed by: INTERNAL MEDICINE

## 2024-11-12 PROCEDURE — 96372 THER/PROPH/DIAG INJ SC/IM: CPT | Performed by: INTERNAL MEDICINE

## 2024-11-12 RX ORDER — OCTREOTIDE ACETATE 20 MG
20 KIT INTRAMUSCULAR ONCE
Status: COMPLETED | OUTPATIENT
Start: 2024-11-12 | End: 2024-11-12

## 2024-11-12 RX ORDER — OCTREOTIDE ACETATE 20 MG
20 KIT INTRAMUSCULAR ONCE
Start: 2024-12-10 | End: 2024-12-10

## 2024-11-12 RX ADMIN — OCTREOTIDE ACETATE 20 MG: KIT at 11:29

## 2024-11-12 ASSESSMENT — PAIN SCALES - GENERAL: PAINLEVEL_OUTOF10: NO PAIN (0)

## 2024-11-12 NOTE — PROGRESS NOTES
Infusion Nursing Note:  Rachele Martínez presents today for Sandostatin.    Patient seen by provider today: No   present during visit today: Not Applicable.    Note: Assessment performed by Ebony REESE RN prior to injection today. .      Intravenous Access:  No Intravenous access/labs at this visit.    Treatment Conditions:  Not Applicable.      Post Infusion Assessment:  Patient tolerated injection without incident.  Site patent and intact, free from redness, edema or discomfort.       Discharge Plan:   Patient discharged in stable condition accompanied by: daughter.  Departure Mode:  Wheelchair.      Theresa Baker MA

## 2024-11-13 ENCOUNTER — MEDICAL CORRESPONDENCE (OUTPATIENT)
Dept: HEALTH INFORMATION MANAGEMENT | Facility: CLINIC | Age: 84
End: 2024-11-13
Payer: MEDICARE

## 2024-11-14 ENCOUNTER — DOCUMENTATION ONLY (OUTPATIENT)
Dept: INTERNAL MEDICINE | Facility: CLINIC | Age: 84
End: 2024-11-14
Payer: MEDICARE

## 2024-11-14 NOTE — PROGRESS NOTES
Type of Form Received: [facility] Medical Incontinence Supply Order [id number]    Form Received (Date) 11/13/24   Form Filled out No   Placed in provider folder Yes

## 2024-11-15 ENCOUNTER — MEDICAL CORRESPONDENCE (OUTPATIENT)
Dept: HEALTH INFORMATION MANAGEMENT | Facility: CLINIC | Age: 84
End: 2024-11-15
Payer: MEDICARE

## 2024-11-20 NOTE — PROGRESS NOTES
[address] [place] [address]               [phone]        Ana Luisa Samuels is a 82 y.o. female and presents with Follow-up (Patient states that she continues to get light headed and she doesn't know why.)           Assessment/Plan:  Ana Luisa was seen today for follow-up.    Diagnoses and all orders for this visit:    Essential hypertension  -     Sacubitril-Valsartan (ENTRESTO) 15-16 MG CPSP; Take 1 capsule by mouth daily    Lightheadedness  -     Sacubitril-Valsartan (ENTRESTO) 15-16 MG CPSP; Take 1 capsule by mouth daily    Hyperlipidemia associated with type 2 diabetes mellitus (HCC)      Lightheadedness related to medications; bps too well controlled; reduce dose of entresto; encouraged hydration/resting today; f/u in 1-2 weeks; advised to pump her legs prior to changing positions; contact us if still symptomatic in the next 2 days; can reduce dose further   HLD: LDL controlled; continue current meds        Follow up and disposition:   No follow-ups on file.      Health Maintenance:   Health Maintenance   Topic Date Due    COVID-19 Vaccine (7 - 2023-24 season) 11/28/2024    Diabetic Alb to Cr ratio (uACR) test  05/22/2025    GFR test (Diabetes, CKD 3-4, OR last GFR 15-59)  08/23/2025    Depression Screen  08/27/2025    Lipids  10/09/2025    Breast cancer screen  08/14/2026    DTaP/Tdap/Td vaccine (2 - Td or Tdap) 01/11/2031    DEXA (modify frequency per FRAX score)  Completed    Annual Wellness Visit (Medicare Advantage)  Completed    Flu vaccine  Completed    Shingles vaccine  Completed    Pneumococcal 65+ years Vaccine  Completed    Respiratory Syncytial Virus (RSV) Pregnant or age 60 yrs+  Completed    Hepatitis A vaccine  Aged Out    Hepatitis B vaccine  Aged Out    Hib vaccine  Aged Out    Polio vaccine  Aged Out    Meningococcal (ACWY) vaccine  Aged Out    Hepatitis C screen  Discontinued        Subjective   83 y/o female here for follow up on cholesterol    Reduced  Pt discharged from 5A via wheelchair transport at 1550. Pt left with all personal belongings. PIV removed. AVS reviewed with pt. All paperwork sent with transport.

## 2024-12-10 ENCOUNTER — INFUSION THERAPY VISIT (OUTPATIENT)
Dept: INFUSION THERAPY | Facility: CLINIC | Age: 84
End: 2024-12-10
Attending: INTERNAL MEDICINE
Payer: MEDICARE

## 2024-12-10 VITALS
DIASTOLIC BLOOD PRESSURE: 52 MMHG | RESPIRATION RATE: 16 BRPM | HEART RATE: 77 BPM | SYSTOLIC BLOOD PRESSURE: 106 MMHG | OXYGEN SATURATION: 95 %

## 2024-12-10 DIAGNOSIS — K31.811 ANGIODYSPLASIA OF STOMACH AND DUODENUM WITH HEMORRHAGE: Primary | ICD-10-CM

## 2024-12-10 DIAGNOSIS — D50.0 IRON DEFICIENCY ANEMIA DUE TO CHRONIC BLOOD LOSS: ICD-10-CM

## 2024-12-10 DIAGNOSIS — K55.20 AVM (ARTERIOVENOUS MALFORMATION) OF SMALL BOWEL, ACQUIRED: ICD-10-CM

## 2024-12-10 PROCEDURE — 96372 THER/PROPH/DIAG INJ SC/IM: CPT | Performed by: INTERNAL MEDICINE

## 2024-12-10 PROCEDURE — 250N000011 HC RX IP 250 OP 636: Performed by: INTERNAL MEDICINE

## 2024-12-10 PROCEDURE — 99207 PR NO CHARGE LOS: CPT

## 2024-12-10 RX ORDER — OCTREOTIDE ACETATE 20 MG
20 KIT INTRAMUSCULAR ONCE
Status: COMPLETED | OUTPATIENT
Start: 2024-12-10 | End: 2024-12-10

## 2024-12-10 RX ORDER — OCTREOTIDE ACETATE 20 MG
20 KIT INTRAMUSCULAR ONCE
Start: 2025-01-07 | End: 2025-01-07

## 2024-12-10 RX ADMIN — OCTREOTIDE ACETATE 20 MG: KIT at 11:34

## 2024-12-10 ASSESSMENT — PAIN SCALES - GENERAL: PAINLEVEL_OUTOF10: SEVERE PAIN (7)

## 2024-12-10 NOTE — PROGRESS NOTES
Infusion Nursing Note:  Rachele Martínez presents today for Sandostatin.    Patient seen by provider today: No   present during visit today: Not Applicable.    Note: Assessment performed by Holly PATINO RN prior to injection today.    Intravenous Access:  No Intravenous access/labs at this visit.    Treatment Conditions:  Not Applicable.      Post Infusion Assessment:  Patient tolerated injection without incident.  Site patent and intact, free from redness, edema or discomfort.       Discharge Plan:   Patient discharged in stable condition accompanied by: daughter.  Departure Mode: Wheelchair.      Vilma Ariza LPN

## 2024-12-11 ENCOUNTER — TELEPHONE (OUTPATIENT)
Dept: GASTROENTEROLOGY | Facility: CLINIC | Age: 84
End: 2024-12-11
Payer: MEDICARE

## 2024-12-11 DIAGNOSIS — I73.9 PERIPHERAL VASCULAR DISEASE (H): ICD-10-CM

## 2024-12-11 DIAGNOSIS — E03.9 HYPOTHYROIDISM, UNSPECIFIED TYPE: ICD-10-CM

## 2024-12-11 NOTE — TELEPHONE ENCOUNTER
Patient confirmed scheduled appointment:     Date: 1/22/25  Time: 3:30 pm Virtual  Appointment Type: Return Liver  Provider: Roberto Gordon PA-C  Location: [place]  Testing/imaging: Lab & US  Additional Notes:

## 2024-12-16 NOTE — TELEPHONE ENCOUNTER
Gabapentin 300 MG Capsule       Last Written Prescription Date:  1-19-24  Last Fill Quantity: 90,   # refills: 3  Last Office Visit : 1-19-24  Future Office visit:  none    Routing refill request to provider for review/approval because:  Drug not on the [facility] [place] [place], [facility] or [facility] refill protocol or controlled substance      levothyroxine (SYNTHROID/LEVOTHROID) 25 MCG tablet       Last Written Prescription Date:  1-19-24  Last Fill Quantity: 90,   # refills: 3  Last TSH 1-19-24  Lab result note:  Goal TSH 3-6 given age  Will decrease synthroid from 50-> 25mcg  TSH W/FREE T4 REFLEX (Every 6 Months)  Overdue since 7/19/2024 1-19-24 My chart message:  For older folks, we often aim for a TSH (thyroid level) of around 3-6. That means we can decrease your thyroid hormone a little. I've sent a new prescription for a 25mcg tab (instead of 50mcg as you are currently taking).    We should repeat a level at your next visit.  -BLM      Routing refill request to provider for review/approval because:  Last rx - dosage change  ?  Next TSH lab

## 2024-12-17 RX ORDER — GABAPENTIN 300 MG/1
300 CAPSULE ORAL AT BEDTIME
Qty: 30 CAPSULE | Refills: 3 | Status: SHIPPED | OUTPATIENT
Start: 2024-12-17

## 2024-12-17 RX ORDER — LEVOTHYROXINE SODIUM 25 UG/1
25 TABLET ORAL DAILY
Qty: 30 TABLET | Refills: 1 | Status: SHIPPED | OUTPATIENT
Start: 2024-12-17

## 2024-12-22 ENCOUNTER — TRANSFERRED RECORDS (OUTPATIENT)
Dept: HEALTH INFORMATION MANAGEMENT | Facility: CLINIC | Age: 84
End: 2024-12-22

## 2024-12-23 ENCOUNTER — MYC MEDICAL ADVICE (OUTPATIENT)
Dept: INTERNAL MEDICINE | Facility: CLINIC | Age: 84
End: 2024-12-23
Payer: MEDICARE

## 2024-12-23 ENCOUNTER — PATIENT OUTREACH (OUTPATIENT)
Dept: CARE COORDINATION | Facility: CLINIC | Age: 84
End: 2024-12-23
Payer: MEDICARE

## 2024-12-23 NOTE — TELEPHONE ENCOUNTER
Sent [employer] (1st Attempt) and Patient Contacted for the patient to call back and schedule the following:    Appointment type: ED/Hosp follow up   Provider: [facility] Provider   Return date: Next available   Specialty phone number: [phone]  Additonal Notes: per message - help patient schedule hospital follow up with Dr. Rodriguez

## 2024-12-23 NOTE — PROGRESS NOTES
Clinic Care Coordination Contact  Transitions of Care Outreach    Chief Complaint   Patient presents with    Clinic Care Coordination - Post Hospital       Most Recent Admission Date: 7/31/2023   Most Recent Admission Diagnosis: Acute pulmonary edema (H) - J81.0  Acute chest pain - R07.9  Pneumonia of both lungs due to infectious organism, unspecified part of lung - J18.9     Most Recent Discharge Date: 8/3/2023   Most Recent Discharge Diagnosis: Acute chest pain - R07.9  Acute pulmonary edema (H) - J81.0  Pneumonia of both lungs due to infectious organism, unspecified part of lung - J18.9  Lab test negative for COVID-19 virus - Z20.822     Transitions of Care Assessment    Discharge Assessment  How are you doing now that you are home?: RN called and spoke to patient's daughter (primary number in chart, consent to communicate on file). Pt's daughter states that patient is doing well, had an upper endoscopy but doing much better. RN inquired if patient plans to continue to see Dr. Rodriguez (last saw in January). Pt's daughter states that she would still like to see her, but if she only has appts on Friday morning that is conflicting with pt's dialysis schedule. RN shared could have scheduling call for follow up, pt's daughter agrees to this. Declines further concerns.  How are your symptoms? (Red Flag symptoms escalate to triage hotline per guidelines): Improved  Do you know how to contact your clinic care team if you have future questions or changes to your health status? : Yes  Does the patient have their discharge instructions? : Yes  Does the patient have questions regarding their discharge instructions? : No  Were you started on any new medications or were there changes to any of your previous medications? : No  Does the patient have all of their medications?: Yes  Do you have questions regarding any of your medications? : No  Do you have all of your needed medical supplies or equipment (DME)?  (i.e. oxygen  tank, CPAP, cane, etc.): Yes              Follow up Plan     Discharge Follow-Up  Discharge follow up appointment scheduled in alignment with recommended follow up timeframe or Transitions of Risk Category? (Low = within 30 days; Moderate= within 14 days; High= within 7 days): No  Patient's follow up appointment not scheduled: Patient accepted scheduling support. Appt scheduled/requested per protocol.    Future Appointments   Date Time Provider Department Center   1/9/2025 11:30 AM [facility] [place]   1/14/2025 11:00 AM [facility] LAB [facility]   1/14/2025 11:30 AM [facility]   1/22/2025  3:30 PM Roberto Gordon PA-C [facility]   2/11/2025 11:30 AM [facility]   3/11/2025 11:30 AM [facility]       Outpatient Plan as outlined on AVS reviewed with patient.      For any urgent concerns, please contact our 24 hour nurse triage line: [phone]       VICKY ALVAREZ RN

## 2024-12-24 NOTE — TELEPHONE ENCOUNTER
I believe her hemoglobin is likely being checked at dialysis, but unfortunately I don't have access to the labs.  Historically she was going to [facility] - [phone], could we confirm they checked the results and see if they would fax over?

## 2024-12-30 NOTE — PROGRESS NOTES
RN called [facility] and spoke with  staff. They stated that they could fax Rachele's hemoglobin (they check hgb weekly). They will put this fax attn to Dr. Rodriguez or to writer. BIBI sent to forms staff.      VICKY ALVAREZ RN on 12/30/2024 at 9:47 AM

## 2025-01-07 ENCOUNTER — DOCUMENTATION ONLY (OUTPATIENT)
Dept: GASTROENTEROLOGY | Facility: CLINIC | Age: 85
End: 2025-01-07

## 2025-01-07 DIAGNOSIS — B19.20 COMPENSATED CIRRHOSIS RELATED TO HEPATITIS C VIRUS (HCV) (H): Primary | ICD-10-CM

## 2025-01-07 DIAGNOSIS — K74.69 COMPENSATED CIRRHOSIS RELATED TO HEPATITIS C VIRUS (HCV) (H): Primary | ICD-10-CM

## 2025-01-07 NOTE — PROGRESS NOTES
Per notes, pt needs orders from Roberto Gordon. Please place orders or contact pt with further info.

## 2025-01-09 ENCOUNTER — INFUSION THERAPY VISIT (OUTPATIENT)
Dept: INFUSION THERAPY | Facility: CLINIC | Age: 85
End: 2025-01-09
Attending: INTERNAL MEDICINE
Payer: MEDICARE

## 2025-01-09 VITALS — HEART RATE: 78 BPM | SYSTOLIC BLOOD PRESSURE: 132 MMHG | DIASTOLIC BLOOD PRESSURE: 65 MMHG | OXYGEN SATURATION: 99 %

## 2025-01-09 DIAGNOSIS — K31.811 ANGIODYSPLASIA OF STOMACH AND DUODENUM WITH HEMORRHAGE: Primary | ICD-10-CM

## 2025-01-09 DIAGNOSIS — K55.20 AVM (ARTERIOVENOUS MALFORMATION) OF SMALL BOWEL, ACQUIRED: ICD-10-CM

## 2025-01-09 DIAGNOSIS — D50.0 IRON DEFICIENCY ANEMIA DUE TO CHRONIC BLOOD LOSS: ICD-10-CM

## 2025-01-09 PROCEDURE — 96372 THER/PROPH/DIAG INJ SC/IM: CPT | Performed by: INTERNAL MEDICINE

## 2025-01-09 RX ORDER — OCTREOTIDE ACETATE 20 MG
20 KIT INTRAMUSCULAR ONCE
Start: 2025-02-04 | End: 2025-02-04

## 2025-01-09 RX ORDER — OCTREOTIDE ACETATE 20 MG
20 KIT INTRAMUSCULAR ONCE
Status: COMPLETED | OUTPATIENT
Start: 2025-01-09 | End: 2025-01-09

## 2025-01-09 RX ADMIN — OCTREOTIDE ACETATE 20 MG: KIT INTRAMUSCULAR at 11:57

## 2025-01-09 ASSESSMENT — PAIN SCALES - GENERAL: PAINLEVEL_OUTOF10: MODERATE PAIN (5)

## 2025-01-09 NOTE — PROGRESS NOTES
Infusion Nursing Note:  Rachele Martínez presents today for Sandostatin.    Patient seen by provider today: No   present during visit today: Not Applicable.    Note: Assessment performed by Holly PATINO RN prior to injection today. .      Intravenous Access:  No Intravenous access/labs at this visit.    Treatment Conditions:  Not Applicable.      Post Infusion Assessment:  Patient tolerated injection without incident.  Site patent and intact, free from redness, edema or discomfort.       Discharge Plan:   Patient discharged in stable condition accompanied by: daughter.  Departure Mode: Wheelchair.      Theresa Baker MA

## 2025-01-14 ENCOUNTER — ANCILLARY PROCEDURE (OUTPATIENT)
Dept: ULTRASOUND IMAGING | Facility: CLINIC | Age: 85
End: 2025-01-14
Attending: PHYSICIAN ASSISTANT
Payer: MEDICARE

## 2025-01-14 ENCOUNTER — LAB (OUTPATIENT)
Dept: LAB | Facility: CLINIC | Age: 85
End: 2025-01-14
Attending: PHYSICIAN ASSISTANT
Payer: MEDICARE

## 2025-01-14 ENCOUNTER — TELEPHONE (OUTPATIENT)
Dept: GASTROENTEROLOGY | Facility: CLINIC | Age: 85
End: 2025-01-14

## 2025-01-14 DIAGNOSIS — I10 HYPERTENSION GOAL BP (BLOOD PRESSURE) < 140/80: ICD-10-CM

## 2025-01-14 DIAGNOSIS — B19.20 COMPENSATED CIRRHOSIS RELATED TO HEPATITIS C VIRUS (HCV) (H): ICD-10-CM

## 2025-01-14 DIAGNOSIS — E03.9 HYPOTHYROIDISM, UNSPECIFIED TYPE: ICD-10-CM

## 2025-01-14 DIAGNOSIS — K74.60 UNSPECIFIED CIRRHOSIS OF LIVER (H): ICD-10-CM

## 2025-01-14 DIAGNOSIS — I10 HYPERTENSION GOAL BP (BLOOD PRESSURE) < 140/80: Primary | ICD-10-CM

## 2025-01-14 DIAGNOSIS — K76.9 LIVER LESION: Primary | ICD-10-CM

## 2025-01-14 DIAGNOSIS — K74.60 CIRRHOSIS OF LIVER WITHOUT ASCITES, UNSPECIFIED HEPATIC CIRRHOSIS TYPE (H): ICD-10-CM

## 2025-01-14 DIAGNOSIS — K74.69 COMPENSATED CIRRHOSIS RELATED TO HEPATITIS C VIRUS (HCV) (H): ICD-10-CM

## 2025-01-14 DIAGNOSIS — E78.5 HYPERLIPIDEMIA WITH TARGET LDL LESS THAN 130: ICD-10-CM

## 2025-01-14 LAB
ERYTHROCYTE [DISTWIDTH] IN BLOOD BY AUTOMATED COUNT: 19.4 % (ref 10–15)
HCT VFR BLD AUTO: 37.4 % (ref 35–47)
HGB BLD-MCNC: 11.2 G/DL (ref 11.7–15.7)
INR PPP: 1.28 (ref 0.85–1.15)
MCH RBC QN AUTO: 31.2 PG (ref 26.5–33)
MCHC RBC AUTO-ENTMCNC: 29.9 G/DL (ref 31.5–36.5)
MCV RBC AUTO: 104 FL (ref 78–100)
PLATELET # BLD AUTO: 181 10E3/UL (ref 150–450)
RADIOLOGIST FLAGS: ABNORMAL
RBC # BLD AUTO: 3.59 10E6/UL (ref 3.8–5.2)
WBC # BLD AUTO: 5.4 10E3/UL (ref 4–11)

## 2025-01-14 PROCEDURE — 76705 ECHO EXAM OF ABDOMEN: CPT | Mod: TC | Performed by: INTERNAL MEDICINE

## 2025-01-14 PROCEDURE — 36415 COLL VENOUS BLD VENIPUNCTURE: CPT

## 2025-01-14 PROCEDURE — 80053 COMPREHEN METABOLIC PANEL: CPT

## 2025-01-14 PROCEDURE — 85610 PROTHROMBIN TIME: CPT

## 2025-01-14 PROCEDURE — 82105 ALPHA-FETOPROTEIN SERUM: CPT

## 2025-01-14 PROCEDURE — 82248 BILIRUBIN DIRECT: CPT

## 2025-01-14 PROCEDURE — 85027 COMPLETE CBC AUTOMATED: CPT

## 2025-01-14 NOTE — TELEPHONE ENCOUNTER
Called patient's daughter to relay below information. CTC on file. MRI ordered. Scheduling # given to daughter.     BROOK Carlson, RN, PHN  Hepatology Clinic  [facility]  [facility]    ----- Message from Roberto Gordon sent at 1/14/2025  3:47 PM CST -----  Please call patient and place MRI order.     Hi,     Your ultrasound showed a small spot in the liver and follow-up imaging (ideally MRI w/ contrast) is recommended for more details to determine what is is.     An order for an MRI will be placed, please schedule it at your next convenience.     Please let us know if you have follow up questions.   Clinic Staff - [phone] option 3    Regards,     Roberto Gordon PA-C

## 2025-01-15 LAB
AFP SERPL-MCNC: 97.4 NG/ML
ALBUMIN SERPL BCG-MCNC: 4.1 G/DL (ref 3.5–5.2)
ALP SERPL-CCNC: 133 U/L (ref 40–150)
ALT SERPL W P-5'-P-CCNC: 17 U/L (ref 0–50)
ANION GAP SERPL CALCULATED.3IONS-SCNC: 13 MMOL/L (ref 7–15)
AST SERPL W P-5'-P-CCNC: 31 U/L (ref 0–45)
BILIRUB DIRECT SERPL-MCNC: 0.22 MG/DL (ref 0–0.3)
BILIRUB SERPL-MCNC: 0.5 MG/DL
BUN SERPL-MCNC: 19.1 MG/DL (ref 8–23)
CALCIUM SERPL-MCNC: 8.4 MG/DL (ref 8.8–10.4)
CHLORIDE SERPL-SCNC: 98 MMOL/L (ref 98–107)
CREAT SERPL-MCNC: 6.37 MG/DL (ref 0.51–0.95)
EGFRCR SERPLBLD CKD-EPI 2021: 6 ML/MIN/1.73M2
GLUCOSE SERPL-MCNC: 91 MG/DL (ref 70–99)
HCO3 SERPL-SCNC: 28 MMOL/L (ref 22–29)
POTASSIUM SERPL-SCNC: 4.3 MMOL/L (ref 3.4–5.3)
PROT SERPL-MCNC: 7.3 G/DL (ref 6.4–8.3)
SODIUM SERPL-SCNC: 139 MMOL/L (ref 135–145)

## 2025-01-19 RX ORDER — AMLODIPINE BESYLATE 10 MG/1
TABLET ORAL
Qty: 60 TABLET | Refills: 11 | OUTPATIENT
Start: 2025-01-19

## 2025-01-22 ENCOUNTER — VIRTUAL VISIT (OUTPATIENT)
Dept: GASTROENTEROLOGY | Facility: CLINIC | Age: 85
End: 2025-01-22
Attending: PHYSICIAN ASSISTANT
Payer: MEDICARE

## 2025-01-22 DIAGNOSIS — N18.6 END STAGE RENAL DISEASE (H): ICD-10-CM

## 2025-01-22 DIAGNOSIS — B19.20 COMPENSATED CIRRHOSIS RELATED TO HEPATITIS C VIRUS (HCV) (H): Primary | ICD-10-CM

## 2025-01-22 DIAGNOSIS — K74.69 COMPENSATED CIRRHOSIS RELATED TO HEPATITIS C VIRUS (HCV) (H): Primary | ICD-10-CM

## 2025-01-22 DIAGNOSIS — K55.20 AVM (ARTERIOVENOUS MALFORMATION) OF SMALL BOWEL, ACQUIRED: ICD-10-CM

## 2025-01-22 DIAGNOSIS — K76.9 LIVER LESION: ICD-10-CM

## 2025-01-22 PROCEDURE — G2211 COMPLEX E/M VISIT ADD ON: HCPCS | Performed by: PHYSICIAN ASSISTANT

## 2025-01-22 PROCEDURE — 98006 SYNCH AUDIO-VIDEO EST MOD 30: CPT | Performed by: PHYSICIAN ASSISTANT

## 2025-01-22 NOTE — NURSING NOTE
Current patient location: Patient declined to provide     Is the patient currently in the state of [place]? YES    Visit mode: VIDEO    If the visit is dropped, the patient can be reconnected by:VIDEO VISIT: Text to cell phone:   Telephone Information:   Mobile [phone]           Will anyone else be joining the visit? NO  (If patient encounters technical issues they should call [phone])    Are changes needed to the allergy or medication list? No    Are refills needed on medications prescribed by this physician? NO    Rooming Documentation:  Not applicable    Reason for visit: RECHECK    Marcela ORTIZF

## 2025-01-22 NOTE — Clinical Note
"1/22/2025      Rachele Martínez  [address]  [facility] [address]      Dear Colleague,    Thank you for referring your patient, Rachele Martínez, to the [facility] [place]. Please see a copy of my visit note below.    Virtual Visit Details    Type of service:  Video Visit   Video Start Time: Joined the call at 1/22/2025, 3:30:32 pm. Left the call at 1/22/2025, 3:46:33 pm.    Originating Location (pt. 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  She has mildly elevated INR, otherwise normal synthetic liver function.  MELD 3.0 is driven by her creatinine.     # HCC screening, recent US with 1.4 cm lesion right lobe:  - AFP elevated 97.4   - MRI Abdomen scheduled in the near future      # Variceal screening, up-to-date:   # History of bleedings AVMs:   - Continue follow up with advanced endoscopy as recommended     # Pruritus:   - Continue Naltrexone 50 mg      # Hepatic encephalopathy: no history   # No radiologic evidence of ascites    - Follow-up in clinic in six months     Roberto Gordon PA-C   [facility]    Total time for E/M services performed on the date of the encounter *** minutes.  This included review of previous: clinic visits, hospital records, lab results, imaging studies, and procedural documentation. Time also includes patient visit, documentation and discussion with other providers.  The findings from this review are summarized in the " above note.    -----------------------------------------------------       HPI:   Rachele Martínez is a 84 year old female presenting for follow-up.     Cirrhosis:  - Hep C  Complicated by:  - No hx of Ascites  - Hx of GI bleed due to AVM, no history of varices  - No hx of HE  EGD/enteroscopy: 12/20/2024, EGD, normal esophagus, bright red blood in the gastric antrum and body, single gastric polyp and single bleeding angio ectasia in the stomach s/p APC and clip.  HCC: 1/9/2024     Hep C, gt1a  - treated with Harvoni x 12, achieved SVR 7/2015  - Fibrosis scan:  2017 Stage 4 fibrosis, 19.9 kilopascals    Patient was last seen by me Jan 2024.   Recent hospitalizations or ER visits:  Patient was hospitalized May 2024 for bleeding from fistula.  She had a follow-up fistulogram which showed thrombosis treated with lysis and angioplasty without complication.    Had low hemoglobin counts during dialysis run prompting ER visit.  Had blood transfusion.  Then was set up with routine iron infusions and somatostatin.    In October 2024, she had an ER visit for bright red blood with bowel movements.  Even ALT globin at that time was 10.7.  Hospitalized 12/18/2024 for acute blood loss anemia.  She had an EGD which showed bleeding angioectasias in the stomach s/p APC and clip.  She had 2 units PRBC during admission.    Appetite decreased within the past year.   Weight might be going down now.   Bowel movements. No blood or black in     Phosphorus causes her to be itchy as well.   Sertraline     Patient denies jaundice, lower extremity edema, abdominal distension or confusion.  Patient also denies melena, hematochezia or hematemesis.    Patient denies weight loss, fevers, sweats or chills.         Medications:     Current Outpatient Medications   Medication Sig Dispense Refill    acetaminophen (TYLENOL) 325 MG tablet Take 325-650 mg by mouth every 6 hours as needed for mild pain      amLODIPine (NORVASC) 10 MG tablet Take 1 tablet  (10 mg) by mouth daily. 90 tablet 1    atorvastatin (LIPITOR) 20 MG tablet 1 TABLET ORALLY DAILY (DX: HYPERLIPIDEMIA) 30 tablet 11    gabapentin (NEURONTIN) 300 MG capsule Take 1 capsule (300 mg) by mouth at bedtime. For additional refills, please schedule a follow-up appointment at [phone] 30 capsule 3    levothyroxine (SYNTHROID/LEVOTHROID) 25 MCG tablet Take 1 tablet (25 mcg) by mouth daily. For additional refills, please schedule a follow-up appointment at [phone], lab due 30 tablet 1    losartan (COZAAR) 100 MG tablet 1 TABLET ORALLY DAILY (DX: HYPERTENSION) 30 tablet 11    melatonin 3 MG tablet 1 TABLET ORALLY AT BEDTIME (DX: INSOMNIA) 28 tablet 11    methocarbamol (ROBAXIN) 500 MG tablet Take 250-500 mg by mouth      Multiple Vitamin (TAB-A-TABATHA) TABS 1 TABLET ORALLY DAILY (DX: SUPPLEMENT) 28 tablet 4    Multiple Vitamins-Minerals (PRORENAL + D) TABS Take 1 tablet by mouth daily at 2 pm      pantoprazole (PROTONIX) 20 MG EC tablet 1 TABLET ORALLY 2 TIMES DAILY 30-60 MINUTES BEFORE A MEAL (DX: INDIGESTION) 60 tablet 11    sertraline (ZOLOFT) 50 MG tablet Take 2 tablets (100 mg) by mouth daily 60 tablet 11    sucroferric oxyhydroxide (VELPHORO) 500 MG CHEW chewable tablet Take 500 mg by mouth 2 times daily       No current facility-administered medications for this visit.            Review of Systems:   10 points ROS was obtained and highlighted in the HPI, otherwise negative.          Physical Exam:   There were no vitals taken for this visit.    GENERAL: healthy, alert and no distress  EYES: Eyes grossly normal to inspection, conjunctivae and sclerae normal  RESP: no audible wheeze, cough, or visible cyanosis.  No visible retractions or increased work of breathing.  Able to speak fully in complete sentences  NEURO: Cranial nerves grossly intact, mentation intact and speech normal  PSYCH: mentation appears normal, affect normal/bright, judgement and insight intact, normal speech and appearance  "well-groomed         Data:   Reviewed in person and significant for:    Lab Results   Component Value Date     01/14/2025     05/26/2020      Lab Results   Component Value Date    POTASSIUM 4.3 01/14/2025    POTASSIUM 4.5 01/27/2022    POTASSIUM 4.1 05/26/2020     Lab Results   Component Value Date    CHLORIDE 98 01/14/2025    CHLORIDE 101 01/27/2022    CHLORIDE 98 05/26/2020     Lab Results   Component Value Date    CO2 28 01/14/2025    CO2 28 01/27/2022    CO2 28 05/26/2020     Lab Results   Component Value Date    BUN 19.1 01/14/2025    BUN 18 01/27/2022    BUN 23 05/26/2020     Lab Results   Component Value Date    CR 6.37 01/14/2025    CR 7.50 05/26/2020       Lab Results   Component Value Date    WBC 5.4 01/14/2025    WBC 5.5 05/26/2020     Lab Results   Component Value Date    HGB 11.2 01/14/2025    HGB 12.8 05/26/2020     Lab Results   Component Value Date    HCT 37.4 01/14/2025    HCT 41.1 05/26/2020     Lab Results   Component Value Date     01/14/2025     05/26/2020     Lab Results   Component Value Date     01/14/2025     05/26/2020       Lab Results   Component Value Date    AST 31 01/14/2025    AST 21 05/26/2020     Lab Results   Component Value Date    ALT 17 01/14/2025    ALT 20 05/26/2020     No results found for: \"BILICONJ\"   Lab Results   Component Value Date    BILITOTAL 0.5 01/14/2025    BILITOTAL 1.0 05/26/2020       Lab Results   Component Value Date    ALBUMIN 4.1 01/14/2025    ALBUMIN 4.1 01/27/2022    ALBUMIN 3.8 05/26/2020     Lab Results   Component Value Date    PROTTOTAL 7.3 01/14/2025    PROTTOTAL 8.7 05/26/2020      Lab Results   Component Value Date    ALKPHOS 133 01/14/2025    ALKPHOS 136 05/26/2020       Lab Results   Component Value Date    INR 1.28 01/14/2025    INR 1.31 05/26/2020     Narrative & Impression   EXAM: US ABDOMEN LIMITED  LOCATION: [facility]  DATE: 1/14/2025     INDICATION: HCC " screening  COMPARISON: 1/9/2024  TECHNIQUE: Limited abdominal ultrasound.     FINDINGS:     GALLBLADDER: Gallstones in an otherwise normal gallbladder. No wall thickening, or pericholecystic fluid. Negative sonographic Baxter's sign.     BILE DUCTS: No biliary dilatation. The common duct measures 5-6 mm.     LIVER: Coarsened echotexture, suggesting underlying fibrosis. Undulating contour. A hypoechoic observation in the right hemiliver, probably segment 7, measures 12 x 14 x 9 mm and was not previously seen. No internal color Doppler flow.     RIGHT KIDNEY: Unchanged right renal atrophy measuring 8.2 cm in length. No hydronephrosis. A few cysts measure up to 14 mm and warrant no follow-up. No solid lesion.     PANCREAS: The visualized portions are normal.     No ascites.                                                                         IMPRESSION:  1.  Hepatic cirrhosis with a new 14 mm hypoechoic observation in the right hemiliver. Recommend further evaluation with liver MRI.  2.  Cholelithiasis.  3.  Unchanged right renal atrophy.     US LI-RADS Category for high risk liver surveillance: US-3. Positive. Observation greater or equal to 10mm, not definitely benign or new thrombus in vein. Observation may warrant multiphase contrast-enhanced imaging.     American College of Radiology Committee on LI-RADS. Ultrasound LI-RADS v2017.         [Access Center: New liver observation in the setting of chronic liver disease]         Again, thank you for allowing me to participate in the care of your patient.        Sincerely,        Roberto Gordon PA-C    Electronically signed

## 2025-01-22 NOTE — PROGRESS NOTES
"Virtual Visit Details    Type of service:  Video Visit   Video Start Time: Joined the call at 1/22/2025, 3:30:32 pm. Left the call at 1/22/2025, 3:46:33 pm.    Originating Location (pt. 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  She has mildly elevated INR, otherwise normal synthetic liver function.  MELD 3.0 is driven by her creatinine.     # HCC screening, recent US with 1.4 cm lesion right lobe:  - AFP elevated 97.4   - MRI Abdomen scheduled in the near future      # Variceal screening, up-to-date:   # History of bleedings AVMs:   - Continue follow up with advanced endoscopy as recommended     # Pruritus:   - Continue Naltrexone 50 mg      # Hepatic encephalopathy: no history   # No radiologic evidence of ascites    - Follow-up in clinic in six months     Roberto Gordon PA-C   [facility] Hepatology clinic    Total time for E/M services performed on the date of the encounter *** minutes.  This included review of previous: clinic visits, hospital records, lab results, imaging studies, and procedural documentation. Time also includes patient visit, documentation and discussion with other providers.  The findings from this review are summarized in the above note.    -----------------------------------------------------       HPI:   Rachele Martínez is a 84 year old female presenting for follow-up.     Cirrhosis:  - Hep C  Complicated by:  - No hx of Ascites  - Hx of GI bleed due to AVM, no history of varices  - " No hx of HE  EGD/enteroscopy: 12/20/2024, EGD, normal esophagus, bright red blood in the gastric antrum and body, single gastric polyp and single bleeding angio ectasia in the stomach s/p APC and clip.  HCC: 1/9/2024     Hep C, gt1a  - treated with Harvoni x 12, achieved SVR 7/2015  - Fibrosis scan:  2017 Stage 4 fibrosis, 19.9 kilopascals    Patient was last seen by me Jan 2024.   Recent hospitalizations or ER visits:  Patient was hospitalized May 2024 for bleeding from fistula.  She had a follow-up fistulogram which showed thrombosis treated with lysis and angioplasty without complication.    Had low hemoglobin counts during dialysis run prompting ER visit.  Had blood transfusion.  Then was set up with routine iron infusions and somatostatin.    In October 2024, she had an ER visit for bright red blood with bowel movements.  Even ALT globin at that time was 10.7.  Hospitalized 12/18/2024 for acute blood loss anemia.  She had an EGD which showed bleeding angioectasias in the stomach s/p APC and clip.  She had 2 units PRBC during admission.    Appetite decreased within the past year.   Weight might be going down now.   Bowel movements. No blood or black in     Phosphorus causes her to be itchy as well.   Sertraline     Patient denies jaundice, lower extremity edema, abdominal distension or confusion.  Patient also denies melena, hematochezia or hematemesis.    Patient denies weight loss, fevers, sweats or chills.         Medications:     Current Outpatient Medications   Medication Sig Dispense Refill    acetaminophen (TYLENOL) 325 MG tablet Take 325-650 mg by mouth every 6 hours as needed for mild pain      amLODIPine (NORVASC) 10 MG tablet Take 1 tablet (10 mg) by mouth daily. 90 tablet 1    atorvastatin (LIPITOR) 20 MG tablet 1 TABLET ORALLY DAILY (DX: HYPERLIPIDEMIA) 30 tablet 11    gabapentin (NEURONTIN) 300 MG capsule Take 1 capsule (300 mg) by mouth at bedtime. For additional refills, please schedule a  follow-up appointment at [phone] 30 capsule 3    levothyroxine (SYNTHROID/LEVOTHROID) 25 MCG tablet Take 1 tablet (25 mcg) by mouth daily. For additional refills, please schedule a follow-up appointment at [phone], lab due 30 tablet 1    losartan (COZAAR) 100 MG tablet 1 TABLET ORALLY DAILY (DX: HYPERTENSION) 30 tablet 11    melatonin 3 MG tablet 1 TABLET ORALLY AT BEDTIME (DX: INSOMNIA) 28 tablet 11    methocarbamol (ROBAXIN) 500 MG tablet Take 250-500 mg by mouth      Multiple Vitamin (TAB-A-TABATHA) TABS 1 TABLET ORALLY DAILY (DX: SUPPLEMENT) 28 tablet 4    Multiple Vitamins-Minerals (PRORENAL + D) TABS Take 1 tablet by mouth daily at 2 pm      pantoprazole (PROTONIX) 20 MG EC tablet 1 TABLET ORALLY 2 TIMES DAILY 30-60 MINUTES BEFORE A MEAL (DX: INDIGESTION) 60 tablet 11    sertraline (ZOLOFT) 50 MG tablet Take 2 tablets (100 mg) by mouth daily 60 tablet 11    sucroferric oxyhydroxide (VELPHORO) 500 MG CHEW chewable tablet Take 500 mg by mouth 2 times daily       No current facility-administered medications for this visit.            Review of Systems:   10 points ROS was obtained and highlighted in the HPI, otherwise negative.          Physical Exam:   There were no vitals taken for this visit.    GENERAL: healthy, alert and no distress  EYES: Eyes grossly normal to inspection, conjunctivae and sclerae normal  RESP: no audible wheeze, cough, or visible cyanosis.  No visible retractions or increased work of breathing.  Able to speak fully in complete sentences  NEURO: Cranial nerves grossly intact, mentation intact and speech normal  PSYCH: mentation appears normal, affect normal/bright, judgement and insight intact, normal speech and appearance well-groomed         Data:   Reviewed in person and significant for:    Lab Results   Component Value Date     01/14/2025     05/26/2020      Lab Results   Component Value Date    POTASSIUM 4.3 01/14/2025    POTASSIUM 4.5 01/27/2022    POTASSIUM 4.1  "05/26/2020     Lab Results   Component Value Date    CHLORIDE 98 01/14/2025    CHLORIDE 101 01/27/2022    CHLORIDE 98 05/26/2020     Lab Results   Component Value Date    CO2 28 01/14/2025    CO2 28 01/27/2022    CO2 28 05/26/2020     Lab Results   Component Value Date    BUN 19.1 01/14/2025    BUN 18 01/27/2022    BUN 23 05/26/2020     Lab Results   Component Value Date    CR 6.37 01/14/2025    CR 7.50 05/26/2020       Lab Results   Component Value Date    WBC 5.4 01/14/2025    WBC 5.5 05/26/2020     Lab Results   Component Value Date    HGB 11.2 01/14/2025    HGB 12.8 05/26/2020     Lab Results   Component Value Date    HCT 37.4 01/14/2025    HCT 41.1 05/26/2020     Lab Results   Component Value Date     01/14/2025     05/26/2020     Lab Results   Component Value Date     01/14/2025     05/26/2020       Lab Results   Component Value Date    AST 31 01/14/2025    AST 21 05/26/2020     Lab Results   Component Value Date    ALT 17 01/14/2025    ALT 20 05/26/2020     No results found for: \"BILICONJ\"   Lab Results   Component Value Date    BILITOTAL 0.5 01/14/2025    BILITOTAL 1.0 05/26/2020       Lab Results   Component Value Date    ALBUMIN 4.1 01/14/2025    ALBUMIN 4.1 01/27/2022    ALBUMIN 3.8 05/26/2020     Lab Results   Component Value Date    PROTTOTAL 7.3 01/14/2025    PROTTOTAL 8.7 05/26/2020      Lab Results   Component Value Date    ALKPHOS 133 01/14/2025    ALKPHOS 136 05/26/2020       Lab Results   Component Value Date    INR 1.28 01/14/2025    INR 1.31 05/26/2020     Narrative & Impression   EXAM: US ABDOMEN LIMITED  LOCATION: [facility]  DATE: 1/14/2025     INDICATION: HCC screening  COMPARISON: 1/9/2024  TECHNIQUE: Limited abdominal ultrasound.     FINDINGS:     GALLBLADDER: Gallstones in an otherwise normal gallbladder. No wall thickening, or pericholecystic fluid. Negative sonographic Baxter's sign.     BILE DUCTS: No biliary dilatation. The " common duct measures 5-6 mm.     LIVER: Coarsened echotexture, suggesting underlying fibrosis. Undulating contour. A hypoechoic observation in the right hemiliver, probably segment 7, measures 12 x 14 x 9 mm and was not previously seen. No internal color Doppler flow.     RIGHT KIDNEY: Unchanged right renal atrophy measuring 8.2 cm in length. No hydronephrosis. A few cysts measure up to 14 mm and warrant no follow-up. No solid lesion.     PANCREAS: The visualized portions are normal.     No ascites.                                                                         IMPRESSION:  1.  Hepatic cirrhosis with a new 14 mm hypoechoic observation in the right hemiliver. Recommend further evaluation with liver MRI.  2.  Cholelithiasis.  3.  Unchanged right renal atrophy.     US LI-RADS Category for high risk liver surveillance: US-3. Positive. Observation greater or equal to 10mm, not definitely benign or new thrombus in vein. Observation may warrant multiphase contrast-enhanced imaging.     American College of Radiology Committee on LI-RADS. Ultrasound LI-RADS v2017.         [Access Center: New liver observation in the setting of chronic liver disease]

## 2025-01-25 ENCOUNTER — LAB (OUTPATIENT)
Dept: LAB | Facility: CLINIC | Age: 85
End: 2025-01-25
Payer: MEDICARE

## 2025-01-25 ENCOUNTER — OFFICE VISIT (OUTPATIENT)
Dept: INTERNAL MEDICINE | Facility: CLINIC | Age: 85
End: 2025-01-25
Payer: MEDICARE

## 2025-01-25 VITALS
SYSTOLIC BLOOD PRESSURE: 150 MMHG | WEIGHT: 118.9 LBS | DIASTOLIC BLOOD PRESSURE: 73 MMHG | BODY MASS INDEX: 20.3 KG/M2 | TEMPERATURE: 98.2 F | RESPIRATION RATE: 18 BRPM | HEIGHT: 64 IN | OXYGEN SATURATION: 95 % | HEART RATE: 80 BPM

## 2025-01-25 DIAGNOSIS — E03.9 HYPOTHYROIDISM, UNSPECIFIED TYPE: ICD-10-CM

## 2025-01-25 DIAGNOSIS — D50.0 IRON DEFICIENCY ANEMIA DUE TO CHRONIC BLOOD LOSS: Chronic | ICD-10-CM

## 2025-01-25 DIAGNOSIS — I73.9 PERIPHERAL VASCULAR DISEASE: ICD-10-CM

## 2025-01-25 DIAGNOSIS — I10 HYPERTENSION GOAL BP (BLOOD PRESSURE) < 140/80: ICD-10-CM

## 2025-01-25 DIAGNOSIS — N18.6 END STAGE RENAL DISEASE (H): ICD-10-CM

## 2025-01-25 DIAGNOSIS — B19.20 COMPENSATED CIRRHOSIS RELATED TO HEPATITIS C VIRUS (HCV) (H): ICD-10-CM

## 2025-01-25 DIAGNOSIS — R63.4 WEIGHT LOSS: ICD-10-CM

## 2025-01-25 DIAGNOSIS — E06.3 HYPOTHYROIDISM DUE TO HASHIMOTO THYROIDITIS: ICD-10-CM

## 2025-01-25 DIAGNOSIS — K74.69 COMPENSATED CIRRHOSIS RELATED TO HEPATITIS C VIRUS (HCV) (H): ICD-10-CM

## 2025-01-25 DIAGNOSIS — Z23 NEED FOR SHINGLES VACCINE: ICD-10-CM

## 2025-01-25 DIAGNOSIS — E78.5 HYPERLIPIDEMIA WITH TARGET LDL LESS THAN 130: ICD-10-CM

## 2025-01-25 DIAGNOSIS — Z29.11 NEED FOR VACCINATION AGAINST RESPIRATORY SYNCYTIAL VIRUS: ICD-10-CM
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
## 2025-01-25 DIAGNOSIS — K74.60 UNSPECIFIED CIRRHOSIS OF LIVER (H): ICD-10-CM

## 2025-01-25 LAB
ANION GAP SERPL CALCULATED.3IONS-SCNC: 12 MMOL/L (ref 7–15)
BUN SERPL-MCNC: 28.2 MG/DL (ref 8–23)
CALCIUM SERPL-MCNC: 9.6 MG/DL (ref 8.8–10.4)
CHLORIDE SERPL-SCNC: 98 MMOL/L (ref 98–107)
CHOLEST SERPL-MCNC: 104 MG/DL
CREAT SERPL-MCNC: 6.84 MG/DL (ref 0.51–0.95)
EGFRCR SERPLBLD CKD-EPI 2021: 6 ML/MIN/1.73M2
FASTING STATUS PATIENT QL REPORTED: YES
FASTING STATUS PATIENT QL REPORTED: YES
GLUCOSE SERPL-MCNC: 164 MG/DL (ref 70–99)
HCO3 SERPL-SCNC: 29 MMOL/L (ref 22–29)
HDLC SERPL-MCNC: 53 MG/DL
HGB BLD-MCNC: 12.9 G/DL (ref 11.7–15.7)
LDLC SERPL CALC-MCNC: 39 MG/DL
NONHDLC SERPL-MCNC: 51 MG/DL
POTASSIUM SERPL-SCNC: 5.6 MMOL/L (ref 3.4–5.3)
SODIUM SERPL-SCNC: 139 MMOL/L (ref 135–145)
T4 FREE SERPL-MCNC: 0.74 NG/DL (ref 0.9–1.7)
TRIGL SERPL-MCNC: 58 MG/DL
TSH SERPL DL<=0.005 MIU/L-ACNC: 6.13 UIU/ML (ref 0.3–4.2)

## 2025-01-25 PROCEDURE — 80061 LIPID PANEL: CPT | Performed by: PATHOLOGY

## 2025-01-25 PROCEDURE — 99215 OFFICE O/P EST HI 40 MIN: CPT | Performed by: INTERNAL MEDICINE

## 2025-01-25 PROCEDURE — 84443 ASSAY THYROID STIM HORMONE: CPT | Performed by: PATHOLOGY

## 2025-01-25 PROCEDURE — 36415 COLL VENOUS BLD VENIPUNCTURE: CPT | Performed by: PATHOLOGY

## 2025-01-25 PROCEDURE — 84439 ASSAY OF FREE THYROXINE: CPT | Performed by: PATHOLOGY

## 2025-01-25 PROCEDURE — 80048 BASIC METABOLIC PNL TOTAL CA: CPT | Performed by: PATHOLOGY

## 2025-01-25 PROCEDURE — 85018 HEMOGLOBIN: CPT | Performed by: PATHOLOGY

## 2025-01-25 RX ORDER — AMLODIPINE BESYLATE 10 MG/1
5 TABLET ORAL 2 TIMES DAILY
Status: SHIPPED
Start: 2025-01-25

## 2025-01-25 NOTE — Clinical Note
Put in referral to PT at [facility] facility ([facility]).  Can you help make sure they get the referral and it gets set up?  THanks!

## 2025-01-25 NOTE — PROGRESS NOTES
Assessment & Plan     Fall, sequela  PVD with chronic leg pain and neuropathy  - Risk of falls due PVD, leg weakness and infrequent use of walker.  - Encourage use of walker around the house to prevent falls. Referral to physical therapy at [facility].  - certification for medical MJ completed today    Hypertension goal BP < 140/80  - Blood pressure today is 150/73, which is slightly above the goal.  However, has had labile BPs in the past  - Continue current medication regimen including amlodipine 5 mg twice daily.    Hypothyroidism due to Hashimoto thyroiditis  - Recheck thyroid levels with lab test today.    Weight loss  Poor nutritional intake  - continue with ensure supplements  - certification for medical MJ completed today    Blood loss anemia, presumed due to known AVMs  - No bleeding since hospital discharge.  Hgb improving  - continue monthly ocreotide    Suspected HCC  Compensated cirrhosis due to HCV  - liver MRI ordered and scheduled in 2 weeks  - follows with hepatology    Need for vaccination against respiratory syncytial virus  - RSV vaccine recommended.  - Send prescription to pharmacy for RSV vaccine.    Need for shingles vaccine  - Shingles vaccine recommended.  - Send prescription to pharmacy for shingles vaccine.         MED REC REQUIRED  Post Medication Reconciliation Status:  Discharge medications reconciled, continue medications without change    I spent a total of 40 minutes on the day of the visit.  Time spent by me doing chart review, history and exam, documentation and further activities per the note    The longitudinal plan of care for the diagnosis(es)/condition(s) as documented were addressed during this visit. Due to the added complexity in care, I will continue to support Rachele in the subsequent management and with ongoing continuity of care.    Return in about 6 months (around 7/25/2025).    Subjective   Rachele is a 84 year old, presenting for the following health  "issues:  Hospital F/U (Routine hospital follow up)        1/25/2025    10:24 AM   Additional Questions   Roomed by KTE, EMT     HPI     History of Present Illness    GI Bleed  - Hospitalized at the end of December for bleeding  - Received 2 units of blood,  Hgb has been improving on recent checks  - continues on monthly octreotide    Leg Weakness  - Uses a walker and scooter for mobility, but not consistently at home  - had a mechanical fall at home a few days ago when opening the fridge    Appetite and Nutrition  - Low appetite  - Consumes Boost nutritional drinks, 2-3 times a day  - Prefers breakfast foods, sometimes cooks own meals    Urinary Issues  - Experiences pain after urination  - Urinates infrequently, sometimes not for two days    Mood  - Sometimes feels like a burden to family  - Experiences crying spells without clear reason    Weight Loss  - Current weight is 118 lbs, previously 127 lbs  - Concern about weight loss and maintaining nutrition                   Objective    BP (!) 150/73 (BP Location: Left arm, Patient Position: Sitting, Cuff Size: Adult Regular)   Pulse 80   Temp 98.2  F (36.8  C)   Resp 18   Ht 1.626 m (5' 4.02\")   Wt 53.9 kg (118 lb 14.4 oz)   SpO2 95%   BMI 20.40 kg/m    Body mass index is 20.4 kg/m .  Physical Exam               Signed Electronically by: Agnieszka Rodriguez MD    "

## 2025-02-11 ENCOUNTER — ANCILLARY PROCEDURE (OUTPATIENT)
Dept: MAMMOGRAPHY | Facility: CLINIC | Age: 85
End: 2025-02-11
Attending: INTERNAL MEDICINE
Payer: MEDICARE

## 2025-02-11 ENCOUNTER — INFUSION THERAPY VISIT (OUTPATIENT)
Dept: INFUSION THERAPY | Facility: CLINIC | Age: 85
End: 2025-02-11
Attending: INTERNAL MEDICINE
Payer: MEDICARE

## 2025-02-11 VITALS — DIASTOLIC BLOOD PRESSURE: 68 MMHG | HEART RATE: 64 BPM | OXYGEN SATURATION: 99 % | SYSTOLIC BLOOD PRESSURE: 158 MMHG

## 2025-02-11 DIAGNOSIS — K31.811 ANGIODYSPLASIA OF STOMACH AND DUODENUM WITH HEMORRHAGE: Primary | ICD-10-CM

## 2025-02-11 DIAGNOSIS — D50.0 IRON DEFICIENCY ANEMIA DUE TO CHRONIC BLOOD LOSS: ICD-10-CM

## 2025-02-11 DIAGNOSIS — K55.20 AVM (ARTERIOVENOUS MALFORMATION) OF SMALL BOWEL, ACQUIRED: ICD-10-CM

## 2025-02-11 DIAGNOSIS — Z12.31 VISIT FOR SCREENING MAMMOGRAM: ICD-10-CM

## 2025-02-11 PROCEDURE — 77063 BREAST TOMOSYNTHESIS BI: CPT | Mod: TC | Performed by: RADIOLOGY

## 2025-02-11 PROCEDURE — 99207 PR NO CHARGE LOS: CPT

## 2025-02-11 PROCEDURE — 250N000011 HC RX IP 250 OP 636: Mod: JZ | Performed by: INTERNAL MEDICINE

## 2025-02-11 PROCEDURE — 77067 SCR MAMMO BI INCL CAD: CPT | Mod: TC | Performed by: RADIOLOGY

## 2025-02-11 PROCEDURE — 96372 THER/PROPH/DIAG INJ SC/IM: CPT | Performed by: INTERNAL MEDICINE

## 2025-02-11 RX ORDER — OCTREOTIDE ACETATE 20 MG
20 KIT INTRAMUSCULAR ONCE
Start: 2025-03-06 | End: 2025-03-06

## 2025-02-11 RX ORDER — OCTREOTIDE ACETATE 20 MG
20 KIT INTRAMUSCULAR ONCE
Status: COMPLETED | OUTPATIENT
Start: 2025-02-11 | End: 2025-02-11

## 2025-02-11 RX ADMIN — OCTREOTIDE ACETATE 20 MG: KIT at 11:47

## 2025-02-11 ASSESSMENT — PAIN SCALES - GENERAL: PAINLEVEL_OUTOF10: NO PAIN (0)

## 2025-02-13 ENCOUNTER — ANCILLARY PROCEDURE (OUTPATIENT)
Dept: MRI IMAGING | Facility: CLINIC | Age: 85
End: 2025-02-13
Attending: PHYSICIAN ASSISTANT
Payer: MEDICARE

## 2025-02-13 DIAGNOSIS — K76.9 LIVER LESION: ICD-10-CM

## 2025-02-13 PROCEDURE — 74183 MRI ABD W/O CNTR FLWD CNTR: CPT | Mod: GC | Performed by: RADIOLOGY

## 2025-02-13 PROCEDURE — A9585 GADOBUTROL INJECTION: HCPCS | Performed by: RADIOLOGY

## 2025-02-13 RX ORDER — GADOBUTROL 604.72 MG/ML
5.5 INJECTION INTRAVENOUS ONCE
Status: COMPLETED | OUTPATIENT
Start: 2025-02-13 | End: 2025-02-13

## 2025-02-13 RX ADMIN — GADOBUTROL 5.5 ML: 604.72 INJECTION INTRAVENOUS at 13:16

## 2025-02-18 ENCOUNTER — TELEPHONE (OUTPATIENT)
Dept: GASTROENTEROLOGY | Facility: CLINIC | Age: 85
End: 2025-02-18
Payer: MEDICARE

## 2025-02-18 DIAGNOSIS — K74.60 CIRRHOSIS OF LIVER WITHOUT ASCITES, UNSPECIFIED HEPATIC CIRRHOSIS TYPE (H): ICD-10-CM

## 2025-02-18 DIAGNOSIS — C22.0 HCC (HEPATOCELLULAR CARCINOMA) (H): Primary | ICD-10-CM

## 2025-02-18 DIAGNOSIS — K76.9 LIVER LESION: Primary | ICD-10-CM

## 2025-02-18 DIAGNOSIS — N18.6 END STAGE RENAL DISEASE (H): ICD-10-CM

## 2025-02-18 DIAGNOSIS — C22.0 HCC (HEPATOCELLULAR CARCINOMA) (H): ICD-10-CM

## 2025-02-18 NOTE — TELEPHONE ENCOUNTER
scheduled pts 6 month follow up appt with Roberto Gordon // pts daughter stated they will do labs during one of the pts appts at the cancer center which she has every 28 days //

## 2025-02-24 DIAGNOSIS — L29.9 ITCHING: ICD-10-CM

## 2025-02-25 ENCOUNTER — OFFICE VISIT (OUTPATIENT)
Dept: VASCULAR SURGERY | Facility: CLINIC | Age: 85
End: 2025-02-25
Payer: MEDICARE

## 2025-02-25 VITALS
BODY MASS INDEX: 20.42 KG/M2 | SYSTOLIC BLOOD PRESSURE: 126 MMHG | HEART RATE: 81 BPM | WEIGHT: 119 LBS | DIASTOLIC BLOOD PRESSURE: 56 MMHG | OXYGEN SATURATION: 97 %

## 2025-02-25 DIAGNOSIS — K74.60 CIRRHOSIS OF LIVER WITHOUT ASCITES, UNSPECIFIED HEPATIC CIRRHOSIS TYPE (H): ICD-10-CM

## 2025-02-25 DIAGNOSIS — N18.6 END STAGE RENAL DISEASE (H): ICD-10-CM

## 2025-02-25 DIAGNOSIS — C22.0 HCC (HEPATOCELLULAR CARCINOMA) (H): ICD-10-CM

## 2025-02-25 PROCEDURE — 3078F DIAST BP <80 MM HG: CPT | Performed by: RADIOLOGY

## 2025-02-25 PROCEDURE — 99214 OFFICE O/P EST MOD 30 MIN: CPT | Performed by: RADIOLOGY

## 2025-02-25 PROCEDURE — 1126F AMNT PAIN NOTED NONE PRSNT: CPT | Performed by: RADIOLOGY

## 2025-02-25 PROCEDURE — 3074F SYST BP LT 130 MM HG: CPT | Performed by: RADIOLOGY

## 2025-02-25 ASSESSMENT — PAIN SCALES - GENERAL: PAINLEVEL_OUTOF10: NO PAIN (0)

## 2025-02-25 NOTE — NURSING NOTE
Chief Complaint   Patient presents with    New Patient     2/25/2025 visit with Jalen Mariscal MD for NEW VASCULAR PATIENT - HCC Screening       Vitals were taken and medications reconciled.    Jeremy Meyer, Visit Facilitator  10:29 AM

## 2025-02-25 NOTE — PROGRESS NOTES
"Ms. Martínez is a very pleasant 85 yo female who is referred for discussion of liver directed treatment for newly diagnosed HCC in the right lobe of the liver. She has a history of ESRD on HD, previous HCV and compensated cirrhosis (SVR). She is accompanied by her sister and daughter. She gets around with a walker, otherwise functional. Relatively recent hospitalization for GI bleeding, no current bleeding.    PE: alert, nad  /56 (BP Location: Left arm, Patient Position: Sitting, Cuff Size: Adult Regular)   Pulse 81   Wt 54 kg (119 lb)   SpO2 97%   BMI 20.42 kg/m      Labs:    Lab Results   Component Value Date    WBC 5.4 01/14/2025    WBC 5.5 05/26/2020     Lab Results   Component Value Date    RBC 3.59 01/14/2025    RBC 3.92 05/26/2020     Lab Results   Component Value Date    HGB 12.9 01/25/2025    HGB 12.8 05/26/2020     Lab Results   Component Value Date    HCT 37.4 01/14/2025    HCT 41.1 05/26/2020     No components found for: \"MCT\"  Lab Results   Component Value Date     01/14/2025     05/26/2020     Lab Results   Component Value Date    MCH 31.2 01/14/2025    MCH 32.7 05/26/2020     Lab Results   Component Value Date    MCHC 29.9 01/14/2025    MCHC 31.1 05/26/2020     Lab Results   Component Value Date    RDW 19.4 01/14/2025    RDW 16.0 05/26/2020     Lab Results   Component Value Date     01/14/2025     05/26/2020     Last Comprehensive Metabolic Panel:  Sodium   Date Value Ref Range Status   01/25/2025 139 135 - 145 mmol/L Final   05/26/2020 138 133 - 144 mmol/L Final     Potassium   Date Value Ref Range Status   01/25/2025 5.6 (H) 3.4 - 5.3 mmol/L Final   01/27/2022 4.5 3.4 - 5.3 mmol/L Final   05/26/2020 4.1 3.4 - 5.3 mmol/L Final     Chloride   Date Value Ref Range Status   01/25/2025 98 98 - 107 mmol/L Final   01/27/2022 101 94 - 109 mmol/L Final   05/26/2020 98 94 - 109 mmol/L Final     Carbon Dioxide   Date Value Ref Range Status   05/26/2020 28 20 - 32 mmol/L " Final     Carbon Dioxide (CO2)   Date Value Ref Range Status   01/25/2025 29 22 - 29 mmol/L Final   01/27/2022 28 20 - 32 mmol/L Final     Anion Gap   Date Value Ref Range Status   01/25/2025 12 7 - 15 mmol/L Final   01/27/2022 8 3 - 14 mmol/L Final   05/26/2020 12 3 - 14 mmol/L Final     Glucose   Date Value Ref Range Status   01/25/2025 164 (H) 70 - 99 mg/dL Final   01/27/2022 108 (H) 70 - 99 mg/dL Final   05/26/2020 107 (H) 70 - 99 mg/dL Final     GLUCOSE BY METER POCT   Date Value Ref Range Status   08/02/2023 105 (H) 70 - 99 mg/dL Final     Comment:     /RN Notified     Urea Nitrogen   Date Value Ref Range Status   01/25/2025 28.2 (H) 8.0 - 23.0 mg/dL Final   01/27/2022 18 7 - 30 mg/dL Final   05/26/2020 23 7 - 30 mg/dL Final     Creatinine   Date Value Ref Range Status   01/25/2025 6.84 (H) 0.51 - 0.95 mg/dL Final   05/26/2020 7.50 (H) 0.52 - 1.04 mg/dL Final     GFR Estimate   Date Value Ref Range Status   01/25/2025 6 (L) >60 mL/min/1.73m2 Final     Comment:     eGFR calculated using 2021 CKD-EPI equation.   05/26/2020 5 (L) >60 mL/min/[1.73_m2] Final     Comment:     Non  GFR Calc  Starting 12/18/2018, serum creatinine based estimated GFR (eGFR) will be   calculated using the Chronic Kidney Disease Epidemiology Collaboration   (CKD-EPI) equation.       GFR, ESTIMATED POCT   Date Value Ref Range Status   07/27/2021 5 (L) >60 mL/min/1.73m2 Final     Calcium   Date Value Ref Range Status   01/25/2025 9.6 8.8 - 10.4 mg/dL Final     Comment:     Reference intervals for this test were updated on 7/16/2024 to reflect our healthy population more accurately. There may be differences in the flagging of prior results with similar values performed with this method. Those prior results can be interpreted in the context of the updated reference intervals.   05/26/2020 8.8 8.5 - 10.1 mg/dL Final     Bilirubin Total   Date Value Ref Range Status   01/14/2025 0.5 <=1.2 mg/dL Final   05/26/2020 1.0 0.2 -  1.3 mg/dL Final     Alkaline Phosphatase   Date Value Ref Range Status   01/14/2025 133 40 - 150 U/L Final   05/26/2020 136 40 - 150 U/L Final     ALT   Date Value Ref Range Status   01/14/2025 17 0 - 50 U/L Final   05/26/2020 20 0 - 50 U/L Final     AST   Date Value Ref Range Status   01/14/2025 31 0 - 45 U/L Final   05/26/2020 21 0 - 45 U/L Final     INR   Date Value Ref Range Status   01/14/2025 1.28 (H) 0.85 - 1.15 Final   05/26/2020 1.31 (H) 0.86 - 1.14 Final      AFP: 97    Imaging:    MRI: I reviewed the MRI from 2/13/2025 which demonstrates:    1. Hepatic cirrhosis and excess iron deposition.  2. Segment 8 observation suspicious for hepatocellular carcinoma.  LR-5.   3. Multiple additional LR-2/3 observations. These observations are  most likely to be vascular shunts or transient perfusion differences.  4. Based on this exam only, the patient is within Mejia criteria.  5. Cholelithiasis.    A/P:    85 yo with cirrhosis and HCC (1.6 cm segment 8) LR-5. ECOG 1, BCLC 0 (without consideration of ECOG.    I explained that given location and size, microwave ablation would be the best liver directed therapy option. The lesion is visible on ultrasound. She will need a PAC visit for pre-op H&P and risk factor assessment.

## 2025-02-25 NOTE — LETTER
"2/25/2025       RE: Rachele Martínez  [address]  [facility] [address]     Dear Colleague,    Thank you for referring your patient, Rachele Martínez, to the [facility]. Please see a copy of my visit note below.    Ms. Martínez is a very pleasant 83 yo female who is referred for discussion of liver directed treatment for newly diagnosed HCC in the right lobe of the liver. She has a history of ESRD on HD, previous HCV and compensated cirrhosis (SVR). She is accompanied by her sister and daughter. She gets around with a walker, otherwise functional. Relatively recent hospitalization for GI bleeding, no current bleeding.    PE: alert, nad  /56 (BP Location: Left arm, Patient Position: Sitting, Cuff Size: Adult Regular)   Pulse 81   Wt 54 kg (119 lb)   SpO2 97%   BMI 20.42 kg/m      Labs:    Lab Results   Component Value Date    WBC 5.4 01/14/2025    WBC 5.5 05/26/2020     Lab Results   Component Value Date    RBC 3.59 01/14/2025    RBC 3.92 05/26/2020     Lab Results   Component Value Date    HGB 12.9 01/25/2025    HGB 12.8 05/26/2020     Lab Results   Component Value Date    HCT 37.4 01/14/2025    HCT 41.1 05/26/2020     No components found for: \"MCT\"  Lab Results   Component Value Date     01/14/2025     05/26/2020     Lab Results   Component Value Date    MCH 31.2 01/14/2025    MCH 32.7 05/26/2020     Lab Results   Component Value Date    MCHC 29.9 01/14/2025    MCHC 31.1 05/26/2020     Lab Results   Component Value Date    RDW 19.4 01/14/2025    RDW 16.0 05/26/2020     Lab Results   Component Value Date     01/14/2025     05/26/2020     Last Comprehensive Metabolic Panel:  Sodium   Date Value Ref Range Status   01/25/2025 139 135 - 145 mmol/L Final   05/26/2020 138 133 - 144 mmol/L Final     Potassium   Date Value Ref Range Status   01/25/2025 5.6 (H) 3.4 - 5.3 mmol/L " Final   01/27/2022 4.5 3.4 - 5.3 mmol/L Final   05/26/2020 4.1 3.4 - 5.3 mmol/L Final     Chloride   Date Value Ref Range Status   01/25/2025 98 98 - 107 mmol/L Final   01/27/2022 101 94 - 109 mmol/L Final   05/26/2020 98 94 - 109 mmol/L Final     Carbon Dioxide   Date Value Ref Range Status   05/26/2020 28 20 - 32 mmol/L Final     Carbon Dioxide (CO2)   Date Value Ref Range Status   01/25/2025 29 22 - 29 mmol/L Final   01/27/2022 28 20 - 32 mmol/L Final     Anion Gap   Date Value Ref Range Status   01/25/2025 12 7 - 15 mmol/L Final   01/27/2022 8 3 - 14 mmol/L Final   05/26/2020 12 3 - 14 mmol/L Final     Glucose   Date Value Ref Range Status   01/25/2025 164 (H) 70 - 99 mg/dL Final   01/27/2022 108 (H) 70 - 99 mg/dL Final   05/26/2020 107 (H) 70 - 99 mg/dL Final     GLUCOSE BY METER POCT   Date Value Ref Range Status   08/02/2023 105 (H) 70 - 99 mg/dL Final     Comment:     Dr/RN Notified     Urea Nitrogen   Date Value Ref Range Status   01/25/2025 28.2 (H) 8.0 - 23.0 mg/dL Final   01/27/2022 18 7 - 30 mg/dL Final   05/26/2020 23 7 - 30 mg/dL Final     Creatinine   Date Value Ref Range Status   01/25/2025 6.84 (H) 0.51 - 0.95 mg/dL Final   05/26/2020 7.50 (H) 0.52 - 1.04 mg/dL Final     GFR Estimate   Date Value Ref Range Status   01/25/2025 6 (L) >60 mL/min/1.73m2 Final     Comment:     eGFR calculated using 2021 CKD-EPI equation.   05/26/2020 5 (L) >60 mL/min/[1.73_m2] Final     Comment:     Non  GFR Calc  Starting 12/18/2018, serum creatinine based estimated GFR (eGFR) will be   calculated using the Chronic Kidney Disease Epidemiology Collaboration   (CKD-EPI) equation.       GFR, ESTIMATED POCT   Date Value Ref Range Status   07/27/2021 5 (L) >60 mL/min/1.73m2 Final     Calcium   Date Value Ref Range Status   01/25/2025 9.6 8.8 - 10.4 mg/dL Final     Comment:     Reference intervals for this test were updated on 7/16/2024 to reflect our healthy population more accurately. There may be  differences in the flagging of prior results with similar values performed with this method. Those prior results can be interpreted in the context of the updated reference intervals.   05/26/2020 8.8 8.5 - 10.1 mg/dL Final     Bilirubin Total   Date Value Ref Range Status   01/14/2025 0.5 <=1.2 mg/dL Final   05/26/2020 1.0 0.2 - 1.3 mg/dL Final     Alkaline Phosphatase   Date Value Ref Range Status   01/14/2025 133 40 - 150 U/L Final   05/26/2020 136 40 - 150 U/L Final     ALT   Date Value Ref Range Status   01/14/2025 17 0 - 50 U/L Final   05/26/2020 20 0 - 50 U/L Final     AST   Date Value Ref Range Status   01/14/2025 31 0 - 45 U/L Final   05/26/2020 21 0 - 45 U/L Final     INR   Date Value Ref Range Status   01/14/2025 1.28 (H) 0.85 - 1.15 Final   05/26/2020 1.31 (H) 0.86 - 1.14 Final      AFP: 97    Imaging:    MRI: I reviewed the MRI from 2/13/2025 which demonstrates:    1. Hepatic cirrhosis and excess iron deposition.  2. Segment 8 observation suspicious for hepatocellular carcinoma.  LR-5.   3. Multiple additional LR-2/3 observations. These observations are  most likely to be vascular shunts or transient perfusion differences.  4. Based on this exam only, the patient is within Mejia criteria.  5. Cholelithiasis.    A/P:    85 yo with cirrhosis and HCC (1.6 cm segment 8) LR-5. ECOG 1, BCLC 0 (without consideration of ECOG.    I explained that given location and size, microwave ablation would be the best liver directed therapy option. The lesion is visible on ultrasound. She will need a PAC visit for pre-op H&P and risk factor assessment.       Again, thank you for allowing me to participate in the care of your patient.      Sincerely,    Jalen Mariscal MD

## 2025-02-25 NOTE — PATIENT INSTRUCTIONS
Rachele you have had your consult with Dr Mariscal regarding treatment of your HCC.     Plan:    We will contact you to schedule your CT liver ablation procedure.     Thanks,     MALLORY Payan RN, BSN  Interventional Radiology Care Coordinator   Phone:  [phone]

## 2025-02-27 ENCOUNTER — ANCILLARY PROCEDURE (OUTPATIENT)
Dept: CT IMAGING | Facility: CLINIC | Age: 85
End: 2025-02-27
Attending: PHYSICIAN ASSISTANT
Payer: MEDICARE

## 2025-02-27 DIAGNOSIS — C22.0 HCC (HEPATOCELLULAR CARCINOMA) (H): ICD-10-CM

## 2025-02-27 PROCEDURE — 71250 CT THORAX DX C-: CPT | Mod: GC | Performed by: RADIOLOGY

## 2025-03-11 ENCOUNTER — INFUSION THERAPY VISIT (OUTPATIENT)
Dept: INFUSION THERAPY | Facility: CLINIC | Age: 85
End: 2025-03-11
Attending: INTERNAL MEDICINE
Payer: MEDICARE

## 2025-03-11 VITALS — HEART RATE: 77 BPM | SYSTOLIC BLOOD PRESSURE: 115 MMHG | OXYGEN SATURATION: 97 % | DIASTOLIC BLOOD PRESSURE: 49 MMHG

## 2025-03-11 DIAGNOSIS — D50.0 IRON DEFICIENCY ANEMIA DUE TO CHRONIC BLOOD LOSS: ICD-10-CM

## 2025-03-11 DIAGNOSIS — K31.811 ANGIODYSPLASIA OF STOMACH AND DUODENUM WITH HEMORRHAGE: Primary | ICD-10-CM

## 2025-03-11 DIAGNOSIS — K55.20 AVM (ARTERIOVENOUS MALFORMATION) OF SMALL BOWEL, ACQUIRED: ICD-10-CM

## 2025-03-11 PROCEDURE — 96372 THER/PROPH/DIAG INJ SC/IM: CPT | Performed by: INTERNAL MEDICINE

## 2025-03-11 PROCEDURE — 99207 PR NO CHARGE LOS: CPT

## 2025-03-11 PROCEDURE — 250N000011 HC RX IP 250 OP 636: Mod: JZ | Performed by: INTERNAL MEDICINE

## 2025-03-11 RX ORDER — OCTREOTIDE ACETATE 20 MG
20 KIT INTRAMUSCULAR ONCE
Start: 2025-04-08 | End: 2025-04-08

## 2025-03-11 RX ORDER — OCTREOTIDE ACETATE 20 MG
20 KIT INTRAMUSCULAR ONCE
Status: COMPLETED | OUTPATIENT
Start: 2025-03-11 | End: 2025-03-11

## 2025-03-11 RX ADMIN — OCTREOTIDE ACETATE 20 MG: KIT at 11:51

## 2025-03-11 ASSESSMENT — PAIN SCALES - GENERAL: PAINLEVEL_OUTOF10: NO PAIN (0)

## 2025-03-11 NOTE — PROGRESS NOTES
Infusion Nursing Note:  Rachele Martínez presents today for Sandostatin.    Patient seen by provider today: No   present during visit today: Not Applicable.    Note: Assessment performed by LEESA Dsouza prior to injection today. .      Intravenous Access:  No Intravenous access/labs at this visit.    Treatment Conditions:  Not Applicable.      Post Infusion Assessment:  Patient tolerated injection without incident.  Site patent and intact, free from redness, edema or discomfort.       Discharge Plan:   Patient discharged in stable condition accompanied by: daughter.  Departure Mode: Wheelchair.      Theresa Baker MA

## 2025-03-13 ENCOUNTER — OFFICE VISIT (OUTPATIENT)
Dept: INTERNAL MEDICINE | Facility: CLINIC | Age: 85
End: 2025-03-13
Payer: MEDICARE

## 2025-03-13 VITALS
HEART RATE: 75 BPM | OXYGEN SATURATION: 98 % | TEMPERATURE: 98.3 F | RESPIRATION RATE: 18 BRPM | BODY MASS INDEX: 21.58 KG/M2 | SYSTOLIC BLOOD PRESSURE: 115 MMHG | HEIGHT: 64 IN | DIASTOLIC BLOOD PRESSURE: 65 MMHG | WEIGHT: 126.4 LBS

## 2025-03-13 DIAGNOSIS — Z01.818 PREOP GENERAL PHYSICAL EXAM: Primary | ICD-10-CM

## 2025-03-13 DIAGNOSIS — Z23 NEED FOR PROPHYLACTIC VACCINATION AGAINST HEPATITIS A: ICD-10-CM

## 2025-03-13 DIAGNOSIS — R94.31 ABNORMAL ELECTROCARDIOGRAM: ICD-10-CM

## 2025-03-13 DIAGNOSIS — N18.6 END STAGE RENAL DISEASE (H): ICD-10-CM

## 2025-03-13 DIAGNOSIS — Z23 NEED FOR PROPHYLACTIC VACCINATION AGAINST HEPATITIS B VIRUS: ICD-10-CM

## 2025-03-13 DIAGNOSIS — C22.0 HEPATOCELLULAR CARCINOMA (H): ICD-10-CM

## 2025-03-13 DIAGNOSIS — K55.20 AVM (ARTERIOVENOUS MALFORMATION) OF SMALL BOWEL, ACQUIRED: ICD-10-CM

## 2025-03-13 DIAGNOSIS — Z29.11 NEED FOR VACCINATION AGAINST RESPIRATORY SYNCYTIAL VIRUS: ICD-10-CM

## 2025-03-13 PROCEDURE — 3078F DIAST BP <80 MM HG: CPT

## 2025-03-13 PROCEDURE — 3074F SYST BP LT 130 MM HG: CPT

## 2025-03-13 PROCEDURE — 99214 OFFICE O/P EST MOD 30 MIN: CPT

## 2025-03-13 NOTE — PATIENT INSTRUCTIONS
How to Take Your Medication Before Surgery  Preoperative Medication Instructions   Antiplatelet or Anticoagulation Medication Instructions   - We reviewed the medication list and the patient is not on an antiplatelet or anticoagulation medications.    Additional Medication Instructions  Take all scheduled medications on the day of surgery EXCEPT for modifications listed below:   - Herbal medications and vitamins: DO NOT TAKE 14 days prior to surgery.   - ACE/ARB/ARNI (lisinopril, enalapril, losartan, valsartan, olmesartan, sacubritril/valsartan) : DO NOT TAKE on day of surgery (minimum 11 hours for general anesthesia).   - phosphate binders (Phoslo, Renagel): DO NOT TAKE day of surgery.       Patient Education   Preparing for Your Surgery  For Adults  Getting started  In most cases, a nurse will call to review your health history and instructions. They will give you an arrival time based on your scheduled surgery time. Please be ready to share:  Your doctor's clinic name and phone number  Your medical, surgical, and anesthesia history  A list of allergies and sensitivities  A list of medicines, including herbal treatments and over-the-counter drugs  Whether the patient has a legal guardian (ask how to send us the papers in advance)  Note: You may not receive a call if you were seen at our PAC (Preoperative Assessment Center).  Please tell us if you're pregnant--or if there's any chance you might be pregnant. Some surgeries may injure a fetus (unborn baby), so they require a pregnancy test. Surgeries that are safe for a fetus don't always need a test, and you can choose whether to have one.   Preparing for surgery  Within 10 to 30 days of surgery: Have a pre-op exam (sometimes called an H&P, or History and Physical). This can be done at a clinic or pre-operative center.  If you're having a , you may not need this exam. Talk to your care team.  At your pre-op exam, talk to your care team about all medicines  you take. (This includes CBD oil and any drugs, such as THC, marijuana, and other forms of cannabis.) If you need to stop any medicine before surgery, ask when to start taking it again.  This is for your safety. Many medicines and drugs can make you bleed too much during surgery. Some change how well surgery (anesthesia) drugs work.  Call your insurance company to let them know you're having surgery. (If you don't have insurance, call [phone].)  Call your clinic if there's any change in your health. This includes a scrape or scratch near the surgery site, or any signs of a cold (sore throat, runny nose, cough, rash, fever).  Eating and drinking guidelines  For your safety: Unless your surgeon tells you otherwise, follow the guidelines below.  Eat and drink as normal until 8 hours before you arrive for surgery. After that, no food or milk. You can spit out gum when you arrive.  Drink clear liquids until 2 hours before you arrive. These are liquids you can see through, like water, Gatorade, and Propel Water. They also include plain black coffee and tea (no cream or milk).  No alcohol for 24 hours before you arrive. The night before surgery, stop any drinks that contain THC.  If your care team tells you to take medicine on the morning of surgery, it's okay to take it with a sip of water. No other medicines or drugs are allowed (including CBD oil)--follow your care team's instructions.  If you have questions the day of surgery, call your hospital or surgery center.   Preventing infection  Shower or bathe the night before and the morning of surgery. Follow the instructions your clinic gave you. (If no instructions, use regular soap.)  Don't shave or clip hair near your surgery site. We'll remove the hair if needed.  Don't smoke or vape the morning of surgery. No chewing tobacco for 6 hours before you arrive. A nicotine patch is okay. You may spit out nicotine gum when you arrive.  For some surgeries, the surgeon  will tell you to fully quit smoking and nicotine.  We will make every effort to keep you safe from infection. We will:  Clean our hands often with soap and water (or an alcohol-based hand rub).  Clean the skin at your surgery site with a special soap that kills germs.  Give you a special gown to keep you warm. (Cold raises the risk of infection.)  Wear hair covers, masks, gowns, and gloves during surgery.  Give antibiotic medicine, if prescribed. Not all surgeries need this medicine.  What to bring on the day of surgery  Photo ID and insurance card  Copy of your health care directive, if you have one  Glasses and hearing aids (bring cases)  You can't wear contacts during surgery  Inhaler and eye drops, if you use them (tell us about these when you arrive)  CPAP machine or breathing device, if you use them  A few personal items, if spending the night  If you have . . .  A pacemaker, ICD (cardiac defibrillator), or other implant: Bring the ID card.  An implanted stimulator: Bring the remote control.  A legal guardian: Bring a copy of the certified (court-stamped) guardianship papers.  Please remove any jewelry, including body piercings. Leave jewelry and other valuables at home.  If you're going home the day of surgery  You must have a responsible adult drive you home. They should stay with you overnight as well.  If you don't have someone to stay with you, and you aren't safe to go home alone, we may keep you overnight. Insurance often won't pay for this.  After surgery  If it's hard to control your pain or you need more pain medicine, please call your surgeon's office.  Questions?   If you have any questions for your care team, list them here:   ____________________________________________________________________________________________________________________________________________________________________________________________________________________________________________________________  For informational  purposes only. Not to replace the advice of your health care provider. Copyright   2003, 2019 [facility]. All rights reserved. Clinically reviewed by Niels Coyle MD. VCE [id number] - REV 08/24.

## 2025-03-13 NOTE — PROGRESS NOTES
Preoperative Evaluation  [facility] INTERNAL MEDICINE [address]  4TH [facility] [address]  Phone: [phone]  Fax: [phone]  Primary Provider: Agnieszka Rodriguez MD  Pre-op Performing Provider: Irene Quintana NP  Mar 13, 2025   {Provider  Link to PREOP SmartSet  REQUIRED to apply standard patient instructions and medication directions to the AVS :034380}  {ROOMER review and update patient entered surgical information if needed :006099}        3/13/2025   Surgical Information   What procedure is being done? CT Guided Liver Ablation    Facility or Hospital where procedure/surgery will be performed: [facility]    Who is doing the procedure / surgery? Jalen Mariscal MD    Date of surgery / procedure: 03/19/2025    Time of surgery / procedure: 1:00 PM    Where do you plan to recover after surgery? In assisted living facility       Proxy-reported     Fax number for surgical facility: Note does not need to be faxed, will be available electronically in Epic.    {Provider Charting Preference for Preop :815708}    Tae Jeffrey is a 84 year old, presenting for the following:  Pre-Op Exam (Pt here for pre-op)          3/13/2025     9:25 AM   Additional Questions   Roomed by [facility]       Rachele Martínez presents to the clinic today for a preoperative visit. She has a history of hypertension, hyperlipidemia, cirrhosis, hepatitis C, ESRD on dialysis (M, W, F), secondary hyperparathyroidism, ABIGAIL, AVM of small bowel, peripheral vascular disease, cataracts, depression, and anxiety    History of hepatocellular carcinoma. Planning for CT guided liver ablation.         3/13/2025   Pre-Op Questionnaire   Have you ever had a heart attack or stroke? No    Have you ever had surgery on your heart or blood vessels, such as a stent placement, a coronary artery bypass, or surgery on an artery in your head, neck, heart, or legs? No    Do you have chest pain with  activity? No    Do you have a history of heart failure? No    Do you currently have a cold, bronchitis or symptoms of other infection? No    Do you have a cough, shortness of breath, or wheezing? No    Do you or anyone in your family have previous history of blood clots? No    Do you or does anyone in your family have a serious bleeding problem such as prolonged bleeding following surgeries or cuts? No    Have you ever had problems with anemia or been told to take iron pills? (!) YES - known anemia, receives iron infusions    Have you had any abnormal blood loss such as black, tarry or bloody stools, or abnormal vaginal bleeding? (!) YES - bleeding AVM, has octreotide monthly    Have you ever had a blood transfusion? (!) YES    Have you ever had a transfusion reaction? (!) YES  - needs benadryl prior    Are you willing to have a blood transfusion if it is medically needed before, during, or after your surgery? Yes    Have you or any of your relatives ever had problems with anesthesia? No    Do you have sleep apnea, excessive snoring or daytime drowsiness? No    Do you have any artifical heart valves or other implanted medical devices like a pacemaker, defibrillator, or continuous glucose monitor? No , pt has AV graft in right arm for dialysis   Do you have artificial joints? No    Are you allergic to latex? No        Proxy-reported     Health Care Directive  Patient has a Health Care Directive on file    Preoperative Review of    reviewed - controlled substances reflected in medication list. -- gabapentin      Patient Active Problem List    Diagnosis Date Noted    Generalized weakness 07/20/2023     Priority: Medium    Generalized muscle weakness 05/12/2023     Priority: Medium    Pain of left lower leg 05/12/2023     Priority: Medium    Chronic bilateral low back pain without sciatica 05/12/2023     Priority: Medium    Peripheral vascular disease 10/30/2020     Priority: Medium    Spinal stenosis of lumbar  region with neurogenic claudication 06/12/2019     Priority: Medium    Angiodysplasia of stomach and duodenum with hemorrhage 08/29/2016     Priority: Medium    Tendency to fall 07/20/2016     Priority: Medium    Secondary hyperparathyroidism of renal origin 07/01/2016     Priority: Medium    Coagulation defect, unspecified 06/29/2016     Priority: Medium    Other disorders of bone development and growth, other site 06/29/2016     Priority: Medium    End stage renal disease (H) 05/05/2016     Priority: Medium     Nephrolog[facility] Dr. Traango  [phone], fax [phone]        Unspecified cirrhosis of liver (H) 03/31/2016     Priority: Medium    Congenital arteriovenous malformation 03/31/2016     Priority: Medium    AVM (arteriovenous malformation) of small bowel, acquired 03/14/2016     Priority: Medium    Anemia of chronic disease 03/02/2016     Priority: Medium    Iron deficiency anemia due to chronic blood loss 01/31/2016     Priority: Medium     Due to AVMs      History of hepatitis C 07/28/2015     Priority: Medium     SVR, 7/2015      Hyperlipidemia with target LDL less than 130 02/27/2015     Priority: Medium     Diagnosis updated by automated process. Provider to review and confirm.      Cataract 11/23/2012     Priority: Medium     Right   Problem list name updated by automated process. Provider to review      Depression with anxiety 11/23/2012     Priority: Medium     Problem list name updated by automated process. Provider to review      Hypertension goal BP (blood pressure) < 140/80 08/02/2003     Priority: Medium      Past Medical History:   Diagnosis Date    Anemia     Anxiety and depression     Arthritis     AVM (arteriovenous malformation)     Chronic hepatitis C with cirrhosis (H)     Clotting disorder     ESRD (end stage renal disease) (H)     on dialysis    GI bleed     recurrent    Glaucoma     Hyperlipidemia     Hypertension goal BP (blood pressure) < 140/80      Type 2 diabetes mellitus with diabetic polyneuropathy, without long-term current use of insulin (H) 1/19/2024     Past Surgical History:   Procedure Laterality Date    CAPSULE/PILL CAM ENDOSCOPY N/A 3/27/2019    Procedure: Capsule/pill cam endoscopy;  Surgeon: Yosvany Ram MD;  Location: [facility]    CAPSULE/PILL CAM ENDOSCOPY N/A 2/2/2023    Procedure: IMAGING PROCEDURE, GI TRACT, INTRALUMINAL, VIA CAPSULE;  Surgeon: Mulu Nur DO;  Location: [facility]    COLONOSCOPY N/A 9/4/2015    Procedure: COMBINED COLONOSCOPY, SINGLE OR MULTIPLE BIOPSY/POLYPECTOMY BY BIOPSY;  Surgeon: Rupesh Lopez MD;  Location: [facility]    COLONOSCOPY N/A 9/19/2018    Procedure: COLONOSCOPY;  enteroscopy small bowel  COLONOSCOPY;  Surgeon: Ankit Baires MD;  Location: [facility]    ENTEROSCOPY SMALL BOWEL N/A 3/9/2023    Procedure: antegrade single balloon enteroscopy with argon plasma coagulation of arteriovenous malformations;  Surgeon: Ankit Baires MD;  Location: [facility]    ESOPHAGOSCOPY, GASTROSCOPY, DUODENOSCOPY (EGD), COMBINED N/A 12/18/2014    Procedure: COMBINED ESOPHAGOSCOPY, GASTROSCOPY, DUODENOSCOPY (EGD);  Surgeon: Betsy Carvajal MD;  Location: [facility]    ESOPHAGOSCOPY, GASTROSCOPY, DUODENOSCOPY (EGD), COMBINED N/A 4/25/2015    Procedure: COMBINED ESOPHAGOSCOPY, GASTROSCOPY, DUODENOSCOPY (EGD);  Surgeon: Yosvany Ram MD;  Location: [facility]    ESOPHAGOSCOPY, GASTROSCOPY, DUODENOSCOPY (EGD), COMBINED N/A 5/5/2015    Procedure: COMBINED ESOPHAGOSCOPY, GASTROSCOPY, DUODENOSCOPY (EGD);  Surgeon: Mariano Mistry MD;  Location: [facility]    HC CAPSULE ENDOSCOPY N/A 9/30/2015    Procedure: CAPSULE/PILL CAM ENDOSCOPY;  Surgeon: Pan Dhaliwal MD;  Location: [facility]    HC VASCULAR SURGERY PROCEDURE UNLIST      HYSTERECTOMY  1980    KYREE    LUMPECTOMY BREAST       Current Outpatient Medications   Medication Sig Dispense Refill    acetaminophen (TYLENOL) 325 MG tablet Take 325-650 mg by mouth every 6 hours as needed  for mild pain      amLODIPine (NORVASC) 10 MG tablet Take 0.5 tablets (5 mg) by mouth 2 times daily.      atorvastatin (LIPITOR) 20 MG tablet 1 TABLET ORALLY DAILY (DX: HYPERLIPIDEMIA) 30 tablet 11    gabapentin (NEURONTIN) 300 MG capsule Take 1 capsule (300 mg) by mouth at bedtime. For additional refills, please schedule a follow-up appointment at [phone] 30 capsule 3    levothyroxine (SYNTHROID/LEVOTHROID) 25 MCG tablet Take 1 tablet (25 mcg) by mouth daily. For additional refills, please schedule a follow-up appointment at [phone], lab due 30 tablet 1    losartan (COZAAR) 100 MG tablet 1 TABLET ORALLY DAILY (DX: HYPERTENSION) 30 tablet 11    melatonin 3 MG tablet 1 TABLET ORALLY AT BEDTIME (DX: INSOMNIA) 28 tablet 11    Multiple Vitamin (TAB-A-TABATHA) TABS 1 TABLET ORALLY DAILY (DX: SUPPLEMENT) 28 tablet 4    Multiple Vitamins-Minerals (PRORENAL + D) TABS Take 1 tablet by mouth daily at 2 pm      pantoprazole (PROTONIX) 20 MG EC tablet 1 TABLET ORALLY 2 TIMES DAILY 30-60 MINUTES BEFORE A MEAL (DX: INDIGESTION) 60 tablet 11    sertraline (ZOLOFT) 50 MG tablet Take 2 tablets (100 mg) by mouth daily. 60 tablet 11    sucroferric oxyhydroxide (VELPHORO) 500 MG CHEW chewable tablet Take 500 mg by mouth 2 times daily         Allergies   Allergen Reactions    Abacavir Itching    Lisinopril Cough    Dust Mites     Hydrochlorothiazide Itching     Severe      No Clinical Screening - See Comments      History of blood transfusion reactions and pre-treats with Benadryl.     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           Review of Systems  Constitutional, HEENT, cardiovascular, pulmonary, gi and gu systems are negative, except as otherwise noted.    Objective    /65 (BP Location: Left arm, Patient Position: Sitting, Cuff Size: Adult Regular)   Pulse 75   Temp 98.3  F (36.8  C) (Oral)   Resp 18   Ht 1.626 m (5' 4.02\")   Wt 57.3 kg (126 lb 6.4 oz)   SpO2 98%   BMI 21.68 kg/m     Estimated body mass index is 21.68 kg/m  as calculated from the following:    Height as of this encounter: 1.626 m (5' 4.02\").    Weight as of this encounter: 57.3 kg (126 lb 6.4 oz).  Physical Exam  {Exam List :167624}    Recent Labs   Lab Test 01/25/25  1118 01/14/25  1159   HGB 12.9 11.2*   PLT  --  181   INR  --  1.28*    139   POTASSIUM 5.6* 4.3   CR 6.84* 6.37*        Diagnostics  ***   No EKG required, no history of coronary heart disease, significant arrhythmia, peripheral arterial disease or other structural heart disease.    Revised Cardiac Risk Index (RCRI)  The patient has the following serious cardiovascular risks for perioperative complications:   - Serum Creatinine >2.0 mg/dl = 1 point     RCRI Interpretation: 1 point: Class II (low risk - 0.9% complication rate)         Signed Electronically by: Irene Quintana NP  A copy of this evaluation report is provided to the requesting physician.    {Provider Resources  Preop [facility] Guidelines  Revised Cardiac Risk Index :674614}   {Email feedback regarding this note to [email or web address]   :246431}  " " C) (Oral)   Resp 18   Ht 1.626 m (5' 4.02\")   Wt 57.3 kg (126 lb 6.4 oz)   SpO2 98%   BMI 21.68 kg/m     Estimated body mass index is 21.68 kg/m  as calculated from the following:    Height as of this encounter: 1.626 m (5' 4.02\").    Weight as of this encounter: 57.3 kg (126 lb 6.4 oz).  Physical Exam  GENERAL: alert and no distress  EYES: right eye ptosis  HENT: ear canals and TM's normal, nose and mouth without ulcers or lesions  NECK: no adenopathy, no asymmetry, masses, or scars  RESP: lungs clear to auscultation - no rales, rhonchi or wheezes  CV: regular rates and rhythm, systolic murmur, no peripheral edema  ABDOMEN: soft, nontender, no hepatosplenomegaly, no masses and bowel sounds normal  MS: uses assistive device for ambulation, no gross musculoskeletal defects noted, no edema, strength 4/5 throughout  NEURO: forgetful, answers questions appropriately  PSYCH: mentation appears normal, affect normal/bright    Recent Labs   Lab Test 01/25/25  1118 01/14/25  1159   HGB 12.9 11.2*   PLT  --  181   INR  --  1.28*    139   POTASSIUM 5.6* 4.3   CR 6.84* 6.37*      Lab Results   Component Value Date    AST 31 01/14/2025    AST 21 05/26/2020     Lab Results   Component Value Date    ALT 17 01/14/2025    ALT 20 05/26/2020     No results found for: \"BILICONJ\"   Lab Results   Component Value Date    BILITOTAL 0.5 01/14/2025    BILITOTAL 1.0 05/26/2020     Lab Results   Component Value Date    ALBUMIN 4.1 01/14/2025    ALBUMIN 4.1 01/27/2022    ALBUMIN 3.8 05/26/2020     Lab Results   Component Value Date    PROTTOTAL 7.3 01/14/2025    PROTTOTAL 8.7 05/26/2020      Lab Results   Component Value Date    ALKPHOS 133 01/14/2025    ALKPHOS 136 05/26/2020        Diagnostics  No labs ordered during this visit; completed frequently through dialysis   No EKG required, no history of coronary heart disease, significant arrhythmia, or other structural heart disease.     Echo result w/o MOPS: Interpretation " SummaryHyperdynamic LV function, LVEF=65-70% with sigmoid septum resulting insystolic anterior motion of the mitral valve without significant LVOTobstruction. Trileaflet aortic sclerosis without stenosis.While neither ofthese findings is hemodynamically significant, both represent a plausiblebasis for a systolic murmur. Both findings were also present on the priorstudy from 3/8/2018.No significant valvular abnormalities are noted. This study was compared with the study from 3/26/19 and 3/8/18: LVEF isincreased from 3/26/19 and is now hyperdynamic, resulting in more prominentSAM. Findings are unchanged from 3/8/18. The regional wall motion abnormalabnormalities described on the report from 3/26/2019 are not appreciated ontoday's study and are also not appreciated on direct review of the images fromthe prior study.     Revised Cardiac Risk Index (RCRI)  The patient has the following serious cardiovascular risks for perioperative complications:   - Serum Creatinine >2.0 mg/dl = 1 point     RCRI Interpretation: 1 point: Class II (low risk - 0.9% complication rate)         Signed Electronically by: Irene Quintana NP  A copy of this evaluation report is provided to the requesting physician.

## 2025-03-17 ENCOUNTER — APPOINTMENT (OUTPATIENT)
Dept: CARDIOLOGY | Facility: CLINIC | Age: 85
End: 2025-03-17
Payer: MEDICARE

## 2025-03-17 ENCOUNTER — MYC MEDICAL ADVICE (OUTPATIENT)
Dept: INTERNAL MEDICINE | Facility: CLINIC | Age: 85
End: 2025-03-17
Payer: MEDICARE

## 2025-03-17 ENCOUNTER — DOCUMENTATION ONLY (OUTPATIENT)
Dept: LAB | Facility: CLINIC | Age: 85
End: 2025-03-17

## 2025-03-17 NOTE — TELEPHONE ENCOUNTER
Per message/check out - assist this patient in scheduling an EKG to be completed prior to her procedure on the 19th   Spoke with the patients daughter, and she was unable to schedule at the time of the call. Sent myc with scheduling info, and will call back later today to schedule urgent lab appt

## 2025-03-17 NOTE — TELEPHONE ENCOUNTER
Patient and daughter confirmed scheduled appointment:  Date: 3/17  Time: 3:30pm  Visit type: Lab  Provider: Ordered by Irene Quintana  Location:  Lab

## 2025-03-17 NOTE — PROGRESS NOTES
Rachele Martínez has an upcoming lab appointment:    Future Appointments   Date Time Provider Department Center   3/17/2025  3:30 PM LAB FIRST FLOOR [facility] [place]   3/19/2025  1:00 PM [id number] [place] [address]   4/8/2025 11:00 AM [facility] [place]   5/6/2025 11:30 AM [facility] [place]   6/3/2025 11:30 AM [facility] [place]   7/1/2025 11:30 AM [facility] [place]   7/22/2025 10:30 AM Roberto Gordon PA-C [facility]   7/22/2025  1:00 PM Agnieszka Rodriguez MD [facility]     Patient is scheduled for the following lab(s): Pt is requesting labs for TODAY@3;30 with orders that are not signed and released. Please release and sing these orders ASAP so we may use those orders for her lab appt today.     Thank you, Cherrie

## 2025-03-18 LAB
ATRIAL RATE - MUSE: 82 BPM
DIASTOLIC BLOOD PRESSURE - MUSE: NORMAL MMHG
INTERPRETATION ECG - MUSE: NORMAL
P AXIS - MUSE: 71 DEGREES
PR INTERVAL - MUSE: 206 MS
QRS DURATION - MUSE: 104 MS
QT - MUSE: 406 MS
QTC - MUSE: 474 MS
R AXIS - MUSE: -8 DEGREES
SYSTOLIC BLOOD PRESSURE - MUSE: NORMAL MMHG
T AXIS - MUSE: 92 DEGREES
VENTRICULAR RATE- MUSE: 82 BPM

## 2025-03-19 ENCOUNTER — ANESTHESIA EVENT (OUTPATIENT)
Dept: SURGERY | Facility: CLINIC | Age: 85
End: 2025-03-19
Payer: MEDICARE

## 2025-03-19 ENCOUNTER — HOSPITAL ENCOUNTER (OUTPATIENT)
Facility: CLINIC | Age: 85
Discharge: HOME | End: 2025-03-19
Attending: RADIOLOGY | Admitting: RADIOLOGY
Payer: MEDICARE

## 2025-03-19 ENCOUNTER — APPOINTMENT (OUTPATIENT)
Dept: INTERVENTIONAL RADIOLOGY/VASCULAR | Facility: CLINIC | Age: 85
End: 2025-03-19
Attending: RADIOLOGY
Payer: MEDICARE

## 2025-03-19 ENCOUNTER — ANESTHESIA (OUTPATIENT)
Dept: SURGERY | Facility: CLINIC | Age: 85
End: 2025-03-19
Payer: MEDICARE

## 2025-03-19 VITALS
RESPIRATION RATE: 17 BRPM | WEIGHT: 125.44 LBS | DIASTOLIC BLOOD PRESSURE: 52 MMHG | SYSTOLIC BLOOD PRESSURE: 144 MMHG | OXYGEN SATURATION: 100 % | BODY MASS INDEX: 21.52 KG/M2 | TEMPERATURE: 97.9 F | HEART RATE: 77 BPM

## 2025-03-19 DIAGNOSIS — C22.0 HCC (HEPATOCELLULAR CARCINOMA) (H): ICD-10-CM

## 2025-03-19 LAB
ABO + RH BLD: NORMAL
ALBUMIN SERPL BCG-MCNC: 4.1 G/DL (ref 3.5–5.2)
ALP SERPL-CCNC: 139 U/L (ref 40–150)
ALT SERPL W P-5'-P-CCNC: 14 U/L (ref 0–50)
ANION GAP SERPL CALCULATED.3IONS-SCNC: 14 MMOL/L (ref 7–15)
AST SERPL W P-5'-P-CCNC: 26 U/L (ref 0–45)
BILIRUB DIRECT SERPL-MCNC: 0.22 MG/DL (ref 0–0.3)
BILIRUB SERPL-MCNC: 0.4 MG/DL
BLD GP AB SCN SERPL QL: NEGATIVE
BUN SERPL-MCNC: 18.3 MG/DL (ref 8–23)
CALCIUM SERPL-MCNC: 9.7 MG/DL (ref 8.8–10.4)
CHLORIDE SERPL-SCNC: 101 MMOL/L (ref 98–107)
CREAT SERPL-MCNC: 5.41 MG/DL (ref 0.51–0.95)
EGFRCR SERPLBLD CKD-EPI 2021: 7 ML/MIN/1.73M2
ERYTHROCYTE [DISTWIDTH] IN BLOOD BY AUTOMATED COUNT: 16.9 % (ref 10–15)
GLUCOSE BLDC GLUCOMTR-MCNC: 118 MG/DL (ref 70–99)
GLUCOSE SERPL-MCNC: 101 MG/DL (ref 70–99)
HCO3 SERPL-SCNC: 24 MMOL/L (ref 22–29)
HCT VFR BLD AUTO: 35.1 % (ref 35–47)
HGB BLD-MCNC: 10.4 G/DL (ref 11.7–15.7)
INR PPP: 1.27 (ref 0.85–1.15)
MAGNESIUM SERPL-MCNC: 2 MG/DL (ref 1.7–2.3)
MCH RBC QN AUTO: 30.1 PG (ref 26.5–33)
MCHC RBC AUTO-ENTMCNC: 29.6 G/DL (ref 31.5–36.5)
MCV RBC AUTO: 101 FL (ref 78–100)
PHOSPHATE SERPL-MCNC: 3 MG/DL (ref 2.5–4.5)
PLATELET # BLD AUTO: 181 10E3/UL (ref 150–450)
POTASSIUM SERPL-SCNC: 4.5 MMOL/L (ref 3.4–5.3)
PROT SERPL-MCNC: 7.4 G/DL (ref 6.4–8.3)
RBC # BLD AUTO: 3.46 10E6/UL (ref 3.8–5.2)
SODIUM SERPL-SCNC: 139 MMOL/L (ref 135–145)
SPECIMEN EXP DATE BLD: NORMAL
WBC # BLD AUTO: 6.5 10E3/UL (ref 4–11)

## 2025-03-19 PROCEDURE — 250N000013 HC RX MED GY IP 250 OP 250 PS 637: Performed by: STUDENT IN AN ORGANIZED HEALTH CARE EDUCATION/TRAINING PROGRAM

## 2025-03-19 PROCEDURE — 250N000011 HC RX IP 250 OP 636: Performed by: ANESTHESIOLOGY

## 2025-03-19 PROCEDURE — 250N000011 HC RX IP 250 OP 636: Performed by: NURSE PRACTITIONER

## 2025-03-19 PROCEDURE — 80053 COMPREHEN METABOLIC PANEL: CPT | Performed by: NURSE PRACTITIONER

## 2025-03-19 PROCEDURE — 96372 THER/PROPH/DIAG INJ SC/IM: CPT

## 2025-03-19 PROCEDURE — 84100 ASSAY OF PHOSPHORUS: CPT | Performed by: ANESTHESIOLOGY

## 2025-03-19 PROCEDURE — 250N000009 HC RX 250: Performed by: ANESTHESIOLOGY

## 2025-03-19 PROCEDURE — 85027 COMPLETE CBC AUTOMATED: CPT | Performed by: NURSE PRACTITIONER

## 2025-03-19 PROCEDURE — 86900 BLOOD TYPING SEROLOGIC ABO: CPT | Performed by: ANESTHESIOLOGY

## 2025-03-19 PROCEDURE — 47382 PERCUT ABLATE LIVER RF: CPT | Performed by: RADIOLOGY

## 2025-03-19 PROCEDURE — 272N000516 HC NEEDLE, ATENNA MICROWAVE ABLATION

## 2025-03-19 PROCEDURE — 710N000012 HC RECOVERY PHASE 2, PER MINUTE

## 2025-03-19 PROCEDURE — 370N000017 HC ANESTHESIA TECHNICAL FEE, PER MIN

## 2025-03-19 PROCEDURE — 47382 PERCUT ABLATE LIVER RF: CPT

## 2025-03-19 PROCEDURE — 82962 GLUCOSE BLOOD TEST: CPT

## 2025-03-19 PROCEDURE — 999N000141 HC STATISTIC PRE-PROCEDURE NURSING ASSESSMENT

## 2025-03-19 PROCEDURE — 250N000025 HC SEVOFLURANE, PER MIN

## 2025-03-19 PROCEDURE — 999N000248 HC STATISTIC IV INSERT WITH US BY RN

## 2025-03-19 PROCEDURE — 250N000009 HC RX 250

## 2025-03-19 PROCEDURE — 36415 COLL VENOUS BLD VENIPUNCTURE: CPT | Performed by: NURSE PRACTITIONER

## 2025-03-19 PROCEDURE — 85610 PROTHROMBIN TIME: CPT | Performed by: NURSE PRACTITIONER

## 2025-03-19 PROCEDURE — 710N000010 HC RECOVERY PHASE 1, LEVEL 2, PER MIN

## 2025-03-19 PROCEDURE — 82248 BILIRUBIN DIRECT: CPT | Performed by: NURSE PRACTITIONER

## 2025-03-19 PROCEDURE — 250N000011 HC RX IP 250 OP 636: Mod: JW | Performed by: ANESTHESIOLOGY

## 2025-03-19 PROCEDURE — 83735 ASSAY OF MAGNESIUM: CPT | Performed by: ANESTHESIOLOGY

## 2025-03-19 PROCEDURE — 77013 CT GUIDE FOR TISSUE ABLATION: CPT | Mod: 26 | Performed by: RADIOLOGY

## 2025-03-19 PROCEDURE — 250N000011 HC RX IP 250 OP 636: Mod: JZ | Performed by: STUDENT IN AN ORGANIZED HEALTH CARE EDUCATION/TRAINING PROGRAM

## 2025-03-19 PROCEDURE — 258N000003 HC RX IP 258 OP 636: Performed by: ANESTHESIOLOGY

## 2025-03-19 RX ORDER — DEXAMETHASONE SODIUM PHOSPHATE 4 MG/ML
4 INJECTION, SOLUTION INTRA-ARTICULAR; INTRALESIONAL; INTRAMUSCULAR; INTRAVENOUS; SOFT TISSUE
Status: DISCONTINUED | OUTPATIENT
Start: 2025-03-19 | End: 2025-03-19 | Stop reason: HOSPADM

## 2025-03-19 RX ORDER — PROPOFOL 10 MG/ML
INJECTION, EMULSION INTRAVENOUS PRN
Status: DISCONTINUED | OUTPATIENT
Start: 2025-03-19 | End: 2025-03-19

## 2025-03-19 RX ORDER — NALOXONE HYDROCHLORIDE 0.4 MG/ML
0.1 INJECTION, SOLUTION INTRAMUSCULAR; INTRAVENOUS; SUBCUTANEOUS
Status: DISCONTINUED | OUTPATIENT
Start: 2025-03-19 | End: 2025-03-19 | Stop reason: HOSPADM

## 2025-03-19 RX ORDER — HYDROMORPHONE HCL IN WATER/PF 6 MG/30 ML
0.2 PATIENT CONTROLLED ANALGESIA SYRINGE INTRAVENOUS EVERY 5 MIN PRN
Status: DISCONTINUED | OUTPATIENT
Start: 2025-03-19 | End: 2025-03-19 | Stop reason: HOSPADM

## 2025-03-19 RX ORDER — SODIUM CHLORIDE 9 MG/ML
INJECTION, SOLUTION INTRAVENOUS CONTINUOUS
Status: DISCONTINUED | OUTPATIENT
Start: 2025-03-19 | End: 2025-03-19 | Stop reason: HOSPADM

## 2025-03-19 RX ORDER — ONDANSETRON 2 MG/ML
4 INJECTION INTRAMUSCULAR; INTRAVENOUS EVERY 30 MIN PRN
Status: DISCONTINUED | OUTPATIENT
Start: 2025-03-19 | End: 2025-03-19 | Stop reason: HOSPADM

## 2025-03-19 RX ORDER — FENTANYL CITRATE 50 UG/ML
25 INJECTION, SOLUTION INTRAMUSCULAR; INTRAVENOUS EVERY 5 MIN PRN
Status: DISCONTINUED | OUTPATIENT
Start: 2025-03-19 | End: 2025-03-19 | Stop reason: HOSPADM

## 2025-03-19 RX ORDER — ONDANSETRON 4 MG/1
4 TABLET, ORALLY DISINTEGRATING ORAL EVERY 30 MIN PRN
Status: DISCONTINUED | OUTPATIENT
Start: 2025-03-19 | End: 2025-03-19 | Stop reason: HOSPADM

## 2025-03-19 RX ORDER — SODIUM CHLORIDE, SODIUM LACTATE, POTASSIUM CHLORIDE, CALCIUM CHLORIDE 600; 310; 30; 20 MG/100ML; MG/100ML; MG/100ML; MG/100ML
INJECTION, SOLUTION INTRAVENOUS CONTINUOUS
Status: DISCONTINUED | OUTPATIENT
Start: 2025-03-19 | End: 2025-03-19 | Stop reason: HOSPADM

## 2025-03-19 RX ORDER — CEFAZOLIN SODIUM/WATER 2 G/20 ML
2 SYRINGE (ML) INTRAVENOUS SEE ADMIN INSTRUCTIONS
Status: DISCONTINUED | OUTPATIENT
Start: 2025-03-19 | End: 2025-03-19 | Stop reason: HOSPADM

## 2025-03-19 RX ORDER — EPHEDRINE SULFATE 50 MG/ML
INJECTION, SOLUTION INTRAMUSCULAR; INTRAVENOUS; SUBCUTANEOUS PRN
Status: DISCONTINUED | OUTPATIENT
Start: 2025-03-19 | End: 2025-03-19

## 2025-03-19 RX ORDER — OXYCODONE HYDROCHLORIDE 10 MG/1
10 TABLET ORAL
Status: DISCONTINUED | OUTPATIENT
Start: 2025-03-19 | End: 2025-03-19 | Stop reason: HOSPADM

## 2025-03-19 RX ORDER — LIDOCAINE HYDROCHLORIDE 20 MG/ML
INJECTION, SOLUTION INFILTRATION; PERINEURAL PRN
Status: DISCONTINUED | OUTPATIENT
Start: 2025-03-19 | End: 2025-03-19

## 2025-03-19 RX ORDER — FENTANYL CITRATE 50 UG/ML
INJECTION, SOLUTION INTRAMUSCULAR; INTRAVENOUS PRN
Status: DISCONTINUED | OUTPATIENT
Start: 2025-03-19 | End: 2025-03-19

## 2025-03-19 RX ORDER — SODIUM CHLORIDE, SODIUM LACTATE, POTASSIUM CHLORIDE, CALCIUM CHLORIDE 600; 310; 30; 20 MG/100ML; MG/100ML; MG/100ML; MG/100ML
INJECTION, SOLUTION INTRAVENOUS CONTINUOUS PRN
Status: DISCONTINUED | OUTPATIENT
Start: 2025-03-19 | End: 2025-03-19

## 2025-03-19 RX ORDER — CEFAZOLIN SODIUM/WATER 2 G/20 ML
2 SYRINGE (ML) INTRAVENOUS
Status: COMPLETED | OUTPATIENT
Start: 2025-03-19 | End: 2025-03-19

## 2025-03-19 RX ORDER — ONDANSETRON 2 MG/ML
4 INJECTION INTRAMUSCULAR; INTRAVENOUS ONCE
Status: DISCONTINUED | OUTPATIENT
Start: 2025-03-19 | End: 2025-03-19 | Stop reason: HOSPADM

## 2025-03-19 RX ORDER — ESMOLOL HYDROCHLORIDE 10 MG/ML
INJECTION INTRAVENOUS PRN
Status: DISCONTINUED | OUTPATIENT
Start: 2025-03-19 | End: 2025-03-19

## 2025-03-19 RX ORDER — OXYCODONE HYDROCHLORIDE 5 MG/1
5 TABLET ORAL
Status: COMPLETED | OUTPATIENT
Start: 2025-03-19 | End: 2025-03-19

## 2025-03-19 RX ORDER — SODIUM CHLORIDE 9 MG/ML
INJECTION, SOLUTION INTRAVENOUS CONTINUOUS PRN
Status: DISCONTINUED | OUTPATIENT
Start: 2025-03-19 | End: 2025-03-19

## 2025-03-19 RX ORDER — FENTANYL CITRATE 50 UG/ML
50 INJECTION, SOLUTION INTRAMUSCULAR; INTRAVENOUS EVERY 5 MIN PRN
Status: DISCONTINUED | OUTPATIENT
Start: 2025-03-19 | End: 2025-03-19 | Stop reason: HOSPADM

## 2025-03-19 RX ORDER — LIDOCAINE HYDROCHLORIDE 10 MG/ML
1-30 INJECTION, SOLUTION EPIDURAL; INFILTRATION; INTRACAUDAL; PERINEURAL
Status: COMPLETED | OUTPATIENT
Start: 2025-03-19 | End: 2025-03-19

## 2025-03-19 RX ORDER — OXYCODONE HYDROCHLORIDE 5 MG/1
5 TABLET ORAL EVERY 6 HOURS PRN
Qty: 12 TABLET | Refills: 0 | Status: SHIPPED | OUTPATIENT
Start: 2025-03-19 | End: 2025-03-22

## 2025-03-19 RX ORDER — HYDROMORPHONE HCL IN WATER/PF 6 MG/30 ML
0.4 PATIENT CONTROLLED ANALGESIA SYRINGE INTRAVENOUS EVERY 5 MIN PRN
Status: DISCONTINUED | OUTPATIENT
Start: 2025-03-19 | End: 2025-03-19 | Stop reason: HOSPADM

## 2025-03-19 RX ORDER — VASOPRESSIN IN 0.9 % NACL 2 UNIT/2ML
SYRINGE (ML) INTRAVENOUS PRN
Status: DISCONTINUED | OUTPATIENT
Start: 2025-03-19 | End: 2025-03-19

## 2025-03-19 RX ADMIN — LIDOCAINE HYDROCHLORIDE 8 ML: 10 INJECTION, SOLUTION EPIDURAL; INFILTRATION; INTRACAUDAL; PERINEURAL at 14:45

## 2025-03-19 RX ADMIN — FENTANYL CITRATE 50 MCG: 50 INJECTION, SOLUTION INTRAMUSCULAR; INTRAVENOUS at 15:52

## 2025-03-19 RX ADMIN — FENTANYL CITRATE 50 MCG: 50 INJECTION INTRAMUSCULAR; INTRAVENOUS at 14:26

## 2025-03-19 RX ADMIN — PHENYLEPHRINE HYDROCHLORIDE 100 MCG: 10 INJECTION INTRAVENOUS at 14:32

## 2025-03-19 RX ADMIN — FENTANYL CITRATE 50 MCG: 50 INJECTION, SOLUTION INTRAMUSCULAR; INTRAVENOUS at 16:02

## 2025-03-19 RX ADMIN — Medication 10 MG: at 14:51

## 2025-03-19 RX ADMIN — Medication 1 UNITS: at 14:56

## 2025-03-19 RX ADMIN — SODIUM CHLORIDE, SODIUM LACTATE, POTASSIUM CHLORIDE, AND CALCIUM CHLORIDE: .6; .31; .03; .02 INJECTION, SOLUTION INTRAVENOUS at 14:10

## 2025-03-19 RX ADMIN — Medication 1 UNITS: at 14:46

## 2025-03-19 RX ADMIN — SODIUM CHLORIDE: 0.9 INJECTION, SOLUTION INTRAVENOUS at 14:50

## 2025-03-19 RX ADMIN — NOREPINEPHRINE BITARTRATE 12.8 MCG: 1 INJECTION, SOLUTION, CONCENTRATE INTRAVENOUS at 15:02

## 2025-03-19 RX ADMIN — Medication 50 MG: at 14:20

## 2025-03-19 RX ADMIN — Medication 200 MG: at 15:13

## 2025-03-19 RX ADMIN — EPHEDRINE SULFATE 5 MG: 5 INJECTION INTRAVENOUS at 14:46

## 2025-03-19 RX ADMIN — Medication 2 G: at 14:27

## 2025-03-19 RX ADMIN — ESMOLOL HYDROCHLORIDE 30 MG: 10 INJECTION, SOLUTION INTRAVENOUS at 15:10

## 2025-03-19 RX ADMIN — EPHEDRINE SULFATE 5 MG: 5 INJECTION INTRAVENOUS at 14:44

## 2025-03-19 RX ADMIN — LIDOCAINE HYDROCHLORIDE 60 MG: 20 INJECTION, SOLUTION INFILTRATION; PERINEURAL at 14:19

## 2025-03-19 RX ADMIN — PROPOFOL 100 MG: 10 INJECTION, EMULSION INTRAVENOUS at 14:19

## 2025-03-19 RX ADMIN — OXYCODONE HYDROCHLORIDE 5 MG: 5 TABLET ORAL at 16:37

## 2025-03-19 RX ADMIN — PHENYLEPHRINE HYDROCHLORIDE 200 MCG: 10 INJECTION INTRAVENOUS at 14:41

## 2025-03-19 RX ADMIN — FENTANYL CITRATE 50 MCG: 50 INJECTION INTRAMUSCULAR; INTRAVENOUS at 14:19

## 2025-03-19 RX ADMIN — PHENYLEPHRINE HYDROCHLORIDE 0.5 MCG/KG/MIN: 10 INJECTION INTRAVENOUS at 14:48

## 2025-03-19 RX ADMIN — PHENYLEPHRINE HYDROCHLORIDE 100 MCG: 10 INJECTION INTRAVENOUS at 14:36

## 2025-03-19 RX ADMIN — HYDROMORPHONE HYDROCHLORIDE 0.2 MG: 0.2 INJECTION, SOLUTION INTRAMUSCULAR; INTRAVENOUS; SUBCUTANEOUS at 16:29

## 2025-03-19 RX ADMIN — Medication 1 UNITS: at 15:13

## 2025-03-19 RX ADMIN — NOREPINEPHRINE BITARTRATE 12.8 MCG: 1 INJECTION, SOLUTION, CONCENTRATE INTRAVENOUS at 15:10

## 2025-03-19 RX ADMIN — HYDROMORPHONE HYDROCHLORIDE 0.2 MG: 0.2 INJECTION, SOLUTION INTRAMUSCULAR; INTRAVENOUS; SUBCUTANEOUS at 16:13

## 2025-03-19 ASSESSMENT — ACTIVITIES OF DAILY LIVING (ADL)
ADLS_ACUITY_SCORE: 55

## 2025-03-19 NOTE — ANESTHESIA CARE TRANSFER NOTE
Patient: Rachele Martínez    Procedure: Procedure(s):  ANESTHESIA, IN NON-OPERATING ROOM SETTING CT Guided Liver Ablation @1300       Diagnosis: Hepatocellular carcinoma (H) [C22.0]  Diagnosis Additional Information: No value filed.    Anesthesia Type:   No value filed.     Note:    Oropharynx: oropharynx clear of all foreign objects and spontaneously breathing  Level of Consciousness: awake  Oxygen Supplementation: nasal cannula  Level of Supplemental Oxygen (L/min / FiO2): 3  Independent Airway: airway patency satisfactory and stable  Dentition: dentition unchanged  Vital Signs Stable: post-procedure vital signs reviewed and stable  Report to RN Given: handoff report given  Patient transferred to: PACU    Handoff Report: Identifed the Patient, Identified the Reponsible Provider, Reviewed the pertinent medical history, Discussed the surgical course, Reviewed Intra-OP anesthesia mangement and issues during anesthesia, Set expectations for post-procedure period and Allowed opportunity for questions and acknowledgement of understanding      Vitals:  Vitals Value Taken Time   /62 03/19/25 1532   Temp 36.2    Pulse 78 03/19/25 1537   Resp 16 03/19/25 1537   SpO2 99 % 03/19/25 1537   Vitals shown include unfiled device data.    Electronically Signed By: BAILEY David CRNA  March 19, 2025  3:38 PM

## 2025-03-19 NOTE — ANESTHESIA PROCEDURE NOTES
Airway         Procedure Start/Stop Times: 3/19/2025 2:21 PM  Staff -        Other Anesthesia Staff: Trisha Verduzco       Performed By: SRNAIndications and Patient Condition       Indications for airway management: airway protection         Mask difficulty assessment: 1 - vent by mask    Final Airway Details       Final airway type: endotracheal airway       Successful airway: ETT - single  Endotracheal Airway Details        ETT size (mm): 7.0       Successful intubation technique: direct laryngoscopy       DL Blade Type: Smart 2       Grade View of Cords: 1       Adjucts: stylet       Position: Right       Measured from: lips       Secured at (cm): 21       Bite block used: None    Post intubation assessment        Placement verified by: capnometry, equal breath sounds and chest rise        Number of attempts at approach: 1       Secured with: tape       Ease of procedure: easy       Dentition: Intact    Medication(s) Administered   Medication Administration Time: 3/19/2025 2:21 PM

## 2025-03-19 NOTE — PROCEDURES
[facility]    Procedure: IR Procedure Note    Date/Time: 3/19/2025 3:15 PM    Performed by: Jalen Mariscal MD  Authorized by: Jalen Mariscal MD  IR Fellow Physician:    Pre Procedure Diagnosis: Hepatocellular carcinoma  Post Procedure Diagnosis: same    UNIVERSAL PROTOCOL   Site Marked: NA  Prior Images Obtained and Reviewed:  Yes  Required items: Required blood products, implants, devices and special equipment available    Patient identity confirmed:  Arm band, provided demographic data, hospital-assigned identification number and verbally with patient  Patient was reevaluated immediately before administering moderate or deep sedation or anesthesia  Confirmation Checklist:  Correct equipment/implants were available, procedure was appropriate and matched the consent or emergent situation, relevant allergies and patient's identity using two indicators  Time out: Immediately prior to the procedure a time out was called    Universal Protocol: the Joint Commission Universal Protocol was followed    Preparation: Patient was prepped and draped in usual sterile fashion       ANESTHESIA    Anesthesia:  Local infiltration  Local Anesthetic:  Lidocaine 1% without epinephrine      SEDATION  Patient Sedated: Yes    Sedation Type:  Deep  Sedation:  Propofol  Vital signs: Vital signs monitored during sedation    See dictated procedure note for full details.  Findings: 100W for 10 minutes Medtronic Emprint ablation right liver lobe hypoechoic lesion under ultrasound and CT guidance. Bedrest for 2-3 hours. Discharge when meets criteria.    Specimens: none    Procedural Complications: None    Condition: Stable      PROCEDURE    Patient Tolerance:  Patient tolerated the procedure well with no immediate complications  Length of time physician/provider present for 1:1 monitoring during sedation:  0-22 min

## 2025-03-19 NOTE — ANESTHESIA POSTPROCEDURE EVALUATION
Patient: Rachele Martínez    Procedure: Procedure(s):  ANESTHESIA, IN NON-OPERATING ROOM SETTING CT Guided Liver Ablation @1300       Anesthesia Type:  No value filed.    Note:  Disposition: Outpatient   Postop Pain Control: Uneventful            Sign Out: Well controlled pain   PONV: No   Neuro/Psych: Uneventful            Sign Out: Acceptable/Baseline neuro status   Airway/Respiratory: Uneventful            Sign Out: Acceptable/Baseline resp. status   CV/Hemodynamics: Uneventful            Sign Out: Acceptable CV status; No obvious hypovolemia; No obvious fluid overload   Other NRE: NONE   DID A NON-ROUTINE EVENT OCCUR? No           Last vitals:  Vitals Value Taken Time   /56 03/19/25 1700   Temp 36.8  C (98.3  F) 03/19/25 1530   Pulse 77 03/19/25 1700   Resp 9 03/19/25 1700   SpO2 95 % 03/19/25 1700       Electronically Signed By: Andriy Yanez MD  March 19, 2025  6:35 PM

## 2025-03-19 NOTE — DISCHARGE INSTRUCTIONS
Contacting your Doctor -   To contact a doctor, call   [phone] and ask for the resident on call for interventional radiology or anesthesiology (answered 24 hours a day)   Emergency Department:  [facility]: [phone]  [facility]: [phone] 911 if you are in need of immediate or emergent help

## 2025-03-19 NOTE — IR NOTE
Patient Name: Rachele Martínez  Medical Record Number: [id number]  Today's Date: 3/19/2025    Procedure: CT Guided Liver Ablation  Proceduralist: Davey Rivera Notes: General Anesthesia     Procedure start time: 1450  Procedure end time: 1515    Report given to: PACU LEESA Truong  : n/a    Other Notes: Pt arrived to IR room CT2 from . Consent reviewed, pt confirmed. Pt denies any questions or concerns regarding procedure. Pt positioned supine and monitored per protocol. Site cleansed and dressed per protocol. Pt tolerated procedure without any noted complications. Pt transferred to PACU.

## 2025-03-19 NOTE — ANESTHESIA PREPROCEDURE EVALUATION
Anesthesia Pre-Procedure Evaluation    Patient: Rachele Martínez   MRN: [id number] : 1940        Procedure : Procedure(s):  ANESTHESIA, IN NON-OPERATING ROOM SETTING CT Guided Liver Ablation @1300          Past Medical History:   Diagnosis Date    Anemia     Anxiety and depression     Arthritis     AVM (arteriovenous malformation)     Chronic hepatitis C with cirrhosis (H)     Clotting disorder     ESRD (end stage renal disease) (H)     on dialysis    GI bleed     recurrent    Glaucoma     Hyperlipidemia     Hypertension goal BP (blood pressure) < 140/80     Type 2 diabetes mellitus with diabetic polyneuropathy, without long-term current use of insulin (H) 2024      Past Surgical History:   Procedure Laterality Date    CAPSULE/PILL CAM ENDOSCOPY N/A 3/27/2019    Procedure: Capsule/pill cam endoscopy;  Surgeon: Yosvany Ram MD;  Location: [facility]    CAPSULE/PILL CAM ENDOSCOPY N/A 2023    Procedure: IMAGING PROCEDURE, GI TRACT, INTRALUMINAL, VIA CAPSULE;  Surgeon: Mulu Nur DO;  Location: [facility]    COLONOSCOPY N/A 2015    Procedure: COMBINED COLONOSCOPY, SINGLE OR MULTIPLE BIOPSY/POLYPECTOMY BY BIOPSY;  Surgeon: Rupesh Lopez MD;  Location: [facility]    COLONOSCOPY N/A 2018    Procedure: COLONOSCOPY;  enteroscopy small bowel  COLONOSCOPY;  Surgeon: Ankit Baires MD;  Location: [facility]    ENTEROSCOPY SMALL BOWEL N/A 3/9/2023    Procedure: antegrade single balloon enteroscopy with argon plasma coagulation of arteriovenous malformations;  Surgeon: Ankit Baires MD;  [facility]    ESOPHAGOSCOPY, GASTROSCOPY, DUODENOSCOPY (EGD), COMBINED N/A 2014    Procedure: COMBINED ESOPHAGOSCOPY, GASTROSCOPY, DUODENOSCOPY (EGD);  Surgeon: Betsy Carvajal MD;  Location: [facility]    ESOPHAGOSCOPY, GASTROSCOPY, DUODENOSCOPY (EGD), COMBINED N/A 2015    Procedure: COMBINED ESOPHAGOSCOPY, GASTROSCOPY, DUODENOSCOPY (EGD);  Surgeon: Yosvany Ram MD;  Location: [facility]     ESOPHAGOSCOPY, GASTROSCOPY, DUODENOSCOPY (EGD), COMBINED N/A 2015    Procedure: COMBINED ESOPHAGOSCOPY, GASTROSCOPY, DUODENOSCOPY (EGD);  Surgeon: Mariano Mistry MD;  Location:  GI     CAPSULE ENDOSCOPY N/A 2015    Procedure: CAPSULE/PILL CAM ENDOSCOPY;  Surgeon: Pan Dhlaiwal MD;  Location:  GI     VASCULAR SURGERY PROCEDURE UNLIST      HYSTERECTOMY      KYREE    LUMPECTOMY BREAST        Allergies   Allergen Reactions    Abacavir Itching    Lisinopril Cough    Dust Mites     Hydrochlorothiazide Itching     Severe      No Clinical Screening - See Comments      History of blood transfusion reactions and pre-treats with Benadryl.     Spironolactone Nausea    Sulfa Antibiotics Hives    Valsartan Itching    Valsartan-Hydrochlorothiazide Itching     severe      Social History     Tobacco Use    Smoking status: Former     Current packs/day: 0.00     Average packs/day: 2.0 packs/day for 49.9 years (99.8 ttl pk-yrs)     Types: Cigarettes     Start date: 1953     Quit date: 2002     Years since quittin.3    Smokeless tobacco: Never   Substance Use Topics    Alcohol use: No      Wt Readings from Last 1 Encounters:   25 56.9 kg (125 lb 7.1 oz)        Anesthesia Evaluation   Pt has had prior anesthetic. Type: General.        ROS/MED HX  ENT/Pulmonary:  - neg pulmonary ROS     Neurologic:  - neg neurologic ROS     Cardiovascular:     (+)  hypertension- -   -  - -                                      METS/Exercise Tolerance:     Hematologic:     (+)      anemia,          Musculoskeletal:  - neg musculoskeletal ROS     GI/Hepatic: Comment: AVM small bowel    (+)           hepatitis type C, liver disease,       Renal/Genitourinary:     (+) renal disease, type: ESRD, Pt requires dialysis,           Endo: Comment: Hyperparathyroidism - neg endo ROS     Psychiatric/Substance Use:     (+) psychiatric history anxiety and depression       Infectious Disease:  - neg infectious disease  ROS     Malignancy: Comment: Liver mass      Other:            Physical Exam    Airway        Mallampati: II   TM distance: > 3 FB   Neck ROM: full   Mouth opening: > 3 cm    Respiratory Devices and Support         Dental       (+) Modest Abnormalities - crowns, retainers, 1 or 2 missing teeth      Cardiovascular   cardiovascular exam normal          Pulmonary   pulmonary exam normal                OUTSIDE LABS:  CBC:   Lab Results   Component Value Date    WBC 6.5 03/19/2025    WBC 5.4 01/14/2025    HGB 10.4 (L) 03/19/2025    HGB 12.9 01/25/2025    HCT 35.1 03/19/2025    HCT 37.4 01/14/2025     03/19/2025     01/14/2025     BMP:   Lab Results   Component Value Date     01/25/2025     01/14/2025    POTASSIUM 5.6 (H) 01/25/2025    POTASSIUM 4.3 01/14/2025    CHLORIDE 98 01/25/2025    CHLORIDE 98 01/14/2025    CO2 29 01/25/2025    CO2 28 01/14/2025    BUN 28.2 (H) 01/25/2025    BUN 19.1 01/14/2025    CR 6.84 (H) 01/25/2025    CR 6.37 (H) 01/14/2025     (H) 01/25/2025    GLC 91 01/14/2025     COAGS:   Lab Results   Component Value Date    PTT 34 05/14/2023    INR 1.27 (H) 03/19/2025     POC:   Lab Results   Component Value Date     (H) 04/24/2015     HEPATIC:   Lab Results   Component Value Date    ALBUMIN 4.1 01/14/2025    PROTTOTAL 7.3 01/14/2025    ALT 17 01/14/2025    AST 31 01/14/2025    ALKPHOS 133 01/14/2025    BILITOTAL 0.5 01/14/2025    SHYANNE 16 07/20/2023     OTHER:   Lab Results   Component Value Date    PH 7.40 06/01/2023    LACT 1.5 08/02/2023    A1C 4.7 07/03/2023    BELGICA 9.6 01/25/2025    PHOS 3.9 08/03/2023    MAG 2.0 08/03/2023    LIPASE 43 07/31/2023    TSH 6.13 (H) 01/25/2025    T4 0.74 (L) 01/25/2025       Anesthesia Plan    ASA Status:  3    NPO Status:  NPO Appropriate    Anesthesia Type: General.     - Airway: ETT   Induction: Intravenous.   Maintenance: Balanced.   Techniques and Equipment:     - Lines/Monitors: 2nd IV     Consents    Anesthesia Plan(s) and  associated risks, benefits, and realistic alternatives discussed. Questions answered and patient/representative(s) expressed understanding.     - Discussed:     - Discussed with:  Patient      - Extended Intubation/Ventilatory Support Discussed: Yes.      - Patient is DNR/DNI Status: No     Use of blood products discussed: Yes.     - Discussed with: Patient.     - Consented: consented to blood products     Postoperative Care    Pain management: Multi-modal analgesia.   PONV prophylaxis: Ondansetron (or other 5HT-3)     Comments:              PAC Discussion and Assessment    ASA Classification: 3    Anesthetic techniques and relevant risks discussed: GA  Invasive monitoring and risk discussed: Yes    Possibility and Risk of blood transfusion discussed: Yes  NPO instructions given: NPO after midnight    Needs early admission to pre-op area: No                                            Sintia Toure MD    Clinically Significant Risk Factors Present on Admission                # Coagulation Defect: INR = 1.27 (Ref range: 0.85 - 1.15) and/or PTT = N/A, will monitor for bleeding    # Hypertension: Noted on problem list      # Anemia: based on hgb <11

## 2025-03-19 NOTE — PRE-PROCEDURE
GENERAL PRE-PROCEDURE:   Procedure:  Image guided liver lesion microwave ablation  Date/Time:  3/19/2025 12:15 PM    Written consent obtained?: Yes    Risks and benefits: Risks, benefits and alternatives were discussed    Consent given by:  Patient  Patient states understanding of procedure being performed: Yes    Patient's understanding of procedure matches consent: Yes    Procedure consent matches procedure scheduled: Yes    Expected level of sedation:  Deep  History & Physical reviewed:  History and physical reviewed and no updates needed  Statement of review:  I have reviewed the lab findings, diagnostic data, medications, and the plan for sedation

## 2025-03-24 ENCOUNTER — MEDICAL CORRESPONDENCE (OUTPATIENT)
Dept: HEALTH INFORMATION MANAGEMENT | Facility: CLINIC | Age: 85
End: 2025-03-24
Payer: MEDICARE

## 2025-03-31 ENCOUNTER — PATIENT OUTREACH (OUTPATIENT)
Dept: CARE COORDINATION | Facility: CLINIC | Age: 85
End: 2025-03-31
Payer: MEDICARE

## 2025-03-31 NOTE — PROGRESS NOTES
Clinic Care Coordination Contact    Situation: Patient chart reviewed by care coordinator.    Background: Patient identified via recently discharged from external organization.    Assessment: Chart review completed, TCM call assigned to CHW.    Plan/Recommendations: TCM assigned to CHW to complete post hospital outreach. Encounter created as standard work for TCM for external discharge.    VICKY ALVAREZ RN on 3/31/2025 at 8:23 AM

## 2025-04-03 ENCOUNTER — PATIENT OUTREACH (OUTPATIENT)
Dept: CARE COORDINATION | Facility: CLINIC | Age: 85
End: 2025-04-03
Payer: MEDICARE

## 2025-04-03 ENCOUNTER — TELEPHONE (OUTPATIENT)
Dept: INTERNAL MEDICINE | Facility: CLINIC | Age: 85
End: 2025-04-03
Payer: MEDICARE

## 2025-04-03 ENCOUNTER — MEDICAL CORRESPONDENCE (OUTPATIENT)
Dept: HEALTH INFORMATION MANAGEMENT | Facility: CLINIC | Age: 85
End: 2025-04-03

## 2025-04-03 NOTE — TELEPHONE ENCOUNTER
Patient confirmed scheduled appointment:  Date: 4/8  Time: 2pm  Visit type: Video Return   Provider: PCP  Location: [facility]  Additional notes: per message - Please call patients daughter to get patient scheduled with PCP for a hospital discharge follow up APPT.

## 2025-04-03 NOTE — PROGRESS NOTES
"Clinic Care Coordination Contact  Transitions of Care Outreach    Chief Complaint   Patient presents with    Clinic Care Coordination - Post Hospital       Most Recent Admission Date: 3/19/2025   Most Recent Admission Diagnosis: Hepatocellular carcinoma (H) - C22.0     Most Recent Discharge Date: 3/19/2025   Most Recent Discharge Diagnosis: HCC (hepatocellular carcinoma) (H) - C22.0     Transitions of Care Assessment    Discharge Assessment  How are you doing now that you are home?: \"shes hanging in there, shes doing much better but still sleepy from overdose\" - daughter  How are your symptoms? (Red Flag symptoms escalate to triage hotline per guidelines): Improved  Do you know how to contact your clinic care team if you have future questions or changes to your health status? : Yes  Does the patient have their discharge instructions? : Yes  Does the patient have questions regarding their discharge instructions? : No  Were you started on any new medications or were there changes to any of your previous medications? : No  Does the patient have all of their medications?: Yes  Do you have questions regarding any of your medications? : No  Do you have all of your needed medical supplies or equipment (DME)?  (i.e. oxygen tank, CPAP, cane, etc.): Yes              [facility] Explained and offered Care Coordination support to eligible patients: No    Patient accepted? N/A    CHW sent a message to the clinic coordinators to get patient scheduled with PCP for a hospital discharge follow up APPT.    Follow up Plan     Discharge Follow-Up  Discharge follow up appointment scheduled in alignment with recommended follow up timeframe or Transitions of Risk Category? (Low = within 30 days; Moderate= within 14 days; High= within 7 days): No  Patient's follow up appointment not scheduled: Patient accepted scheduling support. Appt scheduled/requested per protocol.    Future Appointments   Date Time Provider Department Center   4/8/2025 11:00 " AM [facility] [place]   5/6/2025 11:30 AM [facility]   6/3/2025 11:30 AM [facility]   7/1/2025 11:30 AM [facility] [place]   7/22/2025 10:30 AM Roberto Gordon PA-C [facility]   7/22/2025  1:00 PM Agnieszka Rodriguez MD [facility]       Outpatient Plan as outlined on AVS reviewed with patient.      For any urgent concerns, please contact our 24 hour nurse triage line: [phone]       TAMARA Gipson, Community Health Worker  Clinic Care Coordination  [facility]   Clinic #: [phone]  Direct #: [phone]

## 2025-04-07 ENCOUNTER — DOCUMENTATION ONLY (OUTPATIENT)
Dept: INTERNAL MEDICINE | Facility: CLINIC | Age: 85
End: 2025-04-07
Payer: MEDICARE

## 2025-04-07 NOTE — PROGRESS NOTES
Type of Form Received: [employer] 20416    Form Received (Date) 4/4/25   Form Filled out No   Placed in provider folder Yes

## 2025-04-08 ENCOUNTER — VIRTUAL VISIT (OUTPATIENT)
Dept: INTERNAL MEDICINE | Facility: CLINIC | Age: 85
End: 2025-04-08
Payer: MEDICARE

## 2025-04-08 ENCOUNTER — INFUSION THERAPY VISIT (OUTPATIENT)
Dept: INFUSION THERAPY | Facility: CLINIC | Age: 85
End: 2025-04-08
Attending: INTERNAL MEDICINE
Payer: MEDICARE

## 2025-04-08 VITALS
OXYGEN SATURATION: 99 % | DIASTOLIC BLOOD PRESSURE: 58 MMHG | HEART RATE: 71 BPM | TEMPERATURE: 97.4 F | SYSTOLIC BLOOD PRESSURE: 137 MMHG

## 2025-04-08 DIAGNOSIS — L29.9 ITCHING: Primary | ICD-10-CM

## 2025-04-08 DIAGNOSIS — C22.0 HEPATOCELLULAR CARCINOMA (H): ICD-10-CM

## 2025-04-08 DIAGNOSIS — K55.20 AVM (ARTERIOVENOUS MALFORMATION) OF SMALL BOWEL, ACQUIRED: ICD-10-CM

## 2025-04-08 DIAGNOSIS — T40.2X1S OPIOID OVERDOSE, ACCIDENTAL OR UNINTENTIONAL, SEQUELA: Primary | ICD-10-CM

## 2025-04-08 DIAGNOSIS — D50.0 IRON DEFICIENCY ANEMIA DUE TO CHRONIC BLOOD LOSS: Primary | ICD-10-CM

## 2025-04-08 DIAGNOSIS — N18.6 END STAGE RENAL DISEASE (H): ICD-10-CM

## 2025-04-08 DIAGNOSIS — K31.811 ANGIODYSPLASIA OF STOMACH AND DUODENUM WITH HEMORRHAGE: ICD-10-CM

## 2025-04-08 PROCEDURE — 99207 PR NO CHARGE LOS: CPT

## 2025-04-08 PROCEDURE — 99495 TRANSJ CARE MGMT MOD F2F 14D: CPT | Performed by: INTERNAL MEDICINE

## 2025-04-08 PROCEDURE — 96372 THER/PROPH/DIAG INJ SC/IM: CPT | Performed by: INTERNAL MEDICINE

## 2025-04-08 PROCEDURE — 250N000011 HC RX IP 250 OP 636: Mod: JZ | Performed by: INTERNAL MEDICINE

## 2025-04-08 PROCEDURE — 1126F AMNT PAIN NOTED NONE PRSNT: CPT | Performed by: INTERNAL MEDICINE

## 2025-04-08 RX ORDER — OCTREOTIDE ACETATE 20 MG
20 KIT INTRAMUSCULAR ONCE
Start: 2025-05-06 | End: 2025-05-06

## 2025-04-08 RX ORDER — OCTREOTIDE ACETATE 20 MG
20 KIT INTRAMUSCULAR ONCE
Status: COMPLETED | OUTPATIENT
Start: 2025-04-08 | End: 2025-04-08

## 2025-04-08 RX ADMIN — OCTREOTIDE ACETATE 20 MG: KIT at 11:28

## 2025-04-08 ASSESSMENT — PAIN SCALES - GENERAL: PAINLEVEL_OUTOF10: NO PAIN (0)

## 2025-04-08 NOTE — PATIENT INSTRUCTIONS
Thank you for visiting the Primary Care Center today at the [facility]! The following is some information about our clinic:     Primary Care Center Frequently-Asked Questions    (1) How do I schedule appointments at the [facility]?     Primary Care--to schedule or make changes to an existing appointment, please call our primary care line at [phone].    Labs--to schedule a lab appointment at the [facility] you can use [employer] or call [phone]. If you have a [place] location that is closer to home, you can reach out to that location for scheduling options.     Imaging--if you need to schedule a CT, X-ray, MRI, ultrasound, or other imaging study you can call [phone] to schedule at the [facility] or any other [facility] imaging location.     Referrals--if a referral to another specialty was ordered you can expect a phone call from their scheduling team. If you have not heard from them in a week, please call us or send us a [employer] PRO message to check the status or get a scheduling number. Please note that this only applies to internal [facility] referrals. If the referral is external you would need to contact their office for scheduling.     (2) I have a question about my visit, who do I contact?     You can call us at the primary care line at [phone] to ask questions about your visit. You can also send a secure message through Recognition PRO, which is reviewed by clinic staff. Please note that [employer] messages have a twenty-four to forty-eight business hour turnaround time and should not be used for urgent concerns.    (3) How will I get the results of my tests?    If you are signed up for [employer] all tests will be released to you within twenty-four hours of resulting. Please allow three to five days for your doctor to review your results and place a note interpreting the results. If you do not have [place] you will receive your  results through mail seven to ten business days following the return of the tests. Please note that if there should be any urgent or concerning results that your doctor or their registered nurse will reach out to you the same day as the tests come back. If you have follow up questions about your results or would like to discuss the results in detail please schedule a follow up with your provider either in person or virtually.     (4) How do I get refills of my prescriptions?     You should always first contact your pharmacy for refills of your medications. If submitting a refill request on [facility], please be sure to submit the request only once--repeat requests can cause delays in refill. If you are requesting a NEW medication or a medication related to new symptoms you will need to schedule an appointment with a provider prior to approval. Please note: Routine medication refills have up to one to three business day turnaround whereas controlled substances refills have up to five to seven business day turnaround.    (5) I have new symptoms, what do I do?     If you are having an immediate medical emergency, you should dial 911 for assistance.   For anything urgent that needs to be seen within a few hours to one day you should visit a local urgent care for assistance.  For non-urgent symptoms that need to be seen within a few days to a week you can schedule with an available provider in primary care by going to [employer] or calling [phone].   If you are not sure how serious your symptoms are or you would like to receive medical advice you can always call [phone] to speak with a triage nurse.

## 2025-04-08 NOTE — PROGRESS NOTES
Rachele is a 84 year old who is being evaluated via a billable video visit.    How would you like to obtain your AVS? MyChart  If the video visit is dropped, the invitation should be resent by: Text to cell phone: [phone]  Will anyone else be joining your video visit? Daughter, Charla, is present      Are refills needed on medications prescribed by this physician? NO    Lynnette Sorto VVF      Assessment & Plan     Opioid overdose, accidental or unintentional, sequela  End stage renal disease (H)  - Overdose occurred due to oxycodone administration q6 hrs at assisted living post-procedure, in the setting of ESRD.  Mental status is back to baseline, although she still experiences fatigue and drowsiness.  - Oxycodone noted allergy in medical records to prevent future prescriptions. Consider small doses of dilaudid as a safer alternative if pain management is needed.    Hepatocellular carcinoma (H)  - Continue close follow-up with hepatology    End stage renal disease (H)  - Kidney function impacts medication metabolism, contributing to opioid accumulation.  - Continue regular dialysis. Consider safer pain management options that do not rely on kidney metabolism.    Consent was obtained from the patient to use an AI documentation tool in the creation of this note.    Return for keep scheduled appointment.    Subjective   Rachele is a 84 year old, presenting for the following health issues:  RECHECK and Follow up from hospitalization      Video Start Time: 2:07 PM    History of Present Illness       Reason for visit:  Follow up from hospital stay    She eats 2-3 servings of fruits and vegetables daily.She consumes 3 sweetened beverage(s) daily.She exercises with enough effort to increase her heart rate 9 or less minutes per day.  She exercises with enough effort to increase her heart rate 3 or less days per week.   She is taking medications regularly.   Liver Cancer  - Diagnosed with HCC  - Underwent treatment on  "March 19, 2025  - No significant pain reported post-procedure  - Scheduled for follow-up imaging (CAT scan or MRI) to assess treatment efficacy and check for metastasis  - Follow-up visits planned every three months with hepatology    Opioid Overdose  - Hospitalized recently due to an opioid overdose  - Overdose occurred after being prescribed Oxycodone post-liver procedure  - Medication was administered every 6 hours at assisted living facility, leading to overdose  - Hospital stay lasted 2-3 days to allow the drug to clear from the system  - Continues to feel drowsy and lacks energy since the overdose    Appetite and Weight Loss  - Reports a poor appetite  - Weight decreased from 125 lbs to 119 lbs  - Using supplements to help with nutritional intake    Medical Marijuana  - Previously obtained medical marijuana certification  - Unable to use marijuana in her apartment due to lease restrictions and past complaints  - Finds the cost of medical marijuana prohibitive          4/3/2025   Post Discharge Outreach   How are you doing now that you are home? \"shes hanging in there, shes doing much better but still sleepy from overdose\" - daughter   How are your symptoms? (Red Flag symptoms escalate to triage hotline per guidelines) Improved   Does the patient have their discharge instructions?  Yes   Does the patient have questions regarding their discharge instructions?  No   Were you started on any new medications or were there changes to any of your previous medications?  No   Does the patient have all of their medications? Yes   Do you have questions regarding any of your medications?  No   Do you have all of your needed medical supplies or equipment (DME)?  (i.e. oxygen tank, CPAP, cane, etc.) Yes       Hospital Follow-up Visit:    Hospital/Nursing Home/IP Rehab Facility: [facility]  Most Recent Admission Date: 3/19/2025   Most Recent Admission Diagnosis: Hepatocellular " "carcinoma (H) - C22.0     Most Recent Discharge Date: 3/19/2025   Most Recent Discharge Diagnosis: HCC (hepatocellular carcinoma) (H) - C22.0   Was the patient in the ICU or did the patient experience delirium during hospitalization?  No  Do you have any other stressors you would like to discuss with your provider? No    Problems taking medications regularly:  yes  Medication changes since discharge: None  Problems adhering to non-medication therapy:  None    Summary of hospitalization:  [facility] discharge summary reviewed  Diagnostic Tests/Treatments reviewed.  Follow up needed: none  Other Healthcare Providers Involved in Patient s Care:         Homecare  Update since discharge: improved.         Plan of care communicated with patient and family                     Objective    Vitals - Patient Reported  Systolic (Patient Reported): 137  Diastolic (Patient Reported): 58  Weight (Patient Reported): 55.3 kg (122 lb)  Height (Patient Reported): 162.6 cm (5' 4\")  BMI (Based on Pt Reported Ht/Wt): 20.94  Pulse (Patient Reported): 71  Pain Score: No Pain (0)        Physical Exam   GENERAL: alert and no distress  EYES: Eyes grossly normal to inspection.  No discharge or erythema, or obvious scleral/conjunctival abnormalities.  RESP: No audible wheeze, cough, or visible cyanosis.    SKIN: Visible skin clear. No significant rash, abnormal pigmentation or lesions.  NEURO: Cranial nerves grossly intact.  Mentation and speech appropriate for age.  PSYCH: Appropriate affect, tone, and pace of words          Video-Visit Details    Type of service:  Video Visit   Video End Time:2:37 PM  Originating Location (pt. Location): Home    Distant Location (provider location):  Off-site  Platform used for Video Visit: Kimmy  Signed Electronically by: Agnieszka Rodriguez MD    "

## 2025-04-08 NOTE — PROGRESS NOTES
"Rachele is a 84 year old who is being evaluated via a billable video visit.    How would you like to obtain your AVS? MyChart  If the video visit is dropped, the invitation should be resent by: Text to cell phone: [phone]  Will anyone else be joining your video visit? Daughter, Charla, is present  {If patient encounters technical issues they should call [phone] :049318}    Are refills needed on medications prescribed by this physician? NO    Lynnette Sorto VVF      {PROVIDER CHARTING PREFERENCE:132859}    Subjective   Rachele is a 84 year old, presenting for the following health issues:  RECHECK  {(!) Visit Details have not yet been documented.  Please enter Visit Details and then use this list to pull in documentation. (Optional):485032}    Video Start Time: {video visit start/end time for provider to select:063161}    History of Present Illness       Reason for visit:  Follow up from hospital stay    She eats 2-3 servings of fruits and vegetables daily.She consumes 3 sweetened beverage(s) daily.She exercises with enough effort to increase her heart rate 9 or less minutes per day.  She exercises with enough effort to increase her heart rate 3 or less days per week.   She is taking medications regularly.            4/3/2025   Post Discharge Outreach   How are you doing now that you are home? \"shes hanging in there, shes doing much better but still sleepy from overdose\" - daughter   How are your symptoms? (Red Flag symptoms escalate to triage hotline per guidelines) Improved   Does the patient have their discharge instructions?  Yes   Does the patient have questions regarding their discharge instructions?  No   Were you started on any new medications or were there changes to any of your previous medications?  No   Does the patient have all of their medications? Yes   Do you have questions regarding any of your medications?  No   Do you have all of your needed medical supplies or equipment (DME)?  (i.e. oxygen " "tank, CPAP, cane, etc.) Yes       Hospital Follow-up Visit:    Hospital/Nursing Home/IP Rehab Facility: {INPT AND SNF DISCHARGE:207446}  {ADMIT AND DISCHARGE INFO:210859}  Was the patient in the ICU or did the patient experience delirium during hospitalization?  {No_YES (delirium):626790}  Do you have any other stressors you would like to discuss with your provider? { :575970}    Problems taking medications regularly:  {:650228}  Medication changes since discharge: {:005462}  Problems adhering to non-medication therapy:  {:216094}    Summary of hospitalization:  {:299808}  Diagnostic Tests/Treatments reviewed.  Follow up needed: {:017282:}  Other Healthcare Providers Involved in Patient s Care:         {those currently involved after discharge:336028::\"None\"}  Update since discharge: {:442839} {TIP  Include information from family/caregivers, SNF, Care Coordination :738339}        Plan of care communicated with {:492367}     {Reference  Coding guidelines- Moderate Complexity F2F/Video within 7 - 14 days of discharge [id number], High Complexity F2F/Video within 7 days [id number] or pxptxd51 days [id number] :206714}      {additonal problems for provider to add (Optional):545533}    {ROS Picklists (Optional):707238}      Objective             Physical Exam   {video visit exam brief selected:806372}    {Diagnostic Test Results (Optional):314747}      Video-Visit Details    Type of service:  Video Visit   Video End Time:{video visit start/end time for provider to select:459370}  Originating Location (pt. Location): {video visit patient location:922999::\"Home\"}  {PROVIDER LOCATION On-site should be selected for visits conducted from your clinic location or adjoining [facility] hospital, academic office, or other nearby [facility] building. Off-site should be selected for all other provider locations, including home:850414}  Distant Location (provider location):  {virtual location provider:655232}  Platform used for Video Visit: {Virtual " "Visit Platforms:640608::\"Kimmy\"}  Signed Electronically by: Agnieszka Rodriguez MD  {Email feedback regarding this note to [email or web address]   :658437}  "

## 2025-04-08 NOTE — PROGRESS NOTES
Infusion Nursing Note:  Rachele Martínez presents today for Sandostatin.    Patient seen by provider today: No   present during visit today: Not Applicable.    Note: Pt c/o always feeling tired, neuropathy in toes continues and occasional dizziness. 2 small sores noted on pts L buttocks, sores are red, scabbed and dry, but pt requesting band aids for them because she says when she rolls on them in bed they itch. Sores are on lower buttocks, not on upper area when sandostatin injections are. See flow sheet for assessment.      Intravenous Access:  No Intravenous access/labs at this visit.    Treatment Conditions:  Not Applicable.      Post Infusion Assessment:  Patient tolerated injection without incident.  Site patent and intact, free from redness, edema or discomfort.       Discharge Plan:   Patient discharged in stable condition accompanied by: daughter.  Departure Mode: Wheelchair.  PT will RTC 5/6/25 for sandostatin. Appts verified and pt aware.      Josias Hicks RN

## 2025-04-11 ENCOUNTER — MEDICAL CORRESPONDENCE (OUTPATIENT)
Dept: HEALTH INFORMATION MANAGEMENT | Facility: CLINIC | Age: 85
End: 2025-04-11
Payer: MEDICARE

## 2025-04-14 RX ORDER — SERTRALINE HYDROCHLORIDE 100 MG/1
100 TABLET, FILM COATED ORAL DAILY
Qty: 30 TABLET | Refills: 11 | Status: SHIPPED | OUTPATIENT
Start: 2025-04-14

## 2025-04-18 DIAGNOSIS — Z53.9 DIAGNOSIS NOT YET DEFINED: Primary | ICD-10-CM

## 2025-04-23 ENCOUNTER — DOCUMENTATION ONLY (OUTPATIENT)
Dept: INTERNAL MEDICINE | Facility: CLINIC | Age: 85
End: 2025-04-23
Payer: MEDICARE

## 2025-04-23 NOTE — PROGRESS NOTES
Type of Form Received: [facility] Med Orders Request    Form Received (Date) 4/22/25   Form Filled out No   Placed in provider folder Yes

## 2025-05-06 ENCOUNTER — INFUSION THERAPY VISIT (OUTPATIENT)
Dept: INFUSION THERAPY | Facility: CLINIC | Age: 85
End: 2025-05-06
Attending: INTERNAL MEDICINE
Payer: MEDICARE

## 2025-05-06 VITALS
RESPIRATION RATE: 16 BRPM | OXYGEN SATURATION: 97 % | SYSTOLIC BLOOD PRESSURE: 163 MMHG | HEART RATE: 70 BPM | DIASTOLIC BLOOD PRESSURE: 61 MMHG

## 2025-05-06 DIAGNOSIS — K31.811 ANGIODYSPLASIA OF STOMACH AND DUODENUM WITH HEMORRHAGE: ICD-10-CM

## 2025-05-06 DIAGNOSIS — I73.9 PERIPHERAL VASCULAR DISEASE: ICD-10-CM

## 2025-05-06 DIAGNOSIS — K55.20 AVM (ARTERIOVENOUS MALFORMATION) OF SMALL BOWEL, ACQUIRED: ICD-10-CM

## 2025-05-06 DIAGNOSIS — D50.0 IRON DEFICIENCY ANEMIA DUE TO CHRONIC BLOOD LOSS: Primary | ICD-10-CM

## 2025-05-06 PROCEDURE — 250N000011 HC RX IP 250 OP 636: Mod: JZ | Performed by: INTERNAL MEDICINE

## 2025-05-06 PROCEDURE — 99207 PR NO CHARGE LOS: CPT

## 2025-05-06 PROCEDURE — 96372 THER/PROPH/DIAG INJ SC/IM: CPT | Performed by: INTERNAL MEDICINE

## 2025-05-06 RX ORDER — OCTREOTIDE ACETATE 20 MG
20 KIT INTRAMUSCULAR ONCE
Status: COMPLETED | OUTPATIENT
Start: 2025-05-06 | End: 2025-05-06

## 2025-05-06 RX ORDER — OCTREOTIDE ACETATE 20 MG
20 KIT INTRAMUSCULAR ONCE
Start: 2025-06-03 | End: 2025-06-03

## 2025-05-06 RX ADMIN — OCTREOTIDE ACETATE 20 MG: KIT at 12:10

## 2025-05-06 ASSESSMENT — PAIN SCALES - GENERAL: PAINLEVEL_OUTOF10: MODERATE PAIN (5)

## 2025-05-07 NOTE — TELEPHONE ENCOUNTER
Last Written Prescription:  gabapentin (NEURONTIN) 300 MG capsule 30 capsule 3 2024 -- No   Sig - Route: Take 1 capsule (300 mg) by mouth at bedtime. For additional refills, please schedule a follow-up appointment at [phone] [facility]   Sent to pharmacy as: Gabapentin 300 MG Oral Capsule (NEURONTIN)   Class: E-Prescribe     ----------------------  Last Visit Date: 25  Future Visit Date: 25  ----------------------    Refill decision: Medication unable to be refilled by RN due to: Controlled medication and Medication not included in refill protocol policy         Request from pharmacy:  Requested Prescriptions   Pending Prescriptions Disp Refills    gabapentin (NEURONTIN) 300 MG capsule [Pharmacy Med Name: Gabapentin 300 MG Capsule] 30 capsule 11     Si CAPSULE ORALLY AT BEDTIME       There is no refill protocol information for this order

## 2025-05-07 NOTE — TELEPHONE ENCOUNTER
Controlled substance refill request notes    Refill request received for: Gabapentin 300 Mg Capsule  Last Rx: Gabapentin 300 Mg Capsule, qty 30, 30 day supply  [place]  data reviewed 05/07/25:  Medication last refill: qty 28, 28 day supply, filled/sold to patient on 04/08/2025  Pended order: Gabapentin 300 Mg Capsule, qty 30, 30 day supply, no delay in fill date     Primary care provider: Agnieszka Rodriguez  Last office visit with this department: 3/13/2025  Last virtual visit with this department: 4/8/2025  Next appointment with PCP:    7/22/2025  1:00 PM VIDEO VISIT RETURN 30 min [facility] Agnieszka Rodriguez MD   Location Instructions:    Due to road [place] [address], travel times to this location may be longer than usual. Please plan for extra travel time and check the [place] Department of Transportation [address] [place] website for delay, closure, and detour information.  [facility] ([facility]) is in a dense urban area with multiple transportation and parking options. You may wish to review options for  service and self-parking in more detail on the [facility] s website at [email or web address].     Refill request forwarded to provider for review.     Cecily VANN LPN  [facility]

## 2025-05-08 RX ORDER — GABAPENTIN 300 MG/1
CAPSULE ORAL
Qty: 30 CAPSULE | Refills: 11 | Status: SHIPPED | OUTPATIENT
Start: 2025-05-08

## 2025-05-22 ENCOUNTER — DOCUMENTATION ONLY (OUTPATIENT)
Dept: INTERNAL MEDICINE | Facility: CLINIC | Age: 85
End: 2025-05-22
Payer: MEDICARE

## 2025-05-22 NOTE — PROGRESS NOTES
Type of Form Received: [facility] 54783    Form Received (Date) 5/20/25   Form Filled out No   Placed in provider folder Yes

## 2025-05-23 ENCOUNTER — MEDICAL CORRESPONDENCE (OUTPATIENT)
Dept: HEALTH INFORMATION MANAGEMENT | Facility: CLINIC | Age: 85
End: 2025-05-23
Payer: MEDICARE

## 2025-06-03 ENCOUNTER — INFUSION THERAPY VISIT (OUTPATIENT)
Dept: INFUSION THERAPY | Facility: CLINIC | Age: 85
End: 2025-06-03
Attending: INTERNAL MEDICINE
Payer: MEDICARE

## 2025-06-03 VITALS
DIASTOLIC BLOOD PRESSURE: 68 MMHG | SYSTOLIC BLOOD PRESSURE: 179 MMHG | HEART RATE: 77 BPM | RESPIRATION RATE: 16 BRPM | OXYGEN SATURATION: 97 %

## 2025-06-03 DIAGNOSIS — I10 HYPERTENSION GOAL BP (BLOOD PRESSURE) < 140/80: Primary | ICD-10-CM

## 2025-06-03 DIAGNOSIS — K55.20 AVM (ARTERIOVENOUS MALFORMATION) OF SMALL BOWEL, ACQUIRED: ICD-10-CM

## 2025-06-03 DIAGNOSIS — D50.0 IRON DEFICIENCY ANEMIA DUE TO CHRONIC BLOOD LOSS: Primary | ICD-10-CM

## 2025-06-03 DIAGNOSIS — K31.811 ANGIODYSPLASIA OF STOMACH AND DUODENUM WITH HEMORRHAGE: ICD-10-CM

## 2025-06-03 PROCEDURE — 250N000011 HC RX IP 250 OP 636: Mod: JZ | Performed by: INTERNAL MEDICINE

## 2025-06-03 PROCEDURE — 96372 THER/PROPH/DIAG INJ SC/IM: CPT | Performed by: INTERNAL MEDICINE

## 2025-06-03 PROCEDURE — 99207 PR NO CHARGE LOS: CPT

## 2025-06-03 RX ORDER — OCTREOTIDE ACETATE 20 MG
20 KIT INTRAMUSCULAR ONCE
Start: 2025-07-01 | End: 2025-07-01

## 2025-06-03 RX ORDER — OCTREOTIDE ACETATE 20 MG
20 KIT INTRAMUSCULAR ONCE
Status: COMPLETED | OUTPATIENT
Start: 2025-06-03 | End: 2025-06-03

## 2025-06-03 RX ADMIN — OCTREOTIDE ACETATE 20 MG: KIT at 11:53

## 2025-06-03 ASSESSMENT — PAIN SCALES - GENERAL: PAINLEVEL_OUTOF10: SEVERE PAIN (7)

## 2025-06-03 NOTE — PROGRESS NOTES
Infusion Nursing Note:  Rachele Martínez presents today for Sandostatin.    Patient seen by provider today: No   present during visit today: Not Applicable.    Note: Assessment performed by Betsy LIZAMA RN prior to injection today.    Intravenous Access:  No Intravenous access/labs at this visit.    Treatment Conditions:  Not Applicable.      Post Infusion Assessment:  Patient tolerated injection without incident.  Site patent and intact, free from redness, edema or discomfort.       Discharge Plan:   Patient discharged in stable condition accompanied by: daughter.  Departure Mode: Wheelchair.      Vilma Ariza LPN

## 2025-06-05 NOTE — TELEPHONE ENCOUNTER
Sent  a [employer] message to patient This medication is not on patients current medication list and has not been previously prescribed by Dr. Rodriguez.      Kacie Woodward RN on 6/5/2025 at 3:51 PM

## 2025-06-05 NOTE — TELEPHONE ENCOUNTER
hydrALAZINE HCl 50 MG Tablet   Last Written Prescription:  ?  ----------------------  Last Visit Date: 25  Future Visit Date: 25  ----------------------  Refill decision: Medication unable to be refilled by RN due to:  Medication not active on Pt's med list     Request from pharmacy:  Requested Prescriptions   Pending Prescriptions Disp Refills    hydrALAZINE (APRESOLINE) 50 MG tablet [Pharmacy Med Name: hydrALAZINE HCl 50 MG Tablet] 60 tablet 11     Si TABLET ORALLY 2 TIMES DAILY       Vasodilators Failed - 2025  8:46 AM        Failed - Most recent BP less than 140/90 on record     BP Readings from Last 3 Encounters:   25 (!) 179/68   25 (!) 163/61   25 137/58       Systolic (Patient Reported): 137 (2025  1:50 PM)  Diastolic (Patient Reported): 58 (2025  1:50 PM)                           Passed - Recent (12 month) or future (90 days) visit with authorizing provider's specialty (provided they have been seen in the past 15 months)     The patient must have completed an in-person or virtual visit within the past 12 months or has a future visit scheduled within the next 90 days with the authorizing provider s specialty.  Urgent care and e-visits do not qualify as an office visit for this protocol.          Passed - Patient is of age 18 years or older        Passed - Patient is not pregnant        Passed - Patient has not had a positive pregnancy test within the past 12 months

## 2025-06-10 ENCOUNTER — DOCUMENTATION ONLY (OUTPATIENT)
Dept: INTERNAL MEDICINE | Facility: CLINIC | Age: 85
End: 2025-06-10
Payer: MEDICARE

## 2025-06-10 NOTE — PROGRESS NOTES
Type of Form Received: [facility] Request for Orders     Form Received (Date) 6/9/25   Form Filled out No   Placed in provider folder Yes

## 2025-06-11 RX ORDER — HYDRALAZINE HYDROCHLORIDE 50 MG/1
50 TABLET, FILM COATED ORAL 2 TIMES DAILY
Qty: 60 TABLET | Refills: 11 | Status: SHIPPED | OUTPATIENT
Start: 2025-06-11

## 2025-06-15 ENCOUNTER — HEALTH MAINTENANCE LETTER (OUTPATIENT)
Age: 85
End: 2025-06-15

## 2025-07-01 ENCOUNTER — INFUSION THERAPY VISIT (OUTPATIENT)
Dept: INFUSION THERAPY | Facility: CLINIC | Age: 85
End: 2025-07-01
Attending: INTERNAL MEDICINE
Payer: MEDICARE

## 2025-07-01 VITALS
OXYGEN SATURATION: 96 % | HEART RATE: 75 BPM | RESPIRATION RATE: 16 BRPM | DIASTOLIC BLOOD PRESSURE: 66 MMHG | SYSTOLIC BLOOD PRESSURE: 160 MMHG

## 2025-07-01 DIAGNOSIS — D50.0 IRON DEFICIENCY ANEMIA DUE TO CHRONIC BLOOD LOSS: Primary | ICD-10-CM

## 2025-07-01 DIAGNOSIS — K31.811 ANGIODYSPLASIA OF STOMACH AND DUODENUM WITH HEMORRHAGE: ICD-10-CM

## 2025-07-01 DIAGNOSIS — K55.20 AVM (ARTERIOVENOUS MALFORMATION) OF SMALL BOWEL, ACQUIRED: ICD-10-CM

## 2025-07-01 PROCEDURE — 99207 PR NO CHARGE LOS: CPT

## 2025-07-01 PROCEDURE — 96372 THER/PROPH/DIAG INJ SC/IM: CPT | Performed by: INTERNAL MEDICINE

## 2025-07-01 PROCEDURE — 250N000011 HC RX IP 250 OP 636: Mod: JZ | Performed by: INTERNAL MEDICINE

## 2025-07-01 RX ORDER — OCTREOTIDE ACETATE 20 MG
20 KIT INTRAMUSCULAR ONCE
Start: 2025-07-29 | End: 2025-07-29

## 2025-07-01 RX ORDER — OCTREOTIDE ACETATE 20 MG
20 KIT INTRAMUSCULAR ONCE
Status: COMPLETED | OUTPATIENT
Start: 2025-07-01 | End: 2025-07-01

## 2025-07-01 RX ADMIN — OCTREOTIDE ACETATE 20 MG: KIT at 12:03

## 2025-07-01 ASSESSMENT — PAIN SCALES - GENERAL: PAINLEVEL_OUTOF10: NO PAIN (0)

## 2025-07-01 NOTE — PROGRESS NOTES
Infusion Nursing Note:  Rachele Martínez presents today for Sandostatin.    Patient seen by provider today: No   present during visit today: Not Applicable.    Note: Assessment performed by Lorena REESE RN prior to injection today. .      Intravenous Access:  No Intravenous access/labs at this visit.    Treatment Conditions:  Not Applicable.      Post Infusion Assessment:  Patient tolerated injection without incident.  Site patent and intact, free from redness, edema or discomfort.       Discharge Plan:   Patient discharged in stable condition accompanied by: daughter.  Departure Mode: Ambulatory and Wheelchair.      Theresa Baker MA

## 2025-07-10 ENCOUNTER — TELEPHONE (OUTPATIENT)
Dept: GASTROENTEROLOGY | Facility: CLINIC | Age: 85
End: 2025-07-10

## 2025-07-10 ENCOUNTER — LAB (OUTPATIENT)
Dept: LAB | Facility: CLINIC | Age: 85
End: 2025-07-10
Payer: MEDICARE

## 2025-07-10 ENCOUNTER — OFFICE VISIT (OUTPATIENT)
Dept: INTERNAL MEDICINE | Facility: CLINIC | Age: 85
End: 2025-07-10
Payer: MEDICARE

## 2025-07-10 VITALS
OXYGEN SATURATION: 96 % | HEIGHT: 64 IN | DIASTOLIC BLOOD PRESSURE: 66 MMHG | RESPIRATION RATE: 16 BRPM | WEIGHT: 124.3 LBS | TEMPERATURE: 98.4 F | BODY MASS INDEX: 21.22 KG/M2 | SYSTOLIC BLOOD PRESSURE: 163 MMHG | HEART RATE: 82 BPM

## 2025-07-10 DIAGNOSIS — B19.20 COMPENSATED CIRRHOSIS RELATED TO HEPATITIS C VIRUS (HCV) (H): ICD-10-CM

## 2025-07-10 DIAGNOSIS — D63.8 ANEMIA OF CHRONIC DISEASE: ICD-10-CM

## 2025-07-10 DIAGNOSIS — D50.0 IRON DEFICIENCY ANEMIA DUE TO CHRONIC BLOOD LOSS: ICD-10-CM

## 2025-07-10 DIAGNOSIS — R22.9 LOCALIZED SUPERFICIAL SWELLING, MASS, OR LUMP: ICD-10-CM

## 2025-07-10 DIAGNOSIS — Z23 NEED FOR VACCINATION: ICD-10-CM

## 2025-07-10 DIAGNOSIS — K76.9 LIVER LESION: Primary | ICD-10-CM

## 2025-07-10 DIAGNOSIS — K31.811 ANGIODYSPLASIA OF STOMACH AND DUODENUM WITH HEMORRHAGE: ICD-10-CM

## 2025-07-10 DIAGNOSIS — K55.20 AVM (ARTERIOVENOUS MALFORMATION) OF SMALL BOWEL, ACQUIRED: ICD-10-CM

## 2025-07-10 DIAGNOSIS — K74.60 UNSPECIFIED CIRRHOSIS OF LIVER (H): ICD-10-CM

## 2025-07-10 DIAGNOSIS — R53.83 FATIGUE, UNSPECIFIED TYPE: ICD-10-CM

## 2025-07-10 DIAGNOSIS — D50.8 OTHER IRON DEFICIENCY ANEMIA: ICD-10-CM

## 2025-07-10 DIAGNOSIS — K74.69 COMPENSATED CIRRHOSIS RELATED TO HEPATITIS C VIRUS (HCV) (H): ICD-10-CM

## 2025-07-10 DIAGNOSIS — E03.9 HYPOTHYROIDISM, UNSPECIFIED TYPE: ICD-10-CM

## 2025-07-10 DIAGNOSIS — R01.1 MURMUR, CARDIAC: ICD-10-CM

## 2025-07-10 DIAGNOSIS — N18.6 END STAGE RENAL DISEASE (H): ICD-10-CM

## 2025-07-10 DIAGNOSIS — E03.9 HYPOTHYROIDISM, UNSPECIFIED TYPE: Primary | ICD-10-CM

## 2025-07-10 LAB
ALBUMIN SERPL BCG-MCNC: 4.2 G/DL (ref 3.5–5.2)
ALP SERPL-CCNC: 155 U/L (ref 40–150)
ALT SERPL W P-5'-P-CCNC: <5 U/L (ref 0–50)
ANION GAP SERPL CALCULATED.3IONS-SCNC: 14 MMOL/L (ref 7–15)
AST SERPL W P-5'-P-CCNC: 22 U/L (ref 0–45)
BASOPHILS # BLD AUTO: 0.1 10E3/UL (ref 0–0.2)
BASOPHILS NFR BLD AUTO: 1 %
BILIRUB SERPL-MCNC: 0.7 MG/DL
BUN SERPL-MCNC: 7.7 MG/DL (ref 8–23)
CALCIUM SERPL-MCNC: 9.9 MG/DL (ref 8.8–10.4)
CHLORIDE SERPL-SCNC: 103 MMOL/L (ref 98–107)
CREAT SERPL-MCNC: 4.39 MG/DL (ref 0.51–0.95)
EGFRCR SERPLBLD CKD-EPI 2021: 9 ML/MIN/1.73M2
EOSINOPHIL # BLD AUTO: 0.1 10E3/UL (ref 0–0.7)
EOSINOPHIL NFR BLD AUTO: 2 %
ERYTHROCYTE [DISTWIDTH] IN BLOOD BY AUTOMATED COUNT: 18.3 % (ref 10–15)
FERRITIN SERPL-MCNC: 328 NG/ML (ref 11–328)
GLUCOSE SERPL-MCNC: 109 MG/DL (ref 70–99)
HCO3 SERPL-SCNC: 22 MMOL/L (ref 22–29)
HCT VFR BLD AUTO: 40 % (ref 35–47)
HGB BLD-MCNC: 12.1 G/DL (ref 11.7–15.7)
IMM GRANULOCYTES # BLD: 0 10E3/UL
IMM GRANULOCYTES NFR BLD: 0 %
IRON BINDING CAPACITY (ROCHE): 160 UG/DL (ref 240–430)
IRON SATN MFR SERPL: 18 % (ref 15–46)
IRON SERPL-MCNC: 28 UG/DL (ref 37–145)
LYMPHOCYTES # BLD AUTO: 0.6 10E3/UL (ref 0.8–5.3)
LYMPHOCYTES NFR BLD AUTO: 12 %
MCH RBC QN AUTO: 30.2 PG (ref 26.5–33)
MCHC RBC AUTO-ENTMCNC: 30.3 G/DL (ref 31.5–36.5)
MCV RBC AUTO: 100 FL (ref 78–100)
MONOCYTES # BLD AUTO: 0.6 10E3/UL (ref 0–1.3)
MONOCYTES NFR BLD AUTO: 12 %
NEUTROPHILS # BLD AUTO: 3.7 10E3/UL (ref 1.6–8.3)
NEUTROPHILS NFR BLD AUTO: 72 %
NRBC # BLD AUTO: 0 10E3/UL
NRBC BLD AUTO-RTO: 0 /100
PLATELET # BLD AUTO: 194 10E3/UL (ref 150–450)
POTASSIUM SERPL-SCNC: 4.9 MMOL/L (ref 3.4–5.3)
PROT SERPL-MCNC: 7.3 G/DL (ref 6.4–8.3)
RBC # BLD AUTO: 4.01 10E6/UL (ref 3.8–5.2)
SODIUM SERPL-SCNC: 139 MMOL/L (ref 135–145)
T4 FREE SERPL-MCNC: 1.02 NG/DL (ref 0.9–1.7)
TSH SERPL DL<=0.005 MIU/L-ACNC: 6.94 UIU/ML (ref 0.3–4.2)
WBC # BLD AUTO: 5.1 10E3/UL (ref 4–11)

## 2025-07-10 RX ORDER — HEPARIN SODIUM (PORCINE) LOCK FLUSH IV SOLN 100 UNIT/ML 100 UNIT/ML
5 SOLUTION INTRAVENOUS
OUTPATIENT
Start: 2025-07-17

## 2025-07-10 RX ORDER — HEPARIN SODIUM,PORCINE 10 UNIT/ML
5-20 VIAL (ML) INTRAVENOUS DAILY PRN
OUTPATIENT
Start: 2025-07-17

## 2025-07-10 RX ORDER — ALBUTEROL SULFATE 0.83 MG/ML
2.5 SOLUTION RESPIRATORY (INHALATION)
OUTPATIENT
Start: 2025-07-17

## 2025-07-10 RX ORDER — METHYLPREDNISOLONE SODIUM SUCCINATE 40 MG/ML
40 INJECTION INTRAMUSCULAR; INTRAVENOUS
Start: 2025-07-17

## 2025-07-10 RX ORDER — ALBUTEROL SULFATE 90 UG/1
1-2 INHALANT RESPIRATORY (INHALATION)
Start: 2025-07-17

## 2025-07-10 RX ORDER — LEVOTHYROXINE SODIUM 25 UG/1
50 TABLET ORAL DAILY
Qty: 90 TABLET | Refills: 3 | Status: SHIPPED | OUTPATIENT
Start: 2025-07-10

## 2025-07-10 RX ORDER — DIPHENHYDRAMINE HYDROCHLORIDE 50 MG/ML
50 INJECTION, SOLUTION INTRAMUSCULAR; INTRAVENOUS
Start: 2025-07-17

## 2025-07-10 RX ORDER — DIPHENHYDRAMINE HYDROCHLORIDE 50 MG/ML
25 INJECTION, SOLUTION INTRAMUSCULAR; INTRAVENOUS
Start: 2025-07-17

## 2025-07-10 RX ORDER — EPINEPHRINE 1 MG/ML
0.3 INJECTION, SOLUTION, CONCENTRATE INTRAVENOUS EVERY 5 MIN PRN
OUTPATIENT
Start: 2025-07-17

## 2025-07-10 RX ORDER — MEPERIDINE HYDROCHLORIDE 25 MG/ML
25 INJECTION INTRAMUSCULAR; INTRAVENOUS; SUBCUTANEOUS
OUTPATIENT
Start: 2025-07-17

## 2025-07-10 NOTE — TELEPHONE ENCOUNTER
[facility] Call Center    Phone Message    May a detailed message be left on voicemail: yes     Reason for Call: Other: Pt's daughter is calling wanting to speak with a nurse to discuss if her mom needs and US done or not before her visit 7/22/25.      Action Taken: Message routed to:  [facility] Hep    Travel Screening: Not Applicable     Date of Service:

## 2025-07-10 NOTE — LETTER
7/10/2025       RE: Rachele Martínez  [address]   [address]  [facility] [address]     Dear Colleague,    Thank you for referring your patient, Rachele Martínez, to the [facility] [place] at [facility]. Please see a copy of my visit note below.      Assessment & Plan    85-year-old female presented to clinic to address multiple issues.  She has a past medical history significant for hypertension, peripheral vascular disease, hyperlipidemia, iron deficiency anemia, cirrhosis of the liver, hepatocellular carcinoma, ESRD on dialysis Monday Wednesday Friday.  She reported that she has been having swelling affecting the right side of her face. the swelling is worse in the morning and then gradually resolves throughout the day.  She reports that this swelling is worse on nondialysis days and when present on dialysis days resolves after dialysis.  On physical exam palpation of the face is not notable for any fluctuant mass, hard mass, fluid collection, loculation.  On assessment, this is most likely dependent edema secondary to fluid overload or electrolyte imbalance as a result of ESRD and dialysis.  She notes that she sleeps on her right side, thus it is likely she is developing dependent edema in the areas of compromised tissue of her face and eye where fluid can accumulate during this time.  To further assess for any electrolyte imbalances or protein imbalances, a CMP was ordered.  Additionally she complains about hypertension.  Her daughter who accompanies her and notes that her blood pressure readings, systolic, ranged from low in the 90s during and after dialysis, too high up to 210 on days she is not having dialysis.  She is on multiple antihypertensive medications managed by her primary care physician and by nephrology.  She does not have symptoms when she has hypertensive, but she does have fatigue and possibly some  dizziness when she is hypotensive.  Due to the risk of hypotension, which outweighs the risks of hypertension especially given the patient's age and comorbidities, will not adjust antihypertensive medications at this time.  Finally she complains of fatigue. she says that she is constantly tired.  She has been fatigued for many years and it comes and goes, however she felt better recently after receiving iron infusion therapy.  Lab review shows a baseline hemoglobin of around 8-9, but during infusion therapy her hemoglobin was up to 13.  It appears there may be a downtrend in her hemoglobin after this.  It is unclear whether her hemoglobin is being checked during dialysis.  At this time we will reassess CBC for hemoglobin.  In addition we will check iron panel, and given patient's history of hypothyroidism we will also check TSH.   lastly she has a notable murmur over her upper right sternal border.  Previous echo in 2018 was notable for aortic valvular sclerosis, and a murmur was present at that time however a recheck of the echo has not been completed since.  Given her wide pulse pressure, there is concern that her aortic sclerosis has evolved into either aortic stenosis or aortic regurgitation.  Thus we will check an echocardiogram.    She will follow-up with her primary care physician in 12 days.      (E03.9) Hypothyroidism, unspecified type  (primary encounter diagnosis)  Comment:   Plan: TSH WITH FREE T4 REFLEX          (K74.60) Unspecified cirrhosis of liver (H)  Comment:   Plan: Comprehensive metabolic panel (BMP + Alb, Alk         Phos, ALT, AST, Total. Bili, TP)        (R53.83) Fatigue, unspecified type  Comment:   Plan: Comprehensive metabolic panel (BMP + Alb, Alk         Phos, ALT, AST, Total. Bili, TP), CBC with         platelets and differential, Iron and iron         binding capacity, Ferritin, Echocardiogram         Complete            (R22.9) Localized superficial swelling, mass, or lump  Plan:  "Comprehensive metabolic panel (BMP + Alb, Alk         Phos, ALT, AST, Total. Bili, TP), CBC with         platelets and differential            (R01.1) Murmur, cardiac  Plan: Echocardiogram Complete            (D50.8) Other iron deficiency anemia  Plan: Ferritin           Addendum: Iron studies show increased iron deficiency, infusion therapy plan ordered will contact patient's family to discuss.  In addition thyroid studies show increased TSH, T4 still pending, will plan to increase levothyroxine dose.        Tae Jeffrey is a 85 year old, presenting for the following health issues:  Swelling (Facial swelling, no bruising, a little pain per pt, mostly irritating per pt; worst swelling occurs in the morning; extreme fatigue per pt onset about 2 weeks )      7/10/2025    12:16 PM   Additional Questions   Roomed by MR   Accompanied by Daughter     HPI      Dialysis since 2016    Happens when she wakes up, resolves after dialysis. Swelling stays on days that she doesn't have dialysis.     No pain associated.     She does have swelling of her feet and legs.                   Objective    BP (!) 163/66 (BP Location: Left arm, Patient Position: Sitting, Cuff Size: Adult Regular)   Pulse 82   Temp 98.4  F (36.9  C) (Oral)   Resp 16   Ht 1.626 m (5' 4\")   Wt 56.4 kg (124 lb 4.8 oz)   SpO2 96%   BMI 21.34 kg/m    Body mass index is 21.34 kg/m .  Physical Exam               Signed Electronically by: Porter Paniagua MD      Again, thank you for allowing me to participate in the care of your patient.      Sincerely,    Porter Paniagua MD    "

## 2025-07-10 NOTE — TELEPHONE ENCOUNTER
MRI abdomen with and without due now and in 3 months for s/p ablation follow up per Roberto. Orders placed. Pre-visit lab appointment scheduled with patient and daughter. Imaging scheduling # given to patient and daughter to set up MRIs.    BROOK Carlson, RN, PHN  Hepatology Clinic  [facility]  [facility]

## 2025-07-10 NOTE — PROGRESS NOTES
Assessment & Plan     85-year-old female presented to clinic to address multiple issues.  She has a past medical history significant for hypertension, peripheral vascular disease, hyperlipidemia, iron deficiency anemia, cirrhosis of the liver, hepatocellular carcinoma, ESRD on dialysis Monday Wednesday Friday.  She reported that she has been having swelling affecting the right side of her face. the swelling is worse in the morning and then gradually resolves throughout the day.  She reports that this swelling is worse on nondialysis days and when present on dialysis days resolves after dialysis.  On physical exam palpation of the face is not notable for any fluctuant mass, hard mass, fluid collection, loculation.  On assessment, this is most likely dependent edema secondary to fluid overload or electrolyte imbalance as a result of ESRD and dialysis.  She notes that she sleeps on her right side, thus it is likely she is developing dependent edema in the areas of compromised tissue of her face and eye where fluid can accumulate during this time.  To further assess for any electrolyte imbalances or protein imbalances, a CMP was ordered.  Additionally she complains about hypertension.  Her daughter who accompanies her and notes that her blood pressure readings, systolic, ranged from low in the 90s during and after dialysis, too high up to 210 on days she is not having dialysis.  She is on multiple antihypertensive medications managed by her primary care physician and by nephrology.  She does not have symptoms when she has hypertensive, but she does have fatigue and possibly some dizziness when she is hypotensive.  Due to the risk of hypotension, which outweighs the risks of hypertension especially given the patient's age and comorbidities, will not adjust antihypertensive medications at this time.  Finally she complains of fatigue. she says that she is constantly tired.  She has been fatigued for many years and it  comes and goes, however she felt better recently after receiving iron infusion therapy.  Lab review shows a baseline hemoglobin of around 8-9, but during infusion therapy her hemoglobin was up to 13.  It appears there may be a downtrend in her hemoglobin after this.  It is unclear whether her hemoglobin is being checked during dialysis.  At this time we will reassess CBC for hemoglobin.  In addition we will check iron panel, and given patient's history of hypothyroidism we will also check TSH.   lastly she has a notable murmur over her upper right sternal border.  Previous echo in 2018 was notable for aortic valvular sclerosis, and a murmur was present at that time however a recheck of the echo has not been completed since.  Given her wide pulse pressure, there is concern that her aortic sclerosis has evolved into either aortic stenosis or aortic regurgitation.  Thus we will check an echocardiogram.    She will follow-up with her primary care physician in 12 days.      (E03.9) Hypothyroidism, unspecified type  (primary encounter diagnosis)  Comment:   Plan: TSH WITH FREE T4 REFLEX          (K74.60) Unspecified cirrhosis of liver (H)  Comment:   Plan: Comprehensive metabolic panel (BMP + Alb, Alk         Phos, ALT, AST, Total. Bili, TP)        (R53.83) Fatigue, unspecified type  Comment:   Plan: Comprehensive metabolic panel (BMP + Alb, Alk         Phos, ALT, AST, Total. Bili, TP), CBC with         platelets and differential, Iron and iron         binding capacity, Ferritin, Echocardiogram         Complete            (R22.9) Localized superficial swelling, mass, or lump  Plan: Comprehensive metabolic panel (BMP + Alb, Alk         Phos, ALT, AST, Total. Bili, TP), CBC with         platelets and differential            (R01.1) Murmur, cardiac  Plan: Echocardiogram Complete            (D50.8) Other iron deficiency anemia  Plan: Ferritin           Addendum: Iron studies show increased iron deficiency, infusion therapy  "plan ordered will contact patient's family to discuss.  In addition thyroid studies show increased TSH, T4 still pending, will plan to increase levothyroxine dose.    {Follow-up (Optional):785767}    Tae Jeffrey is a 85 year old, presenting for the following health issues:  Swelling (Facial swelling, no bruising, a little pain per pt, mostly irritating per pt; worst swelling occurs in the morning; extreme fatigue per pt onset about 2 weeks )      7/10/2025    12:16 PM   Additional Questions   Roomed by MR   Accompanied by Daughter     HPI      Dialysis since 2016    Happens when she wakes up, resolves after dialysis. Swelling stays on days that she doesn't have dialysis.     No pain associated.     She does have swelling of her feet and legs.     {MA/LPN/RN Pre-Provider Visit Orders- hCG/UA/Strep (Optional):675836}  {SUPERLIST (Optional):657458}  {additonal problems for provider to add (Optional):871657}    {ROS Picklists (Optional):374643}      Objective    BP (!) 163/66 (BP Location: Left arm, Patient Position: Sitting, Cuff Size: Adult Regular)   Pulse 82   Temp 98.4  F (36.9  C) (Oral)   Resp 16   Ht 1.626 m (5' 4\")   Wt 56.4 kg (124 lb 4.8 oz)   SpO2 96%   BMI 21.34 kg/m    Body mass index is 21.34 kg/m .  Physical Exam   {Exam List (Optional):256695}    {Diagnostic Test Results (Optional):669750}        Signed Electronically by: Porter Paniagua MD  {Email feedback regarding this note to [email or web address]   :243100}  "

## 2025-07-22 ENCOUNTER — VIRTUAL VISIT (OUTPATIENT)
Dept: INTERNAL MEDICINE | Facility: CLINIC | Age: 85
End: 2025-07-22
Payer: MEDICARE

## 2025-07-22 ENCOUNTER — OFFICE VISIT (OUTPATIENT)
Dept: GASTROENTEROLOGY | Facility: CLINIC | Age: 85
End: 2025-07-22
Attending: PHYSICIAN ASSISTANT
Payer: MEDICARE

## 2025-07-22 ENCOUNTER — TELEPHONE (OUTPATIENT)
Dept: GASTROENTEROLOGY | Facility: CLINIC | Age: 85
End: 2025-07-22

## 2025-07-22 ENCOUNTER — LAB (OUTPATIENT)
Dept: LAB | Facility: CLINIC | Age: 85
End: 2025-07-22
Payer: MEDICARE

## 2025-07-22 ENCOUNTER — MEDICAL CORRESPONDENCE (OUTPATIENT)
Dept: HEALTH INFORMATION MANAGEMENT | Facility: CLINIC | Age: 85
End: 2025-07-22

## 2025-07-22 VITALS
OXYGEN SATURATION: 96 % | HEART RATE: 79 BPM | SYSTOLIC BLOOD PRESSURE: 182 MMHG | TEMPERATURE: 98.2 F | DIASTOLIC BLOOD PRESSURE: 70 MMHG

## 2025-07-22 DIAGNOSIS — K74.69 COMPENSATED CIRRHOSIS RELATED TO HEPATITIS C VIRUS (HCV) (H): Primary | ICD-10-CM

## 2025-07-22 DIAGNOSIS — B19.20 COMPENSATED CIRRHOSIS RELATED TO HEPATITIS C VIRUS (HCV) (H): Primary | ICD-10-CM

## 2025-07-22 DIAGNOSIS — F32.A FATIGUE DUE TO DEPRESSION: Primary | ICD-10-CM

## 2025-07-22 DIAGNOSIS — N18.6 END STAGE RENAL DISEASE (H): ICD-10-CM

## 2025-07-22 DIAGNOSIS — F41.8 DEPRESSION WITH ANXIETY: ICD-10-CM

## 2025-07-22 DIAGNOSIS — B19.20 COMPENSATED CIRRHOSIS RELATED TO HEPATITIS C VIRUS (HCV) (H): ICD-10-CM

## 2025-07-22 DIAGNOSIS — R19.5 LOOSE STOOLS: ICD-10-CM

## 2025-07-22 DIAGNOSIS — K76.9 LIVER LESION: ICD-10-CM

## 2025-07-22 DIAGNOSIS — Z91.81 HISTORY OF RECENT FALL: ICD-10-CM

## 2025-07-22 DIAGNOSIS — C22.0 HCC (HEPATOCELLULAR CARCINOMA) (H): ICD-10-CM

## 2025-07-22 DIAGNOSIS — R53.83 FATIGUE DUE TO DEPRESSION: Primary | ICD-10-CM

## 2025-07-22 DIAGNOSIS — K74.69 COMPENSATED CIRRHOSIS RELATED TO HEPATITIS C VIRUS (HCV) (H): ICD-10-CM

## 2025-07-22 LAB
AFP SERPL-MCNC: 3.5 NG/ML
ALBUMIN SERPL BCG-MCNC: 3.9 G/DL (ref 3.5–5.2)
ALP SERPL-CCNC: 109 U/L (ref 40–150)
ALT SERPL W P-5'-P-CCNC: <5 U/L (ref 0–50)
ANION GAP SERPL CALCULATED.3IONS-SCNC: 14 MMOL/L (ref 7–15)
AST SERPL W P-5'-P-CCNC: 19 U/L (ref 0–45)
BILIRUB SERPL-MCNC: 0.8 MG/DL
BILIRUBIN DIRECT (ROCHE PRO & PURE): 0.47 MG/DL (ref 0–0.45)
BUN SERPL-MCNC: 16.7 MG/DL (ref 8–23)
CALCIUM SERPL-MCNC: 9 MG/DL (ref 8.8–10.4)
CHLORIDE SERPL-SCNC: 103 MMOL/L (ref 98–107)
CREAT SERPL-MCNC: 5.74 MG/DL (ref 0.51–0.95)
EGFRCR SERPLBLD CKD-EPI 2021: 7 ML/MIN/1.73M2
ERYTHROCYTE [DISTWIDTH] IN BLOOD BY AUTOMATED COUNT: 16.6 % (ref 10–15)
GLUCOSE SERPL-MCNC: 101 MG/DL (ref 70–99)
HCO3 SERPL-SCNC: 22 MMOL/L (ref 22–29)
HCT VFR BLD AUTO: 40.6 % (ref 35–47)
HGB BLD-MCNC: 12.8 G/DL (ref 11.7–15.7)
INR PPP: 1.14 (ref 0.85–1.15)
MCH RBC QN AUTO: 30.5 PG (ref 26.5–33)
MCHC RBC AUTO-ENTMCNC: 31.5 G/DL (ref 31.5–36.5)
MCV RBC AUTO: 97 FL (ref 78–100)
PLATELET # BLD AUTO: 186 10E3/UL (ref 150–450)
POTASSIUM SERPL-SCNC: 4.4 MMOL/L (ref 3.4–5.3)
PROT SERPL-MCNC: 6.8 G/DL (ref 6.4–8.3)
PROTHROMBIN TIME: 14.8 SECONDS (ref 11.8–14.8)
RBC # BLD AUTO: 4.19 10E6/UL (ref 3.8–5.2)
SODIUM SERPL-SCNC: 139 MMOL/L (ref 135–145)
WBC # BLD AUTO: 5 10E3/UL (ref 4–11)

## 2025-07-22 PROCEDURE — 80053 COMPREHEN METABOLIC PANEL: CPT | Performed by: PATHOLOGY

## 2025-07-22 PROCEDURE — 98007 SYNCH AUDIO-VIDEO EST HI 40: CPT | Performed by: INTERNAL MEDICINE

## 2025-07-22 PROCEDURE — 85610 PROTHROMBIN TIME: CPT | Performed by: PATHOLOGY

## 2025-07-22 PROCEDURE — 1126F AMNT PAIN NOTED NONE PRSNT: CPT | Performed by: INTERNAL MEDICINE

## 2025-07-22 PROCEDURE — G0463 HOSPITAL OUTPT CLINIC VISIT: HCPCS | Performed by: PHYSICIAN ASSISTANT

## 2025-07-22 PROCEDURE — 99000 SPECIMEN HANDLING OFFICE-LAB: CPT | Performed by: PATHOLOGY

## 2025-07-22 PROCEDURE — 36415 COLL VENOUS BLD VENIPUNCTURE: CPT | Performed by: PATHOLOGY

## 2025-07-22 PROCEDURE — 82105 ALPHA-FETOPROTEIN SERUM: CPT | Performed by: PHYSICIAN ASSISTANT

## 2025-07-22 PROCEDURE — G2211 COMPLEX E/M VISIT ADD ON: HCPCS | Performed by: INTERNAL MEDICINE

## 2025-07-22 PROCEDURE — 82248 BILIRUBIN DIRECT: CPT | Performed by: PATHOLOGY

## 2025-07-22 PROCEDURE — 85027 COMPLETE CBC AUTOMATED: CPT | Performed by: PATHOLOGY

## 2025-07-22 RX ORDER — LOPERAMIDE HYDROCHLORIDE 2 MG/1
TABLET ORAL
Qty: 120 TABLET | Refills: 0 | Status: SHIPPED | OUTPATIENT
Start: 2025-07-22

## 2025-07-22 ASSESSMENT — PAIN SCALES - GENERAL: PAINLEVEL_OUTOF10: NO PAIN (0)

## 2025-07-22 NOTE — Clinical Note
Please give daughter a call: Prescriptions have been sent to pharmacy.   AFP tumor marker down to kalin at 3.5 from 97.   Start Metamucil (1 teaspoon and increase as need to 2 teaspoons)  and  Imodium (Initial: 4 mg, followed by 2 mg after each loose stool, max 8 mg at this time) to help bulk up stools.  Increase slowly to avoid constipation.   Thanks,    Roberto Gordon PA-C

## 2025-07-22 NOTE — LETTER
7/22/2025      Rachele Martínez  [address]  [place] [address]      Dear Colleague,    Thank you for referring your patient, Rachele Martínez, to the [facility] [place]. Please see a copy of my visit note below.    Hepatology Follow-up Clinic note  Rachele Martínez   Date of Birth 1940  Reason for follow-up: Cirrhosis          Assessment/plan:   Rachele Martínez is a 85 year old female with  female with history of compensated cirrhosis (due to history of Hep C - SVR 7/2015) in the setting of end-stage renal disease requiring HD.  She has mildly elevated INR, otherwise normal synthetic liver function.  MELD 3.0 is driven by her creatinine.     # HCC  with 1.4 cm lesion right lobe s/p CT-guided liver ablation on 3/19/25   # CT Chest staging on 2/2025  - AFP previously 97.4, AFP pending today  - call with results   - MRI Abdomen w/ and w/o out control scheduled on 7/31. Continue MRI abdomen every 3 months      # Variceal screening, up-to-date:   # History of bleedings AVMs:   - Continue follow up with advanced endoscopy as recommended    # Appears intravascularly depleted, decreased skin turgor: Ensure adequate hydration  # Monitor weight trends     # Intermittent liquid stool (suspect more overflow diarrhea, with constipation)  # Urgency and incontinence secondary to looser stools:   - Given intermittent nature of loose stools low suspicion of infection. Evaluate stool burden on upcoming MRI imaging.   - Optimize thyroid function   - Start Metamucil (1-2 teaspoons daily) and imodium (Initial: 4 mg, followed by 2 mg after each loose stool, max 8 mg)     # Pruritus, improved with phosphorus binder.   - Continue Naltrexone 50 mg   - Takes sertraline 100 mg to help with itching.   - Optimize iron stores as needed     # Hepatic encephalopathy: no history   # No radiologic evidence of ascites    # History of hypertension, CAD, ESRD on dialysis   # PAD   # CT  chest on 2/2025 showing new enlarged ascending thoracic aorta measuring up to 3.8  cm.  - Continue follow up with PCP and nephrology for managements     - Follow-up in clinic in 1 year     Roberto Gordon PA-C   [facility] Hepatology clinic    Total time for E/M services performed on the date of the encounter 45 minutes.  This included review of previous: clinic visits, hospital records, lab results, imaging studies, and procedural documentation. Time also includes patient visit, documentation and discussion with other providers.  The findings from this review are summarized in the above note.    The longitudinal plan of care for the diagnosis(es)/condition(s) as documented were addressed during this visit. Due to the added complexity in care, I will continue to support Rachele Martínez in the subsequent management and with ongoing continuity of care.  -----------------------------------------------------       HPI:   Rachele Martínez is a 85 year old female presenting for follow-up.     Cirrhosis:  - Hep C  Complicated by:  - No hx of Ascites  - Hx of GI bleed due to AVM, no history of varices  - No hx of HE  EGD/enteroscopy: 12/20/2024, EGD, normal esophagus, bright red blood in the gastric antrum and body, single gastric polyp and single bleeding angio ectasia in the stomach s/p APC and clip.  HCC: See below      Hep C, gt1a  - treated with Harvoni x 12, achieved SVR 7/2015  - Fibrosis scan:  2017 Stage 4 fibrosis, 19.9 kilopascals    HCC   - 1.4 cm lesion right lobe  - Baseline AFP 97.4 Jan 2025    s/p CT-guided liver ablation on 3/19/25     Patient was last seen by me on 1/22/2025.   Recent hospitalizations or ER visits:   New medications: None     - Underwent ablation procedure for liver cancer in March 2025; does not recall details of the procedure, but reports no significant pain afterward,  -weight was 124 lbs at an appointment one week prior to this encounter, and down to 110 lbs today and  122 lbs in April 2025  - Reports not feeling like she has lost significant muscle mass and does not perceive herself as looking much smaller despite weight loss    - Reports regular bowel movements, typically three times per day, but notes that stools are often more liquid and sometimes does not feel when a bowel movement is occurring; this has been happening more frequently lately  - Reports occasional swelling on one side of the face in the morning, especially on the side she sleeps on; associated with increased tearing of the eye on that side; swelling is less severe on some mornings  - Denies fluid accumulation in the legs and denies abdominal bloating  - Reports chewing ice frequently and limiting fluid intake due to dialysis  - Reports not wanting to urinate anymore  - Reports itching, for which she takes a medication (calcium-based chewable, sometimes forgotten after meals)  - Receives regular iron infusions through dialysis    - Reports feeling nauseous frequently and often feeling full or bloated  - Reports difficulty with rectal continence, with loose stools sometimes passing without awareness; describes this as a recent change  - Reports that sometimes stools are watery, sometimes formed, but overall has difficulty holding stool.     Patient denies jaundice, lower extremity edema, abdominal distension or confusion.  P  Patient also denies melena, hematochezia or hematemesis. Patient denies weight loss, fevers, sweats or chills.           Medications:     Current Outpatient Medications   Medication Sig Dispense Refill     acetaminophen (TYLENOL) 325 MG tablet Take 325-650 mg by mouth every 6 hours as needed for mild pain       amLODIPine (NORVASC) 10 MG tablet Take 0.5 tablets (5 mg) by mouth 2 times daily. 90 tablet 0     atorvastatin (LIPITOR) 20 MG tablet 1 TABLET ORALLY DAILY (DX: HYPERLIPIDEMIA) 30 tablet 11     gabapentin (NEURONTIN) 300 MG capsule 1 CAPSULE ORALLY AT BEDTIME 30 capsule 11      hydrALAZINE (APRESOLINE) 50 MG tablet Take 1 tablet (50 mg) by mouth 2 times daily. 60 tablet 11     levothyroxine (SYNTHROID/LEVOTHROID) 25 MCG tablet Take 2 tablets (50 mcg) by mouth daily. For additional refills, please schedule a follow-up appointment at [phone], lab due 90 tablet 3     losartan (COZAAR) 100 MG tablet 1 TABLET ORALLY DAILY (DX: HYPERTENSION) 30 tablet 11     melatonin 3 MG tablet 1 TABLET ORALLY AT BEDTIME (DX: INSOMNIA) 28 tablet 11     Multiple Vitamin (TAB-A-TABATHA) TABS 1 TABLET ORALLY DAILY (DX: SUPPLEMENT) 28 tablet 4     Multiple Vitamins-Minerals (PRORENAL + D) TABS Take 1 tablet by mouth daily at 2 pm       pantoprazole (PROTONIX) 20 MG EC tablet 1 TABLET ORALLY 2 TIMES DAILY 30-60 MINUTES BEFORE A MEAL (DX: INDIGESTION) 60 tablet 11     sertraline (ZOLOFT) 100 MG tablet 1 TABLET ORALLY DAILY 30 tablet 11     sucroferric oxyhydroxide (VELPHORO) 500 MG CHEW chewable tablet Take 500 mg by mouth 2 times daily       No current facility-administered medications for this visit.     Facility-Administered Medications Ordered in Other Visits   Medication Dose Route Frequency Provider Last Rate Last Admin     gadobutrol (GADAVIST) injection 7.5 mL  7.5 mL Intravenous Once Roberto Gordon PA-C                    Review of Systems:   10 points ROS was obtained and highlighted in the HPI, otherwise negative.          Physical Exam:   BP (!) 182/70   Pulse 79   Temp 98.2  F (36.8  C) (Oral)   SpO2 96%     Gen: A&Ox3, NAD,   HEENT: non-icteric   Lym- no palpable lymphadenopathy  Abd: soft, NT, mildly distended, no organomegaly.   Ext: trace edema, intact pulses, decreased skin turgor   Skin: No rash, no palmar erythema, telangiectasias or jaundice. Dry skin.   Neuro: grossly intact, no asterixis   Psych: appropriate mood and affects         Data:   Reviewed in person and significant for:    Lab Results   Component Value Date     07/22/2025     05/26/2020      Lab Results  "  Component Value Date    POTASSIUM 4.4 07/22/2025    POTASSIUM 4.5 01/27/2022    POTASSIUM 4.1 05/26/2020     Lab Results   Component Value Date    CHLORIDE 103 07/22/2025    CHLORIDE 101 01/27/2022    CHLORIDE 98 05/26/2020     Lab Results   Component Value Date    CO2 22 07/22/2025    CO2 28 01/27/2022    CO2 28 05/26/2020     Lab Results   Component Value Date    BUN 16.7 07/22/2025    BUN 18 01/27/2022    BUN 23 05/26/2020     Lab Results   Component Value Date    CR 5.74 07/22/2025    CR 7.50 05/26/2020       Lab Results   Component Value Date    WBC 5.0 07/22/2025    WBC 5.5 05/26/2020     Lab Results   Component Value Date    HGB 12.8 07/22/2025    HGB 12.8 05/26/2020     Lab Results   Component Value Date    HCT 40.6 07/22/2025    HCT 41.1 05/26/2020     Lab Results   Component Value Date    MCV 97 07/22/2025     05/26/2020     Lab Results   Component Value Date     07/22/2025     05/26/2020       Lab Results   Component Value Date    AST 19 07/22/2025    AST 21 05/26/2020     Lab Results   Component Value Date    ALT <5 07/22/2025    ALT 20 05/26/2020     No results found for: \"BILICONJ\"   Lab Results   Component Value Date    BILITOTAL 0.8 07/22/2025    BILITOTAL 1.0 05/26/2020       Lab Results   Component Value Date    ALBUMIN 3.9 07/22/2025    ALBUMIN 4.1 01/27/2022    ALBUMIN 3.8 05/26/2020     Lab Results   Component Value Date    PROTTOTAL 6.8 07/22/2025    PROTTOTAL 8.7 05/26/2020      Lab Results   Component Value Date    ALKPHOS 109 07/22/2025    ALKPHOS 136 05/26/2020       Lab Results   Component Value Date    INR 1.14 07/22/2025    INR 1.31 05/26/2020       PROCEDURE: Image-guided heat-based ablation     Procedural Personnel  Attending physician(s): NOLAN Johnson, Dr. Jalen Mariscal performed the  entirety of this procedure without assistance.     Fellow physician(s): None  Resident physician(s): None  Advanced practice provider(s): " None  Indication: Curative intent  Pre-procedure diagnosis: Hepatocellular carcinoma  Post-procedure diagnosis: Same     Additional clinical history: None     Complications: No immediate complications.                                                                      IMPRESSION:     Percutaneous microwave ablation of right lobe segment 6/7  hepatocellular carcinoma.     Plan:      Bedrest for 2 to 3 hours.  _______________________________________________________________     PROCEDURE SUMMARY:  - Target site: Native liver  - Image-guided heat-based ablation  - Additional procedure(s): None     PROCEDURE DETAILS:     Pre-procedure  Consent: Informed consent for the procedure including risks, benefits  and alternatives was obtained and time-out was performed prior to the  procedure.  Preparation: The site was prepared and draped using maximal sterile  barrier technique including cutaneous antisepsis.     Anesthesia/sedation  Level of anesthesia/sedation: General anesthesia  Anesthesia/sedation administered by: Anesthesiology  Total intra-service sedation time (minutes): 30     Pre-ablation imaging  Modality: MRI and ultrasound  Findings: 1.6 cm arterial enhancing lesion in segment 6/7.     Heat-based ablation  Under Ultrasound guidance, the ablation applicator(s) were advanced  and positioned within the target(s). For each target lesion the  applicators were placed and repositioned as necessary to achieve the  desired ablation zone. The ablation applicator(s) were removed and  sterile bandages were applied.  Ablation applicator: ActionRun Emprint     ~Target  ~Target number:1  ~Maximal diameter (cm): 1.6  ~Location: Segment 6/7     ~~Number of applicators: 1  ~~Duration (minutes): 10   ~~Maximum energy applied (mcmillan): 100   ~~Tract cauterization performed with applicator removal: Yes  ~~Intraprocedural imaging findings: No abnormality     Additional Details  Additional description of procedure: None  Acute  procedural success: Yes  Registry event: V/3/f  Device used: None  Equipment details: None  Unique Device Identifiers: Not available  Specimens removed: None  Estimated blood loss (mL): Less than 10  Standardized report: [email or web address]_Heat_v3.1     Attestation  Signer name: Jalen Leon   I attest that I performed the entire procedure. I reviewed the stored  images and agree with the report as written.     JALEN LEON          Narrative & Impression   EXAM: CT CHEST W/O CONTRAST2/27/2025 11:44 AM      HISTORY: New HCC; staging; HCC (hepatocellular carcinoma) (H)     COMPARISON: CT chest 7/31/2023.     TECHNIQUE: Helical acquisition of CT images from the lung apices to  the kidneys. Coronal images and axial MIP images were reconstructed  from the source data.     FINDINGS:   Lungs: Scattered calcified and noncalcified pulmonary micronodules. No  suspicious pulmonary nodules.     Pleura: Within normal limits     Airways: Within normal limits     Cardiovascular: Enlarged ascending thoracic aorta measuring up to 3.8  cm. Severe atherosclerotic calcification of the thoracic aorta, major  branching vessels, and coronary arteries. Trace pericardial effusion.     Mediastinum/lc: No lymphadenopathy.      Chest wall/neck: Densely calcified masses throughout the left and  right breasts.     Musculoskeletal: Severe multilevel discovertebral degenerative changes  throughout the thoracolumbar spine, most extensive T8-T9 and L1-L2,  with similar severe intervertebral disc space loss and endplate  sclerosis of these levels. Numerous prominent Schmorl's nodes,  unchanged. Vertebral body heights are maintained.     Upper Abdomen: Densely calcified abdominal aorta and celiac trunk  branches. Poorly appreciated hypoattenuating right hepatic lobe  lesion, segment 8.                                                                      IMPRESSION:? ?  1.  No suspicious pulmonary nodules concerning for metastatic disease  in  the setting of patient's hepatocellular carcinoma.?  2.  Trace pericardial effusion.  3.  Aneurysmal dilatation of the ascending thoracic aorta.     I have personally reviewed the examination and initial interpretation  and I agree with the findings.     WALLACE LINN MD              Chest       Again, thank you for allowing me to participate in the care of your patient.        Sincerely,        Roberto Gordon PA-C    Electronically signed

## 2025-07-22 NOTE — PATIENT INSTRUCTIONS
Thank you for visiting the Primary Care Center today at the [facility]! The following is some information about our clinic:     Primary Care Center Frequently-Asked Questions    (1) How do I schedule appointments at the [facility]?     Primary Care--to schedule or make changes to an existing appointment, please call our primary care line at [phone].    Labs--to schedule a lab appointment at the [facility] you can use [facility] or call [phone]. If you have a [facility] location that is closer to home, you can reach out to that location for scheduling options.     Imaging--if you need to schedule a CT, X-ray, MRI, ultrasound, or other imaging study you can call [phone] to schedule at the [facility] or any other [facility] imaging location.     Referrals--if a referral to another specialty was ordered you can expect a phone call from their scheduling team. If you have not heard from them in a week, please call us or send us a [facility] message to check the status or get a scheduling number. Please note that this only applies to internal [facility] referrals. If the referral is external you would need to contact their office for scheduling.     (2) I have a question about my visit, who do I contact?     You can call us at the primary care line at [phone] to ask questions about your visit. You can also send a secure message through [facility], which is reviewed by clinic staff. Please note that [facility] messages have a twenty-four to forty-eight business hour turnaround time and should not be used for urgent concerns.    (3) How will I get the results of my tests?    If you are signed up for [employer] all tests will be released to you within twenty-four hours of resulting. Please allow three to five days for your doctor to review your results and place a note interpreting the results. If you do not have [employer] you will receive your  results through mail seven to ten business days following the return of the tests. Please note that if there should be any urgent or concerning results that your doctor or their registered nurse will reach out to you the same day as the tests come back. If you have follow up questions about your results or would like to discuss the results in detail please schedule a follow up with your provider either in person or virtually.     (4) How do I get refills of my prescriptions?     You should always first contact your pharmacy for refills of your medications. If submitting a refill request on [employer], please be sure to submit the request only once--repeat requests can cause delays in refill. If you are requesting a NEW medication or a medication related to new symptoms you will need to schedule an appointment with a provider prior to approval. Please note: Routine medication refills have up to one to three business day turnaround whereas controlled substances refills have up to five to seven business day turnaround.    (5) I have new symptoms, what do I do?     If you are having an immediate medical emergency, you should dial 911 for assistance.   For anything urgent that needs to be seen within a few hours to one day you should visit a local urgent care for assistance.  For non-urgent symptoms that need to be seen within a few days to a week you can schedule with an available provider in primary care by going to [employer] or calling [phone].   If you are not sure how serious your symptoms are or you would like to receive medical advice you can always call [phone] to speak with a triage nurse.

## 2025-07-22 NOTE — TELEPHONE ENCOUNTER
Called patient's daughter.    Reported AFP is now normal at 3.5. Was 97.    Explained new constipation/loose stool medications:  - Start Metamucil (1 teaspoon and increase as need to 2 teaspoons) AND  - Imodium (Initial: 4 mg, followed by 2 mg after each loose stool, max 8 mg at this time) to help bulk up stools.     Increase slowly to avoid constipation.     Daughter understanding and will let patient know. Daughter reported medications would be distributed at assisted living.     BROOK Carlson, RN, PHN  Hepatology Clinic  [facility]  [facility]

## 2025-07-22 NOTE — PROGRESS NOTES
"Rachele is a 85 year old who is being evaluated via a billable video visit.    How would you like to obtain your AVS? MyChart  If the video visit is dropped, the invitation should be resent by: Text to cell phone: [phone]  Will anyone else be joining your video visit? Yes: NA. How would they like to receive their invitation? Text to cell phone: [phone]      Assessment & Plan     Depression with anxiety:  Fatigue, likely in part due to depression:  Cognitive impairment (likely vascular dementia)  She has been prescribed zoloft for many years for management of itching, related to her liver disease.  Historically she has been a very social person who enjoys shopping and activities out of the house.  Her daughter notes that she has been increasingly more withdrawn, does not like to leave her apartment, does not enjoy activities or have energy to even do things around the house.  Struggles with aging and says that she doesn't want to \"hang out with the old people\" at her assisted living.    - PHQ9 and RAJENDRA 7 assigned over mychart  - E-consult with psychiatry for additional recs.  While bupropion might be a good addition, need to be cautious given her ESRD.  Stimulants may provide rapid improvement in energy and fatigue, but also come with risks given her challenging to control hypertension and vascular disease.  Will see if psych has additional recs.    End stage renal disease (H):  - Dialysis access is functioning well, no current issues.    History of recent fall:  - has life alert and nurses at the facility to help  - would benefit from PT.  There is a PT at her facility.  Charla will check on the name and I can place a referral    Facial swelling:  - Swelling is improving, likely related to sleeping position and water retention.  - Lower suspicion for SVC syndrome, especially as she reports improvement  - reminded Charla to schedule TTE, ordered at last visit    Consent was obtained from the patient to use " "an AI documentation tool in the creation of this note.    Follow-up  Return in about 4 months (around 11/22/2025) for Routine preventive.    I spent a total of 50 minutes on the day of the visit.  Time spent by me doing chart review, history and exam, documentation and further activities per the note    The longitudinal plan of care for the diagnosis(es)/condition(s) as documented were addressed during this visit. Due to the added complexity in care, I will continue to support Rachele in the subsequent management and with ongoing continuity of care.    Subjective   Rachele is a 85 year old, presenting for the following health issues:  Follow Up and RECHECK      Video Start Time: 1pm    History of Present Illness       Reason for visit:  Extreme fatigue and swelling on face  Symptom onset:  3-7 days ago  Symptom intensity:  Severe  Symptom progression:  Staying the same  Had these symptoms before:  No  What makes it worse:  Na  What makes it better:  Na   She is taking medications regularly.   Facial Swelling  - Swelling of the face, more pronounced on the left side, first noticed in May, 2025  - Swelling worse in the morning, improves throughout the day  - Swelling has decreased since initial onset  - Sleeps on the affected side  - No reported issues with dialysis access (right-sided fistula)    Weight/ Poor Appetite  - Poor appetite, \"does not have a good appetite\"  - Eats very little, sometimes as little as \"5 fries\"  - Some days does not eat after returning from dialysis, sleeps until the next morning  - Daughter Charla routinely cleans out her fridge and there is lots of leftover, uneaten food  - Weight has been stable ~125lbs  - Reports not liking the food at current residence, relies on family to bring food    Depressive Symptoms / Decreased Motivation  - Reports not wanting to socialize or participate in activities  - Described as having difficulty accepting current living situation and aging  - Daughter " "reports periods of depression, lack of interest in activities, and not smiling  - Noted decline in housekeeping and self-care, requires assistance with chores  - Historically Rachele was very social, loved to go out shopping  - Reports low energy, prays for energy, \"just doesn't feel like doing anything\"  - Sometimes does not feel like reading or engaging in activities  - Cat is ill, causing additional distress    Recent Fall  - Fell off bed while reaching for purse on July 21, 2025  - Unable to get up without assistance, called nurses for help (has lifeline)  - Reports using walker most of the time, but sometimes does not use it    Hydration Concerns  - Concern about becoming dehydrated, pinched skin test performed  - Limited fluid intake due to dialysis, but worried about effects of dehydration    Blood Pressure  - Blood pressure remains elevated, recent increase in amlodipine dose to twice daily  - Blood pressure readings often in 160s, sometimes 180s      PHQ-2 Score:         4/8/2025     1:51 PM 2/25/2025    10:29 AM   PHQ-2 ( 1999 [employer])   Q1: Little interest or pleasure in doing things 1 1   Q2: Feeling down, depressed or hopeless 0 1   PHQ-2 Score 1 2                     Objective    Vitals - Patient Reported  Pain Score: No Pain (0)    Are refills needed on medications prescribed by this physician?  NO     Physical Exam   GENERAL: alert and no distress  EYES: Eyes grossly normal to inspection.  No discharge or erythema, or obvious scleral/conjunctival abnormalities.  RESP: No audible wheeze, cough, or visible cyanosis.    SKIN: Visible skin clear. No significant rash, abnormal pigmentation or lesions.  NEURO: Cranial nerves grossly intact.  Mentation and speech appropriate for age.  PSYCH: Appropriate affect, tone, and pace of words          Video-Visit Details    Type of service:  Video Visit   Video End Time:1:39 PM  Originating Location (pt. Location): Home    Distant Location (provider location):  " Off-site  Platform used for Video Visit: Kimmy  Signed Electronically by: Agnieszka Rodriguez MD

## 2025-07-22 NOTE — PROGRESS NOTES
Hepatology Follow-up Clinic note  Rachele Martínez   Date of Birth 1940  Reason for follow-up: Cirrhosis          Assessment/plan:   Rachele Martínez is a 85 year old female with  female with history of compensated cirrhosis (due to history of Hep C - SVR 7/2015) in the setting of end-stage renal disease requiring HD.  She has mildly elevated INR, otherwise normal synthetic liver function.  MELD 3.0 is driven by her creatinine.     # HCC  with 1.4 cm lesion right lobe s/p CT-guided liver ablation on 3/19/25   # CT Chest staging on 2/2025  - AFP previously 97.4, AFP pending today  - call with results   - MRI Abdomen w/ and w/o out control scheduled on 7/31. Continue MRI abdomen every 3 months      # Variceal screening, up-to-date:   # History of bleedings AVMs:   - Continue follow up with advanced endoscopy as recommended    # Appears intravascularly depleted, decreased skin turgor: Ensure adequate hydration  # Monitor weight trends     # Intermittent liquid stool (suspect more overflow diarrhea, with constipation)  # Urgency and incontinence secondary to looser stools:   - Given intermittent nature of loose stools low suspicion of infection. Evaluate stool burden on upcoming MRI imaging.   - Optimize thyroid function   - Start Metamucil (1-2 teaspoons daily) and imodium (Initial: 4 mg, followed by 2 mg after each loose stool, max 8 mg)     # Pruritus, improved with phosphorus binder.   - Continue Naltrexone 50 mg   - Takes sertraline 100 mg to help with itching.   - Optimize iron stores as needed     # Hepatic encephalopathy: no history   # No radiologic evidence of ascites    # History of hypertension, CAD, ESRD on dialysis   # PAD   # CT chest on 2/2025 showing new enlarged ascending thoracic aorta measuring up to 3.8  cm.  - Continue follow up with PCP and nephrology for managements     - Follow-up in clinic in 1 year     Roberto Gordon PA-C   [facility] Hepatology clinic    Total time for E/M  Reviewed with leadership referring provider's note mentions ganglion cysts. Advising to schedule with hand surgery sent to clinic coordinators to schedule.    Leti Perry LPN        services performed on the date of the encounter 45 minutes.  This included review of previous: clinic visits, hospital records, lab results, imaging studies, and procedural documentation. Time also includes patient visit, documentation and discussion with other providers.  The findings from this review are summarized in the above note.    The longitudinal plan of care for the diagnosis(es)/condition(s) as documented were addressed during this visit. Due to the added complexity in care, I will continue to support Rachele Martínez in the subsequent management and with ongoing continuity of care.  -----------------------------------------------------       HPI:   Rachele Martínez is a 85 year old female presenting for follow-up.     Cirrhosis:  - Hep C  Complicated by:  - No hx of Ascites  - Hx of GI bleed due to AVM, no history of varices  - No hx of HE  EGD/enteroscopy: 12/20/2024, EGD, normal esophagus, bright red blood in the gastric antrum and body, single gastric polyp and single bleeding angio ectasia in the stomach s/p APC and clip.  HCC: See below      Hep C, gt1a  - treated with Harvoni x 12, achieved SVR 7/2015  - Fibrosis scan:  2017 Stage 4 fibrosis, 19.9 kilopascals    HCC   - 1.4 cm lesion right lobe  - Baseline AFP 97.4 Jan 2025    s/p CT-guided liver ablation on 3/19/25     Patient was last seen by me on 1/22/2025.   Recent hospitalizations or ER visits:   New medications: None     - Underwent ablation procedure for liver cancer in March 2025; does not recall details of the procedure, but reports no significant pain afterward,  -weight was 124 lbs at an appointment one week prior to this encounter, and down to 110 lbs today and 122 lbs in April 2025  - Reports not feeling like she has lost significant muscle mass and does not perceive herself as looking much smaller despite weight loss    - Reports regular bowel movements, typically three times per day, but notes that stools are often more liquid  and sometimes does not feel when a bowel movement is occurring; this has been happening more frequently lately  - Reports occasional swelling on one side of the face in the morning, especially on the side she sleeps on; associated with increased tearing of the eye on that side; swelling is less severe on some mornings  - Denies fluid accumulation in the legs and denies abdominal bloating  - Reports chewing ice frequently and limiting fluid intake due to dialysis  - Reports not wanting to urinate anymore  - Reports itching, for which she takes a medication (calcium-based chewable, sometimes forgotten after meals)  - Receives regular iron infusions through dialysis    - Reports feeling nauseous frequently and often feeling full or bloated  - Reports difficulty with rectal continence, with loose stools sometimes passing without awareness; describes this as a recent change  - Reports that sometimes stools are watery, sometimes formed, but overall has difficulty holding stool.     Patient denies jaundice, lower extremity edema, abdominal distension or confusion.  P  Patient also denies melena, hematochezia or hematemesis. Patient denies weight loss, fevers, sweats or chills.           Medications:     Current Outpatient Medications   Medication Sig Dispense Refill    acetaminophen (TYLENOL) 325 MG tablet Take 325-650 mg by mouth every 6 hours as needed for mild pain      amLODIPine (NORVASC) 10 MG tablet Take 0.5 tablets (5 mg) by mouth 2 times daily. 90 tablet 0    atorvastatin (LIPITOR) 20 MG tablet 1 TABLET ORALLY DAILY (DX: HYPERLIPIDEMIA) 30 tablet 11    gabapentin (NEURONTIN) 300 MG capsule 1 CAPSULE ORALLY AT BEDTIME 30 capsule 11    hydrALAZINE (APRESOLINE) 50 MG tablet Take 1 tablet (50 mg) by mouth 2 times daily. 60 tablet 11    levothyroxine (SYNTHROID/LEVOTHROID) 25 MCG tablet Take 2 tablets (50 mcg) by mouth daily. For additional refills, please schedule a follow-up appointment at [phone], lab due 90  tablet 3    losartan (COZAAR) 100 MG tablet 1 TABLET ORALLY DAILY (DX: HYPERTENSION) 30 tablet 11    melatonin 3 MG tablet 1 TABLET ORALLY AT BEDTIME (DX: INSOMNIA) 28 tablet 11    Multiple Vitamin (TAB-A-TABATHA) TABS 1 TABLET ORALLY DAILY (DX: SUPPLEMENT) 28 tablet 4    Multiple Vitamins-Minerals (PRORENAL + D) TABS Take 1 tablet by mouth daily at 2 pm      pantoprazole (PROTONIX) 20 MG EC tablet 1 TABLET ORALLY 2 TIMES DAILY 30-60 MINUTES BEFORE A MEAL (DX: INDIGESTION) 60 tablet 11    sertraline (ZOLOFT) 100 MG tablet 1 TABLET ORALLY DAILY 30 tablet 11    sucroferric oxyhydroxide (VELPHORO) 500 MG CHEW chewable tablet Take 500 mg by mouth 2 times daily       No current facility-administered medications for this visit.     Facility-Administered Medications Ordered in Other Visits   Medication Dose Route Frequency Provider Last Rate Last Admin    gadobutrol (GADAVIST) injection 7.5 mL  7.5 mL Intravenous Once Roberto Gordon PA-C                    Review of Systems:   10 points ROS was obtained and highlighted in the HPI, otherwise negative.          Physical Exam:   BP (!) 182/70   Pulse 79   Temp 98.2  F (36.8  C) (Oral)   SpO2 96%     Gen: A&Ox3, NAD,   HEENT: non-icteric   Lym- no palpable lymphadenopathy  Abd: soft, NT, mildly distended, no organomegaly.   Ext: trace edema, intact pulses, decreased skin turgor   Skin: No rash, no palmar erythema, telangiectasias or jaundice. Dry skin.   Neuro: grossly intact, no asterixis   Psych: appropriate mood and affects         Data:   Reviewed in person and significant for:    Lab Results   Component Value Date     07/22/2025     05/26/2020      Lab Results   Component Value Date    POTASSIUM 4.4 07/22/2025    POTASSIUM 4.5 01/27/2022    POTASSIUM 4.1 05/26/2020     Lab Results   Component Value Date    CHLORIDE 103 07/22/2025    CHLORIDE 101 01/27/2022    CHLORIDE 98 05/26/2020     Lab Results   Component Value Date    CO2 22 07/22/2025    CO2 28  "01/27/2022    CO2 28 05/26/2020     Lab Results   Component Value Date    BUN 16.7 07/22/2025    BUN 18 01/27/2022    BUN 23 05/26/2020     Lab Results   Component Value Date    CR 5.74 07/22/2025    CR 7.50 05/26/2020       Lab Results   Component Value Date    WBC 5.0 07/22/2025    WBC 5.5 05/26/2020     Lab Results   Component Value Date    HGB 12.8 07/22/2025    HGB 12.8 05/26/2020     Lab Results   Component Value Date    HCT 40.6 07/22/2025    HCT 41.1 05/26/2020     Lab Results   Component Value Date    MCV 97 07/22/2025     05/26/2020     Lab Results   Component Value Date     07/22/2025     05/26/2020       Lab Results   Component Value Date    AST 19 07/22/2025    AST 21 05/26/2020     Lab Results   Component Value Date    ALT <5 07/22/2025    ALT 20 05/26/2020     No results found for: \"BILICONJ\"   Lab Results   Component Value Date    BILITOTAL 0.8 07/22/2025    BILITOTAL 1.0 05/26/2020       Lab Results   Component Value Date    ALBUMIN 3.9 07/22/2025    ALBUMIN 4.1 01/27/2022    ALBUMIN 3.8 05/26/2020     Lab Results   Component Value Date    PROTTOTAL 6.8 07/22/2025    PROTTOTAL 8.7 05/26/2020      Lab Results   Component Value Date    ALKPHOS 109 07/22/2025    ALKPHOS 136 05/26/2020       Lab Results   Component Value Date    INR 1.14 07/22/2025    INR 1.31 05/26/2020       PROCEDURE: Image-guided heat-based ablation     Procedural Personnel  Attending physician(s): NOLAN Johnson, Dr. Jalen Mariscal performed the  entirety of this procedure without assistance.     Fellow physician(s): None  Resident physician(s): None  Advanced practice provider(s): None  Indication: Curative intent  Pre-procedure diagnosis: Hepatocellular carcinoma  Post-procedure diagnosis: Same     Additional clinical history: None     Complications: No immediate complications.                                                                      IMPRESSION:     Percutaneous " microwave ablation of right lobe segment 6/7  hepatocellular carcinoma.     Plan:      Bedrest for 2 to 3 hours.  _______________________________________________________________     PROCEDURE SUMMARY:  - Target site: Native liver  - Image-guided heat-based ablation  - Additional procedure(s): None     PROCEDURE DETAILS:     Pre-procedure  Consent: Informed consent for the procedure including risks, benefits  and alternatives was obtained and time-out was performed prior to the  procedure.  Preparation: The site was prepared and draped using maximal sterile  barrier technique including cutaneous antisepsis.     Anesthesia/sedation  Level of anesthesia/sedation: General anesthesia  Anesthesia/sedation administered by: Anesthesiology  Total intra-service sedation time (minutes): 30     Pre-ablation imaging  Modality: MRI and ultrasound  Findings: 1.6 cm arterial enhancing lesion in segment 6/7.     Heat-based ablation  Under Ultrasound guidance, the ablation applicator(s) were advanced  and positioned within the target(s). For each target lesion the  applicators were placed and repositioned as necessary to achieve the  desired ablation zone. The ablation applicator(s) were removed and  sterile bandages were applied.  Ablation applicator: [employer] Emprint     ~Target  ~Target number:1  ~Maximal diameter (cm): 1.6  ~Location: Segment 6/7     ~~Number of applicators: 1  ~~Duration (minutes): 10   ~~Maximum energy applied (mcmillan): 100   ~~Tract cauterization performed with applicator removal: Yes  ~~Intraprocedural imaging findings: No abnormality     Additional Details  Additional description of procedure: None  Acute procedural success: Yes  Registry event: V/3/f  Device used: None  Equipment details: None  Unique Device Identifiers: Not available  Specimens removed: None  Estimated blood loss (mL): Less than 10  Standardized report: [email or web address]_[email or web address]_Heat_v3.1     Attestation  Signer name: Jalen Mariscal I attest  that I performed the entire procedure. I reviewed the stored  images and agree with the report as written.     MADDIE LEON          Narrative & Impression   EXAM: CT CHEST W/O CONTRAST2/27/2025 11:44 AM      HISTORY: New HCC; staging; HCC (hepatocellular carcinoma) (H)     COMPARISON: CT chest 7/31/2023.     TECHNIQUE: Helical acquisition of CT images from the lung apices to  the kidneys. Coronal images and axial MIP images were reconstructed  from the source data.     FINDINGS:   Lungs: Scattered calcified and noncalcified pulmonary micronodules. No  suspicious pulmonary nodules.     Pleura: Within normal limits     Airways: Within normal limits     Cardiovascular: Enlarged ascending thoracic aorta measuring up to 3.8  cm. Severe atherosclerotic calcification of the thoracic aorta, major  branching vessels, and coronary arteries. Trace pericardial effusion.     Mediastinum/lc: No lymphadenopathy.      Chest wall/neck: Densely calcified masses throughout the left and  right breasts.     Musculoskeletal: Severe multilevel discovertebral degenerative changes  throughout the thoracolumbar spine, most extensive T8-T9 and L1-L2,  with similar severe intervertebral disc space loss and endplate  sclerosis of these levels. Numerous prominent Schmorl's nodes,  unchanged. Vertebral body heights are maintained.     Upper Abdomen: Densely calcified abdominal aorta and celiac trunk  branches. Poorly appreciated hypoattenuating right hepatic lobe  lesion, segment 8.                                                                      IMPRESSION:? ?  1.  No suspicious pulmonary nodules concerning for metastatic disease  in the setting of patient's hepatocellular carcinoma.?  2.  Trace pericardial effusion.  3.  Aneurysmal dilatation of the ascending thoracic aorta.     I have personally reviewed the examination and initial interpretation  and I agree with the findings.     WALLACE LINN MD              Chest

## 2025-07-22 NOTE — PATIENT INSTRUCTIONS
Start Metamucil (1 teaspoon and increase as need to 2 teaspoons)   and   Imodium (Initial: 4 mg, followed by 2 mg after each loose stool, max 8 mg at this time) to help bulk up stools.   Increase slowly to avoid constipation.       AFP tumor marker down to kalin at 3.5 from 97.     Thanks,       Robreto Gordon PA-C

## 2025-07-31 ENCOUNTER — ANCILLARY PROCEDURE (OUTPATIENT)
Dept: MRI IMAGING | Facility: CLINIC | Age: 85
End: 2025-07-31
Attending: PHYSICIAN ASSISTANT
Payer: MEDICARE

## 2025-07-31 PROCEDURE — A9585 GADOBUTROL INJECTION: HCPCS | Performed by: STUDENT IN AN ORGANIZED HEALTH CARE EDUCATION/TRAINING PROGRAM

## 2025-07-31 PROCEDURE — 74183 MRI ABD W/O CNTR FLWD CNTR: CPT | Performed by: STUDENT IN AN ORGANIZED HEALTH CARE EDUCATION/TRAINING PROGRAM

## 2025-07-31 RX ORDER — GADOBUTROL 604.72 MG/ML
7.5 INJECTION INTRAVENOUS ONCE
Status: COMPLETED | OUTPATIENT
Start: 2025-07-31 | End: 2025-07-31

## 2025-07-31 RX ADMIN — GADOBUTROL 6 ML: 604.72 INJECTION INTRAVENOUS at 14:47

## 2025-08-06 ENCOUNTER — RESULTS FOLLOW-UP (OUTPATIENT)
Dept: GASTROENTEROLOGY | Facility: CLINIC | Age: 85
End: 2025-08-06
Payer: MEDICARE

## 2025-08-25 ENCOUNTER — DOCUMENTATION ONLY (OUTPATIENT)
Dept: INTERNAL MEDICINE | Facility: CLINIC | Age: 85
End: 2025-08-25
Payer: MEDICARE

## 2025-09-02 ENCOUNTER — DOCUMENTATION ONLY (OUTPATIENT)
Dept: INTERNAL MEDICINE | Facility: CLINIC | Age: 85
End: 2025-09-02
Payer: MEDICARE

## (undated) DEVICE — SUCTION MANIFOLD NEPTUNE 2 SYS 4 PORT 0702-020-000

## (undated) DEVICE — TUBING SUCTION 10'X3/16" N510

## (undated) DEVICE — KIT CONNECTOR FOR OLYMPUS ENDOSCOPES DEFENDO 100310

## (undated) DEVICE — ENDO TUBE SPLINTING ST-SB1

## (undated) DEVICE — ENDO TANK RESERVOIR FOR SPLINTING TUBE MAJ-1727

## (undated) DEVICE — KIT ENDO FIRST STEP DISINFECTANT 200ML W/POUCH EP-4

## (undated) DEVICE — ENDO CAP AND TUBING STERILE FOR ENDOGATOR  100130

## (undated) DEVICE — SOL WATER IRRIG 1000ML BOTTLE 2F7114

## (undated) DEVICE — ENDO TUBING CO2 SMARTCAP STERILE DISP 100145CO2EXT

## (undated) DEVICE — ENDO BITE BLOCK ADULT OMNI-BLOC

## (undated) DEVICE — PACK ENDOSCOPY GI CUSTOM UMMC

## (undated) DEVICE — ESU ERBE FIAPC PROBE 2.3MMX220CM

## (undated) RX ORDER — DOBUTAMINE HYDROCHLORIDE 200 MG/100ML
INJECTION INTRAVENOUS
Status: DISPENSED
Start: 2017-07-11

## (undated) RX ORDER — DEXAMETHASONE SODIUM PHOSPHATE 10 MG/ML
INJECTION, SOLUTION INTRAMUSCULAR; INTRAVENOUS
Status: DISPENSED
Start: 2019-03-19

## (undated) RX ORDER — FENTANYL CITRATE 50 UG/ML
INJECTION, SOLUTION INTRAMUSCULAR; INTRAVENOUS
Status: DISPENSED
Start: 2023-03-09

## (undated) RX ORDER — CEFAZOLIN SODIUM/WATER 2 G/20 ML
SYRINGE (ML) INTRAVENOUS
Status: DISPENSED
Start: 2025-03-19

## (undated) RX ORDER — SIMETHICONE 20 MG/.3ML
EMULSION ORAL
Status: DISPENSED
Start: 2018-09-19

## (undated) RX ORDER — FENTANYL CITRATE 50 UG/ML
INJECTION, SOLUTION INTRAMUSCULAR; INTRAVENOUS
Status: DISPENSED
Start: 2019-03-27

## (undated) RX ORDER — OXYCODONE HYDROCHLORIDE 5 MG/1
TABLET ORAL
Status: DISPENSED
Start: 2025-03-19

## (undated) RX ORDER — SIMETHICONE 20 MG/.3ML
EMULSION ORAL
Status: DISPENSED
Start: 2019-03-27

## (undated) RX ORDER — LABETALOL HYDROCHLORIDE 5 MG/ML
INJECTION, SOLUTION INTRAVENOUS
Status: DISPENSED
Start: 2023-03-09

## (undated) RX ORDER — ONDANSETRON 2 MG/ML
INJECTION INTRAMUSCULAR; INTRAVENOUS
Status: DISPENSED
Start: 2023-03-09

## (undated) RX ORDER — PROPOFOL 10 MG/ML
INJECTION, EMULSION INTRAVENOUS
Status: DISPENSED
Start: 2023-03-09

## (undated) RX ORDER — FENTANYL CITRATE 50 UG/ML
INJECTION, SOLUTION INTRAMUSCULAR; INTRAVENOUS
Status: DISPENSED
Start: 2025-03-19

## (undated) RX ORDER — METOPROLOL TARTRATE 1 MG/ML
INJECTION, SOLUTION INTRAVENOUS
Status: DISPENSED
Start: 2017-07-11

## (undated) RX ORDER — REGADENOSON 0.08 MG/ML
INJECTION, SOLUTION INTRAVENOUS
Status: DISPENSED
Start: 2019-04-16

## (undated) RX ORDER — LIDOCAINE HYDROCHLORIDE 10 MG/ML
INJECTION, SOLUTION EPIDURAL; INFILTRATION; INTRACAUDAL; PERINEURAL
Status: DISPENSED
Start: 2025-03-19

## (undated) RX ORDER — FENTANYL CITRATE 50 UG/ML
INJECTION, SOLUTION INTRAMUSCULAR; INTRAVENOUS
Status: DISPENSED
Start: 2018-09-19

## (undated) RX ORDER — EPHEDRINE SULFATE 50 MG/ML
INJECTION, SOLUTION INTRAMUSCULAR; INTRAVENOUS; SUBCUTANEOUS
Status: DISPENSED
Start: 2025-03-19

## (undated) RX ORDER — DEXAMETHASONE SODIUM PHOSPHATE 4 MG/ML
INJECTION, SOLUTION INTRA-ARTICULAR; INTRALESIONAL; INTRAMUSCULAR; INTRAVENOUS; SOFT TISSUE
Status: DISPENSED
Start: 2023-03-09

## (undated) RX ORDER — HYDROMORPHONE HCL IN WATER/PF 6 MG/30 ML
PATIENT CONTROLLED ANALGESIA SYRINGE INTRAVENOUS
Status: DISPENSED
Start: 2025-03-19

## (undated) RX ORDER — WATER 10 ML/10ML
INJECTION INTRAMUSCULAR; INTRAVENOUS; SUBCUTANEOUS
Status: DISPENSED
Start: 2023-03-09

## (undated) RX ORDER — ESMOLOL HYDROCHLORIDE 10 MG/ML
INJECTION INTRAVENOUS
Status: DISPENSED
Start: 2025-03-19

## (undated) RX ORDER — SIMETHICONE 40MG/0.6ML
SUSPENSION, DROPS(FINAL DOSAGE FORM)(ML) ORAL
Status: DISPENSED
Start: 2023-02-02

## (undated) RX ORDER — LIDOCAINE HYDROCHLORIDE 10 MG/ML
INJECTION, SOLUTION EPIDURAL; INFILTRATION; INTRACAUDAL; PERINEURAL
Status: DISPENSED
Start: 2019-03-19